# Patient Record
Sex: MALE | Race: WHITE | NOT HISPANIC OR LATINO | Employment: OTHER | URBAN - METROPOLITAN AREA
[De-identification: names, ages, dates, MRNs, and addresses within clinical notes are randomized per-mention and may not be internally consistent; named-entity substitution may affect disease eponyms.]

---

## 2017-04-11 DIAGNOSIS — K21.9 ESOPHAGEAL REFLUX: ICD-10-CM

## 2017-04-12 RX ORDER — OMEPRAZOLE 10 MG/1
CAPSULE, DELAYED RELEASE ORAL
Qty: 30 CAPSULE | Refills: 0 | Status: SHIPPED | OUTPATIENT
Start: 2017-04-12 | End: 2017-05-15

## 2017-04-12 NOTE — TELEPHONE ENCOUNTER
Medication is being filled for 1 time refill only due to:  Patient needs to be seen because it has been more than one year since last visit.   Last seen 4/11/16.  Hilary Orozco RN

## 2017-04-25 ENCOUNTER — OFFICE VISIT (OUTPATIENT)
Dept: TRANSPLANT | Facility: CLINIC | Age: 72
End: 2017-04-25
Attending: INTERNAL MEDICINE
Payer: COMMERCIAL

## 2017-04-25 ENCOUNTER — APPOINTMENT (OUTPATIENT)
Dept: LAB | Facility: CLINIC | Age: 72
End: 2017-04-25
Attending: INTERNAL MEDICINE
Payer: COMMERCIAL

## 2017-04-25 VITALS
BODY MASS INDEX: 25.31 KG/M2 | SYSTOLIC BLOOD PRESSURE: 118 MMHG | HEART RATE: 109 BPM | WEIGHT: 159.2 LBS | RESPIRATION RATE: 18 BRPM | DIASTOLIC BLOOD PRESSURE: 79 MMHG | OXYGEN SATURATION: 94 % | TEMPERATURE: 98.5 F

## 2017-04-25 DIAGNOSIS — C91.10 CLL (CHRONIC LYMPHOCYTIC LEUKEMIA) (H): ICD-10-CM

## 2017-04-25 LAB
ALBUMIN SERPL-MCNC: 4.1 G/DL (ref 3.4–5)
ALP SERPL-CCNC: 101 U/L (ref 40–150)
ALT SERPL W P-5'-P-CCNC: 26 U/L (ref 0–70)
ANION GAP SERPL CALCULATED.3IONS-SCNC: 5 MMOL/L (ref 3–14)
AST SERPL W P-5'-P-CCNC: 26 U/L (ref 0–45)
BASOPHILS # BLD AUTO: 0 10E9/L (ref 0–0.2)
BASOPHILS NFR BLD AUTO: 0 %
BILIRUB SERPL-MCNC: 0.8 MG/DL (ref 0.2–1.3)
BUN SERPL-MCNC: 15 MG/DL (ref 7–30)
CALCIUM SERPL-MCNC: 9.1 MG/DL (ref 8.5–10.1)
CHLORIDE SERPL-SCNC: 109 MMOL/L (ref 94–109)
CO2 SERPL-SCNC: 26 MMOL/L (ref 20–32)
CREAT SERPL-MCNC: 1.22 MG/DL (ref 0.66–1.25)
DIFFERENTIAL METHOD BLD: ABNORMAL
EOSINOPHIL # BLD AUTO: 0 10E9/L (ref 0–0.7)
EOSINOPHIL NFR BLD AUTO: 0 %
ERYTHROCYTE [DISTWIDTH] IN BLOOD BY AUTOMATED COUNT: 14 % (ref 10–15)
GFR SERPL CREATININE-BSD FRML MDRD: 59 ML/MIN/1.7M2
GLUCOSE SERPL-MCNC: 103 MG/DL (ref 70–99)
HCT VFR BLD AUTO: 43.3 % (ref 40–53)
HGB BLD-MCNC: 13.8 G/DL (ref 13.3–17.7)
LDH SERPL L TO P-CCNC: 240 U/L (ref 85–227)
LYMPHOCYTES # BLD AUTO: 89.5 10E9/L (ref 0.8–5.3)
LYMPHOCYTES NFR BLD AUTO: 94 %
MCH RBC QN AUTO: 30.4 PG (ref 26.5–33)
MCHC RBC AUTO-ENTMCNC: 31.9 G/DL (ref 31.5–36.5)
MCV RBC AUTO: 95 FL (ref 78–100)
MONOCYTES # BLD AUTO: 1 10E9/L (ref 0–1.3)
MONOCYTES NFR BLD AUTO: 1 %
NEUTROPHILS # BLD AUTO: 4.8 10E9/L (ref 1.6–8.3)
NEUTROPHILS NFR BLD AUTO: 5 %
PLATELET # BLD AUTO: 125 10E9/L (ref 150–450)
POTASSIUM SERPL-SCNC: 4.5 MMOL/L (ref 3.4–5.3)
PROT SERPL-MCNC: 6.9 G/DL (ref 6.8–8.8)
RBC # BLD AUTO: 4.54 10E12/L (ref 4.4–5.9)
RBC MORPH BLD: NORMAL
SODIUM SERPL-SCNC: 140 MMOL/L (ref 133–144)
WBC # BLD AUTO: 95.2 10E9/L (ref 4–11)

## 2017-04-25 PROCEDURE — 36415 COLL VENOUS BLD VENIPUNCTURE: CPT

## 2017-04-25 PROCEDURE — 80053 COMPREHEN METABOLIC PANEL: CPT | Performed by: INTERNAL MEDICINE

## 2017-04-25 PROCEDURE — 83615 LACTATE (LD) (LDH) ENZYME: CPT | Performed by: INTERNAL MEDICINE

## 2017-04-25 PROCEDURE — 85025 COMPLETE CBC W/AUTO DIFF WBC: CPT | Performed by: INTERNAL MEDICINE

## 2017-04-25 PROCEDURE — 99212 OFFICE O/P EST SF 10 MIN: CPT | Mod: ZF

## 2017-04-25 RX ORDER — CHLORAL HYDRATE 500 MG
2 CAPSULE ORAL DAILY
COMMUNITY
End: 2017-07-18

## 2017-04-25 ASSESSMENT — PAIN SCALES - GENERAL: PAINLEVEL: NO PAIN (0)

## 2017-04-25 NOTE — NURSING NOTE
Chief Complaint   Patient presents with     Blood Draw     labs drawn from right arm and vitals taken      Katya Ogden CMA April 25, 2017

## 2017-04-25 NOTE — MR AVS SNAPSHOT
After Visit Summary   4/25/2017    Loco Wood    MRN: [id number]           Patient Information     Date Of Birth          1945        Visit Information        Provider Department      4/25/2017 3:30 PM He Burns MD [facility]        Today's Diagnoses     CLL (chronic lymphocytic leukemia) (H)              [facility] ([facility]  [address] [place] [address]  Phone: [phone]  Clinic Hours:   Monday-Friday:    8am to 4:30pm   Weekends and holidays:    8am to noon (in general)  If your fever is 100.5  or greater,   call the clinic.  After hours call the   hospital at [phone] or   [phone]. Ask for the BMT   fellow for pediatric or adult patients            Follow-ups after your visit        Follow-up notes from your care team     Return for Physical Exam, Lab Work.      Your next 10 appointments already scheduled     May 15, 2017  9:15 AM CDT   PHYSICAL with Jameson Cisse MD   [facility] ([facility])    [address] [place] [id number] [phone]              Who to contact     If you have questions or need follow up information about today's clinic visit or your schedule please contact [facility] directly at [phone].  Normal or non-critical lab and imaging results will be communicated to you by MyChart, letter or phone within 4 business days after the clinic has received the results. If you do not hear from us within 7 days, please contact the clinic through piSocietyhart or phone. If you have a critical or abnormal lab result, we will notify you by phone as soon as possible.  Submit refill requests through [employer] or call your pharmacy and they will forward the refill request to us. Please allow 3 business days for your refill to be completed.          Additional Information About Your Visit        MyChart Information     [employer] gives you  secure access to your electronic health record. If you see a primary care provider, you can also send messages to your care team and make appointments. If you have questions, please call your primary care clinic.  If you do not have a primary care provider, please call [phone] and they will assist you.        Care EveryWhere ID     This is your Care EveryWhere ID. This could be used by other organizations to access your [place] medical records  [id number]        Your Vitals Were     Pulse Temperature Respirations Pulse Oximetry BMI (Body Mass Index)       109 98.5  F (36.9  C) (Oral) 18 94% 25.31 kg/m2        Blood Pressure from Last 3 Encounters:   04/25/17 118/79   10/25/16 111/70   04/11/16 113/69    Weight from Last 3 Encounters:   04/25/17 72.2 kg (159 lb 3.2 oz)   10/25/16 72.6 kg (160 lb)   04/11/16 71.2 kg (157 lb)              We Performed the Following     *CBC with platelets differential     Comprehensive metabolic panel     Lactate Dehydrogenase        Recent Review Flowsheet Data     BMT Recent Results Latest Ref Rng & Units 6/12/2014 9/19/2014 4/29/2015 11/20/2015 4/11/2016 10/25/2016 4/25/2017    WBC 4.0 - 11.0 10e9/L 46.9(H) 37.0(H) 52.9(HH) 54.2(HH) 64.1(HH) 95.6(HH) 95.2(HH)    Hemoglobin 13.3 - 17.7 g/dL 13.9 14.4 14.8 14.0 14.3 13.2(L) 13.8    Platelet Count 150 - 450 10e9/L 133(L) 129(L) 107(L) 116(L) 112(L) 116(L) 125(L)    Neutrophils (Absolute) 1.6 - 8.3 10e9/L - 4.1 3.8 3.3 3.8 6.7 4.8    INR 0.86 - 1.14 - - - - - - -    Sodium 133 - 144 mmol/L - 142 143 140 141 144 140    Potassium 3.4 - 5.3 mmol/L - 4.6 4.2 3.9 4.0 4.4 4.5    Chloride 94 - 109 mmol/L - 107 111(H) 106 106 107 109    Glucose 70 - 99 mg/dL - 82 99 98 92 88 103(H)    Urea Nitrogen 7 - 30 mg/dL - 17 15 19 18 14 15    Creatinine 0.66 - 1.25 mg/dL - 1.19 1.26(H) 1.34(H) 1.23 1.35(H) 1.22    Calcium (Total) 8.5 - 10.1 mg/dL - 9.2 9.3 8.9 9.3 9.0 9.1    Protein (Total) 6.8 - 8.8 g/dL - 6.9 6.7(L) 6.6(L) 7.0 6.6(L) 6.9     Albumin 3.4 - 5.0 g/dL - 4.1 4.0 3.9 4.4 4.0 4.1    Alkaline Phosphatase 40 - 150 U/L - 78 88 75 73 84 101    AST 0 - 45 U/L - 24 18 22 18 24 26    ALT 0 - 70 U/L - 26 22 27 22 26 26    MCV 78 - 100 fl 92 93 94 95 97 96 95               Primary Care Provider Office Phone # Fax #    Jameson Cisse [phone]       [facility] [address] [facility]  [address]  [facility] [address]        Thank you!     Thank you for choosing [facility]  for your care. Our goal is always to provide you with excellent care. Hearing back from our patients is one way we can continue to improve our services. Please take a few minutes to complete the written survey that you may receive in the mail after your visit with us. Thank you!             Your Updated Medication List - Protect others around you: Learn how to safely use, store and throw away your medicines at www.disposemymeds.org.          This list is accurate as of: 4/25/17 11:59 PM.  Always use your most recent med list.                   Brand Name Dispense Instructions for use    atorvastatin 10 MG tablet    LIPITOR    45 tablet    Take 1 tablet (10 mg) by mouth every 48 hours       fish oil-omega-3 fatty acids 1000 MG capsule      Take 2 g by mouth daily       omeprazole 10 MG CR capsule    priLOSEC    30 capsule    TAKE 1 CAPSULE BY MOUTH DAILY - TAKE 30 TO 60 MINUTES BEFORE A MEAL -       order for DME     1 Device    Equipment being ordered: Superfeet - Blue - Size E       sildenafil 100 MG cap/tab    VIAGRA    18 tablet    Take 0.5-1 tablets ( mg) by mouth daily as needed for erectile dysfunction Take 30 min to 4 hours before intercourse.       VITAMIN D3 PO      Take 2,000 Units by mouth

## 2017-04-25 NOTE — PROGRESS NOTES
Hematology/Oncology Evaluation      Loco Wood is a 71 year old male previously followed by Dr. Dias for CLL.      Hematologic history:  Diagnosed 2/12/2007 with CLL, Lyles stage 0, followed clinically since.  At diagnosis WBC 17.8, ALC 11.2, hemoglobin 15.2, platelets 188.  Flow consistent with CLL.  No opportunistic infections, with IgG in the normal range during follow up.    Date Treatment Response Toxicities/Complications    Observation alone.                  HPI:  Please see entry above for hematologic history.  Overall, he has been doing well. He spent his winter in [place] this year. It went well except for it was complicated by bed bugs at whichever place he was renting. He otherwise has done quite well, he denies any major symptoms, his energy level is good. He has no fevers, chills, or night sweats. His weight is stable. He continues to be quite active. He has no new lumps/bumps and no new pain. He has no chest pain or shortness of breath.    RECOMMENDATIONS:  Mr. Wood is being monitored for his early stage CLL. CBC is notable today for elevated WBC of 95 which is stable since last visit, he has had a slow increase in his WBC over the last several years plus this recent jump, but since his last visit it is largely stable. His hemoglobin and platelets are also stable and he has no adenopathy or organomegaly. At this point he does not have any strict indication to start treatment for his CLL. We reviewed that if he develops cytopenias or symptoms that would be an indication to treat. We briefly reviewed treatment options but did not go into great detail as the treatment landscape will likely change over time.     He asked about testosterone which should not have an impact on his CLL. He can discuss with his PCP about this in the future.  We discussed red flag symptoms, including weight loss, fevers, night sweats, increasing adenopathy, or progressive immune dysfunction, for which he  should be seen.     We reviewed:  1. RTC in 6 months with labs at that time  2. RTC sooner if unexplained weight loss, night sweats, increasing adenopathy, or other new symptoms.    Patient seen and discussed with Dr. Grant Hair MD  Heme/Onc Fellow    _______________________________________________    ATTENDING NOTE    I have seen and personally evaluated the patient today.  I reviewed vitals, medications, laboratory results, and I viewed pertinent imaging studies.  After doing so, I formulated a plan with Dr. Hair as documented in this note.  I personally communicated recommendations to the patient.      He Burns MD, pager [phone]         ROS: 10 point ROS neg other than the symptoms noted above in the HPI.          Allergies   Allergen Reactions     Dilaudid [Hydromorphone] Itching     Morphine Itching        Current Outpatient Prescriptions   Medication Sig Dispense Refill     fish oil-omega-3 fatty acids 1000 MG capsule Take 2 g by mouth daily       omeprazole (PRILOSEC) 10 MG CR capsule TAKE 1 CAPSULE BY MOUTH DAILY - TAKE 30 TO 60 MINUTES BEFORE A MEAL -  30 capsule 0     atorvastatin (LIPITOR) 10 MG tablet Take 1 tablet (10 mg) by mouth every 48 hours 45 tablet 9     Cholecalciferol (VITAMIN D3 PO) Take 2,000 Units by mouth       ORDER FOR DME Equipment being ordered: Superfeet - Blue - Size E 1 Device 0     sildenafil (VIAGRA) 100 MG tablet Take 0.5-1 tablets ( mg) by mouth daily as needed for erectile dysfunction Take 30 min to 4 hours before intercourse. 18 tablet prn         Physical Exam:     Vital Signs: /79 (BP Location: Right arm, Patient Position: Chair, Cuff Size: Adult Regular)  Pulse 109  Temp 98.5  F (36.9  C) (Oral)  Resp 18  Wt 72.2 kg (159 lb 3.2 oz)  SpO2 94%  BMI 25.31 kg/m2    KPS:  100%, ECOG 0     General Appearance: healthy, alert and no distress  Eyes: PERRL, conjunctiva and lids normal, sclera nonicteric  Ears/Nose/M/Throat: Oral mucosa and  posterior oropharynx normal, moist mucous membranes  Neck supple, non-tender, free range of motion, no adenopathy  Cardio/Vascular:regular rate and rhythm, normal S1 and S2, no murmur  Resp Effort And Auscultation: Normal - Clear to auscultation without rales, rhonchi, or wheezing.  GI: soft, nontender, no hepatosplenomegaly   Lymphatics:no significant enlargement of lymph nodes globally   Musculoskeletal: Musculoskeletal normal  Edema: none  Skin: Skin color, texture, turgor normal. No rashes or lesions.  Neurologic: negative  Psych/Affect: Mood and affect are appropriate.  Vascular Access:  none      Loco understood the above assessment and recommendations.  Multiple questions answered.  No barriers to learning identified.         Total time: 25 minutes  Counseling time: 20 minutes  Prolonged service:  no      ------------------------------------------------------------------------------------------------------------------------------------------------    Patient Care Team      Relationship Specialty Notifications Start End    Jameson Cisse MD PCP - General   12/2/02     Comment:  per patient    Phone: [phone] Fax: [phone]         [facility] [address] [facility] [address] [address]    Yodit Barnett PA Physician Assistant   12/2/13     Phone: [phone] Fax: [phone]         [address] [address]

## 2017-04-25 NOTE — NURSING NOTE
"Loco Wood is a 71 year old male who presents for:  Chief Complaint   Patient presents with     Blood Draw     labs drawn from right arm and vitals taken      Oncology Clinic Visit     CLL        Initial Vitals:  /79 (BP Location: Right arm, Patient Position: Chair, Cuff Size: Adult Regular)  Pulse 109  Temp 98.5  F (36.9  C) (Oral)  Resp 18  Wt 72.2 kg (159 lb 3.2 oz)  SpO2 94%  BMI 25.31 kg/m2 Estimated body mass index is 25.31 kg/(m^2) as calculated from the following:    Height as of 4/11/16: 1.689 m (5' 6.5\").    Weight as of this encounter: 72.2 kg (159 lb 3.2 oz).. Body surface area is 1.84 meters squared. BP completed using cuff size: NA (Not Taken)  No Pain (0) No LMP for male patient. Allergies and medications reviewed.     Medications: Medication refills not needed today.  Pharmacy name entered into EPIC:    [facility] - [place], [place] - [address] [facility]  [facility] - [place], [place] - [address]    Comments:     5 minutes for nursing intake (face to face time)   NILTON GRECO CMA        "

## 2017-05-15 ENCOUNTER — OFFICE VISIT (OUTPATIENT)
Dept: FAMILY MEDICINE | Facility: CLINIC | Age: 72
End: 2017-05-15
Payer: COMMERCIAL

## 2017-05-15 VITALS
HEIGHT: 67 IN | WEIGHT: 156 LBS | RESPIRATION RATE: 16 BRPM | DIASTOLIC BLOOD PRESSURE: 73 MMHG | SYSTOLIC BLOOD PRESSURE: 121 MMHG | OXYGEN SATURATION: 96 % | BODY MASS INDEX: 24.48 KG/M2 | TEMPERATURE: 96.9 F | HEART RATE: 68 BPM

## 2017-05-15 DIAGNOSIS — K21.9 GASTROESOPHAGEAL REFLUX DISEASE WITHOUT ESOPHAGITIS: ICD-10-CM

## 2017-05-15 DIAGNOSIS — N40.2 PROSTATE NODULE: ICD-10-CM

## 2017-05-15 DIAGNOSIS — Z00.01 ENCOUNTER FOR ROUTINE ADULT HEALTH EXAMINATION WITH ABNORMAL FINDINGS: Primary | ICD-10-CM

## 2017-05-15 DIAGNOSIS — M62.50 MUSCULAR ATROPHY, UNSPECIFIED SITE: ICD-10-CM

## 2017-05-15 DIAGNOSIS — M72.2 PLANTAR FASCIITIS: ICD-10-CM

## 2017-05-15 DIAGNOSIS — E78.5 HYPERLIPIDEMIA LDL GOAL <130: ICD-10-CM

## 2017-05-15 LAB — PSA SERPL-ACNC: 4.76 UG/L (ref 0–4)

## 2017-05-15 PROCEDURE — G0103 PSA SCREENING: HCPCS | Performed by: FAMILY MEDICINE

## 2017-05-15 PROCEDURE — 99397 PER PM REEVAL EST PAT 65+ YR: CPT | Performed by: FAMILY MEDICINE

## 2017-05-15 PROCEDURE — 84403 ASSAY OF TOTAL TESTOSTERONE: CPT | Performed by: FAMILY MEDICINE

## 2017-05-15 PROCEDURE — 36415 COLL VENOUS BLD VENIPUNCTURE: CPT | Performed by: FAMILY MEDICINE

## 2017-05-15 RX ORDER — OMEPRAZOLE 10 MG/1
CAPSULE, DELAYED RELEASE ORAL
Qty: 90 CAPSULE | Refills: 3 | Status: SHIPPED | OUTPATIENT
Start: 2017-05-15 | End: 2018-05-19

## 2017-05-15 RX ORDER — ATORVASTATIN CALCIUM 10 MG/1
10 TABLET, FILM COATED ORAL
Qty: 45 TABLET | Refills: 9 | Status: SHIPPED | OUTPATIENT
Start: 2017-05-15 | End: 2017-12-21

## 2017-05-15 NOTE — PROGRESS NOTES
Well,this is just slightly high, so hard to conclude anything but see what the urologists think next

## 2017-05-15 NOTE — PROGRESS NOTES
SUBJECTIVE:                                                            Loco Wood is a 72 year old male who presents for Preventive Visit.      Are you in the first 12 months of your Medicare coverage?  No    Physical   Annual:     Getting at least 3 servings of Calcium per day::  Yes    Bi-annual eye exam::  Yes    Dental care twice a year::  Yes    Sleep apnea or symptoms of sleep apnea::  None    Frequency of exercise::  2-3 days/week    Duration of exercise::  45-60 minutes    Taking medications regularly::  Yes    Medication side effects::  None    Additional concerns today::  YES      COGNITIVE SCREEN  1) Repeat 3 items (Banana, Sunrise, Chair)    2) Clock draw: NORMAL  3) 3 item recall: Recalls 3 objects  Results: 3 items recalled: COGNITIVE IMPAIRMENT LESS LIKELY    Mini-CogTM Copyright S Hanna. Licensed by the author for use in [facility]; reprinted with permission (nithin@[facility]). All rights reserved.        Reviewed and updated as needed this visit by clinical staff  Meds  Problems         Reviewed and updated as needed this visit by Provider  Meds  Problems        Social History   Substance Use Topics     Smoking status: Never Smoker     Smokeless tobacco: Never Used     Alcohol use 0.0 oz/week     0 Standard drinks or equivalent per week      Comment: 3 drinks per week       The patient does not drink >3 drinks per day nor >7 drinks per week.        Will discuss with provider     Today's PHQ-2 Score:   PHQ-2 ( 1999 [employer]) 5/12/2017   Little interest or pleasure in doing things Not at all   Feeling down, depressed or hopeless Not at all   PHQ-2 Score 0       Do you feel safe in your environment - Yes    Do you have a Health Care Directive?: Yes: Patient states has Advance Directive and will bring in a copy to clinic.    Current providers sharing in care for this patient include:   Patient Care Team:  Jameson Cisse MD as PCP - General  Yodit Barnett PA as Physician  Assistant      Hearing impairment: No    Ability to successfully perform activities of daily living: Yes, no assistance needed     Fall risk:       Home safety:  none identified  click delete button to remove this line now    The following health maintenance items are reviewed in Epic and correct as of today:  Health Maintenance   Topic Date Due     AORTIC ANEURYSM SCREENING (SYSTEM ASSIGNED)  04/28/2012     FALL RISK ASSESSMENT  04/11/2017     ADVANCE DIRECTIVE PLANNING Q5 YRS (NO INBASKET)  08/02/2017     COLONOSCOPY Q5 YR INBASKET MESSAGE  08/14/2017     INFLUENZA VACCINE (SYSTEM ASSIGNED)  09/01/2017     LIPID MONITORING Q1 YEAR( NO INBASKET)  10/25/2017     TETANUS IMMUNIZATION (SYSTEM ASSIGNED)  05/21/2018     PNEUMOCOCCAL  Completed     HEPATITIS C SCREENING  Completed              ROS:he is concerned about his lack of being able to build up muscle tissue, was to see if his testosterone level is okay, initially wanted to take a testosterone supplement which I recommended against unless his level is consistently low on 2 or 3 occasions. I did find a small nodule on his prostate. There is 1 cm swelling in the anterior prostate area that apparently is new. Thus we did a PSA test today and he will see urology. His insurance is about to change as he retires and that may change where he can go.  We also talked about plantar fasciitis, was seen 7 months ago and had a cortisone shot which made a big difference but it's coming back.  C: NEGATIVE for fever, chills, change in weight  I: NEGATIVE for worrisome rashes, moles or lesions  E: NEGATIVE for vision changes or irritation  E/M: NEGATIVE for ear, mouth and throat problems  R: NEGATIVE for significant cough or SOB  B: NEGATIVE for masses, tenderness or discharge  CV: NEGATIVE for chest pain, palpitations or peripheral edema  GI: NEGATIVE for nausea, abdominal pain, heartburn, or change in bowel habits  : NEGATIVE for frequency, dysuria, or hematuria  M: NEGATIVE  "for significant arthralgias or myalgia  N: NEGATIVE for weakness, dizziness or paresthesias  E: NEGATIVE for temperature intolerance, skin/hair changes  H: NEGATIVE for bleeding problems  P: NEGATIVE for changes in mood or affect    Problem list, Medication list, Allergies, and Medical/Social/Surgical histories reviewed in [facility] and updated as appropriate.  OBJECTIVE:                                                            There were no vitals taken for this visit. Estimated body mass index is 25.31 kg/(m^2) as calculated from the following:    Height as of 4/11/16: 5' 6.5\" (1.689 m).    Weight as of 4/25/17: 159 lb 3.2 oz (72.2 kg).  EXAM:   GENERAL: healthy, alert and no distress  EYES: Eyes grossly normal to inspection, PERRL and conjunctivae and sclerae normal  HENT: ear canals and TM's normal, nose and mouth without ulcers or lesions  NECK: no adenopathy, no asymmetry, masses, or scars and thyroid normal to palpation  RESP: lungs clear to auscultation - no rales, rhonchi or wheezes  CV: regular rate and rhythm, normal S1 S2, no S3 or S4, no murmur, click or rub, no peripheral edema and peripheral pulses strong  ABDOMEN: soft, nontender, no hepatosplenomegaly, no masses and bowel sounds normal   (male): normal male genitalia without lesions or urethral discharge, no hernia  RECTAL: normal sphincter tone, no rectal masses and prostate as above  MS: no gross musculoskeletal defects noted, no edema  SKIN: no suspicious lesions or rashes  NEURO: Normal strength and tone, mentation intact and speech normal  PSYCH: mentation appears normal, affect normal/bright    ASSESSMENT / PLAN:                                                            1. Encounter for routine adult health examination with abnormal findings    - GASTROENTEROLOGY ADULT REF PROCEDURE ONLY    2. Gastroesophageal reflux disease without esophagitis    - omeprazole (PRILOSEC) 10 MG CR capsule; TAKE 1 CAPSULE BY MOUTH DAILY - TAKE 30 TO 60 MINUTES " "BEFORE A MEAL -  Dispense: 90 capsule; Refill: 3    3. Hyperlipidemia LDL goal <130    - atorvastatin (LIPITOR) 10 MG tablet; Take 1 tablet (10 mg) by mouth every 48 hours  Dispense: 45 tablet; Refill: 9    4. Muscular atrophy, unspecified site    - Testosterone, total    5. Plantar fasciitis      6. Prostate nodule    - PSA, screen  - UROLOGY ADULT REFERRAL    End of Life Planning:  Patient currently has an advanced directive: Yes.  Practitioner is supportive of decision.    COUNSELING:  Reviewed preventive health counseling, as reflected in patient instructions       Regular exercise       Healthy diet/nutrition        Estimated body mass index is 25.31 kg/(m^2) as calculated from the following:    Height as of 4/11/16: 5' 6.5\" (1.689 m).    Weight as of 4/25/17: 159 lb 3.2 oz (72.2 kg).     reports that he has never smoked. He has never used smokeless tobacco.      Appropriate preventive services were discussed with this patient, including applicable screening as appropriate for cardiovascular disease, diabetes, osteopenia/osteoporosis, and glaucoma.  As appropriate for age/gender, discussed screening for colorectal cancer, prostate cancer, breast cancer, and cervical cancer. Checklist reviewing preventive services available has been given to the patient.    Reviewed patients plan of care and provided an AVS. The Basic Care Plan (routine screening as documented in Health Maintenance) for Loco meets the Care Plan requirement. This Care Plan has been established and reviewed with the Patient.    Counseling Resources:  ATP IV Guidelines  Pooled Cohorts Equation Calculator  Breast Cancer Risk Calculator  FRAX Risk Assessment  ICSI Preventive Guidelines  Dietary Guidelines for Americans, 2010  [employer]s MyPlate  ASA Prophylaxis  Lung CA Screening    Jameson Cisse MD  [facility]  Answers for HPI/ROS submitted by the patient on 5/12/2017   Q1: Little interest or pleasure in doing things: 0=Not at " all  Q2: Feeling down, depressed or hopeless: 0=Not at all  PHQ-2 Score: 0

## 2017-05-15 NOTE — NURSING NOTE
"Chief Complaint   Patient presents with     Wellness Visit     /73  Pulse 68  Temp 96.9  F (36.1  C) (Oral)  Resp 16  Ht 5' 6.5\" (1.689 m)  Wt 156 lb (70.8 kg)  SpO2 96%  BMI 24.8 kg/m2 Estimated body mass index is 24.8 kg/(m^2) as calculated from the following:    Height as of this encounter: 5' 6.5\" (1.689 m).    Weight as of this encounter: 156 lb (70.8 kg).  bp completed using cuff size: regular       Health Maintenance addressed:  NONE    n/a    Maude Lima MA     "

## 2017-05-15 NOTE — MR AVS SNAPSHOT
After Visit Summary   5/15/2017    Loco Wood    MRN: [id number]           Patient Information     Date Of Birth          1945        Visit Information        Provider Department      5/15/2017 9:15 AM Jameson Cisse MD [facility]        Today's Diagnoses     Encounter for routine adult health examination with abnormal findings    -  1    Gastroesophageal reflux disease without esophagitis        Hyperlipidemia LDL goal <130        Muscular atrophy, unspecified site        Plantar fasciitis        Prostate nodule           Follow-ups after your visit        Additional Services     GASTROENTEROLOGY ADULT REF PROCEDURE ONLY       Last Lab Result: Creatinine (mg/dL)       Date                     Value                 04/25/2017               1.22             ----------  There is no height or weight on file to calculate BMI.      Patient will be contacted to schedule procedure.     Please be aware that coverage of these services is subject to the terms and limitations of your health insurance plan.  Call member services at your health plan with any benefit or coverage questions.  Any procedures must be performed at a [facility] facility OR coordinated by your clinic's referral office.    Please bring the following with you to your appointment:    (1) Any X-Rays, CTs or MRIs which have been performed.  Contact the facility where they were done to arrange for  prior to your scheduled appointment.    (2) List of current medications   (3) This referral request   (4) Any documents/labs given to you for this referral            UROLOGY ADULT REFERRAL       Your provider has referred you to: [facility]: [place] [facility] - [place] [phone]   [email or web address]    Please be aware that coverage of these services is subject to the terms and limitations of your health insurance plan.  Call  member services at your health plan with any benefit or coverage questions.      Please bring the following with you to your appointment:    (1) Any X-Rays, CTs or MRIs which have been performed.  Contact the facility where they were done to arrange for  prior to your scheduled appointment.    (2) List of current medications  (3) This referral request   (4) Any documents/labs given to you for this referral                  Your next 10 appointments already scheduled     Oct 24, 2017  4:00 PM CDT   [facility] with  [facility]   [facility] ([facility])    [address] [place] [id number] [phone]            Oct 24, 2017  4:30 PM CDT   RETURN ONC with He Burns MD   [facility] Blood and Marrow Transplant ([facility])    [address] [place] [address] [phone]              Who to contact     If you have questions or need follow up information about today's clinic visit or your schedule please contact [facility] directly at [phone].  Normal or non-critical lab and imaging results will be communicated to you by MyChart, letter or phone within 4 business days after the clinic has received the results. If you do not hear from us within 7 days, please contact the clinic through [employer] or phone. If you have a critical or abnormal lab result, we will notify you by phone as soon as possible.  Submit refill requests through GetYou or call your pharmacy and they will forward the refill request to us. Please allow 3 business days for your refill to be completed.          Additional Information About Your Visit        [employer] Information     [employer] gives you secure access to your electronic health record. If you see a primary care provider, you can also send messages to your care team and make appointments. If you have questions, please call your primary  "care clinic.  If you do not have a primary care provider, please call [phone] and they will assist you.        Care EveryWhere ID     This is your Care EveryWhere ID. This could be used by other organizations to access your [facility] medical records  [id number]        Your Vitals Were     Pulse Temperature Respirations Height Pulse Oximetry BMI (Body Mass Index)    68 96.9  F (36.1  C) (Oral) 16 5' 6.5\" (1.689 m) 96% 24.8 kg/m2       Blood Pressure from Last 3 Encounters:   05/15/17 121/73   04/25/17 118/79   10/25/16 111/70    Weight from Last 3 Encounters:   05/15/17 156 lb (70.8 kg)   04/25/17 159 lb 3.2 oz (72.2 kg)   10/25/16 160 lb (72.6 kg)              We Performed the Following     GASTROENTEROLOGY ADULT REF PROCEDURE ONLY     PSA, screen     Testosterone, total     UROLOGY ADULT REFERRAL          Today's Medication Changes          These changes are accurate as of: 5/15/17 10:33 AM.  If you have any questions, ask your nurse or doctor.               These medicines have changed or have updated prescriptions.        Dose/Directions    omeprazole 10 MG CR capsule   Commonly known as:  priLOSEC   This may have changed:  See the new instructions.   Used for:  Gastroesophageal reflux disease without esophagitis   Changed by:  Jameson Cisse MD        TAKE 1 CAPSULE BY MOUTH DAILY - TAKE 30 TO 60 MINUTES BEFORE A MEAL -   Quantity:  90 capsule   Refills:  3            Where to get your medicines      Some of these will need a paper prescription and others can be bought over the counter.  Ask your nurse if you have questions.     Bring a paper prescription for each of these medications     atorvastatin 10 MG tablet    omeprazole 10 MG CR capsule                Primary Care Provider Office Phone # Fax #    Jameson Cisse [phone]       [address] [address]        Thank you!     Thank you for choosing [facility]  for " your care. Our goal is always to provide you with excellent care. Hearing back from our patients is one way we can continue to improve our services. Please take a few minutes to complete the written survey that you may receive in the mail after your visit with us. Thank you!             Your Updated Medication List - Protect others around you: Learn how to safely use, store and throw away your medicines at www.disposemymeds.org.          This list is accurate as of: 5/15/17 10:33 AM.  Always use your most recent med list.                   Brand Name Dispense Instructions for use    atorvastatin 10 MG tablet    LIPITOR    45 tablet    Take 1 tablet (10 mg) by mouth every 48 hours       fish oil-omega-3 fatty acids 1000 MG capsule      Take 2 g by mouth daily       omeprazole 10 MG CR capsule    priLOSEC    90 capsule    TAKE 1 CAPSULE BY MOUTH DAILY - TAKE 30 TO 60 MINUTES BEFORE A MEAL -       order for DME     1 Device    Equipment being ordered: Superfeet - Blue - Size E       sildenafil 100 MG cap/tab    VIAGRA    18 tablet    Take 0.5-1 tablets ( mg) by mouth daily as needed for erectile dysfunction Take 30 min to 4 hours before intercourse.       VITAMIN D3 PO      Take 2,000 Units by mouth

## 2017-05-17 LAB — TESTOST SERPL-MCNC: 637 NG/DL (ref 240–950)

## 2017-05-18 ENCOUNTER — PRE VISIT (OUTPATIENT)
Dept: UROLOGY | Facility: CLINIC | Age: 72
End: 2017-05-18

## 2017-05-29 ENCOUNTER — HOSPITAL ENCOUNTER (EMERGENCY)
Facility: CLINIC | Age: 72
Discharge: HOME | End: 2017-05-29
Attending: EMERGENCY MEDICINE | Admitting: EMERGENCY MEDICINE
Payer: COMMERCIAL

## 2017-05-29 VITALS
SYSTOLIC BLOOD PRESSURE: 114 MMHG | RESPIRATION RATE: 16 BRPM | DIASTOLIC BLOOD PRESSURE: 71 MMHG | TEMPERATURE: 98.2 F | HEART RATE: 72 BPM | OXYGEN SATURATION: 96 %

## 2017-05-29 DIAGNOSIS — G89.29 CHRONIC MIDLINE LOW BACK PAIN WITHOUT SCIATICA: ICD-10-CM

## 2017-05-29 DIAGNOSIS — M54.50 CHRONIC MIDLINE LOW BACK PAIN WITHOUT SCIATICA: ICD-10-CM

## 2017-05-29 PROCEDURE — 25000128 H RX IP 250 OP 636: Performed by: EMERGENCY MEDICINE

## 2017-05-29 PROCEDURE — 99284 EMERGENCY DEPT VISIT MOD MDM: CPT | Mod: Z6 | Performed by: EMERGENCY MEDICINE

## 2017-05-29 PROCEDURE — 25000132 ZZH RX MED GY IP 250 OP 250 PS 637: Performed by: EMERGENCY MEDICINE

## 2017-05-29 PROCEDURE — 96375 TX/PRO/DX INJ NEW DRUG ADDON: CPT | Performed by: EMERGENCY MEDICINE

## 2017-05-29 PROCEDURE — 96376 TX/PRO/DX INJ SAME DRUG ADON: CPT | Performed by: EMERGENCY MEDICINE

## 2017-05-29 PROCEDURE — 25000125 ZZHC RX 250: Performed by: EMERGENCY MEDICINE

## 2017-05-29 PROCEDURE — 99285 EMERGENCY DEPT VISIT HI MDM: CPT | Mod: 25 | Performed by: EMERGENCY MEDICINE

## 2017-05-29 PROCEDURE — 96374 THER/PROPH/DIAG INJ IV PUSH: CPT | Performed by: EMERGENCY MEDICINE

## 2017-05-29 RX ORDER — DIAZEPAM 10 MG/2ML
2.5 INJECTION, SOLUTION INTRAMUSCULAR; INTRAVENOUS ONCE
Status: COMPLETED | OUTPATIENT
Start: 2017-05-29 | End: 2017-05-29

## 2017-05-29 RX ORDER — DIAZEPAM 5 MG
5 TABLET ORAL EVERY 6 HOURS PRN
Qty: 10 TABLET | Refills: 0 | Status: SHIPPED | OUTPATIENT
Start: 2017-05-29 | End: 2017-05-31

## 2017-05-29 RX ORDER — KETOROLAC TROMETHAMINE 30 MG/ML
30 INJECTION, SOLUTION INTRAMUSCULAR; INTRAVENOUS ONCE
Status: COMPLETED | OUTPATIENT
Start: 2017-05-29 | End: 2017-05-29

## 2017-05-29 RX ORDER — CYCLOBENZAPRINE HCL 10 MG
10 TABLET ORAL ONCE
Status: COMPLETED | OUTPATIENT
Start: 2017-05-29 | End: 2017-05-29

## 2017-05-29 RX ORDER — FENTANYL CITRATE 50 UG/ML
100 INJECTION, SOLUTION INTRAMUSCULAR; INTRAVENOUS ONCE
Status: COMPLETED | OUTPATIENT
Start: 2017-05-29 | End: 2017-05-29

## 2017-05-29 RX ADMIN — KETOROLAC TROMETHAMINE 30 MG: 30 INJECTION, SOLUTION INTRAMUSCULAR at 17:44

## 2017-05-29 RX ADMIN — DIAZEPAM 2.5 MG: 5 INJECTION, SOLUTION INTRAMUSCULAR; INTRAVENOUS at 19:35

## 2017-05-29 RX ADMIN — CYCLOBENZAPRINE HYDROCHLORIDE 10 MG: 10 TABLET, FILM COATED ORAL at 19:34

## 2017-05-29 RX ADMIN — DIAZEPAM 2.5 MG: 5 INJECTION, SOLUTION INTRAMUSCULAR; INTRAVENOUS at 17:43

## 2017-05-29 RX ADMIN — FENTANYL CITRATE 100 MCG: 50 INJECTION, SOLUTION INTRAMUSCULAR; INTRAVENOUS at 20:46

## 2017-05-29 NOTE — ED NOTES
Bed: ED08  Expected date: 5/29/17  Expected time: 5:17 PM  Means of arrival: Ambulance  Comments:  416 73yo male baack pain

## 2017-05-29 NOTE — ED PROVIDER NOTES
History     Chief Complaint   Patient presents with     Back Pain     HPI  Loco Wood is a 72 year old male with a history of CLL, and chronic back pain s/p L4-S1 fusion who presents for evaluation of back pain. Patient complains of non-radiating, bilateral lower back pain with associated back spasms that began today while he was at the gym. He denies numbness or weakness. No saddle anesthesia. No changes in bowel or bladder movements. He is not followed by a chiropractor or in physical therapy.     I have reviewed the Medications, Allergies, Past Medical and Surgical History, and Social History in the [employer] system.  Past Medical History:   Diagnosis Date     Arthritis     hip and toes     Chronic pain      CLL (chronic lymphocytic leukaemia)      Esophageal reflux      Malignant neoplasm (H)     Leukemia     PONV (postoperative nausea and vomiting)      Pure hypercholesterolemia        Past Surgical History:   Procedure Laterality Date     ARTHROPLASTY MINIMALLY INVASIVE HIP  5/10/2013    Procedure: ARTHROPLASTY MINIMALLY INVASIVE HIP;  Left Two Incision Total Hip Arthroplasty ;  Surgeon: Desmond Alberts MD;  Location: [facility] OR     ARTHROPLASTY MINIMALLY INVASIVE HIP  2/27/2014    Procedure: ARTHROPLASTY MINIMALLY INVASIVE HIP;  Right Total Hip Arthroplasty Minimally Invasive Two Incision  *Latex Free Room;  Surgeon: Desmond Alberts MD;  Location: [facility] OR     BACK SURGERY      back fusion 1994     C NONSPECIFIC PROCEDURE  1964 &'95    (R) inguinal herniorrhapy     C NONSPECIFIC PROCEDURE  1949    (L) inguinal herniorrhaphy     C NONSPECIFIC PROCEDURE  1994    L4-5  L5 S1 fusion - spondylolisthesis     COLONOSCOPY      2007     GI SURGERY      4 hernia repairs in childhood       Family History   Problem Relation Age of Onset     HEART DISEASE Father      CEREBROVASCULAR DISEASE Father      CANCER Father      melonoma     Melanoma Father      CANCER Mother      Lung cancer     CEREBROVASCULAR DISEASE  Paternal Uncle      Aneurism     Breast Cancer Maternal Aunt       from it     CANCER Paternal Uncle      CANCER Paternal Aunt      Skin Cancer No family hx of        Social History   Substance Use Topics     Smoking status: Never Smoker     Smokeless tobacco: Never Used     Alcohol use 0.0 oz/week     0 Standard drinks or equivalent per week      Comment: 3 drinks per week     No current facility-administered medications for this encounter.      Current Outpatient Prescriptions   Medication     diazepam (VALIUM) 5 MG tablet     omeprazole (PRILOSEC) 10 MG CR capsule     atorvastatin (LIPITOR) 10 MG tablet     fish oil-omega-3 fatty acids 1000 MG capsule     Cholecalciferol (VITAMIN D3 PO)     ORDER FOR DME     sildenafil (VIAGRA) 100 MG tablet        Allergies   Allergen Reactions     Dilaudid [Hydromorphone] Itching     Morphine Itching       Review of Systems  ROS: 14 point ROS neg other than the symptoms noted above in the HPI.    Physical Exam      Physical Exam   Constitutional: He is oriented to person, place, and time. He appears well-developed and well-nourished. No distress.   HENT:   Head: Normocephalic and atraumatic.   Eyes: No scleral icterus.   Neck: Normal range of motion. Neck supple.   Pulmonary/Chest: Effort normal.   Musculoskeletal:        Lumbar back: He exhibits decreased range of motion, tenderness, pain and spasm. He exhibits no bony tenderness.   Neurological: He is alert and oriented to person, place, and time.   Skin: Skin is warm and dry. No rash noted. He is not diaphoretic. No erythema. No pallor.       ED Course     ED Course     Procedures       5:34 PM  The patient was seen and examined by Dr. Thomas in Room 8.          Critical Care time:  none               Labs Ordered and Resulted from Time of ED Arrival Up to the Time of Departure from the ED - No data to display         Assessments & Plan (with Medical Decision Making)   This is a 71 y/o male coming to the ED with lower  back pain after working out at the gym today. He admits to chronic lower back pain. Today he is obvious tense in his lower back but has no signs of cauda equina. After 2 doses of valium, flexeril and fentanyl he was able to stand and ambulate without issue. I believe he can be safely discharged with close follow up with his PCP.     I have reviewed the nursing notes.    I have reviewed the findings, diagnosis, plan and need for follow up with the patient.    New Prescriptions    DIAZEPAM (VALIUM) 5 MG TABLET    Take 1 tablet (5 mg) by mouth every 6 hours as needed for anxiety or sleep (MUSCLE SPASM)       Final diagnoses:   Chronic midline low back pain without sciatica   I, Iona Norton, am serving as a trained medical scribe to document services personally performed by Rah Thomas MD, based on the provider's statements to me.   Rah ASCENCIO MD, was physically present and have reviewed and verified the accuracy of this note documented by Iona Norton.      5/29/2017   [facility], [place], EMERGENCY DEPARTMENT     Rah Thomas MD  05/29/17 2016

## 2017-05-29 NOTE — ED AVS SNAPSHOT
" [facility], Emergency Department    [address]    [facility] [place] [address]    Phone:  [phone]    Fax:  [phone]                                       Loco Wood   MRN: [id number]    Department:  [facility], Emergency Department   Date of Visit:  5/29/2017           Patient Information     Date Of Birth          1945        Your diagnoses for this visit were:     Chronic midline low back pain without sciatica        You were seen by Rah Thomas MD.        Discharge Instructions         Neck/Back Pain: General    Both neck and back pain are usually caused by injury to the muscles or ligaments of the spine. Sometimes the disks that separate each bone of the spine may cause pain by pressing on a nearby nerve. Back and neck pain may appear after a sudden twisting/bending force (such as in a car accident), or sometimes after a simple awkward movement. In either case, muscle spasm is often present and adds to the pain.  Acute neck and back pain usually gets better in 1 to 2 weeks. Pain related to disk disease, arthritis in the spinal joints or spinal stenosis (narrowing of the spinal canal) can become chronic and last for months or years.  Back and neck pain are common problems. Most people feel better in 1 or 2 weeks, and most of the rest in 1 to 2 months. Most people can remain active.  Symptoms  People experience and describe pain differently.    Pain can be sharp, stabbing, shooting, aching, cramping, or burning    Movement, standing, bending, lifting, sitting, or walking may worsen the pain    Pain can be localized to one spot or area, or it can be more generalized    Pain can spread or radiate upwards, downwards, to the front, or go down your arms    Muscle spasm may occur.  Cause  Most of the time \"mechanical problems\" with the muscles or spine cause the pain. it is usually caused by an injury, whether known or not, to the muscles or ligaments. While illnesses can cause " back pain, it is usually not caused by a serious illness. Pain is usually related to physical activity, whether sports, exercise, work, or normal activity. Sometimes it can occur without an identifiable cause. This can happen simply by stretching or moving wrong, without noting pain at the time. Other causes include:    Overexertion, lifting, pushing, pulling incorrectly or too aggressively.    Sudden twisting, bending or stretching from an accident (car or fall), or accidental movement.    Poor posture    Poor conditioning, lack of regular exercise    Spinal disc disease or arthritis    Stress    Pregnancy, or illness like appendicitis, bladder or kidney infection, pelvic infections   Home care    For neck pain: Use a comfortable pillow that supports the head and keeps the spine in a neutral position. The position of the head should not be tilted forward or backward.    When in bed, try to find a position of comfort. A firm mattress is best. Try lying flat on your back with pillows under your knees. You can also try lying on your side with your knees bent up towards your chest and a pillow between your knees.    At first, do not try to stretch out the sore spots. If there is a strain, it is not like the good soreness you get after exercising without an injury. In this case, stretching may make it worse.    Avoid prolonged sitting, long car rides or travel. This puts more stress on the lower back than standing or walking.    During the first 24 to 72 hours after an injury, apply an ice pack to the painful area for 20 minutes and then remove it for 20 minutes over a period of 60 to 90 minutes or several times a day. As a safety precaution, do not use a heating pad at bedtime. Sleeping with a heating pad can lead to skin burns or tissue damage.    Ice and heat therapies can be alternated. Talk with your health care provider about the best treatment for your back or neck pain.    Therapeutic massage can help relax the  back and neck muscles without stretching them.    Be aware of safe lifting methods and do not lift anything over 15 pounds until all the pain is gone.  Medications  Talk to your health care provider before using medications, especially if you have other medical problems or are taking other medicines.    You may use acetaminophen (such as Tylenol) or ibuprofen (such as Advil or Motrin) to control pain, unless another pain medicine was prescribed. If you have chronic conditions like diabetes, liver or kidney disease, stomach ulcers,  gastrointestinal bleeding, or are taking blood thinner medications.    Be careful if you are given pain medicines, narcotics, or medication for muscle spasm. They can cause drowsiness, and can affect your coordination, reflexes, and judgment. Do not drive or operate heavy machinery.  Follow-up care  Follow up with your health care provider if your symptoms do not start to improve after one week. Physical therapy or further tests may be needed.  If X-rays were taken, they will be reviewed by a radiologist. You will be notified of any new findings that may affect your care.  Call [phone]  Seek emergency medical care if any of the following occur:    Trouble breathing    Confusion    Very drowsy or trouble awakening    Fainting or loss of consciousness    Rapid or very slow heart rate    Loss of bowel or bladder control  When to seek medical care  Get prompt medical attention if any of the following occur:    Pain becomes worse or spreads into your arms or legs    Weakness, numbness or pain in one or both arms or legs    Numbness in the groin area    Difficulty walking    Fever of 100.4 F (38 C) or higher, or as directed by your healthcare provider    4003-0257 [employer]. [address] [place] [address], [place] [address]. All rights reserved. This information is not intended as a substitute for professional medical care. Always follow your healthcare professional's  instructions.          Future Appointments        Provider Department Dept Phone Center    6/16/2017 9:20 AM Campos Boyd MD [facility]rolog[facility] [phone] [facility]    10/24/2017 4:00 PM [facility] [phone] [facility]    10/24/2017 4:30 PM He Burns MD [facility] Marrow Transplant [phone] [facility]      24 Hour Appointment Hotline       To make an appointment at any [facility], call [phone] ([phone]). If you don't have a family doctor or clinic, we will help you find one. [facility] clinics are conveniently located to serve the needs of you and your family.             Review of your medicines      START taking        Dose / Directions Last dose taken    diazepam 5 MG tablet   Commonly known as:  VALIUM   Dose:  5 mg   Quantity:  10 tablet        Take 1 tablet (5 mg) by mouth every 6 hours as needed for anxiety or sleep (MUSCLE SPASM)   Refills:  0          Our records show that you are taking the medicines listed below. If these are incorrect, please call your family doctor or clinic.        Dose / Directions Last dose taken    atorvastatin 10 MG tablet   Commonly known as:  LIPITOR   Dose:  10 mg   Quantity:  45 tablet        Take 1 tablet (10 mg) by mouth every 48 hours   Refills:  9        fish oil-omega-3 fatty acids 1000 MG capsule   Dose:  2 g        Take 2 g by mouth daily   Refills:  0        omeprazole 10 MG CR capsule   Commonly known as:  priLOSEC   Quantity:  90 capsule        TAKE 1 CAPSULE BY MOUTH DAILY - TAKE 30 TO 60 MINUTES BEFORE A MEAL -   Refills:  3        order for DME   Quantity:  1 Device        Equipment being ordered: Superfeet - Blue - Size E   Refills:  0        sildenafil 100 MG cap/tab   Commonly known as:  VIAGRA   Dose:   mg   Quantity:  18 tablet        Take 0.5-1 tablets ( mg) by mouth daily as needed for erectile dysfunction Take 30 min to 4 hours before  intercourse.   Refills:  prn        VITAMIN D3 PO   Dose:  2000 Units        Take 2,000 Units by mouth   Refills:  0                Prescriptions were sent or printed at these locations (1 Prescription)                   Other Prescriptions                Printed at Department/Unit printer (1 of 1)         diazepam (VALIUM) 5 MG tablet                Orders Needing Specimen Collection     None      Pending Results     No orders found from 5/27/2017 to 5/30/2017.            Pending Culture Results     No orders found from 5/27/2017 to 5/30/2017.            Pending Results Instructions     If you had any lab results that were not finalized at the time of your Discharge, you can call the ED Lab Result RN at [phone]. You will be contacted by this team for any positive Lab results or changes in treatment. The nurses are available 7 days a week from 10A to 6:30P.  You can leave a message 24 hours per day and they will return your call.        Thank you for choosing [facility]       Thank you for choosing [facility] for your care. Our goal is always to provide you with excellent care. Hearing back from our patients is one way we can continue to improve our services. Please take a few minutes to complete the written survey that you may receive in the mail after you visit with us. Thank you!        [employer] Information     [employer] gives you secure access to your electronic health record. If you see a primary care provider, you can also send messages to your care team and make appointments. If you have questions, please call your primary care clinic.  If you do not have a primary care provider, please call [phone] and they will assist you.        Care EveryWhere ID     This is your Care EveryWhere ID. This could be used by other organizations to access your [facility] medical records  [id number]        After Visit Summary       This is your record. Keep this with you and show to your community pharmacist(s) and doctor(s)  at your next visit.

## 2017-05-29 NOTE — ED AVS SNAPSHOT
[facility], Emergency Department    [address]    [facility] [address]    Phone:  [phone]    Fax:  [phone]                                       Loco Wood   MRN: [id number]    Department:  [facility], Emergency Department   Date of Visit:  5/29/2017           After Visit Summary Signature Page     I have received my discharge instructions, and my questions have been answered. I have discussed any challenges I see with this plan with the nurse or doctor.    ..........................................................................................................................................  Patient/Patient Representative Signature      ..........................................................................................................................................  Patient Representative Print Name and Relationship to Patient    ..................................................               ................................................  Date                                            Time    ..........................................................................................................................................  Reviewed by Signature/Title    ...................................................              ..............................................  Date                                                            Time

## 2017-05-30 NOTE — ED NOTES
Pt states he continues to be in a lot of pain and doesn't want to try and sit up. States he can roll side to side without difficulty.

## 2017-05-30 NOTE — DISCHARGE INSTRUCTIONS
"  Neck/Back Pain: General    Both neck and back pain are usually caused by injury to the muscles or ligaments of the spine. Sometimes the disks that separate each bone of the spine may cause pain by pressing on a nearby nerve. Back and neck pain may appear after a sudden twisting/bending force (such as in a car accident), or sometimes after a simple awkward movement. In either case, muscle spasm is often present and adds to the pain.  Acute neck and back pain usually gets better in 1 to 2 weeks. Pain related to disk disease, arthritis in the spinal joints or spinal stenosis (narrowing of the spinal canal) can become chronic and last for months or years.  Back and neck pain are common problems. Most people feel better in 1 or 2 weeks, and most of the rest in 1 to 2 months. Most people can remain active.  Symptoms  People experience and describe pain differently.    Pain can be sharp, stabbing, shooting, aching, cramping, or burning    Movement, standing, bending, lifting, sitting, or walking may worsen the pain    Pain can be localized to one spot or area, or it can be more generalized    Pain can spread or radiate upwards, downwards, to the front, or go down your arms    Muscle spasm may occur.  Cause  Most of the time \"mechanical problems\" with the muscles or spine cause the pain. it is usually caused by an injury, whether known or not, to the muscles or ligaments. While illnesses can cause back pain, it is usually not caused by a serious illness. Pain is usually related to physical activity, whether sports, exercise, work, or normal activity. Sometimes it can occur without an identifiable cause. This can happen simply by stretching or moving wrong, without noting pain at the time. Other causes include:    Overexertion, lifting, pushing, pulling incorrectly or too aggressively.    Sudden twisting, bending or stretching from an accident (car or fall), or accidental movement.    Poor posture    Poor conditioning, " lack of regular exercise    Spinal disc disease or arthritis    Stress    Pregnancy, or illness like appendicitis, bladder or kidney infection, pelvic infections   Home care    For neck pain: Use a comfortable pillow that supports the head and keeps the spine in a neutral position. The position of the head should not be tilted forward or backward.    When in bed, try to find a position of comfort. A firm mattress is best. Try lying flat on your back with pillows under your knees. You can also try lying on your side with your knees bent up towards your chest and a pillow between your knees.    At first, do not try to stretch out the sore spots. If there is a strain, it is not like the good soreness you get after exercising without an injury. In this case, stretching may make it worse.    Avoid prolonged sitting, long car rides or travel. This puts more stress on the lower back than standing or walking.    During the first 24 to 72 hours after an injury, apply an ice pack to the painful area for 20 minutes and then remove it for 20 minutes over a period of 60 to 90 minutes or several times a day. As a safety precaution, do not use a heating pad at bedtime. Sleeping with a heating pad can lead to skin burns or tissue damage.    Ice and heat therapies can be alternated. Talk with your health care provider about the best treatment for your back or neck pain.    Therapeutic massage can help relax the back and neck muscles without stretching them.    Be aware of safe lifting methods and do not lift anything over 15 pounds until all the pain is gone.  Medications  Talk to your health care provider before using medications, especially if you have other medical problems or are taking other medicines.    You may use acetaminophen (such as Tylenol) or ibuprofen (such as Advil or Motrin) to control pain, unless another pain medicine was prescribed. If you have chronic conditions like diabetes, liver or kidney disease, stomach  ulcers,  gastrointestinal bleeding, or are taking blood thinner medications.    Be careful if you are given pain medicines, narcotics, or medication for muscle spasm. They can cause drowsiness, and can affect your coordination, reflexes, and judgment. Do not drive or operate heavy machinery.  Follow-up care  Follow up with your health care provider if your symptoms do not start to improve after one week. Physical therapy or further tests may be needed.  If X-rays were taken, they will be reviewed by a radiologist. You will be notified of any new findings that may affect your care.  Call 911  Seek emergency medical care if any of the following occur:    Trouble breathing    Confusion    Very drowsy or trouble awakening    Fainting or loss of consciousness    Rapid or very slow heart rate    Loss of bowel or bladder control  When to seek medical care  Get prompt medical attention if any of the following occur:    Pain becomes worse or spreads into your arms or legs    Weakness, numbness or pain in one or both arms or legs    Numbness in the groin area    Difficulty walking    Fever of 100.4 F (38 C) or higher, or as directed by your healthcare provider    2848-2207 [employer]. [address] [place] [id number]. All rights reserved. This information is not intended as a substitute for professional medical care. Always follow your healthcare professional's instructions.

## 2017-05-31 ENCOUNTER — TELEPHONE (OUTPATIENT)
Dept: FAMILY MEDICINE | Facility: CLINIC | Age: 72
End: 2017-05-31

## 2017-05-31 DIAGNOSIS — M54.50 ACUTE BILATERAL LOW BACK PAIN WITHOUT SCIATICA: Primary | ICD-10-CM

## 2017-05-31 RX ORDER — METHYLPREDNISOLONE 4 MG
TABLET, DOSE PACK ORAL
Qty: 21 TABLET | Refills: 0 | Status: SHIPPED | OUTPATIENT
Start: 2017-05-31 | End: 2017-07-18

## 2017-05-31 RX ORDER — DIAZEPAM 5 MG
5 TABLET ORAL EVERY 6 HOURS PRN
Qty: 20 TABLET | Refills: 0 | Status: SHIPPED | OUTPATIENT
Start: 2017-05-31 | End: 2017-10-03

## 2017-05-31 NOTE — TELEPHONE ENCOUNTER
"MP,  Patient was in the ER for back pain 5/29/2017  Was sent home with #10 Valium    Pt states pain is still bad; having difficulties moving  States ER kicked him out once he could get up.    Patient requesting orders for Epidural and \"scans\"  Said it's been years since his last scans of his back  Also had history of getting Epidurals he said.  Told pt he would need to be seen to discuss orders for these meds    You have no openings; pt unable to come in d/t pain anyway.  Pt states he can barely move from the pain much less come in for an appointment.  Pharmacy pended if you want to send in alternative med  Please advise.  Delmy DURÁN RN    Assessments & Plan (with Medical Decision Making)   This is a 73 y/o male coming to the ED with lower back pain after working out at the gym today. He admits to   chronic lower back pain. Today he is obvious tense in his lower back but has no signs of cauda equina. After   2 doses of valium, flexeril and fentanyl he was able to stand and ambulate without issue. I believe he can be   safely discharged with close follow up with his PCP.      I have reviewed the nursing notes.     I have reviewed the findings, diagnosis, plan and need for follow up with the patient.          New Prescriptions     DIAZEPAM (VALIUM) 5 MG TABLET    Take 1 tablet (5 mg) by mouth every 6 hours as needed for anxiety or sleep (MUSCLE SPASM)       "

## 2017-05-31 NOTE — TELEPHONE ENCOUNTER
Reason for call:  Patient reporting a symptom    Symptom or request: back went, pt states he cannot move    Duration (how long have symptoms been present): 5/29     Have you been treated for this before? Yes    Additional comments: Pt was in ED on 5/29   He was given IV medication.     Phone Number patient can be reached at:  Home number on file [phone] (home)    Best Time:  anytime    Can we leave a detailed message on this number:  YES    Call taken on 5/31/2017 at 12:22 PM by Christelle Ceballos

## 2017-05-31 NOTE — TELEPHONE ENCOUNTER
Feels like what he had years ago. Last MRI 2013.  Can't do another MRI due to metal. Not radiating. Has vicodin at home from dentist. Will send medrol and refill valium and see if things turn around. Neighbor can p/u

## 2017-06-02 ASSESSMENT — ENCOUNTER SYMPTOMS
NECK PAIN: 1
BACK PAIN: 1

## 2017-06-12 ENCOUNTER — PRE VISIT (OUTPATIENT)
Dept: UROLOGY | Facility: CLINIC | Age: 72
End: 2017-06-12

## 2017-06-16 ENCOUNTER — OFFICE VISIT (OUTPATIENT)
Dept: UROLOGY | Facility: CLINIC | Age: 72
End: 2017-06-16

## 2017-06-16 VITALS
BODY MASS INDEX: 25 KG/M2 | SYSTOLIC BLOOD PRESSURE: 117 MMHG | WEIGHT: 159.3 LBS | DIASTOLIC BLOOD PRESSURE: 66 MMHG | HEIGHT: 67 IN | HEART RATE: 74 BPM

## 2017-06-16 DIAGNOSIS — N40.2 PROSTATE NODULE: Primary | ICD-10-CM

## 2017-06-16 ASSESSMENT — PAIN SCALES - GENERAL: PAINLEVEL: NO PAIN (0)

## 2017-06-16 NOTE — PROGRESS NOTES
ASSESSMENT AND PLAN  Prostate Nodule and borderline elevated PSA.    Based on [facility] Risk Calculator:  13% chance of high-grade prostate cancer,  19% chance of low-grade cancer,  68% chance that the biopsy is negative for cancer.  About 2 to 4% of men undergoing biopsy will have an infection that may require hospitalization.    We discussed biopsy vs observation in 6 months.  He is aware of risks of prostate cancer.    He would like follow-up in 6 months with PSA.    __________________________________________________    CHIEF COMPLAINT  It was my pleasure to see Loco Wood who is a 72 year old male here for prostate nodule.      HPI  He is here since Dr Gibson found a prostate nodule.    He has had a slow stream for many years but this is not bothersome.  He has minimal nocturia.    He denies gross hematuria.    He uses viagra with good results.    He denies family history of prostate cancer.    He has history of CLL.  This was diagnosed 10 years ago and he is on watchful waiting.    PSA   Date Value Ref Range Status   05/15/2017 4.76 (H) 0 - 4 ug/L Final     Comment:     Assay Method:  Chemiluminescence using Siemens Vista analyzer   08/01/2011 1.47 0 - 4 ug/L Final   07/27/2010 1.44 0 - 4 ug/L Final   05/26/2009 1.17 0 - 4 ug/L Final     Patient Active Problem List    Diagnosis Date Noted     Acute bilateral low back pain without sciatica 05/31/2017     Priority: Medium     BPH (benign prostatic hyperplasia) 04/29/2015     Priority: Medium     Insomnia 06/12/2014     Priority: Medium     OA (osteoarthritis) of hip 05/10/2013     Priority: Medium     Osteoarthritis of hip 04/29/2013     Priority: Medium     Do you wish to do the replacement in the background? yes         Advanced directives, counseling/discussion 08/02/2012     Priority: Medium     Patient states has Advance Directive and will bring in a copy to clinic. 8/2/2012   TISH Shah CMA           Vitamin D deficiency 04/30/2012     Priority:  Medium     Health Care Home 06/29/2011     Priority: Medium     x  DX V65.8 REPLACED WITH 97891 [facility]ALTH CARE HOME (04/08/2013)       Mass of skin 03/28/2011     Priority: Medium     Left thumb.       HYPERLIPIDEMIA LDL GOAL <130 10/31/2010     Priority: Medium     CLL (chronic lymphocytic leukemia) (H) 05/11/2007     Priority: Medium     Lumbago 08/04/2006     Priority: Medium     Esophageal reflux 06/23/2005     Priority: Medium     Past Medical History:   Diagnosis Date     Arthritis     hip and toes     Chronic pain      CLL (chronic lymphocytic leukaemia)      Esophageal reflux      Malignant neoplasm (H)     Leukemia     PONV (postoperative nausea and vomiting)      Pure hypercholesterolemia      Past Surgical History:   Procedure Laterality Date     ARTHROPLASTY MINIMALLY INVASIVE HIP  5/10/2013    Procedure: ARTHROPLASTY MINIMALLY INVASIVE HIP;  Left Two Incision Total Hip Arthroplasty ;  Surgeon: Desmond Alberts MD;  Location: [facility] OR     ARTHROPLASTY MINIMALLY INVASIVE HIP  2/27/2014    Procedure: ARTHROPLASTY MINIMALLY INVASIVE HIP;  Right Total Hip Arthroplasty Minimally Invasive Two Incision  *Latex Free Room;  Surgeon: Desmond Alberts MD;  Location: [facility] OR     BACK SURGERY      back fusion 1994     C NONSPECIFIC PROCEDURE  1964 &'95    (R) inguinal herniorrhapy     C NONSPECIFIC PROCEDURE  1949    (L) inguinal herniorrhaphy     C NONSPECIFIC PROCEDURE  1994    L4-5  L5 S1 fusion - spondylolisthesis     COLONOSCOPY      2007     GI SURGERY      4 hernia repairs in childhood     Current Outpatient Prescriptions   Medication Sig Dispense Refill     methylPREDNISolone (MEDROL DOSEPAK) 4 MG tablet Follow package instructions 21 tablet 0     diazepam (VALIUM) 5 MG tablet Take 1 tablet (5 mg) by mouth every 6 hours as needed for anxiety or sleep (MUSCLE SPASM) 20 tablet 0     omeprazole (PRILOSEC) 10 MG CR capsule TAKE 1 CAPSULE BY MOUTH DAILY - TAKE 30 TO 60 MINUTES BEFORE A MEAL - 90 capsule 3      "atorvastatin (LIPITOR) 10 MG tablet Take 1 tablet (10 mg) by mouth every 48 hours 45 tablet 9     fish oil-omega-3 fatty acids 1000 MG capsule Take 2 g by mouth daily       Cholecalciferol (VITAMIN D3 PO) Take 2,000 Units by mouth       ORDER FOR DME Equipment being ordered: Superfeet - Blue - Size E 1 Device 0     sildenafil (VIAGRA) 100 MG tablet Take 0.5-1 tablets ( mg) by mouth daily as needed for erectile dysfunction Take 30 min to 4 hours before intercourse. 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[employer]     Social History Main Topics     Smoking status: Never Smoker     Smokeless tobacco: Never Used     Alcohol use 0.0 oz/week     0 Standard drinks or equivalent per week      Comment: 3 drinks per week     Drug use: No     Sexual activity: Yes     Partners: Female       REVIEW OF SYSTEMS  Answers for HPI/ROS submitted by the patient on 2017   General Symptoms: No  Skin Symptoms: No  HENT Symptoms: No  EYE SYMPTOMS: No  HEART SYMPTOMS: No  LUNG SYMPTOMS: No  INTESTINAL SYMPTOMS: No  URINARY SYMPTOMS: No  REPRODUCTIVE SYMPTOMS: No  SKELETAL SYMPTOMS: Yes  BLOOD SYMPTOMS: No  NERVOUS SYSTEM SYMPTOMS: No  MENTAL HEALTH SYMPTOMS: No  Back pain: Yes  Neck pain: Yes      PHYSICAL EXAM  Vitals:    17 0900   BP: 117/66   Pulse: 74   Weight: 72.3 kg (159 lb 4.8 oz)   Height: 1.702 m (5' 7\")     Wt Readings from Last 3 Encounters:   17 72.3 kg (159 lb 4.8 " oz)   05/15/17 70.8 kg (156 lb)   04/25/17 72.2 kg (159 lb 3.2 oz)     Constitutional: Alert, no acute distress  Psychiatric: Normal mood and affect  Head: Normocephalic. No masses, lesions, tenderness or abnormalities  Neck: Neck supple. No adenopathy. Thyroid symmetric, normal size  ENT: Oropharynx clear.  Back: No spinal tenderness.  No costovertebral angle tenderness  Cardiovascular: RRR. No murmurs, clicks gallops or rub  Respiratory: Good diaphragmatic excursion. Lungs clear  Gastrointestinal: Abdomen soft, non-tender. No masses, organomegaly. No hernia  Skin: no suspicious lesions or rashes on abdomen  Extremities: No lower extremity edema.  No clubbing or cyanosis.  Neurologic:  Cranial nerves grossly intact.  Equal strength and sensation on bilateral extremities.   : Penis is circumcised and without masses/plaques.  Bilateral testis are descended and without masses.  Scrotum is without any lesions.  Digital rectal exam shows normal sphincter tone.  Prostate is approximately 30grams and has a 5-10mm subtle left sided nodule at the base.       Recent Labs   Lab Test  04/25/17   1443  10/25/16   1543  04/11/16   1136  11/20/15   1156   WBC  95.2*  95.6*  64.1*  54.2*   HGB  13.8  13.2*  14.3  14.0   HCT  43.3  41.0  44.7  43.0   PLT  125*  116*  112*  116*     Recent Labs   Lab Test  04/25/17   1443  10/25/16   1543  04/11/16   1136  11/20/15   1156   NA  140  144  141  140   POTASSIUM  4.5  4.4  4.0  3.9   CHLORIDE  109  107  106  106   CO2  26  29  28  30   ANIONGAP  5  8  7  4   GLC  103*  88  92  98   BUN  15  14  18  19   CR  1.22  1.35*  1.23  1.34*   GFRESTIMATED  59*  52*  58*  53*   GFRESTBLACK  71  63  71  64   DAVID  9.1  9.0  9.3  8.9     Recent Labs   Lab Test  03/02/14   1115  02/17/14   1117   COLOR  Yellow  Yellow   APPEARANCE  Clear  Clear   URINEGLC  Negative  Negative   URINEBILI  Negative  Negative   URINEKETONE  Negative  Trace*   SG  1.011  1.025   UBLD  Negative  Negative   URINEPH  6.5   5.0   PROTEIN  Negative  Negative   UROBILINOGEN   --   0.2   NITRITE  Negative  Negative   LEUKEST  Negative  Negative   RBCU  2   --    WBCU  <1   --        Jermaine ASCENCIO, reviewed these laboratory values.        CC  Patient Care Team:  Jameson Cisse MD as PCP - Yodit Bell PA as Physician Assistant  Campos Boyd MD as MD (Urology)  Padmini Whitney, RN as Registered Nurse  JAMESON CISSE    Copy to patient  ATIF JULIEN [facility]  [address] [facility] [address] [facility] [id number]

## 2017-06-16 NOTE — NURSING NOTE
Chief Complaint   Patient presents with     Consult     prostate nodule per pt    Christo Richardson LPN

## 2017-06-16 NOTE — LETTER
6/16/2017       RE: Loco Wood  [address] [facility] [address]     Dear Colleague,    Thank you for referring your patient, Loco Wood, to the [facility] UROLOGY AND INST FOR PROSTATE AND UROLOGIC CANCERS at [facility]. Please see a copy of my visit note below.    ASSESSMENT AND PLAN  Prostate Nodule and borderline elevated PSA.    Based on Addison Risk Calculator:  13% chance of high-grade prostate cancer,  19% chance of low-grade cancer,  68% chance that the biopsy is negative for cancer.  About 2 to 4% of men undergoing biopsy will have an infection that may require hospitalization.    We discussed biopsy vs observation in 6 months.  He is aware of risks of prostate cancer.    He would like follow-up in 6 months with PSA.  __________________________________________________    CHIEF COMPLAINT  It was my pleasure to see Loco Wood who is a 72 year old male here for prostate nodule.      HPI  He is here since Dr Gibson found a prostate nodule.    He has had a slow stream for many years but this is not bothersome.  He has minimal nocturia.    He denies gross hematuria.    He uses viagra with good results.  He denies family history of prostate cancer.    He has history of CLL.  This was diagnosed 10 years ago and he is on watchful waiting.    PSA   Date Value Ref Range Status   05/15/2017 4.76 (H) 0 - 4 ug/L Final     Comment:     Assay Method:  Chemiluminescence using Siemens Vista analyzer   08/01/2011 1.47 0 - 4 ug/L Final   07/27/2010 1.44 0 - 4 ug/L Final   05/26/2009 1.17 0 - 4 ug/L Final     Patient Active Problem List    Diagnosis Date Noted     Acute bilateral low back pain without sciatica 05/31/2017     Priority: Medium     BPH (benign prostatic hyperplasia) 04/29/2015     Priority: Medium     Insomnia 06/12/2014     Priority: Medium     OA (osteoarthritis) of hip 05/10/2013     Priority: Medium     Osteoarthritis of hip 04/29/2013      Priority: Medium     Do you wish to do the replacement in the background? yes         Advanced directives, counseling/discussion 08/02/2012     Priority: Medium     Patient states has Advance Directive and will bring in a copy to clinic. 8/2/2012   TISH Shah CMA           Vitamin D deficiency 04/30/2012     Priority: Medium     Health Care Home 06/29/2011     Priority: Medium     x  DX V65.8 REPLACED WITH 79731 HEALTH CARE HOME (04/08/2013)       Mass of skin 03/28/2011     Priority: Medium     Left thumb.       HYPERLIPIDEMIA LDL GOAL <130 10/31/2010     Priority: Medium     CLL (chronic lymphocytic leukemia) (H) 05/11/2007     Priority: Medium     Lumbago 08/04/2006     Priority: Medium     Esophageal reflux 06/23/2005     Priority: Medium     Past Medical History:   Diagnosis Date     Arthritis     hip and toes     Chronic pain      CLL (chronic lymphocytic leukaemia)      Esophageal reflux      Malignant neoplasm (H)     Leukemia     PONV (postoperative nausea and vomiting)      Pure hypercholesterolemia      Past Surgical History:   Procedure Laterality Date     ARTHROPLASTY MINIMALLY INVASIVE HIP  5/10/2013    Procedure: ARTHROPLASTY MINIMALLY INVASIVE HIP;  Left Two Incision Total Hip Arthroplasty ;  Surgeon: Desmond Alberts MD;  Location: [facility] OR     ARTHROPLASTY MINIMALLY INVASIVE HIP  2/27/2014    Procedure: ARTHROPLASTY MINIMALLY INVASIVE HIP;  Right Total Hip Arthroplasty Minimally Invasive Two Incision  *Latex Free Room;  Surgeon: Desmond Alberts MD;  Location: [facility] OR     BACK SURGERY      back fusion 1994     C NONSPECIFIC PROCEDURE  1964 &'95    (R) inguinal herniorrhapy     C NONSPECIFIC PROCEDURE  1949    (L) inguinal herniorrhaphy     C NONSPECIFIC PROCEDURE  1994    L4-5  L5 S1 fusion - spondylolisthesis     COLONOSCOPY      2007     GI SURGERY      4 hernia repairs in childhood     Current Outpatient Prescriptions   Medication Sig Dispense Refill     methylPREDNISolone (MEDROL  "DOSEPAK) 4 MG tablet Follow package instructions 21 tablet 0     diazepam (VALIUM) 5 MG tablet Take 1 tablet (5 mg) by mouth every 6 hours as needed for anxiety or sleep (MUSCLE SPASM) 20 tablet 0     omeprazole (PRILOSEC) 10 MG CR capsule TAKE 1 CAPSULE BY MOUTH DAILY - TAKE 30 TO 60 MINUTES BEFORE A MEAL - 90 capsule 3     atorvastatin (LIPITOR) 10 MG tablet Take 1 tablet (10 mg) by mouth every 48 hours 45 tablet 9     fish oil-omega-3 fatty acids 1000 MG capsule Take 2 g by mouth daily       Cholecalciferol (VITAMIN D3 PO) Take 2,000 Units by mouth       ORDER FOR DME Equipment being ordered: [employer] - Blue - Size E 1 Device 0     sildenafil (VIAGRA) 100 MG tablet Take 0.5-1 tablets ( mg) by mouth daily as needed for erectile dysfunction Take 30 min to 4 hours before intercourse. 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[employer]     Social History Main Topics     Smoking status: Never Smoker     Smokeless tobacco: Never Used     Alcohol use 0.0 oz/week     0 Standard drinks or equivalent per week      Comment: 3 drinks per week     Drug use: No     Sexual activity: Yes     Partners: Female     REVIEW OF SYSTEMS    PHYSICAL EXAM  Vitals:    17 0900   BP: 117/66   Pulse: 74   Weight: 72.3 kg (159 lb 4.8 oz)   Height: 1.702 m (5' 7\")     Wt Readings from Last 3 Encounters:   17 72.3 kg (159 lb 4.8 oz)   05/15/17 70.8 kg (156 lb)   17 72.2 kg (159 lb " 3.2 oz)     Constitutional: Alert, no acute distress  Psychiatric: Normal mood and affect  Head: Normocephalic. No masses, lesions, tenderness or abnormalities  Neck: Neck supple. No adenopathy. Thyroid symmetric, normal size  ENT: Oropharynx clear.  Back: No spinal tenderness.  No costovertebral angle tenderness  Cardiovascular: RRR. No murmurs, clicks gallops or rub  Respiratory: Good diaphragmatic excursion. Lungs clear  Gastrointestinal: Abdomen soft, non-tender. No masses, organomegaly. No hernia  Skin: no suspicious lesions or rashes on abdomen  Extremities: No lower extremity edema.  No clubbing or cyanosis.  Neurologic:  Cranial nerves grossly intact.  Equal strength and sensation on bilateral extremities.   : Penis is circumcised and without masses/plaques.  Bilateral testis are descended and without masses.  Scrotum is without any lesions.  Digital rectal exam shows normal sphincter tone.  Prostate is approximately 30grams and has a 5-10mm subtle left sided nodule at the base.     Recent Labs   Lab Test  04/25/17   1443  10/25/16   1543  04/11/16   1136  11/20/15   1156   WBC  95.2*  95.6*  64.1*  54.2*   HGB  13.8  13.2*  14.3  14.0   HCT  43.3  41.0  44.7  43.0   PLT  125*  116*  112*  116*     Recent Labs   Lab Test  04/25/17   1443  10/25/16   1543  04/11/16   1136  11/20/15   1156   NA  140  144  141  140   POTASSIUM  4.5  4.4  4.0  3.9   CHLORIDE  109  107  106  106   CO2  26  29  28  30   ANIONGAP  5  8  7  4   GLC  103*  88  92  98   BUN  15  14  18  19   CR  1.22  1.35*  1.23  1.34*   GFRESTIMATED  59*  52*  58*  53*   GFRESTBLACK  71  63  71  64   DAVID  9.1  9.0  9.3  8.9     Recent Labs   Lab Test  03/02/14   1115  02/17/14   1117   COLOR  Yellow  Yellow   APPEARANCE  Clear  Clear   URINEGLC  Negative  Negative   URINEBILI  Negative  Negative   URINEKETONE  Negative  Trace*   SG  1.011  1.025   UBLD  Negative  Negative   URINEPH  6.5  5.0   PROTEIN  Negative  Negative   UROBILINOGEN   --   0.2    NITRITE  Negative  Negative   LEUKEST  Negative  Negative   RBCU  2   --    WBCU  <1   --      I, Jermaine Boyd, reviewed these laboratory values.    CC  Patient Care Team:  Jameson Cisse MD as PCP - Yodit Bell PA as Physician Assistant  Padmini Whitney RN as Registered Nurse    Copy to patient  ATIF JAMES  [address] [facility] [address]      Again, thank you for allowing me to participate in the care of your patient.      Sincerely,    Campos Boyd MD

## 2017-06-16 NOTE — PATIENT INSTRUCTIONS
Follow up with Dr. Boyd in 6 months with PSA done prior to appointment.    It was a pleasure meeting with you today.  Thank you for allowing me and my team the privilege of caring for you today.  YOU are the reason we are here, and I truly hope we provided you with the excellent service you deserve.  Please let us know if there is anything else we can do for you so that we can be sure you are leaving completely satisfied with your care experience.      DAMION Santiago

## 2017-06-16 NOTE — MR AVS SNAPSHOT
After Visit Summary   6/16/2017    Loco Wood    MRN: [id number]           Patient Information     Date Of Birth          1945        Visit Information        Provider Department      6/16/2017 9:20 AM Campos Boyd MD [facility] Urology and [facility] for Prostate and Urologic Cancers        Today's Diagnoses     Prostate nodule    -  1       Follow-ups after your visit        Follow-up notes from your care team     Return in about 6 months (around 12/16/2017).      Your next 10 appointments already scheduled     Aug 15, 2017   Procedure with Chun Mcguire MD   [facility] ([facility])    [address] [facility] [address]   [phone]           [facility] is located at [address]            Oct 24, 2017  4:00 PM CDT   [facility] with  [facility]   [facility] Draw ([facility])    [address]  [address] [facility] [address] [phone]            Oct 24, 2017  4:30 PM CDT   RETURN ONC with He Burns MD   [facility] Blood and Marrow Transplant ([facility])    [address]  2nd Floor  [facility] [address] [phone]              Future tests that were ordered for you today     Open Future Orders        Priority Expected Expires Ordered    PSA tumor marker Routine  7/21/2018 6/16/2017            Who to contact     Please call your clinic at [phone] to:    Ask questions about your health    Make or cancel appointments    Discuss your medicines    Learn about your test results    Speak to your doctor   If you have compliments or concerns about an experience at your clinic, or if you wish to file a complaint, please contact [facility] Patient Relations at [phone] or email us at [email or web address]         Additional Information About Your Visit        [facility]  "Information     [facility] gives you secure access to your electronic health record. If you see a primary care provider, you can also send messages to your care team and make appointments. If you have questions, please call your primary care clinic.  If you do not have a primary care provider, please call [phone] and they will assist you.      [employer] is an electronic gateway that provides easy, online access to your medical records. With [employer], you can request a clinic appointment, read your test results, renew a prescription or communicate with your care team.     To access your existing account, please contact your [facility] or call [phone] for assistance.        Care EveryWhere ID     This is your Care EveryWhere ID. This could be used by other organizations to access your [place] medical records  [id number]        Your Vitals Were     Pulse Height BMI (Body Mass Index)             74 1.702 m (5' 7\") 24.95 kg/m2          Blood Pressure from Last 3 Encounters:   06/16/17 117/66   05/29/17 114/71   05/15/17 121/73    Weight from Last 3 Encounters:   06/16/17 72.3 kg (159 lb 4.8 oz)   05/15/17 70.8 kg (156 lb)   04/25/17 72.2 kg (159 lb 3.2 oz)               Primary Care Provider Office Phone # Fax #    Jameson Cisse [phone]       [facility] [address] [address]        Thank you!     Thank you for choosing [facility] UROLOGY AND [facility] FOR PROSTATE AND UROLOGIC CANCERS  for your care. Our goal is always to provide you with excellent care. Hearing back from our patients is one way we can continue to improve our services. Please take a few minutes to complete the written survey that you may receive in the mail after your visit with us. Thank you!             Your Updated Medication List - Protect others around you: Learn how to safely use, store and throw away your medicines at www.disposemymeds.org.          This " list is accurate as of: 6/16/17  9:39 AM.  Always use your most recent med list.                   Brand Name Dispense Instructions for use    atorvastatin 10 MG tablet    LIPITOR    45 tablet    Take 1 tablet (10 mg) by mouth every 48 hours       diazepam 5 MG tablet    VALIUM    20 tablet    Take 1 tablet (5 mg) by mouth every 6 hours as needed for anxiety or sleep (MUSCLE SPASM)       fish oil-omega-3 fatty acids 1000 MG capsule      Take 2 g by mouth daily       methylPREDNISolone 4 MG tablet    MEDROL DOSEPAK    21 tablet    Follow package instructions       omeprazole 10 MG CR capsule    priLOSEC    90 capsule    TAKE 1 CAPSULE BY MOUTH DAILY - TAKE 30 TO 60 MINUTES BEFORE A MEAL -       order for DME     1 Device    Equipment being ordered: [employer] - Blue - Size E       sildenafil 100 MG cap/tab    VIAGRA    18 tablet    Take 0.5-1 tablets ( mg) by mouth daily as needed for erectile dysfunction Take 30 min to 4 hours before intercourse.       VITAMIN D3 PO      Take 2,000 Units by mouth

## 2017-06-30 ENCOUNTER — RADIANT APPOINTMENT (OUTPATIENT)
Dept: GENERAL RADIOLOGY | Facility: CLINIC | Age: 72
End: 2017-06-30
Attending: PEDIATRICS
Payer: COMMERCIAL

## 2017-06-30 ENCOUNTER — OFFICE VISIT (OUTPATIENT)
Dept: ORTHOPEDICS | Facility: CLINIC | Age: 72
End: 2017-06-30
Payer: COMMERCIAL

## 2017-06-30 VITALS — BODY MASS INDEX: 24.96 KG/M2 | HEIGHT: 67 IN | WEIGHT: 159 LBS

## 2017-06-30 DIAGNOSIS — S99.911A RIGHT ANKLE INJURY, INITIAL ENCOUNTER: Primary | ICD-10-CM

## 2017-06-30 DIAGNOSIS — M95.8 OSTEOCHONDRAL DEFECT OF ANKLE: ICD-10-CM

## 2017-06-30 DIAGNOSIS — M25.561 RIGHT KNEE PAIN, UNSPECIFIED CHRONICITY: ICD-10-CM

## 2017-06-30 DIAGNOSIS — S99.911A RIGHT ANKLE INJURY, INITIAL ENCOUNTER: ICD-10-CM

## 2017-06-30 PROCEDURE — 99203 OFFICE O/P NEW LOW 30 MIN: CPT | Performed by: PEDIATRICS

## 2017-06-30 PROCEDURE — 73562 X-RAY EXAM OF KNEE 3: CPT | Mod: RT | Performed by: PEDIATRICS

## 2017-06-30 PROCEDURE — 73610 X-RAY EXAM OF ANKLE: CPT | Mod: RT | Performed by: PEDIATRICS

## 2017-06-30 NOTE — PROGRESS NOTES
Sports Medicine Clinic Visit    PCP: Jameson Cisse ANAYA Wood is a 72 year old male who is seen  as a self referral presenting with right knee and ankle pain.  Patient states he rolled his ankle this past Tuesday prior to playing golf.  He did play and states he had no issues, but when he was done had pain in his right knee and down the leg to the ankle.  He has had pain in his right knee for a few months.   Knee is more painful and is over medial aspect.  Currently wearing a knee sleeve.  Is scheduled to play tennis tomorrow morning and wanted to make sure it was ok to play.  Pain has improved for both areas since Tuesday      **  Pain began after rolling his ankle.  Continued to play 18 holes of golf with no symptoms until after the holes. Pain and symptoms have been improving since the incident.    Right Knee: Pain is in the medial portion of the knee, and with stretching pain is lateral.   Right Ankle: Pain is focal in the lateral aspect of the ankle. Some pain medial and dorsal foot.      Of note: Patient has had Plantar Fasciitis in the left foot.  Patient has History of osteoarthritis      Injury: gradual for right knee, acute  inversion for right ankle     Location of Pain: right knee medial, lateral ankle   Duration of Pain: 4 day(s)  Rating of Pain at worst: 8/10  Rating of Pain Currently: 2/10  Symptoms are better with: Ice, Ibuprofen and Rest  Symptoms are worse with: flexing knee, crossing legs  Additional Features:   Positive:    Negative: swelling, bruising, popping, grinding, catching, locking, instability, paresthesias, numbness, weakness, pain in other joints and systemic symptoms  Other evaluation and/or treatments so far consists of: Nothing  Prior History of related problems: denies for knee and ankle, does have chronic back issues      Social History: retired     Review of Systems  Musculoskeletal: as above  Remainder of review of systems is negative including constitutional, CV,  pulmonary, GI, Skin and Neurologic except as noted in HPI or medical history.    This document serves as a record of the services and decisions personally performed and made by Jayy Harvey DO, CAQ. It was created on his behalf by Solo Tran, a trained medical scribe. The creation of this document is based the provider's statements to the medical scribe.  Solo Tran 2017 7:59 AM       Past Medical History:   Diagnosis Date     Arthritis     hip and toes     Chronic pain      CLL (chronic lymphocytic leukaemia)      Esophageal reflux      Malignant neoplasm (H)     Leukemia     PONV (postoperative nausea and vomiting)      Pure hypercholesterolemia      Past Surgical History:   Procedure Laterality Date     ARTHROPLASTY MINIMALLY INVASIVE HIP  5/10/2013    Procedure: ARTHROPLASTY MINIMALLY INVASIVE HIP;  Left Two Incision Total Hip Arthroplasty ;  Surgeon: Desmond Alberts MD;  [facility]     ARTHROPLASTY MINIMALLY INVASIVE HIP  2014    Procedure: ARTHROPLASTY MINIMALLY INVASIVE HIP;  Right Total Hip Arthroplasty Minimally Invasive Two Incision  *Latex Free Room;  Surgeon: Desmond Alberts MD;  [facility]     BACK SURGERY      back fusion      C NONSPECIFIC PROCEDURE   &    (R) inguinal herniorrhapy     C NONSPECIFIC PROCEDURE      (L) inguinal herniorrhaphy     C NONSPECIFIC PROCEDURE      L4-5  L5 S1 fusion - spondylolisthesis     COLONOSCOPY           GI SURGERY      4 hernia repairs in childhood     Family History   Problem Relation Age of Onset     HEART DISEASE Father      CEREBROVASCULAR DISEASE Father      CANCER Father      melonoma     Melanoma Father      CANCER Mother      Lung cancer     CEREBROVASCULAR DISEASE Paternal Uncle      Aneurism     Breast Cancer Maternal Aunt       from it     CANCER Paternal Uncle      CANCER Paternal Aunt      Skin Cancer No family hx of      Social History     Social History     Marital status:  "Single     Spouse name: Ellen     Number of children: 0     Years of education: PHD     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[employer]     Social History Main Topics     Smoking status: Never Smoker     Smokeless tobacco: Never Used     Alcohol use 0.0 oz/week     0 Standard drinks or equivalent per week      Comment: 3 drinks per week     Drug use: No     Sexual activity: Yes     Partners: Female     Other Topics Concern      Service No     Blood Transfusions No     Caffeine Concern No     Occupational Exposure No     Hobby Hazards No     Sleep Concern No     Stress Concern No     Weight Concern No     Special Diet No     Back Care Yes     Exercise Yes     Several times a week     Bike Helmet No     Seat Belt Yes     Self-Exams No     Parent/Sibling W/ Cabg, Mi Or Angioplasty Before 65f 55m? No     Social History Narrative    Social Documentation:7/10        Balanced Diet: YES    Calcium intake:milk and food    Caffeine: 2 cups of coffee per day    Exercise:  type of activity  Wts , stretching, cardio;  3 times per week    Sunscreen:no    Seatbelts:  Yes    Self Breast Exam:  No -     Self Testicular Exam: No    Physical/Emotional/Sexual Abuse: No     Do you feel safe in your environment? Yes        Cholesterol screen up to date: today    Eye Exam up to date: Yes    Dental Exam up to date: Yes    Pap smear up to date: Does Not Apply    Mammogram up to date: Does Not Apply    Dexa Scan up to date: Does Not Apply    Colonoscopy up to date: Yes-2007    Immunizations up to date: Yes-2008    Glucose screen if over 40:  No     Luis Alfredo Knox ma                       Objective  Ht 5' 7\" (1.702 m)  Wt 159 lb (72.1 kg)  BMI 24.9 kg/m2    GENERAL APPEARANCE: healthy, alert and no distress   GAIT: NORMAL  SKIN: no suspicious lesions or rashes  NEURO: Normal strength and tone, mentation intact and speech normal  PSYCH:  mentation appears normal and affect normal/bright  HEENT: no scleral " icterus  CV: no extremity edema   RESP: nonlabored breathing     Right Knee exam    ROM:        Full active and passive ROM with flexion and extension, no change in pain with each motion    Inspection:       no visible ecchymosis       minimal effusion     Skin:       no visible deformities       well perfused       capillary refill brisk    Patellar Motion:        Minimal crepitus noted in the patellofemoral joint         Non Tender:     Special Tests:        neg (-) Tanvi       neg (-) Lachman       neg (-) varus no change in pain        neg (-) valgus no change in pain          Evaluation of ipsilateral kinetic chain       Not assessed     Right Ankle Exam:    Inspection:       no visible ecchymosis       no visible edema or effusion    Foot inspection:       no deformity noted    ROM:        full ROM with dorsiflexion, plantarflexion, inversion and eversion    Strength:       ankle dorsiflexion 5/5       plantarflexion 5/5       inversion 5/5       eversion 5/5       great toe dorsiflexion 5/5       great toe plantarflexion 5/5       No change in pain with each motion     Non-Tender:    Skin:       well perfused       capillary refill brisk    Gait:       normal    Proprioception:   Not assessed    Sensation:  Grossly intact to light touch    Regional pulses:  Normal      Radiology  Visualized radiographs of right knee obtained today, and reviewed the images with the patient.  Impression: Bilateral Cabrera, bilateral axial, and right lateral views of the knees demonstrate no acute osseous abnormality. Right knee joint effusion present. Joint spaces appear preserved.  Visualized radiographs of right ankle obtained today, and reviewed the images with the patient.  Impression: Three views of the right ankle demonstrate a lucency in the medial talar dome, noted on AP and oblique views. This is consistent with an osteochondral defect. Otherwise, there are no clear acute osseous abnormalities. Mortise appears  intact, with ankle joint space preserved.        Assessment:  1. Right ankle injury, initial encounter    2. Right knee pain, unspecified chronicity    3. Osteochondral defect of ankle    knee and ankle with minimal physical exam findings today; symptoms improving by history.    Plan:  Discussed the assessment with the patient.    Plain films of the area reviewed with the patient.    Knee:  We discussed the following: symptom treatment, activity modification/rest, imaging, rehab, injection therapy and support for the affected area. Following discussion, plan:    Topical Treatments: Ice prn  Over the counter medication: Patient's preferred OTC medication as directed on packaging.  Observe and monitor for now.  Activity Modification: as discussed; able to return to sports, avoid activities that cause pain  Medical Equipment: compression sleeve prn  Follow up: as needed. If not getting continued improvement, consider additional imaging.  Questions answered. The patient indicates understanding of these issues and agrees with the plan.     Ankle:   Discussed nature of OCD lesions. In this case, no concerning findings on exam today. As such, will monitor clinically for now.  We discussed the following: symptom treatment, activity modification/rest, imaging, rehab, injection therapy and support for the affected area. Following discussion, plan:    Topical Treatments: Ice prn   Over the counter medication: Patient's preferred OTC medication as directed on packaging.  Observe and Monitor  Activity Modification: able to return to sports   Medical Equipment: Patient owns a Trilok ankle brace; will begin using will activities prn  Follow up: as needed. If pain recurs, if swelling in ankle, mechanical symptoms, advised notify clinic and likely obtain MRI.   Questions answered. The patient indicates understanding of these issues and agrees with the plan.     Jayy Harvey DO, CAQ        Disclaimer: This note consists of  symbols derived from keyboarding, dictation and/or voice recognition software. As a result, there may be errors in the script that have gone undetected. Please consider this when interpreting information found in this chart.    The information in this document, created by the medical scribe for me, accurately reflects the services I personally performed and the decisions made by me. I have reviewed and approved this document for accuracy.   Jayy Harvey DO, CAQ

## 2017-06-30 NOTE — Clinical Note
Loco CORONADO was seen in [facility] clinic for his knee and ankle pain. Improved at time of visit. Please see copy of the chart note for additional details. Thanks.

## 2017-07-18 ENCOUNTER — OFFICE VISIT (OUTPATIENT)
Dept: FAMILY MEDICINE | Facility: CLINIC | Age: 72
End: 2017-07-18
Payer: COMMERCIAL

## 2017-07-18 VITALS
TEMPERATURE: 97.3 F | OXYGEN SATURATION: 97 % | HEART RATE: 65 BPM | WEIGHT: 159 LBS | DIASTOLIC BLOOD PRESSURE: 61 MMHG | SYSTOLIC BLOOD PRESSURE: 103 MMHG | BODY MASS INDEX: 24.9 KG/M2

## 2017-07-18 DIAGNOSIS — M25.561 ACUTE PAIN OF RIGHT KNEE: ICD-10-CM

## 2017-07-18 DIAGNOSIS — N40.2 PROSTATE NODULE: ICD-10-CM

## 2017-07-18 DIAGNOSIS — M48.061 SPINAL STENOSIS, LUMBAR REGION, WITHOUT NEUROGENIC CLAUDICATION: Primary | ICD-10-CM

## 2017-07-18 DIAGNOSIS — C91.10 CLL (CHRONIC LYMPHOCYTIC LEUKEMIA) (H): ICD-10-CM

## 2017-07-18 PROCEDURE — 99214 OFFICE O/P EST MOD 30 MIN: CPT | Performed by: INTERNAL MEDICINE

## 2017-07-18 RX ORDER — CALCIUM CARBONATE/VITAMIN D3 600 MG-10
2 TABLET ORAL DAILY
COMMUNITY
End: 2021-04-13

## 2017-07-18 ASSESSMENT — PAIN SCALES - GENERAL: PAINLEVEL: NO PAIN (0)

## 2017-07-18 NOTE — PROGRESS NOTES
SUBJECTIVE:                                                    Loco Wood is a 72 year old male who presents to clinic today for the following health issues:    CLL no symptoms or treatment.  Most recently spiked significantly   Every 6 months  Nodule in the prostate and saw Dr. Boyd at [facility]   13   Opted not to do biopsy.    DR. NEWBERRY ( ONCOLOGY)    LIPITOR AND PRILOSEC  10 MG EVERY OTHER DAY   PRILOSEC TRIED APPLE CIDER VINEGAR   WORKS WELL        BACK AND HAD FUSION SURGERY 20 YEARS AGO FUSED l$ TO [facility]   BACK PAIN ON A DAILY BASIS AND BACK WENT OUT COMPLETELY   CANNOT MOVE AT ALL.   STENOSIS AND SOME SCOLIOSIS   WORKING OUT REGULARLY     NO SCAN IN AWHILE       KNEE CAUSING INTERNAL ROTATION AND DID NOT DO STRETCH PREVIOUS   BEFORE     HEMORROID.      LEFT HEEL PLANTAR FASCITIS    .  PODIATRY [facility].           New Patient/Transfer of Care        Problem list and histories reviewed & adjusted, as indicated.  Additional history: as documented    Patient Active Problem List   Diagnosis     Esophageal reflux     Lumbago     CLL (chronic lymphocytic leukemia) (H)     HYPERLIPIDEMIA LDL GOAL <130     Mass of skin     Health Care Home     Vitamin D deficiency     Advanced directives, counseling/discussion     Osteoarthritis of hip     OA (osteoarthritis) of hip     Insomnia     BPH (benign prostatic hyperplasia)     Acute bilateral low back pain without sciatica     Prostate nodule     Past Surgical History:   Procedure Laterality Date     ARTHROPLASTY MINIMALLY INVASIVE HIP  5/10/2013    Procedure: ARTHROPLASTY MINIMALLY INVASIVE HIP;  Left Two Incision Total Hip Arthroplasty ;  Surgeon: Desmond Alberts MD;  [facility]     ARTHROPLASTY MINIMALLY INVASIVE HIP  2/27/2014    Procedure: ARTHROPLASTY MINIMALLY INVASIVE HIP;  Right Total Hip Arthroplasty Minimally Invasive Two Incision  *Latex Free Room;  Surgeon: Desmond Alberts MD;  [facility]     BACK SURGERY      back fusion       C NONSPECIFIC PROCEDURE   &    (R) inguinal herniorrhapy     C NONSPECIFIC PROCEDURE      (L) inguinal herniorrhaphy     C NONSPECIFIC PROCEDURE      L4-5  L5 S1 fusion - spondylolisthesis     COLONOSCOPY      2007     GI SURGERY      4 hernia repairs in childhood       Social History   Substance Use Topics     Smoking status: Never Smoker     Smokeless tobacco: Never Used     Alcohol use 0.0 oz/week     0 Standard drinks or equivalent per week      Comment: 3 drinks per week     Family History   Problem Relation Age of Onset     HEART DISEASE Father      CEREBROVASCULAR DISEASE Father      CANCER Father      melonoma     Melanoma Father      CANCER Mother      Lung cancer     CEREBROVASCULAR DISEASE Paternal Uncle      Aneurism     Breast Cancer Maternal Aunt       from it     CANCER Paternal Uncle      CANCER Paternal Aunt      Skin Cancer No family hx of          Current Outpatient Prescriptions   Medication Sig Dispense Refill     omega 3 1200 MG CAPS Take 2 capsules by mouth daily       diazepam (VALIUM) 5 MG tablet Take 1 tablet (5 mg) by mouth every 6 hours as needed for anxiety or sleep (MUSCLE SPASM) 20 tablet 0     omeprazole (PRILOSEC) 10 MG CR capsule TAKE 1 CAPSULE BY MOUTH DAILY - TAKE 30 TO 60 MINUTES BEFORE A MEAL - 90 capsule 3     atorvastatin (LIPITOR) 10 MG tablet Take 1 tablet (10 mg) by mouth every 48 hours 45 tablet 9     Cholecalciferol (VITAMIN D3 PO) Take 2,000 Units by mouth       ORDER FOR DME Equipment being ordered: [employer] - Blue - Size E 1 Device 0     sildenafil (VIAGRA) 100 MG tablet Take 0.5-1 tablets ( mg) by mouth daily as needed for erectile dysfunction Take 30 min to 4 hours before intercourse. (Patient not taking: Reported on 2017) 18 tablet prn     Allergies   Allergen Reactions     Dilaudid [Hydromorphone] Itching     Morphine Itching     Recent Labs   Lab Test  17   1443  10/25/16   1543  16   1136   04/29/15   1012   LDL    --   72  101*   --   96   HDL   --   35*  40   --   40*   TRIG   --   263*  123   --   88   ALT  26  26  22   < >  22   CR  1.22  1.35*  1.23   < >  1.26*   GFRESTIMATED  59*  52*  58*   < >  57*   GFRESTBLACK  71  63  71   < >  69   POTASSIUM  4.5  4.4  4.0   < >  4.2    < > = values in this interval not displayed.            Reviewed and updated as needed this visit by clinical staffTobacco  Allergies  Meds  Med Hx  Surg Hx  Fam Hx  Soc Hx      Reviewed and updated as needed this visit by Provider         ROS:  Constitutional, HEENT, cardiovascular, pulmonary, gi and gu systems are negative, except as otherwise noted.      OBJECTIVE:   /61 (BP Location: Right arm, Patient Position: Chair, Cuff Size: Adult Regular)  Pulse 65  Temp 97.3  F (36.3  C) (Oral)  Wt 159 lb (72.1 kg)  SpO2 97%  BMI 24.9 kg/m2  Body mass index is 24.9 kg/(m^2).  GENERAL: healthy, alert and no distress  NECK: no adenopathy, no asymmetry, masses, or scars and thyroid normal to palpation  RESP: lungs clear to auscultation - no rales, rhonchi or wheezes  CV: regular rate and rhythm, normal S1 S2, no S3 or S4, no murmur, click or rub, no peripheral edema and peripheral pulses strong  ABDOMEN: soft, nontender, no hepatosplenomegaly, no masses and bowel sounds normal  MS: no gross musculoskeletal defects noted, no edema    Diagnostic Test Results:  Results for orders placed or performed in visit on 06/30/17   XR Knee Right 3 Views    Narrative    Bilateral Cabrera, bilateral axial, and right lateral views of the knees   demonstrate no acute osseous abnormality. Right knee joint effusion   present. Joint spaces appear preserved.    aJyy Harvey, DO, CAQ         ASSESSMENT/PLAN:       ICD-10-CM    1. Spinal stenosis, lumbar region, without neurogenic claudication M48.06 omega 3 1200 MG CAPS     CT Lumbar Spine w/o Contrast     MR Knee Right w/o Contrast   2. Acute pain of right knee M25.561 MR Knee Right w/o Contrast   3.  CLL (chronic lymphocytic leukemia) (H) C91.10    4. Prostate nodule N40.2      Continue management with oncology and urology.    Patient with symptoms suggestive of medial menisceal pathology and spinal stenosis and/or SI dysfunction            Campos Parra MD  [facility]

## 2017-07-18 NOTE — MR AVS SNAPSHOT
After Visit Summary   7/18/2017    Loco Wood    MRN: [id number]           Patient Information     Date Of Birth          1945        Visit Information        Provider Department      7/18/2017 10:20 AM Campos Parra MD [facility]        Today's Diagnoses     Spinal stenosis, lumbar region, without neurogenic claudication    -  1    Acute pain of right knee           Follow-ups after your visit        Your next 10 appointments already scheduled     Aug 15, 2017   Procedure with Chun Mcguire MD   [facility] Endoscopy ([facility])    [address]  [place] [address]   [phone]           [facility] is located at [address] [place]            Oct 24, 2017  4:00 PM CDT   [facility] Draw with  [facility] DRAW   [facility] Draw ([facility])    [address] [place] [id number] [phone]            Oct 24, 2017  4:30 PM CDT   RETURN ONC with He Burns MD   [facility] Blood and Marrow Transplant ([facility])    [address] [place] [id number] [phone]              Future tests that were ordered for you today     Open Future Orders        Priority Expected Expires Ordered    MR Knee Right w/o Contrast Routine  7/18/2018 7/18/2017    CT Lumbar Spine w/o Contrast Routine  7/18/2018 7/18/2017            Who to contact     If you have questions or need follow up information about today's clinic visit or your schedule please contact [facility] directly at [phone].  Normal or non-critical lab and imaging results will be communicated to you by MyChart, letter or phone within 4 business days after the clinic has received the results. If you do not hear from us within 7 days, please contact the clinic through MyChart or phone. If you have a critical or abnormal lab  result, we will notify you by phone as soon as possible.  Submit refill requests through [employer] or call your pharmacy and they will forward the refill request to us. Please allow 3 business days for your refill to be completed.          Additional Information About Your Visit        [employer] Information     [employer] gives you secure access to your electronic health record. If you see a primary care provider, you can also send messages to your care team and make appointments. If you have questions, please call your primary care clinic.  If you do not have a primary care provider, please call [phone] and they will assist you.        Care EveryWhere ID     This is your Care EveryWhere ID. This could be used by other organizations to access your [facility] medical records  [id number]        Your Vitals Were     Pulse Temperature Pulse Oximetry BMI (Body Mass Index)          65 97.3  F (36.3  C) (Oral) 97% 24.9 kg/m2         Blood Pressure from Last 3 Encounters:   07/18/17 103/61   06/16/17 117/66   05/29/17 114/71    Weight from Last 3 Encounters:   07/18/17 159 lb (72.1 kg)   06/30/17 159 lb (72.1 kg)   06/16/17 159 lb 4.8 oz (72.3 kg)                 Today's Medication Changes          These changes are accurate as of: 7/18/17 10:56 AM.  If you have any questions, ask your nurse or doctor.               These medicines have changed or have updated prescriptions.        Dose/Directions    omega 3 1200 MG Caps   This may have changed:  Another medication with the same name was removed. Continue taking this medication, and follow the directions you see here.   Used for:  Spinal stenosis, lumbar region, without neurogenic claudication   Changed by:  Campos Parra MD        Dose:  2 capsule   Take 2 capsules by mouth daily   Refills:  0                Primary Care Provider Office Phone # Fax #    Jameson Cisse [phone]       [address]  [address]        Equal Access to Services     [facility]: Hadii litzy staton marileegris Sigifredoali, wajavyda luqadaha, qaybta karjjose moura. So [facility] [phone].    ATENCIÓN: Si habla español, tiene a knutson disposición servicios gratuitos de asistencia lingüística. Princessame al [phone].    We comply with applicable federal civil rights laws and [place] laws. We do not discriminate on the basis of race, color, national origin, age, disability sex, sexual orientation or gender identity.            Thank you!     Thank you for choosing [facility]  for your care. Our goal is always to provide you with excellent care. Hearing back from our patients is one way we can continue to improve our services. Please take a few minutes to complete the written survey that you may receive in the mail after your visit with us. Thank you!             Your Updated Medication List - Protect others around you: Learn how to safely use, store and throw away your medicines at www.disposemymeds.org.          This list is accurate as of: 7/18/17 10:56 AM.  Always use your most recent med list.                   Brand Name Dispense Instructions for use Diagnosis    atorvastatin 10 MG tablet    LIPITOR    45 tablet    Take 1 tablet (10 mg) by mouth every 48 hours    Hyperlipidemia LDL goal <130       diazepam 5 MG tablet    VALIUM    20 tablet    Take 1 tablet (5 mg) by mouth every 6 hours as needed for anxiety or sleep (MUSCLE SPASM)    Acute bilateral low back pain without sciatica       omega 3 1200 MG Caps      Take 2 capsules by mouth daily    Spinal stenosis, lumbar region, without neurogenic claudication       omeprazole 10 MG CR capsule    priLOSEC    90 capsule    TAKE 1 CAPSULE BY MOUTH DAILY - TAKE 30 TO 60 MINUTES BEFORE A MEAL -    Gastroesophageal reflux disease without esophagitis       order for DME     1 Device    Equipment being ordered: Isa - Blue - Size E     Plantar fasciitis       sildenafil 100 MG cap/tab    VIAGRA    18 tablet    Take 0.5-1 tablets ( mg) by mouth daily as needed for erectile dysfunction Take 30 min to 4 hours before intercourse.    Erectile dysfunction       VITAMIN D3 PO      Take 2,000 Units by mouth

## 2017-07-18 NOTE — NURSING NOTE
"Chief Complaint   Patient presents with     Establish Care       Initial /61 (BP Location: Right arm, Patient Position: Chair, Cuff Size: Adult Regular)  Pulse 65  Temp 97.3  F (36.3  C) (Oral)  Wt 159 lb (72.1 kg)  SpO2 97%  BMI 24.9 kg/m2 Estimated body mass index is 24.9 kg/(m^2) as calculated from the following:    Height as of 6/30/17: 5' 7\" (1.702 m).    Weight as of this encounter: 159 lb (72.1 kg).  Medication Reconciliation: complete   Karen Levy MA      "

## 2017-07-20 ENCOUNTER — RADIANT APPOINTMENT (OUTPATIENT)
Dept: CT IMAGING | Facility: CLINIC | Age: 72
End: 2017-07-20
Attending: INTERNAL MEDICINE
Payer: COMMERCIAL

## 2017-07-20 DIAGNOSIS — M48.061 SPINAL STENOSIS, LUMBAR REGION, WITHOUT NEUROGENIC CLAUDICATION: ICD-10-CM

## 2017-07-20 PROCEDURE — 72131 CT LUMBAR SPINE W/O DYE: CPT | Mod: TC

## 2017-07-24 DIAGNOSIS — S83.203A COMPLEX TEAR OF MENISCUS OF RIGHT KNEE AS CURRENT INJURY, UNSPECIFIED MENISCUS, INITIAL ENCOUNTER: Primary | ICD-10-CM

## 2017-07-25 ENCOUNTER — TELEPHONE (OUTPATIENT)
Dept: FAMILY MEDICINE | Facility: CLINIC | Age: 72
End: 2017-07-25

## 2017-07-25 NOTE — TELEPHONE ENCOUNTER
CT shows  Old fusion at L4-L5 and L5-s1  Degenerative disc disease rest of low back  Some changes due to aging where his vertebra are pushed back slightly at L2-L3 and L3-L4 and narrowing of the sides at these levels    I am unsure of plan with Dr. Parra-would reommend pt schedule follow up with him about next steps.    Juanita Goldman PA-C

## 2017-07-25 NOTE — TELEPHONE ENCOUNTER
Copied provider results into [employer] message to send, offered to mail a copy of CT result.    Please check chart review for patient response as his response will only come to this RN and I will not be here Thursday.      Christelle Silvestre RN  [facility]

## 2017-07-25 NOTE — TELEPHONE ENCOUNTER
Called and spoke with patient, relayed message as below.  He would like these results to be available in [email or web address].    He declines to schedule at this time but will schedule f/up with JENNIFERB.    Please leave this encounter open after resulting for [employer] for FYI to [facility].    Christelle Silvestre RN  [facility]

## 2017-07-25 NOTE — TELEPHONE ENCOUNTER
RN sees MRI was resulted, CT has not been yet.     Routed to covering pool to advise.    Christelle Silvestre RN  [facility]

## 2017-07-25 NOTE — TELEPHONE ENCOUNTER
Reason for Call:  Other call back    Detailed comments: Patient is calling to get the CT results on his back.  He has not heard from anyone regarding the results.    Phone Number Patient can be reached at: Home number on file [phone]     Best Time: Any   Can we leave a detailed message on this number? YES    Call taken on 7/25/2017 at 4:31 PM by Criselda Pascual

## 2017-07-27 NOTE — TELEPHONE ENCOUNTER
My chart message still not read will try phoning. No answer left message informing of My chart response with triage nurse line number to call if questions or would like the results mailed.    Aurora Sung RN  [facility]

## 2017-07-27 NOTE — TELEPHONE ENCOUNTER
I called and spoke to patient; he states he did read the MRI result from Muriel Morales from 7/24 (although it appears he did not) but states he did not get a message from Christelle although it appears this was sent.    He would like the actual CT result sent via [employer].   I don't have access to release it but can copy and paste into a [employer] message since Shea already resulted it for him.       Patient would appreciate that; done.    Routed to Dr. Parra to review results as well.    Neeta Bloom RN  [facility]

## 2017-07-31 NOTE — TELEPHONE ENCOUNTER
I agree it would be best to start with evaluation of the knee and then move to the back  There is extensive disease is the back, but not obviously benefited by surgery whereas knee likely needs repair.

## 2017-08-01 ENCOUNTER — OFFICE VISIT (OUTPATIENT)
Dept: ORTHOPEDICS | Facility: CLINIC | Age: 72
End: 2017-08-01
Payer: COMMERCIAL

## 2017-08-01 VITALS — TEMPERATURE: 98.6 F | RESPIRATION RATE: 18 BRPM | HEIGHT: 67 IN | BODY MASS INDEX: 24.96 KG/M2 | WEIGHT: 159 LBS

## 2017-08-01 DIAGNOSIS — S83.231A COMPLEX TEAR OF MEDIAL MENISCUS OF RIGHT KNEE AS CURRENT INJURY, INITIAL ENCOUNTER: Primary | ICD-10-CM

## 2017-08-01 DIAGNOSIS — M94.261 CHONDROMALACIA, KNEE, RIGHT: ICD-10-CM

## 2017-08-01 PROCEDURE — 99203 OFFICE O/P NEW LOW 30 MIN: CPT | Performed by: ORTHOPAEDIC SURGERY

## 2017-08-01 RX ORDER — DIAZEPAM 5 MG
5 TABLET ORAL EVERY 6 HOURS PRN
Qty: 15 TABLET | Refills: 0 | Status: SHIPPED | OUTPATIENT
Start: 2017-08-01 | End: 2017-11-06

## 2017-08-01 NOTE — PROGRESS NOTES
Loco Wood is a 72 year old male who is seen in consultation at the request of Dr. Campos Parra  for right knee pain. This started about 2 years ago without history of injury. He just noted it recently when he went back to his health club and was doing a piriformis stretch on a table. He had pain at the medial aspect of the knee. He does not have pain with activities such as pickle ball or bicycling. He describes sharp pain on the medial aspect of the knee rated 1 out of 10 with this stretch.    He has had MRI scan performed on 7/23/17. I've reviewed the images with the patient. This shows a large horizontal cleavage tear of the medial meniscus with further tear of the inferior leaflet. 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    Past Medical History:   Diagnosis Date     Arthritis     hip and toes     Chronic pain      CLL (chronic lymphocytic leukaemia)      Esophageal reflux      Malignant neoplasm (H)     Leukemia     PONV (postoperative nausea and vomiting)      Pure hypercholesterolemia        Past Surgical History:   Procedure Laterality Date     ARTHROPLASTY MINIMALLY INVASIVE HIP  5/10/2013    Procedure: ARTHROPLASTY MINIMALLY INVASIVE HIP;  Left Two Incision Total Hip Arthroplasty ;  Surgeon: Desmond Alberts MD;  [facility]     ARTHROPLASTY MINIMALLY INVASIVE HIP  2/27/2014    Procedure: ARTHROPLASTY MINIMALLY INVASIVE HIP;  Right Total Hip Arthroplasty Minimally Invasive Two Incision  *Latex Free Room;  Surgeon: Desmond Alberts MD;  [facility]: [facility] OR     BACK SURGERY      back fusion 1994     C NONSPECIFIC PROCEDURE  1964 &'95    (R) inguinal herniorrhapy     C NONSPECIFIC PROCEDURE  1949    (L) inguinal herniorrhaphy     C NONSPECIFIC PROCEDURE  1994    L4-5  L5 S1 fusion - spondylolisthesis     COLONOSCOPY      2007     GI SURGERY      4 hernia repairs in childhood       Family History   Problem Relation Age of Onset     HEART DISEASE Father      CEREBROVASCULAR DISEASE Father       CANCER Father      melonoma     Melanoma Father      CANCER Mother      Lung cancer     CEREBROVASCULAR DISEASE Paternal Uncle      Aneurism     Breast Cancer Maternal Aunt       from it     CANCER Paternal Uncle      CANCER Paternal Aunt      Skin Cancer No family hx of        Social History     Social History     Marital status: Single     Spouse name: Ellen     Number of children: 0     Years of education: PHD     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[employer]     Social History Main Topics     Smoking status: Never Smoker     Smokeless tobacco: Never Used     Alcohol use 0.0 oz/week     0 Standard drinks or equivalent per week      Comment: 3 drinks per week     Drug use: No     Sexual activity: Yes     Partners: Female     Other Topics Concern      Service No     Blood Transfusions No     Caffeine Concern No     Occupational Exposure No     Hobby Hazards No     Sleep Concern No     Stress Concern No     Weight Concern No     Special Diet No     Back Care Yes     Exercise Yes     Several times a week     Bike Helmet No     Seat Belt Yes     Self-Exams No     Parent/Sibling W/ Cabg, Mi Or Angioplasty Before 65f 55m? No     Social History Narrative    Social Documentation:7/10        Balanced Diet: YES    Calcium intake:milk and food    Caffeine: 2 cups of coffee per day    Exercise:  type of activity  Wts , stretching, cardio;  3 times per week    Sunscreen:no    Seatbelts:  Yes    Self Breast Exam:  No -     Self Testicular Exam: No    Physical/Emotional/Sexual Abuse: No     Do you feel safe in your environment? Yes        Cholesterol screen up to date: today    Eye Exam up to date: Yes    Dental Exam up to date: Yes    Pap smear up to date: Does Not Apply    Mammogram up to date: Does Not Apply    Dexa Scan up to date: Does Not Apply    Colonoscopy up to date: Yes-    Immunizations up to date: Yes-    Glucose screen if over 40:  No     Luis Alfredo Knox ma                        Current Outpatient Prescriptions   Medication Sig Dispense Refill     diazepam (VALIUM) 5 MG tablet Take 1 tablet (5 mg) by mouth every 6 hours as needed for anxiety 15 tablet 0     omega 3 1200 MG CAPS Take 2 capsules by mouth daily       diazepam (VALIUM) 5 MG tablet Take 1 tablet (5 mg) by mouth every 6 hours as needed for anxiety or sleep (MUSCLE SPASM) 20 tablet 0     omeprazole (PRILOSEC) 10 MG CR capsule TAKE 1 CAPSULE BY MOUTH DAILY - TAKE 30 TO 60 MINUTES BEFORE A MEAL - 90 capsule 3     atorvastatin (LIPITOR) 10 MG tablet Take 1 tablet (10 mg) by mouth every 48 hours 45 tablet 9     Cholecalciferol (VITAMIN D3 PO) Take 2,000 Units by mouth       ORDER FOR DME Equipment being ordered: Superfeet - Blue - Size E 1 Device 0     sildenafil (VIAGRA) 100 MG tablet Take 0.5-1 tablets ( mg) by mouth daily as needed for erectile dysfunction Take 30 min to 4 hours before intercourse. (Patient not taking: Reported on 7/18/2017) 18 tablet prn       Allergies   Allergen Reactions     Dilaudid [Hydromorphone] Itching     Morphine Itching       REVIEW OF SYSTEMS:  CONSTITUTIONAL:  NEGATIVE for fever, chills, change in weight, not feeling tired  SKIN:  NEGATIVE for worrisome rashes, no skin lumps, no skin ulcers and no non-healing wounds  EYES:  NEGATIVE for vision changes or irritation.  ENT/MOUTH:  NEGATIVE.  No hearing loss, no hoarseness, no difficulty swallowing.  RESP:  NEGATIVE. No cough or shortness of breath.  CV:  NEGATIVE for chest pain, palpitations or peripheral edema  GI:  NEGATIVE for nausea, abdominal pain, heartburn, or change in bowel habits  :  Negative. No dysuria, no hematuria  MUSCULOSKELETAL:  See HPI above  NEURO:  NEGATIVE . No headaches, no dizziness,  no numbness  ENDOCRINE:  NEGATIVE for temperature intolerance, skin/hair changes  HEME/ALLERGY/IMMUNE:  NEGATIVE for bleeding problems  PSYCHIATRIC:  NEGATIVE. no anxiety, no depression.      Exam:  Vitals: Temp 98.6  F (37  C)  " Resp 18  Ht 1.702 m (5' 7\")  Wt 72.1 kg (159 lb)  BMI 24.9 kg/m2  BMI= Body mass index is 24.9 kg/(m^2).  Constitutional:  healthy, alert and no distress  Neuro: Alert and Oriented x 3, Gait normal. Sensation grossly WNL.  HEENT:  Atraumatic, EOMI  Neck:  Neck supple with no tenderness.  Psych: Affect normal   Respiratory: Breathing not labored.  Cardiovascular: normal peripheral pulses  Lymph: no adenopathy  Skin: No rashes,worrisome lesions or skin problems  Spine: straight, no straight leg raising pain.  Hips show full range of motion.  There is no tenderness over the sacro-iliac joints, sciatic notch, or greater trochanters.   Knees have full range of motion.  He has no tenderness of the medial or lateral joint line on either knee.  He has negative medial and lateral Tanvi.  There is no effusion of the knees.  He only has pain with a figure 4 stretch on a tabletop with his right leg.  Sensation, motor and circulation are intact.    Assessment:  Right knee chondromalacia, medial meniscus tear  Plan: Since he has minimal symptoms will just observe this for now.  He should avoid the piriformis stretch and avoid squatting.  We will plan surgery only if other activities become painful.    "

## 2017-08-01 NOTE — NURSING NOTE
"Chief Complaint   Patient presents with     Consult     Right knee pain for the past 2 yrs. no injury. Pain level 1/10 sharp and episodic. Only hurts when patient is stretching in a certain way.       Initial Temp 98.6  F (37  C)  Resp 18  Ht 1.702 m (5' 7\")  Wt 72.1 kg (159 lb)  BMI 24.9 kg/m2 Estimated body mass index is 24.9 kg/(m^2) as calculated from the following:    Height as of this encounter: 1.702 m (5' 7\").    Weight as of this encounter: 72.1 kg (159 lb).  Medication Reconciliation: complete   Shannan Dodge MA      "

## 2017-08-01 NOTE — LETTER
8/1/2017         RE: Loco Wood  [address] [id number]        Dear Colleague,    Thank you for referring your patient, Loco Wood, to the [facility]. Please see a copy of my visit note below.    Loco Wood is a 72 year old male who is seen in consultation at the request of Dr. Campos Parra  for right knee pain. This started about 2 years ago without history of injury. He just noted it recently when he went back to his health club and was doing a piriformis stretch on a table. He had pain at the medial aspect of the knee. He does not have pain with activities such as pickle ball or bicycling. He describes sharp pain on the medial aspect of the knee rated 1 out of 10 with this stretch.    He has had MRI scan performed on 7/23/17. I've reviewed the images with the patient. This shows a large horizontal cleavage tear of the medial meniscus with further tear of the inferior leaflet. 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    Past Medical History:   Diagnosis Date     Arthritis     hip and toes     Chronic pain      CLL (chronic lymphocytic leukaemia)      Esophageal reflux      Malignant neoplasm (H)     Leukemia     PONV (postoperative nausea and vomiting)      Pure hypercholesterolemia        Past Surgical History:   Procedure Laterality Date     ARTHROPLASTY MINIMALLY INVASIVE HIP  5/10/2013    Procedure: ARTHROPLASTY MINIMALLY INVASIVE HIP;  Left Two Incision Total Hip Arthroplasty ;  Surgeon: Desmond Alberts MD;  [facility]     ARTHROPLASTY MINIMALLY INVASIVE HIP  2/27/2014    Procedure: ARTHROPLASTY MINIMALLY INVASIVE HIP;  Right Total Hip Arthroplasty Minimally Invasive Two Incision  *Latex Free Room;  Surgeon: Desmond Alberts MD;  [facility]     BACK SURGERY      back fusion 1994     C NONSPECIFIC PROCEDURE  1964 &'95    (R) inguinal herniorrhapy     C NONSPECIFIC PROCEDURE  1949    (L) inguinal herniorrhaphy     C  NONSPECIFIC PROCEDURE      L4-5  L5 S1 fusion - spondylolisthesis     COLONOSCOPY           GI SURGERY      4 hernia repairs in childhood       Family History   Problem Relation Age of Onset     HEART DISEASE Father      CEREBROVASCULAR DISEASE Father      CANCER Father      melonoma     Melanoma Father      CANCER Mother      Lung cancer     CEREBROVASCULAR DISEASE Paternal Uncle      Aneurism     Breast Cancer Maternal Aunt       from it     CANCER Paternal Uncle      CANCER Paternal Aunt      Skin Cancer No family hx of        Social History     Social History     Marital status: Single     Spouse name: Ellen     Number of children: 0     Years of education: PHD     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[employer]     Social History Main Topics     Smoking status: Never Smoker     Smokeless tobacco: Never Used     Alcohol use 0.0 oz/week     0 Standard drinks or equivalent per week      Comment: 3 drinks per week     Drug use: No     Sexual activity: Yes     Partners: Female     Other Topics Concern      Service No     Blood Transfusions No     Caffeine Concern No     Occupational Exposure No     Hobby Hazards No     Sleep Concern No     Stress Concern No     Weight Concern No     Special Diet No     Back Care Yes     Exercise Yes     Several times a week     Bike Helmet No     Seat Belt Yes     Self-Exams No     Parent/Sibling W/ Cabg, Mi Or Angioplasty Before 65f 55m? No     Social History Narrative    Social Documentation:7/10        Balanced Diet: YES    Calcium intake:milk and food    Caffeine: 2 cups of coffee per day    Exercise:  type of activity  Wts , stretching, cardio;  3 times per week    Sunscreen:no    Seatbelts:  Yes    Self Breast Exam:  No -     Self Testicular Exam: No    Physical/Emotional/Sexual Abuse: No     Do you feel safe in your environment? Yes        Cholesterol screen up to date: today    Eye Exam up to date: Yes    Dental Exam up to date:  Yes    Pap smear up to date: Does Not Apply    Mammogram up to date: Does Not Apply    Dexa Scan up to date: Does Not Apply    Colonoscopy up to date: Yes-2007    Immunizations up to date: Yes-2008    Glucose screen if over 40:  No     Luis Alfredo Knox ma                       Current Outpatient Prescriptions   Medication Sig Dispense Refill     diazepam (VALIUM) 5 MG tablet Take 1 tablet (5 mg) by mouth every 6 hours as needed for anxiety 15 tablet 0     omega 3 1200 MG CAPS Take 2 capsules by mouth daily       diazepam (VALIUM) 5 MG tablet Take 1 tablet (5 mg) by mouth every 6 hours as needed for anxiety or sleep (MUSCLE SPASM) 20 tablet 0     omeprazole (PRILOSEC) 10 MG CR capsule TAKE 1 CAPSULE BY MOUTH DAILY - TAKE 30 TO 60 MINUTES BEFORE A MEAL - 90 capsule 3     atorvastatin (LIPITOR) 10 MG tablet Take 1 tablet (10 mg) by mouth every 48 hours 45 tablet 9     Cholecalciferol (VITAMIN D3 PO) Take 2,000 Units by mouth       ORDER FOR DME Equipment being ordered: Superfeet - Blue - Size E 1 Device 0     sildenafil (VIAGRA) 100 MG tablet Take 0.5-1 tablets ( mg) by mouth daily as needed for erectile dysfunction Take 30 min to 4 hours before intercourse. (Patient not taking: Reported on 7/18/2017) 18 tablet prn       Allergies   Allergen Reactions     Dilaudid [Hydromorphone] Itching     Morphine Itching       REVIEW OF SYSTEMS:  CONSTITUTIONAL:  NEGATIVE for fever, chills, change in weight, not feeling tired  SKIN:  NEGATIVE for worrisome rashes, no skin lumps, no skin ulcers and no non-healing wounds  EYES:  NEGATIVE for vision changes or irritation.  ENT/MOUTH:  NEGATIVE.  No hearing loss, no hoarseness, no difficulty swallowing.  RESP:  NEGATIVE. No cough or shortness of breath.  CV:  NEGATIVE for chest pain, palpitations or peripheral edema  GI:  NEGATIVE for nausea, abdominal pain, heartburn, or change in bowel habits  :  Negative. No dysuria, no hematuria  MUSCULOSKELETAL:  See HPI above  NEURO:   "NEGATIVE . No headaches, no dizziness,  no numbness  ENDOCRINE:  NEGATIVE for temperature intolerance, skin/hair changes  HEME/ALLERGY/IMMUNE:  NEGATIVE for bleeding problems  PSYCHIATRIC:  NEGATIVE. no anxiety, no depression.      Exam:  Vitals: Temp 98.6  F (37  C)  Resp 18  Ht 1.702 m (5' 7\")  Wt 72.1 kg (159 lb)  BMI 24.9 kg/m2  BMI= Body mass index is 24.9 kg/(m^2).  Constitutional:  healthy, alert and no distress  Neuro: Alert and Oriented x 3, Gait normal. Sensation grossly WNL.  HEENT:  Atraumatic, EOMI  Neck:  Neck supple with no tenderness.  Psych: Affect normal   Respiratory: Breathing not labored.  Cardiovascular: normal peripheral pulses  Lymph: no adenopathy  Skin: No rashes,worrisome lesions or skin problems  Spine: straight, no straight leg raising pain.  Hips show full range of motion.  There is no tenderness over the sacro-iliac joints, sciatic notch, or greater trochanters.   Knees have full range of motion.  He has no tenderness of the medial or lateral joint line on either knee.  He has negative medial and lateral Tanvi.  There is no effusion of the knees.  He only has pain with a figure 4 stretch on a tabletop with his right leg.  Sensation, motor and circulation are intact.    Assessment:  Right knee chondromalacia, medial meniscus tear  Plan: Since he has minimal symptoms will just observe this for now.  He should avoid the piriformis stretch and avoid squatting.  We will plan surgery only if other activities become painful.      Again, thank you for allowing me to participate in the care of your patient.        Sincerely,        Arnaud Mccray MD    "

## 2017-08-01 NOTE — MR AVS SNAPSHOT
After Visit Summary   8/1/2017    Loco Wood    MRN: [id number]           Patient Information     Date Of Birth          1945        Visit Information        Provider Department      8/1/2017 3:30 PM Arnaud Mccray MD [facility]        Today's Diagnoses     Complex tear of medial meniscus of right knee as current injury, initial encounter    -  1    Chondromalacia, knee, right           Follow-ups after your visit        Your next 10 appointments already scheduled     Aug 15, 2017   Procedure with Chun Mcguire MD   [facility] ([facility])    [address]  [place] [address]   [phone]           [facility] is located at [address] [place]            Oct 24, 2017  4:00 PM CDT   [facility] Lab Draw with  [facility] DRAW   [facility] Mason[facility] Lab Draw ([facility])    [address] [place] [id number] [phone]            Oct 24, 2017  4:30 PM CDT   RETURN ONC with He Burns MD   [facility] Blood and Marrow Transplant ([facility])    [address] [place] [id number] [phone]              Who to contact     If you have questions or need follow up information about today's clinic visit or your schedule please contact [facility] directly at [phone].  Normal or non-critical lab and imaging results will be communicated to you by MyChart, letter or phone within 4 business days after the clinic has received the results. If you do not hear from us within 7 days, please contact the clinic through MyChart or phone. If you have a critical or abnormal lab result, we will notify you by phone as soon as possible.  Submit refill requests through [employer] or call your pharmacy and they will forward the refill request to us. Please allow 3 business days for your refill to  "be completed.          Additional Information About Your Visit        [employer] Information     [employer] gives you secure access to your electronic health record. If you see a primary care provider, you can also send messages to your care team and make appointments. If you have questions, please call your primary care clinic.  If you do not have a primary care provider, please call [phone] and they will assist you.        Care EveryWhere ID     This is your Care EveryWhere ID. This could be used by other organizations to access your [place] medical records  [id number]        Your Vitals Were     Temperature Respirations Height BMI (Body Mass Index)          98.6  F (37  C) 18 1.702 m (5' 7\") 24.9 kg/m2         Blood Pressure from Last 3 Encounters:   07/18/17 103/61   06/16/17 117/66   05/29/17 114/71    Weight from Last 3 Encounters:   08/01/17 72.1 kg (159 lb)   07/18/17 72.1 kg (159 lb)   06/30/17 72.1 kg (159 lb)              Today, you had the following     No orders found for display         Today's Medication Changes          These changes are accurate as of: 8/1/17  5:03 PM.  If you have any questions, ask your nurse or doctor.               These medicines have changed or have updated prescriptions.        Dose/Directions    * diazepam 5 MG tablet   Commonly known as:  VALIUM   This may have changed:  Another medication with the same name was added. Make sure you understand how and when to take each.   Used for:  Acute bilateral low back pain without sciatica   Changed by:  Jameson Cisse MD        Dose:  5 mg   Take 1 tablet (5 mg) by mouth every 6 hours as needed for anxiety or sleep (MUSCLE SPASM)   Quantity:  20 tablet   Refills:  0       * diazepam 5 MG tablet   Commonly known as:  VALIUM   This may have changed:  You were already taking a medication with the same name, and this prescription was added. Make sure you understand how and when to take each.   Used for:  Complex tear of medial " meniscus of right knee as current injury, initial encounter   Changed by:  Arnaud Mccray MD        Dose:  5 mg   Take 1 tablet (5 mg) by mouth every 6 hours as needed for anxiety   Quantity:  15 tablet   Refills:  0       * Notice:  This list has 2 medication(s) that are the same as other medications prescribed for you. Read the directions carefully, and ask your doctor or other care provider to review them with you.         Where to get your medicines      Some of these will need a paper prescription and others can be bought over the counter.  Ask your nurse if you have questions.     Bring a paper prescription for each of these medications     diazepam 5 MG tablet                Primary Care Provider Office Phone # Fax #    Jameson Cisse [phone]       [facility] [address] [address]        Equal Access to Services     HECTOR SANTOS : Hadhenry hunto Solemuel, waaxda luqadaha, qaybta kaalmada adeegyada, jose newton . So [facility] [phone].    ATENCIÓN: Si habla español, tiene a knutson disposición servicios gratuitos de asistencia lingüística. Anastasia al [phone].    We comply with applicable federal civil rights laws and [place] laws. We do not discriminate on the basis of race, color, national origin, age, disability sex, sexual orientation or gender identity.            Thank you!     Thank you for choosing [facility]  for your care. Our goal is always to provide you with excellent care. Hearing back from our patients is one way we can continue to improve our services. Please take a few minutes to complete the written survey that you may receive in the mail after your visit with us. Thank you!             Your Updated Medication List - Protect others around you: Learn how to safely use, store and throw away your medicines at www.disposemymeds.org.          This list is accurate as of:  8/1/17  5:03 PM.  Always use your most recent med list.                   Brand Name Dispense Instructions for use Diagnosis    atorvastatin 10 MG tablet    LIPITOR    45 tablet    Take 1 tablet (10 mg) by mouth every 48 hours    Hyperlipidemia LDL goal <130       * diazepam 5 MG tablet    VALIUM    20 tablet    Take 1 tablet (5 mg) by mouth every 6 hours as needed for anxiety or sleep (MUSCLE SPASM)    Acute bilateral low back pain without sciatica       * diazepam 5 MG tablet    VALIUM    15 tablet    Take 1 tablet (5 mg) by mouth every 6 hours as needed for anxiety    Complex tear of medial meniscus of right knee as current injury, initial encounter       omega 3 1200 MG Caps      Take 2 capsules by mouth daily    Spinal stenosis, lumbar region, without neurogenic claudication       omeprazole 10 MG CR capsule    priLOSEC    90 capsule    TAKE 1 CAPSULE BY MOUTH DAILY - TAKE 30 TO 60 MINUTES BEFORE A MEAL -    Gastroesophageal reflux disease without esophagitis       order for DME     1 Device    Equipment being ordered: Superfeet - Blue - Size E    Plantar fasciitis       sildenafil 100 MG cap/tab    VIAGRA    18 tablet    Take 0.5-1 tablets ( mg) by mouth daily as needed for erectile dysfunction Take 30 min to 4 hours before intercourse.    Erectile dysfunction       VITAMIN D3 PO      Take 2,000 Units by mouth        * Notice:  This list has 2 medication(s) that are the same as other medications prescribed for you. Read the directions carefully, and ask your doctor or other care provider to review them with you.

## 2017-08-15 ENCOUNTER — SURGERY (OUTPATIENT)
Age: 72
End: 2017-08-15

## 2017-08-15 ENCOUNTER — HOSPITAL ENCOUNTER (OUTPATIENT)
Facility: CLINIC | Age: 72
Discharge: HOME | End: 2017-08-15
Attending: SPECIALIST | Admitting: SPECIALIST
Payer: MEDICARE

## 2017-08-15 VITALS
RESPIRATION RATE: 9 BRPM | WEIGHT: 148 LBS | OXYGEN SATURATION: 96 % | SYSTOLIC BLOOD PRESSURE: 111 MMHG | DIASTOLIC BLOOD PRESSURE: 65 MMHG | BODY MASS INDEX: 23.23 KG/M2 | HEIGHT: 67 IN

## 2017-08-15 LAB — COLONOSCOPY: NORMAL

## 2017-08-15 PROCEDURE — G0121 COLON CA SCRN NOT HI RSK IND: HCPCS | Performed by: SPECIALIST

## 2017-08-15 PROCEDURE — 25000128 H RX IP 250 OP 636: Performed by: SPECIALIST

## 2017-08-15 PROCEDURE — 45378 DIAGNOSTIC COLONOSCOPY: CPT | Performed by: SPECIALIST

## 2017-08-15 PROCEDURE — G0500 MOD SEDAT ENDO SERVICE >5YRS: HCPCS | Performed by: SPECIALIST

## 2017-08-15 RX ORDER — ONDANSETRON 2 MG/ML
4 INJECTION INTRAMUSCULAR; INTRAVENOUS
Status: DISCONTINUED | OUTPATIENT
Start: 2017-08-15 | End: 2017-08-15 | Stop reason: HOSPADM

## 2017-08-15 RX ORDER — FENTANYL CITRATE 50 UG/ML
INJECTION, SOLUTION INTRAMUSCULAR; INTRAVENOUS PRN
Status: DISCONTINUED | OUTPATIENT
Start: 2017-08-15 | End: 2017-08-15 | Stop reason: HOSPADM

## 2017-08-15 RX ORDER — LIDOCAINE 40 MG/G
CREAM TOPICAL
Status: DISCONTINUED | OUTPATIENT
Start: 2017-08-15 | End: 2017-08-15 | Stop reason: HOSPADM

## 2017-08-15 RX ADMIN — MIDAZOLAM HYDROCHLORIDE 1 MG: 1 INJECTION, SOLUTION INTRAMUSCULAR; INTRAVENOUS at 11:07

## 2017-08-15 RX ADMIN — MIDAZOLAM HYDROCHLORIDE 1 MG: 1 INJECTION, SOLUTION INTRAMUSCULAR; INTRAVENOUS at 11:04

## 2017-08-15 RX ADMIN — FENTANYL CITRATE 50 MCG: 50 INJECTION, SOLUTION INTRAMUSCULAR; INTRAVENOUS at 11:08

## 2017-08-15 RX ADMIN — FENTANYL CITRATE 50 MCG: 50 INJECTION, SOLUTION INTRAMUSCULAR; INTRAVENOUS at 11:04

## 2017-08-15 NOTE — BRIEF OP NOTE
[facility] Brief Operative Note    Pre-operative diagnosis: screening   Post-operative diagnosis Diverticulosis, tortuous left colon, internal hemorrhoids.      Procedure: Procedure(s):  colonoscopy - Wound Class: II-Clean Contaminated   Surgeon(s): Surgeon(s) and Role:     * Chun Mcguire MD - Primary   Estimated blood loss: * No values recorded between 8/15/2017 12:00 AM and 8/15/2017 11:29 AM *    Specimens: * No specimens in log *   Findings: Please see ProVation procedure note in Chart Review

## 2017-08-15 NOTE — H&P
Pre-Endoscopy History and Physical     Loco Wood MRN# [id number]   YOB: 1945 Age: 72 year old     Date of Procedure: 8/15/2017  Primary care provider: Campos Parra  Type of Endoscopy: Colonoscopy with possible biopsy, possible polypectomy  Reason for Procedure: surveillance  Type of Anesthesia Anticipated: Conscious Sedation    HPI:    Loco is a 72 year old male who will be undergoing the above procedure.      A history and physical has been performed. The patient's medications and allergies have been reviewed. The risks and benefits of the procedure and the sedation options and risks were discussed with the patient.  All questions were answered and informed consent was obtained.      He denies a personal or family history of anesthesia complications or bleeding disorders.     Patient Active Problem List   Diagnosis     Esophageal reflux     Lumbago     CLL (chronic lymphocytic leukemia) (H)     HYPERLIPIDEMIA LDL GOAL <130     [facility]     Health Care Home     Vitamin D deficiency     Advanced directives, counseling/discussion     Osteoarthritis of hip     OA (osteoarthritis) of hip     Insomnia     BPH (benign prostatic hyperplasia)     Acute bilateral low back pain without sciatica     Prostate nodule     Chondromalacia, knee, right     Complex tear of medial meniscus of right knee as current injury        Past Medical History:   Diagnosis Date     Arthritis     hip and toes     Chronic pain      CLL (chronic lymphocytic leukaemia)      Esophageal reflux      Malignant neoplasm (H)     Leukemia     PONV (postoperative nausea and vomiting)      Pure hypercholesterolemia         Past Surgical History:   Procedure Laterality Date     ARTHROPLASTY MINIMALLY INVASIVE HIP  5/10/2013    Procedure: ARTHROPLASTY MINIMALLY INVASIVE HIP;  Left Two Incision Total Hip Arthroplasty ;  Surgeon: Desmond Alberts MD;  [facility]: [facility]     ARTHROPLASTY MINIMALLY INVASIVE HIP  2/27/2014     Procedure: ARTHROPLASTY MINIMALLY INVASIVE HIP;  Right Total Hip Arthroplasty Minimally Invasive Two Incision  *Latex Free Room;  Surgeon: Desmond Alberts MD;  [facility] OR     BACK SURGERY      back fusion      C NONSPECIFIC PROCEDURE  1964    (R) inguinal herniorrhapy     C NONSPECIFIC PROCEDURE      (L) inguinal herniorrhaphy     C NONSPECIFIC PROCEDURE      L4-5  L5 S1 fusion - spondylolisthesis     COLONOSCOPY           GI SURGERY      4 hernia repairs in childhood       Social History   Substance Use Topics     Smoking status: Never Smoker     Smokeless tobacco: Never Used     Alcohol use 0.0 oz/week     0 Standard drinks or equivalent per week      Comment: 3 drinks per week       Family History   Problem Relation Age of Onset     HEART DISEASE Father      CEREBROVASCULAR DISEASE Father      CANCER Father      melonoma     Melanoma Father      CANCER Mother      Lung cancer     CEREBROVASCULAR DISEASE Paternal Uncle      Aneurism     Breast Cancer Maternal Aunt       from it     CANCER Paternal Uncle      CANCER Paternal Aunt      Skin Cancer No family hx of        Prior to Admission medications    Medication Sig Start Date End Date Taking? Authorizing Provider   omega 3 1200 MG CAPS Take 2 capsules by mouth daily   Yes Reported, Patient   omeprazole (PRILOSEC) 10 MG CR capsule TAKE 1 CAPSULE BY MOUTH DAILY - TAKE 30 TO 60 MINUTES BEFORE A MEAL - 5/15/17  Yes Jameson Cisse MD   atorvastatin (LIPITOR) 10 MG tablet Take 1 tablet (10 mg) by mouth every 48 hours 5/15/17  Yes Jameson Cisse MD   Cholecalciferol (VITAMIN D3 PO) Take 2,000 Units by mouth   Yes Reported, Patient   diazepam (VALIUM) 5 MG tablet Take 1 tablet (5 mg) by mouth every 6 hours as needed for anxiety 17   Arnaud Mccray MD   diazepam (VALIUM) 5 MG tablet Take 1 tablet (5 mg) by mouth every 6 hours as needed for anxiety or sleep (MUSCLE SPASM) 17   Jameson Cisse MD   ORDER FOR  "DME Equipment being ordered: Superfeet - Blue - Size E 4/1/15   Seth Hightower, DPM   sildenafil (VIAGRA) 100 MG tablet Take 0.5-1 tablets ( mg) by mouth daily as needed for erectile dysfunction Take 30 min to 4 hours before intercourse.  Patient not taking: Reported on 7/18/2017 7/21/14   Jameson Cisse MD       Allergies   Allergen Reactions     Dilaudid [Hydromorphone] Itching     Morphine Itching        REVIEW OF SYSTEMS:   5 point ROS negative except as noted above in HPI, including Gen., Resp., CV, GI &  system review.    PHYSICAL EXAM:   /66  Resp 16  Ht 1.689 m (5' 6.5\")  Wt 67.1 kg (148 lb)  SpO2 98%  BMI 23.53 kg/m2 Estimated body mass index is 23.53 kg/(m^2) as calculated from the following:    Height as of this encounter: 1.689 m (5' 6.5\").    Weight as of this encounter: 67.1 kg (148 lb).   GENERAL APPEARANCE: alert, and oriented  MENTAL STATUS: alert  AIRWAY EXAM: Mallampatti Class II (visualization of the soft palate, fauces, and uvula)  RESP: lungs clear to auscultation - no rales, rhonchi or wheezes  CV: regular rates and rhythm  DIAGNOSTICS:    Not indicated    IMPRESSION   ASA Class 2 - Mild systemic disease    PLAN:   Plan for Colonoscopy with possible biopsy, possible polypectomy. We discussed the risks, benefits and alternatives and the patient wished to proceed.    The above has been forwarded to the consulting provider.      Signed Electronically by: Chun Mcguire  August 15, 2017          "

## 2017-08-22 ENCOUNTER — TRANSFERRED RECORDS (OUTPATIENT)
Dept: HEALTH INFORMATION MANAGEMENT | Facility: CLINIC | Age: 72
End: 2017-08-22

## 2017-08-23 ENCOUNTER — TELEPHONE (OUTPATIENT)
Dept: NURSING | Facility: CLINIC | Age: 72
End: 2017-08-23

## 2017-08-23 NOTE — TELEPHONE ENCOUNTER
Forms received from: [employer]   Phone number listed: [phone]   Fax listed: [phone]  Date received: 8-23-17  Form description: PT  Once forms are completed, please return to [employer] via fax.  Is patient requesting to be contacted when forms are completed: n/a  Form placed: provider desk  Kate Zazueta

## 2017-09-05 ENCOUNTER — TELEPHONE (OUTPATIENT)
Dept: FAMILY MEDICINE | Facility: CLINIC | Age: 72
End: 2017-09-05

## 2017-09-05 NOTE — TELEPHONE ENCOUNTER
Reason for Call:  Copy of CT Scan of Spine done in July of 2017.    Detailed comments: Patient called [facility] location said we could burn a CD for patient. Patient states this location is closer for him to pick this up.    Phone Number Patient can be reached at:   Loco Wood (Self) [phone] (H)         Best Time: anytime    Can we leave a detailed message on this number? YES    Call taken on 9/5/2017 at 9:11 AM by Miladis Manzo

## 2017-09-06 NOTE — TELEPHONE ENCOUNTER
Left detailed message, informing patient that CD is ready for  and that he will need to sign SARAN, attached, when he picks it up at .

## 2017-09-08 ENCOUNTER — TRANSFERRED RECORDS (OUTPATIENT)
Dept: HEALTH INFORMATION MANAGEMENT | Facility: CLINIC | Age: 72
End: 2017-09-08

## 2017-09-16 ENCOUNTER — HOSPITAL ENCOUNTER (EMERGENCY)
Facility: CLINIC | Age: 72
Discharge: HOME | End: 2017-09-16
Attending: EMERGENCY MEDICINE | Admitting: EMERGENCY MEDICINE
Payer: MEDICARE

## 2017-09-16 VITALS
BODY MASS INDEX: 25.28 KG/M2 | HEART RATE: 52 BPM | TEMPERATURE: 97.7 F | WEIGHT: 159 LBS | RESPIRATION RATE: 16 BRPM | DIASTOLIC BLOOD PRESSURE: 75 MMHG | SYSTOLIC BLOOD PRESSURE: 118 MMHG | OXYGEN SATURATION: 99 %

## 2017-09-16 DIAGNOSIS — R10.31 GROIN DISCOMFORT, RIGHT: ICD-10-CM

## 2017-09-16 DIAGNOSIS — Z92.241 STATUS POST EPIDURAL STEROID INJECTION: ICD-10-CM

## 2017-09-16 LAB
ALBUMIN UR-MCNC: NEGATIVE MG/DL
APPEARANCE UR: CLEAR
BILIRUB UR QL STRIP: NEGATIVE
COLOR UR AUTO: YELLOW
GLUCOSE UR STRIP-MCNC: NEGATIVE MG/DL
HGB UR QL STRIP: NEGATIVE
KETONES UR STRIP-MCNC: NEGATIVE MG/DL
LEUKOCYTE ESTERASE UR QL STRIP: NEGATIVE
NITRATE UR QL: NEGATIVE
PH UR STRIP: 5.5 PH (ref 5–7)
SOURCE: NORMAL
SP GR UR STRIP: 1.02 (ref 1–1.03)
UROBILINOGEN UR STRIP-MCNC: NORMAL MG/DL (ref 0–2)

## 2017-09-16 PROCEDURE — 99284 EMERGENCY DEPT VISIT MOD MDM: CPT | Mod: Z6 | Performed by: EMERGENCY MEDICINE

## 2017-09-16 PROCEDURE — 81003 URINALYSIS AUTO W/O SCOPE: CPT | Performed by: EMERGENCY MEDICINE

## 2017-09-16 PROCEDURE — 99283 EMERGENCY DEPT VISIT LOW MDM: CPT | Performed by: EMERGENCY MEDICINE

## 2017-09-16 ASSESSMENT — ENCOUNTER SYMPTOMS
BACK PAIN: 1
HEMATURIA: 0
FREQUENCY: 0

## 2017-09-16 NOTE — ED PROVIDER NOTES
"    [facility] EMERGENCY DEPARTMENT ([facility])    9/16/17       History     Chief Complaint   Patient presents with     rule out groin abcess     The history is provided by the patient and medical records.     Loco Wood is a 72 year old male with a medical history of CLL, lumbago, OA of hip, s/p right inguinal herniorrhaphy (1964; 1995), s/p left inguinal herniorrhaphy (1949), s/p L4-5 S1 fusion (1994) and s/p arthroplasty minimally invasive hip (5/10/2013; 2/27/2014). Per review of patient's chart, patient was seen at [facility] this morning complaining of right groin pain for the past 3 days. Patient had a normal exam and was advised to go to the Emergency Department to rule out epidural abscess. Patient presents to the Emergency Department today for this reason. Patient reports getting an epidural injection between L2-3 on 9/13/2017 for lower back pain management and since then has been experiencing groin pain. He notes this pain is worse at night when he is lying in bed and improved during the day when he is up on his feet. Patient reports the pain feels more like it is in the pubic bone. He notes his back pain has not improved yet; however, he was told it would take a few days before the epidural would improve his lower back pain. Patient feels the pain he is experiencing today is the back pain radiating into his pelvis. Patient notes increased pain when \"pushing\" while urinating. He denies any scrotal swelling, testicular pain, change in color or smell of his urine, or penile discharge. He also denies any sexual partners, history of STDs or history of kidney stones.    I have reviewed the Medications, Allergies, Past Medical and Surgical History, and Social History in the [facility] system.    Past Medical History:   Diagnosis Date     Arthritis     hip and toes     Chronic pain      CLL (chronic lymphocytic leukaemia)      Esophageal reflux      Malignant neoplasm (H)     Leukemia     " PONV (postoperative nausea and vomiting)      Pure hypercholesterolemia        Past Surgical History:   Procedure Laterality Date     ARTHROPLASTY MINIMALLY INVASIVE HIP  5/10/2013    Procedure: ARTHROPLASTY MINIMALLY INVASIVE HIP;  Left Two Incision Total Hip Arthroplasty ;  Surgeon: Desmond Alberts MD;  [facility]     ARTHROPLASTY MINIMALLY INVASIVE HIP  2014    Procedure: ARTHROPLASTY MINIMALLY INVASIVE HIP;  Right Total Hip Arthroplasty Minimally Invasive Two Incision  *Latex Free Room;  Surgeon: Desmond Alberts MD;  [facility]     BACK SURGERY      back fusion      C NONSPECIFIC PROCEDURE   &    (R) inguinal herniorrhapy     C NONSPECIFIC PROCEDURE      (L) inguinal herniorrhaphy     C NONSPECIFIC PROCEDURE      L4-5  L5 S1 fusion - spondylolisthesis     COLONOSCOPY           COLONOSCOPY N/A 8/15/2017    Procedure: COLONOSCOPY;  colonoscopy;  Surgeon: Chun Mcguire MD;  Location:  GI     GI SURGERY      4 hernia repairs in childhood       Family History   Problem Relation Age of Onset     HEART DISEASE Father      CEREBROVASCULAR DISEASE Father      CANCER Father      melonoma     Melanoma Father      CANCER Mother      Lung cancer     CEREBROVASCULAR DISEASE Paternal Uncle      Aneurism     Breast Cancer Maternal Aunt       from it     CANCER Paternal Uncle      CANCER Paternal Aunt      Skin Cancer No family hx of        Social History   Substance Use Topics     Smoking status: Never Smoker     Smokeless tobacco: Never Used     Alcohol use 0.0 oz/week     0 Standard drinks or equivalent per week      Comment: 3 drinks per week       No current facility-administered medications for this encounter.      Current Outpatient Prescriptions   Medication     omega 3 1200 MG CAPS     omeprazole (PRILOSEC) 10 MG CR capsule     atorvastatin (LIPITOR) 10 MG tablet     Cholecalciferol (VITAMIN D3 PO)     ORDER FOR DME     tamsulosin (FLOMAX) 0.4 MG capsule      ciprofloxacin (CIPRO) 250 MG tablet     diazepam (VALIUM) 5 MG tablet     diazepam (VALIUM) 5 MG tablet     sildenafil (VIAGRA) 100 MG tablet        Allergies   Allergen Reactions     Dilaudid [Hydromorphone] Itching     Morphine Itching         Review of Systems   Genitourinary: Negative for decreased urine volume, discharge, frequency, hematuria, scrotal swelling, testicular pain and urgency.        Positive for groin pain, increased when pushing while urinating   Musculoskeletal: Positive for back pain (lower).   All other systems reviewed and are negative.      Physical Exam   BP: 119/73  Pulse: 51  Temp: 97.9  F (36.6  C)  Resp: 14  Weight: 72.1 kg (159 lb)  SpO2: 99 %  Physical Exam   Constitutional: He is oriented to person, place, and time. He appears well-developed and well-nourished. No distress.   HENT:   Head: Normocephalic and atraumatic.   Right Ear: External ear normal.   Left Ear: External ear normal.   Nose: Nose normal.   Eyes: EOM are normal.   Neck: Normal range of motion.   Cardiovascular: Normal rate, regular rhythm and normal heart sounds.    Pulmonary/Chest: Effort normal and breath sounds normal.   Abdominal: Soft. Bowel sounds are normal. He exhibits no distension and no mass. There is no tenderness. There is no rebound and no guarding. Hernia confirmed negative in the right inguinal area and confirmed negative in the left inguinal area.   Genitourinary: Testes normal and penis normal.   Musculoskeletal: Normal range of motion.   Back normal appearance.    Lymphadenopathy:        Right: No inguinal adenopathy present.        Left: No inguinal adenopathy present.   Neurological: He is alert and oriented to person, place, and time. Coordination normal.   Gait and movement normal.    Skin: Skin is warm and dry. No rash noted. He is not diaphoretic. No erythema. No pallor.   Psychiatric: He has a normal mood and affect.   Nursing note and vitals reviewed.      ED Course   9:44 AM  The patient  was seen and examined by Sindy Bearden MD in Room ED19.     ED Course     Procedures           Labs Ordered and Resulted from Time of ED Arrival Up to the Time of Departure from the ED   URINE MACROSCOPIC WITH REFLEX TO MICRO        No results found for this or any previous visit (from the past 24 hour(s)).           Assessments & Plan (with Medical Decision Making)   The patient presents for right groin pain, and more specifically suprapubic discomfort which seems to be coming from the right.  He says he had an epidural injection 3 days ago and noticed this pain immediately after the procedure.  It has persisted and so decided to have it checked.  He has had injections before and says his back feels similar to prior.  He feels that the pain is nerve pain but when he went for evaluation at an urgent care earlier today they felt he should be checked for an epidural abscess.  Given the pain is near the groin, testicular exam was done and is normal.  UA was checked and does not appear infected.  He denies urinary changes, and denies penile discharge.  He believe the pain is nerve and due to the injection.  I agree.  He doesn't feel that his back pain is out of the ordinary after an injection and denies incontinence or leg symptoms.  He will continue to allow it time to heal and if not better in 1 week or worsening, will f/u with his pmd.   I have reviewed the nursing notes.    I have reviewed the findings, diagnosis, plan and need for follow up with the patient.    Discharge Medication List as of 9/16/2017 10:53 AM          Final diagnoses:   Groin discomfort, right     IMihir, am serving as a trained medical scribe to document services personally performed by Sindy Bearden MD, based on the provider's statements to me.   I, Sindy Bearden MD, was physically present and have reviewed and verified the accuracy of this note documented by Mihir Hall.    9/16/2017   [facility], [place], EMERGENCY DEPARTMENT     Suleiman  MD Sindy  09/18/17 [id number]       Sindy Bearden MD  09/18/17 [id number]

## 2017-09-16 NOTE — ED NOTES
Patient had an epidural cortisone injection at L2-L3 last Wednesday and shortly thereafter he started having groin pain.  He was at [place] urgent care [facility] and they wanted him to come here to r/o an abscess.

## 2017-09-16 NOTE — DISCHARGE INSTRUCTIONS
Continue with current management of your pain.     If your pain does not improve within 1 week, worsens, or you develop fevers, please follow up with your primary care provider.

## 2017-09-16 NOTE — ED AVS SNAPSHOT
[facility], Emergency Department    [address]    [facility] [address]    Phone:  [phone]    Fax:  [phone]                                       Loco Wood   MRN: [id number]    Department:  [facility], Emergency Department   Date of Visit:  9/16/2017           After Visit Summary Signature Page     I have received my discharge instructions, and my questions have been answered. I have discussed any challenges I see with this plan with the nurse or doctor.    ..........................................................................................................................................  Patient/Patient Representative Signature      ..........................................................................................................................................  Patient Representative Print Name and Relationship to Patient    ..................................................               ................................................  Date                                            Time    ..........................................................................................................................................  Reviewed by Signature/Title    ...................................................              ..............................................  Date                                                            Time

## 2017-09-16 NOTE — ED AVS SNAPSHOT
[facility], Emergency Department    [address]    [place] [address]    Phone:  [phone]    Fax:  [phone]                                       Loco Wood   MRN: [id number]    Department:  [facility], Emergency Department   Date of Visit:  9/16/2017           Patient Information     Date Of Birth          1945        Your diagnoses for this visit were:     Groin discomfort, right        You were seen by Sindy Bearden MD.        Discharge Instructions       Continue with current management of your pain.     If your pain does not improve within 1 week, worsens, or you develop fevers, please follow up with your primary care provider.     Future Appointments        Provider Department Dept Phone Center    10/24/2017 4:00 PM [facility] Draw [facility] Draw [phone] [facility]    10/24/2017 4:30 PM He Burns MD [facility] Blood and Marrow Transplant [phone] [facility]      24 Hour Appointment Hotline       To make an appointment at any [place] clinic, call [phone] ([phone]). If you don't have a family doctor or clinic, we will help you find one. [place] clinics are conveniently located to serve the needs of you and your family.             Review of your medicines      Our records show that you are taking the medicines listed below. If these are incorrect, please call your family doctor or clinic.        Dose / Directions Last dose taken    atorvastatin 10 MG tablet   Commonly known as:  LIPITOR   Dose:  10 mg   Quantity:  45 tablet        Take 1 tablet (10 mg) by mouth every 48 hours   Refills:  9        * diazepam 5 MG tablet   Commonly known as:  VALIUM   Dose:  5 mg   Quantity:  20 tablet        Take 1 tablet (5 mg) by mouth every 6 hours as needed for anxiety or sleep (MUSCLE SPASM)   Refills:  0        * diazepam 5 MG tablet   Commonly known as:  VALIUM   Dose:  5 mg   Quantity:  15 tablet        Take 1 tablet (5 mg) by mouth every 6 hours as  needed for anxiety   Refills:  0        omega 3 1200 MG Caps   Dose:  2 capsule        Take 2 capsules by mouth daily   Refills:  0        omeprazole 10 MG CR capsule   Commonly known as:  priLOSEC   Quantity:  90 capsule        TAKE 1 CAPSULE BY MOUTH DAILY - TAKE 30 TO 60 MINUTES BEFORE A MEAL -   Refills:  3        order for DME   Quantity:  1 Device        Equipment being ordered: Superfeet - Blue - Size E   Refills:  0        sildenafil 100 MG tablet   Commonly known as:  VIAGRA   Dose:   mg   Quantity:  18 tablet        Take 0.5-1 tablets ( mg) by mouth daily as needed for erectile dysfunction Take 30 min to 4 hours before intercourse.   Refills:  prn        VITAMIN D3 PO   Dose:  2000 Units        Take 2,000 Units by mouth   Refills:  0        * Notice:  This list has 2 medication(s) that are the same as other medications prescribed for you. Read the directions carefully, and ask your doctor or other care provider to review them with you.            Procedures and tests performed during your visit     UA reflex to Microscopic      Orders Needing Specimen Collection     None      Pending Results     No orders found from 9/14/2017 to 9/17/2017.            Pending Culture Results     No orders found from 9/14/2017 to 9/17/2017.            Pending Results Instructions     If you had any lab results that were not finalized at the time of your Discharge, you can call the ED Lab Result RN at [phone]. You will be contacted by this team for any positive Lab results or changes in treatment. The nurses are available 7 days a week from 10A to 6:30P.  You can leave a message 24 hours per day and they will return your call.        Thank you for choosing [facility]       Thank you for choosing [facility] for your care. Our goal is always to provide you with excellent care. Hearing back from our patients is one way we can continue to improve our services. Please take a few minutes to complete the written survey  that you may receive in the mail after you visit with us. Thank you!        [employer] Information     [employer] gives you secure access to your electronic health record. If you see a primary care provider, you can also send messages to your care team and make appointments. If you have questions, please call your primary care clinic.  If you do not have a primary care provider, please call [phone] and they will assist you.        Care EveryWhere ID     This is your Care EveryWhere ID. This could be used by other organizations to access your [facility] medical records  [id number]        Equal Access to Services     [facility]NCY : Aron Gu, nakia rajput, celina barker, jose desai. So [facility] [phone].    ATENCIÓN: Si habla español, tiene a knutson disposición servicios gratuitos de asistencia lingüística. Anastasia al [phone].    We comply with applicable federal civil rights laws and [place] laws. We do not discriminate on the basis of race, color, national origin, age, disability sex, sexual orientation or gender identity.            After Visit Summary       This is your record. Keep this with you and show to your community pharmacist(s) and doctor(s) at your next visit.

## 2017-09-18 ENCOUNTER — OFFICE VISIT (OUTPATIENT)
Dept: FAMILY MEDICINE | Facility: CLINIC | Age: 72
End: 2017-09-18
Payer: COMMERCIAL

## 2017-09-18 ENCOUNTER — TELEPHONE (OUTPATIENT)
Dept: FAMILY MEDICINE | Facility: CLINIC | Age: 72
End: 2017-09-18

## 2017-09-18 ENCOUNTER — NURSE TRIAGE (OUTPATIENT)
Dept: NURSING | Facility: CLINIC | Age: 72
End: 2017-09-18

## 2017-09-18 VITALS
WEIGHT: 159 LBS | TEMPERATURE: 97.3 F | HEART RATE: 82 BPM | OXYGEN SATURATION: 97 % | SYSTOLIC BLOOD PRESSURE: 111 MMHG | DIASTOLIC BLOOD PRESSURE: 69 MMHG | BODY MASS INDEX: 25.28 KG/M2

## 2017-09-18 DIAGNOSIS — R30.0 DYSURIA: Primary | ICD-10-CM

## 2017-09-18 DIAGNOSIS — N40.2 PROSTATE NODULE: ICD-10-CM

## 2017-09-18 LAB

## 2017-09-18 PROCEDURE — 99213 OFFICE O/P EST LOW 20 MIN: CPT | Performed by: FAMILY MEDICINE

## 2017-09-18 PROCEDURE — 84153 ASSAY OF PSA TOTAL: CPT | Performed by: FAMILY MEDICINE

## 2017-09-18 PROCEDURE — 81001 URINALYSIS AUTO W/SCOPE: CPT | Performed by: FAMILY MEDICINE

## 2017-09-18 PROCEDURE — 36415 COLL VENOUS BLD VENIPUNCTURE: CPT | Performed by: FAMILY MEDICINE

## 2017-09-18 RX ORDER — CIPROFLOXACIN 250 MG/1
250 TABLET, FILM COATED ORAL 2 TIMES DAILY
Qty: 6 TABLET | Refills: 0 | Status: SHIPPED | OUTPATIENT
Start: 2017-09-18 | End: 2017-10-03

## 2017-09-18 RX ORDER — TAMSULOSIN HYDROCHLORIDE 0.4 MG/1
0.4 CAPSULE ORAL DAILY
Qty: 30 CAPSULE | Refills: 1 | Status: SHIPPED | OUTPATIENT
Start: 2017-09-18 | End: 2017-10-03

## 2017-09-18 NOTE — MR AVS SNAPSHOT
After Visit Summary   9/18/2017    Loco Wood    MRN: [id number]           Patient Information     Date Of Birth          1945        Visit Information        Provider Department      9/18/2017 1:20 PM Artur Paris MD [facility]        Today's Diagnoses     Dysuria    -  1    Prostate nodule           Follow-ups after your visit        Your next 10 appointments already scheduled     Oct 24, 2017  4:00 PM CDT   [facility] Draw with  [facility]RAW   [facility] Draw ([facility])    [address] [place] [id number] [phone]            Oct 24, 2017  4:30 PM CDT   RETURN ONC with He Burns MD   [facility] Blood and Marrow Transplant ([facility])    [address] [place] [id number] [phone]              Who to contact     If you have questions or need follow up information about today's clinic visit or your schedule please contact [facility] directly at [phone].  Normal or non-critical lab and imaging results will be communicated to you by [employer], letter or phone within 4 business days after the clinic has received the results. If you do not hear from us within 7 days, please contact the clinic through [employer] or phone. If you have a critical or abnormal lab result, we will notify you by phone as soon as possible.  Submit refill requests through [employer] or call your pharmacy and they will forward the refill request to us. Please allow 3 business days for your refill to be completed.          Additional Information About Your Visit        [employer] Information     [employer] gives you secure access to your electronic health record. If you see a primary care provider, you can also send messages to your care team and make appointments. If you have questions, please call your primary care clinic.  If you do not  have a primary care provider, please call [phone] and they will assist you.        Care EveryWhere ID     This is your Care EveryWhere ID. This could be used by other organizations to access your [facility] medical records  [id number]        Your Vitals Were     Pulse Temperature Pulse Oximetry BMI (Body Mass Index)          82 97.3  F (36.3  C) (Oral) 97% 25.28 kg/m2         Blood Pressure from Last 3 Encounters:   09/18/17 111/69   09/16/17 118/75   08/15/17 111/65    Weight from Last 3 Encounters:   09/18/17 159 lb (72.1 kg)   09/16/17 159 lb (72.1 kg)   08/15/17 148 lb (67.1 kg)              We Performed the Following     PSA, tumor marker     UA reflex to Microscopic and Culture     Urine Microscopic          Today's Medication Changes          These changes are accurate as of: 9/18/17  2:26 PM.  If you have any questions, ask your nurse or doctor.               Start taking these medicines.        Dose/Directions    ciprofloxacin 250 MG tablet   Commonly known as:  CIPRO   Used for:  Dysuria   Started by:  Artur Paris MD        Dose:  250 mg   Take 1 tablet (250 mg) by mouth 2 times daily   Quantity:  6 tablet   Refills:  0       tamsulosin 0.4 MG capsule   Commonly known as:  FLOMAX   Used for:  Dysuria   Started by:  Artur Paris MD        Dose:  0.4 mg   Take 1 capsule (0.4 mg) by mouth daily   Quantity:  30 capsule   Refills:  1            Where to get your medicines      These medications were sent to [facility]/pharmacy #[id number] - [place], [place] - [address] OF [address] [place] [address]     Phone:  [phone]     ciprofloxacin 250 MG tablet    tamsulosin 0.4 MG capsule                Primary Care Provider Office Phone # Fax #    Campos Parra -[phone] [address] [facility] [address]        Equal Access to Services     HECTOR SANTOS AH: Aron Gu, nakia rajput, qadaisy barker,  jose cuevasrufina dodge'aan ah. So [facility] [phone].    ATENCIÓN: Si sebastian rodney, tiene a knutson disposición servicios gratuitos de asistencia lingüística. Anastasia lomeli [phone].    We comply with applicable federal civil rights laws and [place] laws. We do not discriminate on the basis of race, color, national origin, age, disability sex, sexual orientation or gender identity.            Thank you!     Thank you for choosing [facility]  for your care. Our goal is always to provide you with excellent care. Hearing back from our patients is one way we can continue to improve our services. Please take a few minutes to complete the written survey that you may receive in the mail after your visit with us. Thank you!             Your Updated Medication List - Protect others around you: Learn how to safely use, store and throw away your medicines at www.disposemymeds.org.          This list is accurate as of: 9/18/17  2:26 PM.  Always use your most recent med list.                   Brand Name Dispense Instructions for use Diagnosis    atorvastatin 10 MG tablet    LIPITOR    45 tablet    Take 1 tablet (10 mg) by mouth every 48 hours    Hyperlipidemia LDL goal <130       ciprofloxacin 250 MG tablet    CIPRO    6 tablet    Take 1 tablet (250 mg) by mouth 2 times daily    Dysuria       * diazepam 5 MG tablet    VALIUM    20 tablet    Take 1 tablet (5 mg) by mouth every 6 hours as needed for anxiety or sleep (MUSCLE SPASM)    Acute bilateral low back pain without sciatica       * diazepam 5 MG tablet    VALIUM    15 tablet    Take 1 tablet (5 mg) by mouth every 6 hours as needed for anxiety    Complex tear of medial meniscus of right knee as current injury, initial encounter       omega 3 1200 MG Caps      Take 2 capsules by mouth daily    Spinal stenosis, lumbar region, without neurogenic claudication       omeprazole 10 MG CR capsule    priLOSEC    90 capsule    TAKE 1 CAPSULE BY MOUTH  DAILY - TAKE 30 TO 60 MINUTES BEFORE A MEAL -    Gastroesophageal reflux disease without esophagitis       order for DME     1 Device    Equipment being ordered: Superfeet - Blue - Size E    Plantar fasciitis       sildenafil 100 MG tablet    VIAGRA    18 tablet    Take 0.5-1 tablets ( mg) by mouth daily as needed for erectile dysfunction Take 30 min to 4 hours before intercourse.    Erectile dysfunction       tamsulosin 0.4 MG capsule    FLOMAX    30 capsule    Take 1 capsule (0.4 mg) by mouth daily    Dysuria       VITAMIN D3 PO      Take 2,000 Units by mouth        * Notice:  This list has 2 medication(s) that are the same as other medications prescribed for you. Read the directions carefully, and ask your doctor or other care provider to review them with you.

## 2017-09-18 NOTE — TELEPHONE ENCOUNTER
No further action required - appointment scheduled and triaged per FNA.    Aurora Sung RN  [facility]

## 2017-09-18 NOTE — TELEPHONE ENCOUNTER
Reason for call:  Patient reporting a symptom    Symptom or request: Groin pain    Additional comments: RN Pat with FNA triaging patient.    Phone Number patient can be reached at:  Home number on file [phone] (home)        Call taken on 9/18/2017 at 8:07 AM by Miladis Manzo

## 2017-09-18 NOTE — TELEPHONE ENCOUNTER
Recent History of Had  Spinal  for back pain on 9/13/17 , within a day or so  started having right  groin pain .    Seen on 9/16/17 for Groin pain at [facility]  .  Wants  Follow up appt with PCP after ED visit  -  Dr Parra  and PSA test .    Has prostate nodule  which could be aggressive cancer and has follow up in Dec for this - wants PSA test .    New Frequency & urgency , normal color of urine and no new back pain  and no  pain with urination  and no fever for 3 days , and groin pain decreased in same time frame . Sent to  for appointment ..Chrissie De Dios RN [facility] nurse advisors.    Reason for Disposition    Urinating more frequently than usual (i.e., frequency)    Additional Information    Negative: Shock suspected (e.g., cold/pale/clammy skin, too weak to stand, low BP, rapid pulse)    Negative: Sounds like a life-threatening emergency to the triager    Negative: Followed a genital area injury    Negative: Followed a genital area injury (penis, scrotum)    Negative: Vaginal discharge    Negative: Pus (white, yellow) or bloody discharge from end of penis    Negative: [1] Taking antibiotic for urinary tract infection (UTI) AND [2] female    Negative: [1] Taking antibiotic for urinary tract infection (UTI) AND [2] male    Negative: [1] Discomfort (pain, burning or stinging) when passing urine AND [2] pregnant    Negative: [1] Discomfort (pain, burning or stinging) when passing urine AND [2] postpartum < 1 month    Negative: [1] Discomfort (pain, burning or stinging) when passing urine AND [2] female    Negative: [1] Discomfort (pain, burning or stinging) when passing urine AND [2] male    Negative: Pain or itching in the vulvar area    Negative: Pain in scrotum is main symptom    Negative: Blood in the urine is main symptom    Negative: Symptoms arising from use of a urinary catheter (Piña or Coude)    Negative: [1] Unable to urinate (or only a few drops) > 4 hours AND     [2] bladder feels  very full (e.g., palpable bladder or strong urge to urinate)    Negative: [1] Decreased urination and [2] drinking very little AND [2] dehydration suspected (e.g., dark urine, no urine > 12 hours, very dry mouth, very lightheaded)    Negative: Patient sounds very sick or weak to the triager    Negative: Fever > 100.5 F (38.1 C)    Negative: Side (flank) or lower back pain present    Negative: [1] Can't control passage of urine (i.e., urinary incontinence) AND [2] new onset (< 2 weeks) or worsening    Protocols used: URINARY SYMPTOMS-ADULT-AH

## 2017-09-18 NOTE — PROGRESS NOTES
SUBJECTIVE:   Loco Wood is a 72 year old male who presents to clinic today for the following health issues:      ED/UC Followup:    Facility:  [facility]  Date of visit: 9/16/17  Reason for visit: Groin pain  Current Status: Better     Urinary frequency  Has a nodule on his prostate  Would like to have his PSA checked    He also brought after blood was drawn that he has CLL and might like to have that rechecked     Patient was given steroids for a low back problem and wondered if that was related     O: /69  Pulse 82  Temp 97.3  F (36.3  C) (Oral)  Wt 159 lb (72.1 kg)  SpO2 97%  BMI 25.28 kg/m2    Results for orders placed or performed in visit on 09/18/17   UA reflex to Microscopic and Culture   Result Value Ref Range    Color Urine Yellow     Appearance Urine Clear     Glucose Urine Negative NEG^Negative mg/dL    Bilirubin Urine Negative NEG^Negative    Ketones Urine Negative NEG^Negative mg/dL    Specific Gravity Urine 1.015 1.003 - 1.035    Blood Urine Trace (A) NEG^Negative    pH Urine 6.0 5.0 - 7.0 pH    Protein Albumin Urine Negative NEG^Negative mg/dL    Urobilinogen Urine 0.2 0.2 - 1.0 EU/dL    Nitrite Urine Negative NEG^Negative    Leukocyte Esterase Urine Negative NEG^Negative    Source Midstream Urine    Urine Microscopic   Result Value Ref Range    WBC Urine O - 2 OTO2^O - 2 /HPF    RBC Urine 2-5 (A) OTO2^O - 2 /HPF    Squamous Epithelial /LPF Urine Few FEW^Few /LPF    Bacteria Urine Few (A) NEG^Negative /HPF        ICD-10-CM    1. Dysuria R30.0 UA reflex to Microscopic and Culture     Urine Microscopic     tamsulosin (FLOMAX) 0.4 MG capsule     ciprofloxacin (CIPRO) 250 MG tablet   2. Prostate nodule N40.2 PSA, tumor marker     If positive then consider referral back to urology   Lat psa was 4.5  Problem list and histories reviewed & adjusted, as indicated.

## 2017-09-19 LAB — PSA SERPL-MCNC: 4.48 UG/L (ref 0–4)

## 2017-09-22 ENCOUNTER — TRANSFERRED RECORDS (OUTPATIENT)
Dept: HEALTH INFORMATION MANAGEMENT | Facility: CLINIC | Age: 72
End: 2017-09-22

## 2017-10-03 ENCOUNTER — OFFICE VISIT (OUTPATIENT)
Dept: FAMILY MEDICINE | Facility: CLINIC | Age: 72
End: 2017-10-03
Payer: COMMERCIAL

## 2017-10-03 VITALS
SYSTOLIC BLOOD PRESSURE: 102 MMHG | OXYGEN SATURATION: 99 % | TEMPERATURE: 92 F | WEIGHT: 159 LBS | DIASTOLIC BLOOD PRESSURE: 60 MMHG | BODY MASS INDEX: 25.28 KG/M2

## 2017-10-03 DIAGNOSIS — J06.9 VIRAL URI: Primary | ICD-10-CM

## 2017-10-03 PROCEDURE — 99213 OFFICE O/P EST LOW 20 MIN: CPT | Performed by: NURSE PRACTITIONER

## 2017-10-03 RX ORDER — FLUTICASONE PROPIONATE 50 MCG
2 SPRAY, SUSPENSION (ML) NASAL DAILY
Qty: 1 BOTTLE | Refills: 1 | Status: SHIPPED | OUTPATIENT
Start: 2017-10-03 | End: 2017-10-30

## 2017-10-03 ASSESSMENT — PAIN SCALES - GENERAL: PAINLEVEL: MODERATE PAIN (5)

## 2017-10-03 NOTE — PROGRESS NOTES
HISTORY AND PHYSICAL  Mr. Wood is a 72 year old year old male seen in clinic today for concern for sinus infection. Describes ~10 day history of R face and eye pain, and thick, clear sputum. Of note, pt had recent clindamycin course for L lower tooth abscess last week, and had a R upper root canal yesterday.              Review Of Systems    Eyes: Conjunctiva pale pink. Sclera white.  Ears/Nose/Throat: Nasal turbinates pale pink, moist, dried mucous bilaterally. Negative for maxillary or frontal sinus pain with palpation. Oral mucosa intact, pale pink, moist. Bilateral tonsils with mild erythema. Uvula midline.   Respiratory:  Lung sounds clear, no crackles or wheezes. No SOB.   Chief Complaint   Patient presents with     URI     Dictation insertion point#1     Pertinent labs and tests results were reviewed as available in epic.

## 2017-10-03 NOTE — PROGRESS NOTES
"  SUBJECTIVE:   Loco Wood is a 72 year old male who presents to clinic today for the following health issues:      Acute Illness   Acute illness concerns: URI/Sinus  Onset: 1 /12 weeks    Fever: no    Chills/Sweats: no    Headache (location?): no    Sinus Pressure:YES- tender, post-nasal drainage and facial pain    Conjunctivitis:  no    Ear Pain: no    Rhinorrhea: YES    Congestion: YES- head and chest    Sore Throat: no     Cough: YES-productive of clear sputum    Wheeze: no    Decreased Appetite: no    Nausea: YES    Vomiting: no    Diarrhea:  no    Dysuria/Freq.: no    Fatigue/Achiness: YES    Sick/Strep Exposure: no     Therapies Tried and outcome: nothing    Symptoms started 10 days ago with \"all my teeth hurt\" which localized to left lower teeth  Saw his dentist  Treated for abscess with clindamycin. Was on for 5 days then stopped by orthodontist.   He had root canal yesterday upper left tooth (this was known from over 1 year ago)    Bitter taste to mucus he is coughing up  Sharp pain behind right eye started today  No fevers  Feels like his symptoms are improving although the right eye pain is new  \"Feel like I can't get a full breath\"  Nonsmoker      Problem list and histories reviewed & adjusted, as indicated.  Additional history: none    Patient Active Problem List   Diagnosis     Esophageal reflux     Lumbago     CLL (chronic lymphocytic leukemia) (H)     HYPERLIPIDEMIA LDL GOAL <130     Mass of skin     Health Care Home     Vitamin D deficiency     Advanced directives, counseling/discussion     Osteoarthritis of hip     OA (osteoarthritis) of hip     Insomnia     BPH (benign prostatic hyperplasia)     Acute bilateral low back pain without sciatica     Prostate nodule     Chondromalacia, knee, right     Complex tear of medial meniscus of right knee as current injury     Past Surgical History:   Procedure Laterality Date     ARTHROPLASTY MINIMALLY INVASIVE HIP  5/10/2013    Procedure: ARTHROPLASTY " MINIMALLY INVASIVE HIP;  Left Two Incision Total Hip Arthroplasty ;  Surgeon: Desmond Alberts MD;  Location: [facility]     ARTHROPLASTY MINIMALLY INVASIVE HIP  2014    Procedure: ARTHROPLASTY MINIMALLY INVASIVE HIP;  Right Total Hip Arthroplasty Minimally Invasive Two Incision  *Latex Free Room;  Surgeon: Desmond Alberts MD;  Location: [facility] OR     BACK SURGERY      back fusion      C NONSPECIFIC PROCEDURE   &    (R) inguinal herniorrhapy     C NONSPECIFIC PROCEDURE      (L) inguinal herniorrhaphy     C NONSPECIFIC PROCEDURE      L4-5  L5 S1 fusion - spondylolisthesis     COLONOSCOPY           COLONOSCOPY N/A 8/15/2017    Procedure: COLONOSCOPY;  colonoscopy;  Surgeon: Chun Mcguire MD;  Location:  GI     GI SURGERY      4 hernia repairs in childhood       Social History   Substance Use Topics     Smoking status: Never Smoker     Smokeless tobacco: Never Used     Alcohol use 0.0 oz/week     0 Standard drinks or equivalent per week      Comment: 3 drinks per week     Family History   Problem Relation Age of Onset     HEART DISEASE Father      CEREBROVASCULAR DISEASE Father      CANCER Father      melonoma     Melanoma Father      CANCER Mother      Lung cancer     CEREBROVASCULAR DISEASE Paternal Uncle      Aneurism     Breast Cancer Maternal Aunt       from it     CANCER Paternal Uncle      CANCER Paternal Aunt      Skin Cancer No family hx of              Reviewed and updated as needed this visit by clinical staffTobacco  Allergies  Med Hx  Surg Hx  Fam Hx  Soc Hx      Reviewed and updated as needed this visit by Provider         ROS:  Constitutional, HEENT, cardiovascular, pulmonary, gi and gu systems are negative, except as otherwise noted.      OBJECTIVE:   /60 (BP Location: Right arm, Patient Position: Chair, Cuff Size: Adult Regular)  Temp 92  F (33.3  C) (Oral)  Wt 159 lb (72.1 kg)  SpO2 99%  BMI 25.28 kg/m2  Body mass index is 25.28 kg/(m^2).  GENERAL:  healthy, alert and no distress  EYES: Eyes grossly normal to inspection, PERRL and conjunctivae and sclerae normal  HENT: ear canals and TM's normal, nose and mouth without ulcers or lesions, slight nasal mucosa edema with clear nasal drainage, no pain to palpation maxillary or frontal sinuses  NECK: no adenopathy, no asymmetry, masses, or scars and thyroid normal to palpation  RESP: lungs clear to auscultation - no rales, rhonchi or wheezes  CV: regular rate and rhythm, normal S1 S2, no S3 or S4, no murmur, click or rub, no peripheral edema and peripheral pulses strong    Diagnostic Test Results:  none     ASSESSMENT/PLAN:       ICD-10-CM    1. Viral URI J06.9 fluticasone (FLONASE) 50 MCG/ACT spray    B97.89      Patient's symptoms have been present for greater than 5 days, but they appear to be improving and I question whether root canal yesterday is confounding his symptoms  Recommend symptomatic treatment with Fluticasone nasal spray and scheduled ibuprofen   No other occular symptoms to warrant opthalmology referral  If symptoms not improving with the above measures consider Augmentin for sinusitis    Patient Instructions   Do 2 sprays of Flonase nasal spray in each nostril once daily  You can take 600 mg ibuprofen every 6 hours as needed for pain    Continue these as long as you are continuing to have symptoms  If your symptoms are worsening over the next 2-3 days, or you don't continue to see improvement in your symptoms, call clinic and I will start you on antibiotics      LASHAY Stallings [facility]

## 2017-10-03 NOTE — NURSING NOTE
"Chief Complaint   Patient presents with     URI       Initial /60 (BP Location: Right arm, Patient Position: Chair, Cuff Size: Adult Regular)  Temp 92  F (33.3  C) (Oral)  Wt 159 lb (72.1 kg)  SpO2 99%  BMI 25.28 kg/m2 Estimated body mass index is 25.28 kg/(m^2) as calculated from the following:    Height as of 8/15/17: 5' 6.5\" (1.689 m).    Weight as of this encounter: 159 lb (72.1 kg).  Medication Reconciliation: complete.  SEBASTIAN Wong MA      "

## 2017-10-03 NOTE — MR AVS SNAPSHOT
After Visit Summary   10/3/2017    Loco Wood    MRN: [id number]           Patient Information     Date Of Birth          1945        Visit Information        Provider Department      10/3/2017 11:20 AM Rosalind Chamberlain APRN [facility]        Today's Diagnoses     Viral URI    -  1      Care Instructions    Do 2 sprays of Flonase nasal spray in each nostril once daily  You can take 600 mg ibuprofen every 6 hours as needed for pain    Continue these as long as you are continuing to have symptoms  If your symptoms are worsening over the next 2-3 days, or you don't continue to see improvement in your symptoms, call clinic and I will start you on antibiotics          Follow-ups after your visit        Your next 10 appointments already scheduled     Oct 24, 2017  4:00 PM CDT   [facility] with  [facility]   [facility] Draw ([facility])    [address] [place] [id number]   [phone]            Oct 24, 2017  4:30 PM CDT   RETURN ONC with He Burns MD   [facility] Blood and Marrow Transplant ([facility])    [address] [place] [id number]   [phone]            Dec 01, 2017  8:00 AM CST   LAB with  LAB   [facility] Lab ([facility])    [address] [place] [id number]   [phone]           Patient must bring picture ID. Patient should be prepared to give a urine specimen  Please do not eat 10-12 hours before your appointment if you are coming in fasting for labs on lipids, cholesterol, or glucose (sugar). Pregnant women should follow their Care Team instructions. Water with medications is okay. Do not drink coffee or other fluids. If you have concerns about taking  your medications, please ask at office or if scheduling via [facility] Mall, send a message by clicking on Secure  Messaging, Message Your Care Team.            Dec 01, 2017  9:00 AM CST   (Arrive by 8:45 AM)   Return Visit with Campos Boyd MD   [facility] Pro[facility] Urologic Cancers ([facility])    [address]  [address] [facility] [address] [phone]              Who to contact     If you have questions or need follow up information about today's clinic visit or your schedule please contact [facility] directly at [phone].  Normal or non-critical lab and imaging results will be communicated to you by [facility], letter or phone within 4 business days after the clinic has received the results. If you do not hear from us within 7 days, please contact the clinic through Lineagent or phone. If you have a critical or abnormal lab result, we will notify you by phone as soon as possible.  Submit refill requests through [employer] or call your pharmacy and they will forward the refill request to us. Please allow 3 business days for your refill to be completed.          Additional Information About Your Visit        [employer] Information     [employer] gives you secure access to your electronic health record. If you see a primary care provider, you can also send messages to your care team and make appointments. If you have questions, please call your primary care clinic.  If you do not have a primary care provider, please call [phone] and they will assist you.        Care EveryWhere ID     This is your Care EveryWhere ID. This could be used by other organizations to access your [facility] medical records  [id number]        Your Vitals Were     Temperature Pulse Oximetry BMI (Body Mass Index)             92  F (33.3  C) (Oral) 99% 25.28 kg/m2          Blood Pressure from Last 3 Encounters:   10/03/17 102/60   09/18/17 111/69   09/16/17 118/75    Weight from Last 3 Encounters:   10/03/17 159 lb (72.1 kg)   09/18/17 159 lb (72.1 kg)   09/16/17  159 lb (72.1 kg)              Today, you had the following     No orders found for display         Today's Medication Changes          These changes are accurate as of: 10/3/17 12:09 PM.  If you have any questions, ask your nurse or doctor.               Start taking these medicines.        Dose/Directions    fluticasone 50 MCG/ACT spray   Commonly known as:  FLONASE   Used for:  Viral URI   Started by:  Rosalind Chamberlain APRN CNP        Dose:  2 spray   Spray 2 sprays into both nostrils daily   Quantity:  1 Bottle   Refills:  1            Where to get your medicines      These medications were sent to [id number] - [place], [place] - [address] [facility] [address]     Phone:  [phone]     fluticasone 50 MCG/ACT spray                Primary Care Provider Office Phone # Fax #    Campos Parra [phone] [address] Levine, Susan. \Hospital Has a New Name and Outlook.\"" [id number]        Equal Access to Services     [facility] : Hadii aad ku hadasho Soomaali, waaxda luqadaha, qaybta kaalmada adeegyada, waxay idiin haysally [place] . So [facility] [phone].    ATENCIÓN: Si habla español, tiene a knutson disposición servicios gratuitos de asistencia lingüística. Llame al [phone].    We comply with applicable federal civil rights laws and [place] laws. We do not discriminate on the basis of race, color, national origin, age, disability, sex, sexual orientation, or gender identity.            Thank you!     Thank you for choosing [facility]  for your care. Our goal is always to provide you with excellent care. Hearing back from our patients is one way we can continue to improve our services. Please take a few minutes to complete the written survey that you may receive in the mail after your visit with us. Thank you!             Your Updated Medication List - Protect others around you: Learn how to safely use, store and throw away  your medicines at www.disposemymeds.org.          This list is accurate as of: 10/3/17 12:09 PM.  Always use your most recent med list.                   Brand Name Dispense Instructions for use Diagnosis    atorvastatin 10 MG tablet    LIPITOR    45 tablet    Take 1 tablet (10 mg) by mouth every 48 hours    Hyperlipidemia LDL goal <130       diazepam 5 MG tablet    VALIUM    15 tablet    Take 1 tablet (5 mg) by mouth every 6 hours as needed for anxiety    Complex tear of medial meniscus of right knee as current injury, initial encounter       fluticasone 50 MCG/ACT spray    FLONASE    1 Bottle    Spray 2 sprays into both nostrils daily    Viral URI       omega 3 1200 MG Caps      Take 2 capsules by mouth daily    Spinal stenosis, lumbar region, without neurogenic claudication       omeprazole 10 MG CR capsule    priLOSEC    90 capsule    TAKE 1 CAPSULE BY MOUTH DAILY - TAKE 30 TO 60 MINUTES BEFORE A MEAL -    Gastroesophageal reflux disease without esophagitis       VITAMIN D3 PO      Take 2,000 Units by mouth

## 2017-10-03 NOTE — PATIENT INSTRUCTIONS
Do 2 sprays of Flonase nasal spray in each nostril once daily  You can take 600 mg ibuprofen every 6 hours as needed for pain    Continue these as long as you are continuing to have symptoms  If your symptoms are worsening over the next 2-3 days, or you don't continue to see improvement in your symptoms, call clinic and I will start you on antibiotics

## 2017-10-04 ENCOUNTER — HOSPITAL ENCOUNTER (EMERGENCY)
Facility: CLINIC | Age: 72
Discharge: HOME | End: 2017-10-05
Attending: EMERGENCY MEDICINE | Admitting: EMERGENCY MEDICINE
Payer: MEDICARE

## 2017-10-04 ENCOUNTER — APPOINTMENT (OUTPATIENT)
Dept: CT IMAGING | Facility: CLINIC | Age: 72
End: 2017-10-04
Attending: EMERGENCY MEDICINE
Payer: MEDICARE

## 2017-10-04 ENCOUNTER — NURSE TRIAGE (OUTPATIENT)
Dept: NURSING | Facility: CLINIC | Age: 72
End: 2017-10-04

## 2017-10-04 DIAGNOSIS — G44.219 EPISODIC TENSION-TYPE HEADACHE, NOT INTRACTABLE: ICD-10-CM

## 2017-10-04 LAB
ANION GAP SERPL CALCULATED.3IONS-SCNC: 8 MMOL/L (ref 3–14)
BUN SERPL-MCNC: 16 MG/DL (ref 7–30)
CALCIUM SERPL-MCNC: 8.9 MG/DL (ref 8.5–10.1)
CHLORIDE SERPL-SCNC: 107 MMOL/L (ref 94–109)
CO2 SERPL-SCNC: 29 MMOL/L (ref 20–32)
CREAT SERPL-MCNC: 1.28 MG/DL (ref 0.66–1.25)
ERYTHROCYTE [SEDIMENTATION RATE] IN BLOOD BY WESTERGREN METHOD: 1 MM/H (ref 0–20)
GFR SERPL CREATININE-BSD FRML MDRD: 55 ML/MIN/1.7M2
GLUCOSE SERPL-MCNC: 95 MG/DL (ref 70–99)
POTASSIUM SERPL-SCNC: 4.3 MMOL/L (ref 3.4–5.3)
SODIUM SERPL-SCNC: 144 MMOL/L (ref 133–144)
TROPONIN I SERPL-MCNC: <0.015 UG/L (ref 0–0.04)

## 2017-10-04 PROCEDURE — 99285 EMERGENCY DEPT VISIT HI MDM: CPT | Mod: Z6 | Performed by: EMERGENCY MEDICINE

## 2017-10-04 PROCEDURE — 70450 CT HEAD/BRAIN W/O DYE: CPT

## 2017-10-04 PROCEDURE — 80048 BASIC METABOLIC PNL TOTAL CA: CPT | Performed by: EMERGENCY MEDICINE

## 2017-10-04 PROCEDURE — 85652 RBC SED RATE AUTOMATED: CPT | Performed by: EMERGENCY MEDICINE

## 2017-10-04 PROCEDURE — 25000125 ZZHC RX 250: Performed by: EMERGENCY MEDICINE

## 2017-10-04 PROCEDURE — 99284 EMERGENCY DEPT VISIT MOD MDM: CPT | Mod: 25 | Performed by: EMERGENCY MEDICINE

## 2017-10-04 PROCEDURE — 84484 ASSAY OF TROPONIN QUANT: CPT | Performed by: EMERGENCY MEDICINE

## 2017-10-04 PROCEDURE — 85025 COMPLETE CBC W/AUTO DIFF WBC: CPT | Performed by: EMERGENCY MEDICINE

## 2017-10-04 RX ORDER — PROPARACAINE HYDROCHLORIDE 5 MG/ML
1 SOLUTION/ DROPS OPHTHALMIC ONCE
Status: COMPLETED | OUTPATIENT
Start: 2017-10-04 | End: 2017-10-04

## 2017-10-04 RX ADMIN — FLUORESCEIN SODIUM 1 STRIP: 1 STRIP OPHTHALMIC at 23:14

## 2017-10-04 RX ADMIN — PROPARACAINE HYDROCHLORIDE 1 DROP: 5 SOLUTION/ DROPS OPHTHALMIC at 23:14

## 2017-10-04 ASSESSMENT — ENCOUNTER SYMPTOMS: EYE PAIN: 1

## 2017-10-04 NOTE — TELEPHONE ENCOUNTER
Past two hours, fluttering in forehead, same side as eye pain, right side, above the eye brow.  Miya Dixon RN-[facility]

## 2017-10-04 NOTE — TELEPHONE ENCOUNTER
"  Reason for Disposition    [1] New headache AND [2] weak immune system (e.g., HIV positive, cancer chemo, splenectomy, organ transplant, chronic steroids)    Additional Information    Negative: Difficult to awaken or acting confused  (e.g., disoriented, slurred speech)     Family history of aneurisms.    Negative: [1] Weakness of the face, arm or leg on one side of the body AND [2] new onset    Negative: [1] Numbness of the face, arm or leg on one side of the body AND [2] new onset    Negative: [1] Loss of speech or garbled speech AND [2] new onset    Negative: Passed out (i.e., lost consciousness, collapsed and was not responding)    Negative: Sounds like a life-threatening emergency to the triager    Negative: Followed a head injury within last 3 days    Negative: Pregnant    Negative: Traumatic Brain Injury (TBI) is suspected     Root canal done on upper left side.    Negative: Unable to walk, or can only walk with assistance (e.g., requires support)    Negative: Stiff neck (can't touch chin to chest)    Negative: Severe pain in one eye     Pain behind the eye @ 4.    Negative: [1] Other family members (or roommates) with headaches AND [2] possibility of carbon monoxide exposure    Negative: [1] SEVERE headache (e.g., excruciating) AND [2] \"worst headache\" of life    Negative: [1] SEVERE headache AND [2] sudden-onset (i.e., reaching maximum intensity within seconds)    Negative: [1] SEVERE headache AND [2] fever    Negative: Loss of vision or double vision (Exception: same as prior migraines)    Negative: [1] Fever > 100.5 F (38.1 C) AND [2] diabetes mellitus or weak immune system (e.g., HIV positive, cancer chemo, splenectomy, organ transplant, chronic steroids)    Negative: Patient sounds very sick or weak to the triager    Negative: [1] SEVERE headache (e.g., excruciating) AND [2] not improved after 2 hours of pain medicine    Negative: [1] Vomiting AND [2] 2 or more times (Exception: similar to previous " migraines)    Negative: Fever > 104 F (40 C)    Negative: [1] MODERATE headache (e.g., interferes with normal activities) AND [2] present > 24 hours AND [3] unexplained  (Exceptions: analgesics not tried, typical migraine, or headache part of viral illness)    Protocols used: HEADACHE-ADULT-AH    He spoke with a [employer] RN before calling us.  He describes the discomfort/sensation behind his eye, like a fluttering.  Denies facial swelling. Family history of brain aneurism in that same area.  Thought it was a sinus infection, pain behind right eye. Given prescription for flonase. He is nervous, sensation is different.  He wanted to know what the on call MD would do or say about it. I told him they'd have him go in for evaluation since there is nothing they can do over the phone. We talked about if there is fluid build up in the sinus cavity, that can put pressure around the eye and if fluid is sitting there you can have sensations in the sinus cavity, like a dripping sensation. He is going to try a warm pack to that part of his face. Giving that a few hours. If that doesn't help, he'll try a cold pack. He declined setting up an appointment. I advised he call back if anything worsens, changes or something else develops.  Miya Dixon RN-[facility]

## 2017-10-04 NOTE — ED AVS SNAPSHOT
[facility], Emergency Department    [address]    [facility] [address]    Phone:  [phone]    Fax:  [phone]                                       Loco Wood   MRN: [id number]    Department:  [facility], Emergency Department   Date of Visit:  10/4/2017           After Visit Summary Signature Page     I have received my discharge instructions, and my questions have been answered. I have discussed any challenges I see with this plan with the nurse or doctor.    ..........................................................................................................................................  Patient/Patient Representative Signature      ..........................................................................................................................................  Patient Representative Print Name and Relationship to Patient    ..................................................               ................................................  Date                                            Time    ..........................................................................................................................................  Reviewed by Signature/Title    ...................................................              ..............................................  Date                                                            Time

## 2017-10-04 NOTE — ED AVS SNAPSHOT
[facility], Emergency Department    [address]    [place] [address]    Phone:  [phone]    Fax:  [phone]                                       Loco Wood   MRN: [id number]    Department:  [facility], Emergency Department   Date of Visit:  10/4/2017           Patient Information     Date Of Birth          1945        Your diagnoses for this visit were:     Episodic tension-type headache, not intractable        You were seen by Be Mcduffie MD.      Follow-up Information     Follow up with Campos Parra MD In 1 day.    Specialties:  Internal Medicine, Pediatrics    Contact information:    [address] [facility] [address] [phone]          Follow up with [facility], Emergency Department.    Specialty:  EMERGENCY MEDICINE    Why:  As needed, If symptoms worsen    Contact information:    [address] [facility] [address] [phone]    Additional information:    The [facility] is located in the [facility]. lt is easily accessible from virtually any point in the [facility] area, via [employer]-        Discharge Instructions          * HEADACHE [unspecified]    The cause of your headache today is not clear, but it does not appear to be the sign of any serious illness.  Under stress, some people tense the muscles of their shoulder, neck and scalp without knowing it. If this condition lasts long enough, a TENSION HEADACHE can occur.  A MIGRAINE HEADACHE is caused by changes in blood flow to the brain. It can be mild or severe. A migraine attack may be triggered by emotional stress, hormone changes during the menstrual cycle, oral contraceptives, alcohol use, certain foods containing tyramine, eye strain, weather changes, missing meals, lack of sleep or oversleeping.  Other causes of headache include a viral illness, sinus, ear or throat infection, dental pain and TMJ (jaw joint) pain.  HOME CARE:    If you  were given pain medicine for this headache, do not drive yourself home. Arrange for a ride, instead. When you get home, try to sleep. You should feel much better when you wake up.    If you are having nausea or vomiting, follow a light diet until your headache is relieved.    If you have a migraine type headache, use sunglasses when in the daylight or around bright indoor lighting until symptoms improve. Bright glaring light can worsen this kind of headache.  FOLLOW UP with your doctor if the headache is not better within the next 24 hours. If you have frequent headaches you should discuss a treatment plan with your primary care doctor. By being aware of the earliest signs of headache, and starting treatment right away, you may be able to stop the pain yourself.  GET PROMPT MEDICAL ATTENTION if any of the following occur:    Worsening of your head pain or no improvement within 24 hours    Repeated vomiting (unable to keep liquids down)    Fever over 101 F (38.3 C)    Stiff neck    Extreme drowsiness, confusion or fainting    Weakness of an arm or leg or one side of the face    Difficulty with speech or vision    [address] [place] [address]. All rights reserved. This information is not intended as a substitute for professional medical care. Always follow your healthcare professional's instructions.      Future Appointments        Provider Department Dept Phone Center    10/5/2017 8:40 AM Lauren A. Engelmann, MD [facility] [phone] [facility]    10/24/2017 4:00 PM [facility] Draw [facility] Lab Draw [phone] [facility]    10/24/2017 4:30 PM He Burns MD [facility] Blood and Marrow Transplant [phone] [facility]    12/1/2017 8:00 AM Lab [facility] Lab [phone] [facility]    12/1/2017 9:00 AM Campos Boyd MD [facility] Urology and Inst for Prostate and Urologic Cancers [phone] [facility]      24 Hour Appointment Hotline       To  make an appointment at any [facility] clinic, call [phone] ([phone]). If you don't have a family doctor or clinic, we will help you find one. [facility] clinics are conveniently located to serve the needs of you and your family.             Review of your medicines      Our records show that you are taking the medicines listed below. If these are incorrect, please call your family doctor or clinic.        Dose / Directions Last dose taken    atorvastatin 10 MG tablet   Commonly known as:  LIPITOR   Dose:  10 mg   Quantity:  45 tablet        Take 1 tablet (10 mg) by mouth every 48 hours   Refills:  9        diazepam 5 MG tablet   Commonly known as:  VALIUM   Dose:  5 mg   Quantity:  15 tablet        Take 1 tablet (5 mg) by mouth every 6 hours as needed for anxiety   Refills:  0        fluticasone 50 MCG/ACT spray   Commonly known as:  FLONASE   Dose:  2 spray   Quantity:  1 Bottle        Spray 2 sprays into both nostrils daily   Refills:  1        omega 3 1200 MG Caps   Dose:  2 capsule        Take 2 capsules by mouth daily   Refills:  0        omeprazole 10 MG CR capsule   Commonly known as:  priLOSEC   Quantity:  90 capsule        TAKE 1 CAPSULE BY MOUTH DAILY - TAKE 30 TO 60 MINUTES BEFORE A MEAL -   Refills:  3        VITAMIN D3 PO   Dose:  2000 Units        Take 2,000 Units by mouth   Refills:  0                Procedures and tests performed during your visit     Basic metabolic panel    CBC with platelets differential    CT Head w/o Contrast    Erythrocyte sedimentation rate auto    Troponin I      Orders Needing Specimen Collection     None      Pending Results     Date and Time Order Name Status Description    10/4/2017 2204 CBC with platelets differential In process             Pending Culture Results     No orders found for last 3 day(s).            Pending Results Instructions     If you had any lab results that were not finalized at the time of your Discharge, you can call the ED Lab Result RN  at [phone]. You will be contacted by this team for any positive Lab results or changes in treatment. The nurses are available 7 days a week from 10A to 6:30P.  You can leave a message 24 hours per day and they will return your call.        Thank you for choosing [facility]       Thank you for choosing [facility] for your care. Our goal is always to provide you with excellent care. Hearing back from our patients is one way we can continue to improve our services. Please take a few minutes to complete the written survey that you may receive in the mail after you visit with us. Thank you!        [facility] Information     [facility] gives you secure access to your electronic health record. If you see a primary care provider, you can also send messages to your care team and make appointments. If you have questions, please call your primary care clinic.  If you do not have a primary care provider, please call [phone] and they will assist you.        Care EveryWhere ID     This is your Care EveryWhere ID. This could be used by other organizations to access your [facility] medical records  [id number]        Equal Access to Services     HECTOR SANTOS : Hadii litzy Gu, wajonas rajput, qadaisy barker, jose newton . So [facility] [phone].    ATENCIÓN: Si habla español, tiene a knutson disposición servicios gratuitos de asistencia lingüística. Llame al [phone].    We comply with applicable federal civil rights laws and [place] laws. We do not discriminate on the basis of race, color, national origin, age, disability, sex, sexual orientation, or gender identity.            After Visit Summary       This is your record. Keep this with you and show to your community pharmacist(s) and doctor(s) at your next visit.

## 2017-10-05 ENCOUNTER — TELEPHONE (OUTPATIENT)
Dept: FAMILY MEDICINE | Facility: CLINIC | Age: 72
End: 2017-10-05

## 2017-10-05 ENCOUNTER — OFFICE VISIT (OUTPATIENT)
Dept: FAMILY MEDICINE | Facility: CLINIC | Age: 72
End: 2017-10-05
Payer: COMMERCIAL

## 2017-10-05 ENCOUNTER — ALLIED HEALTH/NURSE VISIT (OUTPATIENT)
Dept: NURSING | Facility: CLINIC | Age: 72
End: 2017-10-05
Payer: COMMERCIAL

## 2017-10-05 VITALS
WEIGHT: 158 LBS | BODY MASS INDEX: 25.12 KG/M2 | OXYGEN SATURATION: 97 % | SYSTOLIC BLOOD PRESSURE: 102 MMHG | DIASTOLIC BLOOD PRESSURE: 63 MMHG | HEART RATE: 87 BPM | TEMPERATURE: 97 F

## 2017-10-05 VITALS
SYSTOLIC BLOOD PRESSURE: 132 MMHG | BODY MASS INDEX: 24.83 KG/M2 | OXYGEN SATURATION: 98 % | WEIGHT: 156.2 LBS | RESPIRATION RATE: 18 BRPM | TEMPERATURE: 97.4 F | HEART RATE: 78 BPM | DIASTOLIC BLOOD PRESSURE: 74 MMHG

## 2017-10-05 DIAGNOSIS — G44.209 TENSION TYPE HEADACHE: Primary | ICD-10-CM

## 2017-10-05 DIAGNOSIS — G44.219 EPISODIC TENSION-TYPE HEADACHE, NOT INTRACTABLE: Primary | ICD-10-CM

## 2017-10-05 DIAGNOSIS — C91.10 CLL (CHRONIC LYMPHOCYTIC LEUKEMIA) (H): ICD-10-CM

## 2017-10-05 PROCEDURE — 99207 ZZC NO CHARGE NURSE ONLY: CPT

## 2017-10-05 PROCEDURE — 99213 OFFICE O/P EST LOW 20 MIN: CPT | Performed by: FAMILY MEDICINE

## 2017-10-05 ASSESSMENT — ENCOUNTER SYMPTOMS
CHILLS: 0
SPEECH DIFFICULTY: 0
WEAKNESS: 0
NUMBNESS: 0
BACK PAIN: 0
HEADACHES: 0
FEVER: 0
SEIZURES: 0
PALPITATIONS: 0
NECK PAIN: 0
ABDOMINAL PAIN: 0

## 2017-10-05 NOTE — ED PROVIDER NOTES
[facility] EMERGENCY DEPARTMENT ([facility])    10/04/17     Ed 4  10:07 PM   History     Chief Complaint   Patient presents with     Eye Pain     last night felt electrical pulsing or tingling in forehead then felt jabbing pain around the right eye; denies visual problems; yesterday had pain behind the same eye; saw a nurse practitioner yesterday and was prescribed flonase; also has right TMJ pain; had root canal Monday on the upper left side     Facial Pain     last night felt electrical pulsing or tingling in forehead then felt jabbing pain around the right eye; denies visual problems; yesterday had pain behind the same eye; saw a nurse practitioner yesterday and was prescribed flonase; also has right TMJ pain; had root canal Monday on the upper left side     The history is provided by the patient.     Loco Wood is a 72 year old male who presents with pain to his forehead and around his right eye. He has a history of CLL as well as recent dental procedures. Patient states that 12 days ago he was having left lower dental pain. He had this evaluated by a dentist who considered possible abscess. He saw an endodontist who performed an xray that was negative for abscess, though with dental decay requiring root canal for his left lower tooth, and this was performed 2 days ago.  Yesterday she developed an   electrical pulsing  sensation to her right forehead. He states it wasn't painful and seemed to revolve around his right eye. He subsequently developed jolts of pain around his right eye that started to concern him. He continues to have this right eye pain here. He also has some right jaw pain as well that worsens with poking his jaw with a finger, though feels the jaw pain and periorbital pain are unrelated. No left jaw pain. No change in vision, no photophobia.     I have reviewed the Medications, Allergies, Past Medical and Surgical History, and Social History in the Epic system.    Review of  Systems   Constitutional: Negative for chills and fever.   HENT:        Right forehead pain   Eyes: Positive for pain (periorbital right eye pain). Negative for visual disturbance.   Cardiovascular: Negative for chest pain and palpitations.   Gastrointestinal: Negative for abdominal pain.   Musculoskeletal: Negative for back pain and neck pain.   Neurological: Negative for seizures, speech difficulty, weakness, numbness and headaches.       Physical Exam   BP: 127/71  Pulse: 75  Temp: 97.1  F (36.2  C)  Resp: 16  Weight: 70.9 kg (156 lb 3.2 oz)  SpO2: 96 %  Physical Exam   Constitutional: He is oriented to person, place, and time. He appears well-developed and well-nourished. No distress.   HENT:   Head: Normocephalic and atraumatic.   Right Ear: External ear normal.   Left Ear: External ear normal.   Mouth/Throat: Oropharynx is clear and moist.   Eyes: Conjunctivae are normal. Pupils are equal, round, and reactive to light.   Neck: Normal range of motion. No spinous process tenderness and no muscular tenderness present. No rigidity. Normal range of motion present.   Cardiovascular: Normal rate, regular rhythm and normal heart sounds.    Pulmonary/Chest: Effort normal and breath sounds normal. No stridor. No respiratory distress. He has no wheezes. He has no rales.   Abdominal: Soft.   Neurological: He is alert and oriented to person, place, and time. He has normal strength. No cranial nerve deficit or sensory deficit. GCS eye subscore is 4. GCS verbal subscore is 5. GCS motor subscore is 6.   Skin: Skin is warm and dry. No rash noted.   Psychiatric: He has a normal mood and affect. His behavior is normal.       ED Course     ED Course     Procedures             Labs Ordered and Resulted from Time of ED Arrival Up to the Time of Departure from the ED   CBC WITH PLATELETS DIFFERENTIAL - Abnormal; Notable for the following:        Result Value    WBC 96.1 (*)     Platelet Count 125 (*)     All other components within  normal limits   BASIC METABOLIC PANEL - Abnormal; Notable for the following:     Creatinine 1.28 (*)     GFR Estimate 55 (*)     All other components within normal limits   TROPONIN I   ERYTHROCYTE SEDIMENTATION RATE AUTO            Assessments & Plan (with Medical Decision Making)   72-year-old male who arrives to the Emergency Department for evaluation of right upper forehead pain of unclear etiology.  Upon arrival patient noted to be alert. He is afebrile and hemodynamically stable.  He has no evidence of localized trauma, no appreciable neurologic deficits.  We did obtain intraocular pressures that are nonelevated, low suspicion for glaucoma or optic neuritis as a primary cause. Extraocular movements intact, pupils are equal and reactive. I do not suspect primary eye pathology for source.  He has no sign of generalized neurologic deficit. This does sound atypical for a stroke, subarachnoid bleed or dissection. We did discuss potential etiologies such as temporal arteritis or trigeminal neuralgia with low suspicion of this as the primary cause.  Ultimately we did obtain laboratory studies that demonstrate no significant anemia nor electrolyte disturbance. He has no elevation in sed rate.  With some right jaw discomfort we did obtain a single troponin with a very low suspicion for ACS; this is negative.  Patient is a concern of stroke based on family. We discussed presentation of stroke and low clinical suspicion for this.  CT scan of the head demonstrates no sign of acute pathology.  Otherwise this does not sound consistent with zoster ophthalmicus. I did obtain fluorescein dye with no note of uptake.  Overall the patient appears nontoxic. We discussed symptomatic treatment as well as indications for follow-up for consideration of further evaluation such as MR should symptoms persist. The patient will call or return emergently with change or worsening symptoms.    This part of the document was transcribed by  Shira Reyes, Medical Scribe.      I have reviewed the nursing notes.    I have reviewed the findings, diagnosis, plan and need for follow up with the patient.    New Prescriptions    No medications on file       Final diagnoses:   Episodic tension-type headache, not intractable     I, Shira Reyes, am serving as a trained medical scribe to document services personally performed by Be Mcduffie MD, based on the provider's statements to me on October 4, 2017.  This document has been checked and approved by the attending provider.    I, Be Mcduffie MD, was physically present and have reviewed and verified the accuracy of this note documented by Shira Reyes, medical scribe.       10/4/2017   [facility], [place], EMERGENCY DEPARTMENT      Be Mcduffie MD  10/05/17 1628

## 2017-10-05 NOTE — PROGRESS NOTES
SUBJECTIVE:   Loco Wood is a 72 year old male who presents to clinic today for the following health issues:    ED/UC Followup:    [facility]    Date of visit: 10-4-2017   Reason for visit: headache   Current Status: feeling good      Patient was seen last night at [facility] and had a thorough workup for unilateral headache, pain behind his eye.   Please see 10/4/17 ED documentation for more history.   This morning he states that he feels well and is pain free. He is relieved after being seen in the ED.   He wonders if it is a sinus issue because there is a lot of dust and road debris from construction in his neighborhood.    Problem list and histories reviewed & adjusted, as indicated.  Additional history: as documented    Patient Active Problem List   Diagnosis     Esophageal reflux     Lumbago     CLL (chronic lymphocytic leukemia) (H)     HYPERLIPIDEMIA LDL GOAL <130     Mass of skin     Health Care Home     Vitamin D deficiency     Advanced directives, counseling/discussion     Osteoarthritis of hip     OA (osteoarthritis) of hip     Insomnia     BPH (benign prostatic hyperplasia)     Acute bilateral low back pain without sciatica     Prostate nodule     Chondromalacia, knee, right     Complex tear of medial meniscus of right knee as current injury     Past Surgical History:   Procedure Laterality Date     ARTHROPLASTY MINIMALLY INVASIVE HIP  5/10/2013    Procedure: ARTHROPLASTY MINIMALLY INVASIVE HIP;  Left Two Incision Total Hip Arthroplasty ;  Surgeon: Desmond Alberts MD;  Location: [facility] OR     ARTHROPLASTY MINIMALLY INVASIVE HIP  2/27/2014    Procedure: ARTHROPLASTY MINIMALLY INVASIVE HIP;  Right Total Hip Arthroplasty Minimally Invasive Two Incision  *Latex Free Room;  Surgeon: Desmond Alberts MD;  Location: [facility] OR     BACK SURGERY      back fusion 1994     C NONSPECIFIC PROCEDURE  1964 &'95    (R) inguinal herniorrhapy     C NONSPECIFIC PROCEDURE  1949    (L) inguinal  herniorrhaphy     C NONSPECIFIC PROCEDURE      L4-5  L5 S1 fusion - spondylolisthesis     COLONOSCOPY           COLONOSCOPY N/A 8/15/2017    Procedure: COLONOSCOPY;  colonoscopy;  Surgeon: Chun Mcguire MD;  Location:  GI     GI SURGERY      4 hernia repairs in childhood       Social History   Substance Use Topics     Smoking status: Never Smoker     Smokeless tobacco: Never Used     Alcohol use 0.0 oz/week     0 Standard drinks or equivalent per week      Comment: 3 drinks per week     Family History   Problem Relation Age of Onset     HEART DISEASE Father      CEREBROVASCULAR DISEASE Father      CANCER Father      melonoma     Melanoma Father      CANCER Mother      Lung cancer     CEREBROVASCULAR DISEASE Paternal Uncle      Aneurism     Breast Cancer Maternal Aunt       from it     CANCER Paternal Uncle      CANCER Paternal Aunt      Skin Cancer No family hx of              Reviewed and updated as needed this visit by clinical staff  Tobacco  Allergies  Meds  Med Hx  Surg Hx  Fam Hx  Soc Hx      Reviewed and updated as needed this visit by Provider         ROS:  Constitutional, HEENT, cardiovascular, pulmonary, gi and gu systems are negative, except as otherwise noted.      OBJECTIVE:   /63 (BP Location: Left arm, Patient Position: Sitting, Cuff Size: Adult Regular)  Pulse 87  Temp 97  F (36.1  C) (Oral)  Wt 158 lb (71.7 kg)  SpO2 97%  BMI 25.12 kg/m2  Body mass index is 25.12 kg/(m^2).  GENERAL: healthy, alert and no distress  EYES: Eyes grossly normal to inspection, PERRL and conjunctivae and sclerae normal  RESP: lungs clear to auscultation - no rales, rhonchi or wheezes  CV: regular rate and rhythm, normal S1 S2, no S3 or S4, no murmur, click or rub, no peripheral edema and peripheral pulses strong  MS: no gross musculoskeletal defects noted, no edema  NEURO: Normal strength and tone, mentation intact and speech normal    Diagnostic Test Results:  none     ASSESSMENT/PLAN:        ICD-10-CM    1. Episodic tension-type headache, not intractable G44.219    2. CLL (chronic lymphocytic leukemia) (H) C91.10      I reviewed his chart and we discussed his workup. I do not have anything to add at this time and he is satisfied with his ED visit.   He will call the clinic if he would like to do an MRI if symptoms haven't resolved.  Reviewed CBC with the patient, WBCs are stable. He has follow up scheduled on October 24. No need to move the visit sooner unless the patient prefers.     See Patient Instructions    Lauren A. Engelmann, MD  [facility]

## 2017-10-05 NOTE — NURSING NOTE
"Chief Complaint   Patient presents with     Hospital F/U       Initial /63 (BP Location: Left arm, Patient Position: Sitting, Cuff Size: Adult Regular)  Pulse 87  Temp 97  F (36.1  C) (Oral)  Wt 158 lb (71.7 kg)  SpO2 97%  BMI 25.12 kg/m2 Estimated body mass index is 25.12 kg/(m^2) as calculated from the following:    Height as of 8/15/17: 5' 6.5\" (1.689 m).    Weight as of this encounter: 158 lb (71.7 kg).  Medication Reconciliation: complete  Gio Lei MA    "

## 2017-10-05 NOTE — PATIENT INSTRUCTIONS
If you are still having symptoms in 7-10 days, please give me a call and I will order an MRI. Use Tylenol or aspirin for a headache.

## 2017-10-05 NOTE — DISCHARGE INSTRUCTIONS
* HEADACHE [unspecified]    The cause of your headache today is not clear, but it does not appear to be the sign of any serious illness.  Under stress, some people tense the muscles of their shoulder, neck and scalp without knowing it. If this condition lasts long enough, a TENSION HEADACHE can occur.  A MIGRAINE HEADACHE is caused by changes in blood flow to the brain. It can be mild or severe. A migraine attack may be triggered by emotional stress, hormone changes during the menstrual cycle, oral contraceptives, alcohol use, certain foods containing tyramine, eye strain, weather changes, missing meals, lack of sleep or oversleeping.  Other causes of headache include a viral illness, sinus, ear or throat infection, dental pain and TMJ (jaw joint) pain.  HOME CARE:    If you were given pain medicine for this headache, do not drive yourself home. Arrange for a ride, instead. When you get home, try to sleep. You should feel much better when you wake up.    If you are having nausea or vomiting, follow a light diet until your headache is relieved.    If you have a migraine type headache, use sunglasses when in the daylight or around bright indoor lighting until symptoms improve. Bright glaring light can worsen this kind of headache.  FOLLOW UP with your doctor if the headache is not better within the next 24 hours. If you have frequent headaches you should discuss a treatment plan with your primary care doctor. By being aware of the earliest signs of headache, and starting treatment right away, you may be able to stop the pain yourself.  GET PROMPT MEDICAL ATTENTION if any of the following occur:    Worsening of your head pain or no improvement within 24 hours    Repeated vomiting (unable to keep liquids down)    Fever over 101 F (38.3 C)    Stiff neck    Extreme drowsiness, confusion or fainting    Weakness of an arm or leg or one side of the face    Difficulty with speech or vision    [id number] [address]  [place] [address]. All rights reserved. This information is not intended as a substitute for professional medical care. Always follow your healthcare professional's instructions.

## 2017-10-05 NOTE — MR AVS SNAPSHOT
After Visit Summary   10/5/2017    Loco Wood    MRN: [id number]           Patient Information     Date Of Birth          1945        Visit Information        Provider Department      10/5/2017 1:00 PM CP RN [facility]        Today's Diagnoses     Tension type headache    -  1       Follow-ups after your visit        Your next 10 appointments already scheduled     Oct 24, 2017  4:00 PM CDT   Masonic Lab Draw with  MASONIC LAB DRAW   [facility] Masonic Lab Draw ([facility])    [address] [place] [address]  2nd [facility] [address]   [phone]            Oct 24, 2017  4:30 PM CDT   RETURN ONC with He Burns MD   [facility] Blood and Marrow Transplant ([facility])    [address] [place] [address]  2nd [facility] [address]   [phone]            Dec 01, 2017  8:00 AM CST   LAB with  LAB   [facility] Lab ([facility])    [address] [place] [address]  1st [facility] [address] [phone]           Patient must bring picture ID. Patient should be prepared to give a urine specimen  Please do not eat 10-12 hours before your appointment if you are coming in fasting for labs on lipids, cholesterol, or glucose (sugar). Pregnant women should follow their Care Team instructions. Water with medications is okay. Do not drink coffee or other fluids. If you have concerns about taking  your medications, please ask at office or if scheduling via [employer], send a message by clicking on Secure Messaging, Message Your Care Team.            Dec 01, 2017  9:00 AM CST   (Arrive by 8:45 AM)   Return Visit with Campos Boyd MD   [facility] Urology and Inst for Prostate and Urologic Cancers ([facility])    [address] [place] [address]  4th [facility] [address] [phone]              Who to contact     If you have questions or need follow up information about  today's clinic visit or your schedule please contact [facility] directly at [phone].  Normal or non-critical lab and imaging results will be communicated to you by MyChart, letter or phone within 4 business days after the clinic has received the results. If you do not hear from us within 7 days, please contact the clinic through [employer] or phone. If you have a critical or abnormal lab result, we will notify you by phone as soon as possible.  Submit refill requests through [employer] or call your pharmacy and they will forward the refill request to us. Please allow 3 business days for your refill to be completed.          Additional Information About Your Visit        [employer] Information     [employer] gives you secure access to your electronic health record. If you see a primary care provider, you can also send messages to your care team and make appointments. If you have questions, please call your primary care clinic.  If you do not have a primary care provider, please call [phone] and they will assist you.        Care EveryWhere ID     This is your Care EveryWhere ID. This could be used by other organizations to access your [facility] medical records  [id number]         Blood Pressure from Last 3 Encounters:   10/05/17 102/63   10/05/17 132/74   10/03/17 102/60    Weight from Last 3 Encounters:   10/05/17 158 lb (71.7 kg)   10/04/17 156 lb 3.2 oz (70.9 kg)   10/03/17 159 lb (72.1 kg)              Today, you had the following     No orders found for display       Primary Care Provider Office Phone # Fax #    Campos Parra [phone]       [address] [facility] [address]        Equal Access to Services     HECTOR SANTOS : Hadii litzy Gu, wajonas lubernabe, qaybta kaaljose thapa. So [facility] [phone].    ATENCIÓN: Si habla español, tiene a knutson disposición servicios gratuitos de asistencia  lingüística. Anastasia al [phone].    We comply with applicable federal civil rights laws and [place] laws. We do not discriminate on the basis of race, color, national origin, age, disability, sex, sexual orientation, or gender identity.            Thank you!     Thank you for choosing [facility]  for your care. Our goal is always to provide you with excellent care. Hearing back from our patients is one way we can continue to improve our services. Please take a few minutes to complete the written survey that you may receive in the mail after your visit with us. Thank you!             Your Updated Medication List - Protect others around you: Learn how to safely use, store and throw away your medicines at www.disposemymeds.org.          This list is accurate as of: 10/5/17  5:05 PM.  Always use your most recent med list.                   Brand Name Dispense Instructions for use Diagnosis    atorvastatin 10 MG tablet    LIPITOR    45 tablet    Take 1 tablet (10 mg) by mouth every 48 hours    Hyperlipidemia LDL goal <130       diazepam 5 MG tablet    VALIUM    15 tablet    Take 1 tablet (5 mg) by mouth every 6 hours as needed for anxiety    Complex tear of medial meniscus of right knee as current injury, initial encounter       fluticasone 50 MCG/ACT spray    FLONASE    1 Bottle    Spray 2 sprays into both nostrils daily    Viral URI       omega 3 1200 MG Caps      Take 2 capsules by mouth daily    Spinal stenosis, lumbar region, without neurogenic claudication       omeprazole 10 MG CR capsule    priLOSEC    90 capsule    TAKE 1 CAPSULE BY MOUTH DAILY - TAKE 30 TO 60 MINUTES BEFORE A MEAL -    Gastroesophageal reflux disease without esophagitis       VITAMIN D3 PO      Take 2,000 Units by mouth

## 2017-10-05 NOTE — NURSING NOTE
"Patient declined vital signs check, states his BP is always good.   Denies blurry vision, weakness on one side, worst headache of his life or facial droop.   Had CT in ER last night for headache, was seen today for follow up and was feeling better.   Was advised to call if worse as MRI might be needed.   Patient walked in as he was on hold for 30 minutes around 4 pm.       Dr. Sierra saw patient this am and is now gone for the day.   Patient reports new symptom: sharp \"flashes\" of pain on right side of head and the forehead tingling is further up his forehead.   States he understood he'd be sent to the  for MRI.      Huddled with Dr. COSTELLO who states would not be productive for him to see patient again; advised patient can go to ER if wants this addressed tonight or if develops stroke symptoms; otherwise appropriate to ask Dr. Engelmann to address this tomorrow.    Advised patient of the above, discussed signs of stroke: one-sided weakness, numbness, or tingling, worst headache of his life, droop face, visual disturbance.  Patient states is well-aware of the signs.      Advised he try tylenol/ibuprofen, perhaps cold compress, dark room, relaxation might help.    Patient verbalized understanding of and agreement with plan.    Routed phone encounter to Dr. Engelmann to address.    Neeta Bloom RN  [facility]       "

## 2017-10-05 NOTE — MR AVS SNAPSHOT
After Visit Summary   10/5/2017    Loco Wood    MRN: [id number]           Patient Information     Date Of Birth          1945        Visit Information        Provider Department      10/5/2017 8:40 AM Engelmann, Lauren Anneliese, MD [facility]        Today's Diagnoses     Need for prophylactic vaccination and inoculation against influenza    -  1      Care Instructions    If you are still having symptoms in 7-10 days, please give me a call and I will order an MRI. Use Tylenol or aspirin for a headache.           Follow-ups after your visit        Your next 10 appointments already scheduled     Oct 24, 2017  4:00 PM CDT   Masonic Lab Draw with  MASONIC LAB DRAW   [facility] Masonic Lab Draw ([facility])    [address] [facility]  2nd [facility] [address]   [phone]            Oct 24, 2017  4:30 PM CDT   RETURN ONC with He Burns MD   [facility] Blood and Marrow Transplant ([facility])    [address] [place] [address]   [phone]            Dec 01, 2017  8:00 AM CST   LAB with  LAB   [facility] Lab ([facility])    [address] [place] [address]   [phone]           Patient must bring picture ID. Patient should be prepared to give a urine specimen  Please do not eat 10-12 hours before your appointment if you are coming in fasting for labs on lipids, cholesterol, or glucose (sugar). Pregnant women should follow their Care Team instructions. Water with medications is okay. Do not drink coffee or other fluids. If you have concerns about taking  your medications, please ask at office or if scheduling via [employer], send a message by clicking on Secure Messaging, Message Your Care Team.            Dec 01, 2017  9:00 AM CST   (Arrive by 8:45 AM)   Return Visit with Campos Boyd MD   [facility] Urology and [facility] for  Prostate and Urologic Cancers ([facility])    [address] [facility]  4th Floor  [facility] [address] [phone]              Who to contact     If you have questions or need follow up information about today's clinic visit or your schedule please contact [facility] directly at [phone].  Normal or non-critical lab and imaging results will be communicated to you by MyChart, letter or phone within 4 business days after the clinic has received the results. If you do not hear from us within 7 days, please contact the clinic through MyChart or phone. If you have a critical or abnormal lab result, we will notify you by phone as soon as possible.  Submit refill requests through [employer] or call your pharmacy and they will forward the refill request to us. Please allow 3 business days for your refill to be completed.          Additional Information About Your Visit        [employer] Information     [employer] gives you secure access to your electronic health record. If you see a primary care provider, you can also send messages to your care team and make appointments. If you have questions, please call your primary care clinic.  If you do not have a primary care provider, please call [phone] and they will assist you.        Care EveryWhere ID     This is your Care EveryWhere ID. This could be used by other organizations to access your [facility] medical records  [id number]        Your Vitals Were     Pulse Temperature Pulse Oximetry BMI (Body Mass Index)          87 97  F (36.1  C) (Oral) 97% 25.12 kg/m2         Blood Pressure from Last 3 Encounters:   10/05/17 102/63   10/05/17 132/74   10/03/17 102/60    Weight from Last 3 Encounters:   10/05/17 158 lb (71.7 kg)   10/04/17 156 lb 3.2 oz (70.9 kg)   10/03/17 159 lb (72.1 kg)              Today, you had the following     No orders found for display       Primary Care Provider Office Phone # Fax #    Campos Parra  [phone]       [address] [facility] [address]        Equal Access to Services     HECTOR SANTOS : Hadii aad ku hadambergris Keirylemuel, wajavyda hamletjoshha, qarobertata karjda mj, jose elenain hayaamanuela cuevasrufina sneed laDannysally desai. So [facility] [phone].    ATENCIÓN: Si habla español, tiene a knutson disposición servicios gratuitos de asistencia lingüística. Llame al [phone].    We comply with applicable federal civil rights laws and [place] laws. We do not discriminate on the basis of race, color, national origin, age, disability, sex, sexual orientation, or gender identity.            Thank you!     Thank you for choosing [facility]  for your care. Our goal is always to provide you with excellent care. Hearing back from our patients is one way we can continue to improve our services. Please take a few minutes to complete the written survey that you may receive in the mail after your visit with us. Thank you!             Your Updated Medication List - Protect others around you: Learn how to safely use, store and throw away your medicines at www.disposemymeds.org.          This list is accurate as of: 10/5/17  9:03 AM.  Always use your most recent med list.                   Brand Name Dispense Instructions for use Diagnosis    atorvastatin 10 MG tablet    LIPITOR    45 tablet    Take 1 tablet (10 mg) by mouth every 48 hours    Hyperlipidemia LDL goal <130       diazepam 5 MG tablet    VALIUM    15 tablet    Take 1 tablet (5 mg) by mouth every 6 hours as needed for anxiety    Complex tear of medial meniscus of right knee as current injury, initial encounter       fluticasone 50 MCG/ACT spray    FLONASE    1 Bottle    Spray 2 sprays into both nostrils daily    Viral URI       omega 3 1200 MG Caps      Take 2 capsules by mouth daily    Spinal stenosis, lumbar region, without neurogenic claudication       omeprazole 10 MG CR capsule    priLOSEC    90 capsule    TAKE 1 CAPSULE  BY MOUTH DAILY - TAKE 30 TO 60 MINUTES BEFORE A MEAL -    Gastroesophageal reflux disease without esophagitis       VITAMIN D3 PO      Take 2,000 Units by mouth

## 2017-10-05 NOTE — PROGRESS NOTES
Noted. I reviewed last heme/onc note - WBCs are stable. He has regularly scheduled follow up with them on October 24. No need to move appointment sooner.     Lauren Engelmann, MD

## 2017-10-06 ENCOUNTER — HOSPITAL ENCOUNTER (EMERGENCY)
Facility: CLINIC | Age: 72
Discharge: HOME | End: 2017-10-06
Attending: EMERGENCY MEDICINE | Admitting: EMERGENCY MEDICINE
Payer: MEDICARE

## 2017-10-06 ENCOUNTER — APPOINTMENT (OUTPATIENT)
Dept: MRI IMAGING | Facility: CLINIC | Age: 72
End: 2017-10-06
Attending: EMERGENCY MEDICINE
Payer: MEDICARE

## 2017-10-06 VITALS
SYSTOLIC BLOOD PRESSURE: 122 MMHG | HEIGHT: 67 IN | DIASTOLIC BLOOD PRESSURE: 78 MMHG | WEIGHT: 154 LBS | BODY MASS INDEX: 24.17 KG/M2 | OXYGEN SATURATION: 100 % | TEMPERATURE: 98.2 F | HEART RATE: 79 BPM | RESPIRATION RATE: 16 BRPM

## 2017-10-06 DIAGNOSIS — G50.0 TRIGEMINAL NEURALGIA: ICD-10-CM

## 2017-10-06 PROCEDURE — 25000128 H RX IP 250 OP 636: Performed by: EMERGENCY MEDICINE

## 2017-10-06 PROCEDURE — A9270 NON-COVERED ITEM OR SERVICE: HCPCS | Performed by: EMERGENCY MEDICINE

## 2017-10-06 PROCEDURE — A9585 GADOBUTROL INJECTION: HCPCS | Performed by: EMERGENCY MEDICINE

## 2017-10-06 PROCEDURE — 25000132 ZZH RX MED GY IP 250 OP 250 PS 637: Performed by: EMERGENCY MEDICINE

## 2017-10-06 PROCEDURE — 99285 EMERGENCY DEPT VISIT HI MDM: CPT | Mod: 25 | Performed by: EMERGENCY MEDICINE

## 2017-10-06 PROCEDURE — 70553 MRI BRAIN STEM W/O & W/DYE: CPT

## 2017-10-06 PROCEDURE — 96361 HYDRATE IV INFUSION ADD-ON: CPT | Performed by: EMERGENCY MEDICINE

## 2017-10-06 PROCEDURE — 99285 EMERGENCY DEPT VISIT HI MDM: CPT | Mod: Z6 | Performed by: EMERGENCY MEDICINE

## 2017-10-06 PROCEDURE — 96374 THER/PROPH/DIAG INJ IV PUSH: CPT | Mod: 59 | Performed by: EMERGENCY MEDICINE

## 2017-10-06 PROCEDURE — 96375 TX/PRO/DX INJ NEW DRUG ADDON: CPT | Mod: 59 | Performed by: EMERGENCY MEDICINE

## 2017-10-06 RX ORDER — FENTANYL CITRATE 50 UG/ML
50 INJECTION, SOLUTION INTRAMUSCULAR; INTRAVENOUS
Status: DISCONTINUED | OUTPATIENT
Start: 2017-10-06 | End: 2017-10-06 | Stop reason: HOSPADM

## 2017-10-06 RX ORDER — KETOROLAC TROMETHAMINE 30 MG/ML
15 INJECTION, SOLUTION INTRAMUSCULAR; INTRAVENOUS ONCE
Status: COMPLETED | OUTPATIENT
Start: 2017-10-06 | End: 2017-10-06

## 2017-10-06 RX ORDER — GADOBUTROL 604.72 MG/ML
7.5 INJECTION INTRAVENOUS ONCE
Status: COMPLETED | OUTPATIENT
Start: 2017-10-06 | End: 2017-10-06

## 2017-10-06 RX ORDER — CARBAMAZEPINE 100 MG/1
100 TABLET, EXTENDED RELEASE ORAL 2 TIMES DAILY
Qty: 60 TABLET | Refills: 0 | Status: SHIPPED | OUTPATIENT
Start: 2017-10-06 | End: 2017-10-10

## 2017-10-06 RX ORDER — SODIUM CHLORIDE 9 MG/ML
INJECTION, SOLUTION INTRAVENOUS CONTINUOUS
Status: DISCONTINUED | OUTPATIENT
Start: 2017-10-06 | End: 2017-10-06 | Stop reason: HOSPADM

## 2017-10-06 RX ORDER — METOCLOPRAMIDE HYDROCHLORIDE 5 MG/ML
5 INJECTION INTRAMUSCULAR; INTRAVENOUS ONCE
Status: COMPLETED | OUTPATIENT
Start: 2017-10-06 | End: 2017-10-06

## 2017-10-06 RX ADMIN — GADOBUTROL 7.5 ML: 604.72 INJECTION INTRAVENOUS at 07:53

## 2017-10-06 RX ADMIN — METOCLOPRAMIDE 5 MG: 5 INJECTION, SOLUTION INTRAMUSCULAR; INTRAVENOUS at 05:25

## 2017-10-06 RX ADMIN — CARBAMAZEPINE 100 MG: 200 TABLET ORAL at 05:53

## 2017-10-06 RX ADMIN — SODIUM CHLORIDE: 9 INJECTION, SOLUTION INTRAVENOUS at 04:04

## 2017-10-06 RX ADMIN — KETOROLAC TROMETHAMINE 15 MG: 30 INJECTION, SOLUTION INTRAMUSCULAR at 04:04

## 2017-10-06 RX ADMIN — SODIUM CHLORIDE: 9 INJECTION, SOLUTION INTRAVENOUS at 08:14

## 2017-10-06 RX ADMIN — FENTANYL CITRATE 50 MCG: 50 INJECTION, SOLUTION INTRAMUSCULAR; INTRAVENOUS at 05:24

## 2017-10-06 ASSESSMENT — ENCOUNTER SYMPTOMS
SPEECH DIFFICULTY: 0
HEADACHES: 1
NUMBNESS: 1
FACIAL ASYMMETRY: 0
TREMORS: 0
MUSCULOSKELETAL NEGATIVE: 1
FACIAL SWELLING: 0
FEVER: 0
WEAKNESS: 0
SORE THROAT: 0
SEIZURES: 0

## 2017-10-06 NOTE — ED AVS SNAPSHOT
[facility], [place], Emergency Department    [address] [place] [address]    Phone:  [phone]                                       Loco Wood   MRN: [id number]    Department:  [facility], Emergency Department   Date of Visit:  10/6/2017           After Visit Summary Signature Page     I have received my discharge instructions, and my questions have been answered. I have discussed any challenges I see with this plan with the nurse or doctor.    ..........................................................................................................................................  Patient/Patient Representative Signature      ..........................................................................................................................................  Patient Representative Print Name and Relationship to Patient    ..................................................               ................................................  Date                                            Time    ..........................................................................................................................................  Reviewed by Signature/Title    ...................................................              ..............................................  Date                                                            Time

## 2017-10-06 NOTE — ED AVS SNAPSHOT
[facility], Emergency Department    [address] [facility] [address]    Phone:  [phone]                                       Loco Wood   MRN: [id number]    Department:  [facility], Emergency Department   Date of Visit:  10/6/2017           Patient Information     Date Of Birth          1945        Your diagnoses for this visit were:     Trigeminal neuralgia        You were seen by Nakul Quigley MD.        Discharge Instructions           Start carbamazepine 100 mg twice daily after one week you can increase this to 200 mg twice daily  Follow-up in the neurology clinic  Your brain MRI was normal  Please make an appointment to follow up with Neurology Clinic (phone: [phone])     Trigeminal Neuralgia  You have trigeminal neuralgia. This is pain caused by irritation of the trigeminal nerve on your face. Symptoms include sudden, sharp pain in your head or face. It may feel like an electric shock. It can last for several seconds or minutes. It usually happens on only 1 side of your face. Pain may be triggered by things like moving your jaw or a touch on the skin of your face. The pain may be caused by something irritating the trigeminal nerve, such as a blood vessel pressing against it. But the exact cause of this problem often isn t known. Although it can be quite painful, the condition isn t dangerous.  Trigeminal neuralgia is often treated with medicines. These include anti-seizure medicines or antidepressants. Certain other treatments may also help. In some cases, you may need surgery.    Home care  Your healthcare provider may prescribe medicines to help relieve and prevent pain. Take all medicines as directed. Please note that it may take several changes in dose and medicines before the right combination is found that controls the pain.  General care:    Plan to rest at home today.    Avoid any specific activities that seem to trigger the pain.    Over the next few  weeks, keep a pain diary. Write down when your symptoms happen and how they feel. Certain activities such as touching your face, chewing, talking, or brushing your teeth may bring on the pain. Cold air can also trigger the pain. Make sure you write down any triggers and discuss these with your healthcare provider. This will help guide treatment.  Follow-up care  Follow up with your healthcare provider, or as advised. If you were referred to a neurologist, be sure to make an appointment.  For more information on your condition, visit:    F[employer] Association www.fpa-support.org  When to seek medical advice  Call your healthcare provider right away if any of these occur:    Fever of 100.4 F (38 C) or higher, or as advised    Headache with very stiff neck    You aren t able to keep liquids down (repeated vomiting)    Extreme drowsiness or confusion    Dizziness or fainting    A new feeling of weakness or numbness or tingling in your arm, leg, or face    Difficulty speaking or seeing  Date Last Reviewed: 8/1/2016 2000-2017 [employer]. [address] [place] [address]. All rights reserved. This information is not intended as a substitute for professional medical care. Always follow your healthcare professional's instructions.          Future Appointments        Provider Department Dept Phone Center    10/24/2017 4:00 PM Masonic Lab Draw [facility] Lab Draw [phone] [facility]    10/24/2017 4:30 PM He Burns MD [facility] Blood and Marrow Transplant [phone] [facility]    12/1/2017 8:00 AM Lab [facility] Lab [phone] [facility]    12/1/2017 9:00 AM Campos Boyd MD [facility] Urology and [facility] for Prostate and Urologic Cancers [phone] [facility]      24 Hour Appointment Hotline       To make an appointment at any [facility], call [phone] ([phone]). If you don't have a family doctor or clinic, we will help you find one. [facility] are conveniently  located to serve the needs of you and your family.             Review of your medicines      START taking        Dose / Directions Last dose taken    carBAMazepine 100 MG 12 hr tablet   Commonly known as:  TEGretol XR   Dose:  100 mg   Quantity:  60 tablet        Take 1 tablet (100 mg) by mouth 2 times daily   Refills:  0          Our records show that you are taking the medicines listed below. If these are incorrect, please call your family doctor or clinic.        Dose / Directions Last dose taken    atorvastatin 10 MG tablet   Commonly known as:  LIPITOR   Dose:  10 mg   Quantity:  45 tablet        Take 1 tablet (10 mg) by mouth every 48 hours   Refills:  9        diazepam 5 MG tablet   Commonly known as:  VALIUM   Dose:  5 mg   Quantity:  15 tablet        Take 1 tablet (5 mg) by mouth every 6 hours as needed for anxiety   Refills:  0        fluticasone 50 MCG/ACT spray   Commonly known as:  FLONASE   Dose:  2 spray   Quantity:  1 Bottle        Spray 2 sprays into both nostrils daily   Refills:  1        omega 3 1200 MG Caps   Dose:  2 capsule        Take 2 capsules by mouth daily   Refills:  0        omeprazole 10 MG CR capsule   Commonly known as:  priLOSEC   Quantity:  90 capsule        TAKE 1 CAPSULE BY MOUTH DAILY - TAKE 30 TO 60 MINUTES BEFORE A MEAL -   Refills:  3        VITAMIN D3 PO   Dose:  2000 Units        Take 2,000 Units by mouth   Refills:  0                Prescriptions were sent or printed at these locations (1 Prescription)                   Other Prescriptions                Printed at Department/Unit printer (1 of 1)         carBAMazepine (TEGRETOL XR) 100 MG 12 hr tablet                Procedures and tests performed during your visit     MR Brain w/o & w Contrast      Orders Needing Specimen Collection     None      Pending Results     Date and Time Order Name Status Description    10/6/2017 0513 MR Brain w/o & w Contrast Preliminary             Pending Culture Results     No orders found  from 10/4/2017 to 10/7/2017.            Pending Results Instructions     If you had any lab results that were not finalized at the time of your Discharge, you can call the ED Lab Result RN at [phone]. You will be contacted by this team for any positive Lab results or changes in treatment. The nurses are available 7 days a week from 10A to 6:30P.  You can leave a message 24 hours per day and they will return your call.        Thank you for choosing [facility]       Thank you for choosing [facility] for your care. Our goal is always to provide you with excellent care. Hearing back from our patients is one way we can continue to improve our services. Please take a few minutes to complete the written survey that you may receive in the mail after you visit with us. Thank you!        [employer] Information     [employer] gives you secure access to your electronic health record. If you see a primary care provider, you can also send messages to your care team and make appointments. If you have questions, please call your primary care clinic.  If you do not have a primary care provider, please call [phone] and they will assist you.        Care EveryWhere ID     This is your Care EveryWhere ID. This could be used by other organizations to access your [facility] medical records  [id number]        Equal Access to Services     HECTOR SANTOS : Aron Gu, nakia rajput, qadaisy kagurdeep barker, jose desai. So [facility] [phone].    ATENCIÓN: Si habla español, tiene a knutson disposición servicios gratuitos de asistencia lingüística. Llame al [phone].    We comply with applicable federal civil rights laws and [place] laws. We do not discriminate on the basis of race, color, national origin, age, disability, sex, sexual orientation, or gender identity.            After Visit Summary       This is your record. Keep this with you and show to your community pharmacist(s) and  doctor(s) at your next visit.

## 2017-10-06 NOTE — CONSULTS
[facility]  Neurology Consultation    Patient Name:  Loco Wood  MRN:  [id number]    :  1945  Date of Service:  2017  Primary care provider:  Campos Parra      Neurology consultation service was asked to see Loco Wood to evaluate right sided facial and eye pain.    History of Present Illness:   72 year old male h/o CLL who presents with pain in the right eye and pain primarily on the right side of the face. Patient notes that approximately 2 weeks ago he started to have right jaw pain. He presented to the dentist office and was considered to have a possible abscess. However, an x-ray was negative for this abscess. He continued to have right jaw pain and then started to notice pain around his right eye several days later. The right eye pain is described as a sharp shooting pain. He denies any changes in vision. Shortly thereafter he started to notice a electrical pulsing sensation in his right forehead. He states originally it wasn't painful, but it did seem to revolve around the pain in his right eye. This forehead sensation progressed to be painful as he states that he would get impulses of pain up to 9/10 on the pain scale. These impulses are worsened by touch and temperature changes. He states they have been happening approximately every 15 minutes or so for the last 24-48 hours. Patient notes that he does have pain below his right eye now as well. He states it goes back to about the top of the head and currently, is very painful to touch his hair. He notes that he has had sinus congestion in the last two weeks. He denies unilateral conjunctival injection or rhinorrhea. He has never had any symptoms like this in the past. He states he gets a tension type headache approximately once per year which responds to ibuprofen. He notes no proximal weakness. He is generally healthy except for CLL which is currently being monitored.     AN JULIEN  10-point ROS was performed as per HPI.     PMH  Past Medical History:   Diagnosis Date     Arthritis     hip and toes     Chronic pain      CLL (chronic lymphocytic leukaemia)      Esophageal reflux      Malignant neoplasm (H)     Leukemia     PONV (postoperative nausea and vomiting)      Pure hypercholesterolemia      Past Surgical History:   Procedure Laterality Date     ARTHROPLASTY MINIMALLY INVASIVE HIP  5/10/2013    Procedure: ARTHROPLASTY MINIMALLY INVASIVE HIP;  Left Two Incision Total Hip Arthroplasty ;  Surgeon: Desmond Alberts MD;  Location: [facility] OR     ARTHROPLASTY MINIMALLY INVASIVE HIP  2/27/2014    Procedure: ARTHROPLASTY MINIMALLY INVASIVE HIP;  Right Total Hip Arthroplasty Minimally Invasive Two Incision  *Latex Free Room;  Surgeon: Desmond Alberts MD;  Location: [facility] OR     BACK SURGERY      back fusion 1994     C NONSPECIFIC PROCEDURE  1964 &'95    (R) inguinal herniorrhapy     C NONSPECIFIC PROCEDURE  1949    (L) inguinal herniorrhaphy     C NONSPECIFIC PROCEDURE  1994    L4-5  L5 S1 fusion - spondylolisthesis     COLONOSCOPY      2007     COLONOSCOPY N/A 8/15/2017    Procedure: COLONOSCOPY;  colonoscopy;  Surgeon: Chun Mcguire MD;  Location:  GI     GI SURGERY      4 hernia repairs in childhood       Medications     (Not in a hospital admission)    Allergies  Allergies   Allergen Reactions     Dilaudid [Hydromorphone] Itching     Morphine Itching       Social History  Social History   Substance Use Topics     Smoking status: Never Smoker     Smokeless tobacco: Never Used     Alcohol use 0.0 oz/week     0 Standard drinks or equivalent per week      Comment: 5 drinks per week       Family History    Family History   Problem Relation Age of Onset     HEART DISEASE Father      CEREBROVASCULAR DISEASE Father      CANCER Father      melonoma     Melanoma Father      CANCER Mother      Lung cancer     CEREBROVASCULAR DISEASE Paternal Uncle      Aneurism     Breast Cancer Maternal Aunt       " from it     CANCER Paternal Uncle      CANCER Paternal Aunt      Skin Cancer No family hx of          Physical Examination   Vitals: /85  Pulse 94  Temp 98.3  F (36.8  C) (Oral)  Resp 12  Ht 1.702 m (5' 7\")  Wt 69.9 kg (154 lb)  SpO2 98%  BMI 24.12 kg/m2  General: Adult, in NAD, cooperative  HEENT: NC/AT, no icterus, op pink and moist  Cardiac: RRR no M  Chest: CTAB no w/c/r  Abdomen: S/NT/ND  Extremities: No LE swelling.  Skin: No rash or lesion.   Psych: Mood pleasant, affect congruent  Neuro:  Mental status: Awake, alert, attentive, oriented. Speech is fluent, no dysarthria.  Cranial nerves: CN2-12 tested and no significant findings appreciated. Eyes conjugate, PERRLA, EOMI, face symmetric, facial sensation intact, shoulder shrug strong, tongue/uvula midline.   Motor: Tone within normal. 5/5 strength in all 4 extremities. No atrophy or twitches.   Reflexes: normoreflexic and symmetric biceps, patellar, and achilles. Toes down-going.  Sensory: Intact to LT, allodynia around right eye.   Coordination: FNF no dysmetria, HTS & BRETT normal  Gait: Normal width, stride length, turn, and arm swing. Station normal. Tandem walk intact.      Investigations   CT had completed yesterday showed no acute intracranial findings  ESR was negative, troponin was negative, BMP showed a creatinine of 1.28, CBC showed a white blood cell count 96.1.    Impression  Mr. Wood is a pleasant 72 year old male with PMH significant for CLL (stable) who is presenting with right eye pain and generally right sided trigeminal distribution pain. This pain does track to the posterior portion of V1 and does include a significant portion of V2 on the right side. The differential includes trigeminal neuralgia and hemicrania continua, optic neuritis, acute glaucoma, GCA, stroke, amongst other etiologies. The patient denies lacrimation, conjunctival injection, ptosis making hemicrania continua less likely. Patient had normal " intraocular pressures. Patient had negative ESR.  Patient does have a history of CLL and therefore, the patient would likely benefit from MRI of the brain w and w/o contrast for further evaluation. If this is negative, the patient likely has trigeminal neuralgia and would benefit from carbamazepine 100mg BID and increase to 200mg BID after 1 week and an outpatient neurology follow up.     Recommendations  -MRI brain w and w/o contrast  -If negative, consider carbamazepine 100mg BID and up to 200mg BID after 1 week  -F/u with general neurology.    Thank you for involving neurology in the care of Loco Wood.  Please do not hesitate to call with questions/concerns (consult pager [phone]).      Patient was discussed with Dr. East.     Harvey Herbert MD  Neurology Resident, PGY-2  (p) [phone]

## 2017-10-06 NOTE — DISCHARGE INSTRUCTIONS
Start carbamazepine 100 mg twice daily after one week you can increase this to 200 mg twice daily  Follow-up in the neurology clinic  Your brain MRI was normal  Please make an appointment to follow up with Neurology Clinic (phone: [phone])     Trigeminal Neuralgia  You have trigeminal neuralgia. This is pain caused by irritation of the trigeminal nerve on your face. Symptoms include sudden, sharp pain in your head or face. It may feel like an electric shock. It can last for several seconds or minutes. It usually happens on only 1 side of your face. Pain may be triggered by things like moving your jaw or a touch on the skin of your face. The pain may be caused by something irritating the trigeminal nerve, such as a blood vessel pressing against it. But the exact cause of this problem often isn t known. Although it can be quite painful, the condition isn t dangerous.  Trigeminal neuralgia is often treated with medicines. These include anti-seizure medicines or antidepressants. Certain other treatments may also help. In some cases, you may need surgery.    Home care  Your healthcare provider may prescribe medicines to help relieve and prevent pain. Take all medicines as directed. Please note that it may take several changes in dose and medicines before the right combination is found that controls the pain.  General care:    Plan to rest at home today.    Avoid any specific activities that seem to trigger the pain.    Over the next few weeks, keep a pain diary. Write down when your symptoms happen and how they feel. Certain activities such as touching your face, chewing, talking, or brushing your teeth may bring on the pain. Cold air can also trigger the pain. Make sure you write down any triggers and discuss these with your healthcare provider. This will help guide treatment.  Follow-up care  Follow up with your healthcare provider, or as advised. If you were referred to a neurologist, be sure to make an  appointment.  For more information on your condition, visit:    [employer] www.fpa-support.org  When to seek medical advice  Call your healthcare provider right away if any of these occur:    Fever of 100.4 F (38 C) or higher, or as advised    Headache with very stiff neck    You aren t able to keep liquids down (repeated vomiting)    Extreme drowsiness or confusion    Dizziness or fainting    A new feeling of weakness or numbness or tingling in your arm, leg, or face    Difficulty speaking or seeing  Date Last Reviewed: 8/1/2016 2000-2017 [employer]. [address] [place] [address]. All rights reserved. This information is not intended as a substitute for professional medical care. Always follow your healthcare professional's instructions.

## 2017-10-06 NOTE — ED NOTES
Pt presents to ED with sharp pain to his right eye.p states he also has shooting head pain to the right side that is very tender to the touch. Pt states he developed a numbing sensation tonight to his face. Pt was seen for this at [place] yesterday an as told to come back to ED if symptoms increase. Pt denies any vision changes

## 2017-10-06 NOTE — ED PROVIDER NOTES
History     Chief Complaint   Patient presents with     Eye Pain     Headache     HPI  Loco Wood is a 72 year old male who presents with a subacute headache. He was seen here yesterday and had a head CT done that was negative. He was advised that, if he wasn't better, he should get an MRI and a neurologic consult. His symptoms are unilateral, involving the right side, sharp with tactile pain to the scalp, radiating over the lateral face and retroorbital. It is intermittent, sharp, lancinating pain. Symptoms are suggestive of trigeminal neuralgia. He does not have a rash in the area. He does have a history of CLL that is being monitored, he has had no treatment for it. The pain is not managed by any modalities that he has tried.     This part of the medical record was transcribed by Shaniqua Day Medical Scribe, from a dictation done by Nakul Quigley MD.      I have reviewed the Medications, Allergies, Past Medical and Surgical History, and Social History in the [employer] system.  Past Medical History:   Diagnosis Date     Arthritis     hip and toes     Chronic pain      CLL (chronic lymphocytic leukaemia)      Esophageal reflux      Malignant neoplasm (H)     Leukemia     PONV (postoperative nausea and vomiting)      Pure hypercholesterolemia      Social History     Social History     Marital status: Single     Spouse name: Ellen     Number of children: 0     Years of education: PHD     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[employer]     Social History Main Topics     Smoking status: Never Smoker     Smokeless tobacco: Never Used     Alcohol use 0.0 oz/week     0 Standard drinks or equivalent per week      Comment: 5 drinks per week     Drug use: No     Sexual activity: Yes     Partners: Female     Other Topics Concern      Service No     Blood Transfusions No     Caffeine Concern No     Occupational Exposure No     Hobby Hazards No     Sleep Concern No     Stress  "Concern No     Weight Concern No     Special Diet No     Back Care Yes     Exercise Yes     Several times a week     Bike Helmet No     Seat Belt Yes     Self-Exams No     Parent/Sibling W/ Cabg, Mi Or Angioplasty Before 65f 55m? No     Social History Narrative    Social Documentation:7/10        Balanced Diet: YES    Calcium intake:milk and food    Caffeine: 2 cups of coffee per day    Exercise:  type of activity  Wts , stretching, cardio;  3 times per week    Sunscreen:no    Seatbelts:  Yes    Self Breast Exam:  No -     Self Testicular Exam: No    Physical/Emotional/Sexual Abuse: No     Do you feel safe in your environment? Yes        Cholesterol screen up to date: today    Eye Exam up to date: Yes    Dental Exam up to date: Yes    Pap smear up to date: Does Not Apply    Mammogram up to date: Does Not Apply    Dexa Scan up to date: Does Not Apply    Colonoscopy up to date: Yes-2007    Immunizations up to date: Yes-2008    Glucose screen if over 40:  No     Luis Alfredo Knox ma                       Review of Systems   Constitutional: Negative for fever.   HENT: Negative for facial swelling and sore throat.    Eyes: Negative for visual disturbance.   Musculoskeletal: Negative.    Skin: Negative for rash.   Neurological: Positive for numbness and headaches. Negative for tremors, seizures, facial asymmetry, speech difficulty and weakness.   All other systems reviewed and are negative.      Physical Exam   BP: 126/85  Pulse: 94  Temp: 98.3  F (36.8  C)  Resp: 12  Height: 170.2 cm (5' 7\")  Weight: 69.9 kg (154 lb)  SpO2: 99 %  Physical Exam   Constitutional: He is oriented to person, place, and time. He appears well-developed and well-nourished. No distress.   HENT:   Head: Normocephalic and atraumatic.       Pain as noted   Eyes: Conjunctivae and EOM are normal. Pupils are equal, round, and reactive to light. No scleral icterus.   Neck: Neck supple.   Cardiovascular: Normal rate, regular rhythm and normal heart " sounds.    Pulmonary/Chest: Effort normal. No respiratory distress.   Neurological: He is alert and oriented to person, place, and time. No cranial nerve deficit.   Skin: Skin is warm and dry. No rash noted.   Psychiatric: He has a normal mood and affect. His behavior is normal.   Nursing note and vitals reviewed.      ED Course     ED Course     Procedures        Medications   0.9% sodium chloride infusion ( Intravenous Stopped 10/6/17 0900)   fentaNYL (PF) (SUBLIMAZE) injection 50 mcg (50 mcg Intravenous Given 10/6/17 0524)   ketorolac (TORADOL) injection 15 mg (15 mg Intravenous Given 10/6/17 0404)   metoclopramide (REGLAN) injection 5 mg (5 mg Intravenous Given 10/6/17 0525)   carBAMazepine (TEGretol) half-tab 100 mg (100 mg Oral Given 10/6/17 0590)   gadobutrol (GADAVIST) injection 7.5 mL (7.5 mLs Intravenous Given 10/6/17 5963)           Results for orders placed or performed during the hospital encounter of 10/06/17   MR Brain w/o & w Contrast    Narrative    MR brain without and with contrast 10/6/2017 8:01 AM    Provided History: Right-sided atypical headache, negative CT, history  of CLL.    Comparison: Head CT 10/4/2017.    Technique: Multiplanar T1-weighted, axial FLAIR, and susceptibility  images were obtained without intravenous contrast. Following  intravenous gadolinium-based contrast administration, axial  T2-weighted, diffusion, and T1-weighted images (in multiple planes)  were obtained.    Contrast: 7.5 mL Gadavist    Findings:    No abnormal foci of intracranial enhancement to suggest CNS  involvement of leukemia. No intracranial hemorrhage, mass effect,  midline shift or abnormal extra axial fluid collection. Mild gliosis  and generalized parenchymal volume loss. Ventricles are not enlarged  out of proportion to the cerebral sulci. Stable small anterior right  temporal stem subcortical enlarged perivascular space with thin rim of  gliosis. Patent major intracranial vascular flow voids. No  abnormal  foci of restricted diffusion.      Right maxillary sinus polypoid mucosal thickening. Mastoid air cells  are clear. The normal marrow signal. Orbits are normal.      Impression    Impression:  1. No findings to explain patient's symptoms. No evidence of CNS  involvement of leukemia.  2. Anterior right temporal stem enlarged perivascular space within rim  of gliosis.  3. Mild presumed chronic small vessel ischemic disease.    I have personally reviewed the examination and initial interpretation  and I agree with the findings.    CHRIS MEANS MD         Labs Ordered and Resulted from Time of ED Arrival Up to the Time of Departure from the ED - No data to display         Assessments & Plan (with Medical Decision Making)   72-year-old male with acute right-sided headache with symptoms likely representing trigeminal neuralgia. He was seen by Neurology who agrees with this diagnosis. His brain MRI is normal. He will be discharged on carbamazepine 100 mg QD for 1 week and then increase this to 200 mg BID. He will be given a phone number for the neurology clinic to follow up.     This part of the medical record was transcribed by Shaniqua Day, Medical Scribe, from a dictation done by Nakul Quigley MD.      I have reviewed the nursing notes.    I have reviewed the findings, diagnosis, plan and need for follow up with the patient.    New Prescriptions    CARBAMAZEPINE (TEGRETOL XR) 100 MG 12 HR TABLET    Take 1 tablet (100 mg) by mouth 2 times daily       Final diagnoses:   Trigeminal neuralgia       10/6/2017   [facility], [place], EMERGENCY DEPARTMENT     Nakul Quigley MD  10/06/17 0915

## 2017-10-07 ENCOUNTER — NURSE TRIAGE (OUTPATIENT)
Dept: NURSING | Facility: CLINIC | Age: 72
End: 2017-10-07

## 2017-10-07 LAB
ANISOCYTOSIS BLD QL SMEAR: SLIGHT
BASOPHILS # BLD AUTO: 0 10E9/L (ref 0–0.2)
BASOPHILS NFR BLD AUTO: 0 %
DIFFERENTIAL METHOD BLD: ABNORMAL
EOSINOPHIL # BLD AUTO: 0 10E9/L (ref 0–0.7)
EOSINOPHIL NFR BLD AUTO: 0 %
ERYTHROCYTE [DISTWIDTH] IN BLOOD BY AUTOMATED COUNT: 14.2 % (ref 10–15)
HCT VFR BLD AUTO: 43.7 % (ref 40–53)
HGB BLD-MCNC: 14 G/DL (ref 13.3–17.7)
LYMPHOCYTES # BLD AUTO: 81.2 10E9/L (ref 0.8–5.3)
LYMPHOCYTES NFR BLD AUTO: 84.5 %
MCH RBC QN AUTO: 30.9 PG (ref 26.5–33)
MCHC RBC AUTO-ENTMCNC: 32 G/DL (ref 31.5–36.5)
MCV RBC AUTO: 97 FL (ref 78–100)
MONOCYTES # BLD AUTO: 1 10E9/L (ref 0–1.3)
MONOCYTES NFR BLD AUTO: 1 %
NEUTROPHILS # BLD AUTO: 4.3 10E9/L (ref 1.6–8.3)
NEUTROPHILS NFR BLD AUTO: 4.5 %
OTHER CELLS # BLD MANUAL: 9.6 10E9/L
OTHER CELLS NFR BLD MANUAL: 10 %
PLATELET # BLD AUTO: 125 10E9/L (ref 150–450)
PLATELET # BLD EST: ABNORMAL 10*3/UL
RBC # BLD AUTO: 4.53 10E12/L (ref 4.4–5.9)
WBC # BLD AUTO: 96.1 10E9/L (ref 4–11)

## 2017-10-08 NOTE — TELEPHONE ENCOUNTER
Additional Information    Negative: Shock suspected (e.g., cold/pale/clammy skin, too weak to stand, low BP, rapid pulse)    Negative: Sounds like a life-threatening emergency to the triager    Negative: [1] Nausea or vomiting AND [2] pregnancy < 20 weeks    Negative: Menstrual Period - Missed or Late (i.e., pregnancy suspected)    Negative: Heat exhaustion suspected (i.e., dehydration from heat exposure)    Negative: Motion sickness suspected (i.e., nausea with car, plane, boat, or train travel)    Negative: Anxiety or stress suspected (i.e., nausea with anxiety attacks or stressful situations)    Negative: Traumatic Brain Injury (TBI) suspected    Negative: Vomiting occurs    Negative: Other symptom is present, see that guideline.  (e.g., chest pain, headache, dizziness, abdominal pain, colds, sore throat, etc.).    Negative: Unable to walk, or can only walk with assistance (e.g., requires support)    Negative: Difficulty breathing    Negative: [1] Insulin-dependent diabetes (Type I) AND [2] glucose > 400 mg/dl (22 mmol/l)    Negative: [1] Drinking very little AND [2] dehydration suspected (e.g., no urine > 12 hours, very dry mouth, very lightheaded)    Negative: Patient sounds very sick or weak to the triager    Negative: Fever > 104 F (40 C)    Negative: [1] Fever > 101 F (38.3 C) AND [2] age > 60    Negative: [1] Fever > 101 F (38.3 C) AND [2] bedridden (e.g., nursing home patient, CVA, chronic illness, recovering from surgery)    Negative: [1] Fever > 100.5 F (38.1 C) AND [2] diabetes mellitus or weak immune system (e.g., HIV positive, cancer chemo, splenectomy, organ transplant, chronic steroids)    Negative: Taking any of the following medications: digoxin (Lanoxin), lithium, theophylline, phenytoin (Dilantin)    Negative: Yellowish color of the skin or white of the eye (i.e., jaundice)    Negative: Fever present > 3 days (72 hours)    Negative: Receiving cancer chemotherapy medication    Negative: Taking  "prescription medication that could cause nausea (e.g., narcotics/opiates, antibiotics, OCPs, many others)    Negative: Nausea persists > 1 week    Negative: Alcohol or drug abuse, known or suspected    Negative: Nausea is a chronic symptom (recurrent or ongoing AND present > 4 weeks)    Unexplained nausea  (all triage questions negative)    Answer Assessment - Initial Assessment Questions  1. NAUSEA SEVERITY: \"How bad is the nausea?\" (e.g., mild, moderate, severe; dehydration, weight loss)    - MILD: loss of appetite without change in eating habits    - MODERATE: decreased oral intake without significant weight loss, dehydration, or malnutrition    - SEVERE: inadequate caloric or fluid intake, significant weight loss, symptoms of dehydration   modewrate  2. ONSET: \"When did the nausea begin?\"      Today , when he took  Two new medications together   3. VOMITING: \"Any vomiting?\" If so, ask: \"How many times today?\"      No so far  4. RECURRENT SYMPTOM: \"Have you had nausea before?\" If so, ask: \"When was the last time?\" \"What happened that time?\"      Not like this   5. CAUSE: \"What do you think is causing the nausea?\"      Medication/pain   6. PREGNANCY: \"Is there any chance you are pregnant?\" (e.g., unprotected intercourse, missed birth control pill, broken condom)      na    Protocols used: NAUSEA-ADULT-MARISELA Adan was originally diagnosed with Trigeminal nerve pain, and today his friend noted he had a bump rash on his forehead, he went to urgent care, they  Diagnosed him with  Shingles, and began him on  Acyclovir, and gave him  Vicodin for the pain .  He took the two med's together, 500 valtrex, and  Vicodin 2 tabs  Within 30 minutes, now nausea, and  Pain not controlled . Still at 8-9 . Due to timing of nausea, and meds taken  This should resolve  With  Time, he will try sips of clod fluids, and resting quietly, has some antinausea med's he could try , cautioned that tastes could make him vomit also . He will try " some ice pack for pain also, and stagger medications  When needing to take again. Avoid taking together to lessen the tummy issues. If pain continues to be poorly controlled, or he needs nausea med's, he can call back to reassess . Of note, he has also discontinued the Tegretol they  Gave for Trigeminal pain.

## 2017-10-10 ENCOUNTER — OFFICE VISIT (OUTPATIENT)
Dept: FAMILY MEDICINE | Facility: CLINIC | Age: 72
End: 2017-10-10
Payer: COMMERCIAL

## 2017-10-10 ENCOUNTER — TELEPHONE (OUTPATIENT)
Dept: FAMILY MEDICINE | Facility: CLINIC | Age: 72
End: 2017-10-10

## 2017-10-10 VITALS
DIASTOLIC BLOOD PRESSURE: 73 MMHG | OXYGEN SATURATION: 96 % | WEIGHT: 161 LBS | TEMPERATURE: 98 F | HEART RATE: 98 BPM | HEIGHT: 67 IN | SYSTOLIC BLOOD PRESSURE: 118 MMHG | BODY MASS INDEX: 25.27 KG/M2

## 2017-10-10 DIAGNOSIS — B02.9 HERPES ZOSTER WITHOUT COMPLICATION: Primary | ICD-10-CM

## 2017-10-10 PROCEDURE — 99213 OFFICE O/P EST LOW 20 MIN: CPT | Performed by: FAMILY MEDICINE

## 2017-10-10 RX ORDER — HYDROCODONE BITARTRATE AND ACETAMINOPHEN 10; 325 MG/1; MG/1
1 TABLET ORAL EVERY 6 HOURS PRN
COMMUNITY
End: 2017-12-21

## 2017-10-10 RX ORDER — LIDOCAINE 50 MG/G
OINTMENT TOPICAL 3 TIMES DAILY PRN
Qty: 50 G | Refills: 1 | Status: SHIPPED | OUTPATIENT
Start: 2017-10-10 | End: 2018-02-19

## 2017-10-10 ASSESSMENT — PAIN SCALES - GENERAL: PAINLEVEL: SEVERE PAIN (6)

## 2017-10-10 NOTE — PROGRESS NOTES
SUBJECTIVE:   Loco Wood is a 72 year old male who presents to clinic today for the following health issues:    Rash/Shingles      Duration: 10/3/17    Description  Location: Scalp, forehead, around the eye  Itching: Left side    Intensity:  9/10- average 6/10    Accompanying signs and symptoms: Pain in the right eye, eye is weeping, some head pain    History (similar episodes/previous evaluation): None    Precipitating or alleviating factors:  New exposures:  None  Recent travel: no      Therapies tried and outcome: Rx- hard to tell if it has done anything. Stated he is in a little less pain.    Patient presents to discuss the missed dx of shingles. He was seen in clinic and in the ED several times between onset of neuropathic pain and outbreak of vesicles (10/3 to 10/8).   His main purpose today is to review his diagnosis and to ensure that no other treatment is needed.     Problem list and histories reviewed & adjusted, as indicated.  Additional history: as documented    Patient Active Problem List   Diagnosis     Esophageal reflux     Lumbago     CLL (chronic lymphocytic leukemia) (H)     HYPERLIPIDEMIA LDL GOAL <130     Mass of skin     Health Care Home     Vitamin D deficiency     Advanced directives, counseling/discussion     Osteoarthritis of hip     OA (osteoarthritis) of hip     Insomnia     BPH (benign prostatic hyperplasia)     Acute bilateral low back pain without sciatica     Prostate nodule     Chondromalacia, knee, right     Complex tear of medial meniscus of right knee as current injury     Past Surgical History:   Procedure Laterality Date     ARTHROPLASTY MINIMALLY INVASIVE HIP  5/10/2013    Procedure: ARTHROPLASTY MINIMALLY INVASIVE HIP;  Left Two Incision Total Hip Arthroplasty ;  Surgeon: Desmond Alberts MD;  Location: [facility]     ARTHROPLASTY MINIMALLY INVASIVE HIP  2/27/2014    Procedure: ARTHROPLASTY MINIMALLY INVASIVE HIP;  Right Total Hip Arthroplasty Minimally Invasive Two  "Incision  *Latex Free Room;  Surgeon: Desmond Alberts MD;  Location: [facility] OR     BACK SURGERY      back fusion      C NONSPECIFIC PROCEDURE  1964    (R) inguinal herniorrhapy     C NONSPECIFIC PROCEDURE      (L) inguinal herniorrhaphy     C NONSPECIFIC PROCEDURE      L4-5  L5 S1 fusion - spondylolisthesis     COLONOSCOPY           COLONOSCOPY N/A 8/15/2017    Procedure: COLONOSCOPY;  colonoscopy;  Surgeon: Chun Mcguire MD;  Location:  GI     GI SURGERY      4 hernia repairs in childhood       Social History   Substance Use Topics     Smoking status: Never Smoker     Smokeless tobacco: Never Used     Alcohol use 0.0 oz/week     0 Standard drinks or equivalent per week      Comment: 5 drinks per week     Family History   Problem Relation Age of Onset     HEART DISEASE Father      CEREBROVASCULAR DISEASE Father      CANCER Father      melonoma     Melanoma Father      CANCER Mother      Lung cancer     CEREBROVASCULAR DISEASE Paternal Uncle      Aneurism     Breast Cancer Maternal Aunt       from it     CANCER Paternal Uncle      CANCER Paternal Aunt      Skin Cancer No family hx of              Reviewed and updated as needed this visit by clinical staff     Reviewed and updated as needed this visit by Provider         ROS:  Constitutional, HEENT, cardiovascular, pulmonary, gi and gu systems are negative, except as otherwise noted.      OBJECTIVE:   /73 (BP Location: Left arm, Patient Position: Chair, Cuff Size: Adult Regular)  Pulse 98  Temp 98  F (36.7  C) (Oral)  Ht 1.702 m (5' 7\")  Wt 73 kg (161 lb)  SpO2 96%  BMI 25.22 kg/m2  Body mass index is 25.22 kg/(m^2).  GENERAL: healthy, alert and no distress  EYES: eyelids- periorbital edema OD and erythema and weeping from eye, and conjunctiva/corneas- conjunctival injection OD and clear colored discharge present right  NECK: cervical adenopathy R anterior chain, and thyroid normal to palpation  RESP: lungs clear to " auscultation - no rales, rhonchi or wheezes  CV: regular rate and rhythm, normal S1 S2, no S3 or S4, no murmur, click or rub, no peripheral edema and peripheral pulses strong  SKIN: zosteriform eruption - right trigeminal ophthalmic branch distribution     Diagnostic Test Results:  none     ASSESSMENT/PLAN:         ICD-10-CM    1. Herpes zoster without complication B02.9 lidocaine (XYLOCAINE) 5 % ointment     OPTOMETRY REFERRAL     Discussed progression of symptoms and expected course.   Although he had an eye exam over the weekend, I would prefer that he sees optometry or ophthalmology ASAP.   Rx for topical lidocaine given.   Ok to use gabapentin at bedtime as rx'd by urgent care.     See Patient Instructions    Lauren A. Engelmann, MD  [facility]

## 2017-10-10 NOTE — TELEPHONE ENCOUNTER
Reason for Call:  Other prescription and call back     Detailed comments: Went to the pharmacy on [address] to  some medication. The pharmacy has also called to speak with a nurse about the medication. [facility] has not received the order for lidocaine (XYLOCAINE) 5 % ointment. Please contact patient instead of pharmacy. Thanks     Phone Number Patient can be reached at: Home number on file [phone] (home)    Best Time: Anytime     Can we leave a detailed message on this number? YES    Call taken on 10/10/2017 at 2:13 PM by Chelsea Baldwin

## 2017-10-10 NOTE — TELEPHONE ENCOUNTER
Attempt # 1  Called patient at home number.  Was call answered?  No, left message to call nurse line.  Aurora Sung RN  [facility]

## 2017-10-10 NOTE — PATIENT INSTRUCTIONS
Take Norco (hydrocodone) and gabapentin as needed for pain. Continue the acyclovir until it's gone.

## 2017-10-10 NOTE — NURSING NOTE
"Chief Complaint   Patient presents with     Shingles       Initial /73 (BP Location: Left arm, Patient Position: Chair, Cuff Size: Adult Regular)  Pulse 98  Temp 98  F (36.7  C) (Oral)  Ht 5' 7\" (1.702 m)  Wt 161 lb (73 kg)  SpO2 96%  BMI 25.22 kg/m2 Estimated body mass index is 25.22 kg/(m^2) as calculated from the following:    Height as of this encounter: 5' 7\" (1.702 m).    Weight as of this encounter: 161 lb (73 kg).  Medication Reconciliation: complete    Maite Chavez MA    "

## 2017-10-10 NOTE — MR AVS SNAPSHOT
After Visit Summary   10/10/2017    Loco Wood    MRN: [id number]           Patient Information     Date Of Birth          1945        Visit Information        Provider Department      10/10/2017 1:20 PM Engelmann, Lauren Anneliese, MD [facility]        Today's Diagnoses     Herpes zoster without complication    -  1      Care Instructions    Take Norco (hydrocodone) and gabapentin as needed for pain. Continue the acyclovir until it's gone.           Follow-ups after your visit        Additional Services     OPTOMETRY REFERRAL       Your provider has referred you to: [facility] [facility] [phone]    [email or web address]    Please be aware that coverage of these services is subject to the terms and limitations of your health insurance plan.  Call member services at your health plan with any benefit or coverage questions.      Please bring the following with you to your appointment:    (1) Any X-Rays, CTs or MRIs which have been performed.  Contact the facility where they were done to arrange for  prior to your scheduled appointment.    (2) List of current medications  (3) This referral request   (4) Any documents/labs given to you for this referral                  Your next 10 appointments already scheduled     Oct 13, 2017 11:00 AM CDT   New Visit with Citlali Vega OD   [facility]    [address] [place] [id number] [phone]            Oct 24, 2017  4:00 PM CDT   Masonic Lab Draw with  MASONIC LAB DRAW   [facility] Masonic Lab Draw ([facility])    [address] [place] [id number] [phone]            Oct 24, 2017  4:30 PM CDT   RETURN ONC with He Burns MD   [facility] Blood and Marrow Transplant ([facility])    [address] [place] [address]  [id number] [phone]            Dec 01, 2017  8:00 AM CST   LAB with  LAB   [facility] ([facility])    [address] [place] [id number] [phone]           Patient must bring picture ID. Patient should be prepared to give a urine specimen  Please do not eat 10-12 hours before your appointment if you are coming in fasting for labs on lipids, cholesterol, or glucose (sugar). Pregnant women should follow their Care Team instructions. Water with medications is okay. Do not drink coffee or other fluids. If you have concerns about taking  your medications, please ask at office or if scheduling via Wand[employer]ia, send a message by clicking on Secure Messaging, Message Your Care Team.            Dec 01, 2017  9:00 AM CST   (Arrive by 8:45 AM)   Return Visit with Campos Boyd MD   [facility]rolog[facility] ([facility])    [address] [place] [address] [id number] [phone]              Who to contact     If you have questions or need follow up information about today's clinic visit or your schedule please contact [facility] directly at [phone].  Normal or non-critical lab and imaging results will be communicated to you by MedAwarehart, letter or phone within 4 business days after the clinic has received the results. If you do not hear from us within 7 days, please contact the clinic through MedAwarehart or phone. If you have a critical or abnormal lab result, we will notify you by phone as soon as possible.  Submit refill requests through [employer]ia or call your pharmacy and they will forward the refill request to us. Please allow 3 business days for your refill to be completed.          Additional Information About Your Visit        Wandoujia Information     W[employer]ia gives you secure access to your electronic health record. If you see a primary care provider, you can  "also send messages to your care team and make appointments. If you have questions, please call your primary care clinic.  If you do not have a primary care provider, please call [phone] and they will assist you.        Care EveryWhere ID     This is your Care EveryWhere ID. This could be used by other organizations to access your [place] medical records  [id number]        Your Vitals Were     Pulse Temperature Height Pulse Oximetry BMI (Body Mass Index)       98 98  F (36.7  C) (Oral) 5' 7\" (1.702 m) 96% 25.22 kg/m2        Blood Pressure from Last 3 Encounters:   10/10/17 118/73   10/06/17 122/78   10/05/17 102/63    Weight from Last 3 Encounters:   10/10/17 161 lb (73 kg)   10/06/17 154 lb (69.9 kg)   10/05/17 158 lb (71.7 kg)              We Performed the Following     OPTOMETRY REFERRAL          Today's Medication Changes          These changes are accurate as of: 10/10/17  1:58 PM.  If you have any questions, ask your nurse or doctor.               Start taking these medicines.        Dose/Directions    lidocaine 5 % ointment   Commonly known as:  XYLOCAINE   Used for:  Herpes zoster without complication   Started by:  Engelmann, Lauren Anneliese, MD        Apply topically 3 times daily as needed for moderate pain   Quantity:  50 g   Refills:  1            Where to get your medicines      Call your pharmacy to confirm that your medication is ready for pickup. It may take up to 24 hours for them to receive the prescription. If the prescription is not ready within 3 business days, please contact your clinic or your provider.     We will let you know when these medications are ready. If you don't hear back within 3 business days, please contact us.     lidocaine 5 % ointment                Primary Care Provider Office Phone # Fax #    Campos Parra [phone]       [address] [place] [address]        Equal Access to Services     HECTOR SANTOS AH: Aron huynh " Keirylemuel, joséda luqadaha, qarobertata kagurdeep barker, jose elenain hayaan faithrufina brandonmarlene laDannysally giselle. So [facility] [phone].    ATENCIÓN: Si sebastian rodney, tiene a knutson disposición servicios gratuitos de asistencia lingüística. Anastasia al [phone].    We comply with applicable federal civil rights laws and [place] laws. We do not discriminate on the basis of race, color, national origin, age, disability, sex, sexual orientation, or gender identity.            Thank you!     Thank you for choosing [facility]  for your care. Our goal is always to provide you with excellent care. Hearing back from our patients is one way we can continue to improve our services. Please take a few minutes to complete the written survey that you may receive in the mail after your visit with us. Thank you!             Your Updated Medication List - Protect others around you: Learn how to safely use, store and throw away your medicines at www.disposemymeds.org.          This list is accurate as of: 10/10/17  1:58 PM.  Always use your most recent med list.                   Brand Name Dispense Instructions for use Diagnosis    atorvastatin 10 MG tablet    LIPITOR    45 tablet    Take 1 tablet (10 mg) by mouth every 48 hours    Hyperlipidemia LDL goal <130       diazepam 5 MG tablet    VALIUM    15 tablet    Take 1 tablet (5 mg) by mouth every 6 hours as needed for anxiety    Complex tear of medial meniscus of right knee as current injury, initial encounter       fluticasone 50 MCG/ACT spray    FLONASE    1 Bottle    Spray 2 sprays into both nostrils daily    Viral URI       HYDROcodone-acetaminophen  MG per tablet    NORCO     Take 1 tablet by mouth every 6 hours as needed for moderate to severe pain        lidocaine 5 % ointment    XYLOCAINE    50 g    Apply topically 3 times daily as needed for moderate pain    Herpes zoster without complication       omega 3 1200 MG Caps      Take 2 capsules by mouth daily     Spinal stenosis, lumbar region, without neurogenic claudication       omeprazole 10 MG CR capsule    priLOSEC    90 capsule    TAKE 1 CAPSULE BY MOUTH DAILY - TAKE 30 TO 60 MINUTES BEFORE A MEAL -    Gastroesophageal reflux disease without esophagitis       TYLENOL PO           VALACYCLOVIR HCL PO           VITAMIN D3 PO      Take 2,000 Units by mouth

## 2017-10-11 NOTE — TELEPHONE ENCOUNTER
Called [facility] pharmacy spoke to Desmond - patient's co-pay 105.00 because he needs to cover his deductible.     Desmond will contact patient and let him know.     Aurora Sung RN  [facility]

## 2017-10-13 ENCOUNTER — OFFICE VISIT (OUTPATIENT)
Dept: OPTOMETRY | Facility: CLINIC | Age: 72
End: 2017-10-13
Payer: COMMERCIAL

## 2017-10-13 ENCOUNTER — ALLIED HEALTH/NURSE VISIT (OUTPATIENT)
Dept: NURSING | Facility: CLINIC | Age: 72
End: 2017-10-13
Payer: COMMERCIAL

## 2017-10-13 DIAGNOSIS — H61.23 BILATERAL IMPACTED CERUMEN: Primary | ICD-10-CM

## 2017-10-13 DIAGNOSIS — B02.33: Primary | ICD-10-CM

## 2017-10-13 PROCEDURE — 99202 OFFICE O/P NEW SF 15 MIN: CPT | Performed by: OPTOMETRIST

## 2017-10-13 PROCEDURE — 99207 ZZC NO CHARGE LOS: CPT

## 2017-10-13 RX ORDER — FLUOROMETHOLONE 0.1 %
1 SUSPENSION, DROPS(FINAL DOSAGE FORM)(ML) OPHTHALMIC (EYE) 3 TIMES DAILY
Qty: 3 ML | Refills: 0 | Status: SHIPPED | OUTPATIENT
Start: 2017-10-13 | End: 2017-10-27

## 2017-10-13 ASSESSMENT — VISUAL ACUITY
OD_SC: 20/60
OS_SC: 20/40
METHOD: SNELLEN - LINEAR
OD_SC+: -2

## 2017-10-13 ASSESSMENT — EXTERNAL EXAM - LEFT EYE: OS_EXAM: EDEMA

## 2017-10-13 ASSESSMENT — TONOMETRY
OS_IOP_MMHG: 18
IOP_METHOD: APPLANATION
OD_IOP_MMHG: 18

## 2017-10-13 ASSESSMENT — EXTERNAL EXAM - RIGHT EYE: OD_EXAM: EDEMA

## 2017-10-13 NOTE — MR AVS SNAPSHOT
After Visit Summary   10/13/2017    Loco Wood    MRN: [id number]           Patient Information     Date Of Birth          1945        Visit Information        Provider Department      10/13/2017 1:30 PM [facility] ANCILLARY [facility]        Today's Diagnoses     Bilateral impacted cerumen    -  1       Follow-ups after your visit        Your next 10 appointments already scheduled     Oct 24, 2017  4:00 PM CDT   Masonic Lab Draw with  MASONIC LAB DRAW   [facility] Masonic Lab Draw ([facility])    [address] [place] [address]  2nd [facility] [address]   [phone]            Oct 24, 2017  4:30 PM CDT   RETURN ONC with He Burns MD   [facility] Blood and Marrow Transplant ([facility])    [address] [place] [address]   [phone]            Oct 27, 2017  8:45 AM CDT   New Visit with Jacob Love MD   [facility] ([facility])    [address] [place] [phone]   [phone]            Dec 01, 2017  8:00 AM CST   LAB with  LAB   [facility] Lab ([facility])    [address] [place] [phone]           Patient must bring picture ID. Patient should be prepared to give a urine specimen  Please do not eat 10-12 hours before your appointment if you are coming in fasting for labs on lipids, cholesterol, or glucose (sugar). Pregnant women should follow their Care Team instructions. Water with medications is okay. Do not drink coffee or other fluids. If you have concerns about taking  your medications, please ask at office or if scheduling via GelSight, send a message by clicking on Secure Messaging, Message Your Care Team.            Dec 01, 2017  9:00 AM CST   (Arrive by 8:45 AM)   Return Visit with Campos Boyd MD   [facility]rology and [facility] Prostate [facility]rologic C[facility] ([facility]  Clinics and Surgery Center)    [address]  4th Floor  [facility] [place] [address] [phone]              Who to contact     If you have questions or need follow up information about today's clinic visit or your schedule please contact [facility] directly at [phone].  Normal or non-critical lab and imaging results will be communicated to you by MyChart, letter or phone within 4 business days after the clinic has received the results. If you do not hear from us within 7 days, please contact the clinic through WordSentryhart or phone. If you have a critical or abnormal lab result, we will notify you by phone as soon as possible.  Submit refill requests through [employer] or call your pharmacy and they will forward the refill request to us. Please allow 3 business days for your refill to be completed.          Additional Information About Your Visit        MyChart Information     [employer] gives you secure access to your electronic health record. If you see a primary care provider, you can also send messages to your care team and make appointments. If you have questions, please call your primary care clinic.  If you do not have a primary care provider, please call [phone] and they will assist you.        Care EveryWhere ID     This is your Care EveryWhere ID. This could be used by other organizations to access your [place] medical records  [id number]         Blood Pressure from Last 3 Encounters:   10/10/17 118/73   10/06/17 122/78   10/05/17 102/63    Weight from Last 3 Encounters:   10/10/17 161 lb (73 kg)   10/06/17 154 lb (69.9 kg)   10/05/17 158 lb (71.7 kg)              We Performed the Following     REMOVE IMPACTED CERUMEN          Today's Medication Changes          These changes are accurate as of: 10/13/17  1:39 PM.  If you have any questions, ask your nurse or doctor.               Start taking these medicines.        Dose/Directions    fluorometholone 0.1 % ophthalmic susp    Commonly known as:  FML LIQUIFILM   Used for:  Herpes zoster with keratoconjunctivitis   Started by:  Citlali Vega, OD        Dose:  1 drop   Place 1 drop into the right eye 3 times daily for 14 days   Quantity:  3 mL   Refills:  0            Where to get your medicines      These medications were [facility] to [id number] - [place], [place] - [address] [facility] [facility] [address]     Phone:  [phone]     fluorometholone 0.1 % ophthalmic susp                Primary Care Provider Office Phone # Fax #    Campos Parra [phone] [address] [facility] [address]        Equal Access to Services     HECTOR SANTOS : Aron Gu, nakia rajput, qadaisy kaalmacorinne barker, jose desai. So [facility] [phone].    ATENCIÓN: Si habla español, tiene a knutson disposición servicios gratuitos de asistencia lingüística. Llame al [phone].    We comply with applicable federal civil rights laws and [place] laws. We do not discriminate on the basis of race, color, national origin, age, disability, sex, sexual orientation, or gender identity.            Thank you!     Thank you for choosing [facility]  for your care. Our goal is always to provide you with excellent care. Hearing back from our patients is one way we can continue to improve our services. Please take a few minutes to complete the written survey that you may receive in the mail after your visit with us. Thank you!             Your Updated Medication List - Protect others around you: Learn how to safely use, store and throw away your medicines at www.disposemymeds.org.          This list is accurate as of: 10/13/17  1:39 PM.  Always use your most recent med list.                   Brand Name Dispense Instructions for use Diagnosis    atorvastatin 10 MG tablet    LIPITOR    45 tablet    Take 1 tablet (10 mg) by mouth every 48  hours    Hyperlipidemia LDL goal <130       diazepam 5 MG tablet    VALIUM    15 tablet    Take 1 tablet (5 mg) by mouth every 6 hours as needed for anxiety    Complex tear of medial meniscus of right knee as current injury, initial encounter       fluorometholone 0.1 % ophthalmic susp    FML LIQUIFILM    3 mL    Place 1 drop into the right eye 3 times daily for 14 days    Herpes zoster with keratoconjunctivitis       fluticasone 50 MCG/ACT spray    FLONASE    1 Bottle    Spray 2 sprays into both nostrils daily    Viral URI       HYDROcodone-acetaminophen  MG per tablet    NORCO     Take 1 tablet by mouth every 6 hours as needed for moderate to severe pain        lidocaine 5 % ointment    XYLOCAINE    50 g    Apply topically 3 times daily as needed for moderate pain    Herpes zoster without complication       omega 3 1200 MG Caps      Take 2 capsules by mouth daily    Spinal stenosis, lumbar region, without neurogenic claudication       omeprazole 10 MG CR capsule    priLOSEC    90 capsule    TAKE 1 CAPSULE BY MOUTH DAILY - TAKE 30 TO 60 MINUTES BEFORE A MEAL -    Gastroesophageal reflux disease without esophagitis       TYLENOL PO           VALACYCLOVIR HCL PO           VITAMIN D3 PO      Take 2,000 Units by mouth

## 2017-10-13 NOTE — MR AVS SNAPSHOT
After Visit Summary   10/13/2017    Loco Wood    MRN: [id number]           Patient Information     Date Of Birth          1945        Visit Information        Provider Department      10/13/2017 11:00 AM Citlali Vega OD [facility]        Today's Diagnoses     Herpes zoster with keratoconjunctivitis    -  1      Care Instructions    FML Ophthalmic Suspension, place 1 drop in right eye 3 times per day for 14 days  Can use cool compresses to help reduce lid swelling  Return for recheck in 2 weeks with Dr. Kate Vega O.D.  [facility]  [address] [place]  [address]    [phone]            Follow-ups after your visit        Follow-up notes from your care team     Return in about 2 weeks (around 10/27/2017) for Recheck with Dr. Love.      Your next 10 appointments already scheduled     Oct 24, 2017  4:00 PM CDT   Masonic Lab Draw with  MASONIC LAB DRAW   [facility] Masonic Lab Draw ([facility])    [address] [place] [id number] [phone]            Oct 24, 2017  4:30 PM CDT   RETURN ONC with He Burns MD   [facility] Blood and Marrow Transplant ([facility])    [address] [place] [address] [phone]            Dec 01, 2017  8:00 AM CST   LAB with  LAB   [facility] Lab ([facility])    [address] [place] [id number] [phone]           Patient must bring picture ID. Patient should be prepared to give a urine specimen  Please do not eat 10-12 hours before your appointment if you are coming in fasting for labs on lipids, cholesterol, or glucose (sugar). Pregnant women should follow their Care Team instructions. Water with medications is okay. Do not drink coffee or other fluids. If you have concerns about taking  your medications, please ask at  office or if scheduling via [employer], send a message by clicking on Secure Messaging, Message Your Care Team.            Dec 01, 2017  9:00 AM CST   (Arrive by 8:45 AM)   Return Visit with Campos Boyd MD   [facility] for Prostate and Urologic Cancers ([facility])    [address] [address] [phone]              Who to contact     If you have questions or need follow up information about today's clinic visit or your schedule please contact [facility] directly at [phone].  Normal or non-critical lab and imaging results will be communicated to you by MyChart, letter or phone within 4 business days after the clinic has received the results. If you do not hear from us within 7 days, please contact the clinic through [employer] or phone. If you have a critical or abnormal lab result, we will notify you by phone as soon as possible.  Submit refill requests through [employer] or call your pharmacy and they will forward the refill request to us. Please allow 3 business days for your refill to be completed.          Additional Information About Your Visit        [employer] Information     [employer] gives you secure access to your electronic health record. If you see a primary care provider, you can also send messages to your care team and make appointments. If you have questions, please call your primary care clinic.  If you do not have a primary care provider, please call [phone] and they will assist you.        Care EveryWhere ID     This is your Care EveryWhere ID. This could be used by other organizations to access your [place] medical records  [id number]         Blood Pressure from Last 3 Encounters:   10/10/17 118/73   10/06/17 122/78   10/05/17 102/63    Weight from Last 3 Encounters:   10/10/17 73 kg (161 lb)   10/06/17 69.9 kg (154 lb)   10/05/17 71.7 kg (158 lb)              Today, you had the following     No  orders found for display         Today's Medication Changes          These changes are accurate as of: 10/13/17 12:00 PM.  If you have any questions, ask your nurse or doctor.               Start taking these medicines.        Dose/Directions    fluorometholone 0.1 % ophthalmic susp   Commonly known as:  FML LIQUIFILM   Used for:  Herpes zoster with keratoconjunctivitis   Started by:  Citlali Vega, OD        Dose:  1 drop   Place 1 drop into the right eye 3 times daily for 14 days   Quantity:  3 mL   Refills:  0            Where to get your medicines      These medications were sent to [facility]/pharmacy #[id number] - [place], [place] - [address] [facility] [address]     Phone:  [phone]     fluorometholone 0.1 % ophthalmic susp                Primary Care Provider Office Phone # Fax #    Campos Parra [phone] [address] [facility] [address]        Equal Access to Services     [facility] AH: Hadii aad ku hadasho Solemuel, waaxda luqadaha, qaybta kaalmada adeegyada, jose newton . So [facility] [phone].    ATENCIÓN: Si aleksandrla español, tiene a knutson disposición servicios gratuitos de asistencia lingüística. Princessame al [phone].    We comply with applicable federal civil rights laws and [place] laws. We do not discriminate on the basis of race, color, national origin, age, disability, sex, sexual orientation, or gender identity.            Thank you!     Thank you for choosing [facility]  for your care. Our goal is always to provide you with excellent care. Hearing back from our patients is one way we can continue to improve our services. Please take a few minutes to complete the written survey that you may receive in the mail after your visit with us. Thank you!             Your Updated Medication List - Protect others around you: Learn how to safely use, store and throw away your medicines at  www.disposemymeds.org.          This list is accurate as of: 10/13/17 12:00 PM.  Always use your most recent med list.                   Brand Name Dispense Instructions for use Diagnosis    atorvastatin 10 MG tablet    LIPITOR    45 tablet    Take 1 tablet (10 mg) by mouth every 48 hours    Hyperlipidemia LDL goal <130       diazepam 5 MG tablet    VALIUM    15 tablet    Take 1 tablet (5 mg) by mouth every 6 hours as needed for anxiety    Complex tear of medial meniscus of right knee as current injury, initial encounter       fluorometholone 0.1 % ophthalmic susp    FML LIQUIFILM    3 mL    Place 1 drop into the right eye 3 times daily for 14 days    Herpes zoster with keratoconjunctivitis       fluticasone 50 MCG/ACT spray    FLONASE    1 Bottle    Spray 2 sprays into both nostrils daily    Viral URI       HYDROcodone-acetaminophen  MG per tablet    NORCO     Take 1 tablet by mouth every 6 hours as needed for moderate to severe pain        lidocaine 5 % ointment    XYLOCAINE    50 g    Apply topically 3 times daily as needed for moderate pain    Herpes zoster without complication       omega 3 1200 MG Caps      Take 2 capsules by mouth daily    Spinal stenosis, lumbar region, without neurogenic claudication       omeprazole 10 MG CR capsule    priLOSEC    90 capsule    TAKE 1 CAPSULE BY MOUTH DAILY - TAKE 30 TO 60 MINUTES BEFORE A MEAL -    Gastroesophageal reflux disease without esophagitis       TYLENOL PO           VALACYCLOVIR HCL PO           VITAMIN D3 PO      Take 2,000 Units by mouth

## 2017-10-13 NOTE — PROGRESS NOTES
Chief Complaint   Patient presents with     Eye Problem Right Eye           HPI    Affected eye(s):  Both   Location:  Medial, Lateral   Symptoms:     Puffy eyes      Duration:  10 days      Do you have eye pain now?:  Yes   Location:  OD   Pain Level:  Moderate Pain (5)   Pain Frequency:  Constant      Comments:  Patient has been on medications for the past week for shringles. Patient is uncertain if the meds are working. Patient has more pain at night then during the day.             Citlali Vega, OD     See Review Of Systems     HPI and ROS performed by Optometrist  scribed by Juanita Donaldson, Optometric Tech    Medical, surgical and family histories reviewed and updated 10/13/2017.         OBJECTIVE: See Ophthalmology exam    ASSESSMENT:    ICD-10-CM    1. Herpes zoster with keratoconjunctivitis B02.33 fluorometholone (FML LIQUIFILM) 0.1 % ophthalmic susp      PLAN:  FML Ophthalmic Suspension, place 1 drop in right eye 3 times per day for 14 days  Can use cool compresses to help reduce lid swelling  Return for recheck in 2 weeks with Dr. Kate Vega O.D.  [address]  [address]    [phone]

## 2017-10-13 NOTE — PATIENT INSTRUCTIONS
FML Ophthalmic Suspension, place 1 drop in right eye 3 times per day for 14 days  Can use cool compresses to help reduce lid swelling  Return for recheck in 2 weeks with Dr. Kate Vega O.D.  [address]  [address]    [phone]

## 2017-10-13 NOTE — NURSING NOTE
"Chief Complaint   Patient presents with     Ear Lavage     Bilateral       Initial There were no vitals taken for this visit. Estimated body mass index is 25.22 kg/(m^2) as calculated from the following:    Height as of 10/10/17: 5' 7\" (1.702 m).    Weight as of 10/10/17: 161 lb (73 kg).  Medication Reconciliation: unable or not appropriate to perform   ONSET:  When did the symptoms begin? 1 week ago    DURATION:  Describe the duration of the symptoms: 1 week    LOCATION:  Bilateral ear    PAIN SEVERITY:  0/10    ADDITIONAL SYMPTOMS:  plugged sensation bilaterally    AFFECTS ON ADLs: none    HISTORY:  cerumen impaction    INTERVENTIONS TAKEN:  none    MEASURES WHICH RELIEVE SYMPTOMS: none    Patient came in for bilateral ear lavage. Ear lavage completed using luke warm water, peroxide and elephant ear flush. RN verified before and after cleaning.   Shayla WINSTON CMA ([facility])        "

## 2017-10-16 ENCOUNTER — OFFICE VISIT (OUTPATIENT)
Dept: FAMILY MEDICINE | Facility: CLINIC | Age: 72
End: 2017-10-16
Payer: COMMERCIAL

## 2017-10-16 VITALS
HEART RATE: 90 BPM | TEMPERATURE: 97.3 F | WEIGHT: 155 LBS | SYSTOLIC BLOOD PRESSURE: 120 MMHG | BODY MASS INDEX: 24.28 KG/M2 | DIASTOLIC BLOOD PRESSURE: 80 MMHG | OXYGEN SATURATION: 100 %

## 2017-10-16 DIAGNOSIS — B02.9 HERPES ZOSTER WITHOUT COMPLICATION: Primary | ICD-10-CM

## 2017-10-16 DIAGNOSIS — R59.0 CERVICAL LYMPHADENOPATHY: ICD-10-CM

## 2017-10-16 PROCEDURE — 99213 OFFICE O/P EST LOW 20 MIN: CPT | Performed by: FAMILY MEDICINE

## 2017-10-16 RX ORDER — VALACYCLOVIR HYDROCHLORIDE 1 G/1
1000 TABLET, FILM COATED ORAL 3 TIMES DAILY
Qty: 15 TABLET | Refills: 0 | Status: SHIPPED | OUTPATIENT
Start: 2017-10-16 | End: 2017-10-21

## 2017-10-16 NOTE — PATIENT INSTRUCTIONS
Take 5 more days of Valtrex.   Use pain medications and/or sleep aids as needed at bedtime.   Call with questions.

## 2017-10-16 NOTE — PROGRESS NOTES
SUBJECTIVE:   Loco Wood is a 72 year old male who presents to clinic today for the following health issues:      Patient is here to follow up on his shingles.  Wants to know if he can take more Valtrex because of his history of CLL.   See note dated 10/10/17 for more historical details.       Problem list and histories reviewed & adjusted, as indicated.  Additional history: as documented    Patient Active Problem List   Diagnosis     Esophageal reflux     Lumbago     CLL (chronic lymphocytic leukemia) (H)     HYPERLIPIDEMIA LDL GOAL <130     Mass of skin     Health Care Home     Vitamin D deficiency     Advanced directives, counseling/discussion     Osteoarthritis of hip     OA (osteoarthritis) of hip     Insomnia     BPH (benign prostatic hyperplasia)     Acute bilateral low back pain without sciatica     Prostate nodule     Chondromalacia, knee, right     Complex tear of medial meniscus of right knee as current injury     Past Surgical History:   Procedure Laterality Date     ARTHROPLASTY MINIMALLY INVASIVE HIP  5/10/2013    Procedure: ARTHROPLASTY MINIMALLY INVASIVE HIP;  Left Two Incision Total Hip Arthroplasty ;  Surgeon: Desmond Alberts MD;  Location: [facility] OR     ARTHROPLASTY MINIMALLY INVASIVE HIP  2/27/2014    Procedure: ARTHROPLASTY MINIMALLY INVASIVE HIP;  Right Total Hip Arthroplasty Minimally Invasive Two Incision  *Latex Free Room;  Surgeon: Desmond Alberts MD;  Location: [facility] OR     BACK SURGERY      back fusion 1994     C NONSPECIFIC PROCEDURE  1964 &'95    (R) inguinal herniorrhapy     C NONSPECIFIC PROCEDURE  1949    (L) inguinal herniorrhaphy     C NONSPECIFIC PROCEDURE  1994    L4-5  L5 S1 fusion - spondylolisthesis     COLONOSCOPY      2007     COLONOSCOPY N/A 8/15/2017    Procedure: COLONOSCOPY;  colonoscopy;  Surgeon: Chun Mcguire MD;  Location:  GI     GI SURGERY      4 hernia repairs in childhood       Social History   Substance Use Topics     Smoking status: Never Smoker      Smokeless tobacco: Never Used     Alcohol use 0.0 oz/week     0 Standard drinks or equivalent per week      Comment: 5 drinks per week     Family History   Problem Relation Age of Onset     HEART DISEASE Father      CEREBROVASCULAR DISEASE Father      CANCER Father      melonoma     Melanoma Father      CANCER Mother      Lung cancer     CEREBROVASCULAR DISEASE Paternal Uncle      Aneurism     Breast Cancer Maternal Aunt       from it     CANCER Paternal Uncle      CANCER Paternal Aunt      Skin Cancer No family hx of              Reviewed and updated as needed this visit by clinical staffTobacco  Allergies  Meds  Med Hx  Surg Hx  Fam Hx  Soc Hx      Reviewed and updated as needed this visit by Provider         ROS:  Constitutional, HEENT, cardiovascular, pulmonary, gi and gu systems are negative, except as otherwise noted.      OBJECTIVE:   /80 (BP Location: Right arm, Patient Position: Chair, Cuff Size: Adult Regular)  Pulse 90  Temp 97.3  F (36.3  C) (Oral)  Wt 155 lb (70.3 kg)  SpO2 100%  BMI 24.28 kg/m2  Body mass index is 24.28 kg/(m^2).  GENERAL: alert and no distress  NECK: bilateral anterior cervical adenopathy, no asymmetry, masses, or scars and thyroid normal to palpation  RESP: lungs clear to auscultation - no rales, rhonchi or wheezes  CV: regular rate and rhythm, normal S1 S2, no S3 or S4, no murmur, click or rub, no peripheral edema and peripheral pulses strong  SKIN: zosteriform eruption - face    Diagnostic Test Results:  none     ASSESSMENT/PLAN:           ICD-10-CM    1. Herpes zoster without complication B02.9 valACYclovir (VALTREX) 1000 mg tablet   2. Cervical lymphadenopathy R59.0      Counseled extensively on typical course of zoster.   Discussed possibility of post herpetic neuralgia.   Reasonable to extend course of Valtrex given history.     See Patient Instructions    Lauren A. Engelmann, MD  [facility]

## 2017-10-16 NOTE — MR AVS SNAPSHOT
After Visit Summary   10/16/2017    Loco Wood    MRN: [id number]           Patient Information     Date Of Birth          1945        Visit Information        Provider Department      10/16/2017 2:40 PM Engelmann, Lauren Anneliese, MD [facility]        Today's Diagnoses     Herpes zoster without complication    -  1    Cervical lymphadenopathy          Care Instructions    Take 5 more days of Valtrex.   Use pain medications and/or sleep aids as needed at bedtime.   Call with questions.           Follow-ups after your visit        Your next 10 appointments already scheduled     Oct 24, 2017  4:00 PM CDT   Masonic Lab Draw with  MASONIC LAB DRAW   [facility] Masonic Lab Draw ([facility])    [address] [place] [id number] [phone]            Oct 24, 2017  4:30 PM CDT   RETURN ONC with He Burns MD   [facility] Blood and Marrow Transplant ([facility])    [address] [place] [address] [phone]            Oct 27, 2017  8:45 AM CDT   New Visit with Jacob Love MD   [facility]    [address] [facility] [address]   [phone]            Dec 01, 2017  8:00 AM CST   LAB with  LAB   [facility] Lab ([facility])    [address] [place] [address]   [phone]           Patient must bring picture ID. Patient should be prepared to give a urine specimen  Please do not eat 10-12 hours before your appointment if you are coming in fasting for labs on lipids, cholesterol, or glucose (sugar). Pregnant women should follow their Care Team instructions. Water with medications is okay. Do not drink coffee or other fluids. If you have concerns about taking  your medications, please ask at office or if scheduling via [employer], send a message by clicking on  Secure Messaging, Message Your Care Team.            Dec 01, 2017  9:00 AM CST   (Arrive by 8:45 AM)   Return Visit with Campos Boyd MD   [facility] and [facility] for Prostate and Urologic Cancers ([facility])    [address] [facility]  [address] [facility] [address] [phone]              Who to contact     If you have questions or need follow up information about today's clinic visit or your schedule please contact [facility] directly at [phone].  Normal or non-critical lab and imaging results will be communicated to you by Pixel [employer], letter or phone within 4 business days after the clinic has received the results. If you do not hear from us within 7 days, please contact the clinic through [employer] or phone. If you have a critical or abnormal lab result, we will notify you by phone as soon as possible.  Submit refill requests through [employer] or call your pharmacy and they will forward the refill request to us. Please allow 3 business days for your refill to be completed.          Additional Information About Your Visit        [employer] Information     [employer] gives you secure access to your electronic health record. If you see a primary care provider, you can also send messages to your care team and make appointments. If you have questions, please call your primary care clinic.  If you do not have a primary care provider, please call [phone] and they will assist you.        Care EveryWhere ID     This is your Care EveryWhere ID. This could be used by other organizations to access your [place] medical records  [id number]        Your Vitals Were     Pulse Temperature Pulse Oximetry BMI (Body Mass Index)          90 97.3  F (36.3  C) (Oral) 100% 24.28 kg/m2         Blood Pressure from Last 3 Encounters:   10/16/17 120/80   10/10/17 118/73   10/06/17 122/78    Weight from Last 3 Encounters:   10/16/17 155 lb (70.3 kg)   10/10/17 161 lb (73 kg)    10/06/17 154 lb (69.9 kg)              Today, you had the following     No orders found for display         Today's Medication Changes          These changes are accurate as of: 10/16/17  3:30 PM.  If you have any questions, ask your nurse or doctor.               These medicines have changed or have updated prescriptions.        Dose/Directions    * VALACYCLOVIR HCL PO   This may have changed:  Another medication with the same name was added. Make sure you understand how and when to take each.   Changed by:  Engelmann, Lauren Anneliese, MD        Refills:  0       * valACYclovir 1000 mg tablet   Commonly known as:  VALTREX   This may have changed:  You were already taking a medication with the same name, and this prescription was added. Make sure you understand how and when to take each.   Used for:  Herpes zoster without complication   Changed by:  Engelmann, Lauren Anneliese, MD        Dose:  1000 mg   Take 1 tablet (1,000 mg) by mouth 3 times daily for 5 days   Quantity:  15 tablet   Refills:  0       * Notice:  This list has 2 medication(s) that are the same as other medications prescribed for you. Read the directions carefully, and ask your doctor or other care provider to review them with you.         Where to get your medicines      These medications were sent to [facility]/pharmacy #[id number] - [place] [address] [place] [address]     Phone:  [phone]     valACYclovir 1000 mg tablet                Primary Care Provider Office Phone # Fax #    Campos Parra [phone]       [address] [facility] [address]        Equal Access to Services     [facility] AH: Hadii litzy ku hadasho Sorooseveltali, waaxda luqadaha, qaybta kaalmada adeegyada, jose desai. So [facility] [phone].    ATENCIÓN: Si habla español, tiene a knutson disposición servicios gratuitos de asistencia lingüística. Llame al [phone].    We  comply with applicable federal civil rights laws and [place] laws. We do not discriminate on the basis of race, color, national origin, age, disability, sex, sexual orientation, or gender identity.            Thank you!     Thank you for choosing [facility]  for your care. Our goal is always to provide you with excellent care. Hearing back from our patients is one way we can continue to improve our services. Please take a few minutes to complete the written survey that you may receive in the mail after your visit with us. Thank you!             Your Updated Medication List - Protect others around you: Learn how to safely use, store and throw away your medicines at www.disposemymeds.org.          This list is accurate as of: 10/16/17  3:30 PM.  Always use your most recent med list.                   Brand Name Dispense Instructions for use Diagnosis    atorvastatin 10 MG tablet    LIPITOR    45 tablet    Take 1 tablet (10 mg) by mouth every 48 hours    Hyperlipidemia LDL goal <130       diazepam 5 MG tablet    VALIUM    15 tablet    Take 1 tablet (5 mg) by mouth every 6 hours as needed for anxiety    Complex tear of medial meniscus of right knee as current injury, initial encounter       fluorometholone 0.1 % ophthalmic susp    FML LIQUIFILM    3 mL    Place 1 drop into the right eye 3 times daily for 14 days    Herpes zoster with keratoconjunctivitis       fluticasone 50 MCG/ACT spray    FLONASE    1 Bottle    Spray 2 sprays into both nostrils daily    Viral URI       GABAPENTIN PO           HYDROcodone-acetaminophen  MG per tablet    NORCO     Take 1 tablet by mouth every 6 hours as needed for moderate to severe pain        lidocaine 5 % ointment    XYLOCAINE    50 g    Apply topically 3 times daily as needed for moderate pain    Herpes zoster without complication       omega 3 1200 MG Caps      Take 2 capsules by mouth daily    Spinal stenosis, lumbar region, without neurogenic  claudication       omeprazole 10 MG CR capsule    priLOSEC    90 capsule    TAKE 1 CAPSULE BY MOUTH DAILY - TAKE 30 TO 60 MINUTES BEFORE A MEAL -    Gastroesophageal reflux disease without esophagitis       TYLENOL PO           * VALACYCLOVIR HCL PO           * valACYclovir 1000 mg tablet    VALTREX    15 tablet    Take 1 tablet (1,000 mg) by mouth 3 times daily for 5 days    Herpes zoster without complication       VITAMIN D3 PO      Take 2,000 Units by mouth        * Notice:  This list has 2 medication(s) that are the same as other medications prescribed for you. Read the directions carefully, and ask your doctor or other care provider to review them with you.

## 2017-10-16 NOTE — NURSING NOTE
"Chief Complaint   Patient presents with     Shingles       Initial /80 (BP Location: Right arm, Patient Position: Chair, Cuff Size: Adult Regular)  Pulse 90  Temp 97.3  F (36.3  C) (Oral)  Wt 155 lb (70.3 kg)  SpO2 100%  BMI 24.28 kg/m2 Estimated body mass index is 24.28 kg/(m^2) as calculated from the following:    Height as of 10/10/17: 5' 7\" (1.702 m).    Weight as of this encounter: 155 lb (70.3 kg).  Medication Reconciliation: complete   Estelle See KARINA Ramirez      "

## 2017-10-20 ENCOUNTER — TELEPHONE (OUTPATIENT)
Dept: NURSING | Facility: CLINIC | Age: 72
End: 2017-10-20

## 2017-10-20 ENCOUNTER — TELEPHONE (OUTPATIENT)
Dept: FAMILY MEDICINE | Facility: CLINIC | Age: 72
End: 2017-10-20

## 2017-10-20 DIAGNOSIS — B02.9 HERPES ZOSTER WITHOUT COMPLICATION: ICD-10-CM

## 2017-10-20 DIAGNOSIS — B02.29 POSTHERPETIC NEURALGIA: Primary | ICD-10-CM

## 2017-10-20 RX ORDER — HYDROXYZINE PAMOATE 25 MG/1
25 CAPSULE ORAL 4 TIMES DAILY PRN
Qty: 120 CAPSULE | Refills: 0 | Status: SHIPPED | OUTPATIENT
Start: 2017-10-20 | End: 2017-11-06

## 2017-10-20 RX ORDER — VALACYCLOVIR HYDROCHLORIDE 1 G/1
1000 TABLET, FILM COATED ORAL 3 TIMES DAILY
Qty: 15 TABLET | Refills: 0 | Status: CANCELLED | OUTPATIENT
Start: 2017-10-20

## 2017-10-20 NOTE — TELEPHONE ENCOUNTER
[facility] pharmacy calling pt today was prescribed hydroxyzine Pamoate 25 mg Vistaril and he needs to have a prior authorization done.  Urgent message to clinic.

## 2017-10-20 NOTE — TELEPHONE ENCOUNTER
10/20/17- Called Pharmacy to see if a different medication would be covered and there is not.  Maite Chavez MA

## 2017-10-20 NOTE — TELEPHONE ENCOUNTER
Patient is here, RN relayed message as below.  Patient states he is still taking Norco.  He states he's been applying Calamine Spray & Cortizone Cream without any relief.      He would like to know if there is anything else that would work, he does not think Benadryl would work.  He will continue to wait.    Routed to provider.    Christelle Silvestre RN  [facility]

## 2017-10-20 NOTE — TELEPHONE ENCOUNTER
Spoke with patient.  He does not want the Gabapentin d/t the hangover effect.  He will try the Vistaril and will take before bedtime if it ends up making him too tired.    He will D/C the hydrocortisone cream.  He will use OTC topical Benadryl.    RN advised to call us Monday if this doesn't work.    Routed to provider, pharmacy cued up.    Christelle Silvestre RN  [facility]

## 2017-10-20 NOTE — TELEPHONE ENCOUNTER
"elp with itching  Reason for Call:  Medication or medication refill:    Do you use a [facility]?  Name of the pharmacy and phone number for the current request:  Natanael pended    Name of the medication requested: valACYclovir (VALTREX) 1000 mg tablet    Other request: patient is asking if he can get a refill or something else to help with \"this God awful itching\"  Please call to advise.    Can we leave a detailed message on this number? YES    Phone number patient can be reached at: Home number on file [phone] (home)    Best Time: any    Call taken on 10/20/2017 at 12:59 PM by Kate Zazueta      "

## 2017-10-20 NOTE — TELEPHONE ENCOUNTER
Here are his options:     - gabapentin at bedtime which will help the neuropathic pain; I know he tried this and didn't like it, but it is very helpful with the type of pain from shingles.   - hydroxyzine; this is a stronger version of Benadryl, but it will make him tired  - topical Benadryl in addition to either of the above - this is different than calamine lotion and hydrocortisone cream  - still ok to use lidocaine    I do not recommend hydrocortisone cream in this scenario as a steroid has a risk of thinning the skin. Let me know what he wants to do and I will send any appropriate rx.     Lauren Engelmann, MD

## 2017-10-20 NOTE — TELEPHONE ENCOUNTER
Valtrex won't help with itching. Unfortunately the medications he can take by mouth for itching will make him drowsy, but he can use the topical lidocaine and topical Benadryl. Or take oral Benadryl. Of note, the norco also may be making him itch. Is he still taking it?    Lauren Engelmann, MD

## 2017-10-23 ENCOUNTER — ALLIED HEALTH/NURSE VISIT (OUTPATIENT)
Dept: NURSING | Facility: CLINIC | Age: 72
End: 2017-10-23
Payer: COMMERCIAL

## 2017-10-23 ENCOUNTER — TELEPHONE (OUTPATIENT)
Dept: FAMILY MEDICINE | Facility: CLINIC | Age: 72
End: 2017-10-23

## 2017-10-23 DIAGNOSIS — B02.29 POST HERPETIC NEURALGIA: Primary | ICD-10-CM

## 2017-10-23 DIAGNOSIS — L29.9 ITCHING: Primary | ICD-10-CM

## 2017-10-23 PROCEDURE — 99207 ZZC NO CHARGE NURSE ONLY: CPT

## 2017-10-23 NOTE — TELEPHONE ENCOUNTER
Reason for Call:  Other prescription    Detailed comments: pt called in. He hopes to have the medication today     Phone Number Patient can be reached at: Home number on file [phone] (home)    Best Time:     Can we leave a detailed message on this number? YES    Call taken on 10/23/2017 at 8:55 AM by Melinda Jerome

## 2017-10-23 NOTE — NURSING NOTE
Patient was given Vistaril for itching on Friday.  Please see telephone message.  This medication requires a prior auth.  Selma COLLINS is looking into it.    Patient is here waiting.    Selma COLLINS completed the PA and notified the pharmacy.    Patient advised.        Maude Brice RN [facility] Triage.

## 2017-10-23 NOTE — TELEPHONE ENCOUNTER
Notified patient of the message below per provider.  Patient stated understanding and agreeable with the plan of care. Maude Brice RN [facility] Triage.

## 2017-10-23 NOTE — MR AVS SNAPSHOT
After Visit Summary   10/23/2017    Loco Wood    MRN: [id number]           Patient Information     Date Of Birth          1945        Visit Information        Provider Department      10/23/2017 2:00 PM CP RN [facility]        Today's Diagnoses     Itching    -  1       Follow-ups after your visit        Your next 10 appointments already scheduled     Oct 24, 2017  4:00 PM CDT   [facility] Draw with  [facility]   [facility] Draw ([facility])    [address] [place] [address]  2nd [facility] [address]   [phone]            Oct 24, 2017  4:30 PM CDT   RETURN ONC with He Burns MD   [facility] Blood and Marrow Transplant ([facility])    [address] [place] [address]   [phone]            Oct 27, 2017  8:45 AM CDT   New Visit with Jacob Love MD   [facility] ([facility]    [address] [place] [address]   [phone]            Dec 01, 2017  8:00 AM CST   LAB with  LAB   [facility] Lab ([facility])    [address] [place] [address]  1st [facility] [address] [phone]           Patient must bring picture ID. Patient should be prepared to give a urine specimen  Please do not eat 10-12 hours before your appointment if you are coming in fasting for labs on lipids, cholesterol, or glucose (sugar). Pregnant women should follow their Care Team instructions. Water with medications is okay. Do not drink coffee or other fluids. If you have concerns about taking  your medications, please ask at office or if scheduling via [employer], send a message by clicking on Secure Messaging, Message Your Care Team.            Dec 01, 2017  9:00 AM CST   (Arrive by 8:45 AM)   Return Visit with Campos Boyd MD   [facility] Urology and [facility]    [address] [facility]  4th [facility] [address] [phone]              Who to contact     If you have questions or need follow up information about today's clinic visit or your schedule please contact [facility] directly at [phone].  Normal or non-critical lab and imaging results will be communicated to you by MyChart, letter or phone within 4 business days after the clinic has received the results. If you do not hear from us within 7 days, please contact the clinic through MyChart or phone. If you have a critical or abnormal lab result, we will notify you by phone as soon as possible.  Submit refill requests through [employer] or call your pharmacy and they will forward the refill request to us. Please allow 3 business days for your refill to be completed.          Additional Information About Your Visit        MyChart Information     [employer] gives you secure access to your electronic health record. If you see a primary care provider, you can also send messages to your care team and make appointments. If you have questions, please call your primary care clinic.  If you do not have a primary care provider, please call [phone] and they will assist you.        Care EveryWhere ID     This is your Care EveryWhere ID. This could be used by other organizations to access your [facility] medical records  [id number]         Blood Pressure from Last 3 Encounters:   10/16/17 120/80   10/10/17 118/73   10/06/17 122/78    Weight from Last 3 Encounters:   10/16/17 155 lb (70.3 kg)   10/10/17 161 lb (73 kg)   10/06/17 154 lb (69.9 kg)              Today, you had the following     No orders found for display       Primary Care Provider Office Phone # Fax #    Campos Parra [phone]       [address]  [facility] [address]        Equal Access to Services     HECTOR SANTOS : nakia Busch qaybta  jose mercerrufina newton ah. So [facility] [phone].    ATENCIÓN: Si sebastian rodney, tiene a knutson disposición servicios gratuitos de asistencia lingüística. Anastasia al [phone].    We comply with applicable federal civil rights laws and [place] laws. We do not discriminate on the basis of race, color, national origin, age, disability, sex, sexual orientation, or gender identity.            Thank you!     Thank you for choosing [facility]  for your care. Our goal is always to provide you with excellent care. Hearing back from our patients is one way we can continue to improve our services. Please take a few minutes to complete the written survey that you may receive in the mail after your visit with us. Thank you!             Your Updated Medication List - Protect others around you: Learn how to safely use, store and throw away your medicines at www.disposemymeds.org.          This list is accurate as of: 10/23/17  3:46 PM.  Always use your most recent med list.                   Brand Name Dispense Instructions for use Diagnosis    atorvastatin 10 MG tablet    LIPITOR    45 tablet    Take 1 tablet (10 mg) by mouth every 48 hours    Hyperlipidemia LDL goal <130       diazepam 5 MG tablet    VALIUM    15 tablet    Take 1 tablet (5 mg) by mouth every 6 hours as needed for anxiety    Complex tear of medial meniscus of right knee as current injury, initial encounter       fluorometholone 0.1 % ophthalmic susp    FML LIQUIFILM    3 mL    Place 1 drop into the right eye 3 times daily for 14 days    Herpes zoster with keratoconjunctivitis       fluticasone 50 MCG/ACT spray    FLONASE    1 Bottle    Spray 2 sprays into both nostrils daily    Viral URI       GABAPENTIN PO           HYDROcodone-acetaminophen  MG per tablet    NORCO     Take 1 tablet by mouth every 6 hours as needed for moderate to severe pain        hydrOXYzine 25 MG capsule    VISTARIL    120  capsule    Take 1 capsule (25 mg) by mouth 4 times daily as needed for itching    Herpes zoster without complication, Postherpetic neuralgia       lidocaine 5 % ointment    XYLOCAINE    50 g    Apply topically 3 times daily as needed for moderate pain    Herpes zoster without complication       omega 3 1200 MG Caps      Take 2 capsules by mouth daily    Spinal stenosis, lumbar region, without neurogenic claudication       omeprazole 10 MG CR capsule    priLOSEC    90 capsule    TAKE 1 CAPSULE BY MOUTH DAILY - TAKE 30 TO 60 MINUTES BEFORE A MEAL -    Gastroesophageal reflux disease without esophagitis       TYLENOL PO           * VALACYCLOVIR HCL PO           * valACYclovir 1000 mg tablet    VALTREX    15 tablet    Take 1 tablet (1,000 mg) by mouth 3 times daily for 5 days    Herpes zoster without complication       VITAMIN D3 PO      Take 2,000 Units by mouth        * Notice:  This list has 2 medication(s) that are the same as other medications prescribed for you. Read the directions carefully, and ask your doctor or other care provider to review them with you.

## 2017-10-23 NOTE — TELEPHONE ENCOUNTER
Forms received from: [employer]    Phone number listed:    Fax listed: [phone]  Date received: 10/23/2017  Form description: prior authorization request  Once forms are completed, please return to [employer] via fax.  Form placed: in providers folder  Amy Monroy

## 2017-10-23 NOTE — TELEPHONE ENCOUNTER
Patient has tried and failed Benadryl (diphenhydramine) oral and topical.     Thanks!    Lauren Engelmann, MD

## 2017-10-23 NOTE — TELEPHONE ENCOUNTER
See previous issues and encounters.  He stated that the ER gave him a referral for neurosurgery and he stated he needs one for neurology for the [facility].    Maude Brice RN C[facility] Triage.

## 2017-10-23 NOTE — TELEPHONE ENCOUNTER
PA is needed for the medication, Hydroxizine 25mg.     Insurance has been called.  Alternatives that are covered are:    Would you like to start PA or Rx alternative medication?    If PA, please list all medications this patient has tried along with any contraindications that may have been experienced in the past. Thank you.    ROUTE BACK TO GHAZAL BULLOCK    Pharmacy: [facility]  Phone: [phone]    Insurance Plan: [facility] [employer]  Phone: [phone]  Pt ID: [id number]  Group:     Forwarded to Dr. Parra for review.

## 2017-10-24 ENCOUNTER — MYC MEDICAL ADVICE (OUTPATIENT)
Dept: FAMILY MEDICINE | Facility: CLINIC | Age: 72
End: 2017-10-24

## 2017-10-24 ENCOUNTER — OFFICE VISIT (OUTPATIENT)
Dept: TRANSPLANT | Facility: CLINIC | Age: 72
End: 2017-10-24
Attending: INTERNAL MEDICINE
Payer: MEDICARE

## 2017-10-24 ENCOUNTER — APPOINTMENT (OUTPATIENT)
Dept: LAB | Facility: CLINIC | Age: 72
End: 2017-10-24
Attending: INTERNAL MEDICINE
Payer: MEDICARE

## 2017-10-24 ENCOUNTER — NURSE TRIAGE (OUTPATIENT)
Dept: NURSING | Facility: CLINIC | Age: 72
End: 2017-10-24

## 2017-10-24 VITALS
WEIGHT: 153 LBS | BODY MASS INDEX: 23.96 KG/M2 | HEART RATE: 102 BPM | TEMPERATURE: 98.4 F | DIASTOLIC BLOOD PRESSURE: 79 MMHG | OXYGEN SATURATION: 97 % | SYSTOLIC BLOOD PRESSURE: 112 MMHG

## 2017-10-24 DIAGNOSIS — G62.9 NEUROPATHY: ICD-10-CM

## 2017-10-24 DIAGNOSIS — C91.10 CLL (CHRONIC LYMPHOCYTIC LEUKEMIA) (H): Primary | ICD-10-CM

## 2017-10-24 LAB
ALBUMIN SERPL-MCNC: 4.4 G/DL (ref 3.4–5)
ALP SERPL-CCNC: 84 U/L (ref 40–150)
ALT SERPL W P-5'-P-CCNC: 24 U/L (ref 0–70)
ANION GAP SERPL CALCULATED.3IONS-SCNC: 6 MMOL/L (ref 3–14)
AST SERPL W P-5'-P-CCNC: 16 U/L (ref 0–45)
BASOPHILS # BLD AUTO: 0 10E9/L (ref 0–0.2)
BASOPHILS NFR BLD AUTO: 0 %
BILIRUB SERPL-MCNC: 0.7 MG/DL (ref 0.2–1.3)
BUN SERPL-MCNC: 18 MG/DL (ref 7–30)
CALCIUM SERPL-MCNC: 8.7 MG/DL (ref 8.5–10.1)
CHLORIDE SERPL-SCNC: 105 MMOL/L (ref 94–109)
CO2 SERPL-SCNC: 29 MMOL/L (ref 20–32)
CREAT SERPL-MCNC: 1.26 MG/DL (ref 0.66–1.25)
DIFFERENTIAL METHOD BLD: ABNORMAL
EOSINOPHIL # BLD AUTO: 1.9 10E9/L (ref 0–0.7)
EOSINOPHIL NFR BLD AUTO: 2 %
ERYTHROCYTE [DISTWIDTH] IN BLOOD BY AUTOMATED COUNT: 14.8 % (ref 10–15)
GFR SERPL CREATININE-BSD FRML MDRD: 57 ML/MIN/1.7M2
GLUCOSE SERPL-MCNC: 91 MG/DL (ref 70–99)
HCT VFR BLD AUTO: 43.3 % (ref 40–53)
HGB BLD-MCNC: 13.5 G/DL (ref 13.3–17.7)
LDH SERPL L TO P-CCNC: 194 U/L (ref 85–227)
LYMPHOCYTES # BLD AUTO: 84.7 10E9/L (ref 0.8–5.3)
LYMPHOCYTES NFR BLD AUTO: 89 %
MCH RBC QN AUTO: 30.6 PG (ref 26.5–33)
MCHC RBC AUTO-ENTMCNC: 31.2 G/DL (ref 31.5–36.5)
MCV RBC AUTO: 98 FL (ref 78–100)
MONOCYTES # BLD AUTO: 2.9 10E9/L (ref 0–1.3)
MONOCYTES NFR BLD AUTO: 3 %
NEUTROPHILS # BLD AUTO: 5.7 10E9/L (ref 1.6–8.3)
NEUTROPHILS NFR BLD AUTO: 6 %
PLATELET # BLD AUTO: 152 10E9/L (ref 150–450)
POTASSIUM SERPL-SCNC: 4.7 MMOL/L (ref 3.4–5.3)
PROT SERPL-MCNC: 7.1 G/DL (ref 6.8–8.8)
RBC # BLD AUTO: 4.41 10E12/L (ref 4.4–5.9)
RBC MORPH BLD: NORMAL
SODIUM SERPL-SCNC: 140 MMOL/L (ref 133–144)
WBC # BLD AUTO: 95.2 10E9/L (ref 4–11)

## 2017-10-24 PROCEDURE — 83615 LACTATE (LD) (LDH) ENZYME: CPT | Performed by: INTERNAL MEDICINE

## 2017-10-24 PROCEDURE — 85025 COMPLETE CBC W/AUTO DIFF WBC: CPT | Performed by: INTERNAL MEDICINE

## 2017-10-24 PROCEDURE — 82784 ASSAY IGA/IGD/IGG/IGM EACH: CPT | Performed by: INTERNAL MEDICINE

## 2017-10-24 PROCEDURE — 80053 COMPREHEN METABOLIC PANEL: CPT | Performed by: INTERNAL MEDICINE

## 2017-10-24 PROCEDURE — 36415 COLL VENOUS BLD VENIPUNCTURE: CPT

## 2017-10-24 PROCEDURE — 99213 OFFICE O/P EST LOW 20 MIN: CPT | Mod: ZF

## 2017-10-24 RX ORDER — AZITHROMYCIN 250 MG/1
TABLET, FILM COATED ORAL
COMMUNITY
Start: 2017-10-21 | End: 2017-10-26

## 2017-10-24 ASSESSMENT — PAIN SCALES - GENERAL: PAINLEVEL: SEVERE PAIN (6)

## 2017-10-24 NOTE — PROGRESS NOTES
Hematology/Oncology Evaluation      Loco Wood is a 71 year old male previously followed by Dr. Dias for CLL.      Hematologic history:  Diagnosed 2/12/2007 with CLL, Lyles stage 0, followed clinically since.  At diagnosis WBC 17.8, ALC 11.2, hemoglobin 15.2, platelets 188.  Flow consistent with CLL.  No opportunistic infections, with IgG in the normal range during follow up.    Date Treatment Response Toxicities/Complications   2007 - current Observation alone.                  HPI:  Please see entry above for hematologic history.  He has been dealing with trigeminal neuralgia at the beginning of October, which transitioned into herpes zoster with keratoconjunctivitis later in October.  The itching sensation is very bad and not readily relieved by a number of conservative therapies including benadryl, Vistaril, Atarax, gabapentin etc.      RECOMMENDATIONS:  Mr. Wood is being monitored for his early stage CLL.  His lymphocytosis is stable since last April.  His hemoglobin and platelets are also stable and he has no adenopathy or organomegaly. At this point he does not have any strict indication to start treatment for his CLL. We reviewed that if he develops cytopenias or symptoms that would be an indication to treat. We briefly reviewed treatment options but did not go into great detail as the treatment landscape will likely change over time.     Immunodeficiency is possible with CLL, and an IgG level is pending.  If low (<500), consideration could be given to IVIG to potentially help him overcome his recent viral reactivation.    We reviewed:  1. Await IgG.  Consider IVIG if he has significant hypogammaglobulinemia. Addendum:  IgG>500, no plan to supplement with IVIG for now, but can reconsider if recurrent infections.  2.  Trial of gabapentin gel in addition to his other supportive care for zoster.  He did not like the sedation of oral gabapentin.  3. RTC in 6 months with labs at that time  4.  RTC sooner if unexplained weight loss, night sweats, increasing adenopathy, or other new symptoms.    He Burns MD, pager [phone]          ROS: 10 point ROS neg other than the symptoms noted above in the HPI.          Allergies   Allergen Reactions     Dilaudid [Hydromorphone] Itching     Morphine Itching        Current Outpatient Prescriptions   Medication Sig Dispense Refill     azithromycin (ZITHROMAX) 250 MG tablet        gabapentin 8 % GEL topical PLO cream Apply 100 g topically every 8 hours 100 g 3     hydrOXYzine (VISTARIL) 25 MG capsule Take 1 capsule (25 mg) by mouth 4 times daily as needed for itching 120 capsule 0     GABAPENTIN PO        fluorometholone (FML LIQUIFILM) 0.1 % ophthalmic susp Place 1 drop into the right eye 3 times daily for 14 days 3 mL 0     HYDROcodone-acetaminophen (NORCO)  MG per tablet Take 1 tablet by mouth every 6 hours as needed for moderate to severe pain       Acetaminophen (TYLENOL PO)        lidocaine (XYLOCAINE) 5 % ointment Apply topically 3 times daily as needed for moderate pain 50 g 1     fluticasone (FLONASE) 50 MCG/ACT spray Spray 2 sprays into both nostrils daily 1 Bottle 1     diazepam (VALIUM) 5 MG tablet Take 1 tablet (5 mg) by mouth every 6 hours as needed for anxiety 15 tablet 0     omega 3 1200 MG CAPS Take 2 capsules by mouth daily       omeprazole (PRILOSEC) 10 MG CR capsule TAKE 1 CAPSULE BY MOUTH DAILY - TAKE 30 TO 60 MINUTES BEFORE A MEAL - 90 capsule 3     atorvastatin (LIPITOR) 10 MG tablet Take 1 tablet (10 mg) by mouth every 48 hours 45 tablet 9     Cholecalciferol (VITAMIN D3 PO) Take 2,000 Units by mouth           Physical Exam:     Vital Signs: /79 (BP Location: Right arm, Patient Position: Sitting)  Pulse 102  Temp 98.4  F (36.9  C) (Oral)  Wt 69.4 kg (153 lb)  SpO2 97%  BMI 23.96 kg/m2    KPS:  80%, ECOG 1 due to zoster     General Appearance: healthy, alert and no distress  Eyes: Swollen right eyelid, injected conjunctiva R>L,  no significant discharge  Ears/Nose/M/Throat: Oral mucosa and posterior oropharynx normal, moist mucous membranes  Neck supple, non-tender, free range of motion, no adenopathy  Cardio/Vascular:regular rate and rhythm, normal S1 and S2, no murmur  Resp Effort And Auscultation: Normal - Clear to auscultation without rales, rhonchi, or wheezing.  GI: soft, nontender, no hepatosplenomegaly   Lymphatics:no significant enlargement of lymph nodes globally   Musculoskeletal: Musculoskeletal normal  Edema: none  Skin: Skin color, texture, turgor normal. Crusted, healing VZV lesions on left scalp  Neurologic: negative  Psych/Affect: Mood and affect are appropriate.  Vascular Access:  none      Loco understood the above assessment and recommendations.  Multiple questions answered.  No barriers to learning identified.         Total time: 25 minutes  Counseling time: 20 minutes  Prolonged service:  no      ------------------------------------------------------------------------------------------------------------------------------------------------    Patient Care Team      Relationship Specialty Notifications Start End    Jameson Cisse MD PCP - General   12/2/02     Comment:  per patient    Phone: [phone] Fax: [phone]         [facility] [address]  [address] [place] [address]    Yodit Barnett PA Physician Assistant   12/2/13     Phone: [phone] Fax: [phone]         [address] [address]

## 2017-10-24 NOTE — MR AVS SNAPSHOT
After Visit Summary   10/24/2017    Loco Wood    MRN: [id number]           Patient Information     Date Of Birth          1945        Visit Information        Provider Department      10/24/2017 4:30 PM He Burns MD [facility] Blood and Marrow Transplant        Today's Diagnoses     CLL (chronic lymphocytic leukemia) (H)    -  1    Neuropathy              [facility] ([facility])  [address] [place] [address]  Phone: [phone]  Clinic Hours:   Monday-Thursday:7am to 7pm   Friday: 7am to 5pm   Weekends and holidays:    8am to noon (in general)  If your fever is 100.5  or greater,   call the clinic.  After hours call the   hospital at [phone] or   [phone]. Ask for the BMT   fellow on-call            Follow-ups after your visit        Follow-up notes from your care team     Return in about 6 months (around 4/24/2018).      Your next 10 appointments already scheduled     Oct 27, 2017  8:45 AM CDT   New Visit with Jacob Love MD   [facility] ([facility])    [address] [address]   [phone]            Oct 30, 2017  2:15 PM CDT   New Patient Visit with Nikhil Farah MD   [facility] NEUROSPECIALTIES ([facility])    [address] [facility] [address]  [facility] [address]   [phone]            Dec 01, 2017  8:00 AM CST   LAB with  LAB   [facility] Lab ([facility])    [address] [place] [address]  1st Floor  [facility] [id number] [phone]           Patient must bring picture ID. Patient should be prepared to give a urine specimen  Please do not eat 10-12 hours before your appointment if you are coming in fasting for labs on lipids, cholesterol, or glucose (sugar). Pregnant women should follow their Care Team instructions. Water with medications is okay. Do not drink coffee or other fluids. If you have concerns about taking  your medications, please  ask at office or if scheduling via Doctor Evidence, send a message by clicking on Secure Messaging, Message Your Care Team.            Dec 01, 2017  9:00 AM CST   (Arrive by 8:45 AM)   Return Visit with Campos Boyd MD   [facility] Urology and Inst for Prostate and Urologic Cancers ([facility])    [address] [place] [address]  4th Floor  [facility] [address]   [phone]            Apr 24, 2018  3:00 PM CDT   M[facility]ic Lab Draw with  MASONIC LAB DRAW   [facility] Masonic Lab Draw ([facility])    [address] [place] [address]  2nd [facility] [address] [phone]            Apr 24, 2018  3:30 PM CDT   RETURN ONC with He Burns MD   [facility] Blood and Marrow Transplant ([facility])    [address] [place] [id number] [phone]              Who to contact     If you have questions or need follow up information about today's clinic visit or your schedule please contact [facility] directly at [phone].  Normal or non-critical lab and imaging results will be communicated to you by [employer], letter or phone within 4 business days after the clinic has received the results. If you do not hear from us within 7 days, please contact the clinic through [employer] or phone. If you have a critical or abnormal lab result, we will notify you by phone as soon as possible.  Submit refill requests through Doctor Evidence or call your pharmacy and they will forward the refill request to us. Please allow 3 business days for your refill to be completed.          Additional Information About Your Visit        [employer] Information     Doctor Evidence gives you secure access to your electronic health record. If you see a primary care provider, you can also send messages to your care team and make appointments. If you have questions, please call your primary care clinic.  If you do not have a primary care provider,  please call [phone] and they will assist you.        Care EveryWhere ID     This is your Care EveryWhere ID. This could be used by other organizations to access your [facility] medical records  [id number]        Your Vitals Were     Pulse Temperature Pulse Oximetry BMI (Body Mass Index)          102 98.4  F (36.9  C) (Oral) 97% 23.96 kg/m2         Blood Pressure from Last 3 Encounters:   10/24/17 112/79   10/16/17 120/80   10/10/17 118/73    Weight from Last 3 Encounters:   10/24/17 69.4 kg (153 lb)   10/16/17 70.3 kg (155 lb)   10/10/17 73 kg (161 lb)              We Performed the Following     *CBC with platelets differential     Comprehensive metabolic panel     IgG     Lactate Dehydrogenase          Today's Medication Changes          These changes are accurate as of: 10/24/17 11:59 PM.  If you have any questions, ask your nurse or doctor.               Start taking these medicines.        Dose/Directions    gabapentin 8 % Gel topical PLO cream   Used for:  Neuropathy   Started by:  He Burns MD        Dose:  100 g   Apply 100 g topically every 8 hours   Quantity:  100 g   Refills:  3         Stop taking these medicines if you haven't already. Please contact your care team if you have questions.     VALACYCLOVIR HCL PO   Stopped by:  He Burns MD                Where to get your medicines      These medications were sent to [facility]/pharmacy #[id number] [address] AT CORNER OF [address] [place] [address]     Phone:  [phone]     gabapentin 8 % Gel topical PLO cream                Recent Review Flowsheet Data     BMT Recent Results Latest Ref Rng & Units 4/29/2015 11/20/2015 4/11/2016 10/25/2016 4/25/2017 10/4/2017 10/24/2017    WBC 4.0 - 11.0 10e9/L 52.9(HH) 54.2(HH) 64.1(HH) 95.6(HH) 95.2(HH) 96.1(HH) 95.2(HH)    Hemoglobin 13.3 - 17.7 g/dL 14.8 14.0 14.3 13.2(L) 13.8 14.0 13.5    Platelet Count 150 - 450 10e9/L 107(L) 116(L) 112(L) 116(L) 125(L) 125(L)  152    Neutrophils (Absolute) 1.6 - 8.3 10e9/L 3.8 3.3 3.8 6.7 4.8 4.3 5.7    INR 0.86 - 1.14 - - - - - - -    Sodium 133 - 144 mmol/L 143 140 141 144 140 144 140    Potassium 3.4 - 5.3 mmol/L 4.2 3.9 4.0 4.4 4.5 4.3 4.7    Chloride 94 - 109 mmol/L 111(H) 106 106 107 109 107 105    Glucose 70 - 99 mg/dL 99 98 92 88 103(H) 95 91    Urea Nitrogen 7 - 30 mg/dL 15 19 18 14 15 16 18    Creatinine 0.66 - 1.25 mg/dL 1.26(H) 1.34(H) 1.23 1.35(H) 1.22 1.28(H) 1.26(H)    Calcium (Total) 8.5 - 10.1 mg/dL 9.3 8.9 9.3 9.0 9.1 8.9 8.7    Protein (Total) 6.8 - 8.8 g/dL 6.7(L) 6.6(L) 7.0 6.6(L) 6.9 - 7.1    Albumin 3.4 - 5.0 g/dL 4.0 3.9 4.4 4.0 4.1 - 4.4    Alkaline Phosphatase 40 - 150 U/L 88 75 73 84 101 - 84    AST 0 - 45 U/L 18 22 18 24 26 - 16    ALT 0 - 70 U/L 22 27 22 26 26 - 24    MCV 78 - 100 fl 94 95 97 96 95 97 98               Primary Care Provider Office Phone # Fax #    Campos Parra [phone]       [address] [facility] [address]        Equal Access to Services     [facility] AH: Hadii aad ku hadeitan Solemuel, waaxda luqadaha, qaybta kaalmada adeegyada, jose valverden harjinder desai. So [facility] [phone].    ATENCIÓN: Si habla español, tiene a knutson disposición servicios gratuitos de asistencia lingüística. Llame al [phone].    We comply with applicable federal civil rights laws and [place] laws. We do not discriminate on the basis of race, color, national origin, age, disability, sex, sexual orientation, or gender identity.            Thank you!     Thank you for choosing [facility]OD [facility]ARR[facility]PL[facility]  for your care. Our goal is always to provide you with excellent care. Hearing back from our patients is one way we can continue to improve our services. Please take a few minutes to complete the written survey that you may receive in the mail after your visit with us. Thank you!             Your Updated Medication List - Protect others around you: Learn how  to safely use, store and throw away your medicines at www.disposemymeds.org.          This list is accurate as of: 10/24/17 11:59 PM.  Always use your most recent med list.                   Brand Name Dispense Instructions for use Diagnosis    atorvastatin 10 MG tablet    LIPITOR    45 tablet    Take 1 tablet (10 mg) by mouth every 48 hours    Hyperlipidemia LDL goal <130       azithromycin 250 MG tablet    ZITHROMAX          diazepam 5 MG tablet    VALIUM    15 tablet    Take 1 tablet (5 mg) by mouth every 6 hours as needed for anxiety    Complex tear of medial meniscus of right knee as current injury, initial encounter       fluorometholone 0.1 % ophthalmic susp    FML LIQUIFILM    3 mL    Place 1 drop into the right eye 3 times daily for 14 days    Herpes zoster with keratoconjunctivitis       fluticasone 50 MCG/ACT spray    FLONASE    1 Bottle    Spray 2 sprays into both nostrils daily    Viral URI       gabapentin 8 % Gel topical PLO cream     100 g    Apply 100 g topically every 8 hours    Neuropathy       GABAPENTIN PO           HYDROcodone-acetaminophen  MG per tablet    NORCO     Take 1 tablet by mouth every 6 hours as needed for moderate to severe pain        hydrOXYzine 25 MG capsule    VISTARIL    120 capsule    Take 1 capsule (25 mg) by mouth 4 times daily as needed for itching    Herpes zoster without complication, Postherpetic neuralgia       lidocaine 5 % ointment    XYLOCAINE    50 g    Apply topically 3 times daily as needed for moderate pain    Herpes zoster without complication       omega 3 1200 MG Caps      Take 2 capsules by mouth daily    Spinal stenosis, lumbar region, without neurogenic claudication       omeprazole 10 MG CR capsule    priLOSEC    90 capsule    TAKE 1 CAPSULE BY MOUTH DAILY - TAKE 30 TO 60 MINUTES BEFORE A MEAL -    Gastroesophageal reflux disease without esophagitis       TYLENOL PO           VITAMIN D3 PO      Take 2,000 Units by mouth

## 2017-10-24 NOTE — TELEPHONE ENCOUNTER
"  Reason for Disposition    [1] Painful rash AND [2] multiple small blisters grouped together (i.e., dermatomal distribution or \"band\" or \"stripe\" on one side of face)    Additional Information    Negative: Shock suspected (e.g., cold/pale/clammy skin, too weak to stand, low BP, rapid pulse)    Negative: [1] Similar pain previously AND [2] it was from \"heart attack\"    Negative: [1] Similar pain previously AND [2] it was from \"angina\" AND [3] not relieved by nitroglycerin    Negative: Sounds like a life-threatening emergency to the triager    Negative: Chest pain    Negative: Sinus pain and congestion    Negative: Headache or pain in upper forehead    Negative: Toothache is main symptom    Negative: Followed a face injury    Negative: Difficulty breathing or unusual sweating (e.g., sweating without exertion)    Negative: Can't close the mouth fully (i.e., \"mouth is locked open\", patient will have difficulty talking)    Negative: [1] Fever AND [2] localized red rash    Negative: [1] Fever AND [2] area of face is swollen    Negative: Patient sounds very sick or weak to the triager    Negative: [1] SEVERE pain (e.g., excruciating) AND [2] not improved after 2 hours of pain medicine    Negative: [1] Localized red rash AND [2] painful to the touch    Protocols used: FACE PAIN-ADULT-  Patient is calling and states he is having severe itching and irritation from his shingles rash on face. Patient states the atarax is not working, nothing the provider has prescribed is not working and wants the triager to advise him on what he can treat the itching and pain with. Triager advised patient to call an 24 hour pharmacist with over the counter recommendations, as nothing seems to work for patient. Triager spoke about baking soda paste or aveeno oatmeal treatment.  "

## 2017-10-24 NOTE — NURSING NOTE
"Oncology Rooming Note    October 24, 2017 4:29 PM   Loco Wood is a 72 year old male who presents for:    Chief Complaint   Patient presents with     Blood Draw     Venipuncture labs collected by RN.      RECHECK     Patient here for provider visit r/t CLL      Initial Vitals: /79 (BP Location: Right arm, Patient Position: Sitting)  Pulse 102  Temp 98.4  F (36.9  C) (Oral)  Wt 69.4 kg (153 lb)  SpO2 97%  BMI 23.96 kg/m2 Estimated body mass index is 23.96 kg/(m^2) as calculated from the following:    Height as of 10/10/17: 1.702 m (5' 7\").    Weight as of this encounter: 69.4 kg (153 lb). Body surface area is 1.81 meters squared.  Severe Pain (6) Comment: Data Unavailable   No LMP for male patient.  Allergies reviewed: Yes  Medications reviewed: Yes    Medications: MEDICATION REFILLS NEEDED TODAY. Provider was notified. Hydrocodone.   Pharmacy name entered into EPIC:    [facility] - [place], [place] - [address] [facility]  [facility] - [place], [place] - [address]    Clinical concerns: NA    8 minutes for nursing intake (face to face time)     Daly Dougherty RN              "

## 2017-10-25 LAB — IGG SERPL-MCNC: 568 MG/DL (ref 695–1620)

## 2017-10-27 ENCOUNTER — OFFICE VISIT (OUTPATIENT)
Dept: OPHTHALMOLOGY | Facility: CLINIC | Age: 72
End: 2017-10-27
Payer: COMMERCIAL

## 2017-10-27 ENCOUNTER — MYC MEDICAL ADVICE (OUTPATIENT)
Dept: FAMILY MEDICINE | Facility: CLINIC | Age: 72
End: 2017-10-27

## 2017-10-27 DIAGNOSIS — B02.33: Primary | ICD-10-CM

## 2017-10-27 PROCEDURE — 92002 INTRM OPH EXAM NEW PATIENT: CPT | Performed by: OPHTHALMOLOGY

## 2017-10-27 RX ORDER — FLUOROMETHOLONE 0.1 %
1 SUSPENSION, DROPS(FINAL DOSAGE FORM)(ML) OPHTHALMIC (EYE) 3 TIMES DAILY
Qty: 3 ML | Refills: 3 | Status: SHIPPED | OUTPATIENT
Start: 2017-10-27 | End: 2017-11-28

## 2017-10-27 RX ORDER — ACYCLOVIR 400 MG/1
400 TABLET ORAL DAILY
Qty: 30 TABLET | Refills: 4 | Status: SHIPPED | OUTPATIENT
Start: 2017-10-27 | End: 2017-11-28

## 2017-10-27 ASSESSMENT — VISUAL ACUITY
OS_SC: 20/40
OD_SC+: -1
OD_SC: 20/50
METHOD: SNELLEN - LINEAR

## 2017-10-27 ASSESSMENT — SLIT LAMP EXAM - LIDS: COMMENTS: MOD EDEMA

## 2017-10-27 ASSESSMENT — TONOMETRY
OS_IOP_MMHG: 12
IOP_METHOD: ICARE
OD_IOP_MMHG: 13

## 2017-10-27 NOTE — PROGRESS NOTES
Current Eye Medications:  FML TID right eye. Fish oil QD. Warm compresses TID right eye.     Subjective:  Referred by iCtlali Vega, OD for 2 week follow up on Herpes zoster with keratoconjunctivitis. Sharp stabbing pains right eye, pain scale up to 9/10 and a constant 7/10 for last 3 weeks. Was seen at [facility]'s and they put on Z-pack. Watering right eye. Has trouble opening right eye to see, can't keep open very long. Vision is a blurred when able to keep both eyes open, was never that way before.     Objective:  See Ophthalmology Exam.       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      Plan: Continue FML three times daily right eye.   Continue Gabapentin pills up to 3 times a day (discuss this with your primary care doctor).  Acyclovir 400 mg daily.  Cold packs as needed.   Sleep with head of bed elevated.  Return visit one month for intraocular pressure check.    Jacob Love M.D.

## 2017-10-27 NOTE — PATIENT INSTRUCTIONS
Continue FML three times daily right eye.   Continue Gabapentin pills up to 3 times a day (discuss this with your primary care doctor).  Acyclovir 400 mg daily.  Cold packs as needed.   Sleep with head of bed elevated.  Return visit one month for intraocular pressure check.    Jacob Love M.D.

## 2017-10-27 NOTE — MR AVS SNAPSHOT
After Visit Summary   10/27/2017    Loco Wood    MRN: [id number]           Patient Information     Date Of Birth          1945        Visit Information        Provider Department      10/27/2017 8:45 AM Jacob Love MD [facility]        Today's Diagnoses     Herpes zoster keratoconjunctivitis    -  1    Herpes zoster with keratoconjunctivitis          Care Instructions    Continue FML three times daily left eye   Continue Gabapentin pills up to 3 times a day (discuss this with your primary care doctor)  Acyclovir 400 mg daily  Cold packs as needed   Sleep elevated  Return visit one month for intraocular pressure check    Jacob Love M.D.            Follow-ups after your visit        Your next 10 appointments already scheduled     Oct 27, 2017  8:45 AM CDT   New Visit with Jacob Love MD   [facility] ([facility])    [address] [facility] [address]   [phone]            Oct 30, 2017  2:15 PM CDT   New Patient Visit with Nikhil Farah MD   [facility] NEUROSPECIALTIES ([facility] Clin[facility])    [address] [facility]  [address]  [facility] [address]   [phone]            Dec 01, 2017  8:00 AM CST   LAB with  LAB    Health Lab ([facility])    [address] [address] [phone]           Patient must bring picture ID. Patient should be prepared to give a urine specimen  Please do not eat 10-12 hours before your appointment if you are coming in fasting for labs on lipids, cholesterol, or glucose (sugar). Pregnant women should follow their Care Team instructions. Water with medications is okay. Do not drink coffee or other fluids. If you have concerns about taking  your medications, please ask at office or if scheduling via [employer], send a message by clicking on Secure Messaging, Message Your Care Team.            Dec 01, 2017  9:00 AM CST   (Arrive  by 8:45 AM)   Return Visit with Campos Boyd MD   [facility] Urology and Inst for Prostate and Urologic Cancers ([facility])    [address] [facility]  4th Floor  [facility] [address] [phone]            Apr 24, 2018  2:30 PM CDT   [facility] Lab Draw with  MASONIC LAB DRAW   [facility] Masonic Lab Draw ([facility])    [address] [facility]  2nd Floor  [facility] [address] [phone]            Apr 24, 2018  3:00 PM CDT   RETURN ONC with He Burns MD   [facility] Blood and Marrow Transplant ([facility])    [address] [facility]  2nd Floor  [facility] [id number] [phone]              Who to contact     If you have questions or need follow up information about today's clinic visit or your schedule please contact [facility] directly at [phone].  Normal or non-critical lab and imaging results will be communicated to you by [employer], letter or phone within 4 business days after the clinic has received the results. If you do not hear from us within 7 days, please contact the clinic through [employer] or phone. If you have a critical or abnormal lab result, we will notify you by phone as soon as possible.  Submit refill requests through [employer] or call your pharmacy and they will forward the refill request to us. Please allow 3 business days for your refill to be completed.          Additional Information About Your Visit        [employer] Information     [employer] gives you secure access to your electronic health record. If you see a primary care provider, you can also send messages to your care team and make appointments. If you have questions, please call your primary care clinic.  If you do not have a primary care provider, please call [phone] and they will assist you.        Care EveryWhere ID     This is your Care EveryWhere ID. This could be used by other organizations to access your [facility]  medical records  [id number]         Blood Pressure from Last 3 Encounters:   10/24/17 112/79   10/16/17 120/80   10/10/17 118/73    Weight from Last 3 Encounters:   10/24/17 69.4 kg (153 lb)   10/16/17 70.3 kg (155 lb)   10/10/17 73 kg (161 lb)              Today, you had the following     No orders found for display         Today's Medication Changes          These changes are accurate as of: 10/27/17  8:42 AM.  If you have any questions, ask your nurse or doctor.               Start taking these medicines.        Dose/Directions    acyclovir 400 MG tablet   Commonly known as:  ZOVIRAX   Used for:  Herpes zoster keratoconjunctivitis        Dose:  400 mg   Take 1 tablet (400 mg) by mouth daily   Quantity:  30 tablet   Refills:  4         Stop taking these medicines if you haven't already. Please contact your care team if you have questions.     azithromycin 250 MG tablet   Commonly known as:  ZITHROMAX                Where to get your medicines      These medications were sent to [facility]/pharmacy #[id number] - [place] [address]  [address] [place] [address]     Phone:  [phone]     acyclovir 400 MG tablet    fluorometholone 0.1 % ophthalmic susp                Primary Care Provider Office Phone # Fax #    Campos Parra [phone] [address] [facility] [address]        Equal Access to Services     [facility]: Hadhenry Gu, wajavyda lubernabe, qaybta kaalmacorinne barker, jose newton . So [facility] [phone].    ATENCIÓN: Si habla español, tiene a knutson disposición servicios gratuitos de asistencia lingüística. Llame al [phone].    We comply with applicable federal civil rights laws and [place] laws. We do not discriminate on the basis of race, color, national origin, age, disability, sex, sexual orientation, or gender identity.            Thank you!     Thank you for choosing [place]  for your care. Our goal is always to provide you with excellent care. Hearing back from our patients is one way we can continue to improve our services. Please take a few minutes to complete the written survey that you may receive in the mail after your visit with us. Thank you!             Your Updated Medication List - Protect others around you: Learn how to safely use, store and throw away your medicines at www.disposemymeds.org.          This list is accurate as of: 10/27/17  8:42 AM.  Always use your most recent med list.                   Brand Name Dispense Instructions for use Diagnosis    acyclovir 400 MG tablet    ZOVIRAX    30 tablet    Take 1 tablet (400 mg) by mouth daily    Herpes zoster keratoconjunctivitis       atorvastatin 10 MG tablet    LIPITOR    45 tablet    Take 1 tablet (10 mg) by mouth every 48 hours    Hyperlipidemia LDL goal <130       diazepam 5 MG tablet    VALIUM    15 tablet    Take 1 tablet (5 mg) by mouth every 6 hours as needed for anxiety    Complex tear of medial meniscus of right knee as current injury, initial encounter       fluorometholone 0.1 % ophthalmic susp    FML LIQUIFILM    3 mL    Place 1 drop into the right eye 3 times daily    Herpes zoster with keratoconjunctivitis       fluticasone 50 MCG/ACT spray    FLONASE    1 Bottle    Spray 2 sprays into both nostrils daily    Viral URI       gabapentin 8 % Gel topical PLO cream     100 g    Apply 100 g topically every 8 hours    Neuropathy       GABAPENTIN PO           HYDROcodone-acetaminophen  MG per tablet    NORCO     Take 1 tablet by mouth every 6 hours as needed for moderate to severe pain        hydrOXYzine 25 MG capsule    VISTARIL    120 capsule    Take 1 capsule (25 mg) by mouth 4 times daily as needed for itching    Herpes zoster without complication, Postherpetic neuralgia       lidocaine 5 % ointment    XYLOCAINE    50 g    Apply topically 3 times daily as needed for moderate pain     Herpes zoster without complication       omega 3 1200 MG Caps      Take 2 capsules by mouth daily    Spinal stenosis, lumbar region, without neurogenic claudication       omeprazole 10 MG CR capsule    priLOSEC    90 capsule    TAKE 1 CAPSULE BY MOUTH DAILY - TAKE 30 TO 60 MINUTES BEFORE A MEAL -    Gastroesophageal reflux disease without esophagitis       TYLENOL PO           VITAMIN D3 PO      Take 2,000 Units by mouth

## 2017-10-29 PROBLEM — B02.33: Status: ACTIVE | Noted: 2017-10-29

## 2017-10-30 ENCOUNTER — OFFICE VISIT (OUTPATIENT)
Dept: NEUROLOGY | Facility: CLINIC | Age: 72
End: 2017-10-30

## 2017-10-30 VITALS
SYSTOLIC BLOOD PRESSURE: 134 MMHG | DIASTOLIC BLOOD PRESSURE: 87 MMHG | HEIGHT: 67 IN | HEART RATE: 88 BPM | BODY MASS INDEX: 24.39 KG/M2 | WEIGHT: 155.4 LBS

## 2017-10-30 DIAGNOSIS — B02.29 POST HERPETIC NEURALGIA: Primary | ICD-10-CM

## 2017-10-30 DIAGNOSIS — H02.401 PTOSIS OF EYELID, RIGHT: ICD-10-CM

## 2017-10-30 RX ORDER — NORTRIPTYLINE HCL 10 MG
10 CAPSULE ORAL SEE ADMIN INSTRUCTIONS
Qty: 90 CAPSULE | Refills: 1 | Status: SHIPPED | OUTPATIENT
Start: 2017-10-30 | End: 2017-11-28

## 2017-10-30 NOTE — MR AVS SNAPSHOT
After Visit Summary   10/30/2017    Loco Wood    MRN: [id number]           Patient Information     Date Of Birth          1945        Visit Information        Provider Department      10/30/2017 2:15 PM Nikhil Farah MD [facility]UROSPECIALT[facility]        Today's Diagnoses     Post herpetic neuralgia    -  1    Ptosis of eyelid, right           Follow-ups after your visit        Additional Services     OPHTHALMOLOGY ADULT REFERRAL       Your provider has referred you to: [facility]: [facility] - [place] [phone]   [email or web address]    Please be aware that coverage of these services is subject to the terms and limitations of your health insurance plan.  Call member services at your health plan with any benefit or coverage questions.      Please bring the following with you to your appointment:    (1) Any X-Rays, CTs or MRIs which have been performed.  Contact the facility where they were done to arrange for  prior to your scheduled appointment.    (2) List of current medications  (3) This referral request   (4) Any documents/labs given to you for this referral                  Follow-up notes from your care team     Return if symptoms worsen or fail to improve.      Your next 10 appointments already scheduled     Nov 20, 2017 10:45 AM CST   [facility]   [facility]    [address] [place] [id number] [phone]            Dec 01, 2017  8:00 AM CST   LAB with  LAB    Health Lab ([facility])    [address] [id number] [phone]           Please do not eat 10-12 hours before your appointment if you are coming in fasting for labs on lipids, cholesterol, or glucose (sugar). This does not apply to pregnant women. Water, hot tea and black coffee (with nothing added) are okay. Do not drink other  fluids, diet soda or chew gum.            Dec 01, 2017  9:00 AM CST   (Arrive by 8:45 AM)   Return Visit with Campos Boyd MD   [facility] ([facility])    [address] [place] [address]  4th [facility] [id number] [phone]            Apr 24, 2018  2:30 PM CDT   [facility] Draw with  [facility]   [facility] ([facility])    [address] [place] [address]  2nd [facility] [id number] [phone]            Apr 24, 2018  3:00 PM CDT   RETURN ONC with He Burns MD   [facility] ([facility])    [address] [place] [address]  2nd [facility] [id number] [phone]              Who to contact     Please call your clinic at [phone] to:    Ask questions about your health    Make or cancel appointments    Discuss your medicines    Learn about your test results    Speak to your doctor   If you have compliments or concerns about an experience at your clinic, or if you wish to file a complaint, please contact [facility] Patient Relations at [phone] or email us at [email or web address]         Additional Information About Your Visit        Pipelinefxhart Information     Metrilus gives you secure access to your electronic health record. If you see a primary care provider, you can also send messages to your care team and make appointments. If you have questions, please call your primary care clinic.  If you do not have a primary care provider, please call [phone] and they will assist you.      Metrilus is an electronic gateway that provides easy, online access to your medical records. With Metrilus, you can request a clinic appointment, read your test results, renew a prescription or communicate with your care team.     To access your existing account, please contact your [facility]  [facility] [facility] or call [phone] for assistance.        Care EveryWhere ID     This is your Care EveryWhere ID. This could be used by other organizations to access your Post medical records  [id number]        Your Vitals Were     Pulse Height BMI (Body Mass Index)             88 5' 7\" (170.2 cm) 24.34 kg/m2          Blood Pressure from Last 3 Encounters:   10/30/17 134/87   10/24/17 112/79   10/16/17 120/80    Weight from Last 3 Encounters:   10/30/17 155 lb 6.4 oz (70.5 kg)   10/24/17 153 lb (69.4 kg)   10/16/17 155 lb (70.3 kg)              We Performed the Following     OPHTHALMOLOGY ADULT REFERRAL          Today's Medication Changes          These changes are accurate as of: 10/30/17  3:10 PM.  If you have any questions, ask your nurse or doctor.               Start taking these medicines.        Dose/Directions    nortriptyline 10 MG capsule   Commonly known as:  PAMELOR   Used for:  Post herpetic neuralgia   Started by:  Nikhil Farah MD        Dose:  10 mg   Take 1 capsule (10 mg) by mouth See Admin Instructions One po q hs for 3 days, then two po q hs for 3 days, then three po q hs.   Quantity:  90 capsule   Refills:  1            Where to get your medicines      These medications [facility] to [id number] [address] AT CORNER OF [address] [place], [place] [id number]     Phone:  [phone]     nortriptyline 10 MG capsule                Primary Care Provider Office Phone # Fax #    [facility] [phone]       [address] [place] [address]        Equal Access to Services     HECTOR SANTOS : Hadhenry Gu, wajavyda lubernabe, qaybta kaaljose thapa. So [facility] [phone].    ATENCIÓN: Si habla español, tiene a knutson disposición servicios gratuitos de asistencia lingüística. Llame al [phone].    We comply with applicable federal " civil rights laws and [place] laws. We do not discriminate on the basis of race, color, national origin, age, disability, sex, sexual orientation, or gender identity.            Thank you!     Thank you for choosing [facility] NEUROSPECIALTIES  for your care. Our goal is always to provide you with excellent care. Hearing back from our patients is one way we can continue to improve our services. Please take a few minutes to complete the written survey that you may receive in the mail after your visit with us. Thank you!             Your Updated Medication List - Protect others around you: Learn how to safely use, store and throw away your medicines at www.disposemymeds.org.          This list is accurate as of: 10/30/17  3:10 PM.  Always use your most recent med list.                   Brand Name Dispense Instructions for use Diagnosis    acyclovir 400 MG tablet    ZOVIRAX    30 tablet    Take 1 tablet (400 mg) by mouth daily    Herpes zoster with keratoconjunctivitis       atorvastatin 10 MG tablet    LIPITOR    45 tablet    Take 1 tablet (10 mg) by mouth every 48 hours    Hyperlipidemia LDL goal <130       diazepam 5 MG tablet    VALIUM    15 tablet    Take 1 tablet (5 mg) by mouth every 6 hours as needed for anxiety    Complex tear of medial meniscus of right knee as current injury, initial encounter       fluorometholone 0.1 % ophthalmic susp    FML LIQUIFILM    3 mL    Place 1 drop into the right eye 3 times daily    Herpes zoster with keratoconjunctivitis       gabapentin 8 % Gel topical PLO cream     100 g    Apply 100 g topically every 8 hours    Neuropathy       GABAPENTIN PO           HYDROcodone-acetaminophen  MG per tablet    NORCO     Take 1 tablet by mouth every 6 hours as needed for moderate to severe pain        hydrOXYzine 25 MG capsule    VISTARIL    120 capsule    Take 1 capsule (25 mg) by mouth 4 times daily as needed for itching    Herpes zoster without complication, Postherpetic neuralgia        lidocaine 5 % ointment    XYLOCAINE    50 g    Apply topically 3 times daily as needed for moderate pain    Herpes zoster without complication       nortriptyline 10 MG capsule    PAMELOR    90 capsule    Take 1 capsule (10 mg) by mouth See Admin Instructions One po q hs for 3 days, then two po q hs for 3 days, then three po q hs.    Post herpetic neuralgia       omega 3 1200 MG Caps      Take 2 capsules by mouth daily    Spinal stenosis, lumbar region, without neurogenic claudication       omeprazole 10 MG CR capsule    priLOSEC    90 capsule    TAKE 1 CAPSULE BY MOUTH DAILY - TAKE 30 TO 60 MINUTES BEFORE A MEAL -    Gastroesophageal reflux disease without esophagitis       TYLENOL PO           VITAMIN D3 PO      Take 2,000 Units by mouth

## 2017-10-30 NOTE — PROGRESS NOTES
"REASON FOR VISIT:  This patient is a 72-year-old right-handed man seen for evaluation of right periorbital pain.        HISTORY OF PRESENT ILLNESS:   He has a somewhat complicated history.  His symptoms began on 10/03.  He had a couple of visits to the emergency room.  He was diagnosed with trigeminal neuralgia.  Three days after onset, he developed shingles around the right eye and on the right forehead.  Since then he has had intense pain.  He feels like he has electric tingling in the right side of his face and the top of his head and also a \"spike in his right eye.\"  He gets sharp jolts of pain.  His face has been numb in the area of the pain.  He was put on a course of antiviral for 5 days and the course was repeated.  He does have CLL.  He was on Tegretol briefly before the shingles broke out, but did not notice the Tegretol helped at all.  He was never on oral steroids but did take steroid drops for his eye.  A few weeks before the outbreak of the shingles, he had had an epidural steroid.  He has also had issues with a painful root canal and a partial crown on the right.  He has pain and also a symptom of itching that is \"driving him crazy.\"  He has a difficult time opening the right eye in part because of the pain.  He said that when it was he had shingles all around the eyelid, upper and lower, as well as on the top of his scalp.  He is highly sensitive below the right eyelid now and even light wind blowing on his face can send him into a paroxysm of pain.  His right cheek is extremely sensitive to the touch.  He is now in the midst of his third course of acyclovir.  He feels that the stabbing pain in his eye might actually be getting worse.  It is a neuralgia type pain in that it is short-lived, but recurring and intense.  Vicodin takes the edge off it.  He has 3-year-old gabapentin at home.  He never cared for the medicine and has not found it very helpful on this occasion for treatment of the neuralgia.  " He has had CLL for 10 years.  He does not have symptoms on the left face.  He does not have symptoms in his arms or legs or with bowel or bladder control.  He has no problem with hearing, speech or swallowing.      PAST MEDICAL HISTORY:   Otherwise largely noncontributory.  He does not have high blood pressure, diabetes, thyroid or asthma.  He has not had pertinent surgery to the head or neck.  He did have a head injury at age 19 with coma for several days and a 1-month hospitalization.      CURRENT MEDICATIONS:  He does take atorvastatin.      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      FAMILY HISTORY:  Positive for heart disease and lung cancer.      PHYSICAL EXAMINATION:   GENERAL:  The patient is cooperative and in mild distress.  He has his right eye closed or its ptosis.   VITAL SIGNS:  Blood pressure 134/87.     There are no carotid bruits.  Auscultation of the heart shows S1, S2, without murmur, rub or gallop.  Chest is clear to auscultation.     NEUROLOGIC:   The patient is alert, oriented and lucid.  Cranial nerve testing shows full visual fields to confrontation.  Funduscopic exam was performed on the left shows a sharp disk.  It was difficult to perform a funduscopic exam on the right.  The eye is either closed or he has ptosis.  He does report that he is keeping his eye closed because it hurts so much to have it open.  He is able to open it somewhat, but the range of motion of the muscles appear limited.  Pupil response on the right could not be assessed.  The pupil on the left reacts.  The pupils are equal in size.  Facies are otherwise symmetric.  Tongue protrudes in the midline.  Motor evaluation shows no pronator drift, normal finger tapping, finger-nose-finger and heel-knee-shin.  He has good strength in the arms, hands and legs.  Muscle stretch reflexes are present and symmetric.  Toes are downgoing.  Sensory exam shows  preserved vibration and temperature in the hands and feet.  Romberg sign is absent.  He can walk on his heels, toes and tandem.      He did have an MRI scan of the brain performed before his diagnosis in early October.  The study was reviewed with the patient.  There are some minor areas of gliosis in the cerebral white matter.  The study is otherwise unremarkable.      ASSESSMENT:   1.  Postherpetic neuralgia, right ophthalmic territory.      DISCUSSION:  The patient is seen for evaluation of postherpetic neuralgia in the right ophthalmic territory.  He has superimposed ptosis or a closed eye.  In terms of treating the pain, I am going to try him on nortriptyline, building up to 30 mg at night.  If that is ineffective, then carbamazepine will be tried.  He really does not want to try the gabapentin because it made him feel ill when he took it a few years ago.  Pregabalin might be an alternative.  I offered to make an appointment to see him in followup in a month, but he is going to monitor his status for now.  I also put in a referral for him to see Neuro-Ophthalmology with regard to the closed right eye and whether or not this is ptosis or just a reflex closure due to pain.      MD MICHEAL Thakkar MD             D: 10/30/2017 15:08   T: 10/30/2017 15:44   MT: KEVIN      Name:     ATIF JAMES   MRN:      [id number]        Account:      [id number]   :      1945           Service Date: 10/30/2017      Document: [id number]

## 2017-10-30 NOTE — LETTER
"10/30/2017       RE: Loco Wood  [address] [facility] [address]     Dear Colleague,    Thank you for referring your patient, Loco Wood, to the [facility] at [facility]. Please see a copy of my visit note below.    REASON FOR VISIT:  This patient is a 72-year-old right-handed man seen for evaluation of right periorbital pain.        HISTORY OF PRESENT ILLNESS:   He has a somewhat complicated history.  His symptoms began on 10/03.  He had a couple of visits to the emergency room.  He was diagnosed with trigeminal neuralgia.  Three days after onset, he developed shingles around the right eye and on the right forehead.  Since then he has had intense pain.  He feels like he has electric tingling in the right side of his face and the top of his head and also a \"spike in his right eye.\"  He gets sharp jolts of pain.  His face has been numb in the area of the pain.  He was put on a course of antiviral for 5 days and the course was repeated.  He does have CLL.  He was on Tegretol briefly before the shingles broke out, but did not notice the Tegretol helped at all.  He was never on oral steroids but did take steroid drops for his eye.  A few weeks before the outbreak of the shingles, he had had an epidural steroid.  He has also had issues with a painful root canal and a partial crown on the right.  He has pain and also a symptom of itching that is \"driving him crazy.\"  He has a difficult time opening the right eye in part because of the pain.  He said that when it was he had shingles all around the eyelid, upper and lower, as well as on the top of his scalp.  He is highly sensitive below the right eyelid now and even light wind blowing on his face can send him into a paroxysm of pain.  His right cheek is extremely sensitive to the touch.  He is now in the midst of his third course of acyclovir.  He feels that the stabbing pain in his eye might " actually be getting worse.  It is a neuralgia type pain in that it is short-lived, but recurring and intense.  Vicodin takes the edge off it.  He has 3-year-old gabapentin at home.  He never cared for the medicine and has not found it very helpful on this occasion for treatment of the neuralgia.  He has had CLL for 10 years.  He does not have symptoms on the left face.  He does not have symptoms in his arms or legs or with bowel or bladder control.  He has no problem with hearing, speech or swallowing.      PAST MEDICAL HISTORY:   Otherwise largely noncontributory.  He does not have high blood pressure, diabetes, thyroid or asthma.  He has not had pertinent surgery to the head or neck.  He did have a head injury at age 19 with coma for several days and a 1-month hospitalization.      CURRENT MEDICATIONS:  He does take atorvastatin.      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      FAMILY HISTORY:  Positive for heart disease and lung cancer.      PHYSICAL EXAMINATION:   GENERAL:  The patient is cooperative and in mild distress.  He has his right eye closed or its ptosis.   VITAL SIGNS:  Blood pressure 134/87.     There are no carotid bruits.  Auscultation of the heart shows S1, S2, without murmur, rub or gallop.  Chest is clear to auscultation.     NEUROLOGIC:   The patient is alert, oriented and lucid.  Cranial nerve testing shows full visual fields to confrontation.  Funduscopic exam was performed on the left shows a sharp disk.  It was difficult to perform a funduscopic exam on the right.  The eye is either closed or he has ptosis.  He does report that he is keeping his eye closed because it hurts so much to have it open.  He is able to open it somewhat, but the range of motion of the muscles appear limited.  Pupil response on the right could not be assessed.  The pupil on the left reacts.  The pupils are equal in size.  Facies are otherwise  symmetric.  Tongue protrudes in the midline.  Motor evaluation shows no pronator drift, normal finger tapping, finger-nose-finger and heel-knee-shin.  He has good strength in the arms, hands and legs.  Muscle stretch reflexes are present and symmetric.  Toes are downgoing.  Sensory exam shows preserved vibration and temperature in the hands and feet.  Romberg sign is absent.  He can walk on his heels, toes and tandem.      He did have an MRI scan of the brain performed before his diagnosis in early October.  The study was reviewed with the patient.  There are some minor areas of gliosis in the cerebral white matter.  The study is otherwise unremarkable.      ASSESSMENT:   1.  Postherpetic neuralgia, right ophthalmic territory.      DISCUSSION:  The patient is seen for evaluation of postherpetic neuralgia in the right ophthalmic territory.  He has superimposed ptosis or a closed eye.  In terms of treating the pain, I am going to try him on nortriptyline, building up to 30 mg at night.  If that is ineffective, then carbamazepine will be tried.  He really does not want to try the gabapentin because it made him feel ill when he took it a few years ago.  Pregabalin might be an alternative.  I offered to make an appointment to see him in followup in a month, but he is going to monitor his status for now.  I also put in a referral for him to see Neuro-Ophthalmology with regard to the closed right eye and whether or not this is ptosis or just a reflex closure due to pain.      Nikhil Farah MD

## 2017-10-31 ENCOUNTER — MYC MEDICAL ADVICE (OUTPATIENT)
Dept: FAMILY MEDICINE | Facility: CLINIC | Age: 72
End: 2017-10-31

## 2017-10-31 DIAGNOSIS — B02.29 POST HERPETIC NEURALGIA: Primary | ICD-10-CM

## 2017-10-31 NOTE — TELEPHONE ENCOUNTER
Please see MyChart message below.    Routed to provider to advise.    Christlele Silvestre RN  [facility]

## 2017-11-01 ENCOUNTER — MYC MEDICAL ADVICE (OUTPATIENT)
Dept: FAMILY MEDICINE | Facility: CLINIC | Age: 72
End: 2017-11-01

## 2017-11-01 ENCOUNTER — TELEPHONE (OUTPATIENT)
Dept: OPHTHALMOLOGY | Facility: CLINIC | Age: 72
End: 2017-11-01

## 2017-11-01 DIAGNOSIS — B02.29 POST HERPETIC NEURALGIA: ICD-10-CM

## 2017-11-01 DIAGNOSIS — B02.29 POST HERPETIC NEURALGIA: Primary | ICD-10-CM

## 2017-11-01 RX ORDER — GABAPENTIN 300 MG/1
300-900 CAPSULE ORAL 3 TIMES DAILY
Qty: 540 CAPSULE | Refills: 3 | Status: SHIPPED | OUTPATIENT
Start: 2017-11-01 | End: 2017-11-01

## 2017-11-01 RX ORDER — GABAPENTIN 300 MG/1
300-900 CAPSULE ORAL 3 TIMES DAILY
Qty: 540 CAPSULE | Refills: 3 | Status: SHIPPED | OUTPATIENT
Start: 2017-11-01 | End: 2019-05-16

## 2017-11-01 NOTE — TELEPHONE ENCOUNTER
Routing to PCP to review and advise.    My chart message Gabapentin 300 mg    Aurora Sung RN  [facility]

## 2017-11-01 NOTE — TELEPHONE ENCOUNTER
Sent patient a message to find out what pharmacy he wants to use.    Maude Brice RN [facility] Triage.

## 2017-11-01 NOTE — TELEPHONE ENCOUNTER
Pt currently being treated for keratits by dr. Garibay and referred to Dr. Carrillo for ptosis    Called and left voicemail with direct triage number      Would review able to schedule with another provider for complaint of worsening eye pain--    Will try again by end of day if no call back  Desmond Arriaga RN 1:43 PM 11/01/17

## 2017-11-01 NOTE — TELEPHONE ENCOUNTER
Routing to provider - was sent to wrong pharmacy - called pharmacy and cancelled these 2 scripts - correct pharmacy is cued. [facility]     Aurora Sung RN  [facility]

## 2017-11-01 NOTE — TELEPHONE ENCOUNTER
----- Message from Mark Ferrell sent at 11/1/2017 12:58 PM CDT -----  Regarding: Symptomatic Pt  Contact: [phone]  Pt of Dr Bonner stated that he's been experiencing stabbing in his eye thats becoming more frequent and intense in the past few days.     Pt has an Appt with Dr Bonner on 12/01/17 and said there's no way he can wait 1 month to be seen.     Pt would like to a call back at [phone] to discuss symptoms, ok to leave detailed vm.    Thank you!  CH  Please DO NOT send this message and/or reply back to sender. Call Center Representatives DO NOT respond to messages.

## 2017-11-02 ENCOUNTER — OFFICE VISIT (OUTPATIENT)
Dept: OPHTHALMOLOGY | Facility: CLINIC | Age: 72
End: 2017-11-02

## 2017-11-02 DIAGNOSIS — B02.31 HERPES ZOSTER CONJUNCTIVITIS OF RIGHT EYE: Primary | ICD-10-CM

## 2017-11-02 RX ORDER — PREDNISOLONE ACETATE 10 MG/ML
1 SUSPENSION/ DROPS OPHTHALMIC 4 TIMES DAILY
Qty: 1 BOTTLE | Refills: 0 | Status: SHIPPED | OUTPATIENT
Start: 2017-11-02 | End: 2017-11-28

## 2017-11-02 ASSESSMENT — VISUAL ACUITY
OD_SC+: +2
METHOD: SNELLEN - LINEAR
OD_SC: 20/25
OS_SC: 20/25
OS_SC+: +

## 2017-11-02 ASSESSMENT — SLIT LAMP EXAM - LIDS: COMMENTS: MOD EDEMA

## 2017-11-02 ASSESSMENT — TONOMETRY
OS_IOP_MMHG: 12
IOP_METHOD: ICARE
OD_IOP_MMHG: 9

## 2017-11-02 ASSESSMENT — CONF VISUAL FIELD
OD_NORMAL: 1
OS_NORMAL: 1

## 2017-11-02 NOTE — TELEPHONE ENCOUNTER
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 evans 10-27-17 and pt was to f/u in one month.    Pt seen by neurologist 10-30-17 and referred to dr. Carrillo for post herpetic neuralgia pain/ptosis  Pt stopped acyclovir at that appt and started nortriptyline      Pt states started having drooping lid about same time as shingles started     Pt now reporting increased right eye pain recently and worse yesterday    Scheduled pt with our cornea fellow for review of HZO reoccurrence may review if needs to see dr. Carrillo earlier than scheduled also with dr. Byrd    Pt scheduled today at 3:40 at [facility]  Desmond Arriaga RN 9:48 AM 11/02/17    Note to dr. Byrd for review

## 2017-11-02 NOTE — MR AVS SNAPSHOT
After Visit Summary   11/2/2017    Loco Wood    MRN: [id number]           Patient Information     Date Of Birth          1945        Visit Information        Provider Department      11/2/2017 3:40 PM Blayne Byrd DO [facility] Ophthalmology        Today's Diagnoses     Herpes zoster conjunctivitis of right eye - Right Eye    -  1       Follow-ups after your visit        Your next 10 appointments already scheduled     Nov 20, 2017 10:45 AM CST   Return Visit with Jacob Love MD   [facility] ([facility])    [address] [facility] [address]   [phone]            Dec 01, 2017  8:00 AM CST   LAB with  LAB   [facility] Lab ([facility])    [address] [place] [address]  [address]st [facility] [id number] [phone]           Please do not eat 10-12 hours before your appointment if you are coming in fasting for labs on lipids, cholesterol, or glucose (sugar). This does not apply to pregnant women. Water, hot tea and black coffee (with nothing added) are okay. Do not drink other fluids, diet soda or chew gum.            Dec 01, 2017  9:00 AM CST   (Arrive by 8:45 AM)   Return Visit with Campos Boyd MD   [facility]rology and Inst for Prostate and Urologic Cancers ([facility])    [address] [place] [address]  [address] [facility] [id number] [phone]            Dec 01, 2017  2:15 PM CST   NEW NEURO with Kamlesh Bonner MD   [facility] Clinic ([facility])    [address]  [address] [facility]  [address] Clin [address]  [facility] [address] [phone]            Apr 24, 2018  2:30 PM CDT   [facility] Draw with  [facility] DRAW   [facility] Draw ([facility])    [address] [facility]  [address] [facility] [id number] [phone]            Apr 24, 2018  3:00 PM CDT   RETURN ONC with He Burns MD   [facility] Blood and Marrow  Transplant ([facility])    [address] [facility]  2nd Floor  [facility] [address] [phone]              Who to contact     Please call your clinic at [phone] to:    Ask questions about your health    Make or cancel appointments    Discuss your medicines    Learn about your test results    Speak to your doctor   If you have compliments or concerns about an experience at your clinic, or if you wish to file a complaint, please contact [facility] Patient Relations at [phone] or email us at [email or web address]         Additional Information About Your Visit        [employer] Information     [employer] gives you secure access to your electronic health record. If you see a primary care provider, you can also send messages to your care team and make appointments. If you have questions, please call your primary care clinic.  If you do not have a primary care provider, please call [phone] and they will assist you.      [employer] is an electronic gateway that provides easy, online access to your medical records. With [employer], you can request a clinic appointment, read your test results, renew a prescription or communicate with your care team.     To access your existing account, please contact your [facility] or call [phone] for assistance.        Care EveryWhere ID     This is your Care EveryWhere ID. This could be used by other organizations to access your [facility] medical records  [id number]         Blood Pressure from Last 3 Encounters:   10/30/17 134/87   10/24/17 112/79   10/16/17 120/80    Weight from Last 3 Encounters:   10/30/17 70.5 kg (155 lb 6.4 oz)   10/24/17 69.4 kg (153 lb)   10/16/17 70.3 kg (155 lb)              Today, you had the following     No orders found for display         Today's Medication Changes          These changes are accurate as of: 11/2/17  5:33 PM.  If you have any questions,  ask your nurse or doctor.               Start taking these medicines.        Dose/Directions    prednisoLONE acetate 1 % ophthalmic susp   Commonly known as:  PRED FORTE   Started by:  Blayne Byrd, DO        Dose:  1 drop   Place 1 drop into the right eye 4 times daily   Quantity:  1 Bottle   Refills:  0            Where to get your medicines      These medications were sent to [id number] - [facility] [address] [facility] [address]     Phone:  [phone]     prednisoLONE acetate 1 % ophthalmic susp                Primary Care Provider Office Phone # Fax #    [facility] [phone]       [address] [facility] [address]        Equal Access to Services     HECTOR SANTOS : Aron Gu, wajonas rajput, celina toussaintalmacorinne barker, jose desai. So [facility] [phone].    ATENCIÓN: Si habla español, tiene a knutson disposición servicios gratuitos de asistencia lingüística. Llame al [phone].    We comply with applicable federal civil rights laws and [place] laws. We do not discriminate on the basis of race, color, national origin, age, disability, sex, sexual orientation, or gender identity.            Thank you!     Thank you for choosing [facility] OPHTHALMOLOGY  for your care. Our goal is always to provide you with excellent care. Hearing back from our patients is one way we can continue to improve our services. Please take a few minutes to complete the written survey that you may receive in the mail after your visit with us. Thank you!             Your Updated Medication List - Protect others around you: Learn how to safely use, store and throw away your medicines at www.disposemymeds.org.          This list is accurate as of: 11/2/17  5:33 PM.  Always use your most recent med list.                   Brand Name Dispense Instructions for use Diagnosis     acyclovir 400 MG tablet    ZOVIRAX    30 tablet    Take 1 tablet (400 mg) by mouth daily    Herpes zoster with keratoconjunctivitis       atorvastatin 10 MG tablet    LIPITOR    45 tablet    Take 1 tablet (10 mg) by mouth every 48 hours    Hyperlipidemia LDL goal <130       diazepam 5 MG tablet    VALIUM    15 tablet    Take 1 tablet (5 mg) by mouth every 6 hours as needed for anxiety    Complex tear of medial meniscus of right knee as current injury, initial encounter       fluorometholone 0.1 % ophthalmic susp    FML LIQUIFILM    3 mL    Place 1 drop into the right eye 3 times daily    Herpes zoster with keratoconjunctivitis       gabapentin 8 % Gel topical PLO cream     100 g    Apply 100 g topically every 8 hours    Neuropathy       * GABAPENTIN PO           * gabapentin 300 MG capsule    NEURONTIN    540 capsule    Take 1-3 capsules (300-900 mg) by mouth 3 times daily    Post herpetic neuralgia       HYDROcodone-acetaminophen  MG per tablet    NORCO     Take 1 tablet by mouth every 6 hours as needed for moderate to severe pain        hydrOXYzine 25 MG capsule    VISTARIL    120 capsule    Take 1 capsule (25 mg) by mouth 4 times daily as needed for itching    Herpes zoster without complication, Postherpetic neuralgia       lidocaine 2 % topical gel    XYLOCAINE    30 mL    Apply topically 3 times daily    Post herpetic neuralgia       lidocaine 5 % ointment    XYLOCAINE    50 g    Apply topically 3 times daily as needed for moderate pain    Herpes zoster without complication       nortriptyline 10 MG capsule    PAMELOR    90 capsule    Take 1 capsule (10 mg) by mouth See Admin Instructions One po q hs for 3 days, then two po q hs for 3 days, then three po q hs.    Post herpetic neuralgia       omega 3 1200 MG Caps      Take 2 capsules by mouth daily    Spinal stenosis, lumbar region, without neurogenic claudication       omeprazole 10 MG CR capsule    priLOSEC    90 capsule    TAKE 1 CAPSULE BY MOUTH DAILY  - TAKE 30 TO 60 MINUTES BEFORE A MEAL -    Gastroesophageal reflux disease without esophagitis       prednisoLONE acetate 1 % ophthalmic susp    PRED FORTE    1 Bottle    Place 1 drop into the right eye 4 times daily        TYLENOL PO           VITAMIN D3 PO      Take 2,000 Units by mouth        * Notice:  This list has 2 medication(s) that are the same as other medications prescribed for you. Read the directions carefully, and ask your doctor or other care provider to review them with you.

## 2017-11-02 NOTE — NURSING NOTE
Chief Complaints and History of Present Illnesses   Patient presents with     Follow Up For     Herpes zoster with keratoconjunctivitis     HPI    Affected eye(s):  Right   Symptoms:     No blurred vision   Photophobia      Frequency:  Constant       Do you have eye pain now?:  Yes   Location:  OD   Pain Level:  Severe Pain (6), Severe Pain (7)   Pain Frequency:  Constant   Pain Characteristics:  Stabbing      Comments:  Herpes zoster with keratoconjunctivitis OD. Stabbing pain that comes and goes, but a constant pain. Feels like it is either the eye itself or right below the eye. Eye has been mostly closed, when opening it is more painful. Lids and above the eye is numb, since onset of shingles. Using FML TID OD, taking nortriptyline (was told not to take acyclovir).    Maegan Ni COT 3:25 PM November 2, 2017

## 2017-11-02 NOTE — PROGRESS NOTES
Subjective:  Patient has been seen for VZV by Dr. Love and has recently seen Dr. Farah (neurology) for post herpetic neuralgia.  He reports that he has frequent stabbing pain in and around the right eye.  Shingles was diagnosed 10/3/17 and he has been treated with Valtrex, FML, and gabapentin.  His vision has been blurred in the right eye since the onset but has cleared somewhat.  He denies new flashing lights, floaters, or curtains over vision.      Current Eye Medications:    FML TID right eye   Fish oil QD.  Cold compresses TID right eye.  Acyclovir (stopped by neurology)  Gabapentin  Nortriptyline         A/P  Herpes zoster ophthalmicus right eye with mild iritis    Trial of prednisolone FOUR TIMES A DAY OD in place of FML  Pain control per neurology with nortriptyline and gabapentin  Cold packs as needed.   No intraocular involvement today  No evidence of neurologic involvement, ptosis is protective and related to periocular edema  Full ocular motility  Keep appointment with Dr. Bonner as scheduled    F/u with Dr. Love for IOP check in 2 weeks    Complete documentation of historical and exam elements from today's encounter can be found in the full encounter summary report (not reduplicated in this progress note). I personally obtained the chief complaint(s) and history of present illness.  I confirmed and edited as necessary the review of systems, past medical/surgical history, family history, social history, and examination findings as documented by others; and I examined the patient myself. I personally reviewed the relevant tests, images, and reports as documented above. I formulated and edited as necessary the assessment and plan and discussed the findings and management plan with the patient and family.     Blayne Byrd,

## 2017-11-03 ENCOUNTER — MYC MEDICAL ADVICE (OUTPATIENT)
Dept: FAMILY MEDICINE | Facility: CLINIC | Age: 72
End: 2017-11-03

## 2017-11-03 NOTE — TELEPHONE ENCOUNTER
Routing to PCP to review and advise.    Please see My chart message     Aurora Sung RN  [facility]

## 2017-11-06 ENCOUNTER — OFFICE VISIT (OUTPATIENT)
Dept: FAMILY MEDICINE | Facility: CLINIC | Age: 72
End: 2017-11-06
Payer: COMMERCIAL

## 2017-11-06 ENCOUNTER — MYC MEDICAL ADVICE (OUTPATIENT)
Dept: FAMILY MEDICINE | Facility: CLINIC | Age: 72
End: 2017-11-06

## 2017-11-06 VITALS
OXYGEN SATURATION: 100 % | TEMPERATURE: 96.9 F | BODY MASS INDEX: 24.59 KG/M2 | SYSTOLIC BLOOD PRESSURE: 115 MMHG | HEART RATE: 94 BPM | DIASTOLIC BLOOD PRESSURE: 79 MMHG | WEIGHT: 157 LBS

## 2017-11-06 DIAGNOSIS — B02.33: Primary | ICD-10-CM

## 2017-11-06 DIAGNOSIS — C91.10 CLL (CHRONIC LYMPHOCYTIC LEUKEMIA) (H): ICD-10-CM

## 2017-11-06 DIAGNOSIS — B02.22 POSTHERPETIC TRIGEMINAL NEURALGIA: ICD-10-CM

## 2017-11-06 PROCEDURE — 99213 OFFICE O/P EST LOW 20 MIN: CPT | Performed by: INTERNAL MEDICINE

## 2017-11-06 RX ORDER — HYDROCODONE BITARTRATE AND ACETAMINOPHEN 5; 325 MG/1; MG/1
1 TABLET ORAL EVERY 8 HOURS PRN
Qty: 30 TABLET | Refills: 0 | Status: SHIPPED | OUTPATIENT
Start: 2017-11-06 | End: 2017-11-30

## 2017-11-06 RX ORDER — ZOLPIDEM TARTRATE 5 MG/1
5 TABLET ORAL
Qty: 30 TABLET | Refills: 5 | Status: SHIPPED | OUTPATIENT
Start: 2017-11-06 | End: 2019-05-22

## 2017-11-06 NOTE — NURSING NOTE
"Chief Complaint   Patient presents with     Shingles       Initial /79 (BP Location: Right arm, Patient Position: Chair, Cuff Size: Adult Regular)  Pulse 94  Temp 96.9  F (36.1  C) (Oral)  Wt 157 lb (71.2 kg)  SpO2 100%  BMI 24.59 kg/m2 Estimated body mass index is 24.59 kg/(m^2) as calculated from the following:    Height as of 10/30/17: 5' 7\" (1.702 m).    Weight as of this encounter: 157 lb (71.2 kg).  Medication Reconciliation: complete   Karen Levy MA      "

## 2017-11-06 NOTE — PROGRESS NOTES
SUBJECTIVE:   Loco Wood is a 72 year old male who presents to clinic today for the following health issues:      Follow up shingles, right side of face and right eye.    Steroid and root canal.    Patient had immunization.  Has CLL but very well controlled.  Not on immunosupressive treatments    Involved the right side of the face and eye    Working with ophthalmology for preservation of vision  Saw neurology.    At this point, could CLL be affecting?  Tend to agree that there is little harm in continuing 1 more month of acyclovir.               Problem list and histories reviewed & adjusted, as indicated.  Additional history: as documented    Patient Active Problem List   Diagnosis     Esophageal reflux     Lumbago     CLL (chronic lymphocytic leukemia) (H)     HYPERLIPIDEMIA LDL GOAL <130     Mass of skin     Health Care Home     Vitamin D deficiency     Advanced directives, counseling/discussion     Osteoarthritis of hip     OA (osteoarthritis) of hip     Insomnia     BPH (benign prostatic hyperplasia)     Acute bilateral low back pain without sciatica     Prostate nodule     Chondromalacia, knee, right     Complex tear of medial meniscus of right knee as current injury     Herpes zoster with keratoconjunctivitis, od     Past Surgical History:   Procedure Laterality Date     ARTHROPLASTY MINIMALLY INVASIVE HIP  5/10/2013    Procedure: ARTHROPLASTY MINIMALLY INVASIVE HIP;  Left Two Incision Total Hip Arthroplasty ;  Surgeon: Desmond Alberts MD;  Location: [facility] OR     ARTHROPLASTY MINIMALLY INVASIVE HIP  2/27/2014    Procedure: ARTHROPLASTY MINIMALLY INVASIVE HIP;  Right Total Hip Arthroplasty Minimally Invasive Two Incision  *Latex Free Room;  Surgeon: Desmond Alberts MD;  Location: [facility] OR     BACK SURGERY      back fusion 1994     C NONSPECIFIC PROCEDURE  1964 &'95    (R) inguinal herniorrhapy     C NONSPECIFIC PROCEDURE  1949    (L) inguinal herniorrhaphy     C NONSPECIFIC PROCEDURE  1994     L4-5  L5 S1 fusion - spondylolisthesis     COLONOSCOPY           COLONOSCOPY N/A 8/15/2017    Procedure: COLONOSCOPY;  colonoscopy;  Surgeon: Chun Mcguire MD;  Location:  GI     GI SURGERY      4 hernia repairs in childhood       Social History   Substance Use Topics     Smoking status: Never Smoker     Smokeless tobacco: Never Used     Alcohol use 0.0 oz/week     0 Standard drinks or equivalent per week      Comment: 5 drinks per week     Family History   Problem Relation Age of Onset     HEART DISEASE Father      CEREBROVASCULAR DISEASE Father      CANCER Father      melonoma     Melanoma Father      CANCER Mother      Lung cancer     CEREBROVASCULAR DISEASE Paternal Uncle      Aneurism     Breast Cancer Maternal Aunt       from it     CANCER Paternal Uncle      CANCER Paternal Aunt      Skin Cancer No family hx of      Glaucoma No family hx of      Macular Degeneration No family hx of          Current Outpatient Prescriptions   Medication Sig Dispense Refill     HYDROcodone-acetaminophen (NORCO) 5-325 MG per tablet Take 1 tablet by mouth every 8 hours as needed for pain maximum 2 tablet(s) per day 30 tablet 0     zolpidem (AMBIEN) 5 MG tablet Take 1 tablet (5 mg) by mouth nightly as needed for sleep 30 tablet 5     prednisoLONE acetate (PRED FORTE) 1 % ophthalmic susp Place 1 drop into the right eye 4 times daily 1 Bottle 0     gabapentin (NEURONTIN) 300 MG capsule Take 1-3 capsules (300-900 mg) by mouth 3 times daily 540 capsule 3     lidocaine (XYLOCAINE) 2 % topical gel Apply topically 3 times daily 30 mL 3     nortriptyline (PAMELOR) 10 MG capsule Take 1 capsule (10 mg) by mouth See Admin Instructions One po q hs for 3 days, then two po q hs for 3 days, then three po q hs. 90 capsule 1     acyclovir (ZOVIRAX) 400 MG tablet Take 1 tablet (400 mg) by mouth daily 30 tablet 4     fluorometholone (FML LIQUIFILM) 0.1 % ophthalmic susp Place 1 drop into the right eye 3 times daily 3 mL 3     gabapentin  8 % GEL topical PLO cream Apply 100 g topically every 8 hours 100 g 3     GABAPENTIN PO        HYDROcodone-acetaminophen (NORCO)  MG per tablet Take 1 tablet by mouth every 6 hours as needed for moderate to severe pain       Acetaminophen (TYLENOL PO)        lidocaine (XYLOCAINE) 5 % ointment Apply topically 3 times daily as needed for moderate pain 50 g 1     omega 3 1200 MG CAPS Take 2 capsules by mouth daily       omeprazole (PRILOSEC) 10 MG CR capsule TAKE 1 CAPSULE BY MOUTH DAILY - TAKE 30 TO 60 MINUTES BEFORE A MEAL - 90 capsule 3     atorvastatin (LIPITOR) 10 MG tablet Take 1 tablet (10 mg) by mouth every 48 hours 45 tablet 9     Cholecalciferol (VITAMIN D3 PO) Take 2,000 Units by mouth       Allergies   Allergen Reactions     Dilaudid [Hydromorphone] Itching     Morphine Itching     Recent Labs   Lab Test  10/24/17   1620  10/04/17   2250  04/25/17   1443  10/25/16   1543  04/11/16   1136   04/29/15   1012   LDL   --    --    --   72  101*   --   96   HDL   --    --    --   35*  40   --   40*   TRIG   --    --    --   263*  123   --   88   ALT  24   --   26  26  22   < >  22   CR  1.26*  1.28*  1.22  1.35*  1.23   < >  1.26*   GFRESTIMATED  57*  55*  59*  52*  58*   < >  57*   GFRESTBLACK  68  67  71  63  71   < >  69   POTASSIUM  4.7  4.3  4.5  4.4  4.0   < >  4.2    < > = values in this interval not displayed.            Reviewed and updated as needed this visit by clinical staffTobacco  Allergies  Meds  Problems  Med Hx  Surg Hx  Fam Hx  Soc Hx        Reviewed and updated as needed this visit by Provider  Allergies  Meds  Problems         ROS:  Constitutional, HEENT, cardiovascular, pulmonary, gi and gu systems are negative, except as otherwise noted.      OBJECTIVE:   /79 (BP Location: Right arm, Patient Position: Chair, Cuff Size: Adult Regular)  Pulse 94  Temp 96.9  F (36.1  C) (Oral)  Wt 157 lb (71.2 kg)  SpO2 100%  BMI 24.59 kg/m2  Body mass index is 24.59  kg/(m^2).  GENERAL: healthy, alert and no distress  NECK: no adenopathy, no asymmetry, masses, or scars and thyroid normal to palpation  RESP: lungs clear to auscultation - no rales, rhonchi or wheezes  CV: regular rate and rhythm, normal S1 S2, no S3 or S4, no murmur, click or rub, no peripheral edema and peripheral pulses strong  ABDOMEN: soft, nontender, no hepatosplenomegaly, no masses and bowel sounds normal  MS: no gross musculoskeletal defects noted, no edema    Diagnostic Test Results:  Results for orders placed or performed in visit on 10/24/17   Comprehensive metabolic panel   Result Value Ref Range    Sodium 140 133 - 144 mmol/L    Potassium 4.7 3.4 - 5.3 mmol/L    Chloride 105 94 - 109 mmol/L    Carbon Dioxide 29 20 - 32 mmol/L    Anion Gap 6 3 - 14 mmol/L    Glucose 91 70 - 99 mg/dL    Urea Nitrogen 18 7 - 30 mg/dL    Creatinine 1.26 (H) 0.66 - 1.25 mg/dL    GFR Estimate 57 (L) >60 mL/min/1.7m2    GFR Estimate If Black 68 >60 mL/min/1.7m2    Calcium 8.7 8.5 - 10.1 mg/dL    Bilirubin Total 0.7 0.2 - 1.3 mg/dL    Albumin 4.4 3.4 - 5.0 g/dL    Protein Total 7.1 6.8 - 8.8 g/dL    Alkaline Phosphatase 84 40 - 150 U/L    ALT 24 0 - 70 U/L    AST 16 0 - 45 U/L   Lactate Dehydrogenase   Result Value Ref Range    Lactate Dehydrogenase 194 85 - 227 U/L   *CBC with platelets differential   Result Value Ref Range    WBC 95.2 (HH) 4.0 - 11.0 10e9/L    RBC Count 4.41 4.4 - 5.9 10e12/L    Hemoglobin 13.5 13.3 - 17.7 g/dL    Hematocrit 43.3 40.0 - 53.0 %    MCV 98 78 - 100 fl    MCH 30.6 26.5 - 33.0 pg    MCHC 31.2 (L) 31.5 - 36.5 g/dL    RDW 14.8 10.0 - 15.0 %    Platelet Count 152 150 - 450 10e9/L    Diff Method Manual Differential     % Neutrophils 6.0 %    % Lymphocytes 89.0 %    % Monocytes 3.0 %    % Eosinophils 2.0 %    % Basophils 0.0 %    Absolute Neutrophil 5.7 1.6 - 8.3 10e9/L    Absolute Lymphocytes 84.7 (H) 0.8 - 5.3 10e9/L    Absolute Monocytes 2.9 (H) 0.0 - 1.3 10e9/L    Absolute Eosinophils 1.9 (H) 0.0  - 0.7 10e9/L    Absolute Basophils 0.0 0.0 - 0.2 10e9/L    RBC Morphology Normal    IgG   Result Value Ref Range     (L) 695 - 1620 mg/dL       ASSESSMENT/PLAN:       ICD-10-CM    1. Herpes zoster with keratoconjunctivitis, od B02.33 HYDROcodone-acetaminophen (NORCO) 5-325 MG per tablet     zolpidem (AMBIEN) 5 MG tablet   2. Postherpetic trigeminal neuralgia B02.22    3. CLL (chronic lymphocytic leukemia) (H) C91.10      Continue one more month of acyclovir, continue neurontin, continue nortrytiline.  Occasional hydrocodone    Recommended 4-6 hours between hydrocodone and other sedative medications  cotninue topical treatmetns if helpful as well        Campos Parra MD  [facility]

## 2017-11-06 NOTE — MR AVS SNAPSHOT
After Visit Summary   11/6/2017    Loco Wood    MRN: [id number]           Patient Information     Date Of Birth          1945        Visit Information        Provider Department      11/6/2017 11:20 AM Campos Parra MD [facility]        Today's Diagnoses     Herpes zoster with keratoconjunctivitis, od    -  1       Follow-ups after your visit        Your next 10 appointments already scheduled     Nov 20, 2017 10:45 AM CST   Return Visit with Jacob Love MD   [facility] ([facility])    [address] [facility] [facility] [address]   [phone]            Dec 01, 2017  8:00 AM CST   LAB with  LAB   [facility] ([facility])    [address] [place] [address]  1st [facility] [address] [phone]           Please do not eat 10-12 hours before your appointment if you are coming in fasting for labs on lipids, cholesterol, or glucose (sugar). This does not apply to pregnant women. Water, hot tea and black coffee (with nothing added) are okay. Do not drink other fluids, diet soda or chew gum.            Dec 01, 2017  9:00 AM CST   (Arrive by 8:45 AM)   Return Visit with Campos Boyd MD   [facility] for Prostate and Urologic Cancers ([facility])    [address] [place] [address]  [address]th [facility] [address] [phone]            Dec 01, 2017  2:15 PM CST   NEW NEURO with Kamlesh Bonner MD   [facility] ([facility])    [address]  [address] [facility]  [address] [address]  [facility] [address] [phone]            Apr 24, 2018  2:30 PM CDT   [facility] Lab Draw with  [facility] LAB DRAW   [facility] Lab Draw ([facility])    [address] [facility]  2nd [facility] [address] [phone]            Apr 24, 2018  3:00 PM CDT   RETURN ONC with He Burns MD   [facility] Blood and Marrow  Transplant ([facility])    [address] [facility]  2nd Floor  [facility] [address] [phone]              Who to contact     If you have questions or need follow up information about today's clinic visit or your schedule please contact [facility] directly at [phone].  Normal or non-critical lab and imaging results will be communicated to you by MyChart, letter or phone within 4 business days after the clinic has received the results. If you do not hear from us within 7 days, please contact the clinic through [employer] or phone. If you have a critical or abnormal lab result, we will notify you by phone as soon as possible.  Submit refill requests through [employer] or call your pharmacy and they will forward the refill request to us. Please allow 3 business days for your refill to be completed.          Additional Information About Your Visit        MyChart Information     [employer] gives you secure access to your electronic health record. If you see a primary care provider, you can also send messages to your care team and make appointments. If you have questions, please call your primary care clinic.  If you do not have a primary care provider, please call [phone] and they will assist you.        Care EveryWhere ID     This is your Care EveryWhere ID. This could be used by other organizations to access your [place] medical records  [id number]        Your Vitals Were     Pulse Temperature Pulse Oximetry BMI (Body Mass Index)          94 96.9  F (36.1  C) (Oral) 100% 24.59 kg/m2         Blood Pressure from Last 3 Encounters:   11/06/17 115/79   10/30/17 134/87   10/24/17 112/79    Weight from Last 3 Encounters:   11/06/17 157 lb (71.2 kg)   10/30/17 155 lb 6.4 oz (70.5 kg)   10/24/17 153 lb (69.4 kg)              Today, you had the following     No orders found for display         Today's Medication Changes          These changes are accurate as of: 11/6/17  12:09 PM.  If you have any questions, ask your nurse or doctor.               These medicines have changed or have updated prescriptions.        Dose/Directions    * HYDROcodone-acetaminophen  MG per tablet   Commonly known as:  NORCO   This may have changed:  Another medication with the same name was added. Make sure you understand how and when to take each.   Changed by:  Engelmann, Lauren Anneliese, MD        Dose:  1 tablet   Take 1 tablet by mouth every 6 hours as needed for moderate to severe pain   Refills:  0       * HYDROcodone-acetaminophen 5-325 MG per tablet   Commonly known as:  NORCO   This may have changed:  You were already taking a medication with the same name, and this prescription was added. Make sure you understand how and when to take each.   Used for:  Herpes zoster with keratoconjunctivitis   Changed by:  Campos Parra MD        Dose:  1 tablet   Take 1 tablet by mouth every 8 hours as needed for pain maximum 2 tablet(s) per day   Quantity:  30 tablet   Refills:  0       * Notice:  This list has 2 medication(s) that are the same as other medications prescribed for you. Read the directions carefully, and ask your doctor or other care provider to review them with you.      Stop taking these medicines if you haven't already. Please contact your care team if you have questions.     diazepam 5 MG tablet   Commonly known as:  VALIUM   Stopped by:  Campos Parra MD           hydrOXYzine 25 MG capsule   Commonly known as:  VISTARIL   Stopped by:  Campos Parra MD                Where to get your medicines      Some of these will need a paper prescription and others can be bought over the counter.  Ask your nurse if you have questions.     Bring a paper prescription for each of these medications     HYDROcodone-acetaminophen 5-325 MG per tablet                Primary Care Provider Office Phone # Fax #    [facility] [phone]       [address] [place] [address]  [facility] [address]        Equal Access to Services     HECTOR SANTOS : Hadii aad ku hadambergris Gu, wajavycorinne rajput, qarobertalinus castañedamajose moura. So [facility] [phone].    ATENCIÓN: Si habla español, tiene a knutson disposición servicios gratuitos de asistencia lingüística. Princessame al [phone].    We comply with applicable federal civil rights laws and [place] laws. We do not discriminate on the basis of race, color, national origin, age, disability, sex, sexual orientation, or gender identity.            Thank you!     Thank you for choosing [facility]  for your care. Our goal is always to provide you with excellent care. Hearing back from our patients is one way we can continue to improve our services. Please take a few minutes to complete the written survey that you may receive in the mail after your visit with us. Thank you!             Your Updated Medication List - Protect others around you: Learn how to safely use, store and throw away your medicines at www.disposemymeds.org.          This list is accurate as of: 11/6/17 12:09 PM.  Always use your most recent med list.                   Brand Name Dispense Instructions for use Diagnosis    acyclovir 400 MG tablet    ZOVIRAX    30 tablet    Take 1 tablet (400 mg) by mouth daily    Herpes zoster with keratoconjunctivitis       atorvastatin 10 MG tablet    LIPITOR    45 tablet    Take 1 tablet (10 mg) by mouth every 48 hours    Hyperlipidemia LDL goal <130       fluorometholone 0.1 % ophthalmic susp    FML LIQUIFILM    3 mL    Place 1 drop into the right eye 3 times daily    Herpes zoster with keratoconjunctivitis       gabapentin 8 % Gel topical PLO cream     100 g    Apply 100 g topically every 8 hours    Neuropathy       * GABAPENTIN PO           * gabapentin 300 MG capsule    NEURONTIN    540 capsule    Take 1-3 capsules (300-900 mg) by mouth 3 times daily    Post herpetic  neuralgia       * HYDROcodone-acetaminophen  MG per tablet    NORCO     Take 1 tablet by mouth every 6 hours as needed for moderate to severe pain        * HYDROcodone-acetaminophen 5-325 MG per tablet    NORCO    30 tablet    Take 1 tablet by mouth every 8 hours as needed for pain maximum 2 tablet(s) per day    Herpes zoster with keratoconjunctivitis       lidocaine 2 % topical gel    XYLOCAINE    30 mL    Apply topically 3 times daily    Post herpetic neuralgia       lidocaine 5 % ointment    XYLOCAINE    50 g    Apply topically 3 times daily as needed for moderate pain    Herpes zoster without complication       nortriptyline 10 MG capsule    PAMELOR    90 capsule    Take 1 capsule (10 mg) by mouth See Admin Instructions One po q hs for 3 days, then two po q hs for 3 days, then three po q hs.    Post herpetic neuralgia       omega 3 1200 MG Caps      Take 2 capsules by mouth daily    Spinal stenosis, lumbar region, without neurogenic claudication       omeprazole 10 MG CR capsule    priLOSEC    90 capsule    TAKE 1 CAPSULE BY MOUTH DAILY - TAKE 30 TO 60 MINUTES BEFORE A MEAL -    Gastroesophageal reflux disease without esophagitis       prednisoLONE acetate 1 % ophthalmic susp    PRED FORTE    1 Bottle    Place 1 drop into the right eye 4 times daily        TYLENOL PO           VITAMIN D3 PO      Take 2,000 Units by mouth        * Notice:  This list has 4 medication(s) that are the same as other medications prescribed for you. Read the directions carefully, and ask your doctor or other care provider to review them with you.

## 2017-11-09 ENCOUNTER — MYC MEDICAL ADVICE (OUTPATIENT)
Dept: FAMILY MEDICINE | Facility: CLINIC | Age: 72
End: 2017-11-09

## 2017-11-09 NOTE — TELEPHONE ENCOUNTER
Routing to PCP to review and advise.    See my chart     Aurora Sung RN  [facility]

## 2017-11-14 ENCOUNTER — MYC MEDICAL ADVICE (OUTPATIENT)
Dept: FAMILY MEDICINE | Facility: CLINIC | Age: 72
End: 2017-11-14

## 2017-11-14 DIAGNOSIS — L08.9 INFECTED SKIN LESION: Primary | ICD-10-CM

## 2017-11-14 RX ORDER — SULFAMETHOXAZOLE/TRIMETHOPRIM 800-160 MG
1 TABLET ORAL 2 TIMES DAILY
Qty: 10 TABLET | Refills: 0 | Status: SHIPPED | OUTPATIENT
Start: 2017-11-14 | End: 2017-11-15

## 2017-11-14 NOTE — TELEPHONE ENCOUNTER
Routing to PCP to review and advise.    See My Chart question?      Aurora Sung RN  [facility]

## 2017-11-15 ENCOUNTER — OFFICE VISIT (OUTPATIENT)
Dept: FAMILY MEDICINE | Facility: CLINIC | Age: 72
End: 2017-11-15
Payer: COMMERCIAL

## 2017-11-15 VITALS
OXYGEN SATURATION: 99 % | TEMPERATURE: 97.3 F | SYSTOLIC BLOOD PRESSURE: 114 MMHG | DIASTOLIC BLOOD PRESSURE: 68 MMHG | HEART RATE: 97 BPM

## 2017-11-15 DIAGNOSIS — C91.10 CLL (CHRONIC LYMPHOCYTIC LEUKEMIA) (H): Primary | ICD-10-CM

## 2017-11-15 DIAGNOSIS — B02.33: ICD-10-CM

## 2017-11-15 PROCEDURE — 99213 OFFICE O/P EST LOW 20 MIN: CPT | Performed by: INTERNAL MEDICINE

## 2017-11-15 ASSESSMENT — PAIN SCALES - GENERAL: PAINLEVEL: MODERATE PAIN (5)

## 2017-11-15 NOTE — NURSING NOTE
"Chief Complaint   Patient presents with     Bleeding/Bruising       Initial /68 (BP Location: Right arm, Patient Position: Chair, Cuff Size: Adult Regular)  Pulse 97  Temp 97.3  F (36.3  C) (Oral)  SpO2 99% Estimated body mass index is 24.59 kg/(m^2) as calculated from the following:    Height as of 10/30/17: 5' 7\" (1.702 m).    Weight as of 11/6/17: 157 lb (71.2 kg).  Medication Reconciliation: complete   Karen Levy MA      "

## 2017-11-15 NOTE — MR AVS SNAPSHOT
After Visit Summary   11/15/2017    Loco Wood    MRN: [id number]           Patient Information     Date Of Birth          1945        Visit Information        Provider Department      11/15/2017 4:40 PM Campos Parra MD [facility]         Follow-ups after your visit        Your next 10 appointments already scheduled     Nov 20, 2017 10:45 AM CST   Return Visit with Jacob Love MD   [facility] ([facility])    [address] [facility] [address]   [phone]            Nov 28, 2017 12:30 PM CST   NEW NEURO with Jameson Morton MD   Eye Clinic ([facility])    [address]  [address] [facility] [address] [facility] [facility] [address]  9th Fl Clin 9a  [facility] [address] [phone]            Dec 01, 2017  8:00 AM CST   LAB with  LAB   [facility] ([facility])    [address] [place] [address]  1st Floor  [facility] [address] [phone]           Please do not eat 10-12 hours before your appointment if you are coming in fasting for labs on lipids, cholesterol, or glucose (sugar). This does not apply to pregnant women. Water, hot tea and black coffee (with nothing added) are okay. Do not drink other fluids, diet soda or chew gum.            Dec 01, 2017  9:00 AM CST   (Arrive by 8:45 AM)   Return Visit with Campos Boyd MD   [facility]rology [facility] Inst for Prostate and Urologic Cancers ([facility])    [address] [place] [address]  4th Floor  [facility] [id number] [phone]            Apr 24, 2018  2:30 PM CDT   [facility]ic Lab Draw with  MASONIC LAB DRAW   [facility] Lab Draw ([facility])    [address] [facility]  2nd Floor  [facility] [address] [phone]            Apr 24, 2018  3:00 PM CDT   RETURN ONC with He Burns MD   [facility] Blood and Marrow Transplant ([facility])    [address] [place] [address]  2nd  [facility] [address] [phone]              Who to contact     If you have questions or need follow up information about today's clinic visit or your schedule please contact [facility] directly at [phone].  Normal or non-critical lab and imaging results will be communicated to you by MyChart, letter or phone within 4 business days after the clinic has received the results. If you do not hear from us within 7 days, please contact the clinic through [employer] or phone. If you have a critical or abnormal lab result, we will notify you by phone as soon as possible.  Submit refill requests through [employer] or call your pharmacy and they will forward the refill request to us. Please allow 3 business days for your refill to be completed.          Additional Information About Your Visit        [employer] Information     [employer] gives you secure access to your electronic health record. If you see a primary care provider, you can also send messages to your care team and make appointments. If you have questions, please call your primary care clinic.  If you do not have a primary care provider, please call [phone] and they will assist you.        Care EveryWhere ID     This is your Care EveryWhere ID. This could be used by other organizations to access your [place] medical records  [id number]        Your Vitals Were     Pulse Temperature Pulse Oximetry             97 97.3  F (36.3  C) (Oral) 99%          Blood Pressure from Last 3 Encounters:   11/15/17 114/68   11/06/17 115/79   10/30/17 134/87    Weight from Last 3 Encounters:   11/06/17 157 lb (71.2 kg)   10/30/17 155 lb 6.4 oz (70.5 kg)   10/24/17 153 lb (69.4 kg)              Today, you had the following     No orders found for display         Today's Medication Changes          These changes are accurate as of: 11/15/17  5:25 PM.  If you have any questions, ask your nurse or doctor.               Stop taking these medicines  if you haven't already. Please contact your care team if you have questions.     sulfamethoxazole-trimethoprim 800-160 MG per tablet   Commonly known as:  BACTRIM DS/SEPTRA DS   Stopped by:  Campos Parra MD                    Primary Care Provider Office Phone # Fax #    [facility] [phone]       [address] [place] [address]        Equal Access to Services     HECTOR SANTOS : Hadii aad ku hadasho Soomaali, waaxda luqadaha, qaybta kaalmada adeegyada, waxay idiin hayaan adeeg kharash la'aan . So [facility] [phone].    ATENCIÓN: Si habla espgatito, tiene a kntuson disposición servicios gratuitos de asistencia lingüística. Llame al [phone].    We comply with applicable federal civil rights laws and [place] laws. We do not discriminate on the basis of race, color, national origin, age, disability, sex, sexual orientation, or gender identity.            Thank you!     Thank you for choosing [facility]  for your care. Our goal is always to provide you with excellent care. Hearing back from our patients is one way we can continue to improve our services. Please take a few minutes to complete the written survey that you may receive in the mail after your visit with us. Thank you!             Your Updated Medication List - Protect others around you: Learn how to safely use, store and throw away your medicines at www.disposemymeds.org.          This list is accurate as of: 11/15/17  5:25 PM.  Always use your most recent med list.                   Brand Name Dispense Instructions for use Diagnosis    acyclovir 400 MG tablet    ZOVIRAX    30 tablet    Take 1 tablet (400 mg) by mouth daily    Herpes zoster with keratoconjunctivitis       atorvastatin 10 MG tablet    LIPITOR    45 tablet    Take 1 tablet (10 mg) by mouth every 48 hours    Hyperlipidemia LDL goal <130       fluorometholone 0.1 % ophthalmic susp    FML LIQUIFILM    3 mL    Place  1 drop into the right eye 3 times daily    Herpes zoster with keratoconjunctivitis       gabapentin 8 % Gel topical PLO cream     100 g    Apply 100 g topically every 8 hours    Neuropathy       * GABAPENTIN PO           * gabapentin 300 MG capsule    NEURONTIN    540 capsule    Take 1-3 capsules (300-900 mg) by mouth 3 times daily    Post herpetic neuralgia       * HYDROcodone-acetaminophen  MG per tablet    NORCO     Take 1 tablet by mouth every 6 hours as needed for moderate to severe pain        * HYDROcodone-acetaminophen 5-325 MG per tablet    NORCO    30 tablet    Take 1 tablet by mouth every 8 hours as needed for pain maximum 2 tablet(s) per day    Herpes zoster with keratoconjunctivitis       lidocaine 2 % topical gel    XYLOCAINE    30 mL    Apply topically 3 times daily    Post herpetic neuralgia       lidocaine 5 % ointment    XYLOCAINE    50 g    Apply topically 3 times daily as needed for moderate pain    Herpes zoster without complication       nortriptyline 10 MG capsule    PAMELOR    90 capsule    Take 1 capsule (10 mg) by mouth See Admin Instructions One po q hs for 3 days, then two po q hs for 3 days, then three po q hs.    Post herpetic neuralgia       omega 3 1200 MG Caps      Take 2 capsules by mouth daily    Spinal stenosis, lumbar region, without neurogenic claudication       omeprazole 10 MG CR capsule    priLOSEC    90 capsule    TAKE 1 CAPSULE BY MOUTH DAILY - TAKE 30 TO 60 MINUTES BEFORE A MEAL -    Gastroesophageal reflux disease without esophagitis       prednisoLONE acetate 1 % ophthalmic susp    PRED FORTE    1 Bottle    Place 1 drop into the right eye 4 times daily        TYLENOL PO           VITAMIN D3 PO      Take 2,000 Units by mouth        zolpidem 5 MG tablet    AMBIEN    30 tablet    Take 1 tablet (5 mg) by mouth nightly as needed for sleep    Herpes zoster with keratoconjunctivitis       * Notice:  This list has 4 medication(s) that are the same as other medications  prescribed for you. Read the directions carefully, and ask your doctor or other care provider to review them with you.

## 2017-11-15 NOTE — PROGRESS NOTES
SUBJECTIVE:   Loco Wood is a 72 year old male who presents to clinic today for the following health issues:    Recheck bruising/swelling on forehead/face; Hx of shingles   Had a few weeks before and more fatigued before the pain occurred.        Problem list and histories reviewed & adjusted, as indicated.  Additional history: as documented    Patient Active Problem List   Diagnosis     Esophageal reflux     Lumbago     CLL (chronic lymphocytic leukemia) (H)     HYPERLIPIDEMIA LDL GOAL <130     Mass of skin     Health Care Home     Vitamin D deficiency     Advanced directives, counseling/discussion     Osteoarthritis of hip     OA (osteoarthritis) of hip     Insomnia     BPH (benign prostatic hyperplasia)     Acute bilateral low back pain without sciatica     Prostate nodule     Chondromalacia, knee, right     Complex tear of medial meniscus of right knee as current injury     Herpes zoster with keratoconjunctivitis, od     Past Surgical History:   Procedure Laterality Date     ARTHROPLASTY MINIMALLY INVASIVE HIP  5/10/2013    Procedure: ARTHROPLASTY MINIMALLY INVASIVE HIP;  Left Two Incision Total Hip Arthroplasty ;  Surgeon: Desmond Alberts MD;  Location: [facility]     ARTHROPLASTY MINIMALLY INVASIVE HIP  2/27/2014    Procedure: ARTHROPLASTY MINIMALLY INVASIVE HIP;  Right Total Hip Arthroplasty Minimally Invasive Two Incision  *Latex Free Room;  Surgeon: Desmond Alberts MD;  Location: [facility]     BACK SURGERY      back fusion 1994     C NONSPECIFIC PROCEDURE  1964 &'95    (R) inguinal herniorrhapy     C NONSPECIFIC PROCEDURE  1949    (L) inguinal herniorrhaphy     C NONSPECIFIC PROCEDURE  1994    L4-5  L5 S1 fusion - spondylolisthesis     COLONOSCOPY      2007     COLONOSCOPY N/A 8/15/2017    Procedure: COLONOSCOPY;  colonoscopy;  Surgeon: Chun Mcguire MD;  Location:  GI     GI SURGERY      4 hernia repairs in childhood       Social History   Substance Use Topics     Smoking status: Never Smoker      Smokeless tobacco: Never Used     Alcohol use 0.0 oz/week     0 Standard drinks or equivalent per week      Comment: 5 drinks per week     Family History   Problem Relation Age of Onset     HEART DISEASE Father      CEREBROVASCULAR DISEASE Father      CANCER Father      melonoma     Melanoma Father      CANCER Mother      Lung cancer     CEREBROVASCULAR DISEASE Paternal Uncle      Aneurism     Breast Cancer Maternal Aunt       from it     CANCER Paternal Uncle      CANCER Paternal Aunt      Skin Cancer No family hx of      Glaucoma No family hx of      Macular Degeneration No family hx of          Current Outpatient Prescriptions   Medication Sig Dispense Refill     HYDROcodone-acetaminophen (NORCO) 5-325 MG per tablet Take 1 tablet by mouth every 8 hours as needed for pain maximum 2 tablet(s) per day 30 tablet 0     zolpidem (AMBIEN) 5 MG tablet Take 1 tablet (5 mg) by mouth nightly as needed for sleep 30 tablet 5     prednisoLONE acetate (PRED FORTE) 1 % ophthalmic susp Place 1 drop into the right eye 4 times daily 1 Bottle 0     gabapentin (NEURONTIN) 300 MG capsule Take 1-3 capsules (300-900 mg) by mouth 3 times daily 540 capsule 3     lidocaine (XYLOCAINE) 2 % topical gel Apply topically 3 times daily 30 mL 3     nortriptyline (PAMELOR) 10 MG capsule Take 1 capsule (10 mg) by mouth See Admin Instructions One po q hs for 3 days, then two po q hs for 3 days, then three po q hs. 90 capsule 1     acyclovir (ZOVIRAX) 400 MG tablet Take 1 tablet (400 mg) by mouth daily 30 tablet 4     fluorometholone (FML LIQUIFILM) 0.1 % ophthalmic susp Place 1 drop into the right eye 3 times daily 3 mL 3     gabapentin 8 % GEL topical PLO cream Apply 100 g topically every 8 hours 100 g 3     GABAPENTIN PO        HYDROcodone-acetaminophen (NORCO)  MG per tablet Take 1 tablet by mouth every 6 hours as needed for moderate to severe pain       Acetaminophen (TYLENOL PO)        lidocaine (XYLOCAINE) 5 % ointment Apply  topically 3 times daily as needed for moderate pain 50 g 1     omega 3 1200 MG CAPS Take 2 capsules by mouth daily       omeprazole (PRILOSEC) 10 MG CR capsule TAKE 1 CAPSULE BY MOUTH DAILY - TAKE 30 TO 60 MINUTES BEFORE A MEAL - 90 capsule 3     atorvastatin (LIPITOR) 10 MG tablet Take 1 tablet (10 mg) by mouth every 48 hours 45 tablet 9     Cholecalciferol (VITAMIN D3 PO) Take 2,000 Units by mouth       Allergies   Allergen Reactions     Dilaudid [Hydromorphone] Itching     Morphine Itching     Recent Labs   Lab Test  10/24/17   1620  10/04/17   2250  04/25/17   1443  10/25/16   1543  04/11/16   1136   04/29/15   1012   LDL   --    --    --   72  101*   --   96   HDL   --    --    --   35*  40   --   40*   TRIG   --    --    --   263*  123   --   88   ALT  24   --   26  26  22   < >  22   CR  1.26*  1.28*  1.22  1.35*  1.23   < >  1.26*   GFRESTIMATED  57*  55*  59*  52*  58*   < >  57*   GFRESTBLACK  68  67  71  63  71   < >  69   POTASSIUM  4.7  4.3  4.5  4.4  4.0   < >  4.2    < > = values in this interval not displayed.            Reviewed and updated as needed this visit by clinical staff       Reviewed and updated as needed this visit by Provider         ROS:  Constitutional, HEENT, cardiovascular, pulmonary, gi and gu systems are negative, except as otherwise noted.      OBJECTIVE:   /68 (BP Location: Right arm, Patient Position: Chair, Cuff Size: Adult Regular)  Pulse 97  Temp 97.3  F (36.3  C) (Oral)  SpO2 99%  There is no height or weight on file to calculate BMI.  GENERAL: healthy, alert and no distress  NECK: no adenopathy, no asymmetry, masses, or scars and thyroid normal to palpation  RESP: lungs clear to auscultation - no rales, rhonchi or wheezes  CV: regular rate and rhythm, normal S1 S2, no S3 or S4, no murmur, click or rub, no peripheral edema and peripheral pulses strong  ABDOMEN: soft, nontender, no hepatosplenomegaly, no masses and bowel sounds normal  MS: no gross musculoskeletal  defects noted, no edema    Diagnostic Test Results:  Results for orders placed or performed in visit on 10/24/17   Comprehensive metabolic panel   Result Value Ref Range    Sodium 140 133 - 144 mmol/L    Potassium 4.7 3.4 - 5.3 mmol/L    Chloride 105 94 - 109 mmol/L    Carbon Dioxide 29 20 - 32 mmol/L    Anion Gap 6 3 - 14 mmol/L    Glucose 91 70 - 99 mg/dL    Urea Nitrogen 18 7 - 30 mg/dL    Creatinine 1.26 (H) 0.66 - 1.25 mg/dL    GFR Estimate 57 (L) >60 mL/min/1.7m2    GFR Estimate If Black 68 >60 mL/min/1.7m2    Calcium 8.7 8.5 - 10.1 mg/dL    Bilirubin Total 0.7 0.2 - 1.3 mg/dL    Albumin 4.4 3.4 - 5.0 g/dL    Protein Total 7.1 6.8 - 8.8 g/dL    Alkaline Phosphatase 84 40 - 150 U/L    ALT 24 0 - 70 U/L    AST 16 0 - 45 U/L   Lactate Dehydrogenase   Result Value Ref Range    Lactate Dehydrogenase 194 85 - 227 U/L   *CBC with platelets differential   Result Value Ref Range    WBC 95.2 (HH) 4.0 - 11.0 10e9/L    RBC Count 4.41 4.4 - 5.9 10e12/L    Hemoglobin 13.5 13.3 - 17.7 g/dL    Hematocrit 43.3 40.0 - 53.0 %    MCV 98 78 - 100 fl    MCH 30.6 26.5 - 33.0 pg    MCHC 31.2 (L) 31.5 - 36.5 g/dL    RDW 14.8 10.0 - 15.0 %    Platelet Count 152 150 - 450 10e9/L    Diff Method Manual Differential     % Neutrophils 6.0 %    % Lymphocytes 89.0 %    % Monocytes 3.0 %    % Eosinophils 2.0 %    % Basophils 0.0 %    Absolute Neutrophil 5.7 1.6 - 8.3 10e9/L    Absolute Lymphocytes 84.7 (H) 0.8 - 5.3 10e9/L    Absolute Monocytes 2.9 (H) 0.0 - 1.3 10e9/L    Absolute Eosinophils 1.9 (H) 0.0 - 0.7 10e9/L    Absolute Basophils 0.0 0.0 - 0.2 10e9/L    RBC Morphology Normal    IgG   Result Value Ref Range     (L) 695 - 1620 mg/dL       ASSESSMENT/PLAN:       ICD-10-CM    1. CLL (chronic lymphocytic leukemia) (H) C91.10    2. Herpes zoster with keratoconjunctivitis, od B02.33      Continue current treatments.  Titrate available meds.  If worsening, may need to see dermatoology    Suspect CLL is contributing to atypical  presentation and length of severe symptoms        Campos Parra MD  [facility]

## 2017-11-20 ENCOUNTER — OFFICE VISIT (OUTPATIENT)
Dept: OPHTHALMOLOGY | Facility: CLINIC | Age: 72
End: 2017-11-20
Payer: COMMERCIAL

## 2017-11-20 DIAGNOSIS — B02.33: Primary | ICD-10-CM

## 2017-11-20 DIAGNOSIS — B02.29 POST HERPETIC NEURALGIA: ICD-10-CM

## 2017-11-20 PROCEDURE — 92012 INTRM OPH EXAM EST PATIENT: CPT | Performed by: OPHTHALMOLOGY

## 2017-11-20 ASSESSMENT — TONOMETRY: OD_IOP_MMHG: 14

## 2017-11-20 ASSESSMENT — VISUAL ACUITY
OD_SC: 20/40
OD_PH_SC: 20/30
METHOD: SNELLEN - LINEAR

## 2017-11-20 NOTE — MR AVS SNAPSHOT
After Visit Summary   11/20/2017    Loco Wood    MRN: [id number]           Patient Information     Date Of Birth          1945        Visit Information        Provider Department      11/20/2017 10:45 AM Jacob Love MD [facility]        Today's Diagnoses     Herpes zoster with keratoconjunctivitis, od    -  1      Care Instructions    Continue Prednisolone four times daily right eye   Continue Acyclovir as prescribed  Keep scheduled appt. with Dr. Morton  Return visit as needed     Jacob Love M.D.            Follow-ups after your visit        Your next 10 appointments already scheduled     Nov 28, 2017 12:30 PM CST   NEW NEURO with Jameson Morton MD   [facility] Clinic ([facility])    [address]  [address]  [facility] [address] [phone]            Dec 01, 2017  8:00 AM CST   LAB with  LAB   [facility] Lab ([facility])    [address] [place] [address]  [address]st [facility] [id number] [phone]           Please do not eat 10-12 hours before your appointment if you are coming in fasting for labs on lipids, cholesterol, or glucose (sugar). This does not apply to pregnant women. Water, hot tea and black coffee (with nothing added) are okay. Do not drink other fluids, diet soda or chew gum.            Dec 01, 2017  9:00 AM CST   (Arrive by 8:45 AM)   Return Visit with Campos Boyd MD   [facility] Urology and Inst for Prostate and Urologic Cancers ([facility])    [address]  4th Floor  [facility] [id number] [phone]            Apr 24, 2018  2:30 PM CDT   [facility] Lab Draw with  [facility] DRAW   [facility] Lab Draw ([facility])    [address]  2nd Floor  [facility] [id number] [phone]            Apr 24, 2018  3:00 PM CDT   RETURN ONC with He Burns MD   [facility] Blood and Marrow  Transplant ([facility])    [address]  2nd Floor  [facility] [address] [phone]              Who to contact     If you have questions or need follow up information about today's clinic visit or your schedule please contact [facility] directly at [phone].  Normal or non-critical lab and imaging results will be communicated to you by MyChart, letter or phone within 4 business days after the clinic has received the results. If you do not hear from us within 7 days, please contact the clinic through Tweetflowhart or phone. If you have a critical or abnormal lab result, we will notify you by phone as soon as possible.  Submit refill requests through Gigantt or call your pharmacy and they will forward the refill request to us. Please allow 3 business days for your refill to be completed.          Additional Information About Your Visit        Tweetflowhart Information     [employer]t gives you secure access to your electronic health record. If you see a primary care provider, you can also send messages to your care team and make appointments. If you have questions, please call your primary care clinic.  If you do not have a primary care provider, please call [phone] and they will assist you.        Care EveryWhere ID     This is your Care EveryWhere ID. This could be used by other organizations to access your [facility] medical records  [id number]         Blood Pressure from Last 3 Encounters:   11/15/17 114/68   11/06/17 115/79   10/30/17 134/87    Weight from Last 3 Encounters:   11/06/17 71.2 kg (157 lb)   10/30/17 70.5 kg (155 lb 6.4 oz)   10/24/17 69.4 kg (153 lb)              Today, you had the following     No orders found for display       Primary Care Provider Office Phone # Fax #    [facility] [phone]       [address] [place] [address]        Equal Access to Services     HECTOR SANTOS : Aron staton  amina Gu, wajavyda luqadaha, qaybta kaalmada mj, jose barakat faithrufina brandonmarlene laDannysally giselle. So [facility] [phone].    ATENCIÓN: Si sebastian rodney, tiene a knutson disposición servicios gratuitos de asistencia lingüística. Anastasia al [phone].    We comply with applicable federal civil rights laws and [place] laws. We do not discriminate on the basis of race, color, national origin, age, disability, sex, sexual orientation, or gender identity.            Thank you!     Thank you for choosing [facility]  for your care. Our goal is always to provide you with excellent care. Hearing back from our patients is one way we can continue to improve our services. Please take a few minutes to complete the written survey that you may receive in the mail after your visit with us. Thank you!             Your Updated Medication List - Protect others around you: Learn how to safely use, store and throw away your medicines at www.disposemymeds.org.          This list is accurate as of: 11/20/17 11:39 AM.  Always use your most recent med list.                   Brand Name Dispense Instructions for use Diagnosis    acyclovir 400 MG tablet    ZOVIRAX    30 tablet    Take 1 tablet (400 mg) by mouth daily    Herpes zoster with keratoconjunctivitis       atorvastatin 10 MG tablet    LIPITOR    45 tablet    Take 1 tablet (10 mg) by mouth every 48 hours    Hyperlipidemia LDL goal <130       fluorometholone 0.1 % ophthalmic susp    FML LIQUIFILM    3 mL    Place 1 drop into the right eye 3 times daily    Herpes zoster with keratoconjunctivitis       gabapentin 8 % Gel topical PLO cream     100 g    Apply 100 g topically every 8 hours    Neuropathy       * GABAPENTIN PO           * gabapentin 300 MG capsule    NEURONTIN    540 capsule    Take 1-3 capsules (300-900 mg) by mouth 3 times daily    Post herpetic neuralgia       * HYDROcodone-acetaminophen  MG per tablet    NORCO     Take 1 tablet by mouth every 6 hours  as needed for moderate to severe pain        * HYDROcodone-acetaminophen 5-325 MG per tablet    NORCO    30 tablet    Take 1 tablet by mouth every 8 hours as needed for pain maximum 2 tablet(s) per day    Herpes zoster with keratoconjunctivitis       lidocaine 2 % topical gel    XYLOCAINE    30 mL    Apply topically 3 times daily    Post herpetic neuralgia       lidocaine 5 % ointment    XYLOCAINE    50 g    Apply topically 3 times daily as needed for moderate pain    Herpes zoster without complication       nortriptyline 10 MG capsule    PAMELOR    90 capsule    Take 1 capsule (10 mg) by mouth See Admin Instructions One po q hs for 3 days, then two po q hs for 3 days, then three po q hs.    Post herpetic neuralgia       omega 3 1200 MG Caps      Take 2 capsules by mouth daily    Spinal stenosis, lumbar region, without neurogenic claudication       omeprazole 10 MG CR capsule    priLOSEC    90 capsule    TAKE 1 CAPSULE BY MOUTH DAILY - TAKE 30 TO 60 MINUTES BEFORE A MEAL -    Gastroesophageal reflux disease without esophagitis       prednisoLONE acetate 1 % ophthalmic susp    PRED FORTE    1 Bottle    Place 1 drop into the right eye 4 times daily        TYLENOL PO           VITAMIN D3 PO      Take 2,000 Units by mouth        zolpidem 5 MG tablet    AMBIEN    30 tablet    Take 1 tablet (5 mg) by mouth nightly as needed for sleep    Herpes zoster with keratoconjunctivitis       * Notice:  This list has 4 medication(s) that are the same as other medications prescribed for you. Read the directions carefully, and ask your doctor or other care provider to review them with you.

## 2017-11-20 NOTE — PROGRESS NOTES
Current Eye Medications:  Prednisolone 1% qid od     Subjective:  4 wk iop/hzo right eye   Pt reports that is keeping his right eye  closed, this eye is light sensitive and he also  finds it is difficult to navigate with both eyes open. Pt also wants us to know if he  hits a bump while driving will get a twinge of pain in his right eye.    Also saw Dr. Byrd at [facility] and has appt scheduled with Dr. Morton 11/28.     Objective:  See Ophthalmology Exam.       Assessment:  Herpes zoster ophthalmicus right eye.  Corneal edema and iritis improved.  Post herpetic neuralgia is primary complaint.      Plan:  Continue Prednisolone four times daily right eye.  Continue Acyclovir as prescribed.  Keep scheduled appt. with Dr. Morton.  Return visit and tapering schedule for meds determined after visit with Dr. Morton.     Jacob Love M.D.

## 2017-11-20 NOTE — PATIENT INSTRUCTIONS
Continue Prednisolone four times daily right eye.  Continue Acyclovir as prescribed.  Keep scheduled appt. with Dr. Morton.  Return visit determined after visit with Dr. Morton.     Jacob Love M.D.

## 2017-11-21 ENCOUNTER — PRE VISIT (OUTPATIENT)
Dept: UROLOGY | Facility: CLINIC | Age: 72
End: 2017-11-21

## 2017-11-21 PROBLEM — B02.29 POST HERPETIC NEURALGIA: Status: ACTIVE | Noted: 2017-11-21

## 2017-11-21 ASSESSMENT — SLIT LAMP EXAM - LIDS: COMMENTS: MEIBOMIAN GLAND DYSFUNCTION, UPPER LID DERMATOCHALASIS

## 2017-11-21 ASSESSMENT — EXTERNAL EXAM - LEFT EYE: OS_EXAM: PROLAPSED FAT PADS: UPPER, LOWER

## 2017-11-28 ENCOUNTER — OFFICE VISIT (OUTPATIENT)
Dept: OPHTHALMOLOGY | Facility: CLINIC | Age: 72
End: 2017-11-28
Attending: OPHTHALMOLOGY
Payer: MEDICARE

## 2017-11-28 DIAGNOSIS — B02.29 POST HERPETIC NEURALGIA: Primary | ICD-10-CM

## 2017-11-28 DIAGNOSIS — H49.11 PARALYTIC STRABISMUS ASSOCIATED WITH RIGHT TROCHLEAR NERVE PALSY: ICD-10-CM

## 2017-11-28 DIAGNOSIS — H53.10 SUBJECTIVE VISUAL DISTURBANCE: ICD-10-CM

## 2017-11-28 DIAGNOSIS — H53.10 SUBJECTIVE VISUAL DISTURBANCE: Primary | ICD-10-CM

## 2017-11-28 PROCEDURE — 92060 SENSORIMOTOR EXAMINATION: CPT | Mod: ZF | Performed by: OPHTHALMOLOGY

## 2017-11-28 PROCEDURE — 99213 OFFICE O/P EST LOW 20 MIN: CPT | Mod: ZF | Performed by: TECHNICIAN/TECHNOLOGIST

## 2017-11-28 ASSESSMENT — CONF VISUAL FIELD
OS_NORMAL: 1
METHOD: COUNTING FINGERS
OD_NORMAL: 1

## 2017-11-28 ASSESSMENT — EXTERNAL EXAM - LEFT EYE: OS_EXAM: PROLAPSED FAT PADS: UPPER, LOWER

## 2017-11-28 ASSESSMENT — SLIT LAMP EXAM - LIDS: COMMENTS: MEIBOMIAN GLAND DYSFUNCTION, UPPER LID DERMATOCHALASIS

## 2017-11-28 ASSESSMENT — VISUAL ACUITY
METHOD: SNELLEN - LINEAR
OS_SC: 20/25
OD_SC+: -2
OD_SC: 20/30

## 2017-11-28 ASSESSMENT — TONOMETRY
OS_IOP_MMHG: 12
OD_IOP_MMHG: 10
IOP_METHOD: ICARE

## 2017-11-28 ASSESSMENT — CUP TO DISC RATIO
OS_RATIO: 0.3
OD_RATIO: 0.3

## 2017-11-28 NOTE — LETTER
2017         RE:  :  MRN: Loco Wood  1945  [id number]     Dear Dr. Farah,    Thank you for asking me to see your very pleasant patient, Loco Wood, in neuro-ophthalmic consultation.  I would like to thank you for sending your records and I have summarized them in the history of present illness.  My assessment and plan are below.  For further details, please see my attached clinic note.          Loco Wood is a 72 year old male with the following diagnoses:   1. Post herpetic neuralgia    2. Subjective visual disturbance    3. 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 Patient continues to have paresthesia of the scalp and periorbital skin, has pain at night time that requires oral pain medication to resolve. He reports blurry vision out of his right eye, and closes it to avoid diplopia. He reports that the blurriness is worst in downgaze, when he is walking down steps or playing pool.  He was taking acyclovir oral daily up until yesterday, and is being tapered off of pred forte.     There is no anterior segment inflammation. His right sided ptosis is due to brow ptosis. There is no afferent pupillary defect. He is currently on gabapentin for neuropathic pain and patient is interested in discontinuing because he does not feel that it helps.  I think it is reasonable to taper him.       He has a lot of edema of the Right upper eyelid.  This should improve as the HZO improves.      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  This occurred about a month after the HZO  Began.  This has been reported secondary to HZO.   It usually resolves over the course of 3-4 months.      Finally, he has an anesthetic cornea. I advised him to come in if he develops epiphora or injection.      Follow up 3 months sooner as needed for worsening symptoms.            Again, thank you  for allowing me to participate in the care of your patient.      Sincerely,    Jameson Morton MD  Professor, Neuro-Ophthalmology  Department of Ophthalmology and Visual Neurosciences  [facility]    CC: Nikhil Farah MD  [address] [place] [address]  VIA In Basket     [facility]  [address] [facility] [address]  VIA Facsimile: [phone]     MEÑO Goetz  Xxx Resigned Xxx  [id number]  [facility] [address]  VIA Mail     Campos Boyd MD  [address] [facility] [address]  VIA In Basket     Padmini Whitney RN  VIA In Basket     Jacob Love MD  [address] [place] [id number]  VIA In Basket       DX =  Herpes zoster ophthalmicus, 4th nerve palsy

## 2017-11-28 NOTE — PROGRESS NOTES
Assessment & Plan      Lcoo Wood is a 72 year old male with the following diagnoses:   1. Post herpetic neuralgia    2. Subjective visual disturbance    3. 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 Patient continues to have paresthesia of the scalp and periorbital skin, has pain at night time that requires oral pain medication to resolve. He reports blurry vision out of his right eye, and closes it to avoid diplopia. He reports that the blurriness is worst in downgaze, when he is walking down steps or playing pool.  He was taking acyclovir oral daily up until yesterday, and is being tapered off of pred forte.     There is no anterior segment inflammation. His right sided ptosis is due to brow ptosis. There is no afferent pupillary defect. He is currently on gabapentin for neuropathic pain and patient is interested in discontinuing because he does not feel that it helps.  I think it is reasonable to taper him.       He has a lot of edema of the Right upper eyelid.  This should improve as the HZO improves.      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  This occurred about a month after the HZO  Began.  This has been reported secondary to HZO.   It usually resolves over the course of 3-4 months.      Finally, he has an anesthetic cornea. I advised him to come in if he develops epiphora or injection.      Follow up 3 months sooner as needed for worsening symptoms.           Christo Morton MD  PGY3     Attending Physician Attestation:  Complete documentation of historical and exam elements from today's encounter can be found in the full encounter summary report (not reduplicated in this progress note).  I personally obtained the chief complaint(s) and history of present illness.  I confirmed and edited as necessary the review of systems, past medical/surgical history, family history, social  history, and examination findings as documented by others; and I examined the patient myself.  I personally reviewed the relevant tests, images, and reports as documented above.  I formulated and edited as necessary the assessment and plan and discussed the findings and management plan with the patient and family. - Jameson Morton MD

## 2017-11-28 NOTE — NURSING NOTE
"Chief Complaints and History of Present Illnesses   Patient presents with     Neurologic Evaluation     ptosis     HPI    Symptoms:              Comments:  New patient referred for ptosis.   Patient states ptosis, right eye, occurred the same time diagnosed with Shingles (10/3/17).  Patient states can open right eye but difficult as he states \"pressure, like iron pressing on my upper eye brow\". Patient states right upper brow and front is very sensitive to touch. Feels like needles are poking him.  Patient reports photophobia right eye. Also, patient reports double, blurry vision when opening right eye.    Patient saw Dr. Love a week ago and states that the orbit is unremarkable, patient would like a second opinion.   Currently taking Prednisolone once daily (started with four times per day).  Today was the last dose of acyclovir. Patient states Dr. Love recommended seeing Dr. Morton today and see what Dr. Morton recommend.   CINDY Bowles 11/28/2017 12:38 PM                  "

## 2017-11-28 NOTE — MR AVS SNAPSHOT
After Visit Summary   11/28/2017    Loco Wood    MRN: [id number]           Patient Information     Date Of Birth          1945        Visit Information        Provider Department      11/28/2017 12:30 PM [facility]on [facility]        Today's Diagnoses     Post herpetic neuralgia    -  1    Subjective visual disturbance           Follow-ups after your visit        Follow-up notes from your care team     Return in about 3 months (around 2/28/2018) for Vision, tension color.      Your next 10 appointments already scheduled     Dec 01, 2017  8:00 AM CST   LAB with  LAB   [facility] ([facility])    [address]  1st Floor  [place] [address] [phone]           Please do not eat 10-12 hours before your appointment if you are coming in fasting for labs on lipids, cholesterol, or glucose (sugar). This does not apply to pregnant women. Water, hot tea and black coffee (with nothing added) are okay. Do not drink other fluids, diet soda or chew gum.            Dec 01, 2017  9:00 AM CST   (Arrive by 8:45 AM)   Return Visit with Campos Boyd MD   [facility] ([facility])    [address]  4th Floor  [place] [address] [phone]            Mar 01, 2018 10:00 AM CST   RETURN NEURO with Jameson Morton MD   [facility] ([facility])    [address]  [address]  9th [address]  [place] [address]   [phone]            Apr 24, 2018  2:30 PM CDT   [facility] with  [facility]   [facility] ([facility])    [address]  2nd Floor  [place] [address]   [phone]            Apr 24, 2018  3:00 PM CDT   RETURN ONC with He Burns MD   [facility] ([facility])    [address] [place] [address]  2nd  [facility] [address] [phone]              Who to contact     Please call your clinic at [phone] to:    Ask questions about your health    Make or cancel appointments    Discuss your medicines    Learn about your test results    Speak to your doctor   If you have compliments or concerns about an experience at your clinic, or if you wish to file a complaint, please contact [facility] Patient Relations at [phone] or email us at [email or web address]         Additional Information About Your Visit        Neuralitic Systemshart Information     [facility] gives you secure access to your electronic health record. If you see a primary care provider, you can also send messages to your care team and make appointments. If you have questions, please call your primary care clinic.  If you do not have a primary care provider, please call [phone] and they will assist you.      [facility] is an electronic gateway that provides easy, online access to your medical records. With [facility], you can request a clinic appointment, read your test results, renew a prescription or communicate with your care team.     To access your existing account, please contact your [facility] or call [phone] for assistance.        Care EveryWhere ID     This is your Care EveryWhere ID. This could be used by other organizations to access your [place] medical records  [id number]         Blood Pressure from Last 3 Encounters:   11/15/17 114/68   11/06/17 115/79   10/30/17 134/87    Weight from Last 3 Encounters:   11/06/17 71.2 kg (157 lb)   10/30/17 70.5 kg (155 lb 6.4 oz)   10/24/17 69.4 kg (153 lb)              We Performed the Following     IOP Measurement          Today's Medication Changes          These changes are accurate as of: 11/28/17  1:53 PM.  If you have any questions, ask your nurse or doctor.               Stop taking these medicines if you haven't  already. Please contact your care team if you have questions.     acyclovir 400 MG tablet   Commonly known as:  ZOVIRAX   Stopped by:  Jameson Morton MD           fluorometholone 0.1 % ophthalmic susp   Commonly known as:  FML LIQUIFILM   Stopped by:  Jameson Morton MD           lidocaine 2 % topical gel   Commonly known as:  XYLOCAINE   Stopped by:  Jameson Morton MD           nortriptyline 10 MG capsule   Commonly known as:  PAMELOR   Stopped by:  Jameson Morton MD           prednisoLONE acetate 1 % ophthalmic susp   Commonly known as:  PRED FORTE   Stopped by:  Jameson Morton MD                    Primary Care Provider Office Phone # Fax #    [facility] [phone]       [address] [place] [address]        Equal Access to Services     HECTOR SANTOS : Hadii aad ku hadasho Soomaali, waaxda luqadaha, qaybta kaalmada adeegyada, jose newton . So [facility] [phone].    ATENCIÓN: Si habla español, tiene a knutson disposición servicios gratuitos de asistencia lingüística. [facility] [phone].    We comply with applicable federal civil rights laws and [place] laws. We do not discriminate on the basis of race, color, national origin, age, disability, sex, sexual orientation, or gender identity.            Thank you!     Thank you for choosing EYE CLINIC  for your care. Our goal is always to provide you with excellent care. Hearing back from our patients is one way we can continue to improve our services. Please take a few minutes to complete the written survey that you may receive in the mail after your visit with us. Thank you!             Your Updated Medication List - Protect others around you: Learn how to safely use, store and throw away your medicines at www.disposemymeds.org.          This list is accurate as of: 11/28/17  1:53 PM.  Always use your most recent med list.                    Brand Name Dispense Instructions for use Diagnosis    atorvastatin 10 MG tablet    LIPITOR    45 tablet    Take 1 tablet (10 mg) by mouth every 48 hours    Hyperlipidemia LDL goal <130       gabapentin 8 % Gel topical PLO cream     100 g    Apply 100 g topically every 8 hours    Neuropathy       * GABAPENTIN PO           * gabapentin 300 MG capsule    NEURONTIN    540 capsule    Take 1-3 capsules (300-900 mg) by mouth 3 times daily    Post herpetic neuralgia       * HYDROcodone-acetaminophen  MG per tablet    NORCO     Take 1 tablet by mouth every 6 hours as needed for moderate to severe pain        * HYDROcodone-acetaminophen 5-325 MG per tablet    NORCO    30 tablet    Take 1 tablet by mouth every 8 hours as needed for pain maximum 2 tablet(s) per day    Herpes zoster with keratoconjunctivitis       lidocaine 5 % ointment    XYLOCAINE    50 g    Apply topically 3 times daily as needed for moderate pain    Herpes zoster without complication       omega 3 1200 MG Caps      Take 2 capsules by mouth daily    Spinal stenosis, lumbar region, without neurogenic claudication       omeprazole 10 MG CR capsule    priLOSEC    90 capsule    TAKE 1 CAPSULE BY MOUTH DAILY - TAKE 30 TO 60 MINUTES BEFORE A MEAL -    Gastroesophageal reflux disease without esophagitis       TYLENOL PO           VITAMIN D3 PO      Take 2,000 Units by mouth        zolpidem 5 MG tablet    AMBIEN    30 tablet    Take 1 tablet (5 mg) by mouth nightly as needed for sleep    Herpes zoster with keratoconjunctivitis       * Notice:  This list has 4 medication(s) that are the same as other medications prescribed for you. Read the directions carefully, and ask your doctor or other care provider to review them with you.

## 2017-12-05 NOTE — TELEPHONE ENCOUNTER
Omeprazole      Last Written Prescription Date: 1-11-16  Last Fill Quantity: 90,  # refills: 0   Last Office Visit with G, P or [facility] prescribing provider: 4-11-16                                         Next 5 appointments (look out 90 days)     May 15, 2017  9:15 AM CDT   PHYSICAL with Jameson Cisse MD   [facility] ([facility])    [address] [address] [phone]                       SUBJECTIVE: Patient is a 59 year old female with complaints of : of having headache in frontal area  area which is dull achy and pulling type of  pain; present since last 24 hrs  and is continuous.  no nausea, no vomiting with headache; no photophobia, no phonophobia; + prior history of similar  Migraine/tension  headaches; no other neurologic symptoms associated with headaches like weakness in arms, legs, vision changes, balancing problems; headache graded 6/10.  She takes fioricet and  Ran out of meds; want refills today   denies any chest pain, sob, palpitations or any other cardiovascular symptoms;  denies any respiratory distress or symptoms;  denies any gastrointestinal symptoms;  denies any genitourinary symptoms;  denies any  visual changes or any other neurologic symptoms;    Past Medical History:   Diagnosis Date   • Migraine    • UTI (urinary tract infection)      Social History     Social History   • Marital status: Single     Spouse name: N/A   • Number of children: N/A   • Years of education: N/A     Occupational History   • Not on file.     Social History Main Topics   • Smoking status: Never Smoker   • Smokeless tobacco: Never Used   • Alcohol use 0.0 oz/week      Comment: 2 per month           PHYSICAL EXAM:  APPEARANCE:  Healthy, alert, in no distress  HEAD: normocephalic. No masses, lesions, tenderness or abnormalities  EYES: Pupils equal, round reactive to light & accommodation, normal extraocular movements and fundoscopic examination normal  EARS: tympanic membranes normal  NOSE: normal  THROAT: normal  NECK: Neck supple. No masses. No adenopathy.  no JVD; neg meningeal signs   LUNGS: clear to auscultation and percussion  HEART: regular rate and rhythm, S1 and S2 normal and with no gallops or murmurs  EXTREMITIES: the extremities are normal with no signs of inflammation or injury. There is no edema.  NEUROLOGIC: CN II-XII intact, sensory and motor grossly intact, reflexes normal and symmetric.  and normal speech and gait    ASSESSMENT:  (G44.219) Episodic tension-type headache, not intractable  (primary encounter diagnosis)  Plan: med as ord  > in case of persistent headaches or worsening of headaches or any neurologic symptoms or visual symptoms should recheck immediately;  Patient instructions - AVS given to patient.

## 2017-12-20 ENCOUNTER — TELEPHONE (OUTPATIENT)
Dept: UROLOGY | Facility: CLINIC | Age: 72
End: 2017-12-20

## 2017-12-20 NOTE — TELEPHONE ENCOUNTER
Patient called several times to get in to see you again after being cancelled very late for his scheduled appointment on dec 1 st  No one has called him  Scheduled jan 5th Marielos Craft LPN Staff Nurse

## 2017-12-21 ENCOUNTER — OFFICE VISIT (OUTPATIENT)
Dept: FAMILY MEDICINE | Facility: CLINIC | Age: 72
End: 2017-12-21
Payer: COMMERCIAL

## 2017-12-21 VITALS
BODY MASS INDEX: 25.22 KG/M2 | DIASTOLIC BLOOD PRESSURE: 68 MMHG | HEART RATE: 93 BPM | OXYGEN SATURATION: 99 % | SYSTOLIC BLOOD PRESSURE: 116 MMHG | WEIGHT: 161 LBS | TEMPERATURE: 97.5 F

## 2017-12-21 DIAGNOSIS — N40.2 PROSTATE NODULE: ICD-10-CM

## 2017-12-21 DIAGNOSIS — C91.10 CLL (CHRONIC LYMPHOCYTIC LEUKEMIA) (H): Primary | ICD-10-CM

## 2017-12-21 DIAGNOSIS — B02.33: ICD-10-CM

## 2017-12-21 DIAGNOSIS — K64.5 THROMBOSED EXTERNAL HEMORRHOIDS: ICD-10-CM

## 2017-12-21 DIAGNOSIS — B02.29 POST HERPETIC NEURALGIA: ICD-10-CM

## 2017-12-21 DIAGNOSIS — E78.5 HYPERLIPIDEMIA LDL GOAL <130: ICD-10-CM

## 2017-12-21 LAB
ANION GAP SERPL CALCULATED.3IONS-SCNC: 9 MMOL/L (ref 3–14)
BASOPHILS # BLD AUTO: 0.1 10E9/L (ref 0–0.2)
BASOPHILS NFR BLD AUTO: 0.1 %
BUN SERPL-MCNC: 20 MG/DL (ref 7–30)
CALCIUM SERPL-MCNC: 9.4 MG/DL (ref 8.5–10.1)
CHLORIDE SERPL-SCNC: 106 MMOL/L (ref 94–109)
CO2 SERPL-SCNC: 28 MMOL/L (ref 20–32)
CREAT SERPL-MCNC: 1.33 MG/DL (ref 0.66–1.25)
DIFFERENTIAL METHOD BLD: ABNORMAL
EOSINOPHIL # BLD AUTO: 0.4 10E9/L (ref 0–0.7)
EOSINOPHIL NFR BLD AUTO: 0.4 %
ERYTHROCYTE [DISTWIDTH] IN BLOOD BY AUTOMATED COUNT: 14.5 % (ref 10–15)
GFR SERPL CREATININE-BSD FRML MDRD: 53 ML/MIN/1.7M2
GLUCOSE SERPL-MCNC: 97 MG/DL (ref 70–99)
HCT VFR BLD AUTO: 43.9 % (ref 40–53)
HGB BLD-MCNC: 14 G/DL (ref 13.3–17.7)
LYMPHOCYTES # BLD AUTO: 87.4 10E9/L (ref 0.8–5.3)
LYMPHOCYTES NFR BLD AUTO: 94 %
MCH RBC QN AUTO: 32.2 PG (ref 26.5–33)
MCHC RBC AUTO-ENTMCNC: 31.9 G/DL (ref 31.5–36.5)
MCV RBC AUTO: 101 FL (ref 78–100)
MONOCYTES # BLD AUTO: 1.8 10E9/L (ref 0–1.3)
MONOCYTES NFR BLD AUTO: 1.9 %
NEUTROPHILS # BLD AUTO: 3.4 10E9/L (ref 1.6–8.3)
NEUTROPHILS NFR BLD AUTO: 3.6 %
PLATELET # BLD AUTO: 118 10E9/L (ref 150–450)
POTASSIUM SERPL-SCNC: 4.1 MMOL/L (ref 3.4–5.3)
PSA SERPL-ACNC: 4.2 UG/L (ref 0–4)
RBC # BLD AUTO: 4.35 10E12/L (ref 4.4–5.9)
SODIUM SERPL-SCNC: 143 MMOL/L (ref 133–144)
WBC # BLD AUTO: 93 10E9/L (ref 4–11)

## 2017-12-21 PROCEDURE — 99214 OFFICE O/P EST MOD 30 MIN: CPT | Performed by: INTERNAL MEDICINE

## 2017-12-21 PROCEDURE — 36415 COLL VENOUS BLD VENIPUNCTURE: CPT | Performed by: INTERNAL MEDICINE

## 2017-12-21 PROCEDURE — 85025 COMPLETE CBC W/AUTO DIFF WBC: CPT | Performed by: INTERNAL MEDICINE

## 2017-12-21 PROCEDURE — G0103 PSA SCREENING: HCPCS | Performed by: INTERNAL MEDICINE

## 2017-12-21 PROCEDURE — 80048 BASIC METABOLIC PNL TOTAL CA: CPT | Performed by: INTERNAL MEDICINE

## 2017-12-21 RX ORDER — ATORVASTATIN CALCIUM 10 MG/1
10 TABLET, FILM COATED ORAL
Qty: 45 TABLET | Refills: 9 | Status: SHIPPED | OUTPATIENT
Start: 2017-12-21 | End: 2019-03-14

## 2017-12-21 ASSESSMENT — PAIN SCALES - GENERAL: PAINLEVEL: NO PAIN (0)

## 2017-12-21 NOTE — PROGRESS NOTES
SUBJECTIVE:   Loco Wood is a 72 year old male who presents to clinic today for the following health issues:      Follow up on shingles  Itching has decreased and few nights made through the night  1800    Itching gets so bad not able.   No new sores  Touching bumps.  Entire right side towards back of the head as well    Still very painful and debilitating.  Cannot help but wonder how much the CLL is contributing    Consdiering zoster vaccine.  Will need to review with CLL diagnosis the safety of the vaccine      Problem list and histories reviewed & adjusted, as indicated.  Additional history: as documented    Patient Active Problem List   Diagnosis     Esophageal reflux     Lumbago     CLL (chronic lymphocytic leukemia) (H)     HYPERLIPIDEMIA LDL GOAL <130     Mass of skin     Health Care Home     Vitamin D deficiency     Advanced directives, counseling/discussion     Osteoarthritis of hip     OA (osteoarthritis) of hip     Insomnia     BPH (benign prostatic hyperplasia)     Acute bilateral low back pain without sciatica     Prostate nodule     Chondromalacia, knee, right     Complex tear of medial meniscus of right knee as current injury     Herpes zoster with keratoconjunctivitis, od     Post herpetic neuralgia     Past Surgical History:   Procedure Laterality Date     ARTHROPLASTY MINIMALLY INVASIVE HIP  5/10/2013    Procedure: ARTHROPLASTY MINIMALLY INVASIVE HIP;  Left Two Incision Total Hip Arthroplasty ;  Surgeon: Desmond Alberts MD;  Location: [facility]     ARTHROPLASTY MINIMALLY INVASIVE HIP  2/27/2014    Procedure: ARTHROPLASTY MINIMALLY INVASIVE HIP;  Right Total Hip Arthroplasty Minimally Invasive Two Incision  *Latex Free Room;  Surgeon: Desmond Alberts MD;  Location: [facility] OR     BACK SURGERY      back fusion 1994     C NONSPECIFIC PROCEDURE  1964 &'95    (R) inguinal herniorrhapy     C NONSPECIFIC PROCEDURE  1949    (L) inguinal herniorrhaphy     C NONSPECIFIC PROCEDURE  1994    L4-5  L5  S1 fusion - spondylolisthesis     COLONOSCOPY           COLONOSCOPY N/A 8/15/2017    Procedure: COLONOSCOPY;  colonoscopy;  Surgeon: Chun Mcguire MD;  Location:  GI     GI SURGERY      4 hernia repairs in childhood       Social History   Substance Use Topics     Smoking status: Never Smoker     Smokeless tobacco: Never Used     Alcohol use 0.0 oz/week     0 Standard drinks or equivalent per week      Comment: 5 drinks per week     Family History   Problem Relation Age of Onset     HEART DISEASE Father      CEREBROVASCULAR DISEASE Father      CANCER Father      melonoma     Melanoma Father      CANCER Mother      Lung cancer     CEREBROVASCULAR DISEASE Paternal Uncle      Aneurism     Breast Cancer Maternal Aunt       from it     CANCER Paternal Uncle      CANCER Paternal Aunt      Skin Cancer No family hx of      Glaucoma No family hx of      Macular Degeneration No family hx of          Current Outpatient Prescriptions   Medication Sig Dispense Refill     atorvastatin (LIPITOR) 10 MG tablet Take 1 tablet (10 mg) by mouth every 48 hours 45 tablet 9     hydrocortisone (ANUSOL-HC) 2.5 % cream Place rectally 2 times daily 30 g 3     HYDROcodone-acetaminophen (NORCO) 5-325 MG per tablet Take 1 tablet by mouth every 8 hours as needed for pain maximum 2 tablet(s) per day 60 tablet 0     zolpidem (AMBIEN) 5 MG tablet Take 1 tablet (5 mg) by mouth nightly as needed for sleep 30 tablet 5     gabapentin (NEURONTIN) 300 MG capsule Take 1-3 capsules (300-900 mg) by mouth 3 times daily 540 capsule 3     lidocaine (XYLOCAINE) 5 % ointment Apply topically 3 times daily as needed for moderate pain 50 g 1     omega 3 1200 MG CAPS Take 2 capsules by mouth daily       omeprazole (PRILOSEC) 10 MG CR capsule TAKE 1 CAPSULE BY MOUTH DAILY - TAKE 30 TO 60 MINUTES BEFORE A MEAL - 90 capsule 3     Cholecalciferol (VITAMIN D3 PO) Take 2,000 Units by mouth       Acetaminophen (TYLENOL PO)        Allergies   Allergen Reactions      Dilaudid [Hydromorphone] Itching     Morphine Itching     Recent Labs   Lab Test  12/21/17   0951  10/24/17   1620   04/25/17   1443  10/25/16   1543  04/11/16   1136   04/29/15   1012   LDL   --    --    --    --   72  101*   --   96   HDL   --    --    --    --   35*  40   --   40*   TRIG   --    --    --    --   263*  123   --   88   ALT   --   24   --   26  26  22   < >  22   CR  1.33*  1.26*   < >  1.22  1.35*  1.23   < >  1.26*   GFRESTIMATED  53*  57*   < >  59*  52*  58*   < >  57*   GFRESTBLACK  64  68   < >  71  63  71   < >  69   POTASSIUM  4.1  4.7   < >  4.5  4.4  4.0   < >  4.2    < > = values in this interval not displayed.            Reviewed and updated as needed this visit by clinical staffTobacco  Allergies  Meds  Med Hx  Surg Hx  Fam Hx  Soc Hx      Reviewed and updated as needed this visit by Provider         ROS:  Constitutional, HEENT, cardiovascular, pulmonary, gi and gu systems are negative, except as otherwise noted.      OBJECTIVE:   /68 (BP Location: Right arm, Patient Position: Chair, Cuff Size: Adult Regular)  Pulse 93  Temp 97.5  F (36.4  C) (Oral)  Wt 161 lb (73 kg)  SpO2 99%  BMI 25.22 kg/m2  Body mass index is 25.22 kg/(m^2).  GENERAL: healthy, alert and no distress  EYES: swelling, sensitivity left eye into forehead and into posterior scalp as well.    HENT: ear canals and TM's normal, nose and mouth without ulcers or lesions  NECK: no adenopathy, no asymmetry, masses, or scars and thyroid normal to palpation  RESP: lungs clear to auscultation - no rales, rhonchi or wheezes  CV: regular rate and rhythm, normal S1 S2, no S3 or S4, no murmur, click or rub, no peripheral edema and peripheral pulses strong  ABDOMEN: soft, nontender, no hepatosplenomegaly, no masses and bowel sounds normal  MS: no gross musculoskeletal defects noted, no edema  SKIN: no suspicious lesions or rashes  NEURO: Normal strength and tone, mentation intact and speech normal  BACK: no CVA  tenderness, no paralumbar tenderness    Diagnostic Test Results:  Results for orders placed or performed in visit on 12/21/17   PSA, screen   Result Value Ref Range    PSA 4.20 (H) 0 - 4 ug/L   CBC with platelets and differential   Result Value Ref Range    WBC 93.0 (HH) 4.0 - 11.0 10e9/L    RBC Count 4.35 (L) 4.4 - 5.9 10e12/L    Hemoglobin 14.0 13.3 - 17.7 g/dL    Hematocrit 43.9 40.0 - 53.0 %     (H) 78 - 100 fl    MCH 32.2 26.5 - 33.0 pg    MCHC 31.9 31.5 - 36.5 g/dL    RDW 14.5 10.0 - 15.0 %    Platelet Count 118 (L) 150 - 450 10e9/L    Diff Method Automated Method     % Neutrophils 3.6 %    % Lymphocytes 94.0 %    % Monocytes 1.9 %    % Eosinophils 0.4 %    % Basophils 0.1 %    Absolute Neutrophil 3.4 1.6 - 8.3 10e9/L    Absolute Lymphocytes 87.4 (H) 0.8 - 5.3 10e9/L    Absolute Monocytes 1.8 (H) 0.0 - 1.3 10e9/L    Absolute Eosinophils 0.4 0.0 - 0.7 10e9/L    Absolute Basophils 0.1 0.0 - 0.2 10e9/L   Basic metabolic panel   Result Value Ref Range    Sodium 143 133 - 144 mmol/L    Potassium 4.1 3.4 - 5.3 mmol/L    Chloride 106 94 - 109 mmol/L    Carbon Dioxide 28 20 - 32 mmol/L    Anion Gap 9 3 - 14 mmol/L    Glucose 97 70 - 99 mg/dL    Urea Nitrogen 20 7 - 30 mg/dL    Creatinine 1.33 (H) 0.66 - 1.25 mg/dL    GFR Estimate 53 (L) >60 mL/min/1.7m2    GFR Estimate If Black 64 >60 mL/min/1.7m2    Calcium 9.4 8.5 - 10.1 mg/dL       ASSESSMENT/PLAN:       ICD-10-CM    1. CLL (chronic lymphocytic leukemia) (H) C91.10 CBC with platelets and differential   2. Hyperlipidemia LDL goal <130 E78.5 atorvastatin (LIPITOR) 10 MG tablet     Basic metabolic panel   3. Prostate nodule N40.2 PSA, screen   4. Thrombosed external hemorrhoids K64.5 hydrocortisone (ANUSOL-HC) 2.5 % cream   5. Herpes zoster with keratoconjunctivitis, od B02.33    6. Post herpetic neuralgia B02.29      consdier lyrica as another option as other tretments have not hector successful    Consider zoster vaccine in setting of CLL      Campos Parra,  MD  [facility]

## 2017-12-21 NOTE — MR AVS SNAPSHOT
After Visit Summary   12/21/2017    Loco Wood    MRN: [id number]           Patient Information     Date Of Birth          1945        Visit Information        Provider Department      12/21/2017 9:20 AM Campos Parra MD [facility]        Today's Diagnoses     CLL (chronic lymphocytic leukemia) (H)    -  1    Hyperlipidemia LDL goal <130        Prostate nodule        Thrombosed external hemorrhoids           Follow-ups after your visit        Your next 10 appointments already scheduled     Jan 05, 2018  2:40 PM CST   (Arrive by 2:25 PM)   Return Visit with Campos Boyd MD   [facility] and Inst for Prostate and Urologic Cancers ([facility])    [address]  4th Floor  [facility] [address]   [phone]            Mar 01, 2018 10:00 AM CST   RETURN NEURO with Jameson Morton MD   Eye Clinic ([facility])    [address]  [address]  9th [address] Clin [address]a  [facility] [address]   [phone]            Apr 24, 2018  2:30 PM CDT   Masonic Lab Draw with  MASONIC LAB DRAW   [facility] Masonic Lab Draw ([facility])    [address]  [address]nd [facility] [address] [phone]            Apr 24, 2018  3:00 PM CDT   RETURN ONC with He Burns MD   [facility] Blood and Marrow Transplant ([facility])    [address]  [address] [facility] [address] [phone]              Who to contact     If you have questions or need follow up information about today's clinic visit or your schedule please contact [facility] directly at [phone].  Normal or non-critical lab and imaging results will be communicated to you by MyChart, letter or phone within 4 business days after the clinic has received the results. If you do not hear from us within 7 days, please contact the clinic through HitMeUphart or  phone. If you have a critical or abnormal lab result, we will notify you by phone as soon as possible.  Submit refill requests through FoneSense or call your pharmacy and they will forward the refill request to us. Please allow 3 business days for your refill to be completed.          Additional Information About Your Visit        NeoMedia Technologieshart Information     FoneSense gives you secure access to your electronic health record. If you see a primary care provider, you can also send messages to your care team and make appointments. If you have questions, please call your primary care clinic.  If you do not have a primary care provider, please call [phone] and they will assist you.        Care EveryWhere ID     This is your Care EveryWhere ID. This could be used by other organizations to access your [place] medical records  [id number]        Your Vitals Were     Pulse Temperature Pulse Oximetry BMI (Body Mass Index)          93 97.5  F (36.4  C) (Oral) 99% 25.22 kg/m2         Blood Pressure from Last 3 Encounters:   12/21/17 116/68   11/15/17 114/68   11/06/17 115/79    Weight from Last 3 Encounters:   12/21/17 161 lb (73 kg)   11/06/17 157 lb (71.2 kg)   10/30/17 155 lb 6.4 oz (70.5 kg)              We Performed the Following     Basic metabolic panel     CBC with platelets and differential     PSA, screen          Today's Medication Changes          These changes are accurate as of: 12/21/17  9:47 AM.  If you have any questions, ask your nurse or doctor.               Start taking these medicines.        Dose/Directions    hydrocortisone 2.5 % cream   Commonly known as:  ANUSOL-HC   Used for:  Thrombosed external hemorrhoids   Started by:  Campos Parra MD        Place rectally 2 times daily   Quantity:  30 g   Refills:  3         These medicines have changed or have updated prescriptions.        Dose/Directions    gabapentin 300 MG capsule   Commonly known as:  NEURONTIN   This may have changed:  Another medication  with the same name was removed. Continue taking this medication, and follow the directions you see here.   Used for:  Post herpetic neuralgia   Changed by:  Campos Parra MD        Dose:  300-900 mg   Take 1-3 capsules (300-900 mg) by mouth 3 times daily   Quantity:  540 capsule   Refills:  3       HYDROcodone-acetaminophen 5-325 MG per tablet   Commonly known as:  NORCO   This may have changed:  Another medication with the same name was removed. Continue taking this medication, and follow the directions you see here.   Used for:  Herpes zoster with keratoconjunctivitis   Changed by:  Campos Parra MD        Dose:  1 tablet   Take 1 tablet by mouth every 8 hours as needed for pain maximum 2 tablet(s) per day   Quantity:  60 tablet   Refills:  0         Stop taking these medicines if you haven't already. Please contact your care team if you have questions.     gabapentin 8 % Gel topical PLO cream   Stopped by:  Campos Parra MD                Where to get your medicines      These medications were sent to [facility]/pharmacy #[id number] [address] AT CORNER OF [address] [place] [address]     Phone:  [phone]     atorvastatin 10 MG tablet    hydrocortisone 2.5 % cream                Primary Care Provider Office Phone # Fax #    [facility] [phone]       [address] [place] [address]        Equal Access to Services     [facility] AH: Aron Gu, waaxda luqadaha, qaybta kaalmada adeegyada, jose newton . So [facility] [phone].    ATENCIÓN: Si habla español, tiene a knutson disposición servicios gratuitos de asistencia lingüística. Llame al [phone].    We comply with applicable federal civil rights laws and [place] laws. We do not discriminate on the basis of race, color, national origin, age, disability, sex, sexual orientation, or gender identity.            Thank you!      Thank you for choosing [facility]  for your care. Our goal is always to provide you with excellent care. Hearing back from our patients is one way we can continue to improve our services. Please take a few minutes to complete the written survey that you may receive in the mail after your visit with us. Thank you!             Your Updated Medication List - Protect others around you: Learn how to safely use, store and throw away your medicines at www.disposemymeds.org.          This list is accurate as of: 12/21/17  9:47 AM.  Always use your most recent med list.                   Brand Name Dispense Instructions for use Diagnosis    atorvastatin 10 MG tablet    LIPITOR    45 tablet    Take 1 tablet (10 mg) by mouth every 48 hours    Hyperlipidemia LDL goal <130       gabapentin 300 MG capsule    NEURONTIN    540 capsule    Take 1-3 capsules (300-900 mg) by mouth 3 times daily    Post herpetic neuralgia       HYDROcodone-acetaminophen 5-325 MG per tablet    NORCO    60 tablet    Take 1 tablet by mouth every 8 hours as needed for pain maximum 2 tablet(s) per day    Herpes zoster with keratoconjunctivitis       hydrocortisone 2.5 % cream    ANUSOL-HC    30 g    Place rectally 2 times daily    Thrombosed external hemorrhoids       lidocaine 5 % ointment    XYLOCAINE    50 g    Apply topically 3 times daily as needed for moderate pain    Herpes zoster without complication       omega 3 1200 MG Caps      Take 2 capsules by mouth daily    Spinal stenosis, lumbar region, without neurogenic claudication       omeprazole 10 MG CR capsule    priLOSEC    90 capsule    TAKE 1 CAPSULE BY MOUTH DAILY - TAKE 30 TO 60 MINUTES BEFORE A MEAL -    Gastroesophageal reflux disease without esophagitis       TYLENOL PO           VITAMIN D3 PO      Take 2,000 Units by mouth        zolpidem 5 MG tablet    AMBIEN    30 tablet    Take 1 tablet (5 mg) by mouth nightly as needed for sleep    Herpes zoster with  keratoconjunctivitis

## 2017-12-21 NOTE — NURSING NOTE
"Chief Complaint   Patient presents with     RECHECK       Initial /68 (BP Location: Right arm, Patient Position: Chair, Cuff Size: Adult Regular)  Pulse 93  Temp 97.5  F (36.4  C) (Oral)  Wt 161 lb (73 kg)  SpO2 99%  BMI 25.22 kg/m2 Estimated body mass index is 25.22 kg/(m^2) as calculated from the following:    Height as of 10/30/17: 5' 7\" (1.702 m).    Weight as of this encounter: 161 lb (73 kg).  Medication Reconciliation: complete   Karen Levy MA      "

## 2018-01-02 ENCOUNTER — MYC REFILL (OUTPATIENT)
Dept: FAMILY MEDICINE | Facility: CLINIC | Age: 73
End: 2018-01-02

## 2018-01-02 ENCOUNTER — TELEPHONE (OUTPATIENT)
Dept: UROLOGY | Facility: CLINIC | Age: 73
End: 2018-01-02

## 2018-01-02 DIAGNOSIS — B02.33: ICD-10-CM

## 2018-01-02 NOTE — TELEPHONE ENCOUNTER
Patient called regarding his cancelled appt in December  He was told dr rudolph would call him and he has not received phone call.  Appointment made for jan 5th patient states his psa is the same as last time and does not want to come in.  Message sent to klaudia about future follow up . Marielos Craft LPN Staff Nurse

## 2018-01-02 NOTE — TELEPHONE ENCOUNTER
Routing refill request to provider for review/approval because:  Drug not on the [facility] refill protocol       Christelle Silvestre RN  [facility]

## 2018-01-02 NOTE — TELEPHONE ENCOUNTER
Message from MyChart:  Original authorizing provider: Campos Parra MD    Loco JULIENMary Nina would like a refill of the following medications:  HYDROcodone-acetaminophen (NORCO) 5-325 MG per tablet [Campos Parra MD]    Preferred pharmacy: [facility]/PHARMACY #[id number] - [facility] [address]    Comment:  have 10 tablets left, but want to not wait till last minute to refill.... some small signs of progress...longer stretches of time without needing Norco (e.g. making it thru the night and most of the next day), longer stretches between itching frenzies, and eye being partly open more of the time still painful pressure in forehead right above eye and extreme sensitivity along right side of eye..these have not diminished in 3 months and skin still feels alien

## 2018-01-03 RX ORDER — HYDROCODONE BITARTRATE AND ACETAMINOPHEN 5; 325 MG/1; MG/1
1 TABLET ORAL EVERY 8 HOURS PRN
Qty: 60 TABLET | Refills: 0 | Status: SHIPPED | OUTPATIENT
Start: 2018-01-03 | End: 2019-05-22

## 2018-01-03 NOTE — TELEPHONE ENCOUNTER
Prescription of HYDROcodone-acetaminophen (NORCO) 5-325 MG per tablet was brought to the  and patient informed to  via Unypet.

## 2018-01-05 ENCOUNTER — TELEPHONE (OUTPATIENT)
Dept: UROLOGY | Facility: CLINIC | Age: 73
End: 2018-01-05

## 2018-01-05 DIAGNOSIS — R97.20 ELEVATED PROSTATE SPECIFIC ANTIGEN (PSA): Primary | ICD-10-CM

## 2018-01-05 NOTE — TELEPHONE ENCOUNTER
I spoke with Loco Wood via phone today.       ASSESSMENT AND PLAN  Prostate Nodule and borderline elevated PSA.    Based on [place] Risk Calculator:  13% chance of high-grade prostate cancer,  19% chance of low-grade cancer,  68% chance that the biopsy is negative for cancer.  About 2 to 4% of men undergoing biopsy will have an infection that may require hospitalization.    He would like to continue observation.    He would like follow-up in 6 months with PSA.    __________________________________________________    CHIEF COMPLAINT  It was my pleasure to see Loco Wood who is a 72 year old male here for prostate nodule.      HPI  He is here since Dr Gibson found a prostate nodule.    He has had a slow stream for many years but this is not bothersome.  He has minimal nocturia.    He denies gross hematuria.    He uses viagra with good results.    He denies family history of prostate cancer.    He has history of CLL.  This was diagnosed 10 years ago and he is on watchful waiting.    PSA   Date Value Ref Range Status   12/21/2017 4.20 (H) 0 - 4 ug/L Final     Comment:     Assay Method:  Chemiluminescence using Siemens Vista analyzer   09/18/2017 4.48 (H) 0 - 4 ug/L Final     Comment:     Assay Method:  Chemiluminescence using Siemens Vista analyzer   05/15/2017 4.76 (H) 0 - 4 ug/L Final     Comment:     Assay Method:  Chemiluminescence using Siemens Vista analyzer   08/01/2011 1.47 0 - 4 ug/L Final   07/27/2010 1.44 0 - 4 ug/L Final   05/26/2009 1.17 0 - 4 ug/L Final     Patient Active Problem List    Diagnosis Date Noted     Post herpetic neuralgia 11/21/2017     Priority: Medium     Herpes zoster with keratoconjunctivitis, od 10/29/2017     Priority: Medium     Chondromalacia, knee, right 08/01/2017     Priority: Medium     Complex tear of medial meniscus of right knee as current injury 08/01/2017     Priority: Medium     Prostate nodule 06/16/2017     Priority: Medium     Acute bilateral low back  pain without sciatica 05/31/2017     Priority: Medium     BPH (benign prostatic hyperplasia) 04/29/2015     Priority: Medium     Insomnia 06/12/2014     Priority: Medium     OA (osteoarthritis) of hip 05/10/2013     Priority: Medium     Osteoarthritis of hip 04/29/2013     Priority: Medium     Do you wish to do the replacement in the background? yes         Advanced directives, counseling/discussion 08/02/2012     Priority: Medium     Patient states has Advance Directive and will bring in a copy to clinic. 8/2/2012   TISH Shah CMA           Vitamin D deficiency 04/30/2012     Priority: Medium     Health Care Home 06/29/2011     Priority: Medium     x  DX V65.8 REPLACED WITH 28590 HEALTH CARE HOME (04/08/2013)       Mass of skin 03/28/2011     Priority: Medium     Left thumb.       HYPERLIPIDEMIA LDL GOAL <130 10/31/2010     Priority: Medium     CLL (chronic lymphocytic leukemia) (H) 05/11/2007     Priority: Medium     Lumbago 08/04/2006     Priority: Medium     Esophageal reflux 06/23/2005     Priority: Medium     Past Medical History:   Diagnosis Date     Arthritis     hip and toes     Chronic pain      CLL (chronic lymphocytic leukaemia)      Esophageal reflux      Malignant neoplasm (H)     Leukemia     PONV (postoperative nausea and vomiting)      Pure hypercholesterolemia      Past Surgical History:   Procedure Laterality Date     ARTHROPLASTY MINIMALLY INVASIVE HIP  5/10/2013    Procedure: ARTHROPLASTY MINIMALLY INVASIVE HIP;  Left Two Incision Total Hip Arthroplasty ;  Surgeon: Desmond Alberts MD;  Location: [facility] OR     ARTHROPLASTY MINIMALLY INVASIVE HIP  2/27/2014    Procedure: ARTHROPLASTY MINIMALLY INVASIVE HIP;  Right Total Hip Arthroplasty Minimally Invasive Two Incision  *Latex Free Room;  Surgeon: Desmond Alberts MD;  Location: [facility] OR     BACK SURGERY      back fusion 1994     C NONSPECIFIC PROCEDURE  1964 &'95    (R) inguinal herniorrhapy     C NONSPECIFIC PROCEDURE  1949    (L) inguinal  herniorrhaphy     C NONSPECIFIC PROCEDURE      L4-5  L5 S1 fusion - spondylolisthesis     COLONOSCOPY           COLONOSCOPY N/A 8/15/2017    Procedure: COLONOSCOPY;  colonoscopy;  Surgeon: Chun Mcguire MD;  Location:  GI     GI SURGERY      4 hernia repairs in childhood     Current Outpatient Prescriptions   Medication Sig Dispense Refill     HYDROcodone-acetaminophen (NORCO) 5-325 MG per tablet Take 1 tablet by mouth every 8 hours as needed for pain maximum 2 tablet(s) per day 60 tablet 0     atorvastatin (LIPITOR) 10 MG tablet Take 1 tablet (10 mg) by mouth every 48 hours 45 tablet 9     hydrocortisone (ANUSOL-HC) 2.5 % cream Place rectally 2 times daily 30 g 3     zolpidem (AMBIEN) 5 MG tablet Take 1 tablet (5 mg) by mouth nightly as needed for sleep 30 tablet 5     gabapentin (NEURONTIN) 300 MG capsule Take 1-3 capsules (300-900 mg) by mouth 3 times daily 540 capsule 3     Acetaminophen (TYLENOL PO)        lidocaine (XYLOCAINE) 5 % ointment Apply topically 3 times daily as needed for moderate pain 50 g 1     omega 3 1200 MG CAPS Take 2 capsules by mouth daily       omeprazole (PRILOSEC) 10 MG CR capsule TAKE 1 CAPSULE BY MOUTH DAILY - TAKE 30 TO 60 MINUTES BEFORE A MEAL - 90 capsule 3     Cholecalciferol (VITAMIN D3 PO) Take 2,000 Units by mouth        Allergies   Allergen Reactions     Dilaudid [Hydromorphone] Itching     Morphine Itching     Family History   Problem Relation Age of Onset     HEART DISEASE Father      CEREBROVASCULAR DISEASE Father      CANCER Father      melonoma     Melanoma Father      CANCER Mother      Lung cancer     CEREBROVASCULAR DISEASE Paternal Uncle      Aneurism     Breast Cancer Maternal Aunt       from it     CANCER Paternal Uncle      CANCER Paternal Aunt      Skin Cancer No family hx of      Glaucoma No family hx of      Macular Degeneration No family hx of       Social History     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[employer]      Social History Main Topics     Smoking status: Never Smoker     Smokeless tobacco: Never Used     Alcohol use 0.0 oz/week     0 Standard drinks or equivalent per week      Comment: 5 drinks per week     Drug use: No     Sexual activity: Yes     Partners: Female        Recent Labs   Lab Test  04/25/17   1443  10/25/16   1543  04/11/16   1136  11/20/15   1156   WBC  95.2*  95.6*  64.1*  54.2*   HGB  13.8  13.2*  14.3  14.0   HCT  43.3  41.0  44.7  43.0   PLT  125*  116*  112*  116*     Recent Labs   Lab Test  04/25/17   1443  10/25/16   1543  04/11/16   1136  11/20/15   1156   NA  140  144  141  140   POTASSIUM  4.5  4.4  4.0  3.9   CHLORIDE  109  107  106  106   CO2  26  29  28  30   ANIONGAP  5  8  7  4   GLC  103*  88  92  98   BUN  15  14  18  19   CR  1.22  1.35*  1.23  1.34*   GFRESTIMATED  59*  52*  58*  53*   GFRESTBLACK  71  63  71  64   DAVID  9.1  9.0  9.3  8.9     Recent Labs   Lab Test  03/02/14   1115  02/17/14   1117   COLOR  Yellow  Yellow   APPEARANCE  Clear  Clear   URINEGLC  Negative  Negative   URINEBILI  Negative  Negative   URINEKETONE  Negative  Trace*   SG  1.011  1.025   UBLD  Negative  Negative   URINEPH  6.5  5.0   PROTEIN  Negative  Negative   UROBILINOGEN   --   0.2   NITRITE  Negative  Negative   LEUKEST  Negative  Negative   RBCU  2   --    WBCU  <1   --        Jermaine ASCENCIO, reviewed these laboratory values.        CC  Patient Care Team:  [facility] as PCP - General (Clinic)  Yodit Barnett PA as Physician Assistant  Campos Boyd MD as MD (Urology)  Padmini Whitney RN as Registered Nurse  Nikhil Farah MD as MD (Neurology)  Nikhil Farah MD as Referring Physician (Neurology)  Kamlesh Bonner MD as MD (Ophthalmology)  JAMIE MEEKS    Copy to patient  ATIF JULIEN [facility]  [address] [address]

## 2018-01-29 ENCOUNTER — TRANSFERRED RECORDS (OUTPATIENT)
Dept: HEALTH INFORMATION MANAGEMENT | Facility: CLINIC | Age: 73
End: 2018-01-29

## 2018-02-19 ENCOUNTER — OFFICE VISIT (OUTPATIENT)
Dept: NEUROLOGY | Facility: CLINIC | Age: 73
End: 2018-02-19
Payer: COMMERCIAL

## 2018-02-19 VITALS
HEART RATE: 75 BPM | WEIGHT: 166 LBS | HEIGHT: 68 IN | SYSTOLIC BLOOD PRESSURE: 130 MMHG | BODY MASS INDEX: 25.16 KG/M2 | DIASTOLIC BLOOD PRESSURE: 70 MMHG

## 2018-02-19 DIAGNOSIS — B02.29 POST HERPETIC NEURALGIA: Primary | ICD-10-CM

## 2018-02-19 ASSESSMENT — PAIN SCALES - GENERAL: PAINLEVEL: MODERATE PAIN (4)

## 2018-02-19 NOTE — MR AVS SNAPSHOT
After Visit Summary   2/19/2018    Loco Wood    MRN: [id number]           Patient Information     Date Of Birth          1945        Visit Information        Provider Department      2/19/2018 4:15 PM Nikhil Farah MD [facility] NEUROSPECIALTIES        Today's Diagnoses     Post herpetic neuralgia    -  1       Follow-ups after your visit        Follow-up notes from your care team     Return if symptoms worsen or fail to improve.      Your next 10 appointments already scheduled     Mar 01, 2018 10:00 AM CST   RETURN NEURO with Jameson Morton MD   Eye Clinic ([facility])    [address]  [facility] [address]   [phone]            Apr 24, 2018  2:30 PM CDT   Masonic Lab Draw with  MASONIC LAB DRAW   [facility] Masonic Lab Draw ([facility])    [address]  [address]  [facility] [id number] [phone]            Apr 24, 2018  3:00 PM CDT   RETURN ONC with He Burns MD   [facility] Blood and Marrow Transplant ([facility])    [address]  [address]  [facility] [id number] [phone]              Who to contact     Please call your clinic at [phone] to:    Ask questions about your health    Make or cancel appointments    Discuss your medicines    Learn about your test results    Speak to your doctor            Additional Information About Your Visit        Gustohart Information     Keepstream gives you secure access to your electronic health record. If you see a primary care provider, you can also send messages to your care team and make appointments. If you have questions, please call your primary care clinic.  If you do not have a primary care provider, please call [phone] and they will assist you.      Keepstream is an electronic gateway that provides easy, online access to your medical records. With Keepstream, you can request a  "clinic appointment, read your test results, renew a prescription or communicate with your care team.     To access your existing account, please contact your [facility] or call [phone] for assistance.        Care EveryWhere ID     This is your Care EveryWhere ID. This could be used by other organizations to access your [place] medical records  [id number]        Your Vitals Were     Pulse Height BMI (Body Mass Index)             75 1.715 m (5' 7.5\") 25.62 kg/m2          Blood Pressure from Last 3 Encounters:   02/19/18 130/70   12/21/17 116/68   11/15/17 114/68    Weight from Last 3 Encounters:   02/19/18 75.3 kg (166 lb)   12/21/17 73 kg (161 lb)   11/06/17 71.2 kg (157 lb)              Today, you had the following     No orders found for display         Today's Medication Changes          These changes are accurate as of 2/19/18 11:59 PM.  If you have any questions, ask your nurse or doctor.               These medicines have changed or have updated prescriptions.        Dose/Directions    gabapentin 300 MG capsule   Commonly known as:  NEURONTIN   This may have changed:  how much to take   Used for:  Post herpetic neuralgia        Dose:  300-900 mg   Take 1-3 capsules (300-900 mg) by mouth 3 times daily   Quantity:  540 capsule   Refills:  3         Stop taking these medicines if you haven't already. Please contact your care team if you have questions.     lidocaine 5 % ointment   Commonly known as:  XYLOCAINE   Stopped by:  Nikhil Farah MD                    Primary Care Provider Office Phone # Fax #    [facility] [phone]       [address] [place] [address]        Equal Access to Services     [facility] Hospital[facility]NCY : nakia Busch, jose ortega. So [facility] [phone].    ATENCIÓN: Si habla español, tiene a knutson disposición servicios " zaid de asistencia lingüística. Anastasia lomeli [phone].    We comply with applicable federal civil rights laws and [place] laws. We do not discriminate on the basis of race, color, national origin, age, disability, sex, sexual orientation, or gender identity.            Thank you!     Thank you for choosing [facility] NEUROSPECIALTIES  for your care. Our goal is always to provide you with excellent care. Hearing back from our patients is one way we can continue to improve our services. Please take a few minutes to complete the written survey that you may receive in the mail after your visit with us. Thank you!             Your Updated Medication List - Protect others around you: Learn how to safely use, store and throw away your medicines at www.disposemymeds.org.          This list is accurate as of 2/19/18 11:59 PM.  Always use your most recent med list.                   Brand Name Dispense Instructions for use Diagnosis    atorvastatin 10 MG tablet    LIPITOR    45 tablet    Take 1 tablet (10 mg) by mouth every 48 hours    Hyperlipidemia LDL goal <130       gabapentin 300 MG capsule    NEURONTIN    540 capsule    Take 1-3 capsules (300-900 mg) by mouth 3 times daily    Post herpetic neuralgia       HYDROcodone-acetaminophen 5-325 MG per tablet    NORCO    60 tablet    Take 1 tablet by mouth every 8 hours as needed for pain maximum 2 tablet(s) per day    Herpes zoster with keratoconjunctivitis       hydrocortisone 2.5 % cream    ANUSOL-HC    30 g    Place rectally 2 times daily    Thrombosed external hemorrhoids       omega 3 1200 MG Caps      Take 2 capsules by mouth daily    Spinal stenosis, lumbar region, without neurogenic claudication       omeprazole 10 MG CR capsule    priLOSEC    90 capsule    TAKE 1 CAPSULE BY MOUTH DAILY - TAKE 30 TO 60 MINUTES BEFORE A MEAL -    Gastroesophageal reflux disease without esophagitis       TYLENOL PO           VITAMIN D3 PO      Take 4,000 Units by mouth daily         zolpidem 5 MG tablet    AMBIEN    30 tablet    Take 1 tablet (5 mg) by mouth nightly as needed for sleep    Herpes zoster with keratoconjunctivitis

## 2018-02-19 NOTE — LETTER
"2/19/2018       RE: Loco Wood  [address] [facility] [address]     Dear Colleague,    Thank you for referring your patient, Loco Wood, to the [facility] at [facility]. Please see a copy of my visit note below.    Service Date: 02/19/2018      INTERVAL HISTORY:  This patient is seen in followup with postherpetic neuralgia.  I saw him in October.  His symptoms began in early October.  He was quite impaired when I saw him.  He was having intense pain.  He also had ptosis in the right eye and did see Neuro-Ophthalmology.  He was diagnosed with a fourth nerve palsy as well.  He is now opening his eye normally.  The eyelid is numb.  His fourth nerve palsy has resolved and he no longer has double vision.  He continues to have pain in his forehead, scalp in the V1 territory and in the right temple.  He describes it as a stabbing and burning.  It is present pretty much every day.  It is hypersensitive to touch.  It tingles and itches.  The itching \"drives him crazy.\"  For 3 months, the left eye was closed, but that has now resolved.  He is using less Vicodin.  He was using up to 2 or more per day, but now has only used 1 in the last week.  He is on gabapentin 300 mg 2 tablets 3 times a day.  His sleep is adequate.  He had been on nortriptyline, but could not tolerate the dry mouth.      PHYSICAL EXAMINATION:   GENERAL:   The patient is cooperative and in no distress.     VITAL SIGNS:   His blood pressure is 130/70.     NEUROLOGIC:   He has full eye movements.  There is no ptosis.      ASSESSMENT:   1.  Postherpetic neuralgia, right ophthalmic territory.      DISCUSSION:  This patient is seen in followup with postherpetic neuralgia.  He has actually improved somewhat in the last week or 2 and is using less Vicodin.  I offered to increase his dose of gabapentin or initiate a trial of venlafaxine or duloxetine.  He is really not interested in taking " any more medication.  There is no specific treatment for the symptom of itching.  He mostly had questions about his illness and whether or not his recovery is typical.  I reassured him on these points.  Hopefully within the next few months he is going to have significant resolution of symptoms.  He had questions about the new vaccine.  I am not sure it would add much in terms of prevention, given that he has just had the illness itself.  I am not making any changes in his treatment.  I will see him in followup on an as-needed basis.      This was a 25-minute appointment, more than half of which was spent in counseling and coordination of care.      MD MICHEAL Thakkar MD             D: 2018   T: 2018   MT: KEVIN      Name:     ATIF JAMES   MRN:      [id number]        Account:      [id number]   :      1945           Service Date: 2018      Document: [id number]       Again, thank you for allowing me to participate in the care of your patient.      Sincerely,    Micheal Farah MD

## 2018-02-19 NOTE — PROGRESS NOTES
"Service Date: 02/19/2018      INTERVAL HISTORY:  This patient is seen in followup with postherpetic neuralgia.  I saw him in October.  His symptoms began in early October.  He was quite impaired when I saw him.  He was having intense pain.  He also had ptosis in the right eye and did see Neuro-Ophthalmology.  He was diagnosed with a fourth nerve palsy as well.  He is now opening his eye normally.  The eyelid is numb.  His fourth nerve palsy has resolved and he no longer has double vision.  He continues to have pain in his forehead, scalp in the V1 territory and in the right temple.  He describes it as a stabbing and burning.  It is present pretty much every day.  It is hypersensitive to touch.  It tingles and itches.  The itching \"drives him crazy.\"  For 3 months, the left eye was closed, but that has now resolved.  He is using less Vicodin.  He was using up to 2 or more per day, but now has only used 1 in the last week.  He is on gabapentin 300 mg 2 tablets 3 times a day.  His sleep is adequate.  He had been on nortriptyline, but could not tolerate the dry mouth.      PHYSICAL EXAMINATION:   GENERAL:   The patient is cooperative and in no distress.     VITAL SIGNS:   His blood pressure is 130/70.     NEUROLOGIC:   He has full eye movements.  There is no ptosis.      ASSESSMENT:   1.  Postherpetic neuralgia, right ophthalmic territory.      DISCUSSION:  This patient is seen in followup with postherpetic neuralgia.  He has actually improved somewhat in the last week or 2 and is using less Vicodin.  I offered to increase his dose of gabapentin or initiate a trial of venlafaxine or duloxetine.  He is really not interested in taking any more medication.  There is no specific treatment for the symptom of itching.  He mostly had questions about his illness and whether or not his recovery is typical.  I reassured him on these points.  Hopefully within the next few months he is going to have significant resolution of " symptoms.  He had questions about the new vaccine.  I am not sure it would add much in terms of prevention, given that he has just had the illness itself.  I am not making any changes in his treatment.  I will see him in followup on an as-needed basis.      This was a 25-minute appointment, more than half of which was spent in counseling and coordination of care.      MD MICHEAL Thakkar MD             D: 2018   T: 2018   MT: KEVIN      Name:     ATIF JAMES   MRN:      [id number]        Account:      [id number]   :      1945           Service Date: 2018      Document: [id number]

## 2018-02-28 DIAGNOSIS — H53.10 SUBJECTIVE VISUAL DISTURBANCE: Primary | ICD-10-CM

## 2018-03-01 ENCOUNTER — OFFICE VISIT (OUTPATIENT)
Dept: OPHTHALMOLOGY | Facility: CLINIC | Age: 73
End: 2018-03-01
Attending: OPHTHALMOLOGY
Payer: MEDICARE

## 2018-03-01 DIAGNOSIS — H49.10 SUPERIOR OBLIQUE PALSY, UNSPECIFIED LATERALITY: Primary | ICD-10-CM

## 2018-03-01 DIAGNOSIS — H53.10 SUBJECTIVE VISUAL DISTURBANCE: ICD-10-CM

## 2018-03-01 PROCEDURE — 92060 SENSORIMOTOR EXAMINATION: CPT | Mod: ZF | Performed by: OPHTHALMOLOGY

## 2018-03-01 PROCEDURE — G0463 HOSPITAL OUTPT CLINIC VISIT: HCPCS | Mod: ZF | Performed by: TECHNICIAN/TECHNOLOGIST

## 2018-03-01 ASSESSMENT — VISUAL ACUITY
OD_PH_SC: 20/20
OD_SC+: +3
OD_SC: 20/30
OS_PH_SC: 20/20
OS_SC: 20/40
METHOD: SNELLEN - LINEAR

## 2018-03-01 ASSESSMENT — CONF VISUAL FIELD
OS_NORMAL: 1
OD_NORMAL: 1
METHOD: COUNTING FINGERS

## 2018-03-01 ASSESSMENT — TONOMETRY
OS_IOP_MMHG: 12
OD_IOP_MMHG: 11
IOP_METHOD: ICARE

## 2018-03-01 ASSESSMENT — EXTERNAL EXAM - LEFT EYE: OS_EXAM: NORMAL

## 2018-03-01 ASSESSMENT — SLIT LAMP EXAM - LIDS
COMMENTS: NORMAL
COMMENTS: NORMAL

## 2018-03-01 ASSESSMENT — EXTERNAL EXAM - RIGHT EYE: OD_EXAM: NORMAL

## 2018-03-01 NOTE — MR AVS SNAPSHOT
After Visit Summary   3/1/2018    Loco Wood    MRN: [id number]           Patient Information     Date Of Birth          1945        Visit Information        Provider Department      3/1/2018 10:00 AM Jameson Morton MD [facility]        Today's Diagnoses     Superior oblique palsy, unspecified laterality    -  1    Subjective visual disturbance           Follow-ups after your visit        Your next 10 appointments already scheduled     Apr 24, 2018  2:30 PM CDT   [facility] with  [facility]   [facility] ([facility])    [address] [place] [address] [address]  [address]  [facility] [address] [phone]            Apr 24, 2018  3:00 PM CDT   RETURN ONC with He Burns MD   [facility] Mar[facility] Transplant ([facility])    [address] [place] [address]  [address] [place] [address] [phone]              Who to contact     Please call your clinic at [phone] to:    Ask questions about your health    Make or cancel appointments    Discuss your medicines    Learn about your test results    Speak to your doctor            Additional Information About Your Visit        [facility] Information     tadoÂ° gives you secure access to your electronic health record. If you see a primary care provider, you can also send messages to your care team and make appointments. If you have questions, please call your primary care clinic.  If you do not have a primary care provider, please call [phone] and they will assist you.      tadoÂ° is an electronic gateway that provides easy, online access to your medical records. With tadoÂ°, you can request a clinic appointment, read your test results, renew a prescription or communicate with your care team.     To access your existing account, please contact your [facility] or call [phone] for assistance.        Care EveryWhere ID      This is your Care EveryWhere ID. This could be used by other organizations to access your [facility] medical records  [id number]         Blood Pressure from Last 3 Encounters:   02/19/18 130/70   12/21/17 116/68   11/15/17 114/68    Weight from Last 3 Encounters:   02/19/18 75.3 kg (166 lb)   12/21/17 73 kg (161 lb)   11/06/17 71.2 kg (157 lb)              We Performed the Following     IOP Measurement     Sensorimotor          Today's Medication Changes          These changes are accurate as of 3/1/18 11:24 AM.  If you have any questions, ask your nurse or doctor.               These medicines have changed or have updated prescriptions.        Dose/Directions    gabapentin 300 MG capsule   Commonly known as:  NEURONTIN   This may have changed:  how much to take   Used for:  Post herpetic neuralgia        Dose:  300-900 mg   Take 1-3 capsules (300-900 mg) by mouth 3 times daily   Quantity:  540 capsule   Refills:  3                Primary Care Provider Office Phone # Fax #    [facility] [phone]       [address] [place] [address]        Equal Access to Services     HECTOR SANTOS : Hadhenry Gu, wajonas rajput, qadaisy barker, jose desai. So [facility] [phone].    ATENCIÓN: Si habla español, tiene a knutson disposición servicios gratuitos de asistencia lingüística. [facility] [phone].    We comply with applicable federal civil rights laws and [place] laws. We do not discriminate on the basis of race, color, national origin, age, disability, sex, sexual orientation, or gender identity.            Thank you!     Thank you for choosing [facility]  for your care. Our goal is always to provide you with excellent care. Hearing back from our patients is one way we can continue to improve our services. Please take a few minutes to complete the written survey that you may receive in the mail after your visit  with us. Thank you!             Your Updated Medication List - Protect others around you: Learn how to safely use, store and throw away your medicines at www.disposemymeds.org.          This list is accurate as of 3/1/18 11:24 AM.  Always use your most recent med list.                   Brand Name Dispense Instructions for use Diagnosis    atorvastatin 10 MG tablet    LIPITOR    45 tablet    Take 1 tablet (10 mg) by mouth every 48 hours    Hyperlipidemia LDL goal <130       gabapentin 300 MG capsule    NEURONTIN    540 capsule    Take 1-3 capsules (300-900 mg) by mouth 3 times daily    Post herpetic neuralgia       HYDROcodone-acetaminophen 5-325 MG per tablet    NORCO    60 tablet    Take 1 tablet by mouth every 8 hours as needed for pain maximum 2 tablet(s) per day    Herpes zoster with keratoconjunctivitis       hydrocortisone 2.5 % cream    ANUSOL-HC    30 g    Place rectally 2 times daily    Thrombosed external hemorrhoids       omega 3 1200 MG Caps      Take 2 capsules by mouth daily    Spinal stenosis, lumbar region, without neurogenic claudication       omeprazole 10 MG CR capsule    priLOSEC    90 capsule    TAKE 1 CAPSULE BY MOUTH DAILY - TAKE 30 TO 60 MINUTES BEFORE A MEAL -    Gastroesophageal reflux disease without esophagitis       TYLENOL PO           VITAMIN D3 PO      Take 4,000 Units by mouth daily        zolpidem 5 MG tablet    AMBIEN    30 tablet    Take 1 tablet (5 mg) by mouth nightly as needed for sleep    Herpes zoster with keratoconjunctivitis

## 2018-03-01 NOTE — NURSING NOTE
Chief Complaints and History of Present Illnesses   Patient presents with     Follow Up For     subjective visual disturbance     HPI    Symptoms:              Comments:  Loco Wood is a 72 year old male with the following diagnoses:     1. Post herpetic neuralgia   2. Subjective visual disturbance   3. 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   He can open his eye more, no double vision and vision improving.   Pain persistent on top forehead associated with shingles per patient.   Estelle Wallace CO 3/1/2018 9:53 AM

## 2018-03-01 NOTE — LETTER
3/1/2018         Nikhil Farah MD    RE:  :  MRN: Loco Wood  1945  [id number]     Dear Dr. Farah:     Your patient, Loco Wood, returned for neuro-ophthalmic follow up. My assessment and plan are below.  For further details, please see my attached clinic note.      Assessment & Plan   Loco Wood is a 72 year old male with the following diagnoses:   1. Superior oblique palsy, unspecified laterality    2. Subjective visual disturbance       Follow up for post-herpetic neuralgia, and right trochlear nerve palsy. At the last visit  he had a right trochlear nerve palsy most obvious in downgaze. Diplopia has resolved completely as of one month ago. No blurry vision. No longer any weeping. Continues to have pin prick tingling over the right eyelid, as well has pain in R V1 distribution. Intermittent right eyelid burning pain that lasts 30 minutes. He is taking gabapentin 600 mg TID with some improvement. When he weaned to 300 mg TID one month ago, head pain returned. He is no longer taking prednisone or acyclovir.     Visual acuity 20/30 OD, 20/40 OS. IOP stable. Right V1 altered/painful sensation, V2, V3 symmetric and intact.  His corneal sensation has improved in the right eye.  His 4th nerve palsy has gone away.  He has a small exophoria, but this is likely pre-existing.    It is my impression that he had a 4th nerve palsy following an attack of herpes zoster ophthalmicus.  This has resolved, which is typical of this phenomenon.  He still has some postherpetic pain, but is only on gabapentin 1800 mg a day at this point.  He will continue to try to taper this over the course of time.  I did not give him a follow-up appointment but would be happy to see him in the future as needed.    Again, thank you for allowing me to participate in the care of your patient.      Sincerely,    Jameson Morton MD  Professor, Neuro-Ophthalmology      CC: [facility]  Campos Dean  MD Padmini Boyd, MD Kamlesh Melendez MD Dennis A Fenichel,  VIA MyChart       DX = 4th nerve palsy, Herpes zoster ophthalmicus

## 2018-03-01 NOTE — PROGRESS NOTES
Assessment & Plan     Loco Wood is a 72 year old male with the following diagnoses:   1. Superior oblique palsy, unspecified laterality    2. Subjective visual disturbance       Follow up for post-herpetic neuralgia, and right trochlear nerve palsy. At the last visit 11/17 he had a right trochlear nerve palsy most obvious in downgaze. Diplopia has resolved completely as of one month ago. No blurry vision. No longer any weeping. Continues to have pin prick tingling over the right eyelid, as well has pain in R V1 distribution. Intermittent right eyelid burning pain that lasts 30 minutes. He is taking gabapentin 600 mg TID with some improvement. When he weaned to 300 mg TID one month ago, head pain returned. He is no longer taking prednisone or acyclovir.     Visual acuity 20/30 OD, 20/40 OS. IOP stable. Right V1 altered/painful sensation, V2, V3 symmetric and intact.  His corneal sensation has improved in the right eye.  His 4th nerve palsy has gone away.  He has a small exophoria, but this is likely pre-existing.    It is my impression that he had a 4th nerve palsy following an attack of herpes zoster ophthalmicus.  This has resolved, which is typical of this phenomenon.  He still has some postherpetic pain, but is only on gabapentin 1800 mg a day at this point.  He will continue to try to taper this over the course of time.  I did not give him a follow-up appointment but would be happy to see him in the future as needed.              Attending Physician Attestation:  Complete documentation of historical and exam elements from today's encounter can be found in the full encounter summary report (not reduplicated in this progress note).  I personally obtained the chief complaint(s) and history of present illness.  I confirmed and edited as necessary the review of systems, past medical/surgical history, family history, social history, and examination findings as documented by others; and I examined the  patient myself.  I personally reviewed the relevant tests, images, and reports as documented above.  I formulated and edited as necessary the assessment and plan and discussed the findings and management plan with the patient and family. - Jameson Morton MD

## 2018-03-19 ASSESSMENT — ACTIVITIES OF DAILY LIVING (ADL)
CURRENT_FUNCTION: NO ASSISTANCE NEEDED
I_NEED_ASSISTANCE_FOR_THE_FOLLOWING_DAILY_ACTIVITIES:: NO ASSISTANCE IS NEEDED

## 2018-03-22 ENCOUNTER — OFFICE VISIT (OUTPATIENT)
Dept: FAMILY MEDICINE | Facility: CLINIC | Age: 73
End: 2018-03-22
Payer: COMMERCIAL

## 2018-03-22 VITALS
BODY MASS INDEX: 25.62 KG/M2 | TEMPERATURE: 97.2 F | SYSTOLIC BLOOD PRESSURE: 105 MMHG | DIASTOLIC BLOOD PRESSURE: 68 MMHG | WEIGHT: 166 LBS | HEART RATE: 88 BPM | OXYGEN SATURATION: 96 %

## 2018-03-22 DIAGNOSIS — F52.9 SEXUAL FUNCTION PROBLEM: ICD-10-CM

## 2018-03-22 DIAGNOSIS — B02.33: ICD-10-CM

## 2018-03-22 DIAGNOSIS — N40.1 BENIGN PROSTATIC HYPERPLASIA WITH URINARY OBSTRUCTION: ICD-10-CM

## 2018-03-22 DIAGNOSIS — C91.10 CLL (CHRONIC LYMPHOCYTIC LEUKEMIA) (H): ICD-10-CM

## 2018-03-22 DIAGNOSIS — R97.20 ELEVATED PROSTATE SPECIFIC ANTIGEN (PSA): Primary | ICD-10-CM

## 2018-03-22 DIAGNOSIS — N13.8 BENIGN PROSTATIC HYPERPLASIA WITH URINARY OBSTRUCTION: ICD-10-CM

## 2018-03-22 DIAGNOSIS — E78.5 HYPERLIPIDEMIA LDL GOAL <130: ICD-10-CM

## 2018-03-22 LAB
ANION GAP SERPL CALCULATED.3IONS-SCNC: 10 MMOL/L (ref 3–14)
BUN SERPL-MCNC: 21 MG/DL (ref 7–30)
CALCIUM SERPL-MCNC: 9.2 MG/DL (ref 8.5–10.1)
CHLORIDE SERPL-SCNC: 107 MMOL/L (ref 94–109)
CHOLEST SERPL-MCNC: 172 MG/DL
CO2 SERPL-SCNC: 25 MMOL/L (ref 20–32)
CREAT SERPL-MCNC: 1.51 MG/DL (ref 0.66–1.25)
GFR SERPL CREATININE-BSD FRML MDRD: 46 ML/MIN/1.7M2
GLUCOSE SERPL-MCNC: 94 MG/DL (ref 70–99)
HDLC SERPL-MCNC: 36 MG/DL
LDLC SERPL CALC-MCNC: 107 MG/DL
NONHDLC SERPL-MCNC: 136 MG/DL
POTASSIUM SERPL-SCNC: 4.5 MMOL/L (ref 3.4–5.3)
PROLACTIN SERPL-MCNC: 9 UG/L (ref 2–18)
PSA SERPL-ACNC: 4.58 UG/L (ref 0–4)
SODIUM SERPL-SCNC: 142 MMOL/L (ref 133–144)
TRIGL SERPL-MCNC: 144 MG/DL

## 2018-03-22 PROCEDURE — 36415 COLL VENOUS BLD VENIPUNCTURE: CPT | Performed by: INTERNAL MEDICINE

## 2018-03-22 PROCEDURE — 99397 PER PM REEVAL EST PAT 65+ YR: CPT | Performed by: INTERNAL MEDICINE

## 2018-03-22 PROCEDURE — 80061 LIPID PANEL: CPT | Performed by: INTERNAL MEDICINE

## 2018-03-22 PROCEDURE — 84146 ASSAY OF PROLACTIN: CPT | Performed by: INTERNAL MEDICINE

## 2018-03-22 PROCEDURE — G0103 PSA SCREENING: HCPCS | Performed by: INTERNAL MEDICINE

## 2018-03-22 PROCEDURE — 80048 BASIC METABOLIC PNL TOTAL CA: CPT | Performed by: INTERNAL MEDICINE

## 2018-03-22 PROCEDURE — 84403 ASSAY OF TOTAL TESTOSTERONE: CPT | Performed by: INTERNAL MEDICINE

## 2018-03-22 RX ORDER — GABAPENTIN 100 MG/1
100-200 CAPSULE ORAL 3 TIMES DAILY
Qty: 180 CAPSULE | Refills: 3 | Status: SHIPPED | OUTPATIENT
Start: 2018-03-22 | End: 2019-05-16

## 2018-03-22 ASSESSMENT — ACTIVITIES OF DAILY LIVING (ADL): CURRENT_FUNCTION: NO ASSISTANCE NEEDED

## 2018-03-22 ASSESSMENT — PAIN SCALES - GENERAL: PAINLEVEL: NO PAIN (0)

## 2018-03-22 NOTE — MR AVS SNAPSHOT
After Visit Summary   3/22/2018    Loco Wood    MRN: [id number]           Patient Information     Date Of Birth          1945        Visit Information        Provider Department      3/22/2018 11:00 AM Campos Parra MD [facility]        Today's Diagnoses     Elevated prostate specific antigen (PSA)    -  1    Benign prostatic hyperplasia with urinary obstruction        Herpes zoster with keratoconjunctivitis, od        Hyperlipidemia LDL goal <130        CLL (chronic lymphocytic leukemia) (H)        Sexual function problem          Care Instructions      Preventive Health Recommendations:       Male Ages 65 and over    Yearly exam:             See your health care provider every year in order to  o   Review health changes.   o   Discuss preventive care.    o   Review your medicines if your doctor has prescribed any.    Talk with your health care provider about whether you should have a test to screen for prostate cancer (PSA).    Every 3 years, have a diabetes test (fasting glucose). If you are at risk for diabetes, you should have this test more often.    Every 5 years, have a cholesterol test. Have this test more often if you are at risk for high cholesterol or heart disease.     Every 10 years, have a colonoscopy. Or, have a yearly FIT test (stool test). These exams will check for colon cancer.    Talk to with your health care provider about screening for Abdominal Aortic Aneurysm if you have a family history of AAA or have a history of smoking.  Shots:     Get a flu shot each year.     Get a tetanus shot every 10 years.     Talk to your doctor about your pneumonia vaccines. There are now two you should receive - Pneumovax (PPSV 23) and Prevnar (PCV 13).    Talk to your doctor about a shingles vaccine.     Talk to your doctor about the hepatitis B vaccine.  Nutrition:     Eat at least 5 servings of fruits and vegetables each day.     Eat whole-grain bread,  whole-wheat pasta and brown rice instead of white grains and rice.     Talk to your doctor about Calcium and Vitamin D.   Lifestyle    Exercise for at least 150 minutes a week (30 minutes a day, 5 days a week). This will help you control your weight and prevent disease.     Limit alcohol to one drink per day.     No smoking.     Wear sunscreen to prevent skin cancer.     See your dentist every six months for an exam and cleaning.     See your eye doctor every 1 to 2 years to screen for conditions such as glaucoma, macular degeneration and cataracts.          Follow-ups after your visit        Your next 10 appointments already scheduled     Apr 24, 2018  2:30 PM CDT   [facility] with  [facility]   [facility] ([facility])    [address] [place] [address]  [address]  [facility] [id number] [phone]            Apr 24, 2018  3:00 PM CDT   RETURN ONC with He Burns MD   [facility] and Mar[facility] Transplant ([facility])    [address] [place] [address]  [address]  [facility] [id number] [phone]              Who to contact     If you have questions or need follow up information about today's clinic visit or your schedule please contact [facility] directly at [phone].  Normal or non-critical lab and imaging results will be communicated to you by [employer], letter or phone within 4 business days after the clinic has received the results. If you do not hear from us within 7 days, please contact the clinic through [employer] or phone. If you have a critical or abnormal lab result, we will notify you by phone as soon as possible.  Submit refill requests through [employer] or call your pharmacy and they will forward the refill request to us. Please allow 3 business days for your refill to be completed.          Additional Information About Your Visit        [employer] Information     [employer] gives you secure access to your  electronic health record. If you see a primary care provider, you can also send messages to your care team and make appointments. If you have questions, please call your primary care clinic.  If you do not have a primary care provider, please call [phone] and they will assist you.        Care EveryWhere ID     This is your Care EveryWhere ID. This could be used by other organizations to access your [place] medical records  [id number]        Your Vitals Were     Pulse Temperature Pulse Oximetry BMI (Body Mass Index)          88 97.2  F (36.2  C) (Oral) 96% 25.62 kg/m2         Blood Pressure from Last 3 Encounters:   03/22/18 105/68   02/19/18 130/70   12/21/17 116/68    Weight from Last 3 Encounters:   03/22/18 166 lb (75.3 kg)   02/19/18 166 lb (75.3 kg)   12/21/17 161 lb (73 kg)              We Performed the Following     **Basic metabolic panel FUTURE anytime     Lipid panel reflex to direct LDL Fasting     Prolactin     PSA, screen     Testosterone, total          Today's Medication Changes          These changes are accurate as of 3/22/18 11:46 AM.  If you have any questions, ask your nurse or doctor.               These medicines have changed or have updated prescriptions.        Dose/Directions    * gabapentin 300 MG capsule   Commonly known as:  NEURONTIN   This may have changed:  how much to take   Used for:  Post herpetic neuralgia        Dose:  300-900 mg   Take 1-3 capsules (300-900 mg) by mouth 3 times daily   Quantity:  540 capsule   Refills:  3       * gabapentin 100 MG capsule   Commonly known as:  NEURONTIN   This may have changed:  You were already taking a medication with the same name, and this prescription was added. Make sure you understand how and when to take each.   Used for:  Herpes zoster with keratoconjunctivitis   Changed by:  Campos Parra MD        Dose:  100-200 mg   Take 1-2 capsules (100-200 mg) by mouth 3 times daily   Quantity:  180 capsule   Refills:  3       * Notice:   This list has 2 medication(s) that are the same as other medications prescribed for you. Read the directions carefully, and ask your doctor or other care provider to review them with you.         Where to get your medicines      These medications were sent to [id number] - [place] [id number] [facility] [address]     Phone:  [phone]     gabapentin 100 MG capsule                Primary Care Provider Office Phone # Fax #    [facility] [phone] [address] [facility] [address]        Equal Access to Services     [facility] Center[facility]NC[facility] : Hadii litzy ku hadasho Soomaali, waaxda luqadaha, qaybta kaalmada aderufinayacorinne, jose newton . So [facility] [phone].    ATENCIÓN: Si habla español, tiene a knutson disposición servicios gratuitos de asistencia lingüística. Princessame al [phone].    We comply with applicable federal civil rights laws and [place] laws. We do not discriminate on the basis of race, color, national origin, age, disability, sex, sexual orientation, or gender identity.            Thank you!     Thank you for choosing [facility]  for your care. Our goal is always to provide you with excellent care. Hearing back from our patients is one way we can continue to improve our services. Please take a few minutes to complete the written survey that you may receive in the mail after your visit with us. Thank you!             Your Updated Medication List - Protect others around you: Learn how to safely use, store and throw away your medicines at www.disposemymeds.org.          This list is accurate as of 3/22/18 11:46 AM.  Always use your most recent med list.                   Brand Name Dispense Instructions for use Diagnosis    atorvastatin 10 MG tablet    LIPITOR    45 tablet    Take 1 tablet (10 mg) by mouth every 48 hours    Hyperlipidemia LDL goal <130        * gabapentin 300 MG capsule    NEURONTIN    540 capsule    Take 1-3 capsules (300-900 mg) by mouth 3 times daily    Post herpetic neuralgia       * gabapentin 100 MG capsule    NEURONTIN    180 capsule    Take 1-2 capsules (100-200 mg) by mouth 3 times daily    Herpes zoster with keratoconjunctivitis       HYDROcodone-acetaminophen 5-325 MG per tablet    NORCO    60 tablet    Take 1 tablet by mouth every 8 hours as needed for pain maximum 2 tablet(s) per day    Herpes zoster with keratoconjunctivitis       hydrocortisone 2.5 % cream    ANUSOL-HC    30 g    Place rectally 2 times daily    Thrombosed external hemorrhoids       omega 3 1200 MG Caps      Take 2 capsules by mouth daily    Spinal stenosis, lumbar region, without neurogenic claudication       omeprazole 10 MG CR capsule    priLOSEC    90 capsule    TAKE 1 CAPSULE BY MOUTH DAILY - TAKE 30 TO 60 MINUTES BEFORE A MEAL -    Gastroesophageal reflux disease without esophagitis       TYLENOL PO           VITAMIN D3 PO      Take 4,000 Units by mouth daily        zolpidem 5 MG tablet    AMBIEN    30 tablet    Take 1 tablet (5 mg) by mouth nightly as needed for sleep    Herpes zoster with keratoconjunctivitis       * Notice:  This list has 2 medication(s) that are the same as other medications prescribed for you. Read the directions carefully, and ask your doctor or other care provider to review them with you.

## 2018-03-22 NOTE — NURSING NOTE
"Chief Complaint   Patient presents with     Physical       Initial /68 (BP Location: Right arm, Patient Position: Chair, Cuff Size: Adult Regular)  Pulse 88  Temp 97.2  F (36.2  C) (Oral)  Wt 166 lb (75.3 kg)  SpO2 96%  BMI 25.62 kg/m2 Estimated body mass index is 25.62 kg/(m^2) as calculated from the following:    Height as of 2/19/18: 5' 7.5\" (1.715 m).    Weight as of this encounter: 166 lb (75.3 kg).  Medication Reconciliation: complete   Karen Levy MA      "

## 2018-03-22 NOTE — PROGRESS NOTES
SUBJECTIVE:   Loco Wood is a 72 year old male who presents for Preventive Visit.  Are you in the first 12 months of your Medicare coverage?  No    Physical   Annual:     Getting at least 3 servings of Calcium per day::  Yes    Bi-annual eye exam::  Yes    Dental care twice a year::  Yes    Sleep apnea or symptoms of sleep apnea::  None    Diet::  Regular (no restrictions)    Frequency of exercise::  2-3 days/week    Duration of exercise::  30-45 minutes    Taking medications regularly::  Yes    Medication side effects::  None    Additional concerns today::  YES    Ability to successfully perform activities of daily living: no assistance needed  Home Safety:  No safety concerns identified  Hearing Impairment: no hearing concerns    Fall risk:  Fallen 2 or more times in the past year?: No  Any fall with injury in the past year?: No       Itching -  Gabapentin  100 mg tablets   300 mg tid   200 mg tid    Lump on the right scalp  Staying about same in size over the area.    Concerning PSA  Hurts in the back of the leg or knee     COGNITIVE SCREEN  1) Repeat 3 items (Banana, Sunrise, Chair)    2) Clock draw: NORMAL  3) 3 item recall: Recalls 3 objects  Results: 0 items recalled: PROBABLE COGNITIVE IMPAIRMENT, **INFORM PROVIDER**    Mini-CogTM Copyright SUMA Ferreira. Licensed by the author for use in [facility]; reprinted with permission (nithin@.[facility]). All rights reserved.        Reviewed and updated as needed this visit by clinical staff         Reviewed and updated as needed this visit by Provider        Social History   Substance Use Topics     Smoking status: Never Smoker     Smokeless tobacco: Never Used     Alcohol use 0.0 oz/week     0 Standard drinks or equivalent per week      Comment: 5 drinks per week       No flowsheet data found.No flowsheet data found.                Today's PHQ-2 Score:   PHQ-2 ( 1999 [employer]) 3/19/2018   Q1: Little interest or pleasure in doing things 0   Q2: Feeling down,  depressed or hopeless 0   PHQ-2 Score 0   Q1: Little interest or pleasure in doing things Not at all   Q2: Feeling down, depressed or hopeless Not at all   PHQ-2 Score 0       Do you feel safe in your environment - Yes    Do you have a Health Care Directive?: No: Advance care planning reviewed with patient; information given to patient to review.    Current providers sharing in care for this patient include:   Patient Care Team:  [facility], [facility] as PCP - General (Clinic)  Campos Boyd MD as MD (Urology)  Padmini Whitney RN as Registered Nurse  Nikhil Farah MD as MD (Neurology)  Nikhil Farah MD as Referring Physician (Neurology)  Kamlesh Bonner MD as MD (Ophthalmology)    The following health maintenance items are reviewed in Epic and correct as of today:  Health Maintenance   Topic Date Due     AORTIC ANEURYSM SCREENING (SYSTEM ASSIGNED)  04/28/2012     ADVANCE DIRECTIVE PLANNING Q5 YRS  08/02/2017     LIPID MONITORING Q1 YEAR  10/25/2017     TETANUS IMMUNIZATION (SYSTEM ASSIGNED)  05/21/2018     INFLUENZA VACCINE (SYSTEM ASSIGNED)  09/01/2018     FALL RISK ASSESSMENT  11/28/2018     COLONOSCOPY Q5 YR  08/15/2022     PNEUMOCOCCAL  Completed     HEPATITIS C SCREENING  Completed     Labs reviewed in EPIC  BP Readings from Last 3 Encounters:   03/22/18 105/68   02/19/18 130/70   12/21/17 116/68    Wt Readings from Last 3 Encounters:   03/22/18 166 lb (75.3 kg)   02/19/18 166 lb (75.3 kg)   12/21/17 161 lb (73 kg)                  Patient Active Problem List   Diagnosis     Esophageal reflux     Lumbago     CLL (chronic lymphocytic leukemia) (H)     HYPERLIPIDEMIA LDL GOAL <130     Mass of skin     Health Care Home     Vitamin D deficiency     Advanced directives, counseling/discussion     Osteoarthritis of hip     OA (osteoarthritis) of hip     Insomnia     BPH (benign prostatic hyperplasia)     Acute bilateral low back pain without sciatica     Prostate nodule      Chondromalacia, knee, right     Complex tear of medial meniscus of right knee as current injury     Herpes zoster with keratoconjunctivitis, od     Post herpetic neuralgia     Past Surgical History:   Procedure Laterality Date     ARTHROPLASTY MINIMALLY INVASIVE HIP  5/10/2013    Procedure: ARTHROPLASTY MINIMALLY INVASIVE HIP;  Left Two Incision Total Hip Arthroplasty ;  Surgeon: Desmond Alberts MD;  Location: [facility] OR     ARTHROPLASTY MINIMALLY INVASIVE HIP  2014    Procedure: ARTHROPLASTY MINIMALLY INVASIVE HIP;  Right Total Hip Arthroplasty Minimally Invasive Two Incision  *Latex Free Room;  Surgeon: Desmond Alberts MD;  Location: [facility] OR     BACK SURGERY      back fusion      C NONSPECIFIC PROCEDURE   &    (R) inguinal herniorrhapy     C NONSPECIFIC PROCEDURE      (L) inguinal herniorrhaphy     C NONSPECIFIC PROCEDURE      L4-5  L5 S1 fusion - spondylolisthesis     COLONOSCOPY           COLONOSCOPY N/A 8/15/2017    Procedure: COLONOSCOPY;  colonoscopy;  Surgeon: Chun Mcguire MD;  Location:  GI     GI SURGERY      4 hernia repairs in childhood     HERNIA REPAIR      4 since age 4       Social History   Substance Use Topics     Smoking status: Never Smoker     Smokeless tobacco: Never Used     Alcohol use 0.0 oz/week      Comment: moderate, social     Family History   Problem Relation Age of Onset     HEART DISEASE Father      CEREBROVASCULAR DISEASE Father       OF STROKE      CANCER Father      melonoma     Melanoma Father      Hyperlipidemia Father      Thyroid Disease Father      CANCER Mother      Lung cancer     Other Cancer Mother      lung; heavy smoker     CEREBROVASCULAR DISEASE Paternal Uncle      Aneurism     Breast Cancer Maternal Aunt       from it     CANCER Paternal Uncle      CANCER Paternal Aunt      Skin Cancer No family hx of      Glaucoma No family hx of      Macular Degeneration No family hx of          Current Outpatient Prescriptions  "  Medication Sig Dispense Refill     gabapentin (NEURONTIN) 100 MG capsule Take 1-2 capsules (100-200 mg) by mouth 3 times daily 180 capsule 3     HYDROcodone-acetaminophen (NORCO) 5-325 MG per tablet Take 1 tablet by mouth every 8 hours as needed for pain maximum 2 tablet(s) per day 60 tablet 0     atorvastatin (LIPITOR) 10 MG tablet Take 1 tablet (10 mg) by mouth every 48 hours 45 tablet 9     hydrocortisone (ANUSOL-HC) 2.5 % cream Place rectally 2 times daily 30 g 3     zolpidem (AMBIEN) 5 MG tablet Take 1 tablet (5 mg) by mouth nightly as needed for sleep 30 tablet 5     gabapentin (NEURONTIN) 300 MG capsule Take 1-3 capsules (300-900 mg) by mouth 3 times daily (Patient taking differently: Take 600 mg by mouth 3 times daily ) 540 capsule 3     Acetaminophen (TYLENOL PO)        omega 3 1200 MG CAPS Take 2 capsules by mouth daily       omeprazole (PRILOSEC) 10 MG CR capsule TAKE 1 CAPSULE BY MOUTH DAILY - TAKE 30 TO 60 MINUTES BEFORE A MEAL - 90 capsule 3     Cholecalciferol (VITAMIN D3 PO) Take 4,000 Units by mouth daily        Allergies   Allergen Reactions     Dilaudid [Hydromorphone] Itching     Morphine Itching           Review of Systems  Constitutional, HEENT, cardiovascular, pulmonary, gi and gu systems are negative, except as otherwise noted.    OBJECTIVE:   There were no vitals taken for this visit. Estimated body mass index is 25.62 kg/(m^2) as calculated from the following:    Height as of 2/19/18: 5' 7.5\" (1.715 m).    Weight as of 2/19/18: 166 lb (75.3 kg).  Physical Exam  GENERAL: healthy, alert and no distress  EYES: Eyes grossly normal to inspection, PERRL and conjunctivae and sclerae normal  HENT: ear canals and TM's normal, nose and mouth without ulcers or lesions  NECK: no adenopathy, no asymmetry, masses, or scars and thyroid normal to palpation  RESP: lungs clear to auscultation - no rales, rhonchi or wheezes  CV: regular rate and rhythm, normal S1 S2, no S3 or S4, no murmur, click or rub, no " "peripheral edema and peripheral pulses strong  ABDOMEN: soft, nontender, no hepatosplenomegaly, no masses and bowel sounds normal  MS: no gross musculoskeletal defects noted, no edema  SKIN: no suspicious lesions or rashes  NEURO: Normal strength and tone, mentation intact and speech normal  PSYCH: mentation appears normal, affect normal/bright  Small cyst right occiptal - not fixed. No drainage    ASSESSMENT / PLAN:       ICD-10-CM    1. Elevated prostate specific antigen (PSA) R97.20 PSA, screen   2. Benign prostatic hyperplasia with urinary obstruction N40.1 PSA, screen    N13.8    3. Herpes zoster with keratoconjunctivitis, od B02.33 gabapentin (NEURONTIN) 100 MG capsule   4. Hyperlipidemia LDL goal <130 E78.5 Lipid panel reflex to direct LDL Fasting   5. CLL (chronic lymphocytic leukemia) (H) C91.10 **Basic metabolic panel FUTURE anytime   6. Sexual function problem F52.9 Prolactin     Testosterone, total       End of Life Planning:  Patient currently has an advanced directive: Yes.  Practitioner is supportive of decision.    COUNSELING:  Reviewed preventive health counseling, as reflected in patient instructions       Consider AAA screening for ages 65-75 and smoking history       Regular exercise       Healthy diet/nutrition       Vision screening       Hearing screening       Dental care       Colon cancer screening        Estimated body mass index is 25.62 kg/(m^2) as calculated from the following:    Height as of 2/19/18: 5' 7.5\" (1.715 m).    Weight as of 2/19/18: 166 lb (75.3 kg).     reports that he has never smoked. He has never used smokeless tobacco.      ICD-10-CM    1. Elevated prostate specific antigen (PSA) R97.20 PSA, screen   2. Benign prostatic hyperplasia with urinary obstruction N40.1 PSA, screen    N13.8    3. Herpes zoster with keratoconjunctivitis, od B02.33 gabapentin (NEURONTIN) 100 MG capsule   4. Hyperlipidemia LDL goal <130 E78.5 Lipid panel reflex to direct LDL Fasting   5. CLL " (chronic lymphocytic leukemia) (H) C91.10 **Basic metabolic panel FUTURE anytime   6. Sexual function problem F52.9 Prolactin     Testosterone, total       Appropriate preventive services were discussed with this patient, including applicable screening as appropriate for cardiovascular disease, diabetes, osteopenia/osteoporosis, and glaucoma.  As appropriate for age/gender, discussed screening for colorectal cancer, prostate cancer, breast cancer, and cervical cancer. Checklist reviewing preventive services available has been given to the patient.    Reviewed patients plan of care and provided an AVS. The Basic Care Plan (routine screening as documented in Health Maintenance) for Loco meets the Care Plan requirement. This Care Plan has been established and reviewed with the Patient.    Counseling Resources:  ATP IV Guidelines  Pooled Cohorts Equation Calculator  Breast Cancer Risk Calculator  FRAX Risk Assessment  ICSI Preventive Guidelines  Dietary Guidelines for Americans, 2010  USDA's MyPlate  ASA Prophylaxis  Lung CA Screening    Campos Parra MD  [facility]

## 2018-03-24 LAB — TESTOST SERPL-MCNC: 568 NG/DL (ref 240–950)

## 2018-04-11 ENCOUNTER — TRANSFERRED RECORDS (OUTPATIENT)
Dept: HEALTH INFORMATION MANAGEMENT | Facility: CLINIC | Age: 73
End: 2018-04-11

## 2018-04-24 ENCOUNTER — APPOINTMENT (OUTPATIENT)
Dept: LAB | Facility: CLINIC | Age: 73
End: 2018-04-24
Attending: INTERNAL MEDICINE
Payer: MEDICARE

## 2018-04-24 ENCOUNTER — OFFICE VISIT (OUTPATIENT)
Dept: TRANSPLANT | Facility: CLINIC | Age: 73
End: 2018-04-24
Attending: INTERNAL MEDICINE
Payer: MEDICARE

## 2018-04-24 VITALS
HEIGHT: 68 IN | HEART RATE: 86 BPM | DIASTOLIC BLOOD PRESSURE: 67 MMHG | WEIGHT: 165.7 LBS | TEMPERATURE: 99.5 F | OXYGEN SATURATION: 95 % | RESPIRATION RATE: 16 BRPM | SYSTOLIC BLOOD PRESSURE: 125 MMHG | BODY MASS INDEX: 25.11 KG/M2

## 2018-04-24 DIAGNOSIS — N40.2 PROSTATE NODULE: ICD-10-CM

## 2018-04-24 DIAGNOSIS — C91.10 CLL (CHRONIC LYMPHOCYTIC LEUKEMIA) (H): ICD-10-CM

## 2018-04-24 LAB
ALBUMIN SERPL-MCNC: 3.9 G/DL (ref 3.4–5)
ALP SERPL-CCNC: 102 U/L (ref 40–150)
ALT SERPL W P-5'-P-CCNC: 20 U/L (ref 0–70)
ANION GAP SERPL CALCULATED.3IONS-SCNC: 7 MMOL/L (ref 3–14)
AST SERPL W P-5'-P-CCNC: 23 U/L (ref 0–45)
BASOPHILS # BLD AUTO: 0 10E9/L (ref 0–0.2)
BASOPHILS NFR BLD AUTO: 0 %
BILIRUB SERPL-MCNC: 0.8 MG/DL (ref 0.2–1.3)
BUN SERPL-MCNC: 23 MG/DL (ref 7–30)
CALCIUM SERPL-MCNC: 9.1 MG/DL (ref 8.5–10.1)
CHLORIDE SERPL-SCNC: 110 MMOL/L (ref 94–109)
CO2 SERPL-SCNC: 26 MMOL/L (ref 20–32)
CREAT SERPL-MCNC: 1.54 MG/DL (ref 0.66–1.25)
DIFFERENTIAL METHOD BLD: ABNORMAL
EOSINOPHIL # BLD AUTO: 1 10E9/L (ref 0–0.7)
EOSINOPHIL NFR BLD AUTO: 1 %
ERYTHROCYTE [DISTWIDTH] IN BLOOD BY AUTOMATED COUNT: 13.9 % (ref 10–15)
GFR SERPL CREATININE-BSD FRML MDRD: 45 ML/MIN/1.7M2
GLUCOSE SERPL-MCNC: 103 MG/DL (ref 70–99)
HCT VFR BLD AUTO: 41.1 % (ref 40–53)
HGB BLD-MCNC: 13.3 G/DL (ref 13.3–17.7)
LDH SERPL L TO P-CCNC: 222 U/L (ref 85–227)
LYMPHOCYTES # BLD AUTO: 92.9 10E9/L (ref 0.8–5.3)
LYMPHOCYTES NFR BLD AUTO: 90 %
MCH RBC QN AUTO: 31.1 PG (ref 26.5–33)
MCHC RBC AUTO-ENTMCNC: 32.4 G/DL (ref 31.5–36.5)
MCV RBC AUTO: 96 FL (ref 78–100)
MONOCYTES # BLD AUTO: 4.1 10E9/L (ref 0–1.3)
MONOCYTES NFR BLD AUTO: 4 %
NEUTROPHILS # BLD AUTO: 5.2 10E9/L (ref 1.6–8.3)
NEUTROPHILS NFR BLD AUTO: 5 %
PLATELET # BLD AUTO: 129 10E9/L (ref 150–450)
PLATELET # BLD EST: ABNORMAL 10*3/UL
POIKILOCYTOSIS BLD QL SMEAR: SLIGHT
POTASSIUM SERPL-SCNC: 4.6 MMOL/L (ref 3.4–5.3)
PROT SERPL-MCNC: 6.7 G/DL (ref 6.8–8.8)
PSA SERPL-MCNC: 4.44 UG/L (ref 0–4)
RBC # BLD AUTO: 4.28 10E12/L (ref 4.4–5.9)
SODIUM SERPL-SCNC: 142 MMOL/L (ref 133–144)
WBC # BLD AUTO: 103.2 10E9/L (ref 4–11)

## 2018-04-24 PROCEDURE — 80053 COMPREHEN METABOLIC PANEL: CPT | Performed by: INTERNAL MEDICINE

## 2018-04-24 PROCEDURE — 36415 COLL VENOUS BLD VENIPUNCTURE: CPT

## 2018-04-24 PROCEDURE — G0463 HOSPITAL OUTPT CLINIC VISIT: HCPCS

## 2018-04-24 PROCEDURE — 85025 COMPLETE CBC W/AUTO DIFF WBC: CPT | Performed by: INTERNAL MEDICINE

## 2018-04-24 PROCEDURE — 83615 LACTATE (LD) (LDH) ENZYME: CPT | Performed by: INTERNAL MEDICINE

## 2018-04-24 PROCEDURE — 84153 ASSAY OF PSA TOTAL: CPT | Performed by: INTERNAL MEDICINE

## 2018-04-24 ASSESSMENT — PAIN SCALES - GENERAL: PAINLEVEL: NO PAIN (0)

## 2018-04-24 NOTE — NURSING NOTE
Chief Complaint   Patient presents with     Blood Draw     patient here for venipuncture lab draw, vitals completed, pt checked in for appt.      Idania Solomon, CMA

## 2018-04-24 NOTE — LETTER
April 30, 2018       TO: Loco Wood  [address] [facility] [id number]         Mr. Wood,   Your PSA is stable.  If you would like to continue with observation, I would recommend another PSA in 6 months.  If you change your mind and would like to proceed with prostate biopsy, please let me know.  Thank you, Jermaine Boyd         Resulted Orders   PSA tumor marker   Result Value Ref Range    PSA 4.44 (H) 0 - 4 ug/L      Comment:      Assay Method:  Chemiluminescence using Siemens Vista analyzer

## 2018-04-24 NOTE — PROGRESS NOTES
Hematology/Oncology Evaluation      Loco Wood is a 71 year old male previously followed by Dr. Dias for CLL.      Hematologic history:  Diagnosed 2/12/2007 with CLL, Lyles stage 0, followed clinically since.  At diagnosis WBC 17.8, ALC 11.2, hemoglobin 15.2, platelets 188.  Flow consistent with CLL.  No opportunistic infections, with IgG in the normal range during follow up.    Date Treatment Response Toxicities/Complications   2007 - current Observation alone.                  HPI:  Please see entry above for hematologic history.  He has been dealing with trigeminal neuralgia at the beginning of October, which transitioned into herpes zoster with keratoconjunctivitis later in October.  He has continued to have a lot of post-herpetic neuralgia, but his eyelid swelling and vision have markedly improved.      RECOMMENDATIONS:  Mr. Wood is being monitored for his early stage CLL.  His leukocytosis is relatively stable since last April.  His hemoglobin and platelets are also stable.  He has no adenopathy or organomegaly. At this point he does not have any strict indication to start treatment for his CLL. We reviewed that if he develops cytopenias or symptoms of bulky adenopathy that would be an indication to treat. We briefly reviewed treatment options, including a discussion of ibrutinib, but did not go into great detail as the treatment landscape will likely change over time.     We reviewed:  1. Continue to monitor CLL.  2. Continue to monitor immunodeficiency related to CLL.  No contraindication to Shingrix from my standpoint, but it is also not clear if it would be of additional benefit after zoster.  3. RTC in 6 months with labs at that time  4. RTC sooner if unexplained weight loss, night sweats, increasing adenopathy, or other new symptoms.    eH Burns MD, pager [phone]          ROS: 10 point ROS neg other than the symptoms noted above in the HPI.          Allergies   Allergen Reactions  "    Dilaudid [Hydromorphone] Itching     Morphine Itching        Current Outpatient Prescriptions   Medication Sig Dispense Refill     atorvastatin (LIPITOR) 10 MG tablet Take 1 tablet (10 mg) by mouth every 48 hours 45 tablet 9     Cholecalciferol (VITAMIN D3 PO) Take 4,000 Units by mouth daily        gabapentin (NEURONTIN) 100 MG capsule Take 1-2 capsules (100-200 mg) by mouth 3 times daily 180 capsule 3     gabapentin (NEURONTIN) 300 MG capsule Take 1-3 capsules (300-900 mg) by mouth 3 times daily (Patient taking differently: Take 600 mg by mouth 3 times daily ) 540 capsule 3     omega 3 1200 MG CAPS Take 2 capsules by mouth daily       omeprazole (PRILOSEC) 10 MG CR capsule TAKE 1 CAPSULE BY MOUTH DAILY - TAKE 30 TO 60 MINUTES BEFORE A MEAL - 90 capsule 3     Acetaminophen (TYLENOL PO)        HYDROcodone-acetaminophen (NORCO) 5-325 MG per tablet Take 1 tablet by mouth every 8 hours as needed for pain maximum 2 tablet(s) per day (Patient not taking: Reported on 4/24/2018) 60 tablet 0     hydrocortisone (ANUSOL-HC) 2.5 % cream Place rectally 2 times daily (Patient not taking: Reported on 4/24/2018) 30 g 3     zolpidem (AMBIEN) 5 MG tablet Take 1 tablet (5 mg) by mouth nightly as needed for sleep (Patient not taking: Reported on 4/24/2018) 30 tablet 5         Physical Exam:     Vital Signs: /67  Pulse 86  Temp 99.5  F (37.5  C) (Oral)  Resp 16  Ht 1.715 m (5' 7.5\")  Wt 75.2 kg (165 lb 11.2 oz)  SpO2 95%  BMI 25.57 kg/m2    KPS:  90%    General Appearance: healthy, alert and no distress  Eyes: Slight droop of right eyelid  Ears/Nose/M/Throat: Oral mucosa and posterior oropharynx normal, moist mucous membranes  Neck supple, non-tender, free range of motion, no adenopathy  Cardio/Vascular:regular rate and rhythm, normal S1 and S2, no murmur  Resp Effort And Auscultation: Normal - Clear to auscultation without rales, rhonchi, or wheezing.  GI: soft, nontender, no hepatosplenomegaly   Lymphatics:no " significant enlargement of lymph nodes globally   Musculoskeletal: Musculoskeletal normal  Edema: none  Skin: Skin color, texture, turgor normal. Neurologic: negative  Psych/Affect: Mood and affect are appropriate.  Vascular Access:  none      Loco understood the above assessment and recommendations.  Multiple questions answered.  No barriers to learning identified.         Total time: 25 minutes  Counseling time: 20 minutes  Prolonged service:  no      ------------------------------------------------------------------------------------------------------------------------------------------------    Patient Care Team      Relationship Specialty Notifications Start End    Jameson Cisse MD PCP - General   12/2/02     Comment:  per patient    Phone: [phone] Fax: [phone]         [facility] [address] [address]    Yodit Barnett PA Physician Assistant   12/2/13     Phone: [phone] Fax: [phone]         [address] [address]

## 2018-04-24 NOTE — MR AVS SNAPSHOT
After Visit Summary   4/24/2018    Loco Wood    MRN: [id number]           Patient Information     Date Of Birth          1945        Visit Information        Provider Department      4/24/2018 3:00 PM He Burns MD [facility] Blood and Marrow Transplant        Today's Diagnoses     CLL (chronic lymphocytic leukemia) (H)        Prostate nodule              [facility] ([facility])  [address] [place] [address]  Phone: [phone]  Clinic Hours:   Monday-Thursday:7am to 7pm   Friday: 7am to 5pm   Weekends and holidays:    8am to noon (in general)  If your fever is 100.5  or greater,   call the clinic.  After hours call the   hospital at [phone] or   [phone]. Ask for the BMT   fellow on-call            Follow-ups after your visit        Your next 10 appointments already scheduled     Sep 26, 2018 10:00 AM CDT   Nurse Only with CP ANCILLARY   [facility] ([facility])    [address] [place] [id number] [phone]            Oct 16, 2018  1:30 PM CDT   Masonic Lab Draw with  MASONIC LAB DRAW   [facility] Masonic Lab Draw ([facility])    [address] [place] [address]  [address] [place] [address] [phone]            Oct 16, 2018  2:00 PM CDT   RETURN ONC with He Burns MD   [facility] Blood and Marrow Transplant ([facility])    [address] [place] [address]  [address] [place] [address] [phone]              Who to contact     If you have questions or need follow up information about today's clinic visit or your schedule please contact [facility] BLOOD [facility] MARROW TRANSPLANT directly at [phone].  Normal or non-critical lab and imaging results will be communicated to you by MyChart, letter or phone within 4 business days after the clinic has received the results. If you do not hear from us within 7 days, please  "contact the clinic through makeena or phone. If you have a critical or abnormal lab result, we will notify you by phone as soon as possible.  Submit refill requests through makeena or call your pharmacy and they will forward the refill request to us. Please allow 3 business days for your refill to be completed.          Additional Information About Your Visit        [email or web address] Information     makeena gives you secure access to your electronic health record. If you see a primary care provider, you can also send messages to your care team and make appointments. If you have questions, please call your primary care clinic.  If you do not have a primary care provider, please call [phone] and they will assist you.        Care EveryWhere ID     This is your Care EveryWhere ID. This could be used by other organizations to access your [place] medical records  [id number]        Your Vitals Were     Pulse Temperature Respirations Height Pulse Oximetry BMI (Body Mass Index)    86 99.5  F (37.5  C) (Oral) 16 1.715 m (5' 7.5\") 95% 25.57 kg/m2       Blood Pressure from Last 3 Encounters:   06/27/18 100/61   04/30/18 116/68   04/24/18 125/67    Weight from Last 3 Encounters:   06/27/18 74.4 kg (164 lb)   04/30/18 75.5 kg (166 lb 6 oz)   04/24/18 75.2 kg (165 lb 11.2 oz)              We Performed the Following     *CBC with platelets differential     Comprehensive metabolic panel     Lactate Dehydrogenase     PSA tumor marker          Today's Medication Changes          These changes are accurate as of 4/24/18 11:59 PM.  If you have any questions, ask your nurse or doctor.               These medicines have changed or have updated prescriptions.        Dose/Directions    * gabapentin 300 MG capsule   Commonly known as:  NEURONTIN   This may have changed:  how much to take   Used for:  Post herpetic neuralgia        Dose:  300-900 mg   Take 1-3 capsules (300-900 mg) by mouth 3 times daily   Quantity:  540 capsule   Refills:  3 "       * gabapentin 100 MG capsule   Commonly known as:  NEURONTIN   This may have changed:  Another medication with the same name was changed. Make sure you understand how and when to take each.   Used for:  Herpes zoster with keratoconjunctivitis        Dose:  100-200 mg   Take 1-2 capsules (100-200 mg) by mouth 3 times daily   Quantity:  180 capsule   Refills:  3       * Notice:  This list has 2 medication(s) that are the same as other medications prescribed for you. Read the directions carefully, and ask your doctor or other care provider to review them with you.             Recent Review Flowsheet Data     BMT Recent Results Latest Ref Rng & Units 10/25/2016 4/25/2017 10/4/2017 10/24/2017 12/21/2017 3/22/2018 4/24/2018    WBC 4.0 - 11.0 10e9/L 95.6(HH) 95.2(HH) 96.1(HH) 95.2(HH) 93.0(HH) - 103.2(HH)    Hemoglobin 13.3 - 17.7 g/dL 13.2(L) 13.8 14.0 13.5 14.0 - 13.3    Platelet Count 150 - 450 10e9/L 116(L) 125(L) 125(L) 152 118(L) - 129(L)    Neutrophils (Absolute) 1.6 - 8.3 10e9/L 6.7 4.8 4.3 5.7 3.4 - 5.2    INR 0.86 - 1.14 - - - - - - -    Sodium 133 - 144 mmol/L 144 140 144 140 143 142 142    Potassium 3.4 - 5.3 mmol/L 4.4 4.5 4.3 4.7 4.1 4.5 4.6    Chloride 94 - 109 mmol/L 107 109 107 105 106 107 110(H)    Glucose 70 - 99 mg/dL 88 103(H) 95 91 97 94 103(H)    Urea Nitrogen 7 - 30 mg/dL 14 15 16 18 20 21 23    Creatinine 0.66 - 1.25 mg/dL 1.35(H) 1.22 1.28(H) 1.26(H) 1.33(H) 1.51(H) 1.54(H)    Calcium (Total) 8.5 - 10.1 mg/dL 9.0 9.1 8.9 8.7 9.4 9.2 9.1    Protein (Total) 6.8 - 8.8 g/dL 6.6(L) 6.9 - 7.1 - - 6.7(L)    Albumin 3.4 - 5.0 g/dL 4.0 4.1 - 4.4 - - 3.9    Alkaline Phosphatase 40 - 150 U/L 84 101 - 84 - - 102    AST 0 - 45 U/L 24 26 - 16 - - 23    ALT 0 - 70 U/L 26 26 - 24 - - 20    MCV 78 - 100 fl 96 95 97 98 101(H) - 96               Primary Care Provider Office Phone # Fax #    [facility] [phone] [address] [facility] [address]         Equal Access to Services     [facility] Beach[facility]NC[facility] : Hadii aad ku hadambergris Gu, wajavyda hamletjoshha, qadaisy norbertrjjose moura. So [facility] [phone].    ATENCIÓN: Si habla ronel, tiene a knutson disposición servicios gratuitos de asistencia lingüística. Princessame al [phone].    We comply with applicable federal civil rights laws and [place] laws. We do not discriminate on the basis of race, color, national origin, age, disability, sex, sexual orientation, or gender identity.            Thank you!     Thank you for choosing [facility] BLOOD [facility] MARROW TRANSPLANT  for your care. Our goal is always to provide you with excellent care. Hearing back from our patients is one way we can continue to improve our services. Please take a few minutes to complete the written survey that you may receive in the mail after your visit with us. Thank you!             Your Updated Medication List - Protect others around you: Learn how to safely use, store and throw away your medicines at www.disposemymeds.org.          This list is accurate as of 4/24/18 11:59 PM.  Always use your most recent med list.                   Brand Name Dispense Instructions for use Diagnosis    atorvastatin 10 MG tablet    LIPITOR    45 tablet    Take 1 tablet (10 mg) by mouth every 48 hours    Hyperlipidemia LDL goal <130       * gabapentin 300 MG capsule    NEURONTIN    540 capsule    Take 1-3 capsules (300-900 mg) by mouth 3 times daily    Post herpetic neuralgia       * gabapentin 100 MG capsule    NEURONTIN    180 capsule    Take 1-2 capsules (100-200 mg) by mouth 3 times daily    Herpes zoster with keratoconjunctivitis       HYDROcodone-acetaminophen 5-325 MG per tablet    NORCO    60 tablet    Take 1 tablet by mouth every 8 hours as needed for pain maximum 2 tablet(s) per day    Herpes zoster with keratoconjunctivitis       hydrocortisone 2.5 % cream    ANUSOL-HC    30 g    Place rectally 2 times daily    Thrombosed  external hemorrhoids       omega 3 1200 MG Caps      Take 2 capsules by mouth daily    Spinal stenosis, lumbar region, without neurogenic claudication       TYLENOL PO           VITAMIN D3 PO      Take 4,000 Units by mouth daily        zolpidem 5 MG tablet    AMBIEN    30 tablet    Take 1 tablet (5 mg) by mouth nightly as needed for sleep    Herpes zoster with keratoconjunctivitis       * Notice:  This list has 2 medication(s) that are the same as other medications prescribed for you. Read the directions carefully, and ask your doctor or other care provider to review them with you.

## 2018-04-24 NOTE — NURSING NOTE
"Oncology Rooming Note    April 24, 2018 3:10 PM   Loco Wood is a 72 year old male who presents for:    Chief Complaint   Patient presents with     Blood Draw     patient here for venipuncture lab draw, vitals completed, pt checked in for appt.      RECHECK     Return: CLL (chronic lymphocytic leukemia)     Initial Vitals: /67  Pulse 86  Temp 99.5  F (37.5  C) (Oral)  Resp 16  Ht 1.715 m (5' 7.5\")  Wt 75.2 kg (165 lb 11.2 oz)  SpO2 95%  BMI 25.57 kg/m2 Estimated body mass index is 25.57 kg/(m^2) as calculated from the following:    Height as of this encounter: 1.715 m (5' 7.5\").    Weight as of this encounter: 75.2 kg (165 lb 11.2 oz). Body surface area is 1.89 meters squared.  No Pain (0) Comment: Data Unavailable   No LMP for male patient.  Allergies reviewed: Yes  Medications reviewed: Yes    Medications: Medication refills not needed today.  Pharmacy name entered into K[employer]: [id number] - [place], [place] - [address]    Clinical concerns: N/A  5 minutes for nursing intake (face to face time)     Patrica Francois CMA              "

## 2018-04-26 NOTE — PROGRESS NOTES
Mr. Wood,  Your PSA is stable.  If you would like to continue with observation, I would recommend another PSA in 6 months.  If you change your mind and would like to proceed with prostate biopsy, please let me know.  Thank you, Jermaine Boyd.

## 2018-04-30 ENCOUNTER — OFFICE VISIT (OUTPATIENT)
Dept: FAMILY MEDICINE | Facility: CLINIC | Age: 73
End: 2018-04-30
Payer: COMMERCIAL

## 2018-04-30 VITALS
DIASTOLIC BLOOD PRESSURE: 68 MMHG | BODY MASS INDEX: 25.22 KG/M2 | SYSTOLIC BLOOD PRESSURE: 116 MMHG | HEART RATE: 72 BPM | HEIGHT: 68 IN | WEIGHT: 166.38 LBS | TEMPERATURE: 97.8 F

## 2018-04-30 DIAGNOSIS — C91.10 CLL (CHRONIC LYMPHOCYTIC LEUKEMIA) (H): ICD-10-CM

## 2018-04-30 DIAGNOSIS — G57.01 LESION OF RIGHT SCIATIC NERVE: Primary | ICD-10-CM

## 2018-04-30 PROCEDURE — 99214 OFFICE O/P EST MOD 30 MIN: CPT | Performed by: FAMILY MEDICINE

## 2018-04-30 RX ORDER — METHYLPREDNISOLONE 4 MG
TABLET, DOSE PACK ORAL
Qty: 21 TABLET | Refills: 0 | Status: SHIPPED | OUTPATIENT
Start: 2018-04-30 | End: 2018-06-27

## 2018-04-30 ASSESSMENT — PAIN SCALES - GENERAL: PAINLEVEL: MILD PAIN (2)

## 2018-04-30 NOTE — PATIENT INSTRUCTIONS
Continue to stretch your hamstrings.     Let me know if your pain returns after completing the dose pack.     If you can, it is helpful if you fill out a survey about your clinic visit if it is mailed to your house in a few weeks.

## 2018-04-30 NOTE — MR AVS SNAPSHOT
After Visit Summary   4/30/2018    Loco Wood    MRN: [id number]           Patient Information     Date Of Birth          1945        Visit Information        Provider Department      4/30/2018 8:40 AM Lorena Isbell DO [facility]        Today's Diagnoses     Lesion of right sciatic nerve    -  1    CLL (chronic lymphocytic leukemia) (H)          Care Instructions    Continue to stretch your hamstrings.     Let me know if your pain returns after completing the dose pack.     If you can, it is helpful if you fill out a survey about your clinic visit if it is mailed to your house in a few weeks.          Follow-ups after your visit        Your next 10 appointments already scheduled     Oct 16, 2018  1:30 PM CDT   Masonic Lab Draw with  MASONIC LAB DRAW   [facility] Masonic Lab Draw ([facility])    [address] [place] [address]  [address] [place] [address] [phone]            Oct 16, 2018  2:00 PM CDT   RETURN ONC with He Burns MD   [facility] Blood and Marrow Transplant ([facility])    [address] [place] [address]  [address] [place] [address] [phone]              Who to contact     If you have questions or need follow up information about today's clinic visit or your schedule please contact [facility] directly at [phone].  Normal or non-critical lab and imaging results will be communicated to you by MyChart, letter or phone within 4 business days after the clinic has received the results. If you do not hear from us within 7 days, please contact the clinic through MyChart or phone. If you have a critical or abnormal lab result, we will notify you by phone as soon as possible.  Submit refill requests through [employer] or call your pharmacy and they will forward the refill request to us. Please allow 3 business days for your refill to be completed.          Additional Information About  "Your Visit        [employer] Information     [employer] gives you secure access to your electronic health record. If you see a primary care provider, you can also send messages to your care team and make appointments. If you have questions, please call your primary care clinic.  If you do not have a primary care provider, please call [phone] and they will assist you.        Care EveryWhere ID     This is your Care EveryWhere ID. This could be used by other organizations to access your [place] medical records  [id number]        Your Vitals Were     Pulse Temperature Height BMI (Body Mass Index)          72 97.8  F (36.6  C) (Oral) 5' 7.5\" (1.715 m) 25.67 kg/m2         Blood Pressure from Last 3 Encounters:   04/30/18 116/68   04/24/18 125/67   03/22/18 105/68    Weight from Last 3 Encounters:   04/30/18 166 lb 6 oz (75.5 kg)   04/24/18 165 lb 11.2 oz (75.2 kg)   03/22/18 166 lb (75.3 kg)              Today, you had the following     No orders found for display         Today's Medication Changes          These changes are accurate as of 4/30/18  9:23 AM.  If you have any questions, ask your nurse or doctor.               Start taking these medicines.        Dose/Directions    methylPREDNISolone 4 MG tablet   Commonly known as:  MEDROL DOSEPAK   Used for:  Lesion of right sciatic nerve   Started by:  Lorena Isbell, DO        Follow package instructions   Quantity:  21 tablet   Refills:  0         These medicines have changed or have updated prescriptions.        Dose/Directions    * gabapentin 300 MG capsule   Commonly known as:  NEURONTIN   This may have changed:  how much to take   Used for:  Post herpetic neuralgia        Dose:  300-900 mg   Take 1-3 capsules (300-900 mg) by mouth 3 times daily   Quantity:  540 capsule   Refills:  3       * gabapentin 100 MG capsule   Commonly known as:  NEURONTIN   This may have changed:  Another medication with the same name was changed. Make sure you understand how and when to " take each.   Used for:  Herpes zoster with keratoconjunctivitis        Dose:  100-200 mg   Take 1-2 capsules (100-200 mg) by mouth 3 times daily   Quantity:  180 capsule   Refills:  3       * Notice:  This list has 2 medication(s) that are the same as other medications prescribed for you. Read the directions carefully, and ask your doctor or other care provider to review them with you.         Where to get your medicines      These medications were sent to [id number] - [place], [place] - [address] [facility] [address]     Phone:  [phone]     methylPREDNISolone 4 MG tablet                Primary Care Provider Office Phone # Fax #    Campos Parra [phone] [address] [facility] [address]        Equal Access to Services     HECTOR SANTOS : Aron Gu, wajonas rajput, qadaisy kaaltian barker, jose newton . So [facility] [phone].    ATENCIÓN: Si habla español, tiene a knutson disposición servicios gratuitos de asistencia lingüística. Anastasia al [phone].    We comply with applicable federal civil rights laws and [place] laws. We do not discriminate on the basis of race, color, national origin, age, disability, sex, sexual orientation, or gender identity.            Thank you!     Thank you for choosing [facility]  for your care. Our goal is always to provide you with excellent care. Hearing back from our patients is one way we can continue to improve our services. Please take a few minutes to complete the written survey that you may receive in the mail after your visit with us. Thank you!             Your Updated Medication List - Protect others around you: Learn how to safely use, store and throw away your medicines at www.disposemymeds.org.          This list is accurate as of 4/30/18  9:23 AM.  Always use your most recent med list.                    Brand Name Dispense Instructions for use Diagnosis    atorvastatin 10 MG tablet    LIPITOR    45 tablet    Take 1 tablet (10 mg) by mouth every 48 hours    Hyperlipidemia LDL goal <130       * gabapentin 300 MG capsule    NEURONTIN    540 capsule    Take 1-3 capsules (300-900 mg) by mouth 3 times daily    Post herpetic neuralgia       * gabapentin 100 MG capsule    NEURONTIN    180 capsule    Take 1-2 capsules (100-200 mg) by mouth 3 times daily    Herpes zoster with keratoconjunctivitis       HYDROcodone-acetaminophen 5-325 MG per tablet    NORCO    60 tablet    Take 1 tablet by mouth every 8 hours as needed for pain maximum 2 tablet(s) per day    Herpes zoster with keratoconjunctivitis       hydrocortisone 2.5 % cream    ANUSOL-HC    30 g    Place rectally 2 times daily    Thrombosed external hemorrhoids       methylPREDNISolone 4 MG tablet    MEDROL DOSEPAK    21 tablet    Follow package instructions    Lesion of right sciatic nerve       omega 3 1200 MG Caps      Take 2 capsules by mouth daily    Spinal stenosis, lumbar region, without neurogenic claudication       omeprazole 10 MG CR capsule    priLOSEC    90 capsule    TAKE 1 CAPSULE BY MOUTH DAILY - TAKE 30 TO 60 MINUTES BEFORE A MEAL -    Gastroesophageal reflux disease without esophagitis       TYLENOL PO           VITAMIN D3 PO      Take 4,000 Units by mouth daily        zolpidem 5 MG tablet    AMBIEN    30 tablet    Take 1 tablet (5 mg) by mouth nightly as needed for sleep    Herpes zoster with keratoconjunctivitis       * Notice:  This list has 2 medication(s) that are the same as other medications prescribed for you. Read the directions carefully, and ask your doctor or other care provider to review them with you.

## 2018-04-30 NOTE — PROGRESS NOTES
SUBJECTIVE:   Loco Wood is a 73 year old male who presents to clinic today for the following health issues:      Joint Pain    Onset: about 3-4 months    Description:   Location: Back of right thigh, buttock area  Character: Sharp and tightness type pain    Intensity: 9/10 at its worst, 2/10 currently    Progression of Symptoms: intermittent    Accompanying Signs & Symptoms:  Other symptoms: radiation of pain down leg, to back of knee and into the calve     History:   Previous similar pain: YES- many years ago had some similar pain that was sciatica (unsure of which side he had that on) and never did see doctor for it (self diagnosed)    Precipitating factors:   Trauma or overuse: no     Alleviating factors:  Improved by: Not doing forward bends    Therapies Tried and outcome: heat and ibuprofen    He is currently on Neurontin. He has an MRI in 2013 of his lumbar spine. He had a fusion of L4-S1. L3-4 he has severe foraminal stenosis on the left. He has been to [facility], and [facility] for his back and spine. He has had steroid injections. He has CLL for 11 years, he has had no symptoms or treatment for this yet.    He reports that the pain in his leg is more recent than his back pain. He had this pain once or twice many years ago, but it quickly resolved. He states that it began about 3 months ago. He states that the pain is worst when he leans forward to tie his shoe, or is he has been sitting for prolonged periods of time. The pain is mostly in the thigh. Occasionally the pain will radiate toward his calf, and that is when the pain is the worst. He states he does not feel this pain is coming from his back. It seems to be more behind the thigh, and doesn't seem to begin in the buttocks.When he is sitting for prolonged periods is when the calf pain occurs, and it will be in the buttocks at that time.        Problem list and histories reviewed & adjusted, as indicated.  Additional history: as  "documented    BP Readings from Last 3 Encounters:   04/30/18 116/68   04/24/18 125/67   03/22/18 105/68    Wt Readings from Last 3 Encounters:   04/30/18 166 lb 6 oz (75.5 kg)   04/24/18 165 lb 11.2 oz (75.2 kg)   03/22/18 166 lb (75.3 kg)                    Reviewed and updated as needed this visit by clinical staff  Tobacco  Allergies  Meds  Med Hx  Surg Hx  Fam Hx  Soc Hx      Reviewed and updated as needed this visit by Provider         ROS:  Constitutional, HEENT, cardiovascular, pulmonary, gi and gu systems are negative, except as otherwise noted.    This document serves as a record of the services and decisions personally performed by RUSSEL LEBRON. It was created on his/her behalf by Britt Swartz, a trained medical scribe. The creation of this document is based on the provider's statements to the medical scribe. Britt Swartz, April 30, 2018 8:55 AM    OBJECTIVE:     /68 (BP Location: Right arm, Cuff Size: Adult Large)  Pulse 72  Temp 97.8  F (36.6  C) (Oral)  Ht 5' 7.5\" (1.715 m)  Wt 166 lb 6 oz (75.5 kg)  BMI 25.67 kg/m2  Body mass index is 25.67 kg/(m^2).  GENERAL: healthy, alert and no distress  HENT: ear canals and TM's normal, nose and mouth without ulcers or lesions  NECK: no adenopathy, no asymmetry, masses, or scars and thyroid normal to palpation  RESP: lungs clear to auscultation - no rales, rhonchi or wheezes  CV: regular rate and rhythm, normal S1 S2, no S3 or S4, no murmur, click or rub, no peripheral edema and peripheral pulses strong  MS: No trochanteric bursitis tenderness, IT bands normal, No tenderness over the ischial tuberosity . Right adductor non-tender.Slight tenderness of the medial hamstring. no gross musculoskeletal defects noted, no edema  SKIN: no suspicious lesions or rashes  NEURO: Normal strength and tone, mentation intact and speech normal  PSYCH: mentation appears normal, affect normal/bright    Diagnostic Test Results:  No results found for this or any previous " visit (from the past 24 hour(s)).    ASSESSMENT/PLAN:       ICD-10-CM    1. 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 CLL (chronic lymphocytic leukemia) (H) C91.10        I prescribed the patient methylprednisolone for his sciatica pain. I advised him to continue to stretch his hamstrings. If this pain returns after methylprednisolone course, we will consider PT. He is not being treated for CLL at this time, so this should not be affecting his nerve pain.     Patient Instructions   Continue to stretch your hamstrings.     Let me know if your pain returns after completing the dose pack.     If you can, it is helpful if you fill out a survey about your clinic visit if it is mailed to your house in a few weeks.      Lorena Isbell, DO  [facility]    The information in this document, created by the medical scribe Britt Swartz for me, accurately reflects the services I personally performed and the decisions made by me. I have reviewed and approved this document for accuracy prior to leaving the patient care area.

## 2018-05-19 DIAGNOSIS — K21.9 GASTROESOPHAGEAL REFLUX DISEASE WITHOUT ESOPHAGITIS: ICD-10-CM

## 2018-05-21 NOTE — TELEPHONE ENCOUNTER
"Requested Prescriptions   Pending Prescriptions Disp Refills     omeprazole (PRILOSEC) 10 MG CR capsule [Pharmacy Med Name: OMEPRAZOLE DR 10 MG CAPSULE] 90 capsule 1    Last Written Prescription Date:  5-15-17  Last Fill Quantity: 90,  # refills: 3   Last office visit: 4/30/2018 with prescribing provider:     Future Office Visit:     Sig: TAKE ONE CAPSULE BY MOUTH EVERY DAY 30 TO 60 MINUTES BEFORE A MEAL    PPI Protocol Passed    5/19/2018  1:58 PM       Passed - Not on Clopidogrel (unless Pantoprazole ordered)       Passed - No diagnosis of osteoporosis on record       Passed - Recent (12 mo) or future (30 days) visit within the authorizing provider's specialty    Patient had office visit in the last 12 months or has a visit in the next 30 days with authorizing provider or within the authorizing provider's specialty.  See \"Patient Info\" tab in inbasket, or \"Choose Columns\" in Meds & Orders section of the refill encounter.           Passed - Patient is age 18 or older          "

## 2018-05-23 ENCOUNTER — MYC MEDICAL ADVICE (OUTPATIENT)
Dept: FAMILY MEDICINE | Facility: CLINIC | Age: 73
End: 2018-05-23

## 2018-05-23 DIAGNOSIS — K21.9 GASTROESOPHAGEAL REFLUX DISEASE WITHOUT ESOPHAGITIS: ICD-10-CM

## 2018-05-23 RX ORDER — OMEPRAZOLE 10 MG/1
CAPSULE, DELAYED RELEASE ORAL
Qty: 90 CAPSULE | Refills: 1 | Status: SHIPPED | OUTPATIENT
Start: 2018-05-23 | End: 2019-04-02

## 2018-05-23 RX ORDER — OMEPRAZOLE 10 MG/1
CAPSULE, DELAYED RELEASE ORAL
Qty: 90 CAPSULE | Refills: 1 | Status: SHIPPED | OUTPATIENT
Start: 2018-05-23 | End: 2018-05-23

## 2018-06-01 ENCOUNTER — TELEPHONE (OUTPATIENT)
Dept: FAMILY MEDICINE | Facility: CLINIC | Age: 73
End: 2018-06-01

## 2018-06-01 ENCOUNTER — MYC MEDICAL ADVICE (OUTPATIENT)
Dept: FAMILY MEDICINE | Facility: CLINIC | Age: 73
End: 2018-06-01

## 2018-06-01 NOTE — TELEPHONE ENCOUNTER
Sent my chart message to patient: Loco,     Is the following present?   Sudden onset of severe hives or rash and difficulty breathing, chest tightness, swelling in back of throat or tongue or change in mental status?    If so call 911     Are any of the following present?   Sudden onset of illness and rapid progression of widespread redness, scaliness, fever and enlarged lymph nodes   Purple or blood-colored flat spots or dots, headache, stiff neck, vomiting, confusion or fever   Severe facial or eye swelling   Fever, headache or respiratory infection followed by blistering rash,   Blistering rash and painful urination or swallowing   If so seek emergency care now - someone else to drive.     Are any of the following present?   Rash around eyes, vision changes or weeping lesions   Open sores with signes of infection: redness, swellingk pain, red streaks, draiange, warmth   Fever, sore throat and joint pain   Fever and painful rash   Red rash that peels off in sheets of skin   Tick bite in past 24 days and fever, headache, red or purple rash on wrists, ankles, palms or soles of feet   Go to urgent care now.     Did you recently have the vaccinations you listed in your appointment request?     Thank you,   Aurora Sung RN   [facility]

## 2018-06-01 NOTE — TELEPHONE ENCOUNTER
[facility] for the following:    tetanus and shingles shots,sciatica,skin rash,varicose veins?    Routing to review rash symptoms.

## 2018-06-04 NOTE — TELEPHONE ENCOUNTER
Patient replied with no red flag symptoms see my chart messages.\    Aurora Sung RN  [facility]

## 2018-06-27 ENCOUNTER — RADIANT APPOINTMENT (OUTPATIENT)
Dept: GENERAL RADIOLOGY | Facility: CLINIC | Age: 73
End: 2018-06-27
Attending: INTERNAL MEDICINE
Payer: COMMERCIAL

## 2018-06-27 ENCOUNTER — OFFICE VISIT (OUTPATIENT)
Dept: FAMILY MEDICINE | Facility: CLINIC | Age: 73
End: 2018-06-27
Payer: COMMERCIAL

## 2018-06-27 VITALS
DIASTOLIC BLOOD PRESSURE: 61 MMHG | WEIGHT: 164 LBS | HEART RATE: 69 BPM | SYSTOLIC BLOOD PRESSURE: 100 MMHG | BODY MASS INDEX: 25.31 KG/M2 | OXYGEN SATURATION: 97 %

## 2018-06-27 DIAGNOSIS — R09.89 RHONCHI: ICD-10-CM

## 2018-06-27 DIAGNOSIS — B02.29 POST HERPETIC NEURALGIA: ICD-10-CM

## 2018-06-27 DIAGNOSIS — N18.30 CHRONIC KIDNEY DISEASE, STAGE 3 (MODERATE): ICD-10-CM

## 2018-06-27 DIAGNOSIS — C91.10 CLL (CHRONIC LYMPHOCYTIC LEUKEMIA) (H): ICD-10-CM

## 2018-06-27 DIAGNOSIS — Z23 NEED FOR SHINGLES VACCINE: Primary | ICD-10-CM

## 2018-06-27 DIAGNOSIS — M54.16 LUMBAR RADICULOPATHY: ICD-10-CM

## 2018-06-27 DIAGNOSIS — Z23 NEED FOR TETANUS BOOSTER: ICD-10-CM

## 2018-06-27 PROCEDURE — 90714 TD VACC NO PRESV 7 YRS+ IM: CPT | Performed by: INTERNAL MEDICINE

## 2018-06-27 PROCEDURE — 90471 IMMUNIZATION ADMIN: CPT | Performed by: INTERNAL MEDICINE

## 2018-06-27 PROCEDURE — 71046 X-RAY EXAM CHEST 2 VIEWS: CPT | Mod: FY

## 2018-06-27 PROCEDURE — 99214 OFFICE O/P EST MOD 30 MIN: CPT | Mod: 25 | Performed by: INTERNAL MEDICINE

## 2018-06-27 RX ORDER — IBUPROFEN 200 MG
200 TABLET ORAL EVERY 4 HOURS PRN
COMMUNITY
End: 2021-01-01

## 2018-06-27 RX ORDER — AMOXICILLIN 500 MG/1
500 TABLET, FILM COATED ORAL 3 TIMES DAILY
COMMUNITY
End: 2019-05-22

## 2018-06-27 NOTE — NURSING NOTE
Screening Questionnaire for Adult Immunization    Are you sick today?   No   Do you have allergies to medications, food, a vaccine component or latex?   Yes   Have you ever had a serious reaction after receiving a vaccination?   No   Do you have a long-term health problem with heart disease, lung disease, asthma, kidney disease, metabolic disease (e.g. diabetes), anemia, or other blood disorder?   No   Do you have cancer, leukemia, HIV/AIDS, or any other immune system problem?   No   In the past 3 months, have you taken medications that affect  your immune system, such as prednisone, other steroids, or anticancer drugs; drugs for the treatment of rheumatoid arthritis, Crohn s disease, or psoriasis; or have you had radiation treatments?   No   Have you had a seizure, or a brain or other nervous system problem?   No   During the past year, have you received a transfusion of blood or blood     products, or been given immune (gamma) globulin or antiviral drug?   No   For women: Are you pregnant or is there a chance you could become        pregnant during the next month?   No   Have you received any vaccinations in the past 4 weeks?   No     Immunization questionnaire was positive for at least one answer.  Notified Dr. Parra.        Per orders of Dr. Parra, injection of Td given by Karen Levy. Patient instructed to remain in clinic for 15 minutes afterwards, and to report any adverse reaction to me immediately.       Screening performed by Karen Levy on 6/27/2018 at 3:52 PM.

## 2018-06-27 NOTE — MR AVS SNAPSHOT
After Visit Summary   6/27/2018    Loco Wood    MRN: [id number]           Patient Information     Date Of Birth          1945        Visit Information        Provider Department      6/27/2018 3:00 PM Campos Parra MD [facility]        Today's Diagnoses     Need for shingles vaccine    -  1    Need for tetanus booster        Chronic kidney disease, stage 3 (moderate)        Lumbar radiculopathy        Rhonchi        Post herpetic neuralgia        CLL (chronic lymphocytic leukemia) (H)           Follow-ups after your visit        Your next 10 appointments already scheduled     Oct 16, 2018  1:30 PM CDT   [facility] with  [facility]   [facility] ([facility])    [address]  [address]  [place] [id number] [phone]            Oct 16, 2018  2:00 PM CDT   RETURN ONC with He Burns MD   [facility] Marrow Transplant ([facility])    [address]  [address]  [place] [facility] [id number] [phone]              Future tests that were ordered for you today     Open Future Orders        Priority Expected Expires Ordered    MR Lumbar Spine w/o Contrast Routine  6/27/2019 6/27/2018    **Basic metabolic panel FUTURE anytime Routine 6/27/2018 6/27/2019 6/27/2018    ZOSTER VACCINE RECOMBINANT ADJUVANTED IM NJX Routine 12/27/2018 6/27/2019 6/27/2018            Who to contact     If you have questions or need follow up information about today's clinic visit or your schedule please contact [facility] directly at [phone].  Normal or non-critical lab and imaging results will be communicated to you by MyChart, letter or phone within 4 business days after the clinic has received the results. If you do not hear from us within 7 days, please contact the clinic through MyChart or phone. If you have a critical or abnormal lab result, we will  notify you by phone as soon as possible.  Submit refill requests through [employer] or call your pharmacy and they will forward the refill request to us. Please allow 3 business days for your refill to be completed.          Additional Information About Your Visit        [employer] Information     [employer] gives you secure access to your electronic health record. If you see a primary care provider, you can also send messages to your care team and make appointments. If you have questions, please call your primary care clinic.  If you do not have a primary care provider, please call [phone] and they will assist you.        Care EveryWhere ID     This is your Care EveryWhere ID. This could be used by other organizations to access your [place] medical records  [id number]        Your Vitals Were     Pulse Pulse Oximetry BMI (Body Mass Index)             69 97% 25.31 kg/m2          Blood Pressure from Last 3 Encounters:   06/27/18 100/61   04/30/18 116/68   04/24/18 125/67    Weight from Last 3 Encounters:   06/27/18 164 lb (74.4 kg)   04/30/18 166 lb 6 oz (75.5 kg)   04/24/18 165 lb 11.2 oz (75.2 kg)              We Performed the Following     TD PRESERV FREE, IM (7+ YRS)     VACCINE ADMINISTRATION, INITIAL          Today's Medication Changes          These changes are accurate as of 6/27/18  3:46 PM.  If you have any questions, ask your nurse or doctor.               These medicines have changed or have updated prescriptions.        Dose/Directions    * gabapentin 300 MG capsule   Commonly known as:  NEURONTIN   This may have changed:  how much to take   Used for:  Post herpetic neuralgia        Dose:  300-900 mg   Take 1-3 capsules (300-900 mg) by mouth 3 times daily   Quantity:  540 capsule   Refills:  3       * gabapentin 100 MG capsule   Commonly known as:  NEURONTIN   This may have changed:  Another medication with the same name was changed. Make sure you understand how and when to take each.   Used for:  Herpes  zoster with keratoconjunctivitis        Dose:  100-200 mg   Take 1-2 capsules (100-200 mg) by mouth 3 times daily   Quantity:  180 capsule   Refills:  3       * Notice:  This list has 2 medication(s) that are the same as other medications prescribed for you. Read the directions carefully, and ask your doctor or other care provider to review them with you.      Stop taking these medicines if you haven't already. Please contact your care team if you have questions.     methylPREDNISolone 4 MG tablet   Commonly known as:  MEDROL DOSEPAK   Stopped by:  Campos Parra MD                    Primary Care Provider Office Phone # Fax #    Campos Parra [phone]       [address] [place] [address]        Equal Access to Services     [facility] Center[facility] : Aron hunto Riccardo, waaxda luqadaha, qaybta kaalmada mj, jose newton . So [facility] [phone].    ATENCIÓN: Si habla español, tiene a knutson disposición servicios gratuitos de asistencia lingüística. Llame al [phone].    We comply with applicable federal civil rights laws and [place] laws. We do not discriminate on the basis of race, color, national origin, age, disability, sex, sexual orientation, or gender identity.            Thank you!     Thank you for choosing [facility]  for your care. Our goal is always to provide you with excellent care. Hearing back from our patients is one way we can continue to improve our services. Please take a few minutes to complete the written survey that you may receive in the mail after your visit with us. Thank you!             Your Updated Medication List - Protect others around you: Learn how to safely use, store and throw away your medicines at www.disposemymeds.org.          This list is accurate as of 6/27/18  3:46 PM.  Always use your most recent med list.                   Brand Name Dispense Instructions for use Diagnosis     amoxicillin 500 MG tablet    AMOXIL     Take 500 mg by mouth 3 times daily        atorvastatin 10 MG tablet    LIPITOR    45 tablet    Take 1 tablet (10 mg) by mouth every 48 hours    Hyperlipidemia LDL goal <130       * gabapentin 300 MG capsule    NEURONTIN    540 capsule    Take 1-3 capsules (300-900 mg) by mouth 3 times daily    Post herpetic neuralgia       * gabapentin 100 MG capsule    NEURONTIN    180 capsule    Take 1-2 capsules (100-200 mg) by mouth 3 times daily    Herpes zoster with keratoconjunctivitis       HYDROcodone-acetaminophen 5-325 MG per tablet    NORCO    60 tablet    Take 1 tablet by mouth every 8 hours as needed for pain maximum 2 tablet(s) per day    Herpes zoster with keratoconjunctivitis       hydrocortisone 2.5 % cream    ANUSOL-HC    30 g    Place rectally 2 times daily    Thrombosed external hemorrhoids       ibuprofen 200 MG tablet    ADVIL/MOTRIN     Take 200 mg by mouth every 4 hours as needed for mild pain        omega 3 1200 MG Caps      Take 2 capsules by mouth daily    Spinal stenosis, lumbar region, without neurogenic claudication       omeprazole 10 MG CR capsule    priLOSEC    90 capsule    TAKE ONE CAPSULE BY MOUTH EVERY DAY 30 TO 60 MINUTES BEFORE A MEAL    Gastroesophageal reflux disease without esophagitis       TYLENOL PO           VITAMIN D3 PO      Take 4,000 Units by mouth daily        zolpidem 5 MG tablet    AMBIEN    30 tablet    Take 1 tablet (5 mg) by mouth nightly as needed for sleep    Herpes zoster with keratoconjunctivitis       * Notice:  This list has 2 medication(s) that are the same as other medications prescribed for you. Read the directions carefully, and ask your doctor or other care provider to review them with you.

## 2018-06-27 NOTE — PROGRESS NOTES
SUBJECTIVE:   Loco Wood is a 73 year old male who presents to clinic today for the following health issues:    Recheck rash due to shingles; discuss immunizations and medications    Patient still with increased sensitivity over the right frontal trigemnial branch    I would recommend the shingles vaccine after healing from the root canal    Patient tolerating medications well          Problem list and histories reviewed & adjusted, as indicated.  Additional history: as documented    Patient Active Problem List   Diagnosis     Esophageal reflux     Lumbago     CLL (chronic lymphocytic leukemia) (H)     HYPERLIPIDEMIA LDL GOAL <130     Mass of skin     Health Care Home     Vitamin D deficiency     Advanced directives, counseling/discussion     Osteoarthritis of hip     OA (osteoarthritis) of hip     Insomnia     BPH (benign prostatic hyperplasia)     Acute bilateral low back pain without sciatica     Prostate nodule     Chondromalacia, knee, right     Complex tear of medial meniscus of right knee as current injury     Herpes zoster with keratoconjunctivitis, od     Post herpetic neuralgia     Past Surgical History:   Procedure Laterality Date     ARTHROPLASTY MINIMALLY INVASIVE HIP  5/10/2013    Procedure: ARTHROPLASTY MINIMALLY INVASIVE HIP;  Left Two Incision Total Hip Arthroplasty ;  Surgeon: Desmond Alberts MD;  [facility]: [facility]     ARTHROPLASTY MINIMALLY INVASIVE HIP  2/27/2014    Procedure: ARTHROPLASTY MINIMALLY INVASIVE HIP;  Right Total Hip Arthroplasty Minimally Invasive Two Incision  *Latex Free Room;  Surgeon: Desmond Alberts MD;  [facility]: [facility]     BACK SURGERY      back fusion 1994     C NONSPECIFIC PROCEDURE  1964 &'95    (R) inguinal herniorrhapy     C NONSPECIFIC PROCEDURE  1949    (L) inguinal herniorrhaphy     C NONSPECIFIC PROCEDURE  1994    L4-5  L5 S1 fusion - spondylolisthesis     COLONOSCOPY      2007     COLONOSCOPY N/A 8/15/2017    Procedure: COLONOSCOPY;  colonoscopy;   Surgeon: Chun Mcguire MD;  Location:  GI     GI SURGERY      4 hernia repairs in childhood     HERNIA REPAIR      4 since age 4       Social History   Substance Use Topics     Smoking status: Never Smoker     Smokeless tobacco: Never Used     Alcohol use 0.0 oz/week      Comment: moderate, social     Family History   Problem Relation Age of Onset     HEART DISEASE Father      Cerebrovascular Disease Father       OF STROKE      Cancer Father      melonoma     Melanoma Father      Hyperlipidemia Father      Thyroid Disease Father      Cancer Mother      Lung cancer     Other Cancer Mother      lung; heavy smoker     Cerebrovascular Disease Paternal Uncle      Aneurism     Breast Cancer Maternal Aunt       from it     Cancer Paternal Uncle      Cancer Paternal Aunt      Skin Cancer No family hx of      Glaucoma No family hx of      Macular Degeneration No family hx of          Current Outpatient Prescriptions   Medication Sig Dispense Refill     Acetaminophen (TYLENOL PO)        amoxicillin (AMOXIL) 500 MG tablet Take 500 mg by mouth 3 times daily       atorvastatin (LIPITOR) 10 MG tablet Take 1 tablet (10 mg) by mouth every 48 hours 45 tablet 9     Cholecalciferol (VITAMIN D3 PO) Take 4,000 Units by mouth daily        gabapentin (NEURONTIN) 100 MG capsule Take 1-2 capsules (100-200 mg) by mouth 3 times daily 180 capsule 3     gabapentin (NEURONTIN) 300 MG capsule Take 1-3 capsules (300-900 mg) by mouth 3 times daily (Patient taking differently: Take 600 mg by mouth 3 times daily ) 540 capsule 3     HYDROcodone-acetaminophen (NORCO) 5-325 MG per tablet Take 1 tablet by mouth every 8 hours as needed for pain maximum 2 tablet(s) per day 60 tablet 0     hydrocortisone (ANUSOL-HC) 2.5 % cream Place rectally 2 times daily 30 g 3     ibuprofen (ADVIL/MOTRIN) 200 MG tablet Take 200 mg by mouth every 4 hours as needed for mild pain       omega 3 1200 MG CAPS Take 2 capsules by mouth daily       omeprazole  (PRILOSEC) 10 MG CR capsule TAKE ONE CAPSULE BY MOUTH EVERY DAY 30 TO 60 MINUTES BEFORE A MEAL 90 capsule 1     zolpidem (AMBIEN) 5 MG tablet Take 1 tablet (5 mg) by mouth nightly as needed for sleep 30 tablet 5     Allergies   Allergen Reactions     Dilaudid [Hydromorphone] Itching     Morphine Itching     Recent Labs   Lab Test  04/24/18   1432  03/22/18   1151   10/24/17   1620   04/25/17   1443  10/25/16   1543  04/11/16   1136   LDL   --   107*   --    --    --    --   72  101*   HDL   --   36*   --    --    --    --   35*  40   TRIG   --   144   --    --    --    --   263*  123   ALT  20   --    --   24   --   26  26  22   CR  1.54*  1.51*   < >  1.26*   < >  1.22  1.35*  1.23   GFRESTIMATED  45*  46*   < >  57*   < >  59*  52*  58*   GFRESTBLACK  54*  55*   < >  68   < >  71  63  71   POTASSIUM  4.6  4.5   < >  4.7   < >  4.5  4.4  4.0    < > = values in this interval not displayed.        Reviewed and updated as needed this visit by clinical staff       Reviewed and updated as needed this visit by Provider         ROS:  Constitutional, HEENT, cardiovascular, pulmonary, gi and gu systems are negative, except as otherwise noted.    OBJECTIVE:     /61 (BP Location: Right arm, Patient Position: Chair, Cuff Size: Adult Regular)  Pulse 69  Wt 164 lb (74.4 kg)  SpO2 97%  BMI 25.31 kg/m2  Body mass index is 25.31 kg/(m^2).  GENERAL: healthy, alert and no distress  EYES: Eyes grossly normal to inspection, PERRL and conjunctivae and sclerae normal  HENT: ear canals and TM's normal, nose and mouth without ulcers or lesions  NECK: no adenopathy, no asymmetry, masses, or scars and thyroid normal to palpation  RESP: lungs clear to auscultation - no rales, rhonchi or wheezes  CV: regular rate and rhythm, normal S1 S2, no S3 or S4, no murmur, click or rub, no peripheral edema and peripheral pulses strong  ABDOMEN: soft, nontender, no hepatosplenomegaly, no masses and bowel sounds normal  MS: no gross  musculoskeletal defects noted, no edema  SKIN: no suspicious lesions or rashes  NEURO: Normal strength and tone, mentation intact and speech normal  BACK: no CVA tenderness, no paralumbar tenderness  PSYCH: mentation appears normal, affect normal/bright  LYMPH: no cervical, supraclavicular, axillary, or inguinal adenopathy    Diagnostic Test Results:  Results for orders placed or performed in visit on 04/24/18   Comprehensive metabolic panel   Result Value Ref Range    Sodium 142 133 - 144 mmol/L    Potassium 4.6 3.4 - 5.3 mmol/L    Chloride 110 (H) 94 - 109 mmol/L    Carbon Dioxide 26 20 - 32 mmol/L    Anion Gap 7 3 - 14 mmol/L    Glucose 103 (H) 70 - 99 mg/dL    Urea Nitrogen 23 7 - 30 mg/dL    Creatinine 1.54 (H) 0.66 - 1.25 mg/dL    GFR Estimate 45 (L) >60 mL/min/1.7m2    GFR Estimate If Black 54 (L) >60 mL/min/1.7m2    Calcium 9.1 8.5 - 10.1 mg/dL    Bilirubin Total 0.8 0.2 - 1.3 mg/dL    Albumin 3.9 3.4 - 5.0 g/dL    Protein Total 6.7 (L) 6.8 - 8.8 g/dL    Alkaline Phosphatase 102 40 - 150 U/L    ALT 20 0 - 70 U/L    AST 23 0 - 45 U/L   Lactate Dehydrogenase   Result Value Ref Range    Lactate Dehydrogenase 222 85 - 227 U/L   *CBC with platelets differential   Result Value Ref Range    .2 (HH) 4.0 - 11.0 10e9/L    RBC Count 4.28 (L) 4.4 - 5.9 10e12/L    Hemoglobin 13.3 13.3 - 17.7 g/dL    Hematocrit 41.1 40.0 - 53.0 %    MCV 96 78 - 100 fl    MCH 31.1 26.5 - 33.0 pg    MCHC 32.4 31.5 - 36.5 g/dL    RDW 13.9 10.0 - 15.0 %    Platelet Count 129 (L) 150 - 450 10e9/L    Diff Method Manual Differential     % Neutrophils 5.0 %    % Lymphocytes 90.0 %    % Monocytes 4.0 %    % Eosinophils 1.0 %    % Basophils 0.0 %    Absolute Neutrophil 5.2 1.6 - 8.3 10e9/L    Absolute Lymphocytes 92.9 (H) 0.8 - 5.3 10e9/L    Absolute Monocytes 4.1 (H) 0.0 - 1.3 10e9/L    Absolute Eosinophils 1.0 (H) 0.0 - 0.7 10e9/L    Absolute Basophils 0.0 0.0 - 0.2 10e9/L    Poikilocytosis Slight     Platelet Estimate Confirming automated  cell count    PSA tumor marker   Result Value Ref Range    PSA 4.44 (H) 0 - 4 ug/L       ASSESSMENT/PLAN:       ICD-10-CM    1. Need for shingles vaccine Z23 VACCINE ADMINISTRATION, INITIAL     ZOSTER VACCINE RECOMBINANT ADJUVANTED IM NJX   2. Need for tetanus booster Z23 TD PRESERV FREE, IM (7+ YRS)   3. Chronic kidney disease, stage 3 (moderate) N18.3 **Basic metabolic panel FUTURE anytime   4. Lumbar radiculopathy M54.16 MR Lumbar Spine w/o Contrast   5. Rhonchi R09.89 XR Chest 2 Views   6. Post herpetic neuralgia B02.29    7. CLL (chronic lymphocytic leukemia) (H) C91.10 XR Chest 2 Views             Campos Parra MD  [facility]

## 2018-06-29 ENCOUNTER — RADIANT APPOINTMENT (OUTPATIENT)
Dept: MRI IMAGING | Facility: CLINIC | Age: 73
End: 2018-06-29
Attending: INTERNAL MEDICINE
Payer: COMMERCIAL

## 2018-06-29 DIAGNOSIS — M54.16 LUMBAR RADICULOPATHY: ICD-10-CM

## 2018-06-29 PROCEDURE — 72148 MRI LUMBAR SPINE W/O DYE: CPT | Mod: TC

## 2018-07-09 ENCOUNTER — ALLIED HEALTH/NURSE VISIT (OUTPATIENT)
Dept: NURSING | Facility: CLINIC | Age: 73
End: 2018-07-09
Payer: COMMERCIAL

## 2018-07-09 DIAGNOSIS — Z23 NEED FOR SHINGLES VACCINE: Primary | ICD-10-CM

## 2018-07-09 PROCEDURE — 99207 ZZC NO CHARGE NURSE ONLY: CPT

## 2018-07-09 PROCEDURE — 90471 IMMUNIZATION ADMIN: CPT

## 2018-07-30 ENCOUNTER — MYC MEDICAL ADVICE (OUTPATIENT)
Dept: FAMILY MEDICINE | Facility: CLINIC | Age: 73
End: 2018-07-30

## 2018-07-30 DIAGNOSIS — M54.16 LUMBAR RADICULOPATHY: Primary | ICD-10-CM

## 2018-07-30 NOTE — TELEPHONE ENCOUNTER
My chart message sent to patient with the referral information.    Maude Brice, DORIS [facility] Triage.

## 2018-07-30 NOTE — TELEPHONE ENCOUNTER
Routed patient's response to Dr. Parra, he would prefer to have injection for his sciatic pain.    Would need referral?    Neeta Bloom RN  [facility]

## 2018-08-06 ENCOUNTER — TRANSFERRED RECORDS (OUTPATIENT)
Dept: HEALTH INFORMATION MANAGEMENT | Facility: CLINIC | Age: 73
End: 2018-08-06

## 2018-09-10 ENCOUNTER — MYC MEDICAL ADVICE (OUTPATIENT)
Dept: FAMILY MEDICINE | Facility: CLINIC | Age: 73
End: 2018-09-10

## 2018-09-21 ENCOUNTER — DOCUMENTATION ONLY (OUTPATIENT)
Dept: OTHER | Facility: CLINIC | Age: 73
End: 2018-09-21

## 2018-09-26 ENCOUNTER — ALLIED HEALTH/NURSE VISIT (OUTPATIENT)
Dept: NURSING | Facility: CLINIC | Age: 73
End: 2018-09-26
Payer: COMMERCIAL

## 2018-09-26 DIAGNOSIS — Z23 NEED FOR SHINGLES VACCINE: Primary | ICD-10-CM

## 2018-09-26 PROCEDURE — 90471 IMMUNIZATION ADMIN: CPT

## 2018-09-26 PROCEDURE — 99207 ZZC NO CHARGE NURSE ONLY: CPT

## 2018-10-04 ENCOUNTER — OFFICE VISIT (OUTPATIENT)
Dept: FAMILY MEDICINE | Facility: CLINIC | Age: 73
End: 2018-10-04
Payer: COMMERCIAL

## 2018-10-04 VITALS
WEIGHT: 157 LBS | SYSTOLIC BLOOD PRESSURE: 106 MMHG | OXYGEN SATURATION: 98 % | BODY MASS INDEX: 24.23 KG/M2 | TEMPERATURE: 97 F | HEART RATE: 67 BPM | DIASTOLIC BLOOD PRESSURE: 64 MMHG

## 2018-10-04 DIAGNOSIS — L50.9 URTICARIA: Primary | ICD-10-CM

## 2018-10-04 PROCEDURE — 99213 OFFICE O/P EST LOW 20 MIN: CPT | Performed by: FAMILY MEDICINE

## 2018-10-04 ASSESSMENT — PAIN SCALES - GENERAL: PAINLEVEL: NO PAIN (0)

## 2018-10-04 NOTE — PROGRESS NOTES
SUBJECTIVE:                                                    Loco Wood is a 73 year old male who presents to clinic today for the following health issues:  Patient comes in today with concern for rash, he reports he noticed this morning he reports it  was more in his inner groin area in addition to his buttocks .      He reports he had this rash before does comes and go.  He reports yesterday he did use hot tube After he had worked out.  He is not sure if that could be the cause. He also, wear swim suite.    He has no fever no chills has no other exposure.  Currently has no rashes.    Rash  Onset: Recurring, but noticed this morning    Description:   Location: bottom and inner thighs  Character: round, raised, red, blanches to palpitation  Itching (Pruritis): YES    Progression of Symptoms:  worsening    Accompanying Signs & Symptoms:  Fever: no   Body aches or joint pain: no   Sore throat symptoms: no   Recent cold symptoms: YES    History:   Previous similar rash: YES    Precipitating factors:   Exposure to similar rash: YES  New exposures: unsure  Recent travel: no     Alleviating factors:  None    Therapies Tried and outcome: none        Problem list and histories reviewed & adjusted, as indicated.  Additional history: as documented    Patient Active Problem List   Diagnosis     Esophageal reflux     Lumbago     CLL (chronic lymphocytic leukemia) (H)     HYPERLIPIDEMIA LDL GOAL <130     Mass of skin     Health Care Home     Vitamin D deficiency     Osteoarthritis of hip     OA (osteoarthritis) of hip     Insomnia     BPH (benign prostatic hyperplasia)     Acute bilateral low back pain without sciatica     Prostate nodule     Chondromalacia, knee, right     Complex tear of medial meniscus of right knee as current injury     Herpes zoster with keratoconjunctivitis, od     Post herpetic neuralgia     Past Surgical History:   Procedure Laterality Date     ARTHROPLASTY MINIMALLY INVASIVE HIP  5/10/2013     Procedure: ARTHROPLASTY MINIMALLY INVASIVE HIP;  Left Two Incision Total Hip Arthroplasty ;  Surgeon: Desmond Alberts MD;  [facility]     ARTHROPLASTY MINIMALLY INVASIVE HIP  2014    Procedure: ARTHROPLASTY MINIMALLY INVASIVE HIP;  Right Total Hip Arthroplasty Minimally Invasive Two Incision  *Latex Free Room;  Surgeon: Desmond Alberts MD;  [facility]     BACK SURGERY      back fusion      C NONSPECIFIC PROCEDURE   &    (R) inguinal herniorrhapy     C NONSPECIFIC PROCEDURE      (L) inguinal herniorrhaphy     C NONSPECIFIC PROCEDURE      L4-5  L5 S1 fusion - spondylolisthesis     COLONOSCOPY           COLONOSCOPY N/A 8/15/2017    Procedure: COLONOSCOPY;  colonoscopy;  Surgeon: Chun Mcguire MD;  Location:  GI     GI SURGERY      4 hernia repairs in childhood     HERNIA REPAIR      4 since age 4       Social History   Substance Use Topics     Smoking status: Never Smoker     Smokeless tobacco: Never Used     Alcohol use 0.0 oz/week      Comment: moderate, social     Family History   Problem Relation Age of Onset     HEART DISEASE Father      Cerebrovascular Disease Father       OF STROKE      Cancer Father      melonoma     Melanoma Father      Hyperlipidemia Father      Thyroid Disease Father      Cancer Mother      Lung cancer     Other Cancer Mother      lung; heavy smoker     Cerebrovascular Disease Paternal Uncle      Aneurism     Breast Cancer Maternal Aunt       from it     Cancer Paternal Uncle      Cancer Paternal Aunt      Skin Cancer No family hx of      Glaucoma No family hx of      Macular Degeneration No family hx of            ROS:  Constitutional, HEENT, cardiovascular, pulmonary, gi and gu systems are negative, except as otherwise noted.    OBJECTIVE:     /64 (BP Location: Right arm, Patient Position: Chair, Cuff Size: Adult Regular)  Pulse 67  Temp 97  F (36.1  C) (Oral)  Wt 157 lb (71.2 kg)  SpO2 98%  BMI 24.23 kg/m2  Body  mass index is 24.23 kg/(m^2).  GENERAL: healthy, alert and no distress  SKIN: no suspicious lesions or rashes    none     ASSESSMENT/PLAN:       ICD-10-CM    1. Urticaria L50.9    currently, normal exam. Possible allergice reaction, HIves from Hot tube or swimming suite, discussed preventative measure.    Discussed with patient to pay more attention to what could cause it.  In addition I advised him he may take loratadine or Zyrtec before he start working out.  This note was recorded using an Auto voice recording, ( Dragon).  Patient Instructions     Hives (Adult)  Hives are pink or red bumps on the skin. These bumps are also known as wheals. The bumps can itch, burn, or sting. Hives can occur anywhere on the body. They vary in size and shape and can form in clusters. Individual hives can appear and go away quickly. New hives may develop as old ones fade. Hives are common and usually harmless. Occasionally hives are a sign of a serious allergy.  Hives are often caused by an allergic reaction. It may be an allergic reaction to foods such as fruit, shellfish, chocolate, nuts, or tomatoes. It may be a reaction to pollens, animal fur, or mold spores. Medicines, chemicals, and insect bites can also cause hives. And hives can be caused by hot sun or cold air. The cause of hives can be difficult to find.  You may be given medicines to relieve swelling and itching. Follow all instructions when using these medicines. The hives will usually fade in a few days, but can last up to 2 weeks.  Home care  Follow these tips:    Try to find the cause of the hives and eliminate it. Discuss possible causes with your healthcare provider. Future reactions to the same allergen may be worse.    Don t scratch the hives. Scratching will delay healing. To reduce itching, apply cool, wet compresses to the skin.    Dress in soft, loose cotton clothing.    Don t bathe in hot water. This can make the itching worse.    Apply an ice pack or cool  pack wrapped in a thin towel to your skin. This will help reduce redness and itching. But if your hives were caused by exposure to cold, then do not apply more cold to them.    You may use over-the counter antihistamines to reduce itching. Some older antihistamines, such as diphenhydramine and chlorpheniramine, are inexpensive. But they need to be taken often and may make you sleepy. They are best used at bedtime. Don t use diphenhydramine if you have glaucoma or have trouble urinating because of an enlarged prostate. Newer antihistamines, such as loratadine, cetirizine, and fexofenadine, are generally more expensive. But they tend to have fewer side effects, such as drowsiness. They can be taken less often.    Another type of antihistamine is used to treat heartburn. This type includes ranitidine, nizatidine, famotidine, and cimetidine. These are sometimes used along with the above antihistamines if a single medicine is not working.  Follow-up care  Follow up with your healthcare provider if your symptoms don't get better in 2 days. Ask your provider about allergy testing if you have had a severe reaction, or have had several episodes of hives. He or she can use the allergy testing to find out what you are allergic to.  When to seek medical advice  Call your healthcare provider right away if any of these occur:    Fever of 100.4 F (38.0 C) or higher, or as directed by your healthcare provider    Redness, swelling, or pain    Foul-smelling fluid coming from the rash  Call 911  Call 911 if any of the following occur:    Swelling of the face, throat, or tongue    Trouble breathing or swallowing    Dizziness, weakness, or fainting  Date Last Reviewed: 9/1/2016 2000-2017 [employer] [address] [place] [address], [place] [address]. All rights reserved. This information is not intended as a substitute for professional medical care. Always follow your healthcare professional's instructions.            Oscar  CISCO Girard MD  [facility]

## 2018-10-04 NOTE — MR AVS SNAPSHOT
After Visit Summary   10/4/2018    Loco Wood    MRN: [id number]           Patient Information     Date Of Birth          1945        Visit Information        Provider Department      10/4/2018 11:00 AM Oscar Girard MD [facility]      Hives (Adult)  Hives are pink or red bumps on the skin. These bumps are also known as wheals. The bumps can itch, burn, or sting. Hives can occur anywhere on the body. They vary in size and shape and can form in clusters. Individual hives can appear and go away quickly. New hives may develop as old ones fade. Hives are common and usually harmless. Occasionally hives are a sign of a serious allergy.  Hives are often caused by an allergic reaction. It may be an allergic reaction to foods such as fruit, shellfish, chocolate, nuts, or tomatoes. It may be a reaction to pollens, animal fur, or mold spores. Medicines, chemicals, and insect bites can also cause hives. And hives can be caused by hot sun or cold air. The cause of hives can be difficult to find.  You may be given medicines to relieve swelling and itching. Follow all instructions when using these medicines. The hives will usually fade in a few days, but can last up to 2 weeks.  Home care  Follow these tips:    Try to find the cause of the hives and eliminate it. Discuss possible causes with your healthcare provider. Future reactions to the same allergen may be worse.    Don t scratch the hives. Scratching will delay healing. To reduce itching, apply cool, wet compresses to the skin.    Dress in soft, loose cotton clothing.    Don t bathe in hot water. This can make the itching worse.    Apply an ice pack or cool pack wrapped in a thin towel to your skin. This will help reduce redness and itching. But if your hives were caused by exposure to cold, then do not apply more cold to them.    You may use over-the counter antihistamines to reduce itching. Some  older antihistamines, such as diphenhydramine and chlorpheniramine, are inexpensive. But they need to be taken often and may make you sleepy. They are best used at bedtime. Don t use diphenhydramine if you have glaucoma or have trouble urinating because of an enlarged prostate. Newer antihistamines, such as loratadine, cetirizine, and fexofenadine, are generally more expensive. But they tend to have fewer side effects, such as drowsiness. They can be taken less often.    Another type of antihistamine is used to treat heartburn. This type includes ranitidine, nizatidine, famotidine, and cimetidine. These are sometimes used along with the above antihistamines if a single medicine is not working.  Follow-up care  Follow up with your healthcare provider if your symptoms don't get better in 2 days. Ask your provider about allergy testing if you have had a severe reaction, or have had several episodes of hives. He or she can use the allergy testing to find out what you are allergic to.  When to seek medical advice  Call your healthcare provider right away if any of these occur:    Fever of 100.4 F (38.0 C) or higher, or as directed by your healthcare provider    Redness, swelling, or pain    Foul-smelling fluid coming from the rash  Call 911  Call 911 if any of the following occur:    Swelling of the face, throat, or tongue    Trouble breathing or swallowing    Dizziness, weakness, or fainting  Date Last Reviewed: 9/1/2016 2000-2017 [employer]. [address] [place] [address]. All rights reserved. This information is not intended as a substitute for professional medical care. Always follow your healthcare professional's instructions.                Follow-ups after your visit        Your next 10 appointments already scheduled     Oct 16, 2018  1:30 PM T   [facility] with  [facility]   [facility] ([facility])    [address] [place] [address]  [address]  [facility] [id number] [phone]            Oct 16, 2018  2:00 PM CDT   RETURN ONC with He Burns MD   [facility] and Marrow Transplant ([facility])    [address]  [address]  [facility] [id number] [phone]              Who to contact     If you have questions or need follow up information about today's clinic visit or your schedule please contact [facility] directly at [phone].  Normal or non-critical lab and imaging results will be communicated to you by [email or web address], letter or phone within 4 business days after the clinic has received the results. If you do not hear from us within 7 days, please contact the clinic through [employer]t or phone. If you have a critical or abnormal lab result, we will notify you by phone as soon as possible.  Submit refill requests through [employer] or call your pharmacy and they will forward the refill request to us. Please allow 3 business days for your refill to be completed.          Additional Information About Your Visit        [employer] Information     [employer] gives you secure access to your electronic health record. If you see a primary care provider, you can also send messages to your care team and make appointments. If you have questions, please call your primary care clinic.  If you do not have a primary care provider, please call [phone] and they will assist you.        Care EveryWhere ID     This is your Care EveryWhere ID. This could be used by other organizations to access your [place] medical records  [id number]        Your Vitals Were     Pulse Temperature Pulse Oximetry BMI (Body Mass Index)          67 97  F (36.1  C) (Oral) 98% 24.23 kg/m2         Blood Pressure from Last 3 Encounters:   10/04/18 106/64   06/27/18 100/61   04/30/18 116/68    Weight from Last 3 Encounters:   10/04/18 157 lb (71.2 kg)   06/27/18 164 lb (74.4 kg)   04/30/18 166 lb 6 oz (75.5 kg)               Today, you had the following     No orders found for display       Primary Care Provider Office Phone # Fax #    Campos Parra [phone]       [address] [facility] [address]        Equal Access to Services     HECTOR SANTOS : Hadii aad ku hadeitan Solemuel, waaxda luqadaha, qaybta kaalmada adeprashanth, jose elenain hayaamanuela cuevasrufina sneed aman desai. So [facility] [place] [facility] [phone].    ATENCIÓN: Si habla español, tiene a knutson disposición servicios gratuitos de asistencia lingüística. Llame al [phone].    We comply with applicable federal civil rights laws and [place] laws. We do not discriminate on the basis of race, color, national origin, age, disability, sex, sexual orientation, or gender identity.            Thank you!     Thank you for choosing [facility]  for your care. Our goal is always to provide you with excellent care. Hearing back from our patients is one way we can continue to improve our services. Please take a few minutes to complete the written survey that you may receive in the mail after your visit with us. Thank you!             Your Updated Medication List - Protect others around you: Learn how to safely use, store and throw away your medicines at www.disposemymeds.org.          This list is accurate as of 10/4/18 11:11 AM.  Always use your most recent med list.                   Brand Name Dispense Instructions for use Diagnosis    amoxicillin 500 MG tablet    AMOXIL     Take 500 mg by mouth 3 times daily        atorvastatin 10 MG tablet    LIPITOR    45 tablet    Take 1 tablet (10 mg) by mouth every 48 hours    Hyperlipidemia LDL goal <130       * gabapentin 300 MG capsule    NEURONTIN    540 capsule    Take 1-3 capsules (300-900 mg) by mouth 3 times daily    Post herpetic neuralgia       * gabapentin 100 MG capsule    NEURONTIN    180 capsule    Take 1-2 capsules (100-200 mg) by mouth 3 times daily    Herpes zoster with keratoconjunctivitis        HYDROcodone-acetaminophen 5-325 MG per tablet    NORCO    60 tablet    Take 1 tablet by mouth every 8 hours as needed for pain maximum 2 tablet(s) per day    Herpes zoster with keratoconjunctivitis       hydrocortisone 2.5 % cream    ANUSOL-HC    30 g    Place rectally 2 times daily    Thrombosed external hemorrhoids       ibuprofen 200 MG tablet    ADVIL/MOTRIN     Take 200 mg by mouth every 4 hours as needed for mild pain        omega 3 1200 MG Caps      Take 2 capsules by mouth daily    Spinal stenosis, lumbar region, without neurogenic claudication       omeprazole 10 MG CR capsule    priLOSEC    90 capsule    TAKE ONE CAPSULE BY MOUTH EVERY DAY 30 TO 60 MINUTES BEFORE A MEAL    Gastroesophageal reflux disease without esophagitis       TYLENOL PO           VITAMIN D3 PO      Take 4,000 Units by mouth daily        zolpidem 5 MG tablet    AMBIEN    30 tablet    Take 1 tablet (5 mg) by mouth nightly as needed for sleep    Herpes zoster with keratoconjunctivitis       * Notice:  This list has 2 medication(s) that are the same as other medications prescribed for you. Read the directions carefully, and ask your doctor or other care provider to review them with you.

## 2018-10-04 NOTE — PATIENT INSTRUCTIONS
Hives (Adult)  Hives are pink or red bumps on the skin. These bumps are also known as wheals. The bumps can itch, burn, or sting. Hives can occur anywhere on the body. They vary in size and shape and can form in clusters. Individual hives can appear and go away quickly. New hives may develop as old ones fade. Hives are common and usually harmless. Occasionally hives are a sign of a serious allergy.  Hives are often caused by an allergic reaction. It may be an allergic reaction to foods such as fruit, shellfish, chocolate, nuts, or tomatoes. It may be a reaction to pollens, animal fur, or mold spores. Medicines, chemicals, and insect bites can also cause hives. And hives can be caused by hot sun or cold air. The cause of hives can be difficult to find.  You may be given medicines to relieve swelling and itching. Follow all instructions when using these medicines. The hives will usually fade in a few days, but can last up to 2 weeks.  Home care  Follow these tips:    Try to find the cause of the hives and eliminate it. Discuss possible causes with your healthcare provider. Future reactions to the same allergen may be worse.    Don t scratch the hives. Scratching will delay healing. To reduce itching, apply cool, wet compresses to the skin.    Dress in soft, loose cotton clothing.    Don t bathe in hot water. This can make the itching worse.    Apply an ice pack or cool pack wrapped in a thin towel to your skin. This will help reduce redness and itching. But if your hives were caused by exposure to cold, then do not apply more cold to them.    You may use over-the counter antihistamines to reduce itching. Some older antihistamines, such as diphenhydramine and chlorpheniramine, are inexpensive. But they need to be taken often and may make you sleepy. They are best used at bedtime. Don t use diphenhydramine if you have glaucoma or have trouble urinating because of an enlarged prostate. Newer antihistamines, such as  loratadine, cetirizine, and fexofenadine, are generally more expensive. But they tend to have fewer side effects, such as drowsiness. They can be taken less often.    Another type of antihistamine is used to treat heartburn. This type includes ranitidine, nizatidine, famotidine, and cimetidine. These are sometimes used along with the above antihistamines if a single medicine is not working.  Follow-up care  Follow up with your healthcare provider if your symptoms don't get better in 2 days. Ask your provider about allergy testing if you have had a severe reaction, or have had several episodes of hives. He or she can use the allergy testing to find out what you are allergic to.  When to seek medical advice  Call your healthcare provider right away if any of these occur:    Fever of 100.4 F (38.0 C) or higher, or as directed by your healthcare provider    Redness, swelling, or pain    Foul-smelling fluid coming from the rash  Call 911  Call 911 if any of the following occur:    Swelling of the face, throat, or tongue    Trouble breathing or swallowing    Dizziness, weakness, or fainting  Date Last Reviewed: 9/1/2016 2000-2017 [employer]. [address] [place] [address], [place] [address]. All rights reserved. This information is not intended as a substitute for professional medical care. Always follow your healthcare professional's instructions.

## 2018-10-16 ENCOUNTER — OFFICE VISIT (OUTPATIENT)
Dept: TRANSPLANT | Facility: CLINIC | Age: 73
End: 2018-10-16
Attending: INTERNAL MEDICINE
Payer: MEDICARE

## 2018-10-16 VITALS
TEMPERATURE: 97.5 F | WEIGHT: 158 LBS | HEIGHT: 68 IN | HEART RATE: 65 BPM | BODY MASS INDEX: 23.95 KG/M2 | RESPIRATION RATE: 18 BRPM | SYSTOLIC BLOOD PRESSURE: 124 MMHG | OXYGEN SATURATION: 97 % | DIASTOLIC BLOOD PRESSURE: 73 MMHG

## 2018-10-16 DIAGNOSIS — C91.10 CLL (CHRONIC LYMPHOCYTIC LEUKEMIA) (H): ICD-10-CM

## 2018-10-16 DIAGNOSIS — R97.20 ELEVATED PROSTATE SPECIFIC ANTIGEN (PSA): ICD-10-CM

## 2018-10-16 LAB
ALBUMIN SERPL-MCNC: 4.1 G/DL (ref 3.4–5)
ALP SERPL-CCNC: 107 U/L (ref 40–150)
ALT SERPL W P-5'-P-CCNC: 25 U/L (ref 0–70)
ANION GAP SERPL CALCULATED.3IONS-SCNC: 3 MMOL/L (ref 3–14)
AST SERPL W P-5'-P-CCNC: 22 U/L (ref 0–45)
BASOPHILS # BLD AUTO: 0 10E9/L (ref 0–0.2)
BASOPHILS NFR BLD AUTO: 0 %
BILIRUB SERPL-MCNC: 0.6 MG/DL (ref 0.2–1.3)
BUN SERPL-MCNC: 16 MG/DL (ref 7–30)
CALCIUM SERPL-MCNC: 8.5 MG/DL (ref 8.5–10.1)
CHLORIDE SERPL-SCNC: 107 MMOL/L (ref 94–109)
CO2 SERPL-SCNC: 30 MMOL/L (ref 20–32)
CREAT SERPL-MCNC: 1.35 MG/DL (ref 0.66–1.25)
DIFFERENTIAL METHOD BLD: ABNORMAL
EOSINOPHIL # BLD AUTO: 0.7 10E9/L (ref 0–0.7)
EOSINOPHIL NFR BLD AUTO: 0.5 %
ERYTHROCYTE [DISTWIDTH] IN BLOOD BY AUTOMATED COUNT: 14.5 % (ref 10–15)
GFR SERPL CREATININE-BSD FRML MDRD: 52 ML/MIN/1.7M2
GLUCOSE SERPL-MCNC: 113 MG/DL (ref 70–99)
HCT VFR BLD AUTO: 40.2 % (ref 40–53)
HGB BLD-MCNC: 12.4 G/DL (ref 13.3–17.7)
LDH SERPL L TO P-CCNC: 216 U/L (ref 85–227)
LYMPHOCYTES # BLD AUTO: 130.2 10E9/L (ref 0.8–5.3)
LYMPHOCYTES NFR BLD AUTO: 95 %
MCH RBC QN AUTO: 30.7 PG (ref 26.5–33)
MCHC RBC AUTO-ENTMCNC: 30.8 G/DL (ref 31.5–36.5)
MCV RBC AUTO: 100 FL (ref 78–100)
MONOCYTES # BLD AUTO: 3.4 10E9/L (ref 0–1.3)
MONOCYTES NFR BLD AUTO: 2.5 %
NEUTROPHILS # BLD AUTO: 2.7 10E9/L (ref 1.6–8.3)
NEUTROPHILS NFR BLD AUTO: 2 %
PLATELET # BLD AUTO: 111 10E9/L (ref 150–450)
PLATELET # BLD EST: ABNORMAL 10*3/UL
POIKILOCYTOSIS BLD QL SMEAR: SLIGHT
POTASSIUM SERPL-SCNC: 4.8 MMOL/L (ref 3.4–5.3)
PROT SERPL-MCNC: 6.8 G/DL (ref 6.8–8.8)
PSA SERPL-MCNC: 4.68 UG/L (ref 0–4)
RBC # BLD AUTO: 4.04 10E12/L (ref 4.4–5.9)
SODIUM SERPL-SCNC: 140 MMOL/L (ref 133–144)
WBC # BLD AUTO: 137 10E9/L (ref 4–11)

## 2018-10-16 PROCEDURE — G0463 HOSPITAL OUTPT CLINIC VISIT: HCPCS

## 2018-10-16 PROCEDURE — 84153 ASSAY OF PSA TOTAL: CPT | Performed by: UROLOGY

## 2018-10-16 PROCEDURE — 80053 COMPREHEN METABOLIC PANEL: CPT | Performed by: INTERNAL MEDICINE

## 2018-10-16 PROCEDURE — 36415 COLL VENOUS BLD VENIPUNCTURE: CPT

## 2018-10-16 PROCEDURE — 83615 LACTATE (LD) (LDH) ENZYME: CPT | Performed by: INTERNAL MEDICINE

## 2018-10-16 PROCEDURE — 85025 COMPLETE CBC W/AUTO DIFF WBC: CPT | Performed by: INTERNAL MEDICINE

## 2018-10-16 ASSESSMENT — PAIN SCALES - GENERAL: PAINLEVEL: NO PAIN (0)

## 2018-10-16 NOTE — LETTER
November 5, 2018       TO: Loco Wood  [address] [address]     Mr. Wood,     Your PSA is stable.  We should plan for another check in 6 months.  Please let me know if you would like to pursue an alternate plan such as biopsy.      Thank you, Jermaine Boyd    Resulted Orders   PSA tumor marker   Result Value Ref Range    PSA 4.68 (H) 0 - 4 ug/L      Comment:      Assay Method:  Chemiluminescence using Siemens Vista analyzer

## 2018-10-16 NOTE — PROGRESS NOTES
Hematology/Oncology Evaluation      Loco Wood is a 71 year old male previously followed by Dr. Dias for CLL.      Hematologic history:  Diagnosed 2/12/2007 with CLL, Lyles stage 0, followed clinically since.  At diagnosis WBC 17.8, ALC 11.2, hemoglobin 15.2, platelets 188.  Flow consistent with CLL.  No opportunistic infections, with IgG in the normal range during follow up.    Date Treatment Response Toxicities/Complications   2007 - current Observation alone.                  HPI:  Please see entry above for hematologic history.  Loco continues to have some post-herpetic neuralgia, and he received botox yesterday in an effort to ameliorate this.  No fevers, chills, sweats, weight loss, bone pain, nausea, vomiting, diarrhea, skin rash, or any other new symptoms.     RECOMMENDATIONS:  Mr. Wood is being monitored for his CLL.  His leukocytosis continues to gradually increase.  He is slightly anemic and thromboctopenic, but he does not have enlarged lymph nodes, liver, or spleen.    We reviewed:  1. Continue to monitor CLL.    2. Slight progression in anemia and thrombocytopenia suggests possibility of needing to start CLL-specific therapy in the coming months.  Discussed potential first-line therapies including ibrutinib.  3. RTC in 3-4 months with labs at that time  4. RTC sooner if unexplained weight loss, night sweats, increasing adenopathy, or other new symptoms.    He Burns MD, pager [phone]          ROS: 10 point ROS neg other than the symptoms noted above in the HPI.          Allergies   Allergen Reactions     Dilaudid [Hydromorphone] Itching     Morphine Itching        Current Outpatient Prescriptions   Medication Sig Dispense Refill     Acetaminophen (TYLENOL PO)        amoxicillin (AMOXIL) 500 MG tablet Take 500 mg by mouth 3 times daily       atorvastatin (LIPITOR) 10 MG tablet Take 1 tablet (10 mg) by mouth every 48 hours 45 tablet 9     Cholecalciferol (VITAMIN D3 PO) Take  "4,000 Units by mouth daily        gabapentin (NEURONTIN) 100 MG capsule Take 1-2 capsules (100-200 mg) by mouth 3 times daily (Patient not taking: Reported on 10/4/2018) 180 capsule 3     gabapentin (NEURONTIN) 300 MG capsule Take 1-3 capsules (300-900 mg) by mouth 3 times daily (Patient not taking: Reported on 10/4/2018) 540 capsule 3     HYDROcodone-acetaminophen (NORCO) 5-325 MG per tablet Take 1 tablet by mouth every 8 hours as needed for pain maximum 2 tablet(s) per day 60 tablet 0     hydrocortisone (ANUSOL-HC) 2.5 % cream Place rectally 2 times daily (Patient not taking: Reported on 10/4/2018) 30 g 3     ibuprofen (ADVIL/MOTRIN) 200 MG tablet Take 200 mg by mouth every 4 hours as needed for mild pain       omega 3 1200 MG CAPS Take 2 capsules by mouth daily       omeprazole (PRILOSEC) 10 MG CR capsule TAKE ONE CAPSULE BY MOUTH EVERY DAY 30 TO 60 MINUTES BEFORE A MEAL 90 capsule 1     zolpidem (AMBIEN) 5 MG tablet Take 1 tablet (5 mg) by mouth nightly as needed for sleep (Patient not taking: Reported on 10/4/2018) 30 tablet 5         Physical Exam:     Vital Signs: /73 (BP Location: Right arm, Patient Position: Sitting, Cuff Size: Adult Regular)  Pulse 65  Temp 97.5  F (36.4  C) (Oral)  Resp 18  Ht 1.715 m (5' 7.52\")  Wt 71.7 kg (158 lb)  SpO2 97%  BMI 24.37 kg/m2    KPS:  90%    General Appearance: healthy, alert and no distress  Eyes: no redness or icterus  Ears/Nose/M/Throat: Oral mucosa and posterior oropharynx normal, moist mucous membranes  Neck supple, non-tender, free range of motion, no adenopathy  Cardio/Vascular:regular rate and rhythm, normal S1 and S2, no murmur  Resp Effort And Auscultation: Normal - Clear to auscultation without rales, rhonchi, or wheezing.  GI: soft, nontender, no hepatosplenomegaly   Lymphatics:no significant enlargement of lymph nodes globally   Musculoskeletal: Musculoskeletal normal  Edema: none  Skin: Skin color, texture, turgor normal. Neurologic: " negative  Psych/Affect: Mood and affect are appropriate.  Vascular Access:  none      Loco understood the above assessment and recommendations.  Multiple questions answered.  No barriers to learning identified.         Total time: 25 minutes  Counseling time: 20 minutes  Prolonged service:  no      ------------------------------------------------------------------------------------------------------------------------------------------------    Patient Care Team      Relationship Specialty Notifications Start End    Jameson Cisse MD PCP - General   12/2/02     Comment:  per patient    Phone: [phone] Fax: [phone]         [facility] [address] [address]    Yodit Barnett PA Physician Assistant   12/2/13     Phone: [phone] Fax: [phone]         [address] [address]

## 2018-10-16 NOTE — NURSING NOTE
"Oncology Rooming Note    October 16, 2018 1:58 PM   Loco Wood is a 73 year old male who presents for:    Chief Complaint   Patient presents with     Oncology Clinic Visit     Pt is here for a RTN for CLL     Initial Vitals: /73 (BP Location: Right arm, Patient Position: Sitting, Cuff Size: Adult Regular)  Pulse 65  Temp 97.5  F (36.4  C) (Oral)  Resp 18  Ht 1.715 m (5' 7.52\")  Wt 71.7 kg (158 lb)  SpO2 97%  BMI 24.37 kg/m2 Estimated body mass index is 24.37 kg/(m^2) as calculated from the following:    Height as of this encounter: 1.715 m (5' 7.52\").    Weight as of this encounter: 71.7 kg (158 lb). Body surface area is 1.85 meters squared.  No Pain (0) Comment: Data Unavailable   No LMP for male patient.  Allergies reviewed: Yes  Medications reviewed: Yes    Medications: Medication refills not needed today.  Pharmacy name entered into [employer]: [id number] - [place], [place] - [address]    Clinical concerns: none     6 minutes for nursing intake (face to face time)     Nancy Samuel MA              "

## 2018-10-16 NOTE — NURSING NOTE
Chief Complaint   Patient presents with     Blood Draw     Labs only     Vitals done, labs drawn by venipuncture.  See doc flow sheets for details.  Kendra Victoria CMA

## 2018-10-16 NOTE — MR AVS SNAPSHOT
After Visit Summary   10/16/2018    Loco Wood    MRN: [id number]           Patient Information     Date Of Birth          1945        Visit Information        Provider Department      10/16/2018 2:00 PM He Burns MD [facility] Blood and Marrow Transplant        Today's Diagnoses     CLL (chronic lymphocytic leukemia) (H)        Elevated prostate specific antigen (PSA)              [facility] ([facility])  [address] [place] [address]  Phone: [phone]  Clinic Hours:   Monday-Thursday:7am to 7pm   Friday: 7am to 5pm   Weekends and holidays:    8am to noon (in general)  If your fever is 100.5  or greater,   call the clinic.  After hours call the   hospital at [phone] or   [phone]. Ask for the BMT   fellow on-call            Follow-ups after your visit        Future tests that were ordered for you today     Open Standing Orders        Priority Remaining Interval Expires Ordered    CBC with platelets differential Routine 20/20 3 months 10/16/2019 10/16/2018    Comprehensive metabolic panel Routine 20/20 3 months 10/16/2019 10/16/2018    Lactate Dehydrogenase Routine 20/20 3 months 10/16/2019 10/16/2018    IgG Routine 20/20 3 months 10/16/2019 10/16/2018            Who to contact     If you have questions or need follow up information about today's clinic visit or your schedule please contact [facility] BLOOD AND MARROW TRANSPLANT directly at [phone].  Normal or non-critical lab and imaging results will be communicated to you by MyChart, letter or phone within 4 business days after the clinic has received the results. If you do not hear from us within 7 days, please contact the clinic through MyChart or phone. If you have a critical or abnormal lab result, we will notify you by phone as soon as possible.  Submit refill requests through [employer] or call your pharmacy and they will forward the refill request to us. Please allow 3 business days for  "your refill to be completed.          Additional Information About Your Visit        L3hart Information     [employer] gives you secure access to your electronic health record. If you see a primary care provider, you can also send messages to your care team and make appointments. If you have questions, please call your primary care clinic.  If you do not have a primary care provider, please call [phone] and they will assist you.        Care EveryWhere ID     This is your Care EveryWhere ID. This could be used by other organizations to access your [place] medical records  [id number]        Your Vitals Were     Pulse Temperature Respirations Height Pulse Oximetry BMI (Body Mass Index)    65 97.5  F (36.4  C) (Oral) 18 1.715 m (5' 7.52\") 97% 24.37 kg/m2       Blood Pressure from Last 3 Encounters:   10/16/18 124/73   10/04/18 106/64   06/27/18 100/61    Weight from Last 3 Encounters:   10/16/18 71.7 kg (158 lb)   10/04/18 71.2 kg (157 lb)   06/27/18 74.4 kg (164 lb)              We Performed the Following     *CBC with platelets differential     Comprehensive metabolic panel     Lactate Dehydrogenase     PSA tumor marker        Recent Review Flowsheet Data     BMT Recent Results Latest Ref Rng & Units 4/25/2017 10/4/2017 10/24/2017 12/21/2017 3/22/2018 4/24/2018 10/16/2018    WBC 4.0 - 11.0 10e9/L 95.2(HH) 96.1(HH) 95.2(HH) 93.0(HH) - 103.2(HH) 137.0(HH)    Hemoglobin 13.3 - 17.7 g/dL 13.8 14.0 13.5 14.0 - 13.3 12.4(L)    Platelet Count 150 - 450 10e9/L 125(L) 125(L) 152 118(L) - 129(L) 111(L)    Neutrophils (Absolute) 1.6 - 8.3 10e9/L 4.8 4.3 5.7 3.4 - 5.2 2.7    INR 0.86 - 1.14 - - - - - - -    Sodium 133 - 144 mmol/L 140 144 140 143 142 142 140    Potassium 3.4 - 5.3 mmol/L 4.5 4.3 4.7 4.1 4.5 4.6 4.8    Chloride 94 - 109 mmol/L 109 107 105 106 107 110(H) 107    Glucose 70 - 99 mg/dL 103(H) 95 91 97 94 103(H) 113(H)    Urea Nitrogen 7 - 30 mg/dL 15 16 18 20 21 23 16    Creatinine 0.66 - 1.25 mg/dL 1.22 1.28(H) " 1.26(H) 1.33(H) 1.51(H) 1.54(H) 1.35(H)    Calcium (Total) 8.5 - 10.1 mg/dL 9.1 8.9 8.7 9.4 9.2 9.1 8.5    Protein (Total) 6.8 - 8.8 g/dL 6.9 - 7.1 - - 6.7(L) 6.8    Albumin 3.4 - 5.0 g/dL 4.1 - 4.4 - - 3.9 4.1    Alkaline Phosphatase 40 - 150 U/L 101 - 84 - - 102 107    AST 0 - 45 U/L 26 - 16 - - 23 22    ALT 0 - 70 U/L 26 - 24 - - 20 25    MCV 78 - 100 fl 95 97 98 101(H) - 96 100               Primary Care Provider Office Phone # Fax #    Campos Parra [phone]       [address] [facility] [address]        Equal Access to Services     [facility] Hospital[facility] : Hadii litzy ku hadasho Solemuel, waaxda luqadaha, qaybta kaalmada adeegyada, jose newton . So [facility] [phone].    ATENCIÓN: Si sebastian rodney, tiene a knutson disposición servicios gratuitos de asistencia lingüística. Llame al [phone].    We comply with applicable federal civil rights laws and [place] laws. We do not discriminate on the basis of race, color, national origin, age, disability, sex, sexual orientation, or gender identity.            Thank you!     Thank you for choosing [facility] [place] BLOOD AND MARROW TRANSPLANT  for your care. Our goal is always to provide you with excellent care. Hearing back from our patients is one way we can continue to improve our services. Please take a few minutes to complete the written survey that you may receive in the mail after your visit with us. Thank you!             Your Updated Medication List - Protect others around you: Learn how to safely use, store and throw away your medicines at www.disposemymeds.org.          This list is accurate as of 10/16/18  5:12 PM.  Always use your most recent med list.                   Brand Name Dispense Instructions for use Diagnosis    amoxicillin 500 MG tablet    AMOXIL     Take 500 mg by mouth 3 times daily        atorvastatin 10 MG tablet    LIPITOR    45 tablet    Take 1 tablet (10 mg) by mouth every 48 hours     Hyperlipidemia LDL goal <130       * gabapentin 300 MG capsule    NEURONTIN    540 capsule    Take 1-3 capsules (300-900 mg) by mouth 3 times daily    Post herpetic neuralgia       * gabapentin 100 MG capsule    NEURONTIN    180 capsule    Take 1-2 capsules (100-200 mg) by mouth 3 times daily    Herpes zoster with keratoconjunctivitis       HYDROcodone-acetaminophen 5-325 MG per tablet    NORCO    60 tablet    Take 1 tablet by mouth every 8 hours as needed for pain maximum 2 tablet(s) per day    Herpes zoster with keratoconjunctivitis       hydrocortisone 2.5 % cream    ANUSOL-HC    30 g    Place rectally 2 times daily    Thrombosed external hemorrhoids       ibuprofen 200 MG tablet    ADVIL/MOTRIN     Take 200 mg by mouth every 4 hours as needed for mild pain        omega 3 1200 MG Caps      Take 2 capsules by mouth daily    Spinal stenosis, lumbar region, without neurogenic claudication       omeprazole 10 MG CR capsule    priLOSEC    90 capsule    TAKE ONE CAPSULE BY MOUTH EVERY DAY 30 TO 60 MINUTES BEFORE A MEAL    Gastroesophageal reflux disease without esophagitis       TYLENOL PO           VITAMIN D3 PO      Take 4,000 Units by mouth daily        zolpidem 5 MG tablet    AMBIEN    30 tablet    Take 1 tablet (5 mg) by mouth nightly as needed for sleep    Herpes zoster with keratoconjunctivitis       * Notice:  This list has 2 medication(s) that are the same as other medications prescribed for you. Read the directions carefully, and ask your doctor or other care provider to review them with you.

## 2018-10-24 ENCOUNTER — TELEPHONE (OUTPATIENT)
Dept: ONCOLOGY | Facility: CLINIC | Age: 73
End: 2018-10-24

## 2018-10-24 ENCOUNTER — CARE COORDINATION (OUTPATIENT)
Dept: ONCOLOGY | Facility: CLINIC | Age: 73
End: 2018-10-24

## 2018-10-24 DIAGNOSIS — C91.10 CLL (CHRONIC LYMPHOCYTIC LEUKEMIA) (H): ICD-10-CM

## 2018-10-24 DIAGNOSIS — N18.30 CHRONIC KIDNEY DISEASE, STAGE 3 (MODERATE): ICD-10-CM

## 2018-10-24 LAB
ALBUMIN SERPL-MCNC: 4.5 G/DL (ref 3.4–5)
ALP SERPL-CCNC: 114 U/L (ref 40–150)
ALT SERPL W P-5'-P-CCNC: 27 U/L (ref 0–70)
ANION GAP SERPL CALCULATED.3IONS-SCNC: 6 MMOL/L (ref 3–14)
AST SERPL W P-5'-P-CCNC: 24 U/L (ref 0–45)
BILIRUB SERPL-MCNC: 1.2 MG/DL (ref 0.2–1.3)
BUN SERPL-MCNC: 21 MG/DL (ref 7–30)
CALCIUM SERPL-MCNC: 9.6 MG/DL (ref 8.5–10.1)
CHLORIDE SERPL-SCNC: 105 MMOL/L (ref 94–109)
CO2 SERPL-SCNC: 30 MMOL/L (ref 20–32)
CREAT SERPL-MCNC: 1.49 MG/DL (ref 0.66–1.25)
DIFFERENTIAL METHOD BLD: ABNORMAL
EOSINOPHIL # BLD AUTO: 1.7 10E9/L (ref 0–0.7)
EOSINOPHIL NFR BLD AUTO: 1 %
ERYTHROCYTE [DISTWIDTH] IN BLOOD BY AUTOMATED COUNT: 15.6 % (ref 10–15)
GFR SERPL CREATININE-BSD FRML MDRD: 46 ML/MIN/1.7M2
GLUCOSE SERPL-MCNC: 104 MG/DL (ref 70–99)
HCT VFR BLD AUTO: 44.5 % (ref 40–53)
HGB BLD-MCNC: 13.5 G/DL (ref 13.3–17.7)
LDH SERPL L TO P-CCNC: 209 U/L (ref 85–227)
LYMPHOCYTES # BLD AUTO: 164.6 10E9/L (ref 0.8–5.3)
LYMPHOCYTES NFR BLD AUTO: 96 %
MCH RBC QN AUTO: 30.2 PG (ref 26.5–33)
MCHC RBC AUTO-ENTMCNC: 30.3 G/DL (ref 31.5–36.5)
MCV RBC AUTO: 100 FL (ref 78–100)
NEUTROPHILS # BLD AUTO: 5.1 10E9/L (ref 1.6–8.3)
NEUTROPHILS NFR BLD AUTO: 3 %
PLATELET # BLD AUTO: 119 10E9/L (ref 150–450)
PLATELET # BLD EST: ABNORMAL 10*3/UL
POTASSIUM SERPL-SCNC: 4.3 MMOL/L (ref 3.4–5.3)
PROT SERPL-MCNC: 7.3 G/DL (ref 6.8–8.8)
RBC # BLD AUTO: 4.47 10E12/L (ref 4.4–5.9)
RBC MORPH BLD: NORMAL
SMUDGE CELLS BLD QL SMEAR: PRESENT
SODIUM SERPL-SCNC: 141 MMOL/L (ref 133–144)
WBC # BLD AUTO: 171.4 10E9/L (ref 4–11)

## 2018-10-24 PROCEDURE — 36415 COLL VENOUS BLD VENIPUNCTURE: CPT | Performed by: INTERNAL MEDICINE

## 2018-10-24 PROCEDURE — 83615 LACTATE (LD) (LDH) ENZYME: CPT | Performed by: INTERNAL MEDICINE

## 2018-10-24 PROCEDURE — 80053 COMPREHEN METABOLIC PANEL: CPT | Performed by: INTERNAL MEDICINE

## 2018-10-24 PROCEDURE — 85025 COMPLETE CBC W/AUTO DIFF WBC: CPT | Performed by: INTERNAL MEDICINE

## 2018-10-24 PROCEDURE — 82784 ASSAY IGA/IGD/IGG/IGM EACH: CPT | Performed by: INTERNAL MEDICINE

## 2018-10-24 NOTE — TELEPHONE ENCOUNTER
Writer received a call from Wandy at [facility] to report a critical WBC of 171.4. Writer paged Dr. He Burns and sent urgent IB to Marisol Juarez, DORISCC, as well.

## 2018-10-24 NOTE — PROGRESS NOTES
Spoke to Loco to discuss his lab results.  , Hgb 15.3 Plts 119.  Denies fevers, pain, night sweats or swelling.  States he feels the same as he did when he was seen by Dr Burns last week.  Discussed that Dr Burns would like him to have labs rechecked and an appointment with an MELLY in 2 weeks. He was agreeable to having labs drawn locally in 2 weeks, he does not want to come to the clinic for an appointment unless his labs have worsened. Writer agreed to review his lab results on 11/7 with Dr Burns or Jess Sanchez PA-C, until the labs have been reviewed his appointment with Jess Sanchez PA-C will remain scheduled. He agrees to notify writer after he has the labs drawn on 11/7 in order to watch for results.  Reviewed s/sx to report prior to his appointments.  Verbalized understanding and agreement with above plan.

## 2018-10-25 LAB — IGG SERPL-MCNC: 555 MG/DL (ref 695–1620)

## 2018-11-04 NOTE — PROGRESS NOTES
Mr. Wood,  Your PSA is stable.  We should plan for another check in 6 months.  Please let me know if you would like to pursue an alternate plan such as biopsy.  Thank you, Jermaine Boyd.

## 2018-11-07 DIAGNOSIS — C91.10 CLL (CHRONIC LYMPHOCYTIC LEUKEMIA) (H): ICD-10-CM

## 2018-11-07 LAB
ALBUMIN SERPL-MCNC: 4.2 G/DL (ref 3.4–5)
ALP SERPL-CCNC: 109 U/L (ref 40–150)
ALT SERPL W P-5'-P-CCNC: 27 U/L (ref 0–70)
ANION GAP SERPL CALCULATED.3IONS-SCNC: 5 MMOL/L (ref 3–14)
ANISOCYTOSIS BLD QL SMEAR: SLIGHT
AST SERPL W P-5'-P-CCNC: 21 U/L (ref 0–45)
BASOPHILS NFR BLD AUTO: 0.5 %
BILIRUB SERPL-MCNC: 0.8 MG/DL (ref 0.2–1.3)
BUN SERPL-MCNC: 18 MG/DL (ref 7–30)
CALCIUM SERPL-MCNC: 8.9 MG/DL (ref 8.5–10.1)
CHLORIDE SERPL-SCNC: 110 MMOL/L (ref 94–109)
CO2 SERPL-SCNC: 30 MMOL/L (ref 20–32)
CREAT SERPL-MCNC: 1.4 MG/DL (ref 0.66–1.25)
DIFFERENTIAL METHOD BLD: ABNORMAL
EOSINOPHIL NFR BLD AUTO: 0.5 %
ERYTHROCYTE [DISTWIDTH] IN BLOOD BY AUTOMATED COUNT: 15.2 % (ref 10–15)
GFR SERPL CREATININE-BSD FRML MDRD: 50 ML/MIN/1.7M2
GLUCOSE SERPL-MCNC: 105 MG/DL (ref 70–99)
HCT VFR BLD AUTO: 43.7 % (ref 40–53)
HGB BLD-MCNC: 13.3 G/DL (ref 13.3–17.7)
LDH SERPL L TO P-CCNC: 211 U/L (ref 85–227)
LYMPHOCYTES NFR BLD AUTO: 97 %
MCH RBC QN AUTO: 30.7 PG (ref 26.5–33)
MCHC RBC AUTO-ENTMCNC: 30.4 G/DL (ref 31.5–36.5)
MCV RBC AUTO: 101 FL (ref 78–100)
MONOCYTES NFR BLD AUTO: 0.5 %
NEUTROPHILS NFR BLD AUTO: 1.5 %
PLATELET # BLD AUTO: 117 10E9/L (ref 150–450)
PLATELET # BLD EST: ABNORMAL 10*3/UL
POTASSIUM SERPL-SCNC: 4.4 MMOL/L (ref 3.4–5.3)
PROT SERPL-MCNC: 6.8 G/DL (ref 6.8–8.8)
RBC # BLD AUTO: 4.33 10E12/L (ref 4.4–5.9)
SODIUM SERPL-SCNC: 145 MMOL/L (ref 133–144)
WBC # BLD AUTO: 148.5 10E9/L (ref 4–11)

## 2018-11-07 PROCEDURE — 36415 COLL VENOUS BLD VENIPUNCTURE: CPT | Performed by: INTERNAL MEDICINE

## 2018-11-07 PROCEDURE — 85025 COMPLETE CBC W/AUTO DIFF WBC: CPT | Performed by: INTERNAL MEDICINE

## 2018-11-07 PROCEDURE — 82784 ASSAY IGA/IGD/IGG/IGM EACH: CPT | Performed by: INTERNAL MEDICINE

## 2018-11-07 PROCEDURE — 80053 COMPREHEN METABOLIC PANEL: CPT | Performed by: INTERNAL MEDICINE

## 2018-11-07 PROCEDURE — 83615 LACTATE (LD) (LDH) ENZYME: CPT | Performed by: INTERNAL MEDICINE

## 2018-11-08 LAB — IGG SERPL-MCNC: 520 MG/DL (ref 695–1620)

## 2018-11-09 ENCOUNTER — CARE COORDINATION (OUTPATIENT)
Dept: ONCOLOGY | Facility: CLINIC | Age: 73
End: 2018-11-09

## 2018-11-09 NOTE — PROGRESS NOTES
LVM to let Loco know that Dr Burns recommends he keep his appointment in [place] with her.  He should call the clinic with any unexplained fevers, weight loss, rapidly growing lymph nodes, or any other symptoms that concern him.  Encouraged him to call with any additional questions or concerns.

## 2018-11-21 ENCOUNTER — ALLIED HEALTH/NURSE VISIT (OUTPATIENT)
Dept: NURSING | Facility: CLINIC | Age: 73
End: 2018-11-21
Payer: COMMERCIAL

## 2018-11-21 DIAGNOSIS — Z23 NEED FOR PROPHYLACTIC VACCINATION AND INOCULATION AGAINST INFLUENZA: Primary | ICD-10-CM

## 2018-11-21 PROCEDURE — 90662 IIV NO PRSV INCREASED AG IM: CPT

## 2018-11-21 PROCEDURE — 99207 ZZC NO CHARGE NURSE ONLY: CPT

## 2018-11-21 PROCEDURE — G0008 ADMIN INFLUENZA VIRUS VAC: HCPCS

## 2018-11-21 NOTE — PROGRESS NOTES

## 2018-11-21 NOTE — MR AVS SNAPSHOT
After Visit Summary   11/21/2018    Loco Wood    MRN: [id number]           Patient Information     Date Of Birth          1945        Visit Information        Provider Department      11/21/2018 12:00 PM CP FLU CLINIC [facility]        Today's Diagnoses     Need for prophylactic vaccination and inoculation against influenza    -  1       Follow-ups after your visit        Your next 10 appointments already scheduled     Nov 21, 2018 12:00 PM CST   Nurse Only with  FLU CLINIC   [facility] ([facility])    [address] [address]   [phone]            Dec 21, 2018 10:30 AM CST   [facility] Lab Draw with  [facility] LAB DRAW   [facility] Masonic Lab Draw ([facility])    [address]  [address]  [facility] [address] [phone]            Dec 21, 2018 11:00 AM CST   RETURN ONC with  BMT DOM   [facility] Blood and Marrow Transplant ([facility])    [address]  [address]  [facility] [address] [phone]              Who to contact     If you have questions or need follow up information about today's clinic visit or your schedule please contact [facility] directly at [phone].  Normal or non-critical lab and imaging results will be communicated to you by MyChart, letter or phone within 4 business days after the clinic has received the results. If you do not hear from us within 7 days, please contact the clinic through MyChart or phone. If you have a critical or abnormal lab result, we will notify you by phone as soon as possible.  Submit refill requests through [email or web address] or call your pharmacy and they will forward the refill request to us. Please allow 3 business days for your refill to be completed.          Additional Information About Your Visit        MyChart Information     [employer]  gives you secure access to your electronic health record. If you see a primary care provider, you can also send messages to your care team and make appointments. If you have questions, please call your primary care clinic.  If you do not have a primary care provider, please call [phone] and they will assist you.        Care EveryWhere ID     This is your Care EveryWhere ID. This could be used by other organizations to access your [place] medical records  [id number]         Blood Pressure from Last 3 Encounters:   10/16/18 124/73   10/04/18 106/64   06/27/18 100/61    Weight from Last 3 Encounters:   10/16/18 158 lb (71.7 kg)   10/04/18 157 lb (71.2 kg)   06/27/18 164 lb (74.4 kg)              We Performed the Following     FLU VACCINE, INCREASED ANTIGEN, PRESV FREE, AGE 65+ [55593]     Vaccine Administration, Initial [19604]        Primary Care Provider Office Phone # Fax #    Campos Parra [phone]       [address] [facility] [address]        Equal Access to Services     [facility] : Hadii aad ku hadasho Soomaali, waaxda luqadaha, qaybta kaalmada adeprashanth, jose newton . So [facility] [phone].    ATENCIÓN: Si habla español, tiene a knutson disposición servicios gratjiros de asistencia lingüística. Anastasia al [phone].    We comply with applicable federal civil rights laws and [place] laws. We do not discriminate on the basis of race, color, national origin, age, disability, sex, sexual orientation, or gender identity.            Thank you!     Thank you for choosing [facility]  for your care. Our goal is always to provide you with excellent care. Hearing back from our patients is one way we can continue to improve our services. Please take a few minutes to complete the written survey that you may receive in the mail after your visit with us. Thank you!             Your Updated Medication List - Protect others around  you: Learn how to safely use, store and throw away your medicines at www.disposemymeds.org.          This list is accurate as of 11/21/18 11:58 AM.  Always use your most recent med list.                   Brand Name Dispense Instructions for use Diagnosis    amoxicillin 500 MG tablet    AMOXIL     Take 500 mg by mouth 3 times daily        atorvastatin 10 MG tablet    LIPITOR    45 tablet    Take 1 tablet (10 mg) by mouth every 48 hours    Hyperlipidemia LDL goal <130       * gabapentin 300 MG capsule    NEURONTIN    540 capsule    Take 1-3 capsules (300-900 mg) by mouth 3 times daily    Post herpetic neuralgia       * gabapentin 100 MG capsule    NEURONTIN    180 capsule    Take 1-2 capsules (100-200 mg) by mouth 3 times daily    Herpes zoster with keratoconjunctivitis       HYDROcodone-acetaminophen 5-325 MG tablet    NORCO    60 tablet    Take 1 tablet by mouth every 8 hours as needed for pain maximum 2 tablet(s) per day    Herpes zoster with keratoconjunctivitis       hydrocortisone 2.5 % cream    ANUSOL-HC    30 g    Place rectally 2 times daily    Thrombosed external hemorrhoids       ibuprofen 200 MG tablet    ADVIL/MOTRIN     Take 200 mg by mouth every 4 hours as needed for mild pain        omega 3 1200 MG Caps      Take 2 capsules by mouth daily    Spinal stenosis, lumbar region, without neurogenic claudication       omeprazole 10 MG CR capsule    priLOSEC    90 capsule    TAKE ONE CAPSULE BY MOUTH EVERY DAY 30 TO 60 MINUTES BEFORE A MEAL    Gastroesophageal reflux disease without esophagitis       TYLENOL PO           VITAMIN D3 PO      Take 4,000 Units by mouth daily        zolpidem 5 MG tablet    AMBIEN    30 tablet    Take 1 tablet (5 mg) by mouth nightly as needed for sleep    Herpes zoster with keratoconjunctivitis       * Notice:  This list has 2 medication(s) that are the same as other medications prescribed for you. Read the directions carefully, and ask your doctor or other care provider to review  them with you.

## 2018-11-21 NOTE — NURSING NOTE
Prior to injection verified patient identity using patient's name and date of birth.  Due to injection administration, patient instructed to remain in clinic for 15 minutes  afterwards, and to report any adverse reaction to me immediately.    MICHAEL Vital MA    VIS for Influenza given on same date of administration.  Staff signature/Title: MICHAEL Vital MA

## 2018-12-20 NOTE — PROGRESS NOTES
Hematology/Oncology Evaluation      Loco Wood is a 71 year old male previously followed by Dr. Dias for CLL.      Hematologic history:  Diagnosed 2/12/2007 with CLL, Lyles stage 0, followed clinically since.  At diagnosis WBC 17.8, ALC 11.2, hemoglobin 15.2, platelets 188.  Flow consistent with CLL.  No opportunistic infections, with IgG in the normal range during follow up.    Date Treatment Response Toxicities/Complications   2007 - current Observation alone.  Ocular VZV                HPI:  Please see entry above for hematologic history.  Loco is feeling well.  No fevers, chills, sweats, weight loss, bone pain, nausea, vomiting, diarrhea, skin rash, or any other new symptoms.     RECOMMENDATIONS:  Mr. Wood is being monitored for his CLL.  His leukocytosis continues to gradually increase.  He is slightly anemic and thromboctopenic, but he does not have enlarged lymph nodes, liver, or spleen.    We reviewed:  1. Continue to monitor CLL.  He is traveling to [place] for the spring and knows to be evaluated promtply for any fevers, chills, weight loss, increasing adenopathy, or any other symptoms that concern him.   2. Slight progression in anemia and thrombocytopenia suggests possibility of needing to start CLL-specific therapy in the coming months.  Discussed potential first-line therapies including ibrutinib vs obinutuzumab+chlorambucil.  3. RTC in 3-4 months with labs at that time      He Burns MD, pager [phone]          ROS: 10 point ROS neg other than the symptoms noted above in the HPI.          Allergies   Allergen Reactions     Dilaudid [Hydromorphone] Itching     Morphine Itching        Current Outpatient Medications   Medication Sig Dispense Refill     Acetaminophen (TYLENOL PO)        amoxicillin (AMOXIL) 500 MG tablet Take 500 mg by mouth 3 times daily       atorvastatin (LIPITOR) 10 MG tablet Take 1 tablet (10 mg) by mouth every 48 hours 45 tablet 9     Cholecalciferol (VITAMIN D3  PO) Take 4,000 Units by mouth daily        HYDROcodone-acetaminophen (NORCO) 5-325 MG per tablet Take 1 tablet by mouth every 8 hours as needed for pain maximum 2 tablet(s) per day 60 tablet 0     ibuprofen (ADVIL/MOTRIN) 200 MG tablet Take 200 mg by mouth every 4 hours as needed for mild pain       omega 3 1200 MG CAPS Take 2 capsules by mouth daily       omeprazole (PRILOSEC) 10 MG CR capsule TAKE ONE CAPSULE BY MOUTH EVERY DAY 30 TO 60 MINUTES BEFORE A MEAL 90 capsule 1     gabapentin (NEURONTIN) 100 MG capsule Take 1-2 capsules (100-200 mg) by mouth 3 times daily (Patient not taking: Reported on 10/4/2018) 180 capsule 3     gabapentin (NEURONTIN) 300 MG capsule Take 1-3 capsules (300-900 mg) by mouth 3 times daily (Patient not taking: Reported on 10/4/2018) 540 capsule 3     hydrocortisone (ANUSOL-HC) 2.5 % cream Place rectally 2 times daily (Patient not taking: Reported on 10/4/2018) 30 g 3     zolpidem (AMBIEN) 5 MG tablet Take 1 tablet (5 mg) by mouth nightly as needed for sleep (Patient not taking: Reported on 10/4/2018) 30 tablet 5         Physical Exam:     Vital Signs: /74   Pulse 93   Temp 98.1  F (36.7  C) (Oral)   Resp 18   Wt 72.9 kg (160 lb 11.5 oz)   SpO2 98%   BMI 24.79 kg/m      KPS:  90%    General Appearance: healthy, alert and no distress  Eyes: no redness or icterus  Ears/Nose/M/Throat: Oral mucosa and posterior oropharynx normal, moist mucous membranes  Neck supple, non-tender, free range of motion, no adenopathy  Cardio/Vascular: no JVD  Resp Effort And Auscultation: No wheeze  GI: soft, nontender, no hepatosplenomegaly   Lymphatics:no significant enlargement of lymph nodes globally   Musculoskeletal: Musculoskeletal normal  Edema: none  Skin: Skin color, texture, turgor normal. Neurologic: negative  Psych/Affect: Mood and affect are appropriate.  Vascular Access:  none      Loco understood the above assessment and recommendations.  Multiple questions answered.  No barriers to  learning identified.         Total time: 25 minutes  Counseling time: 20 minutes  Prolonged service:  no      ------------------------------------------------------------------------------------------------------------------------------------------------    Patient Care Team      Relationship Specialty Notifications Start End    Jameson Cisse MD PCP - General   12/2/02     Comment:  per patient    Phone: [phone] Fax: [phone]         [facility] [address] [address]    Yodit Barnett PA Physician Assistant   12/2/13     Phone: [phone] Fax: [phone]         [address] [address]

## 2018-12-21 ENCOUNTER — APPOINTMENT (OUTPATIENT)
Dept: LAB | Facility: CLINIC | Age: 73
End: 2018-12-21
Attending: INTERNAL MEDICINE
Payer: MEDICARE

## 2018-12-21 ENCOUNTER — OFFICE VISIT (OUTPATIENT)
Dept: TRANSPLANT | Facility: CLINIC | Age: 73
End: 2018-12-21
Attending: INTERNAL MEDICINE
Payer: MEDICARE

## 2018-12-21 VITALS
SYSTOLIC BLOOD PRESSURE: 128 MMHG | HEART RATE: 93 BPM | WEIGHT: 160.72 LBS | BODY MASS INDEX: 24.79 KG/M2 | DIASTOLIC BLOOD PRESSURE: 74 MMHG | OXYGEN SATURATION: 98 % | RESPIRATION RATE: 18 BRPM | TEMPERATURE: 98.1 F

## 2018-12-21 DIAGNOSIS — D64.9 ANEMIA, UNSPECIFIED TYPE: ICD-10-CM

## 2018-12-21 DIAGNOSIS — C91.10 CLL (CHRONIC LYMPHOCYTIC LEUKEMIA) (H): ICD-10-CM

## 2018-12-21 LAB
ALBUMIN SERPL-MCNC: 3.9 G/DL (ref 3.4–5)
ALBUMIN UR-MCNC: NEGATIVE MG/DL
ALP SERPL-CCNC: 121 U/L (ref 40–150)
ALT SERPL W P-5'-P-CCNC: 29 U/L (ref 0–70)
ANION GAP SERPL CALCULATED.3IONS-SCNC: 5 MMOL/L (ref 3–14)
APPEARANCE UR: CLEAR
AST SERPL W P-5'-P-CCNC: 29 U/L (ref 0–45)
BASOPHILS # BLD AUTO: 0 10E9/L (ref 0–0.2)
BASOPHILS NFR BLD AUTO: 0 %
BILIRUB SERPL-MCNC: 0.7 MG/DL (ref 0.2–1.3)
BILIRUB UR QL STRIP: NEGATIVE
BUN SERPL-MCNC: 18 MG/DL (ref 7–30)
CALCIUM SERPL-MCNC: 8.6 MG/DL (ref 8.5–10.1)
CHLORIDE SERPL-SCNC: 107 MMOL/L (ref 94–109)
CO2 SERPL-SCNC: 28 MMOL/L (ref 20–32)
COLOR UR AUTO: YELLOW
CREAT SERPL-MCNC: 1.57 MG/DL (ref 0.66–1.25)
DIFFERENTIAL METHOD BLD: ABNORMAL
EOSINOPHIL # BLD AUTO: 3.5 10E9/L (ref 0–0.7)
EOSINOPHIL NFR BLD AUTO: 2 %
ERYTHROCYTE [DISTWIDTH] IN BLOOD BY AUTOMATED COUNT: 14.9 % (ref 10–15)
GFR SERPL CREATININE-BSD FRML MDRD: 44 ML/MIN/{1.73_M2}
GLUCOSE SERPL-MCNC: 97 MG/DL (ref 70–99)
GLUCOSE UR STRIP-MCNC: NEGATIVE MG/DL
HCT VFR BLD AUTO: 42 % (ref 40–53)
HGB BLD-MCNC: 12.8 G/DL (ref 13.3–17.7)
HGB UR QL STRIP: NEGATIVE
KETONES UR STRIP-MCNC: 5 MG/DL
LDH SERPL L TO P-CCNC: 237 U/L (ref 85–227)
LEUKOCYTE ESTERASE UR QL STRIP: NEGATIVE
LYMPHOCYTES # BLD AUTO: 167 10E9/L (ref 0.8–5.3)
LYMPHOCYTES NFR BLD AUTO: 96 %
MCH RBC QN AUTO: 30.5 PG (ref 26.5–33)
MCHC RBC AUTO-ENTMCNC: 30.5 G/DL (ref 31.5–36.5)
MCV RBC AUTO: 100 FL (ref 78–100)
MONOCYTES # BLD AUTO: 0 10E9/L (ref 0–1.3)
MONOCYTES NFR BLD AUTO: 0 %
NEUTROPHILS # BLD AUTO: 3.5 10E9/L (ref 1.6–8.3)
NEUTROPHILS NFR BLD AUTO: 2 %
NITRATE UR QL: NEGATIVE
PH UR STRIP: 5 PH (ref 5–7)
PLATELET # BLD AUTO: 114 10E9/L (ref 150–450)
POTASSIUM SERPL-SCNC: 4.7 MMOL/L (ref 3.4–5.3)
PROT SERPL-MCNC: 6.7 G/DL (ref 6.8–8.8)
RBC # BLD AUTO: 4.19 10E12/L (ref 4.4–5.9)
RBC MORPH BLD: NORMAL
SODIUM SERPL-SCNC: 140 MMOL/L (ref 133–144)
SOURCE: ABNORMAL
SP GR UR STRIP: 1.02 (ref 1–1.03)
T4 FREE SERPL-MCNC: 0.74 NG/DL (ref 0.76–1.46)
TSH SERPL DL<=0.005 MIU/L-ACNC: 9.33 MU/L (ref 0.4–4)
UROBILINOGEN UR STRIP-MCNC: 0 MG/DL (ref 0–2)
WBC # BLD AUTO: 174 10E9/L (ref 4–11)

## 2018-12-21 PROCEDURE — 83615 LACTATE (LD) (LDH) ENZYME: CPT | Performed by: INTERNAL MEDICINE

## 2018-12-21 PROCEDURE — 82784 ASSAY IGA/IGD/IGG/IGM EACH: CPT | Performed by: INTERNAL MEDICINE

## 2018-12-21 PROCEDURE — 80053 COMPREHEN METABOLIC PANEL: CPT | Performed by: INTERNAL MEDICINE

## 2018-12-21 PROCEDURE — G0463 HOSPITAL OUTPT CLINIC VISIT: HCPCS | Mod: ZF

## 2018-12-21 PROCEDURE — 84439 ASSAY OF FREE THYROXINE: CPT | Performed by: INTERNAL MEDICINE

## 2018-12-21 PROCEDURE — 84443 ASSAY THYROID STIM HORMONE: CPT | Performed by: INTERNAL MEDICINE

## 2018-12-21 PROCEDURE — 81003 URINALYSIS AUTO W/O SCOPE: CPT | Performed by: INTERNAL MEDICINE

## 2018-12-21 PROCEDURE — 00000402 ZZHCL STATISTIC TOTAL PROTEIN

## 2018-12-21 PROCEDURE — 84165 PROTEIN E-PHORESIS SERUM: CPT

## 2018-12-21 PROCEDURE — 85025 COMPLETE CBC W/AUTO DIFF WBC: CPT | Performed by: INTERNAL MEDICINE

## 2018-12-21 PROCEDURE — 36415 COLL VENOUS BLD VENIPUNCTURE: CPT

## 2018-12-21 ASSESSMENT — PAIN SCALES - GENERAL: PAINLEVEL: NO PAIN (0)

## 2018-12-21 NOTE — NURSING NOTE
Chief Complaint   Patient presents with     Blood Draw     VPT blood draw and vitals     VPT labs drawn from left arm   (2) JIc red gels drawn for ELP - refer to my chart message patient sent to Dr. Burns about labs from allergist.

## 2018-12-21 NOTE — NURSING NOTE
"Oncology Rooming Note    December 21, 2018 11:17 AM   Loco Wood is a 73 year old male who presents for:    Chief Complaint   Patient presents with     Blood Draw     VPT blood draw and vitals     Oncology Clinic Visit     RTN- CLL     Initial Vitals: /74   Pulse 93   Temp 98.1  F (36.7  C) (Oral)   Resp 18   Wt 72.9 kg (160 lb 11.5 oz)   SpO2 98%   BMI 24.79 kg/m   Estimated body mass index is 24.79 kg/m  as calculated from the following:    Height as of 10/16/18: 1.715 m (5' 7.52\").    Weight as of this encounter: 72.9 kg (160 lb 11.5 oz). Body surface area is 1.86 meters squared.  No Pain (0) Comment: Data Unavailable   No LMP for male patient.  Allergies reviewed: Yes  Medications reviewed: Yes    Medications: Medication refills not needed today.  Pharmacy name entered into [employer]: [id number] - [place], [place] - [address]    Clinical concerns: None     8 minutes for nursing intake (face to face time)     Karena Freedman CMA              "

## 2018-12-24 LAB
ALBUMIN SERPL ELPH-MCNC: 4.5 G/DL (ref 3.7–5.1)
ALPHA1 GLOB SERPL ELPH-MCNC: 0.2 G/DL (ref 0.2–0.4)
ALPHA2 GLOB SERPL ELPH-MCNC: 0.6 G/DL (ref 0.5–0.9)
B-GLOBULIN SERPL ELPH-MCNC: 0.6 G/DL (ref 0.6–1)
GAMMA GLOB SERPL ELPH-MCNC: 0.4 G/DL (ref 0.7–1.6)
IGG SERPL-MCNC: 443 MG/DL (ref 695–1620)
M PROTEIN SERPL ELPH-MCNC: 0 G/DL
PROT PATTERN SERPL ELPH-IMP: ABNORMAL

## 2019-01-29 ENCOUNTER — TRANSFERRED RECORDS (OUTPATIENT)
Dept: HEALTH INFORMATION MANAGEMENT | Facility: CLINIC | Age: 74
End: 2019-01-29

## 2019-03-14 DIAGNOSIS — E78.5 HYPERLIPIDEMIA LDL GOAL <130: ICD-10-CM

## 2019-03-14 RX ORDER — ATORVASTATIN CALCIUM 10 MG/1
10 TABLET, FILM COATED ORAL
Qty: 45 TABLET | Refills: 4 | Status: SHIPPED | OUTPATIENT
Start: 2019-03-14 | End: 2019-04-02

## 2019-03-14 NOTE — TELEPHONE ENCOUNTER
"Requested Prescriptions   Pending Prescriptions Disp Refills     atorvastatin (LIPITOR) 10 MG tablet [Pharmacy Med Name: ATORVASTATIN 10 MG TABLET] 45 tablet 4    Last Written Prescription Date:  12-21-17  Last Fill Quantity: 45,  # refills: 9   Last office visit: 10/4/2018 with prescribing provider:  3-22-18   Future Office Visit:     Sig: TAKE 1 TABLET (10 MG) BY MOUTH EVERY 48 HOURS    Statins Protocol Passed - 3/14/2019  9:10 AM       Passed - LDL on file in past 12 months    Recent Labs   Lab Test 03/22/18  1151   *            Passed - No abnormal creatine kinase in past 12 months    No lab results found.            Passed - Recent (12 mo) or future (30 days) visit within the authorizing provider's specialty    Patient had office visit in the last 12 months or has a visit in the next 30 days with authorizing provider or within the authorizing provider's specialty.  See \"Patient Info\" tab in inbasket, or \"Choose Columns\" in Meds & Orders section of the refill encounter.             Passed - Medication is active on med list       Passed - Patient is age 18 or older          "

## 2019-04-02 ENCOUNTER — MYC REFILL (OUTPATIENT)
Dept: FAMILY MEDICINE | Facility: CLINIC | Age: 74
End: 2019-04-02

## 2019-04-02 DIAGNOSIS — E78.5 HYPERLIPIDEMIA LDL GOAL <130: ICD-10-CM

## 2019-04-02 DIAGNOSIS — K21.9 GASTROESOPHAGEAL REFLUX DISEASE WITHOUT ESOPHAGITIS: ICD-10-CM

## 2019-04-02 NOTE — TELEPHONE ENCOUNTER
"Requested Prescriptions   Pending Prescriptions Disp Refills     omeprazole (PRILOSEC) 10 MG DR capsule 90 capsule 1     Sig: TAKE ONE CAPSULE BY MOUTH EVERY DAY 30 TO 60 MINUTES BEFORE A MEAL    PPI Protocol Passed - 4/2/2019  3:44 PM       Passed - Not on Clopidogrel (unless Pantoprazole ordered)       Passed - No diagnosis of osteoporosis on record       Passed - Recent (12 mo) or future (30 days) visit within the authorizing provider's specialty    Patient had office visit in the last 12 months or has a visit in the next 30 days with authorizing provider or within the authorizing provider's specialty.  See \"Patient Info\" tab in inbasket, or \"Choose Columns\" in Meds & Orders section of the refill encounter.             Passed - Medication is active on med list       Passed - Patient is age 18 or older        atorvastatin (LIPITOR) 10 MG tablet 45 tablet 4     Sig: Take 1 tablet (10 mg) by mouth every 48 hours    Statins Protocol Failed - 4/2/2019  3:44 PM       Failed - LDL on file in past 12 months    Recent Labs   Lab Test 03/22/18  1151   *            Passed - No abnormal creatine kinase in past 12 months    No lab results found.            Passed - Recent (12 mo) or future (30 days) visit within the authorizing provider's specialty    Patient had office visit in the last 12 months or has a visit in the next 30 days with authorizing provider or within the authorizing provider's specialty.  See \"Patient Info\" tab in inbasket, or \"Choose Columns\" in Meds & Orders section of the refill encounter.             Passed - Medication is active on med list       Passed - Patient is age 18 or older          "

## 2019-04-02 NOTE — TELEPHONE ENCOUNTER
Routing refill request to provider for review/approval because:  Labs not current:  Lipid.  Please review refills.  Thank you.  Krysten Beth RN

## 2019-04-03 RX ORDER — OMEPRAZOLE 10 MG/1
CAPSULE, DELAYED RELEASE ORAL
Qty: 90 CAPSULE | Refills: 1 | Status: SHIPPED | OUTPATIENT
Start: 2019-04-03 | End: 2019-06-05

## 2019-04-03 RX ORDER — ATORVASTATIN CALCIUM 10 MG/1
10 TABLET, FILM COATED ORAL
Qty: 45 TABLET | Refills: 4 | Status: SHIPPED | OUTPATIENT
Start: 2019-04-03 | End: 2020-04-22

## 2019-04-30 ENCOUNTER — APPOINTMENT (OUTPATIENT)
Dept: LAB | Facility: CLINIC | Age: 74
End: 2019-04-30
Attending: INTERNAL MEDICINE
Payer: MEDICARE

## 2019-04-30 ENCOUNTER — OFFICE VISIT (OUTPATIENT)
Dept: TRANSPLANT | Facility: CLINIC | Age: 74
End: 2019-04-30
Attending: INTERNAL MEDICINE
Payer: MEDICARE

## 2019-04-30 VITALS
DIASTOLIC BLOOD PRESSURE: 69 MMHG | RESPIRATION RATE: 16 BRPM | HEIGHT: 68 IN | BODY MASS INDEX: 23.49 KG/M2 | HEART RATE: 81 BPM | TEMPERATURE: 97.4 F | WEIGHT: 155 LBS | SYSTOLIC BLOOD PRESSURE: 113 MMHG | OXYGEN SATURATION: 95 %

## 2019-04-30 DIAGNOSIS — C91.10 CLL (CHRONIC LYMPHOCYTIC LEUKEMIA) (H): ICD-10-CM

## 2019-04-30 DIAGNOSIS — D64.9 ANEMIA, UNSPECIFIED TYPE: ICD-10-CM

## 2019-04-30 DIAGNOSIS — R97.20 ELEVATED PROSTATE SPECIFIC ANTIGEN (PSA): Primary | ICD-10-CM

## 2019-04-30 LAB
ALBUMIN SERPL-MCNC: 4.3 G/DL (ref 3.4–5)
ALP SERPL-CCNC: 123 U/L (ref 40–150)
ALT SERPL W P-5'-P-CCNC: 27 U/L (ref 0–70)
ANION GAP SERPL CALCULATED.3IONS-SCNC: 5 MMOL/L (ref 3–14)
ANISOCYTOSIS BLD QL SMEAR: SLIGHT
AST SERPL W P-5'-P-CCNC: 34 U/L (ref 0–45)
BASOPHILS # BLD AUTO: 0 10E9/L (ref 0–0.2)
BASOPHILS NFR BLD AUTO: 0 %
BILIRUB SERPL-MCNC: 0.8 MG/DL (ref 0.2–1.3)
BUN SERPL-MCNC: 22 MG/DL (ref 7–30)
CALCIUM SERPL-MCNC: 9 MG/DL (ref 8.5–10.1)
CHLORIDE SERPL-SCNC: 108 MMOL/L (ref 94–109)
CO2 SERPL-SCNC: 26 MMOL/L (ref 20–32)
CREAT SERPL-MCNC: 1.47 MG/DL (ref 0.66–1.25)
DIFFERENTIAL METHOD BLD: ABNORMAL
EOSINOPHIL # BLD AUTO: 0 10E9/L (ref 0–0.7)
EOSINOPHIL NFR BLD AUTO: 0 %
ERYTHROCYTE [DISTWIDTH] IN BLOOD BY AUTOMATED COUNT: 15.5 % (ref 10–15)
GFR SERPL CREATININE-BSD FRML MDRD: 47 ML/MIN/{1.73_M2}
GLUCOSE SERPL-MCNC: 96 MG/DL (ref 70–99)
HCT VFR BLD AUTO: 38.8 % (ref 40–53)
HGB BLD-MCNC: 11.9 G/DL (ref 13.3–17.7)
LDH SERPL L TO P-CCNC: 258 U/L (ref 85–227)
LYMPHOCYTES # BLD AUTO: 195.7 10E9/L (ref 0.8–5.3)
LYMPHOCYTES NFR BLD AUTO: 97 %
MACROCYTES BLD QL SMEAR: PRESENT
MCH RBC QN AUTO: 30.8 PG (ref 26.5–33)
MCHC RBC AUTO-ENTMCNC: 30.7 G/DL (ref 31.5–36.5)
MCV RBC AUTO: 101 FL (ref 78–100)
MONOCYTES # BLD AUTO: 0 10E9/L (ref 0–1.3)
MONOCYTES NFR BLD AUTO: 0 %
NEUTROPHILS # BLD AUTO: 6.1 10E9/L (ref 1.6–8.3)
NEUTROPHILS NFR BLD AUTO: 3 %
PLATELET # BLD AUTO: 102 10E9/L (ref 150–450)
PLATELET # BLD EST: ABNORMAL 10*3/UL
POTASSIUM SERPL-SCNC: 5.1 MMOL/L (ref 3.4–5.3)
PROT SERPL-MCNC: 6.6 G/DL (ref 6.8–8.8)
PSA SERPL-MCNC: 4.11 UG/L (ref 0–4)
RBC # BLD AUTO: 3.86 10E12/L (ref 4.4–5.9)
SODIUM SERPL-SCNC: 139 MMOL/L (ref 133–144)
T4 FREE SERPL-MCNC: 0.74 NG/DL (ref 0.76–1.46)
TSH SERPL DL<=0.005 MIU/L-ACNC: 12.84 MU/L (ref 0.4–4)
WBC # BLD AUTO: 201.8 10E9/L (ref 4–11)

## 2019-04-30 PROCEDURE — 83615 LACTATE (LD) (LDH) ENZYME: CPT | Performed by: INTERNAL MEDICINE

## 2019-04-30 PROCEDURE — 36415 COLL VENOUS BLD VENIPUNCTURE: CPT

## 2019-04-30 PROCEDURE — 84153 ASSAY OF PSA TOTAL: CPT | Performed by: INTERNAL MEDICINE

## 2019-04-30 PROCEDURE — 84439 ASSAY OF FREE THYROXINE: CPT | Performed by: INTERNAL MEDICINE

## 2019-04-30 PROCEDURE — 82784 ASSAY IGA/IGD/IGG/IGM EACH: CPT | Performed by: INTERNAL MEDICINE

## 2019-04-30 PROCEDURE — 85025 COMPLETE CBC W/AUTO DIFF WBC: CPT | Performed by: INTERNAL MEDICINE

## 2019-04-30 PROCEDURE — 00000402 ZZHCL STATISTIC TOTAL PROTEIN: Performed by: INTERNAL MEDICINE

## 2019-04-30 PROCEDURE — G0463 HOSPITAL OUTPT CLINIC VISIT: HCPCS | Mod: ZF

## 2019-04-30 PROCEDURE — 80053 COMPREHEN METABOLIC PANEL: CPT | Performed by: INTERNAL MEDICINE

## 2019-04-30 PROCEDURE — 84165 PROTEIN E-PHORESIS SERUM: CPT | Performed by: INTERNAL MEDICINE

## 2019-04-30 PROCEDURE — 84443 ASSAY THYROID STIM HORMONE: CPT | Performed by: INTERNAL MEDICINE

## 2019-04-30 ASSESSMENT — MIFFLIN-ST. JEOR: SCORE: 1419.96

## 2019-04-30 ASSESSMENT — PAIN SCALES - GENERAL: PAINLEVEL: NO PAIN (0)

## 2019-04-30 NOTE — NURSING NOTE
"Oncology Rooming Note    April 30, 2019 12:55 PM   Loco Wood is a 74 year old male who presents for:    Chief Complaint   Patient presents with     Blood Draw     vitals and venipuncture done by CMA     RECHECK     Return: CLL (chronic lymphocytic leukemia)     Initial Vitals: /69 (BP Location: Right arm, Patient Position: Sitting, Cuff Size: Adult Regular)   Pulse 81   Temp 97.4  F (36.3  C) (Oral)   Resp 16   Ht 1.715 m (5' 7.52\")   Wt 70.3 kg (155 lb)   SpO2 95%   BMI 23.90 kg/m   Estimated body mass index is 23.9 kg/m  as calculated from the following:    Height as of this encounter: 1.715 m (5' 7.52\").    Weight as of this encounter: 70.3 kg (155 lb). Body surface area is 1.83 meters squared.  No Pain (0) Comment: Data Unavailable   No LMP for male patient.  Allergies reviewed: Yes  Medications reviewed: Yes    Medications: Medication refills not needed today.  Pharmacy name entered into [employer]: [id number] - [place], [place] - [address]    Clinical concerns: N/A     Patrica Francois CMA              "

## 2019-04-30 NOTE — NURSING NOTE
Chief Complaint   Patient presents with     Blood Draw     vitals and venipuncture done by ISI Reyes CMA on 4/30/2019 at 12:19 PM

## 2019-04-30 NOTE — PROGRESS NOTES
Hematology/Oncology Evaluation      Loco Wood is a 74 year old male previously followed by Dr. Dias for CLL.      Hematologic history:  Diagnosed 2/12/2007 with CLL, Lyles stage 0, followed clinically since.  At diagnosis WBC 17.8, ALC 11.2, hemoglobin 15.2, platelets 188.  Flow consistent with CLL.  No opportunistic infections, with IgG in the normal range during follow up.    Date Treatment Response Toxicities/Complications   2007 - current Observation alone.  Ocular VZV                HPI:  Please see entry above for hematologic history.  Loco is feeling well.  Denies any symptoms related to CLL.  No fevers, chills, sweats, weight loss, bone pain, nausea, vomiting, diarrhea, skin rash, or any other new symptoms.  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    RECOMMENDATIONS:  Mr. Wood is being monitored for his CLL.  His leukocytosis continues to gradually increase.  He is slightly anemic and thromboctopenic, but he does not have enlarged lymph nodes, liver, or spleen.  He has marked lymphocytosis with an ALC doubling time of 1 year.    We reviewed:  1. Continue to monitor CLL.  He is asymptomatic and wants to continue monitoring alone.  Discussed potential first-line therapies including ibrutinib vs obinutuzumab+chlorambucil.  He prefers ibrutinib when the time comes for treatment.  I think we should consider this soon, as his ALC continues to increase, he could have worsening complications with tumor lysis syndrome.  He is not yet ready, and we will continue to monitor carefully.    2. Slight elevation in TSH, may benefit from thyroid hormone replacement soon. Asked him to follow up with his PCP regarding this.    3. RTC in 3 months, sooner PRN.      He Burns MD, pager [phone]          ROS: 10 point ROS neg other than the symptoms noted above in the HPI.          Allergies   Allergen Reactions     Dilaudid [Hydromorphone] Itching     Morphine Itching        Current  "Outpatient Medications   Medication Sig Dispense Refill     atorvastatin (LIPITOR) 10 MG tablet Take 1 tablet (10 mg) by mouth every 48 hours 45 tablet 4     Cholecalciferol (VITAMIN D3 PO) Take 4,000 Units by mouth daily        ibuprofen (ADVIL/MOTRIN) 200 MG tablet Take 200 mg by mouth every 4 hours as needed for mild pain       omega 3 1200 MG CAPS Take 2 capsules by mouth daily       omeprazole (PRILOSEC) 10 MG DR capsule TAKE ONE CAPSULE BY MOUTH EVERY DAY 30 TO 60 MINUTES BEFORE A MEAL 90 capsule 1     Acetaminophen (TYLENOL PO)        amoxicillin (AMOXIL) 500 MG tablet Take 500 mg by mouth 3 times daily       gabapentin (NEURONTIN) 100 MG capsule Take 1-2 capsules (100-200 mg) by mouth 3 times daily (Patient not taking: Reported on 10/4/2018) 180 capsule 3     gabapentin (NEURONTIN) 300 MG capsule Take 1-3 capsules (300-900 mg) by mouth 3 times daily (Patient not taking: Reported on 10/4/2018) 540 capsule 3     HYDROcodone-acetaminophen (NORCO) 5-325 MG per tablet Take 1 tablet by mouth every 8 hours as needed for pain maximum 2 tablet(s) per day (Patient not taking: Reported on 4/30/2019) 60 tablet 0     hydrocortisone (ANUSOL-HC) 2.5 % cream Place rectally 2 times daily (Patient not taking: Reported on 10/4/2018) 30 g 3     zolpidem (AMBIEN) 5 MG tablet Take 1 tablet (5 mg) by mouth nightly as needed for sleep (Patient not taking: Reported on 10/4/2018) 30 tablet 5         Physical Exam:     Vital Signs: /69 (BP Location: Right arm, Patient Position: Sitting, Cuff Size: Adult Regular)   Pulse 81   Temp 97.4  F (36.3  C) (Oral)   Resp 16   Ht 1.715 m (5' 7.52\")   Wt 70.3 kg (155 lb)   SpO2 95%   BMI 23.90 kg/m      KPS:  90%    General Appearance: healthy, alert and no distress  Eyes: no redness or icterus  Ears/Nose/M/Throat: Oral mucosa and posterior oropharynx normal, moist mucous membranes  Neck supple, non-tender, free range of motion, no adenopathy  Cardio/Vascular: no JVD  Resp Effort And " Auscultation: No wheeze  GI: soft, nontender, no hepatosplenomegaly   Lymphatics:no significant enlargement of lymph nodes globally   Musculoskeletal: Musculoskeletal normal  Edema: none  Skin: Skin color, texture, turgor normal. Neurologic: negative  Psych/Affect: Mood and affect are appropriate.  Vascular Access:  none      Loco understood the above assessment and recommendations.  Multiple questions answered.  No barriers to learning identified.         Total time: 25 minutes  Counseling time: 20 minutes  Prolonged service:  no      ------------------------------------------------------------------------------------------------------------------------------------------------    Patient Care Team      Relationship Specialty Notifications Start End    Jameson Cisse MD PCP - General   12/2/02     Comment:  per patient    Phone: [phone] Fax: [phone]         [place] [id number] [address] [address]    Yodit Barnett PA Physician Assistant   12/2/13     Phone: [phone] Fax: [phone]         [address] [address]

## 2019-05-01 LAB
ALBUMIN SERPL ELPH-MCNC: 4.7 G/DL (ref 3.7–5.1)
ALPHA1 GLOB SERPL ELPH-MCNC: 0.3 G/DL (ref 0.2–0.4)
ALPHA2 GLOB SERPL ELPH-MCNC: 0.6 G/DL (ref 0.5–0.9)
B-GLOBULIN SERPL ELPH-MCNC: 0.6 G/DL (ref 0.6–1)
GAMMA GLOB SERPL ELPH-MCNC: 0.5 G/DL (ref 0.7–1.6)
IGG SERPL-MCNC: 429 MG/DL (ref 695–1620)
M PROTEIN SERPL ELPH-MCNC: 0 G/DL
PROT PATTERN SERPL ELPH-IMP: ABNORMAL

## 2019-05-02 ENCOUNTER — MYC MEDICAL ADVICE (OUTPATIENT)
Dept: FAMILY MEDICINE | Facility: CLINIC | Age: 74
End: 2019-05-02

## 2019-05-02 DIAGNOSIS — E06.3 HYPOTHYROIDISM DUE TO HASHIMOTO'S THYROIDITIS: Primary | ICD-10-CM

## 2019-05-02 RX ORDER — LEVOTHYROXINE SODIUM 50 UG/1
50 TABLET ORAL DAILY
Qty: 90 TABLET | Refills: 3 | Status: SHIPPED | OUTPATIENT
Start: 2019-05-02 | End: 2020-04-22

## 2019-05-07 ENCOUNTER — TELEPHONE (OUTPATIENT)
Dept: ONCOLOGY | Facility: CLINIC | Age: 74
End: 2019-05-07

## 2019-05-07 NOTE — TELEPHONE ENCOUNTER
Prior Authorization Approval    Authorization Effective Date: 5/7/2019  Authorization Expiration Date: 12/31/2019  Medication: Imbruvica prior authorization approved.  Approved Dose/Quantity: 420mg, 30/30 days  Reference #: [id number]   Insurance Company: [employer] Part D - Phone [phone] Fax [phone]  Expected CoPay: Unknown, outside pharmacy     CoPay Card Available: No    Foundation Assistance Needed:    Which Pharmacy is filling the prescription (Not needed for infusion/clinic administered): [facility] [place], [place] - [address]  Pharmacy Notified: Yes  Patient Notified:  Yes

## 2019-05-08 ENCOUNTER — TELEPHONE (OUTPATIENT)
Dept: ONCOLOGY | Facility: CLINIC | Age: 74
End: 2019-05-08

## 2019-05-08 DIAGNOSIS — Z79.899 ENCOUNTER FOR LONG-TERM (CURRENT) USE OF MEDICATIONS: ICD-10-CM

## 2019-05-08 DIAGNOSIS — C91.10 CLL (CHRONIC LYMPHOCYTIC LEUKEMIA) (H): ICD-10-CM

## 2019-05-08 DIAGNOSIS — C91.10 CLL (CHRONIC LYMPHOCYTIC LEUKEMIA) (H): Primary | ICD-10-CM

## 2019-05-08 RX ORDER — PROCHLORPERAZINE MALEATE 10 MG
10 TABLET ORAL EVERY 6 HOURS PRN
Qty: 30 TABLET | Refills: 2 | Status: SHIPPED | OUTPATIENT
Start: 2019-05-08 | End: 2020-05-20

## 2019-05-08 NOTE — ORAL ONC MGMT
Oral Chemotherapy Monitoring Program    Primary Oncologist: Dr. He Burns   Primary Oncology Clinic: [facility]  Cancer Diagnosis: CLL    Drug: Imbruvica 420mg once daily   Start Date: as soon as available   Dose is appropriate for patients:  Renal Function and  Hepatic Function   Expected duration of therapy: Until disease progression or unacceptable toxicity    Drug Interaction Assessment:   Review of medication list with chemotherapy agents on 5/8: -Imbruvica -Acetaminophen -Amoxicillin -AtorvaSTATin -Cholecalciferol -Gabapentin -Hydrocodone and Acetaminophen -Ibuprofen -Levothyroxine -Omega-3 Fatty Acids -Omeprazole -Zolpidem    Upon review of medication list, the following potential drug interactions were identified:   Imbruvica + ibuprofen and omega-3 fatty acids = Imbruvica may enhance the adverse/toxic effect of agents with antiplatelet properties. Risk Rating C: Monitor therapy. During new teach, provided patient education. Patient does not take ibuprofen at this time.     Lab Monitoring Plan  CBC/CMP qweekly x 8 per Dr. Burns then check for plan.     Subjective/Objective:  Loco Wood is a 74 year old male contacted by phone for an initial visit for oral chemotherapy education.      ORAL CHEMOTHERAPY 5/8/2019   Drug Name Imbruvica (Ibrutinib)   Current Dosage 420mg   Current Schedule Daily   Cycle Details Continuous   Any new drug interactions? No   Is the dose as ordered appropriate for the patient? Yes       Last PHQ-2 Score on record:   PHQ-2 ( [facility]) 3/19/2018 2/19/2018   Q1: Little interest or pleasure in doing things 0 0   Q2: Feeling down, depressed or hopeless 0 0   PHQ-2 Score 0 0   Q1: Little interest or pleasure in doing things Not at all -   Q2: Feeling down, depressed or hopeless Not at all -   PHQ-2 Score 0 -       Patient does not report depression symptoms.      Vitals:  BP:   BP Readings from Last 1 Encounters:   04/30/19 113/69     Wt Readings from Last 1  "Encounters:   04/30/19 70.3 kg (155 lb)     Estimated body surface area is 1.83 meters squared as calculated from the following:    Height as of 4/30/19: 1.715 m (5' 7.52\").    Weight as of 4/30/19: 70.3 kg (155 lb).      Labs:  _  Result Component Current Result Ref Range   Sodium 139 (4/30/2019) 133 - 144 mmol/L     _  Result Component Current Result Ref Range   Potassium 5.1 (4/30/2019) 3.4 - 5.3 mmol/L     _  Result Component Current Result Ref Range   Calcium 9.0 (4/30/2019) 8.5 - 10.1 mg/dL     No results found for Mag within last 30 days.     No results found for Phos within last 30 days.     _  Result Component Current Result Ref Range   Albumin 4.3 (4/30/2019) 3.4 - 5.0 g/dL     _  Result Component Current Result Ref Range   Urea Nitrogen 22 (4/30/2019) 7 - 30 mg/dL     _  Result Component Current Result Ref Range   Creatinine 1.47 (H) (4/30/2019) 0.66 - 1.25 mg/dL       _  Result Component Current Result Ref Range   AST 34 (4/30/2019) 0 - 45 U/L     _  Result Component Current Result Ref Range   ALT 27 (4/30/2019) 0 - 70 U/L     _  Result Component Current Result Ref Range   Bilirubin Total 0.8 (4/30/2019) 0.2 - 1.3 mg/dL       _  Result Component Current Result Ref Range   .8 (HH) (4/30/2019) 4.0 - 11.0 10e9/L     _  Result Component Current Result Ref Range   Hemoglobin 11.9 (L) (4/30/2019) 13.3 - 17.7 g/dL     _  Result Component Current Result Ref Range   Platelet Count 102 (L) (4/30/2019) 150 - 450 10e9/L     _  Result Component Current Result Ref Range   Absolute Neutrophil 6.1 (4/30/2019) 1.6 - 8.3 10e9/L       Assessment:  Patient is appropriate to start therapy.    Plan:  Basic chemotherapy teaching was reviewed with the patient including indication, start date of therapy, dose, administration, adverse effects, missed doses, food and drug interactions, monitoring, side effect management, office contact information, and safe handling. Written materials were mailed and all questions answered " to his stated satisfaction.     Spoke to patient about limited distribution medication to [facility] per Access Services note. He has their phone number and will follow up tomorrow.      Patient verbalized understanding to call Oral Chemotherapy Pharmacist team when medication is delivered prior to starting so appropriate appointment and lab follow-up can be scheduled.     Follow-Up:  1 week after start       Thank you for the opportunity to participate in the care of the above patient,  Jameson Baker, PharmD  Hematology/Oncology Clinical Pharmacist  [facility]  [facility]  [phone]        Addendum:  [facility] called and is not able to fill Imbruvica for patient, no access to medication. Per oncology liaison, insurance prefers [facility]. Called and [phone]. Imbruvica was re-released to [facility]. Patient called back and [facility].  He verbalized understanding of the pharmacy change.

## 2019-05-09 ENCOUNTER — TELEPHONE (OUTPATIENT)
Dept: ONCOLOGY | Facility: CLINIC | Age: 74
End: 2019-05-09

## 2019-05-09 DIAGNOSIS — C91.10 CLL (CHRONIC LYMPHOCYTIC LEUKEMIA) (H): ICD-10-CM

## 2019-05-09 NOTE — TELEPHONE ENCOUNTER
Prior Authorization Approval    Authorization Effective Date: 5/7/2019  Authorization Expiration Date: 12/31/2019  Medication: Imbruvica prior authorization approved.  Approved Dose/Quantity: 420mg, 30/30 days  Reference #: [id number]   Insurance Company: [employer] Part D - Phone [phone] Fax [phone]  Expected CoPay: [facility] [place]   CoPay Card Available: No    Foundation Assistance Needed:  Cancercare mirela under [facility] for $10,000.00  Which Pharmacy is filling the prescription (Not needed for infusion/clinic administered): [id number] - [place], [place] - [address]  Pharmacy Notified: Yes  Patient Notified: Yes

## 2019-05-09 NOTE — ORAL ONC MGMT
Oral chemotherapy monitoring program    Per Oncology Liaison, both [facility] and [facility] do not have access to Imbruvica. Imbruvica Rx was canceled at [facility].     Placed call to patient in follow up of Imbruvica therapy. Left message to inform patient that [facility] does not have access to Imbruvica. Informed him to call [facility], [place],  [place], phone number [phone] to schedule delivery and to call back if he has questions. No patient names or medication names mentioned.      Imbruvica rx was released to [facility].     Jameson Baker, PharmD  Hematology/Oncology Clinical Pharmacist  [facility]

## 2019-05-16 ENCOUNTER — OFFICE VISIT (OUTPATIENT)
Dept: FAMILY MEDICINE | Facility: CLINIC | Age: 74
End: 2019-05-16
Payer: MEDICARE

## 2019-05-16 VITALS
TEMPERATURE: 97.5 F | WEIGHT: 152 LBS | HEART RATE: 65 BPM | OXYGEN SATURATION: 100 % | HEIGHT: 67 IN | BODY MASS INDEX: 23.86 KG/M2 | DIASTOLIC BLOOD PRESSURE: 65 MMHG | SYSTOLIC BLOOD PRESSURE: 115 MMHG

## 2019-05-16 DIAGNOSIS — B02.29 POST HERPETIC NEURALGIA: ICD-10-CM

## 2019-05-16 DIAGNOSIS — N40.1 BENIGN PROSTATIC HYPERPLASIA WITH URINARY RETENTION: ICD-10-CM

## 2019-05-16 DIAGNOSIS — E06.3 HYPOTHYROIDISM DUE TO HASHIMOTO'S THYROIDITIS: ICD-10-CM

## 2019-05-16 DIAGNOSIS — C91.10 CLL (CHRONIC LYMPHOCYTIC LEUKEMIA) (H): ICD-10-CM

## 2019-05-16 DIAGNOSIS — E78.5 HYPERLIPIDEMIA LDL GOAL <130: Primary | ICD-10-CM

## 2019-05-16 DIAGNOSIS — R33.8 BENIGN PROSTATIC HYPERPLASIA WITH URINARY RETENTION: ICD-10-CM

## 2019-05-16 PROCEDURE — G0439 PPPS, SUBSEQ VISIT: HCPCS | Performed by: INTERNAL MEDICINE

## 2019-05-16 ASSESSMENT — ENCOUNTER SYMPTOMS
HEMATOCHEZIA: 0
SORE THROAT: 0
WEAKNESS: 0
PARESTHESIAS: 1
DIZZINESS: 0
FREQUENCY: 1
EYE PAIN: 0
DYSURIA: 0
ABDOMINAL PAIN: 0
COUGH: 0
DIARRHEA: 0
CONSTIPATION: 0
MYALGIAS: 1
NERVOUS/ANXIOUS: 0
CHILLS: 0
NAUSEA: 0
HEMATURIA: 0
PALPITATIONS: 0
SHORTNESS OF BREATH: 0
FEVER: 0
ARTHRALGIAS: 1
HEADACHES: 0
HEARTBURN: 0

## 2019-05-16 ASSESSMENT — MIFFLIN-ST. JEOR: SCORE: 1398.1

## 2019-05-16 ASSESSMENT — ACTIVITIES OF DAILY LIVING (ADL): CURRENT_FUNCTION: NO ASSISTANCE NEEDED

## 2019-05-16 NOTE — PROGRESS NOTES
"SUBJECTIVE:   CC: Loco Wood is an 74 year old male who presents for preventative health visit.     Healthy Habits:     In general, how would you rate your overall health?  Good    Frequency of exercise:  4-5 days/week    Duration of exercise:  45-60 minutes    Do you usually eat at least 4 servings of fruit and vegetables a day, include whole grains    & fiber and avoid regularly eating high fat or \"junk\" foods?  No    Taking medications regularly:  Yes    Medication side effects:  None    Ability to successfully perform activities of daily living:  No assistance needed    Home Safety:  No safety concerns identified    Hearing Impairment:  No hearing concerns    In the past 6 months, have you been bothered by leaking of urine?  No    In general, how would you rate your overall mental or emotional health?  Good      PHQ-2 Total Score: 0    Additional concerns today:  Yes    Still had no symptoms initially and the white count doubled   More load placed on the kidneys  Bleeding and arrythmia.   3 weeks       Today's PHQ-2 Score:   PHQ-2 ( 1999 [employer]) 5/16/2019   Q1: Little interest or pleasure in doing things 0   Q2: Feeling down, depressed or hopeless 0   PHQ-2 Score 0   Q1: Little interest or pleasure in doing things Not at all   Q2: Feeling down, depressed or hopeless Not at all   PHQ-2 Score 0       Abuse: Current or Past(Physical, Sexual or Emotional)- No  Do you feel safe in your environment? Yes    Social History     Tobacco Use     Smoking status: Never Smoker     Smokeless tobacco: Never Used   Substance Use Topics     Alcohol use: Yes     Alcohol/week: 0.0 oz     Comment: moderate, social     If you drink alcohol do you typically have >3 drinks per day or >7 drinks per week? No    Alcohol Use 5/16/2019   Prescreen: >3 drinks/day or >7 drinks/week? No   Prescreen: >3 drinks/day or >7 drinks/week? -   No flowsheet data found.    Last PSA:   PSA   Date Value Ref Range Status   04/30/2019 4.11 (H) 0 - 4 " ug/L Final     Comment:     Assay Method:  Chemiluminescence using Siemens Vista analyzer     Hips lock walking makes them lock  Starts to hurt significantly  Always cramps with leg lifting    Reviewed orders with patient. Reviewed health maintenance and updated orders accordingly - Yes  Lab work is in process  Labs reviewed in EPIC  BP Readings from Last 3 Encounters:   05/16/19 115/65   04/30/19 113/69   12/21/18 128/74    Wt Readings from Last 3 Encounters:   05/16/19 68.9 kg (152 lb)   04/30/19 70.3 kg (155 lb)   12/21/18 72.9 kg (160 lb 11.5 oz)                  Patient Active Problem List   Diagnosis     Esophageal reflux     Lumbago     CLL (chronic lymphocytic leukemia) (H)     HYPERLIPIDEMIA LDL GOAL <130     Mass of skin     Health Care Home     Vitamin D deficiency     Osteoarthritis of hip     OA (osteoarthritis) of hip     Insomnia     BPH (benign prostatic hyperplasia)     Acute bilateral low back pain without sciatica     Prostate nodule     Chondromalacia, knee, right     Complex tear of medial meniscus of right knee as current injury     Herpes zoster with keratoconjunctivitis, od     Post herpetic neuralgia     Past Surgical History:   Procedure Laterality Date     ARTHROPLASTY MINIMALLY INVASIVE HIP  5/10/2013    Procedure: ARTHROPLASTY MINIMALLY INVASIVE HIP;  Left Two Incision Total Hip Arthroplasty ;  Surgeon: Desmond Alberts MD;  Location: [facility]     ARTHROPLASTY MINIMALLY INVASIVE HIP  2/27/2014    Procedure: ARTHROPLASTY MINIMALLY INVASIVE HIP;  Right Total Hip Arthroplasty Minimally Invasive Two Incision  *Latex Free Room;  Surgeon: Desmond Alberts MD;  Location: [facility]     BACK SURGERY      back fusion 1994     C NONSPECIFIC PROCEDURE  1964 &'95    (R) inguinal herniorrhapy     C NONSPECIFIC PROCEDURE  1949    (L) inguinal herniorrhaphy     C NONSPECIFIC PROCEDURE  1994    L4-5  L5 S1 fusion - spondylolisthesis     COLONOSCOPY      2007     COLONOSCOPY N/A 8/15/2017    Procedure:  COLONOSCOPY;  colonoscopy;  Surgeon: Chun Mcguire MD;  Location:  GI     GI SURGERY      4 hernia repairs in childhood     HERNIA REPAIR      4 since age 4       Social History     Tobacco Use     Smoking status: Never Smoker     Smokeless tobacco: Never Used   Substance Use Topics     Alcohol use: Yes     Alcohol/week: 0.0 oz     Comment: moderate, social     Family History   Problem Relation Age of Onset     Heart Disease Father      Cerebrovascular Disease Father          OF STROKE      Cancer Father         melonoma     Melanoma Father      Hyperlipidemia Father      Thyroid Disease Father      Cancer Mother         Lung cancer     Other Cancer Mother         lung; heavy smoker     Cerebrovascular Disease Paternal Uncle         Aneurism     Breast Cancer Maternal Aunt          from it     Cancer Paternal Uncle      Cancer Paternal Aunt      Skin Cancer No family hx of      Glaucoma No family hx of      Macular Degeneration No family hx of            Reviewed and updated as needed this visit by clinical staff  Tobacco  Allergies         Reviewed and updated as needed this visit by Provider            Review of Systems   Constitutional: Negative for chills and fever.   HENT: Negative for congestion, ear pain, hearing loss and sore throat.    Eyes: Negative for pain and visual disturbance.   Respiratory: Negative for cough and shortness of breath.    Cardiovascular: Negative for chest pain, palpitations and peripheral edema.   Gastrointestinal: Negative for abdominal pain, constipation, diarrhea, heartburn, hematochezia and nausea.   Genitourinary: Positive for frequency and urgency. Negative for discharge, dysuria, genital sores, hematuria and impotence.   Musculoskeletal: Positive for arthralgias and myalgias.   Skin: Negative for rash.   Neurological: Positive for paresthesias. Negative for dizziness, weakness and headaches.   Psychiatric/Behavioral: Negative for mood changes. The patient is  "not nervous/anxious.      CONSTITUTIONAL: NEGATIVE for fever, chills, change in weight  INTEGUMENTARY/SKIN: NEGATIVE for worrisome rashes, moles or lesions  EYES: NEGATIVE for vision changes or irritation  ENT: NEGATIVE for ear, mouth and throat problems  RESP: NEGATIVE for significant cough or SOB  CV: NEGATIVE for chest pain, palpitations or peripheral edema  GI: NEGATIVE for nausea, abdominal pain, heartburn, or change in bowel habits   male: negative for dysuria, hematuria, decreased urinary stream, erectile dysfunction, urethral discharge  MUSCULOSKELETAL: NEGATIVE for significant arthralgias or myalgia  NEURO: NEGATIVE for weakness, dizziness or paresthesias  PSYCHIATRIC: NEGATIVE for changes in mood or affect    OBJECTIVE:   /65 (BP Location: Right arm, Patient Position: Chair, Cuff Size: Adult Regular)   Pulse 65   Temp 97.5  F (36.4  C)   Ht 1.702 m (5' 7\")   Wt 68.9 kg (152 lb)   SpO2 100%   BMI 23.81 kg/m      Physical Exam  GENERAL: healthy, alert and no distress  EYES: Eyes grossly normal to inspection, PERRL and conjunctivae and sclerae normal  HENT: ear canals and TM's normal, nose and mouth without ulcers or lesions  NECK: no adenopathy, no asymmetry, masses, or scars and thyroid normal to palpation  RESP: lungs clear to auscultation - no rales, rhonchi or wheezes  CV: regular rate and rhythm, normal S1 S2, no S3 or S4, no murmur, click or rub, no peripheral edema and peripheral pulses strong  ABDOMEN: soft, nontender, no hepatosplenomegaly, no masses and bowel sounds normal  MS: no gross musculoskeletal defects noted, no edema  SKIN: no suspicious lesions or rashes  NEURO: Normal strength and tone, mentation intact and speech normal  BACK: no CVA tenderness, no paralumbar tenderness  PSYCH: mentation appears normal, affect normal/bright  LYMPH: no cervical, supraclavicular, axillary, or inguinal adenopathy    Diagnostic Test Results:  Results for orders placed or performed in visit on " 04/30/19   IgG   Result Value Ref Range     (L) 695 - 1,620 mg/dL   Lactate Dehydrogenase   Result Value Ref Range    Lactate Dehydrogenase 258 (H) 85 - 227 U/L   Comprehensive metabolic panel   Result Value Ref Range    Sodium 139 133 - 144 mmol/L    Potassium 5.1 3.4 - 5.3 mmol/L    Chloride 108 94 - 109 mmol/L    Carbon Dioxide 26 20 - 32 mmol/L    Anion Gap 5 3 - 14 mmol/L    Glucose 96 70 - 99 mg/dL    Urea Nitrogen 22 7 - 30 mg/dL    Creatinine 1.47 (H) 0.66 - 1.25 mg/dL    GFR Estimate 47 (L) >60 mL/min/[1.73_m2]    GFR Estimate If Black 54 (L) >60 mL/min/[1.73_m2]    Calcium 9.0 8.5 - 10.1 mg/dL    Bilirubin Total 0.8 0.2 - 1.3 mg/dL    Albumin 4.3 3.4 - 5.0 g/dL    Protein Total 6.6 (L) 6.8 - 8.8 g/dL    Alkaline Phosphatase 123 40 - 150 U/L    ALT 27 0 - 70 U/L    AST 34 0 - 45 U/L   CBC with platelets differential   Result Value Ref Range    .8 (HH) 4.0 - 11.0 10e9/L    RBC Count 3.86 (L) 4.4 - 5.9 10e12/L    Hemoglobin 11.9 (L) 13.3 - 17.7 g/dL    Hematocrit 38.8 (L) 40.0 - 53.0 %     (H) 78 - 100 fl    MCH 30.8 26.5 - 33.0 pg    MCHC 30.7 (L) 31.5 - 36.5 g/dL    RDW 15.5 (H) 10.0 - 15.0 %    Platelet Count 102 (L) 150 - 450 10e9/L    Diff Method Manual Differential     % Neutrophils 3.0 %    % Lymphocytes 97.0 %    % Monocytes 0.0 %    % Eosinophils 0.0 %    % Basophils 0.0 %    Absolute Neutrophil 6.1 1.6 - 8.3 10e9/L    Absolute Lymphocytes 195.7 (H) 0.8 - 5.3 10e9/L    Absolute Monocytes 0.0 0.0 - 1.3 10e9/L    Absolute Eosinophils 0.0 0.0 - 0.7 10e9/L    Absolute Basophils 0.0 0.0 - 0.2 10e9/L    Anisocytosis Slight     Macrocytes Present     Platelet Estimate Confirming automated cell count    Protein electrophoresis   Result Value Ref Range    Albumin Fraction 4.7 3.7 - 5.1 g/dL    Alpha 1 Fraction 0.3 0.2 - 0.4 g/dL    Alpha 2 Fraction 0.6 0.5 - 0.9 g/dL    Beta Fraction 0.6 0.6 - 1.0 g/dL    Gamma Fraction 0.5 (L) 0.7 - 1.6 g/dL    Monoclonal Peak 0.0 0.0 g/dL    ELP  "Interpretation:       Hypogammaglobulinemia.  No monoclonal protein seen.  Recommend a urine for immunofixation   to rule out a light chain secreting myeloma if myeloma is a serious clinical   consideration.  Pathologic significance requires clinical correlation. MARCY Castellano M.D., Ph.D., Pathologist ().      TSH with free T4 reflex   Result Value Ref Range    TSH 12.84 (H) 0.40 - 4.00 mU/L   T4 free   Result Value Ref Range    T4 Free 0.74 (L) 0.76 - 1.46 ng/dL   PSA tumor marker   Result Value Ref Range    PSA 4.11 (H) 0 - 4 ug/L       ASSESSMENT/PLAN:       ICD-10-CM    1. Hyperlipidemia LDL goal <130 E78.5 Lipid panel reflex to direct LDL Non-fasting   2. Hypothyroidism due to Hashimoto's thyroiditis E03.8 **TSH with free T4 reflex FUTURE anytime    E06.3    3. Post herpetic neuralgia B02.29    4. Benign prostatic hyperplasia with urinary retention N40.1     R33.8    5. CLL (chronic lymphocytic leukemia) (H) C91.90        COUNSELING:   Reviewed preventive health counseling, as reflected in patient instructions       Regular exercise       Healthy diet/nutrition       Vision screening       Hearing screening    Estimated body mass index is 23.81 kg/m  as calculated from the following:    Height as of this encounter: 1.702 m (5' 7\").    Weight as of this encounter: 68.9 kg (152 lb).          reports that he has never smoked. He has never used smokeless tobacco.      ICD-10-CM    1. Hyperlipidemia LDL goal <130 E78.5 Lipid panel reflex to direct LDL Non-fasting   2. Hypothyroidism due to Hashimoto's thyroiditis E03.8 **TSH with free T4 reflex FUTURE anytime    E06.3    3. Post herpetic neuralgia B02.29    4. Benign prostatic hyperplasia with urinary retention N40.1     R33.8    5. CLL (chronic lymphocytic leukemia) (H) C91.90      Two areas of AK frozen off left ear  Starting on the Imbruvica without side effects currently    Had a doubling of the white count load without symptoms      Counseling " Resources:  ATP IV Guidelines  Pooled Cohorts Equation Calculator  FRAX Risk Assessment  ICSI Preventive Guidelines  Dietary Guidelines for Americans, 2010  USDA's MyPlate  ASA Prophylaxis  Lung CA Screening    Campos Parra MD  [facility]

## 2019-05-16 NOTE — NURSING NOTE
Patient identified using two patient identifiers.  Ear exam showing wax occlusion completed by provider.  Solution: warm water was placed in the both ear(s) via irrigation tool: elephant ear.  Karen Levy MA

## 2019-05-22 ENCOUNTER — PRE VISIT (OUTPATIENT)
Dept: UROLOGY | Facility: CLINIC | Age: 74
End: 2019-05-22

## 2019-05-22 ENCOUNTER — APPOINTMENT (OUTPATIENT)
Dept: LAB | Facility: CLINIC | Age: 74
End: 2019-05-22
Attending: INTERNAL MEDICINE
Payer: MEDICARE

## 2019-05-22 ENCOUNTER — ONCOLOGY VISIT (OUTPATIENT)
Dept: ONCOLOGY | Facility: CLINIC | Age: 74
End: 2019-05-22
Attending: INTERNAL MEDICINE
Payer: MEDICARE

## 2019-05-22 VITALS
SYSTOLIC BLOOD PRESSURE: 107 MMHG | HEART RATE: 63 BPM | OXYGEN SATURATION: 97 % | HEIGHT: 67 IN | BODY MASS INDEX: 24.61 KG/M2 | DIASTOLIC BLOOD PRESSURE: 68 MMHG | WEIGHT: 156.8 LBS | TEMPERATURE: 97.5 F

## 2019-05-22 DIAGNOSIS — R97.20 ELEVATED PROSTATE SPECIFIC ANTIGEN (PSA): ICD-10-CM

## 2019-05-22 DIAGNOSIS — C91.10 CLL (CHRONIC LYMPHOCYTIC LEUKEMIA) (H): ICD-10-CM

## 2019-05-22 DIAGNOSIS — E06.3 HYPOTHYROIDISM DUE TO HASHIMOTO'S THYROIDITIS: ICD-10-CM

## 2019-05-22 LAB
ALBUMIN SERPL-MCNC: 4 G/DL (ref 3.4–5)
ALP SERPL-CCNC: 113 U/L (ref 40–150)
ALT SERPL W P-5'-P-CCNC: 19 U/L (ref 0–70)
ANION GAP SERPL CALCULATED.3IONS-SCNC: 6 MMOL/L (ref 3–14)
AST SERPL W P-5'-P-CCNC: 19 U/L (ref 0–45)
BASOPHILS # BLD AUTO: 0 10E9/L (ref 0–0.2)
BASOPHILS NFR BLD AUTO: 0 %
BILIRUB SERPL-MCNC: 1.1 MG/DL (ref 0.2–1.3)
BUN SERPL-MCNC: 19 MG/DL (ref 7–30)
CALCIUM SERPL-MCNC: 9.3 MG/DL (ref 8.5–10.1)
CHLORIDE SERPL-SCNC: 106 MMOL/L (ref 94–109)
CO2 SERPL-SCNC: 28 MMOL/L (ref 20–32)
CREAT SERPL-MCNC: 1.48 MG/DL (ref 0.66–1.25)
DIFFERENTIAL METHOD BLD: ABNORMAL
EOSINOPHIL # BLD AUTO: 0 10E9/L (ref 0–0.7)
EOSINOPHIL NFR BLD AUTO: 0 %
ERYTHROCYTE [DISTWIDTH] IN BLOOD BY AUTOMATED COUNT: 17.5 % (ref 10–15)
GFR SERPL CREATININE-BSD FRML MDRD: 47 ML/MIN/{1.73_M2}
GLUCOSE SERPL-MCNC: 94 MG/DL (ref 70–99)
HCT VFR BLD AUTO: 40.6 % (ref 40–53)
HGB BLD-MCNC: 11.7 G/DL (ref 13.3–17.7)
LYMPHOCYTES # BLD AUTO: 261.2 10E9/L (ref 0.8–5.3)
LYMPHOCYTES NFR BLD AUTO: 99.2 %
MCH RBC QN AUTO: 30 PG (ref 26.5–33)
MCHC RBC AUTO-ENTMCNC: 28.8 G/DL (ref 31.5–36.5)
MCV RBC AUTO: 104 FL (ref 78–100)
MONOCYTES # BLD AUTO: 0 10E9/L (ref 0–1.3)
MONOCYTES NFR BLD AUTO: 0 %
NEUTROPHILS # BLD AUTO: 2.1 10E9/L (ref 1.6–8.3)
NEUTROPHILS NFR BLD AUTO: 0.8 %
PLATELET # BLD AUTO: 104 10E9/L (ref 150–450)
PLATELET # BLD EST: ABNORMAL 10*3/UL
POTASSIUM SERPL-SCNC: 4.5 MMOL/L (ref 3.4–5.3)
PROT SERPL-MCNC: 6.3 G/DL (ref 6.8–8.8)
PSA SERPL-MCNC: 4.58 UG/L (ref 0–4)
RBC # BLD AUTO: 3.9 10E12/L (ref 4.4–5.9)
RBC MORPH BLD: NORMAL
SODIUM SERPL-SCNC: 141 MMOL/L (ref 133–144)
T4 FREE SERPL-MCNC: 1.08 NG/DL (ref 0.76–1.46)
TSH SERPL DL<=0.005 MIU/L-ACNC: 5.33 MU/L (ref 0.4–4)
WBC # BLD AUTO: 263.3 10E9/L (ref 4–11)

## 2019-05-22 PROCEDURE — 84153 ASSAY OF PSA TOTAL: CPT | Performed by: INTERNAL MEDICINE

## 2019-05-22 PROCEDURE — 99214 OFFICE O/P EST MOD 30 MIN: CPT | Mod: ZP | Performed by: PHYSICIAN ASSISTANT

## 2019-05-22 PROCEDURE — 84439 ASSAY OF FREE THYROXINE: CPT | Performed by: INTERNAL MEDICINE

## 2019-05-22 PROCEDURE — 85025 COMPLETE CBC W/AUTO DIFF WBC: CPT | Performed by: INTERNAL MEDICINE

## 2019-05-22 PROCEDURE — 36415 COLL VENOUS BLD VENIPUNCTURE: CPT

## 2019-05-22 PROCEDURE — 80053 COMPREHEN METABOLIC PANEL: CPT | Performed by: INTERNAL MEDICINE

## 2019-05-22 PROCEDURE — 84443 ASSAY THYROID STIM HORMONE: CPT | Performed by: INTERNAL MEDICINE

## 2019-05-22 PROCEDURE — G0463 HOSPITAL OUTPT CLINIC VISIT: HCPCS | Mod: ZF

## 2019-05-22 ASSESSMENT — PAIN SCALES - GENERAL: PAINLEVEL: SEVERE PAIN (6)

## 2019-05-22 ASSESSMENT — MIFFLIN-ST. JEOR: SCORE: 1419.87

## 2019-05-22 NOTE — NURSING NOTE
Chief Complaint   Patient presents with     Blood Draw     Pt here for venipuncture blood draw, vitals completed by Ma, osmany chekced in for appt.     Patrica Francois MA

## 2019-05-22 NOTE — PROGRESS NOTES
Oncology/Hematology Visit Note  May 22, 2019    Reason for Visit: follow up of CLL    History of Present Illness: Loco Wood is a 74 year old male with CLL. He was diagnosed 2/12/2007 with CLL, Lyles stage 0, followed clinically since.  At diagnosis WBC 17.8, ALC 11.2, hemoglobin 15.2, platelets 188.  Flow consistent with CLL.  No opportunistic infections, with IgG in the normal range during follow up. Due to rising lymphocyte count, it was recommended he consider starting on treatment. He is currently undergoing treatment with ibrutinib, which he began on 5/14/19. Please see previous notes for further details on the patient's history. He comes in today for routine follow up.    Interval History:  Patient reports that overall he has been tolerating the ibrutinib well thus far.  He has had some mild mid abdominal fullness and he is unsure if that is related to the bruit neb or not.  He also had one day with some diarrhea.  He typically has bowel movements twice a day and they are usually loose which is typical for him.  He keeps active with playing pickle ball and golf.  He also works out a few times per week.  He is typically up a few times at night to urinate and will be seeing urology for this.  He denies any lumps or bumps or night sweats.  He denies other concerns.    Review of Systems:  Patient denies any of the following except if noted above: fevers, chills, difficulty with energy, vision or hearing changes, chest pain, dyspnea, abdominal pain, nausea, vomiting, diarrhea, constipation, other urinary concerns, headaches, numbness, tingling, issues with sleep or mood.     Current Outpatient Medications   Medication Sig Dispense Refill     Acetaminophen (TYLENOL PO)        atorvastatin (LIPITOR) 10 MG tablet Take 1 tablet (10 mg) by mouth every 48 hours 45 tablet 4     Cholecalciferol (VITAMIN D3 PO) Take 4,000 Units by mouth daily        ibrutinib (IMBRUVICA) 420 MG tablet Take 1 tablet (420 mg) by mouth  "daily 28 tablet 0     levothyroxine (SYNTHROID/LEVOTHROID) 50 MCG tablet Take 1 tablet (50 mcg) by mouth daily 90 tablet 3     omega 3 1200 MG CAPS Take 2 capsules by mouth daily       omeprazole (PRILOSEC) 10 MG DR capsule TAKE ONE CAPSULE BY MOUTH EVERY DAY 30 TO 60 MINUTES BEFORE A MEAL 90 capsule 1     HYDROcodone-acetaminophen (NORCO) 5-325 MG per tablet Take 1 tablet by mouth every 8 hours as needed for pain maximum 2 tablet(s) per day (Patient not taking: Reported on 5/22/2019) 60 tablet 0     hydrocortisone (ANUSOL-HC) 2.5 % cream Place rectally 2 times daily (Patient not taking: Reported on 5/22/2019) 30 g 3     ibuprofen (ADVIL/MOTRIN) 200 MG tablet Take 200 mg by mouth every 4 hours as needed for mild pain       prochlorperazine (COMPAZINE) 10 MG tablet Take 1 tablet (10 mg) by mouth every 6 hours as needed (Nausea/Vomiting) (Patient not taking: Reported on 5/22/2019) 30 tablet 2     zolpidem (AMBIEN) 5 MG tablet Take 1 tablet (5 mg) by mouth nightly as needed for sleep (Patient not taking: Reported on 5/22/2019) 30 tablet 5     Physical Examination:  General: The patient is a pleasant male in no acute distress.  /68 (BP Location: Left arm, Patient Position: Sitting, Cuff Size: Adult Regular)   Pulse 63   Temp 97.5  F (36.4  C) (Oral)   Ht 1.702 m (5' 7\")   Wt 71.1 kg (156 lb 12.8 oz)   SpO2 97%   BMI 24.56 kg/m    Wt Readings from Last 10 Encounters:   05/22/19 71.1 kg (156 lb 12.8 oz)   05/16/19 68.9 kg (152 lb)   04/30/19 70.3 kg (155 lb)   12/21/18 72.9 kg (160 lb 11.5 oz)   10/16/18 71.7 kg (158 lb)   10/04/18 71.2 kg (157 lb)   06/27/18 74.4 kg (164 lb)   04/30/18 75.5 kg (166 lb 6 oz)   04/24/18 75.2 kg (165 lb 11.2 oz)   03/22/18 75.3 kg (166 lb)   HEENT: EOMI, PERRL. Sclerae are anicteric. Oral mucosa is pink and moist with no lesions or thrush.   Lymph: Neck is supple with no lymphadenopathy in the cervical or supraclavicular areas. No axillary adenopathy palpable. Pea-sized lymph " nodes palpated in the inguinal area bilaterally.  Heart: Regular rate and rhythm.   Lungs: Clear to auscultation bilaterally.   Abdomen: Bowel sounds present, soft, nontender with no palpable hepatosplenomegaly or masses.   Extremities: No lower extremity edema noted bilaterally.   Neuro: Cranial nerves II through XII are grossly intact.  Skin: No rashes, petechiae, or bruising noted on exposed skin.    Laboratory Data:   5/22/2019 09:01   Sodium 141   Potassium 4.5   Chloride 106   Carbon Dioxide 28   Urea Nitrogen 19   Creatinine 1.48 (H)   GFR Estimate 47 (L)   GFR Estimate If Black 54 (L)   Calcium 9.3   Anion Gap 6   Albumin 4.0   Protein Total 6.3 (L)   Bilirubin Total 1.1   Alkaline Phosphatase 113   ALT 19   AST 19   PSA 4.58 (H)   T4 Free 1.08   TSH 5.33 (H)   Glucose 94   .3 (HH)   Hemoglobin 11.7 (L)   Hematocrit 40.6   Platelet Count 104 (L)   RBC Count 3.90 (L)    (H)   MCH 30.0   MCHC 28.8 (L)   RDW 17.5 (H)   Diff Method Manual Differential   % Neutrophils 0.8   % Lymphocytes 99.2   % Monocytes 0.0   % Eosinophils 0.0   % Basophils 0.0   Absolute Neutrophil 2.1   Absolute Lymphocytes 261.2 (H)   Absolute Monocytes 0.0   Absolute Eosinophils 0.0   Absolute Basophils 0.0   RBC Morphology Normal   Platelet Estimate Confirming automa...     Assessment and Plan:  CLL. Patient started on ibrutinib 420 mg daily on 5/14/19. He is tolerating treatment very well thus far. He has had an expected rise in his lymphocyte count. He will continue weekly follow up for the first month. If he is doing well after that time, we may decrease the frequency of his follow up.     Hypothyroidism. He began on levothyroxine 50 mcg daily on 5/2/19. TSH is improving and free T4 is normal. I would not recommend a dose adjustment at this time.     CKD. He appears to have had some kidney dysfunction since at least 2010. No acute concerns today. Will continue to monitor.     Jess Sanchez PA-C  [facility]  [address]  [place] [address]  [phone]

## 2019-05-22 NOTE — NURSING NOTE
"Oncology Rooming Note    May 22, 2019 9:29 AM   Loco Wood is a 74 year old male who presents for:    Chief Complaint   Patient presents with     Blood Draw     Pt here for venipuncture blood draw, vitals completed by osmany Grayson chekced in for appt.     Oncology Clinic Visit     Leukemia , labs      Initial Vitals: /68 (BP Location: Left arm, Patient Position: Sitting, Cuff Size: Adult Regular)   Pulse 63   Temp 97.5  F (36.4  C) (Oral)   Ht 1.702 m (5' 7\")   Wt 71.1 kg (156 lb 12.8 oz)   SpO2 97%   BMI 24.56 kg/m   Estimated body mass index is 24.56 kg/m  as calculated from the following:    Height as of this encounter: 1.702 m (5' 7\").    Weight as of this encounter: 71.1 kg (156 lb 12.8 oz). Body surface area is 1.83 meters squared.  Severe Pain (6) Comment: Data Unavailable   No LMP for male patient.  Allergies reviewed: Yes  Medications reviewed: Yes    Medications: Medication refills not needed today.  Pharmacy name entered into [employer]:    [id number] - [place], [place] - [address] AT [address]NER OF [address] [place] [address] [id number] [place] - [address] [facility] [place] - [address]  [facility], [place] - [address] [facility]  [facility] - [place], [place] - [address]    Clinical concerns: no concerns  Daniel  was notified.      Ynes Geronimo MA              "

## 2019-05-22 NOTE — LETTER
5/22/2019      RE: Loco Wood  [address] [facility] [address]       Oncology/Hematology Visit Note  May 22, 2019    Reason for Visit: follow up of CLL    History of Present Illness: Loco Wood is a 74 year old male with CLL. He was diagnosed 2/12/2007 with CLL, Lyles stage 0, followed clinically since.  At diagnosis WBC 17.8, ALC 11.2, hemoglobin 15.2, platelets 188.  Flow consistent with CLL.  No opportunistic infections, with IgG in the normal range during follow up. Due to rising lymphocyte count, it was recommended he consider starting on treatment. He is currently undergoing treatment with ibrutinib, which he began on 5/14/19. Please see previous notes for further details on the patient's history. He comes in today for routine follow up.    Interval History:  Patient reports that overall he has been tolerating the ibrutinib well thus far.  He has had some mild mid abdominal fullness and he is unsure if that is related to the bruit neb or not.  He also had one day with some diarrhea.  He typically has bowel movements twice a day and they are usually loose which is typical for him.  He keeps active with playing pickle ball and golf.  He also works out a few times per week.  He is typically up a few times at night to urinate and will be seeing urology for this.  He denies any lumps or bumps or night sweats.  He denies other concerns.    Review of Systems:  Patient denies any of the following except if noted above: fevers, chills, difficulty with energy, vision or hearing changes, chest pain, dyspnea, abdominal pain, nausea, vomiting, diarrhea, constipation, other urinary concerns, headaches, numbness, tingling, issues with sleep or mood.     Current Outpatient Medications   Medication Sig Dispense Refill     Acetaminophen (TYLENOL PO)        atorvastatin (LIPITOR) 10 MG tablet Take 1 tablet (10 mg) by mouth every 48 hours 45 tablet 4     Cholecalciferol (VITAMIN D3 PO) Take 4,000 Units  "by mouth daily        ibrutinib (IMBRUVICA) 420 MG tablet Take 1 tablet (420 mg) by mouth daily 28 tablet 0     levothyroxine (SYNTHROID/LEVOTHROID) 50 MCG tablet Take 1 tablet (50 mcg) by mouth daily 90 tablet 3     omega 3 1200 MG CAPS Take 2 capsules by mouth daily       omeprazole (PRILOSEC) 10 MG DR capsule TAKE ONE CAPSULE BY MOUTH EVERY DAY 30 TO 60 MINUTES BEFORE A MEAL 90 capsule 1     HYDROcodone-acetaminophen (NORCO) 5-325 MG per tablet Take 1 tablet by mouth every 8 hours as needed for pain maximum 2 tablet(s) per day (Patient not taking: Reported on 5/22/2019) 60 tablet 0     hydrocortisone (ANUSOL-HC) 2.5 % cream Place rectally 2 times daily (Patient not taking: Reported on 5/22/2019) 30 g 3     ibuprofen (ADVIL/MOTRIN) 200 MG tablet Take 200 mg by mouth every 4 hours as needed for mild pain       prochlorperazine (COMPAZINE) 10 MG tablet Take 1 tablet (10 mg) by mouth every 6 hours as needed (Nausea/Vomiting) (Patient not taking: Reported on 5/22/2019) 30 tablet 2     zolpidem (AMBIEN) 5 MG tablet Take 1 tablet (5 mg) by mouth nightly as needed for sleep (Patient not taking: Reported on 5/22/2019) 30 tablet 5     Physical Examination:  General: The patient is a pleasant male in no acute distress.  /68 (BP Location: Left arm, Patient Position: Sitting, Cuff Size: Adult Regular)   Pulse 63   Temp 97.5  F (36.4  C) (Oral)   Ht 1.702 m (5' 7\")   Wt 71.1 kg (156 lb 12.8 oz)   SpO2 97%   BMI 24.56 kg/m     Wt Readings from Last 10 Encounters:   05/22/19 71.1 kg (156 lb 12.8 oz)   05/16/19 68.9 kg (152 lb)   04/30/19 70.3 kg (155 lb)   12/21/18 72.9 kg (160 lb 11.5 oz)   10/16/18 71.7 kg (158 lb)   10/04/18 71.2 kg (157 lb)   06/27/18 74.4 kg (164 lb)   04/30/18 75.5 kg (166 lb 6 oz)   04/24/18 75.2 kg (165 lb 11.2 oz)   03/22/18 75.3 kg (166 lb)   HEENT: EOMI, PERRL. Sclerae are anicteric. Oral mucosa is pink and moist with no lesions or thrush.   Lymph: Neck is supple with no lymphadenopathy in " the cervical or supraclavicular areas. No axillary adenopathy palpable. Pea-sized lymph nodes palpated in the inguinal area bilaterally.  Heart: Regular rate and rhythm.   Lungs: Clear to auscultation bilaterally.   Abdomen: Bowel sounds present, soft, nontender with no palpable hepatosplenomegaly or masses.   Extremities: No lower extremity edema noted bilaterally.   Neuro: Cranial nerves II through XII are grossly intact.  Skin: No rashes, petechiae, or bruising noted on exposed skin.    Laboratory Data:   5/22/2019 09:01   Sodium 141   Potassium 4.5   Chloride 106   Carbon Dioxide 28   Urea Nitrogen 19   Creatinine 1.48 (H)   GFR Estimate 47 (L)   GFR Estimate If Black 54 (L)   Calcium 9.3   Anion Gap 6   Albumin 4.0   Protein Total 6.3 (L)   Bilirubin Total 1.1   Alkaline Phosphatase 113   ALT 19   AST 19   PSA 4.58 (H)   T4 Free 1.08   TSH 5.33 (H)   Glucose 94   .3 (HH)   Hemoglobin 11.7 (L)   Hematocrit 40.6   Platelet Count 104 (L)   RBC Count 3.90 (L)    (H)   MCH 30.0   MCHC 28.8 (L)   RDW 17.5 (H)   Diff Method Manual Differential   % Neutrophils 0.8   % Lymphocytes 99.2   % Monocytes 0.0   % Eosinophils 0.0   % Basophils 0.0   Absolute Neutrophil 2.1   Absolute Lymphocytes 261.2 (H)   Absolute Monocytes 0.0   Absolute Eosinophils 0.0   Absolute Basophils 0.0   RBC Morphology Normal   Platelet Estimate Confirming automa...     Assessment and Plan:  CLL. Patient started on ibrutinib 420 mg daily on 5/14/19. He is tolerating treatment very well thus far. He has had an expected rise in his lymphocyte count. He will continue weekly follow up for the first month. If he is doing well after that time, we may decrease the frequency of his follow up.     Hypothyroidism. He began on levothyroxine 50 mcg daily on 5/2/19. TSH is improving and free T4 is normal. I would not recommend a dose adjustment at this time.     CKD. He appears to have had some kidney dysfunction since at least 2010. No acute  concerns today. Will continue to monitor.     Jess Sanchez PA-C  [facility]  [address] [place] [address] [phone]

## 2019-05-24 ENCOUNTER — TELEPHONE (OUTPATIENT)
Dept: FAMILY MEDICINE | Facility: CLINIC | Age: 74
End: 2019-05-24

## 2019-05-24 DIAGNOSIS — R35.0 URINARY FREQUENCY: ICD-10-CM

## 2019-05-24 DIAGNOSIS — R35.0 URINARY FREQUENCY: Primary | ICD-10-CM

## 2019-05-24 LAB
ALBUMIN UR-MCNC: NEGATIVE MG/DL
APPEARANCE UR: CLEAR
BILIRUB UR QL STRIP: NEGATIVE
COLOR UR AUTO: YELLOW
GLUCOSE UR STRIP-MCNC: NEGATIVE MG/DL
HGB UR QL STRIP: ABNORMAL
KETONES UR STRIP-MCNC: NEGATIVE MG/DL
LEUKOCYTE ESTERASE UR QL STRIP: NEGATIVE
NITRATE UR QL: NEGATIVE
NON-SQ EPI CELLS #/AREA URNS LPF: ABNORMAL /LPF
PH UR STRIP: 5.5 PH (ref 5–7)
RBC #/AREA URNS AUTO: ABNORMAL /HPF
SOURCE: ABNORMAL
SP GR UR STRIP: 1.02 (ref 1–1.03)
UROBILINOGEN UR STRIP-ACNC: 0.2 EU/DL (ref 0.2–1)
WBC #/AREA URNS AUTO: ABNORMAL /HPF

## 2019-05-24 PROCEDURE — 81001 URINALYSIS AUTO W/SCOPE: CPT | Performed by: INTERNAL MEDICINE

## 2019-05-24 NOTE — TELEPHONE ENCOUNTER
Routed to Dr. Parra to be aware of in case UA requires treatment today.    Neeta Bloom RN  [facility]

## 2019-05-24 NOTE — TELEPHONE ENCOUNTER
Attempted to call patient at home/mobile number, left message on voicemail; patient was instructed to return call to [facility] RN directly on the RN call back line at [phone]   Neeta Bloom RN  [facility]

## 2019-05-24 NOTE — TELEPHONE ENCOUNTER
Patient was recently seen 5/16/19, see plan:                    Instructions         Return in about 6 months (around 11/16/2019).       I see he has prostate issues and is oncology patient.    I called patient, he says he started new oncology med recently and has been having urinary frequency and urgency for a few days, also up at night to urinate frequently but that is not new.    See recent oncology note:    He is currently undergoing treatment with ibrutinib, which he began on 5/14/19.    Patient says he called the pharmacy and they told him UTI is a fairly common side effect when taking this med.      Patient denies blood in urine, chills, fever.    He is already scheduled for 9:15 lab visit.    Routed to Dr. Parra for UA order.    Neeta Bloom RN  [facility]

## 2019-05-24 NOTE — TELEPHONE ENCOUNTER
Reason for Call: Request for an order or referral:    Order or referral being requested: Urine test    Date needed: as soon as possible    Has the patient been seen by the PCP for this problem? NO    Additional comments: Patient called and stated he wants to have a urine test today and he would appreciate his PCP putting in an order.    Phone number Patient can be reached at:  Cell number on file:    Telephone Information:   Mobile [phone]       Best Time:  ASAP    Can we leave a detailed message on this number?  YES    Call taken on 5/24/2019 at 8:19 AM by Lisa Pena

## 2019-05-24 NOTE — TELEPHONE ENCOUNTER
Reason for Call:  Other DRUG WARNING    Detailed comments: Patient wants Dr. Parra to know that he cannot take CIPRO while on his leukemia drugs.  He wants Dr. LANIER to know that because he might have a UTI and is here for lab test today.    Phone Number Patient can be reached at: Cell number on file:    Telephone Information:   Mobile [phone]       Best Time: anytime    Can we leave a detailed message on this number? YES    Call taken on 5/24/2019 at 9:12 AM by Perri Hare

## 2019-05-24 NOTE — TELEPHONE ENCOUNTER
Notified patient of the results below per PCP.  Patient stated understanding and agreeable with the plan of care. Maude Brice,JODYN,RN, [facility] Triage.

## 2019-05-31 ENCOUNTER — APPOINTMENT (OUTPATIENT)
Dept: LAB | Facility: CLINIC | Age: 74
End: 2019-05-31
Attending: INTERNAL MEDICINE
Payer: MEDICARE

## 2019-05-31 ENCOUNTER — OFFICE VISIT (OUTPATIENT)
Dept: UROLOGY | Facility: CLINIC | Age: 74
End: 2019-05-31
Payer: MEDICARE

## 2019-05-31 ENCOUNTER — ONCOLOGY VISIT (OUTPATIENT)
Dept: ONCOLOGY | Facility: CLINIC | Age: 74
End: 2019-05-31
Attending: INTERNAL MEDICINE
Payer: MEDICARE

## 2019-05-31 VITALS
HEART RATE: 64 BPM | BODY MASS INDEX: 23.98 KG/M2 | RESPIRATION RATE: 18 BRPM | SYSTOLIC BLOOD PRESSURE: 111 MMHG | TEMPERATURE: 98 F | OXYGEN SATURATION: 97 % | DIASTOLIC BLOOD PRESSURE: 63 MMHG | WEIGHT: 152.8 LBS | HEIGHT: 67 IN

## 2019-05-31 VITALS
HEART RATE: 64 BPM | BODY MASS INDEX: 23.86 KG/M2 | SYSTOLIC BLOOD PRESSURE: 111 MMHG | OXYGEN SATURATION: 97 % | TEMPERATURE: 98 F | HEIGHT: 67 IN | DIASTOLIC BLOOD PRESSURE: 63 MMHG | RESPIRATION RATE: 18 BRPM | WEIGHT: 152 LBS

## 2019-05-31 DIAGNOSIS — E03.9 HYPOTHYROIDISM, UNSPECIFIED TYPE: Primary | ICD-10-CM

## 2019-05-31 DIAGNOSIS — C91.10 CLL (CHRONIC LYMPHOCYTIC LEUKEMIA) (H): ICD-10-CM

## 2019-05-31 DIAGNOSIS — N40.1 BENIGN PROSTATIC HYPERPLASIA WITH LOWER URINARY TRACT SYMPTOMS, SYMPTOM DETAILS UNSPECIFIED: Primary | ICD-10-CM

## 2019-05-31 DIAGNOSIS — Z79.899 ENCOUNTER FOR LONG-TERM (CURRENT) USE OF MEDICATIONS: ICD-10-CM

## 2019-05-31 LAB
ALBUMIN SERPL-MCNC: 3.9 G/DL (ref 3.4–5)
ALP SERPL-CCNC: 97 U/L (ref 40–150)
ALT SERPL W P-5'-P-CCNC: 18 U/L (ref 0–70)
ANION GAP SERPL CALCULATED.3IONS-SCNC: 6 MMOL/L (ref 3–14)
AST SERPL W P-5'-P-CCNC: 16 U/L (ref 0–45)
BASOPHILS # BLD AUTO: 0 10E9/L (ref 0–0.2)
BASOPHILS NFR BLD AUTO: 0 %
BILIRUB SERPL-MCNC: 1.1 MG/DL (ref 0.2–1.3)
BUN SERPL-MCNC: 22 MG/DL (ref 7–30)
BURR CELLS BLD QL SMEAR: ABNORMAL
CALCIUM SERPL-MCNC: 9.6 MG/DL (ref 8.5–10.1)
CHLORIDE SERPL-SCNC: 106 MMOL/L (ref 94–109)
CO2 SERPL-SCNC: 27 MMOL/L (ref 20–32)
CREAT SERPL-MCNC: 1.47 MG/DL (ref 0.66–1.25)
DIFFERENTIAL METHOD BLD: ABNORMAL
EOSINOPHIL # BLD AUTO: 0 10E9/L (ref 0–0.7)
EOSINOPHIL NFR BLD AUTO: 0 %
ERYTHROCYTE [DISTWIDTH] IN BLOOD BY AUTOMATED COUNT: 16.9 % (ref 10–15)
GFR SERPL CREATININE-BSD FRML MDRD: 47 ML/MIN/{1.73_M2}
GLUCOSE SERPL-MCNC: 108 MG/DL (ref 70–99)
HCT VFR BLD AUTO: 38.3 % (ref 40–53)
HGB BLD-MCNC: 10.7 G/DL (ref 13.3–17.7)
IGG SERPL-MCNC: 409 MG/DL (ref 695–1620)
LDH SERPL L TO P-CCNC: 171 U/L (ref 85–227)
LYMPHOCYTES # BLD AUTO: 229.2 10E9/L (ref 0.8–5.3)
LYMPHOCYTES NFR BLD AUTO: 98.5 %
MCH RBC QN AUTO: 29.4 PG (ref 26.5–33)
MCHC RBC AUTO-ENTMCNC: 27.9 G/DL (ref 31.5–36.5)
MCV RBC AUTO: 105 FL (ref 78–100)
MONOCYTES # BLD AUTO: 0 10E9/L (ref 0–1.3)
MONOCYTES NFR BLD AUTO: 0 %
NEUTROPHILS # BLD AUTO: 3.5 10E9/L (ref 1.6–8.3)
NEUTROPHILS NFR BLD AUTO: 1.5 %
OVALOCYTES BLD QL SMEAR: SLIGHT
PLATELET # BLD AUTO: 153 10E9/L (ref 150–450)
PLATELET # BLD EST: ABNORMAL 10*3/UL
POIKILOCYTOSIS BLD QL SMEAR: ABNORMAL
POTASSIUM SERPL-SCNC: 4.7 MMOL/L (ref 3.4–5.3)
PROT SERPL-MCNC: 6.3 G/DL (ref 6.8–8.8)
RBC # BLD AUTO: 3.64 10E12/L (ref 4.4–5.9)
SODIUM SERPL-SCNC: 139 MMOL/L (ref 133–144)
WBC # BLD AUTO: 232.7 10E9/L (ref 4–11)

## 2019-05-31 PROCEDURE — 85025 COMPLETE CBC W/AUTO DIFF WBC: CPT | Performed by: INTERNAL MEDICINE

## 2019-05-31 PROCEDURE — 36415 COLL VENOUS BLD VENIPUNCTURE: CPT

## 2019-05-31 PROCEDURE — 83615 LACTATE (LD) (LDH) ENZYME: CPT | Performed by: INTERNAL MEDICINE

## 2019-05-31 PROCEDURE — 80053 COMPREHEN METABOLIC PANEL: CPT | Performed by: INTERNAL MEDICINE

## 2019-05-31 PROCEDURE — 82784 ASSAY IGA/IGD/IGG/IGM EACH: CPT | Performed by: INTERNAL MEDICINE

## 2019-05-31 PROCEDURE — G0463 HOSPITAL OUTPT CLINIC VISIT: HCPCS | Mod: ZF

## 2019-05-31 PROCEDURE — 99214 OFFICE O/P EST MOD 30 MIN: CPT | Mod: ZP | Performed by: PHYSICIAN ASSISTANT

## 2019-05-31 RX ORDER — TAMSULOSIN HYDROCHLORIDE 0.4 MG/1
0.4 CAPSULE ORAL DAILY
Qty: 90 CAPSULE | Refills: 3 | Status: SHIPPED | OUTPATIENT
Start: 2019-05-31 | End: 2019-07-23

## 2019-05-31 RX ORDER — FINASTERIDE 5 MG/1
5 TABLET, FILM COATED ORAL DAILY
Qty: 90 TABLET | Refills: 3 | Status: SHIPPED | OUTPATIENT
Start: 2019-05-31 | End: 2019-07-23

## 2019-05-31 ASSESSMENT — PAIN SCALES - GENERAL
PAINLEVEL: NO PAIN (0)
PAINLEVEL: NO PAIN (0)

## 2019-05-31 ASSESSMENT — MIFFLIN-ST. JEOR
SCORE: 1401.85
SCORE: 1398.1

## 2019-05-31 NOTE — LETTER
2019       RE: Loco Wood  [address] [facility] [address]     Dear Colleague,    Thank you for referring your patient, Loco Wood, to the [facility] UROLOGY AND INST FOR PROSTATE AND UROLOGIC CANCERS at [facility]. Please see a copy of my visit note below.      Urology Clinic    Campos Boyd MD  Date of Service: 2019     Name: Loco Wood  MRN: [id number]  Age: 74 year old  : 1945  Referring provider: Referred Self     Assessment and Plan:  1. Prostate Nodule and borderline elevated PSA.    PSA has been in the 4.11-4.76 range since 2017. Currently 4.58 on 2019.     We discussed that we cannot rule out prostate cancer with this marker, but this marker has not changed in the last 2 years. The patient understands that he might have prostate cancer, though if he did have cancer this would likely not be aggressive or a high tisha score type cancer burden.      We had a long discussion about the utility of PSA screening. I noted the patient would not need aggressive management currently. If the PSA changes, biopsy would be the next step. He is aware that he could have a biopsy now if he wishes.    Based on [place] Risk Calculator:  13% chance of high-grade prostate cancer,  19% chance of low-grade cancer,  68% chance that the biopsy is negative for cancer.  About 2 to 4% of men undergoing biopsy will have an infection that may require hospitalization.    In the end, given the overall low PSA and normal SYDNIE, we felt that there was not strong evidence that a biopsy would be beneficial at this point, understanding that there is a small chance that a biologically important cancer may be missed.    Patient is welcome to continue with PSA screening as the patient and his primary care physician see best.    2. Sexual Health:     The patient states that he has experienced some retrograde ejaculations normal orgasms.     3.  "Urinary symptoms the patient attributes to his enlarged prostate:     Nocturia 3-6x. Urgency. Frequency. We discussed Flowmax. The patient is aware of nausea, lightheadedness, interactions with eye surgery, and potential retrograde ejaculation. We also spoke about finasteride as well as side effects. We briefly discussed TURP. I noted that medication is usually the first step. We briefly discussed Urolift, Rezum, TURP, Greenlight PVP, and HoLEP. If the patient is interested in this option, I will refer him to my colleague.     --The patient will start Flowmax and Finasteride   --Follow up in 6 months with a PSA  ______________________________________________________________________    HPI  Loco Wood is a 74 year old male here for follow up of an elevated PSA. Mr. Wood has a history of chronic lymphocytic leukemia diagnosed 2/12/2007 with CLL, Lyles stage 0, followed clinically since now with Dr. Sanchez. He is currently undergoing treatment with ibrutinib, which he began on 5/14/19.       I last saw Mr. Wood on 06/16/2017 at which time he complained of a slow urinary stream, minimal nocturia. He denied gross hematuria. He was using viagra with good results. He denied a family history of prostate cancer.     Today, the patient endorses multiple episodes of nocturia and states \"I have all of the classic symptoms of an enlarged prostate.\" He endorses a slow stream, urgency, and frequency.     Review of Systems:   Pertinent items are noted in HPI or as below, remainder of complete ROS is negative.      Physical Exam:   PHYSICAL EXAM  /63   Pulse 64   Temp 98  F (36.7  C) (Oral)   Resp 18   Ht 1.702 m (5' 7\")   Wt 68.9 kg (152 lb)   SpO2 97%   BMI 23.81 kg/m     Constitutional: Alert, no acute distress  Psychiatric: Normal mood and affect  Gastrointestinal: Abdomen soft, non-tender.  : Deferred    Laboratory:   I reviewed all applicable laboratory and pathology data and went over findings with " patient  Significant for     Lab Results   Component Value Date    CR 1.48 05/22/2019    CR 1.47 04/30/2019    CR 1.57 12/21/2018    CR 1.40 11/07/2018    CR 1.49 10/24/2018    CR 1.35 10/16/2018    CR 1.54 04/24/2018    CR 1.51 03/22/2018    CR 1.33 12/21/2017    CR 1.26 10/24/2017     PSA   Date Value Ref Range Status   05/22/2019 4.58 (H) 0 - 4 ug/L Final     Comment:     Assay Method:  Chemiluminescence using Siemens Vista analyzer   04/30/2019 4.11 (H) 0 - 4 ug/L Final     Comment:     Assay Method:  Chemiluminescence using Siemens Vista analyzer   10/16/2018 4.68 (H) 0 - 4 ug/L Final     Comment:     Assay Method:  Chemiluminescence using Siemens Vista analyzer   04/24/2018 4.44 (H) 0 - 4 ug/L Final     Comment:     Assay Method:  Chemiluminescence using Siemens Vista analyzer   03/22/2018 4.58 (H) 0 - 4 ug/L Final     Comment:     Assay Method:  Chemiluminescence using Siemens Vista analyzer   12/21/2017 4.20 (H) 0 - 4 ug/L Final     Comment:     Assay Method:  Chemiluminescence using Siemens Vista analyzer   09/18/2017 4.48 (H) 0 - 4 ug/L Final     Comment:     Assay Method:  Chemiluminescence using Siemens Vista analyzer   05/15/2017 4.76 (H) 0 - 4 ug/L Final     Comment:     Assay Method:  Chemiluminescence using Siemens Vista analyzer   08/01/2011 1.47 0 - 4 ug/L Final   07/27/2010 1.44 0 - 4 ug/L Final     Results for orders placed or performed during the hospital encounter of 02/27/14   UA with Microscopic   Result Value Ref Range    Color Urine Yellow     Appearance Urine Clear     Glucose Urine Negative NEG mg/dL    Bilirubin Urine Negative NEG    Ketones Urine Negative NEG mg/dL    Specific Gravity Urine 1.011 1.003 - 1.035    Blood Urine Negative NEG    pH Urine 6.5 5.0 - 7.0 pH    Protein Albumin Urine Negative NEG mg/dL    Urobilinogen mg/dL Normal 0.0 - 2.0 mg/dL    Nitrite Urine Negative NEG    Leukocyte Esterase Urine Negative NEG    Source Midstream Urine     WBC Urine <1 0 - 2 /HPF    RBC  Urine 2 0 - 2 /HPF    Bacteria Urine Few (A) NEG /HPF    Mucous Urine Present (A) NEG /LPF    Hyaline Casts 1 0 - 2 /LPF     Results for orders placed or performed in visit on 10/04/11   Lactate dehydrogenase total   Result Value Ref Range     325 - 750 U/L   Results for orders placed or performed in visit on 09/25/09   Lactate dehydrogenase total   Result Value Ref Range     325 - 750 U/L   Results for orders placed or performed in visit on 03/11/08   Lactate dehydrogenase total   Result Value Ref Range     325 - 750 U/L   ]   Imaging:   I personally reviewed all applicable imaging and went over the below findings with patient.    Results for orders placed or performed in visit on 06/29/18   MR Lumbar Spine w/o Contrast    Narrative    MRI LUMBAR SPINE WITHOUT CONTRAST   6/29/2018 12:39 PM     HISTORY: Approximately 6 months of right low back and leg pain.  Surgery in 1990.    COMPARISON: A CT on 7/20/2017.    TECHNIQUE: Multiplanar MR imaging was performed without contrast.    FINDINGS: Numbering of the levels is based on what appear to be five  lumbar type vertebral bodies. The majority of the vertebral bodies and  the entire spinal canal from L3 to the sacrum is obscured by metal  artifact. The visualized vertebral bodies are well aligned. No  fracture is seen. No pars interarticularis defect is demonstrated.  There is an approximately 1 cm benign-appearing hemangioma within the  L1 vertebral body. Mild degenerative signal changes are seen in the  endplates adjacent to the L2-L3 disc. The conus medullaris terminates  at the level of the T12-L1 disc. No intrathecal abnormality is seen.  The adjacent soft tissues are unremarkable.    Findings by specific level:    T12-L1: The disc and facet joints are normal. No stenosis is seen.    L1-L2: The disc height is well-preserved. There is a minimal disc  bulge with no herniation or stenosis seen. The facet joints are  unremarkable.    L2-L3: There  appears to mild posterior disc height loss. There is a  mild disc bulge with no focal herniation seen. The facet joints are  partially obscured by metal artifact, but I suspect there are mild if  not moderate degenerative changes. The central canal is partially  obscured by metal artifact. There is moderate left and mild right  neural foraminal stenosis.    L3-L4, L4-L5, and L5-S1: These levels are completely obscured by  surgical metal artifact.      Impression    IMPRESSION:   1. Surgical changes from L3 to S1. Metal artifact obscures these  levels and thus they cannot be further evaluated.  2. At L2-L3 degenerative changes result in moderate left and mild  right neural foraminal stenosis.    CIRO BURT MD     Scribe Disclosure:  I, Seth Cade, am serving as a scribe to document services personally performed by Campos Boyd MD at this visit, based upon the provider's statements to me. All documentation has been reviewed by the aforementioned provider prior to being entered into the official medical record.     Seth Cade served as the scribe for this patient's visit and documented my history and physical exam.  I performed the history and physical exam.  I have edited and agree with the note.  JUS Boyd MD      Again, thank you for allowing me to participate in the care of your patient.      Sincerely,    Campos Boyd MD        Patient Care Team:  Campos Parra MD as PCP - General (Internal Medicine)  Campos Boyd MD as MD (Urology)  Padmini Whitney RN as Registered Nurse  Nikhil Farah MD as MD (Neurology)  Nikhil Farah MD as Referring Physician (Neurology)  Kamlesh Bonner MD as MD (Ophthalmology)  Campos Parra MD as Assigned PCP    Copy to patient  ATIF JAMES  [address] [facility] [address]

## 2019-05-31 NOTE — PATIENT INSTRUCTIONS
Follow up with Dr. Boyd in 6 months with PSA prior to appointment.    Medications have been sent to your pharmacy (Finasteride and Tamsulosin)    It was a pleasure meeting with you today.  Thank you for allowing me and my team the privilege of caring for you today.  YOU are the reason we are here, and I truly hope we provided you with the excellent service you deserve.  Please let us know if there is anything else we can do for you so that we can be sure you are leaving completely satisfied with your care experience.        DAMION Santigao

## 2019-05-31 NOTE — LETTER
5/31/2019      RE: Loco Wood  [address] [facility] [address]       Oncology/Hematology Visit Note  May 31, 2019    Reason for Visit: follow up of CLL    History of Present Illness: Loco Wood is a 74 year old male with CLL. He was diagnosed 2/12/2007 with CLL, Lyles stage 0, followed clinically since.  At diagnosis WBC 17.8, ALC 11.2, hemoglobin 15.2, platelets 188.  Flow consistent with CLL.  No opportunistic infections, with IgG in the normal range during follow up. Due to rising lymphocyte count, it was recommended he consider starting on treatment. He is currently undergoing treatment with ibrutinib, which he began on 5/14/19. Please see previous notes for further details on the patient's history. He comes in today for routine follow up.    Interval History:  Loco is here for follow up. He woke up one night with a damp T shirt. He states this is unusual. No fevers. No other sweats. Appetite good. Weight stable. No fatigue. Stools may be a little looser. No nausea.     He saw Dr. Boyd today and was recommended Flomax and Finasteride for his prostate.    He is recovering from URI a few weeks ago. Has some cough with mucous occasionally. He has not been using any OTC meds. He feels this is improving. No other infectious concerns.     Review of Systems:  Patient denies any of the following except if noted above: chest pain, dyspnea,dizziness, abdominal pain, rashes, bleeding/bruising, melena, hematochezia. Had 1 headache that resolved with tylenol.    Current Outpatient Medications   Medication Sig Dispense Refill     Acetaminophen (TYLENOL PO)        atorvastatin (LIPITOR) 10 MG tablet Take 1 tablet (10 mg) by mouth every 48 hours 45 tablet 4     Cholecalciferol (VITAMIN D3 PO) Take 4,000 Units by mouth daily        finasteride (PROSCAR) 5 MG tablet Take 1 tablet (5 mg) by mouth daily 90 tablet 3     ibrutinib (IMBRUVICA) 420 MG tablet Take 1 tablet (420 mg) by mouth daily 28  tablet 0     ibuprofen (ADVIL/MOTRIN) 200 MG tablet Take 200 mg by mouth every 4 hours as needed for mild pain       levothyroxine (SYNTHROID/LEVOTHROID) 50 MCG tablet Take 1 tablet (50 mcg) by mouth daily 90 tablet 3     omega 3 1200 MG CAPS Take 2 capsules by mouth daily       omeprazole (PRILOSEC) 10 MG DR capsule TAKE ONE CAPSULE BY MOUTH EVERY DAY 30 TO 60 MINUTES BEFORE A MEAL 90 capsule 1     prochlorperazine (COMPAZINE) 10 MG tablet Take 1 tablet (10 mg) by mouth every 6 hours as needed (Nausea/Vomiting) 30 tablet 2     tamsulosin (FLOMAX) 0.4 MG capsule Take 1 capsule (0.4 mg) by mouth daily 90 capsule 3     Physical Examination:  General: The patient is a pleasant male in no acute distress.  /63 (BP Location: Right arm, Patient Position: Sitting, Cuff Size: Adult Regular)   Pulse 64   Temp 98  F (36.7  C) (Oral)   Resp 18   Wt 69.3 kg (152 lb 12.8 oz)   SpO2 97%   BMI 23.93 kg/m     Wt Readings from Last 10 Encounters:   05/31/19 69.3 kg (152 lb 12.8 oz)   05/31/19 68.9 kg (152 lb)   05/22/19 71.1 kg (156 lb 12.8 oz)   05/16/19 68.9 kg (152 lb)   04/30/19 70.3 kg (155 lb)   12/21/18 72.9 kg (160 lb 11.5 oz)   10/16/18 71.7 kg (158 lb)   10/04/18 71.2 kg (157 lb)   06/27/18 74.4 kg (164 lb)   04/30/18 75.5 kg (166 lb 6 oz)   HEENT: EOMI, PERRL. Sclerae are anicteric. Oral mucosa is pink and moist with no lesions or thrush.   Lymph: Neck is supple with no lymphadenopathy in the cervical or supraclavicular areas. No axillary adenopathy palpable. Pea-sized lymph nodes palpated in the left inguinal area only.  Heart: Regular rate and rhythm.   Lungs: Clear to auscultation bilaterally.   Abdomen: Bowel sounds present, soft, nontender with no palpable hepatosplenomegaly or masses.   Extremities: No lower extremity edema noted bilaterally.   Neuro: Cranial nerves II through XII are grossly intact.  Skin: No rashes, petechiae, or bruising noted on exposed skin.  Laboratory Data:  Results for FENICHEL,  ATIF JULIEN (MRN [id number]) as of 5/31/2019 11:45   Ref. Range 5/31/2019 10:21   Sodium Latest Ref Range: 133 - 144 mmol/L 139   Potassium Latest Ref Range: 3.4 - 5.3 mmol/L 4.7   Chloride Latest Ref Range: 94 - 109 mmol/L 106   Carbon Dioxide Latest Ref Range: 20 - 32 mmol/L 27   Urea Nitrogen Latest Ref Range: 7 - 30 mg/dL 22   Creatinine Latest Ref Range: 0.66 - 1.25 mg/dL 1.47 (H)   GFR Estimate Latest Ref Range: >60 mL/min/1.73_m2 47 (L)   GFR Estimate If Black Latest Ref Range: >60 mL/min/1.73_m2 54 (L)   Calcium Latest Ref Range: 8.5 - 10.1 mg/dL 9.6   Anion Gap Latest Ref Range: 3 - 14 mmol/L 6   Albumin Latest Ref Range: 3.4 - 5.0 g/dL 3.9   Protein Total Latest Ref Range: 6.8 - 8.8 g/dL 6.3 (L)   Bilirubin Total Latest Ref Range: 0.2 - 1.3 mg/dL 1.1   Alkaline Phosphatase Latest Ref Range: 40 - 150 U/L 97   ALT Latest Ref Range: 0 - 70 U/L 18   AST Latest Ref Range: 0 - 45 U/L 16   Lactate Dehydrogenase Latest Ref Range: 85 - 227 U/L 171   Glucose Latest Ref Range: 70 - 99 mg/dL 108 (H)   WBC Latest Ref Range: 4.0 - 11.0 10e9/L 232.7 (HH)   Hemoglobin Latest Ref Range: 13.3 - 17.7 g/dL 10.7 (L)   Hematocrit Latest Ref Range: 40.0 - 53.0 % 38.3 (L)   Platelet Count Latest Ref Range: 150 - 450 10e9/L 153   RBC Count Latest Ref Range: 4.4 - 5.9 10e12/L 3.64 (L)   MCV Latest Ref Range: 78 - 100 fl 105 (H)   MCH Latest Ref Range: 26.5 - 33.0 pg 29.4   MCHC Latest Ref Range: 31.5 - 36.5 g/dL 27.9 (L)   RDW Latest Ref Range: 10.0 - 15.0 % 16.9 (H)   Diff Method Unknown Manual Differential   % Neutrophils Latest Units: % 1.5   % Lymphocytes Latest Units: % 98.5   % Monocytes Latest Units: % 0.0   % Eosinophils Latest Units: % 0.0   % Basophils Latest Units: % 0.0   Absolute Neutrophil Latest Ref Range: 1.6 - 8.3 10e9/L 3.5   Absolute Lymphocytes Latest Ref Range: 0.8 - 5.3 10e9/L 229.2 (H)   Absolute Monocytes Latest Ref Range: 0.0 - 1.3 10e9/L 0.0   Absolute Eosinophils Latest Ref Range: 0.0 - 0.7 10e9/L 0.0    Absolute Basophils Latest Ref Range: 0.0 - 0.2 10e9/L 0.0     Assessment and Plan:  CLL. Patient started on ibrutinib 420 mg daily on 5/14/19. He is tolerating treatment very well thus far. He has had an expected rise in his lymphocyte count that is now trending down. He will continue weekly follow up for the first month. If he is doing well after that time, we may decrease the frequency of his follow up.     Hypothyroidism. He began on levothyroxine 50 mcg daily on 5/2/19. TSH is improving and free T4 is normal. Will recheck next visit.    CKD. He appears to have had some kidney dysfunction since at least 2010. No acute concerns today. Will continue to monitor.     Kelley Everett PA-C  [facility]  [address] [place] [address] [phone]

## 2019-05-31 NOTE — NURSING NOTE
Chief Complaint   Patient presents with     RECHECK     Follow up- prostate nodule and elevated PSA     SAMUEL SantiagoA

## 2019-05-31 NOTE — NURSING NOTE
"Oncology Rooming Note    May 31, 2019 11:19 AM   Loco Wood is a 74 year old male who presents for:    Chief Complaint   Patient presents with     Blood Draw     Labs drawn via  by RN in lab. VS taken.     Oncology Clinic Visit     Return visit related to CLL     Initial Vitals: /63 (BP Location: Right arm, Patient Position: Sitting, Cuff Size: Adult Regular)   Pulse 64   Temp 98  F (36.7  C) (Oral)   Resp 18   Ht 1.702 m (5' 7.01\")   Wt 69.3 kg (152 lb 12.8 oz)   SpO2 97%   BMI 23.93 kg/m   Estimated body mass index is 23.93 kg/m  as calculated from the following:    Height as of this encounter: 1.702 m (5' 7.01\").    Weight as of this encounter: 69.3 kg (152 lb 12.8 oz). Body surface area is 1.81 meters squared.  No Pain (0) Comment: Data Unavailable   No LMP for male patient.  Allergies reviewed: Yes  Medications reviewed: Yes    Medications: Medication refills not needed today.  Pharmacy name entered into [employer]:    [id number] - [place], [place] - [address] AT CORNER OF [address] [place] [address] [id number] - [place], [place] - [address] [facility] - [place] - [address]  [facility] - [place]  [place], [place] - [address] [facility] - [place], [place] - [address]    Clinical concerns: No new concerns. Provider was notified.      Magdalena Hackett LPN            "

## 2019-05-31 NOTE — NURSING NOTE
Chief Complaint   Patient presents with     Blood Draw     Labs drawn via  by RN in lab. VS taken.     Evangelina Nation RN

## 2019-05-31 NOTE — PROGRESS NOTES
Oncology/Hematology Visit Note  May 31, 2019    Reason for Visit: follow up of CLL    History of Present Illness: Loco Wood is a 74 year old male with CLL. He was diagnosed 2/12/2007 with CLL, Lyles stage 0, followed clinically since.  At diagnosis WBC 17.8, ALC 11.2, hemoglobin 15.2, platelets 188.  Flow consistent with CLL.  No opportunistic infections, with IgG in the normal range during follow up. Due to rising lymphocyte count, it was recommended he consider starting on treatment. He is currently undergoing treatment with ibrutinib, which he began on 5/14/19. Please see previous notes for further details on the patient's history. He comes in today for routine follow up.    Interval History:  Loco is here for follow up. He woke up one night with a damp T shirt. He states this is unusual. No fevers. No other sweats. Appetite good. Weight stable. No fatigue. Stools may be a little looser. No nausea.     He saw Dr. Boyd today and was recommended Flomax and Finasteride for his prostate.    He is recovering from URI a few weeks ago. Has some cough with mucous occasionally. He has not been using any OTC meds. He feels this is improving. No other infectious concerns.     Review of Systems:  Patient denies any of the following except if noted above: chest pain, dyspnea,dizziness, abdominal pain, rashes, bleeding/bruising, melena, hematochezia. Had 1 headache that resolved with tylenol.    Current Outpatient Medications   Medication Sig Dispense Refill     Acetaminophen (TYLENOL PO)        atorvastatin (LIPITOR) 10 MG tablet Take 1 tablet (10 mg) by mouth every 48 hours 45 tablet 4     Cholecalciferol (VITAMIN D3 PO) Take 4,000 Units by mouth daily        finasteride (PROSCAR) 5 MG tablet Take 1 tablet (5 mg) by mouth daily 90 tablet 3     ibrutinib (IMBRUVICA) 420 MG tablet Take 1 tablet (420 mg) by mouth daily 28 tablet 0     ibuprofen (ADVIL/MOTRIN) 200 MG tablet Take 200 mg by mouth every 4 hours as  needed for mild pain       levothyroxine (SYNTHROID/LEVOTHROID) 50 MCG tablet Take 1 tablet (50 mcg) by mouth daily 90 tablet 3     omega 3 1200 MG CAPS Take 2 capsules by mouth daily       omeprazole (PRILOSEC) 10 MG DR capsule TAKE ONE CAPSULE BY MOUTH EVERY DAY 30 TO 60 MINUTES BEFORE A MEAL 90 capsule 1     prochlorperazine (COMPAZINE) 10 MG tablet Take 1 tablet (10 mg) by mouth every 6 hours as needed (Nausea/Vomiting) 30 tablet 2     tamsulosin (FLOMAX) 0.4 MG capsule Take 1 capsule (0.4 mg) by mouth daily 90 capsule 3     Physical Examination:  General: The patient is a pleasant male in no acute distress.  /63 (BP Location: Right arm, Patient Position: Sitting, Cuff Size: Adult Regular)   Pulse 64   Temp 98  F (36.7  C) (Oral)   Resp 18   Wt 69.3 kg (152 lb 12.8 oz)   SpO2 97%   BMI 23.93 kg/m    Wt Readings from Last 10 Encounters:   05/31/19 69.3 kg (152 lb 12.8 oz)   05/31/19 68.9 kg (152 lb)   05/22/19 71.1 kg (156 lb 12.8 oz)   05/16/19 68.9 kg (152 lb)   04/30/19 70.3 kg (155 lb)   12/21/18 72.9 kg (160 lb 11.5 oz)   10/16/18 71.7 kg (158 lb)   10/04/18 71.2 kg (157 lb)   06/27/18 74.4 kg (164 lb)   04/30/18 75.5 kg (166 lb 6 oz)   HEENT: EOMI, PERRL. Sclerae are anicteric. Oral mucosa is pink and moist with no lesions or thrush.   Lymph: Neck is supple with no lymphadenopathy in the cervical or supraclavicular areas. No axillary adenopathy palpable. Pea-sized lymph nodes palpated in the left inguinal area only.  Heart: Regular rate and rhythm.   Lungs: Clear to auscultation bilaterally.   Abdomen: Bowel sounds present, soft, nontender with no palpable hepatosplenomegaly or masses.   Extremities: No lower extremity edema noted bilaterally.   Neuro: Cranial nerves II through XII are grossly intact.  Skin: No rashes, petechiae, or bruising noted on exposed skin.  Laboratory Data:  Results for ATIF JAMES (MRN [id number]) as of 5/31/2019 11:45   Ref. Range 5/31/2019 10:21   Sodium Latest  Ref Range: 133 - 144 mmol/L 139   Potassium Latest Ref Range: 3.4 - 5.3 mmol/L 4.7   Chloride Latest Ref Range: 94 - 109 mmol/L 106   Carbon Dioxide Latest Ref Range: 20 - 32 mmol/L 27   Urea Nitrogen Latest Ref Range: 7 - 30 mg/dL 22   Creatinine Latest Ref Range: 0.66 - 1.25 mg/dL 1.47 (H)   GFR Estimate Latest Ref Range: >60 mL/min/1.73_m2 47 (L)   GFR Estimate If Black Latest Ref Range: >60 mL/min/1.73_m2 54 (L)   Calcium Latest Ref Range: 8.5 - 10.1 mg/dL 9.6   Anion Gap Latest Ref Range: 3 - 14 mmol/L 6   Albumin Latest Ref Range: 3.4 - 5.0 g/dL 3.9   Protein Total Latest Ref Range: 6.8 - 8.8 g/dL 6.3 (L)   Bilirubin Total Latest Ref Range: 0.2 - 1.3 mg/dL 1.1   Alkaline Phosphatase Latest Ref Range: 40 - 150 U/L 97   ALT Latest Ref Range: 0 - 70 U/L 18   AST Latest Ref Range: 0 - 45 U/L 16   Lactate Dehydrogenase Latest Ref Range: 85 - 227 U/L 171   Glucose Latest Ref Range: 70 - 99 mg/dL 108 (H)   WBC Latest Ref Range: 4.0 - 11.0 10e9/L 232.7 (HH)   Hemoglobin Latest Ref Range: 13.3 - 17.7 g/dL 10.7 (L)   Hematocrit Latest Ref Range: 40.0 - 53.0 % 38.3 (L)   Platelet Count Latest Ref Range: 150 - 450 10e9/L 153   RBC Count Latest Ref Range: 4.4 - 5.9 10e12/L 3.64 (L)   MCV Latest Ref Range: 78 - 100 fl 105 (H)   MCH Latest Ref Range: 26.5 - 33.0 pg 29.4   MCHC Latest Ref Range: 31.5 - 36.5 g/dL 27.9 (L)   RDW Latest Ref Range: 10.0 - 15.0 % 16.9 (H)   Diff Method Unknown Manual Differential   % Neutrophils Latest Units: % 1.5   % Lymphocytes Latest Units: % 98.5   % Monocytes Latest Units: % 0.0   % Eosinophils Latest Units: % 0.0   % Basophils Latest Units: % 0.0   Absolute Neutrophil Latest Ref Range: 1.6 - 8.3 10e9/L 3.5   Absolute Lymphocytes Latest Ref Range: 0.8 - 5.3 10e9/L 229.2 (H)   Absolute Monocytes Latest Ref Range: 0.0 - 1.3 10e9/L 0.0   Absolute Eosinophils Latest Ref Range: 0.0 - 0.7 10e9/L 0.0   Absolute Basophils Latest Ref Range: 0.0 - 0.2 10e9/L 0.0     Assessment and Plan:  CLL. Patient  started on ibrutinib 420 mg daily on 5/14/19. He is tolerating treatment very well thus far. He has had an expected rise in his lymphocyte count that is now trending down. He will continue weekly follow up for the first month. If he is doing well after that time, we may decrease the frequency of his follow up.     Hypothyroidism. He began on levothyroxine 50 mcg daily on 5/2/19. TSH is improving and free T4 is normal. Will recheck next visit.    CKD. He appears to have had some kidney dysfunction since at least 2010. No acute concerns today. Will continue to monitor.     Kelley Everett PA-C  [facility]  [address] [place] [address] [phone]

## 2019-06-04 DIAGNOSIS — C91.10 CLL (CHRONIC LYMPHOCYTIC LEUKEMIA) (H): ICD-10-CM

## 2019-06-05 ENCOUNTER — APPOINTMENT (OUTPATIENT)
Dept: LAB | Facility: CLINIC | Age: 74
End: 2019-06-05
Attending: INTERNAL MEDICINE
Payer: MEDICARE

## 2019-06-05 ENCOUNTER — ONCOLOGY VISIT (OUTPATIENT)
Dept: ONCOLOGY | Facility: CLINIC | Age: 74
End: 2019-06-05
Attending: INTERNAL MEDICINE
Payer: MEDICARE

## 2019-06-05 VITALS
HEIGHT: 67 IN | HEART RATE: 73 BPM | RESPIRATION RATE: 16 BRPM | BODY MASS INDEX: 24.42 KG/M2 | DIASTOLIC BLOOD PRESSURE: 57 MMHG | OXYGEN SATURATION: 98 % | WEIGHT: 155.6 LBS | TEMPERATURE: 98.3 F | SYSTOLIC BLOOD PRESSURE: 102 MMHG

## 2019-06-05 DIAGNOSIS — K21.9 GASTROESOPHAGEAL REFLUX DISEASE WITHOUT ESOPHAGITIS: ICD-10-CM

## 2019-06-05 DIAGNOSIS — C91.10 CLL (CHRONIC LYMPHOCYTIC LEUKEMIA) (H): Primary | ICD-10-CM

## 2019-06-05 DIAGNOSIS — E03.9 HYPOTHYROIDISM, UNSPECIFIED TYPE: ICD-10-CM

## 2019-06-05 DIAGNOSIS — R00.2 HEART PALPITATIONS: ICD-10-CM

## 2019-06-05 LAB
ALBUMIN SERPL-MCNC: 3.9 G/DL (ref 3.4–5)
ALP SERPL-CCNC: 88 U/L (ref 40–150)
ALT SERPL W P-5'-P-CCNC: 17 U/L (ref 0–70)
ANION GAP SERPL CALCULATED.3IONS-SCNC: 7 MMOL/L (ref 3–14)
ANISOCYTOSIS BLD QL SMEAR: SLIGHT
AST SERPL W P-5'-P-CCNC: 18 U/L (ref 0–45)
BASOPHILS # BLD AUTO: 0 10E9/L (ref 0–0.2)
BASOPHILS NFR BLD AUTO: 0 %
BILIRUB SERPL-MCNC: 1.1 MG/DL (ref 0.2–1.3)
BUN SERPL-MCNC: 13 MG/DL (ref 7–30)
CALCIUM SERPL-MCNC: 8.9 MG/DL (ref 8.5–10.1)
CHLORIDE SERPL-SCNC: 106 MMOL/L (ref 94–109)
CO2 SERPL-SCNC: 26 MMOL/L (ref 20–32)
CREAT SERPL-MCNC: 1.4 MG/DL (ref 0.66–1.25)
DIFFERENTIAL METHOD BLD: ABNORMAL
EOSINOPHIL # BLD AUTO: 0 10E9/L (ref 0–0.7)
EOSINOPHIL NFR BLD AUTO: 0 %
ERYTHROCYTE [DISTWIDTH] IN BLOOD BY AUTOMATED COUNT: 16.5 % (ref 10–15)
GFR SERPL CREATININE-BSD FRML MDRD: 50 ML/MIN/{1.73_M2}
GLUCOSE SERPL-MCNC: 109 MG/DL (ref 70–99)
HCT VFR BLD AUTO: 38.7 % (ref 40–53)
HGB BLD-MCNC: 11.2 G/DL (ref 13.3–17.7)
LYMPHOCYTES # BLD AUTO: 209.5 10E9/L (ref 0.8–5.3)
LYMPHOCYTES NFR BLD AUTO: 99 %
MCH RBC QN AUTO: 30.7 PG (ref 26.5–33)
MCHC RBC AUTO-ENTMCNC: 28.9 G/DL (ref 31.5–36.5)
MCV RBC AUTO: 106 FL (ref 78–100)
MONOCYTES # BLD AUTO: 0 10E9/L (ref 0–1.3)
MONOCYTES NFR BLD AUTO: 0 %
NEUTROPHILS # BLD AUTO: 2.1 10E9/L (ref 1.6–8.3)
NEUTROPHILS NFR BLD AUTO: 1 %
PLATELET # BLD AUTO: 132 10E9/L (ref 150–450)
POIKILOCYTOSIS BLD QL SMEAR: SLIGHT
POTASSIUM SERPL-SCNC: 4.3 MMOL/L (ref 3.4–5.3)
PROT SERPL-MCNC: 6.1 G/DL (ref 6.8–8.8)
RBC # BLD AUTO: 3.65 10E12/L (ref 4.4–5.9)
SODIUM SERPL-SCNC: 140 MMOL/L (ref 133–144)
T4 FREE SERPL-MCNC: 1.21 NG/DL (ref 0.76–1.46)
TSH SERPL DL<=0.005 MIU/L-ACNC: 5.55 MU/L (ref 0.4–4)
WBC # BLD AUTO: 211.6 10E9/L (ref 4–11)

## 2019-06-05 PROCEDURE — 85025 COMPLETE CBC W/AUTO DIFF WBC: CPT | Performed by: INTERNAL MEDICINE

## 2019-06-05 PROCEDURE — 84439 ASSAY OF FREE THYROXINE: CPT | Performed by: INTERNAL MEDICINE

## 2019-06-05 PROCEDURE — G0463 HOSPITAL OUTPT CLINIC VISIT: HCPCS | Mod: ZF

## 2019-06-05 PROCEDURE — 80053 COMPREHEN METABOLIC PANEL: CPT | Performed by: INTERNAL MEDICINE

## 2019-06-05 PROCEDURE — 93010 ELECTROCARDIOGRAM REPORT: CPT | Mod: ZP | Performed by: INTERNAL MEDICINE

## 2019-06-05 PROCEDURE — 93005 ELECTROCARDIOGRAM TRACING: CPT | Mod: ZF

## 2019-06-05 PROCEDURE — G0463 HOSPITAL OUTPT CLINIC VISIT: HCPCS | Mod: 25

## 2019-06-05 PROCEDURE — 84443 ASSAY THYROID STIM HORMONE: CPT | Performed by: INTERNAL MEDICINE

## 2019-06-05 PROCEDURE — 99214 OFFICE O/P EST MOD 30 MIN: CPT | Mod: ZP | Performed by: PHYSICIAN ASSISTANT

## 2019-06-05 PROCEDURE — 36415 COLL VENOUS BLD VENIPUNCTURE: CPT

## 2019-06-05 RX ORDER — OMEPRAZOLE 10 MG/1
CAPSULE, DELAYED RELEASE ORAL
Qty: 90 CAPSULE | Refills: 3 | Status: SHIPPED | OUTPATIENT
Start: 2019-06-05 | End: 2020-08-24

## 2019-06-05 ASSESSMENT — PAIN SCALES - GENERAL: PAINLEVEL: NO PAIN (0)

## 2019-06-05 ASSESSMENT — MIFFLIN-ST. JEOR: SCORE: 1414.43

## 2019-06-05 NOTE — NURSING NOTE
"Oncology Rooming Note    June 5, 2019 9:22 AM   Loco Wood is a 74 year old male who presents for:    Chief Complaint   Patient presents with     Blood Draw     Labs drawn via VPT by RN in lab. Vs taken. Pt checked in for next appt     Oncology Clinic Visit     Return: CLL (chronic lymphocytic leukemia)     Initial Vitals: /57 (BP Location: Right arm, Patient Position: Sitting, Cuff Size: Adult Regular)   Pulse 73   Temp 98.3  F (36.8  C) (Oral)   Resp 16   Ht 1.702 m (5' 7\")   Wt 70.6 kg (155 lb 9.6 oz)   SpO2 98%   BMI 24.37 kg/m   Estimated body mass index is 24.37 kg/m  as calculated from the following:    Height as of this encounter: 1.702 m (5' 7\").    Weight as of this encounter: 70.6 kg (155 lb 9.6 oz). Body surface area is 1.83 meters squared.  No Pain (0) Comment: Data Unavailable   No LMP for male patient.  Allergies reviewed: Yes  Medications reviewed: Yes    Medications: MEDICATION REFILLS NEEDED TODAY. Provider was notified.  Pharmacy name entered into [employer]:    [id number] - [place], [place] - [address] AT [address] OF [address] [place] [address] [id number] - [place], [place] - [address] [facility] - [place] - [address]  [facility] - [facility], [place] - [address] [facility]  [facility] - [place], [place] - [address]    Clinical concerns: Pt requesting refill on the omeprazole.  Jess LEACH was notified.      Patrica Francois CMA              "

## 2019-06-05 NOTE — PROGRESS NOTES
Oncology/Hematology Visit Note  Jun 5, 2019    Reason for Visit: follow up of CLL    History of Present Illness: Loco Wood is a 74 year old male with CLL. He was diagnosed 2/12/2007 with CLL, Lyles stage 0, followed clinically since.  At diagnosis WBC 17.8, ALC 11.2, hemoglobin 15.2, platelets 188.  Flow consistent with CLL.  No opportunistic infections, with IgG in the normal range during follow up. Due to rising lymphocyte count, it was recommended he consider starting on treatment. He is currently undergoing treatment with ibrutinib, which he began on 5/14/19. Please see previous notes for further details on the patient's history. He comes in today for routine follow up.    Interval History:  Patient reports that he had an episode of blood mixed with his nasal mucus yesterday.  He has had ongoing cold symptoms over the last 2 weeks with someSinus congestion but no sinus pain.  He denies fevers.  He did have a cough that has since improved.  He is not taking any medication for his cold symptoms.  He did have intermittent diarrhea and has not felt the need to take medication for this.  He questions if he may have heart palpitations most days but he is not sure.  Since starting on the new medications for his prostate he has noticed improvement in his nighttime urination from 4-5 times at night down to twice at night.  He denies any heartburn and remains on Prilosec chronically.  He denies any new lumps or bumps.  He denies any further night sweats.  He reports that his appetite has been okay and he has been eating and drinking okay.  He has had one headache in the last week.  He reports that his ankles intermittently feel achy and he has not felt the need to take anything for this.  He denies other concerns.    Current Outpatient Medications   Medication Sig Dispense Refill     Acetaminophen (TYLENOL PO)        atorvastatin (LIPITOR) 10 MG tablet Take 1 tablet (10 mg) by mouth every 48 hours 45 tablet 4      "Cholecalciferol (VITAMIN D3 PO) Take 4,000 Units by mouth daily        finasteride (PROSCAR) 5 MG tablet Take 1 tablet (5 mg) by mouth daily 90 tablet 3     ibrutinib (IMBRUVICA) 420 MG tablet Take 1 tablet (420 mg) by mouth daily 28 tablet 0     levothyroxine (SYNTHROID/LEVOTHROID) 50 MCG tablet Take 1 tablet (50 mcg) by mouth daily 90 tablet 3     omega 3 1200 MG CAPS Take 2 capsules by mouth daily       omeprazole (PRILOSEC) 10 MG DR capsule TAKE ONE CAPSULE BY MOUTH EVERY DAY 30 TO 60 MINUTES BEFORE A MEAL 90 capsule 3     tamsulosin (FLOMAX) 0.4 MG capsule Take 1 capsule (0.4 mg) by mouth daily 90 capsule 3     ibuprofen (ADVIL/MOTRIN) 200 MG tablet Take 200 mg by mouth every 4 hours as needed for mild pain       prochlorperazine (COMPAZINE) 10 MG tablet Take 1 tablet (10 mg) by mouth every 6 hours as needed (Nausea/Vomiting) (Patient not taking: Reported on 5/31/2019) 30 tablet 2     Physical Examination:  General: The patient is a pleasant male in no acute distress.  /57 (BP Location: Right arm, Patient Position: Sitting, Cuff Size: Adult Regular)   Pulse 73   Temp 98.3  F (36.8  C) (Oral)   Resp 16   Ht 1.702 m (5' 7\")   Wt 70.6 kg (155 lb 9.6 oz)   SpO2 98%   BMI 24.37 kg/m    Wt Readings from Last 10 Encounters:   06/05/19 70.6 kg (155 lb 9.6 oz)   05/31/19 69.3 kg (152 lb 12.8 oz)   05/31/19 68.9 kg (152 lb)   05/22/19 71.1 kg (156 lb 12.8 oz)   05/16/19 68.9 kg (152 lb)   04/30/19 70.3 kg (155 lb)   12/21/18 72.9 kg (160 lb 11.5 oz)   10/16/18 71.7 kg (158 lb)   10/04/18 71.2 kg (157 lb)   06/27/18 74.4 kg (164 lb)   HEENT: EOMI, PERRL. Sclerae are anicteric. Oral mucosa is pink and moist with no lesions or thrush.   Lymph: Neck is supple with no lymphadenopathy in the cervical or supraclavicular areas. No axillary adenopathy palpable. One pea-sized lymph node palpated in each of the inguinal areas.  Heart: Regular rate and rhythm.   Lungs: Clear to auscultation bilaterally.   Abdomen: Bowel " sounds present, soft, nontender with no palpable hepatosplenomegaly or masses.   Extremities: No lower extremity edema noted bilaterally.   Neuro: Cranial nerves II through XII are grossly intact.  Skin: No rashes, petechiae, or bruising noted on exposed skin.    Laboratory Data:   6/5/2019 09:04   Sodium 140   Potassium 4.3   Chloride 106   Carbon Dioxide 26   Urea Nitrogen 13   Creatinine 1.40 (H)   GFR Estimate 50 (L)   GFR Estimate If Black 58 (L)   Calcium 8.9   Anion Gap 7   Albumin 3.9   Protein Total 6.1 (L)   Bilirubin Total 1.1   Alkaline Phosphatase 88   ALT 17   AST 18   T4 Free 1.21   TSH 5.55 (H)   Glucose 109 (H)   .6 (HH)   Hemoglobin 11.2 (L)   Hematocrit 38.7 (L)   Platelet Count 132 (L)   RBC Count 3.65 (L)    (H)   MCH 30.7   MCHC 28.9 (L)   RDW 16.5 (H)   Diff Method Manual Differential   % Neutrophils 1.0   % Lymphocytes 99.0   % Monocytes 0.0   % Eosinophils 0.0   % Basophils 0.0   Absolute Neutrophil 2.1   Absolute Lymphocytes 209.5 (H)   Absolute Monocytes 0.0   Absolute Eosinophils 0.0   Absolute Basophils 0.0   Anisocytosis Slight   Poikilocytosis Slight     Assessment and Plan:  CLL. Patient started on ibrutinib 420 mg daily on 5/14/19. He is tolerating treatment very well thus far. He has had an expected rise in his lymphocyte count that is now trending down. He will continue weekly follow up for the first month. If he is doing well after that time, we may decrease the frequency of his follow up. He will see Dr. Burns in early July.     Hypothyroidism. He began on levothyroxine 50 mcg daily on 5/2/19. TSH is stable and mildly elevated and free T4 is normal.      CKD. He appears to have had some kidney dysfunction since at least 2010. No acute concerns today. Will continue to monitor.     Heart palpitations. EKG today shows NSR with normal QTc of 409. If persist through next week, may consider a Ziopatch Holter monitor.     Jess Sanchez PA-C  [facility]  [address]  [place] [address]  [phone]

## 2019-06-05 NOTE — NURSING NOTE
Chief Complaint   Patient presents with     Blood Draw     Labs drawn via VPT by RN in lab. Vs taken. Pt checked in for next appt     Labs collected from venipuncture by RN. Vitals taken. Checked in for appointment(s). Pt stated no PSA needed at this time.    Monica ESCUDERO RN PHN BSN  BMT/Oncology Lab

## 2019-06-05 NOTE — LETTER
6/5/2019       RE: Loco Wood  [address] [facility] [address]     Dear Colleague,    Thank you for referring your patient, Loco Wood, to the [facility]. Please see a copy of my visit note below.    Oncology/Hematology Visit Note  Jun 5, 2019    Reason for Visit: follow up of CLL    History of Present Illness: Loco Wood is a 74 year old male with CLL. He was diagnosed 2/12/2007 with CLL, Lyles stage 0, followed clinically since.  At diagnosis WBC 17.8, ALC 11.2, hemoglobin 15.2, platelets 188.  Flow consistent with CLL.  No opportunistic infections, with IgG in the normal range during follow up. Due to rising lymphocyte count, it was recommended he consider starting on treatment. He is currently undergoing treatment with ibrutinib, which he began on 5/14/19. Please see previous notes for further details on the patient's history. He comes in today for routine follow up.    Interval History:  Patient reports that he had an episode of blood mixed with his nasal mucus yesterday.  He has had ongoing cold symptoms over the last 2 weeks with someSinus congestion but no sinus pain.  He denies fevers.  He did have a cough that has since improved.  He is not taking any medication for his cold symptoms.  He did have intermittent diarrhea and has not felt the need to take medication for this.  He questions if he may have heart palpitations most days but he is not sure.  Since starting on the new medications for his prostate he has noticed improvement in his nighttime urination from 4-5 times at night down to twice at night.  He denies any heartburn and remains on Prilosec chronically.  He denies any new lumps or bumps.  He denies any further night sweats.  He reports that his appetite has been okay and he has been eating and drinking okay.  He has had one headache in the last week.  He reports that his ankles intermittently feel achy and he has not felt the need to take  "anything for this.  He denies other concerns.    Current Outpatient Medications   Medication Sig Dispense Refill     Acetaminophen (TYLENOL PO)        atorvastatin (LIPITOR) 10 MG tablet Take 1 tablet (10 mg) by mouth every 48 hours 45 tablet 4     Cholecalciferol (VITAMIN D3 PO) Take 4,000 Units by mouth daily        finasteride (PROSCAR) 5 MG tablet Take 1 tablet (5 mg) by mouth daily 90 tablet 3     ibrutinib (IMBRUVICA) 420 MG tablet Take 1 tablet (420 mg) by mouth daily 28 tablet 0     levothyroxine (SYNTHROID/LEVOTHROID) 50 MCG tablet Take 1 tablet (50 mcg) by mouth daily 90 tablet 3     omega 3 1200 MG CAPS Take 2 capsules by mouth daily       omeprazole (PRILOSEC) 10 MG DR capsule TAKE ONE CAPSULE BY MOUTH EVERY DAY 30 TO 60 MINUTES BEFORE A MEAL 90 capsule 3     tamsulosin (FLOMAX) 0.4 MG capsule Take 1 capsule (0.4 mg) by mouth daily 90 capsule 3     ibuprofen (ADVIL/MOTRIN) 200 MG tablet Take 200 mg by mouth every 4 hours as needed for mild pain       prochlorperazine (COMPAZINE) 10 MG tablet Take 1 tablet (10 mg) by mouth every 6 hours as needed (Nausea/Vomiting) (Patient not taking: Reported on 5/31/2019) 30 tablet 2     Physical Examination:  General: The patient is a pleasant male in no acute distress.  /57 (BP Location: Right arm, Patient Position: Sitting, Cuff Size: Adult Regular)   Pulse 73   Temp 98.3  F (36.8  C) (Oral)   Resp 16   Ht 1.702 m (5' 7\")   Wt 70.6 kg (155 lb 9.6 oz)   SpO2 98%   BMI 24.37 kg/m     Wt Readings from Last 10 Encounters:   06/05/19 70.6 kg (155 lb 9.6 oz)   05/31/19 69.3 kg (152 lb 12.8 oz)   05/31/19 68.9 kg (152 lb)   05/22/19 71.1 kg (156 lb 12.8 oz)   05/16/19 68.9 kg (152 lb)   04/30/19 70.3 kg (155 lb)   12/21/18 72.9 kg (160 lb 11.5 oz)   10/16/18 71.7 kg (158 lb)   10/04/18 71.2 kg (157 lb)   06/27/18 74.4 kg (164 lb)   HEENT: EOMI, PERRL. Sclerae are anicteric. Oral mucosa is pink and moist with no lesions or thrush.   Lymph: Neck is supple with no " lymphadenopathy in the cervical or supraclavicular areas. No axillary adenopathy palpable. One pea-sized lymph node palpated in each of the inguinal areas.  Heart: Regular rate and rhythm.   Lungs: Clear to auscultation bilaterally.   Abdomen: Bowel sounds present, soft, nontender with no palpable hepatosplenomegaly or masses.   Extremities: No lower extremity edema noted bilaterally.   Neuro: Cranial nerves II through XII are grossly intact.  Skin: No rashes, petechiae, or bruising noted on exposed skin.    Laboratory Data:   6/5/2019 09:04   Sodium 140   Potassium 4.3   Chloride 106   Carbon Dioxide 26   Urea Nitrogen 13   Creatinine 1.40 (H)   GFR Estimate 50 (L)   GFR Estimate If Black 58 (L)   Calcium 8.9   Anion Gap 7   Albumin 3.9   Protein Total 6.1 (L)   Bilirubin Total 1.1   Alkaline Phosphatase 88   ALT 17   AST 18   T4 Free 1.21   TSH 5.55 (H)   Glucose 109 (H)   .6 (HH)   Hemoglobin 11.2 (L)   Hematocrit 38.7 (L)   Platelet Count 132 (L)   RBC Count 3.65 (L)    (H)   MCH 30.7   MCHC 28.9 (L)   RDW 16.5 (H)   Diff Method Manual Differential   % Neutrophils 1.0   % Lymphocytes 99.0   % Monocytes 0.0   % Eosinophils 0.0   % Basophils 0.0   Absolute Neutrophil 2.1   Absolute Lymphocytes 209.5 (H)   Absolute Monocytes 0.0   Absolute Eosinophils 0.0   Absolute Basophils 0.0   Anisocytosis Slight   Poikilocytosis Slight     Assessment and Plan:  CLL. Patient started on ibrutinib 420 mg daily on 5/14/19. He is tolerating treatment very well thus far. He has had an expected rise in his lymphocyte count that is now trending down. He will continue weekly follow up for the first month. If he is doing well after that time, we may decrease the frequency of his follow up. He will see Dr. Burns in early July.     Hypothyroidism. He began on levothyroxine 50 mcg daily on 5/2/19. TSH is stable and mildly elevated and free T4 is normal.      CKD. He appears to have had some kidney dysfunction since at least  2010. No acute concerns today. Will continue to monitor.     Heart palpitations. EKG today shows NSR with normal QTc of 409. If persist through next week, may consider a Ziopatch Holter monitor.     Jess Sanchez PA-C  [facility] [place] [address] [place], [place] [address] [phone]

## 2019-06-06 LAB — INTERPRETATION ECG - MUSE: NORMAL

## 2019-06-11 NOTE — PROGRESS NOTES
Oncology/Hematology Visit Note  Jun 12, 2019    Reason for Visit: follow up of CLL    History of Present Illness: Loco Wood is a 74 year old male with CLL. He was diagnosed 2/12/2007 with CLL, Lyles stage 0, followed clinically since.  At diagnosis WBC 17.8, ALC 11.2, hemoglobin 15.2, platelets 188.  Flow consistent with CLL.  No opportunistic infections, with IgG in the normal range during follow up. Due to rising lymphocyte count, it was recommended he consider starting on treatment. He is currently undergoing treatment with ibrutinib, which he began on 5/14/19. Please see previous notes for further details on the patient's history. He comes in today for routine follow up.    Interval History:  Patient reports that he has had a cold over the last 3 weeks.  He notes that he has had a cough that is gone into his chest and is sometimes productive with white to yellow mucus.  He denies any fevers, dyspnea, or chest pain.  He has not taken any medication for his symptoms.  He does not stay awake at night with his cough.  He has had 3 or 4 episodes of feeling like his heart is racing in the last week each lasting about 20 seconds and occurring while in bed.  He denies other concerns.    Review of Systems:  Patient denies any of the following except if noted above: fevers, chills, difficulty with energy, vision or hearing changes, chest pain, dyspnea, abdominal pain, nausea, vomiting, diarrhea, constipation, urinary concerns, headaches, numbness, tingling, issues with sleep or mood.     Current Outpatient Medications   Medication Sig Dispense Refill     Acetaminophen (TYLENOL PO)        atorvastatin (LIPITOR) 10 MG tablet Take 1 tablet (10 mg) by mouth every 48 hours 45 tablet 4     Cholecalciferol (VITAMIN D3 PO) Take 4,000 Units by mouth daily        finasteride (PROSCAR) 5 MG tablet Take 1 tablet (5 mg) by mouth daily 90 tablet 3     ibrutinib (IMBRUVICA) 420 MG tablet Take 1 tablet (420 mg) by mouth daily 28  "tablet 0     ibrutinib (IMBRUVICA) 420 MG tablet Take 1 tablet (420 mg) by mouth daily 28 tablet 0     ibuprofen (ADVIL/MOTRIN) 200 MG tablet Take 200 mg by mouth every 4 hours as needed for mild pain       levothyroxine (SYNTHROID/LEVOTHROID) 50 MCG tablet Take 1 tablet (50 mcg) by mouth daily 90 tablet 3     omega 3 1200 MG CAPS Take 2 capsules by mouth daily       omeprazole (PRILOSEC) 10 MG DR capsule TAKE ONE CAPSULE BY MOUTH EVERY DAY 30 TO 60 MINUTES BEFORE A MEAL 90 capsule 3     prochlorperazine (COMPAZINE) 10 MG tablet Take 1 tablet (10 mg) by mouth every 6 hours as needed (Nausea/Vomiting) 30 tablet 2     tamsulosin (FLOMAX) 0.4 MG capsule Take 1 capsule (0.4 mg) by mouth daily 90 capsule 3     Physical Examination:  General: The patient is a pleasant male in no acute distress.  /64 (BP Location: Right arm, Patient Position: Sitting, Cuff Size: Adult Regular)   Pulse 59   Temp 98.5  F (36.9  C) (Oral)   Resp 16   Ht 1.702 m (5' 7.01\")   Wt 70.9 kg (156 lb 4.8 oz)   SpO2 99%   BMI 24.47 kg/m    Wt Readings from Last 10 Encounters:   06/12/19 70.9 kg (156 lb 4.8 oz)   06/05/19 70.6 kg (155 lb 9.6 oz)   05/31/19 69.3 kg (152 lb 12.8 oz)   05/31/19 68.9 kg (152 lb)   05/22/19 71.1 kg (156 lb 12.8 oz)   05/16/19 68.9 kg (152 lb)   04/30/19 70.3 kg (155 lb)   12/21/18 72.9 kg (160 lb 11.5 oz)   10/16/18 71.7 kg (158 lb)   10/04/18 71.2 kg (157 lb)   HEENT: EOMI, PERRL. Sclerae are anicteric. Oral mucosa is pink and moist with no lesions or thrush.   Lymph: Neck is supple with no lymphadenopathy in the cervical or supraclavicular areas. No axillary adenopathy palpable. One pea-sized lymph node palpated in each of the inguinal areas.  Heart: Regular rate and rhythm.   Lungs: Clear to auscultation bilaterally.   Abdomen: Bowel sounds present, soft, nontender with no palpable hepatosplenomegaly or masses.   Extremities: No lower extremity edema noted bilaterally.   Neuro: Cranial nerves II through XII " are grossly intact.  Skin: No rashes, petechiae, or bruising noted on exposed skin.    Laboratory Data:   2019 08:06   Sodium 139   Potassium 4.5   Chloride 107   Carbon Dioxide 25   Urea Nitrogen 17   Creatinine 1.40 (H)   GFR Estimate 50 (L)   GFR Estimate If Black 58 (L)   Calcium 8.5   Anion Gap 7   Albumin 3.8   Protein Total 6.1 (L)   Bilirubin Total 0.9   Alkaline Phosphatase 81   ALT 20   AST 22   Lactate Dehydrogenase 173   Glucose 91   .3 (HH)   Hemoglobin 10.7 (L)   Hematocrit 36.7 (L)   Platelet Count 107 (L)   RBC Count 3.51 (L)    (H)   MCH 30.5   MCHC 29.2 (L)   RDW 16.2 (H)   Diff Method Manual Differential   % Neutrophils 0.8   % Lymphocytes 99.2   % Monocytes 0.0   % Eosinophils 0.0   % Basophils 0.0   Absolute Neutrophil 1.6   Absolute Lymphocytes 202.7 (H)   Absolute Monocytes 0.0   Absolute Eosinophils 0.0   Absolute Basophils 0.0   Anisocytosis Slight   Platelet Estimate Confirming automa...     Imaging:  Chest x-ray today shows the followin. No acute cardiopulmonary abnormality.   2. Stable convexity along the posteromedial aspect of the left lower chest, possibly a Bochdalek hernia.    Assessment and Plan:  CLL. Patient started on ibrutinib 420 mg daily on 19. He is tolerating treatment very well thus far. He has had an expected rise in his lymphocyte count that is now trending down. He will see Dr. Burns in early July. I will check with Dr. Burns on the frequency of labs moving forward.     Hypothyroidism. He began on levothyroxine 50 mcg daily on 19. Last TSH on 19 was stable and mildly elevated and free T4 is normal.      CKD. He appears to have had some kidney dysfunction since at least . No acute concerns today. Will continue to monitor.     Heart palpitations. EKG on 19 shows NSR with normal QTc of 409. Given ongoing episodes of heart racing, will set up for a Ziopatch Holter monitor.     Cough. Chest x-ray today shows no evidence of  pneumonia. Suspect viral illness. Okay to use OTC cough medications for symptom management. Call if develops fever or worsening of symptoms.     Jess Sanchez PA-C  [facility]  [address], [place] [address] [phone]    Addendum: Will plan to recheck labs in 1.5 weeks at [facility]. He will see Dr. Burns in 3 weeks.

## 2019-06-12 ENCOUNTER — ANCILLARY PROCEDURE (OUTPATIENT)
Dept: GENERAL RADIOLOGY | Facility: CLINIC | Age: 74
End: 2019-06-12
Attending: PHYSICIAN ASSISTANT
Payer: MEDICARE

## 2019-06-12 ENCOUNTER — APPOINTMENT (OUTPATIENT)
Dept: LAB | Facility: CLINIC | Age: 74
End: 2019-06-12
Attending: INTERNAL MEDICINE
Payer: MEDICARE

## 2019-06-12 ENCOUNTER — ONCOLOGY VISIT (OUTPATIENT)
Dept: ONCOLOGY | Facility: CLINIC | Age: 74
End: 2019-06-12
Attending: INTERNAL MEDICINE
Payer: MEDICARE

## 2019-06-12 VITALS
SYSTOLIC BLOOD PRESSURE: 112 MMHG | WEIGHT: 156.3 LBS | HEIGHT: 67 IN | OXYGEN SATURATION: 99 % | HEART RATE: 59 BPM | DIASTOLIC BLOOD PRESSURE: 64 MMHG | RESPIRATION RATE: 16 BRPM | TEMPERATURE: 98.5 F | BODY MASS INDEX: 24.53 KG/M2

## 2019-06-12 DIAGNOSIS — R05.9 COUGH: ICD-10-CM

## 2019-06-12 DIAGNOSIS — C91.10 CLL (CHRONIC LYMPHOCYTIC LEUKEMIA) (H): Primary | ICD-10-CM

## 2019-06-12 DIAGNOSIS — R00.2 PALPITATIONS: ICD-10-CM

## 2019-06-12 DIAGNOSIS — Z79.899 ENCOUNTER FOR LONG-TERM (CURRENT) USE OF MEDICATIONS: ICD-10-CM

## 2019-06-12 LAB
ALBUMIN SERPL-MCNC: 3.8 G/DL (ref 3.4–5)
ALP SERPL-CCNC: 81 U/L (ref 40–150)
ALT SERPL W P-5'-P-CCNC: 20 U/L (ref 0–70)
ANION GAP SERPL CALCULATED.3IONS-SCNC: 7 MMOL/L (ref 3–14)
ANISOCYTOSIS BLD QL SMEAR: SLIGHT
AST SERPL W P-5'-P-CCNC: 22 U/L (ref 0–45)
BASOPHILS # BLD AUTO: 0 10E9/L (ref 0–0.2)
BASOPHILS NFR BLD AUTO: 0 %
BILIRUB SERPL-MCNC: 0.9 MG/DL (ref 0.2–1.3)
BUN SERPL-MCNC: 17 MG/DL (ref 7–30)
CALCIUM SERPL-MCNC: 8.5 MG/DL (ref 8.5–10.1)
CHLORIDE SERPL-SCNC: 107 MMOL/L (ref 94–109)
CO2 SERPL-SCNC: 25 MMOL/L (ref 20–32)
CREAT SERPL-MCNC: 1.4 MG/DL (ref 0.66–1.25)
DIFFERENTIAL METHOD BLD: ABNORMAL
EOSINOPHIL # BLD AUTO: 0 10E9/L (ref 0–0.7)
EOSINOPHIL NFR BLD AUTO: 0 %
ERYTHROCYTE [DISTWIDTH] IN BLOOD BY AUTOMATED COUNT: 16.2 % (ref 10–15)
GFR SERPL CREATININE-BSD FRML MDRD: 50 ML/MIN/{1.73_M2}
GLUCOSE SERPL-MCNC: 91 MG/DL (ref 70–99)
HCT VFR BLD AUTO: 36.7 % (ref 40–53)
HGB BLD-MCNC: 10.7 G/DL (ref 13.3–17.7)
IGG SERPL-MCNC: 394 MG/DL (ref 695–1620)
LDH SERPL L TO P-CCNC: 173 U/L (ref 85–227)
LYMPHOCYTES # BLD AUTO: 202.7 10E9/L (ref 0.8–5.3)
LYMPHOCYTES NFR BLD AUTO: 99.2 %
MCH RBC QN AUTO: 30.5 PG (ref 26.5–33)
MCHC RBC AUTO-ENTMCNC: 29.2 G/DL (ref 31.5–36.5)
MCV RBC AUTO: 105 FL (ref 78–100)
MONOCYTES # BLD AUTO: 0 10E9/L (ref 0–1.3)
MONOCYTES NFR BLD AUTO: 0 %
NEUTROPHILS # BLD AUTO: 1.6 10E9/L (ref 1.6–8.3)
NEUTROPHILS NFR BLD AUTO: 0.8 %
PLATELET # BLD AUTO: 107 10E9/L (ref 150–450)
PLATELET # BLD EST: ABNORMAL 10*3/UL
POTASSIUM SERPL-SCNC: 4.5 MMOL/L (ref 3.4–5.3)
PROT SERPL-MCNC: 6.1 G/DL (ref 6.8–8.8)
RBC # BLD AUTO: 3.51 10E12/L (ref 4.4–5.9)
SODIUM SERPL-SCNC: 139 MMOL/L (ref 133–144)
WBC # BLD AUTO: 204.3 10E9/L (ref 4–11)

## 2019-06-12 PROCEDURE — 99215 OFFICE O/P EST HI 40 MIN: CPT | Mod: ZP | Performed by: PHYSICIAN ASSISTANT

## 2019-06-12 PROCEDURE — G0463 HOSPITAL OUTPT CLINIC VISIT: HCPCS | Mod: ZF

## 2019-06-12 PROCEDURE — 82784 ASSAY IGA/IGD/IGG/IGM EACH: CPT | Performed by: INTERNAL MEDICINE

## 2019-06-12 PROCEDURE — 36415 COLL VENOUS BLD VENIPUNCTURE: CPT

## 2019-06-12 PROCEDURE — 83615 LACTATE (LD) (LDH) ENZYME: CPT | Performed by: INTERNAL MEDICINE

## 2019-06-12 PROCEDURE — 80053 COMPREHEN METABOLIC PANEL: CPT | Performed by: INTERNAL MEDICINE

## 2019-06-12 PROCEDURE — 85025 COMPLETE CBC W/AUTO DIFF WBC: CPT | Performed by: INTERNAL MEDICINE

## 2019-06-12 ASSESSMENT — PAIN SCALES - GENERAL: PAINLEVEL: NO PAIN (0)

## 2019-06-12 ASSESSMENT — MIFFLIN-ST. JEOR: SCORE: 1417.72

## 2019-06-12 NOTE — NURSING NOTE
Chief Complaint   Patient presents with     Blood Draw     labs drawn in lab via , vitals taken, pt checked in for appt     Idania Solomon, CMA

## 2019-06-12 NOTE — NURSING NOTE
"Oncology Rooming Note    June 12, 2019 8:20 AM   Loco Wood is a 74 year old male who presents for:    Chief Complaint   Patient presents with     Blood Draw     labs drawn in lab via , vitals taken, pt checked in for appt     Oncology Clinic Visit     Return visit related to Leukemia     Initial Vitals: /64 (BP Location: Right arm, Patient Position: Sitting, Cuff Size: Adult Regular)   Pulse 59   Temp 98.5  F (36.9  C) (Oral)   Resp 16   Ht 1.702 m (5' 7.01\")   Wt 70.9 kg (156 lb 4.8 oz)   SpO2 99%   BMI 24.47 kg/m   Estimated body mass index is 24.47 kg/m  as calculated from the following:    Height as of this encounter: 1.702 m (5' 7.01\").    Weight as of this encounter: 70.9 kg (156 lb 4.8 oz). Body surface area is 1.83 meters squared.  No Pain (0) Comment: Data Unavailable   No LMP for male patient.  Allergies reviewed: Yes  Medications reviewed: Yes    Medications: MEDICATION REFILLS NEEDED TODAY. Provider was notified.  Pharmacy name entered into [employer]:    [id number] - [place], [place] - [address] AT CORNER OF [address] [place] [address] [id number] - [place], [place] - [address] [facility] [place], [place] - [address]  [facility] - [facility], [place] - [address] [facility]  [facility] - [place], [place] - [address]    Clinical concerns: Refills needed today. Patient complains of cold and cough past 3 weeks. Provider was notified.      Magdalena Hackett LPN            "

## 2019-06-12 NOTE — LETTER
6/12/2019      RE: Loco Wood  [address] [facility] [address]       Oncology/Hematology Visit Note  Jun 12, 2019    Reason for Visit: follow up of CLL    History of Present Illness: Loco Wood is a 74 year old male with CLL. He was diagnosed 2/12/2007 with CLL, Lyles stage 0, followed clinically since.  At diagnosis WBC 17.8, ALC 11.2, hemoglobin 15.2, platelets 188.  Flow consistent with CLL.  No opportunistic infections, with IgG in the normal range during follow up. Due to rising lymphocyte count, it was recommended he consider starting on treatment. He is currently undergoing treatment with ibrutinib, which he began on 5/14/19. Please see previous notes for further details on the patient's history. He comes in today for routine follow up.    Interval History:  Patient reports that he has had a cold over the last 3 weeks.  He notes that he has had a cough that is gone into his chest and is sometimes productive with white to yellow mucus.  He denies any fevers, dyspnea, or chest pain.  He has not taken any medication for his symptoms.  He does not stay awake at night with his cough.  He has had 3 or 4 episodes of feeling like his heart is racing in the last week each lasting about 20 seconds and occurring while in bed.  He denies other concerns.    Review of Systems:  Patient denies any of the following except if noted above: fevers, chills, difficulty with energy, vision or hearing changes, chest pain, dyspnea, abdominal pain, nausea, vomiting, diarrhea, constipation, urinary concerns, headaches, numbness, tingling, issues with sleep or mood.     Current Outpatient Medications   Medication Sig Dispense Refill     Acetaminophen (TYLENOL PO)        atorvastatin (LIPITOR) 10 MG tablet Take 1 tablet (10 mg) by mouth every 48 hours 45 tablet 4     Cholecalciferol (VITAMIN D3 PO) Take 4,000 Units by mouth daily        finasteride (PROSCAR) 5 MG tablet Take 1 tablet (5 mg) by mouth daily 90  "tablet 3     ibrutinib (IMBRUVICA) 420 MG tablet Take 1 tablet (420 mg) by mouth daily 28 tablet 0     ibrutinib (IMBRUVICA) 420 MG tablet Take 1 tablet (420 mg) by mouth daily 28 tablet 0     ibuprofen (ADVIL/MOTRIN) 200 MG tablet Take 200 mg by mouth every 4 hours as needed for mild pain       levothyroxine (SYNTHROID/LEVOTHROID) 50 MCG tablet Take 1 tablet (50 mcg) by mouth daily 90 tablet 3     omega 3 1200 MG CAPS Take 2 capsules by mouth daily       omeprazole (PRILOSEC) 10 MG DR capsule TAKE ONE CAPSULE BY MOUTH EVERY DAY 30 TO 60 MINUTES BEFORE A MEAL 90 capsule 3     prochlorperazine (COMPAZINE) 10 MG tablet Take 1 tablet (10 mg) by mouth every 6 hours as needed (Nausea/Vomiting) 30 tablet 2     tamsulosin (FLOMAX) 0.4 MG capsule Take 1 capsule (0.4 mg) by mouth daily 90 capsule 3     Physical Examination:  General: The patient is a pleasant male in no acute distress.  /64 (BP Location: Right arm, Patient Position: Sitting, Cuff Size: Adult Regular)   Pulse 59   Temp 98.5  F (36.9  C) (Oral)   Resp 16   Ht 1.702 m (5' 7.01\")   Wt 70.9 kg (156 lb 4.8 oz)   SpO2 99%   BMI 24.47 kg/m     Wt Readings from Last 10 Encounters:   06/12/19 70.9 kg (156 lb 4.8 oz)   06/05/19 70.6 kg (155 lb 9.6 oz)   05/31/19 69.3 kg (152 lb 12.8 oz)   05/31/19 68.9 kg (152 lb)   05/22/19 71.1 kg (156 lb 12.8 oz)   05/16/19 68.9 kg (152 lb)   04/30/19 70.3 kg (155 lb)   12/21/18 72.9 kg (160 lb 11.5 oz)   10/16/18 71.7 kg (158 lb)   10/04/18 71.2 kg (157 lb)   HEENT: EOMI, PERRL. Sclerae are anicteric. Oral mucosa is pink and moist with no lesions or thrush.   Lymph: Neck is supple with no lymphadenopathy in the cervical or supraclavicular areas. No axillary adenopathy palpable. One pea-sized lymph node palpated in each of the inguinal areas.  Heart: Regular rate and rhythm.   Lungs: Clear to auscultation bilaterally.   Abdomen: Bowel sounds present, soft, nontender with no palpable hepatosplenomegaly or masses. "   Extremities: No lower extremity edema noted bilaterally.   Neuro: Cranial nerves II through XII are grossly intact.  Skin: No rashes, petechiae, or bruising noted on exposed skin.    Laboratory Data:   2019 08:06   Sodium 139   Potassium 4.5   Chloride 107   Carbon Dioxide 25   Urea Nitrogen 17   Creatinine 1.40 (H)   GFR Estimate 50 (L)   GFR Estimate If Black 58 (L)   Calcium 8.5   Anion Gap 7   Albumin 3.8   Protein Total 6.1 (L)   Bilirubin Total 0.9   Alkaline Phosphatase 81   ALT 20   AST 22   Lactate Dehydrogenase 173   Glucose 91   .3 (HH)   Hemoglobin 10.7 (L)   Hematocrit 36.7 (L)   Platelet Count 107 (L)   RBC Count 3.51 (L)    (H)   MCH 30.5   MCHC 29.2 (L)   RDW 16.2 (H)   Diff Method Manual Differential   % Neutrophils 0.8   % Lymphocytes 99.2   % Monocytes 0.0   % Eosinophils 0.0   % Basophils 0.0   Absolute Neutrophil 1.6   Absolute Lymphocytes 202.7 (H)   Absolute Monocytes 0.0   Absolute Eosinophils 0.0   Absolute Basophils 0.0   Anisocytosis Slight   Platelet Estimate Confirming automa...     Imaging:  Chest x-ray today shows the followin. No acute cardiopulmonary abnormality.   2. Stable convexity along the posteromedial aspect of the left lower chest, possibly a Bochdalek hernia.    Assessment and Plan:  CLL. Patient started on ibrutinib 420 mg daily on 19. He is tolerating treatment very well thus far. He has had an expected rise in his lymphocyte count that is now trending down. He will see Dr. Burns in early July. I will check with Dr. Burns on the frequency of labs moving forward.     Hypothyroidism. He began on levothyroxine 50 mcg daily on 19. Last TSH on 19 was stable and mildly elevated and free T4 is normal.      CKD. He appears to have had some kidney dysfunction since at least . No acute concerns today. Will continue to monitor.     Heart palpitations. EKG on 19 shows NSR with normal QTc of 409. Given ongoing episodes of heart racing,  will set up for a Ziopatch Holter monitor.     Cough. Chest x-ray today shows no evidence of pneumonia. Suspect viral illness. Okay to use OTC cough medications for symptom management. Call if develops fever or worsening of symptoms.     Jess Sanchez PA-C  [facility] [place] [address] [place] [address]  [phone]    Addendum: Will plan to recheck labs in 1.5 weeks at [facility]. He will see Dr. Burns in 3 weeks.

## 2019-06-17 ENCOUNTER — ALLIED HEALTH/NURSE VISIT (OUTPATIENT)
Dept: NURSING | Facility: CLINIC | Age: 74
End: 2019-06-17
Payer: MEDICARE

## 2019-06-17 DIAGNOSIS — R00.2 PALPITATIONS: ICD-10-CM

## 2019-06-17 PROCEDURE — 99207 ZZC NO CHARGE NURSE ONLY: CPT

## 2019-06-17 NOTE — NURSING NOTE
Zio Patch was placed on 06/17/2019 at 1:26 pm. Patient instructed on how to use the device and their questions were answered. Instructed the patient on how to remove the device and mail it back via the pre-addressed and pre-postage box/envelope after 3 days (per provider instruction). Verbal consent was obtained from patient to place Zio patch.  Cris De Santiago MA on 6/17/2019 at 2:40 PM

## 2019-06-21 ENCOUNTER — TELEPHONE (OUTPATIENT)
Dept: ONCOLOGY | Facility: CLINIC | Age: 74
End: 2019-06-21

## 2019-06-21 NOTE — ORAL ONC MGMT
Oral Chemotherapy Monitoring Program   Left Voicemail    Attempted to contact patient today for follow up regarding oral chemotherapy, ibrutinib, for CLL. No answer. Left voicemail for patient to remind him to get labs drawn. Asked patient to call us back at [phone] with questions. No medication name was left.    Kathie Bustamante, Pharm.D., [facility]  [phone]  06/21/19

## 2019-06-25 ENCOUNTER — TELEPHONE (OUTPATIENT)
Dept: ONCOLOGY | Facility: CLINIC | Age: 74
End: 2019-06-25

## 2019-06-25 DIAGNOSIS — Z79.899 ENCOUNTER FOR LONG-TERM (CURRENT) USE OF MEDICATIONS: ICD-10-CM

## 2019-06-25 DIAGNOSIS — C91.10 CLL (CHRONIC LYMPHOCYTIC LEUKEMIA) (H): ICD-10-CM

## 2019-06-25 LAB
ALBUMIN SERPL-MCNC: 4.3 G/DL (ref 3.4–5)
ALP SERPL-CCNC: 92 U/L (ref 40–150)
ALT SERPL W P-5'-P-CCNC: 25 U/L (ref 0–70)
ANION GAP SERPL CALCULATED.3IONS-SCNC: 9 MMOL/L (ref 3–14)
AST SERPL W P-5'-P-CCNC: 20 U/L (ref 0–45)
BILIRUB SERPL-MCNC: 1 MG/DL (ref 0.2–1.3)
BUN SERPL-MCNC: 16 MG/DL (ref 7–30)
CALCIUM SERPL-MCNC: 9.8 MG/DL (ref 8.5–10.1)
CHLORIDE SERPL-SCNC: 107 MMOL/L (ref 94–109)
CO2 SERPL-SCNC: 26 MMOL/L (ref 20–32)
CREAT SERPL-MCNC: 1.47 MG/DL (ref 0.66–1.25)
DIFFERENTIAL METHOD BLD: ABNORMAL
ERYTHROCYTE [DISTWIDTH] IN BLOOD BY AUTOMATED COUNT: 15.8 % (ref 10–15)
GFR SERPL CREATININE-BSD FRML MDRD: 47 ML/MIN/{1.73_M2}
GLUCOSE SERPL-MCNC: 98 MG/DL (ref 70–99)
HCT VFR BLD AUTO: 40.5 % (ref 40–53)
HGB BLD-MCNC: 11.7 G/DL (ref 13.3–17.7)
LYMPHOCYTES # BLD AUTO: 208.1 10E9/L (ref 0.8–5.3)
LYMPHOCYTES NFR BLD AUTO: 98 %
MCH RBC QN AUTO: 30.6 PG (ref 26.5–33)
MCHC RBC AUTO-ENTMCNC: 28.9 G/DL (ref 31.5–36.5)
MCV RBC AUTO: 106 FL (ref 78–100)
MONOCYTES # BLD AUTO: 2.1 10E9/L (ref 0–1.3)
MONOCYTES NFR BLD AUTO: 1 %
NEUTROPHILS # BLD AUTO: 2.1 10E9/L (ref 1.6–8.3)
NEUTROPHILS NFR BLD AUTO: 1 %
OVALOCYTES BLD QL SMEAR: SLIGHT
PLATELET # BLD AUTO: 149 10E9/L (ref 150–450)
PLATELET # BLD EST: ABNORMAL 10*3/UL
POTASSIUM SERPL-SCNC: 4 MMOL/L (ref 3.4–5.3)
PROT SERPL-MCNC: 6.8 G/DL (ref 6.8–8.8)
RBC # BLD AUTO: 3.82 10E12/L (ref 4.4–5.9)
SMUDGE CELLS BLD QL SMEAR: PRESENT
SODIUM SERPL-SCNC: 142 MMOL/L (ref 133–144)
WBC # BLD AUTO: 212.3 10E9/L (ref 4–11)

## 2019-06-25 PROCEDURE — 36415 COLL VENOUS BLD VENIPUNCTURE: CPT | Performed by: INTERNAL MEDICINE

## 2019-06-25 PROCEDURE — 80053 COMPREHEN METABOLIC PANEL: CPT | Performed by: INTERNAL MEDICINE

## 2019-06-25 PROCEDURE — 85025 COMPLETE CBC W/AUTO DIFF WBC: CPT | Performed by: INTERNAL MEDICINE

## 2019-06-25 NOTE — TELEPHONE ENCOUNTER
DATE:  6/25/2019   TIME OF RECEIPT FROM LAB:  0914  LAB TEST:  WBC  LAB VALUE:  212.28 per   RESULTS GIVEN WITH READ-BACK TO (PROVIDER):  Write paged provider, Dr He Burns, at 0918 through [employer]  TIME LAB VALUE REPORTED TO PROVIDER:   Dr Burns received message and no further steps were advised

## 2019-06-25 NOTE — ORAL ONC MGMT
Oral Chemotherapy Monitoring Program     Placed call to patient in follow up of oral chemotherapy. Plan per Dr. Burns and the patient was to get weekly labs for first 8 weeks of therapy due to lower counts at start of therapy. Patient has not had labs since 6/12 and had a drop in platelets from 150 to 107 within a two week period. This to me is concerning as imbruvica has an inherent risk of bleeding due to its mechanism. Message sent to Dr. Burns at the request of the patient to discuss the need for weekly labs and whether he could wait until 7/2 which would be ~3 weeks between lab appointments.     Received correspondence from Dr. Burns that she would in fact like labs this week and next to monitor the drop in platelets. I called the patient to convey this message, and he subsequently told me that he spoke with the pharmacist at [facility] who stated that a drop in platelets was common and it likely did not warrant rechecking as frequently. I respectfully told Loco that I respect the opinion on a fellow pharmacist colleague but that they likely do not have the full picture and that Dr. Burns has spent a lot of time reviewing your case and developing a plan specific to his individual needs. I also told him that it would NOT be our medical advice to wait until 7/2 to recheck his labs given the drop in platelets.     He reluctantly agreed to have labs completed today. Our team will review those labs and inform Dr. Burns of any abnormalities.     Berto Coleman, PharmD, MS  Hematology/Oncology Clinical Pharmacist  [facility]  [facility]

## 2019-06-26 ENCOUNTER — TELEPHONE (OUTPATIENT)
Dept: ONCOLOGY | Facility: CLINIC | Age: 74
End: 2019-06-26

## 2019-06-26 NOTE — ORAL ONC MGMT
Oral Chemotherapy Monitoring Program     Placed call to patient in follow up of labs due to Ibrutinib therapy. I left a generic message to call us back.    Left message to please call back in follow-up of therapy. No patient or drug names were mentioned.Patient was reluctant to get labs done on 6/25 but did have them drawn.  Results took a long time to process and they were not resulted as of 5:27 last evening.  Results were posted in chart on 6/26 in am.  I sent inbasket to Dr. Burns to make her aware platelets did not continue going down as we were concerned may happen. Also asked her if there was anything she would like us to tell patient in regard to his labs. (His WBC is quite elevated but that is due to his disease.) I didn't want to make the patient keep waiting on results so called and had to leave message.  Nothing concerning with labs and happy to see platelets were trending up.  He will see Dr. Burns next week.    Kacy Devlin, Pharm.D., [facility]  [phone]  06/26/19

## 2019-06-26 NOTE — ORAL ONC MGMT
Patient returned call and I reviewed results with him and he appreciated my call.  He thanked me and asked me to release his results to [employer].      Kacy Devlin, Pharm.D., [facility]  [phone]  06/26/19

## 2019-06-27 ENCOUNTER — TELEPHONE (OUTPATIENT)
Dept: ONCOLOGY | Facility: CLINIC | Age: 74
End: 2019-06-27

## 2019-06-27 DIAGNOSIS — I48.0 PAROXYSMAL A-FIB (H): Primary | ICD-10-CM

## 2019-06-27 NOTE — TELEPHONE ENCOUNTER
Spoke to Loco to discuss results of Holter monitor and the consult placed for Cardiology.   He is agreement and has no additional questions or concerns.

## 2019-06-27 NOTE — TELEPHONE ENCOUNTER
----- Message from Jess Sanchez PA-C sent at 6/27/2019  7:43 AM CDT -----  Regarding: FW: Holter results  Scheduling,  Please set up to see cardiology.  Marisol,  Please let him know his Holter monitor showed a small amount of a.fib so we would like him evaluated by cardiology, given risk with ibrutinib.     Thanks.  Jess    ----- Message -----  From: He Burns MD  Sent: 6/26/2019   3:35 PM  To: Jess Sanchez PA-C, Gilda Juarez, RN  Subject: RE: Holter results                               Yes please, moses given risk of atrial fibrillation with ibrutinib.      ----- Message -----  From: Jess Sanchez PA-C  Sent: 6/26/2019  11:44 AM  To: Gilda Juarez, RN, He Burns MD  Subject: Holter results                                   Due to heart palpitations, he had a Holter monitor, which showed 1 brief episode of SVT and a.fib lasting up to 58 seconds. Do you think he should see cardiology?   Jess

## 2019-06-29 DIAGNOSIS — C91.10 CLL (CHRONIC LYMPHOCYTIC LEUKEMIA) (H): ICD-10-CM

## 2019-07-01 ENCOUNTER — DOCUMENTATION ONLY (OUTPATIENT)
Dept: CARE COORDINATION | Facility: CLINIC | Age: 74
End: 2019-07-01

## 2019-07-02 ENCOUNTER — OFFICE VISIT (OUTPATIENT)
Dept: TRANSPLANT | Facility: CLINIC | Age: 74
End: 2019-07-02
Attending: INTERNAL MEDICINE
Payer: MEDICARE

## 2019-07-02 ENCOUNTER — APPOINTMENT (OUTPATIENT)
Dept: LAB | Facility: CLINIC | Age: 74
End: 2019-07-02
Attending: INTERNAL MEDICINE
Payer: MEDICARE

## 2019-07-02 VITALS
WEIGHT: 156.8 LBS | RESPIRATION RATE: 16 BRPM | DIASTOLIC BLOOD PRESSURE: 71 MMHG | BODY MASS INDEX: 24.55 KG/M2 | OXYGEN SATURATION: 98 % | TEMPERATURE: 97.8 F | HEART RATE: 73 BPM | SYSTOLIC BLOOD PRESSURE: 121 MMHG

## 2019-07-02 DIAGNOSIS — C91.10 CLL (CHRONIC LYMPHOCYTIC LEUKEMIA) (H): ICD-10-CM

## 2019-07-02 LAB
ALBUMIN SERPL-MCNC: 4.1 G/DL (ref 3.4–5)
ALP SERPL-CCNC: 97 U/L (ref 40–150)
ALT SERPL W P-5'-P-CCNC: 24 U/L (ref 0–70)
ANION GAP SERPL CALCULATED.3IONS-SCNC: 5 MMOL/L (ref 3–14)
ANISOCYTOSIS BLD QL SMEAR: SLIGHT
AST SERPL W P-5'-P-CCNC: 22 U/L (ref 0–45)
BASOPHILS # BLD AUTO: 0 10E9/L (ref 0–0.2)
BASOPHILS NFR BLD AUTO: 0 %
BILIRUB SERPL-MCNC: 0.9 MG/DL (ref 0.2–1.3)
BUN SERPL-MCNC: 17 MG/DL (ref 7–30)
CALCIUM SERPL-MCNC: 9.2 MG/DL (ref 8.5–10.1)
CHLORIDE SERPL-SCNC: 106 MMOL/L (ref 94–109)
CO2 SERPL-SCNC: 27 MMOL/L (ref 20–32)
CREAT SERPL-MCNC: 1.39 MG/DL (ref 0.66–1.25)
DIFFERENTIAL METHOD BLD: ABNORMAL
EOSINOPHIL # BLD AUTO: 0 10E9/L (ref 0–0.7)
EOSINOPHIL NFR BLD AUTO: 0 %
ERYTHROCYTE [DISTWIDTH] IN BLOOD BY AUTOMATED COUNT: 15.6 % (ref 10–15)
GFR SERPL CREATININE-BSD FRML MDRD: 50 ML/MIN/{1.73_M2}
GLUCOSE SERPL-MCNC: 86 MG/DL (ref 70–99)
HCT VFR BLD AUTO: 42.3 % (ref 40–53)
HGB BLD-MCNC: 12.2 G/DL (ref 13.3–17.7)
LYMPHOCYTES # BLD AUTO: 207.5 10E9/L (ref 0.8–5.3)
LYMPHOCYTES NFR BLD AUTO: 94 %
MCH RBC QN AUTO: 30.4 PG (ref 26.5–33)
MCHC RBC AUTO-ENTMCNC: 28.8 G/DL (ref 31.5–36.5)
MCV RBC AUTO: 106 FL (ref 78–100)
MONOCYTES # BLD AUTO: 2.2 10E9/L (ref 0–1.3)
MONOCYTES NFR BLD AUTO: 1 %
NEUTROPHILS # BLD AUTO: 11 10E9/L (ref 1.6–8.3)
NEUTROPHILS NFR BLD AUTO: 5 %
PLATELET # BLD AUTO: 146 10E9/L (ref 150–450)
PLATELET # BLD EST: ABNORMAL 10*3/UL
POTASSIUM SERPL-SCNC: 4.7 MMOL/L (ref 3.4–5.3)
PROT SERPL-MCNC: 6.8 G/DL (ref 6.8–8.8)
RBC # BLD AUTO: 4.01 10E12/L (ref 4.4–5.9)
SODIUM SERPL-SCNC: 138 MMOL/L (ref 133–144)
WBC # BLD AUTO: 220.7 10E9/L (ref 4–11)

## 2019-07-02 PROCEDURE — 36415 COLL VENOUS BLD VENIPUNCTURE: CPT

## 2019-07-02 PROCEDURE — G0463 HOSPITAL OUTPT CLINIC VISIT: HCPCS

## 2019-07-02 PROCEDURE — 80053 COMPREHEN METABOLIC PANEL: CPT | Performed by: INTERNAL MEDICINE

## 2019-07-02 PROCEDURE — 85025 COMPLETE CBC W/AUTO DIFF WBC: CPT | Performed by: INTERNAL MEDICINE

## 2019-07-02 ASSESSMENT — PAIN SCALES - GENERAL: PAINLEVEL: NO PAIN (0)

## 2019-07-02 NOTE — NURSING NOTE
Chief Complaint   Patient presents with     Blood Draw     Labs drawn via  by RN in lab. VS taken.      Labs drawn via venipuncture. Vital signs taken. Checked into next appointment.   Chitra Camacho RN

## 2019-07-02 NOTE — NURSING NOTE
"Oncology Rooming Note    July 2, 2019 2:00 PM   Loco Wood is a 74 year old male who presents for:    Chief Complaint   Patient presents with     Blood Draw     Labs drawn via  by RN in lab. VS taken.      Oncology Clinic Visit     Pt is here for a rtn for CLL     Initial Vitals: Blood Pressure 121/71   Pulse 73   Temperature 97.8  F (36.6  C) (Oral)   Respiration 16   Weight 71.1 kg (156 lb 12.8 oz)   Oxygen Saturation 98%   Body Mass Index 24.55 kg/m   Estimated body mass index is 24.55 kg/m  as calculated from the following:    Height as of 6/12/19: 1.702 m (5' 7.01\").    Weight as of this encounter: 71.1 kg (156 lb 12.8 oz). Body surface area is 1.83 meters squared.  No Pain (0) Comment: Data Unavailable   No LMP for male patient.  Allergies reviewed: Yes  Medications reviewed: Yes    Medications: Medication refills not needed today.  Pharmacy name entered into [employer]:    [id number] - [place], [place] - [address] AT [facility] OF [address] [place] [address] [facility] [place] - [address]  [facility] - [place] [facility], [place] - [address] [facility]  [facility] [place] [place] - [address]    Clinical concerns: none       Nancy Samuel MA              "

## 2019-07-02 NOTE — PROGRESS NOTES
Hematology/Oncology Evaluation      Loco Wood is a 74 year old male previously followed by Dr. Dias for CLL.      Hematologic history:  Diagnosed 2/12/2007 with CLL, Lyles stage 0, followed clinically since.  At diagnosis WBC 17.8, ALC 11.2, hemoglobin 15.2, platelets 188.  Flow consistent with CLL.  No opportunistic infections, with IgG in the normal range during follow up.    Date Treatment Response Toxicities/Complications   2007 - current Observation alone.  Ocular VZV                HPI:  Please see entry above for hematologic history.  Loco is concerned about his atrial fibrillation.  It was picked up on Zio patch, but he also had an episode that he could feel lasted over an hour before resolving.  He does not have symptoms currently.  He is concerned about stroke risk.  He has otherwise no significant symptoms from ibrutinib.    RECOMMENDATIONS:    #CLL:  Mr. Wood is now 7 weeks into therapy with ibrutinib for CLL with hyperleukocytosis, anemia, and thrombocytopenia.  He has had the expected increase in ALC, but fortunately his hemoglobin and platelets have remained stable.  I think we can reduce the monitoring to every 3-4 weeks for now.    #Atrial fibrillation: He has developed paroxysmal atrial fibrillation.  This has been previously reported with ibrutinib (~10%).  He has an appointment with EP coming up.  To reduce stroke risk, I did recommend starting aspirin, but I held off on starting any beta blocker or similar drug in case this would interfere with EP evaluation.    Clealry the hard decision is whether to switch therapy based upon this development.  Ibrutinib has a very high response rate, but it takes months sometimes to see a full response, and does carry a risk of afib.  Our most commonly used alternative, chlorambucil + ofatumumab, has a similar excellent response rate, but has more short term risks (tumor lysis, infusion reactions, neutropenia, more profound  immunodeficiency).  He is not strongly in favor of this because he lives alone and would not have someone to drive him to/from appointments if needed.  A third option would be to consider the old standard of chlorambucil + prednisone, which is palliative, safe, and well-tolerated, and would avoid the infusion reaction risk.  He is going to consider all these options further, and we will re-discuss after his EP appointment to make the final decision on what therapy to continue going forward.    In the meantime, I encouraged him to seek immediate help if he experiences another episode of atrial fibrillation that does not resolve after a few minutes, or if he has chest pain, shortness of breath, or other concerning syptoms.  He expressed understanding.    He Burns MD, pager [phone]          ROS: 10 point ROS neg other than the symptoms noted above in the HPI.          Allergies   Allergen Reactions     Dilaudid [Hydromorphone] Itching     Morphine Itching        Current Outpatient Medications   Medication Sig Dispense Refill     Acetaminophen (TYLENOL PO)        atorvastatin (LIPITOR) 10 MG tablet Take 1 tablet (10 mg) by mouth every 48 hours 45 tablet 4     Cholecalciferol (VITAMIN D3 PO) Take 4,000 Units by mouth daily        finasteride (PROSCAR) 5 MG tablet Take 1 tablet (5 mg) by mouth daily 90 tablet 3     ibrutinib (IMBRUVICA) 420 MG tablet Take 1 tablet (420 mg) by mouth daily 28 tablet 0     ibrutinib (IMBRUVICA) 420 MG tablet Take 1 tablet (420 mg) by mouth daily 28 tablet 0     ibuprofen (ADVIL/MOTRIN) 200 MG tablet Take 200 mg by mouth every 4 hours as needed for mild pain       levothyroxine (SYNTHROID/LEVOTHROID) 50 MCG tablet Take 1 tablet (50 mcg) by mouth daily 90 tablet 3     omega 3 1200 MG CAPS Take 2 capsules by mouth daily       omeprazole (PRILOSEC) 10 MG DR capsule TAKE ONE CAPSULE BY MOUTH EVERY DAY 30 TO 60 MINUTES BEFORE A MEAL 90 capsule 3     prochlorperazine (COMPAZINE) 10 MG tablet  Take 1 tablet (10 mg) by mouth every 6 hours as needed (Nausea/Vomiting) 30 tablet 2     tamsulosin (FLOMAX) 0.4 MG capsule Take 1 capsule (0.4 mg) by mouth daily 90 capsule 3         Physical Exam:     Vital Signs: /71   Pulse 73   Temp 97.8  F (36.6  C) (Oral)   Resp 16   Wt 71.1 kg (156 lb 12.8 oz)   SpO2 98%   BMI 24.55 kg/m      KPS:  90%    General Appearance: healthy, alert and no distress  Eyes: no redness or icterus  Ears/Nose/M/Throat: Oral mucosa and posterior oropharynx normal, moist mucous membranes  Neck supple, non-tender, free range of motion, no adenopathy  Cardio/Vascular: no JVD  Resp Effort And Auscultation: No wheeze  GI: soft, nontender, no hepatosplenomegaly   Lymphatics:no significant enlargement of lymph nodes globally   Musculoskeletal: Musculoskeletal normal  Edema: none  Skin: Skin color, texture, turgor normal. Neurologic: negative  Psych/Affect: Mood and affect are appropriate.  Vascular Access:  none      Loco understood the above assessment and recommendations.  Multiple questions answered.  No barriers to learning identified.         Total time: 25 minutes  Counseling time: 20 minutes  Prolonged service:  no      ------------------------------------------------------------------------------------------------------------------------------------------------    Patient Care Team      Relationship Specialty Notifications Start End    Jameson Cisse MD PCP - General   12/2/02     Comment:  per patient    Phone: [phone] Fax: [phone]         [facility] [address] [address]    Yodit Barnett PA Physician Assistant   12/2/13     Phone: [phone] Fax: [phone]         [address] [address]

## 2019-07-03 DIAGNOSIS — C91.10 CLL (CHRONIC LYMPHOCYTIC LEUKEMIA) (H): Primary | ICD-10-CM

## 2019-07-09 NOTE — TELEPHONE ENCOUNTER
RECORDS RECEIVED FROM: Internal   DATE RECEIVED: 7-15-19   NOTES STATUS DETAILS   OFFICE NOTE from referring provider    N/A    OFFICE NOTE from other cardiologists   and neurologists N/A    DISCHARGE SUMMARY from hospital    N/A    DISCHARGE REPORT from the ER   N/A    OPERATIVE REPORT    N/A    MEDICATION LIST   Internal    LABS     BMP   Internal 3-22-18   CBC   Internal 7-2-19   CMP   Internal 7-2-19   Lipids   Internal 3-22-18   TSH   Internal 6-5-19   DIAGNOSTIC PROCEDURES     EKG  Strips *important Internal    Monitor Reports  Strips *important Internal    Cardioversions   N/A    ICD/pacemaker implant       Tilt table studies   N/A    IMAGING (DISC & REPORT)      ECHO's   N/A    Stress Tests   N/A    Cath   N/A    CT/CTA   N/A    MRI/MRA   N/A

## 2019-07-11 ENCOUNTER — TRANSFERRED RECORDS (OUTPATIENT)
Dept: HEALTH INFORMATION MANAGEMENT | Facility: CLINIC | Age: 74
End: 2019-07-11

## 2019-07-15 ENCOUNTER — PRE VISIT (OUTPATIENT)
Dept: CARDIOLOGY | Facility: CLINIC | Age: 74
End: 2019-07-15

## 2019-07-18 ENCOUNTER — TELEPHONE (OUTPATIENT)
Dept: UROLOGY | Facility: CLINIC | Age: 74
End: 2019-07-18

## 2019-07-18 NOTE — TELEPHONE ENCOUNTER
Called patient and wants to see dr pizarro on aug 13th at 8am for discussion on possible prostate surgery.  He cannot take flomax and proscar he felt side effects of A-fib and low sex drive Marielos Craft LPN Staff Nurse

## 2019-07-18 NOTE — PROGRESS NOTES
I am delighted to see Loco Wood in consultation for  Atrial fibrillation.    History of Present Illness:  As you know, the patient is a 74 year old  Male with a long history of CLL who started Ibrutinib in   May 2019. He also started meds for BPH. He developed some irregular heart beats which he describes as happening mostly when he was going to bed, usually a few minutes but sometimes hours. Due to his irregular heart rhythm he was given a ziopatch monitor which he wore for 3 days, during this time he had no symptoms. On the monitor however paroxysmal atrial fibrillation was noted. In the last two weeks he stopped his Flomax and Proscar and he has had no irregular heart beats whatsoever. However he's having difficulty with urinary retention.  He knows that Ibrutinib can be associated with atrial fibrillation and he is concerned that he might have to stop Ibrutinib. He continues to be active, has no physical limitations, plays [employer], no chest discomfort, no dizziness/syncope, dyspnea. He is tolerating Ibrutinib without significant side effects.    The following portions of the patient's history were reviewed and updated as appropriate: allergies, current medications, past family history, past medical history, past social history, past surgical history, and the problem list.      Past Medical History:  CLL diagnosed 07; ibrutinib started 19  Hyperlipidemia  Hypothyroidism  BPH  GERD  Hip surgery  Hernia repair  Chronic renal insufficiency - cr ~ 1.4    Medications:   Atorvastatin 10 every day  Ibrutinib 420 mg qd  Levothyroxine 50 mcg every day  Omeprazole    Allergies:    Allergies   Allergen Reactions     Dilaudid [Hydromorphone] Itching     Morphine Itching         Family History: father  stroke 85    Family History   Problem Relation Age of Onset     Heart Disease Father      Cerebrovascular Disease Father          OF STROKE      Cancer Father         melonoma     Melanoma  Father      Hyperlipidemia Father      Thyroid Disease Father      Cancer Mother         Lung cancer     Other Cancer Mother         lung; heavy smoker     Cerebrovascular Disease Paternal Uncle         Aneurism     Breast Cancer Maternal Aunt          from it     Cancer Paternal Uncle      Cancer Paternal Aunt      Skin Cancer No family hx of      Glaucoma No family hx of      Macular Degeneration No family hx of        Psychosocial history:  reports that he has never smoked. He has never used smokeless tobacco. He reports that he drinks alcohol. He reports that he does not use drugs.    Review of systems:   Cardiovascular: No palpitations, chest pain, shortness of breath at rest, dyspnea with exertion, orthopnea, paroxysmal nocturia dyspnea, nocturia, dizziness, syncope.    In addition,   Constitutional: No change in weight, sleep or appetite.  Normal energy.  No fever or chills  Eyes: Negative for vision changes or eye problems  ENT: No problems with ears, nose or throat.  No difficulty swallowing.  Resp: No coughing, wheezing or shortness of breath  GI: No nausea, vomiting,  heartburn, abdominal pain, diarrhea, constipation or change in bowel habits  :  +urinary frequency   Musculoskeletal: No significant muscle or joint pains  Neurologic: No headaches, numbness, tingling, weakness, problems with balance or coordination  Psychiatric: No problems with anxiety, depression or mental health  Heme/immune/allergy: No history of bleeding or clotting problems or anemia.  No allergies or immune system problems  Integumentary: No rashes,worrisome lesions or skin problems      Physical examination  Vitals: 123/68, 89 bpm  BMI= 24    Constitutional: In general, the patient is a pleasant male in no apparent distress.    Eyes: PERRLA.  EOMI.  Sclerae white, not injected.  ENT/mouth: Normiocephalic and atraumatic.  Nares clear.  Pharynx without erythema or exudate.  Dentition intact.  No adenopathy.  No thyromegaly.  Carotids +2/2 bilaterally without bruits.  No jugular venous distension.   Card/Vasc: The PMI is in the 5th ICS in the midclavicular line. There is no heave. Regular rate and rhythm. Normal S1, S2. No murmur, rub, click, or gallop. Pulses are normal bilaterally throughout. No peripheral edema.  Respiratory: Clear to asculation.  No ronchi, wheezes, rales.  No dullness to percussion.   GI: Abdomen is soft, nontender, nondistended. No organomegaly. No AAA.  No bruits.   Integument: No significant bruises or rashes  Neurological: The neurological examination reveal a patient who was oriented to person, place, and time.    Psych: Normal  Heme/Lymph/Immun: no significant adenopathy      I have reviewed the following labs/imaging:  Labs: 19 - K 4.7, cr 1.39, hgb 12, plt 146K  19 - TSH 5.55,  Free T4 1.21      I have personally and independently reviewed the following:  EK19 - sinus 82 bpm, normal intervals  19: sinus, normal intervals  Ziopatch -19 (3 days) - sinus , average 77 bpm  4 episodes of Afib, longest 58 seconds, average rate 120 bpm. No symptoms reported.      Assessment :  1. Atrial fibrillation, paroxysmal. No symptoms.Brief, all episodes < 1 minutes. [id number] is 1 for age. Annual stroke risk is low, he does not require long term anticoagulation at this time. No need to stop ibrutinib.  2. Palpitations - unknown mechanism - he had no symptoms on monitor and has no symptoms since stopping procar and flomax. If he has recurrent symptoms we can do a longer monitor.  3. CLL - stable. Patients on Ibrutinib  have higher incidence of AF compared with age controlled population; however his episodes are brief and asymptomatic, no need to stop  4. BPH. Unknown if proscar and/or flomax caused symptoms but he's been asymptomatic since stopping. He is however having urinary frequency.    Plan:  Reassurance offered  No need for long term anticoagulation at this time  Can continue  ibrutinib  If he would like to resume flomax/proscar and if irregular heart beats recur, he will notify me and I will order a long term 30 day event monitor      I spent a total of 30 minutes face to face with  Loco ANAYA Joseluisjermaine during today's office visit. Over 50% of this time was spent counseling the patient and/or coordinating care regarding management strategies.      The patient is to return as needed . The patient understood the treatment plan as outlined above.  There were no barriers to learning.      Virginie Kenney MD

## 2019-07-18 NOTE — TELEPHONE ENCOUNTER
[facility] Call Center    Phone Message    May a detailed message be left on voicemail: yes    Reason for Call: Medication Question or concern regarding medications - Tamsulosin (FLOMAX), finasteride (PROSCAR). Per Pt, they are not working and he would like to see Dr. Boyd to discuss other options. Also has questions regarding UroLift procedure and would like to know if this is something that Dr. Boyd could do. I tried to schedule Pt with Dr. Boyd, but he is scheduling out to October. Pt then asked if he could be seen by another provider, which I informed him that I was not able to schedule and would send a message to clinic to call him back.       Action Taken: Urology Clinic

## 2019-07-19 ENCOUNTER — OFFICE VISIT (OUTPATIENT)
Dept: CARDIOLOGY | Facility: CLINIC | Age: 74
End: 2019-07-19
Attending: INTERNAL MEDICINE
Payer: MEDICARE

## 2019-07-19 VITALS
DIASTOLIC BLOOD PRESSURE: 68 MMHG | HEART RATE: 89 BPM | HEIGHT: 67 IN | OXYGEN SATURATION: 94 % | WEIGHT: 158.2 LBS | BODY MASS INDEX: 24.83 KG/M2 | SYSTOLIC BLOOD PRESSURE: 123 MMHG

## 2019-07-19 DIAGNOSIS — C91.10 CLL (CHRONIC LYMPHOCYTIC LEUKEMIA) (H): ICD-10-CM

## 2019-07-19 DIAGNOSIS — R33.8 BENIGN PROSTATIC HYPERPLASIA WITH URINARY RETENTION: ICD-10-CM

## 2019-07-19 DIAGNOSIS — I48.0 PAROXYSMAL ATRIAL FIBRILLATION (H): Primary | ICD-10-CM

## 2019-07-19 DIAGNOSIS — N40.1 BENIGN PROSTATIC HYPERPLASIA WITH URINARY RETENTION: ICD-10-CM

## 2019-07-19 LAB — INTERPRETATION ECG - MUSE: NORMAL

## 2019-07-19 PROCEDURE — 99204 OFFICE O/P NEW MOD 45 MIN: CPT | Mod: 25 | Performed by: INTERNAL MEDICINE

## 2019-07-19 PROCEDURE — G0463 HOSPITAL OUTPT CLINIC VISIT: HCPCS | Mod: 25,ZF

## 2019-07-19 PROCEDURE — 93010 ELECTROCARDIOGRAM REPORT: CPT | Mod: ZP | Performed by: INTERNAL MEDICINE

## 2019-07-19 PROCEDURE — 93005 ELECTROCARDIOGRAM TRACING: CPT | Mod: ZF

## 2019-07-19 ASSESSMENT — MIFFLIN-ST. JEOR: SCORE: 1426.22

## 2019-07-19 ASSESSMENT — PAIN SCALES - GENERAL: PAINLEVEL: NO PAIN (0)

## 2019-07-19 NOTE — PATIENT INSTRUCTIONS
You were seen in the Electrophysiology Clinic today by: Dr. Virginie Kenney    Plan:     Medication Changes: none    Labs/Tests Needed: none    Follow up visit: as needed    Further Instructions: none      Your Care Team:  EP Cardiology   Telephone Number     Daya Adams RN [phone]     For scheduling appts or procedures:    Tere Centeno   [phone]   For the Device Clinic (Pacemakers, ICDs, Loop Recorders)    During business hours: [phone]  After business hours:   [phone]- select option 4 and ask for job code [phone].       Cardiovascular Clinic:   [address] [place] [address]. [place] [address]      As always, Thank you for trusting us with your health care needs!

## 2019-07-19 NOTE — LETTER
7/19/2019      RE: Loco Wood  [address] [facility] [address]       Dear Colleague,    Thank you for the opportunity to participate in the care of your patient, Loco Wood, at the [facility] at [facility]. Please see a copy of my visit note below.    I am delighted to see Loco Wood in consultation for  Atrial fibrillation.    History of Present Illness:  As you know, the patient is a 74 year old  Male with a long history of CLL who started Ibrutinib in   May 2019. He also started meds for BPH. He developed some irregular heart beats which he describes as happening mostly when he was going to bed, usually a few minutes but sometimes hours. Due to his irregular heart rhythm he was given a ziopatch monitor which he wore for 3 days, during this time he had no symptoms. On the monitor however paroxysmal atrial fibrillation was noted. In the last two weeks he stopped his Flomax and Proscar and he has had no irregular heart beats whatsoever. However he's having difficulty with urinary retention.  He knows that Ibrutinib can be associated with atrial fibrillation and he is concerned that he might have to stop Ibrutinib. 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 He is tolerating Ibrutinib without significant side effects.    The following portions of the patient's history were reviewed and updated as appropriate: allergies, current medications, past family history, past medical history, past social history, past surgical history, and the problem list.      Past Medical History:  CLL diagnosed 2/12/07; ibrutinib started 5/14/19  Hyperlipidemia  Hypothyroidism  BPH  GERD  Hip surgery  Hernia repair  Chronic renal insufficiency - cr ~ 1.4    Medications:   Atorvastatin 10 every day  Ibrutinib 420 mg qd  Levothyroxine 50 mcg every day  Omeprazole    Allergies:     Allergies   Allergen Reactions     Dilaudid [Hydromorphone] Itching     Morphine Itching         Family History: father  stroke 85    Family History   Problem Relation Age of Onset     Heart Disease Father      Cerebrovascular Disease Father          OF STROKE      Cancer Father         melonoma     Melanoma Father      Hyperlipidemia Father      Thyroid Disease Father      Cancer Mother         Lung cancer     Other Cancer Mother         lung; heavy smoker     Cerebrovascular Disease Paternal Uncle         Aneurism     Breast Cancer Maternal Aunt          from it     Cancer Paternal Uncle      Cancer Paternal Aunt      Skin Cancer No family hx of      Glaucoma No family hx of      Macular Degeneration No family hx of        Psychosocial history:  reports that he has never smoked. He has never used smokeless tobacco. He reports that he drinks alcohol. He reports that he does not use drugs.    Review of systems:   Cardiovascular: No palpitations, chest pain, shortness of breath at rest, dyspnea with exertion, orthopnea, paroxysmal nocturia dyspnea, nocturia, dizziness, syncope.    In addition,   Constitutional: No change in weight, sleep or appetite.  Normal energy.  No fever or chills  Eyes: Negative for vision changes or eye problems  ENT: No problems with ears, nose or throat.  No difficulty swallowing.  Resp: No coughing, wheezing or shortness of breath  GI: No nausea, vomiting,  heartburn, abdominal pain, diarrhea, constipation or change in bowel habits  :  +urinary frequency   Musculoskeletal: No significant muscle or joint pains  Neurologic: No headaches, numbness, tingling, weakness, problems with balance or coordination  Psychiatric: No problems with anxiety, depression or mental health  Heme/immune/allergy: No history of bleeding or clotting problems or anemia.  No allergies or immune system problems  Integumentary: No rashes,worrisome lesions or skin problems      Physical  examination  Vitals: 123/68, 89 bpm  BMI= 24    Constitutional: In general, the patient is a pleasant male in no apparent distress.    Eyes: PERRLA.  EOMI.  Sclerae white, not injected.  ENT/mouth: Normiocephalic and atraumatic.  Nares clear.  Pharynx without erythema or exudate.  Dentition intact.  No adenopathy.  No thyromegaly. Carotids +2/2 bilaterally without bruits.  No jugular venous distension.   Card/Vasc: The PMI is in the 5th ICS in the midclavicular line. There is no heave. Regular rate and rhythm. Normal S1, S2. No murmur, rub, click, or gallop. Pulses are normal bilaterally throughout. No peripheral edema.  Respiratory: Clear to asculation.  No ronchi, wheezes, rales.  No dullness to percussion.   GI: Abdomen is soft, nontender, nondistended. No organomegaly. No AAA.  No bruits.   Integument: No significant bruises or rashes  Neurological: The neurological examination reveal a patient who was oriented to person, place, and time.    Psych: Normal  Heme/Lymph/Immun: no significant adenopathy      I have reviewed the following labs/imaging:  Labs: 19 - K 4.7, cr 1.39, hgb 12, plt 146K  19 - TSH 5.55,  Free T4 1.21      I have personally and independently reviewed the following:  EK19 - sinus 82 bpm, normal intervals  19: sinus, normal intervals  Ziopatch -19 (3 days) - sinus , average 77 bpm  4 episodes of Afib, longest 58 seconds, average rate 120 bpm. No symptoms reported.      Assessment :  1. Atrial fibrillation, paroxysmal. No symptoms.Brief, all episodes < 1 minutes. [id number] is 1 for age. Annual stroke risk is low, he does not require long term anticoagulation at this time. No need to stop ibrutinib.  2. Palpitations - unknown mechanism - he had no symptoms on monitor and has no symptoms since stopping procar and flomax. If he has recurrent symptoms we can do a longer monitor.  3. CLL - stable. Patients on Ibrutinib  have higher incidence of AF compared with  age controlled population; however his episodes are brief and asymptomatic, no need to stop  4. BPH. Unknown if proscar and/or flomax caused symptoms but he's been asymptomatic since stopping. He is however having urinary frequency.    Plan:  Reassurance offered  No need for long term anticoagulation at this time  Can continue ibrutinib  If he would like to resume flomax/proscar and if irregular heart beats recur, he will notify me and I will order a long term 30 day event monitor      I spent a total of 30 minutes face to face with  Loco Wood during today's office visit. Over 50% of this time was spent counseling the patient and/or coordinating care regarding management strategies.      The patient is to return as needed . The patient understood the treatment plan as outlined above.  There were no barriers to learning.      Virginie Kenney MD

## 2019-07-22 ENCOUNTER — PRE VISIT (OUTPATIENT)
Dept: UROLOGY | Facility: CLINIC | Age: 74
End: 2019-07-22

## 2019-07-22 NOTE — TELEPHONE ENCOUNTER
Chief Complaint : Return-2 mo    Hx/Sx: BPH/Elevated PSA    Records/Orders: Available; has seen Dr. Boyd     Pt Contacted: N/A    At Rooming: Flow/PVR

## 2019-07-23 ENCOUNTER — OFFICE VISIT (OUTPATIENT)
Dept: UROLOGY | Facility: CLINIC | Age: 74
End: 2019-07-23
Payer: MEDICARE

## 2019-07-23 VITALS
RESPIRATION RATE: 16 BRPM | DIASTOLIC BLOOD PRESSURE: 72 MMHG | HEART RATE: 79 BPM | WEIGHT: 160 LBS | BODY MASS INDEX: 25.06 KG/M2 | SYSTOLIC BLOOD PRESSURE: 121 MMHG

## 2019-07-23 DIAGNOSIS — N40.1 BENIGN PROSTATIC HYPERPLASIA WITH LOWER URINARY TRACT SYMPTOMS, SYMPTOM DETAILS UNSPECIFIED: ICD-10-CM

## 2019-07-23 RX ORDER — TAMSULOSIN HYDROCHLORIDE 0.4 MG/1
0.4 CAPSULE ORAL DAILY
Qty: 90 CAPSULE | Refills: 3 | Status: SHIPPED | OUTPATIENT
Start: 2019-07-23 | End: 2020-03-02

## 2019-07-23 ASSESSMENT — PAIN SCALES - GENERAL: PAINLEVEL: NO PAIN (1)

## 2019-07-23 NOTE — LETTER
2019       RE: Loco Wood  [address] [facility] [address]     Dear Colleague,    Thank you for referring your patient, Loco Wood, to the [facility] PROSTATE [facility] UROLOGIC CANCERS at [facility]. Please see a copy of my visit note below.      Urology Clinic    Jameson Warren MD  Date of Service: 2019     Name: Loco Wood  MRN: [id number]  Age: 74 year old  : 1945  Referring provider: Campos Boyd     Assessment and Plan:  Assessment:  Loco Wood  is a 74 year old male with a history of benign prostatic hypertrophy with lower urinary tract symptoms.      Plan:  We discussed the available surgical treatment options for BPH and went over the risk/benefit profile of each including retrograde ejaculation, bleeding, infection, damage to the urethra, prostate and bladder, urinary incontinence, pelvic pain, identification of incidental prostate cancer and need for additional intervention.    At this point he feels his symptoms are fairly tolerable with the improvement he has had on Flomax and he is not ready to contemplate a surgery at this time.  I will see him back yearly, sooner if he starts to have symptoms that are more bothersome.    ______________________________________________________________________    HPI  Loco Wood is a 74 year old male with a history of CLL who presents for follow up of elevated PSA and BPH.  He was last seen in urology clinic by Dr. Boyd on 19 at which time he endorsed a slow urine stream, urgency, and frequency.      He was told he had a nodule on his prostate a couple years ago by his primary physician.  He has had PSA tests which have been in the mid-4 range since 2017.  Dr. Boyd did offer him a prostate biopsy at his appointment in May 2019 which the patient deferred at that time.    He was having urinary frequency, having to get up  "approximately 6 times a night to urinate.  He was started on Flomax and Finasteride by Dr. Boyd in May and did notice an improvement in his stream and only had to get up once at night.  However, he read about some long-term effects of Finasteride.  He noticed it was harder to get and erection and ejaculate therefore he stopped it after about 3 weeks or a month.  He did stop Flomax temporarily and noticed his urinary symptoms worsened markedly.  Now back on this, he does feel it makes his urinary symptoms acceptable.    He started having an irregular heartbeat after he started on Flomax and Finasteride.  This was also after he started on a new medication, Ibrutinib, for his CLL.  He did have a cardiac workup with a Ziopatch which found very short episodes of atrial fibrillation.  Atrial fibrillation can be associated with Ibrutinib.  Cardiology told him he would not need anticoagulation based on how short the episodes of atrial fibrillation were.    Review of Systems:   Pertinent items are noted in HPI or as below, remainder of complete ROS is negative.      Physical Exam:   Patient is a 74 year old  male   Vitals: Blood pressure 121/72, pulse 79, resp. rate 16, weight 72.6 kg (160 lb).  Notable Findings on Exam: Well-nourished male in no apparent distress   : Prostate \"nodule\" feels more consistent with superficial calcification rather than nodular tissue, otherwise 40 gm smooth    Laboratory:   I personally reviewed all applicable laboratory data and went over findings with patient  Significant for:    CBC RESULTS:  Recent Labs   Lab Test 07/02/19  1256 06/25/19  0852 06/12/19  0806 06/05/19  0904   .7* 212.3* 204.3* 211.6*   HGB 12.2* 11.7* 10.7* 11.2*   * 149* 107* 132*     BMP RESULTS:  Recent Labs   Lab Test 07/02/19  1256 06/25/19  0852 06/12/19  0806 06/05/19  0904    142 139 140   POTASSIUM 4.7 4.0 4.5 4.3   CHLORIDE 106 107 107 106   CO2 27 26 25 26   ANIONGAP 5 9 7 7   GLC 86 98 " 91 109*   BUN 17 16 17 13   CR 1.39* 1.47* 1.40* 1.40*   GFRESTIMATED 50* 47* 50* 50*   GFRESTBLACK 58* 54* 58* 58*   DAVID 9.2 9.8 8.5 8.9     UA RESULTS:   Recent Labs   Lab Test 05/24/19  0914 12/21/18  1156 09/18/17  1359  03/02/14  1115   SG 1.020 1.021 1.015   < > 1.011   URINEPH 5.5 5.0 6.0   < > 6.5   NITRITE Negative Negative Negative   < > Negative   RBCU 2-5*  --  2-5*  --  2   WBCU 0 - 5  --  O - 2  --  <1    < > = values in this interval not displayed.     CALCIUM RESULTS  Lab Results   Component Value Date    DAVID 9.2 07/02/2019    DAVID 9.8 06/25/2019    DAVID 8.5 06/12/2019     PSA RESULTS  PSA   Date Value Ref Range Status   05/22/2019 4.58 (H) 0 - 4 ug/L Final     Comment:     Assay Method:  Chemiluminescence using Siemens Vista analyzer   04/30/2019 4.11 (H) 0 - 4 ug/L Final     Comment:     Assay Method:  Chemiluminescence using Siemens Vista analyzer   10/16/2018 4.68 (H) 0 - 4 ug/L Final     Comment:     Assay Method:  Chemiluminescence using Siemens Vista analyzer   04/24/2018 4.44 (H) 0 - 4 ug/L Final     Comment:     Assay Method:  Chemiluminescence using Siemens Vista analyzer   03/22/2018 4.58 (H) 0 - 4 ug/L Final     Comment:     Assay Method:  Chemiluminescence using Siemens Vista analyzer   12/21/2017 4.20 (H) 0 - 4 ug/L Final     Comment:     Assay Method:  Chemiluminescence using Siemens Vista analyzer   09/18/2017 4.48 (H) 0 - 4 ug/L Final     Comment:     Assay Method:  Chemiluminescence using Siemens Vista analyzer   05/15/2017 4.76 (H) 0 - 4 ug/L Final     Comment:     Assay Method:  Chemiluminescence using Siemens Vista analyzer   08/01/2011 1.47 0 - 4 ug/L Final   07/27/2010 1.44 0 - 4 ug/L Final       INR  Recent Labs   Lab Test 03/06/14  2127 01/03/13  0150   INR 1.03 1.10        Scribe Disclosure:  I, Leela Nicholas, am serving as a scribe to document services personally performed by Jameson Warren MD at this visit, based upon the provider's statements to me. All documentation  has been reviewed by the aforementioned provider prior to being entered into the official medical record.     Again, thank you for allowing me to participate in the care of your patient.      Sincerely,    Jameson Warren MD

## 2019-07-23 NOTE — PROGRESS NOTES
Urology Clinic    Jameson Warren MD  Date of Service: 2019     Name: Loco Wood  MRN: [id number]  Age: 74 year old  : 1945  Referring provider: Campos Boyd     Assessment and Plan:  Assessment:  Loco Wood  is a 74 year old male with a history of benign prostatic hypertrophy with lower urinary tract symptoms.      Plan:  We discussed the available surgical treatment options for BPH and went over the risk/benefit profile of each including retrograde ejaculation, bleeding, infection, damage to the urethra, prostate and bladder, urinary incontinence, pelvic pain, identification of incidental prostate cancer and need for additional intervention.    At this point he feels his symptoms are fairly tolerable with the improvement he has had on Flomax and he is not ready to contemplate a surgery at this time.  I will see him back yearly, sooner if he starts to have symptoms that are more bothersome.    ______________________________________________________________________    HPI  Loco Wood is a 74 year old male with a history of CLL who presents for follow up of elevated PSA and BPH.  He was last seen in urology clinic by Dr. Boyd on 19 at which time he endorsed a slow urine stream, urgency, and frequency.      He was told he had a nodule on his prostate a couple years ago by his primary physician.  He has had PSA tests which have been in the mid-4 range since 2017.  Dr. Boyd did offer him a prostate biopsy at his appointment in May 2019 which the patient deferred at that time.    He was having urinary frequency, having to get up approximately 6 times a night to urinate.  He was started on Flomax and Finasteride by Dr. Boyd in May and did notice an improvement in his stream and only had to get up once at night.  However, he read about some long-term effects of Finasteride.  He noticed it was harder to get and erection and ejaculate therefore he stopped it  "after about 3 weeks or a month.  He did stop Flomax temporarily and noticed his urinary symptoms worsened markedly.  Now back on this, he does feel it makes his urinary symptoms acceptable.    He started having an irregular heartbeat after he started on Flomax and Finasteride.  This was also after he started on a new medication, Ibrutinib, for his CLL.  He did have a cardiac workup with a Ziopatch which found very short episodes of atrial fibrillation.  Atrial fibrillation can be associated with Ibrutinib.  Cardiology told him he would not need anticoagulation based on how short the episodes of atrial fibrillation were.    Review of Systems:   Pertinent items are noted in HPI or as below, remainder of complete ROS is negative.      Physical Exam:   Patient is a 74 year old  male   Vitals: Blood pressure 121/72, pulse 79, resp. rate 16, weight 72.6 kg (160 lb).  Notable Findings on Exam: Well-nourished male in no apparent distress   : Prostate \"nodule\" feels more consistent with superficial calcification rather than nodular tissue, otherwise 40 gm smooth    Laboratory:   I personally reviewed all applicable laboratory data and went over findings with patient  Significant for:    CBC RESULTS:  Recent Labs   Lab Test 07/02/19  1256 06/25/19  0852 06/12/19  0806 06/05/19  0904   .7* 212.3* 204.3* 211.6*   HGB 12.2* 11.7* 10.7* 11.2*   * 149* 107* 132*     BMP RESULTS:  Recent Labs   Lab Test 07/02/19  1256 06/25/19  0852 06/12/19  0806 06/05/19  0904    142 139 140   POTASSIUM 4.7 4.0 4.5 4.3   CHLORIDE 106 107 107 106   CO2 27 26 25 26   ANIONGAP 5 9 7 7   GLC 86 98 91 109*   BUN 17 16 17 13   CR 1.39* 1.47* 1.40* 1.40*   GFRESTIMATED 50* 47* 50* 50*   GFRESTBLACK 58* 54* 58* 58*   DAVID 9.2 9.8 8.5 8.9     UA RESULTS:   Recent Labs   Lab Test 05/24/19  0914 12/21/18  1156 09/18/17  1359  03/02/14  1115   SG 1.020 1.021 1.015   < > 1.011   URINEPH 5.5 5.0 6.0   < > 6.5   NITRITE Negative Negative " Negative   < > Negative   RBCU 2-5*  --  2-5*  --  2   WBCU 0 - 5  --  O - 2  --  <1    < > = values in this interval not displayed.     CALCIUM RESULTS  Lab Results   Component Value Date    DAVID 9.2 07/02/2019    DAVID 9.8 06/25/2019    DAVID 8.5 06/12/2019     PSA RESULTS  PSA   Date Value Ref Range Status   05/22/2019 4.58 (H) 0 - 4 ug/L Final     Comment:     Assay Method:  Chemiluminescence using Siemens Vista analyzer   04/30/2019 4.11 (H) 0 - 4 ug/L Final     Comment:     Assay Method:  Chemiluminescence using Siemens Vista analyzer   10/16/2018 4.68 (H) 0 - 4 ug/L Final     Comment:     Assay Method:  Chemiluminescence using Siemens Vista analyzer   04/24/2018 4.44 (H) 0 - 4 ug/L Final     Comment:     Assay Method:  Chemiluminescence using Siemens Vista analyzer   03/22/2018 4.58 (H) 0 - 4 ug/L Final     Comment:     Assay Method:  Chemiluminescence using Siemens Vista analyzer   12/21/2017 4.20 (H) 0 - 4 ug/L Final     Comment:     Assay Method:  Chemiluminescence using Siemens Vista analyzer   09/18/2017 4.48 (H) 0 - 4 ug/L Final     Comment:     Assay Method:  Chemiluminescence using Siemens Vista analyzer   05/15/2017 4.76 (H) 0 - 4 ug/L Final     Comment:     Assay Method:  Chemiluminescence using Siemens Vista analyzer   08/01/2011 1.47 0 - 4 ug/L Final   07/27/2010 1.44 0 - 4 ug/L Final       INR  Recent Labs   Lab Test 03/06/14  2127 01/03/13  0150   INR 1.03 1.10        Scribe Disclosure:  I, Leela Nicholas, am serving as a scribe to document services personally performed by Jameson Warren MD at this visit, based upon the provider's statements to me. All documentation has been reviewed by the aforementioned provider prior to being entered into the official medical record.

## 2019-07-23 NOTE — LETTER
2019      RE: Loco Wood  [address] [facility] [address]         Urology Clinic    Jameson Warren MD  Date of Service: 2019     Name: Loco Wood  MRN: [id number]  Age: 74 year old  : 1945  Referring provider: Campos Boyd     Assessment and Plan:  Assessment:  Loco Wood  is a 74 year old male with a history of benign prostatic hypertrophy with lower urinary tract symptoms.      Plan:  We discussed the available surgical treatment options for BPH and went over the risk/benefit profile of each including retrograde ejaculation, bleeding, infection, damage to the urethra, prostate and bladder, urinary incontinence, pelvic pain, identification of incidental prostate cancer and need for additional intervention.    At this point he feels his symptoms are fairly tolerable with the improvement he has had on Flomax and he is not ready to contemplate a surgery at this time.  I will see him back yearly, sooner if he starts to have symptoms that are more bothersome.    ______________________________________________________________________    HPI  Loco Wood is a 74 year old male with a history of CLL who presents for follow up of elevated PSA and BPH.  He was last seen in urology clinic by Dr. Boyd on 19 at which time he endorsed a slow urine stream, urgency, and frequency.      He was told he had a nodule on his prostate a couple years ago by his primary physician.  He has had PSA tests which have been in the mid-4 range since 2017.  Dr. Boyd did offer him a prostate biopsy at his appointment in May 2019 which the patient deferred at that time.    He was having urinary frequency, having to get up approximately 6 times a night to urinate.  He was started on Flomax and Finasteride by Dr. Boyd in May and did notice an improvement in his stream and only had to get up once at night.  However, he read about some long-term effects of  "Finasteride.  He noticed it was harder to get and erection and ejaculate therefore he stopped it after about 3 weeks or a month.  He did stop Flomax temporarily and noticed his urinary symptoms worsened markedly.  Now back on this, he does feel it makes his urinary symptoms acceptable.    He started having an irregular heartbeat after he started on Flomax and Finasteride.  This was also after he started on a new medication, Ibrutinib, for his CLL.  He did have a cardiac workup with a Ziopatch which found very short episodes of atrial fibrillation.  Atrial fibrillation can be associated with Ibrutinib.  Cardiology told him he would not need anticoagulation based on how short the episodes of atrial fibrillation were.    Review of Systems:   Pertinent items are noted in HPI or as below, remainder of complete ROS is negative.      Physical Exam:   Patient is a 74 year old  male   Vitals: Blood pressure 121/72, pulse 79, resp. rate 16, weight 72.6 kg (160 lb).  Notable Findings on Exam: Well-nourished male in no apparent distress   : Prostate \"nodule\" feels more consistent with superficial calcification rather than nodular tissue, otherwise 40 gm smooth    Laboratory:   I personally reviewed all applicable laboratory data and went over findings with patient  Significant for:    CBC RESULTS:  Recent Labs   Lab Test 07/02/19  1256 06/25/19  0852 06/12/19  0806 06/05/19  0904   .7* 212.3* 204.3* 211.6*   HGB 12.2* 11.7* 10.7* 11.2*   * 149* 107* 132*     BMP RESULTS:  Recent Labs   Lab Test 07/02/19  1256 06/25/19  0852 06/12/19  0806 06/05/19  0904    142 139 140   POTASSIUM 4.7 4.0 4.5 4.3   CHLORIDE 106 107 107 106   CO2 27 26 25 26   ANIONGAP 5 9 7 7   GLC 86 98 91 109*   BUN 17 16 17 13   CR 1.39* 1.47* 1.40* 1.40*   GFRESTIMATED 50* 47* 50* 50*   GFRESTBLACK 58* 54* 58* 58*   DAVID 9.2 9.8 8.5 8.9     UA RESULTS:   Recent Labs   Lab Test 05/24/19  0914 12/21/18  1156 09/18/17  1359  03/02/14  1115 "   SG 1.020 1.021 1.015   < > 1.011   URINEPH 5.5 5.0 6.0   < > 6.5   NITRITE Negative Negative Negative   < > Negative   RBCU 2-5*  --  2-5*  --  2   WBCU 0 - 5  --  O - 2  --  <1    < > = values in this interval not displayed.     CALCIUM RESULTS  Lab Results   Component Value Date    DAVID 9.2 07/02/2019    DAVID 9.8 06/25/2019    DAVID 8.5 06/12/2019     PSA RESULTS  PSA   Date Value Ref Range Status   05/22/2019 4.58 (H) 0 - 4 ug/L Final     Comment:     Assay Method:  Chemiluminescence using Siemens Vista analyzer   04/30/2019 4.11 (H) 0 - 4 ug/L Final     Comment:     Assay Method:  Chemiluminescence using Siemens Vista analyzer   10/16/2018 4.68 (H) 0 - 4 ug/L Final     Comment:     Assay Method:  Chemiluminescence using Siemens Vista analyzer   04/24/2018 4.44 (H) 0 - 4 ug/L Final     Comment:     Assay Method:  Chemiluminescence using Siemens Vista analyzer   03/22/2018 4.58 (H) 0 - 4 ug/L Final     Comment:     Assay Method:  Chemiluminescence using Siemens Vista analyzer   12/21/2017 4.20 (H) 0 - 4 ug/L Final     Comment:     Assay Method:  Chemiluminescence using Siemens Vista analyzer   09/18/2017 4.48 (H) 0 - 4 ug/L Final     Comment:     Assay Method:  Chemiluminescence using Siemens Vista analyzer   05/15/2017 4.76 (H) 0 - 4 ug/L Final     Comment:     Assay Method:  Chemiluminescence using Siemens Vista analyzer   08/01/2011 1.47 0 - 4 ug/L Final   07/27/2010 1.44 0 - 4 ug/L Final       INR  Recent Labs   Lab Test 03/06/14  2127 01/03/13  0150   INR 1.03 1.10        Scribe Disclosure:  I, Leela Nicholas, am serving as a scribe to document services personally performed by Jameson Warren MD at this visit, based upon the provider's statements to me. All documentation has been reviewed by the aforementioned provider prior to being entered into the official medical record.     Jameson Warren MD

## 2019-07-23 NOTE — NURSING NOTE
Chief Complaint   Patient presents with     Consult     Patient is here to discuss surgery     Preethi Garcia CMA 2:21 PM on 7/23/2019.

## 2019-07-28 DIAGNOSIS — C91.10 CLL (CHRONIC LYMPHOCYTIC LEUKEMIA) (H): ICD-10-CM

## 2019-07-29 ENCOUNTER — TELEPHONE (OUTPATIENT)
Dept: TRANSPLANT | Facility: CLINIC | Age: 74
End: 2019-07-29

## 2019-07-29 DIAGNOSIS — C91.10 CLL (CHRONIC LYMPHOCYTIC LEUKEMIA) (H): Primary | ICD-10-CM

## 2019-07-29 DIAGNOSIS — C91.10 CLL (CHRONIC LYMPHOCYTIC LEUKEMIA) (H): ICD-10-CM

## 2019-07-29 LAB
ALBUMIN SERPL-MCNC: 4 G/DL (ref 3.4–5)
ALP SERPL-CCNC: 80 U/L (ref 40–150)
ALT SERPL W P-5'-P-CCNC: 22 U/L (ref 0–70)
ANION GAP SERPL CALCULATED.3IONS-SCNC: 5 MMOL/L (ref 3–14)
ANISOCYTOSIS BLD QL SMEAR: SLIGHT
AST SERPL W P-5'-P-CCNC: 19 U/L (ref 0–45)
BILIRUB SERPL-MCNC: 1.1 MG/DL (ref 0.2–1.3)
BUN SERPL-MCNC: 22 MG/DL (ref 7–30)
CALCIUM SERPL-MCNC: 9.1 MG/DL (ref 8.5–10.1)
CHLORIDE SERPL-SCNC: 108 MMOL/L (ref 94–109)
CO2 SERPL-SCNC: 28 MMOL/L (ref 20–32)
CREAT SERPL-MCNC: 1.61 MG/DL (ref 0.66–1.25)
DIFFERENTIAL METHOD BLD: ABNORMAL
ERYTHROCYTE [DISTWIDTH] IN BLOOD BY AUTOMATED COUNT: 14.6 % (ref 10–15)
GFR SERPL CREATININE-BSD FRML MDRD: 42 ML/MIN/{1.73_M2}
GLUCOSE SERPL-MCNC: 84 MG/DL (ref 70–99)
HCT VFR BLD AUTO: 39.2 % (ref 40–53)
HGB BLD-MCNC: 11.3 G/DL (ref 13.3–17.7)
LYMPHOCYTES # BLD AUTO: 165.5 10E9/L (ref 0.8–5.3)
LYMPHOCYTES NFR BLD AUTO: 97 %
MCH RBC QN AUTO: 29.6 PG (ref 26.5–33)
MCHC RBC AUTO-ENTMCNC: 28.8 G/DL (ref 31.5–36.5)
MCV RBC AUTO: 103 FL (ref 78–100)
NEUTROPHILS # BLD AUTO: 5.1 10E9/L (ref 1.6–8.3)
NEUTROPHILS NFR BLD AUTO: 3 %
PLATELET # BLD AUTO: 143 10E9/L (ref 150–450)
PLATELET # BLD EST: ABNORMAL 10*3/UL
POTASSIUM SERPL-SCNC: 4.1 MMOL/L (ref 3.4–5.3)
PROT SERPL-MCNC: 6.2 G/DL (ref 6.8–8.8)
RBC # BLD AUTO: 3.82 10E12/L (ref 4.4–5.9)
SODIUM SERPL-SCNC: 141 MMOL/L (ref 133–144)
WBC # BLD AUTO: 170.6 10E9/L (ref 4–11)

## 2019-07-29 PROCEDURE — 36415 COLL VENOUS BLD VENIPUNCTURE: CPT | Performed by: INTERNAL MEDICINE

## 2019-07-29 PROCEDURE — 85025 COMPLETE CBC W/AUTO DIFF WBC: CPT | Performed by: INTERNAL MEDICINE

## 2019-07-29 PROCEDURE — 80053 COMPREHEN METABOLIC PANEL: CPT | Performed by: INTERNAL MEDICINE

## 2019-07-29 NOTE — TELEPHONE ENCOUNTER
DATE:  7/29/2019   TIME OF RECEIPT FROM LAB:  9:39 AM ([facility])  LAB TEST:  WBC  LAB VALUE:  170  RESULTS GIVEN WITH READ-BACK TO (PROVIDER):  RASHID KAHN  TIME LAB VALUE REPORTED TO PROVIDER:   Paged 9:39 AM, routed to [facility]

## 2019-07-31 ENCOUNTER — TELEPHONE (OUTPATIENT)
Dept: ONCOLOGY | Facility: CLINIC | Age: 74
End: 2019-07-31

## 2019-07-31 NOTE — ORAL ONC MGMT
"Oral Chemotherapy Monitoring Program    Primary Oncologist: Dr. Burns  Primary Oncology Clinic: [facility]  Cancer Diagnosis: CLL    Therapy History:  C1D1: 5/14/19  Imbruvica 420 mg (1 X 420 mg) by mouth daily continuously    Drug Interaction Assessment: No new known drug interactions    Lab Monitoring Plan  CMP and CBC monthly  Subjective/Objective:  Loco Wood is a 74 year old male contacted by phone for a follow-up visit for oral chemotherapy. He verified he is taking the Imbruvica correctly and has missed no doses in the last two weeks. He has not started any new OTC or prescription medications. He said he is doing very good on Imbruvica and has very few side effects. He has experienced some diarrhea, but it is nothing he can't manage right now. He denies rash, edema, or nausea.     ORAL CHEMOTHERAPY 5/8/2019 7/31/2019   Drug Name Imbruvica (Ibrutinib) Imbruvica (Ibrutinib)   Current Dosage 420mg 420mg   Current Schedule Daily Daily   Cycle Details Continuous Continuous   Doses missed in last 2 weeks - 0   Adherence Assessment - Adherent   Adverse Effects - No AE identified during assessment   Any new drug interactions? No No   Is the dose as ordered appropriate for the patient? Yes Yes     Vitals:  BP:   BP Readings from Last 1 Encounters:   07/23/19 121/72     Wt Readings from Last 1 Encounters:   07/23/19 72.6 kg (160 lb)     Estimated body surface area is 1.85 meters squared as calculated from the following:    Height as of 7/19/19: 1.702 m (5' 7\").    Weight as of 7/23/19: 72.6 kg (160 lb).    Labs:  _  Result Component Current Result Ref Range   Sodium 141 (7/29/2019) 133 - 144 mmol/L     _  Result Component Current Result Ref Range   Potassium 4.1 (7/29/2019) 3.4 - 5.3 mmol/L     _  Result Component Current Result Ref Range   Calcium 9.1 (7/29/2019) 8.5 - 10.1 mg/dL     No results found for Mag within last 30 days.     No results found for Phos within last 30 days.     _  Result " Component Current Result Ref Range   Albumin 4.0 (7/29/2019) 3.4 - 5.0 g/dL     _  Result Component Current Result Ref Range   Urea Nitrogen 22 (7/29/2019) 7 - 30 mg/dL     _  Result Component Current Result Ref Range   Creatinine 1.61 (H) (7/29/2019) 0.66 - 1.25 mg/dL       _  Result Component Current Result Ref Range   AST 19 (7/29/2019) 0 - 45 U/L     _  Result Component Current Result Ref Range   ALT 22 (7/29/2019) 0 - 70 U/L     _  Result Component Current Result Ref Range   Bilirubin Total 1.1 (7/29/2019) 0.2 - 1.3 mg/dL       _  Result Component Current Result Ref Range   .6 (HH) (7/29/2019) 4.0 - 11.0 10e9/L     _  Result Component Current Result Ref Range   Hemoglobin 11.3 (L) (7/29/2019) 13.3 - 17.7 g/dL     _  Result Component Current Result Ref Range   Platelet Count 143 (L) (7/29/2019) 150 - 450 10e9/L     _  Result Component Current Result Ref Range   Absolute Neutrophil 5.1 (7/29/2019) 1.6 - 8.3 10e9/L     Assessment:  Loco is doing well on Imbruvica    Plan:  -Continue Imbruvica 420 mg by mouth daily  -Monitor diarrhea    Follow-Up:  Review Jess Sanchez appointment on 8/8    Barby Zaidi  Pharmacy Intern  [facility]  [phone]

## 2019-08-08 ENCOUNTER — OFFICE VISIT (OUTPATIENT)
Dept: ORTHOPEDICS | Facility: CLINIC | Age: 74
End: 2019-08-08
Payer: MEDICARE

## 2019-08-08 ENCOUNTER — ONCOLOGY VISIT (OUTPATIENT)
Dept: ONCOLOGY | Facility: CLINIC | Age: 74
End: 2019-08-08
Attending: PHYSICIAN ASSISTANT
Payer: MEDICARE

## 2019-08-08 VITALS
HEART RATE: 80 BPM | DIASTOLIC BLOOD PRESSURE: 73 MMHG | WEIGHT: 158.07 LBS | SYSTOLIC BLOOD PRESSURE: 114 MMHG | OXYGEN SATURATION: 97 % | RESPIRATION RATE: 14 BRPM | BODY MASS INDEX: 24.76 KG/M2 | TEMPERATURE: 97.6 F

## 2019-08-08 VITALS
DIASTOLIC BLOOD PRESSURE: 73 MMHG | BODY MASS INDEX: 24.8 KG/M2 | HEIGHT: 67 IN | WEIGHT: 158 LBS | SYSTOLIC BLOOD PRESSURE: 114 MMHG

## 2019-08-08 DIAGNOSIS — G89.29 CHRONIC MIDLINE LOW BACK PAIN WITHOUT SCIATICA: Primary | ICD-10-CM

## 2019-08-08 DIAGNOSIS — E03.9 HYPOTHYROIDISM, UNSPECIFIED TYPE: ICD-10-CM

## 2019-08-08 DIAGNOSIS — C91.10 CLL (CHRONIC LYMPHOCYTIC LEUKEMIA) (H): Primary | ICD-10-CM

## 2019-08-08 DIAGNOSIS — M54.50 CHRONIC MIDLINE LOW BACK PAIN WITHOUT SCIATICA: Primary | ICD-10-CM

## 2019-08-08 PROCEDURE — G0463 HOSPITAL OUTPT CLINIC VISIT: HCPCS | Mod: ZF

## 2019-08-08 PROCEDURE — 99214 OFFICE O/P EST MOD 30 MIN: CPT | Mod: ZP | Performed by: PHYSICIAN ASSISTANT

## 2019-08-08 RX ORDER — TIZANIDINE 2 MG/1
2-4 TABLET ORAL 3 TIMES DAILY
Qty: 30 TABLET | Refills: 0 | Status: SHIPPED | OUTPATIENT
Start: 2019-08-08 | End: 2019-09-16

## 2019-08-08 RX ORDER — DIAZEPAM 5 MG
5 TABLET ORAL EVERY 6 HOURS PRN
COMMUNITY
End: 2020-11-18

## 2019-08-08 ASSESSMENT — MIFFLIN-ST. JEOR: SCORE: 1425.31

## 2019-08-08 ASSESSMENT — PAIN SCALES - GENERAL: PAINLEVEL: MODERATE PAIN (4)

## 2019-08-08 NOTE — LETTER
8/8/2019      RE: Loco Wood  [address] [facility] [address]       Oncology/Hematology Visit Note  Aug 8, 2019    Reason for Visit: follow up of CLL    History of Present Illness: Loco Wood is a 74 year old male with CLL. He was diagnosed 2/12/2007 with CLL, Lyles stage 0, followed clinically since.  At diagnosis WBC 17.8, ALC 11.2, hemoglobin 15.2, platelets 188.  Flow consistent with CLL.  No opportunistic infections, with IgG in the normal range during follow up. Due to rising lymphocyte count, it was recommended he consider starting on treatment. He is currently undergoing treatment with ibrutinib, which he began on 5/14/19. Please see previous notes for further details on the patient's history. He comes in today for routine follow up.    Interval History:  Patient reports that he recently went golfing and injured his back.  He plans to go upstairs and see the orthopedic walk-in clinic for this.  He does have bowel movements daily with some loose stools.  He has had 2 incontinent episodes.  Very rarely are his stools watery. He has not felt the need to take Imodium.  He does sometimes report that his stomach is achy.  He continues on omeprazole every other day.  He has found the Flomax extremely helpful for his nighttime urination.  He denies any bleeding or bruising issues.  He does get some cramps in his calves.  He is drinking about 3 cups of fluids per day.  He denies other concerns.    Review of Systems:  Patient denies any of the following except if noted above: fevers, chills, difficulty with energy, vision or hearing changes, chest pain, dyspnea, nausea, vomiting, constipation, urinary concerns, headaches, numbness, tingling, issues with sleep or mood.     Current Outpatient Medications   Medication Sig Dispense Refill     Acetaminophen (TYLENOL PO)        atorvastatin (LIPITOR) 10 MG tablet Take 1 tablet (10 mg) by mouth every 48 hours 45 tablet 4     diazepam (VALIUM) 5 MG  tablet Take 5 mg by mouth every 6 hours as needed for anxiety       ibrutinib (IMBRUVICA) 420 MG tablet Take 1 tablet (420 mg) by mouth daily 28 tablet 0     ibuprofen (ADVIL/MOTRIN) 200 MG tablet Take 200 mg by mouth every 4 hours as needed for mild pain       levothyroxine (SYNTHROID/LEVOTHROID) 50 MCG tablet Take 1 tablet (50 mcg) by mouth daily 90 tablet 3     omeprazole (PRILOSEC) 10 MG DR capsule TAKE ONE CAPSULE BY MOUTH EVERY DAY 30 TO 60 MINUTES BEFORE A MEAL 90 capsule 3     prochlorperazine (COMPAZINE) 10 MG tablet Take 1 tablet (10 mg) by mouth every 6 hours as needed (Nausea/Vomiting) 30 tablet 2     tamsulosin (FLOMAX) 0.4 MG capsule Take 1 capsule (0.4 mg) by mouth daily 90 capsule 3     Cholecalciferol (VITAMIN D3 PO) Take 4,000 Units by mouth daily        omega 3 1200 MG CAPS Take 2 capsules by mouth daily       tiZANidine (ZANAFLEX) 2 MG tablet Take 1-2 tablets (2-4 mg) by mouth 3 times daily 30 tablet 0     Physical Examination:  General: The patient is a pleasant male in no acute distress.  /73   Pulse 80   Temp 97.6  F (36.4  C) (Oral)   Resp 14   Wt 71.7 kg (158 lb 1.1 oz)   SpO2 97%   BMI 24.76 kg/m     Wt Readings from Last 10 Encounters:   08/08/19 71.7 kg (158 lb)   08/08/19 71.7 kg (158 lb 1.1 oz)   07/23/19 72.6 kg (160 lb)   07/19/19 71.8 kg (158 lb 3.2 oz)   07/02/19 71.1 kg (156 lb 12.8 oz)   06/12/19 70.9 kg (156 lb 4.8 oz)   06/05/19 70.6 kg (155 lb 9.6 oz)   05/31/19 69.3 kg (152 lb 12.8 oz)   05/31/19 68.9 kg (152 lb)   05/22/19 71.1 kg (156 lb 12.8 oz)   HEENT: EOMI, PERRL. Sclerae are anicteric. Oral mucosa is pink and moist with no lesions or thrush.   Lymph: Neck is supple with no lymphadenopathy in the cervical or supraclavicular areas. No axillary adenopathy palpable. No inguinal adenopathy palpable.  Heart: Regular rate and rhythm.   Lungs: Clear to auscultation bilaterally.   Abdomen: Bowel sounds present, soft, nontender with no palpable hepatosplenomegaly or  masses.   Extremities: No lower extremity edema noted bilaterally.   Neuro: Cranial nerves II through XII are grossly intact.  Skin: No rashes, petechiae, or bruising noted on exposed skin.    Laboratory Data:   7/29/2019 09:17   Sodium 141   Potassium 4.1   Chloride 108   Carbon Dioxide 28   Urea Nitrogen 22   Creatinine 1.61 (H)   GFR Estimate 42 (L)   GFR Estimate If Black 49 (L)   Calcium 9.1   Anion Gap 5   Albumin 4.0   Protein Total 6.2 (L)   Bilirubin Total 1.1   Alkaline Phosphatase 80   ALT 22   AST 19   .6 (HH)   Hemoglobin 11.3 (L)   Hematocrit 39.2 (L)   Platelet Count 143 (L)   RBC Count 3.82 (L)    (H)   MCH 29.6   MCHC 28.8 (L)   RDW 14.6   Diff Method Manual Differential   % Neutrophils 3.0   % Lymphocytes 97.0   Absolute Neutrophil 5.1   Absolute Lymphocytes 165.5 (H)   Anisocytosis Slight   Platelet Estimate Automated count confirmed.  Platelet morphology is normal.     Assessment and Plan:  CLL. Patient started on ibrutinib 420 mg daily on 5/14/19. He is tolerating treatment very well thus far. He has had an expected rise in his lymphocyte count that is now trending down. He will see me back in 1 month. He will have labs drawn 2 days prior to my visit at [facility].      Hypothyroidism. He began on levothyroxine 50 mcg daily on 5/2/19. Last TSH on 6/5/19 was stable and mildly elevated and free T4 is normal.  Will recheck again next month.    CKD. He appears to have had some kidney dysfunction since at least 2010. His last check was mildly increased above his baseline. Recommend increasing fluid intake to 48-64 oz/day. This may also help with his calf cramping.      Diarrhea. Secondary to ibrutinib. Discussed okay to take Imodium as needed.     Jess Sanchez PA-C  [facility]  [address] [place] [address] [phone]

## 2019-08-08 NOTE — PROGRESS NOTES
"[facility]  Orthopedics  Severiano Royal MD  2019     Name: Loco Wood  MRN: [id number]  Age: 74 year old  : 1945  Referring provider: Referred Self     Chief Complaint: Pain of the Lower Back    History of Present Illness:   Loco Wood is a 74 year old male who presents today for evaluation of lower back pain for the past 30 years. He notes that he had a fusion surgery in , L-4-sacrum. He notes that since the fusion, he has been endorsing sudden, severe pain every few years. Some of these incidents were so intense that he had to be taken in an ambulance to the ED. On one occasion at the ED several years ago, he notes that he was given an IV \"cocktail,\" which had provided significant back pain relief. Last week, he notes that he played 18 holes of golf on Monday, and 9 holes on Thursday. Since Buzz morning, he has been endorsing sharp pain in his left lower back. He denies any numbness or tingling. Pain is exacerbated with certain twisting movements and with weightbearing. He called the spine center and asked if they could do the injection, but they denied his request. He has previously tried physical therapy and has visited a chiropractor. He also denies any pain relief with oral medications such as Flexeril, hydrocodone, and oral valium. He is unable to take ibuprofen, because he is also taken medications for leukemia. Of note, he has received an MRI and has had both hip replacements. He also notes that he has torn his meniscus last year.     Review of Systems:   A 10-point review of systems was obtained and is negative except for as noted in the HPI.     Medications:   Current Outpatient Medications:      Acetaminophen (TYLENOL PO), , Disp: , Rfl:      atorvastatin (LIPITOR) 10 MG tablet, Take 1 tablet (10 mg) by mouth every 48 hours, Disp: 45 tablet, Rfl: 4     Cholecalciferol (VITAMIN D3 PO), Take 4,000 Units by mouth daily , Disp: , Rfl:      diazepam (VALIUM) 5 MG tablet, Take 5 " "mg by mouth every 6 hours as needed for anxiety, Disp: , Rfl:      ibrutinib (IMBRUVICA) 420 MG tablet, Take 1 tablet (420 mg) by mouth daily, Disp: 28 tablet, Rfl: 0     ibuprofen (ADVIL/MOTRIN) 200 MG tablet, Take 200 mg by mouth every 4 hours as needed for mild pain, Disp: , Rfl:      levothyroxine (SYNTHROID/LEVOTHROID) 50 MCG tablet, Take 1 tablet (50 mcg) by mouth daily, Disp: 90 tablet, Rfl: 3     omega 3 1200 MG CAPS, Take 2 capsules by mouth daily, Disp: , Rfl:      omeprazole (PRILOSEC) 10 MG DR capsule, TAKE ONE CAPSULE BY MOUTH EVERY DAY 30 TO 60 MINUTES BEFORE A MEAL, Disp: 90 capsule, Rfl: 3     prochlorperazine (COMPAZINE) 10 MG tablet, Take 1 tablet (10 mg) by mouth every 6 hours as needed (Nausea/Vomiting), Disp: 30 tablet, Rfl: 2     tamsulosin (FLOMAX) 0.4 MG capsule, Take 1 capsule (0.4 mg) by mouth daily, Disp: 90 capsule, Rfl: 3    Allergies:  Dilaudid [hydromorphone] and Morphine     Past Medical History:  Arthritis   Chronic pain  CLL  Esophageal reflux  Malignant neoplasm  PONV  Pure hypercholesterolemia    Past Surgical History:  Arthroplasty Minimally Invasive Hip  Back Surgery  C nonspecific procedure  Colonoscopy  GI Surgery  Hernia Repair    Social History:  He denies tobacco use and admits to alcohol use.     Family History:  Positive: Heart Disease (father), Cerebrovascular Disease (father, paternal uncle), Cancer (father, mother, maternal uncle, paternal aunt), Melanoma (father), Hyperlipidemia (father), Thyroid Disease (father), Breast Cancer (maternal aunt)    Physical Examination:  Blood pressure 114/73, height 1.702 m (5' 7\"), weight 71.7 kg (158 lb).  General: alert, pleasant, no distress. sitting comfortably in chair  Back/Spine: no tenderness to palpation of spinous processes, or paraspinous musculature of lumbar spine. full ROM with flexion, extension, twisting, and side bending with pain on with rotation and sidebending to left. Straight leg raise negative bilaterally. No " significant hamstring tightness noted. No  pain in back with FABRICE testing bilaterally  Neuro: SILT on bilateral lower extremities, can stand on toes and heel walk without difficulty, patellar and achilles reflexes 2+ and symmetric    Imaging: Patient declined xray at this visit     Assessment:   74 year old male with acute on chronic midline low back pain. No red flag symptoms. Suspect acutely mechanical related to golfing this past weekend.     Plan:     Discussed that IV diazepam is not available in this clinic nor is it a common treatment for acute low back pain anymore.     Recommended Tizanidine for back pain relief.     Relative rest. consider home exercises and referral to pt.     Severiano Royal MD      Scribe Disclosure:  IEz, am serving as a scribe to document services personally performed by Severiano Royal MD at this visit, based upon the provider's statements to me. All documentation has been reviewed by the aforementioned provider prior to being entered into the official medical record.

## 2019-08-08 NOTE — NURSING NOTE
"Oncology Rooming Note    August 8, 2019 8:57 AM   Loco Wood is a 74 year old male who presents for:    Chief Complaint   Patient presents with     Oncology Clinic Visit     Leukemia      Initial Vitals: /73   Pulse 80   Temp 97.6  F (36.4  C) (Oral)   Resp 14   Wt 71.7 kg (158 lb 1.1 oz)   SpO2 97%   BMI 24.76 kg/m   Estimated body mass index is 24.76 kg/m  as calculated from the following:    Height as of 7/19/19: 1.702 m (5' 7\").    Weight as of this encounter: 71.7 kg (158 lb 1.1 oz). Body surface area is 1.84 meters squared.  Moderate Pain (4) Comment: Data Unavailable   No LMP for male patient.  Allergies reviewed: Yes  Medications reviewed: Yes    Medications: Medication refills not needed today.  Pharmacy name entered into [facility]:    [id number] - [place], [place] - [address] AT [facility] OF [address] [place] [address] [place], [place] - [address]  [facility] - [place]  [place] - [address] [facility]  [facility] [place] [place] - [address]    Clinical concerns: no  Daniel  was notified.      Ynes Geronimo MA              "

## 2019-08-08 NOTE — PROGRESS NOTES
"      SPORTS & ORTHOPEDIC WALK-IN VISIT 8/8/2019    Primary Care Physician: Dr. Parra    Has had back pain for 30 years. Had fusion surgery in 1990, L4-sacrum. Every few years throws back out again. Few times he's had to go to ED via ambulance because of it. Few years ago this happened and was given an IV \"cocktail\" in ED, worked very well. Last week played 18 holes of golf on Monday and 9 holes on Thursday. Woke up Sunday morning in pain. Has been continuous since then. Called spine center a few days ago and asked if they could do the injection for him, they said no. This is different from [facility], states a needle was stuck between fingers. Believes there was valium in there but not sure what else. This is the only thing that has made a difference. Has done PT, chiro. States hasn't had luck with oral medications either. Has taken valium the past few days but that hasn't helped either.     Reason for visit:     What part of your body is injured / painful?  Midline, this morning left low back    What caused the injury /pain? Overuse injury from regular activity    How long ago did your injury occur or pain begin? a week ago    What are your most bothersome symptoms? Pain    How would you characterize your symptom?  sharp    What makes your symptoms better? Ice, Heat and Other: valium, chiro    What makes your symptoms worse? Movement    Have you been previously seen for this problem? No    Medical History:    Any recent changes to your medical history? No    Any new medication prescribed since last visit? No    Have you had surgery on this body part before? BTHA, fusion    Social History:    Occupation: Retired    Review of Systems:    Do you have fever, chills, weight loss? No    Do you have any vision problems? No    Do you have any chest pain or edema? No    Do you have any shortness of breath or wheezing?  No    Do you have stomach problems? No    Do you have any numbness or focal weakness? No    Do you have " diabetes? No    Do you have problems with bleeding or clotting? No    Do you have an rashes or other skin lesions? No

## 2019-08-08 NOTE — PROGRESS NOTES
Oncology/Hematology Visit Note  Aug 8, 2019    Reason for Visit: follow up of CLL    History of Present Illness: Loco Wood is a 74 year old male with CLL. He was diagnosed 2/12/2007 with CLL, Lyles stage 0, followed clinically since.  At diagnosis WBC 17.8, ALC 11.2, hemoglobin 15.2, platelets 188.  Flow consistent with CLL.  No opportunistic infections, with IgG in the normal range during follow up. Due to rising lymphocyte count, it was recommended he consider starting on treatment. He is currently undergoing treatment with ibrutinib, which he began on 5/14/19. Please see previous notes for further details on the patient's history. He comes in today for routine follow up.    Interval History:  Patient reports that he recently went golfing and injured his back.  He plans to go upstairs and see the orthopedic walk-in clinic for this.  He does have bowel movements daily with some loose stools.  He has had 2 incontinent episodes.  Very rarely are his stools watery. He has not felt the need to take Imodium.  He does sometimes report that his stomach is achy.  He continues on omeprazole every other day.  He has found the Flomax extremely helpful for his nighttime urination.  He denies any bleeding or bruising issues.  He does get some cramps in his calves.  He is drinking about 3 cups of fluids per day.  He denies other concerns.    Review of Systems:  Patient denies any of the following except if noted above: fevers, chills, difficulty with energy, vision or hearing changes, chest pain, dyspnea, nausea, vomiting, constipation, urinary concerns, headaches, numbness, tingling, issues with sleep or mood.     Current Outpatient Medications   Medication Sig Dispense Refill     Acetaminophen (TYLENOL PO)        atorvastatin (LIPITOR) 10 MG tablet Take 1 tablet (10 mg) by mouth every 48 hours 45 tablet 4     diazepam (VALIUM) 5 MG tablet Take 5 mg by mouth every 6 hours as needed for anxiety       ibrutinib (IMBRUVICA)  420 MG tablet Take 1 tablet (420 mg) by mouth daily 28 tablet 0     ibuprofen (ADVIL/MOTRIN) 200 MG tablet Take 200 mg by mouth every 4 hours as needed for mild pain       levothyroxine (SYNTHROID/LEVOTHROID) 50 MCG tablet Take 1 tablet (50 mcg) by mouth daily 90 tablet 3     omeprazole (PRILOSEC) 10 MG DR capsule TAKE ONE CAPSULE BY MOUTH EVERY DAY 30 TO 60 MINUTES BEFORE A MEAL 90 capsule 3     prochlorperazine (COMPAZINE) 10 MG tablet Take 1 tablet (10 mg) by mouth every 6 hours as needed (Nausea/Vomiting) 30 tablet 2     tamsulosin (FLOMAX) 0.4 MG capsule Take 1 capsule (0.4 mg) by mouth daily 90 capsule 3     Cholecalciferol (VITAMIN D3 PO) Take 4,000 Units by mouth daily        omega 3 1200 MG CAPS Take 2 capsules by mouth daily       tiZANidine (ZANAFLEX) 2 MG tablet Take 1-2 tablets (2-4 mg) by mouth 3 times daily 30 tablet 0     Physical Examination:  General: The patient is a pleasant male in no acute distress.  /73   Pulse 80   Temp 97.6  F (36.4  C) (Oral)   Resp 14   Wt 71.7 kg (158 lb 1.1 oz)   SpO2 97%   BMI 24.76 kg/m    Wt Readings from Last 10 Encounters:   08/08/19 71.7 kg (158 lb)   08/08/19 71.7 kg (158 lb 1.1 oz)   07/23/19 72.6 kg (160 lb)   07/19/19 71.8 kg (158 lb 3.2 oz)   07/02/19 71.1 kg (156 lb 12.8 oz)   06/12/19 70.9 kg (156 lb 4.8 oz)   06/05/19 70.6 kg (155 lb 9.6 oz)   05/31/19 69.3 kg (152 lb 12.8 oz)   05/31/19 68.9 kg (152 lb)   05/22/19 71.1 kg (156 lb 12.8 oz)   HEENT: EOMI, PERRL. Sclerae are anicteric. Oral mucosa is pink and moist with no lesions or thrush.   Lymph: Neck is supple with no lymphadenopathy in the cervical or supraclavicular areas. No axillary adenopathy palpable. No inguinal adenopathy palpable.  Heart: Regular rate and rhythm.   Lungs: Clear to auscultation bilaterally.   Abdomen: Bowel sounds present, soft, nontender with no palpable hepatosplenomegaly or masses.   Extremities: No lower extremity edema noted bilaterally.   Neuro: Cranial nerves  II through XII are grossly intact.  Skin: No rashes, petechiae, or bruising noted on exposed skin.    Laboratory Data:   7/29/2019 09:17   Sodium 141   Potassium 4.1   Chloride 108   Carbon Dioxide 28   Urea Nitrogen 22   Creatinine 1.61 (H)   GFR Estimate 42 (L)   GFR Estimate If Black 49 (L)   Calcium 9.1   Anion Gap 5   Albumin 4.0   Protein Total 6.2 (L)   Bilirubin Total 1.1   Alkaline Phosphatase 80   ALT 22   AST 19   .6 (HH)   Hemoglobin 11.3 (L)   Hematocrit 39.2 (L)   Platelet Count 143 (L)   RBC Count 3.82 (L)    (H)   MCH 29.6   MCHC 28.8 (L)   RDW 14.6   Diff Method Manual Differential   % Neutrophils 3.0   % Lymphocytes 97.0   Absolute Neutrophil 5.1   Absolute Lymphocytes 165.5 (H)   Anisocytosis Slight   Platelet Estimate Automated count confirmed.  Platelet morphology is normal.     Assessment and Plan:  CLL. Patient started on ibrutinib 420 mg daily on 5/14/19. He is tolerating treatment very well thus far. He has had an expected rise in his lymphocyte count that is now trending down. He will see me back in 1 month. He will have labs drawn 2 days prior to my visit at [facility].      Hypothyroidism. He began on levothyroxine 50 mcg daily on 5/2/19. Last TSH on 6/5/19 was stable and mildly elevated and free T4 is normal.  Will recheck again next month.    CKD. He appears to have had some kidney dysfunction since at least 2010. His last check was mildly increased above his baseline. Recommend increasing fluid intake to 48-64 oz/day. This may also help with his calf cramping.      Diarrhea. Secondary to ibrutinib. Discussed okay to take Imodium as needed.     Jess Sanchez PA-C  [facility]  [address] [place] [address] [phone]

## 2019-08-21 DIAGNOSIS — C91.10 CLL (CHRONIC LYMPHOCYTIC LEUKEMIA) (H): ICD-10-CM

## 2019-08-27 DIAGNOSIS — C91.10 CLL (CHRONIC LYMPHOCYTIC LEUKEMIA) (H): Primary | ICD-10-CM

## 2019-09-04 ENCOUNTER — TELEPHONE (OUTPATIENT)
Dept: ONCOLOGY | Facility: CLINIC | Age: 74
End: 2019-09-04

## 2019-09-04 DIAGNOSIS — E03.9 HYPOTHYROIDISM, UNSPECIFIED TYPE: ICD-10-CM

## 2019-09-04 DIAGNOSIS — C91.10 CLL (CHRONIC LYMPHOCYTIC LEUKEMIA) (H): ICD-10-CM

## 2019-09-04 LAB
ALBUMIN SERPL-MCNC: 4.1 G/DL (ref 3.4–5)
ALP SERPL-CCNC: 75 U/L (ref 40–150)
ALT SERPL W P-5'-P-CCNC: 22 U/L (ref 0–70)
ANION GAP SERPL CALCULATED.3IONS-SCNC: 5 MMOL/L (ref 3–14)
AST SERPL W P-5'-P-CCNC: 20 U/L (ref 0–45)
BILIRUB SERPL-MCNC: 1 MG/DL (ref 0.2–1.3)
BUN SERPL-MCNC: 18 MG/DL (ref 7–30)
CALCIUM SERPL-MCNC: 9 MG/DL (ref 8.5–10.1)
CHLORIDE SERPL-SCNC: 108 MMOL/L (ref 94–109)
CO2 SERPL-SCNC: 29 MMOL/L (ref 20–32)
CREAT SERPL-MCNC: 1.48 MG/DL (ref 0.66–1.25)
DIFFERENTIAL METHOD BLD: ABNORMAL
ERYTHROCYTE [DISTWIDTH] IN BLOOD BY AUTOMATED COUNT: 14 % (ref 10–15)
GFR SERPL CREATININE-BSD FRML MDRD: 46 ML/MIN/{1.73_M2}
GLUCOSE SERPL-MCNC: 87 MG/DL (ref 70–99)
HCT VFR BLD AUTO: 41.2 % (ref 40–53)
HGB BLD-MCNC: 12.3 G/DL (ref 13.3–17.7)
LYMPHOCYTES # BLD AUTO: 125.3 10E9/L (ref 0.8–5.3)
LYMPHOCYTES NFR BLD AUTO: 92 %
MCH RBC QN AUTO: 29.9 PG (ref 26.5–33)
MCHC RBC AUTO-ENTMCNC: 29.9 G/DL (ref 31.5–36.5)
MCV RBC AUTO: 100 FL (ref 78–100)
MONOCYTES # BLD AUTO: 1.4 10E9/L (ref 0–1.3)
MONOCYTES NFR BLD AUTO: 1 %
NEUTROPHILS # BLD AUTO: 9.5 10E9/L (ref 1.6–8.3)
NEUTROPHILS NFR BLD AUTO: 7 %
PLATELET # BLD AUTO: 143 10E9/L (ref 150–450)
PLATELET # BLD EST: ABNORMAL 10*3/UL
POTASSIUM SERPL-SCNC: 4.1 MMOL/L (ref 3.4–5.3)
PROT SERPL-MCNC: 6.5 G/DL (ref 6.8–8.8)
RBC # BLD AUTO: 4.11 10E12/L (ref 4.4–5.9)
RBC MORPH BLD: NORMAL
SMUDGE CELLS BLD QL SMEAR: PRESENT
SODIUM SERPL-SCNC: 142 MMOL/L (ref 133–144)
T4 FREE SERPL-MCNC: 1.02 NG/DL (ref 0.76–1.46)
TSH SERPL DL<=0.005 MIU/L-ACNC: 7.4 MU/L (ref 0.4–4)
WBC # BLD AUTO: 136.2 10E9/L (ref 4–11)

## 2019-09-04 PROCEDURE — 36415 COLL VENOUS BLD VENIPUNCTURE: CPT | Performed by: INTERNAL MEDICINE

## 2019-09-04 PROCEDURE — 84443 ASSAY THYROID STIM HORMONE: CPT | Performed by: INTERNAL MEDICINE

## 2019-09-04 PROCEDURE — 85025 COMPLETE CBC W/AUTO DIFF WBC: CPT | Performed by: INTERNAL MEDICINE

## 2019-09-04 PROCEDURE — 84439 ASSAY OF FREE THYROXINE: CPT | Performed by: INTERNAL MEDICINE

## 2019-09-04 PROCEDURE — 80053 COMPREHEN METABOLIC PANEL: CPT | Performed by: INTERNAL MEDICINE

## 2019-09-04 NOTE — TELEPHONE ENCOUNTER
[place] [phone]  Preliminary Result  DATE:  9/4/19  TIME OF RECEIPT FROM LAB: 9:57 AM   LAB TEST:  WBC = 136.0  RESULTS PAGED TO (PROVIDER):  Dr Burns  TIME LAB VALUE REPORTED TO PROVIDER: 9:58    10:06 AM  Spoke with Dr Burns, no further action required.

## 2019-09-06 ENCOUNTER — ONCOLOGY VISIT (OUTPATIENT)
Dept: ONCOLOGY | Facility: CLINIC | Age: 74
End: 2019-09-06
Attending: PHYSICIAN ASSISTANT
Payer: MEDICARE

## 2019-09-06 VITALS
BODY MASS INDEX: 25.24 KG/M2 | RESPIRATION RATE: 14 BRPM | OXYGEN SATURATION: 94 % | SYSTOLIC BLOOD PRESSURE: 126 MMHG | DIASTOLIC BLOOD PRESSURE: 61 MMHG | TEMPERATURE: 97.9 F | HEART RATE: 88 BPM | WEIGHT: 161.16 LBS

## 2019-09-06 DIAGNOSIS — C91.10 CLL (CHRONIC LYMPHOCYTIC LEUKEMIA) (H): Primary | ICD-10-CM

## 2019-09-06 PROCEDURE — 99214 OFFICE O/P EST MOD 30 MIN: CPT | Mod: ZP | Performed by: PHYSICIAN ASSISTANT

## 2019-09-06 PROCEDURE — G0463 HOSPITAL OUTPT CLINIC VISIT: HCPCS | Mod: ZF

## 2019-09-06 ASSESSMENT — PAIN SCALES - GENERAL: PAINLEVEL: NO PAIN (0)

## 2019-09-06 NOTE — PROGRESS NOTES
Oncology/Hematology Visit Note  Sep 6, 2019    Reason for Visit: follow up of CLL    History of Present Illness: Loco Wood is a 74 year old male with CLL. He was diagnosed 2/12/2007 with CLL, Lyles stage 0, followed clinically since.  At diagnosis WBC 17.8, ALC 11.2, hemoglobin 15.2, platelets 188.  Flow consistent with CLL.  No opportunistic infections, with IgG in the normal range during follow up. Due to rising lymphocyte count, it was recommended he consider starting on treatment. He is currently undergoing treatment with ibrutinib, which he began on 5/14/19. Please see previous notes for further details on the patient's history. He comes in today for routine follow up.    Interval History:  Patient reports that overall he has been doing okay.  He did go golfing recently though has not been out as much recently due to ongoing back pain which is been a chronic problem for him.  He has been trying to manage this with ice and heat and also using muscle relaxant on occasion.  He has tried physical therapy in the past and has not found this to be particularly helpful.  He continues to have intermittent diarrhea, but has not felt the need to take anything for this.  He has ongoing urinary frequency which initially improved with Flomax but now seems to be worse again going up to 15-20 times per day despite not drinking a large amount of fluids.  He denies any lumps or bumps, night sweats, or issues with his appetite.  He denies other concerns.    Current Outpatient Medications   Medication Sig Dispense Refill     Probiotic Product (PROBIOTIC-10 PO) Take by mouth daily       Acetaminophen (TYLENOL PO)        atorvastatin (LIPITOR) 10 MG tablet Take 1 tablet (10 mg) by mouth every 48 hours 45 tablet 4     Cholecalciferol (VITAMIN D3 PO) Take 4,000 Units by mouth daily        diazepam (VALIUM) 5 MG tablet Take 5 mg by mouth every 6 hours as needed for anxiety       ibrutinib (IMBRUVICA) 420 MG tablet Take 1 tablet  (420 mg) by mouth daily 28 tablet 0     ibrutinib (IMBRUVICA) 420 MG tablet Take 1 tablet (420 mg) by mouth daily 28 tablet 0     ibuprofen (ADVIL/MOTRIN) 200 MG tablet Take 200 mg by mouth every 4 hours as needed for mild pain       levothyroxine (SYNTHROID/LEVOTHROID) 50 MCG tablet Take 1 tablet (50 mcg) by mouth daily 90 tablet 3     omega 3 1200 MG CAPS Take 2 capsules by mouth daily       omeprazole (PRILOSEC) 10 MG DR capsule TAKE ONE CAPSULE BY MOUTH EVERY DAY 30 TO 60 MINUTES BEFORE A MEAL 90 capsule 3     prochlorperazine (COMPAZINE) 10 MG tablet Take 1 tablet (10 mg) by mouth every 6 hours as needed (Nausea/Vomiting) 30 tablet 2     tamsulosin (FLOMAX) 0.4 MG capsule Take 1 capsule (0.4 mg) by mouth daily 90 capsule 3     tiZANidine (ZANAFLEX) 2 MG tablet Take 1-2 tablets (2-4 mg) by mouth 3 times daily 30 tablet 0     Physical Examination:  General: The patient is a pleasant male in no acute distress.  /61   Pulse 88   Temp 97.9  F (36.6  C) (Oral)   Resp 14   Wt 73.1 kg (161 lb 2.5 oz)   SpO2 94%   BMI 25.24 kg/m    Wt Readings from Last 10 Encounters:   09/06/19 73.1 kg (161 lb 2.5 oz)   08/08/19 71.7 kg (158 lb)   08/08/19 71.7 kg (158 lb 1.1 oz)   07/23/19 72.6 kg (160 lb)   07/19/19 71.8 kg (158 lb 3.2 oz)   07/02/19 71.1 kg (156 lb 12.8 oz)   06/12/19 70.9 kg (156 lb 4.8 oz)   06/05/19 70.6 kg (155 lb 9.6 oz)   05/31/19 69.3 kg (152 lb 12.8 oz)   05/31/19 68.9 kg (152 lb)   HEENT: EOMI, PERRL. Sclerae are anicteric. Oral mucosa is pink and moist with no lesions or thrush.   Lymph: Neck is supple with no lymphadenopathy in the cervical or supraclavicular areas. No axillary adenopathy palpable. No inguinal adenopathy palpable.  Heart: Regular rate and rhythm.   Lungs: Clear to auscultation bilaterally.   Abdomen: Bowel sounds present, soft, nontender with no palpable hepatosplenomegaly or masses.   Extremities: No lower extremity edema noted bilaterally.   Neuro: Cranial nerves II through  XII are grossly intact.  Skin: No rashes, petechiae, or bruising noted on exposed skin.    Laboratory Data:   9/4/2019 08:38   Sodium 142   Potassium 4.1   Chloride 108   Carbon Dioxide 29   Urea Nitrogen 18   Creatinine 1.48 (H)   GFR Estimate 46 (L)   GFR Estimate If Black 54 (L)   Calcium 9.0   Anion Gap 5   Albumin 4.1   Protein Total 6.5 (L)   Bilirubin Total 1.0   Alkaline Phosphatase 75   ALT 22   AST 20   T4 Free 1.02   TSH 7.40 (H)   Glucose 87   .2 (HH)   Hemoglobin 12.3 (L)   Hematocrit 41.2   Platelet Count 143 (L)   RBC Count 4.11 (L)      MCH 29.9   MCHC 29.9 (L)   RDW 14.0   Diff Method Manual Differential   % Neutrophils 7.0   % Lymphocytes 92.0   % Monocytes 1.0   Absolute Neutrophil 9.5 (H)   Absolute Lymphocytes 125.3 (H)   Absolute Monocytes 1.4 (H)   RBC Morphology Normal   Smudge Cells Present   Platelet Estimate Automated count confirmed.  Platelet morphology is normal.     Assessment and Plan:  CLL. Patient started on ibrutinib 420 mg daily on 5/14/19. He is tolerating treatment very well thus far. He has had an expected rise in his lymphocyte count that is now trending down. He will see me back in 1 month and will see Dr. Burns in 2 months. He will have labs drawn 2 days our visits at [facility].      Hypothyroidism. He began on levothyroxine 50 mcg daily on 5/2/19. Last TSH on 6/5/19 was stable and mildly elevated and free T4 is normal.  Will recheck again in 3 months (early December).    CKD. He appears to have had some kidney dysfunction since at least 2010. His last check was back to his baseline.     Diarrhea. Secondary to ibrutinib. Occurring intermittently. He is aware of the possibility of taking Imodium prn.    BPH. Follows with urology. Remains on Flomax. Symptoms are worse recently. Recommend discussing with his urologist about a potential dose adjustment of Flomax versus considering adding a medication to help with his symptoms.    Back pain. Chronic, but  more bothersome recently. He will continue with ice and heat as well as his muscle relaxant. Recommend considering resuming physical therapy exercises that he learned previously.     Jess Sanchez PA-C  [facility] [place] [id number] [place] [address] [phone]

## 2019-09-06 NOTE — LETTER
9/6/2019      RE: Loco Wood  [address] [id number]       Oncology/Hematology Visit Note  Sep 6, 2019    Reason for Visit: follow up of CLL    History of Present Illness: Loco Wood is a 74 year old male with CLL. He was diagnosed 2/12/2007 with CLL, Lyles stage 0, followed clinically since.  At diagnosis WBC 17.8, ALC 11.2, hemoglobin 15.2, platelets 188.  Flow consistent with CLL.  No opportunistic infections, with IgG in the normal range during follow up. Due to rising lymphocyte count, it was recommended he consider starting on treatment. He is currently undergoing treatment with ibrutinib, which he began on 5/14/19. Please see previous notes for further details on the patient's history. He comes in today for routine follow up.    Interval History:  Patient reports that overall he has been doing okay.  He did go golfing recently though has not been out as much recently due to ongoing back pain which is been a chronic problem for him.  He has been trying to manage this with ice and heat and also using muscle relaxant on occasion.  He has tried physical therapy in the past and has not found this to be particularly helpful.  He continues to have intermittent diarrhea, but has not felt the need to take anything for this.  He has ongoing urinary frequency which initially improved with Flomax but now seems to be worse again going up to 15-20 times per day despite not drinking a large amount of fluids.  He denies any lumps or bumps, night sweats, or issues with his appetite.  He denies other concerns.    Current Outpatient Medications   Medication Sig Dispense Refill     Probiotic Product (PROBIOTIC-10 PO) Take by mouth daily       Acetaminophen (TYLENOL PO)        atorvastatin (LIPITOR) 10 MG tablet Take 1 tablet (10 mg) by mouth every 48 hours 45 tablet 4     Cholecalciferol (VITAMIN D3 PO) Take 4,000 Units by mouth daily        diazepam (VALIUM) 5 MG tablet Take 5 mg by mouth  every 6 hours as needed for anxiety       ibrutinib (IMBRUVICA) 420 MG tablet Take 1 tablet (420 mg) by mouth daily 28 tablet 0     ibrutinib (IMBRUVICA) 420 MG tablet Take 1 tablet (420 mg) by mouth daily 28 tablet 0     ibuprofen (ADVIL/MOTRIN) 200 MG tablet Take 200 mg by mouth every 4 hours as needed for mild pain       levothyroxine (SYNTHROID/LEVOTHROID) 50 MCG tablet Take 1 tablet (50 mcg) by mouth daily 90 tablet 3     omega 3 1200 MG CAPS Take 2 capsules by mouth daily       omeprazole (PRILOSEC) 10 MG DR capsule TAKE ONE CAPSULE BY MOUTH EVERY DAY 30 TO 60 MINUTES BEFORE A MEAL 90 capsule 3     prochlorperazine (COMPAZINE) 10 MG tablet Take 1 tablet (10 mg) by mouth every 6 hours as needed (Nausea/Vomiting) 30 tablet 2     tamsulosin (FLOMAX) 0.4 MG capsule Take 1 capsule (0.4 mg) by mouth daily 90 capsule 3     tiZANidine (ZANAFLEX) 2 MG tablet Take 1-2 tablets (2-4 mg) by mouth 3 times daily 30 tablet 0     Physical Examination:  General: The patient is a pleasant male in no acute distress.  /61   Pulse 88   Temp 97.9  F (36.6  C) (Oral)   Resp 14   Wt 73.1 kg (161 lb 2.5 oz)   SpO2 94%   BMI 25.24 kg/m     Wt Readings from Last 10 Encounters:   09/06/19 73.1 kg (161 lb 2.5 oz)   08/08/19 71.7 kg (158 lb)   08/08/19 71.7 kg (158 lb 1.1 oz)   07/23/19 72.6 kg (160 lb)   07/19/19 71.8 kg (158 lb 3.2 oz)   07/02/19 71.1 kg (156 lb 12.8 oz)   06/12/19 70.9 kg (156 lb 4.8 oz)   06/05/19 70.6 kg (155 lb 9.6 oz)   05/31/19 69.3 kg (152 lb 12.8 oz)   05/31/19 68.9 kg (152 lb)   HEENT: EOMI, PERRL. Sclerae are anicteric. Oral mucosa is pink and moist with no lesions or thrush.   Lymph: Neck is supple with no lymphadenopathy in the cervical or supraclavicular areas. No axillary adenopathy palpable. No inguinal adenopathy palpable.  Heart: Regular rate and rhythm.   Lungs: Clear to auscultation bilaterally.   Abdomen: Bowel sounds present, soft, nontender with no palpable hepatosplenomegaly or masses.    Extremities: No lower extremity edema noted bilaterally.   Neuro: Cranial nerves II through XII are grossly intact.  Skin: No rashes, petechiae, or bruising noted on exposed skin.    Laboratory Data:   9/4/2019 08:38   Sodium 142   Potassium 4.1   Chloride 108   Carbon Dioxide 29   Urea Nitrogen 18   Creatinine 1.48 (H)   GFR Estimate 46 (L)   GFR Estimate If Black 54 (L)   Calcium 9.0   Anion Gap 5   Albumin 4.1   Protein Total 6.5 (L)   Bilirubin Total 1.0   Alkaline Phosphatase 75   ALT 22   AST 20   T4 Free 1.02   TSH 7.40 (H)   Glucose 87   .2 (HH)   Hemoglobin 12.3 (L)   Hematocrit 41.2   Platelet Count 143 (L)   RBC Count 4.11 (L)      MCH 29.9   MCHC 29.9 (L)   RDW 14.0   Diff Method Manual Differential   % Neutrophils 7.0   % Lymphocytes 92.0   % Monocytes 1.0   Absolute Neutrophil 9.5 (H)   Absolute Lymphocytes 125.3 (H)   Absolute Monocytes 1.4 (H)   RBC Morphology Normal   Smudge Cells Present   Platelet Estimate Automated count confirmed.  Platelet morphology is normal.     Assessment and Plan:  CLL. Patient started on ibrutinib 420 mg daily on 5/14/19. He is tolerating treatment very well thus far. He has had an expected rise in his lymphocyte count that is now trending down. He will see me back in 1 month and will see Dr. Burns in 2 months. He will have labs drawn 2 days our visits at [facility].      Hypothyroidism. He began on levothyroxine 50 mcg daily on 5/2/19. Last TSH on 6/5/19 was stable and mildly elevated and free T4 is normal.  Will recheck again in 3 months (early December).    CKD. He appears to have had some kidney dysfunction since at least 2010. His last check was back to his baseline.     Diarrhea. Secondary to ibrutinib. Occurring intermittently. He is aware of the possibility of taking Imodium prn.    BPH. Follows with urology. Remains on Flomax. Symptoms are worse recently. Recommend discussing with his urologist about a potential dose adjustment of Flomax  versus considering adding a medication to help with his symptoms.    Back pain. Chronic, but more bothersome recently. He will continue with ice and heat as well as his muscle relaxant. Recommend considering resuming physical therapy exercises that he learned previously.     Jess Sanchez PA-C  [facility]  [address] [place], [place] [address] [phone]

## 2019-09-06 NOTE — NURSING NOTE
"Oncology Rooming Note    September 6, 2019 8:37 AM   Loco Wood is a 74 year old male who presents for:    Chief Complaint   Patient presents with     Oncology Clinic Visit     Leukemia      Initial Vitals: /61   Pulse 88   Temp 97.9  F (36.6  C) (Oral)   Resp 14   Wt 73.1 kg (161 lb 2.5 oz)   SpO2 94%   BMI 25.24 kg/m   Estimated body mass index is 25.24 kg/m  as calculated from the following:    Height as of 8/8/19: 1.702 m (5' 7\").    Weight as of this encounter: 73.1 kg (161 lb 2.5 oz). Body surface area is 1.86 meters squared.  No Pain (0) Comment: Data Unavailable   No LMP for male patient.  Allergies reviewed: Yes  Medications reviewed: Yes    Medications: Medication refills not needed today.  Pharmacy name entered into [facility]:    [id number] - [place], [place] - [address] AT [facility] OF [address] [place] [address] [place], [place] - [address]  [facility] - [place]  [place] - [address] [facility]  [facility] [place] [place] - [address]    Clinical concerns: no concerns  Daniel  was notified.      Ynes Geronimo MA            `  "

## 2019-09-16 ENCOUNTER — MYC REFILL (OUTPATIENT)
Dept: ORTHOPEDICS | Facility: CLINIC | Age: 74
End: 2019-09-16

## 2019-09-16 DIAGNOSIS — G89.29 CHRONIC MIDLINE LOW BACK PAIN WITHOUT SCIATICA: ICD-10-CM

## 2019-09-16 DIAGNOSIS — C91.10 CLL (CHRONIC LYMPHOCYTIC LEUKEMIA) (H): ICD-10-CM

## 2019-09-16 DIAGNOSIS — M54.50 CHRONIC MIDLINE LOW BACK PAIN WITHOUT SCIATICA: ICD-10-CM

## 2019-09-16 RX ORDER — TIZANIDINE 2 MG/1
2-4 TABLET ORAL 3 TIMES DAILY
Qty: 30 TABLET | Refills: 0 | Status: SHIPPED | OUTPATIENT
Start: 2019-09-16 | End: 2021-01-25

## 2019-09-24 DIAGNOSIS — C91.10 CLL (CHRONIC LYMPHOCYTIC LEUKEMIA) (H): Primary | ICD-10-CM

## 2019-10-01 ENCOUNTER — HEALTH MAINTENANCE LETTER (OUTPATIENT)
Age: 74
End: 2019-10-01

## 2019-10-01 ENCOUNTER — TELEPHONE (OUTPATIENT)
Dept: ONCOLOGY | Facility: CLINIC | Age: 74
End: 2019-10-01

## 2019-10-01 NOTE — ORAL ONC MGMT
"Oral Chemotherapy Monitoring Program    Primary Oncologist: Dr. Burns  Primary Oncology Clinic: [facility]  Cancer Diagnosis: CLL    Therapy: Imbruvica 420 mg by mouth daily continuously  Start Date: C1D1=5/14/19    Drug Interaction Assessment: No new drug interactions identified    Lab Monitoring Plan  CMP and CBC monthly  Subjective/Objective:  Loco Wood is a 74 year old male contacted by phone for a follow-up visit for oral chemotherapy.  Loco reports that he is tolerating Imbruvica therapy well with minimal side effects. He continues to have ongoing loose stools, but he does not believe they warrant pharmacological intervention. He is aware that he could try Imodium if it becomes bothersome. He denies nausea, vomiting, constipation, mouth sores, rash, and bleeding/bruising. Loco did add that his hand occasionally freezes up for a second and he has ongoing \"deep\" back pain. He wasn't sure if these were newer issues or side effects of the Imbruvica. At the moment, they are not limiting daily activities. He states that his muscle relaxant helps. He will continue to monitor and let us know if there are any changes and worsening of symptoms. Loco added that he will consult with his care team regarding these issues at his appointment on 10/10.     ORAL CHEMOTHERAPY 5/8/2019 7/31/2019 10/1/2019   Drug Name Imbruvica (Ibrutinib) Imbruvica (Ibrutinib) Imbruvica (Ibrutinib)   Current Dosage 420mg 420mg 420mg   Current Schedule Daily Daily Daily   Cycle Details Continuous Continuous Continuous   Doses missed in last 2 weeks - 0 0   Adherence Assessment - Adherent Adherent   Adverse Effects - No AE identified during assessment No AE identified during assessment   Any new drug interactions? No No No   Is the dose as ordered appropriate for the patient? Yes Yes Yes       Vitals:  BP:   BP Readings from Last 1 Encounters:   09/06/19 126/61     Wt Readings from Last 1 Encounters:   09/06/19 73.1 kg " "(161 lb 2.5 oz)     Estimated body surface area is 1.86 meters squared as calculated from the following:    Height as of 8/8/19: 1.702 m (5' 7\").    Weight as of 9/6/19: 73.1 kg (161 lb 2.5 oz).    Labs:  _  Result Component Current Result Ref Range   Sodium 142 (9/4/2019) 133 - 144 mmol/L     _  Result Component Current Result Ref Range   Potassium 4.1 (9/4/2019) 3.4 - 5.3 mmol/L     _  Result Component Current Result Ref Range   Calcium 9.0 (9/4/2019) 8.5 - 10.1 mg/dL     No results found for Mag within last 30 days.     No results found for Phos within last 30 days.     _  Result Component Current Result Ref Range   Albumin 4.1 (9/4/2019) 3.4 - 5.0 g/dL     _  Result Component Current Result Ref Range   Urea Nitrogen 18 (9/4/2019) 7 - 30 mg/dL     _  Result Component Current Result Ref Range   Creatinine 1.48 (H) (9/4/2019) 0.66 - 1.25 mg/dL       _  Result Component Current Result Ref Range   AST 20 (9/4/2019) 0 - 45 U/L     _  Result Component Current Result Ref Range   ALT 22 (9/4/2019) 0 - 70 U/L     _  Result Component Current Result Ref Range   Bilirubin Total 1.0 (9/4/2019) 0.2 - 1.3 mg/dL       _  Result Component Current Result Ref Range   .2 (HH) (9/4/2019) 4.0 - 11.0 10e9/L     _  Result Component Current Result Ref Range   Hemoglobin 12.3 (L) (9/4/2019) 13.3 - 17.7 g/dL     _  Result Component Current Result Ref Range   Platelet Count 143 (L) (9/4/2019) 150 - 450 10e9/L     _  Result Component Current Result Ref Range   Absolute Neutrophil 9.5 (H) (9/4/2019) 1.6 - 8.3 10e9/L       Assessment:  Loco is tolerating Imbruvica well at this time. No pharmacological interventions were made. He will continue to monitor \"hand freezing\" and \"deep back pain\" and will follow-up with his care team at his next appointment.    Plan:  - Continue Imbruvica  - Monitor diarrhea  - Monitor for changes in hand movements and back pain, contact triage for worsening changes    Follow-Up:  Review appointment with " Jess Sanchez on 10/10 and local labs a few days prior.      Olivia Brantley  Pharmacy Intern  [facility]  [phone]

## 2019-10-08 DIAGNOSIS — Z79.899 ENCOUNTER FOR LONG-TERM (CURRENT) USE OF MEDICATIONS: ICD-10-CM

## 2019-10-08 DIAGNOSIS — C91.10 CLL (CHRONIC LYMPHOCYTIC LEUKEMIA) (H): ICD-10-CM

## 2019-10-08 LAB
ALBUMIN SERPL-MCNC: 4.1 G/DL (ref 3.4–5)
ALP SERPL-CCNC: 73 U/L (ref 40–150)
ALT SERPL W P-5'-P-CCNC: 17 U/L (ref 0–70)
ANION GAP SERPL CALCULATED.3IONS-SCNC: 5 MMOL/L (ref 3–14)
AST SERPL W P-5'-P-CCNC: 19 U/L (ref 0–45)
BILIRUB SERPL-MCNC: 0.9 MG/DL (ref 0.2–1.3)
BUN SERPL-MCNC: 20 MG/DL (ref 7–30)
CALCIUM SERPL-MCNC: 8.6 MG/DL (ref 8.5–10.1)
CHLORIDE SERPL-SCNC: 109 MMOL/L (ref 94–109)
CO2 SERPL-SCNC: 29 MMOL/L (ref 20–32)
CREAT SERPL-MCNC: 1.43 MG/DL (ref 0.66–1.25)
DIFFERENTIAL METHOD BLD: ABNORMAL
ERYTHROCYTE [DISTWIDTH] IN BLOOD BY AUTOMATED COUNT: 14.3 % (ref 10–15)
GFR SERPL CREATININE-BSD FRML MDRD: 48 ML/MIN/{1.73_M2}
GLUCOSE SERPL-MCNC: 89 MG/DL (ref 70–99)
HCT VFR BLD AUTO: 41.2 % (ref 40–53)
HGB BLD-MCNC: 12.4 G/DL (ref 13.3–17.7)
LYMPHOCYTES # BLD AUTO: 98.4 10E9/L (ref 0.8–5.3)
LYMPHOCYTES NFR BLD AUTO: 96 %
MCH RBC QN AUTO: 29.7 PG (ref 26.5–33)
MCHC RBC AUTO-ENTMCNC: 30.1 G/DL (ref 31.5–36.5)
MCV RBC AUTO: 99 FL (ref 78–100)
MONOCYTES # BLD AUTO: 1 10E9/L (ref 0–1.3)
MONOCYTES NFR BLD AUTO: 1 %
NEUTROPHILS # BLD AUTO: 3.1 10E9/L (ref 1.6–8.3)
NEUTROPHILS NFR BLD AUTO: 3 %
PLATELET # BLD AUTO: 142 10E9/L (ref 150–450)
PLATELET # BLD EST: ABNORMAL 10*3/UL
POTASSIUM SERPL-SCNC: 3.6 MMOL/L (ref 3.4–5.3)
PROT SERPL-MCNC: 6.4 G/DL (ref 6.8–8.8)
RBC # BLD AUTO: 4.18 10E12/L (ref 4.4–5.9)
RBC MORPH BLD: NORMAL
SODIUM SERPL-SCNC: 143 MMOL/L (ref 133–144)
WBC # BLD AUTO: 102.5 10E9/L (ref 4–11)

## 2019-10-08 PROCEDURE — 85025 COMPLETE CBC W/AUTO DIFF WBC: CPT | Performed by: INTERNAL MEDICINE

## 2019-10-08 PROCEDURE — 80053 COMPREHEN METABOLIC PANEL: CPT | Performed by: INTERNAL MEDICINE

## 2019-10-08 PROCEDURE — 36415 COLL VENOUS BLD VENIPUNCTURE: CPT | Performed by: INTERNAL MEDICINE

## 2019-10-10 ENCOUNTER — ONCOLOGY VISIT (OUTPATIENT)
Dept: ONCOLOGY | Facility: CLINIC | Age: 74
End: 2019-10-10
Attending: PHYSICIAN ASSISTANT
Payer: MEDICARE

## 2019-10-10 VITALS
SYSTOLIC BLOOD PRESSURE: 128 MMHG | TEMPERATURE: 97.7 F | HEIGHT: 67 IN | DIASTOLIC BLOOD PRESSURE: 73 MMHG | OXYGEN SATURATION: 97 % | RESPIRATION RATE: 18 BRPM | BODY MASS INDEX: 25.43 KG/M2 | HEART RATE: 65 BPM | WEIGHT: 162 LBS

## 2019-10-10 DIAGNOSIS — E03.9 HYPOTHYROIDISM, UNSPECIFIED TYPE: ICD-10-CM

## 2019-10-10 DIAGNOSIS — C91.10 CLL (CHRONIC LYMPHOCYTIC LEUKEMIA) (H): Primary | ICD-10-CM

## 2019-10-10 PROCEDURE — G0463 HOSPITAL OUTPT CLINIC VISIT: HCPCS | Mod: ZF

## 2019-10-10 PROCEDURE — 99214 OFFICE O/P EST MOD 30 MIN: CPT | Mod: ZP | Performed by: PHYSICIAN ASSISTANT

## 2019-10-10 ASSESSMENT — PAIN SCALES - GENERAL: PAINLEVEL: NO PAIN (0)

## 2019-10-10 ASSESSMENT — MIFFLIN-ST. JEOR: SCORE: 1443.58

## 2019-10-10 NOTE — NURSING NOTE
"Oncology Rooming Note    October 10, 2019 9:39 AM   Loco Wood is a 74 year old male who presents for:    Chief Complaint   Patient presents with     Oncology Clinic Visit     Return visit related to Leukemia     Initial Vitals: /73 (BP Location: Left arm, Patient Position: Sitting, Cuff Size: Adult Large)   Pulse 65   Temp 97.7  F (36.5  C) (Oral)   Resp 18   Ht 1.702 m (5' 7.01\")   Wt 73.5 kg (162 lb)   SpO2 97%   BMI 25.37 kg/m   Estimated body mass index is 25.37 kg/m  as calculated from the following:    Height as of this encounter: 1.702 m (5' 7.01\").    Weight as of this encounter: 73.5 kg (162 lb). Body surface area is 1.86 meters squared.  No Pain (0) Comment: Data Unavailable   No LMP for male patient.  Allergies reviewed: Yes  Medications reviewed: Yes    Medications: Medication refills not needed today.  Pharmacy name entered into [employer]:    [id number] - [place], [place] - [address] AT [address] OF [address] [place] [address] [facility] [place], [place] - [address] AT [address]  [facility] - [place], [place] - [address] [facility] [address] [facility] [place] - [address]    Clinical concerns: No new concerns. Provider was notified.      Magdalena Hackett LPN            "

## 2019-10-10 NOTE — PROGRESS NOTES
Oncology/Hematology Visit Note  Oct 10, 2019    Reason for Visit: follow up of CLL    History of Present Illness: Loco Wood is a 74 year old male with CLL. He was diagnosed 2/12/2007 with CLL, Lyles stage 0, followed clinically since.  At diagnosis WBC 17.8, ALC 11.2, hemoglobin 15.2, platelets 188.  Flow consistent with CLL.  No opportunistic infections, with IgG in the normal range during follow up. Due to rising lymphocyte count, it was recommended he consider starting on treatment. He is currently undergoing treatment with ibrutinib, which he began on 5/14/19. Please see previous notes for further details on the patient's history. He comes in today for routine follow up.    Interval History:  Patient reports that overall he has been doing okay.  He did enjoy some golfing yesterday.  He notes that about 3 times per week he feels that his hands lock up.  He notes this lasts for about 10 seconds and then resolves.  He has noticed more back pain since starting on treatment that he manages with taking a muscle relaxant a few times per week.  He does not find any relief from ice, heat, or stretching.  He does continue to exercise regularly.  He has occasional diarrhea that he manages without medication.  A few times a week he coughs up some yellow mucus and this is been going on for several months.  He reports that he got his flu shot yesterday.  He denies any other concerns.    Current Outpatient Medications   Medication Sig Dispense Refill     atorvastatin (LIPITOR) 10 MG tablet Take 1 tablet (10 mg) by mouth every 48 hours 45 tablet 4     ibrutinib (IMBRUVICA) 420 MG tablet Take 1 tablet (420 mg) by mouth daily 28 tablet 0     ibrutinib (IMBRUVICA) 420 MG tablet Take 1 tablet (420 mg) by mouth daily 28 tablet 0     ibrutinib (IMBRUVICA) 420 MG tablet Take 1 tablet (420 mg) by mouth daily 28 tablet 0     levothyroxine (SYNTHROID/LEVOTHROID) 50 MCG tablet Take 1 tablet (50 mcg) by mouth daily 90 tablet 3      "omeprazole (PRILOSEC) 10 MG DR capsule TAKE ONE CAPSULE BY MOUTH EVERY DAY 30 TO 60 MINUTES BEFORE A MEAL 90 capsule 3     Probiotic Product (PROBIOTIC-10 PO) Take by mouth daily       tamsulosin (FLOMAX) 0.4 MG capsule Take 1 capsule (0.4 mg) by mouth daily 90 capsule 3     tiZANidine (ZANAFLEX) 2 MG tablet Take 1-2 tablets (2-4 mg) by mouth 3 times daily 30 tablet 0     Acetaminophen (TYLENOL PO)        Cholecalciferol (VITAMIN D3 PO) Take 4,000 Units by mouth daily        diazepam (VALIUM) 5 MG tablet Take 5 mg by mouth every 6 hours as needed for anxiety       ibuprofen (ADVIL/MOTRIN) 200 MG tablet Take 200 mg by mouth every 4 hours as needed for mild pain       omega 3 1200 MG CAPS Take 2 capsules by mouth daily       prochlorperazine (COMPAZINE) 10 MG tablet Take 1 tablet (10 mg) by mouth every 6 hours as needed (Nausea/Vomiting) (Patient not taking: Reported on 10/10/2019) 30 tablet 2     Physical Examination:  General: The patient is a pleasant male in no acute distress.  /73 (BP Location: Left arm, Patient Position: Sitting, Cuff Size: Adult Large)   Pulse 65   Temp 97.7  F (36.5  C) (Oral)   Resp 18   Ht 1.702 m (5' 7.01\")   Wt 73.5 kg (162 lb)   SpO2 97%   BMI 25.37 kg/m    Wt Readings from Last 10 Encounters:   10/10/19 73.5 kg (162 lb)   09/06/19 73.1 kg (161 lb 2.5 oz)   08/08/19 71.7 kg (158 lb)   08/08/19 71.7 kg (158 lb 1.1 oz)   07/23/19 72.6 kg (160 lb)   07/19/19 71.8 kg (158 lb 3.2 oz)   07/02/19 71.1 kg (156 lb 12.8 oz)   06/12/19 70.9 kg (156 lb 4.8 oz)   06/05/19 70.6 kg (155 lb 9.6 oz)   05/31/19 69.3 kg (152 lb 12.8 oz)   HEENT: EOMI, PERRL. Sclerae are anicteric. Oral mucosa is pink and moist with no lesions or thrush.   Lymph: Neck is supple with no lymphadenopathy in the cervical or supraclavicular areas. No axillary adenopathy palpable. No inguinal adenopathy palpable.  Heart: Regular rate and rhythm.   Lungs: Clear to auscultation bilaterally.   Abdomen: Bowel sounds " present, soft, nontender with no palpable hepatosplenomegaly or masses.   Extremities: No lower extremity edema noted bilaterally.   Neuro: Cranial nerves II through XII are grossly intact.  Skin: No rashes, petechiae, or bruising noted on exposed skin.    Laboratory Data:   10/8/2019 08:39   Sodium 143   Potassium 3.6   Chloride 109   Carbon Dioxide 29   Urea Nitrogen 20   Creatinine 1.43 (H)   GFR Estimate 48 (L)   GFR Estimate If Black 56 (L)   Calcium 8.6   Anion Gap 5   Albumin 4.1   Protein Total 6.4 (L)   Bilirubin Total 0.9   Alkaline Phosphatase 73   ALT 17   AST 19   Glucose 89   .5 (HH)   Hemoglobin 12.4 (L)   Hematocrit 41.2   Platelet Count 142 (L)   RBC Count 4.18 (L)   MCV 99   MCH 29.7   MCHC 30.1 (L)   RDW 14.3   Diff Method Manual Differential   % Neutrophils 3.0   % Lymphocytes 96.0   % Monocytes 1.0   Absolute Neutrophil 3.1   Absolute Lymphocytes 98.4 (H)   Absolute Monocytes 1.0   RBC Morphology Normal   Platelet Estimate Automated count confirmed.  Platelet morphology is normal.     Assessment and Plan:  CLL. Patient started on ibrutinib 420 mg daily on 5/14/19. He is tolerating treatment very well thus far. He has had an expected rise in his lymphocyte count that is now trending down. Since starting on treatment, he has had some hand muscle spasms and increased back pain. I suspect both are side effects from ibrutinib. He does not feel either are severe enough to warrant a dose reduction. He will see Dr. Burns in 1 month. He will have labs drawn 2 days our visits at [facility].      Hypothyroidism. He began on levothyroxine 50 mcg daily on 5/2/19. Last TSH on 6/5/19 was stable and mildly elevated and free T4 is normal.  Will recheck again in 3 months (early December).    CKD. He appears to have had some kidney dysfunction since at least 2010. His last check is at his baseline.     Diarrhea. Secondary to ibrutinib. Occurring intermittently. He is aware of the possibility of  taking Imodium prn.    Back pain. Chronic, but more bothersome recently. Suspect related to ibrutinib. He will continue with the muscle relaxant. He is not interested in PT again.    Intermittent phlegm production. Discussed he may try Mucinex prn. Lungs are clear on exam.    Jess Sanchez PA-C  [facility]  [address] [place], [place] [id number] [phone]

## 2019-10-10 NOTE — LETTER
RE: Loco Wood  [address] [facility] [address]       Oncology/Hematology Visit Note  Oct 10, 2019    Reason for Visit: follow up of CLL    History of Present Illness: Loco Wood is a 74 year old male with CLL. He was diagnosed 2/12/2007 with CLL, Lyles stage 0, followed clinically since.  At diagnosis WBC 17.8, ALC 11.2, hemoglobin 15.2, platelets 188.  Flow consistent with CLL.  No opportunistic infections, with IgG in the normal range during follow up. Due to rising lymphocyte count, it was recommended he consider starting on treatment. He is currently undergoing treatment with ibrutinib, which he began on 5/14/19. Please see previous notes for further details on the patient's history. He comes in today for routine follow up.    Interval History:  Patient reports that overall he has been doing okay.  He did enjoy some golfing yesterday.  He notes that about 3 times per week he feels that his hands lock up.  He notes this lasts for about 10 seconds and then resolves.  He has noticed more back pain since starting on treatment that he manages with taking a muscle relaxant a few times per week.  He does not find any relief from ice, heat, or stretching.  He does continue to exercise regularly.  He has occasional diarrhea that he manages without medication.  A few times a week he coughs up some yellow mucus and this is been going on for several months.  He reports that he got his flu shot yesterday.  He denies any other concerns.    Current Outpatient Medications   Medication Sig Dispense Refill     atorvastatin (LIPITOR) 10 MG tablet Take 1 tablet (10 mg) by mouth every 48 hours 45 tablet 4     ibrutinib (IMBRUVICA) 420 MG tablet Take 1 tablet (420 mg) by mouth daily 28 tablet 0     ibrutinib (IMBRUVICA) 420 MG tablet Take 1 tablet (420 mg) by mouth daily 28 tablet 0     ibrutinib (IMBRUVICA) 420 MG tablet Take 1 tablet (420 mg) by mouth daily 28 tablet 0     levothyroxine  "(SYNTHROID/LEVOTHROID) 50 MCG tablet Take 1 tablet (50 mcg) by mouth daily 90 tablet 3     omeprazole (PRILOSEC) 10 MG DR capsule TAKE ONE CAPSULE BY MOUTH EVERY DAY 30 TO 60 MINUTES BEFORE A MEAL 90 capsule 3     Probiotic Product (PROBIOTIC-10 PO) Take by mouth daily       tamsulosin (FLOMAX) 0.4 MG capsule Take 1 capsule (0.4 mg) by mouth daily 90 capsule 3     tiZANidine (ZANAFLEX) 2 MG tablet Take 1-2 tablets (2-4 mg) by mouth 3 times daily 30 tablet 0     Acetaminophen (TYLENOL PO)        Cholecalciferol (VITAMIN D3 PO) Take 4,000 Units by mouth daily        diazepam (VALIUM) 5 MG tablet Take 5 mg by mouth every 6 hours as needed for anxiety       ibuprofen (ADVIL/MOTRIN) 200 MG tablet Take 200 mg by mouth every 4 hours as needed for mild pain       omega 3 1200 MG CAPS Take 2 capsules by mouth daily       prochlorperazine (COMPAZINE) 10 MG tablet Take 1 tablet (10 mg) by mouth every 6 hours as needed (Nausea/Vomiting) (Patient not taking: Reported on 10/10/2019) 30 tablet 2     Physical Examination:  General: The patient is a pleasant male in no acute distress.  /73 (BP Location: Left arm, Patient Position: Sitting, Cuff Size: Adult Large)   Pulse 65   Temp 97.7  F (36.5  C) (Oral)   Resp 18   Ht 1.702 m (5' 7.01\")   Wt 73.5 kg (162 lb)   SpO2 97%   BMI 25.37 kg/m     Wt Readings from Last 10 Encounters:   10/10/19 73.5 kg (162 lb)   09/06/19 73.1 kg (161 lb 2.5 oz)   08/08/19 71.7 kg (158 lb)   08/08/19 71.7 kg (158 lb 1.1 oz)   07/23/19 72.6 kg (160 lb)   07/19/19 71.8 kg (158 lb 3.2 oz)   07/02/19 71.1 kg (156 lb 12.8 oz)   06/12/19 70.9 kg (156 lb 4.8 oz)   06/05/19 70.6 kg (155 lb 9.6 oz)   05/31/19 69.3 kg (152 lb 12.8 oz)   HEENT: EOMI, PERRL. Sclerae are anicteric. Oral mucosa is pink and moist with no lesions or thrush.   Lymph: Neck is supple with no lymphadenopathy in the cervical or supraclavicular areas. No axillary adenopathy palpable. No inguinal adenopathy palpable.  Heart: " Regular rate and rhythm.   Lungs: Clear to auscultation bilaterally.   Abdomen: Bowel sounds present, soft, nontender with no palpable hepatosplenomegaly or masses.   Extremities: No lower extremity edema noted bilaterally.   Neuro: Cranial nerves II through XII are grossly intact.  Skin: No rashes, petechiae, or bruising noted on exposed skin.    Laboratory Data:   10/8/2019 08:39   Sodium 143   Potassium 3.6   Chloride 109   Carbon Dioxide 29   Urea Nitrogen 20   Creatinine 1.43 (H)   GFR Estimate 48 (L)   GFR Estimate If Black 56 (L)   Calcium 8.6   Anion Gap 5   Albumin 4.1   Protein Total 6.4 (L)   Bilirubin Total 0.9   Alkaline Phosphatase 73   ALT 17   AST 19   Glucose 89   .5 (HH)   Hemoglobin 12.4 (L)   Hematocrit 41.2   Platelet Count 142 (L)   RBC Count 4.18 (L)   MCV 99   MCH 29.7   MCHC 30.1 (L)   RDW 14.3   Diff Method Manual Differential   % Neutrophils 3.0   % Lymphocytes 96.0   % Monocytes 1.0   Absolute Neutrophil 3.1   Absolute Lymphocytes 98.4 (H)   Absolute Monocytes 1.0   RBC Morphology Normal   Platelet Estimate Automated count confirmed.  Platelet morphology is normal.     Assessment and Plan:  CLL. Patient started on ibrutinib 420 mg daily on 5/14/19. He is tolerating treatment very well thus far. He has had an expected rise in his lymphocyte count that is now trending down. Since starting on treatment, he has had some hand muscle spasms and increased back pain. I suspect both are side effects from ibrutinib. He does not feel either are severe enough to warrant a dose reduction. He will see Dr. Burns in 1 month. He will have labs drawn 2 days our visits at [facility].      Hypothyroidism. He began on levothyroxine 50 mcg daily on 5/2/19. Last TSH on 6/5/19 was stable and mildly elevated and free T4 is normal.  Will recheck again in 3 months (early December).    CKD. He appears to have had some kidney dysfunction since at least 2010. His last check is at his baseline.      Diarrhea. Secondary to ibrutinib. Occurring intermittently. He is aware of the possibility of taking Imodium prn.    Back pain. Chronic, but more bothersome recently. Suspect related to ibrutinib. He will continue with the muscle relaxant. He is not interested in PT again.    Intermittent phlegm production. Discussed he may try Mucinex prn. Lungs are clear on exam.    Jess Sanchez PA-C  [facility]  [address] [place], [place] [id number] [phone]

## 2019-10-20 DIAGNOSIS — C91.10 CLL (CHRONIC LYMPHOCYTIC LEUKEMIA) (H): ICD-10-CM

## 2019-10-23 DIAGNOSIS — C91.10 CLL (CHRONIC LYMPHOCYTIC LEUKEMIA) (H): Primary | ICD-10-CM

## 2019-11-08 ENCOUNTER — TELEPHONE (OUTPATIENT)
Dept: TRANSPLANT | Facility: CLINIC | Age: 74
End: 2019-11-08

## 2019-11-08 DIAGNOSIS — C91.10 CLL (CHRONIC LYMPHOCYTIC LEUKEMIA) (H): ICD-10-CM

## 2019-11-08 LAB
ALBUMIN SERPL-MCNC: 4.1 G/DL (ref 3.4–5)
ALP SERPL-CCNC: 77 U/L (ref 40–150)
ALT SERPL W P-5'-P-CCNC: 19 U/L (ref 0–70)
ANION GAP SERPL CALCULATED.3IONS-SCNC: 4 MMOL/L (ref 3–14)
AST SERPL W P-5'-P-CCNC: 19 U/L (ref 0–45)
BASOPHILS # BLD AUTO: 0 10E9/L (ref 0–0.2)
BASOPHILS NFR BLD AUTO: 0 %
BILIRUB SERPL-MCNC: 1.2 MG/DL (ref 0.2–1.3)
BUN SERPL-MCNC: 16 MG/DL (ref 7–30)
CALCIUM SERPL-MCNC: 9.2 MG/DL (ref 8.5–10.1)
CHLORIDE SERPL-SCNC: 107 MMOL/L (ref 94–109)
CO2 SERPL-SCNC: 31 MMOL/L (ref 20–32)
CREAT SERPL-MCNC: 1.55 MG/DL (ref 0.66–1.25)
DIFFERENTIAL METHOD BLD: ABNORMAL
EOSINOPHIL # BLD AUTO: 0.2 10E9/L (ref 0–0.7)
EOSINOPHIL NFR BLD AUTO: 0.2 %
ERYTHROCYTE [DISTWIDTH] IN BLOOD BY AUTOMATED COUNT: 14.8 % (ref 10–15)
GFR SERPL CREATININE-BSD FRML MDRD: 44 ML/MIN/{1.73_M2}
GLUCOSE SERPL-MCNC: 99 MG/DL (ref 70–99)
HCT VFR BLD AUTO: 42 % (ref 40–53)
HGB BLD-MCNC: 12.8 G/DL (ref 13.3–17.7)
LYMPHOCYTES # BLD AUTO: 82.8 10E9/L (ref 0.8–5.3)
LYMPHOCYTES NFR BLD AUTO: 94.4 %
MCH RBC QN AUTO: 29.8 PG (ref 26.5–33)
MCHC RBC AUTO-ENTMCNC: 30.5 G/DL (ref 31.5–36.5)
MCV RBC AUTO: 98 FL (ref 78–100)
MONOCYTES # BLD AUTO: 0.9 10E9/L (ref 0–1.3)
MONOCYTES NFR BLD AUTO: 1 %
NEUTROPHILS # BLD AUTO: 3.7 10E9/L (ref 1.6–8.3)
NEUTROPHILS NFR BLD AUTO: 4.4 %
PLATELET # BLD AUTO: 144 10E9/L (ref 150–450)
POTASSIUM SERPL-SCNC: 4 MMOL/L (ref 3.4–5.3)
PROT SERPL-MCNC: 6.5 G/DL (ref 6.8–8.8)
RBC # BLD AUTO: 4.29 10E12/L (ref 4.4–5.9)
SODIUM SERPL-SCNC: 142 MMOL/L (ref 133–144)
WBC # BLD AUTO: 87.7 10E9/L (ref 4–11)

## 2019-11-08 PROCEDURE — 36415 COLL VENOUS BLD VENIPUNCTURE: CPT | Performed by: INTERNAL MEDICINE

## 2019-11-08 PROCEDURE — 85025 COMPLETE CBC W/AUTO DIFF WBC: CPT | Performed by: INTERNAL MEDICINE

## 2019-11-08 PROCEDURE — 80053 COMPREHEN METABOLIC PANEL: CPT | Performed by: INTERNAL MEDICINE

## 2019-11-08 NOTE — TELEPHONE ENCOUNTER
DATE:  11/8/2019   TIME OF RECEIPT FROM LAB:  9:37 AM  LAB TEST:  WBC (prelim)  LAB VALUE:  87.69 (previously 102.5 on 10/8/19)  RESULTS GIVEN WITH READ-BACK TO (PROVIDER):  Jess WILDER  TIME LAB VALUE REPORTED TO PROVIDER:   Paged 9:37 AM   Jess WILDER acknowledged results, no further instructions.

## 2019-11-12 ENCOUNTER — OFFICE VISIT (OUTPATIENT)
Dept: TRANSPLANT | Facility: CLINIC | Age: 74
End: 2019-11-12
Attending: INTERNAL MEDICINE
Payer: MEDICARE

## 2019-11-12 DIAGNOSIS — R82.998 FOAMY URINE: Primary | ICD-10-CM

## 2019-11-12 LAB
ALBUMIN UR-MCNC: NEGATIVE MG/DL
APPEARANCE UR: CLEAR
BILIRUB UR QL STRIP: NEGATIVE
COLOR UR AUTO: YELLOW
GLUCOSE UR STRIP-MCNC: NEGATIVE MG/DL
HGB UR QL STRIP: ABNORMAL
KETONES UR STRIP-MCNC: NEGATIVE MG/DL
LEUKOCYTE ESTERASE UR QL STRIP: NEGATIVE
MUCOUS THREADS #/AREA URNS LPF: PRESENT /LPF
NITRATE UR QL: NEGATIVE
PH UR STRIP: 5 PH (ref 5–7)
RBC #/AREA URNS AUTO: 10 /HPF (ref 0–2)
SOURCE: ABNORMAL
SP GR UR STRIP: 1.02 (ref 1–1.03)
UROBILINOGEN UR STRIP-MCNC: 0 MG/DL (ref 0–2)
WBC #/AREA URNS AUTO: 1 /HPF (ref 0–5)

## 2019-11-12 PROCEDURE — 81001 URINALYSIS AUTO W/SCOPE: CPT | Performed by: INTERNAL MEDICINE

## 2019-11-12 PROCEDURE — G0463 HOSPITAL OUTPT CLINIC VISIT: HCPCS | Mod: ZF

## 2019-11-12 RX ORDER — PREGABALIN 75 MG/1
CAPSULE ORAL
Refills: 3 | COMMUNITY
Start: 2019-11-02 | End: 2020-02-05

## 2019-11-12 NOTE — PROGRESS NOTES
Oncology/Hematology Visit Note  Nov 12, 2019    Reason for Visit: follow up of CLL    History of Present Illness: Loco Wood is a 74 year old male with CLL. He was diagnosed 2/12/2007 with CLL, Lyles stage 0, followed clinically since.  At diagnosis WBC 17.8, ALC 11.2, hemoglobin 15.2, platelets 188.  Flow consistent with CLL.  No opportunistic infections, with IgG in the normal range during follow up. Due to rising lymphocyte count, it was recommended he consider starting on treatment. He is currently undergoing treatment with ibrutinib, which he began on 5/14/19. Please see previous notes for further details on the patient's history. He comes in today for routine follow up.    Interval History:  Patient reports that overall he has been doing okay.  Feeling well.  No bleeding or bruising.  Has noticed some bubbles in his urine, just noticed recently, not sure how long it has been there.      Current Outpatient Medications   Medication Sig Dispense Refill     Acetaminophen (TYLENOL PO)        atorvastatin (LIPITOR) 10 MG tablet Take 1 tablet (10 mg) by mouth every 48 hours 45 tablet 4     ibrutinib (IMBRUVICA) 420 MG tablet Take 1 tablet (420 mg) by mouth daily 28 tablet 0     ibrutinib (IMBRUVICA) 420 MG tablet Take 1 tablet (420 mg) by mouth daily 28 tablet 0     ibuprofen (ADVIL/MOTRIN) 200 MG tablet Take 200 mg by mouth every 4 hours as needed for mild pain       levothyroxine (SYNTHROID/LEVOTHROID) 50 MCG tablet Take 1 tablet (50 mcg) by mouth daily 90 tablet 3     omeprazole (PRILOSEC) 10 MG DR capsule TAKE ONE CAPSULE BY MOUTH EVERY DAY 30 TO 60 MINUTES BEFORE A MEAL (Patient taking differently: TAKE ONE CAPSULE BY MOUTH EVERY OTHER DAY 30 TO 60 MINUTES BEFORE A MEAL) 90 capsule 3     pregabalin (LYRICA) 75 MG capsule TK ONE C PO  BID  3     Probiotic Product (PROBIOTIC-10 PO) Take by mouth daily       tamsulosin (FLOMAX) 0.4 MG capsule Take 1 capsule (0.4 mg) by mouth daily 90 capsule 3     tiZANidine  (ZANAFLEX) 2 MG tablet Take 1-2 tablets (2-4 mg) by mouth 3 times daily 30 tablet 0     Cholecalciferol (VITAMIN D3 PO) Take 4,000 Units by mouth daily        diazepam (VALIUM) 5 MG tablet Take 5 mg by mouth every 6 hours as needed for anxiety       omega 3 1200 MG CAPS Take 2 capsules by mouth daily       prochlorperazine (COMPAZINE) 10 MG tablet Take 1 tablet (10 mg) by mouth every 6 hours as needed (Nausea/Vomiting) (Patient not taking: Reported on 10/10/2019) 30 tablet 2     Physical Examination:  General: The patient is a pleasant male in no acute distress.  There were no vitals taken for this visit.  Wt Readings from Last 10 Encounters:   10/10/19 73.5 kg (162 lb)   09/06/19 73.1 kg (161 lb 2.5 oz)   08/08/19 71.7 kg (158 lb)   08/08/19 71.7 kg (158 lb 1.1 oz)   07/23/19 72.6 kg (160 lb)   07/19/19 71.8 kg (158 lb 3.2 oz)   07/02/19 71.1 kg (156 lb 12.8 oz)   06/12/19 70.9 kg (156 lb 4.8 oz)   06/05/19 70.6 kg (155 lb 9.6 oz)   05/31/19 69.3 kg (152 lb 12.8 oz)     HEENT: EOMI, PERRL. Sclerae are anicteric. Oral mucosa is pink and moist with no lesions or thrush.   Lymph: Neck is supple with no lymphadenopathy in the cervical or supraclavicular areas. No axillary adenopathy palpable. No inguinal adenopathy palpable.  Heart: Regular rate and rhythm.   Lungs: Clear to auscultation bilaterally.   Abdomen: Bowel sounds present, soft, nontender with no palpable hepatosplenomegaly or masses.   Extremities: No lower extremity edema noted bilaterally.   Neuro: Cranial nerves II through XII are grossly intact.  Skin: No rashes, petechiae, or bruising noted on exposed skin.    Results for orders placed or performed in visit on 11/12/19   UA with Microscopic reflex to Culture ([place]; [facility])     Status: Abnormal   Result Value Ref Range    Color Urine Yellow     Appearance Urine Clear     Glucose Urine Negative NEG^Negative mg/dL    Bilirubin Urine Negative NEG^Negative    Ketones Urine Negative  NEG^Negative mg/dL    Specific Gravity Urine 1.019 1.003 - 1.035    Blood Urine Small (A) NEG^Negative    pH Urine 5.0 5.0 - 7.0 pH    Protein Albumin Urine Negative NEG^Negative mg/dL    Urobilinogen mg/dL 0.0 0.0 - 2.0 mg/dL    Nitrite Urine Negative NEG^Negative    Leukocyte Esterase Urine Negative NEG^Negative    Source Midstream Urine     WBC Urine 1 0 - 5 /HPF    RBC Urine 10 (H) 0 - 2 /HPF    Mucous Urine Present (A) NEG^Negative /LPF           Assessment and Plan:  CLL. Patient started on ibrutinib 420 mg daily on 5/14/19. He is tolerating treatment very well thus far. He has had an expected rise in his lymphocyte count that is now trending down. Since starting on treatment, he has had some hand muscle spasms and increased back pain. I suspect both are side effects from ibrutinib. He does not feel either are severe enough to warrant a dose reduction. We will get monthly labs and see him back in 3 months, sooner PRN.    Hypothyroidism. He began on levothyroxine 50 mcg daily on 5/2/19. Last TSH on 6/5/19 was stable and mildly elevated and free T4 is normal.  Will recheck again in 3 months.    CKD. He appears to have had some kidney dysfunction since at least 2010. His last check is at his baseline. He reports bubbles in his urine.  Checked UA, no proteinuria, but he does have some RBCs.  Urology follow up scheduled for January.  Asked him to report any new or worsening symptoms to us prior to that time.      He Burns MD, pager [phone]

## 2019-11-18 DIAGNOSIS — C91.10 CLL (CHRONIC LYMPHOCYTIC LEUKEMIA) (H): ICD-10-CM

## 2019-11-19 DIAGNOSIS — C91.10 CLL (CHRONIC LYMPHOCYTIC LEUKEMIA) (H): Primary | ICD-10-CM

## 2019-11-26 ENCOUNTER — ANCILLARY PROCEDURE (OUTPATIENT)
Dept: GENERAL RADIOLOGY | Facility: CLINIC | Age: 74
End: 2019-11-26
Attending: FAMILY MEDICINE
Payer: MEDICARE

## 2019-11-26 ENCOUNTER — OFFICE VISIT (OUTPATIENT)
Dept: FAMILY MEDICINE | Facility: CLINIC | Age: 74
End: 2019-11-26
Payer: MEDICARE

## 2019-11-26 VITALS
TEMPERATURE: 97.8 F | OXYGEN SATURATION: 97 % | WEIGHT: 166.25 LBS | HEART RATE: 81 BPM | BODY MASS INDEX: 26.03 KG/M2 | SYSTOLIC BLOOD PRESSURE: 125 MMHG | DIASTOLIC BLOOD PRESSURE: 78 MMHG

## 2019-11-26 DIAGNOSIS — R05.9 COUGH: Primary | ICD-10-CM

## 2019-11-26 DIAGNOSIS — K21.9 GASTROESOPHAGEAL REFLUX DISEASE, ESOPHAGITIS PRESENCE NOT SPECIFIED: ICD-10-CM

## 2019-11-26 DIAGNOSIS — Z91.09 ENVIRONMENTAL ALLERGIES: ICD-10-CM

## 2019-11-26 DIAGNOSIS — R05.9 COUGH: ICD-10-CM

## 2019-11-26 DIAGNOSIS — J20.9 ACUTE BRONCHITIS WITH SYMPTOMS > 10 DAYS: ICD-10-CM

## 2019-11-26 PROCEDURE — 71046 X-RAY EXAM CHEST 2 VIEWS: CPT

## 2019-11-26 PROCEDURE — 99213 OFFICE O/P EST LOW 20 MIN: CPT | Performed by: FAMILY MEDICINE

## 2019-11-26 RX ORDER — AZITHROMYCIN 250 MG/1
TABLET, FILM COATED ORAL
Qty: 6 TABLET | Refills: 1 | Status: SHIPPED | OUTPATIENT
Start: 2019-11-26 | End: 2020-02-05

## 2019-11-26 ASSESSMENT — PAIN SCALES - GENERAL: PAINLEVEL: NO PAIN (0)

## 2019-11-26 NOTE — PATIENT INSTRUCTIONS
Take the antibiotics ; one refill available if needed    Stay well hydrated    mucinex as needed    After antibiotics, if still some cough, try zyrtec type med daily for 2-3 weeks    Follow up as needed based on symptoms

## 2019-11-26 NOTE — PROGRESS NOTES
Subjective     Loco Wood is a 74 year old male who presents to clinic today for the following health issues:    HPI   Cough for the past 6 months off and on               Reviewed and updated as needed this visit by Provider         Review of Systems   ROS COMP:  Got mucinex yesterday, took some    Took an allergy tab    A little better last night but this am bad    Mucus for 6 months    More chest     Vocal cords coated sometimes    Sometimes deep    Not much cough at night    Sleep okay    No change in meds except pregalbin new     On pregalbin for aftereffects of shingles    Not around many sick people recently    Chills one day    No fever    Still able to exercise    Retired    gerd usually well controlled    Some taste when cough  Maybe acid?          Objective    /78 (BP Location: Left arm, Patient Position: Chair, Cuff Size: Adult Regular)   Pulse 81   Temp 97.8  F (36.6  C) (Oral)   Wt 75.4 kg (166 lb 4 oz)   SpO2 97%   BMI 26.03 kg/m    Body mass index is 26.03 kg/m .  Physical Exam  Constitutional:       Appearance: Normal appearance.   HENT:      Head: Normocephalic and atraumatic.      Right Ear: Tympanic membrane, ear canal and external ear normal.      Left Ear: Tympanic membrane, ear canal and external ear normal.      Nose: Nose normal.      Mouth/Throat:      Mouth: Mucous membranes are moist.      Pharynx: Oropharynx is clear.   Eyes:      Conjunctiva/sclera: Conjunctivae normal.   Neck:      Musculoskeletal: Neck supple.   Cardiovascular:      Rate and Rhythm: Normal rate and regular rhythm.      Heart sounds: Normal heart sounds.   Neurological:      Mental Status: He is alert.      a few scattered coarse sounds posterior bases  No resp distress  Only rare cough  abd soft, no chest wall/ back/ costovertebral angle tenderness  No sinus / submandib tenderness    Xray done, normal         Diagnostic Test Results:  Labs reviewed in Epic        ASSESSMENT / PLAN:  (R05) Cough   (primary encounter diagnosis)  Comment: no obvious pneumonia  Plan: XR Chest 2 Views             (J20.9) Acute bronchitis with symptoms > 10 days  Comment: patient is on treatment for CLL.  Given duration and worsening nature of symptoms, reasonable to do therapeutic trial of antibiotic.    Plan: azithromycin (ZITHROMAX) 250 MG tablet             (K21.9) Gastroesophageal reflux disease, esophagitis presence not specified  Comment: patient already on ppi   Plan: continue     (Z91.09) Environmental allergies  Comment: after antibiotic done, if not resolved, then do 2-3 weeks daily course of cetirizine  Plan: if not better after that, call/ return to clinic     Be seen promptly if symptoms acutely worsen       I reviewed the patient's medications, allergies, medical history, family history, and social history.    Kin Alexander MD

## 2019-11-26 NOTE — LETTER
[facility]   [address] [place]  [address]  [phone]                                   November 27, 2019    Loco Wood  [address] [facility] [id number]        Dear Loco,    The radiologist thought there might be an infection ( infiltrate ) in the left lower lung.  It     could also just be some mild settling of the lower lung ( atelectasis ).    Take the antibiotics as we discussed.    Plan to follow up in clinic in 4-6 weeks.  Be seen sooner if needed.  Sincerely,    Kin Alexander MD  bmd

## 2019-11-27 NOTE — RESULT ENCOUNTER NOTE
The radiologist thought there might be an infection ( infiltrate ) in the left lower lung.  It could also just be some mild settling of the lower lung ( atelectasis ).    Take the antibiotics as we discussed.    Plan to follow up in clinic in 4-6 weeks.  Be seen sooner if needed.    Kin Alexander MD

## 2019-12-10 ENCOUNTER — TELEPHONE (OUTPATIENT)
Dept: ONCOLOGY | Facility: CLINIC | Age: 74
End: 2019-12-10

## 2019-12-10 DIAGNOSIS — E03.9 HYPOTHYROIDISM, UNSPECIFIED TYPE: ICD-10-CM

## 2019-12-10 DIAGNOSIS — C91.10 CLL (CHRONIC LYMPHOCYTIC LEUKEMIA) (H): ICD-10-CM

## 2019-12-10 DIAGNOSIS — Z79.899 ENCOUNTER FOR LONG-TERM (CURRENT) USE OF MEDICATIONS: ICD-10-CM

## 2019-12-10 LAB
ALBUMIN SERPL-MCNC: 4 G/DL (ref 3.4–5)
ALP SERPL-CCNC: 70 U/L (ref 40–150)
ALT SERPL W P-5'-P-CCNC: 23 U/L (ref 0–70)
ANION GAP SERPL CALCULATED.3IONS-SCNC: 4 MMOL/L (ref 3–14)
AST SERPL W P-5'-P-CCNC: 23 U/L (ref 0–45)
BILIRUB SERPL-MCNC: 1 MG/DL (ref 0.2–1.3)
BUN SERPL-MCNC: 17 MG/DL (ref 7–30)
CALCIUM SERPL-MCNC: 8.9 MG/DL (ref 8.5–10.1)
CHLORIDE SERPL-SCNC: 110 MMOL/L (ref 94–109)
CO2 SERPL-SCNC: 29 MMOL/L (ref 20–32)
CREAT SERPL-MCNC: 1.44 MG/DL (ref 0.66–1.25)
DIFFERENTIAL METHOD BLD: ABNORMAL
EOSINOPHIL # BLD AUTO: 0.7 10E9/L (ref 0–0.7)
EOSINOPHIL NFR BLD AUTO: 1 %
ERYTHROCYTE [DISTWIDTH] IN BLOOD BY AUTOMATED COUNT: 14.4 % (ref 10–15)
GFR SERPL CREATININE-BSD FRML MDRD: 48 ML/MIN/{1.73_M2}
GLUCOSE SERPL-MCNC: 89 MG/DL (ref 70–99)
HCT VFR BLD AUTO: 41.6 % (ref 40–53)
HGB BLD-MCNC: 12.6 G/DL (ref 13.3–17.7)
LYMPHOCYTES # BLD AUTO: 68.7 10E9/L (ref 0.8–5.3)
LYMPHOCYTES NFR BLD AUTO: 93 %
MCH RBC QN AUTO: 29.6 PG (ref 26.5–33)
MCHC RBC AUTO-ENTMCNC: 30.3 G/DL (ref 31.5–36.5)
MCV RBC AUTO: 98 FL (ref 78–100)
MONOCYTES # BLD AUTO: 1.5 10E9/L (ref 0–1.3)
MONOCYTES NFR BLD AUTO: 2 %
NEUTROPHILS # BLD AUTO: 3 10E9/L (ref 1.6–8.3)
NEUTROPHILS NFR BLD AUTO: 4 %
PLATELET # BLD AUTO: 151 10E9/L (ref 150–450)
PLATELET # BLD EST: ABNORMAL 10*3/UL
POTASSIUM SERPL-SCNC: 4.3 MMOL/L (ref 3.4–5.3)
PROT SERPL-MCNC: 6.3 G/DL (ref 6.8–8.8)
RBC # BLD AUTO: 4.26 10E12/L (ref 4.4–5.9)
RBC MORPH BLD: NORMAL
SODIUM SERPL-SCNC: 143 MMOL/L (ref 133–144)
T4 FREE SERPL-MCNC: 0.95 NG/DL (ref 0.76–1.46)
TSH SERPL DL<=0.005 MIU/L-ACNC: 12.3 MU/L (ref 0.4–4)
VARIANT LYMPHS BLD QL SMEAR: PRESENT
WBC # BLD AUTO: 73.9 10E9/L (ref 4–11)

## 2019-12-10 PROCEDURE — 85025 COMPLETE CBC W/AUTO DIFF WBC: CPT | Performed by: INTERNAL MEDICINE

## 2019-12-10 PROCEDURE — 80053 COMPREHEN METABOLIC PANEL: CPT | Performed by: INTERNAL MEDICINE

## 2019-12-10 PROCEDURE — 36415 COLL VENOUS BLD VENIPUNCTURE: CPT | Performed by: INTERNAL MEDICINE

## 2019-12-10 PROCEDURE — 84439 ASSAY OF FREE THYROXINE: CPT | Performed by: INTERNAL MEDICINE

## 2019-12-10 PROCEDURE — 84443 ASSAY THYROID STIM HORMONE: CPT | Performed by: INTERNAL MEDICINE

## 2019-12-10 NOTE — TELEPHONE ENCOUNTER
DATE:  12/10/2019   TIME OF RECEIPT FROM LAB:  1038  LAB TEST:  CBC with differential  LAB VALUE:  WBC 73.9  RESULTS GIVEN WITH READ-BACK TO (PROVIDER):  Notified Jess Sanchez PA-C, no changes to care plan, WBC steadily improving per past results.

## 2019-12-11 DIAGNOSIS — E03.9 HYPOTHYROIDISM, UNSPECIFIED TYPE: Primary | ICD-10-CM

## 2019-12-16 DIAGNOSIS — C91.10 CLL (CHRONIC LYMPHOCYTIC LEUKEMIA) (H): ICD-10-CM

## 2019-12-17 DIAGNOSIS — C91.10 CLL (CHRONIC LYMPHOCYTIC LEUKEMIA) (H): Primary | ICD-10-CM

## 2019-12-18 ENCOUNTER — TELEPHONE (OUTPATIENT)
Dept: ONCOLOGY | Facility: CLINIC | Age: 74
End: 2019-12-18

## 2019-12-18 DIAGNOSIS — E03.9 HYPOTHYROIDISM, UNSPECIFIED TYPE: Primary | ICD-10-CM

## 2019-12-18 RX ORDER — LEVOTHYROXINE SODIUM 75 UG/1
75 TABLET ORAL DAILY
Qty: 90 TABLET | Refills: 3 | Status: SHIPPED | OUTPATIENT
Start: 2019-12-18 | End: 2020-05-20

## 2019-12-18 NOTE — ORAL ONC MGMT
Oral Chemotherapy Monitoring Program     Received voicemail forwared from [facility] voicemail from patient requesting update on ibrutinib refill status.     Placed call to patient in follow up of refill status. Left message, stated rx released to pharmacy on 12/17 and was received by pharmacy [facility] at 2:46PM and can call back if any further questions.     No medication or patient names mentioned.     Jameson Baker, PharmD  Hematology/Oncology Clinical Pharmacist  [facility]  [facility]

## 2019-12-18 NOTE — PROGRESS NOTES
Recevied a return call from Loco who states he does not need an increase in his Levothyroxine. Dr Burns had changed his medication to every other day, he believes this is the reason for the change in his blood work. He has gone back to taking the 50mcg dose daily. He is due for labs next month and will wait to see what his reults are at that time.  Message left for Natanael to cancel the script that was sent by [employer].

## 2019-12-18 NOTE — TELEPHONE ENCOUNTER
"Patient called this writer and stated that \"They must have released my Imbruvica to [facility]...they do this every month. I just called my pharmacy and they said they do not have     [phone]            "

## 2019-12-18 NOTE — PROGRESS NOTES
Called Loco to discuss need to increase Levothyroxine to 75mcg daily and refill for Imbruvica.   LVM with detailed instructions and sent script to his [facility] pharmacy.  The message for Imbruvica refill has been forwarded to the [facility] oral pharmacy team.

## 2019-12-18 NOTE — TELEPHONE ENCOUNTER
"Patient called this writer and stated that \"They must have released my Imbruvica to [facility]...they do this every month. I just called [facility] and they said they do not have the order.\"    This writer checked the order that was released the [facility] on 12/17. [employer] receipt was confirmed by pharmacy 12/17 at 2:45pm.     This writer called [facility] and requested the rep double check for the order. The representative was able to find the order, and stated that the order had been sent to the wrong [facility]. For future orders, fax to [phone]. The wrong [facility] had been selected when placing the order. This writer removed the incorrect [facility] pharmacy location from patient's preferred pharmacy list to avoid this issue in the future.    [facility]   Oncology Pharmacy Liaison  [email or web address]   [phone] (phone)   [phone] (fax)      "

## 2019-12-23 ENCOUNTER — PRE VISIT (OUTPATIENT)
Dept: UROLOGY | Facility: CLINIC | Age: 74
End: 2019-12-23

## 2019-12-23 ENCOUNTER — TELEPHONE (OUTPATIENT)
Dept: FAMILY MEDICINE | Facility: CLINIC | Age: 74
End: 2019-12-23

## 2019-12-23 DIAGNOSIS — M54.50 ACUTE BILATERAL LOW BACK PAIN WITHOUT SCIATICA: Primary | ICD-10-CM

## 2019-12-23 RX ORDER — METHYLPREDNISOLONE 4 MG
TABLET, DOSE PACK ORAL
Qty: 21 TABLET | Refills: 0 | Status: SHIPPED | OUTPATIENT
Start: 2019-12-23 | End: 2020-02-05

## 2019-12-23 RX ORDER — TIZANIDINE HYDROCHLORIDE 4 MG/1
4 CAPSULE, GELATIN COATED ORAL 3 TIMES DAILY PRN
Qty: 30 CAPSULE | Refills: 1 | Status: SHIPPED | OUTPATIENT
Start: 2019-12-23 | End: 2020-05-20

## 2019-12-23 NOTE — TELEPHONE ENCOUNTER
I will send a script for medrol dose pack and tizanadine  85 percent of the time this gets better without injection    We would need to evaluate to see where to place the injection    If not improved on the medications, come into clinic    Do not drive within 6 hours of taking the tizanadine  Call if shingles rash breaks out

## 2019-12-23 NOTE — TELEPHONE ENCOUNTER
Reason for Visit: Discuss surgical options     Diagnosis: BPH    Orders/Procedures/Records: Records available    Contact Patient: N/A    Rooming Requirements: dip/PVR      Natalie Duarte  12/23/19  10:03 AM

## 2019-12-23 NOTE — TELEPHONE ENCOUNTER
Attempt #   Called patient at home number.[phone].     Was call answered?  Yes, relayed message from PCP to patient, Patient verbalized understanding and agreement with plan and had no questions.        Aurora Sung RN  [facility]

## 2019-12-23 NOTE — TELEPHONE ENCOUNTER
Reason for call:  Patient reporting a symptom    Symptom or request:  Patients back went out yesterday.  When he woke up in the morning he could tell his back had gone out.  Patient had done something earlier in the week that he thinks contributed to his back going out.  Patient has a history of his back going out.  Patient was wanting to get an Epidural injection at [facility] or some kind of treatment. [facility] said that they need an order from Dr Parra before they can do anything. Please call patient to discuss.    Duration (how long have symptoms been present): Since yesterday    Have you been treated for this before? Yes    Additional comments:     Phone Number patient can be reached at:  Home number on file [phone] (home)    Best Time:  any    Can we leave a detailed message on this number:  YES    Call taken on 12/23/2019 at 10:55 AM by Lauren Xavier

## 2019-12-23 NOTE — TELEPHONE ENCOUNTER
Routing to PCP to review and advise.    Order for an injection for back pain?          Aurora Sung RN  [facility]

## 2019-12-23 NOTE — TELEPHONE ENCOUNTER
Patient calling back, he states he is in excruciating pain and would like to hear back as soon as possible. please call patient at [phone].    Thank you,  Lucero POLLACK  [email or web address]  Team [place]omen[place] Coordinator

## 2019-12-26 ENCOUNTER — TELEPHONE (OUTPATIENT)
Dept: ONCOLOGY | Facility: CLINIC | Age: 74
End: 2019-12-26

## 2019-12-26 NOTE — ORAL ONC MGMT
Oral Chemotherapy Monitoring Program    Current Therapy: Imbruvica (ibrutinib)  Diagnosis: CLL    Placed call to Loco Wood in follow up of therapy. Left generic message requesting call back at [phone]. No patient or drug names were included in the message.    Sang Braden  Pharmacy Intern  Oral Chemotherapy Monitoring Program  [facility]  [phone]

## 2020-01-03 ENCOUNTER — TELEPHONE (OUTPATIENT)
Dept: ONCOLOGY | Facility: CLINIC | Age: 75
End: 2020-01-03

## 2020-01-03 NOTE — TELEPHONE ENCOUNTER
Oral Chemotherapy Monitoring Program     Placed call to patient in follow up of Imbruvica oral chemotherapy.     Left message requesting call back. No drug names were mentioned.       Lindsey Oconnor, PharmD  Oral Chemotherapy Monitoring Program  [facility]  [phone]  January 3, 2020

## 2020-01-03 NOTE — TELEPHONE ENCOUNTER
Prior Authorization Approval    Authorization Effective Date: 10/5/2019  Authorization Expiration Date: 1/2/2021  Medication: [employer] Isaiah Approval  Approved Dose/Quantity: 420mg/28ds  Reference #:     Insurance Company:    Expected CoPay:       CoPay Card Available: Yes    Foundation Assistance Needed:    Which Pharmacy is filling the prescription (Not needed for infusion/clinic administered):    Pharmacy Notified:    Patient Notified:          Nu Kennedy CPhT  [facility]  Oncology Pharmacy Liaison  [email or web address]  Phone: [phone]  Fax: [phone]

## 2020-01-03 NOTE — TELEPHONE ENCOUNTER
Oral Chemotherapy Monitoring Program    Primary Oncologist: Dr. Burns  Primary Oncology Clinic: [facility]   Cancer Diagnosis: CLL    Therapy History:  Imbruvica 420mg by mouth daily  C1D1=5/14/2019    Drug Interaction Assessment: no new drug interactions identified.   Medication list checked (1/3): -Imbruvica -MethylPREDNISolone -Levothyroxine  -Azithromycin (Systemic) -Pregabalin -TiZANidine -Tamsulosin -Omeprazole -Prochlorperazine -AtorvaSTATin -Cholecalciferol -DiazePAM -Ibuprofen -Omega-3 Fatty Acids    Lab Monitoring Plan  CBC and CMP monthly    Subjective/Objective:  Loco Wood is a 74 year old male contacted by phone for a follow-up visit for oral chemotherapy. Loco confirmed that he is taking the Imbruvica 420mg daily and is adherent to therapy. He reports that he is not experiencing any side effects outside of what has been previously discussed. He denies any changes to his current prescription medications or what he takes over-the-counter. He is aware of his upcoming lab appointment and intends to be able to complete this.     ORAL CHEMOTHERAPY 5/8/2019 7/31/2019 10/1/2019 1/3/2020   Drug Name Imbruvica (Ibrutinib) Imbruvica (Ibrutinib) Imbruvica (Ibrutinib) Imbruvica (Ibrutinib)   Current Dosage 420mg 420mg 420mg 420mg   Current Schedule Daily Daily Daily Daily   Cycle Details Continuous Continuous Continuous Continuous   Doses missed in last 2 weeks - 0 0 0   Adherence Assessment - Adherent Adherent Adherent   Adverse Effects - No AE identified during assessment No AE identified during assessment No AE identified during assessment   Any new drug interactions? No No No No   Is the dose as ordered appropriate for the patient? Yes Yes Yes Yes       Last PHQ-2 Score on record:   PHQ-2 ( 1999 [employer]) 5/16/2019 3/19/2018   Q1: Little interest or pleasure in doing things 0 0   Q2: Feeling down, depressed or hopeless 0 0   PHQ-2 Score 0 0   Q1: Little interest or pleasure in doing things  "Not at all Not at all   Q2: Feeling down, depressed or hopeless Not at all Not at all   PHQ-2 Score 0 0     Vitals:  BP:   BP Readings from Last 1 Encounters:   11/26/19 125/78     Wt Readings from Last 1 Encounters:   11/26/19 75.4 kg (166 lb 4 oz)     Estimated body surface area is 1.89 meters squared as calculated from the following:    Height as of 10/10/19: 1.702 m (5' 7.01\").    Weight as of 11/26/19: 75.4 kg (166 lb 4 oz).    Labs:  _  Result Component Current Result Ref Range   Sodium 143 (12/10/2019) 133 - 144 mmol/L     _  Result Component Current Result Ref Range   Potassium 4.3 (12/10/2019) 3.4 - 5.3 mmol/L     _  Result Component Current Result Ref Range   Calcium 8.9 (12/10/2019) 8.5 - 10.1 mg/dL     No results found for Mag within last 30 days.     No results found for Phos within last 30 days.     _  Result Component Current Result Ref Range   Albumin 4.0 (12/10/2019) 3.4 - 5.0 g/dL     _  Result Component Current Result Ref Range   Urea Nitrogen 17 (12/10/2019) 7 - 30 mg/dL     _  Result Component Current Result Ref Range   Creatinine 1.44 (H) (12/10/2019) 0.66 - 1.25 mg/dL     _  Result Component Current Result Ref Range   AST 23 (12/10/2019) 0 - 45 U/L     _  Result Component Current Result Ref Range   ALT 23 (12/10/2019) 0 - 70 U/L     _  Result Component Current Result Ref Range   Bilirubin Total 1.0 (12/10/2019) 0.2 - 1.3 mg/dL     _  Result Component Current Result Ref Range   WBC 73.9 (HH) (12/10/2019) 4.0 - 11.0 10e9/L     _  Result Component Current Result Ref Range   Hemoglobin 12.6 (L) (12/10/2019) 13.3 - 17.7 g/dL     _  Result Component Current Result Ref Range   Platelet Count 151 (12/10/2019) 150 - 450 10e9/L     _  Result Component Current Result Ref Range   Absolute Neutrophil 3.0 (12/10/2019) 1.6 - 8.3 10e9/L       Assessment:  Loco is tolerating therapy well.     Plan:  Continue Imbruvica therapy as planned.     Follow-Up:  1/10 to review lab work.  Will need to schedule monthly " labs for February.      Lindsey Oconnor PharmD  Oral Chemotherapy Monitoring Program  [facility]  [phone]

## 2020-01-07 ENCOUNTER — OFFICE VISIT (OUTPATIENT)
Dept: UROLOGY | Facility: CLINIC | Age: 75
End: 2020-01-07
Payer: COMMERCIAL

## 2020-01-07 VITALS
HEIGHT: 67 IN | HEART RATE: 85 BPM | WEIGHT: 166 LBS | DIASTOLIC BLOOD PRESSURE: 79 MMHG | BODY MASS INDEX: 26.06 KG/M2 | SYSTOLIC BLOOD PRESSURE: 126 MMHG

## 2020-01-07 DIAGNOSIS — N40.1 BENIGN PROSTATIC HYPERPLASIA WITH LOWER URINARY TRACT SYMPTOMS, SYMPTOM DETAILS UNSPECIFIED: Primary | ICD-10-CM

## 2020-01-07 LAB
APPEARANCE UR: CLEAR
BILIRUB UR QL: NORMAL
COLOR UR: YELLOW
GLUCOSE URINE: NORMAL MG/DL
HGB UR QL: NORMAL
KETONES UR QL: NORMAL MG/DL
LEUKOCYTE ESTERASE URINE: NORMAL
NITRITE UR QL STRIP: NORMAL
PH UR STRIP: 6 PH (ref 5–7)
PROTEIN ALBUMIN URINE: NORMAL MG/DL
SOURCE: NORMAL
SP GR UR STRIP: 1.02 (ref 1–1.03)
UROBILINOGEN UR QL STRIP: 0.2 EU/DL (ref 0.2–1)

## 2020-01-07 ASSESSMENT — MIFFLIN-ST. JEOR: SCORE: 1461.75

## 2020-01-07 ASSESSMENT — PAIN SCALES - GENERAL: PAINLEVEL: NO PAIN (0)

## 2020-01-07 NOTE — NURSING NOTE
"Chief Complaint   Patient presents with     Consult     Discuss HoLEP       Blood pressure 126/79, pulse 85, height 1.702 m (5' 7.01\"), weight 75.3 kg (166 lb). Body mass index is 25.99 kg/m .    Patient Active Problem List   Diagnosis     Esophageal reflux     Lumbago     CLL (chronic lymphocytic leukemia) (H)     HYPERLIPIDEMIA LDL GOAL <130     Mass of On license of [facility]     Health Care Home     Vitamin D deficiency     Osteoarthritis of hip     OA (osteoarthritis) of hip     Insomnia     BPH (benign prostatic hyperplasia)     Acute bilateral low back pain without sciatica     Prostate nodule     Chondromalacia, knee, right     Complex tear of medial meniscus of right knee as current injury     Herpes zoster with keratoconjunctivitis, od     Post herpetic neuralgia     Aftercare following surgery of the musculoskeletal system       Allergies   Allergen Reactions     Dilaudid [Hydromorphone] Itching     Morphine Itching       Current Outpatient Medications   Medication Sig Dispense Refill     Acetaminophen (TYLENOL PO)        atorvastatin (LIPITOR) 10 MG tablet Take 1 tablet (10 mg) by mouth every 48 hours 45 tablet 4     azithromycin (ZITHROMAX) 250 MG tablet 2 po daily x one day then 1 po daily x 4 days 6 tablet 1     Cholecalciferol (VITAMIN D3 PO) Take 4,000 Units by mouth daily        diazepam (VALIUM) 5 MG tablet Take 5 mg by mouth every 6 hours as needed for anxiety       ibrutinib (IMBRUVICA) 420 MG tablet Take 1 tablet (420 mg) by mouth daily 28 tablet 0     ibuprofen (ADVIL/MOTRIN) 200 MG tablet Take 200 mg by mouth every 4 hours as needed for mild pain       levothyroxine (SYNTHROID/LEVOTHROID) 50 MCG tablet Take 1 tablet (50 mcg) by mouth daily 90 tablet 3     levothyroxine (SYNTHROID/LEVOTHROID) 75 MCG tablet Take 1 tablet (75 mcg) by mouth daily 90 tablet 3     methylPREDNISolone (MEDROL DOSEPAK) 4 MG tablet therapy pack Follow Package Directions 21 tablet 0     omega 3 1200 MG CAPS Take 2 capsules by mouth daily  "      omeprazole (PRILOSEC) 10 MG DR capsule TAKE ONE CAPSULE BY MOUTH EVERY DAY 30 TO 60 MINUTES BEFORE A MEAL (Patient taking differently: TAKE ONE CAPSULE BY MOUTH EVERY OTHER DAY 30 TO 60 MINUTES BEFORE A MEAL) 90 capsule 3     pregabalin (LYRICA) 75 MG capsule TK ONE C PO  BID  3     Probiotic Product (PROBIOTIC-10 PO) Take by mouth daily       prochlorperazine (COMPAZINE) 10 MG tablet Take 1 tablet (10 mg) by mouth every 6 hours as needed (Nausea/Vomiting) 30 tablet 2     tamsulosin (FLOMAX) 0.4 MG capsule Take 1 capsule (0.4 mg) by mouth daily 90 capsule 3     tiZANidine (ZANAFLEX) 2 MG tablet Take 1-2 tablets (2-4 mg) by mouth 3 times daily 30 tablet 0     tiZANidine (ZANAFLEX) 4 MG capsule Take 1 capsule (4 mg) by mouth 3 times daily as needed for muscle spasms 30 capsule 1       Social History     Tobacco Use     Smoking status: Never Smoker     Smokeless tobacco: Never Used   Substance Use Topics     Alcohol use: Yes     Alcohol/week: 0.0 standard drinks     Comment: moderate, social     Drug use: No       Natalie Duarte, EMT  1/7/2020  3:46 PM

## 2020-01-07 NOTE — PATIENT INSTRUCTIONS
Referral to IR.      It was a pleasure meeting with you today.  Thank you for allowing me and my team the privilege of caring for you today.  YOU are the reason we are here, and I truly hope we provided you with the excellent service you deserve.  Please let us know if there is anything else we can do for you so that we can be sure you are leaving completely satisfied with your care experience.

## 2020-01-07 NOTE — TELEPHONE ENCOUNTER
DIAGNOSIS: PAE Consult // per Urology clinic // Dr. Warren referring // recds internal   DATE RECEIVED: 1.14.20   NOTES STATUS DETAILS   OFFICE NOTE from referring provider Internal 1.7.20 Dr. Warren   OFFICE NOTE from other specialist N/A    OPERATIVE REPORT N/A    MEDICATION LIST Internal    PERTINENT LABS Internal    CTA (CT ANGIOGRAPHY) N/A    CT N/A    MRI N/A    ULTRASOUND N/A

## 2020-01-07 NOTE — LETTER
2020       RE: Loco Wood  [address] [address]     Dear Colleague,    Thank you for referring your patient, Loco Wood, to the [facility] UROLOGY AND INST FOR PROSTATE AND UROLOGIC CANCERS [facility]. Please see a copy of my visit note below.      Urology Clinic    Jameson Warren MD  Date of Service: 2020     Name: Loco Wood  MRN: [id number]  Age: 74 year old  : 1945  Referring provider: Campos Boyd     Assessment and Plan:  Assessment:  Loco Wood  is a 74 year old male with a history of benign prostatic hypertrophy with lower urinary tract symptoms.      Plan:  We discussed the available surgical treatment options for BPH and went over the risk/benefit profile of each including retrograde ejaculation, bleeding, infection, damage to the urethra, prostate and bladder, urinary incontinence, pelvic pain, identification of incidental prostate cancer and need for additional intervention.    We discussed that PAE, UroLift, and Rezum are the most minimally invasive treatment options with the lowest likelihood of side effects though these treatments are relatively new and long term data regarding durability is limited.  Additionally, functional improvement is less likely to be as dramatic relative to more invasive procedures.    We also discussed the transurethral tissue removing procedures including TURP, Greenlight PVP, and HoLEP and discussed that these procedures carry higher perioperative risk profiles but greater degree of symptom improvement and likely increased durability.  Additionally, we discussed that some degree of post-operative urinary leakage and frequency is common after these procedures though often is limited to the first three months of recovery.    I would recommend either PAE or Rezum given that his symptoms are more mild. We discussed that a more aggressive treatment could  always be pursued later on. He will require cystoscopy prior to either treatment to rule out urethral stricture.     He is leaning towards embolization. Referral was placed for follow up with Dr. Mares for consideration of PAE.   ______________________________________________________________________    HPI  Loco Wood is a 74 year old male with a history of CLL who presents for follow up of elevated PSA and BPH.      He was told he had a nodule on his prostate a couple years ago by his primary physician.  He has had PSA tests which have been in the mid-4 range since 2017.     He was having urinary frequency, having to get up approximately 6 times a night to urinate.  He was started on Flomax and Finasteride by Dr. Boyd in May and did notice an improvement in his stream and only had to get up once at night.  However, he read about some long-term effects of Finasteride.  He noticed it was harder to get and erection and ejaculate therefore he stopped it after about 3 weeks or a month.  He did stop Flomax temporarily and noticed his urinary symptoms worsened markedly.       He started having an irregular heartbeat after he started on Flomax and Finasteride.  This was also after he started on a new medication, Ibrutinib, for his CLL.  He did have a cardiac workup with a Ziopatch which found very short episodes of atrial fibrillation.  Atrial fibrillation can be associated with Ibrutinib.  Cardiology told him he would not need anticoagulation based on how short the episodes of atrial fibrillation were.    Today he reports that most nights he wakes up once but sometimes wakes up 0-2 times per night to void. He does not notice much difference with using Flomax every night vs every other night. He already has retrograde ejaculation on Flomax. Of note, he has a history of Peyronie's years ago.     He is seeking opinions on further treatment options of his urinary symptoms.    Review of Systems:   Pertinent  "items are noted in HPI or as below, remainder of complete ROS is negative.      Physical Exam:   Patient is a 74 year old  male   Vitals: Blood pressure 126/79, pulse 85, height 1.702 m (5' 7.01\"), weight 75.3 kg (166 lb).  Notable Findings on Exam: Well-nourished male in no apparent distress     Laboratory:   I personally reviewed all applicable laboratory data and went over findings with patient  Significant for:    CBC RESULTS:  Recent Labs   Lab Test 12/10/19  0845 11/08/19  0853 10/08/19  0839 09/04/19  0838   WBC 73.9* 87.7* 102.5* 136.2*   HGB 12.6* 12.8* 12.4* 12.3*    144* 142* 143*        BMP RESULTS:  Recent Labs   Lab Test 12/10/19  0845 11/08/19  0853 10/08/19  0839 09/04/19  0838    142 143 142   POTASSIUM 4.3 4.0 3.6 4.1   CHLORIDE 110* 107 109 108   CO2 29 31 29 29   ANIONGAP 4 4 5 5   GLC 89 99 89 87   BUN 17 16 20 18   CR 1.44* 1.55* 1.43* 1.48*   GFRESTIMATED 48* 44* 48* 46*   GFRESTBLACK 56* 51* 56* 54*   DAVID 8.9 9.2 8.6 9.0       UA RESULTS:   Recent Labs   Lab Test 11/12/19  1322 05/24/19  0914 12/21/18  1156 09/18/17  1359   SG 1.019 1.020 1.021 1.015   URINEPH 5.0 5.5 5.0 6.0   NITRITE Negative Negative Negative Negative   RBCU 10* 2-5*  --  2-5*   WBCU 1 0 - 5  --  O - 2       CALCIUM RESULTS  Lab Results   Component Value Date    DAVID 8.9 12/10/2019    DAVID 9.2 11/08/2019    DAVID 8.6 10/08/2019       PSA RESULTS  PSA   Date Value Ref Range Status   05/22/2019 4.58 (H) 0 - 4 ug/L Final     Comment:     Assay Method:  Chemiluminescence using Siemens Vista analyzer   04/30/2019 4.11 (H) 0 - 4 ug/L Final     Comment:     Assay Method:  Chemiluminescence using Siemens Vista analyzer   10/16/2018 4.68 (H) 0 - 4 ug/L Final     Comment:     Assay Method:  Chemiluminescence using Siemens Vista analyzer   04/24/2018 4.44 (H) 0 - 4 ug/L Final     Comment:     Assay Method:  Chemiluminescence using Siemens Vista analyzer   03/22/2018 4.58 (H) 0 - 4 ug/L Final     Comment:     Assay Method:  " Chemiluminescence using Siemens Vista analyzer   12/21/2017 4.20 (H) 0 - 4 ug/L Final     Comment:     Assay Method:  Chemiluminescence using Siemens Vista analyzer   09/18/2017 4.48 (H) 0 - 4 ug/L Final     Comment:     Assay Method:  Chemiluminescence using Siemens Vista analyzer   05/15/2017 4.76 (H) 0 - 4 ug/L Final     Comment:     Assay Method:  Chemiluminescence using Siemens Vista analyzer   08/01/2011 1.47 0 - 4 ug/L Final   07/27/2010 1.44 0 - 4 ug/L Final       INR  Recent Labs   Lab Test 03/06/14  2127 01/03/13  0150   INR 1.03 1.10     Scribe Disclosure:  I, Sherrie Mcdowell, am serving as a scribe to document services personally performed by Jameson Warren MD at this visit, based upon the provider's statements to me. All documentation has been reviewed by the aforementioned provider prior to being entered into the official medical record.       Again, thank you for allowing me to participate in the care of your patient.      Sincerely,    Jameson Warren MD

## 2020-01-07 NOTE — PROGRESS NOTES
Urology Clinic    Jameson Warren MD  Date of Service: 2020     Name: Loco Wood  MRN: [id number]  Age: 74 year old  : 1945  Referring provider: Campos Boyd     Assessment and Plan:  Assessment:  Loco Wood  is a 74 year old male with a history of benign prostatic hypertrophy with lower urinary tract symptoms.      Plan:  We discussed the available surgical treatment options for BPH and went over the risk/benefit profile of each including retrograde ejaculation, bleeding, infection, damage to the urethra, prostate and bladder, urinary incontinence, pelvic pain, identification of incidental prostate cancer and need for additional intervention.    We discussed that PAE, UroLift, and Rezum are the most minimally invasive treatment options with the lowest likelihood of side effects though these treatments are relatively new and long term data regarding durability is limited.  Additionally, functional improvement is less likely to be as dramatic relative to more invasive procedures.    We also discussed the transurethral tissue removing procedures including TURP, Greenlight PVP, and HoLEP and discussed that these procedures carry higher perioperative risk profiles but greater degree of symptom improvement and likely increased durability.  Additionally, we discussed that some degree of post-operative urinary leakage and frequency is common after these procedures though often is limited to the first three months of recovery.    I would recommend either PAE or Rezum given that his symptoms are more mild. We discussed that a more aggressive treatment could always be pursued later on. He will require cystoscopy prior to either treatment to rule out urethral stricture.     He is leaning towards embolization. Referral was placed for follow up with Dr. Mares for consideration of PAE.   ______________________________________________________________________    HPI  Loco Wood  "is a 74 year old male with a history of CLL who presents for follow up of elevated PSA and BPH.      He was told he had a nodule on his prostate a couple years ago by his primary physician.  He has had PSA tests which have been in the mid-4 range since 2017.     He was having urinary frequency, having to get up approximately 6 times a night to urinate.  He was started on Flomax and Finasteride by Dr. Boyd in May and did notice an improvement in his stream and only had to get up once at night.  However, he read about some long-term effects of Finasteride.  He noticed it was harder to get and erection and ejaculate therefore he stopped it after about 3 weeks or a month.  He did stop Flomax temporarily and noticed his urinary symptoms worsened markedly.       He started having an irregular heartbeat after he started on Flomax and Finasteride.  This was also after he started on a new medication, Ibrutinib, for his CLL.  He did have a cardiac workup with a [facility] which found very short episodes of atrial fibrillation.  Atrial fibrillation can be associated with Ibrutinib.  Cardiology told him he would not need anticoagulation based on how short the episodes of atrial fibrillation were.    Today he reports that most nights he wakes up once but sometimes wakes up 0-2 times per night to void. He does not notice much difference with using Flomax every night vs every other night. He already has retrograde ejaculation on Flomax. Of note, he has a history of Peyronie's years ago.     He is seeking opinions on further treatment options of his urinary symptoms.    Review of Systems:   Pertinent items are noted in HPI or as below, remainder of complete ROS is negative.      Physical Exam:   Patient is a 74 year old  male   Vitals: Blood pressure 126/79, pulse 85, height 1.702 m (5' 7.01\"), weight 75.3 kg (166 lb).  Notable Findings on Exam: Well-nourished male in no apparent distress     Laboratory:   I personally reviewed " all applicable laboratory data and went over findings with patient  Significant for:    CBC RESULTS:  Recent Labs   Lab Test 12/10/19  0845 11/08/19  0853 10/08/19  0839 09/04/19  0838   WBC 73.9* 87.7* 102.5* 136.2*   HGB 12.6* 12.8* 12.4* 12.3*    144* 142* 143*        BMP RESULTS:  Recent Labs   Lab Test 12/10/19  0845 11/08/19  0853 10/08/19  0839 09/04/19  0838    142 143 142   POTASSIUM 4.3 4.0 3.6 4.1   CHLORIDE 110* 107 109 108   CO2 29 31 29 29   ANIONGAP 4 4 5 5   GLC 89 99 89 87   BUN 17 16 20 18   CR 1.44* 1.55* 1.43* 1.48*   GFRESTIMATED 48* 44* 48* 46*   GFRESTBLACK 56* 51* 56* 54*   DAVID 8.9 9.2 8.6 9.0       UA RESULTS:   Recent Labs   Lab Test 11/12/19  1322 05/24/19  0914 12/21/18  1156 09/18/17  1359   SG 1.019 1.020 1.021 1.015   URINEPH 5.0 5.5 5.0 6.0   NITRITE Negative Negative Negative Negative   RBCU 10* 2-5*  --  2-5*   WBCU 1 0 - 5  --  O - 2       CALCIUM RESULTS  Lab Results   Component Value Date    DAVID 8.9 12/10/2019    DAVID 9.2 11/08/2019    DAVID 8.6 10/08/2019       PSA RESULTS  PSA   Date Value Ref Range Status   05/22/2019 4.58 (H) 0 - 4 ug/L Final     Comment:     Assay Method:  Chemiluminescence using Siemens Vista analyzer   04/30/2019 4.11 (H) 0 - 4 ug/L Final     Comment:     Assay Method:  Chemiluminescence using Siemens Vista analyzer   10/16/2018 4.68 (H) 0 - 4 ug/L Final     Comment:     Assay Method:  Chemiluminescence using Siemens Vista analyzer   04/24/2018 4.44 (H) 0 - 4 ug/L Final     Comment:     Assay Method:  Chemiluminescence using Siemens Vista analyzer   03/22/2018 4.58 (H) 0 - 4 ug/L Final     Comment:     Assay Method:  Chemiluminescence using Siemens Vista analyzer   12/21/2017 4.20 (H) 0 - 4 ug/L Final     Comment:     Assay Method:  Chemiluminescence using Siemens Vista analyzer   09/18/2017 4.48 (H) 0 - 4 ug/L Final     Comment:     Assay Method:  Chemiluminescence using Siemens Vista analyzer   05/15/2017 4.76 (H) 0 - 4 ug/L Final     Comment:      Assay Method:  Chemiluminescence using Siemens Vista analyzer   08/01/2011 1.47 0 - 4 ug/L Final   07/27/2010 1.44 0 - 4 ug/L Final       INR  Recent Labs   Lab Test 03/06/14  2127 01/03/13  0150   INR 1.03 1.10     Scribe Disclosure:  Sherrie ASCENCIO, am serving as a scribe to document services personally performed by Jameson Warren MD at this visit, based upon the provider's statements to me. All documentation has been reviewed by the aforementioned provider prior to being entered into the official medical record.

## 2020-01-10 DIAGNOSIS — C91.10 CLL (CHRONIC LYMPHOCYTIC LEUKEMIA) (H): ICD-10-CM

## 2020-01-10 DIAGNOSIS — E03.9 HYPOTHYROIDISM, UNSPECIFIED TYPE: ICD-10-CM

## 2020-01-10 DIAGNOSIS — Z79.899 ENCOUNTER FOR LONG-TERM (CURRENT) USE OF MEDICATIONS: ICD-10-CM

## 2020-01-10 LAB
ALBUMIN SERPL-MCNC: 3.8 G/DL (ref 3.4–5)
ALP SERPL-CCNC: 76 U/L (ref 40–150)
ALT SERPL W P-5'-P-CCNC: 19 U/L (ref 0–70)
ANION GAP SERPL CALCULATED.3IONS-SCNC: 4 MMOL/L (ref 3–14)
AST SERPL W P-5'-P-CCNC: 16 U/L (ref 0–45)
BILIRUB SERPL-MCNC: 1.3 MG/DL (ref 0.2–1.3)
BUN SERPL-MCNC: 21 MG/DL (ref 7–30)
CALCIUM SERPL-MCNC: 9.1 MG/DL (ref 8.5–10.1)
CHLORIDE SERPL-SCNC: 109 MMOL/L (ref 94–109)
CO2 SERPL-SCNC: 30 MMOL/L (ref 20–32)
CREAT SERPL-MCNC: 1.57 MG/DL (ref 0.66–1.25)
GFR SERPL CREATININE-BSD FRML MDRD: 43 ML/MIN/{1.73_M2}
GLUCOSE SERPL-MCNC: 94 MG/DL (ref 70–99)
POTASSIUM SERPL-SCNC: 4.1 MMOL/L (ref 3.4–5.3)
PROT SERPL-MCNC: 6.4 G/DL (ref 6.8–8.8)
SODIUM SERPL-SCNC: 143 MMOL/L (ref 133–144)
T4 FREE SERPL-MCNC: 1.07 NG/DL (ref 0.76–1.46)
TSH SERPL DL<=0.005 MIU/L-ACNC: 7.1 MU/L (ref 0.4–4)

## 2020-01-10 PROCEDURE — 36415 COLL VENOUS BLD VENIPUNCTURE: CPT | Performed by: PHYSICIAN ASSISTANT

## 2020-01-10 PROCEDURE — 80053 COMPREHEN METABOLIC PANEL: CPT | Performed by: PHYSICIAN ASSISTANT

## 2020-01-10 PROCEDURE — 85025 COMPLETE CBC W/AUTO DIFF WBC: CPT | Performed by: PHYSICIAN ASSISTANT

## 2020-01-10 PROCEDURE — 84443 ASSAY THYROID STIM HORMONE: CPT | Performed by: PHYSICIAN ASSISTANT

## 2020-01-10 PROCEDURE — 84439 ASSAY OF FREE THYROXINE: CPT | Performed by: PHYSICIAN ASSISTANT

## 2020-01-13 LAB
DIFFERENTIAL METHOD BLD: ABNORMAL
ERYTHROCYTE [DISTWIDTH] IN BLOOD BY AUTOMATED COUNT: 14.7 % (ref 10–15)
HCT VFR BLD AUTO: 40.8 % (ref 40–53)
HGB BLD-MCNC: 12.5 G/DL (ref 13.3–17.7)
LYMPHOCYTES # BLD AUTO: 46.2 10E9/L (ref 0.8–5.3)
LYMPHOCYTES NFR BLD AUTO: 93 %
MCH RBC QN AUTO: 29.6 PG (ref 26.5–33)
MCHC RBC AUTO-ENTMCNC: 30.6 G/DL (ref 31.5–36.5)
MCV RBC AUTO: 97 FL (ref 78–100)
MONOCYTES # BLD AUTO: 1 10E9/L (ref 0–1.3)
MONOCYTES NFR BLD AUTO: 2 %
NEUTROPHILS # BLD AUTO: 2.5 10E9/L (ref 1.6–8.3)
NEUTROPHILS NFR BLD AUTO: 5 %
PLATELET # BLD AUTO: 147 10E9/L (ref 150–450)
PLATELET # BLD EST: ABNORMAL 10*3/UL
RBC # BLD AUTO: 4.23 10E12/L (ref 4.4–5.9)
RBC MORPH BLD: NORMAL
SMUDGE CELLS BLD QL SMEAR: PRESENT
WBC # BLD AUTO: 49.7 10E9/L (ref 4–11)

## 2020-01-14 ENCOUNTER — OFFICE VISIT (OUTPATIENT)
Dept: VASCULAR SURGERY | Facility: CLINIC | Age: 75
End: 2020-01-14
Payer: COMMERCIAL

## 2020-01-14 ENCOUNTER — PRE VISIT (OUTPATIENT)
Dept: VASCULAR SURGERY | Facility: CLINIC | Age: 75
End: 2020-01-14

## 2020-01-14 ENCOUNTER — TELEPHONE (OUTPATIENT)
Dept: ONCOLOGY | Facility: CLINIC | Age: 75
End: 2020-01-14

## 2020-01-14 VITALS — SYSTOLIC BLOOD PRESSURE: 130 MMHG | HEART RATE: 78 BPM | DIASTOLIC BLOOD PRESSURE: 68 MMHG | OXYGEN SATURATION: 97 %

## 2020-01-14 DIAGNOSIS — C91.10 CLL (CHRONIC LYMPHOCYTIC LEUKEMIA) (H): Primary | ICD-10-CM

## 2020-01-14 DIAGNOSIS — N40.1 BENIGN PROSTATIC HYPERPLASIA WITH LOWER URINARY TRACT SYMPTOMS, SYMPTOM DETAILS UNSPECIFIED: Primary | ICD-10-CM

## 2020-01-14 ASSESSMENT — PAIN SCALES - GENERAL: PAINLEVEL: NO PAIN (0)

## 2020-01-14 NOTE — LETTER
RE: Loco Wood  [address] [facility] [id number]     Dear Colleague,    Thank you for referring your patient, Loco Wood, to the [facility] at [facility]. Please see a copy of my visit note below.        Interventional Radiology Clinic Visit    Date of this visit: 1/14/2020    Loco Wood presents for consultation for evaluation of BPH    Referring Physician: Dr. Warren        History Of Present Illness:    Loco Wood is a 74 year old male with a history of CLL, and BPH with lo lower urinary tract symptoms.  Patient has previously seen Dr. Boyd and Dr. Warren in Urology.  Was referred to discuss prostate artery embolization as a noninvasive alternative management of his BPH.  Patient describes initial onset of urinary symptoms as 20 years ago initially presenting as a weak stream.  Over the last year the patient has noted significant increase in nocturia, urgency, dribbling and stopping the starting of stream.  Additional management was with Flomax which did initially improve the patient's stream and decrease his nocturia from 3-5 times a night to 1-2 times a night currently.  Patient describes nocturia as his most bothersome lower urinary tract symptom.  Patient did experience retrograde ejaculation and decreased potency/arousal.  Patient has also attempted finasteride for approximately a month, however discontinued secondary to concerns for side effects.  Patient's most recent PSA in May 2019 measured 4.58, similar to prior PSAs since 2017.  Patient has not had a prior prostate biopsy.  No prostate MRI has been obtained.  No history of gross hematuria, however has had microscopic hematuria on previous urinalysis.  Has not had a uroflow study describes having uroflow study remotely, however no recent uroflow is available.    IPSS score of 18.  QOL of 3.     Review of Systems:    10 Point ROS is positive as noted  in the HPI. Otherwise, the remainder of the ROS is negative.    Past Medical/Surgical History:    Past Medical History:   Diagnosis Date     Arthritis     hip and toes     Chronic pain      CLL (chronic lymphocytic leukaemia)      Esophageal reflux      Malignant neoplasm (H)     Leukemia     PONV (postoperative nausea and vomiting)      Pure hypercholesterolemia      Past Surgical History:   Procedure Laterality Date     ARTHROPLASTY MINIMALLY INVASIVE HIP  5/10/2013    Procedure: ARTHROPLASTY MINIMALLY INVASIVE HIP;  Left Two Incision Total Hip Arthroplasty ;  Surgeon: Desmond Alberts MD;  Location: [facility] OR     ARTHROPLASTY MINIMALLY INVASIVE HIP  2/27/2014    Procedure: ARTHROPLASTY MINIMALLY INVASIVE HIP;  Right Total Hip Arthroplasty Minimally Invasive Two Incision  *Latex Free Room;  Surgeon: Desmond Alberts MD;  Location: [facility] OR     BACK SURGERY      back fusion 1994     C NONSPECIFIC PROCEDURE  1964 &'95    (R) inguinal herniorrhapy     C NONSPECIFIC PROCEDURE  1949    (L) inguinal herniorrhaphy     C NONSPECIFIC PROCEDURE  1994    L4-5  L5 S1 fusion - spondylolisthesis     COLONOSCOPY      2007     COLONOSCOPY N/A 8/15/2017    Procedure: COLONOSCOPY;  colonoscopy;  Surgeon: Chun Mcguire MD;  Location:  GI     GI SURGERY      4 hernia repairs in childhood     HERNIA REPAIR      4 since age 4       Current Medications:    Current Outpatient Medications   Medication Sig Dispense Refill     Acetaminophen (TYLENOL PO)        atorvastatin (LIPITOR) 10 MG tablet Take 1 tablet (10 mg) by mouth every 48 hours 45 tablet 4     azithromycin (ZITHROMAX) 250 MG tablet 2 po daily x one day then 1 po daily x 4 days 6 tablet 1     Cholecalciferol (VITAMIN D3 PO) Take 4,000 Units by mouth daily        diazepam (VALIUM) 5 MG tablet Take 5 mg by mouth every 6 hours as needed for anxiety       ibrutinib (IMBRUVICA) 420 MG tablet Take 1 tablet (420 mg) by mouth daily 28 tablet 0     ibuprofen (ADVIL/MOTRIN) 200 MG  tablet Take 200 mg by mouth every 4 hours as needed for mild pain       levothyroxine (SYNTHROID/LEVOTHROID) 50 MCG tablet Take 1 tablet (50 mcg) by mouth daily 90 tablet 3     levothyroxine (SYNTHROID/LEVOTHROID) 75 MCG tablet Take 1 tablet (75 mcg) by mouth daily 90 tablet 3     methylPREDNISolone (MEDROL DOSEPAK) 4 MG tablet therapy pack Follow Package Directions 21 tablet 0     omega 3 1200 MG CAPS Take 2 capsules by mouth daily       omeprazole (PRILOSEC) 10 MG DR capsule TAKE ONE CAPSULE BY MOUTH EVERY DAY 30 TO 60 MINUTES BEFORE A MEAL (Patient taking differently: TAKE ONE CAPSULE BY MOUTH EVERY OTHER DAY 30 TO 60 MINUTES BEFORE A MEAL) 90 capsule 3     pregabalin (LYRICA) 75 MG capsule TK ONE C PO  BID  3     Probiotic Product (PROBIOTIC-10 PO) Take by mouth daily       prochlorperazine (COMPAZINE) 10 MG tablet Take 1 tablet (10 mg) by mouth every 6 hours as needed (Nausea/Vomiting) 30 tablet 2     tamsulosin (FLOMAX) 0.4 MG capsule Take 1 capsule (0.4 mg) by mouth daily 90 capsule 3     tiZANidine (ZANAFLEX) 2 MG tablet Take 1-2 tablets (2-4 mg) by mouth 3 times daily 30 tablet 0     tiZANidine (ZANAFLEX) 4 MG capsule Take 1 capsule (4 mg) by mouth 3 times daily as needed for muscle spasms 30 capsule 1       Allergies:    Dilaudid [hydromorphone] and Morphine    Family History:    Family History   Problem Relation Age of Onset     Heart Disease Father      Cerebrovascular Disease Father          OF [place]      Cancer Father         melonoma     Melanoma Father      Hyperlipidemia Father      Thyroid Disease Father      Cancer Mother         Lung cancer     Other Cancer Mother         lung; heavy smoker     Cerebrovascular Disease Paternal Uncle         Aneurism     Breast Cancer Maternal Aunt          from it     Cancer Paternal Uncle      Cancer Paternal Aunt      Skin Cancer No family hx of      Glaucoma No family hx of      Macular Degeneration No family hx of        Social  History:      Social History     Socioeconomic History     Marital status: Single     Spouse name: Ellen     Number of children: 0     Years of education: PHD     Highest education level: None   Occupational History     Occupation: Teacher     Employer: [place] [id number]   Social Needs     Financial resource strain: None     Food insecurity:     Worry: None     Inability: None     Transportation needs:     Medical: None     Non-medical: None   Tobacco Use     Smoking status: Never Smoker     Smokeless tobacco: Never Used   Substance and Sexual Activity     Alcohol use: Yes     Alcohol/week: 0.0 standard drinks     Comment: moderate, social     Drug use: No     Sexual activity: Not Currently     Partners: Female   Lifestyle     Physical activity:     Days per week: None     Minutes per session: None     Stress: None   Relationships     Social connections:     Talks on phone: None     Gets together: None     Attends Congregation service: None     Active member of club or organization: None     Attends meetings of clubs or organizations: None     Relationship status: None     Intimate partner violence:     Fear of current or ex partner: None     Emotionally abused: None     Physically abused: None     Forced sexual activity: None   Other Topics Concern      Service No     Blood Transfusions No     Caffeine Concern No     Occupational Exposure No     Hobby Hazards No     Sleep Concern No     Stress Concern No     Weight Concern No     Special Diet No     Back Care Yes     Exercise Yes     Comment: Several times a week     Bike Helmet No     Seat Belt Yes     Self-Exams No     Parent/sibling w/ CABG, MI or angioplasty before 65F 55M? No   Social History Narrative    Social Documentation:7/10        Balanced Diet: YES    Calcium intake:milk and food    Caffeine: 2 cups of coffee per day    Exercise:  type of activity  Wts , stretching, cardio;  3 times per week    Sunscreen:no    Seatbelts:   Yes    Self Breast Exam:  No -     Self Testicular Exam: No    Physical/Emotional/Sexual Abuse: No     Do you feel safe in your environment? Yes        Cholesterol screen up to date: today    Eye Exam up to date: Yes    Dental Exam up to date: Yes    Pap smear up to date: Does Not Apply    Mammogram up to date: Does Not Apply    Dexa Scan up to date: Does Not Apply    Colonoscopy up to date: Yes-2007    Immunizations up to date: Yes-2008    Glucose screen if over 40:  No     Luis Alfredo Knox ma                   Physical Examination:   VITALS:   /68 (BP Location: Left arm, Patient Position: Chair, Cuff Size: Adult Large)   Pulse 78   SpO2 97%   General: No acute distress.   HEENT: Normocephalic/atraumatic.  Chest: Regular rate and rhythm. Breathing normally on room air.   Abdomen: Soft, nontender, nondistended    Laboratory Studies:    Lab Results   Component Value Date    HGB 12.5 01/10/2020     Lab Results   Component Value Date     01/10/2020     Lab Results   Component Value Date    WBC 49.7 01/10/2020       Lab Results   Component Value Date    INR 1.03 03/06/2014         Lab Results   Component Value Date    CR 1.57 01/10/2020     Lab Results   Component Value Date    BUN 21 01/10/2020       No results found for: FETO    Imaging:     I reviewed the no relevant imaging    ASSESSMENT/PLAN:      Loco Wood is a 74 year old male with a history of BPH and LUTS. Currently on Flomax. IPSS score of 18.  QOL of 3. Patient is interested in potential noninvasive management of his BPH symptoms with prostate artery embolization, the patient will contact his insurance provider regarding coverage of the procedure.  P platelets are within normal limits for procedure, no recent INR.  The patient's creatinine was elevated on 110 at 1.57.    Plan:  - We will plan to proceed with prostate artery embolization  - Preoperative CTA of the pelvis  - Patient will need to obtain Uroflow study prior to  intervention  -- Given elevated creatinine of 1.57, plan for pre/post hydration  -- INR draw day of procedure  -- Patient is on Imbruvica (ibrutinib) injection for CLL - discussion with pharmacy regarding the recommendations on holding this medication - risk/benefits discussion regarding bleed risk and risk of holding the medication. Recommend message Dr. Burns for recommendations.     A total of 45 minutes was spent in care for the patient, of which >50% was spent in counseling and co-ordination of care.     It was a pleasure seeing the patient.     Discussion of the risks and benefits of prostate artery embolization.  Prostate artery embolization i was described as a safe and effective noninvasive treatment for BPH performed on an outpatient basis under moderate sedation  We described the procedure including arterial access, either in the groin or radial artery, catheterization of the internal anterior division of the internal iliac artery with selection of the prostate arteries and microsphere embolization of the prostate gland to shrink and/or softening of the prostate to improve BPH symptoms.  We described the risks of the procedure includes treatment failure, groin site complications access site complications including hematoma, pseudoaneurysm or thrombosis.  Risks of the procedure include nontarget embolization including to the bladder, rectum and penis.  We described the post procedural period, and that likely lower urinary tract symptoms will worsen within the first several days which can include mild hematuria or hematospermia, worsening symptoms can be significant and may in rare cases require catheterization.  Typical course involves symptomatic improvement at 1 month with the greatest therapeutic effect at 3 months.  The procedure was described as successful in approximately 85% of cases for patients with appropriate anatomy. We discussed 20-25% recurrence rate at 5 years and that the procedure can be  repeated if necessary. Pre-procedure was described including the acquisition of a pelvic CTA.  Described to the the patient the prostate artery embolization has a lower risk compared to surgical intervention of urinary incontinence or sexual dysfunction, and that improvement in sexual dysfunction function has been reported.  We discussed a prostate artery embolization does not preclude the patient from future surgical management.  Typical restrictions were described to the patient including light activity for 2 to 3 days and no heavy lifting for 1 week, and no sexual activity for a 2 weeks. The patients questions were answered.     Signed,    Vijay Andujar MD  Interventional Radiology Fellow  IR pass pager: [phone]  Personal pager: [phone]    I Have seen the patient with the resident and agree with the note.    Sincerely,    Arline Mares MD    CC  Patient Care Team:  Campos Parra MD as PCP - General (Internal Medicine)  Campos Boyd MD as MD (Urology)  Padmini Whitnye RN as Registered Nurse  Nikhil Farah MD as MD (Neurology)  Nikhil Farah MD as Referring Physician (Neurology)  Kamlesh Bonner MD as MD (Ophthalmology)  Campos Parra MD as Assigned PCP  Daya Adams RN as Specialty Care Coordinator (Cardiology)  Virginie Kenney MD as MD (Cardiology)  JAMIE SHETTY

## 2020-01-14 NOTE — ORAL ONC MGMT
Oral Chemotherapy Monitoring Program    Placed call to patient in follow up of labs from 1/10/2020 for Ibrutinib therapy. Patient has given permission to leave detailed voicemails. There were no concerning abnormalities at this time. WBC were still very elevated, but are trending down. Instructed Loco to call us back at [phone] if he has any questions or concerns.    Olivia Brantley  Pharmacy Intern  [facility]  [phone]

## 2020-01-14 NOTE — PROGRESS NOTES
Interventional Radiology Clinic Visit    Date of this visit: 1/14/2020    Loco Wood presents for consultation for evaluation of BPH    Referring Physician: Dr. Warren        History Of Present Illness:    Loco Wood is a 74 year old male with a history of CLL, and BPH with lo lower urinary tract symptoms.  Patient has previously seen Dr. Boyd and Dr. Warren in Urology.  Was referred to discuss prostate artery embolization as a noninvasive alternative management of his BPH.  Patient describes initial onset of urinary symptoms as 20 years ago initially presenting as a weak stream.  Over the last year the patient has noted significant increase in nocturia, urgency, dribbling and stopping the starting of stream.  Additional management was with Flomax which did initially improve the patient's stream and decrease his nocturia from 3-5 times a night to 1-2 times a night currently.  Patient describes nocturia as his most bothersome lower urinary tract symptom.  Patient did experience retrograde ejaculation and decreased potency/arousal.  Patient has also attempted finasteride for approximately a month, however discontinued secondary to concerns for side effects.  Patient's most recent PSA in May 2019 measured 4.58, similar to prior PSAs since 2017.  Patient has not had a prior prostate biopsy.  No prostate MRI has been obtained.  No history of gross hematuria, however has had microscopic hematuria on previous urinalysis.  Has not had a uroflow study describes having uroflow study remotely, however no recent uroflow is available.    IPSS score of 18.  QOL of 3.     Review of Systems:    10 Point ROS is positive as noted in the HPI. Otherwise, the remainder of the ROS is negative.    Past Medical/Surgical History:    Past Medical History:   Diagnosis Date     Arthritis     hip and toes     Chronic pain      CLL (chronic lymphocytic leukaemia)      Esophageal reflux      Malignant neoplasm (H)      Leukemia     PONV (postoperative nausea and vomiting)      Pure hypercholesterolemia      Past Surgical History:   Procedure Laterality Date     ARTHROPLASTY MINIMALLY INVASIVE HIP  5/10/2013    Procedure: ARTHROPLASTY MINIMALLY INVASIVE HIP;  Left Two Incision Total Hip Arthroplasty ;  Surgeon: Desmond Alberts MD;  Location: [facility]     ARTHROPLASTY MINIMALLY INVASIVE HIP  2/27/2014    Procedure: ARTHROPLASTY MINIMALLY INVASIVE HIP;  Right Total Hip Arthroplasty Minimally Invasive Two Incision  *Latex Free Room;  Surgeon: Desmond Alberts MD;  Location: [facility] OR     BACK SURGERY      back fusion 1994     C NONSPECIFIC PROCEDURE  1964 &'95    (R) inguinal herniorrhapy     C NONSPECIFIC PROCEDURE  1949    (L) inguinal herniorrhaphy     C NONSPECIFIC PROCEDURE  1994    L4-5  L5 S1 fusion - spondylolisthesis     COLONOSCOPY      2007     COLONOSCOPY N/A 8/15/2017    Procedure: COLONOSCOPY;  colonoscopy;  Surgeon: Chun Mcguire MD;  Location:  [facility]     GI SURGERY      4 hernia repairs in childhood     HERNIA REPAIR      4 since age 4       Current Medications:    Current Outpatient Medications   Medication Sig Dispense Refill     Acetaminophen (TYLENOL PO)        atorvastatin (LIPITOR) 10 MG tablet Take 1 tablet (10 mg) by mouth every 48 hours 45 tablet 4     azithromycin (ZITHROMAX) 250 MG tablet 2 po daily x one day then 1 po daily x 4 days 6 tablet 1     Cholecalciferol (VITAMIN D3 PO) Take 4,000 Units by mouth daily        diazepam (VALIUM) 5 MG tablet Take 5 mg by mouth every 6 hours as needed for anxiety       ibrutinib (IMBRUVICA) 420 MG tablet Take 1 tablet (420 mg) by mouth daily 28 tablet 0     ibuprofen (ADVIL/MOTRIN) 200 MG tablet Take 200 mg by mouth every 4 hours as needed for mild pain       levothyroxine (SYNTHROID/LEVOTHROID) 50 MCG tablet Take 1 tablet (50 mcg) by mouth daily 90 tablet 3     levothyroxine (SYNTHROID/LEVOTHROID) 75 MCG tablet Take 1 tablet (75 mcg) by mouth daily 90 tablet 3      methylPREDNISolone (MEDROL DOSEPAK) 4 MG tablet therapy pack Follow Package Directions 21 tablet 0     omega 3 1200 MG CAPS Take 2 capsules by mouth daily       omeprazole (PRILOSEC) 10 MG DR capsule TAKE ONE CAPSULE BY MOUTH EVERY DAY 30 TO 60 MINUTES BEFORE A MEAL (Patient taking differently: TAKE ONE CAPSULE BY MOUTH EVERY OTHER DAY 30 TO 60 MINUTES BEFORE A MEAL) 90 capsule 3     pregabalin (LYRICA) 75 MG capsule TK ONE C PO  BID  3     Probiotic Product (PROBIOTIC-10 PO) Take by mouth daily       prochlorperazine (COMPAZINE) 10 MG tablet Take 1 tablet (10 mg) by mouth every 6 hours as needed (Nausea/Vomiting) 30 tablet 2     tamsulosin (FLOMAX) 0.4 MG capsule Take 1 capsule (0.4 mg) by mouth daily 90 capsule 3     tiZANidine (ZANAFLEX) 2 MG tablet Take 1-2 tablets (2-4 mg) by mouth 3 times daily 30 tablet 0     tiZANidine (ZANAFLEX) 4 MG capsule Take 1 capsule (4 mg) by mouth 3 times daily as needed for muscle spasms 30 capsule 1       Allergies:    Dilaudid [hydromorphone] and Morphine    Family History:    Family History   Problem Relation Age of Onset     Heart Disease Father      Cerebrovascular Disease Father          OF [place]      Cancer Father         melonoma     Melanoma Father      Hyperlipidemia Father      Thyroid Disease Father      Cancer Mother         Lung cancer     Other Cancer Mother         lung; heavy smoker     Cerebrovascular Disease Paternal Uncle         Aneurism     Breast Cancer Maternal Aunt          from it     Cancer Paternal Uncle      Cancer Paternal Aunt      Skin Cancer No family hx of      Glaucoma No family hx of      Macular Degeneration No family hx of        Social History:      Social History     Socioeconomic History     Marital status: Single     Spouse name: Ellen     Number of children: 0     Years of education: PHD     Highest education level: None   Occupational History     Occupation: Teacher     Employer: [employer]    Social Needs     Financial resource strain: None     Food insecurity:     Worry: None     Inability: None     Transportation needs:     Medical: None     Non-medical: None   Tobacco Use     Smoking status: Never Smoker     Smokeless tobacco: Never Used   Substance and Sexual Activity     Alcohol use: Yes     Alcohol/week: 0.0 standard drinks     Comment: moderate, social     Drug use: No     Sexual activity: Not Currently     Partners: Female   Lifestyle     Physical activity:     Days per week: None     Minutes per session: None     Stress: None   Relationships     Social connections:     Talks on phone: None     Gets together: None     Attends [employer]ec[employer]ism service: None     Active member of club or organization: None     Attends meetings of clubs or organizations: None     Relationship status: None     Intimate partner violence:     Fear of current or ex partner: None     Emotionally abused: None     Physically abused: None     Forced sexual activity: None   Other Topics Concern      Service No     Blood Transfusions No     Caffeine Concern No     Occupational Exposure No     Hobby Hazards No     Sleep Concern No     Stress Concern No     Weight Concern No     Special Diet No     Back Care Yes     Exercise Yes     Comment: Several times a week     Bike Helmet No     Seat Belt Yes     Self-Exams No     Parent/sibling w/ CABG, MI or angioplasty before 65F 55M? No   Social History Narrative    Social Documentation:7/10        Balanced Diet: YES    Calcium intake:milk and food    Caffeine: 2 cups of coffee per day    Exercise:  type of activity  Wts , stretching, cardio;  3 times per week    Sunscreen:no    Seatbelts:  Yes    Self Breast Exam:  No -     Self Testicular Exam: No    Physical/Emotional/Sexual Abuse: No     Do you feel safe in your environment? Yes        Cholesterol screen up to date: today    Eye Exam up to date: Yes    Dental Exam up to date: Yes    Pap smear up to date: Does Not Apply     Mammogram up to date: Does Not Apply    Dexa Scan up to date: Does Not Apply    Colonoscopy up to date: Yes-2007    Immunizations up to date: Yes-2008    Glucose screen if over 40:  No     Luis Alfredo Knox ma                   Physical Examination:   VITALS:   /68 (BP Location: Left arm, Patient Position: Chair, Cuff Size: Adult Large)   Pulse 78   SpO2 97%   General: No acute distress.   HEENT: Normocephalic/atraumatic.  Chest: Regular rate and rhythm. Breathing normally on room air.   Abdomen: Soft, nontender, nondistended    Laboratory Studies:    Lab Results   Component Value Date    HGB 12.5 01/10/2020     Lab Results   Component Value Date     01/10/2020     Lab Results   Component Value Date    WBC 49.7 01/10/2020       Lab Results   Component Value Date    INR 1.03 03/06/2014         Lab Results   Component Value Date    CR 1.57 01/10/2020     Lab Results   Component Value Date    BUN 21 01/10/2020       No results found for: FETO    Imaging:     I reviewed the no relevant imaging    ASSESSMENT/PLAN:      Loco Wood is a 74 year old male with a history of BPH and LUTS. Currently on Flomax. IPSS score of 18.  QOL of 3. Patient is interested in potential noninvasive management of his BPH symptoms with prostate artery embolization, the patient will contact his insurance provider regarding coverage of the procedure.  P platelets are within normal limits for procedure, no recent INR.  The patient's creatinine was elevated on 110 at 1.57.    Plan:  - We will plan to proceed with prostate artery embolization  - Preoperative CTA of the pelvis  - Patient will need to obtain Uroflow study prior to intervention  -- Given elevated creatinine of 1.57, plan for pre/post hydration  -- INR draw day of procedure  -- Patient is on Imbruvica (ibrutinib) injection for CLL - discussion with pharmacy regarding the recommendations on holding this medication - risk/benefits discussion regarding bleed risk and  risk of holding the medication. Recommend message Dr. Burns for recommendations.     A total of 45 minutes was spent in care for the patient, of which >50% was spent in counseling and co-ordination of care.     It was a pleasure seeing the patient.     Discussion of the risks and benefits of prostate artery embolization.  Prostate artery embolization i was described as a safe and effective noninvasive treatment for BPH performed on an outpatient basis under moderate sedation  We described the procedure including arterial access, either in the groin or radial artery, catheterization of the internal anterior division of the internal iliac artery with selection of the prostate arteries and microsphere embolization of the prostate gland to shrink and/or softening of the prostate to improve BPH symptoms.  We described the risks of the procedure includes treatment failure, groin site complications access site complications including hematoma, pseudoaneurysm or thrombosis.  Risks of the procedure include nontarget embolization including to the bladder, rectum and penis.  We described the post procedural period, and that likely lower urinary tract symptoms will worsen within the first several days which can include mild hematuria or hematospermia, worsening symptoms can be significant and may in rare cases require catheterization.  Typical course involves symptomatic improvement at 1 month with the greatest therapeutic effect at 3 months.  The procedure was described as successful in approximately 85% of cases for patients with appropriate anatomy. We discussed 20-25% recurrence rate at 5 years and that the procedure can be repeated if necessary. Pre-procedure was described including the acquisition of a pelvic CTA.  Described to the the patient the prostate artery embolization has a lower risk compared to surgical intervention of urinary incontinence or sexual dysfunction, and that improvement in sexual dysfunction  function has been reported.  We discussed a prostate artery embolization does not preclude the patient from future surgical management.  Typical restrictions were described to the patient including light activity for 2 to 3 days and no heavy lifting for 1 week, and no sexual activity for a 2 weeks. The patients questions were answered.     Signed,    Vijay Andujar MD  Interventional Radiology Fellow  IR pass pager: [phone]  Personal pager: [phone]      I Have seen the patient with the resident and agree with the note.    CC  Patient Care Team:  Campos Parra MD as PCP - General (Internal Medicine)  Campos Boyd MD as MD (Urology)  Padmini Whitney RN as Registered Nurse  Nikhil Farah MD as MD (Neurology)  Nikhil Farah MD as Referring Physician (Neurology)  Kamlesh Bonner MD as MD (Ophthalmology)  Campos Parra MD as Assigned PCP  Daya Adams, RN as Specialty Care Coordinator (Cardiology)  Virginie Kenney MD as MD (Cardiology)  JAMIE SHETTY

## 2020-01-14 NOTE — NURSING NOTE
Vascular Rooming Note     Loco Wood's goals for this visit include:   Chief Complaint   Patient presents with     Consult     Loco, is being seen today For a PAE consult, feeling fine, no concerns at this time, as reported by patient.     Rula Palmer LPN

## 2020-01-14 NOTE — PATIENT INSTRUCTIONS
1. CTA of pelvis   2. Uroflow       Location: [facility] waiting room, Main Level, [facility], [address], [place] [address]    Reminders:     1. Nothing to eat or drink after midnight  2. Please have a  as this will be a an outpatient procedure  3. All morning medications are o.k to take with sips of water.   4. The discussion of placing a urinary catheter will be decided at the time of procedure with Dr. Mares.      Preparing for your PAE Procedure:     1. Stop all prostate medication 1 week before the procedure.      2. 2 days before the procedure:      A. Start Omeprazole 20 mg, once daily. This is an acid suppressing medication to help with reflux.   --You will continue to take these medications after the PAE procedure, up to a total of 7 days.      B.  Ibuprofen 800 mg, twice a day. This is an anti-inflammatory that will help with swelling.  -You will continue to take these medications after the PAE procedure, up to a total of 7 days.      C. Cipro 750mg, twice a day. This is an antibiotic to prevent infection.   -You will continue to take this antibiotic after the PAE procedure, up to a total of 10 days.   We will provide you a prescription for this medication.        What to expect after your PAE:     1. You may develop a fever within the first 24 hours. This is normal. Please take over the counter medication for this such as Tylenol.      2. It may be hard to pass urine at first, in which you may have a burning sensation at first. This should go away.     -You should NOT be urinating bright red blood. If this is the case, then please call us immediately or go to the ER. (Hospital phone number is [phone], ask for the Interventional Radiologist on-call) Urine color may range from clear to pink-tinged, but not BRIGHT RED BLOOD.     - You may also notice small clots when urinating the day of or after the procedure. This can last up to 2-3 days.     3. . Please  note that symptoms can get worse prior to seeing improvement. After the procedure, there is still inflammation in which the Ibuprofen will help with. The inflammation should decrease within a couple of days      4. Please do not have intercourse or manual stimulation for at least 2 weeks after procedure.     If there are any question after hours,  you may call our IR on-call team at the hospital which is, [phone].   Should any of these symptoms worsen and you need to seek immediate help, please go to your local ER. They are more than welcome to contact our On-call team by calling the hospital number listed above.     Please call me with any other questions.      Sincerely,      Leela Farrell RN, BSN  Interventional Radiology Nurse Coordinator              Phone: [phone]

## 2020-01-15 DIAGNOSIS — C91.10 CLL (CHRONIC LYMPHOCYTIC LEUKEMIA) (H): ICD-10-CM

## 2020-01-15 NOTE — ORAL ONC MGMT
Oral Chemotherapy Monitoring Program    Re-Released Imbruvica to [facility] as insurance had changed and [place] can now fill med.    Kacy Devlin, Pharm.D., [facility]  [phone]  01/15/20

## 2020-01-16 ENCOUNTER — TELEPHONE (OUTPATIENT)
Dept: VASCULAR SURGERY | Facility: CLINIC | Age: 75
End: 2020-01-16

## 2020-01-16 NOTE — TELEPHONE ENCOUNTER
Called pt to f/u on his clinic visit regarding PAE.     I informed him that I believe there's been one patient who we did bill out to Medicare however once again with Medicare there is  No Prior Authorization allowed and it will be reviewed to see if it is medically necessary.     I did provide him with the two cpt codes :   51339  10903  In which he will call and research himself.     We will call us when he Is ready to proceed.     Leela JOHNSON RN, BSN  Interventional Radiology Nurse Coordinator   Phone: [phone]

## 2020-01-20 ENCOUNTER — PATIENT OUTREACH (OUTPATIENT)
Dept: ONCOLOGY | Facility: CLINIC | Age: 75
End: 2020-01-20

## 2020-01-29 ENCOUNTER — TELEPHONE (OUTPATIENT)
Dept: VASCULAR SURGERY | Facility: CLINIC | Age: 75
End: 2020-01-29

## 2020-01-29 NOTE — TELEPHONE ENCOUNTER
Insurance questions.     He was told by [employer] that he needed to have a Prior Auth prior to treatment.     I've asked for him to call and indicate what fax number this information needs to be sent to as we will be more than happy to send this information to them.     I did inform him that once again, with it being a Medicare plan, there's no prior authorization needed PRIOR to treatment.     He states that he will do so and then let me know.     I also did inform him that I spoke with someone earlier today as well     I did provide call reference number as well.   I did ask him to let the call center people know this reference number in which they can double check purpose of CPT code call.     He states that he will.     Leela JOHNSON RN, BSN  Interventional Radiology Nurse Coordinator   Phone: [phone]

## 2020-01-30 DIAGNOSIS — N40.1 BENIGN PROSTATIC HYPERPLASIA WITH LOWER URINARY TRACT SYMPTOMS, SYMPTOM DETAILS UNSPECIFIED: Primary | ICD-10-CM

## 2020-02-04 ENCOUNTER — OFFICE VISIT (OUTPATIENT)
Dept: ORTHOPEDICS | Facility: CLINIC | Age: 75
End: 2020-02-04
Payer: COMMERCIAL

## 2020-02-04 VITALS — RESPIRATION RATE: 16 BRPM | HEIGHT: 67 IN | BODY MASS INDEX: 26.06 KG/M2 | WEIGHT: 166 LBS

## 2020-02-04 DIAGNOSIS — M75.41 IMPINGEMENT SYNDROME OF SHOULDER REGION, RIGHT: Primary | ICD-10-CM

## 2020-02-04 DIAGNOSIS — G89.29 CHRONIC PAIN IN RIGHT SHOULDER: ICD-10-CM

## 2020-02-04 DIAGNOSIS — M25.511 CHRONIC PAIN IN RIGHT SHOULDER: ICD-10-CM

## 2020-02-04 RX ADMIN — LIDOCAINE HYDROCHLORIDE 4 ML: 10 INJECTION, SOLUTION EPIDURAL; INFILTRATION; INTRACAUDAL; PERINEURAL at 17:30

## 2020-02-04 RX ADMIN — TRIAMCINOLONE ACETONIDE 40 MG: 40 INJECTION, SUSPENSION INTRA-ARTICULAR; INTRAMUSCULAR at 17:30

## 2020-02-04 ASSESSMENT — MIFFLIN-ST. JEOR: SCORE: 1461.6

## 2020-02-04 NOTE — LETTER
2/4/2020       RE: Loco Wood  [address] [facility] [address] [facility] [address]     Dear Colleague,    Thank you for referring your patient, Loco Wood, to the [facility] SPORTS AND ORTHOPAEDIC WALK IN CLINIC at [facility]. Please see a copy of my visit note below.    CHIEF COMPLAINT:  Pain of the Right Shoulder       HISTORY OF PRESENT ILLNESS  Mr. Wood is a pleasant 74 year old year old male who presents to clinic today with acute on chronic right shoulder pain.  Loco explains that he is a very active gentleman and has increasing right shoulder pain with overhead exercising with dumbbells.  Has been intermittent but worsening over the last several months.  No trauma or inciting event.  History of right shoulder pain and diagnosis of shoulder impingement syndrome in 2014.  Treatment at that time consisted of physical therapy which helped.     Intermittent pain has persisted but now worse.  Aching pain. Anterolateral shoulder.  Has a golf trip planned and worried that this will affect his ability to play. Treatment has included strengthening and rest.      Additional history: as documented    MEDICAL HISTORY  Patient Active Problem List   Diagnosis     Esophageal reflux     Lumbago     CLL (chronic lymphocytic leukemia) (H)     HYPERLIPIDEMIA LDL GOAL <130     Mass of skin     Health Care Home     Vitamin D deficiency     Osteoarthritis of hip     OA (osteoarthritis) of hip     Insomnia     BPH (benign prostatic hyperplasia)     Acute bilateral low back pain without sciatica     Prostate nodule     Chondromalacia, knee, right     Complex tear of medial meniscus of right knee as current injury     Herpes zoster with keratoconjunctivitis, od     Post herpetic neuralgia     Aftercare following surgery of the musculoskeletal system       Current Outpatient Medications   Medication Sig Dispense Refill     Acetaminophen (TYLENOL PO)        atorvastatin (LIPITOR) 10 MG  tablet Take 1 tablet (10 mg) by mouth every 48 hours 45 tablet 4     azithromycin (ZITHROMAX) 250 MG tablet 2 po daily x one day then 1 po daily x 4 days 6 tablet 1     Cholecalciferol (VITAMIN D3 PO) Take 4,000 Units by mouth daily        diazepam (VALIUM) 5 MG tablet Take 5 mg by mouth every 6 hours as needed for anxiety       ibrutinib (IMBRUVICA) 420 MG tablet Take 1 tablet (420 mg) by mouth daily 28 tablet 0     ibuprofen (ADVIL/MOTRIN) 200 MG tablet Take 200 mg by mouth every 4 hours as needed for mild pain       levothyroxine (SYNTHROID/LEVOTHROID) 50 MCG tablet Take 1 tablet (50 mcg) by mouth daily 90 tablet 3     levothyroxine (SYNTHROID/LEVOTHROID) 75 MCG tablet Take 1 tablet (75 mcg) by mouth daily 90 tablet 3     methylPREDNISolone (MEDROL DOSEPAK) 4 MG tablet therapy pack Follow Package Directions 21 tablet 0     omega 3 1200 MG CAPS Take 2 capsules by mouth daily       omeprazole (PRILOSEC) 10 MG DR capsule TAKE ONE CAPSULE BY MOUTH EVERY DAY 30 TO 60 MINUTES BEFORE A MEAL (Patient taking differently: TAKE ONE CAPSULE BY MOUTH EVERY OTHER DAY 30 TO 60 MINUTES BEFORE A MEAL) 90 capsule 3     pregabalin (LYRICA) 75 MG capsule TK ONE C PO  BID  3     Probiotic Product (PROBIOTIC-10 PO) Take by mouth daily       prochlorperazine (COMPAZINE) 10 MG tablet Take 1 tablet (10 mg) by mouth every 6 hours as needed (Nausea/Vomiting) 30 tablet 2     tamsulosin (FLOMAX) 0.4 MG capsule Take 1 capsule (0.4 mg) by mouth daily 90 capsule 3     tiZANidine (ZANAFLEX) 2 MG tablet Take 1-2 tablets (2-4 mg) by mouth 3 times daily 30 tablet 0     tiZANidine (ZANAFLEX) 4 MG capsule Take 1 capsule (4 mg) by mouth 3 times daily as needed for muscle spasms 30 capsule 1       Allergies   Allergen Reactions     Dilaudid [Hydromorphone] Itching     Morphine Itching       Family History   Problem Relation Age of Onset     Heart Disease Father      Cerebrovascular Disease Father          OF STROKE      Cancer Father          "melonoma     Melanoma Father      Hyperlipidemia Father      Thyroid Disease Father      Cancer Mother         Lung cancer     Other Cancer Mother         lung; heavy smoker     Cerebrovascular Disease Paternal Uncle         Aneurism     Breast Cancer Maternal Aunt          from it     Cancer Paternal Uncle      Cancer Paternal Aunt      Skin Cancer No family hx of      Glaucoma No family hx of      Macular Degeneration No family hx of        Additional medical/Social/Surgical histories reviewed in [facility] and updated as appropriate.     REVIEW OF SYSTEMS (2020)  A 10-point review of systems was obtained and is negative except for as noted in the HPI.      PHYSICAL EXAM  Resp 16   Ht 1.702 m (5' 7\")   Wt 75.3 kg (166 lb)   BMI 26.00 kg/m       General  - normal appearance, in no obvious distress  CV  - normal radial pulse  Pulm  - normal respiratory pattern, non-labored  Musculoskeletal -Right shoulder  - inspection: normal bone and joint alignment, no obvious deformity, no scapular winging, no AC step-off  - palpation: mildly tender RC insertion, normal clavicle, non-tender AC  - ROM:  Painful at end range of FE and abduction. Overall full ROM  - strength: 5/5  strength, 5/5 in all shoulder planes  - special tests:  (-) Speed's  (+) Neer  (+) Hawkin's  (+) Iveth's  (-) [place]'s  (-) apprehension  (-) subscap lift-off  Neuro  - no sensory or motor deficit, grossly normal coordination, normal muscle tone  Skin  - no ecchymosis, erythema, warmth, or induration, no obvious rash  Psych  - interactive, appropriate, normal mood and affect    IMAGING : Deferred     ASSESSMENT & PLAN  Mr. Wood is a 74 year old year old male who presents to clinic today with acute on chronic right shoulder pain consistent with impingement syndrome.    Diagnosis: Impingement syndrome of right shoulder.    - Treatment options reviewed, he would like to proceed with CSI  -HEP provided for cuff  -Ice therapy  -Analgesics " OTC  -Consider formal PT in the future  -Follow up 4-6 weeks if no improvement    Subacromial Bursa - Ultrasound Guided  The patient was informed of the risks and the benefits of the procedure and a written consent was signed.  The patient s right shoulder was prepped with chlorhexidine in sterile fashion.   40 mg of triamcinolone suspension was drawn up into a 5 mL syringe with 4 mL of 1% lidocaine.  Injection was performed using sterile technique.  Under ultrasound guidance a 1.5-inch 25-gauge needle was used to enter the right subacromial bursa.  Anterolateral approach was used with arm held in Crass position.  Needle placement was visualized and documented with ultrasound.  Ultrasound visualization was necessary to ensure placement in to the bursa and not the rotator cuff tendon which could potentially cause further tendon damage.  Injection performed long axis to the probe.  Injection solution visualized within the joint space.  Images were permanently stored for the patient's record.  There were no complications. The patient tolerated the procedure well. There was negligible bleeding.   The patient was instructed to ice the shoulder upon leaving clinic and refrain from overuse over the next 3 days.   The patient was instructed to call or go to the emergency room with any unusual pain, swelling, redness, or if otherwise concerned.  It was a pleasure seeing Loco today.    Arnaud Melton DO, GMM  Primary Care Sports Medicine            SPORTS & ORTHOPEDIC WALK-IN VISIT 2/4/2020    Primary Care Physician: Dr. Parra    He has had shoulder pain for several years. Previously diagnosed with rotator cuff tear. In the last few months he noticed an increase in shoulder pain. He is leaving for an intensive golf trip and wants it taken care of prior to then. He has done PT for he shoulder in the past. The majority of his pain is in the anterior shoulder.     Reason for visit:     What part of your body is injured /  painful?  right shoulder    What caused the injury /pain? Unsure    How long ago did your injury occur or pain begin? several years ago    What are your most bothersome symptoms? Pain    How would you characterize your symptom?  sharp    What makes your symptoms better? Nothing    What makes your symptoms worse? Overhead motion with weight is the worse    Have you been previously seen for this problem? No    Medical History:    Any recent changes to your medical history? No    Any new medication prescribed since last visit? No    Have you had surgery on this body part before? No    Social History:    Occupation: Retired    Handedness: Right    Exercise: Golf    Review of Systems:    Do you have fever, chills, weight loss? No    Do you have any vision problems? No    Do you have any chest pain or edema? No    Do you have any shortness of breath or wheezing?  No    Do you have stomach problems? No    Do you have any numbness or focal weakness? No    Do you have diabetes? No    Do you have problems with bleeding or clotting? No    Do you have an rashes or other skin lesions? No       Large Joint Injection: R subacromial bursa  Date/Time: 2/4/2020 5:30 PM  Performed by: Arnaud Melton DO  Authorized by: Arnaud Melton DO     Indications:  Pain  Needle Size:  22 G  Guidance: ultrasound    Approach:  Posterior  Location:  Shoulder      Site:  R subacromial bursa  Medications:  40 mg triamcinolone 40 MG/ML; 4 mL lidocaine (PF) 1 %  Outcome:  Tolerated well, no immediate complications  Procedure discussed: discussed risks, benefits, and alternatives    Consent Given by:  Patient  Timeout: timeout called immediately prior to procedure    Prep: patient was prepped and draped in usual sterile fashion            Again, thank you for allowing me to participate in the care of your patient.      Sincerely,    Arnaud Melton DO

## 2020-02-04 NOTE — PROGRESS NOTES
SPORTS & ORTHOPEDIC WALK-IN VISIT 2/4/2020    Primary Care Physician: Dr. Parra    He has had shoulder pain for several years. Previously diagnosed with rotator cuff tear. In the last few months he noticed an increase in shoulder pain. He is leaving for an intensive golf trip and wants it taken care of prior to then. He has done PT for he shoulder in the past. The majority of his pain is in the anterior shoulder.     Reason for visit:     What part of your body is injured / painful?  right shoulder    What caused the injury /pain? Unsure    How long ago did your injury occur or pain begin? several years ago    What are your most bothersome symptoms? Pain    How would you characterize your symptom?  sharp    What makes your symptoms better? Nothing    What makes your symptoms worse? Overhead motion with weight is the worse    Have you been previously seen for this problem? No    Medical History:    Any recent changes to your medical history? No    Any new medication prescribed since last visit? No    Have you had surgery on this body part before? No    Social History:    Occupation: Retired    Handedness: Right    Exercise: Golf    Review of Systems:    Do you have fever, chills, weight loss? No    Do you have any vision problems? No    Do you have any chest pain or edema? No    Do you have any shortness of breath or wheezing?  No    Do you have stomach problems? No    Do you have any numbness or focal weakness? No    Do you have diabetes? No    Do you have problems with bleeding or clotting? No    Do you have an rashes or other skin lesions? No       Large Joint Injection: R subacromial bursa  Date/Time: 2/4/2020 5:30 PM  Performed by: Arnaud Melton DO  Authorized by: Arnaud Melton DO     Indications:  Pain  Needle Size:  22 G  Guidance: ultrasound    Approach:  Posterior  Location:  Shoulder      Site:  R subacromial bursa  Medications:  40 mg triamcinolone 40 MG/ML; 4 mL lidocaine (PF) 1 %  Outcome:   Tolerated well, no immediate complications  Procedure discussed: discussed risks, benefits, and alternatives    Consent Given by:  Patient  Timeout: timeout called immediately prior to procedure    Prep: patient was prepped and draped in usual sterile fashion

## 2020-02-04 NOTE — PROGRESS NOTES
CHIEF COMPLAINT:  Pain of the Right Shoulder       HISTORY OF PRESENT ILLNESS  Mr. Wood is a pleasant 74 year old year old male who presents to clinic today with acute on chronic right shoulder pain.  Loco explains that he is a very active gentleman and has increasing right shoulder pain with overhead exercising with dumbbells.  Has been intermittent but worsening over the last several months.  No trauma or inciting event.  History of right shoulder pain and diagnosis of shoulder impingement syndrome in 2014.  Treatment at that time consisted of physical therapy which helped.     Intermittent pain has persisted but now worse.  Aching pain. Anterolateral shoulder.  Has a golf trip planned and worried that this will affect his ability to play. Treatment has included strengthening and rest.      Additional history: as documented    MEDICAL HISTORY  Patient Active Problem List   Diagnosis     Esophageal reflux     Lumbago     CLL (chronic lymphocytic leukemia) (H)     HYPERLIPIDEMIA LDL GOAL <130     Mass of skin     Health Care Home     Vitamin D deficiency     Osteoarthritis of hip     OA (osteoarthritis) of hip     Insomnia     BPH (benign prostatic hyperplasia)     Acute bilateral low back pain without sciatica     Prostate nodule     Chondromalacia, knee, right     Complex tear of medial meniscus of right knee as current injury     Herpes zoster with keratoconjunctivitis, od     Post herpetic neuralgia     Aftercare following surgery of the musculoskeletal system       Current Outpatient Medications   Medication Sig Dispense Refill     Acetaminophen (TYLENOL PO)        atorvastatin (LIPITOR) 10 MG tablet Take 1 tablet (10 mg) by mouth every 48 hours 45 tablet 4     azithromycin (ZITHROMAX) 250 MG tablet 2 po daily x one day then 1 po daily x 4 days 6 tablet 1     Cholecalciferol (VITAMIN D3 PO) Take 4,000 Units by mouth daily        diazepam (VALIUM) 5 MG tablet Take 5 mg by mouth every 6 hours as needed for  anxiety       ibrutinib (IMBRUVICA) 420 MG tablet Take 1 tablet (420 mg) by mouth daily 28 tablet 0     ibuprofen (ADVIL/MOTRIN) 200 MG tablet Take 200 mg by mouth every 4 hours as needed for mild pain       levothyroxine (SYNTHROID/LEVOTHROID) 50 MCG tablet Take 1 tablet (50 mcg) by mouth daily 90 tablet 3     levothyroxine (SYNTHROID/LEVOTHROID) 75 MCG tablet Take 1 tablet (75 mcg) by mouth daily 90 tablet 3     methylPREDNISolone (MEDROL DOSEPAK) 4 MG tablet therapy pack Follow Package Directions 21 tablet 0     omega 3 1200 MG CAPS Take 2 capsules by mouth daily       omeprazole (PRILOSEC) 10 MG DR capsule TAKE ONE CAPSULE BY MOUTH EVERY DAY 30 TO 60 MINUTES BEFORE A MEAL (Patient taking differently: TAKE ONE CAPSULE BY MOUTH EVERY OTHER DAY 30 TO 60 MINUTES BEFORE A MEAL) 90 capsule 3     pregabalin (LYRICA) 75 MG capsule TK ONE C PO  BID  3     Probiotic Product (PROBIOTIC-10 PO) Take by mouth daily       prochlorperazine (COMPAZINE) 10 MG tablet Take 1 tablet (10 mg) by mouth every 6 hours as needed (Nausea/Vomiting) 30 tablet 2     tamsulosin (FLOMAX) 0.4 MG capsule Take 1 capsule (0.4 mg) by mouth daily 90 capsule 3     tiZANidine (ZANAFLEX) 2 MG tablet Take 1-2 tablets (2-4 mg) by mouth 3 times daily 30 tablet 0     tiZANidine (ZANAFLEX) 4 MG capsule Take 1 capsule (4 mg) by mouth 3 times daily as needed for muscle spasms 30 capsule 1       Allergies   Allergen Reactions     Dilaudid [Hydromorphone] Itching     Morphine Itching       Family History   Problem Relation Age of Onset     Heart Disease Father      Cerebrovascular Disease Father          OF STROKE      Cancer Father         melonoma     Melanoma Father      Hyperlipidemia Father      Thyroid Disease Father      Cancer Mother         Lung cancer     Other Cancer Mother         lung; heavy smoker     Cerebrovascular Disease Paternal Uncle         Aneurism     Breast Cancer Maternal Aunt          from it     Cancer Paternal Uncle       "Cancer Paternal Aunt      Skin Cancer No family hx of      Glaucoma No family hx of      Macular Degeneration No family hx of        Additional medical/Social/Surgical histories reviewed in [facility] and updated as appropriate.     REVIEW OF SYSTEMS (2/4/2020)  A 10-point review of systems was obtained and is negative except for as noted in the HPI.      PHYSICAL EXAM  Resp 16   Ht 1.702 m (5' 7\")   Wt 75.3 kg (166 lb)   BMI 26.00 kg/m      General  - normal appearance, in no obvious distress  CV  - normal radial pulse  Pulm  - normal respiratory pattern, non-labored  Musculoskeletal -Right shoulder  - inspection: normal bone and joint alignment, no obvious deformity, no scapular winging, no AC step-off  - palpation: mildly tender RC insertion, normal clavicle, non-tender AC  - ROM:  Painful at end range of FE and abduction. Overall full ROM  - strength: 5/5  strength, 5/5 in all shoulder planes  - special tests:  (-) Speed's  (+) Neer  (+) Hawkin's  (+) Iveth's  (-) [place]'s  (-) apprehension  (-) subscap lift-off  Neuro  - no sensory or motor deficit, grossly normal coordination, normal muscle tone  Skin  - no ecchymosis, erythema, warmth, or induration, no obvious rash  Psych  - interactive, appropriate, normal mood and affect    IMAGING : Deferred     ASSESSMENT & PLAN  Mr. Wood is a 74 year old year old male who presents to clinic today with acute on chronic right shoulder pain consistent with impingement syndrome.    Diagnosis: Impingement syndrome of right shoulder.    - Treatment options reviewed, he would like to proceed with CSI  -HEP provided for cuff  -Ice therapy  -Analgesics OTC  -Consider formal PT in the future  -Follow up 4-6 weeks if no improvement    Subacromial Bursa - Ultrasound Guided  The patient was informed of the risks and the benefits of the procedure and a written consent was signed.  The patient s right shoulder was prepped with chlorhexidine in sterile fashion.   40 mg of " triamcinolone suspension was drawn up into a 5 mL syringe with 4 mL of 1% lidocaine.  Injection was performed using sterile technique.  Under ultrasound guidance a 1.5-inch 25-gauge needle was used to enter the right subacromial bursa.  Anterolateral approach was used with arm held in Crass position.  Needle placement was visualized and documented with ultrasound.  Ultrasound visualization was necessary to ensure placement in to the bursa and not the rotator cuff tendon which could potentially cause further tendon damage.  Injection performed long axis to the probe.  Injection solution visualized within the joint space.  Images were permanently stored for the patient's record.  There were no complications. The patient tolerated the procedure well. There was negligible bleeding.   The patient was instructed to ice the shoulder upon leaving clinic and refrain from overuse over the next 3 days.   The patient was instructed to call or go to the emergency room with any unusual pain, swelling, redness, or if otherwise concerned.  It was a pleasure seeing Loco today.    Arnaud Melton DO, CAM  Primary Care Sports Medicine

## 2020-02-04 NOTE — NURSING NOTE
[address] [address]  Dept: [phone]  ______________________________________________________________________________    Patient: Loco Wood   : 1945   MRN: [id number]   2020    INVASIVE PROCEDURE SAFETY CHECKLIST    Date: 2020  Procedure: Right subacromial kenalog injection  Patient Name: Loco Wood  MRN: [id number]  YOB: 1945    Action: Complete sections as appropriate. Any discrepancy results in a HARD COPY until resolved.     PRE PROCEDURE:  Patient ID verified with 2 identifiers (name and  or MRN): Yes  Procedure and site verified with patient/designee (when able): Yes  Accurate consent documentation in medical record: Yes  H&P (or appropriate assessment) documented in medical record: Yes  H&P must be up to 20 days prior to procedure and updates within 24 hours of procedure as applicable: NA  Relevant diagnostic and radiology test results appropriately labeled and displayed as applicable: Yes  Procedure site(s) marked with provider initials: NA    TIMEOUT:  Time-Out performed immediately prior to starting procedure, including verbal and active participation of all team members addressing the following:Yes  * Correct patient identify  * Confirmed that the correct side and site are marked  * An accurate procedure consent form  * Agreement on the procedure to be done  * Correct patient position  * Relevant images and results are properly labeled and appropriately displayed  * The need to administer antibiotics or fluids for irrigation purposes during the procedure as applicable   * Safety precautions based on patient history or medication use    DURING PROCEDURE: Verification of correct person, site, and procedures any time the responsibility for care of the patient is transferred to another member of the care team.     The following medication was given:     MEDICATION:  Kenalog 40  mg  ROUTE: intra-articular  SITE: right Shoulder  DOSE: 1mL  LOT #: [id number]  : [employer]  EXPIRATION DATE: 10/2021  NDC#: [address]-6   Was there drug waste? No    MEDICATION:  Lidocaine without epinephrine  ROUTE: intra-articular  SITE: right Shoulder  DOSE: 4mL  LOT #: [id number]  : [employer]  EXPIRATION DATE: 10/2023  NDC#: 24693-113-50   Was there drug waste? Yes  Amount of drug waste (mL): 1.  Reason for waste:  As per MD    Prior to injection, verified patient identity using patient's name and date of birth.  Due to injection administration, patient instructed to remain in clinic for 15 minutes  afterwards, and to report any adverse reaction to me immediately.    Carmenza Hickey, ATC  February 4, 2020

## 2020-02-05 ENCOUNTER — OFFICE VISIT (OUTPATIENT)
Dept: FAMILY MEDICINE | Facility: CLINIC | Age: 75
End: 2020-02-05
Payer: COMMERCIAL

## 2020-02-05 ENCOUNTER — TELEPHONE (OUTPATIENT)
Dept: ONCOLOGY | Facility: CLINIC | Age: 75
End: 2020-02-05

## 2020-02-05 VITALS
OXYGEN SATURATION: 96 % | DIASTOLIC BLOOD PRESSURE: 79 MMHG | BODY MASS INDEX: 26.73 KG/M2 | HEART RATE: 72 BPM | SYSTOLIC BLOOD PRESSURE: 133 MMHG | HEIGHT: 67 IN | WEIGHT: 170.3 LBS

## 2020-02-05 DIAGNOSIS — I48.0 PAROXYSMAL ATRIAL FIBRILLATION (H): ICD-10-CM

## 2020-02-05 DIAGNOSIS — N40.1 BENIGN PROSTATIC HYPERPLASIA WITH LOWER URINARY TRACT SYMPTOMS, SYMPTOM DETAILS UNSPECIFIED: ICD-10-CM

## 2020-02-05 DIAGNOSIS — E55.9 VITAMIN D DEFICIENCY: ICD-10-CM

## 2020-02-05 DIAGNOSIS — C91.10 CLL (CHRONIC LYMPHOCYTIC LEUKEMIA) (H): ICD-10-CM

## 2020-02-05 DIAGNOSIS — N18.30 CHRONIC KIDNEY DISEASE, STAGE 3 (MODERATE): ICD-10-CM

## 2020-02-05 DIAGNOSIS — R22.9 MASS OF SKIN: ICD-10-CM

## 2020-02-05 DIAGNOSIS — B02.29 POST HERPETIC NEURALGIA: Primary | ICD-10-CM

## 2020-02-05 LAB
ALBUMIN SERPL-MCNC: 4.1 G/DL (ref 3.4–5)
ALP SERPL-CCNC: 74 U/L (ref 40–150)
ALT SERPL W P-5'-P-CCNC: 23 U/L (ref 0–70)
ANION GAP SERPL CALCULATED.3IONS-SCNC: 6 MMOL/L (ref 3–14)
AST SERPL W P-5'-P-CCNC: 17 U/L (ref 0–45)
BILIRUB SERPL-MCNC: 1 MG/DL (ref 0.2–1.3)
BUN SERPL-MCNC: 20 MG/DL (ref 7–30)
CALCIUM SERPL-MCNC: 9.1 MG/DL (ref 8.5–10.1)
CHLORIDE SERPL-SCNC: 109 MMOL/L (ref 94–109)
CO2 SERPL-SCNC: 25 MMOL/L (ref 20–32)
CREAT SERPL-MCNC: 1.27 MG/DL (ref 0.66–1.25)
DIFFERENTIAL METHOD BLD: ABNORMAL
ERYTHROCYTE [DISTWIDTH] IN BLOOD BY AUTOMATED COUNT: 14.1 % (ref 10–15)
GFR SERPL CREATININE-BSD FRML MDRD: 56 ML/MIN/{1.73_M2}
GLUCOSE SERPL-MCNC: 107 MG/DL (ref 70–99)
HCT VFR BLD AUTO: 42.2 % (ref 40–53)
HGB BLD-MCNC: 13 G/DL (ref 13.3–17.7)
LYMPHOCYTES # BLD AUTO: 55.4 10E9/L (ref 0.8–5.3)
LYMPHOCYTES NFR BLD AUTO: 88 %
MCH RBC QN AUTO: 29.3 PG (ref 26.5–33)
MCHC RBC AUTO-ENTMCNC: 30.8 G/DL (ref 31.5–36.5)
MCV RBC AUTO: 95 FL (ref 78–100)
NEUTROPHILS # BLD AUTO: 7.6 10E9/L (ref 1.6–8.3)
NEUTROPHILS NFR BLD AUTO: 12 %
PLATELET # BLD AUTO: 188 10E9/L (ref 150–450)
PLATELET # BLD EST: ABNORMAL 10*3/UL
POTASSIUM SERPL-SCNC: 3.9 MMOL/L (ref 3.4–5.3)
PROT SERPL-MCNC: 6.9 G/DL (ref 6.8–8.8)
PSA SERPL-MCNC: 6.92 UG/L (ref 0–4)
RBC # BLD AUTO: 4.43 10E12/L (ref 4.4–5.9)
SMUDGE CELLS BLD QL SMEAR: PRESENT
SODIUM SERPL-SCNC: 140 MMOL/L (ref 133–144)
WBC # BLD AUTO: 63 10E9/L (ref 4–11)

## 2020-02-05 PROCEDURE — 84153 ASSAY OF PSA TOTAL: CPT | Performed by: RADIOLOGY

## 2020-02-05 PROCEDURE — 85025 COMPLETE CBC W/AUTO DIFF WBC: CPT | Performed by: INTERNAL MEDICINE

## 2020-02-05 PROCEDURE — 36415 COLL VENOUS BLD VENIPUNCTURE: CPT | Performed by: INTERNAL MEDICINE

## 2020-02-05 PROCEDURE — 80053 COMPREHEN METABOLIC PANEL: CPT | Performed by: INTERNAL MEDICINE

## 2020-02-05 PROCEDURE — 99214 OFFICE O/P EST MOD 30 MIN: CPT | Performed by: INTERNAL MEDICINE

## 2020-02-05 ASSESSMENT — MIFFLIN-ST. JEOR: SCORE: 1481.11

## 2020-02-05 NOTE — PROGRESS NOTES
"Subjective     Loco Wood is a 74 year old male who presents to clinic today for the following health issues:    HPI   Skin condition  Calf; left leg gave out on him  Gaining weight? Med related?  Both hands will \"freeze\" or not be able to move fingers for a few minutes  Kidney symptoms  Gained 16 pounds               Patient Active Problem List   Diagnosis     Esophageal reflux     Lumbago     CLL (chronic lymphocytic leukemia) (H)     HYPERLIPIDEMIA LDL GOAL <130     Mass of skin     Health Care Home     Vitamin D deficiency     Osteoarthritis of hip     OA (osteoarthritis) of hip     Insomnia     BPH (benign prostatic hyperplasia)     Acute bilateral low back pain without sciatica     Prostate nodule     Chondromalacia, knee, right     Complex tear of medial meniscus of right knee as current injury     Herpes zoster with keratoconjunctivitis, od     Post herpetic neuralgia     Aftercare following surgery of the musculoskeletal system     Paroxysmal atrial fibrillation (H)     Chronic kidney disease, stage 3 (moderate) (H)     Past Surgical History:   Procedure Laterality Date     ARTHROPLASTY MINIMALLY INVASIVE HIP  5/10/2013    Procedure: ARTHROPLASTY MINIMALLY INVASIVE HIP;  Left Two Incision Total Hip Arthroplasty ;  Surgeon: Desmond Alberts MD;  [facility]     ARTHROPLASTY MINIMALLY INVASIVE HIP  2/27/2014    Procedure: ARTHROPLASTY MINIMALLY INVASIVE HIP;  Right Total Hip Arthroplasty Minimally Invasive Two Incision  *Latex Free Room;  Surgeon: Desmond Alberts MD;  [facility]     BACK SURGERY      back fusion 1994     C NONSPECIFIC PROCEDURE  1964 &'95    (R) inguinal herniorrhapy     C NONSPECIFIC PROCEDURE  1949    (L) inguinal herniorrhaphy     C NONSPECIFIC PROCEDURE  1994    L4-5  L5 S1 fusion - spondylolisthesis     COLONOSCOPY      2007     COLONOSCOPY N/A 8/15/2017    Procedure: COLONOSCOPY;  colonoscopy;  Surgeon: Chun Mcguire MD;  Location:  GI     GI SURGERY      4 " hernia repairs in childhood     HERNIA REPAIR      4 since age 4       Social History     Tobacco Use     Smoking status: Never Smoker     Smokeless tobacco: Never Used   Substance Use Topics     Alcohol use: Yes     Alcohol/week: 0.0 standard drinks     Comment: moderate, social     Family History   Problem Relation Age of Onset     Heart Disease Father      Cerebrovascular Disease Father          OF STROKE      Cancer Father         melonoma     Melanoma Father      Hyperlipidemia Father      Thyroid Disease Father      Cancer Mother         Lung cancer     Other Cancer Mother         lung; heavy smoker     Cerebrovascular Disease Paternal Uncle         Aneurism     Breast Cancer Maternal Aunt          from it     Cancer Paternal Uncle      Cancer Paternal Aunt      Skin Cancer No family hx of      Glaucoma No family hx of      Macular Degeneration No family hx of          Current Outpatient Medications   Medication Sig Dispense Refill     Acetaminophen (TYLENOL PO)        atorvastatin (LIPITOR) 10 MG tablet Take 1 tablet (10 mg) by mouth every 48 hours 45 tablet 4     ibrutinib (IMBRUVICA) 420 MG tablet Take 1 tablet (420 mg) by mouth daily 28 tablet 0     ibuprofen (ADVIL/MOTRIN) 200 MG tablet Take 200 mg by mouth every 4 hours as needed for mild pain       levothyroxine (SYNTHROID/LEVOTHROID) 50 MCG tablet Take 1 tablet (50 mcg) by mouth daily 90 tablet 3     omega 3 1200 MG CAPS Take 2 capsules by mouth daily       omeprazole (PRILOSEC) 10 MG DR capsule TAKE ONE CAPSULE BY MOUTH EVERY DAY 30 TO 60 MINUTES BEFORE A MEAL (Patient taking differently: TAKE ONE CAPSULE BY MOUTH EVERY OTHER DAY 30 TO 60 MINUTES BEFORE A MEAL) 90 capsule 3     tamsulosin (FLOMAX) 0.4 MG capsule Take 1 capsule (0.4 mg) by mouth daily 90 capsule 3     tiZANidine (ZANAFLEX) 2 MG tablet Take 1-2 tablets (2-4 mg) by mouth 3 times daily 30 tablet 0     tiZANidine (ZANAFLEX) 4 MG capsule Take 1 capsule (4 mg) by mouth 3 times  "daily as needed for muscle spasms 30 capsule 1     Cholecalciferol (VITAMIN D3 PO) Take 4,000 Units by mouth daily        diazepam (VALIUM) 5 MG tablet Take 5 mg by mouth every 6 hours as needed for anxiety       levothyroxine (SYNTHROID/LEVOTHROID) 75 MCG tablet Take 1 tablet (75 mcg) by mouth daily (Patient not taking: Reported on 2/5/2020) 90 tablet 3     Probiotic Product (PROBIOTIC-10 PO) Take by mouth daily       prochlorperazine (COMPAZINE) 10 MG tablet Take 1 tablet (10 mg) by mouth every 6 hours as needed (Nausea/Vomiting) (Patient not taking: Reported on 2/5/2020) 30 tablet 2     Allergies   Allergen Reactions     Dilaudid [Hydromorphone] Itching     Morphine Itching     Recent Labs   Lab Test 01/10/20  0825 12/10/19  0845 11/08/19  0853  03/22/18  1151  10/25/16  1543 04/11/16  1136   LDL  --   --   --   --  107*  --  72 101*   HDL  --   --   --   --  36*  --  35* 40   TRIG  --   --   --   --  144  --  263* 123   ALT 19 23 19   < >  --    < > 26 22   CR 1.57* 1.44* 1.55*   < > 1.51*   < > 1.35* 1.23   GFRESTIMATED 43* 48* 44*   < > 46*   < > 52* 58*   GFRESTBLACK 50* 56* 51*   < > 55*   < > 63 71   POTASSIUM 4.1 4.3 4.0   < > 4.5   < > 4.4 4.0   TSH 7.10* 12.30*  --    < >  --   --   --   --     < > = values in this interval not displayed.      BP Readings from Last 3 Encounters:   02/05/20 133/79   01/14/20 130/68   01/07/20 126/79    Wt Readings from Last 3 Encounters:   02/05/20 77.2 kg (170 lb 4.8 oz)   02/04/20 75.3 kg (166 lb)   01/07/20 75.3 kg (166 lb)                      Reviewed and updated as needed this visit by Provider         Review of Systems   ROS COMP: Constitutional, HEENT, cardiovascular, pulmonary, gi and gu systems are negative, except as otherwise noted.      Objective    /79 (BP Location: Right arm, Patient Position: Chair, Cuff Size: Adult Large)   Pulse 72   Ht 1.702 m (5' 7\")   Wt 77.2 kg (170 lb 4.8 oz)   SpO2 96%   BMI 26.67 kg/m    Body mass index is 26.67 " kg/m .  Physical Exam   GENERAL: healthy, alert and no distress  EYES: Eyes grossly normal to inspection, PERRL and conjunctivae and sclerae normal  HENT: ear canals and TM's normal, nose and mouth without ulcers or lesions  NECK: no adenopathy, no asymmetry, masses, or scars and thyroid normal to palpation  RESP: lungs clear to auscultation - no rales, rhonchi or wheezes  CV: regular rate and rhythm, normal S1 S2, no S3 or S4, no murmur, click or rub, no peripheral edema and peripheral pulses strong  ABDOMEN: soft, nontender, no hepatosplenomegaly, no masses and bowel sounds normal  MS: no gross musculoskeletal defects noted, no edema  SKIN: no suspicious lesions or rashes  NEURO: Normal strength and tone, mentation intact and speech normal  BACK: no CVA tenderness, no paralumbar tenderness  PSYCH: mentation appears normal, affect normal/bright  LYMPH: no cervical, supraclavicular, axillary, or inguinal adenopathy    Diagnostic Test Results:  Labs reviewed in Epic  Results for orders placed or performed in visit on 02/05/20   CBC with platelets differential     Status: Abnormal (In process)   Result Value Ref Range    WBC 63.0 (HH) 4.0 - 11.0 10e9/L    RBC Count 4.43 4.4 - 5.9 10e12/L    Hemoglobin 13.0 (L) 13.3 - 17.7 g/dL    Hematocrit 42.2 40.0 - 53.0 %    MCV 95 78 - 100 fl    MCH 29.3 26.5 - 33.0 pg    MCHC 30.8 (L) 31.5 - 36.5 g/dL    RDW 14.1 10.0 - 15.0 %    Platelet Count 188 150 - 450 10e9/L    Diff Method PENDING            Assessment & Plan       ICD-10-CM    1. Post herpetic neuralgia B02.29    2. CLL (chronic lymphocytic leukemia) (H) C91.10    3. Paroxysmal atrial fibrillation (H) I48.0    4. Chronic kidney disease, stage 3 (moderate) (H) N18.3    5. Vitamin D deficiency E55.9    6. Mass of skin R22.9       has areas of actinic keratosis not responding to a few episodes of freezing.  Dermatology was negative on biopsy  I recommended treatment - but patient wants to wait for any change in the lesions  "before treatment as he says the areas have not changed even with treatment in over 3 years.      PNH not helped by lyrica -- lyrica noel increased weight gain  Has been on tegretol, nortryptyline, gabapentin without benefit      BMI:   Estimated body mass index is 26.67 kg/m  as calculated from the following:    Height as of this encounter: 1.702 m (5' 7\").    Weight as of this encounter: 77.2 kg (170 lb 4.8 oz).   Weight management plan: Discussed healthy diet and exercise guidelines    BP     133/79  2/5/2020    Lab Results   Component Value Date    GLC 94 01/10/2020     No results found for: A1C  Lab Results   Component Value Date     03/22/2018     No results found for: MICROL  No results found for: MICROALBUMIN      Regular exercise    No follow-ups on file.    Campos Parra MD  [facility]        "

## 2020-02-05 NOTE — TELEPHONE ENCOUNTER
DATE:  2/5/20  TIME OF RECEIPT FROM LAB:  11:14  LAB TEST:  WBC=63  RESULTS PAGED TO (PROVIDER):  Dr Burns  TIME LAB VALUE REPORTED TO PROVIDER:  11:17    Acknowledged by Dr Burns, no action needed at this time.

## 2020-02-06 ENCOUNTER — TELEPHONE (OUTPATIENT)
Dept: ONCOLOGY | Facility: CLINIC | Age: 75
End: 2020-02-06

## 2020-02-06 ENCOUNTER — PRE VISIT (OUTPATIENT)
Dept: UROLOGY | Facility: CLINIC | Age: 75
End: 2020-02-06

## 2020-02-06 RX ORDER — LIDOCAINE HYDROCHLORIDE 10 MG/ML
4 INJECTION, SOLUTION EPIDURAL; INFILTRATION; INTRACAUDAL; PERINEURAL
Status: DISCONTINUED | OUTPATIENT
Start: 2020-02-04 | End: 2020-03-02

## 2020-02-06 RX ORDER — TRIAMCINOLONE ACETONIDE 40 MG/ML
40 INJECTION, SUSPENSION INTRA-ARTICULAR; INTRAMUSCULAR
Status: DISCONTINUED | OUTPATIENT
Start: 2020-02-04 | End: 2020-03-02

## 2020-02-06 NOTE — TELEPHONE ENCOUNTER
Oral Chemotherapy Monitoring Program     Placed call to patient in follow up of lab work completed on 2/5 for Imbruvica oral chemotherapy and to complete a monthly assessment. Patient has authorized that detailed messages can be left on his voicemail.     I left a message and relayed that his labs indicated an elevation in his WBC, but that the remaining labs were unremarkable. Detailed that Dr. Burns is aware of the WBC elevation and the plan is to continue with current treatment. Emphasized to the patient that if he had any questions that he could contact the clinic.       Lindsey Oconnor, PharmD  Oral Chemotherapy Monitoring Program  [facility]  [phone]  February 6, 2020

## 2020-02-08 DIAGNOSIS — C91.10 CLL (CHRONIC LYMPHOCYTIC LEUKEMIA) (H): ICD-10-CM

## 2020-02-11 ENCOUNTER — ALLIED HEALTH/NURSE VISIT (OUTPATIENT)
Dept: UROLOGY | Facility: CLINIC | Age: 75
End: 2020-02-11
Payer: COMMERCIAL

## 2020-02-11 ENCOUNTER — ANCILLARY PROCEDURE (OUTPATIENT)
Dept: CT IMAGING | Facility: CLINIC | Age: 75
End: 2020-02-11
Attending: RADIOLOGY
Payer: COMMERCIAL

## 2020-02-11 VITALS — DIASTOLIC BLOOD PRESSURE: 65 MMHG | HEART RATE: 70 BPM | SYSTOLIC BLOOD PRESSURE: 120 MMHG

## 2020-02-11 DIAGNOSIS — C91.10 CLL (CHRONIC LYMPHOCYTIC LEUKEMIA) (H): Primary | ICD-10-CM

## 2020-02-11 DIAGNOSIS — N40.1 BENIGN PROSTATIC HYPERPLASIA WITH LOWER URINARY TRACT SYMPTOMS, SYMPTOM DETAILS UNSPECIFIED: ICD-10-CM

## 2020-02-11 DIAGNOSIS — N40.1 BENIGN PROSTATIC HYPERPLASIA WITH LOWER URINARY TRACT SYMPTOMS, SYMPTOM DETAILS UNSPECIFIED: Primary | ICD-10-CM

## 2020-02-11 RX ORDER — IOPAMIDOL 755 MG/ML
100 INJECTION, SOLUTION INTRAVASCULAR ONCE
Status: COMPLETED | OUTPATIENT
Start: 2020-02-11 | End: 2020-02-11

## 2020-02-11 RX ORDER — NITROGLYCERIN 0.4 MG/1
0.4 TABLET SUBLINGUAL ONCE
Status: COMPLETED | OUTPATIENT
Start: 2020-02-11 | End: 2020-02-11

## 2020-02-11 RX ORDER — AZITHROMYCIN 250 MG/1
TABLET, FILM COATED ORAL
Refills: 1 | COMMUNITY
Start: 2019-11-26 | End: 2020-03-02

## 2020-02-11 RX ORDER — PREGABALIN 75 MG/1
CAPSULE ORAL
Refills: 3 | COMMUNITY
Start: 2019-12-11 | End: 2020-05-20

## 2020-02-11 RX ORDER — METHYLPREDNISOLONE 4 MG
TABLET, DOSE PACK ORAL
COMMUNITY
Start: 2019-12-23 | End: 2020-03-02

## 2020-02-11 RX ADMIN — NITROGLYCERIN 0.4 MG: 0.4 TABLET SUBLINGUAL at 14:50

## 2020-02-11 RX ADMIN — IOPAMIDOL 100 ML: 755 INJECTION, SOLUTION INTRAVASCULAR at 14:37

## 2020-02-11 NOTE — PROCEDURES
Loco Wood comes into clinic today at the request of Dr. Mares for PVR/Flow (uroflow).    Flow/pvr was documented in the flowsheet. Pt states he thought he had a full bladder.     This service provided today was under the supervising provider of the day Martina Santiago PA-C, who was available if needed.    Justa Mora CMA

## 2020-02-18 ENCOUNTER — TELEPHONE (OUTPATIENT)
Dept: ONCOLOGY | Facility: CLINIC | Age: 75
End: 2020-02-18

## 2020-02-18 NOTE — ORAL ONC MGMT
Oral Chemotherapy Monitoring Program     Placed call to patient in follow up of Imbruvica therapy.     Left message to please call back in follow-up of therapy. No patient or drug names were mentioned.     Henri FlorezD  [facility]  [phone]  February 18, 2020

## 2020-02-24 DIAGNOSIS — N40.1 BENIGN PROSTATIC HYPERPLASIA WITH LOWER URINARY TRACT SYMPTOMS, SYMPTOM DETAILS UNSPECIFIED: Primary | ICD-10-CM

## 2020-02-24 RX ORDER — CIPROFLOXACIN 250 MG/1
750 TABLET, FILM COATED ORAL 2 TIMES DAILY
Qty: 60 TABLET | Refills: 0 | Status: SHIPPED | OUTPATIENT
Start: 2020-03-03 | End: 2020-03-02

## 2020-03-02 ENCOUNTER — TELEPHONE (OUTPATIENT)
Dept: ONCOLOGY | Facility: CLINIC | Age: 75
End: 2020-03-02

## 2020-03-02 ENCOUNTER — DOCUMENTATION ONLY (OUTPATIENT)
Dept: LAB | Facility: CLINIC | Age: 75
End: 2020-03-02

## 2020-03-02 ENCOUNTER — DOCUMENTATION ONLY (OUTPATIENT)
Dept: FAMILY MEDICINE | Facility: CLINIC | Age: 75
End: 2020-03-02

## 2020-03-02 ENCOUNTER — OFFICE VISIT (OUTPATIENT)
Dept: FAMILY MEDICINE | Facility: CLINIC | Age: 75
End: 2020-03-02
Payer: COMMERCIAL

## 2020-03-02 VITALS
HEART RATE: 59 BPM | OXYGEN SATURATION: 99 % | DIASTOLIC BLOOD PRESSURE: 77 MMHG | TEMPERATURE: 97.6 F | BODY MASS INDEX: 25.84 KG/M2 | WEIGHT: 165 LBS | SYSTOLIC BLOOD PRESSURE: 125 MMHG

## 2020-03-02 DIAGNOSIS — Z01.818 PREOP GENERAL PHYSICAL EXAM: Primary | ICD-10-CM

## 2020-03-02 DIAGNOSIS — Z79.899 ENCOUNTER FOR LONG-TERM (CURRENT) USE OF MEDICATIONS: ICD-10-CM

## 2020-03-02 DIAGNOSIS — E78.5 HYPERLIPIDEMIA LDL GOAL <130: ICD-10-CM

## 2020-03-02 DIAGNOSIS — C91.10 CLL (CHRONIC LYMPHOCYTIC LEUKEMIA) (H): ICD-10-CM

## 2020-03-02 DIAGNOSIS — E78.5 HYPERLIPIDEMIA LDL GOAL <130: Primary | ICD-10-CM

## 2020-03-02 DIAGNOSIS — N40.1 BENIGN PROSTATIC HYPERPLASIA WITH LOWER URINARY TRACT SYMPTOMS, SYMPTOM DETAILS UNSPECIFIED: ICD-10-CM

## 2020-03-02 LAB
ALBUMIN SERPL-MCNC: 3.8 G/DL (ref 3.4–5)
ALP SERPL-CCNC: 66 U/L (ref 40–150)
ALT SERPL W P-5'-P-CCNC: 23 U/L (ref 0–70)
ANION GAP SERPL CALCULATED.3IONS-SCNC: 6 MMOL/L (ref 3–14)
AST SERPL W P-5'-P-CCNC: 16 U/L (ref 0–45)
BILIRUB SERPL-MCNC: 1 MG/DL (ref 0.2–1.3)
BUN SERPL-MCNC: 18 MG/DL (ref 7–30)
CALCIUM SERPL-MCNC: 8.8 MG/DL (ref 8.5–10.1)
CHLORIDE SERPL-SCNC: 109 MMOL/L (ref 94–109)
CHOLEST SERPL-MCNC: 174 MG/DL
CO2 SERPL-SCNC: 28 MMOL/L (ref 20–32)
CREAT SERPL-MCNC: 1.52 MG/DL (ref 0.66–1.25)
GFR SERPL CREATININE-BSD FRML MDRD: 45 ML/MIN/{1.73_M2}
GLUCOSE SERPL-MCNC: 94 MG/DL (ref 70–99)
HDLC SERPL-MCNC: 50 MG/DL
LDLC SERPL CALC-MCNC: 107 MG/DL
NONHDLC SERPL-MCNC: 124 MG/DL
POTASSIUM SERPL-SCNC: 4.2 MMOL/L (ref 3.4–5.3)
PROT SERPL-MCNC: 6.4 G/DL (ref 6.8–8.8)
PSA SERPL-MCNC: 6.56 UG/L (ref 0–4)
SODIUM SERPL-SCNC: 143 MMOL/L (ref 133–144)
TRIGL SERPL-MCNC: 83 MG/DL

## 2020-03-02 PROCEDURE — 80053 COMPREHEN METABOLIC PANEL: CPT | Performed by: INTERNAL MEDICINE

## 2020-03-02 PROCEDURE — 84153 ASSAY OF PSA TOTAL: CPT | Performed by: INTERNAL MEDICINE

## 2020-03-02 PROCEDURE — 99214 OFFICE O/P EST MOD 30 MIN: CPT | Performed by: INTERNAL MEDICINE

## 2020-03-02 PROCEDURE — 36415 COLL VENOUS BLD VENIPUNCTURE: CPT | Performed by: INTERNAL MEDICINE

## 2020-03-02 PROCEDURE — 85025 COMPLETE CBC W/AUTO DIFF WBC: CPT | Performed by: INTERNAL MEDICINE

## 2020-03-02 PROCEDURE — 80061 LIPID PANEL: CPT | Performed by: INTERNAL MEDICINE

## 2020-03-02 RX ORDER — CIPROFLOXACIN 250 MG/1
3 TABLET, FILM COATED ORAL 2 TIMES DAILY
COMMUNITY
Start: 2020-02-24 | End: 2020-05-20

## 2020-03-02 NOTE — PROGRESS NOTES
[address] [id number] [phone]  Dept: [phone]    PRE-OP EVALUATION:  Today's date: 3/2/2020    Loco Wood (: 1945) presents for pre-operative evaluation assessment as requested by Dr. Mares.  He requires evaluation and anesthesia risk assessment prior to undergoing surgery/procedure for treatment of Prostate Artery Bolization .    Fax number for surgical facility:   Primary Physician: Campos Parra  Type of Anesthesia Anticipated: General    Patient has a Health Care Directive or Living Will:  YES Scanned into chart    Preop Questions 3/2/2020   Who is doing your surgery? Suzan   What are you having done? Prostate Artery Bolization   Date of Surgery/Procedure: 2020   Facility or Hospital where procedure/surgery will be performed: [facility]   1.  Do you have a history of Heart attack, stroke, stent, coronary bypass surgery, or other heart surgery? No   2.  Do you ever have any pain or discomfort in your chest? No   3.  Do you have a history of  Heart Failure? No   4.   Are you troubled by shortness of breath when:  walking on a level surface, or up a slight hill, or at night? No   5.  Do you currently have a cold, bronchitis or other respiratory infection? No   6.  Do you have a cough, shortness of breath, or wheezing? No   7.  Do you sometimes get pains in the calves of your legs when you walk? No   8. Do you or anyone in your family have previous history of blood clots? No   9.  Do you or does anyone in your family have a serious bleeding problem such as prolonged bleeding following surgeries or cuts? No   10. Have you ever had problems with anemia or been told to take iron pills? No   11. Have you had any abnormal blood loss such as black, tarry or bloody stools? No   12. Have you ever had a blood transfusion? No   13. Have you or any of your relatives ever had problems with anesthesia?  No   14. Do you have sleep apnea, excessive snoring or daytime drowsiness? No   15. Do you have any prosthetic heart valves? No   16. Do you have prosthetic joints? YES - Bilateral hip replacement         HPI:     HPI related to upcoming procedure: Prostate embolization. Enlargement in the prostate.  No previous procedure.  Hernia in the past.    No previous complications.    Patient with the CLL  On the [facility] haave stopped already and staying off 5 days after             MEDICAL HISTORY:     Patient Active Problem List    Diagnosis Date Noted     Paroxysmal atrial fibrillation (H) 02/05/2020     Priority: Medium     Chronic kidney disease, stage 3 (moderate) (H) 02/05/2020     Priority: Medium     Post herpetic neuralgia 11/21/2017     Priority: Medium     Herpes zoster with keratoconjunctivitis, od 10/29/2017     Priority: Medium     Chondromalacia, knee, right 08/01/2017     Priority: Medium     Complex tear of medial meniscus of right knee as current injury 08/01/2017     Priority: Medium     Prostate nodule 06/16/2017     Priority: Medium     Acute bilateral low back pain without sciatica 05/31/2017     Priority: Medium     BPH (benign prostatic hyperplasia) 04/29/2015     Priority: Medium     Aftercare following surgery of the musculoskeletal system 04/17/2015     Priority: Medium     Insomnia 06/12/2014     Priority: Medium     OA (osteoarthritis) of hip 05/10/2013     Priority: Medium     Osteoarthritis of hip 04/29/2013     Priority: Medium     Do you wish to do the replacement in the background? yes         Vitamin D deficiency 04/30/2012     Priority: Medium     Health Care Home 06/29/2011     Priority: Medium     x  DX V65.8 REPLACED WITH 92115 HEALTH CARE HOME (04/08/2013)       Mass of skin 03/28/2011     Priority: Medium     Left thumb.       HYPERLIPIDEMIA LDL GOAL <130 10/31/2010     Priority: Medium     CLL (chronic lymphocytic leukemia) (H) 05/11/2007     Priority: Medium     Lumbago 08/04/2006      Priority: Medium     Esophageal reflux 06/23/2005     Priority: Medium      Past Medical History:   Diagnosis Date     Arthritis     hip and toes     Chronic pain      CLL (chronic lymphocytic leukaemia)      Esophageal reflux      Malignant neoplasm (H)     Leukemia     Paroxysmal atrial fibrillation (H) 2/5/2020     PONV (postoperative nausea and vomiting)      Pure hypercholesterolemia      Past Surgical History:   Procedure Laterality Date     ARTHROPLASTY MINIMALLY INVASIVE HIP  5/10/2013    Procedure: ARTHROPLASTY MINIMALLY INVASIVE HIP;  Left Two Incision Total Hip Arthroplasty ;  Surgeon: Desmond Alberts MD;  [facility]     ARTHROPLASTY MINIMALLY INVASIVE HIP  2/27/2014    Procedure: ARTHROPLASTY MINIMALLY INVASIVE HIP;  Right Total Hip Arthroplasty Minimally Invasive Two Incision  *Latex Free Room;  Surgeon: Desmond Alberts MD;  [facility]     BACK SURGERY      back fusion 1994     C NONSPECIFIC PROCEDURE  1964 &'95    (R) inguinal herniorrhapy     C NONSPECIFIC PROCEDURE  1949    (L) inguinal herniorrhaphy     C NONSPECIFIC PROCEDURE  1994    L4-5  L5 S1 fusion - spondylolisthesis     COLONOSCOPY      2007     COLONOSCOPY N/A 8/15/2017    Procedure: COLONOSCOPY;  colonoscopy;  Surgeon: Chun Mcguire MD;  Location:  GI     GI SURGERY      4 hernia repairs in childhood     HERNIA REPAIR      4 since age 4     Current Outpatient Medications   Medication Sig Dispense Refill     Acetaminophen (TYLENOL PO)        atorvastatin (LIPITOR) 10 MG tablet Take 1 tablet (10 mg) by mouth every 48 hours 45 tablet 4     Cholecalciferol (VITAMIN D3 PO) Take 4,000 Units by mouth daily        diazepam (VALIUM) 5 MG tablet Take 5 mg by mouth every 6 hours as needed for anxiety       ibrutinib (IMBRUVICA) 420 MG tablet Take 1 tablet (420 mg) by mouth daily 28 tablet 0     ibuprofen (ADVIL/MOTRIN) 200 MG tablet Take 200 mg by mouth every 4 hours as needed for mild pain       levothyroxine  (SYNTHROID/LEVOTHROID) 50 MCG tablet Take 1 tablet (50 mcg) by mouth daily 90 tablet 3     levothyroxine (SYNTHROID/LEVOTHROID) 75 MCG tablet Take 1 tablet (75 mcg) by mouth daily 90 tablet 3     omega 3 1200 MG CAPS Take 2 capsules by mouth daily       omeprazole (PRILOSEC) 10 MG DR capsule TAKE ONE CAPSULE BY MOUTH EVERY DAY 30 TO 60 MINUTES BEFORE A MEAL (Patient taking differently: TAKE ONE CAPSULE BY MOUTH EVERY OTHER DAY 30 TO 60 MINUTES BEFORE A MEAL) 90 capsule 3     Probiotic Product (PROBIOTIC-10 PO) Take by mouth daily       prochlorperazine (COMPAZINE) 10 MG tablet Take 1 tablet (10 mg) by mouth every 6 hours as needed (Nausea/Vomiting) 30 tablet 2     tiZANidine (ZANAFLEX) 2 MG tablet Take 1-2 tablets (2-4 mg) by mouth 3 times daily 30 tablet 0     tiZANidine (ZANAFLEX) 4 MG capsule Take 1 capsule (4 mg) by mouth 3 times daily as needed for muscle spasms 30 capsule 1     tiZANidine (ZANAFLEX) 4 MG tablet        ciprofloxacin (CIPRO) 250 MG tablet Take 3 tablets by mouth 2 times daily FOR SUGERY       ibrutinib (IMBRUVICA) 420 MG tablet Take 1 tablet (420 mg) by mouth daily (Patient not taking: Reported on 3/2/2020) 28 tablet 0     pregabalin (LYRICA) 75 MG capsule TK ONE C PO  BID  3     tamsulosin (FLOMAX) 0.4 MG capsule Take 1 capsule (0.4 mg) by mouth daily (Patient not taking: Reported on 3/2/2020) 90 capsule 3     OTC products: None, except as noted above    Allergies   Allergen Reactions     Dilaudid [Hydromorphone] Itching     Morphine Itching      Latex Allergy: NO    Social History     Tobacco Use     Smoking status: Never Smoker     Smokeless tobacco: Never Used   Substance Use Topics     Alcohol use: Yes     Alcohol/week: 0.0 standard drinks     Comment: moderate, social     History   Drug Use No       REVIEW OF SYSTEMS:   CONSTITUTIONAL: NEGATIVE for fever, chills, change in weight  ENT/MOUTH: NEGATIVE for ear, mouth and throat problems  RESP: NEGATIVE for significant cough or SOB  CV:  NEGATIVE for chest pain, palpitations or peripheral edema    EXAM:   /77 (BP Location: Right arm, Patient Position: Chair, Cuff Size: Adult Large)   Pulse 59   Temp 97.6  F (36.4  C) (Oral)   Wt 74.8 kg (165 lb)   SpO2 99%   BMI 25.84 kg/m    GENERAL APPEARANCE: healthy, alert and no distress  HENT: ear canals and TM's normal and nose and mouth without ulcers or lesions  RESP: lungs clear to auscultation - no rales, rhonchi or wheezes  CV: regular rate and rhythm, normal S1 S2, no S3 or S4 and no murmur, click or rub   ABDOMEN: soft, nontender, no HSM or masses and bowel sounds normal  NEURO: Normal strength and tone, sensory exam grossly normal, mentation intact and speech normal    DIAGNOSTICS:   EKG: appears normal, NSR, normal axis, normal intervals, no acute ST/T changes c/w ischemia, no LVH by voltage criteria, unchanged from previous tracings    Recent Labs   Lab Test 02/05/20  0953 01/10/20  0825  03/06/14  2127  01/03/13  0150   HGB 13.0* 12.5*   < > 12.1*   < > 14.4    147*   < > 254   < > 123*   INR  --   --   --  1.03  --  1.10    143   < > 141   < > 146*   POTASSIUM 3.9 4.1   < > 3.7   < > 4.0   CR 1.27* 1.57*   < > 1.20   < > 1.30*    < > = values in this interval not displayed.        IMPRESSION:   Reason for surgery/procedure: prostate embolization for BPH  Diagnosis/reason for consult: pre-operative evaluation    The proposed surgical procedure is considered LOW risk.    REVISED CARDIAC RISK INDEX  The patient has the following serious cardiovascular risks for perioperative complications such as (MI, PE, VFib and 3  AV Block):  No serious cardiac risks  INTERPRETATION: 0 risks: Class I (very low risk - 0.4% complication rate)    The patient has the following additional risks for perioperative complications:  No identified additional risks      ICD-10-CM    1. Preop general physical exam Z01.818        RECOMMENDATIONS:     --Consult hospital rounder / IM to assist post-op  medical management    --> Patient is to take all scheduled medications on the day of surgery EXCEPT for modifications listed below.  -- holding tizanadine as well      APPROVAL GIVEN to proceed with proposed procedure, without further diagnostic evaluation       Signed Electronically by: Campos Parra MD    Copy of this evaluation report is provided to requesting physician.    [facility] Preop Guidelines    Revised Cardiac Risk Index

## 2020-03-02 NOTE — TELEPHONE ENCOUNTER
Preliminary result  DATE:  3/2/2020  TIME OF RECEIPT FROM LAB: 1035    LAB TEST:  WBC=43  RESULTS PAGED TO (PROVIDER): Dr Burns    TIME LAB VALUE REPORTED TO PROVIDER: 1038      10:41 AM  Spoke with Dr Burns, acknowledged value, no further action needed from triage at this time.

## 2020-03-03 ENCOUNTER — OFFICE VISIT (OUTPATIENT)
Dept: TRANSPLANT | Facility: CLINIC | Age: 75
End: 2020-03-03
Attending: INTERNAL MEDICINE
Payer: COMMERCIAL

## 2020-03-03 ENCOUNTER — TELEPHONE (OUTPATIENT)
Dept: INTERVENTIONAL RADIOLOGY/VASCULAR | Facility: CLINIC | Age: 75
End: 2020-03-03

## 2020-03-03 VITALS
BODY MASS INDEX: 26.2 KG/M2 | TEMPERATURE: 97.8 F | RESPIRATION RATE: 16 BRPM | SYSTOLIC BLOOD PRESSURE: 132 MMHG | DIASTOLIC BLOOD PRESSURE: 84 MMHG | WEIGHT: 167.3 LBS | OXYGEN SATURATION: 98 % | HEART RATE: 73 BPM

## 2020-03-03 DIAGNOSIS — C91.10 CLL (CHRONIC LYMPHOCYTIC LEUKEMIA) (H): Primary | ICD-10-CM

## 2020-03-03 LAB
DIFFERENTIAL METHOD BLD: ABNORMAL
EOSINOPHIL # BLD AUTO: 0.4 10E9/L (ref 0–0.7)
EOSINOPHIL NFR BLD AUTO: 1 %
ERYTHROCYTE [DISTWIDTH] IN BLOOD BY AUTOMATED COUNT: 15.2 % (ref 10–15)
HCT VFR BLD AUTO: 44.1 % (ref 40–53)
HGB BLD-MCNC: 13.4 G/DL (ref 13.3–17.7)
LYMPHOCYTES # BLD AUTO: 41.9 10E9/L (ref 0.8–5.3)
LYMPHOCYTES NFR BLD AUTO: 97 %
MCH RBC QN AUTO: 28.9 PG (ref 26.5–33)
MCHC RBC AUTO-ENTMCNC: 30.4 G/DL (ref 31.5–36.5)
MCV RBC AUTO: 95 FL (ref 78–100)
NEUTROPHILS # BLD AUTO: 0.9 10E9/L (ref 1.6–8.3)
NEUTROPHILS NFR BLD AUTO: 2 %
PLATELET # BLD AUTO: 156 10E9/L (ref 150–450)
PLATELET # BLD EST: ABNORMAL 10*3/UL
RBC # BLD AUTO: 4.63 10E12/L (ref 4.4–5.9)
RBC MORPH BLD: NORMAL
WBC # BLD AUTO: 43.2 10E9/L (ref 4–11)

## 2020-03-03 PROCEDURE — G0463 HOSPITAL OUTPT CLINIC VISIT: HCPCS

## 2020-03-03 NOTE — PATIENT INSTRUCTIONS
Ibrutinib has a blood thinning effect.  Please continue to hold after the 5 days past the procedure if you are having bleeding.  Please also notify us if you are continuing to hold your ibrutinib.      Get blood work once monthly and return to see Dr. Burns in 3 months.

## 2020-03-03 NOTE — NURSING NOTE
"Oncology Rooming Note    March 3, 2020 4:05 PM   Loco Wood is a 74 year old male who presents for:    Chief Complaint   Patient presents with     Oncology Clinic Visit     Pt is here for a rtn for CLL     Initial Vitals: Blood Pressure 132/84   Pulse 73   Temperature 97.8  F (36.6  C) (Oral)   Respiration 16   Weight 75.9 kg (167 lb 4.8 oz)   Oxygen Saturation 98%   Body Mass Index 26.20 kg/m   Estimated body mass index is 26.2 kg/m  as calculated from the following:    Height as of 2/5/20: 1.702 m (5' 7\").    Weight as of this encounter: 75.9 kg (167 lb 4.8 oz). Body surface area is 1.89 meters squared.  Data Unavailable Comment: Data Unavailable   No LMP for male patient.  Allergies reviewed: Yes  Medications reviewed: Yes    Medications: Medication refills not needed today.  Pharmacy name entered into [employer]:    [id number] - [place], [place] - [address] AT [facility] [address] [facility] [place] [address]  [facility] [place], [place] [address] [facility] - [place], [place] - [address] [facility] - [place], [place] - [address]    Clinical concerns: none       Nancy Samuel MA            "

## 2020-03-03 NOTE — PROGRESS NOTES
"Oncology/Hematology Visit Note  Mar 3, 2020    Reason for Visit: follow up of CLL    History of Present Illness: Loco Wood is a 74 year old male with CLL. He was diagnosed 2/12/2007 with CLL, Lyles stage 0, followed clinically since.  At diagnosis WBC 17.8, ALC 11.2, hemoglobin 15.2, platelets 188.  Flow consistent with CLL.  No opportunistic infections, with IgG in the normal range during follow up. Due to rising lymphocyte count, it was recommended he consider starting on treatment. He is currently undergoing treatment with ibrutinib, which he began on 5/14/19. Please see previous notes for further details on the patient's history. He comes in today for routine follow up.    Interval History:  Patient reports that overall he has been doing okay.  Getting prostate artery embolization 3/5 for BPH.  Has been off ibrutinib for past 3 days in preparation for procedure.  Notes \"locking\" of his hands has decreased since stopping ibrutinib.  Otherwise feeling well.  Appetite and energy are good.  No fevers, chills, night sweats, new lumps/bumps or early satiety.  Denies other complaints at this time.  Remainder of 10 point ROS reviewed and negative except as in HPI.        Current Outpatient Medications   Medication Sig Dispense Refill     Acetaminophen (TYLENOL PO)        atorvastatin (LIPITOR) 10 MG tablet Take 1 tablet (10 mg) by mouth every 48 hours 45 tablet 4     Cholecalciferol (VITAMIN D3 PO) Take 4,000 Units by mouth daily        ciprofloxacin (CIPRO) 250 MG tablet Take 3 tablets by mouth 2 times daily FOR SUGERY       diazepam (VALIUM) 5 MG tablet Take 5 mg by mouth every 6 hours as needed for anxiety       ibrutinib (IMBRUVICA) 420 MG tablet Take 1 tablet (420 mg) by mouth daily 28 tablet 0     ibrutinib (IMBRUVICA) 420 MG tablet Take 1 tablet (420 mg) by mouth daily (Patient not taking: Reported on 3/2/2020) 28 tablet 0     ibuprofen (ADVIL/MOTRIN) 200 MG tablet Take 200 mg by mouth every 4 hours as " needed for mild pain       levothyroxine (SYNTHROID/LEVOTHROID) 50 MCG tablet Take 1 tablet (50 mcg) by mouth daily 90 tablet 3     levothyroxine (SYNTHROID/LEVOTHROID) 75 MCG tablet Take 1 tablet (75 mcg) by mouth daily 90 tablet 3     omega 3 1200 MG CAPS Take 2 capsules by mouth daily       omeprazole (PRILOSEC) 10 MG DR capsule TAKE ONE CAPSULE BY MOUTH EVERY DAY 30 TO 60 MINUTES BEFORE A MEAL (Patient taking differently: TAKE ONE CAPSULE BY MOUTH EVERY OTHER DAY 30 TO 60 MINUTES BEFORE A MEAL) 90 capsule 3     pregabalin (LYRICA) 75 MG capsule TK ONE C PO  BID  3     Probiotic Product (PROBIOTIC-10 PO) Take by mouth daily       prochlorperazine (COMPAZINE) 10 MG tablet Take 1 tablet (10 mg) by mouth every 6 hours as needed (Nausea/Vomiting) 30 tablet 2     tiZANidine (ZANAFLEX) 2 MG tablet Take 1-2 tablets (2-4 mg) by mouth 3 times daily 30 tablet 0     tiZANidine (ZANAFLEX) 4 MG capsule Take 1 capsule (4 mg) by mouth 3 times daily as needed for muscle spasms 30 capsule 1     tiZANidine (ZANAFLEX) 4 MG tablet        Physical Examination:  General: The patient is a pleasant male in no acute distress.  /84   Pulse 73   Temp 97.8  F (36.6  C) (Oral)   Resp 16   Wt 75.9 kg (167 lb 4.8 oz)   SpO2 98%   BMI 26.20 kg/m    Wt Readings from Last 10 Encounters:   03/03/20 75.9 kg (167 lb 4.8 oz)   03/02/20 74.8 kg (165 lb)   02/05/20 77.2 kg (170 lb 4.8 oz)   02/04/20 75.3 kg (166 lb)   01/07/20 75.3 kg (166 lb)   11/26/19 75.4 kg (166 lb 4 oz)   10/10/19 73.5 kg (162 lb)   09/06/19 73.1 kg (161 lb 2.5 oz)   08/08/19 71.7 kg (158 lb)   08/08/19 71.7 kg (158 lb 1.1 oz)     HEENT: EOMI, PERRL. Sclerae are anicteric. Oral mucosa is pink and moist with no lesions or thrush.   Lymph: Neck is supple with no lymphadenopathy in the cervical or supraclavicular areas.   Heart: Regular rate and rhythm. No m/r/g appreciated   Lungs: Clear to auscultation bilaterally. Normal WOB on RA   Abdomen: Bowel sounds present,  soft, nontender with no palpable hepatosplenomegaly or masses.   Extremities: No lower extremity edema noted bilaterally.   Neuro: Cranial nerves II through XII are grossly intact.  Normal gait, normal speech and mentation.   Skin: No rashes, petechiae, or bruising noted on exposed skin.    No results found for any visits on 03/03/20.  3/2 Labs.   Creatinine at baseline of 1.52.   Lytes and LFTs WNL  WBC 43.2  Hgb 13.4  Plt 156      Assessment and Plan:  CLL. Patient started on ibrutinib 420 mg daily on 5/14/19. He is tolerating treatment very well thus far. He has had an expected rise in his lymphocyte count that is now trending down. Since starting on treatment, he has had some hand muscle spasms and increased back pain. I suspect both are side effects from ibrutinib. He does not feel either are severe enough to warrant a dose reduction, although he did note resolution in cramps after stopping ibrutinib prior to prostate artery embolization procedure. We discussed that he would continue to hold ibrutinib for 5 days after the procedure, longer if he is continuing to have hematuria.  He will call if hematuria/bleeding continues after procedure longer than anticipated so we can workout a plan for when to restart ibrutinib.      Hypothyroidism. He began on levothyroxine 50 mcg daily on 5/2/19. Last TSH on 1/10/20 was elevated to 7.10.  Will recheck in 3 months.      CKD. He appears to have had some kidney dysfunction since at least 2010. His last check is at his baseline. He reports bubbles in his urine.  Checked UA, no proteinuria, but he does have some RBCs. Creatinine remains at baseline of ~1.5.  He is going to try to hydrate more over the next week or so marianna-procedure.      RTC in 3 months with labs.      Patient seen and discussed with Dr. Burns.     Vinod Barr MD, PhD  Hematology/Oncology Fellow  Pager: [phone]    _______________________________________________    ATTENDING NOTE    I have seen and personally  evaluated the patient today.  I reviewed vitals, medications, laboratory results, and I viewed pertinent imaging studies.  After doing so, I formulated a plan with Dr. Barr as documented in this note.  I spent >50% of a 25 minute face-to-face visit personally communicating recommendations regarding monitoring CLL in [place] on ibrutinib, marianna-operative management of ibrutinib giving bleeding risks with the medication.      He Burns MD, pager [phone]

## 2020-03-05 ENCOUNTER — APPOINTMENT (OUTPATIENT)
Dept: MEDSURG UNIT | Facility: CLINIC | Age: 75
End: 2020-03-05
Attending: RADIOLOGY
Payer: COMMERCIAL

## 2020-03-05 ENCOUNTER — APPOINTMENT (OUTPATIENT)
Dept: INTERVENTIONAL RADIOLOGY/VASCULAR | Facility: CLINIC | Age: 75
End: 2020-03-05
Attending: RADIOLOGY
Payer: COMMERCIAL

## 2020-03-05 ENCOUNTER — HOSPITAL ENCOUNTER (OUTPATIENT)
Facility: CLINIC | Age: 75
Discharge: HOME | End: 2020-03-05
Attending: RADIOLOGY | Admitting: RADIOLOGY
Payer: COMMERCIAL

## 2020-03-05 VITALS
DIASTOLIC BLOOD PRESSURE: 84 MMHG | TEMPERATURE: 97 F | HEART RATE: 56 BPM | SYSTOLIC BLOOD PRESSURE: 122 MMHG | RESPIRATION RATE: 16 BRPM | OXYGEN SATURATION: 96 %

## 2020-03-05 DIAGNOSIS — N40.1 BENIGN PROSTATIC HYPERPLASIA WITH LOWER URINARY TRACT SYMPTOMS, SYMPTOM DETAILS UNSPECIFIED: ICD-10-CM

## 2020-03-05 LAB
APTT PPP: 29 SEC (ref 22–37)
INR PPP: 1.16 (ref 0.86–1.14)

## 2020-03-05 PROCEDURE — 27210732 ZZH ACCESSORY CR1

## 2020-03-05 PROCEDURE — C1887 CATHETER, GUIDING: HCPCS

## 2020-03-05 PROCEDURE — C1769 GUIDE WIRE: HCPCS

## 2020-03-05 PROCEDURE — 37243 VASC EMBOLIZE/OCCLUDE ORGAN: CPT

## 2020-03-05 PROCEDURE — 25500064 ZZH RX 255 OP 636: Performed by: RADIOLOGY

## 2020-03-05 PROCEDURE — 85610 PROTHROMBIN TIME: CPT | Performed by: RADIOLOGY

## 2020-03-05 PROCEDURE — 27810149 ZZH COIL/EMBOLIC DEVICE CR12

## 2020-03-05 PROCEDURE — 36247 INS CATH ABD/L-EXT ART 3RD: CPT | Mod: 50

## 2020-03-05 PROCEDURE — 25000128 H RX IP 250 OP 636: Performed by: RADIOLOGY

## 2020-03-05 PROCEDURE — 27810343 ZZH COIL/EMBOLIC DEVICE CR17

## 2020-03-05 PROCEDURE — 40000168 ZZH STATISTIC PP CARE STAGE 3

## 2020-03-05 PROCEDURE — 27210804 ZZH SHEATH CR3

## 2020-03-05 PROCEDURE — 27810158 ZZH COIL/EMBOLIC DEVICE CR7

## 2020-03-05 PROCEDURE — 25000125 ZZHC RX 250: Performed by: STUDENT IN AN ORGANIZED HEALTH CARE EDUCATION/TRAINING PROGRAM

## 2020-03-05 PROCEDURE — C1760 CLOSURE DEV, VASC: HCPCS

## 2020-03-05 PROCEDURE — 85730 THROMBOPLASTIN TIME PARTIAL: CPT | Performed by: RADIOLOGY

## 2020-03-05 PROCEDURE — 99153 MOD SED SAME PHYS/QHP EA: CPT

## 2020-03-05 PROCEDURE — 27210908 ZZH NEEDLE CR4

## 2020-03-05 PROCEDURE — 75736 ARTERY X-RAYS PELVIS: CPT

## 2020-03-05 PROCEDURE — 25800030 ZZH RX IP 258 OP 636: Performed by: RADIOLOGY

## 2020-03-05 PROCEDURE — 25000128 H RX IP 250 OP 636: Performed by: STUDENT IN AN ORGANIZED HEALTH CARE EDUCATION/TRAINING PROGRAM

## 2020-03-05 PROCEDURE — 99152 MOD SED SAME PHYS/QHP 5/>YRS: CPT

## 2020-03-05 PROCEDURE — 27210889 ZZH ACCESSORY CR8

## 2020-03-05 PROCEDURE — 27210888 ZZH ACCESSORY CR7

## 2020-03-05 PROCEDURE — 27810159 ZZH COIL/EMBOLIC DEVICE CR8

## 2020-03-05 PROCEDURE — 27210780 ZZH KIT CR3

## 2020-03-05 RX ORDER — SODIUM CHLORIDE 9 MG/ML
INJECTION, SOLUTION INTRAVENOUS CONTINUOUS
Status: DISCONTINUED | OUTPATIENT
Start: 2020-03-05 | End: 2020-03-05 | Stop reason: HOSPADM

## 2020-03-05 RX ORDER — DEXTROSE MONOHYDRATE 25 G/50ML
25-50 INJECTION, SOLUTION INTRAVENOUS
Status: DISCONTINUED | OUTPATIENT
Start: 2020-03-05 | End: 2020-03-05 | Stop reason: HOSPADM

## 2020-03-05 RX ORDER — NICOTINE POLACRILEX 4 MG
15-30 LOZENGE BUCCAL
Status: DISCONTINUED | OUTPATIENT
Start: 2020-03-05 | End: 2020-03-05 | Stop reason: HOSPADM

## 2020-03-05 RX ORDER — FENTANYL CITRATE 50 UG/ML
25-50 INJECTION, SOLUTION INTRAMUSCULAR; INTRAVENOUS EVERY 5 MIN PRN
Status: DISCONTINUED | OUTPATIENT
Start: 2020-03-05 | End: 2020-03-05 | Stop reason: HOSPADM

## 2020-03-05 RX ORDER — FLUMAZENIL 0.1 MG/ML
0.2 INJECTION, SOLUTION INTRAVENOUS
Status: DISCONTINUED | OUTPATIENT
Start: 2020-03-05 | End: 2020-03-05 | Stop reason: HOSPADM

## 2020-03-05 RX ORDER — NITROGLYCERIN 5 MG/ML
150 VIAL (ML) INTRAVENOUS
Status: COMPLETED | OUTPATIENT
Start: 2020-03-05 | End: 2020-03-05

## 2020-03-05 RX ORDER — NITROGLYCERIN 5 MG/ML
100 VIAL (ML) INTRAVENOUS
Status: COMPLETED | OUTPATIENT
Start: 2020-03-05 | End: 2020-03-05

## 2020-03-05 RX ORDER — CEFAZOLIN SODIUM 2 G/100ML
2 INJECTION, SOLUTION INTRAVENOUS
Status: COMPLETED | OUTPATIENT
Start: 2020-03-05 | End: 2020-03-05

## 2020-03-05 RX ORDER — HEPARIN SOD,PORCINE/0.9 % NACL 5K/1000 ML
1000-10000 INTRAVENOUS SOLUTION INTRAVENOUS
Status: COMPLETED | OUTPATIENT
Start: 2020-03-05 | End: 2020-03-05

## 2020-03-05 RX ORDER — NALOXONE HYDROCHLORIDE 0.4 MG/ML
.1-.4 INJECTION, SOLUTION INTRAMUSCULAR; INTRAVENOUS; SUBCUTANEOUS
Status: DISCONTINUED | OUTPATIENT
Start: 2020-03-05 | End: 2020-03-05 | Stop reason: HOSPADM

## 2020-03-05 RX ORDER — LIDOCAINE 40 MG/G
CREAM TOPICAL
Status: DISCONTINUED | OUTPATIENT
Start: 2020-03-05 | End: 2020-03-05 | Stop reason: HOSPADM

## 2020-03-05 RX ORDER — IODIXANOL 320 MG/ML
300 INJECTION, SOLUTION INTRAVASCULAR ONCE
Status: COMPLETED | OUTPATIENT
Start: 2020-03-05 | End: 2020-03-05

## 2020-03-05 RX ORDER — NITROGLYCERIN 5 MG/ML
100 VIAL (ML) INTRAVENOUS ONCE
Status: COMPLETED | OUTPATIENT
Start: 2020-03-05 | End: 2020-03-05

## 2020-03-05 RX ADMIN — NITROGLYCERIN 100 MCG: 5 INJECTION, SOLUTION INTRAVENOUS at 11:17

## 2020-03-05 RX ADMIN — FENTANYL CITRATE 100 MCG: 50 INJECTION, SOLUTION INTRAMUSCULAR; INTRAVENOUS at 12:27

## 2020-03-05 RX ADMIN — NITROGLYCERIN 100 MCG: 5 INJECTION, SOLUTION INTRAVENOUS at 08:58

## 2020-03-05 RX ADMIN — HEPARIN SODIUM 3000 ML: 1000 INJECTION, SOLUTION INTRAVENOUS; SUBCUTANEOUS at 09:08

## 2020-03-05 RX ADMIN — IODIXANOL 250 ML: 320 INJECTION, SOLUTION INTRAVASCULAR at 12:29

## 2020-03-05 RX ADMIN — NITROGLYCERIN 100 MCG: 5 INJECTION, SOLUTION INTRAVENOUS at 11:02

## 2020-03-05 RX ADMIN — SODIUM CHLORIDE: 9 INJECTION, SOLUTION INTRAVENOUS at 07:25

## 2020-03-05 RX ADMIN — LIDOCAINE HYDROCHLORIDE 5 ML: 10 INJECTION, SOLUTION EPIDURAL; INFILTRATION; INTRACAUDAL; PERINEURAL at 09:07

## 2020-03-05 RX ADMIN — NITROGLYCERIN 150 MCG: 5 INJECTION, SOLUTION INTRAVENOUS at 10:14

## 2020-03-05 RX ADMIN — CEFAZOLIN SODIUM 2 G: 2 INJECTION, SOLUTION INTRAVENOUS at 07:26

## 2020-03-05 NOTE — IP AVS SNAPSHOT
[address] [address]                                    After Visit Summary   3/5/2020    Loco Wood    MRN: [id number]           After Visit Summary Signature Page    I have received my discharge instructions, and my questions have been answered. I have discussed any challenges I see with this plan with the nurse or doctor.    ..........................................................................................................................................  Patient/Patient Representative Signature      ..........................................................................................................................................  Patient Representative Print Name and Relationship to Patient    ..................................................               ................................................  Date                                   Time    ..........................................................................................................................................  Reviewed by Signature/Title    ...................................................              ..............................................  Date                                               Time          22EPIC Rev 08/18

## 2020-03-05 NOTE — PRE-PROCEDURE
GENERAL PRE-PROCEDURE:   Procedure:  Prostate artery embolization  Date/Time:  3/5/2020 7:32 AM    Written consent obtained?: Yes    Risks and benefits: Risks, benefits and alternatives were discussed    Consent given by:  Patient  Patient states understanding of procedure being performed: Yes    Patient's understanding of procedure matches consent: Yes    Procedure consent matches procedure scheduled: Yes    Expected level of sedation:  Moderate  Appropriately NPO:  Yes  ASA Class:  Class 2- mild systemic disease, no acute problems, no functional limitations  Mallampati  :  Grade 3- soft palate visible, posterior pharyngeal wall not visible  Lungs:  Lungs clear with good breath sounds bilaterally  Heart:  Normal heart sounds and rate  History & Physical reviewed:  History and physical reviewed and no updates needed  Statement of review:  I have reviewed the lab findings, diagnostic data, medications, and the plan for sedation

## 2020-03-05 NOTE — PROCEDURES
[facility], [place]    Procedure: IR Procedure Note  Date/Time: 3/5/2020 1:04 PM  Performed by: Vijay Andujar MD  Authorized by: Vijay Andujar MD   IR Fellow Physician:  Other(s) attending procedure: Prostate Artery Embolization    UNIVERSAL PROTOCOL   Site Marked: NA  Prior Images Obtained and Reviewed:  Yes  Required items: Required blood products, implants, devices and special equipment available    Patient identity confirmed:  Verbally with patient, arm band, provided demographic data and hospital-assigned identification number  Patient was reevaluated immediately before administering moderate or deep sedation or anesthesia  Confirmation Checklist:  Patient's identity using two indicators, relevant allergies, procedure was appropriate and matched the consent or emergent situation and correct equipment/implants were available  Time out: Immediately prior to the procedure a time out was called    Universal Protocol: the Joint Commission Universal Protocol was followed    Preparation: Patient was prepped and draped in usual sterile fashion           ANESTHESIA    Anesthesia: Local infiltration  Local Anesthetic:  Lidocaine 1% without epinephrine      SEDATION    Patient Sedated: Yes    Sedation Type:  Moderate (conscious) sedation  Sedation:  Fentanyl and midazolam  Vital signs: Vital signs monitored during sedation    Fluoroscopy Time: 93 minute(s)  See dictated procedure note for full details.  Findings: Patent bilateral prostate arteries, 2 prostate arteries on the right with a capsular branch and a small additional branch from the proximal superior vesicular artery    Specimens: none    Complications: None    Condition: Stable    Plan: - 4 hours of bedrest with right leg straight  - Follow up with Dr. Mares in 1 mo (IR will coordinate)    PROCEDURE   Patient Tolerance:  Patient tolerated the procedure well with no immediate complications  Describe Procedure:  Bilateral particle prostate artery embolization with coil embolization of the right proximal parenchymal prostate branch secondary to extravasation  Length of time physician/provider present for 1:1 monitoring during sedation: 150

## 2020-03-05 NOTE — IP AVS SNAPSHOT
MRN:[id number]                      After Visit Summary   3/5/2020    Loco Wood    MRN: [id number]           Visit Information        Department      3/5/2020  6:50 AM [address] [facility]          Review of your medicines      UNREVIEWED medicines. Ask your doctor about these medicines       Dose / Directions   atorvastatin 10 MG tablet  Commonly known as:  LIPITOR  Used for:  Hyperlipidemia LDL goal <130      Dose:  10 mg  Take 1 tablet (10 mg) by mouth every 48 hours  Quantity:  45 tablet  Refills:  4     ciprofloxacin 250 MG tablet  Commonly known as:  CIPRO      Dose:  3 tablet  Take 3 tablets by mouth 2 times daily FOR SUGERY  Refills:  0     diazepam 5 MG tablet  Commonly known as:  VALIUM      Dose:  5 mg  Take 5 mg by mouth every 6 hours as needed for anxiety  Refills:  0     * ibrutinib 420 MG tablet  Commonly known as:  IMBRUVICA  Used for:  CLL (chronic lymphocytic leukemia) (H)      Dose:  420 mg  Take 1 tablet (420 mg) by mouth daily  Quantity:  28 tablet  Refills:  0     * ibrutinib 420 MG tablet  Commonly known as:  IMBRUVICA  Used for:  CLL (chronic lymphocytic leukemia) (H)      Dose:  420 mg  Take 1 tablet (420 mg) by mouth daily  Quantity:  28 tablet  Refills:  0     ibuprofen 200 MG tablet  Commonly known as:  ADVIL/MOTRIN      Dose:  200 mg  Take 200 mg by mouth every 4 hours as needed for mild pain  Refills:  0     * levothyroxine 50 MCG tablet  Commonly known as:  SYNTHROID/LEVOTHROID  Used for:  Hypothyroidism due to Hashimoto's thyroiditis      Dose:  50 mcg  Take 1 tablet (50 mcg) by mouth daily  Quantity:  90 tablet  Refills:  3     * levothyroxine 75 MCG tablet  Commonly known as:  SYNTHROID/LEVOTHROID  Used for:  Hypothyroidism, unspecified type      Dose:  75 mcg  Take 1 tablet (75 mcg) by mouth daily  Quantity:  90 tablet  Refills:  3     omeprazole 10 MG DR capsule  Commonly known as:  priLOSEC  Used for:  Gastroesophageal reflux disease without  esophagitis      TAKE ONE CAPSULE BY MOUTH EVERY DAY 30 TO 60 MINUTES BEFORE A MEAL  Quantity:  90 capsule  Refills:  3     pregabalin 75 MG capsule  Commonly known as:  LYRICA      TK ONE C PO  BID  Refills:  3     PROBIOTIC-10 PO      Take by mouth daily  Refills:  0     prochlorperazine 10 MG tablet  Commonly known as:  COMPAZINE  Used for:  CLL (chronic lymphocytic leukemia) (H)      Dose:  10 mg  Take 1 tablet (10 mg) by mouth every 6 hours as needed (Nausea/Vomiting)  Quantity:  30 tablet  Refills:  2     * tiZANidine 2 MG tablet  Commonly known as:  ZANAFLEX  Used for:  Chronic midline low back pain without sciatica      Dose:  2-4 mg  Take 1-2 tablets (2-4 mg) by mouth 3 times daily  Quantity:  30 tablet  Refills:  0     * tiZANidine 4 MG capsule  Commonly known as:  ZANAFLEX  Used for:  Acute bilateral low back pain without sciatica      Dose:  4 mg  Take 1 capsule (4 mg) by mouth 3 times daily as needed for muscle spasms  Quantity:  30 capsule  Refills:  1     * tiZANidine 4 MG tablet  Commonly known as:  ZANAFLEX      Refills:  0     TYLENOL PO      Refills:  0     VITAMIN D3 PO      Dose:  4,000 Units  Take 4,000 Units by mouth daily  Refills:  0         * This list has 7 medication(s) that are the same as other medications prescribed for you. Read the directions carefully, and ask your doctor or other care provider to review them with you.            CONTINUE these medicines which have NOT CHANGED       Dose / Directions   omega 3 1200 MG Caps  Used for:  Spinal stenosis, lumbar region, without neurogenic claudication      Dose:  2 capsule  Take 2 capsules by mouth daily  Refills:  0              Protect others around you: Learn how to safely use, store and throw away your medicines at www.disposemymeds.org.       Follow-ups after your visit       Care Instructions       Further instructions from your care team       [facility]   Interventional Radiology  Discharge Instructions Post  Prostate Artery Embolization    AFTER YOU GO HOME          Relax and take it easy for 24 hours.       Drink plenty of fluids.       Resume your regular diet, unless otherwise instructed by your Primary Physician.       DO NOT smoke for at least 24 hours, if you were given any sedation.       DO NOT drink alcoholic beverages the day of your procedure.       Do not drive or operate machinery at home or at work for 24 hours.          DO NOT do any strenuous exercise or lifting for at least 2 days following your Procedure.       DO NOT take a bath or shower for at least 12 hours following your procedure.       DO NOT make any important or legal decisions for 24 hours following your procedure.    CALL THE PHYSICIAN IF:       - You start bleeding from the procedure site. lie down flat and hold pressure on the site. A small lump or bruise is common at the puncture site. Your physician will tell you if you need to return to the hospital.      - You develop numbness, coolness or a change in color of the leg that was punctured.      - You experience increased pain or redness at the puncture site.      - You develop hives or a rash or unexplained itching.      - You develop a temperature of 101 degrees F or greater.    Additional Information:           Support the puncture site for coughing, sneezing, or moving your bowels for the first 48 hours  No tub bath, hot tubs, or swimming for 5 days  No lotion or powder to the puncture site for 3 day    Closure Device: Mynx            [facility] INTERVENTIONAL RADIOLOGY DEPARTMENT         Procedure Physician: Dr. Mares and Dr. Andujar                             Date of Procedure:March 5, 2020       Telephone Numbers:   [phone]     Monday-Friday 8:00 to 4:30 pm                                        [phone]     After 4:30 pm Monday-Friday, Weekend and Holidays. Ask for the Interventional Radiologist on call. Someone is on call 24 hrs/day.              Additional Information About  Your Visit       [facility] [employer] Information    [employer] gives you secure access to your electronic health record. If you see a primary care provider, you can also send messages to your care team and make appointments. If you have questions, please call your primary care clinic.  If you do not have a primary care provider, please call [phone] and they will assist you.       Care EveryWhere ID    This is your Care EveryWhere ID. This could be used by other organizations to access your [facility] medical records  [id number]       Your Vitals Were  Most recent update: 3/5/2020  3:03 PM    Blood Pressure   115/77 (BP Location: Right arm)    Pulse   56    Temperature   97  F (36.1  C)    Respirations   16    Pulse Oximetry   98%          Primary Care Provider Office Phone # Fax #    Campos Parra [phone]      Equal Access to Services    [facility] : Hadii aad ku hadasho Solemuel, waaxda luqadaha, qaybta kaalmada aderufinayacorinne, jose newton . [facility] [phone].    ATENCIÓN: Si habla español, tiene a knutson disposición servicios gratuitos de asistencia lingüística. Anasatsia al [phone].    We comply with applicable federal and state civil rights laws, including the [place] Human Rights Act. We do not discriminate on the basis of race, color, creed, Restorationist, national origin, marital status, age, disability, sex, sexual orientation, or gender identity.       Thank you!    Thank you for choosing [facility] for your care. Our goal is always to provide you with excellent care. Hearing back from our patients is one way we can continue to improve our services. Please take a few minutes to complete the written survey that you may receive in the mail after you visit with us. Thank you!            Medication List      Medications          Morning Afternoon Evening Bedtime As Needed    omega 3 1200 MG Caps  INSTRUCTIONS:  Take 2 capsules by mouth daily                       ASK your  doctor about these medications          Morning Afternoon Evening Bedtime As Needed    atorvastatin 10 MG tablet  Also known as:  LIPITOR  INSTRUCTIONS:  Take 1 tablet (10 mg) by mouth every 48 hours                     ciprofloxacin 250 MG tablet  Also known as:  CIPRO  INSTRUCTIONS:  Take 3 tablets by mouth 2 times daily FOR SUGERY                     diazepam 5 MG tablet  Also known as:  VALIUM  INSTRUCTIONS:  Take 5 mg by mouth every 6 hours as needed for anxiety                     * ibrutinib 420 MG tablet  Also known as:  IMBRUVICA  INSTRUCTIONS:  Take 1 tablet (420 mg) by mouth daily                     * ibrutinib 420 MG tablet  Also known as:  IMBRUVICA  INSTRUCTIONS:  Take 1 tablet (420 mg) by mouth daily                     ibuprofen 200 MG tablet  Also known as:  ADVIL/MOTRIN  INSTRUCTIONS:  Take 200 mg by mouth every 4 hours as needed for mild pain                     * levothyroxine 50 MCG tablet  Also known as:  SYNTHROID/LEVOTHROID  INSTRUCTIONS:  Take 1 tablet (50 mcg) by mouth daily                     * levothyroxine 75 MCG tablet  Also known as:  SYNTHROID/LEVOTHROID  INSTRUCTIONS:  Take 1 tablet (75 mcg) by mouth daily                     omeprazole 10 MG DR capsule  Also known as:  priLOSEC  INSTRUCTIONS:  TAKE ONE CAPSULE BY MOUTH EVERY DAY 30 TO 60 MINUTES BEFORE A MEAL                     pregabalin 75 MG capsule  Also known as:  LYRICA  INSTRUCTIONS:  TK ONE C PO  BID                     PROBIOTIC-10 PO  INSTRUCTIONS:  Take by mouth daily                     prochlorperazine 10 MG tablet  Also known as:  COMPAZINE  INSTRUCTIONS:  Take 1 tablet (10 mg) by mouth every 6 hours as needed (Nausea/Vomiting)                     * tiZANidine 2 MG tablet  Also known as:  ZANAFLEX  INSTRUCTIONS:  Take 1-2 tablets (2-4 mg) by mouth 3 times daily                     * tiZANidine 4 MG capsule  Also known as:  ZANAFLEX  INSTRUCTIONS:  Take 1 capsule (4 mg) by mouth 3 times daily as needed for muscle  spasms                     * tiZANidine 4 MG tablet  Also known as:  ZANAFLEX                     TYLENOL PO                     VITAMIN D3 PO  INSTRUCTIONS:  Take 4,000 Units by mouth daily                        * This list has 7 medication(s) that are the same as other medications prescribed for you. Read the directions carefully, and ask your doctor or other care provider to review them with you.

## 2020-03-05 NOTE — PROGRESS NOTES
Patient tolerated recovery stage well. VSS, right groin site clean/dry/intact, no hematoma, and denies pain. Patient tolerated PO food and fluids. Teaching was done and discharge instructions were given. Patient ambulated, voided, and PIV was removed. Patient discharged from the hospital via wheel chair to home with friend @ 1700.

## 2020-03-05 NOTE — DISCHARGE INSTRUCTIONS
[facility]   Interventional Radiology  Discharge Instructions Post Prostate Artery Embolization    AFTER YOU GO HOME          Relax and take it easy for 24 hours.       Drink plenty of fluids.       Resume your regular diet, unless otherwise instructed by your Primary Physician.       DO NOT smoke for at least 24 hours, if you were given any sedation.       DO NOT drink alcoholic beverages the day of your procedure.       Do not drive or operate machinery at home or at work for 24 hours.          DO NOT do any strenuous exercise or lifting for at least 2 days following your Procedure.       DO NOT take a bath or shower for at least 12 hours following your procedure.       DO NOT make any important or legal decisions for 24 hours following your procedure.    CALL THE PHYSICIAN IF:       - You start bleeding from the procedure site. lie down flat and hold pressure on the site. A small lump or bruise is common at the puncture site. Your physician will tell you if you need to return to the hospital.      - You develop numbness, coolness or a change in color of the leg that was punctured.      - You experience increased pain or redness at the puncture site.      - You develop hives or a rash or unexplained itching.      - You develop a temperature of 101 degrees F or greater.    Additional Information:           Support the puncture site for coughing, sneezing, or moving your bowels for the first 48 hours  No tub bath, hot tubs, or swimming for 5 days  No lotion or powder to the puncture site for 3 day    Closure Device: Mynx            [facility] INTERVENTIONAL RADIOLOGY DEPARTMENT         Procedure Physician: Dr. Mares and Dr. Andujar                             Date of Procedure:March 5, 2020       Telephone Numbers:   [phone]     Monday-Friday 8:00 to 4:30 pm                                        [phone]     After 4:30 pm Monday-Friday, Weekend and Holidays. Ask for the Interventional  Radiologist on call. Someone is on call 24 hrs/day.

## 2020-03-05 NOTE — PROGRESS NOTES
Patient Name: Loco Wood  Medical Record Number: [id number]  Today's Date: 3/5/2020    Procedure: prsotate artery embolization   Proceduralist: Dr.Golzarion Aparicio    Procedure start time: 0830 0837 puncture time 5Fr RFA  Procedure end time: 1235    Report given to: 2A RN     Pt arrived to IR room 1 from . Consent reviewed. Pt denies any questions or concerns regarding procedure. Pt educated on evans placement extensively pt refused three times, agreed to condom cath, nursing explained long procedure with bedrest . Pt verbalized understanding and refused evans. Pt positioned supine and monitored per protocol. Pt tolerated procedure without any noted complications. Pt transferred back to . Site CDI. Pressure dressing placed to site by rad techs.

## 2020-03-05 NOTE — PROGRESS NOTES
Patient returned to 2A post prostate artery embolization.  VSS.  Right groin with pressure dressing in place, C/D/I, no hematoma palpable, denies pain.  Condom catheter removed and patient will attempt to use urinal.  PO food and fluids at bedside.  4 hour bedrest due to likely failed Mynx closure device.

## 2020-03-05 NOTE — PROGRESS NOTES
Patient arrived on 2A for prostate artery embolization.  IV placed, labs sent.  Antibiotic infusing.  Groin prepped, pulses marked.  Consent complete.  Awaiting INR, PTT, otherwise prep complete.

## 2020-03-06 ENCOUNTER — TELEPHONE (OUTPATIENT)
Dept: VASCULAR SURGERY | Facility: CLINIC | Age: 75
End: 2020-03-06

## 2020-03-06 NOTE — TELEPHONE ENCOUNTER
Called pt to f/up on him s/p PAE    He did have some dribbling and frequency last night.   He states that today he's noticed more of a little stream.   Difficulty starting: none.     Hourly wake up last night.  He states that he felt he was never able to complete his bladder.     He does have penile head pain.  Informed him that this is common but it will go away.     Informed him that most often time, people report it as stinging and needles. I've asked him to keep up with the ibuprofen medication.    He states that he is       Pink tinged urine: negative.   Fevers: none    Puncture sites: looks good.   Walking is a little sore.     However overall, managing.     I informed him that most often times results are more visible ~2-3 week cdoy.     Informed him that we will remain in connection to follow up on his sx and if he needs anything     I did verify that he has the hospital phone number to call with any other questions.     He can call me next week should he need to followup.    He agrees to plan     Leela JOHNSON RN, BSN  Interventional Radiology Nurse Coordinator   Phone: [phone]

## 2020-03-09 ENCOUNTER — TELEPHONE (OUTPATIENT)
Dept: SURGERY | Facility: CLINIC | Age: 75
End: 2020-03-09

## 2020-03-10 ENCOUNTER — TELEPHONE (OUTPATIENT)
Dept: ONCOLOGY | Facility: CLINIC | Age: 75
End: 2020-03-10

## 2020-03-10 ENCOUNTER — TELEPHONE (OUTPATIENT)
Dept: VASCULAR SURGERY | Facility: CLINIC | Age: 75
End: 2020-03-10

## 2020-03-10 NOTE — TELEPHONE ENCOUNTER
"Called pt again on his cell phone.     Inquired on how he is doing over the weekend.     He states that the symptoms are still the same.     He states that nocturia is now 2-3 times since then.     Stream is still \"little\"   NO blood in urine.     Asked about the penile head pain, in which he denies now     Decreased frequency    He states that it's still the stream that 's not much improved.     He states that he's planning on 1 mos out. I informed him that symptoms will improve.     He is to call with any other questions.     He agrees to plan.     Leela JOHNSON RN, BSN  Interventional Radiology Nurse Coordinator   Phone: [phone]    "

## 2020-03-10 NOTE — ORAL ONC MGMT
Oral Chemotherapy Monitoring Program     Placed call to patient in follow up of Imbruvica therapy. Imbruvica was held from 3/1-3/10/2020 for prostate artery embolization. Loco said he plans to resume imbruvica on 3/11/2020. He thanked me for the call.     Henri FlorezD  [facility]  [phone]  March 10, 2020

## 2020-03-11 DIAGNOSIS — N40.1 BENIGN PROSTATIC HYPERPLASIA WITH LOWER URINARY TRACT SYMPTOMS, SYMPTOM DETAILS UNSPECIFIED: Primary | ICD-10-CM

## 2020-03-12 ENCOUNTER — TELEPHONE (OUTPATIENT)
Dept: VASCULAR SURGERY | Facility: CLINIC | Age: 75
End: 2020-03-12

## 2020-03-12 NOTE — TELEPHONE ENCOUNTER
Pt returning call.     He states that he's noticed a bruise a couple inches from the puncture site.    He did start taking the imbruvica, which can cause bruising.      I informed him that we will keep an eye on it. I do want him to make sure that the bruise doesn't get worse.     He notes that there's no pain either.     I've asked him to return my call with any other questions.     Leela JOHNSON RN, BSN  Interventional Radiology Nurse Coordinator   Phone: [phone]

## 2020-03-16 DIAGNOSIS — C91.10 CLL (CHRONIC LYMPHOCYTIC LEUKEMIA) (H): Primary | ICD-10-CM

## 2020-03-18 ENCOUNTER — TELEPHONE (OUTPATIENT)
Dept: ORTHOPEDICS | Facility: CLINIC | Age: 75
End: 2020-03-18

## 2020-03-18 ENCOUNTER — OFFICE VISIT (OUTPATIENT)
Dept: ORTHOPEDICS | Facility: CLINIC | Age: 75
End: 2020-03-18
Payer: COMMERCIAL

## 2020-03-18 VITALS
BODY MASS INDEX: 25.53 KG/M2 | SYSTOLIC BLOOD PRESSURE: 140 MMHG | HEART RATE: 86 BPM | DIASTOLIC BLOOD PRESSURE: 97 MMHG | WEIGHT: 163 LBS | RESPIRATION RATE: 16 BRPM

## 2020-03-18 DIAGNOSIS — S86.112A STRAIN OF GASTROCNEMIUS MUSCLE OF LEFT LOWER EXTREMITY, INITIAL ENCOUNTER: Primary | ICD-10-CM

## 2020-03-18 NOTE — TELEPHONE ENCOUNTER
Reason for Call:  Other appointment    Detailed comments: patient has Left Lower leg pain and wants to schedule an appointment.    Phone Number Patient can be reached at: Cell number on file:    Telephone Information:   Mobile [phone]       Best Time: asap    Can we leave a detailed message on this number? YES    Call taken on 3/18/2020 at 2:00 PM by Perri White

## 2020-03-18 NOTE — LETTER
3/18/2020       RE: Loco Wood  [address] [address]     Dear Colleague,    Thank you for referring your patient, Loco Wood, to the [facility] SPORTS AND ORTHOPAEDIC WALK IN CLINIC at [facility]. Please see a copy of my visit note below.          SPORTS & ORTHOPEDIC WALK-IN VISIT 3/18/2020    Primary Care Physician: Dr. Parra    Reason for visit:     What part of your body is injured / painful?  left lower leg    What caused the injury /pain? Gave out while playing pickelball     How long ago did your injury occur or pain begin? 5 weeks ago    What are your most bothersome symptoms? Pain    How would you characterize your symptom?  aching    What makes your symptoms better? Rest    What makes your symptoms worse? Walking    Have you been previously seen for this problem? No    Medical History:    Any recent changes to your medical history? No    Any new medication prescribed since last visit? No    Have you had surgery on this body part before? No    Social History:    Occupation: Retired    Handedness: Right    Exercise: 3-4 days/week    Review of Systems:    Do you have fever, chills, weight loss? No    Do you have any vision problems? No    Do you have any chest pain or edema? No    Do you have any shortness of breath or wheezing?  No    Do you have stomach problems? No    Do you have any numbness or focal weakness? No    Do you have diabetes? No    Do you have problems with bleeding or clotting? No    Do you have an rashes or other skin lesions? No             CHIEF COMPLAINT:  Consult (left lower leg )     HISTORY OF PRESENT ILLNESS  Mr. Wood is a pleasant 74 year old year old male who presents to clinic today with left lower leg pain.  Loco explains that left lower leg pain began about 5 weeks ago.  Initially felt soreness of left calf after pickle ball match.  He experienced improving calf pain after this time, improved  slowly.      However two weeks ago he was on a walk when he noted increased calf pain half way through walk.  Now has pain before the first mile of walking.  Lateral gastrocnemius region.  Worse with weight bearing ambulation.  Improved with rest. No swelling, deformity or ecchymosis of area.     Additional history: as documented    MEDICAL HISTORY  Patient Active Problem List   Diagnosis     Esophageal reflux     Lumbago     CLL (chronic lymphocytic leukemia) (H)     HYPERLIPIDEMIA LDL GOAL <130     [facility]     Health Care Home     Vitamin D deficiency     Osteoarthritis of hip     OA (osteoarthritis) of hip     Insomnia     BPH (benign prostatic hyperplasia)     Acute bilateral low back pain without sciatica     Prostate nodule     Chondromalacia, knee, right     Complex tear of medial meniscus of right knee as current injury     Herpes zoster with keratoconjunctivitis, od     Post herpetic neuralgia     Aftercare following surgery of the musculoskeletal system     Paroxysmal atrial fibrillation (H)     Chronic kidney disease, stage 3 (moderate) (H)       Current Outpatient Medications   Medication Sig Dispense Refill     Acetaminophen (TYLENOL PO)        atorvastatin (LIPITOR) 10 MG tablet Take 1 tablet (10 mg) by mouth every 48 hours 45 tablet 4     Cholecalciferol (VITAMIN D3 PO) Take 4,000 Units by mouth daily        ciprofloxacin (CIPRO) 250 MG tablet Take 3 tablets by mouth 2 times daily FOR SUGERY       diazepam (VALIUM) 5 MG tablet Take 5 mg by mouth every 6 hours as needed for anxiety       ibrutinib (IMBRUVICA) 420 MG tablet Take 1 tablet (420 mg) by mouth daily 28 tablet 0     ibrutinib (IMBRUVICA) 420 MG tablet Take 1 tablet (420 mg) by mouth daily 28 tablet 0     ibuprofen (ADVIL/MOTRIN) 200 MG tablet Take 200 mg by mouth every 4 hours as needed for mild pain       levothyroxine (SYNTHROID/LEVOTHROID) 50 MCG tablet Take 1 tablet (50 mcg) by mouth daily 90 tablet 3     levothyroxine  (SYNTHROID/LEVOTHROID) 75 MCG tablet Take 1 tablet (75 mcg) by mouth daily 90 tablet 3     omega 3 1200 MG CAPS Take 2 capsules by mouth daily       omeprazole (PRILOSEC) 10 MG DR capsule TAKE ONE CAPSULE BY MOUTH EVERY DAY 30 TO 60 MINUTES BEFORE A MEAL (Patient taking differently: TAKE ONE CAPSULE BY MOUTH EVERY OTHER DAY 30 TO 60 MINUTES BEFORE A MEAL) 90 capsule 3     prochlorperazine (COMPAZINE) 10 MG tablet Take 1 tablet (10 mg) by mouth every 6 hours as needed (Nausea/Vomiting) 30 tablet 2     tiZANidine (ZANAFLEX) 2 MG tablet Take 1-2 tablets (2-4 mg) by mouth 3 times daily 30 tablet 0     tiZANidine (ZANAFLEX) 4 MG capsule Take 1 capsule (4 mg) by mouth 3 times daily as needed for muscle spasms 30 capsule 1     tiZANidine (ZANAFLEX) 4 MG tablet        ibrutinib (IMBRUVICA) 420 MG tablet Take 1 tablet (420 mg) by mouth daily (Patient not taking: Reported on 3/2/2020) 28 tablet 0     pregabalin (LYRICA) 75 MG capsule TK ONE C PO  BID  3     Probiotic Product (PROBIOTIC-10 PO) Take by mouth daily         Allergies   Allergen Reactions     Dilaudid [Hydromorphone] Itching     Morphine Itching       Family History   Problem Relation Age of Onset     Heart Disease Father      Cerebrovascular Disease Father          OF [place]      Cancer Father         melonoma     Melanoma Father      Hyperlipidemia Father      Thyroid Disease Father      Cancer Mother         Lung cancer     Other Cancer Mother         lung; heavy smoker     Cerebrovascular Disease Paternal Uncle         Aneurism     Breast Cancer Maternal Aunt          from it     Cancer Paternal Uncle      Cancer Paternal Aunt      Skin Cancer No family hx of      Glaucoma No family hx of      Macular Degeneration No family hx of        Additional medical/Social/Surgical histories reviewed in [facility] and updated as appropriate.     REVIEW OF SYSTEMS (3/20/2020)  A 10-point review of systems was obtained and is negative except for as noted in the HPI.       PHYSICAL EXAM  BP (!) 140/97   Pulse 86   Resp 16   Wt 73.9 kg (163 lb)   BMI 25.53 kg/m      General  - normal appearance, in no obvious distress  CV  - normal popliteal pulse  Pulm  - normal respiratory pattern, non-labored  Musculoskeletal -Left lower leg  - stance: normal gait without limp  - inspection: no swelling or effusion, normal bone and joint alignment, no obvious deformity   - palpation: TTP of lateral head of gastrocnemius.  - ROM: Full knee and ankle ROM without pain. Decreased passive ankle dorsiflexion.  - strength: 5/5 plantarflexion and dorsiflexion at 0 and 90 degrees with mild pain at mid lateral gastroc.  Neuro  - no sensory or motor deficit  Skin  - no ecchymosis, erythema, warmth, or induration, no obvious rash  Psych  - interactive, appropriate, normal mood and affect     ASSESSMENT & PLAN  Mr. Wood is a 74 year old year old male who presents to clinic today with acute calf pain initially after pickle ball match 5 weeks ago.    Diagnosis: Acute strain of left gastrocnemius.    -Heel lifts provided to shorten heel gastroc/achilles complex  -Subthreshold activity including short distance walking  -Ice, OTC analgesics  -Home exercise plan discussed; start stretching and progress to strengthening as pain free with walking and stretches.  -Follow up 4 weeks if persisting; may call in.    It was a pleasure seeing Loco today.    Arnaud Melton DO, [facility]  Primary Care Sports Medicine      Again, thank you for allowing me to participate in the care of your patient.      Sincerely,    Arnaud Melton DO

## 2020-03-18 NOTE — TELEPHONE ENCOUNTER
Called pt back to help schedule appt. Left vm to call back placed spot on hold for pt tomorrow with patel.    Leela Mohan RN Specialty Triage 3/18/2020 3:39 PM

## 2020-03-18 NOTE — PROGRESS NOTES
SPORTS & ORTHOPEDIC WALK-IN VISIT 3/18/2020    Primary Care Physician: Dr. Parra    Reason for visit:     What part of your body is injured / painful?  left lower leg    What caused the injury /pain? Gave out while playing pickelball     How long ago did your injury occur or pain begin? 5 weeks ago    What are your most bothersome symptoms? Pain    How would you characterize your symptom?  aching    What makes your symptoms better? Rest    What makes your symptoms worse? Walking    Have you been previously seen for this problem? No    Medical History:    Any recent changes to your medical history? No    Any new medication prescribed since last visit? No    Have you had surgery on this body part before? No    Social History:    Occupation: Retired    Handedness: Right    Exercise: 3-4 days/week    Review of Systems:    Do you have fever, chills, weight loss? No    Do you have any vision problems? No    Do you have any chest pain or edema? No    Do you have any shortness of breath or wheezing?  No    Do you have stomach problems? No    Do you have any numbness or focal weakness? No    Do you have diabetes? No    Do you have problems with bleeding or clotting? No    Do you have an rashes or other skin lesions? No

## 2020-03-18 NOTE — PATIENT INSTRUCTIONS
WHAT IS A CALF STRAIN?      A calf strain is a stretch or tear of a muscle or tendon in the back of your leg below your knee. This area of your leg is called the calf. Tendons are strong bands of tissue that attach muscle to bone.    This type of injury is often called a pulled muscle.    WHAT IS THE CAUSE?    You can strain your calf muscle when you do an activity that involves pushing off forcefully from your toes. For example, it may happen when you are running, jumping, or lunging.    WHAT ARE THE SYMPTOMS?    Symptoms may include:    A snapping or popping sound at the time of the injury  Pain or burning in the back of your lower leg  A feeling that someone has hit you in the back of the leg  Trouble rising up on your toes  Swelling and bruising    HOW IS IT DIAGNOSED?    Your healthcare provider will ask about your symptoms, activities, and medical history and examine your leg.    HOW IS IT TREATED?    You will need to change or stop doing the activities that cause pain until your muscle or tendon has healed. For example, you may need to swim instead of run. You may need to use crutches if it s too painful to walk.    Your healthcare provider may recommend stretching and strengthening exercises. Your provider may refer you to physical therapy.    Your healthcare provider or physical therapist may tape the injured muscle while it heals to help you return to athletic activities. Taping helps reduce movement that may cause more muscle damage. They may recommend wearing an elastic or neoprene sleeve around your calf.    A mild strain may heal within a few weeks. A more severe strain may take 6 weeks or longer to heal.    HOW CAN I TAKE CARE OF MYSELF?    To help relieve swelling and pain:    Put an ice pack, gel pack, or package of frozen vegetables wrapped in a cloth on the sore area every 3 to 4 hours for up to 20 minutes at a time.  Do ice massage. To do this, freeze water in a Styrofoam cup, then peel the top  of the cup away to expose the ice. Hold the bottom of the cup and rub the ice over the painful area for 5 to 10 minutes. Do this several times a day while you have pain.  Keep your leg up on pillows when you sit or lie down.  Take nonprescription pain medicine, such as acetaminophen, ibuprofen, or naproxen. Read the label and take as directed. Nonsteroidal anti-inflammatory medicines (NSAIDs), such as ibuprofen or naproxen, may cause stomach bleeding and other problems. These risks increase with age. Unless recommended by your healthcare provider, do not take an NSAID for more than 10 days.  After you recover from your injury, moist heat may help relax your muscles and make it easier to use them. Put moist heat on your calf for 10 to 15 minutes before you do warm-up and stretching exercises. Moist heat includes heat patches or moist heating pads that you can buy at most [employer], a wet washcloth or towel that has been heated in a microwave or the dryer, or a hot shower. Don t use heat if you have swelling.    Use an elastic bandage when you return to your activities as directed by your provider.    Follow your healthcare provider's instructions, including any exercises recommended by your provider. Ask your provider:    How long it will take to recover  What activities you should avoid and when you can return to your normal activities  How to take care of yourself at home  What symptoms or problems you should watch for and what to do if you have them  Make sure you know when you should come back for a checkup.    HOW CAN I HELP PREVENT A CALF STRAIN?    Warm-up exercises and stretching before activities can help prevent injuries. This is especially important if you are doing jumping or sprinting sports.    Developed by [employer].  Published by [employer].  Copyright  2014 [employer] and/or one of its subsidiaries. All rights reserved.      CALF STRAIN EXERCISES   You can start gently stretching your  calf muscle with the towel stretch right away. Make sure you feel only a gentle pull and not a sharp pain in your calf while you are doing the stretch.    Wearing a quarter to half-inch heel lift in each shoe might reduce your pain as you start to recover from your injury. You can purchase heel lifts at most pharmacies. You can stop using the heel lift when you have no pain while walking.    Towel stretch: Sit on a hard surface with your injured leg stretched out in front of you. Loop a towel around your toes and the ball of your foot and pull the towel toward your body keeping your leg straight. Hold this position for 15 to 30 seconds and then relax. Repeat 3 times.  After you can do the towel stretch easily, you can start the standing calf stretch.    Standing calf stretch: Stand facing a wall with your hands on the wall at about eye level. Keep your injured leg back with your heel on the floor. Keep the other leg forward with the knee bent. Turn your back foot slightly inward (as if you were pigeon-toed). Slowly lean into the wall until you feel a stretch in the back of your calf. Hold the stretch for 15 to 30 seconds. Return to the starting position. Repeat 3 times. Do this exercise several times each day.    After a couple days of stretching (pain free walking and pain free stretching), you can begin strengthening your calf and lower leg muscles using elastic tubing as described in the next exercise.    Resisted ankle plantar flexion: Sit with your injured leg stretched out in front of you. Loop the tubing around the ball of your foot. Hold the ends of the tubing with both hands. Gently press the ball of your foot down and point your toes, stretching the tubing. Return to the starting position. Do 2 sets of 15.  You may do the last 4 exercises when you can stand on your toes without pain.    Heel raise: Stand behind a chair or counter with both feet flat on the floor. Using the chair or counter as a support,  "rise up onto your toes and hold for 5 seconds. Then slowly lower yourself down without holding onto the support. (It's OK to keep holding onto the support if you need to.) When this exercise becomes less painful, try doing this exercise while you are standing on the injured leg only. Repeat 15 times. Do 2 sets of 15. Rest 30 seconds between sets.  You can challenge yourself by standing on just your injured leg as you do this exercise.    Single leg balance: Stand without any support and try to balance on your injured leg. Keep standing on the one leg for 30 seconds. Repeat 3 times. Begin with your eyes open and then try to do the exercise with your eyes closed. When you have mastered this, try doing the exercise standing on a pillow.    Nose touch: Stand on one leg facing a wall. Stand 4 inches (10 centimeters) from the wall. Keep your body and leg straight. Slowly lean forward, trying to touch your nose to the wall and then return to the starting position. Make sure you do not bend forward at your waist. Do 2 sets of 10.    Wall jump: Face a wall and place a piece of masking tape about 2 feet (50 to 60 centimeters) above your head. Jump up with your arms above your head and try to touch the piece of tape. Make sure you do a \"spring\" type of motion and try to land softly on your feet. As the exercise gets easier, jump on just your injured leg. Do 2 sets of 15.    Side-lying leg lift: Lie on your uninjured side. Tighten the front thigh muscles on your injured leg and lift that leg 8 to 10 inches (20 to 25 centimeters) away from the other leg. Keep the leg straight and lower it slowly. Do 2 sets of 15.    When you can do these exercises without pain, you can start a light jogging program. You can return to your sport when there is no difference between your injured side and noninjured side when you do the exercises.    Developed by [employer].  Published by [employer].  Copyright  2014 [employer] and/or " one of its subsidiaries. All rights reserved.

## 2020-03-20 ENCOUNTER — TELEPHONE (OUTPATIENT)
Dept: SURGERY | Facility: CLINIC | Age: 75
End: 2020-03-20

## 2020-03-20 ENCOUNTER — TELEPHONE (OUTPATIENT)
Dept: VASCULAR SURGERY | Facility: CLINIC | Age: 75
End: 2020-03-20

## 2020-03-20 NOTE — PROGRESS NOTES
CHIEF COMPLAINT:  Consult (left lower leg )     HISTORY OF PRESENT ILLNESS  Mr. Wood is a pleasant 74 year old year old male who presents to clinic today with left lower leg pain.  Loco explains that left lower leg pain began about 5 weeks ago.  Initially felt soreness of left calf after pickle ball match.  He experienced improving calf pain after this time, improved slowly.      However two weeks ago he was on a walk when he noted increased calf pain half way through walk.  Now has pain before the first mile of walking.  Lateral gastrocnemius region.  Worse with weight bearing ambulation.  Improved with rest. No swelling, deformity or ecchymosis of area.     Additional history: as documented    MEDICAL HISTORY  Patient Active Problem List   Diagnosis     Esophageal reflux     Lumbago     CLL (chronic lymphocytic leukemia) (H)     HYPERLIPIDEMIA LDL GOAL <130     Mass of skin     Health Care Home     Vitamin D deficiency     Osteoarthritis of hip     OA (osteoarthritis) of hip     Insomnia     BPH (benign prostatic hyperplasia)     Acute bilateral low back pain without sciatica     Prostate nodule     Chondromalacia, knee, right     Complex tear of medial meniscus of right knee as current injury     Herpes zoster with keratoconjunctivitis, od     Post herpetic neuralgia     Aftercare following surgery of the musculoskeletal system     Paroxysmal atrial fibrillation (H)     Chronic kidney disease, stage 3 (moderate) (H)       Current Outpatient Medications   Medication Sig Dispense Refill     Acetaminophen (TYLENOL PO)        atorvastatin (LIPITOR) 10 MG tablet Take 1 tablet (10 mg) by mouth every 48 hours 45 tablet 4     Cholecalciferol (VITAMIN D3 PO) Take 4,000 Units by mouth daily        ciprofloxacin (CIPRO) 250 MG tablet Take 3 tablets by mouth 2 times daily FOR SUGERY       diazepam (VALIUM) 5 MG tablet Take 5 mg by mouth every 6 hours as needed for anxiety       ibrutinib (IMBRUVICA) 420 MG tablet Take  1 tablet (420 mg) by mouth daily 28 tablet 0     ibrutinib (IMBRUVICA) 420 MG tablet Take 1 tablet (420 mg) by mouth daily 28 tablet 0     ibuprofen (ADVIL/MOTRIN) 200 MG tablet Take 200 mg by mouth every 4 hours as needed for mild pain       levothyroxine (SYNTHROID/LEVOTHROID) 50 MCG tablet Take 1 tablet (50 mcg) by mouth daily 90 tablet 3     levothyroxine (SYNTHROID/LEVOTHROID) 75 MCG tablet Take 1 tablet (75 mcg) by mouth daily 90 tablet 3     omega 3 1200 MG CAPS Take 2 capsules by mouth daily       omeprazole (PRILOSEC) 10 MG DR capsule TAKE ONE CAPSULE BY MOUTH EVERY DAY 30 TO 60 MINUTES BEFORE A MEAL (Patient taking differently: TAKE ONE CAPSULE BY MOUTH EVERY OTHER DAY 30 TO 60 MINUTES BEFORE A MEAL) 90 capsule 3     prochlorperazine (COMPAZINE) 10 MG tablet Take 1 tablet (10 mg) by mouth every 6 hours as needed (Nausea/Vomiting) 30 tablet 2     tiZANidine (ZANAFLEX) 2 MG tablet Take 1-2 tablets (2-4 mg) by mouth 3 times daily 30 tablet 0     tiZANidine (ZANAFLEX) 4 MG capsule Take 1 capsule (4 mg) by mouth 3 times daily as needed for muscle spasms 30 capsule 1     tiZANidine (ZANAFLEX) 4 MG tablet        ibrutinib (IMBRUVICA) 420 MG tablet Take 1 tablet (420 mg) by mouth daily (Patient not taking: Reported on 3/2/2020) 28 tablet 0     pregabalin (LYRICA) 75 MG capsule TK ONE C PO  BID  3     Probiotic Product (PROBIOTIC-10 PO) Take by mouth daily         Allergies   Allergen Reactions     Dilaudid [Hydromorphone] Itching     Morphine Itching       Family History   Problem Relation Age of Onset     Heart Disease Father      Cerebrovascular Disease Father          OF [place]      Cancer Father         melonoma     Melanoma Father      Hyperlipidemia Father      Thyroid Disease Father      Cancer Mother         Lung cancer     Other Cancer Mother         lung; heavy smoker     Cerebrovascular Disease Paternal Uncle         Aneurism     Breast Cancer Maternal Aunt          from it     Cancer  Paternal Uncle      Cancer Paternal Aunt      Skin Cancer No family hx of      Glaucoma No family hx of      Macular Degeneration No family hx of        Additional medical/Social/Surgical histories reviewed in [facility] and updated as appropriate.     REVIEW OF SYSTEMS (3/20/2020)  A 10-point review of systems was obtained and is negative except for as noted in the HPI.      PHYSICAL EXAM  BP (!) 140/97   Pulse 86   Resp 16   Wt 73.9 kg (163 lb)   BMI 25.53 kg/m      General  - normal appearance, in no obvious distress  CV  - normal popliteal pulse  Pulm  - normal respiratory pattern, non-labored  Musculoskeletal -Left lower leg  - stance: normal gait without limp  - inspection: no swelling or effusion, normal bone and joint alignment, no obvious deformity   - palpation: TTP of lateral head of gastrocnemius.  - ROM: Full knee and ankle ROM without pain. Decreased passive ankle dorsiflexion.  - strength: 5/5 plantarflexion and dorsiflexion at 0 and 90 degrees with mild pain at mid lateral gastroc.  Neuro  - no sensory or motor deficit  Skin  - no ecchymosis, erythema, warmth, or induration, no obvious rash  Psych  - interactive, appropriate, normal mood and affect     ASSESSMENT & PLAN  Mr. Wood is a 74 year old year old male who presents to clinic today with acute calf pain initially after pickle ball match 5 weeks ago.    Diagnosis: Acute strain of left gastrocnemius.    -Heel lifts provided to shorten heel gastroc/achilles complex  -Subthreshold activity including short distance walking  -Ice, OTC analgesics  -Home exercise plan discussed; start stretching and progress to strengthening as pain free with walking and stretches.  -Follow up 4 weeks if persisting; may call in.    It was a pleasure seeing Loco today.    Arnaud Melton DO, CAQSM  Primary Care Sports Medicine

## 2020-03-20 NOTE — TELEPHONE ENCOUNTER
LM for patient regarding appointment 4/7 that has been changed from in clinic visit to telephone visit with   Dr. Mares and that call will take place approx. 9:20.    Call back number was provided    Rula Palmer LPN

## 2020-03-20 NOTE — TELEPHONE ENCOUNTER
Pt calling to inform me that he masterbated and had blood in his seman.     I informed him that this can happen post PAE.   Informed him that the prostate is still very inflamed in which when the fluid passes through the canal then blood can appear in the seman fluid    He is not having any pain and his urine is clear.     I informed him that as long as he doesn't have any pain or so then we will continue to monitor him     Assured him that this will get better.     He verbalized understanding     Leela JOHNSON RN, BSN  Interventional Radiology Nurse Coordinator   Phone: [phone]

## 2020-04-07 ENCOUNTER — VIRTUAL VISIT (OUTPATIENT)
Dept: VASCULAR SURGERY | Facility: CLINIC | Age: 75
End: 2020-04-07
Payer: COMMERCIAL

## 2020-04-07 DIAGNOSIS — N40.0 BENIGN PROSTATIC HYPERPLASIA, UNSPECIFIED WHETHER LOWER URINARY TRACT SYMPTOMS PRESENT: Primary | ICD-10-CM

## 2020-04-07 NOTE — PROGRESS NOTES
I've tried 2 times to reach Loco to do his rooming for his appointment today. 2 vm's left with a call back number.    DAMION Hudson

## 2020-04-07 NOTE — PROGRESS NOTES
Interventional Radiology Clinic Visit    Date of this visit: 4/7/2020    This is a telephone clinic visit with Dr. Mares.    9:20 AM    Primary Physician: Campos Parra        History Of Present Illness:    Loco Wood is a 74 year old male with a diagnosis of BPH with LUTS.    Patient will fill out IPSS form. I emailed this to him. We will addend this note, or enter his responses as a separate note, and these results will be compared to his pre-procedure responses.    Addendum to above: pt flled out IPSS form, unchanged score of 18 and QOL rating of 3    Immediately following the procedure he endorses minimal groin oozing, which went away.  Also had ~1 month hematospermia, this has also stopped (few days ago).  No hematuria, continues to have retrograde ejaculations. No pain.   Only improved symptom at this time is now getting up once a night to pee; this is now fairly regular (previously 1-3)  Still weak stream, incomplete and repeat voiding    Urine flow done before procedure, but minimal volume at that time gave a poor reading per patient.      Review of Systems:    General: Negative for recent fever.  Respiratory: Negative for cough or shortness of breath.    Past Medical/Surgical History:    Past Medical History:   Diagnosis Date     Arthritis     hip and toes     Chronic pain      CLL (chronic lymphocytic leukaemia)      Esophageal reflux      Malignant neoplasm (H)     Leukemia     Paroxysmal atrial fibrillation (H) 2/5/2020     PONV (postoperative nausea and vomiting)      Pure hypercholesterolemia      Past Surgical History:   Procedure Laterality Date     ARTHROPLASTY MINIMALLY INVASIVE HIP  5/10/2013    Procedure: ARTHROPLASTY MINIMALLY INVASIVE HIP;  Left Two Incision Total Hip Arthroplasty ;  Surgeon: Desmond Alberts MD;  Location: [facility] OR     ARTHROPLASTY MINIMALLY INVASIVE HIP  2/27/2014    Procedure: ARTHROPLASTY MINIMALLY INVASIVE HIP;  Right Total Hip Arthroplasty Minimally  Invasive Two Incision  *Latex Free Room;  Surgeon: Desmond Alberts MD;  Location: [facility] OR     BACK SURGERY      back fusion 1994     C NONSPECIFIC PROCEDURE  1964 &'95    (R) inguinal herniorrhapy     C NONSPECIFIC PROCEDURE  1949    (L) inguinal herniorrhaphy     C NONSPECIFIC PROCEDURE  1994    L4-5  L5 S1 fusion - spondylolisthesis     COLONOSCOPY      2007     COLONOSCOPY N/A 8/15/2017    Procedure: COLONOSCOPY;  colonoscopy;  Surgeon: Chun Mcguire MD;  Location:  GI     GI SURGERY      4 hernia repairs in childhood     HERNIA REPAIR      4 since age 4     IR PAE  3/5/2020       Current Medications:    Current Outpatient Medications   Medication Sig Dispense Refill     Acetaminophen (TYLENOL PO)        atorvastatin (LIPITOR) 10 MG tablet Take 1 tablet (10 mg) by mouth every 48 hours 45 tablet 4     Cholecalciferol (VITAMIN D3 PO) Take 4,000 Units by mouth daily        ciprofloxacin (CIPRO) 250 MG tablet Take 3 tablets by mouth 2 times daily FOR SUGERY       diazepam (VALIUM) 5 MG tablet Take 5 mg by mouth every 6 hours as needed for anxiety       ibrutinib (IMBRUVICA) 420 MG tablet Take 1 tablet (420 mg) by mouth daily 28 tablet 0     ibrutinib (IMBRUVICA) 420 MG tablet Take 1 tablet (420 mg) by mouth daily 28 tablet 0     ibrutinib (IMBRUVICA) 420 MG tablet Take 1 tablet (420 mg) by mouth daily (Patient not taking: Reported on 3/2/2020) 28 tablet 0     ibuprofen (ADVIL/MOTRIN) 200 MG tablet Take 200 mg by mouth every 4 hours as needed for mild pain       levothyroxine (SYNTHROID/LEVOTHROID) 50 MCG tablet Take 1 tablet (50 mcg) by mouth daily 90 tablet 3     levothyroxine (SYNTHROID/LEVOTHROID) 75 MCG tablet Take 1 tablet (75 mcg) by mouth daily 90 tablet 3     omega 3 1200 MG CAPS Take 2 capsules by mouth daily       omeprazole (PRILOSEC) 10 MG DR capsule TAKE ONE CAPSULE BY MOUTH EVERY DAY 30 TO 60 MINUTES BEFORE A MEAL (Patient taking differently: TAKE ONE CAPSULE BY MOUTH EVERY OTHER DAY 30 TO 60  MINUTES BEFORE A MEAL) 90 capsule 3     pregabalin (LYRICA) 75 MG capsule TK ONE C PO  BID  3     Probiotic Product (PROBIOTIC-10 PO) Take by mouth daily       prochlorperazine (COMPAZINE) 10 MG tablet Take 1 tablet (10 mg) by mouth every 6 hours as needed (Nausea/Vomiting) 30 tablet 2     tiZANidine (ZANAFLEX) 2 MG tablet Take 1-2 tablets (2-4 mg) by mouth 3 times daily 30 tablet 0     tiZANidine (ZANAFLEX) 4 MG capsule Take 1 capsule (4 mg) by mouth 3 times daily as needed for muscle spasms 30 capsule 1     tiZANidine (ZANAFLEX) 4 MG tablet          Allergies:    Dilaudid [hydromorphone] and Morphine    Family History:    Family History   Problem Relation Age of Onset     Heart Disease Father      Cerebrovascular Disease Father          OF [place]      Cancer Father         melonoma     Melanoma Father      Hyperlipidemia Father      Thyroid Disease Father      Cancer Mother         Lung cancer     Other Cancer Mother         lung; heavy smoker     Cerebrovascular Disease Paternal Uncle         Aneurism     Breast Cancer Maternal Aunt          from it     Cancer Paternal Uncle      Cancer Paternal Aunt      Skin Cancer No family hx of      Glaucoma No family hx of      Macular Degeneration No family hx of        Social History:        Social History     Socioeconomic History     Marital status: Single     Spouse name: Ellen     Number of children: 0     Years of education: PHD     Highest education level: Not on file   Occupational History     Occupation: Teacher     Employer: [employer]   Social Needs     Financial resource strain: Not on file     Food insecurity     Worry: Not on file     Inability: Not on file     Transportation needs     Medical: Not on file     Non-medical: Not on file   Tobacco Use     Smoking status: Never Smoker     Smokeless tobacco: Never Used   Substance and Sexual Activity     Alcohol use: Yes     Alcohol/week: 0.0 standard drinks     Comment:  moderate, social     Drug use: No     Sexual activity: Not Currently     Partners: Female   Lifestyle     Physical activity     Days per week: Not on file     Minutes per session: Not on file     Stress: Not on file   Relationships     Social connections     Talks on phone: Not on file     Gets together: Not on file     Attends Buddhism service: Not on file     Active member of club or organization: Not on file     Attends meetings of clubs or organizations: Not on file     Relationship status: Not on file     Intimate partner violence     Fear of current or ex partner: Not on file     Emotionally abused: Not on file     Physically abused: Not on file     Forced sexual activity: Not on file   Other Topics Concern      Service No     Blood Transfusions No     Caffeine Concern No     Occupational Exposure No     Hobby Hazards No     Sleep Concern No     Stress Concern No     Weight Concern No     Special Diet No     Back Care Yes     Exercise Yes     Comment: Several times a week     Bike Helmet No     Seat Belt Yes     Self-Exams No     Parent/sibling w/ CABG, MI or angioplasty before 65F 55M? No   Social History Narrative    Social Documentation:7/10        Balanced Diet: YES    Calcium intake:milk and food    Caffeine: 2 cups of coffee per day    Exercise:  type of activity  Wts , stretching, cardio;  3 times per week    Sunscreen:no    Seatbelts:  Yes    Self Breast Exam:  No -     Self Testicular Exam: No    Physical/Emotional/Sexual Abuse: No     Do you feel safe in your environment? Yes        Cholesterol screen up to date: today    Eye Exam up to date: Yes    Dental Exam up to date: Yes    Pap smear up to date: Does Not Apply    Mammogram up to date: Does Not Apply    Dexa Scan up to date: Does Not Apply    Colonoscopy up to date: Yes-2007    Immunizations up to date: Yes-2008    Glucose screen if over 40:  No     Luis Alfredo Knox ma                   Physical Exam:    There were no vitals taken for  this visit.     Physical exam not performed in this setting.    Laboratory Studies:    Lab Results   Component Value Date    HGB 13.4 03/02/2020     Lab Results   Component Value Date     03/02/2020     Lab Results   Component Value Date    WBC 43.2 03/02/2020       Lab Results   Component Value Date    INR 1.16 03/05/2020       Lab Results   Component Value Date    PROTTOTAL 6.4 03/02/2020      Lab Results   Component Value Date    ALBUMIN 3.8 03/02/2020     Lab Results   Component Value Date    BILITOTAL 1.0 03/02/2020     Lab Results   Component Value Date    BILICONJ 0.0 03/05/2012      Lab Results   Component Value Date    ALKPHOS 66 03/02/2020     Lab Results   Component Value Date    AST 16 03/02/2020     Lab Results   Component Value Date    ALT 23 03/02/2020       Lab Results   Component Value Date    CR 1.52 03/02/2020     Lab Results   Component Value Date    BUN 18 03/02/2020       No results found for: FETO    Imaging:     No new relevant imaging.    ASSESSMENT/PLAN:        We are optimistic that there will be continued improvement at 3 months, decreased nocturia is promising. Also stopped flomax prior to procedure, so current benefit is in spite of that.     We will follow-up in clinic 2 months from now, which will be 3 months from the time of the procedure. We are ok with patient restarting flomax, if he feels this may offer further symptomatic relief. Our end goal would be for his symptoms to be improved enough from our procedure that this would not be necessary. If there is not improvement by this time, we will need to look further into alternative causes of his urinary symptoms.    It was a pleasure heaing this patient. We spent a total of 60 minutes in the care of this patient, greater than 50% of which was spent on face to face counseling and/or care coordination.    Nikhil Flanagan MD    IPSS: 18 and QOL: 3 Without medication    I have discussed with the patient and the resident by  phone and agree with the note.    CC  Patient Care Team:  Campos Parra MD as PCP - General (Internal Medicine)  Campos Boyd MD as MD (Urology)  Padmini Whitney, RN as Registered Nurse  Nikhil Farah MD as MD (Neurology)  Nikhil Farah MD as Referring Physician (Neurology)  Kamlesh Bonner MD as MD (Ophthalmology)  Campos Parra MD as Assigned PCP  Daya Adams RN as Specialty Care Coordinator (Cardiology)  Virginie Kenney MD as MD (Cardiology)  SELF, REFERRED

## 2020-04-13 DIAGNOSIS — C91.10 CLL (CHRONIC LYMPHOCYTIC LEUKEMIA) (H): Primary | ICD-10-CM

## 2020-04-15 ENCOUNTER — DOCUMENTATION ONLY (OUTPATIENT)
Dept: ONCOLOGY | Facility: CLINIC | Age: 75
End: 2020-04-15

## 2020-04-15 DIAGNOSIS — C91.10 CLL (CHRONIC LYMPHOCYTIC LEUKEMIA) (H): Primary | ICD-10-CM

## 2020-04-15 NOTE — PROGRESS NOTES
[facility]-Payment form received via e-mail. To be filled out and placed in provider folder for approval.    When signed, fax to [phone].    Miya Goodwin [facility]

## 2020-04-20 DIAGNOSIS — E06.3 HYPOTHYROIDISM DUE TO HASHIMOTO'S THYROIDITIS: ICD-10-CM

## 2020-04-20 DIAGNOSIS — E78.5 HYPERLIPIDEMIA LDL GOAL <130: ICD-10-CM

## 2020-04-20 NOTE — TELEPHONE ENCOUNTER
"Requested Prescriptions   Pending Prescriptions Disp Refills     atorvastatin (LIPITOR) 10 MG tablet [Pharmacy Med Name: ATORVASTATIN 10MG TABLETS]  Last Written Prescription Date:  4/3/2019  Last Fill Quantity: 45 tablet,  # refills: 4   Last office visit: 3/2/2020 with prescribing provider:  SELENA Parra   Future Office Visit:   45 tablet 4     Sig: TAKE 1 TABLET BY MOUTH EVERY 48 HOURS       Statins Protocol Passed - 4/20/2020  9:30 AM        Passed - LDL on file in past 12 months     Recent Labs   Lab Test 03/02/20  0846   *             Passed - No abnormal creatine kinase in past 12 months     No lab results found.             Passed - Recent (12 mo) or future (30 days) visit within the authorizing provider's specialty     Patient has had an office visit with the authorizing provider or a provider within the authorizing providers department within the previous 12 mos or has a future within next 30 days. See \"Patient Info\" tab in inbasket, or \"Choose Columns\" in Meds & Orders section of the refill encounter.              Passed - Medication is active on med list        Passed - Patient is age 18 or older                   levothyroxine (SYNTHROID/LEVOTHROID) 50 MCG tablet [Pharmacy Med Name: LEVOTHYROXINE 0.05MG (50MCG) TAB]  Last Written Prescription Date:  5/2/2019  Last Fill Quantity: 90 tablet,  # refills: 3   Last office visit: 3/2/2020 with prescribing provider:  SELENA Parra   Future Office Visit:   90 tablet 3     Sig: TAKE 1 TABLET(50 MCG) BY MOUTH DAILY       Thyroid Protocol Failed - 4/20/2020  9:30 AM        Failed - Normal TSH on file in past 12 months     Recent Labs   Lab Test 01/10/20  0825   TSH 7.10*              Passed - Patient is 12 years or older        Passed - Recent (12 mo) or future (30 days) visit within the authorizing provider's specialty     Patient has had an office visit with the authorizing provider or a provider within the authorizing providers department within the " "previous 12 mos or has a future within next 30 days. See \"Patient Info\" tab in inbasket, or \"Choose Columns\" in Meds & Orders section of the refill encounter.              Passed - Medication is active on med list           "

## 2020-04-22 RX ORDER — LEVOTHYROXINE SODIUM 50 UG/1
TABLET ORAL
Qty: 90 TABLET | Refills: 3 | Status: SHIPPED | OUTPATIENT
Start: 2020-04-22 | End: 2021-04-19

## 2020-04-22 RX ORDER — ATORVASTATIN CALCIUM 10 MG/1
TABLET, FILM COATED ORAL
Qty: 45 TABLET | Refills: 4 | Status: SHIPPED | OUTPATIENT
Start: 2020-04-22 | End: 2021-04-12

## 2020-04-22 NOTE — TELEPHONE ENCOUNTER
Atorvastatin  Prescription approved per [facility] [place].  JODY MataN, RN - Pap Tracking

## 2020-04-22 NOTE — TELEPHONE ENCOUNTER
Levothyroxine -   Routing refill request to provider for review/approval because:  Labs out of range:  TSH  Bren Jorgensen, JODYN, RN

## 2020-04-28 ENCOUNTER — MYC MEDICAL ADVICE (OUTPATIENT)
Dept: FAMILY MEDICINE | Facility: CLINIC | Age: 75
End: 2020-04-28

## 2020-05-05 ENCOUNTER — TELEPHONE (OUTPATIENT)
Dept: TRANSPLANT | Facility: CLINIC | Age: 75
End: 2020-05-05

## 2020-05-05 DIAGNOSIS — C91.10 CLL (CHRONIC LYMPHOCYTIC LEUKEMIA) (H): ICD-10-CM

## 2020-05-05 DIAGNOSIS — N40.1 BENIGN PROSTATIC HYPERPLASIA WITH LOWER URINARY TRACT SYMPTOMS, SYMPTOM DETAILS UNSPECIFIED: ICD-10-CM

## 2020-05-05 LAB
ALBUMIN SERPL-MCNC: 4 G/DL (ref 3.4–5)
ALP SERPL-CCNC: 62 U/L (ref 40–150)
ALT SERPL W P-5'-P-CCNC: 20 U/L (ref 0–70)
ANION GAP SERPL CALCULATED.3IONS-SCNC: 4 MMOL/L (ref 3–14)
ANISOCYTOSIS BLD QL SMEAR: SLIGHT
AST SERPL W P-5'-P-CCNC: 21 U/L (ref 0–45)
BILIRUB SERPL-MCNC: 1.2 MG/DL (ref 0.2–1.3)
BUN SERPL-MCNC: 16 MG/DL (ref 7–30)
BURR CELLS BLD QL SMEAR: SLIGHT
CALCIUM SERPL-MCNC: 8.7 MG/DL (ref 8.5–10.1)
CHLORIDE SERPL-SCNC: 109 MMOL/L (ref 94–109)
CO2 SERPL-SCNC: 28 MMOL/L (ref 20–32)
CREAT SERPL-MCNC: 1.46 MG/DL (ref 0.66–1.25)
DIFFERENTIAL METHOD BLD: ABNORMAL
ELLIPTOCYTES BLD QL SMEAR: SLIGHT
ERYTHROCYTE [DISTWIDTH] IN BLOOD BY AUTOMATED COUNT: 15.5 % (ref 10–15)
GFR SERPL CREATININE-BSD FRML MDRD: 47 ML/MIN/{1.73_M2}
GLUCOSE SERPL-MCNC: 93 MG/DL (ref 70–99)
HCT VFR BLD AUTO: 42.1 % (ref 40–53)
HGB BLD-MCNC: 13.2 G/DL (ref 13.3–17.7)
LYMPHOCYTES # BLD AUTO: 32.9 10E9/L (ref 0.8–5.3)
LYMPHOCYTES NFR BLD AUTO: 89 %
MCH RBC QN AUTO: 30.1 PG (ref 26.5–33)
MCHC RBC AUTO-ENTMCNC: 31.4 G/DL (ref 31.5–36.5)
MCV RBC AUTO: 96 FL (ref 78–100)
MONOCYTES # BLD AUTO: 0.7 10E9/L (ref 0–1.3)
MONOCYTES NFR BLD AUTO: 2 %
NEUTROPHILS # BLD AUTO: 3.3 10E9/L (ref 1.6–8.3)
NEUTROPHILS NFR BLD AUTO: 9 %
PLATELET # BLD AUTO: 149 10E9/L (ref 150–450)
PLATELET # BLD EST: ABNORMAL 10*3/UL
POTASSIUM SERPL-SCNC: 4.3 MMOL/L (ref 3.4–5.3)
PROT SERPL-MCNC: 6.5 G/DL (ref 6.8–8.8)
PSA SERPL-MCNC: 5.73 UG/L (ref 0–4)
RBC # BLD AUTO: 4.39 10E12/L (ref 4.4–5.9)
SMUDGE CELLS BLD QL SMEAR: PRESENT
SODIUM SERPL-SCNC: 141 MMOL/L (ref 133–144)
VARIANT LYMPHS BLD QL SMEAR: PRESENT
WBC # BLD AUTO: 36.9 10E9/L (ref 4–11)

## 2020-05-05 PROCEDURE — 85025 COMPLETE CBC W/AUTO DIFF WBC: CPT | Performed by: INTERNAL MEDICINE

## 2020-05-05 PROCEDURE — 84153 ASSAY OF PSA TOTAL: CPT | Performed by: UROLOGY

## 2020-05-05 PROCEDURE — 36415 COLL VENOUS BLD VENIPUNCTURE: CPT | Performed by: INTERNAL MEDICINE

## 2020-05-05 PROCEDURE — 80053 COMPREHEN METABOLIC PANEL: CPT | Performed by: INTERNAL MEDICINE

## 2020-05-05 NOTE — TELEPHONE ENCOUNTER
DATE:  5/5/2020   TIME OF RECEIPT FROM LAB:  11:41 AM  LAB TEST:  wbc  LAB VALUE:  36 (will send slide)  RESULTS GIVEN WITH READ-BACK TO (PROVIDER):  RASHID KAHN (attending, page sent to Jess WILDER)  TIME LAB VALUE REPORTED TO PROVIDER:   Paged 11:41 AM    Acknowledged by provider.

## 2020-05-11 DIAGNOSIS — C91.10 CLL (CHRONIC LYMPHOCYTIC LEUKEMIA) (H): Primary | ICD-10-CM

## 2020-05-13 DIAGNOSIS — C91.10 CLL (CHRONIC LYMPHOCYTIC LEUKEMIA) (H): Primary | ICD-10-CM

## 2020-05-14 ENCOUNTER — TELEPHONE (OUTPATIENT)
Dept: PHARMACY | Facility: CLINIC | Age: 75
End: 2020-05-14

## 2020-05-14 NOTE — ORAL ONC MGMT
"Oral Chemotherapy Monitoring Program    Primary Oncologist: Dr. Burns  Primary Oncology Clinic: [facility]  Cancer Diagnosis: CLL    Therapy History:  Ibrutinib  C1D1: 05/14/20  1 tablet (420mg) daily    Drug Interaction Assessment: No new drug interactions found.    Lab Monitoring Plan  CBC and CMP monthly  Subjective/Objective:  Loco Wood is a 75 year old male called over the phone for a follow-up visit for oral chemotherapy. Dennie confirmed his dose and that he is adherent. He reports that his hands were \"freezing up\" but this hasn't happened for over a week now. He denies any nausea, vomiting, or diarrhea.     ORAL CHEMOTHERAPY 5/8/2019 7/31/2019 10/1/2019 1/3/2020 3/10/2020 5/14/2020   Drug Name Imbruvica (Ibrutinib) Imbruvica (Ibrutinib) Imbruvica (Ibrutinib) Imbruvica (Ibrutinib) Imbruvica (Ibrutinib) Imbruvica (Ibrutinib)   Current Dosage 420mg 420mg 420mg 420mg 420mg 420mg   Current Schedule Daily Daily Daily Daily Daily Daily   Cycle Details Continuous Continuous Continuous Continuous Continuous Continuous   Planned next cycle start date - - - - 3/11/2020 -   Doses missed in last 2 weeks - 0 0 0 - 0   Adherence Assessment - Adherent Adherent Adherent - Adherent   Adverse Effects - No AE identified during assessment No AE identified during assessment No AE identified during assessment - No AE identified during assessment   Any new drug interactions? No No No No - No   Is the dose as ordered appropriate for the patient? Yes Yes Yes Yes - Yes       Last PHQ-2 Score on record:   PHQ-2 ( [facility]) 3/18/2020 1/7/2020   Q1: Little interest or pleasure in doing things 0 0   Q2: Feeling down, depressed or hopeless 0 0   PHQ-2 Score 0 0   Q1: Little interest or pleasure in doing things - -   Q2: Feeling down, depressed or hopeless - -   PHQ-2 Score - -       Vitals:  BP:   BP Readings from Last 1 Encounters:   03/18/20 (!) 140/97     Wt Readings from Last 1 Encounters:   03/18/20 73.9 kg (163 " "lb)     Estimated body surface area is 1.87 meters squared as calculated from the following:    Height as of 2/5/20: 1.702 m (5' 7\").    Weight as of 3/18/20: 73.9 kg (163 lb).    Labs:  _  Result Component Current Result Ref Range   Sodium 141 (5/5/2020) 133 - 144 mmol/L     _  Result Component Current Result Ref Range   Potassium 4.3 (5/5/2020) 3.4 - 5.3 mmol/L     _  Result Component Current Result Ref Range   Calcium 8.7 (5/5/2020) 8.5 - 10.1 mg/dL     No results found for Mag within last 30 days.     No results found for Phos within last 30 days.     _  Result Component Current Result Ref Range   Albumin 4.0 (5/5/2020) 3.4 - 5.0 g/dL     _  Result Component Current Result Ref Range   Urea Nitrogen 16 (5/5/2020) 7 - 30 mg/dL     _  Result Component Current Result Ref Range   Creatinine 1.46 (H) (5/5/2020) 0.66 - 1.25 mg/dL       _  Result Component Current Result Ref Range   AST 21 (5/5/2020) 0 - 45 U/L     _  Result Component Current Result Ref Range   ALT 20 (5/5/2020) 0 - 70 U/L     _  Result Component Current Result Ref Range   Bilirubin Total 1.2 (5/5/2020) 0.2 - 1.3 mg/dL       _  Result Component Current Result Ref Range   WBC 36.9 (HH) (5/5/2020) 4.0 - 11.0 10e9/L     _  Result Component Current Result Ref Range   Hemoglobin 13.2 (L) (5/5/2020) 13.3 - 17.7 g/dL     _  Result Component Current Result Ref Range   Platelet Count 149 (L) (5/5/2020) 150 - 450 10e9/L     _  Result Component Current Result Ref Range   Absolute Neutrophil 3.3 (5/5/2020) 1.6 - 8.3 10e9/L       Assessment/Plan:  CLL: Patient is tolerating well, continue therapy as planned    Follow-Up:  R: Review Grant marquezt.    Refill Due:  Refill due 08/05    [facility] Intern  Oral Chemotherapy Monitoring Program  [phone]      "

## 2020-05-20 ENCOUNTER — VIRTUAL VISIT (OUTPATIENT)
Dept: FAMILY MEDICINE | Facility: CLINIC | Age: 75
End: 2020-05-20
Payer: COMMERCIAL
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
## 2020-05-20 PROCEDURE — 99213 OFFICE O/P EST LOW 20 MIN: CPT | Mod: 95 | Performed by: INTERNAL MEDICINE

## 2020-05-20 NOTE — PROGRESS NOTES
"Loco Wood is a 75 year old male who is being evaluated via a billable video visit.      The patient has been notified of following:     \"This video visit will be conducted via a call between you and your physician/provider. We have found that certain health care needs can be provided without the need for an in-person physical exam.  This service lets us provide the care you need with a video conversation.  If a prescription is necessary we can send it directly to your pharmacy.  If lab work is needed we can place an order for that and you can then stop by our lab to have the test done at a later time.    Video visits are billed at different rates depending on your insurance coverage.  Please reach out to your insurance provider with any questions.    If during the course of the call the physician/provider feels a video visit is not appropriate, you will not be charged for this service.\"    Patient has given verbal consent for Video visit? Yes    How would you like to obtain your AVS? Angela    Patient would like the video invitation sent by: Text to cell phone: [phone]  Start: 05/20/2020 02:22 pm   Stop: 05/20/2020 02:35 pm  Will anyone else be joining your video visit? No    Subjective     Loco Wood is a 75 year old male who presents today via video visit for the following health issues:    HPI     Discuss red/blood looking dots on both arms, forearms. Possible bug bites from golfing yesterday. No itching or raised, no pain or welt like.         Video Start Time: 2:22 PM        Patient Active Problem List   Diagnosis     Esophageal reflux     Lumbago     CLL (chronic lymphocytic leukemia) (H)     HYPERLIPIDEMIA LDL GOAL <130     Mass of skin     Health Care Home     Vitamin D deficiency     Osteoarthritis of hip     OA (osteoarthritis) of hip     Insomnia     BPH (benign prostatic hyperplasia)     Acute bilateral low back pain without sciatica     Prostate nodule     Chondromalacia, knee, " right     Complex tear of medial meniscus of right knee as current injury     Herpes zoster with keratoconjunctivitis, od     Post herpetic neuralgia     Aftercare following surgery of the musculoskeletal system     Paroxysmal atrial fibrillation (H)     Chronic kidney disease, stage 3 (moderate) (H)     Past Surgical History:   Procedure Laterality Date     ARTHROPLASTY MINIMALLY INVASIVE HIP  5/10/2013    Procedure: ARTHROPLASTY MINIMALLY INVASIVE HIP;  Left Two Incision Total Hip Arthroplasty ;  Surgeon: Desmond Alberts MD;  Location: [facility] OR     ARTHROPLASTY MINIMALLY INVASIVE HIP  2014    Procedure: ARTHROPLASTY MINIMALLY INVASIVE HIP;  Right Total Hip Arthroplasty Minimally Invasive Two Incision  *Latex Free Room;  Surgeon: Desmond Alberts MD;  Location: [facility] OR     BACK SURGERY      back fusion      C NONSPECIFIC PROCEDURE   &    (R) inguinal herniorrhapy     C NONSPECIFIC PROCEDURE      (L) inguinal herniorrhaphy     C NONSPECIFIC PROCEDURE      L4-5  L5 S1 fusion - spondylolisthesis     COLONOSCOPY           COLONOSCOPY N/A 8/15/2017    Procedure: COLONOSCOPY;  colonoscopy;  Surgeon: Chun Mcguire MD;  Location:  GI     GI SURGERY      4 hernia repairs in childhood     HERNIA REPAIR      4 since age 4     IR PAE  3/5/2020       Social History     Tobacco Use     Smoking status: Never Smoker     Smokeless tobacco: Never Used   Substance Use Topics     Alcohol use: Yes     Alcohol/week: 0.0 standard drinks     Comment: moderate, social     Family History   Problem Relation Age of Onset     Heart Disease Father      Cerebrovascular Disease Father          OF STROKE      Cancer Father         melonoma     Melanoma Father      Hyperlipidemia Father      Thyroid Disease Father      Cancer Mother         Lung cancer     Other Cancer Mother         lung; heavy smoker     Cerebrovascular Disease Paternal Uncle         Aneurism     Breast Cancer Maternal Aunt           from it     Cancer Paternal Uncle      Cancer Paternal Aunt      Skin Cancer No family hx of      Glaucoma No family hx of      Macular Degeneration No family hx of          Current Outpatient Medications   Medication Sig Dispense Refill     Acetaminophen (TYLENOL PO)        atorvastatin (LIPITOR) 10 MG tablet TAKE 1 TABLET BY MOUTH EVERY 48 HOURS 45 tablet 4     diazepam (VALIUM) 5 MG tablet Take 5 mg by mouth every 6 hours as needed for anxiety       ibrutinib (IMBRUVICA) 420 MG tablet Take 1 tablet (420 mg) by mouth daily 28 tablet 2     ibuprofen (ADVIL/MOTRIN) 200 MG tablet Take 200 mg by mouth every 4 hours as needed for mild pain       levothyroxine (SYNTHROID/LEVOTHROID) 50 MCG tablet TAKE 1 TABLET(50 MCG) BY MOUTH DAILY 90 tablet 3     omega 3 1200 MG CAPS Take 2 capsules by mouth daily       omeprazole (PRILOSEC) 10 MG DR capsule TAKE ONE CAPSULE BY MOUTH EVERY DAY 30 TO 60 MINUTES BEFORE A MEAL (Patient taking differently: TAKE ONE CAPSULE BY MOUTH EVERY OTHER DAY 30 TO 60 MINUTES BEFORE A MEAL) 90 capsule 3     Probiotic Product (PROBIOTIC-10 PO) Take by mouth daily       tiZANidine (ZANAFLEX) 2 MG tablet Take 1-2 tablets (2-4 mg) by mouth 3 times daily 30 tablet 0     tiZANidine (ZANAFLEX) 4 MG tablet        Allergies   Allergen Reactions     Dilaudid [Hydromorphone] Itching     Morphine Itching     Recent Labs   Lab Test 05/05/20  0936 03/02/20  0846 02/05/20  0953 01/10/20  0825 12/10/19  0845  03/22/18  1151  10/25/16  1543   LDL  --  107*  --   --   --   --  107*  --  72   HDL  --  50  --   --   --   --  36*  --  35*   TRIG  --  83  --   --   --   --  144  --  263*   ALT 20 23 23 19 23   < >  --    < > 26   CR 1.46* 1.52* 1.27* 1.57* 1.44*   < > 1.51*   < > 1.35*   GFRESTIMATED 47* 45* 56* 43* 48*   < > 46*   < > 52*   GFRESTBLACK 55* 52* 65 50* 56*   < > 55*   < > 63   POTASSIUM 4.3 4.2 3.9 4.1 4.3   < > 4.5   < > 4.4   TSH  --   --   --  7.10* 12.30*   < >  --   --   --     < > = values in this  "interval not displayed.      BP Readings from Last 3 Encounters:   03/18/20 (!) 140/97   03/05/20 122/84   03/03/20 132/84    Wt Readings from Last 3 Encounters:   03/18/20 73.9 kg (163 lb)   03/03/20 75.9 kg (167 lb 4.8 oz)   03/02/20 74.8 kg (165 lb)                    Reviewed and updated as needed this visit by Provider         Review of Systems   Constitutional, HEENT, cardiovascular, pulmonary, gi and gu systems are negative, except as otherwise noted.      Objective    There were no vitals taken for this visit.  Estimated body mass index is 25.53 kg/m  as calculated from the following:    Height as of 2/5/20: 1.702 m (5' 7\").    Weight as of 3/18/20: 73.9 kg (163 lb).  Physical Exam     GENERAL: Healthy, alert and no distress  EYES: Eyes grossly normal to inspection.  No discharge or erythema, or obvious scleral/conjunctival abnormalities.  HENT: Normal cephalic/atraumatic.  External ears, nose and mouth without ulcers or lesions.  No nasal drainage visible.  NECK: No asymmetry, visible masses or scars  RESP: No audible wheeze, cough, or visible cyanosis.  No visible retractions or increased work of breathing.    SKIN: Visible skin clear. No significant rash, abnormal pigmentation or lesions.  SKIN: purple bites up arms    NEURO: Cranial nerves grossly intact.  Mentation and speech appropriate for age.  PSYCH: Mentation appears normal, affect normal/bright, judgement and insight intact, normal speech and appearance well-groomed.      Diagnostic Test Results:  Labs reviewed in Epic  No results found for any visits on 05/20/20.        Assessment & Plan       ICD-10-CM    1. Bug bite, initial encounter  W57.XXXA           Regular exercise    No follow-ups on file.    Campos Parra MD  [facility]      Video-Visit Details    Type of service:  Video Visit    Video End Time:2:35 PM    Originating Location (pt. Location): Home    Distant Location (provider location):  [facility]     Platform used for Video Visit: Emy    No follow-ups on file.       Campos Parra MD

## 2020-06-05 ENCOUNTER — PRE VISIT (OUTPATIENT)
Dept: UROLOGY | Facility: CLINIC | Age: 75
End: 2020-06-05

## 2020-06-12 ENCOUNTER — TELEPHONE (OUTPATIENT)
Dept: ONCOLOGY | Facility: CLINIC | Age: 75
End: 2020-06-12

## 2020-06-12 DIAGNOSIS — C91.10 CLL (CHRONIC LYMPHOCYTIC LEUKEMIA) (H): ICD-10-CM

## 2020-06-12 DIAGNOSIS — N40.1 BENIGN PROSTATIC HYPERPLASIA WITH LOWER URINARY TRACT SYMPTOMS, SYMPTOM DETAILS UNSPECIFIED: ICD-10-CM

## 2020-06-12 LAB
ALBUMIN SERPL-MCNC: 4.1 G/DL (ref 3.4–5)
ALP SERPL-CCNC: 64 U/L (ref 40–150)
ALT SERPL W P-5'-P-CCNC: 22 U/L (ref 0–70)
ANION GAP SERPL CALCULATED.3IONS-SCNC: 4 MMOL/L (ref 3–14)
ANISOCYTOSIS BLD QL SMEAR: SLIGHT
AST SERPL W P-5'-P-CCNC: 16 U/L (ref 0–45)
BILIRUB SERPL-MCNC: 1.3 MG/DL (ref 0.2–1.3)
BUN SERPL-MCNC: 19 MG/DL (ref 7–30)
CALCIUM SERPL-MCNC: 8.8 MG/DL (ref 8.5–10.1)
CHLORIDE SERPL-SCNC: 112 MMOL/L (ref 94–109)
CO2 SERPL-SCNC: 27 MMOL/L (ref 20–32)
CREAT SERPL-MCNC: 1.44 MG/DL (ref 0.66–1.25)
DIFFERENTIAL METHOD BLD: ABNORMAL
ERYTHROCYTE [DISTWIDTH] IN BLOOD BY AUTOMATED COUNT: 14.2 % (ref 10–15)
GFR SERPL CREATININE-BSD FRML MDRD: 48 ML/MIN/{1.73_M2}
GLUCOSE SERPL-MCNC: 83 MG/DL (ref 70–99)
HCT VFR BLD AUTO: 41.5 % (ref 40–53)
HGB BLD-MCNC: 13.5 G/DL (ref 13.3–17.7)
LYMPHOCYTES # BLD AUTO: 30 10E9/L (ref 0.8–5.3)
LYMPHOCYTES NFR BLD AUTO: 93 %
MCH RBC QN AUTO: 30.9 PG (ref 26.5–33)
MCHC RBC AUTO-ENTMCNC: 32.5 G/DL (ref 31.5–36.5)
MCV RBC AUTO: 95 FL (ref 78–100)
MONOCYTES # BLD AUTO: 0.3 10E9/L (ref 0–1.3)
MONOCYTES NFR BLD AUTO: 1 %
NEUTROPHILS # BLD AUTO: 1.9 10E9/L (ref 1.6–8.3)
NEUTROPHILS NFR BLD AUTO: 6 %
PLATELET # BLD AUTO: 152 10E9/L (ref 150–450)
PLATELET # BLD EST: ABNORMAL 10*3/UL
POTASSIUM SERPL-SCNC: 4 MMOL/L (ref 3.4–5.3)
PROT SERPL-MCNC: 6.5 G/DL (ref 6.8–8.8)
PSA SERPL-MCNC: 6.53 UG/L (ref 0–4)
RBC # BLD AUTO: 4.37 10E12/L (ref 4.4–5.9)
SODIUM SERPL-SCNC: 143 MMOL/L (ref 133–144)
WBC # BLD AUTO: 32.2 10E9/L (ref 4–11)

## 2020-06-12 PROCEDURE — 80053 COMPREHEN METABOLIC PANEL: CPT | Performed by: INTERNAL MEDICINE

## 2020-06-12 PROCEDURE — 84153 ASSAY OF PSA TOTAL: CPT | Performed by: INTERNAL MEDICINE

## 2020-06-12 PROCEDURE — 36415 COLL VENOUS BLD VENIPUNCTURE: CPT | Performed by: INTERNAL MEDICINE

## 2020-06-12 PROCEDURE — 85025 COMPLETE CBC W/AUTO DIFF WBC: CPT | Performed by: INTERNAL MEDICINE

## 2020-06-12 NOTE — TELEPHONE ENCOUNTER
[place] lab calling with critical lab value.  WBC 32k. Was 36.9 on 5/5/20    Paged to Jess Sanchez PA-C with acknowledgement of finding

## 2020-06-15 NOTE — RESULT ENCOUNTER NOTE
Mr. Wood,  Your PSA remains elevated at 6.53.  Please plan to discuss this with Dr Warren at your upcoming appointment.  Thanks, Jermaine Boyd

## 2020-06-16 ENCOUNTER — VIRTUAL VISIT (OUTPATIENT)
Dept: UROLOGY | Facility: CLINIC | Age: 75
End: 2020-06-16
Payer: COMMERCIAL

## 2020-06-16 ENCOUNTER — VIRTUAL VISIT (OUTPATIENT)
Dept: TRANSPLANT | Facility: CLINIC | Age: 75
End: 2020-06-16
Attending: INTERNAL MEDICINE
Payer: COMMERCIAL

## 2020-06-16 ENCOUNTER — VIRTUAL VISIT (OUTPATIENT)
Dept: RADIOLOGY | Facility: CLINIC | Age: 75
End: 2020-06-16
Attending: RADIOLOGY
Payer: COMMERCIAL

## 2020-06-16 DIAGNOSIS — N40.1 BENIGN PROSTATIC HYPERPLASIA WITH LOWER URINARY TRACT SYMPTOMS, SYMPTOM DETAILS UNSPECIFIED: Primary | ICD-10-CM

## 2020-06-16 DIAGNOSIS — C91.10 CLL (CHRONIC LYMPHOCYTIC LEUKEMIA) (H): Primary | ICD-10-CM

## 2020-06-16 DIAGNOSIS — E03.9 HYPOTHYROIDISM, UNSPECIFIED TYPE: ICD-10-CM

## 2020-06-16 DIAGNOSIS — R35.0 URINARY FREQUENCY: Primary | ICD-10-CM

## 2020-06-16 PROCEDURE — 40001009 ZZH VIDEO/TELEPHONE VISIT; NO CHARGE

## 2020-06-16 ASSESSMENT — PAIN SCALES - GENERAL: PAINLEVEL: NO PAIN (0)

## 2020-06-16 NOTE — PROGRESS NOTES
Loco Wood is a 75 year old male who is being evaluated via a billable video visit (changed to telephone)              UROLOGY TELEPHONE FOLLOW-UP NOTE           Chief Complaint:   LUTS         Interval Update    Loco Wood is a very pleasant 75 yo M with hx of BPH/LUTS as well as CLL.  He underwent PAE 3/5/20.     Brief  History: Is doing well, had some hematospermia after procedure.  Had some improvements in nocturia but still with moderate bother and frequency/slow stream.      Has been monitoring his PSA, trends as follows:  PSA   Date Value Ref Range Status   06/12/2020 6.53 (H) 0 - 4 ug/L Final     Comment:     Assay Method:  Chemiluminescence using Siemens Vista analyzer   05/05/2020 5.73 (H) 0 - 4 ug/L Final     Comment:     Assay Method:  Chemiluminescence using Siemens Vista analyzer   03/02/2020 6.56 (H) 0 - 4 ug/L Final     Comment:     Assay Method:  Chemiluminescence using Siemens Vista analyzer   02/05/2020 6.92 (H) 0 - 4 ug/L Final     Comment:     Assay Method:  Chemiluminescence using Siemens Vista analyzer   05/22/2019 4.58 (H) 0 - 4 ug/L Final     Comment:     Assay Method:  Chemiluminescence using Siemens Vista analyzer           Labs and Pathology:    I personally reviewed all applicable laboratory data and went over findings with patient  Significant for:    CBC RESULTS:  Recent Labs   Lab Test 06/12/20  0912 05/05/20  0936 03/02/20  0846 02/05/20  0953   WBC 32.2* 36.9* 43.2* 63.0*   HGB 13.5 13.2* 13.4 13.0*    149* 156 188        BMP RESULTS:  Recent Labs   Lab Test 06/12/20  0912 05/05/20  0936 03/02/20  0846 02/05/20  0953    141 143 140   POTASSIUM 4.0 4.3 4.2 3.9   CHLORIDE 112* 109 109 109   CO2 27 28 28 25   ANIONGAP 4 4 6 6   GLC 83 93 94 107*   BUN 19 16 18 20   CR 1.44* 1.46* 1.52* 1.27*   GFRESTIMATED 48* 47* 45* 56*   GFRESTBLACK 55* 55* 52* 65   DAVID 8.8 8.7 8.8 9.1       UA RESULTS:   Recent Labs   Lab Test 01/07/20 11/12/19  1322 05/24/19  0914   09/18/17  1359   SG 1.025 1.019 1.020   < > 1.015   URINEPH 6.0 5.0 5.5   < > 6.0   NITRITE Neg Negative Negative   < > Negative   RBCU  --  10* 2-5*  --  2-5*   WBCU  --  1 0 - 5  --  O - 2    < > = values in this interval not displayed.       PSA RESULTS  PSA   Date Value Ref Range Status   06/12/2020 6.53 (H) 0 - 4 ug/L Final     Comment:     Assay Method:  Chemiluminescence using Siemens Vista analyzer   05/05/2020 5.73 (H) 0 - 4 ug/L Final     Comment:     Assay Method:  Chemiluminescence using Siemens Vista analyzer   03/02/2020 6.56 (H) 0 - 4 ug/L Final     Comment:     Assay Method:  Chemiluminescence using Siemens Vista analyzer   02/05/2020 6.92 (H) 0 - 4 ug/L Final     Comment:     Assay Method:  Chemiluminescence using Siemens Vista analyzer   05/22/2019 4.58 (H) 0 - 4 ug/L Final     Comment:     Assay Method:  Chemiluminescence using Siemens Vista analyzer   04/30/2019 4.11 (H) 0 - 4 ug/L Final     Comment:     Assay Method:  Chemiluminescence using Siemens Vista analyzer   10/16/2018 4.68 (H) 0 - 4 ug/L Final     Comment:     Assay Method:  Chemiluminescence using Siemens Vista analyzer   04/24/2018 4.44 (H) 0 - 4 ug/L Final     Comment:     Assay Method:  Chemiluminescence using Siemens Vista analyzer   03/22/2018 4.58 (H) 0 - 4 ug/L Final     Comment:     Assay Method:  Chemiluminescence using Siemens Vista analyzer   12/21/2017 4.20 (H) 0 - 4 ug/L Final     Comment:     Assay Method:  Chemiluminescence using Siemens Vista analyzer           Imaging:    I personally reviewed all applicable imaging and went over the below findings with patient.    Results for orders placed or performed during the hospital encounter of 03/05/20   IR PAE    Addendum: 3/24/2020    Disregard previous report. Final dictation as follows:    Procedures 3/5/2020:  1. Ultrasound guidance for vascular access  2. Catheterization of the contralateral internal iliac artery with  internal iliac artery angiogram.  3. Third order  catheterization of the left anterior division of the  obturator artery with angiography.  4. Selective catheterization of the left prostate artery with  cone-beam CTA.   5. Abbe-acryl gelatin microspheres (Embospheres) embolization of the  left prostate artery.   6. Selective catheterization of the pudendal artery with angiography.  7. Selective catheterization of the left seminal vesicle branch with  angiography  8. Abbe-acryl gelatin microspheres (Embospheres) embolization of the  left seminal vesicle branches with branches to the apical prostate  9. Catheterization of the ipsilateral right internal iliac artery with  internal iliac artery angiogram  10. Catheterization of the anterior division of the right internal  iliac artery with angiography.  11. Third order catheterization of the right superior vesicular artery  with selective catheterization of the prostatic branch.  12. Abbe-acryl gelatin microspheres (Embospheres) embolization of the  right prostate capsular artery.   13. Selective catheterization of the right prostate artery parenchymal  branch with angiography.   14. Abbe-acryl gelatin microspheres (Embospheres) embolization of the  right prostate parenchymal artery.   15. Closure device assisted closure of the right common femoral  arteriotomy.     History: 74 year old male with?a history of BPH and LUTS. Currently on  Flomax. Interested in potential noninvasive management of his BPH  symptoms with prostate artery embolization. Mild elevated creatinine.     Comparison: CTA pelvis to 11/20/2020    Staff: Arline Mares MD    Fellow/Resident: LUIS Sauceda, ARLINE MARES MD, attest that I was present for all critical  portions of the procedure and was immediately available to provide  guidance and assistance during the remainder of the procedure.    Monitoring: Patient was placed on continuous monitoring with  intravenous conscious sedation administered by the IR nursing staff  and  supervised by the IR attending. Patient remained stable throughout  the procedure.     Medications:  1. 2. Fentanyl IV: 100 mcg  2. 450 mcg IV nitroglycerin  3. 10  cc 1% lidocaine  4. 100-300 um Abbe-acryl gelatin microspheres (Embospheres)   5. 300-500 um Abbe-acryl gelatin microspheres (Embospheres)     Sedation time, face-to-face: 150 minutes    Fluoroscopy time: 93.4 minutes    Findings/procedure:    Prior to the procedure, both verbal and written informed consent  obtained from the patient.    Patient was positioned in the supine on the fluoroscopy table. The  right groin was prepped and draped in standard sterile fashion. A  preprocedural timeout was performed. Under fluoroscopy, the mid right  femoral head was localized with a metallic clamp. Limited ultrasound  demonstrated a patent right common femoral artery. 1% lidocaine was  instilled and the overlying soft tissues. Under direct ultrasound  guidance, 21-gauge micropuncture needle was advanced into the common  femoral artery. An image of the needle tip within the vessel was saved  in the patient's record. Micropuncture sheath exchanged for a 5 Greek  by 10 cm vascular sheath. 5 Greek RUC catheter and 0.035 Glidewire  advanced into the abdominal aorta and used to select the contralateral  iliac vasculature. The wire was removed and the catheter tip retracted  into the left internal iliac artery.     The internal iliac artery angiogram was obtained demonstrating: Patent  anterior and posterior divisions. Obturator artery identified. Left  prostate artery off the proximal obturator artery was catheterized  with the tip of the 5 Greek catheter and angiography was obtained.  2.4 Fr Direxion microcatheter and 0.016 [employer] Fathom  microwire advanced through this catheter and used to cannulate the  prostate artery. Catheter was advanced selectively into the distal  prostate artery. Catheter location was confirmed with coned beam CTA  of the of  prostate artery. 100 mcg of intra-arterial nitroglycerin was  administered. Embolization with 100-300 um Abbe-acryl gelatin  microspheres were mixed with 320 Visipaque contrast, embolization was  performed total of 2 cc, subsequently embolization was performed with  300-500 um Abbe-acryl gelatin microspheres for a total of 3 cc.  Embolization was performed under direct fluoroscopic guidance. The  desired embolic endpoint was reached with stasis of the embolized  vessels and reflux into an adjacent prostate gland.    Microcatheter/microwire were retracted used to select the pudendal  artery. Pudendal artery angiogram was obtained demonstrating no  branches to the prostate. A small left vesicular branch was then  selected and angiography was performed. Cone beam CTA was then  performed demonstrating enhancement of the left seminal vesicle with  distal branches supplying the left prostate apex. Embolization was  performed with 1 cc of 300-500 Abbe-acryl gelatin microspheres  (Embospheres).    A 2.4 Fr Direxion microcatheter and 0.016 [employer] Fathom  microwire were used to attempt to select the left obturator artery.  Multiple attempts were made in multiple projections, ultimately the  microwire formed a loop which spasm the anterior division of the left  internal iliac artery. A total of 250 mcg intra-arterial nitroglycerin  was administered an attempt to relieve the spasm, without effect. The  microcatheter was removed. The base catheter was advanced into the  abdominal aorta and redirected into the ipsilateral iliac vasculature.  The [facility] catheter was used to cannulate the ostium of the right  internal iliac artery.     A right internal iliac artery angiogram was obtained, demonstrating:  Patent posterior division. Patent anterior division with superior  vesicular artery origin bifurcating with the inferior gluteal artery,  superior vesicular artery distal branches extending towards the  prostate.    The 2.4  Fr Direxion microcatheter and a 0.018 Transend microwire were  used to select the anterior division. Anterior division angiogram was  obtained demonstrating the prostate artery capsular branch. Cone beam  CT areas used confirm catheter position. Embolization performed with  300-500 um Embospheres. Angiography demonstrated an additional  prostate artery from the proximal anterior division of the shared  branches at the superior testicular artery. The right prostate artery  origin from the acute angle with the shared origin of the superior  vesicular artery. Multiple attempts were made with a Direxion catheter  and 0.018 Transend and 0.016 Fathom microwire which resulted in the  mild vasospasm was treated with 350 mcg of IV nitroglycerin.  Ultimately the catheter resulted in a small extravasation from the  prostate artery branch with evidence of forward flow in the prostate  on angiography. Embolization was performed with 300-500 um  Embospheres. Proximal coil embolization of the right prostate artery  was performed with a 2 mm x 4 cm Concerto coil.    A limited angiogram was obtained of the right common femoral  arteriotomy demonstrate appropriate vessel caliber puncture position  for closure device use. The right common femoral arteriotomy was  closed with a 5 Estonian Mynx device. Small hematoma around the right  puncture site, stable and likely procedural. No pseudoaneurysm on  bedside ultrasound. No enlargement of the hematoma.     Patient was transferred to the unit 2A in stable condition.    Estimated blood loss: 0 cc.    Impression:  1. Successful left prostate artery particle embolization with elective  catheterization and embolization of 2 separate prostate artery  branches.  2. Successful right prostate artery particle embolization of a  capsular prostate artery branch. Partial embolization of an additional  right prostate branch, incompletely embolized secondary to vascular  injury with mild extravasation.  There proximal right prostate artery  was coil embolized with a 2 mm x 4 cm Concerto coil.    Plan:  1. 4 hours bedrest with right leg straight.   2. Follow up with Dr. Mares will be arranged by Interventional  Radiology.     I have personally reviewed the examination and initial interpretation  and I agree with the findings.    UGO MARES MD      Narrative    Procedures 3/5/2020:  1. Ultrasound guidance for vascular access  2. Catheterization of the contralateral internal iliac artery with  internal iliac artery angiogram.  3. Third order catheterization of the left anterior division of the  obturator artery with angiography.  4. Selective catheterization of the left prostate artery with  cone-beam CTA.   5. Abbe-acryl gelatin microspheres (Embospheres) embolization of the  left prostate artery.   6. Selective catheterization of the pudendal artery with angiography.  7. Selective catheterization of the left seminal vesicle branch with  angiography  8. Abbe-acryl gelatin microspheres (Embospheres) embolization of the  left seminal vesicle branches with branches to the apical prostate  9. Catheterization of the ipsilateral right internal iliac artery with  internal iliac artery angiogram  10. Catheterization of the anterior division of the right internal  iliac artery with angiography.  11. Third order catheterization of the right superior vesicular artery  with selective catheterization of the prostatic branch.  12. Abbe-acryl gelatin microspheres (Embospheres) embolization of the  right prostate capsular artery.   13. Selective catheterization of the right prostate artery parenchymal  branch with angiography.   14. Abbe-acryl gelatin microspheres (Embospheres) embolization of the  right prostate parenchymal artery.   15. Closure device assisted closure of the right common femoral  arteriotomy.     History: 74 year old male with?a history of BPH and LUTS. Currently on  Flomax. Interested in potential  noninvasive management of his BPH  symptoms with prostate artery embolization. Mild elevated creatinine.     Comparison: CTA pelvis to 11/20/2020    Staff: Arline Mares MD    Fellow/Resident: LUIS Sauceda, ARLINE MARES MD, attest that I was present for all critical  portions of the procedure and was immediately available to provide  guidance and assistance during the remainder of the procedure.    Monitoring: Patient was placed on continuous monitoring with  intravenous conscious sedation administered by the IR nursing staff  and supervised by the IR attending. Patient remained stable throughout  the procedure.     Medications:  1. 2. Fentanyl IV: 100 mcg  2. 450 mcg IV nitroglycerin  3. 10  cc 1% lidocaine  4. 100-300 um Abbe-acryl gelatin microspheres (Embospheres)   5. 300-500 um Abbe-acryl gelatin microspheres (Embospheres)     Sedation time, face-to-face: 150 minutes    Fluoroscopy time: 93.4 minutes    Findings/procedure:    Prior to the procedure, both verbal and written informed consent  obtained from the patient.    Patient was positioned in the supine on the fluoroscopy table. The  right groin was prepped and draped in standard sterile fashion. A  preprocedural timeout was performed. Under fluoroscopy, the mid right  femoral head was localized with a metallic clamp. Limited ultrasound  demonstrated a patent right common femoral artery. 1% lidocaine was  instilled and the overlying soft tissues. Under direct ultrasound  guidance, 21-gauge micropuncture needle was advanced into the common  femoral artery. An image of the needle tip within the vessel was saved  in the patient's record. Micropuncture sheath exchanged for a 5 Kuwaiti  by 10 cm vascular sheath. 5 Kuwaiti RUC catheter and 0.035 Glidewire  advanced into the abdominal aorta and used to select the contralateral  iliac vasculature. The wire was removed and the catheter tip retracted  into the left internal iliac artery.     The  internal iliac artery angiogram was obtained demonstrating: Patent  anterior and posterior divisions. Obturator artery identified. Left  prostate artery off the proximal obturator artery was catheterized  with the tip of the 5 Vietnamese catheter and angiography was obtained.  2.4 Fr Direxion microcatheter and 0.016 [place] [employer] Fathom  microwire advanced through this catheter and used to cannulate the  prostate artery. Catheter was advanced selectively into the distal  prostate artery. Catheter location was confirmed with coned beam CTA  of the of prostate artery. 100 mcg of intra-arterial nitroglycerin was  administered. Embolization with 100-300 um Abbe-acryl gelatin  microspheres were mixed with 320 Visipaque contrast, embolization was  performed total of 2 cc, subsequently embolization was performed with  300-500 um Abbe-acryl gelatin microspheres for a total of 3 cc.  Embolization was performed under direct fluoroscopic guidance. The  desired embolic endpoint was reached with stasis of the embolized  vessels and reflux into an adjacent prostate gland.    Microcatheter/microwire were retracted used to select the pudendal  artery. Pudendal artery angiogram was obtained demonstrating no  branches to the prostate. A small left vesicular branch was then  selected and angiography was performed. Cone beam CTA was then  performed demonstrating enhancement of the left seminal vesicle with  distal branches supplying the left prostate apex. Embolization was  performed with 1 cc of 300-500 Abbe-acryl gelatin microspheres  (Embospheres).    A 2.4 Fr Direxion microcatheter and 0.016 [place] [employer] Fathom  microwire were used to attempt to select the left obturator artery.  Multiple attempts were made in multiple projections, ultimately the  microwire formed a loop which spasm the anterior division of the left  internal iliac artery. A total of 250 mcg intra-arterial nitroglycerin  was administered an attempt to relieve  the spasm, without effect. The  microcatheter was removed. The base catheter was advanced into the  abdominal aorta and redirected into the ipsilateral iliac vasculature.  The RUC catheter was used to cannulate the ostium of the right  internal iliac artery.     A right internal iliac artery angiogram was obtained, demonstrating:  Patent posterior division. Patent anterior division with superior  vesicular artery origin bifurcating with the inferior gluteal artery,  superior vesicular artery distal branches extending towards the  prostate.    The 2.4 Fr Direxion microcatheter and a 0.018 Transend microwire were  used to select the anterior division. Anterior division angiogram was  obtained demonstrating the prostate artery from the inferior gluteal  artery. Multiple small superior vesicular branches proximally to the  dome of the bladder. The distal branches of the superior vesicular  artery supply the right prostate. No additional prostate arteries were  noted. CTA of the right anterior division of the internal iliac artery  confirmed the above findings.    Microcatheter/microwire were used to select the superior vesicular  artery. Limited angiography was obtained of the superior secured  artery. CT angiography of the superior vesicular artery was obtained  confirming small branches to the bladder dome and terminal branches  supplying the right hemiprostate. The catheter was advanced distal to  the superior vesicular branches supplying the bladder dome and right  prostate artery embolization was performed with 300-500 um Embospheres  to stasis. A small artery supplying the contralateral prostate was  noted towards the end of the right prostate embolization with reflux  into the left superior vesicular artery.    Catheter and wires were removed. A noncontrast CT of the pelvis  confirmed staining of the right hemiprostate and the left prostate  capsule. A limited angiogram was obtained of the right common  femoral  arteriotomy demonstrate appropriate vessel caliber puncture position  for closure device use. The right common femoral arteriotomy was  closed with a 5 Slovenian Mynx device.    The patient was transferred to  in stable condition.    1. Ultrasound guidance for vascular access  2. Catheterization of the contralateral internal iliac artery with  internal iliac artery angiogram.  3. Third order catheterization of the left anterior division of the  obturator artery with angiography.  4. Selective catheterization of the left prostate artery with  cone-beam CTA.   5. Abbe-acryl gelatin microspheres (Embospheres) embolization of the  left prostate artery.   6. Selective catheterization of the pudendal artery with angiography.  7. Selective catheterization of the left seminal vesicle branch with  angiography  8. Abbe-acryl gelatin microspheres (Embospheres) embolization of the  left seminal vesicle branches with branches to the apical prostate  9. Catheterization of the ipsilateral right internal iliac artery with  internal iliac artery angiogram  10. Catheterization of the anterior division of the right internal  iliac artery with angiography.  11. Third order catheterization of the right superior vesicular artery  with selective catheterization of the prostatic branch.  12. Abbe-acryl gelatin microspheres (Embospheres) embolization of the  right prostate capsular artery.   13. Selective catheterization of the right prostate artery parenchymal  branch with angiography.   14. Abbe-acryl gelatin microspheres (Embospheres) embolization of the  right prostate parenchymal artery.   15. Closure device assisted closure of the right common femoral  arteriotomy.     Estimate blood loss: 0 cc     Specimens: None.      Impression    Impression:  1. Successful right prostate artery embolization. Small branch to the  left prostate from the right prostate artery noted during  embolization. Postoperative CTA demonstrates staining  throughout the  right hemiprostate and along the left prosthetic capsule.    2. Arterial supply to the left prostate apex from a branch from the  left obturator artery, additional left prostate branch, likely from  the proximal left superior vesicular artery. No left prostate artery  embolization performed due to refractory vasospasm of the anterior  division of the left internal iliac artery.    Plan:  1. Postop recovery PACU. She has a better assess right lower  extremity.  2. Mid for overnight observation. Appreciate the assistance of the  hospitalist service. Okay to resume Coumadin tomorrow morning.  3. Chronic indwelling Piña left in place. Patient has a follow-up  appointment in several weeks with Dr. Warren for attempted removal  of the catheter.  4. Follow-up with Dr. Mares will be arranged by interventional  radiology. Repeated attempts at embolization of the left temporal  prostate could be considered if the patient does not experience  adequate symptomatic relief from the right prostate artery  embolization.    Estimate blood loss: 0 cc    Specimens: None.    Impression:    I have personally reviewed the examination and initial interpretation  and I agree with the findings.    UGO MARES MD              Assessment/Plan   75 year old male with elevated PSA, BPH/LUTS  -Given suboptimal response to PAE will bring back for cysto to ensure no other etiology of lower tract symptoms (I.e. stricture(  -Update PSA prior as there may be some fluctuation with recent embolization               Past Medical History:     Past Medical History:   Diagnosis Date     Arthritis     hip and toes     Chronic pain      CLL (chronic lymphocytic leukaemia)      Esophageal reflux      Malignant neoplasm (H)     Leukemia     Paroxysmal atrial fibrillation (H) 2/5/2020     PONV (postoperative nausea and vomiting)      Pure hypercholesterolemia             Past Surgical History:     Past Surgical History:   Procedure  Laterality Date     ARTHROPLASTY MINIMALLY INVASIVE HIP  5/10/2013    Procedure: ARTHROPLASTY MINIMALLY INVASIVE HIP;  Left Two Incision Total Hip Arthroplasty ;  Surgeon: Desmond Alberts MD;  Location: [facility] OR     ARTHROPLASTY MINIMALLY INVASIVE HIP  2014    Procedure: ARTHROPLASTY MINIMALLY INVASIVE HIP;  Right Total Hip Arthroplasty Minimally Invasive Two Incision  *Latex Free Room;  Surgeon: Desmond Alberts MD;  Location: [facility] OR     BACK SURGERY      back fusion      C NONSPECIFIC PROCEDURE   &    (R) inguinal herniorrhapy     C NONSPECIFIC PROCEDURE      (L) inguinal herniorrhaphy     C NONSPECIFIC PROCEDURE      L4-5  L5 S1 fusion - spondylolisthesis     COLONOSCOPY           COLONOSCOPY N/A 8/15/2017    Procedure: COLONOSCOPY;  colonoscopy;  Surgeon: Chun Mcguire MD;  Location:  GI     GI SURGERY      4 hernia repairs in childhood     HERNIA REPAIR      4 since age 4     IR PAE  3/5/2020            Medications     Current Outpatient Medications   Medication     Acetaminophen (TYLENOL PO)     atorvastatin (LIPITOR) 10 MG tablet     diazepam (VALIUM) 5 MG tablet     ibrutinib (IMBRUVICA) 420 MG tablet     ibuprofen (ADVIL/MOTRIN) 200 MG tablet     levothyroxine (SYNTHROID/LEVOTHROID) 50 MCG tablet     omega 3 1200 MG CAPS     omeprazole (PRILOSEC) 10 MG DR capsule     Probiotic Product (PROBIOTIC-10 PO)     tiZANidine (ZANAFLEX) 2 MG tablet     tiZANidine (ZANAFLEX) 4 MG tablet     No current facility-administered medications for this visit.             Family History:     Family History   Problem Relation Age of Onset     Heart Disease Father      Cerebrovascular Disease Father          OF [place]      Cancer Father         melonoma     Melanoma Father      Hyperlipidemia Father      Thyroid Disease Father      Cancer Mother         Lung cancer     Other Cancer Mother         lung; heavy smoker     Cerebrovascular Disease Paternal Uncle         Aneurism     Breast  Cancer Maternal Aunt          from it     Cancer Paternal Uncle      Cancer Paternal Aunt      Skin Cancer No family hx of      Glaucoma No family hx of      Macular Degeneration No family hx of             Social History:     Social History     Socioeconomic History     Marital status: Single     Spouse name: Ellen     Number of children: 0     Years of education: PHD     Highest education level: Not on file   Occupational History     Occupation: Teacher     Employer: [employer]   Social Needs     Financial resource strain: Not on file     Food insecurity     Worry: Not on file     Inability: Not on file     Transportation needs     Medical: Not on file     Non-medical: Not on file   Tobacco Use     Smoking status: Never Smoker     Smokeless tobacco: Never Used   Substance and Sexual Activity     Alcohol use: Yes     Alcohol/week: 0.0 standard drinks     Comment: moderate, social     Drug use: No     Sexual activity: Not Currently     Partners: Female   Lifestyle     Physical activity     Days per week: Not on file     Minutes per session: Not on file     Stress: Not on file   Relationships     Social connections     Talks on phone: Not on file     Gets together: Not on file     Attends [employer] service: Not on file     Active member of club or organization: Not on file     Attends meetings of clubs or organizations: Not on file     Relationship status: Not on file     Intimate partner violence     Fear of current or ex partner: Not on file     Emotionally abused: Not on file     Physically abused: Not on file     Forced sexual activity: Not on file   Other Topics Concern      Service No     Blood Transfusions No     Caffeine Concern No     Occupational Exposure No     Hobby Hazards No     Sleep Concern No     Stress Concern No     Weight Concern No     Special Diet No     Back Care Yes     Exercise Yes     Comment: Several times a week     Bike Helmet No     Seat Belt Yes  "    Self-Exams No     Parent/sibling w/ CABG, MI or angioplasty before 65F 55M? No   Social History Narrative    Social Documentation:7/10        Balanced Diet: YES    Calcium intake:milk and food    Caffeine: 2 cups of coffee per day    Exercise:  type of activity  Wts , stretching, cardio;  3 times per week    Sunscreen:no    Seatbelts:  Yes    Self Breast Exam:  No -     Self Testicular Exam: No    Physical/Emotional/Sexual Abuse: No     Do you feel safe in your environment? Yes        Cholesterol screen up to date: today    Eye Exam up to date: Yes    Dental Exam up to date: Yes    Pap smear up to date: Does Not Apply    Mammogram up to date: Does Not Apply    Dexa Scan up to date: Does Not Apply    Colonoscopy up to date: Yes-2007    Immunizations up to date: Yes-2008    Glucose screen if over 40:  No     Luis Alfredo Knox ma                        Allergies:   Dilaudid [hydromorphone] and Morphine         Review of Systems:  From intake questionnaire   Negative 14 system review except as noted on HPI, nurse's note.        CC:  Campos Parra      The patient has been notified of following:     \"This video visit will be conducted via a call between you and your physician/provider. We have found that certain health care needs can be provided without the need for an in-person physical exam.  This service lets us provide the care you need with a video conversation.  If a prescription is necessary we can send it directly to your pharmacy.  If lab work is needed we can place an order for that and you can then stop by our lab to have the test done at a later time.    Video visits are billed at different rates depending on your insurance coverage.  Please reach out to your insurance provider with any questions.    If during the course of the call the physician/provider feels a video visit is not appropriate, you will not be charged for this service.\"    Please send invite to patient's cell phone:  [phone]   " "  Phone call contact time = 10 minutes    The patient has been notified of following:     \"This telephone visit will be conducted via a call between you and your physician/provider. We have found that certain health care needs can be provided without the need for a physical exam. This service lets us provide the care you need with a short phone conversation. If a prescription is necessary we can send it directly to your pharmacy. If lab work is needed we can place an order for that and you can then stop by our lab to have the test done at a later time.   If during the course of the call the physician/provider feels a telephone visit is not appropriate, you will not be charged for this service.\"        "

## 2020-06-16 NOTE — LETTER
"    6/16/2020         RE: Loco Wood  [address] [facility] [address] [facility] [address]        Dear Colleague,    Thank you for referring your patient, Loco Wood, to the [facility]. Please see a copy of my visit note below.    Video visit  Total: 5 min        Loco Wood is a 74 year old male with a diagnosis of BPH with LUTS.     3-month status post PAE of the left prostatic artery. Post embolization, he had about 3 weeks of hematospermia. His symptoms slightly improved mostly in relation to Nocturia.  IPSS form at 1 month was 18 and QOL rating of 3 however without medication.    Today, his IPSS is still around 20 without Flomax.    He thinks that his QOL is not as bad and that he van live with his symptoms. For now he is not planning on having any new procedure or a second PAE.    I agree with his decision. The patient would let us know should he change his mind.        Loco Wood is a 75 year old male who is being evaluated via a billable video visit.      The patient has been notified of following:     \"This video visit will be conducted via a call between you and your physician/provider. We have found that certain health care needs can be provided without the need for an in-person physical exam.  This service lets us provide the care you need with a video conversation.  If a prescription is necessary we can send it directly to your pharmacy.  If lab work is needed we can place an order for that and you can then stop by our lab to have the test done at a later time.    Video visits are billed at different rates depending on your insurance coverage.  Please reach out to your insurance provider with any questions.    If during the course of the call the physician/provider feels a video visit is not appropriate, you will not be charged for this service.\"    Patient has given verbal consent for Video visit? Yes    Will anyone else be joining your video visit? No "     Patient does not want to do visit via fypio. Please text invite to [phone].        I have reviewed and updated the patient's allergies and medication list.    Concerns: No new concerns.   Refills: None needed.         Miya Goodwin CMA        Video-Visit Details    Type of service:  Video Visit    Video Start Time:   Video End Time:     Originating Location (pt. Location): home    Distant Location (provider location):  [facility]     Platform used for Video Visit: Moira              Again, thank you for allowing me to participate in the care of your patient.        Sincerely,        Arline Mares MD

## 2020-06-16 NOTE — PROGRESS NOTES
"Loco Wood is a 75 year old male who is being evaluated via a billable video visit.      The patient has been notified of following:     \"This video visit will be conducted via a call between you and your physician/provider. We have found that certain health care needs can be provided without the need for an in-person physical exam.  This service lets us provide the care you need with a video conversation.  If a prescription is necessary we can send it directly to your pharmacy.  If lab work is needed we can place an order for that and you can then stop by our lab to have the test done at a later time.    Video visits are billed at different rates depending on your insurance coverage.  Please reach out to your insurance provider with any questions.    If during the course of the call the physician/provider feels a video visit is not appropriate, you will not be charged for this service.\"    Patient has given verbal consent for Video visit? Yes    Will anyone else be joining your video visit? No     Patient does not want to do visit via [employer]. Please text invite to [phone].        I have reviewed and updated the patient's allergies and medication list.    Concerns: No new concerns.   Refills: None needed.         Miya Goodwin CMA        Telephone Details    Type of service:  Telephone Visit    Time: 16:55 min      "

## 2020-06-16 NOTE — PROGRESS NOTES
Video visit  Total: 5 min        Loco Wood is a 74 year old male with a diagnosis of BPH with LUTS.     3-month status post PAE of the left prostatic artery. Post embolization, he had about 3 weeks of hematospermia. His symptoms slightly improved mostly in relation to Nocturia.  IPSS form at 1 month was 18 and QOL rating of 3 however without medication.    Today, his IPSS is still around 20 without Flomax.    He thinks that his QOL is not as bad and that he van live with his symptoms. For now he is not planning on having any new procedure or a second PAE.    I agree with his decision. The patient would let us know should he change his mind.

## 2020-06-16 NOTE — LETTER
6/16/2020       RE: Loco Wood  [address] [facility] [id number]     Dear Colleague,    Thank you for referring your patient, Loco Wood, to the [facility] UROLOGY AND INST FOR PROSTATE AND UROLOGIC CANCERS at [facility]. Please see a copy of my visit note below.    Loco Wood is a 75 year old male who is being evaluated via a billable video visit (changed to telephone)              UROLOGY TELEPHONE FOLLOW-UP NOTE           Chief Complaint:   LUTS         Interval Update    Loco Wood is a very pleasant 73 yo M with hx of BPH/LUTS as well as CLL.  He underwent PAE 3/5/20.     Brief  History: Is doing well, had some hematospermia after procedure.  Had some improvements in nocturia but still with moderate bother and frequency/slow stream.      Has been monitoring his PSA, trends as follows:  PSA   Date Value Ref Range Status   06/12/2020 6.53 (H) 0 - 4 ug/L Final     Comment:     Assay Method:  Chemiluminescence using Siemens Vista analyzer   05/05/2020 5.73 (H) 0 - 4 ug/L Final     Comment:     Assay Method:  Chemiluminescence using Siemens Vista analyzer   03/02/2020 6.56 (H) 0 - 4 ug/L Final     Comment:     Assay Method:  Chemiluminescence using Siemens Vista analyzer   02/05/2020 6.92 (H) 0 - 4 ug/L Final     Comment:     Assay Method:  Chemiluminescence using Siemens Vista analyzer   05/22/2019 4.58 (H) 0 - 4 ug/L Final     Comment:     Assay Method:  Chemiluminescence using Siemens Vista analyzer           Labs and Pathology:    I personally reviewed all applicable laboratory data and went over findings with patient  Significant for:    CBC RESULTS:  Recent Labs   Lab Test 06/12/20  0912 05/05/20  0936 03/02/20  0846 02/05/20  0953   WBC 32.2* 36.9* 43.2* 63.0*   HGB 13.5 13.2* 13.4 13.0*    149* 156 188        BMP RESULTS:  Recent Labs   Lab Test 06/12/20  0912 05/05/20  0936 03/02/20  0846 02/05/20  0953    141 143 140    POTASSIUM 4.0 4.3 4.2 3.9   CHLORIDE 112* 109 109 109   CO2 27 28 28 25   ANIONGAP 4 4 6 6   GLC 83 93 94 107*   BUN 19 16 18 20   CR 1.44* 1.46* 1.52* 1.27*   GFRESTIMATED 48* 47* 45* 56*   GFRESTBLACK 55* 55* 52* 65   DAVID 8.8 8.7 8.8 9.1       UA RESULTS:   Recent Labs   Lab Test 01/07/20 11/12/19  1322 05/24/19  0914  09/18/17  1359   SG 1.025 1.019 1.020   < > 1.015   URINEPH 6.0 5.0 5.5   < > 6.0   NITRITE Neg Negative Negative   < > Negative   RBCU  --  10* 2-5*  --  2-5*   WBCU  --  1 0 - 5  --  O - 2    < > = values in this interval not displayed.       PSA RESULTS  PSA   Date Value Ref Range Status   06/12/2020 6.53 (H) 0 - 4 ug/L Final     Comment:     Assay Method:  Chemiluminescence using Siemens Vista analyzer   05/05/2020 5.73 (H) 0 - 4 ug/L Final     Comment:     Assay Method:  Chemiluminescence using Siemens Vista analyzer   03/02/2020 6.56 (H) 0 - 4 ug/L Final     Comment:     Assay Method:  Chemiluminescence using Siemens Vista analyzer   02/05/2020 6.92 (H) 0 - 4 ug/L Final     Comment:     Assay Method:  Chemiluminescence using Siemens Vista analyzer   05/22/2019 4.58 (H) 0 - 4 ug/L Final     Comment:     Assay Method:  Chemiluminescence using Siemens Vista analyzer   04/30/2019 4.11 (H) 0 - 4 ug/L Final     Comment:     Assay Method:  Chemiluminescence using Siemens Vista analyzer   10/16/2018 4.68 (H) 0 - 4 ug/L Final     Comment:     Assay Method:  Chemiluminescence using Siemens Vista analyzer   04/24/2018 4.44 (H) 0 - 4 ug/L Final     Comment:     Assay Method:  Chemiluminescence using Siemens Vista analyzer   03/22/2018 4.58 (H) 0 - 4 ug/L Final     Comment:     Assay Method:  Chemiluminescence using Siemens Vista analyzer   12/21/2017 4.20 (H) 0 - 4 ug/L Final     Comment:     Assay Method:  Chemiluminescence using Siemens Vista analyzer           Imaging:    I personally reviewed all applicable imaging and went over the below findings with patient.    Results for orders placed or  performed during the hospital encounter of 03/05/20   IR PAE    Addendum: 3/24/2020    Disregard previous report. Final dictation as follows:    Procedures 3/5/2020:  1. Ultrasound guidance for vascular access  2. Catheterization of the contralateral internal iliac artery with  internal iliac artery angiogram.  3. Third order catheterization of the left anterior division of the  obturator artery with angiography.  4. Selective catheterization of the left prostate artery with  cone-beam CTA.   5. Abbe-acryl gelatin microspheres (Embospheres) embolization of the  left prostate artery.   6. Selective catheterization of the pudendal artery with angiography.  7. Selective catheterization of the left seminal vesicle branch with  angiography  8. Abbe-acryl gelatin microspheres (Embospheres) embolization of the  left seminal vesicle branches with branches to the apical prostate  9. Catheterization of the ipsilateral right internal iliac artery with  internal iliac artery angiogram  10. Catheterization of the anterior division of the right internal  iliac artery with angiography.  11. Third order catheterization of the right superior vesicular artery  with selective catheterization of the prostatic branch.  12. Abbe-acryl gelatin microspheres (Embospheres) embolization of the  right prostate capsular artery.   13. Selective catheterization of the right prostate artery parenchymal  branch with angiography.   14. Abbe-acryl gelatin microspheres (Embospheres) embolization of the  right prostate parenchymal artery.   15. Closure device assisted closure of the right common femoral  arteriotomy.     History: 74 year old male with?a history of BPH and LUTS. Currently on  Flomax. Interested in potential noninvasive management of his BPH  symptoms with prostate artery embolization. Mild elevated creatinine.     Comparison: CTA pelvis to 11/20/2020    Staff: Arline Mares MD    Fellow/Resident: LUIS Sauceda JAFAR  MD BRENDON, attest that I was present for all critical  portions of the procedure and was immediately available to provide  guidance and assistance during the remainder of the procedure.    Monitoring: Patient was placed on continuous monitoring with  intravenous conscious sedation administered by the IR nursing staff  and supervised by the IR attending. Patient remained stable throughout  the procedure.     Medications:  1. 2. Fentanyl IV: 100 mcg  2. 450 mcg IV nitroglycerin  3. 10  cc 1% lidocaine  4. 100-300 um Abbe-acryl gelatin microspheres (Embospheres)   5. 300-500 um Abbe-acryl gelatin microspheres (Embospheres)     Sedation time, face-to-face: 150 minutes    Fluoroscopy time: 93.4 minutes    Findings/procedure:    Prior to the procedure, both verbal and written informed consent  obtained from the patient.    Patient was positioned in the supine on the fluoroscopy table. The  right groin was prepped and draped in standard sterile fashion. A  preprocedural timeout was performed. Under fluoroscopy, the mid right  femoral head was localized with a metallic clamp. Limited ultrasound  demonstrated a patent right common femoral artery. 1% lidocaine was  instilled and the overlying soft tissues. Under direct ultrasound  guidance, 21-gauge micropuncture needle was advanced into the common  femoral artery. An image of the needle tip within the vessel was saved  in the patient's record. Micropuncture sheath exchanged for a 5 Hungarian  by 10 cm vascular sheath. 5 Hungarian RUC catheter and 0.035 Glidewire  advanced into the abdominal aorta and used to select the contralateral  iliac vasculature. The wire was removed and the catheter tip retracted  into the left internal iliac artery.     The internal iliac artery angiogram was obtained demonstrating: Patent  anterior and posterior divisions. Obturator artery identified. Left  prostate artery off the proximal obturator artery was catheterized  with the tip of the 5  English catheter and angiography was obtained.  2.4 Fr Direxion microcatheter and 0.016 [place] [employer] Fathom  microwire advanced through this catheter and used to cannulate the  prostate artery. Catheter was advanced selectively into the distal  prostate artery. Catheter location was confirmed with coned beam CTA  of the of prostate artery. 100 mcg of intra-arterial nitroglycerin was  administered. Embolization with 100-300 um Abbe-acryl gelatin  microspheres were mixed with 320 Visipaque contrast, embolization was  performed total of 2 cc, subsequently embolization was performed with  300-500 um Abbe-acryl gelatin microspheres for a total of 3 cc.  Embolization was performed under direct fluoroscopic guidance. The  desired embolic endpoint was reached with stasis of the embolized  vessels and reflux into an adjacent prostate gland.    Microcatheter/microwire were retracted used to select the pudendal  artery. Pudendal artery angiogram was obtained demonstrating no  branches to the prostate. A small left vesicular branch was then  selected and angiography was performed. Cone beam CTA was then  performed demonstrating enhancement of the left seminal vesicle with  distal branches supplying the left prostate apex. Embolization was  performed with 1 cc of 300-500 Abbe-acryl gelatin microspheres  (Emb[employer]works).    A 2.4 Fr Direxion microcatheter and 0.016 [place] [employer] Fathom  microwire were used to attempt to select the left obturator artery.  Multiple attempts were made in multiple projections, ultimately the  microwire formed a loop which spasm the anterior division of the left  internal iliac artery. A total of 250 mcg intra-arterial nitroglycerin  was administered an attempt to relieve the spasm, without effect. The  microcatheter was removed. The base catheter was advanced into the  abdominal aorta and redirected into the ipsilateral iliac vasculature.  The RUC catheter was used to cannulate the ostium of  the right  internal iliac artery.     A right internal iliac artery angiogram was obtained, demonstrating:  Patent posterior division. Patent anterior division with superior  vesicular artery origin bifurcating with the inferior gluteal artery,  superior vesicular artery distal branches extending towards the  prostate.    The 2.4 Fr Direxion microcatheter and a 0.018 Transend microwire were  used to select the anterior division. Anterior division angiogram was  obtained demonstrating the prostate artery capsular branch. Cone beam  CT areas used confirm catheter position. Embolization performed with  300-500 um Embospheres. Angiography demonstrated an additional  prostate artery from the proximal anterior division of the shared  branches at the superior testicular artery. The right prostate artery  origin from the acute angle with the shared origin of the superior  vesicular artery. Multiple attempts were made with a Direxion catheter  and 0.018 Transend and 0.016 Fathom microwire which resulted in the  mild vasospasm was treated with 350 mcg of IV nitroglycerin.  Ultimately the catheter resulted in a small extravasation from the  prostate artery branch with evidence of forward flow in the prostate  on angiography. Embolization was performed with 300-500 um  Embospheres. Proximal coil embolization of the right prostate artery  was performed with a 2 mm x 4 cm Concerto coil.    A limited angiogram was obtained of the right common femoral  arteriotomy demonstrate appropriate vessel caliber puncture position  for closure device use. The right common femoral arteriotomy was  closed with a 5 [place] Mynx device. Small hematoma around the right  puncture site, stable and likely procedural. No pseudoaneurysm on  bedside ultrasound. No enlargement of the hematoma.     Patient was transferred to the unit 2A in stable condition.    Estimated blood loss: 0 cc.    Impression:  1. Successful left prostate artery particle  embolization with elective  catheterization and embolization of 2 separate prostate artery  branches.  2. Successful right prostate artery particle embolization of a  capsular prostate artery branch. Partial embolization of an additional  right prostate branch, incompletely embolized secondary to vascular  injury with mild extravasation. There proximal right prostate artery  was coil embolized with a 2 mm x 4 cm Concerto coil.    Plan:  1. 4 hours bedrest with right leg straight.   2. Follow up with Dr. Mares will be arranged by Interventional  Radiology.     I have personally reviewed the examination and initial interpretation  and I agree with the findings.    UGO MARES MD      Narrative    Procedures 3/5/2020:  1. Ultrasound guidance for vascular access  2. Catheterization of the contralateral internal iliac artery with  internal iliac artery angiogram.  3. Third order catheterization of the left anterior division of the  obturator artery with angiography.  4. Selective catheterization of the left prostate artery with  cone-beam CTA.   5. Abbe-acryl gelatin microspheres (Embospheres) embolization of the  left prostate artery.   6. Selective catheterization of the pudendal artery with angiography.  7. Selective catheterization of the left seminal vesicle branch with  angiography  8. Abbe-acryl gelatin microspheres (Embospheres) embolization of the  left seminal vesicle branches with branches to the apical prostate  9. Catheterization of the ipsilateral right internal iliac artery with  internal iliac artery angiogram  10. Catheterization of the anterior division of the right internal  iliac artery with angiography.  11. Third order catheterization of the right superior vesicular artery  with selective catheterization of the prostatic branch.  12. Abbe-acryl gelatin microspheres (Embospheres) embolization of the  right prostate capsular artery.   13. Selective catheterization of the right prostate artery  parenchymal  branch with angiography.   14. Abbe-acryl gelatin microspheres (Embospheres) embolization of the  right prostate parenchymal artery.   15. Closure device assisted closure of the right common femoral  arteriotomy.     History: 74 year old male with?a history of BPH and LUTS. Currently on  Flomax. Interested in potential noninvasive management of his BPH  symptoms with prostate artery embolization. Mild elevated creatinine.     Comparison: CTA pelvis to 11/20/2020    Staff: Arline Mares MD    Fellow/Resident: LUIS Sauceda, ARLINE MARES MD, attest that I was present for all critical  portions of the procedure and was immediately available to provide  guidance and assistance during the remainder of the procedure.    Monitoring: Patient was placed on continuous monitoring with  intravenous conscious sedation administered by the IR nursing staff  and supervised by the IR attending. Patient remained stable throughout  the procedure.     Medications:  1. 2. Fentanyl IV: 100 mcg  2. 450 mcg IV nitroglycerin  3. 10  cc 1% lidocaine  4. 100-300 um Abbe-acryl gelatin microspheres (Embospheres)   5. 300-500 um Abbe-acryl gelatin microspheres (Embospheres)     Sedation time, face-to-face: 150 minutes    Fluoroscopy time: 93.4 minutes    Findings/procedure:    Prior to the procedure, both verbal and written informed consent  obtained from the patient.    Patient was positioned in the supine on the fluoroscopy table. The  right groin was prepped and draped in standard sterile fashion. A  preprocedural timeout was performed. Under fluoroscopy, the mid right  femoral head was localized with a metallic clamp. Limited ultrasound  demonstrated a patent right common femoral artery. 1% lidocaine was  instilled and the overlying soft tissues. Under direct ultrasound  guidance, 21-gauge micropuncture needle was advanced into the common  femoral artery. An image of the needle tip within the vessel was  saved  in the patient's record. Micropuncture sheath exchanged for a 5 Swedish  by 10 cm vascular sheath. 5 Swedish RUC catheter and 0.035 Glidewire  advanced into the abdominal aorta and used to select the contralateral  iliac vasculature. The wire was removed and the catheter tip retracted  into the left internal iliac artery.     The internal iliac artery angiogram was obtained demonstrating: Patent  anterior and posterior divisions. Obturator artery identified. Left  prostate artery off the proximal obturator artery was catheterized  with the tip of the 5 Swedish catheter and angiography was obtained.  2.4 Fr Direxion microcatheter and 0.016 [employer] Fathom  microwire advanced through this catheter and used to cannulate the  prostate artery. Catheter was advanced selectively into the distal  prostate artery. Catheter location was confirmed with coned beam CTA  of the of prostate artery. 100 mcg of intra-arterial nitroglycerin was  administered. Embolization with 100-300 um Abbe-acryl gelatin  microspheres were mixed with 320 Visipaque contrast, embolization was  performed total of 2 cc, subsequently embolization was performed with  300-500 um Abbe-acryl gelatin microspheres for a total of 3 cc.  Embolization was performed under direct fluoroscopic guidance. The  desired embolic endpoint was reached with stasis of the embolized  vessels and reflux into an adjacent prostate gland.    Microcatheter/microwire were retracted used to select the pudendal  artery. Pudendal artery angiogram was obtained demonstrating no  branches to the prostate. A small left vesicular branch was then  selected and angiography was performed. Cone beam CTA was then  performed demonstrating enhancement of the left seminal vesicle with  distal branches supplying the left prostate apex. Embolization was  performed with 1 cc of 300-500 Abbe-acryl gelatin microspheres  ([employer]).    A 2.4 Fr Direxion microcatheter and 0.016 [place]  [employer] Fathom  microwire were used to attempt to select the left obturator artery.  Multiple attempts were made in multiple projections, ultimately the  microwire formed a loop which spasm the anterior division of the left  internal iliac artery. A total of 250 mcg intra-arterial nitroglycerin  was administered an attempt to relieve the spasm, without effect. The  microcatheter was removed. The base catheter was advanced into the  abdominal aorta and redirected into the ipsilateral iliac vasculature.  The RUC catheter was used to cannulate the ostium of the right  internal iliac artery.     A right internal iliac artery angiogram was obtained, demonstrating:  Patent posterior division. Patent anterior division with superior  vesicular artery origin bifurcating with the inferior gluteal artery,  superior vesicular artery distal branches extending towards the  prostate.    The 2.4 Fr Direxion microcatheter and a 0.018 Transend microwire were  used to select the anterior division. Anterior division angiogram was  obtained demonstrating the prostate artery from the inferior gluteal  artery. Multiple small superior vesicular branches proximally to the  dome of the bladder. The distal branches of the superior vesicular  artery supply the right prostate. No additional prostate arteries were  noted. CTA of the right anterior division of the internal iliac artery  confirmed the above findings.    Microcatheter/microwire were used to select the superior vesicular  artery. Limited angiography was obtained of the superior secured  artery. CT angiography of the superior vesicular artery was obtained  confirming small branches to the bladder dome and terminal branches  supplying the right hemiprostate. The catheter was advanced distal to  the superior vesicular branches supplying the bladder dome and right  prostate artery embolization was performed with 300-500 um Embospheres  to stasis. A small artery supplying the  contralateral prostate was  noted towards the end of the right prostate embolization with reflux  into the left superior vesicular artery.    Catheter and wires were removed. A noncontrast CT of the pelvis  confirmed staining of the right hemiprostate and the left prostate  capsule. A limited angiogram was obtained of the right common femoral  arteriotomy demonstrate appropriate vessel caliber puncture position  for closure device use. The right common femoral arteriotomy was  closed with a 5 Citizen of [place] Mynx device.    The patient was transferred to  in stable condition.    1. Ultrasound guidance for vascular access  2. Catheterization of the contralateral internal iliac artery with  internal iliac artery angiogram.  3. Third order catheterization of the left anterior division of the  obturator artery with angiography.  4. Selective catheterization of the left prostate artery with  cone-beam CTA.   5. Abbe-acryl gelatin microspheres (Embospheres) embolization of the  left prostate artery.   6. Selective catheterization of the pudendal artery with angiography.  7. Selective catheterization of the left seminal vesicle branch with  angiography  8. Abbe-acryl gelatin microspheres (Embospheres) embolization of the  left seminal vesicle branches with branches to the apical prostate  9. Catheterization of the ipsilateral right internal iliac artery with  internal iliac artery angiogram  10. Catheterization of the anterior division of the right internal  iliac artery with angiography.  11. Third order catheterization of the right superior vesicular artery  with selective catheterization of the prostatic branch.  12. Abbe-acryl gelatin microspheres (Embospheres) embolization of the  right prostate capsular artery.   13. Selective catheterization of the right prostate artery parenchymal  branch with angiography.   14. Abbe-acryl gelatin microspheres (Embospheres) embolization of the  right prostate parenchymal artery.   15.  Closure device assisted closure of the right common femoral  arteriotomy.     Estimate blood loss: 0 cc     Specimens: None.      Impression    Impression:  1. Successful right prostate artery embolization. Small branch to the  left prostate from the right prostate artery noted during  embolization. Postoperative CTA demonstrates staining throughout the  right hemiprostate and along the left prosthetic capsule.    2. Arterial supply to the left prostate apex from a branch from the  left obturator artery, additional left prostate branch, likely from  the proximal left superior vesicular artery. No left prostate artery  embolization performed due to refractory vasospasm of the anterior  division of the left internal iliac artery.    Plan:  1. Postop recovery PACU. She has a better assess right lower  extremity.  2. Mid for overnight observation. Appreciate the assistance of the  hospitalist service. Okay to resume Coumadin tomorrow morning.  3. Chronic indwelling Piña left in place. Patient has a follow-up  appointment in several weeks with Dr. Warren for attempted removal  of the catheter.  4. Follow-up with Dr. Mares will be arranged by interventional  radiology. Repeated attempts at embolization of the left temporal  prostate could be considered if the patient does not experience  adequate symptomatic relief from the right prostate artery  embolization.    Estimate blood loss: 0 cc    Specimens: None.    Impression:    I have personally reviewed the examination and initial interpretation  and I agree with the findings.    UGO MARES MD              Assessment/Plan   75 year old male with elevated PSA, BPH/LUTS  -Given suboptimal response to PAE will bring back for cysto to ensure no other etiology of lower tract symptoms (I.e. stricture(  -Update PSA prior as there may be some fluctuation with recent embolization               Past Medical History:     Past Medical History:   Diagnosis Date      Arthritis     hip and toes     Chronic pain      CLL (chronic lymphocytic leukaemia)      Esophageal reflux      Malignant neoplasm (H)     Leukemia     Paroxysmal atrial fibrillation (H) 2/5/2020     PONV (postoperative nausea and vomiting)      Pure hypercholesterolemia             Past Surgical History:     Past Surgical History:   Procedure Laterality Date     ARTHROPLASTY MINIMALLY INVASIVE HIP  5/10/2013    Procedure: ARTHROPLASTY MINIMALLY INVASIVE HIP;  Left Two Incision Total Hip Arthroplasty ;  Surgeon: Desmond Alberts MD;  Location: [facility] OR     ARTHROPLASTY MINIMALLY INVASIVE HIP  2/27/2014    Procedure: ARTHROPLASTY MINIMALLY INVASIVE HIP;  Right Total Hip Arthroplasty Minimally Invasive Two Incision  *Latex Free Room;  Surgeon: Desmond Alberts MD;  Location: [facility]     BACK SURGERY      back fusion 1994     C NONSPECIFIC PROCEDURE  1964 &'95    (R) inguinal herniorrhapy     C NONSPECIFIC PROCEDURE  1949    (L) inguinal herniorrhaphy     C NONSPECIFIC PROCEDURE  1994    L4-5  L5 S1 fusion - spondylolisthesis     COLONOSCOPY      2007     COLONOSCOPY N/A 8/15/2017    Procedure: COLONOSCOPY;  colonoscopy;  Surgeon: Chun Mcguire MD;  Location:  GI     GI SURGERY      4 hernia repairs in childhood     HERNIA REPAIR      4 since age 4     IR PAE  3/5/2020            Medications     Current Outpatient Medications   Medication     Acetaminophen (TYLENOL PO)     atorvastatin (LIPITOR) 10 MG tablet     diazepam (VALIUM) 5 MG tablet     ibrutinib (IMBRUVICA) 420 MG tablet     ibuprofen (ADVIL/MOTRIN) 200 MG tablet     levothyroxine (SYNTHROID/LEVOTHROID) 50 MCG tablet     omega 3 1200 MG CAPS     omeprazole (PRILOSEC) 10 MG DR capsule     Probiotic Product (PROBIOTIC-10 PO)     tiZANidine (ZANAFLEX) 2 MG tablet     tiZANidine (ZANAFLEX) 4 MG tablet     No current facility-administered medications for this visit.             Family History:     Family History   Problem Relation Age of Onset     Heart  Disease Father      Cerebrovascular Disease Father          OF [place]      Cancer Father         melonoma     Melanoma Father      Hyperlipidemia Father      Thyroid Disease Father      Cancer Mother         Lung cancer     Other Cancer Mother         lung; heavy smoker     Cerebrovascular Disease Paternal Uncle         Aneurism     Breast Cancer Maternal Aunt          from it     Cancer Paternal Uncle      Cancer Paternal Aunt      Skin Cancer No family hx of      Glaucoma No family hx of      Macular Degeneration No family hx of             Social History:     Social History     Socioeconomic History     Marital status: Single     Spouse name: Ellen     Number of children: 0     Years of education: PHD     Highest education level: Not on file   Occupational History     Occupation: Teacher     Employer: [employer]   Social Needs     Financial resource strain: Not on file     Food insecurity     Worry: Not on file     Inability: Not on file     Transportation needs     Medical: Not on file     Non-medical: Not on file   Tobacco Use     Smoking status: Never Smoker     Smokeless tobacco: Never Used   Substance and Sexual Activity     Alcohol use: Yes     Alcohol/week: 0.0 standard drinks     Comment: moderate, social     Drug use: No     Sexual activity: Not Currently     Partners: Female   Lifestyle     Physical activity     Days per week: Not on file     Minutes per session: Not on file     Stress: Not on file   Relationships     Social connections     Talks on phone: Not on file     Gets together: Not on file     Attends Religion service: Not on file     Active member of club or organization: Not on file     Attends meetings of clubs or organizations: Not on file     Relationship status: Not on file     Intimate partner violence     Fear of current or ex partner: Not on file     Emotionally abused: Not on file     Physically abused: Not on file     Forced sexual activity:  "Not on file   Other Topics Concern      Service No     Blood Transfusions No     Caffeine Concern No     Occupational Exposure No     Hobby Hazards No     Sleep Concern No     Stress Concern No     Weight Concern No     Special Diet No     Back Care Yes     Exercise Yes     Comment: Several times a week     Bike Helmet No     Seat Belt Yes     Self-Exams No     Parent/sibling w/ CABG, MI or angioplasty before 65F 55M? No   Social History Narrative    Social Documentation:7/10        Balanced Diet: YES    Calcium intake:milk and food    Caffeine: 2 cups of coffee per day    Exercise:  type of activity  Wts , stretching, cardio;  3 times per week    Sunscreen:no    Seatbelts:  Yes    Self Breast Exam:  No -     Self Testicular Exam: No    Physical/Emotional/Sexual Abuse: No     Do you feel safe in your environment? Yes        Cholesterol screen up to date: today    Eye Exam up to date: Yes    Dental Exam up to date: Yes    Pap smear up to date: Does Not Apply    Mammogram up to date: Does Not Apply    Dexa Scan up to date: Does Not Apply    Colonoscopy up to date: Yes-2007    Immunizations up to date: Yes-2008    Glucose screen if over 40:  No     Luis Alfredo Knox ma                        Allergies:   Dilaudid [hydromorphone] and Morphine         Review of Systems:  From intake questionnaire   Negative 14 system review except as noted on HPI, nurse's note.        CC:  Campos Parra      The patient has been notified of following:     \"This video visit will be conducted via a call between you and your physician/provider. We have found that certain health care needs can be provided without the need for an in-person physical exam.  This service lets us provide the care you need with a video conversation.  If a prescription is necessary we can send it directly to your pharmacy.  If lab work is needed we can place an order for that and you can then stop by our lab to have the test done at a later " "time.    Video visits are billed at different rates depending on your insurance coverage.  Please reach out to your insurance provider with any questions.    If during the course of the call the physician/provider feels a video visit is not appropriate, you will not be charged for this service.\"    Please send invite to patient's cell phone:  [phone]     Phone call contact time = 10 minutes    The patient has been notified of following:     \"This telephone visit will be conducted via a call between you and your physician/provider. We have found that certain health care needs can be provided without the need for a physical exam. This service lets us provide the care you need with a short phone conversation. If a prescription is necessary we can send it directly to your pharmacy. If lab work is needed we can place an order for that and you can then stop by our lab to have the test done at a later time.   If during the course of the call the physician/provider feels a telephone visit is not appropriate, you will not be charged for this service.\"            "

## 2020-06-16 NOTE — LETTER
6/16/2020         RE: Loco Wood  [address] [address]        Dear Colleague,    Thank you for referring your patient, Loco Wood, to the [facility]. Please see a copy of my visit note below.    Oncology/Hematology Visit Note  Jun 16, 2020    Reason for Visit: follow up of CLL    History of Present Illness: Loco Wood is a 75 year old male with CLL. He was diagnosed 2/12/2007 with CLL, Lyles stage 0, followed clinically since.  At diagnosis WBC 17.8, ALC 11.2, hemoglobin 15.2, platelets 188.  Flow consistent with CLL.  No opportunistic infections, with IgG in the normal range during follow up. Due to rising lymphocyte count, it was recommended he consider starting on treatment. He is currently undergoing treatment with ibrutinib, which he began on 5/14/19. Please see previous notes for further details on the patient's history. He comes in today for routine follow up.    Interval History:  Patient reports that overall he has been doing okay. He feels that he has recovered after his prostate artery embolization.  Because he has been doing so well and his counts have been stable, he wonders if he can take the ibrutinib every other day.   Otherwise feeling well.  Appetite and energy are good.  No fevers, chills, night sweats, new lumps/bumps or early satiety.  Denies other complaints at this time.  Remainder of 10 point ROS reviewed and negative except as in HPI.        Current Outpatient Medications   Medication Sig Dispense Refill     Acetaminophen (TYLENOL PO)        atorvastatin (LIPITOR) 10 MG tablet TAKE 1 TABLET BY MOUTH EVERY 48 HOURS 45 tablet 4     diazepam (VALIUM) 5 MG tablet Take 5 mg by mouth every 6 hours as needed for anxiety       ibrutinib (IMBRUVICA) 420 MG tablet Take 1 tablet (420 mg) by mouth daily 28 tablet 2     ibuprofen (ADVIL/MOTRIN) 200 MG tablet Take 200 mg by mouth every 4 hours as needed for mild pain        levothyroxine (SYNTHROID/LEVOTHROID) 50 MCG tablet TAKE 1 TABLET(50 MCG) BY MOUTH DAILY 90 tablet 3     omega 3 1200 MG CAPS Take 2 capsules by mouth daily       omeprazole (PRILOSEC) 10 MG DR capsule TAKE ONE CAPSULE BY MOUTH EVERY DAY 30 TO 60 MINUTES BEFORE A MEAL (Patient taking differently: TAKE ONE CAPSULE BY MOUTH EVERY OTHER DAY 30 TO 60 MINUTES BEFORE A MEAL) 90 capsule 3     Probiotic Product (PROBIOTIC-10 PO) Take by mouth daily       tiZANidine (ZANAFLEX) 2 MG tablet Take 1-2 tablets (2-4 mg) by mouth 3 times daily 30 tablet 0     tiZANidine (ZANAFLEX) 4 MG tablet        Physical Examination:  General: The patient is a pleasant male in no acute distress.  There were no vitals taken for this visit.  Wt Readings from Last 10 Encounters:   03/18/20 73.9 kg (163 lb)   03/03/20 75.9 kg (167 lb 4.8 oz)   03/02/20 74.8 kg (165 lb)   02/05/20 77.2 kg (170 lb 4.8 oz)   02/04/20 75.3 kg (166 lb)   01/07/20 75.3 kg (166 lb)   11/26/19 75.4 kg (166 lb 4 oz)   10/10/19 73.5 kg (162 lb)   09/06/19 73.1 kg (161 lb 2.5 oz)   08/08/19 71.7 kg (158 lb)     AAOx 3, normal mood and affect, linear thoughts, coherent speech  No results found for any visits on 06/16/20.  Orders Only on 06/12/2020   Component Date Value Ref Range Status     Sodium 06/12/2020 143  133 - 144 mmol/L Final     Potassium 06/12/2020 4.0  3.4 - 5.3 mmol/L Final     Chloride 06/12/2020 112* 94 - 109 mmol/L Final     Carbon Dioxide 06/12/2020 27  20 - 32 mmol/L Final     Anion Gap 06/12/2020 4  3 - 14 mmol/L Final     Glucose 06/12/2020 83  70 - 99 mg/dL Final    Non Fasting     Urea Nitrogen 06/12/2020 19  7 - 30 mg/dL Final     Creatinine 06/12/2020 1.44* 0.66 - 1.25 mg/dL Final     GFR Estimate 06/12/2020 48* >60 mL/min/[1.73_m2] Final    Comment: Non  GFR Calc  Starting 12/18/2018, serum creatinine based estimated GFR (eGFR) will be   calculated using the Chronic Kidney Disease Epidemiology Collaboration   (CKD-EPI) equation.        GFR Estimate If Black 06/12/2020 55* >60 mL/min/[1.73_m2] Final    Comment:  GFR Calc  Starting 12/18/2018, serum creatinine based estimated GFR (eGFR) will be   calculated using the Chronic Kidney Disease Epidemiology Collaboration   (CKD-EPI) equation.       Calcium 06/12/2020 8.8  8.5 - 10.1 mg/dL Final     Bilirubin Total 06/12/2020 1.3  0.2 - 1.3 mg/dL Final     Albumin 06/12/2020 4.1  3.4 - 5.0 g/dL Final     Protein Total 06/12/2020 6.5* 6.8 - 8.8 g/dL Final     Alkaline Phosphatase 06/12/2020 64  40 - 150 U/L Final     ALT 06/12/2020 22  0 - 70 U/L Final     AST 06/12/2020 16  0 - 45 U/L Final     WBC 06/12/2020 32.2* 4.0 - 11.0 10e9/L Final    Comment: Critical Value called to and read back by  SOLOMON IBARRA AT 1004 ON 6/12/20 BY MS       RBC Count 06/12/2020 4.37* 4.4 - 5.9 10e12/L Final     Hemoglobin 06/12/2020 13.5  13.3 - 17.7 g/dL Final     Hematocrit 06/12/2020 41.5  40.0 - 53.0 % Final     MCV 06/12/2020 95  78 - 100 fl Final     MCH 06/12/2020 30.9  26.5 - 33.0 pg Final     MCHC 06/12/2020 32.5  31.5 - 36.5 g/dL Final     RDW 06/12/2020 14.2  10.0 - 15.0 % Final     Platelet Count 06/12/2020 152  150 - 450 10e9/L Final     % Neutrophils 06/12/2020 6.0  % Final     % Lymphocytes 06/12/2020 93.0  % Final     % Monocytes 06/12/2020 1.0  % Final     Absolute Neutrophil 06/12/2020 1.9  1.6 - 8.3 10e9/L Final     Absolute Lymphocytes 06/12/2020 30.0* 0.8 - 5.3 10e9/L Final     Absolute Monocytes 06/12/2020 0.3  0.0 - 1.3 10e9/L Final     Anisocytosis 06/12/2020 Slight   Final     Platelet Estimate 06/12/2020 Automated count confirmed.  Platelet morphology is normal.   Final     Diff Method 06/12/2020 Manual Differential   Final     PSA 06/12/2020 6.53* 0 - 4 ug/L Final    Assay Method:  Chemiluminescence using Siemens Vista analyzer           Assessment and Plan:  CLL. Patient started on ibrutinib 420 mg daily on 5/14/19. He is tolerating treatment very well thus far. He has had an expected  rise in his lymphocyte count that is now trending down. Since starting on treatment, he has had some hand muscle spasms and increased back pain. I suspect both are side effects from ibrutinib. He does not feel either are severe enough to warrant a dose reduction, although he did note resolution in cramps after stopping ibrutinib prior to prostate artery embolization procedure.    Based on our discussion today he would like to try and decrease ibrutinib to every other day. We discussed that this may be less effective, but he would like to try for month and recheck labs. The overarching concern is long-term cost of therapy.    Hypothyroidism. He began on levothyroxine 50 mcg daily on 5/2/19. Last TSH on 1/10/20 was elevated to 7.10.  Will recheck next visit.      CKD. He appears to have had some kidney dysfunction since at least 2010. His last check is at his baseline. He reports bubbles in his urine.  Checked UA, no proteinuria, but he does have some RBCs. Creatinine remains at baseline of ~1.5.      Continue monthly lab monitoring, RTC in 3 months or sooner PRN.      Patient seen and discussed with Dr. Burns.     Joann Najera MD  Medicine/Dermatology PGY-5  p [phone]    _______________________________________________    ATTENDING NOTE    I have seen and personally evaluated the patient today.  I reviewed vitals, medications, laboratory results, and I viewed pertinent imaging studies.  After doing so, I formulated a plan with Dr. Colón as documented in this note.  I spent >50% of a 17 minute video visit personally communicating recommendations regarding montitoring CLL in [place] to ibrutinib, monitoring for treatment toxicities, fatigue, hypothyroidism.  He has long-term concerns about the cost of ongoing ibrutinib, understandably.      He Burns MD, pager [phone]          Loco Wood is a 75 year old male who is being evaluated via a billable video visit.      The patient has been notified of  "following:     \"This video visit will be conducted via a call between you and your physician/provider. We have found that certain health care needs can be provided without the need for an in-person physical exam.  This service lets us provide the care you need with a video conversation.  If a prescription is necessary we can send it directly to your pharmacy.  If lab work is needed we can place an order for that and you can then stop by our lab to have the test done at a later time.    Video visits are billed at different rates depending on your insurance coverage.  Please reach out to your insurance provider with any questions.    If during the course of the call the physician/provider feels a video visit is not appropriate, you will not be charged for this service.\"    Patient has given verbal consent for Video visit? Yes    Will anyone else be joining your video visit? No     Patient does not want to do visit via [employer]. Please text invite to [phone].        I have reviewed and updated the patient's allergies and medication list.    Concerns: No new concerns.   Refills: None needed.         Miya Goodwin CMA        Telephone Details    Type of service:  Telephone Visit    Time: 16:55 min        Again, thank you for allowing me to participate in the care of your patient.        Sincerely,        He Burns MD    "

## 2020-06-16 NOTE — PROGRESS NOTES
Oncology/Hematology Visit Note  Jun 16, 2020    Reason for Visit: follow up of CLL    History of Present Illness: Loco Wood is a 75 year old male with CLL. He was diagnosed 2/12/2007 with CLL, Lyles stage 0, followed clinically since.  At diagnosis WBC 17.8, ALC 11.2, hemoglobin 15.2, platelets 188.  Flow consistent with CLL.  No opportunistic infections, with IgG in the normal range during follow up. Due to rising lymphocyte count, it was recommended he consider starting on treatment. He is currently undergoing treatment with ibrutinib, which he began on 5/14/19. Please see previous notes for further details on the patient's history. He comes in today for routine follow up.    Interval History:  Patient reports that overall he has been doing okay. He feels that he has recovered after his prostate artery embolization.  Because he has been doing so well and his counts have been stable, he wonders if he can take the ibrutinib every other day.   Otherwise feeling well.  Appetite and energy are good.  No fevers, chills, night sweats, new lumps/bumps or early satiety.  Denies other complaints at this time.  Remainder of 10 point ROS reviewed and negative except as in HPI.        Current Outpatient Medications   Medication Sig Dispense Refill     Acetaminophen (TYLENOL PO)        atorvastatin (LIPITOR) 10 MG tablet TAKE 1 TABLET BY MOUTH EVERY 48 HOURS 45 tablet 4     diazepam (VALIUM) 5 MG tablet Take 5 mg by mouth every 6 hours as needed for anxiety       ibrutinib (IMBRUVICA) 420 MG tablet Take 1 tablet (420 mg) by mouth daily 28 tablet 2     ibuprofen (ADVIL/MOTRIN) 200 MG tablet Take 200 mg by mouth every 4 hours as needed for mild pain       levothyroxine (SYNTHROID/LEVOTHROID) 50 MCG tablet TAKE 1 TABLET(50 MCG) BY MOUTH DAILY 90 tablet 3     omega 3 1200 MG CAPS Take 2 capsules by mouth daily       omeprazole (PRILOSEC) 10 MG DR capsule TAKE ONE CAPSULE BY MOUTH EVERY DAY 30 TO 60 MINUTES BEFORE A MEAL  (Patient taking differently: TAKE ONE CAPSULE BY MOUTH EVERY OTHER DAY 30 TO 60 MINUTES BEFORE A MEAL) 90 capsule 3     Probiotic Product (PROBIOTIC-10 PO) Take by mouth daily       tiZANidine (ZANAFLEX) 2 MG tablet Take 1-2 tablets (2-4 mg) by mouth 3 times daily 30 tablet 0     tiZANidine (ZANAFLEX) 4 MG tablet        Physical Examination:  General: The patient is a pleasant male in no acute distress.  There were no vitals taken for this visit.  Wt Readings from Last 10 Encounters:   03/18/20 73.9 kg (163 lb)   03/03/20 75.9 kg (167 lb 4.8 oz)   03/02/20 74.8 kg (165 lb)   02/05/20 77.2 kg (170 lb 4.8 oz)   02/04/20 75.3 kg (166 lb)   01/07/20 75.3 kg (166 lb)   11/26/19 75.4 kg (166 lb 4 oz)   10/10/19 73.5 kg (162 lb)   09/06/19 73.1 kg (161 lb 2.5 oz)   08/08/19 71.7 kg (158 lb)     AAOx 3, normal mood and affect, linear thoughts, coherent speech  No results found for any visits on 06/16/20.  Orders Only on 06/12/2020   Component Date Value Ref Range Status     Sodium 06/12/2020 143  133 - 144 mmol/L Final     Potassium 06/12/2020 4.0  3.4 - 5.3 mmol/L Final     Chloride 06/12/2020 112* 94 - 109 mmol/L Final     Carbon Dioxide 06/12/2020 27  20 - 32 mmol/L Final     Anion Gap 06/12/2020 4  3 - 14 mmol/L Final     Glucose 06/12/2020 83  70 - 99 mg/dL Final    Non Fasting     Urea Nitrogen 06/12/2020 19  7 - 30 mg/dL Final     Creatinine 06/12/2020 1.44* 0.66 - 1.25 mg/dL Final     GFR Estimate 06/12/2020 48* >60 mL/min/[1.73_m2] Final    Comment: Non  GFR Calc  Starting 12/18/2018, serum creatinine based estimated GFR (eGFR) will be   calculated using the Chronic Kidney Disease Epidemiology Collaboration   (CKD-EPI) equation.       GFR Estimate If Black 06/12/2020 55* >60 mL/min/[1.73_m2] Final    Comment:  GFR Calc  Starting 12/18/2018, serum creatinine based estimated GFR (eGFR) will be   calculated using the Chronic Kidney Disease Epidemiology Collaboration   (CKD-EPI)  equation.       Calcium 06/12/2020 8.8  8.5 - 10.1 mg/dL Final     Bilirubin Total 06/12/2020 1.3  0.2 - 1.3 mg/dL Final     Albumin 06/12/2020 4.1  3.4 - 5.0 g/dL Final     Protein Total 06/12/2020 6.5* 6.8 - 8.8 g/dL Final     Alkaline Phosphatase 06/12/2020 64  40 - 150 U/L Final     ALT 06/12/2020 22  0 - 70 U/L Final     AST 06/12/2020 16  0 - 45 U/L Final     WBC 06/12/2020 32.2* 4.0 - 11.0 10e9/L Final    Comment: Critical Value called to and read back by  SOLOMON IBARRA AT 1004 ON 6/12/20 BY MS       RBC Count 06/12/2020 4.37* 4.4 - 5.9 10e12/L Final     Hemoglobin 06/12/2020 13.5  13.3 - 17.7 g/dL Final     Hematocrit 06/12/2020 41.5  40.0 - 53.0 % Final     MCV 06/12/2020 95  78 - 100 fl Final     MCH 06/12/2020 30.9  26.5 - 33.0 pg Final     MCHC 06/12/2020 32.5  31.5 - 36.5 g/dL Final     RDW 06/12/2020 14.2  10.0 - 15.0 % Final     Platelet Count 06/12/2020 152  150 - 450 10e9/L Final     % Neutrophils 06/12/2020 6.0  % Final     % Lymphocytes 06/12/2020 93.0  % Final     % Monocytes 06/12/2020 1.0  % Final     Absolute Neutrophil 06/12/2020 1.9  1.6 - 8.3 10e9/L Final     Absolute Lymphocytes 06/12/2020 30.0* 0.8 - 5.3 10e9/L Final     Absolute Monocytes 06/12/2020 0.3  0.0 - 1.3 10e9/L Final     Anisocytosis 06/12/2020 Slight   Final     Platelet Estimate 06/12/2020 Automated count confirmed.  Platelet morphology is normal.   Final     Diff Method 06/12/2020 Manual Differential   Final     PSA 06/12/2020 6.53* 0 - 4 ug/L Final    Assay Method:  Chemiluminescence using Siemens Vista analyzer           Assessment and Plan:  CLL. Patient started on ibrutinib 420 mg daily on 5/14/19. He is tolerating treatment very well thus far. He has had an expected rise in his lymphocyte count that is now trending down. Since starting on treatment, he has had some hand muscle spasms and increased back pain. I suspect both are side effects from ibrutinib. He does not feel either are severe enough to warrant a dose  reduction, although he did note resolution in cramps after stopping ibrutinib prior to prostate artery embolization procedure.    Based on our discussion today he would like to try and decrease ibrutinib to every other day. We discussed that this may be less effective, but he would like to try for month and recheck labs. The overarching concern is long-term cost of therapy.    Hypothyroidism. He began on levothyroxine 50 mcg daily on 5/2/19. Last TSH on 1/10/20 was elevated to 7.10.  Will recheck next visit.      CKD. He appears to have had some kidney dysfunction since at least 2010. His last check is at his baseline. He reports bubbles in his urine.  Checked UA, no proteinuria, but he does have some RBCs. Creatinine remains at baseline of ~1.5.      Continue monthly lab monitoring, RTC in 3 months or sooner PRN.      Patient seen and discussed with Dr. Burns.     Joann Najera MD  Medicine/Dermatology PGY-5  p [phone]    _______________________________________________    ATTENDING NOTE    I have seen and personally evaluated the patient today.  I reviewed vitals, medications, laboratory results, and I viewed pertinent imaging studies.  After doing so, I formulated a plan with Dr. Colón as documented in this note.  I spent >50% of a 17 minute video visit personally communicating recommendations regarding montitoring CLL in [place] to ibrutinib, monitoring for treatment toxicities, fatigue, hypothyroidism.  He has long-term concerns about the cost of ongoing ibrutinib, understandably.      He Burns MD, pager [phone]

## 2020-06-16 NOTE — PROGRESS NOTES
"Loco Wood is a 75 year old male who is being evaluated via a billable video visit.      The patient has been notified of following:     \"This video visit will be conducted via a call between you and your physician/provider. We have found that certain health care needs can be provided without the need for an in-person physical exam.  This service lets us provide the care you need with a video conversation.  If a prescription is necessary we can send it directly to your pharmacy.  If lab work is needed we can place an order for that and you can then stop by our lab to have the test done at a later time.    Video visits are billed at different rates depending on your insurance coverage.  Please reach out to your insurance provider with any questions.    If during the course of the call the physician/provider feels a video visit is not appropriate, you will not be charged for this service.\"    Patient has given verbal consent for Video visit? Yes    Will anyone else be joining your video visit? No     Patient does not want to do visit via [employer] (TT[employer]). Please text invite to [phone].        I have reviewed and updated the patient's allergies and medication list.    Concerns: No new concerns.   Refills: None needed.         Miya Goodwin CMA        Video-Visit Details    Type of service:  Video Visit    Video Start Time:   Video End Time:     Originating Location (pt. Location): home    Distant Location (provider location):  [facility]     Platform used for Video Visit: D[employer]imcharmaine            "

## 2020-06-22 ENCOUNTER — TELEPHONE (OUTPATIENT)
Dept: ONCOLOGY | Facility: CLINIC | Age: 75
End: 2020-06-22

## 2020-06-22 DIAGNOSIS — C91.10 CLL (CHRONIC LYMPHOCYTIC LEUKEMIA) (H): Primary | ICD-10-CM

## 2020-06-22 NOTE — TELEPHONE ENCOUNTER
Pt called and stated that during his 6/16 visit that he was told that orders for Covid antibody testing and Blood typing would be placed and central scheduling would be calling. Pt has not heard form them yet. Dr Burns believes these were issued. Called central scheduling. They have no orders. Dr Burns reissued orders. Spoke with Central scheduling, they are calling Pt immediately to schedule Covid test. Message [facility] scheduling for ABO & Rh lab.

## 2020-06-22 NOTE — PROGRESS NOTES
CLL patient with prior long-lasting URI symptoms months ago, wants to assess if COVID.  Also wants to know blood type in case of transfusions (anemia due to CLL). Orders entered.    He Burns MD, pager [phone]

## 2020-06-23 ENCOUNTER — PATIENT OUTREACH (OUTPATIENT)
Dept: ONCOLOGY | Facility: CLINIC | Age: 75
End: 2020-06-23

## 2020-06-23 DIAGNOSIS — C91.10 CLL (CHRONIC LYMPHOCYTIC LEUKEMIA) (H): ICD-10-CM

## 2020-06-23 DIAGNOSIS — C91.10 CLL (CHRONIC LYMPHOCYTIC LEUKEMIA) (H): Primary | ICD-10-CM

## 2020-06-23 PROCEDURE — 36415 COLL VENOUS BLD VENIPUNCTURE: CPT | Performed by: INTERNAL MEDICINE

## 2020-06-23 PROCEDURE — 99000 SPECIMEN HANDLING OFFICE-LAB: CPT | Performed by: INTERNAL MEDICINE

## 2020-06-23 PROCEDURE — 86769 SARS-COV-2 COVID-19 ANTIBODY: CPT | Mod: 90 | Performed by: INTERNAL MEDICINE

## 2020-06-23 NOTE — LETTER
June 26, 2020        Loco Wood  [address] [address]      COVID-19 Antibody Screen   Date Value Ref Range Status   06/23/2020 Negative  Final     Comment:     No COVID-19 antibodies detected.  Patients within 10 days of symptom onset for   COVID-19 may not produce sufficient levels of detectable antibodies.    Immunocompromised COVID-19 patients may take longer to develop antibodies.       COVID-19 Antibody, IgG Titer   Date Value Ref Range Status   06/23/2020 Not Applicable  Final     Comment:     Qualitative screen for total antibodies to COVID-19 (SARS-CoV-2) with   semi-quantitative measurement of IgG COVID-19 antibodies by endpoint titer.    COVID-19 antibodies may be elevated due to a past or current infection.  Negative results do not rule out COVID-19 infection.  Results from antibody   testing should not be used as the sole basis to diagnose or exclude SARS-CoV-2   infection or to inform infection status.  COVID-19 PCR test should be ordered   if current infection is suspected.  False positive results may occur in rare   cases due to cross-reacting antibodies.  This test was developed and its performance characteristics determined by the   [facility] ([facility]),   which is regulated under CLIA as qualified to perform high-complexity testing.    This test has not been reviewed by the FDA.  Testing performed by [facility], [facility], [address], [address], [place] [address]           You have tested NEGATIVE for COVID-19 antibodies. This suggests you have not had or been exposed to COVID-19. But it does not mean that for sure.     The test finds antibodies in most people 10 days after they get sick. For some people, it takes longer than 10 days for antibodies to show up. Others may never show antibodies against COVID-19, especially if they have weak immune  systems.    If you have COVID-19 symptoms now, please stay home and away from others.     What is antibody testing?    This is a kind of blood test. We take a small sample of your blood, and then test it for something called  antibodies.      Your body makes antibodies to fight infection. If your blood has antibodies for a certain germ, it means you ve been infected with that germ in the past.     Sometimes, antibodies stay in your body for years after you ve had the infection. They can be there even if the germ didn t make you sick. They are a sign that your body fought off the infection.    Will this test find antibodies in everyone who s had COVID-19?    No. The test finds antibodies in most people 10 days after they get sick. For some people, it takes longer than 10 days for antibodies to show up. Others may never show antibodies against COVID-19, especially if they have weak immune systems.    What does it mean if the test finds COVID-19 antibodies?    If we find these antibodies, it suggests:     This person has had the virus.     Their body s immune system fought the virus.     We don t know if this will help protect someone from getting COVID-19 again. Scientists are still learning about this.    What are the signs of COVID-19?    Signs of COVID-19 can appear from 2 to 14 days (up to 2 weeks) after you re infected. Some people have no symptoms or only mild symptoms. Others get very sick. The most common symptoms are:          Cough    Shortness of breath or trouble breathing  Or at least 2 of these symptoms:    Fever    Chills    Repeated shaking with chills    Muscle pain    Headache    Sore throat    Losing your sense of taste or smell    You may have other symptoms. Please contact your doctor or clinic for any symptoms that worry you.    Where can I get more information?     To learn the [place] s guidelines for staying home, please visit the [facility] website at  https://www.health.state.[place]/diseases/coronavirus/basics.html    To learn more about COVID-19 and how to care for yourself at home, please visit the CDC website at https://www.cdc.gov/coronavirus/2019-ncov/about/steps-when-sick.html    For more options for care at [facility], please visit our website at [email or web address]    [facility] ([facility]) COVID-19 Hotline:  [phone]

## 2020-06-23 NOTE — PROGRESS NOTES
Received a VMM from Loco stating he was at the lab today for his COVID antibody test and was told there was not an order for Blood typing.     LVM for Loco that a new order as been placed and is available for his next lab appointment.

## 2020-06-25 LAB
COVID-19 SPIKE RBD ABY TITER: NORMAL
COVID-19 SPIKE RBD ABY: NEGATIVE

## 2020-07-17 ENCOUNTER — TELEPHONE (OUTPATIENT)
Dept: ONCOLOGY | Facility: CLINIC | Age: 75
End: 2020-07-17

## 2020-07-17 DIAGNOSIS — C91.10 CLL (CHRONIC LYMPHOCYTIC LEUKEMIA) (H): ICD-10-CM

## 2020-07-17 DIAGNOSIS — E03.9 HYPOTHYROIDISM, UNSPECIFIED TYPE: ICD-10-CM

## 2020-07-17 DIAGNOSIS — R35.0 URINARY FREQUENCY: ICD-10-CM

## 2020-07-17 LAB
ALBUMIN SERPL-MCNC: 3.7 G/DL (ref 3.4–5)
ALP SERPL-CCNC: 60 U/L (ref 40–150)
ALT SERPL W P-5'-P-CCNC: 20 U/L (ref 0–70)
ANION GAP SERPL CALCULATED.3IONS-SCNC: 7 MMOL/L (ref 3–14)
AST SERPL W P-5'-P-CCNC: 16 U/L (ref 0–45)
BILIRUB SERPL-MCNC: 1.2 MG/DL (ref 0.2–1.3)
BUN SERPL-MCNC: 19 MG/DL (ref 7–30)
CALCIUM SERPL-MCNC: 9.1 MG/DL (ref 8.5–10.1)
CHLORIDE SERPL-SCNC: 109 MMOL/L (ref 94–109)
CO2 SERPL-SCNC: 26 MMOL/L (ref 20–32)
CREAT SERPL-MCNC: 1.4 MG/DL (ref 0.66–1.25)
DIFFERENTIAL METHOD BLD: ABNORMAL
ERYTHROCYTE [DISTWIDTH] IN BLOOD BY AUTOMATED COUNT: 13.5 % (ref 10–15)
GFR SERPL CREATININE-BSD FRML MDRD: 49 ML/MIN/{1.73_M2}
GLUCOSE SERPL-MCNC: 90 MG/DL (ref 70–99)
HCT VFR BLD AUTO: 43.1 % (ref 40–53)
HGB BLD-MCNC: 14 G/DL (ref 13.3–17.7)
LYMPHOCYTES # BLD AUTO: 22.8 10E9/L (ref 0.8–5.3)
LYMPHOCYTES NFR BLD AUTO: 85 %
MCH RBC QN AUTO: 30.6 PG (ref 26.5–33)
MCHC RBC AUTO-ENTMCNC: 32.5 G/DL (ref 31.5–36.5)
MCV RBC AUTO: 94 FL (ref 78–100)
MONOCYTES # BLD AUTO: 0.5 10E9/L (ref 0–1.3)
MONOCYTES NFR BLD AUTO: 2 %
NEUTROPHILS # BLD AUTO: 3.5 10E9/L (ref 1.6–8.3)
NEUTROPHILS NFR BLD AUTO: 13 %
PLATELET # BLD AUTO: 148 10E9/L (ref 150–450)
PLATELET # BLD EST: ABNORMAL 10*3/UL
POTASSIUM SERPL-SCNC: 4.2 MMOL/L (ref 3.4–5.3)
PROT SERPL-MCNC: 6.3 G/DL (ref 6.8–8.8)
PSA SERPL-MCNC: 6.45 UG/L (ref 0–4)
RBC # BLD AUTO: 4.58 10E12/L (ref 4.4–5.9)
RBC MORPH BLD: NORMAL
SMUDGE CELLS BLD QL SMEAR: PRESENT
SODIUM SERPL-SCNC: 142 MMOL/L (ref 133–144)
TSH SERPL DL<=0.005 MIU/L-ACNC: 3.75 MU/L (ref 0.4–4)
WBC # BLD AUTO: 26.8 10E9/L (ref 4–11)

## 2020-07-17 PROCEDURE — 85025 COMPLETE CBC W/AUTO DIFF WBC: CPT | Performed by: FAMILY MEDICINE

## 2020-07-17 PROCEDURE — 86900 BLOOD TYPING SEROLOGIC ABO: CPT | Performed by: FAMILY MEDICINE

## 2020-07-17 PROCEDURE — 84443 ASSAY THYROID STIM HORMONE: CPT | Performed by: FAMILY MEDICINE

## 2020-07-17 PROCEDURE — 36415 COLL VENOUS BLD VENIPUNCTURE: CPT | Performed by: FAMILY MEDICINE

## 2020-07-17 PROCEDURE — 84153 ASSAY OF PSA TOTAL: CPT | Performed by: FAMILY MEDICINE

## 2020-07-17 PROCEDURE — 80053 COMPREHEN METABOLIC PANEL: CPT | Performed by: FAMILY MEDICINE

## 2020-07-17 NOTE — TELEPHONE ENCOUNTER
"DATE:  7/17/20  TIME OF RECEIPT FROM LAB: 1100   LAB TEST:  WBC=26.7  RESULTS PAGED TO (PROVIDER): Dr Burns @ 6319  TIME LAB VALUE REPORTED TO PROVIDER: 4790 Dr Burns wrote \"Agree, these are good results for the patient\"        "

## 2020-07-19 ENCOUNTER — DOCUMENTATION ONLY (OUTPATIENT)
Dept: ONCOLOGY | Facility: CLINIC | Age: 75
End: 2020-07-19

## 2020-07-20 LAB
ABO + RH BLD: NORMAL
ABO + RH BLD: NORMAL
SPECIMEN EXP DATE BLD: NORMAL

## 2020-08-17 ENCOUNTER — TELEPHONE (OUTPATIENT)
Dept: ONCOLOGY | Facility: CLINIC | Age: 75
End: 2020-08-17

## 2020-08-17 DIAGNOSIS — C91.10 CLL (CHRONIC LYMPHOCYTIC LEUKEMIA) (H): ICD-10-CM

## 2020-08-17 LAB
ALBUMIN SERPL-MCNC: 3.6 G/DL (ref 3.4–5)
ALP SERPL-CCNC: 56 U/L (ref 40–150)
ALT SERPL W P-5'-P-CCNC: 22 U/L (ref 0–70)
ANION GAP SERPL CALCULATED.3IONS-SCNC: 7 MMOL/L (ref 3–14)
AST SERPL W P-5'-P-CCNC: 20 U/L (ref 0–45)
BILIRUB SERPL-MCNC: 1 MG/DL (ref 0.2–1.3)
BUN SERPL-MCNC: 12 MG/DL (ref 7–30)
CALCIUM SERPL-MCNC: 8.7 MG/DL (ref 8.5–10.1)
CHLORIDE SERPL-SCNC: 109 MMOL/L (ref 94–109)
CO2 SERPL-SCNC: 27 MMOL/L (ref 20–32)
CREAT SERPL-MCNC: 1.35 MG/DL (ref 0.66–1.25)
DIFFERENTIAL METHOD BLD: ABNORMAL
ERYTHROCYTE [DISTWIDTH] IN BLOOD BY AUTOMATED COUNT: 14.1 % (ref 10–15)
GFR SERPL CREATININE-BSD FRML MDRD: 52 ML/MIN/{1.73_M2}
GLUCOSE SERPL-MCNC: 103 MG/DL (ref 70–99)
HCT VFR BLD AUTO: 40.8 % (ref 40–53)
HGB BLD-MCNC: 15 G/DL (ref 13.3–17.7)
LYMPHOCYTES # BLD AUTO: 18.2 10E9/L (ref 0.8–5.3)
LYMPHOCYTES NFR BLD AUTO: 81 %
MCH RBC QN AUTO: 34.3 PG (ref 26.5–33)
MCHC RBC AUTO-ENTMCNC: 36.8 G/DL (ref 31.5–36.5)
MCV RBC AUTO: 93 FL (ref 78–100)
MONOCYTES # BLD AUTO: 0.2 10E9/L (ref 0–1.3)
MONOCYTES NFR BLD AUTO: 1 %
NEUTROPHILS # BLD AUTO: 4 10E9/L (ref 1.6–8.3)
NEUTROPHILS NFR BLD AUTO: 18 %
PLATELET # BLD AUTO: 200 10E9/L (ref 150–450)
PLATELET # BLD EST: ABNORMAL 10*3/UL
POTASSIUM SERPL-SCNC: 3.9 MMOL/L (ref 3.4–5.3)
PROT SERPL-MCNC: 5.9 G/DL (ref 6.8–8.8)
RBC # BLD AUTO: 4.37 10E12/L (ref 4.4–5.9)
SMUDGE CELLS BLD QL SMEAR: PRESENT
SODIUM SERPL-SCNC: 143 MMOL/L (ref 133–144)
WBC # BLD AUTO: 22.4 10E9/L (ref 4–11)

## 2020-08-17 PROCEDURE — 85025 COMPLETE CBC W/AUTO DIFF WBC: CPT | Performed by: INTERNAL MEDICINE

## 2020-08-17 PROCEDURE — 36415 COLL VENOUS BLD VENIPUNCTURE: CPT | Performed by: INTERNAL MEDICINE

## 2020-08-17 PROCEDURE — 80053 COMPREHEN METABOLIC PANEL: CPT | Performed by: INTERNAL MEDICINE

## 2020-08-17 NOTE — TELEPHONE ENCOUNTER
DATE:  8/17/2020   TIME OF RECEIPT FROM LAB:  1129  LAB TEST:  CBC with platelet differential  LAB VALUE:  WBC 22  RESULTS GIVEN WITH READ-BACK TO (PROVIDER):  Writer paged provider at 1123  TIME LAB VALUE REPORTED TO PROVIDER:   Dr Burns acknowledged at 1132

## 2020-08-24 DIAGNOSIS — K21.9 GASTROESOPHAGEAL REFLUX DISEASE WITHOUT ESOPHAGITIS: ICD-10-CM

## 2020-08-25 RX ORDER — OMEPRAZOLE 10 MG/1
CAPSULE, DELAYED RELEASE ORAL
Qty: 90 CAPSULE | Refills: 1 | Status: SHIPPED | OUTPATIENT
Start: 2020-08-25 | End: 2021-06-18

## 2020-08-25 NOTE — TELEPHONE ENCOUNTER
Prescription approved per [facility] Refill Protocol.    Britt Parra, RN, BSN, PHN  [facility]: [place]

## 2020-09-18 DIAGNOSIS — C91.10 CLL (CHRONIC LYMPHOCYTIC LEUKEMIA) (H): ICD-10-CM

## 2020-09-18 LAB
ALBUMIN SERPL-MCNC: 3.9 G/DL (ref 3.4–5)
ALP SERPL-CCNC: 69 U/L (ref 40–150)
ALT SERPL W P-5'-P-CCNC: 24 U/L (ref 0–70)
ANION GAP SERPL CALCULATED.3IONS-SCNC: 4 MMOL/L (ref 3–14)
AST SERPL W P-5'-P-CCNC: 19 U/L (ref 0–45)
BASOPHILS # BLD AUTO: 0 10E9/L (ref 0–0.2)
BASOPHILS NFR BLD AUTO: 0.1 %
BILIRUB SERPL-MCNC: 1.2 MG/DL (ref 0.2–1.3)
BUN SERPL-MCNC: 16 MG/DL (ref 7–30)
CALCIUM SERPL-MCNC: 8.9 MG/DL (ref 8.5–10.1)
CHLORIDE SERPL-SCNC: 108 MMOL/L (ref 94–109)
CO2 SERPL-SCNC: 29 MMOL/L (ref 20–32)
CREAT SERPL-MCNC: 1.43 MG/DL (ref 0.66–1.25)
DIFFERENTIAL METHOD BLD: ABNORMAL
EOSINOPHIL # BLD AUTO: 0.4 10E9/L (ref 0–0.7)
EOSINOPHIL NFR BLD AUTO: 1.8 %
ERYTHROCYTE [DISTWIDTH] IN BLOOD BY AUTOMATED COUNT: 14.8 % (ref 10–15)
GFR SERPL CREATININE-BSD FRML MDRD: 48 ML/MIN/{1.73_M2}
GLUCOSE SERPL-MCNC: 128 MG/DL (ref 70–99)
HCT VFR BLD AUTO: 42.6 % (ref 40–53)
HGB BLD-MCNC: 13.9 G/DL (ref 13.3–17.7)
LYMPHOCYTES # BLD AUTO: 17.3 10E9/L (ref 0.8–5.3)
LYMPHOCYTES NFR BLD AUTO: 82.9 %
MCH RBC QN AUTO: 30.2 PG (ref 26.5–33)
MCHC RBC AUTO-ENTMCNC: 32.6 G/DL (ref 31.5–36.5)
MCV RBC AUTO: 93 FL (ref 78–100)
MONOCYTES # BLD AUTO: 0.5 10E9/L (ref 0–1.3)
MONOCYTES NFR BLD AUTO: 2.4 %
NEUTROPHILS # BLD AUTO: 2.7 10E9/L (ref 1.6–8.3)
NEUTROPHILS NFR BLD AUTO: 12.8 %
PLATELET # BLD AUTO: 145 10E9/L (ref 150–450)
PLATELET # BLD EST: ABNORMAL 10*3/UL
POTASSIUM SERPL-SCNC: 3.8 MMOL/L (ref 3.4–5.3)
PROT SERPL-MCNC: 6.5 G/DL (ref 6.8–8.8)
RBC # BLD AUTO: 4.6 10E12/L (ref 4.4–5.9)
SMUDGE CELLS BLD QL SMEAR: PRESENT
SODIUM SERPL-SCNC: 141 MMOL/L (ref 133–144)
WBC # BLD AUTO: 20.9 10E9/L (ref 4–11)

## 2020-09-18 PROCEDURE — 36415 COLL VENOUS BLD VENIPUNCTURE: CPT | Performed by: INTERNAL MEDICINE

## 2020-09-18 PROCEDURE — 85025 COMPLETE CBC W/AUTO DIFF WBC: CPT | Performed by: INTERNAL MEDICINE

## 2020-09-18 PROCEDURE — 80053 COMPREHEN METABOLIC PANEL: CPT | Performed by: INTERNAL MEDICINE

## 2020-09-21 NOTE — PROGRESS NOTES
Oncology/Hematology Visit Note  Sep 22, 2020    Reason for Visit: follow up of CLL    History of Present Illness: Loco Wood is a 75 year old male with CLL. He was diagnosed 2/12/2007 with CLL, Lyles stage 0, followed clinically since.  At diagnosis WBC 17.8, ALC 11.2, hemoglobin 15.2, platelets 188.  Flow consistent with CLL.  No opportunistic infections, with IgG in the normal range during follow up. Due to rising lymphocyte count, it was recommended he consider starting on treatment. He is currently undergoing treatment with ibrutinib, which he began on 5/14/19. Please see previous notes for further details on the patient's history. He comes in today for routine follow up.    Interval History:  Patient reports that overall he has been doing okay.  He has been slowly decreasing the frequency of his ibrutinib due to cost considerations, and wants to take it every third day.  Otherwise feeling well.  Appetite and energy are good.  No fevers, chills, night sweats, new lumps/bumps or early satiety. No palpitations. Denies other complaints at this time.  Remainder of 10 point ROS reviewed and negative except as in HPI.        Current Outpatient Medications   Medication Sig Dispense Refill     Acetaminophen (TYLENOL PO)        atorvastatin (LIPITOR) 10 MG tablet TAKE 1 TABLET BY MOUTH EVERY 48 HOURS 45 tablet 4     diazepam (VALIUM) 5 MG tablet Take 5 mg by mouth every 6 hours as needed for anxiety       ibrutinib (IMBRUVICA) 420 MG tablet Take 1 tablet (420 mg) by mouth daily 28 tablet 2     ibuprofen (ADVIL/MOTRIN) 200 MG tablet Take 200 mg by mouth every 4 hours as needed for mild pain       levothyroxine (SYNTHROID/LEVOTHROID) 50 MCG tablet TAKE 1 TABLET(50 MCG) BY MOUTH DAILY 90 tablet 3     omega 3 1200 MG CAPS Take 2 capsules by mouth daily       omeprazole (PRILOSEC) 10 MG DR capsule TAKE ONE CAPSULE BY MOUTH EVERY DAY 30 TO 60 MINUTES BEFORE A MEAL 90 capsule 1     Probiotic Product (PROBIOTIC-10 PO) Take  by mouth daily       tiZANidine (ZANAFLEX) 2 MG tablet Take 1-2 tablets (2-4 mg) by mouth 3 times daily 30 tablet 0     tiZANidine (ZANAFLEX) 4 MG tablet        Physical Examination:  General: The patient is a pleasant male in no acute distress.  Constitutional: No acute distress  HEENT: Normocephalic/atraumatic  Eyes: EOMI, PERRLA, anicteric  Lymph: No cervical, supraclavicular, inguinal, or axillary lymphadenopathy  Respiratory: No increased work of breathing while on room air.  CTAB.    Cardiovascular: RRR, normal S1/S2, no MRGs. No edema.  Extremities WWP.  Pedal pulses 2+.  GI: nontender/nondistended, BS+. No HSM.   Skin: No rashes or lesions on visible areas.    Musculoskeletal: Normal tone, bulk.    Neurologic: Alert and oriented to time, person, place, and situation.  No gross focal cranial nerve abnormalities.  Moving all 4 extremities.    There were no vitals taken for this visit.  Wt Readings from Last 10 Encounters:   03/18/20 73.9 kg (163 lb)   03/03/20 75.9 kg (167 lb 4.8 oz)   03/02/20 74.8 kg (165 lb)   02/05/20 77.2 kg (170 lb 4.8 oz)   02/04/20 75.3 kg (166 lb)   01/07/20 75.3 kg (166 lb)   11/26/19 75.4 kg (166 lb 4 oz)   10/10/19 73.5 kg (162 lb)   09/06/19 73.1 kg (161 lb 2.5 oz)   08/08/19 71.7 kg (158 lb)     AAOx 3, normal mood and affect, linear thoughts, coherent speech  No results found for any visits on 09/22/20.  Orders Only on 06/12/2020   Component Date Value Ref Range Status     Sodium 06/12/2020 143  133 - 144 mmol/L Final     Potassium 06/12/2020 4.0  3.4 - 5.3 mmol/L Final     Chloride 06/12/2020 112* 94 - 109 mmol/L Final     Carbon Dioxide 06/12/2020 27  20 - 32 mmol/L Final     Anion Gap 06/12/2020 4  3 - 14 mmol/L Final     Glucose 06/12/2020 83  70 - 99 mg/dL Final    Non Fasting     Urea Nitrogen 06/12/2020 19  7 - 30 mg/dL Final     Creatinine 06/12/2020 1.44* 0.66 - 1.25 mg/dL Final     GFR Estimate 06/12/2020 48* >60 mL/min/[1.73_m2] Final    Comment: Non   GFR Calc  Starting 12/18/2018, serum creatinine based estimated GFR (eGFR) will be   calculated using the Chronic Kidney Disease Epidemiology Collaboration   (CKD-EPI) equation.       GFR Estimate If Black 06/12/2020 55* >60 mL/min/[1.73_m2] Final    Comment:  GFR Calc  Starting 12/18/2018, serum creatinine based estimated GFR (eGFR) will be   calculated using the Chronic Kidney Disease Epidemiology Collaboration   (CKD-EPI) equation.       Calcium 06/12/2020 8.8  8.5 - 10.1 mg/dL Final     Bilirubin Total 06/12/2020 1.3  0.2 - 1.3 mg/dL Final     Albumin 06/12/2020 4.1  3.4 - 5.0 g/dL Final     Protein Total 06/12/2020 6.5* 6.8 - 8.8 g/dL Final     Alkaline Phosphatase 06/12/2020 64  40 - 150 U/L Final     ALT 06/12/2020 22  0 - 70 U/L Final     AST 06/12/2020 16  0 - 45 U/L Final     WBC 06/12/2020 32.2* 4.0 - 11.0 10e9/L Final    Comment: Critical Value called to and read back by  SOLOMON IBARRA AT 1004 ON 6/12/20 BY MS       RBC Count 06/12/2020 4.37* 4.4 - 5.9 10e12/L Final     Hemoglobin 06/12/2020 13.5  13.3 - 17.7 g/dL Final     Hematocrit 06/12/2020 41.5  40.0 - 53.0 % Final     MCV 06/12/2020 95  78 - 100 fl Final     MCH 06/12/2020 30.9  26.5 - 33.0 pg Final     MCHC 06/12/2020 32.5  31.5 - 36.5 g/dL Final     RDW 06/12/2020 14.2  10.0 - 15.0 % Final     Platelet Count 06/12/2020 152  150 - 450 10e9/L Final     % Neutrophils 06/12/2020 6.0  % Final     % Lymphocytes 06/12/2020 93.0  % Final     % Monocytes 06/12/2020 1.0  % Final     Absolute Neutrophil 06/12/2020 1.9  1.6 - 8.3 10e9/L Final     Absolute Lymphocytes 06/12/2020 30.0* 0.8 - 5.3 10e9/L Final     Absolute Monocytes 06/12/2020 0.3  0.0 - 1.3 10e9/L Final     Anisocytosis 06/12/2020 Slight   Final     Platelet Estimate 06/12/2020 Automated count confirmed.  Platelet morphology is normal.   Final     Diff Method 06/12/2020 Manual Differential   Final     PSA 06/12/2020 6.53* 0 - 4 ug/L Final    Assay Method:  Chemiluminescence using  Siemens Lackey analyzer           Assessment and Plan:  CLL. Patient started on ibrutinib 420 mg daily on 5/14/19. He is tolerating treatment very well thus far. He has had an expected rise in his lymphocyte count that is now trending down. Since starting on treatment, he has had some hand muscle spasms and increased back pain. I suspect both are side effects from ibrutinib. He had a brief episode of atrial fibrillation after starting ibrutinib but has been doing well lately. Due to cost considerations he would like to decrease ibrutinib to every third day -- he plans to incrementally decrease the frequency so long as he has continued disease control. We discussed that this may be less effective, but he would like to try.. The overarching concern is long-term cost of therapy.    Hypothyroidism. He began on levothyroxine 50 mcg daily on 5/2/19. TSH stable July.     CKD. He appears to have had some kidney dysfunction since at least 2010. His last check is at his baseline. He reports bubbles in his urine.  Checked UA, no proteinuria, but he does have some RBCs. Creatinine remains at baseline of ~1.5.      Continue monthly lab monitoring, RTC in 3 months or sooner PRN.      Severiano Lui MD/PhD  Fellow, Division of Hematology/Oncology and Bone Marrow Transplantation  [facility]    _______________________________________________    ATTENDING NOTE    I have seen and personally evaluated the patient today.  I reviewed vitals, medications, laboratory results, and I viewed pertinent imaging studies.  After doing so, I formulated a plan with Dr. Rivers documented in this note.  I spent >50% of a 25 minute in person visit personally communicating recommendations regarding montitoring CLL in [place] to ibrutinib, monitoring for treatment toxicities, fatigue, hypothyroidism.  He has long-term concerns about the cost of ongoing ibrutinib, understandably.      He Burns MD, pager [phone]

## 2020-09-22 ENCOUNTER — OFFICE VISIT (OUTPATIENT)
Dept: TRANSPLANT | Facility: CLINIC | Age: 75
End: 2020-09-22
Attending: INTERNAL MEDICINE
Payer: COMMERCIAL

## 2020-09-22 VITALS
SYSTOLIC BLOOD PRESSURE: 135 MMHG | WEIGHT: 159.9 LBS | HEART RATE: 97 BPM | DIASTOLIC BLOOD PRESSURE: 79 MMHG | RESPIRATION RATE: 16 BRPM | OXYGEN SATURATION: 97 % | BODY MASS INDEX: 25.04 KG/M2 | TEMPERATURE: 98.2 F

## 2020-09-22 DIAGNOSIS — C91.10 CLL (CHRONIC LYMPHOCYTIC LEUKEMIA) (H): Primary | ICD-10-CM

## 2020-09-22 PROCEDURE — G0463 HOSPITAL OUTPT CLINIC VISIT: HCPCS | Mod: ZF

## 2020-09-22 ASSESSMENT — PAIN SCALES - GENERAL: PAINLEVEL: NO PAIN (0)

## 2020-09-22 NOTE — LETTER
9/22/2020         RE: Loco Wood  [address] [address]        Dear Colleague,    Thank you for referring your patient, Loco Wood, to the [facility]. Please see a copy of my visit note below.    Oncology/Hematology Visit Note  Sep 22, 2020    Reason for Visit: follow up of CLL    History of Present Illness: Loco Wood is a 75 year old male with CLL. He was diagnosed 2/12/2007 with CLL, Lyles stage 0, followed clinically since.  At diagnosis WBC 17.8, ALC 11.2, hemoglobin 15.2, platelets 188.  Flow consistent with CLL.  No opportunistic infections, with IgG in the normal range during follow up. Due to rising lymphocyte count, it was recommended he consider starting on treatment. He is currently undergoing treatment with ibrutinib, which he began on 5/14/19. Please see previous notes for further details on the patient's history. He comes in today for routine follow up.    Interval History:  Patient reports that overall he has been doing okay.  He has been slowly decreasing the frequency of his ibrutinib due to cost considerations, and wants to take it every third day.  Otherwise feeling well.  Appetite and energy are good.  No fevers, chills, night sweats, new lumps/bumps or early satiety. No palpitations. Denies other complaints at this time.  Remainder of 10 point ROS reviewed and negative except as in HPI.        Current Outpatient Medications   Medication Sig Dispense Refill     Acetaminophen (TYLENOL PO)        atorvastatin (LIPITOR) 10 MG tablet TAKE 1 TABLET BY MOUTH EVERY 48 HOURS 45 tablet 4     diazepam (VALIUM) 5 MG tablet Take 5 mg by mouth every 6 hours as needed for anxiety       ibrutinib (IMBRUVICA) 420 MG tablet Take 1 tablet (420 mg) by mouth daily 28 tablet 2     ibuprofen (ADVIL/MOTRIN) 200 MG tablet Take 200 mg by mouth every 4 hours as needed for mild pain       levothyroxine (SYNTHROID/LEVOTHROID) 50 MCG tablet TAKE 1  TABLET(50 MCG) BY MOUTH DAILY 90 tablet 3     omega 3 1200 MG CAPS Take 2 capsules by mouth daily       omeprazole (PRILOSEC) 10 MG DR capsule TAKE ONE CAPSULE BY MOUTH EVERY DAY 30 TO 60 MINUTES BEFORE A MEAL 90 capsule 1     Probiotic Product (PROBIOTIC-10 PO) Take by mouth daily       tiZANidine (ZANAFLEX) 2 MG tablet Take 1-2 tablets (2-4 mg) by mouth 3 times daily 30 tablet 0     tiZANidine (ZANAFLEX) 4 MG tablet        Physical Examination:  General: The patient is a pleasant male in no acute distress.  Constitutional: No acute distress  HEENT: Normocephalic/atraumatic  Eyes: EOMI, PERRLA, anicteric  Lymph: No cervical, supraclavicular, inguinal, or axillary lymphadenopathy  Respiratory: No increased work of breathing while on room air.  CTAB.    Cardiovascular: RRR, normal S1/S2, no MRGs. No edema.  Extremities WWP.  Pedal pulses 2+.  GI: nontender/nondistended, BS+. No HSM.   Skin: No rashes or lesions on visible areas.    Musculoskeletal: Normal tone, bulk.    Neurologic: Alert and oriented to time, person, place, and situation.  No gross focal cranial nerve abnormalities.  Moving all 4 extremities.    There were no vitals taken for this visit.  Wt Readings from Last 10 Encounters:   03/18/20 73.9 kg (163 lb)   03/03/20 75.9 kg (167 lb 4.8 oz)   03/02/20 74.8 kg (165 lb)   02/05/20 77.2 kg (170 lb 4.8 oz)   02/04/20 75.3 kg (166 lb)   01/07/20 75.3 kg (166 lb)   11/26/19 75.4 kg (166 lb 4 oz)   10/10/19 73.5 kg (162 lb)   09/06/19 73.1 kg (161 lb 2.5 oz)   08/08/19 71.7 kg (158 lb)     AAOx 3, normal mood and affect, linear thoughts, coherent speech  No results found for any visits on 09/22/20.  Orders Only on 06/12/2020   Component Date Value Ref Range Status     Sodium 06/12/2020 143  133 - 144 mmol/L Final     Potassium 06/12/2020 4.0  3.4 - 5.3 mmol/L Final     Chloride 06/12/2020 112* 94 - 109 mmol/L Final     Carbon Dioxide 06/12/2020 27  20 - 32 mmol/L Final     Anion Gap 06/12/2020 4  3 - 14 mmol/L  Final     Glucose 06/12/2020 83  70 - 99 mg/dL Final    Non Fasting     Urea Nitrogen 06/12/2020 19  7 - 30 mg/dL Final     Creatinine 06/12/2020 1.44* 0.66 - 1.25 mg/dL Final     GFR Estimate 06/12/2020 48* >60 mL/min/[1.73_m2] Final    Comment: Non  GFR Calc  Starting 12/18/2018, serum creatinine based estimated GFR (eGFR) will be   calculated using the Chronic Kidney Disease Epidemiology Collaboration   (CKD-EPI) equation.       GFR Estimate If Black 06/12/2020 55* >60 mL/min/[1.73_m2] Final    Comment:  GFR Calc  Starting 12/18/2018, serum creatinine based estimated GFR (eGFR) will be   calculated using the Chronic Kidney Disease Epidemiology Collaboration   (CKD-EPI) equation.       Calcium 06/12/2020 8.8  8.5 - 10.1 mg/dL Final     Bilirubin Total 06/12/2020 1.3  0.2 - 1.3 mg/dL Final     Albumin 06/12/2020 4.1  3.4 - 5.0 g/dL Final     Protein Total 06/12/2020 6.5* 6.8 - 8.8 g/dL Final     Alkaline Phosphatase 06/12/2020 64  40 - 150 U/L Final     ALT 06/12/2020 22  0 - 70 U/L Final     AST 06/12/2020 16  0 - 45 U/L Final     WBC 06/12/2020 32.2* 4.0 - 11.0 10e9/L Final    Comment: Critical Value called to and read back by  SOLOMON IBARRA AT 1004 ON 6/12/20 BY MS       RBC Count 06/12/2020 4.37* 4.4 - 5.9 10e12/L Final     Hemoglobin 06/12/2020 13.5  13.3 - 17.7 g/dL Final     Hematocrit 06/12/2020 41.5  40.0 - 53.0 % Final     MCV 06/12/2020 95  78 - 100 fl Final     MCH 06/12/2020 30.9  26.5 - 33.0 pg Final     MCHC 06/12/2020 32.5  31.5 - 36.5 g/dL Final     RDW 06/12/2020 14.2  10.0 - 15.0 % Final     Platelet Count 06/12/2020 152  150 - 450 10e9/L Final     % Neutrophils 06/12/2020 6.0  % Final     % Lymphocytes 06/12/2020 93.0  % Final     % Monocytes 06/12/2020 1.0  % Final     Absolute Neutrophil 06/12/2020 1.9  1.6 - 8.3 10e9/L Final     Absolute Lymphocytes 06/12/2020 30.0* 0.8 - 5.3 10e9/L Final     Absolute Monocytes 06/12/2020 0.3  0.0 - 1.3 10e9/L Final      Anisocytosis 06/12/2020 Slight   Final     Platelet Estimate 06/12/2020 Automated count confirmed.  Platelet morphology is normal.   Final     Diff Method 06/12/2020 Manual Differential   Final     PSA 06/12/2020 6.53* 0 - 4 ug/L Final    Assay Method:  Chemiluminescence using Siemens Vista analyzer           Assessment and Plan:  CLL. Patient started on ibrutinib 420 mg daily on 5/14/19. He is tolerating treatment very well thus far. He has had an expected rise in his lymphocyte count that is now trending down. Since starting on treatment, he has had some hand muscle spasms and increased back pain. I suspect both are side effects from ibrutinib. He had a brief episode of atrial fibrillation after starting ibrutinib but has been doing well lately. Due to cost considerations he would like to decrease ibrutinib to every third day -- he plans to incrementally decrease the frequency so long as he has continued disease control. We discussed that this may be less effective, but he would like to try.. The overarching concern is long-term cost of therapy.    Hypothyroidism. He began on levothyroxine 50 mcg daily on 5/2/19. TSH stable July.     CKD. He appears to have had some kidney dysfunction since at least 2010. His last check is at his baseline. He reports bubbles in his urine.  Checked UA, no proteinuria, but he does have some RBCs. Creatinine remains at baseline of ~1.5.      Continue monthly lab monitoring, RTC in 3 months or sooner PRN.      Severiano Liu MD/PhD  Fellow, Division of Hematology/Oncology and Bone Marrow Transplantation  [facility]    _______________________________________________    ATTENDING NOTE    I have seen and personally evaluated the patient today.  I reviewed vitals, medications, laboratory results, and I viewed pertinent imaging studies.  After doing so, I formulated a plan with Dr. Rivers documented in this note.  I spent >50% of a 25 minute in person visit personally  communicating recommendations regarding montitoring CLL in [place] to ibrutinib, monitoring for treatment toxicities, fatigue, hypothyroidism.  He has long-term concerns about the cost of ongoing ibrutinib, understandably.      He Burns MD, pager [phone]          Again, thank you for allowing me to participate in the care of your patient.        Sincerely,        He Burns MD

## 2020-09-22 NOTE — NURSING NOTE
"Oncology Rooming Note    September 22, 2020 4:48 PM   Loco Wood is a 75 year old male who presents for:    Chief Complaint   Patient presents with     Oncology Clinic Visit     CLL (chronic lymphocytic leukemia) (H)     Initial Vitals: /79   Pulse 97   Temp 98.2  F (36.8  C)   Resp 16   Wt 72.5 kg (159 lb 14.4 oz)   SpO2 97%   BMI 25.04 kg/m   Estimated body mass index is 25.04 kg/m  as calculated from the following:    Height as of 2/5/20: 1.702 m (5' 7\").    Weight as of this encounter: 72.5 kg (159 lb 14.4 oz). Body surface area is 1.85 meters squared.  No Pain (0) Comment: Data Unavailable   No LMP for male patient.  Allergies reviewed: Yes  Medications reviewed: Yes    Medications: Medication refills not needed today.  Pharmacy name entered into [facility]:    [id number] - [place], [place] - [address] AT [facility] [address]  [facility] [address] [place] [address]  [facility] [place] - [address] [facility] [place] - [address] [facility] - [place], [place] - [address]  [facility] - [place], [place] - [address] AT [address]    Clinical concerns: No new concerns today. Dr. Burns was notified.      Jb Casper MA            "

## 2020-10-09 ENCOUNTER — MYC MEDICAL ADVICE (OUTPATIENT)
Dept: FAMILY MEDICINE | Facility: CLINIC | Age: 75
End: 2020-10-09

## 2020-10-09 DIAGNOSIS — C61 PROSTATE CANCER (H): ICD-10-CM

## 2020-10-09 DIAGNOSIS — Z12.5 SPECIAL SCREENING FOR MALIGNANT NEOPLASM OF PROSTATE: Primary | ICD-10-CM

## 2020-10-09 NOTE — TELEPHONE ENCOUNTER
Routing to PCP to review and advise.          Aurora Sung RN  [facility]

## 2020-10-14 ENCOUNTER — IMMUNIZATION (OUTPATIENT)
Dept: NURSING | Facility: CLINIC | Age: 75
End: 2020-10-14
Payer: COMMERCIAL

## 2020-10-14 ENCOUNTER — PATIENT OUTREACH (OUTPATIENT)
Dept: ONCOLOGY | Facility: CLINIC | Age: 75
End: 2020-10-14

## 2020-10-14 DIAGNOSIS — Z23 NEED FOR PROPHYLACTIC VACCINATION AND INOCULATION AGAINST INFLUENZA: Primary | ICD-10-CM

## 2020-10-14 DIAGNOSIS — C91.10 CLL (CHRONIC LYMPHOCYTIC LEUKEMIA) (H): ICD-10-CM

## 2020-10-14 DIAGNOSIS — C91.10 CLL (CHRONIC LYMPHOCYTIC LEUKEMIA) (H): Primary | ICD-10-CM

## 2020-10-14 DIAGNOSIS — C61 PROSTATE CANCER (H): ICD-10-CM

## 2020-10-14 LAB
ALBUMIN SERPL-MCNC: 4 G/DL (ref 3.4–5)
ALP SERPL-CCNC: 75 U/L (ref 40–150)
ALT SERPL W P-5'-P-CCNC: 19 U/L (ref 0–70)
ANION GAP SERPL CALCULATED.3IONS-SCNC: 4 MMOL/L (ref 3–14)
ANISOCYTOSIS BLD QL SMEAR: SLIGHT
AST SERPL W P-5'-P-CCNC: 13 U/L (ref 0–45)
BILIRUB SERPL-MCNC: 1 MG/DL (ref 0.2–1.3)
BUN SERPL-MCNC: 18 MG/DL (ref 7–30)
CALCIUM SERPL-MCNC: 8.9 MG/DL (ref 8.5–10.1)
CHLORIDE SERPL-SCNC: 107 MMOL/L (ref 94–109)
CO2 SERPL-SCNC: 29 MMOL/L (ref 20–32)
CREAT SERPL-MCNC: 1.4 MG/DL (ref 0.66–1.25)
DIFFERENTIAL METHOD BLD: ABNORMAL
EOSINOPHIL # BLD AUTO: 0.2 10E9/L (ref 0–0.7)
EOSINOPHIL NFR BLD AUTO: 1 %
ERYTHROCYTE [DISTWIDTH] IN BLOOD BY AUTOMATED COUNT: 14.8 % (ref 10–15)
GFR SERPL CREATININE-BSD FRML MDRD: 49 ML/MIN/{1.73_M2}
GLUCOSE SERPL-MCNC: 86 MG/DL (ref 70–99)
HCT VFR BLD AUTO: 41.9 % (ref 40–53)
HGB BLD-MCNC: 13.8 G/DL (ref 13.3–17.7)
LYMPHOCYTES # BLD AUTO: 16.5 10E9/L (ref 0.8–5.3)
LYMPHOCYTES NFR BLD AUTO: 84 %
MCH RBC QN AUTO: 30.1 PG (ref 26.5–33)
MCHC RBC AUTO-ENTMCNC: 32.9 G/DL (ref 31.5–36.5)
MCV RBC AUTO: 92 FL (ref 78–100)
MONOCYTES # BLD AUTO: 0.2 10E9/L (ref 0–1.3)
MONOCYTES NFR BLD AUTO: 1 %
NEUTROPHILS # BLD AUTO: 2.7 10E9/L (ref 1.6–8.3)
NEUTROPHILS NFR BLD AUTO: 14 %
PLATELET # BLD AUTO: 169 10E9/L (ref 150–450)
PLATELET # BLD EST: ABNORMAL 10*3/UL
POTASSIUM SERPL-SCNC: 4.1 MMOL/L (ref 3.4–5.3)
PROT SERPL-MCNC: 6.7 G/DL (ref 6.8–8.8)
PSA SERPL-MCNC: 7.72 UG/L (ref 0–4)
RBC # BLD AUTO: 4.58 10E12/L (ref 4.4–5.9)
SODIUM SERPL-SCNC: 140 MMOL/L (ref 133–144)
VARIANT LYMPHS BLD QL SMEAR: PRESENT
WBC # BLD AUTO: 19.6 10E9/L (ref 4–11)

## 2020-10-14 PROCEDURE — 90662 IIV NO PRSV INCREASED AG IM: CPT

## 2020-10-14 PROCEDURE — G0008 ADMIN INFLUENZA VIRUS VAC: HCPCS

## 2020-10-14 PROCEDURE — 80053 COMPREHEN METABOLIC PANEL: CPT | Performed by: INTERNAL MEDICINE

## 2020-10-14 PROCEDURE — 85025 COMPLETE CBC W/AUTO DIFF WBC: CPT | Performed by: INTERNAL MEDICINE

## 2020-10-14 PROCEDURE — 84153 ASSAY OF PSA TOTAL: CPT | Performed by: INTERNAL MEDICINE

## 2020-10-14 PROCEDURE — 36415 COLL VENOUS BLD VENIPUNCTURE: CPT | Performed by: INTERNAL MEDICINE

## 2020-10-14 PROCEDURE — 99207 PR NO CHARGE NURSE ONLY: CPT

## 2020-10-14 NOTE — PROGRESS NOTES
Patient asking for lab orders to run monthly CMP as recommended by Dr Burns. Encouraged patient to drink more water with slightly elevated creatinine as he does not drink much water. Verified patient does not take NSAIDS.  Orders placed and patient notified. Winter Zaragoza RN, BSN [facility] Nurse Coordinator

## 2020-10-21 ENCOUNTER — MYC MEDICAL ADVICE (OUTPATIENT)
Dept: FAMILY MEDICINE | Facility: CLINIC | Age: 75
End: 2020-10-21

## 2020-10-21 DIAGNOSIS — R97.20 ELEVATED PROSTATE SPECIFIC ANTIGEN (PSA): Primary | ICD-10-CM

## 2020-10-21 DIAGNOSIS — C91.10 CLL (CHRONIC LYMPHOCYTIC LEUKEMIA) (H): ICD-10-CM

## 2020-10-21 RX ORDER — FAMOTIDINE 40 MG/1
40 TABLET, FILM COATED ORAL
Qty: 90 TABLET | Refills: 4 | Status: SHIPPED | OUTPATIENT
Start: 2020-10-21

## 2020-11-09 DIAGNOSIS — C91.10 CLL (CHRONIC LYMPHOCYTIC LEUKEMIA) (H): Primary | ICD-10-CM

## 2020-11-10 DIAGNOSIS — C91.10 CLL (CHRONIC LYMPHOCYTIC LEUKEMIA) (H): Primary | ICD-10-CM

## 2020-11-16 ENCOUNTER — DOCUMENTATION ONLY (OUTPATIENT)
Dept: ONCOLOGY | Facility: CLINIC | Age: 75
End: 2020-11-16

## 2020-11-16 DIAGNOSIS — C91.10 CLL (CHRONIC LYMPHOCYTIC LEUKEMIA) (H): ICD-10-CM

## 2020-11-16 LAB
ALBUMIN SERPL-MCNC: 3.8 G/DL (ref 3.4–5)
ALP SERPL-CCNC: 63 U/L (ref 40–150)
ALT SERPL W P-5'-P-CCNC: 21 U/L (ref 0–70)
ANION GAP SERPL CALCULATED.3IONS-SCNC: 5 MMOL/L (ref 3–14)
AST SERPL W P-5'-P-CCNC: 14 U/L (ref 0–45)
BILIRUB SERPL-MCNC: 1 MG/DL (ref 0.2–1.3)
BUN SERPL-MCNC: 15 MG/DL (ref 7–30)
CALCIUM SERPL-MCNC: 8.6 MG/DL (ref 8.5–10.1)
CHLORIDE SERPL-SCNC: 107 MMOL/L (ref 94–109)
CO2 SERPL-SCNC: 29 MMOL/L (ref 20–32)
CREAT SERPL-MCNC: 1.37 MG/DL (ref 0.66–1.25)
DIFFERENTIAL METHOD BLD: ABNORMAL
EOSINOPHIL # BLD AUTO: 0.3 10E9/L (ref 0–0.7)
EOSINOPHIL NFR BLD AUTO: 2 %
ERYTHROCYTE [DISTWIDTH] IN BLOOD BY AUTOMATED COUNT: 14.7 % (ref 10–15)
GFR SERPL CREATININE-BSD FRML MDRD: 51 ML/MIN/{1.73_M2}
GLUCOSE SERPL-MCNC: 135 MG/DL (ref 70–99)
HCT VFR BLD AUTO: 42.2 % (ref 40–53)
HGB BLD-MCNC: 13.7 G/DL (ref 13.3–17.7)
LYMPHOCYTES # BLD AUTO: 12.5 10E9/L (ref 0.8–5.3)
LYMPHOCYTES NFR BLD AUTO: 77 %
MCH RBC QN AUTO: 30.6 PG (ref 26.5–33)
MCHC RBC AUTO-ENTMCNC: 32.5 G/DL (ref 31.5–36.5)
MCV RBC AUTO: 94 FL (ref 78–100)
MONOCYTES # BLD AUTO: 0.5 10E9/L (ref 0–1.3)
MONOCYTES NFR BLD AUTO: 3 %
NEUTROPHILS # BLD AUTO: 2.9 10E9/L (ref 1.6–8.3)
NEUTROPHILS NFR BLD AUTO: 18 %
PLATELET # BLD AUTO: 144 10E9/L (ref 150–450)
PLATELET # BLD EST: ABNORMAL 10*3/UL
POTASSIUM SERPL-SCNC: 3.9 MMOL/L (ref 3.4–5.3)
PROT SERPL-MCNC: 6.2 G/DL (ref 6.8–8.8)
RBC # BLD AUTO: 4.48 10E12/L (ref 4.4–5.9)
RBC MORPH BLD: NORMAL
SODIUM SERPL-SCNC: 141 MMOL/L (ref 133–144)
VARIANT LYMPHS BLD QL SMEAR: PRESENT
WBC # BLD AUTO: 16.2 10E9/L (ref 4–11)

## 2020-11-16 PROCEDURE — 80053 COMPREHEN METABOLIC PANEL: CPT | Performed by: INTERNAL MEDICINE

## 2020-11-16 PROCEDURE — 85025 COMPLETE CBC W/AUTO DIFF WBC: CPT | Performed by: INTERNAL MEDICINE

## 2020-11-17 ENCOUNTER — MYC MEDICAL ADVICE (OUTPATIENT)
Dept: PHARMACY | Facility: CLINIC | Age: 75
End: 2020-11-17

## 2020-11-18 ENCOUNTER — VIRTUAL VISIT (OUTPATIENT)
Dept: FAMILY MEDICINE | Facility: CLINIC | Age: 75
End: 2020-11-18
Payer: COMMERCIAL

## 2020-11-18 DIAGNOSIS — R04.0 EPISTAXIS: Primary | ICD-10-CM

## 2020-11-18 DIAGNOSIS — C91.10 CLL (CHRONIC LYMPHOCYTIC LEUKEMIA) (H): ICD-10-CM

## 2020-11-18 DIAGNOSIS — I48.0 PAROXYSMAL ATRIAL FIBRILLATION (H): ICD-10-CM

## 2020-11-18 PROCEDURE — 99213 OFFICE O/P EST LOW 20 MIN: CPT | Mod: 95 | Performed by: INTERNAL MEDICINE

## 2020-11-18 NOTE — PROGRESS NOTES
"Loco Wood is a 75 year old male who is being evaluated via a billable video visit.      The patient has been notified of following:     \"This video visit will be conducted via a call between you and your physician/provider. We have found that certain health care needs can be provided without the need for an in-person physical exam.  This service lets us provide the care you need with a video conversation.  If a prescription is necessary we can send it directly to your pharmacy.  If lab work is needed we can place an order for that and you can then stop by our lab to have the test done at a later time.    Video visits are billed at different rates depending on your insurance coverage.  Please reach out to your insurance provider with any questions.    If during the course of the call the physician/provider feels a video visit is not appropriate, you will not be charged for this service.\"    Patient has given verbal consent for Video visit? Yes  How would you like to obtain your AVS? MyChart  If you are dropped from the video visit, the video invite should be resent to: Text to cell phone: [phone]  Will anyone else be joining your video visit? No    Subjective     Loco Wood is a 75 year old male who presents today via video visit for the following health issues:    HPI     Recheck kidney concerns  Last 16 days 3 occasions no trigger  Humidifier  Nicotinamide  resveritol  quercetin    Bleeds ent doctor    Kidney   End 3:40   Begin 3:18    Video Start Time: 3:18 pm        Review of Systems   Constitutional, HEENT, cardiovascular, pulmonary, gi and gu systems are negative, except as otherwise noted.      Objective           Vitals:  No vitals were obtained today due to virtual visit.    Physical Exam     GENERAL: Healthy, alert and no distress  EYES: Eyes grossly normal to inspection.  No discharge or erythema, or obvious scleral/conjunctival abnormalities.  HENT: Normal cephalic/atraumatic.  " External ears, nose and mouth without ulcers or lesions.  No nasal drainage visible.  NECK: No asymmetry, visible masses or scars  RESP: No audible wheeze, cough, or visible cyanosis.  No visible retractions or increased work of breathing.    SKIN: Visible skin clear. No significant rash, abnormal pigmentation or lesions.  NEURO: Cranial nerves grossly intact.  Mentation and speech appropriate for age.  PSYCH: Mentation appears normal, affect normal/bright, judgement and insight intact, normal speech and appearance well-groomed.      No results found for any visits on 11/18/20.        Assessment & Plan     Loco was seen today for kidney problem.    Diagnoses and all orders for this visit:    Epistaxis    Paroxysmal atrial fibrillation (H)    CLL (chronic lymphocytic leukemia) (H)        Patient with increasing epistaxis .  Aquaphor, humidity  May be worsened by the imbuvica ( 11 percent reported epistaxis)    May need ent evaluation if worsening to evaluate for polyp or other complication    Minimize ibuprofen    Ok tylenol for pain     Regular exercise    No follow-ups on file.    Campos Parra MD  [facility]      Video-Visit Details    Type of service:  Video Visit    Video End Time:3:40 PM    Originating Location (pt. Location): Home    Distant Location (provider location):  [facility]     Platform used for Video Visit: LetaWell

## 2020-11-19 ENCOUNTER — TRANSFERRED RECORDS (OUTPATIENT)
Dept: HEALTH INFORMATION MANAGEMENT | Facility: CLINIC | Age: 75
End: 2020-11-19

## 2020-11-19 ENCOUNTER — HOSPITAL ENCOUNTER (EMERGENCY)
Facility: CLINIC | Age: 75
Discharge: HOME | End: 2020-11-19
Attending: EMERGENCY MEDICINE | Admitting: EMERGENCY MEDICINE
Payer: COMMERCIAL

## 2020-11-19 VITALS
TEMPERATURE: 98.3 F | OXYGEN SATURATION: 94 % | HEIGHT: 67 IN | BODY MASS INDEX: 24.33 KG/M2 | RESPIRATION RATE: 18 BRPM | HEART RATE: 83 BPM | SYSTOLIC BLOOD PRESSURE: 132 MMHG | WEIGHT: 155 LBS | DIASTOLIC BLOOD PRESSURE: 83 MMHG

## 2020-11-19 DIAGNOSIS — R04.0 EPISTAXIS: Primary | ICD-10-CM

## 2020-11-19 PROCEDURE — 99283 EMERGENCY DEPT VISIT LOW MDM: CPT | Performed by: EMERGENCY MEDICINE

## 2020-11-19 PROCEDURE — 250N000013 HC RX MED GY IP 250 OP 250 PS 637: Performed by: EMERGENCY MEDICINE

## 2020-11-19 RX ADMIN — PHENYLEPHRINE HYDROCHLORIDE 1 SPRAY: 0.25 SPRAY NASAL at 02:07

## 2020-11-19 ASSESSMENT — MIFFLIN-ST. JEOR: SCORE: 1406.71

## 2020-11-19 NOTE — ED PROVIDER NOTES
[place] EMERGENCY DEPARTMENT ([facility])  November 19, 2020  History     Chief Complaint   Patient presents with     Epistaxis     The history is provided by the patient and medical records.     Loco Wood is a 75 year old male with a past medical history significant for chronic lymphocytic leukemia, paroxysmal atrial fibrillation, CKD, and BPH who presents here to the Emergency Department via EMS due to nose bleed. Patient reports his nose started bleeding one hour prior to arrival and has been uncontrollable since. Patient states he has not had nosebleeds in many years until this past week when he had 4 episodes. In his first 3 episodes only the right was bleeding but tonight both sides were bleeding. He states he has been taking over the counter supplements for the past month. Patient is not anticoagulated.    PAST MEDICAL HISTORY:   Past Medical History:   Diagnosis Date     Arthritis     hip and toes     Chronic pain      CLL (chronic lymphocytic leukaemia)      Esophageal reflux      Malignant neoplasm (H)     Leukemia     Paroxysmal atrial fibrillation (H) 2/5/2020     PONV (postoperative nausea and vomiting)      Pure hypercholesterolemia        PAST SURGICAL HISTORY:   Past Surgical History:   Procedure Laterality Date     ARTHROPLASTY MINIMALLY INVASIVE HIP  5/10/2013    Procedure: ARTHROPLASTY MINIMALLY INVASIVE HIP;  Left Two Incision Total Hip Arthroplasty ;  Surgeon: Desmond Alberts MD;  [facility]     ARTHROPLASTY MINIMALLY INVASIVE HIP  2/27/2014    Procedure: ARTHROPLASTY MINIMALLY INVASIVE HIP;  Right Total Hip Arthroplasty Minimally Invasive Two Incision  *Latex Free Room;  Surgeon: Desmond Alberts MD;  [facility]: [facility] OR     BACK SURGERY      back fusion 1994     COLONOSCOPY      2007     COLONOSCOPY N/A 8/15/2017    Procedure: COLONOSCOPY;  colonoscopy;  Surgeon: Chun Mcguire MD;  Location:  GI     GI SURGERY      4 hernia repairs in childhood     HERNIA  REPAIR      4 since age 4     IR PAE  3/5/2020     [facility] NONSPECIFIC PROCEDURE   &    (R) inguinal herniorrhapy     [facility] NONSPECIFIC PROCEDURE      (L) inguinal herniorrhaphy     [facility] NONSPECIFIC PROCEDURE      L4-5  L5 S1 fusion - spondylolisthesis       Past medical history, past surgical history, medications, and allergies were reviewed with the patient. Additional pertinent items: None    FAMILY HISTORY:   Family History   Problem Relation Age of Onset     Heart Disease Father      Cerebrovascular Disease Father          OF STROKE      Cancer Father         melonoma     Melanoma Father      Hyperlipidemia Father      Thyroid Disease Father      Cancer Mother         Lung cancer     Other Cancer Mother         lung; heavy smoker     Cerebrovascular Disease Paternal Uncle         Aneurism     Breast Cancer Maternal Aunt          from it     Cancer Paternal Uncle      Cancer Paternal Aunt      Skin Cancer No family hx of      Glaucoma No family hx of      Macular Degeneration No family hx of        SOCIAL HISTORY:   Social History     Tobacco Use     Smoking status: Never Smoker     Smokeless tobacco: Never Used   Substance Use Topics     Alcohol use: Yes     Alcohol/week: 0.0 standard drinks     Comment: moderate, social     Social history was reviewed with the patient. Additional pertinent items: None      Discharge Medication List as of 2020  2:57 AM      CONTINUE these medications which have NOT CHANGED    Details   Acetaminophen (TYLENOL PO) Historical      atorvastatin (LIPITOR) 10 MG tablet TAKE 1 TABLET BY MOUTH EVERY 48 HOURS, Disp-45 tablet,R-4, E-Prescribe      famotidine (PEPCID) 40 MG tablet Take 1 tablet (40 mg) by mouth nightly as needed for heartburn, Disp-90 tablet, R-4, E-Prescribe      ibrutinib (IMBRUVICA) 420 MG tablet Take 1 tablet (420 mg) by mouth daily, Disp-28 tablet, R-2, E-Prescribe      ibuprofen (ADVIL/MOTRIN) 200 MG tablet Take 200 mg by mouth every 4  "hours as needed for mild pain, Historical      levothyroxine (SYNTHROID/LEVOTHROID) 50 MCG tablet TAKE 1 TABLET(50 MCG) BY MOUTH DAILY, Disp-90 tablet,R-3, E-Prescribe      omega 3 1200 MG CAPS Take 2 capsules by mouth daily, Historical      omeprazole (PRILOSEC) 10 MG DR capsule TAKE ONE CAPSULE BY MOUTH EVERY DAY 30 TO 60 MINUTES BEFORE A MEAL, Disp-90 capsule,R-1, E-Prescribe      Probiotic Product (PROBIOTIC-10 PO) Take by mouth daily, Historical      !! tiZANidine (ZANAFLEX) 2 MG tablet Take 1-2 tablets (2-4 mg) by mouth 3 times daily, Disp-30 tablet, R-0, E-Prescribe      !! tiZANidine (ZANAFLEX) 4 MG tablet Historical       !! - Potential duplicate medications found. Please discuss with provider.             Allergies   Allergen Reactions     Dilaudid [Hydromorphone] Itching     Morphine Itching        Review of Systems   HENT: Positive for nosebleeds.        Physical Exam   BP: 138/87  Pulse: 97  Temp: 98.3  F (36.8  C)  Resp: 18  Height: 170.2 cm (5' 7\")  Weight: 70.3 kg (155 lb)  SpO2: 99 %      Physical Exam  Vitals signs and nursing note reviewed.   Constitutional:       General: He is not in acute distress.     Appearance: He is well-developed.   HENT:      Head: Normocephalic.      Nose:      Comments: Blood in bilateral nares, right side seems to be oozing blood in comparison to left  Eyes:      Extraocular Movements: Extraocular movements intact.   Neck:      Musculoskeletal: Neck supple.   Pulmonary:      Effort: No respiratory distress.   Musculoskeletal:         General: No deformity.   Skin:     General: Skin is dry.   Neurological:      Mental Status: He is alert.      Comments: Oriented   Psychiatric:         Behavior: Behavior normal.         ED Course   2:00 AM  The patient was seen and examined by Elvin Cortez DO in Room ED17.       Procedures               No results found for this or any previous visit (from the past 24 hour(s)).  Medications   phenylephrine (VIRGINIA-SYNEPHRINE) 0.25 % " spray 1 spray (1 spray Nasal Given 11/19/20 0207)             Assessments & Plan (with Medical Decision Making)   75-year-old male presents to us with a chief complaint of nosebleed.  Edy-Synephrine was applied to bilateral nares and nose clip was left on for 20 minutes.  This was taken off and there was no further bleeding.  Patient does report recent multiple nosebleeds.  We will give him a referral for ENT and discharge him with nose clips and Edy-Synephrine in case of recurrent bleeding.  Patient is feeling better and comfortable with discharge home and the plan    I have reviewed the nursing notes.    I have reviewed the findings, diagnosis, plan and need for follow up with the patient.    Discharge Medication List as of 11/19/2020  2:57 AM          Final diagnoses:   Epistaxis     --  Elvin Cortez, DO  11/19/2020   [facility] EMERGENCY DEPARTMENT     Elvin Cortez, DO  11/19/20 0308

## 2020-11-19 NOTE — ED AVS SNAPSHOT
[facility] Emergency Department  [address] [address]  Phone: [phone]                                    Loco Wood   MRN: [id number]    Department: [facility] Emergency Department   Date of Visit: 11/19/2020           After Visit Summary Signature Page    I have received my discharge instructions, and my questions have been answered. I have discussed any challenges I see with this plan with the nurse or doctor.    ..........................................................................................................................................  Patient/Patient Representative Signature      ..........................................................................................................................................  Patient Representative Print Name and Relationship to Patient    ..................................................               ................................................  Date                                   Time    ..........................................................................................................................................  Reviewed by Signature/Title    ...................................................              ..............................................  Date                                               Time          22EPIC Rev 08/18

## 2020-11-19 NOTE — ED TRIAGE NOTES
Pt presents to ED via EMS for uncontrolled nose bleed. Denies blood thinner. Pt states nose bleed started at 0040.

## 2020-11-19 NOTE — DISCHARGE INSTRUCTIONS
A referral with ENT has been made for you.  They should call you to set up an appointment.  Return to the emergency department as needed for any new or worsening symptoms.

## 2020-12-01 ENCOUNTER — HOSPITAL ENCOUNTER (EMERGENCY)
Facility: CLINIC | Age: 75
Discharge: HOME | End: 2020-12-01
Attending: FAMILY MEDICINE | Admitting: FAMILY MEDICINE
Payer: COMMERCIAL

## 2020-12-01 VITALS
TEMPERATURE: 98.1 F | WEIGHT: 154.98 LBS | SYSTOLIC BLOOD PRESSURE: 123 MMHG | OXYGEN SATURATION: 100 % | DIASTOLIC BLOOD PRESSURE: 85 MMHG | BODY MASS INDEX: 24.27 KG/M2 | HEART RATE: 120 BPM | RESPIRATION RATE: 16 BRPM

## 2020-12-01 DIAGNOSIS — R04.0 EPISTAXIS, RECURRENT: ICD-10-CM

## 2020-12-01 LAB
ABO + RH BLD: NORMAL
ABO + RH BLD: NORMAL
ANION GAP SERPL CALCULATED.3IONS-SCNC: 4 MMOL/L (ref 3–14)
APTT PPP: 28 SEC (ref 22–37)
BASOPHILS # BLD AUTO: 0.3 10E9/L (ref 0–0.2)
BASOPHILS NFR BLD AUTO: 1.7 %
BLD GP AB SCN SERPL QL: NORMAL
BLOOD BANK CMNT PATIENT-IMP: NORMAL
BUN SERPL-MCNC: 15 MG/DL (ref 7–30)
CALCIUM SERPL-MCNC: 8.8 MG/DL (ref 8.5–10.1)
CHLORIDE SERPL-SCNC: 107 MMOL/L (ref 94–109)
CO2 SERPL-SCNC: 30 MMOL/L (ref 20–32)
CREAT SERPL-MCNC: 1.32 MG/DL (ref 0.66–1.25)
DIFFERENTIAL METHOD BLD: ABNORMAL
EOSINOPHIL # BLD AUTO: 0.2 10E9/L (ref 0–0.7)
EOSINOPHIL NFR BLD AUTO: 0.9 %
ERYTHROCYTE [DISTWIDTH] IN BLOOD BY AUTOMATED COUNT: 13.6 % (ref 10–15)
GFR SERPL CREATININE-BSD FRML MDRD: 53 ML/MIN/{1.73_M2}
GLUCOSE SERPL-MCNC: 116 MG/DL (ref 70–99)
HCT VFR BLD AUTO: 42.4 % (ref 40–53)
HGB BLD-MCNC: 13.7 G/DL (ref 13.3–17.7)
INR PPP: 1.15 (ref 0.86–1.14)
LYMPHOCYTES # BLD AUTO: 12.3 10E9/L (ref 0.8–5.3)
LYMPHOCYTES NFR BLD AUTO: 69 %
MCH RBC QN AUTO: 30.2 PG (ref 26.5–33)
MCHC RBC AUTO-ENTMCNC: 32.3 G/DL (ref 31.5–36.5)
MCV RBC AUTO: 93 FL (ref 78–100)
MONOCYTES # BLD AUTO: 0.6 10E9/L (ref 0–1.3)
MONOCYTES NFR BLD AUTO: 3.4 %
NEUTROPHILS # BLD AUTO: 4.5 10E9/L (ref 1.6–8.3)
NEUTROPHILS NFR BLD AUTO: 25 %
PLATELET # BLD AUTO: 174 10E9/L (ref 150–450)
PLATELET # BLD EST: ABNORMAL 10*3/UL
POTASSIUM SERPL-SCNC: 3.9 MMOL/L (ref 3.4–5.3)
RBC # BLD AUTO: 4.54 10E12/L (ref 4.4–5.9)
RBC MORPH BLD: NORMAL
SODIUM SERPL-SCNC: 141 MMOL/L (ref 133–144)
SPECIMEN EXP DATE BLD: NORMAL
WBC # BLD AUTO: 17.8 10E9/L (ref 4–11)

## 2020-12-01 PROCEDURE — 86901 BLOOD TYPING SEROLOGIC RH(D): CPT | Performed by: FAMILY MEDICINE

## 2020-12-01 PROCEDURE — 80048 BASIC METABOLIC PNL TOTAL CA: CPT | Performed by: FAMILY MEDICINE

## 2020-12-01 PROCEDURE — 99284 EMERGENCY DEPT VISIT MOD MDM: CPT | Mod: 25 | Performed by: FAMILY MEDICINE

## 2020-12-01 PROCEDURE — 85730 THROMBOPLASTIN TIME PARTIAL: CPT | Performed by: FAMILY MEDICINE

## 2020-12-01 PROCEDURE — 30901 CONTROL OF NOSEBLEED: CPT | Mod: RT | Performed by: FAMILY MEDICINE

## 2020-12-01 PROCEDURE — 99283 EMERGENCY DEPT VISIT LOW MDM: CPT | Mod: 25 | Performed by: FAMILY MEDICINE

## 2020-12-01 PROCEDURE — 86900 BLOOD TYPING SEROLOGIC ABO: CPT | Performed by: FAMILY MEDICINE

## 2020-12-01 PROCEDURE — 86850 RBC ANTIBODY SCREEN: CPT | Performed by: FAMILY MEDICINE

## 2020-12-01 PROCEDURE — 85610 PROTHROMBIN TIME: CPT | Performed by: FAMILY MEDICINE

## 2020-12-01 PROCEDURE — 85025 COMPLETE CBC W/AUTO DIFF WBC: CPT | Performed by: FAMILY MEDICINE

## 2020-12-01 RX ORDER — TRANEXAMIC ACID 100 MG/ML
500 INJECTION, SOLUTION INTRAVENOUS ONCE
Status: DISCONTINUED | OUTPATIENT
Start: 2020-12-01 | End: 2020-12-01 | Stop reason: HOSPADM

## 2020-12-01 RX ORDER — LIDOCAINE HYDROCHLORIDE 40 MG/ML
50 SOLUTION TOPICAL
Status: DISCONTINUED | OUTPATIENT
Start: 2020-12-01 | End: 2020-12-01 | Stop reason: HOSPADM

## 2020-12-01 NOTE — ED PROVIDER NOTES
ED Provider Note  [facility]      History     Chief Complaint   Patient presents with     Epistaxis     6th nose bleed this month. was seen in past by ent without any findings. blleding started 40 min ago     The history is provided by the patient and medical records.   Epistaxis    Loco Wood is a 75 year old male with a past medical history significant for paroxysmal defibrillation, CKD stage III, hyperlipidemia and chronic lymphocytic leukemia who presents to the ED for evaluation of epistaxis.  The patient states that this is his sixth nosebleed within the last month.  The patient has previously been seen by ENT without any findings.  Today, the patient reports having another episode of epistaxis that started about 40 minutes prior to arrival (approximately 13:15).  He was seen in the ED November 19.  Since then he has had 2 subsequent bleeding episodes that were self-limited at home and saw otolaryngology for a scope.  Was told there was no areas amenable to cauterization or obvious source of bleeding and advised to keep his nose moist.  Today he had no trauma but direct pressure would not stop the bleed.    Past Medical History  Past Medical History:   Diagnosis Date     Arthritis     hip and toes     Chronic pain      CLL (chronic lymphocytic leukaemia)      Esophageal reflux      Malignant neoplasm (H)     Leukemia     Paroxysmal atrial fibrillation (H) 2/5/2020     PONV (postoperative nausea and vomiting)      Pure hypercholesterolemia      Past Surgical History:   Procedure Laterality Date     ARTHROPLASTY MINIMALLY INVASIVE HIP  5/10/2013    Procedure: ARTHROPLASTY MINIMALLY INVASIVE HIP;  Left Two Incision Total Hip Arthroplasty ;  Surgeon: Desmond Alberts MD;  [facility]     ARTHROPLASTY MINIMALLY INVASIVE HIP  2/27/2014    Procedure: ARTHROPLASTY MINIMALLY INVASIVE HIP;  Right Total Hip Arthroplasty Minimally Invasive Two Incision  *Latex Free Room;   Surgeon: Desmond Alberts MD;  [facility] OR     BACK SURGERY      back fusion      COLONOSCOPY           COLONOSCOPY N/A 8/15/2017    Procedure: COLONOSCOPY;  colonoscopy;  Surgeon: Chun Mcguire MD;  Location:  GI     GI SURGERY      4 hernia repairs in childhood     HERNIA REPAIR      4 since age 4     IR PAE  3/5/2020     [facility] NONSPECIFIC PROCEDURE   &    (R) inguinal herniorrhapy     [facility] NONSPECIFIC PROCEDURE      (L) inguinal herniorrhaphy     [facility] NONSPECIFIC PROCEDURE      L4-5  L5 S1 fusion - spondylolisthesis          Acetaminophen (TYLENOL PO)       atorvastatin (LIPITOR) 10 MG tablet       famotidine (PEPCID) 40 MG tablet       ibrutinib (IMBRUVICA) 420 MG tablet       ibuprofen (ADVIL/MOTRIN) 200 MG tablet       levothyroxine (SYNTHROID/LEVOTHROID) 50 MCG tablet       omega 3 1200 MG CAPS       omeprazole (PRILOSEC) 10 MG DR capsule       Probiotic Product (PROBIOTIC-10 PO)       tiZANidine (ZANAFLEX) 2 MG tablet       tiZANidine (ZANAFLEX) 4 MG tablet      Allergies   Allergen Reactions     Dilaudid [Hydromorphone] Itching     Morphine Itching     Family History  Family History   Problem Relation Age of Onset     Heart Disease Father      Cerebrovascular Disease Father          OF STROKE      Cancer Father         melonoma     Melanoma Father      Hyperlipidemia Father      Thyroid Disease Father      Cancer Mother         Lung cancer     Other Cancer Mother         lung; heavy smoker     Cerebrovascular Disease Paternal Uncle         Aneurism     Breast Cancer Maternal Aunt          from it     Cancer Paternal Uncle      Cancer Paternal Aunt      Skin Cancer No family hx of      Glaucoma No family hx of      Macular Degeneration No family hx of      Social History   Social History     Tobacco Use     Smoking status: Never Smoker     Smokeless tobacco: Never Used   Substance Use Topics     Alcohol use: Yes     Alcohol/week: 0.0 standard drinks     Comment:  moderate, social     Drug use: No      Past medical history, past surgical history, medications, allergies, family history, and social history were reviewed with the patient. No additional pertinent items.       Review of Systems  A complete review of systems was performed with pertinent positives and negatives noted in the HPI, and all other systems negative.    Physical Exam   BP: (!) 146/95  Pulse: 133  Temp: 96.9  F (36.1  C)  Resp: 16  Weight: 70.3 kg (154 lb 15.7 oz)  SpO2: 99 %  Physical Exam  Constitutional:       General: He is in acute distress (Gagging and spitting blood into an emesis bag).      Appearance: He is not diaphoretic.   HENT:      Head: Atraumatic.      Nose:      Right Nostril: Epistaxis and occlusion (Clots in nare) present. No septal hematoma.   Eyes:      General: No scleral icterus.     Pupils: Pupils are equal, round, and reactive to light.   Cardiovascular:      Heart sounds: Normal heart sounds.   Pulmonary:      Effort: No respiratory distress.      Breath sounds: Normal breath sounds.   Abdominal:      General: Bowel sounds are normal.      Palpations: Abdomen is soft.      Tenderness: There is no abdominal tenderness.   Musculoskeletal:         General: No tenderness.   Skin:     General: Skin is warm.      Findings: No rash.         ED Course      [facility]     -Epistaxis Mgmt    Date/Time: 12/1/2020 3:22 PM  Performed by: Arnaud Coffman MD  Authorized by: Arnaud Coffman MD       ANESTHESIA (see MAR for exact dosages)     Anesthesia method:  Topical application    Topical anesthetic:  Epinephrine      PROCEDURE DETAILS     Treatment site:  R anterior    Treatment method:  Anterior pack    Treatment complexity:  Limited    Treatment episode: recurrence of recent bleed        POST PROCEDURE     Assessment:  Bleeding stopped  PROCEDURE   Patient Tolerance:  Patient tolerated the procedure well with no immediate complications  Describe  Procedure: Nose was cleared of clots with blowing and suction.  I examined the nose and I did not find an obvious source of the bleeding.  I topically anesthetized the nose and then packed with gauze soaked with tranexamic acid.  Bleeding stopped, later the packing was removed and there was no recurrent bleeding.                        Results for orders placed or performed during the hospital encounter of 12/01/20   CBC with platelets differential     Status: Abnormal   Result Value Ref Range    WBC 17.8 (H) 4.0 - 11.0 10e9/L    RBC Count 4.54 4.4 - 5.9 10e12/L    Hemoglobin 13.7 13.3 - 17.7 g/dL    Hematocrit 42.4 40.0 - 53.0 %    MCV 93 78 - 100 fl    MCH 30.2 26.5 - 33.0 pg    MCHC 32.3 31.5 - 36.5 g/dL    RDW 13.6 10.0 - 15.0 %    Platelet Count 174 150 - 450 10e9/L    Diff Method Manual Differential     % Neutrophils 25.0 %    % Lymphocytes 69.0 %    % Monocytes 3.4 %    % Eosinophils 0.9 %    % Basophils 1.7 %    Absolute Neutrophil 4.5 1.6 - 8.3 10e9/L    Absolute Lymphocytes 12.3 (H) 0.8 - 5.3 10e9/L    Absolute Monocytes 0.6 0.0 - 1.3 10e9/L    Absolute Eosinophils 0.2 0.0 - 0.7 10e9/L    Absolute Basophils 0.3 (H) 0.0 - 0.2 10e9/L    RBC Morphology Normal     Platelet Estimate Confirming automated cell count    INR     Status: Abnormal   Result Value Ref Range    INR 1.15 (H) 0.86 - 1.14   Partial thromboplastin time     Status: None   Result Value Ref Range    PTT 28 22 - 37 sec   Basic metabolic panel     Status: Abnormal   Result Value Ref Range    Sodium 141 133 - 144 mmol/L    Potassium 3.9 3.4 - 5.3 mmol/L    Chloride 107 94 - 109 mmol/L    Carbon Dioxide 30 20 - 32 mmol/L    Anion Gap 4 3 - 14 mmol/L    Glucose 116 (H) 70 - 99 mg/dL    Urea Nitrogen 15 7 - 30 mg/dL    Creatinine 1.32 (H) 0.66 - 1.25 mg/dL    GFR Estimate 53 (L) >60 mL/min/[1.73_m2]    GFR Estimate If Black 61 >60 mL/min/[1.73_m2]    Calcium 8.8 8.5 - 10.1 mg/dL   ABO/Rh type and screen     Status: None (In process)   Result Value  Ref Range    ABO PENDING     Antibody Screen PENDING     Test Valid Only At          [facility]    Specimen Expires 12/04/2020      Medications   tranexamic acid (CYKLOKAPRON) spray 500 mg (has no administration in time range)   phenylephrine (VIRGINIA-SYNEPHRINE) 0.25 % spray 1 spray (has no administration in time range)   lidocaine (XYLOCAINE) 4 % solution 50 mL (has no administration in time range)        Assessments & Plan (with Medical Decision Making)   75-year-old male with a history of CLL presenting with recurrent nosebleeds.  He arrived spitting blood and unable to stop his nosebleed with direct pressure.  Has been seen in the ED for this, has been seen by ENT for this, and has had 2 or 3 other self-limited episodes at home.  Today his vitals were significant initially for tachycardia which resolved immediately upon the cessation of bleeding.  His hemoglobin and platelets are stable and his coags are unremarkable.  I was able to obtain hemostasis as documented in the procedure above.  He had no recurrent bleeding in over an hour.  He would like to be discharged.  As the bleeding has now stopped and his labs are stable I do not see any indication for packing.  We discussed measures to prevent future rebleeding and also the potential to at least discuss follow-up with the ENT who saw him recently.  Patient appears stable for discharge at this time.  We discussed the indications for return.     I have reviewed the nursing notes. I have reviewed the findings, diagnosis, plan and need for follow up with the patient.    New Prescriptions    No medications on file       Final diagnoses:   Epistaxis, recurrent       --  Arnaud Coffman MD  [facility] EMERGENCY DEPARTMENT  12/1/2020     Arnaud Coffman MD  12/01/20 1524

## 2020-12-01 NOTE — ED NOTES
Patient  reviewed discharge.  .  Follow-up appts reviewed.   What s/s warrant return to the ER.  Prescriptions and how to take them.  Importance of social distancing, good hand hygiene, and wearing a mask when in public spaces.

## 2020-12-01 NOTE — ED AVS SNAPSHOT
[facility] Emergency Department  [address]  [place] [address]  Phone: [phone]  Fax: [phone]                                    Loco Wood   MRN: [id number]    Department: [facility] Emergency Department   Date of Visit: 12/1/2020           After Visit Summary Signature Page    I have received my discharge instructions, and my questions have been answered. I have discussed any challenges I see with this plan with the nurse or doctor.    ..........................................................................................................................................  Patient/Patient Representative Signature      ..........................................................................................................................................  Patient Representative Print Name and Relationship to Patient    ..................................................               ................................................  Date                                   Time    ..........................................................................................................................................  Reviewed by Signature/Title    ...................................................              ..............................................  Date                                               Time          22EPIC Rev 08/18

## 2020-12-01 NOTE — DISCHARGE INSTRUCTIONS
Thank you for choosing [facility].     Please closely monitor for further symptoms. Return to the Emergency Department if you develop any new or worsening signs or symptoms.    If you received any opiate pain medications or sedatives during your visit, please do not drive for at least 8 hours.     Labs, cultures or final xray interpretations may still need to be reviewed.  We will call you if your plan of care needs to be changed.    Consider follow-up by telephone with the ENT who saw you recently.  Continue to avoid forceful blowing of nose and heavy lifting for the next 5 to 7 days.  For recurrence use direct pressure and topical vasoconstrictors as discussed.  Use moisturizing spray or Vaseline.

## 2020-12-11 DIAGNOSIS — C91.10 CLL (CHRONIC LYMPHOCYTIC LEUKEMIA) (H): ICD-10-CM

## 2020-12-11 LAB
BASOPHILS # BLD AUTO: 0.1 10E9/L (ref 0–0.2)
BASOPHILS NFR BLD AUTO: 0.5 %
DIFFERENTIAL METHOD BLD: ABNORMAL
EOSINOPHIL # BLD AUTO: 0.2 10E9/L (ref 0–0.7)
EOSINOPHIL NFR BLD AUTO: 1.8 %
ERYTHROCYTE [DISTWIDTH] IN BLOOD BY AUTOMATED COUNT: 14.4 % (ref 10–15)
HCT VFR BLD AUTO: 34.5 % (ref 40–53)
HGB BLD-MCNC: 11.1 G/DL (ref 13.3–17.7)
LYMPHOCYTES # BLD AUTO: 9.2 10E9/L (ref 0.8–5.3)
LYMPHOCYTES NFR BLD AUTO: 70.2 %
MCH RBC QN AUTO: 29.9 PG (ref 26.5–33)
MCHC RBC AUTO-ENTMCNC: 32.2 G/DL (ref 31.5–36.5)
MCV RBC AUTO: 93 FL (ref 78–100)
MONOCYTES # BLD AUTO: 0.9 10E9/L (ref 0–1.3)
MONOCYTES NFR BLD AUTO: 6.5 %
NEUTROPHILS # BLD AUTO: 2.7 10E9/L (ref 1.6–8.3)
NEUTROPHILS NFR BLD AUTO: 21 %
PLATELET # BLD AUTO: 164 10E9/L (ref 150–450)
RBC # BLD AUTO: 3.71 10E12/L (ref 4.4–5.9)
WBC # BLD AUTO: 13 10E9/L (ref 4–11)

## 2020-12-11 PROCEDURE — 85025 COMPLETE CBC W/AUTO DIFF WBC: CPT | Performed by: INTERNAL MEDICINE

## 2020-12-11 PROCEDURE — 80053 COMPREHEN METABOLIC PANEL: CPT | Performed by: INTERNAL MEDICINE

## 2020-12-11 PROCEDURE — 36415 COLL VENOUS BLD VENIPUNCTURE: CPT | Performed by: INTERNAL MEDICINE

## 2020-12-12 LAB
ALBUMIN SERPL-MCNC: 3.9 G/DL (ref 3.4–5)
ALP SERPL-CCNC: 66 U/L (ref 40–150)
ALT SERPL W P-5'-P-CCNC: 23 U/L (ref 0–70)
ANION GAP SERPL CALCULATED.3IONS-SCNC: 4 MMOL/L (ref 3–14)
AST SERPL W P-5'-P-CCNC: 20 U/L (ref 0–45)
BILIRUB SERPL-MCNC: 0.9 MG/DL (ref 0.2–1.3)
BUN SERPL-MCNC: 16 MG/DL (ref 7–30)
CALCIUM SERPL-MCNC: 8.8 MG/DL (ref 8.5–10.1)
CHLORIDE SERPL-SCNC: 110 MMOL/L (ref 94–109)
CO2 SERPL-SCNC: 26 MMOL/L (ref 20–32)
CREAT SERPL-MCNC: 1.48 MG/DL (ref 0.66–1.25)
GFR SERPL CREATININE-BSD FRML MDRD: 46 ML/MIN/{1.73_M2}
GLUCOSE SERPL-MCNC: 91 MG/DL (ref 70–99)
POTASSIUM SERPL-SCNC: 4 MMOL/L (ref 3.4–5.3)
PROT SERPL-MCNC: 6.4 G/DL (ref 6.8–8.8)
SODIUM SERPL-SCNC: 140 MMOL/L (ref 133–144)

## 2020-12-14 ENCOUNTER — TELEPHONE (OUTPATIENT)
Dept: ONCOLOGY | Facility: CLINIC | Age: 75
End: 2020-12-14

## 2020-12-14 NOTE — ORAL ONC MGMT
Oral Chemotherapy Monitoring Program  Lab Follow Up    Patient currently on ibrutinib therapy for CLL.    Reviewed lab results from 12/11/2020.    Labs:  _  Result Component Current Result Ref Range   Sodium 140 (12/11/2020) 133 - 144 mmol/L     _  Result Component Current Result Ref Range   Potassium 4.0 (12/11/2020) 3.4 - 5.3 mmol/L     _  Result Component Current Result Ref Range   Calcium 8.8 (12/11/2020) 8.5 - 10.1 mg/dL     No results found for Mag within last 30 days.     No results found for Phos within last 30 days.     _  Result Component Current Result Ref Range   Albumin 3.9 (12/11/2020) 3.4 - 5.0 g/dL     _  Result Component Current Result Ref Range   Urea Nitrogen 16 (12/11/2020) 7 - 30 mg/dL     _  Result Component Current Result Ref Range   Creatinine 1.48 (H) (12/11/2020) 0.66 - 1.25 mg/dL       _  Result Component Current Result Ref Range   AST 20 (12/11/2020) 0 - 45 U/L     _  Result Component Current Result Ref Range   ALT 23 (12/11/2020) 0 - 70 U/L     _  Result Component Current Result Ref Range   Bilirubin Total 0.9 (12/11/2020) 0.2 - 1.3 mg/dL       _  Result Component Current Result Ref Range   WBC 13.0 (H) (12/11/2020) 4.0 - 11.0 10e9/L     _  Result Component Current Result Ref Range   Hemoglobin 11.1 (L) (12/11/2020) 13.3 - 17.7 g/dL     _  Result Component Current Result Ref Range   Platelet Count 164 (12/11/2020) 150 - 450 10e9/L     _  Result Component Current Result Ref Range   Absolute Neutrophil 2.7 (12/11/2020) 1.6 - 8.3 10e9/L       Assessment & Plan:  No concerning abnormalities. Creatinine increased, continue to monitor. Continue plan.     Follow-Up:  12/15: BMT appointment    Jameson Baker, PharmD  Hematology/Oncology Clinical Pharmacist  [facility]  [facility]

## 2020-12-15 ENCOUNTER — VIRTUAL VISIT (OUTPATIENT)
Dept: TRANSPLANT | Facility: CLINIC | Age: 75
End: 2020-12-15
Attending: INTERNAL MEDICINE
Payer: COMMERCIAL

## 2020-12-15 DIAGNOSIS — C91.10 CLL (CHRONIC LYMPHOCYTIC LEUKEMIA) (H): Primary | ICD-10-CM

## 2020-12-15 PROCEDURE — 99213 OFFICE O/P EST LOW 20 MIN: CPT | Mod: 95

## 2020-12-15 RX ORDER — CHOLECALCIFEROL (VITAMIN D3) 50 MCG
1 TABLET ORAL DAILY
COMMUNITY

## 2020-12-15 NOTE — PROGRESS NOTES
"Loco Wood is a 75 year old male who is being evaluated via a billable video visit.      The patient has been notified of following:     \"This video visit will be conducted via a call between you and your physician/provider. We have found that certain health care needs can be provided without the need for an in-person physical exam.  This service lets us provide the care you need with a video conversation.  If a prescription is necessary we can send it directly to your pharmacy.  If lab work is needed we can place an order for that and you can then stop by our lab to have the test done at a later time.    Video visits are billed at different rates depending on your insurance coverage.  Please reach out to your insurance provider with any questions.    If during the course of the call the physician/provider feels a video visit is not appropriate, you will not be charged for this service.\"    Patient has given verbal consent for Video visit? Yes  How would you like to obtain your AVS? MyChart  If you are dropped from the video visit, the video invite should be resent to: Text to cell phone: [phone]  Will anyone else be joining your video visit? No        Video-Visit Details    We could not get the video to work, so this converted to a telephone visit.    "

## 2020-12-15 NOTE — LETTER
"    12/15/2020         RE: Loco Wood  [address] [address]        Dear Colleague,    Thank you for referring your patient, Loco Wood, to the [facility] [place]. Please see a copy of my visit note below.    Loco Wood is a 75 year old male who is being evaluated via a billable video visit.      The patient has been notified of following:     \"This video visit will be conducted via a call between you and your physician/provider. We have found that certain health care needs can be provided without the need for an in-person physical exam.  This service lets us provide the care you need with a video conversation.  If a prescription is necessary we can send it directly to your pharmacy.  If lab work is needed we can place an order for that and you can then stop by our lab to have the test done at a later time.    Video visits are billed at different rates depending on your insurance coverage.  Please reach out to your insurance provider with any questions.    If during the course of the call the physician/provider feels a video visit is not appropriate, you will not be charged for this service.\"    Patient has given verbal consent for Video visit? Yes  How would you like to obtain your AVS? MyChart  If you are dropped from the video visit, the video invite should be resent to: Text to cell phone: [phone]  Will anyone else be joining your video visit? No        Video-Visit Details    We could not get the video to work, so this converted to a telephone visit.      Oncology/Hematology Visit Note  Dec 15, 2020    Reason for Visit: follow up of CLL    History of Present Illness: Loco Wood is a 75 year old male with CLL. He was diagnosed 2/12/2007 with CLL, Lyles stage 0, followed clinically since.  At diagnosis WBC 17.8, ALC 11.2, hemoglobin 15.2, platelets 188.  Flow consistent with CLL.  No opportunistic infections, with " IgG in the normal range during follow up. Due to rising lymphocyte count, it was recommended he consider starting on treatment. He is currently undergoing treatment with ibrutinib, which he began on 5/14/19. Please see previous notes for further details on the patient's history. He comes in today for routine follow up.    Interval History:  Loco had 2 major episodes of epistaxis over the past few weeks.  These required emergency room visits for control of bleeding.  He is also been evaluated by ENT.  Most recent evaluation was able to have a visible vessel cauterized.  He has had no recurrent bleeding for about 12 days.  His weight is stable.  His appetite is good.  He has no fevers, chills, night sweats, or increasing lymphadenopathy.       Current Outpatient Medications   Medication Sig Dispense Refill     atorvastatin (LIPITOR) 10 MG tablet TAKE 1 TABLET BY MOUTH EVERY 48 HOURS 45 tablet 4     famotidine (PEPCID) 40 MG tablet Take 1 tablet (40 mg) by mouth nightly as needed for heartburn 90 tablet 4     ibrutinib (IMBRUVICA) 420 MG tablet Take 1 tablet (420 mg) by mouth daily 28 tablet 2     ibuprofen (ADVIL/MOTRIN) 200 MG tablet Take 200 mg by mouth every 4 hours as needed for mild pain       levothyroxine (SYNTHROID/LEVOTHROID) 50 MCG tablet TAKE 1 TABLET(50 MCG) BY MOUTH DAILY 90 tablet 3     omeprazole (PRILOSEC) 10 MG DR capsule TAKE ONE CAPSULE BY MOUTH EVERY DAY 30 TO 60 MINUTES BEFORE A MEAL 90 capsule 1     tiZANidine (ZANAFLEX) 2 MG tablet Take 1-2 tablets (2-4 mg) by mouth 3 times daily 30 tablet 0     tiZANidine (ZANAFLEX) 4 MG tablet        vitamin D3 (CHOLECALCIFEROL) 50 mcg (2000 units) tablet Take 1 tablet by mouth daily       Acetaminophen (TYLENOL PO)        omega 3 1200 MG CAPS Take 2 capsules by mouth daily       Probiotic Product (PROBIOTIC-10 PO) Take by mouth daily       RESVERATROL PO          There were no vitals taken for this visit.  Wt Readings from Last 10 Encounters:   12/01/20  70.3 kg (154 lb 15.7 oz)   11/19/20 70.3 kg (155 lb)   09/22/20 72.5 kg (159 lb 14.4 oz)   03/18/20 73.9 kg (163 lb)   03/03/20 75.9 kg (167 lb 4.8 oz)   03/02/20 74.8 kg (165 lb)   02/05/20 77.2 kg (170 lb 4.8 oz)   02/04/20 75.3 kg (166 lb)   01/07/20 75.3 kg (166 lb)   11/26/19 75.4 kg (166 lb 4 oz)     Physical Examination:  Not possible during telephone visit      Lab Results   Component Value Date    WBC 13.0 (H) 12/11/2020    ANEU 2.7 12/11/2020    HGB 11.1 (L) 12/11/2020    HCT 34.5 (L) 12/11/2020     12/11/2020     12/11/2020    POTASSIUM 4.0 12/11/2020    CHLORIDE 110 (H) 12/11/2020    CO2 26 12/11/2020    GLC 91 12/11/2020    BUN 16 12/11/2020    CR 1.48 (H) 12/11/2020    MAG 1.9 02/28/2014    INR 1.15 (H) 12/01/2020         Assessment and Plan:  CLL. Patient started on ibrutinib 420 mg daily on 5/14/19. He is tolerating treatment very well thus far. He has had an expected rise in his lymphocyte count that is now trending down. Since starting on treatment, he has had some hand muscle spasms and increased back pain. I suspect both are side effects from ibrutinib. He had a brief episode of atrial fibrillation after starting ibrutinib but has been doing well lately. Due to cost considerations, he is taking ibrutinib only 1-2 times per week at this time.  With this lower dosing, his blood counts remained satisfactory, with the overall trajectory of his lymphocytes continuing to decrease.  He is more anemic, undoubtedly due to his recent nosebleeds.  We will continue to monitor this every other month, sooner if he has new or worsening bleeding issues.    Hypothyroidism. He began on levothyroxine 50 mcg daily on 5/2/19. TSH stable July.     CKD. He appears to have had some kidney dysfunction since at least 2010. His last check is at his baseline. He reports bubbles in his urine.  Checked UA, no proteinuria, but he does have some RBCs. Creatinine remains at baseline of ~1.5.      Our telephone call  was of 18 minutes duration today.  Multiple questions answered.  We will have him recheck labs in 2 months and return for a visit with me in 4 months, sooner if needed.      He Burns MD            Again, thank you for allowing me to participate in the care of your patient.        Sincerely,        BMT DOM

## 2020-12-16 NOTE — PROGRESS NOTES
Oncology/Hematology Visit Note  Dec 15, 2020    Reason for Visit: follow up of CLL    History of Present Illness: Loco Wood is a 75 year old male with CLL. He was diagnosed 2/12/2007 with CLL, Lyles stage 0, followed clinically since.  At diagnosis WBC 17.8, ALC 11.2, hemoglobin 15.2, platelets 188.  Flow consistent with CLL.  No opportunistic infections, with IgG in the normal range during follow up. Due to rising lymphocyte count, it was recommended he consider starting on treatment. He is currently undergoing treatment with ibrutinib, which he began on 5/14/19. Please see previous notes for further details on the patient's history. He comes in today for routine follow up.    Interval History:  Loco had 2 major episodes of epistaxis over the past few weeks.  These required emergency room visits for control of bleeding.  He is also been evaluated by ENT.  Most recent evaluation was able to have a visible vessel cauterized.  He has had no recurrent bleeding for about 12 days.  His weight is stable.  His appetite is good.  He has no fevers, chills, night sweats, or increasing lymphadenopathy.       Current Outpatient Medications   Medication Sig Dispense Refill     atorvastatin (LIPITOR) 10 MG tablet TAKE 1 TABLET BY MOUTH EVERY 48 HOURS 45 tablet 4     famotidine (PEPCID) 40 MG tablet Take 1 tablet (40 mg) by mouth nightly as needed for heartburn 90 tablet 4     ibrutinib (IMBRUVICA) 420 MG tablet Take 1 tablet (420 mg) by mouth daily 28 tablet 2     ibuprofen (ADVIL/MOTRIN) 200 MG tablet Take 200 mg by mouth every 4 hours as needed for mild pain       levothyroxine (SYNTHROID/LEVOTHROID) 50 MCG tablet TAKE 1 TABLET(50 MCG) BY MOUTH DAILY 90 tablet 3     omeprazole (PRILOSEC) 10 MG DR capsule TAKE ONE CAPSULE BY MOUTH EVERY DAY 30 TO 60 MINUTES BEFORE A MEAL 90 capsule 1     tiZANidine (ZANAFLEX) 2 MG tablet Take 1-2 tablets (2-4 mg) by mouth 3 times daily 30 tablet 0     tiZANidine (ZANAFLEX) 4 MG tablet         vitamin D3 (CHOLECALCIFEROL) 50 mcg (2000 units) tablet Take 1 tablet by mouth daily       Acetaminophen (TYLENOL PO)        omega 3 1200 MG CAPS Take 2 capsules by mouth daily       Probiotic Product (PROBIOTIC-10 PO) Take by mouth daily       RESVERATROL PO          There were no vitals taken for this visit.  Wt Readings from Last 10 Encounters:   12/01/20 70.3 kg (154 lb 15.7 oz)   11/19/20 70.3 kg (155 lb)   09/22/20 72.5 kg (159 lb 14.4 oz)   03/18/20 73.9 kg (163 lb)   03/03/20 75.9 kg (167 lb 4.8 oz)   03/02/20 74.8 kg (165 lb)   02/05/20 77.2 kg (170 lb 4.8 oz)   02/04/20 75.3 kg (166 lb)   01/07/20 75.3 kg (166 lb)   11/26/19 75.4 kg (166 lb 4 oz)     Physical Examination:  Not possible during telephone visit      Lab Results   Component Value Date    WBC 13.0 (H) 12/11/2020    ANEU 2.7 12/11/2020    HGB 11.1 (L) 12/11/2020    HCT 34.5 (L) 12/11/2020     12/11/2020     12/11/2020    POTASSIUM 4.0 12/11/2020    CHLORIDE 110 (H) 12/11/2020    CO2 26 12/11/2020    GLC 91 12/11/2020    BUN 16 12/11/2020    CR 1.48 (H) 12/11/2020    MAG 1.9 02/28/2014    INR 1.15 (H) 12/01/2020         Assessment and Plan:  CLL. Patient started on ibrutinib 420 mg daily on 5/14/19. He is tolerating treatment very well thus far. He has had an expected rise in his lymphocyte count that is now trending down. Since starting on treatment, he has had some hand muscle spasms and increased back pain. I suspect both are side effects from ibrutinib. He had a brief episode of atrial fibrillation after starting ibrutinib but has been doing well lately. Due to cost considerations, he is taking ibrutinib only 1-2 times per week at this time.  With this lower dosing, his blood counts remained satisfactory, with the overall trajectory of his lymphocytes continuing to decrease.  He is more anemic, undoubtedly due to his recent nosebleeds.  We will continue to monitor this every other month, sooner if he has new or worsening  bleeding issues.    Hypothyroidism. He began on levothyroxine 50 mcg daily on 5/2/19. TSH stable July.     CKD. He appears to have had some kidney dysfunction since at least 2010. His last check is at his baseline. He reports bubbles in his urine.  Checked UA, no proteinuria, but he does have some RBCs. Creatinine remains at baseline of ~1.5.      Our telephone call was of 18 minutes duration today.  Multiple questions answered.  We will have him recheck labs in 2 months and return for a visit with me in 4 months, sooner if needed.      He Burns MD

## 2020-12-24 ENCOUNTER — TELEPHONE (OUTPATIENT)
Dept: ONCOLOGY | Facility: CLINIC | Age: 75
End: 2020-12-24

## 2020-12-24 NOTE — TELEPHONE ENCOUNTER
Nu Kennedy CPhT  [facility]  Oncology Pharmacy [email or web address]  Phone: [phone]  Fax: [phone]

## 2021-01-01 ENCOUNTER — THERAPY VISIT (OUTPATIENT)
Dept: PHYSICAL THERAPY | Facility: CLINIC | Age: 76
End: 2021-01-01
Attending: FAMILY MEDICINE
Payer: COMMERCIAL

## 2021-01-01 ENCOUNTER — NURSE TRIAGE (OUTPATIENT)
Dept: NURSING | Facility: CLINIC | Age: 76
End: 2021-01-01

## 2021-01-01 ENCOUNTER — OFFICE VISIT (OUTPATIENT)
Dept: SURGERY | Facility: CLINIC | Age: 76
End: 2021-01-01
Payer: COMMERCIAL

## 2021-01-01 ENCOUNTER — TELEPHONE (OUTPATIENT)
Facility: CLINIC | Age: 76
End: 2021-01-01

## 2021-01-01 ENCOUNTER — ANCILLARY PROCEDURE (OUTPATIENT)
Dept: GENERAL RADIOLOGY | Facility: CLINIC | Age: 76
End: 2021-01-01
Attending: INTERNAL MEDICINE
Payer: COMMERCIAL

## 2021-01-01 ENCOUNTER — NURSE TRIAGE (OUTPATIENT)
Dept: FAMILY MEDICINE | Facility: CLINIC | Age: 76
End: 2021-01-01

## 2021-01-01 ENCOUNTER — OFFICE VISIT (OUTPATIENT)
Dept: URGENT CARE | Facility: URGENT CARE | Age: 76
End: 2021-01-01
Payer: COMMERCIAL

## 2021-01-01 ENCOUNTER — LAB (OUTPATIENT)
Dept: LAB | Facility: CLINIC | Age: 76
End: 2021-01-01
Payer: COMMERCIAL

## 2021-01-01 ENCOUNTER — THERAPY VISIT (OUTPATIENT)
Dept: PHYSICAL THERAPY | Facility: CLINIC | Age: 76
End: 2021-01-01
Payer: COMMERCIAL

## 2021-01-01 ENCOUNTER — MYC MEDICAL ADVICE (OUTPATIENT)
Dept: FAMILY MEDICINE | Facility: CLINIC | Age: 76
End: 2021-01-01

## 2021-01-01 ENCOUNTER — VIRTUAL VISIT (OUTPATIENT)
Dept: TRANSPLANT | Facility: CLINIC | Age: 76
End: 2021-01-01
Attending: INTERNAL MEDICINE
Payer: COMMERCIAL

## 2021-01-01 ENCOUNTER — VIRTUAL VISIT (OUTPATIENT)
Dept: FAMILY MEDICINE | Facility: CLINIC | Age: 76
End: 2021-01-01
Payer: COMMERCIAL

## 2021-01-01 ENCOUNTER — DOCUMENTATION ONLY (OUTPATIENT)
Dept: LAB | Facility: CLINIC | Age: 76
End: 2021-01-01

## 2021-01-01 ENCOUNTER — OFFICE VISIT (OUTPATIENT)
Dept: ORTHOPEDICS | Facility: CLINIC | Age: 76
End: 2021-01-01
Payer: COMMERCIAL

## 2021-01-01 ENCOUNTER — MYC MEDICAL ADVICE (OUTPATIENT)
Dept: FAMILY MEDICINE | Facility: CLINIC | Age: 76
End: 2021-01-01
Payer: COMMERCIAL

## 2021-01-01 ENCOUNTER — MYC MEDICAL ADVICE (OUTPATIENT)
Dept: TRANSPLANT | Facility: CLINIC | Age: 76
End: 2021-01-01
Payer: COMMERCIAL

## 2021-01-01 ENCOUNTER — TRANSFERRED RECORDS (OUTPATIENT)
Dept: HEALTH INFORMATION MANAGEMENT | Facility: CLINIC | Age: 76
End: 2021-01-01
Payer: COMMERCIAL

## 2021-01-01 ENCOUNTER — TELEPHONE (OUTPATIENT)
Dept: INTERNAL MEDICINE | Facility: CLINIC | Age: 76
End: 2021-01-01

## 2021-01-01 ENCOUNTER — TELEPHONE (OUTPATIENT)
Dept: FAMILY MEDICINE | Facility: CLINIC | Age: 76
End: 2021-01-01

## 2021-01-01 ENCOUNTER — E-VISIT (OUTPATIENT)
Dept: FAMILY MEDICINE | Facility: CLINIC | Age: 76
End: 2021-01-01
Payer: COMMERCIAL

## 2021-01-01 VITALS — WEIGHT: 164 LBS | HEIGHT: 67 IN | BODY MASS INDEX: 25.74 KG/M2

## 2021-01-01 VITALS
HEIGHT: 67 IN | DIASTOLIC BLOOD PRESSURE: 77 MMHG | HEART RATE: 107 BPM | BODY MASS INDEX: 25.83 KG/M2 | WEIGHT: 164.6 LBS | SYSTOLIC BLOOD PRESSURE: 129 MMHG | OXYGEN SATURATION: 100 %

## 2021-01-01 VITALS
OXYGEN SATURATION: 97 % | HEART RATE: 96 BPM | WEIGHT: 165 LBS | DIASTOLIC BLOOD PRESSURE: 70 MMHG | RESPIRATION RATE: 12 BRPM | HEIGHT: 67 IN | BODY MASS INDEX: 25.9 KG/M2 | TEMPERATURE: 98.2 F | SYSTOLIC BLOOD PRESSURE: 114 MMHG

## 2021-01-01 VITALS
HEIGHT: 67 IN | DIASTOLIC BLOOD PRESSURE: 73 MMHG | OXYGEN SATURATION: 99 % | SYSTOLIC BLOOD PRESSURE: 135 MMHG | HEART RATE: 96 BPM | BODY MASS INDEX: 26.19 KG/M2 | WEIGHT: 166.9 LBS

## 2021-01-01 VITALS
SYSTOLIC BLOOD PRESSURE: 111 MMHG | DIASTOLIC BLOOD PRESSURE: 73 MMHG | HEIGHT: 67 IN | WEIGHT: 166 LBS | BODY MASS INDEX: 26.06 KG/M2

## 2021-01-01 DIAGNOSIS — L29.9 PRURITIC DISORDER: ICD-10-CM

## 2021-01-01 DIAGNOSIS — M62.89 HAMSTRING TIGHTNESS: ICD-10-CM

## 2021-01-01 DIAGNOSIS — K21.9 GASTROESOPHAGEAL REFLUX DISEASE WITHOUT ESOPHAGITIS: ICD-10-CM

## 2021-01-01 DIAGNOSIS — C91.10 CLL (CHRONIC LYMPHOCYTIC LEUKEMIA) (H): Primary | ICD-10-CM

## 2021-01-01 DIAGNOSIS — Z98.1 HISTORY OF SPINAL FUSION: ICD-10-CM

## 2021-01-01 DIAGNOSIS — N18.31 STAGE 3A CHRONIC KIDNEY DISEASE (H): ICD-10-CM

## 2021-01-01 DIAGNOSIS — Z96.643 HX OF BILATERAL HIP REPLACEMENTS: ICD-10-CM

## 2021-01-01 DIAGNOSIS — L30.9 DERMATITIS: Primary | ICD-10-CM

## 2021-01-01 DIAGNOSIS — M25.551 RIGHT HIP PAIN: ICD-10-CM

## 2021-01-01 DIAGNOSIS — I48.0 PAROXYSMAL ATRIAL FIBRILLATION (H): ICD-10-CM

## 2021-01-01 DIAGNOSIS — N40.3 PROSTATE NODULE WITH URINARY OBSTRUCTION: Primary | ICD-10-CM

## 2021-01-01 DIAGNOSIS — K64.2 GRADE III INTERNAL HEMORRHOIDS: Primary | ICD-10-CM

## 2021-01-01 DIAGNOSIS — M25.571 PAIN IN JOINT INVOLVING ANKLE AND FOOT, RIGHT: ICD-10-CM

## 2021-01-01 DIAGNOSIS — K64.5 INTERNAL THROMBOSED HEMORRHOIDS: Primary | ICD-10-CM

## 2021-01-01 DIAGNOSIS — M95.5 PELVIC OBLIQUITY: ICD-10-CM

## 2021-01-01 DIAGNOSIS — C91.10 CLL (CHRONIC LYMPHOCYTIC LEUKEMIA) (H): ICD-10-CM

## 2021-01-01 DIAGNOSIS — M79.18 GLUTEAL PAIN: Primary | ICD-10-CM

## 2021-01-01 DIAGNOSIS — S86.112A: ICD-10-CM

## 2021-01-01 DIAGNOSIS — L50.9 HIVES: Primary | ICD-10-CM

## 2021-01-01 DIAGNOSIS — N13.8 PROSTATE NODULE WITH URINARY OBSTRUCTION: Primary | ICD-10-CM

## 2021-01-01 DIAGNOSIS — Z12.5 SPECIAL SCREENING FOR MALIGNANT NEOPLASM OF PROSTATE: ICD-10-CM

## 2021-01-01 DIAGNOSIS — S86.112A STRAIN OF GASTROCNEMIUS MUSCLE OF LEFT LOWER EXTREMITY, INITIAL ENCOUNTER: Primary | ICD-10-CM

## 2021-01-01 DIAGNOSIS — R97.20 ELEVATED PROSTATE SPECIFIC ANTIGEN (PSA): ICD-10-CM

## 2021-01-01 DIAGNOSIS — M79.18 GLUTEAL PAIN: ICD-10-CM

## 2021-01-01 LAB
ALBUMIN SERPL-MCNC: 3.7 G/DL (ref 3.4–5)
ALBUMIN SERPL-MCNC: 4 G/DL (ref 3.4–5)
ALBUMIN SERPL-MCNC: 4.1 G/DL (ref 3.4–5)
ALBUMIN SERPL-MCNC: 4.4 G/DL (ref 3.4–5)
ALP SERPL-CCNC: 66 U/L (ref 40–150)
ALP SERPL-CCNC: 67 U/L (ref 40–150)
ALP SERPL-CCNC: 72 U/L (ref 40–150)
ALP SERPL-CCNC: 72 U/L (ref 40–150)
ALT SERPL W P-5'-P-CCNC: 29 U/L (ref 0–70)
ALT SERPL W P-5'-P-CCNC: 30 U/L (ref 0–70)
ALT SERPL W P-5'-P-CCNC: 31 U/L (ref 0–70)
ALT SERPL W P-5'-P-CCNC: 31 U/L (ref 0–70)
ALT SERPL-CCNC: 18 LU/L (ref 5–60)
ANION GAP SERPL CALCULATED.3IONS-SCNC: 3 MMOL/L (ref 3–14)
ANION GAP SERPL CALCULATED.3IONS-SCNC: 4 MMOL/L (ref 3–14)
ANION GAP SERPL CALCULATED.3IONS-SCNC: 5 MMOL/L (ref 3–14)
AST SERPL W P-5'-P-CCNC: 21 U/L (ref 0–45)
AST SERPL W P-5'-P-CCNC: 23 U/L (ref 0–45)
AST SERPL W P-5'-P-CCNC: 28 U/L (ref 0–45)
AST SERPL W P-5'-P-CCNC: 29 U/L (ref 0–45)
AST SERPL-CCNC: 34 LU/L (ref 12–47)
BASOPHILS # BLD AUTO: 0 10E3/UL (ref 0–0.2)
BASOPHILS # BLD AUTO: 0 10E9/L (ref 0–0.2)
BASOPHILS # BLD AUTO: 0.1 10E3/UL (ref 0–0.2)
BASOPHILS # BLD MANUAL: 0 10E3/UL (ref 0–0.2)
BASOPHILS NFR BLD AUTO: 0 %
BASOPHILS NFR BLD AUTO: 0.5 %
BASOPHILS NFR BLD AUTO: 1 %
BASOPHILS NFR BLD MANUAL: 0 %
BILIRUB DIRECT SERPL-MCNC: 0.4 MG/DL (ref 0–0.2)
BILIRUB SERPL-MCNC: 1.1 MG/DL (ref 0.2–1.3)
BILIRUB SERPL-MCNC: 1.4 MG/DL (ref 0.2–1.3)
BILIRUB SERPL-MCNC: 1.6 MG/DL (ref 0.2–1.3)
BILIRUB SERPL-MCNC: 1.6 MG/DL (ref 0.2–1.3)
BUN SERPL-MCNC: 18 MG/DL (ref 7–30)
BUN SERPL-MCNC: 22 MG/DL (ref 7–30)
BUN SERPL-MCNC: 23 MG/DL (ref 7–30)
CALCIUM SERPL-MCNC: 8.5 MG/DL (ref 8.5–10.1)
CALCIUM SERPL-MCNC: 8.8 MG/DL (ref 8.5–10.1)
CALCIUM SERPL-MCNC: 9.1 MG/DL (ref 8.5–10.1)
CHLORIDE BLD-SCNC: 107 MMOL/L (ref 94–109)
CHLORIDE BLD-SCNC: 112 MMOL/L (ref 94–109)
CHLORIDE SERPL-SCNC: 110 MMOL/L (ref 94–109)
CO2 SERPL-SCNC: 28 MMOL/L (ref 20–32)
CO2 SERPL-SCNC: 28 MMOL/L (ref 20–32)
CO2 SERPL-SCNC: 30 MMOL/L (ref 20–32)
CREAT SERPL-MCNC: 1.47 MG/DL (ref 0.66–1.25)
CREAT SERPL-MCNC: 1.47 MG/DL (ref 0.66–1.25)
CREAT SERPL-MCNC: 1.53 MG/DL (ref 0.66–1.25)
CREATININE (EXTERNAL): 1.58 MG/DL (ref 0.6–1.5)
DIFFERENTIAL METHOD BLD: ABNORMAL
EOSINOPHIL # BLD AUTO: 0.2 10E3/UL (ref 0–0.7)
EOSINOPHIL # BLD AUTO: 0.3 10E3/UL (ref 0–0.7)
EOSINOPHIL # BLD AUTO: 0.4 10E9/L (ref 0–0.7)
EOSINOPHIL # BLD MANUAL: 0.4 10E3/UL (ref 0–0.7)
EOSINOPHIL NFR BLD AUTO: 2 %
EOSINOPHIL NFR BLD AUTO: 4.6 %
EOSINOPHIL NFR BLD AUTO: 5 %
EOSINOPHIL NFR BLD MANUAL: 4 %
ERYTHROCYTE [DISTWIDTH] IN BLOOD BY AUTOMATED COUNT: 12.8 % (ref 10–15)
ERYTHROCYTE [DISTWIDTH] IN BLOOD BY AUTOMATED COUNT: 12.8 % (ref 10–15)
ERYTHROCYTE [DISTWIDTH] IN BLOOD BY AUTOMATED COUNT: 14.1 % (ref 10–15)
ERYTHROCYTE [DISTWIDTH] IN BLOOD BY AUTOMATED COUNT: 15.4 % (ref 10–15)
GFR ESTIMATED (EXTERNAL): 45 ML/MIN/1.73M2
GFR SERPL CREATININE-BSD FRML MDRD: 44 ML/MIN/1.73M2
GFR SERPL CREATININE-BSD FRML MDRD: 46 ML/MIN/1.73M2
GFR SERPL CREATININE-BSD FRML MDRD: 46 ML/MIN/{1.73_M2}
GLUCOSE (EXTERNAL): 91 MG/DL (ref 70–99)
GLUCOSE BLD-MCNC: 108 MG/DL (ref 70–99)
GLUCOSE BLD-MCNC: 114 MG/DL (ref 70–99)
GLUCOSE SERPL-MCNC: 100 MG/DL (ref 70–99)
HCT VFR BLD AUTO: 33.9 % (ref 40–53)
HCT VFR BLD AUTO: 35.7 % (ref 40–53)
HCT VFR BLD AUTO: 35.8 % (ref 40–53)
HCT VFR BLD AUTO: 35.9 % (ref 40–53)
HGB BLD-MCNC: 11.7 G/DL (ref 13.3–17.7)
HGB BLD-MCNC: 11.7 G/DL (ref 13.3–17.7)
HGB BLD-MCNC: 11.9 G/DL (ref 13.3–17.7)
HGB BLD-MCNC: 12 G/DL (ref 13.3–17.7)
IMM GRANULOCYTES # BLD: 0 10E3/UL
IMM GRANULOCYTES # BLD: 0 10E3/UL
IMM GRANULOCYTES NFR BLD: 0 %
IMM GRANULOCYTES NFR BLD: 0 %
LYMPHOCYTES # BLD AUTO: 3.7 10E3/UL (ref 0.8–5.3)
LYMPHOCYTES # BLD AUTO: 3.7 10E3/UL (ref 0.8–5.3)
LYMPHOCYTES # BLD AUTO: 4.5 10E9/L (ref 0.8–5.3)
LYMPHOCYTES # BLD MANUAL: 5.8 10E3/UL (ref 0.8–5.3)
LYMPHOCYTES NFR BLD AUTO: 39 %
LYMPHOCYTES NFR BLD AUTO: 53 %
LYMPHOCYTES NFR BLD AUTO: 54 %
LYMPHOCYTES NFR BLD MANUAL: 59 %
MCH RBC QN AUTO: 32.3 PG (ref 26.5–33)
MCH RBC QN AUTO: 33.1 PG (ref 26.5–33)
MCH RBC QN AUTO: 33.8 PG (ref 26.5–33)
MCH RBC QN AUTO: 35.1 PG (ref 26.5–33)
MCHC RBC AUTO-ENTMCNC: 32.7 G/DL (ref 31.5–36.5)
MCHC RBC AUTO-ENTMCNC: 33.3 G/DL (ref 31.5–36.5)
MCHC RBC AUTO-ENTMCNC: 33.4 G/DL (ref 31.5–36.5)
MCHC RBC AUTO-ENTMCNC: 34.5 G/DL (ref 31.5–36.5)
MCV RBC AUTO: 101 FL (ref 78–100)
MCV RBC AUTO: 102 FL (ref 78–100)
MCV RBC AUTO: 99 FL (ref 78–100)
MCV RBC AUTO: 99 FL (ref 78–100)
METAMYELOCYTES # BLD MANUAL: 0.2 10E3/UL
METAMYELOCYTES NFR BLD MANUAL: 2 %
MONOCYTES # BLD AUTO: 0.4 10E3/UL (ref 0–1.3)
MONOCYTES # BLD AUTO: 0.5 10E9/L (ref 0–1.3)
MONOCYTES # BLD AUTO: 0.6 10E3/UL (ref 0–1.3)
MONOCYTES # BLD MANUAL: 0.6 10E3/UL (ref 0–1.3)
MONOCYTES NFR BLD AUTO: 6 %
MONOCYTES NFR BLD AUTO: 6 %
MONOCYTES NFR BLD AUTO: 6.2 %
MONOCYTES NFR BLD MANUAL: 6 %
NEUTROPHILS # BLD AUTO: 2.4 10E3/UL (ref 1.6–8.3)
NEUTROPHILS # BLD AUTO: 2.9 10E9/L (ref 1.6–8.3)
NEUTROPHILS # BLD AUTO: 4.8 10E3/UL (ref 1.6–8.3)
NEUTROPHILS # BLD MANUAL: 2.9 10E3/UL (ref 1.6–8.3)
NEUTROPHILS NFR BLD AUTO: 34.7 %
NEUTROPHILS NFR BLD AUTO: 35 %
NEUTROPHILS NFR BLD AUTO: 53 %
NEUTROPHILS NFR BLD MANUAL: 29 %
NRBC # BLD AUTO: 0 10E3/UL
NRBC # BLD AUTO: 0 10E3/UL
NRBC BLD AUTO-RTO: 0 /100
NRBC BLD AUTO-RTO: 0 /100
PLAT MORPH BLD: ABNORMAL
PLAT MORPH BLD: ABNORMAL
PLATELET # BLD AUTO: 108 10E3/UL (ref 150–450)
PLATELET # BLD AUTO: 112 10E3/UL (ref 150–450)
PLATELET # BLD AUTO: 118 10E3/UL (ref 150–450)
PLATELET # BLD AUTO: 138 10E9/L (ref 150–450)
POTASSIUM (EXTERNAL): 4.2 MMOL/L (ref 3.6–5.3)
POTASSIUM BLD-SCNC: 4.1 MMOL/L (ref 3.4–5.3)
POTASSIUM BLD-SCNC: 4.1 MMOL/L (ref 3.4–5.3)
POTASSIUM SERPL-SCNC: 4.2 MMOL/L (ref 3.4–5.3)
PROT SERPL-MCNC: 5.8 G/DL (ref 6.8–8.8)
PROT SERPL-MCNC: 6.2 G/DL (ref 6.8–8.8)
PROT SERPL-MCNC: 6.4 G/DL (ref 6.8–8.8)
PROT SERPL-MCNC: 6.4 G/DL (ref 6.8–8.8)
PSA SERPL-MCNC: 5.33 UG/L (ref 0–4)
PSA SERPL-MCNC: 5.93 UG/L (ref 0–4)
RBC # BLD AUTO: 3.33 10E6/UL (ref 4.4–5.9)
RBC # BLD AUTO: 3.55 10E6/UL (ref 4.4–5.9)
RBC # BLD AUTO: 3.6 10E6/UL (ref 4.4–5.9)
RBC # BLD AUTO: 3.62 10E12/L (ref 4.4–5.9)
RBC MORPH BLD: ABNORMAL
RBC MORPH BLD: ABNORMAL
SODIUM SERPL-SCNC: 141 MMOL/L (ref 133–144)
SODIUM SERPL-SCNC: 143 MMOL/L (ref 133–144)
SODIUM SERPL-SCNC: 143 MMOL/L (ref 133–144)
T4 FREE SERPL-MCNC: 0.92 NG/DL (ref 0.76–1.46)
TSH SERPL DL<=0.005 MIU/L-ACNC: 4.62 MU/L (ref 0.4–4)
TSH SERPL-ACNC: 4.23 MU/L (ref 0.45–4.5)
VARIANT LYMPHS BLD QL SMEAR: PRESENT
WBC # BLD AUTO: 6.9 10E3/UL (ref 4–11)
WBC # BLD AUTO: 8.3 10E9/L (ref 4–11)
WBC # BLD AUTO: 9.3 10E3/UL (ref 4–11)
WBC # BLD AUTO: 9.9 10E3/UL (ref 4–11)

## 2021-01-01 PROCEDURE — 85025 COMPLETE CBC W/AUTO DIFF WBC: CPT

## 2021-01-01 PROCEDURE — 84153 ASSAY OF PSA TOTAL: CPT | Performed by: INTERNAL MEDICINE

## 2021-01-01 PROCEDURE — 80053 COMPREHEN METABOLIC PANEL: CPT | Performed by: INTERNAL MEDICINE

## 2021-01-01 PROCEDURE — 99214 OFFICE O/P EST MOD 30 MIN: CPT | Performed by: FAMILY MEDICINE

## 2021-01-01 PROCEDURE — 36415 COLL VENOUS BLD VENIPUNCTURE: CPT

## 2021-01-01 PROCEDURE — G0103 PSA SCREENING: HCPCS

## 2021-01-01 PROCEDURE — 85025 COMPLETE CBC W/AUTO DIFF WBC: CPT | Performed by: INTERNAL MEDICINE

## 2021-01-01 PROCEDURE — 99212 OFFICE O/P EST SF 10 MIN: CPT | Performed by: NURSE PRACTITIONER

## 2021-01-01 PROCEDURE — 80053 COMPREHEN METABOLIC PANEL: CPT

## 2021-01-01 PROCEDURE — 97161 PT EVAL LOW COMPLEX 20 MIN: CPT | Mod: GP | Performed by: PHYSICAL THERAPIST

## 2021-01-01 PROCEDURE — 84443 ASSAY THYROID STIM HORMONE: CPT

## 2021-01-01 PROCEDURE — 73610 X-RAY EXAM OF ANKLE: CPT | Mod: 26 | Performed by: RADIOLOGY

## 2021-01-01 PROCEDURE — 99213 OFFICE O/P EST LOW 20 MIN: CPT | Performed by: PHYSICIAN ASSISTANT

## 2021-01-01 PROCEDURE — 80076 HEPATIC FUNCTION PANEL: CPT

## 2021-01-01 PROCEDURE — 36415 COLL VENOUS BLD VENIPUNCTURE: CPT | Performed by: INTERNAL MEDICINE

## 2021-01-01 PROCEDURE — 97162 PT EVAL MOD COMPLEX 30 MIN: CPT | Mod: GP | Performed by: PHYSICAL THERAPIST

## 2021-01-01 PROCEDURE — 99214 OFFICE O/P EST MOD 30 MIN: CPT | Mod: 95 | Performed by: INTERNAL MEDICINE

## 2021-01-01 PROCEDURE — 97110 THERAPEUTIC EXERCISES: CPT | Mod: GP | Performed by: PHYSICAL THERAPIST

## 2021-01-01 PROCEDURE — 99204 OFFICE O/P NEW MOD 45 MIN: CPT | Performed by: FAMILY MEDICINE

## 2021-01-01 PROCEDURE — 85027 COMPLETE CBC AUTOMATED: CPT

## 2021-01-01 PROCEDURE — 46221 LIGATION OF HEMORRHOID(S): CPT | Performed by: SURGERY

## 2021-01-01 PROCEDURE — 99421 OL DIG E/M SVC 5-10 MIN: CPT | Performed by: INTERNAL MEDICINE

## 2021-01-01 PROCEDURE — 84439 ASSAY OF FREE THYROXINE: CPT

## 2021-01-01 RX ORDER — OMEPRAZOLE 10 MG/1
CAPSULE, DELAYED RELEASE ORAL
Qty: 90 CAPSULE | Refills: 1 | Status: SHIPPED | OUTPATIENT
Start: 2021-01-01

## 2021-01-01 RX ORDER — TRIAMCINOLONE ACETONIDE 1 MG/G
CREAM TOPICAL 2 TIMES DAILY
Qty: 80 G | Refills: 0 | Status: SHIPPED | OUTPATIENT
Start: 2021-01-01 | End: 2022-01-01

## 2021-01-01 RX ORDER — PREDNISONE 20 MG/1
TABLET ORAL
Qty: 20 TABLET | Refills: 0 | Status: SHIPPED | OUTPATIENT
Start: 2021-01-01 | End: 2021-01-01

## 2021-01-01 RX ORDER — KETOCONAZOLE 20 MG/ML
SHAMPOO TOPICAL
COMMUNITY
Start: 2021-01-01 | End: 2021-01-01

## 2021-01-01 ASSESSMENT — PAIN SCALES - GENERAL
PAINLEVEL: EXTREME PAIN (9)
PAINLEVEL: MILD PAIN (2)

## 2021-01-01 ASSESSMENT — MIFFLIN-ST. JEOR
SCORE: 1448.42
SCORE: 1445.25
SCORE: 1447.07
SCORE: 1442.53
SCORE: 1455.68

## 2021-01-01 ASSESSMENT — ENCOUNTER SYMPTOMS: CONSTITUTIONAL NEGATIVE: 1

## 2021-01-04 ENCOUNTER — HOSPITAL ENCOUNTER (EMERGENCY)
Facility: CLINIC | Age: 76
Discharge: HOME | End: 2021-01-04
Attending: EMERGENCY MEDICINE | Admitting: EMERGENCY MEDICINE
Payer: COMMERCIAL

## 2021-01-04 ENCOUNTER — APPOINTMENT (OUTPATIENT)
Dept: CT IMAGING | Facility: CLINIC | Age: 76
End: 2021-01-04
Attending: EMERGENCY MEDICINE
Payer: COMMERCIAL

## 2021-01-04 VITALS
WEIGHT: 164 LBS | RESPIRATION RATE: 20 BRPM | HEART RATE: 62 BPM | DIASTOLIC BLOOD PRESSURE: 67 MMHG | BODY MASS INDEX: 25.69 KG/M2 | SYSTOLIC BLOOD PRESSURE: 102 MMHG | OXYGEN SATURATION: 98 % | TEMPERATURE: 97.5 F

## 2021-01-04 DIAGNOSIS — R10.9 RIGHT FLANK PAIN: ICD-10-CM

## 2021-01-04 DIAGNOSIS — R31.9 HEMATURIA, UNSPECIFIED TYPE: ICD-10-CM

## 2021-01-04 DIAGNOSIS — N18.9 CHRONIC KIDNEY DISEASE, UNSPECIFIED CKD STAGE: ICD-10-CM

## 2021-01-04 DIAGNOSIS — N20.1 RIGHT URETERAL STONE: ICD-10-CM

## 2021-01-04 LAB
ALBUMIN SERPL-MCNC: 4.2 G/DL (ref 3.4–5)
ALBUMIN UR-MCNC: 10 MG/DL
ALP SERPL-CCNC: 63 U/L (ref 40–150)
ALT SERPL W P-5'-P-CCNC: 25 U/L (ref 0–70)
ANION GAP SERPL CALCULATED.3IONS-SCNC: 10 MMOL/L (ref 3–14)
APPEARANCE UR: CLEAR
AST SERPL W P-5'-P-CCNC: 21 U/L (ref 0–45)
BASOPHILS # BLD AUTO: 0.1 10E9/L (ref 0–0.2)
BASOPHILS NFR BLD AUTO: 0.3 %
BILIRUB SERPL-MCNC: 0.9 MG/DL (ref 0.2–1.3)
BILIRUB UR QL STRIP: NEGATIVE
BUN SERPL-MCNC: 18 MG/DL (ref 7–30)
CALCIUM SERPL-MCNC: 9 MG/DL (ref 8.5–10.1)
CHLORIDE SERPL-SCNC: 105 MMOL/L (ref 94–109)
CO2 SERPL-SCNC: 24 MMOL/L (ref 20–32)
COLOR UR AUTO: YELLOW
CREAT SERPL-MCNC: 1.48 MG/DL (ref 0.66–1.25)
DIFFERENTIAL METHOD BLD: ABNORMAL
EOSINOPHIL # BLD AUTO: 0.2 10E9/L (ref 0–0.7)
EOSINOPHIL NFR BLD AUTO: 1.1 %
ERYTHROCYTE [DISTWIDTH] IN BLOOD BY AUTOMATED COUNT: 13.1 % (ref 10–15)
GFR SERPL CREATININE-BSD FRML MDRD: 46 ML/MIN/{1.73_M2}
GLUCOSE SERPL-MCNC: 127 MG/DL (ref 70–99)
GLUCOSE UR STRIP-MCNC: NEGATIVE MG/DL
HCT VFR BLD AUTO: 36.4 % (ref 40–53)
HGB BLD-MCNC: 11.6 G/DL (ref 13.3–17.7)
HGB UR QL STRIP: ABNORMAL
IMM GRANULOCYTES # BLD: 0 10E9/L (ref 0–0.4)
IMM GRANULOCYTES NFR BLD: 0.2 %
KETONES UR STRIP-MCNC: NEGATIVE MG/DL
LEUKOCYTE ESTERASE UR QL STRIP: NEGATIVE
LIPASE SERPL-CCNC: 127 U/L (ref 73–393)
LYMPHOCYTES # BLD AUTO: 7.7 10E9/L (ref 0.8–5.3)
LYMPHOCYTES NFR BLD AUTO: 52.9 %
MCH RBC QN AUTO: 27.7 PG (ref 26.5–33)
MCHC RBC AUTO-ENTMCNC: 31.9 G/DL (ref 31.5–36.5)
MCV RBC AUTO: 87 FL (ref 78–100)
MONOCYTES # BLD AUTO: 0.6 10E9/L (ref 0–1.3)
MONOCYTES NFR BLD AUTO: 4 %
MUCOUS THREADS #/AREA URNS LPF: PRESENT /LPF
NEUTROPHILS # BLD AUTO: 6 10E9/L (ref 1.6–8.3)
NEUTROPHILS NFR BLD AUTO: 41.5 %
NITRATE UR QL: NEGATIVE
NRBC # BLD AUTO: 0 10*3/UL
NRBC BLD AUTO-RTO: 0 /100
PH UR STRIP: 5.5 PH (ref 5–7)
PLATELET # BLD AUTO: 162 10E9/L (ref 150–450)
PLATELET # BLD EST: ABNORMAL 10*3/UL
POTASSIUM SERPL-SCNC: 3.6 MMOL/L (ref 3.4–5.3)
PROT SERPL-MCNC: 6.7 G/DL (ref 6.8–8.8)
RBC # BLD AUTO: 4.19 10E12/L (ref 4.4–5.9)
RBC #/AREA URNS AUTO: 77 /HPF (ref 0–2)
SODIUM SERPL-SCNC: 139 MMOL/L (ref 133–144)
SOURCE: ABNORMAL
SP GR UR STRIP: 1.01 (ref 1–1.03)
UROBILINOGEN UR STRIP-MCNC: NORMAL MG/DL (ref 0–2)
WBC # BLD AUTO: 14.6 10E9/L (ref 4–11)
WBC #/AREA URNS AUTO: 1 /HPF (ref 0–5)

## 2021-01-04 PROCEDURE — 80053 COMPREHEN METABOLIC PANEL: CPT | Performed by: EMERGENCY MEDICINE

## 2021-01-04 PROCEDURE — 250N000011 HC RX IP 250 OP 636: Performed by: EMERGENCY MEDICINE

## 2021-01-04 PROCEDURE — 81001 URINALYSIS AUTO W/SCOPE: CPT | Performed by: EMERGENCY MEDICINE

## 2021-01-04 PROCEDURE — 85025 COMPLETE CBC W/AUTO DIFF WBC: CPT | Performed by: EMERGENCY MEDICINE

## 2021-01-04 PROCEDURE — 74176 CT ABD & PELVIS W/O CONTRAST: CPT

## 2021-01-04 PROCEDURE — 258N000003 HC RX IP 258 OP 636: Performed by: EMERGENCY MEDICINE

## 2021-01-04 PROCEDURE — 99284 EMERGENCY DEPT VISIT MOD MDM: CPT | Performed by: EMERGENCY MEDICINE

## 2021-01-04 PROCEDURE — 83690 ASSAY OF LIPASE: CPT | Performed by: EMERGENCY MEDICINE

## 2021-01-04 PROCEDURE — 99284 EMERGENCY DEPT VISIT MOD MDM: CPT | Mod: 25 | Performed by: EMERGENCY MEDICINE

## 2021-01-04 RX ORDER — SODIUM CHLORIDE 9 MG/ML
INJECTION, SOLUTION INTRAVENOUS CONTINUOUS
Status: DISCONTINUED | OUTPATIENT
Start: 2021-01-04 | End: 2021-01-04 | Stop reason: HOSPADM

## 2021-01-04 RX ORDER — ONDANSETRON 2 MG/ML
4 INJECTION INTRAMUSCULAR; INTRAVENOUS EVERY 30 MIN PRN
Status: DISCONTINUED | OUTPATIENT
Start: 2021-01-04 | End: 2021-01-04 | Stop reason: HOSPADM

## 2021-01-04 RX ORDER — HYDROCODONE BITARTRATE AND ACETAMINOPHEN 5; 325 MG/1; MG/1
1 TABLET ORAL EVERY 6 HOURS PRN
Qty: 15 TABLET | Refills: 0 | Status: SHIPPED | OUTPATIENT
Start: 2021-01-04 | End: 2021-01-07

## 2021-01-04 RX ORDER — ONDANSETRON 4 MG/1
4 TABLET, FILM COATED ORAL EVERY 8 HOURS PRN
Qty: 15 TABLET | Refills: 0 | Status: SHIPPED | OUTPATIENT
Start: 2021-01-04 | End: 2021-04-13

## 2021-01-04 RX ORDER — TAMSULOSIN HYDROCHLORIDE 0.4 MG/1
0.4 CAPSULE ORAL DAILY
Qty: 10 CAPSULE | Refills: 0 | Status: SHIPPED | OUTPATIENT
Start: 2021-01-04 | End: 2021-01-14

## 2021-01-04 RX ORDER — FENTANYL CITRATE 50 UG/ML
50 INJECTION, SOLUTION INTRAMUSCULAR; INTRAVENOUS ONCE
Status: COMPLETED | OUTPATIENT
Start: 2021-01-04 | End: 2021-01-04

## 2021-01-04 RX ADMIN — ONDANSETRON 4 MG: 2 INJECTION INTRAMUSCULAR; INTRAVENOUS at 17:56

## 2021-01-04 RX ADMIN — SODIUM CHLORIDE 1000 ML: 9 INJECTION, SOLUTION INTRAVENOUS at 17:57

## 2021-01-04 RX ADMIN — FENTANYL CITRATE 50 MCG: 50 INJECTION, SOLUTION INTRAMUSCULAR; INTRAVENOUS at 17:54

## 2021-01-04 NOTE — ED PROVIDER NOTES
History     Chief Complaint   Patient presents with     Flank Pain     started 1 hour ago on rt flank     HPI  Loco Wood is a 75 year old male with a past medical history of BPH, arthritis, CLL, GERD, chronic pain, atrial fibrillation, and chronic kidney disease who presents to the emergency department with a chief complaint of right flank pain. It radiates around to the right lower quadrant. It started suddenly 1 hour ago. It is severe. It is constant. It does not wax and wane. The patient denies any history of abdominal surgeries. He states he has been taking some supplements, including nettle tea, Astragalus, but emergency, and coenzyme Q in an attempt to treat his chronic kidney disease at home. Patient reports some associated nausea without vomiting as well as some constipation. The patient states that he urinates frequently due to his BPH, but denies any new dysuria, hematuria, or urinary frequency. The patient is allergic to Dilaudid and morphine. Pt reports his baseline WBC count is around 13.  The patient has not had a history of kidney stones in the past.  The patient denies any prior abdominal surgeries.    I have reviewed the Medications, Allergies, Past Medical and Surgical History, and Social History in the [employer] Machine system.    Past Medical History:   Diagnosis Date     Arthritis     hip and toes     Chronic pain      CLL (chronic lymphocytic leukaemia)      Esophageal reflux      Malignant neoplasm (H)     Leukemia     Paroxysmal atrial fibrillation (H) 2/5/2020     PONV (postoperative nausea and vomiting)      Pure hypercholesterolemia      Past Surgical History:   Procedure Laterality Date     ARTHROPLASTY MINIMALLY INVASIVE HIP  5/10/2013    Procedure: ARTHROPLASTY MINIMALLY INVASIVE HIP;  Left Two Incision Total Hip Arthroplasty ;  Surgeon: Desmond Alberts MD;  Location: [facility]     ARTHROPLASTY MINIMALLY INVASIVE HIP  2/27/2014    Procedure: ARTHROPLASTY MINIMALLY INVASIVE HIP;  Right  Total Hip Arthroplasty Minimally Invasive Two Incision  *Latex Free Room;  Surgeon: Desmond Alberts MD;  Location: [facility] OR     BACK SURGERY      back fusion 1994     COLONOSCOPY      2007     COLONOSCOPY N/A 8/15/2017    Procedure: COLONOSCOPY;  colonoscopy;  Surgeon: Chun Mcguire MD;  Location:  GI     GI SURGERY      4 hernia repairs in childhood     HERNIA REPAIR      4 since age 4     IR PAE  3/5/2020     [facility] NONSPECIFIC PROCEDURE  1964 &'95    (R) inguinal herniorrhapy     [facility] NONSPECIFIC PROCEDURE  1949    (L) inguinal herniorrhaphy     [facility] NONSPECIFIC PROCEDURE  1994    L4-5  L5 S1 fusion - spondylolisthesis     No current facility-administered medications for this encounter.      Current Outpatient Medications   Medication     Acetaminophen (TYLENOL PO)     atorvastatin (LIPITOR) 10 MG tablet     famotidine (PEPCID) 40 MG tablet     ibrutinib (IMBRUVICA) 420 MG tablet     ibuprofen (ADVIL/MOTRIN) 200 MG tablet     levothyroxine (SYNTHROID/LEVOTHROID) 50 MCG tablet     omega 3 1200 MG CAPS     omeprazole (PRILOSEC) 10 MG DR capsule     Probiotic Product (PROBIOTIC-10 PO)     RESVERATROL PO     tiZANidine (ZANAFLEX) 2 MG tablet     tiZANidine (ZANAFLEX) 4 MG tablet     vitamin D3 (CHOLECALCIFEROL) 50 mcg (2000 units) tablet     Allergies   Allergen Reactions     Dilaudid [Hydromorphone] Itching     Morphine Itching     Past medical history, past surgical history, medications, and allergies were reviewed with the patient. Additional pertinent items: None    Social History     Socioeconomic History     Marital status: Single     Spouse name: Ellen     Number of children: 0     Years of education: PHD     Highest education level: Not on file   Occupational History     Occupation: Teacher     Employer: [employer]   Social Needs     Financial resource strain: Not on file     Food insecurity     Worry: Not on file     Inability: Not on file     Transportation needs     Medical:  Not on file     Non-medical: Not on file   Tobacco Use     Smoking status: Never Smoker     Smokeless tobacco: Never Used   Substance and Sexual Activity     Alcohol use: Yes     Alcohol/week: 0.0 standard drinks     Comment: moderate, social     Drug use: No     Sexual activity: Not Currently     Partners: Female   Lifestyle     Physical activity     Days per week: Not on file     Minutes per session: Not on file     Stress: Not on file   Relationships     Social connections     Talks on phone: Not on file     Gets together: Not on file     Attends Sabianism service: Not on file     Active member of club or organization: Not on file     Attends meetings of clubs or organizations: Not on file     Relationship status: Not on file     Intimate partner violence     Fear of current or ex partner: Not on file     Emotionally abused: Not on file     Physically abused: Not on file     Forced sexual activity: Not on file   Other Topics Concern      Service No     Blood Transfusions No     Caffeine Concern No     Occupational Exposure No     Hobby Hazards No     Sleep Concern No     Stress Concern No     Weight Concern No     Special Diet No     Back Care Yes     Exercise Yes     Comment: Several times a week     Bike Helmet No     Seat Belt Yes     Self-Exams No     Parent/sibling w/ CABG, MI or angioplasty before 65F 55M? No   Social History Narrative    Social Documentation:7/10        Balanced Diet: YES    Calcium intake:milk and food    Caffeine: 2 cups of coffee per day    Exercise:  type of activity  Wts , stretching, cardio;  3 times per week    Sunscreen:no    Seatbelts:  Yes    Self Breast Exam:  No -     Self Testicular Exam: No    Physical/Emotional/Sexual Abuse: No     Do you feel safe in your environment? Yes        Cholesterol screen up to date: today    Eye Exam up to date: Yes    Dental Exam up to date: Yes    Pap smear up to date: Does Not Apply    Mammogram up to date: Does Not Apply    Dexa Scan  up to date: Does Not Apply    Colonoscopy up to date: Yes-2007    Immunizations up to date: Yes-2008    Glucose screen if over 40:  No     Luis Alfredo Knox ma                 Social history was reviewed with the patient. Additional pertinent items: None    Review of Systems  General: No fevers or chills  Skin: No rash or diaphoresis  Eyes: No eye redness or discharge  Ears/Nose/Throat: No rhinorrhea or nasal congestion  Respiratory: No cough or SOB  Cardiovascular: No chest pain or palpitations  Gastrointestinal: No nausea, vomiting, or diarrhea  Genitourinary: No urinary frequency, hematuria, or dysuria, positive for flank pain   musculoskeletal: No arthralgias or myalgias  Neurologic: No numbness or weakness  Psychiatric: No depression or SI  Hematologic/Lymphatic/Immunologic: No leg swelling, no easy bruising/bleeding  Endocrine: No polyuria/polydypsia    A complete review of systems was performed with pertinent positives and negatives noted in the HPI, and all other systems negative.    Physical Exam   BP: 134/64  Temp: 97.5  F (36.4  C)  Resp: 20  Weight: 74.4 kg (164 lb)  SpO2: 100 %      General: Well nourished, well developed, patient appears to be in moderate distress secondary to pain.  HEENT: EOMI, anicteric. NCAT, MMM  Neck: no jugular venous distension, supple, nl ROM  Cardiac: Regular rate and rhythm. No murmurs, rubs, or gallops. Normal S1, S2.  Intact peripheral pulses  Pulm: CTAB, no stridor, wheezes, rales, rhonchi  Abd: Soft, nontender, nondistended.  No masses palpated.  R CVA TTP, No L CVA TTP  Skin: Warm and dry to the touch.  No rash  Extremities: No LE edema, no cyanosis, w/w/p  Neuro: A&Ox3, no gross focal deficits    ED Course        Procedures                           Labs Ordered and Resulted from Time of ED Arrival Up to the Time of Departure from the ED   CBC WITH PLATELETS DIFFERENTIAL - Abnormal; Notable for the following components:       Result Value    WBC 14.6 (*)     RBC Count  4.19 (*)     Hemoglobin 11.6 (*)     Hematocrit 36.4 (*)     Absolute Lymphocytes 7.7 (*)     All other components within normal limits   COMPREHENSIVE METABOLIC PANEL - Abnormal; Notable for the following components:    Glucose 127 (*)     Creatinine 1.48 (*)     GFR Estimate 46 (*)     GFR Estimate If Black 53 (*)     Protein Total 6.7 (*)     All other components within normal limits   ROUTINE UA WITH MICROSCOPIC REFLEX TO CULTURE - Abnormal; Notable for the following components:    Blood Urine Moderate (*)     Protein Albumin Urine 10 (*)     RBC Urine 77 (*)     Mucous Urine Present (*)     All other components within normal limits   LIPASE            Results for orders placed or performed during the hospital encounter of 01/04/21 (from the past 24 hour(s))   CBC with platelets differential   Result Value Ref Range    WBC 14.6 (H) 4.0 - 11.0 10e9/L    RBC Count 4.19 (L) 4.4 - 5.9 10e12/L    Hemoglobin 11.6 (L) 13.3 - 17.7 g/dL    Hematocrit 36.4 (L) 40.0 - 53.0 %    MCV 87 78 - 100 fl    MCH 27.7 26.5 - 33.0 pg    MCHC 31.9 31.5 - 36.5 g/dL    RDW 13.1 10.0 - 15.0 %    Platelet Count 162 150 - 450 10e9/L    Diff Method Automated Method     % Neutrophils 41.5 %    % Lymphocytes 52.9 %    % Monocytes 4.0 %    % Eosinophils 1.1 %    % Basophils 0.3 %    % Immature Granulocytes 0.2 %    Nucleated RBCs 0 0 /100    Absolute Neutrophil 6.0 1.6 - 8.3 10e9/L    Absolute Lymphocytes 7.7 (H) 0.8 - 5.3 10e9/L    Absolute Monocytes 0.6 0.0 - 1.3 10e9/L    Absolute Eosinophils 0.2 0.0 - 0.7 10e9/L    Absolute Basophils 0.1 0.0 - 0.2 10e9/L    Abs Immature Granulocytes 0.0 0 - 0.4 10e9/L    Absolute Nucleated RBC 0.0     Platelet Estimate Confirming automated cell count    Comprehensive metabolic panel   Result Value Ref Range    Sodium 139 133 - 144 mmol/L    Potassium 3.6 3.4 - 5.3 mmol/L    Chloride 105 94 - 109 mmol/L    Carbon Dioxide 24 20 - 32 mmol/L    Anion Gap 10 3 - 14 mmol/L    Glucose 127 (H) 70 - 99 mg/dL     Urea Nitrogen 18 7 - 30 mg/dL    Creatinine 1.48 (H) 0.66 - 1.25 mg/dL    GFR Estimate 46 (L) >60 mL/min/[1.73_m2]    GFR Estimate If Black 53 (L) >60 mL/min/[1.73_m2]    Calcium 9.0 8.5 - 10.1 mg/dL    Bilirubin Total 0.9 0.2 - 1.3 mg/dL    Albumin 4.2 3.4 - 5.0 g/dL    Protein Total 6.7 (L) 6.8 - 8.8 g/dL    Alkaline Phosphatase 63 40 - 150 U/L    ALT 25 0 - 70 U/L    AST 21 0 - 45 U/L   Lipase   Result Value Ref Range    Lipase 127 73 - 393 U/L   UA with Microscopic reflex to Culture    Specimen: Urine clean catch; Midstream Urine   Result Value Ref Range    Color Urine Yellow     Appearance Urine Clear     Glucose Urine Negative NEG^Negative mg/dL    Bilirubin Urine Negative NEG^Negative    Ketones Urine Negative NEG^Negative mg/dL    Specific Gravity Urine 1.012 1.003 - 1.035    Blood Urine Moderate (A) NEG^Negative    pH Urine 5.5 5.0 - 7.0 pH    Protein Albumin Urine 10 (A) NEG^Negative mg/dL    Urobilinogen mg/dL Normal 0.0 - 2.0 mg/dL    Nitrite Urine Negative NEG^Negative    Leukocyte Esterase Urine Negative NEG^Negative    Source Midstream Urine     WBC Urine 1 0 - 5 /HPF    RBC Urine 77 (H) 0 - 2 /HPF    Mucous Urine Present (A) NEG^Negative /LPF   Abd/pelvis CT no contrast - Stone Protocol    Narrative    CT ABDOMEN PELVIS WITHOUT CONTRAST 1/4/2021 8:07 PM    CLINICAL HISTORY: Flank pain, stone disease suspected, right.    TECHNIQUE: CT scan of the abdomen and pelvis was performed without IV  contrast. Multiplanar reformats were obtained. Dose reduction  techniques were used.    CONTRAST: None.    COMPARISON: 2/11/2020    FINDINGS:   LOWER CHEST: Dependent density in both lung bases most likely  represents mild atelectasis. Fat-containing Bochdalek-type  diaphragmatic hernia on the left. Small hiatal hernia.    HEPATOBILIARY: Normal.    PANCREAS: Normal.    SPLEEN: Normal.    ADRENAL GLANDS: Normal.    KIDNEYS/BLADDER: Mild right hydronephrosis. There is a 1 mm  nonobstructing right renal calculus. 2 mm  nonobstructing left renal  calculus. Probable 2 mm obstructing calculus in the distal right  ureter on series 4 image 322, immediately proximal to the  ureterovesical junction. Stable pelvic phleboliths.    BOWEL: Sigmoid colonic diverticulosis.    LYMPH NODES: Normal.    VASCULATURE: New embolization coils in a branch of the right internal  iliac artery.    PELVIC ORGANS: The prostate gland is not well visualized due to streak  artifact from bilateral hip arthroplasties.    OTHER: None.    MUSCULOSKELETAL: Bilateral hip arthroplasties. Lower lumbar fusion.      Impression    IMPRESSION: Mild right hydronephrosis secondary to a 2 mm obstructing  calculus in the distal right ureter.    LAURE SOTELO MD       Labs, vital signs, and imaging studies were reviewed by me.    Medications   0.9% sodium chloride BOLUS (has no administration in time range)     Followed by   sodium chloride 0.9% infusion (has no administration in time range)   ondansetron (ZOFRAN) injection 4 mg (has no administration in time range)   fentaNYL (PF) (SUBLIMAZE) injection 50 mcg (has no administration in time range)       Assessments & Plan (with Medical Decision Making)   Loco Wood is a 75 year old male who presents with right flank and abdominal pain. Differential diagnosis includes nephrolithiasis, UTI, musculoskeletal pain, cholelithiasis, cholecystitis, hepatitis, gastritis/gastroenteritis, constipation, appendicitis. Labs, urinalysis, CT scan ordered to further evaluate the patient. Medications were ordered for symptomatic relief in the emergency department.    Laboratory work-up is remarkable for white blood cell count of 14.6, which is consistent with patient's known history of CLL.  Urinalysis shows hematuria.  Not consistent with UTI.  Creatinine is elevated at 1.48, which appears to be near patient's baseline.    CT shows mild right hydronephrosis with 2 mm obstructing calculus in distal right ureter.    I have reviewed the  nursing notes.    I have reviewed the findings, diagnosis, plan and need for follow up with the patient.    Patient to be discharged home. Advised to follow up with urology (referral given, but patient states he has seen a urologist in the past) within 1 week. To return to ER immediately with any new/worsening symptoms. Plan of care discussed with patient who expresses understanding and agrees with plan of care.  Prescription for Flomax as well as for medications for symptomatic relief at home provided to the patient.  The patient states he is prescribed Flomax, but has not been taking it as he did not feel that it significantly helped his BPH.  Patient was advised to resume this medication and to make sure that he drinks plenty of water to help him pass the stone as well as to help prevent future stones.      New Prescriptions    HYDROCODONE-ACETAMINOPHEN (NORCO) 5-325 MG TABLET    Take 1 tablet by mouth every 6 hours as needed for severe pain    ONDANSETRON (ZOFRAN) 4 MG TABLET    Take 1 tablet (4 mg) by mouth every 8 hours as needed    TAMSULOSIN (FLOMAX) 0.4 MG CAPSULE    Take 1 capsule (0.4 mg) by mouth daily for 10 doses       Final diagnoses:   Right flank pain   Hematuria, unspecified type   Chronic kidney disease, unspecified CKD stage   Right ureteral stone       1/4/2021   [facility] EMERGENCY DEPARTMENT     Lucrecia Lopez MD  01/04/21 3201

## 2021-01-04 NOTE — ED AVS SNAPSHOT
[facility] Emergency Department  [address]  [place] [address]  Phone: [phone]  Fax: [phone]                                    Loco Wood   MRN: [id number]    Department: [facility] Emergency Department   Date of Visit: 1/4/2021           After Visit Summary Signature Page    I have received my discharge instructions, and my questions have been answered. I have discussed any challenges I see with this plan with the nurse or doctor.    ..........................................................................................................................................  Patient/Patient Representative Signature      ..........................................................................................................................................  Patient Representative Print Name and Relationship to Patient    ..................................................               ................................................  Date                                   Time    ..........................................................................................................................................  Reviewed by Signature/Title    ...................................................              ..............................................  Date                                               Time          22EPIC Rev 08/18

## 2021-01-04 NOTE — DISCHARGE INSTRUCTIONS
TODAY'S VISIT:  You were seen today for flank pain   -   - If you had any labs or imaging/radiology tests performed today, you should also discuss these tests with your usual provider.     FOLLOW-UP:  Please make an appointment to follow up with:  - Your Primary Care Provider. If you do not have a PCP, please call the Primary Care Center (phone: [phone] for an appointment  - Urology Clinic (phone: [phone])     - Have your provider review the results from today's visit with you again to make sure no further follow-up or additional testing is needed based on those results.     OTHER INSTRUCTIONS:  - make sure to stay well hydrated    RETURN TO THE EMERGENCY DEPARTMENT  Return to the Emergency Department at any time for any new or worsening symptoms or any concerns.

## 2021-01-05 ENCOUNTER — PATIENT OUTREACH (OUTPATIENT)
Dept: NURSING | Facility: CLINIC | Age: 76
End: 2021-01-05
Payer: COMMERCIAL

## 2021-01-05 DIAGNOSIS — Z71.89 OTHER SPECIFIED COUNSELING: ICD-10-CM

## 2021-01-05 NOTE — PROGRESS NOTES
Clinic Care Coordination Contact  Community Health Worker Initial Outreach     CHW Additional Questions  If ED/Hospital discharge, follow-up appointment scheduled as recommended?: No  Patient agreeable to assistance with scheduling?: No  Patient declined (specify): Patient said he will call if wants an appt  Medication changes made following ED/Hospital discharge?: Yes, but patient said he does not currently have questions  MyChart active?: Yes  Patient sent Social Determinants of Health questionnaire?: No    Patient accepts CC: No, patient said he did not have questions for CC RN currently. Patient is aware to call clinic with non-emergent questions or if interested in CC in the future.    CHW spoke with patient today. CHW told patient she saw patient was in the ER and wanted to see if patient had non-emergent medical or medication questions for CC RN at this time. Patient discussed he just had an issue picking up a medication from [employer], is waiting for a call from [employer] about why they don't cover a medication. Patient said he may have a question after speaking with them. CHW told patient to call the clinic with non-emergent questions, and that patient can call the clinic and ask for Care Coordination if wants to speak with CC. CHW asked if patient had any other questions right now, and patient said no, he is just dealing with the medication.     CHW asked if patient wanted CHW to send a message to PCP about a follow up appt. Patient said no, he will call if he wants to do that.      Referral made off of discharge report.    Kierra Swartz, MPH  Community Health Worker   Office: [phone]  [facility], and [facility]  [address], [place] [address]  [facility]  [address] [place] [address]  [facility]  [address], [place] [address]  [email or web address]

## 2021-01-06 ENCOUNTER — NURSE TRIAGE (OUTPATIENT)
Dept: NURSING | Facility: CLINIC | Age: 76
End: 2021-01-06

## 2021-01-06 NOTE — TELEPHONE ENCOUNTER
Patient was in the ER last night for kidney stone, was prescribed Norco, states he has had pain all day, but in the last few hours, pain is severe and had worsened. States Norco isn't touching his pain at all. Advised if pain is uncontrolled, he needs to return to the ER for evaluation.     Marybeth Snell RN/[facility] Nurse Advisors                COVID 19 Nurse Triage Plan/Patient Instructions    Please be aware that novel coronavirus (COVID-19) may be circulating in the community. If you develop symptoms such as fever, cough, or SOB or if you have concerns about the presence of another infection including coronavirus (COVID-19), please contact your health care provider or visit [email or web address].     Disposition/Instructions    ED Visit recommended. Follow protocol based instructions.     Bring Your Own Device:  Please also bring your smart device(s) (smart phones, tablets, laptops) and their charging cables for your personal use and to communicate with your care team during your visit.    Thank you for taking steps to prevent the spread of this virus.  o Limit your contact with others.  o Wear a simple mask to cover your cough.  o Wash your hands well and often.    Resources    [facility]: About COVID-19: [email or web address]    CDC: What to Do If You're Sick: www.cdc.gov/coronavirus/2019-ncov/about/steps-when-sick.html    CDC: Ending Home Isolation: www.cdc.gov/coronavirus/2019-ncov/hcp/disposition-in-home-patients.html     CDC: Caring for Someone: www.cdc.gov/coronavirus/2019-ncov/if-you-are-sick/care-for-someone.html     [facility]: Interim Guidance for Hospital Discharge to Home: www.health.[facility].[place]/diseases/coronavirus/hcp/hospdischarge.pdf    [facility] ER clinical trials (COVID-19 research studies): [email or web address]     Below are the COVID-19 hotlines at the [place] [facility] ([facility]). Interpreters are available.   o For OUTSIDE [employer]  questions: Call 178-399-0179 or 1-991.684.3050 (7 a.m. to 7 p.m.)  o For questions about schools and childcare: Call 991-830-2704 or 1-259.137.3229 (7 a.m. to 7 p.m.)                       Additional Information    Negative: [1] Caller is not with the adult (patient) AND [2] reporting urgent symptoms    Negative: Lab result questions    Negative: Medication questions    Negative: Caller can't be reached by phone    Negative: Caller has already spoken to PCP or another triager    Negative: RN needs further essential information from caller in order to complete triage    Negative: Requesting regular office appointment    Negative: [1] Caller requesting NON-URGENT health information AND [2] PCP's office is the best resource    Health Information question, no triage required and triager able to answer question    Protocols used: INFORMATION ONLY CALL-A-

## 2021-01-15 ENCOUNTER — HEALTH MAINTENANCE LETTER (OUTPATIENT)
Age: 76
End: 2021-01-15

## 2021-01-21 ENCOUNTER — MYC MEDICAL ADVICE (OUTPATIENT)
Dept: FAMILY MEDICINE | Facility: CLINIC | Age: 76
End: 2021-01-21

## 2021-01-25 ENCOUNTER — OFFICE VISIT (OUTPATIENT)
Dept: FAMILY MEDICINE | Facility: CLINIC | Age: 76
End: 2021-01-25
Payer: COMMERCIAL

## 2021-01-25 VITALS
HEART RATE: 80 BPM | TEMPERATURE: 97.6 F | DIASTOLIC BLOOD PRESSURE: 74 MMHG | BODY MASS INDEX: 26 KG/M2 | SYSTOLIC BLOOD PRESSURE: 116 MMHG | OXYGEN SATURATION: 100 % | WEIGHT: 166 LBS

## 2021-01-25 DIAGNOSIS — M43.26 FUSION OF SPINE, LUMBAR REGION: ICD-10-CM

## 2021-01-25 DIAGNOSIS — S39.012A STRAIN OF LUMBAR REGION, INITIAL ENCOUNTER: Primary | ICD-10-CM

## 2021-01-25 DIAGNOSIS — C91.10 CLL (CHRONIC LYMPHOCYTIC LEUKEMIA) (H): ICD-10-CM

## 2021-01-25 LAB
BASOPHILS # BLD AUTO: 0 10E9/L (ref 0–0.2)
BASOPHILS NFR BLD AUTO: 0.3 %
DIFFERENTIAL METHOD BLD: ABNORMAL
EOSINOPHIL # BLD AUTO: 0.2 10E9/L (ref 0–0.7)
EOSINOPHIL NFR BLD AUTO: 2.4 %
ERYTHROCYTE [DISTWIDTH] IN BLOOD BY AUTOMATED COUNT: 14.7 % (ref 10–15)
HCT VFR BLD AUTO: 37.8 % (ref 40–53)
HGB BLD-MCNC: 11.5 G/DL (ref 13.3–17.7)
LYMPHOCYTES # BLD AUTO: 4 10E9/L (ref 0.8–5.3)
LYMPHOCYTES NFR BLD AUTO: 55.7 %
MCH RBC QN AUTO: 26.8 PG (ref 26.5–33)
MCHC RBC AUTO-ENTMCNC: 30.4 G/DL (ref 31.5–36.5)
MCV RBC AUTO: 88 FL (ref 78–100)
MONOCYTES # BLD AUTO: 0.3 10E9/L (ref 0–1.3)
MONOCYTES NFR BLD AUTO: 4.4 %
NEUTROPHILS # BLD AUTO: 2.7 10E9/L (ref 1.6–8.3)
NEUTROPHILS NFR BLD AUTO: 37.2 %
PLATELET # BLD AUTO: 145 10E9/L (ref 150–450)
RBC # BLD AUTO: 4.29 10E12/L (ref 4.4–5.9)
WBC # BLD AUTO: 7.2 10E9/L (ref 4–11)

## 2021-01-25 PROCEDURE — 36415 COLL VENOUS BLD VENIPUNCTURE: CPT | Performed by: INTERNAL MEDICINE

## 2021-01-25 PROCEDURE — 85025 COMPLETE CBC W/AUTO DIFF WBC: CPT | Performed by: INTERNAL MEDICINE

## 2021-01-25 PROCEDURE — 80053 COMPREHEN METABOLIC PANEL: CPT | Performed by: INTERNAL MEDICINE

## 2021-01-25 PROCEDURE — 99214 OFFICE O/P EST MOD 30 MIN: CPT | Performed by: FAMILY MEDICINE

## 2021-01-25 RX ORDER — METHYLPREDNISOLONE 4 MG
TABLET, DOSE PACK ORAL
Qty: 21 TABLET | Refills: 0 | Status: SHIPPED | OUTPATIENT
Start: 2021-01-25 | End: 2021-02-10

## 2021-01-25 NOTE — PROGRESS NOTES
Assessment & Plan     Strain of lumbar region, initial encounter  Worsening back pain, mostly lower lumbar region.  Patient advised for supportive care, heat, massge  Starting Medrol Dosepak tizanidine.  Patient was advised to remain active  - methylPREDNISolone (MEDROL DOSEPAK) 4 MG tablet therapy pack; Follow Package Directions  - tiZANidine (ZANAFLEX) 4 MG tablet; Take 1 tablet (4 mg) by mouth 3 times daily as needed for muscle spasms    Fusion of spine, lumbar region  Advised with supportive care, remain active, daily exercise.           Patient Instructions     Patient Education     Back Sprain or Strain     Injury to the muscles (strain) or ligaments (sprain) around the spine can be troubling. Injury may occur after a sudden forceful twisting or bending such as in a car accident, after a simple awkward movement, or after lifting something heavy with poor body positioning. In any case, muscle spasm is often present and adds to the pain.  Thankfully, most people feel better in 1 to 2 weeks. Most of the rest feel better in 1 to 2 months. Most people can remain active. Unless you had a forceful or traumatic physical injury such as a car accident or fall, X-rays may not be done for the first assessment of a back sprain or strain. If pain continues and doesn't respond to medical treatment, your healthcare provider may then do X-rays and other tests.  Home care  These guidelines will help you care for your injury at home:    When in bed, try to find a comfortable position. A firm mattress is best. Try lying flat on your back with pillows under your knees. You can also try lying on your side with your knees bent up toward your chest and a pillow between your knees.    Don't sit for long periods. Try not to take long car rides or take other trips that have you sitting for a long time. This puts more stress on the lower back than standing or walking.    During the first 24 to 72 hours after an injury or flare-up, put  an ice pack on the painful area for 20 minutes. Then remove it for 20 minutes. Do this for 60 to 90 minutes, or several times a day. This will reduce swelling and pain. Always wrap the ice pack in a thin towel or plastic to protect your skin.    You can start with ice, then switch to heat. Heat from a hot shower, hot bath, or heating pad reduces pain and works well for muscle spasms. Put heat on the painful area for 20 minutes, then remove for 20 minutes. Do this for 60 to 90 minutes, or several times a day. Don't use a heating pad while sleeping. It can burn the skin.    You can alternate the ice and heat. Talk with your healthcare provider to find out the best treatment or therapy for your back pain.    Therapeutic massage can help relax the back muscles without stretching them.    Be aware of safe lifting methods. Don't lift anything over 15 pounds until all of the pain is gone.  Medicines  Talk with your healthcare provider before using medicines, especially if you have other health problems or are taking other medicines.    You may use over-the-counter medicines such as acetaminophen, ibuprofen, or naproxen to control pain, unless another pain medicine was prescribed. Talk with your healthcare provider before taking any medicines if you have a chronic condition such as diabetes, liver or kidney disease, stomach ulcers, or digestive bleeding, or are taking blood-thinner medicines.    Be careful if you are given prescription medicines, opioids, or medicine for muscle spasm. They can cause drowsiness, and affect your coordination, reflexes, and judgment. Don't drive or operate heavy machinery when taking these types of medicines. Only take pain medicine as prescribed by your healthcare provider.  Follow-up care  Follow up with your healthcare provider, or as advised. You may need physical therapy or more tests if your symptoms get worse.  If you had X-rays, your healthcare provider may be checking for any broken  bones, breaks, or fractures. Bruises and sprains can sometimes hurt as much as a fracture. These injuries can take time to heal fully. If your symptoms don t get better or they get worse, talk with your healthcare provider. You may need a repeat X-ray or other tests.  Call 911  Call 911 if any of the following occur:    Trouble breathing    Confused    Very drowsy or trouble awakening    Fainting or loss of consciousness    Rapid or very slow heart rate    Loss of bowel or bladder control  When to seek medical advice  Call your healthcare provider right away if any of the following occur:    Pain gets worse or spreads to your arms or legs    Weakness or numbness in one or both arms or legs    Numbness in the groin or genital area  [employer] last reviewed this educational content on 11/1/2019 2000-2020 [employer]. [address] [place] [address]. All rights reserved. This information is not intended as a substitute for professional medical care. Always follow your healthcare professional's instructions.           Patient Education     Back Exercises: Arm Reach    Do this exercise on your hands and knees. Keep your knees under your hips and your hands under your shoulders. Keep your spine in a neutral position (not arched or sagging). Be sure to maintain your neck s natural curve:    Stretch one arm straight out in front of you. Don t raise your head or let your supporting shoulder sag.    Hold for 5 seconds.    Return to starting position.    Repeat 5 times.    Switch arms.  [employer] last reviewed this educational content on 3/1/2018    1437-2813 [employer]. [address] [place] [address]. All rights reserved. This information is not intended as a substitute for professional medical care. Always follow your healthcare professional's instructions.           Patient Education     Back Exercises: Leg Pull    To start, lie on your back with your knees bent and feet  flat on the floor. Don t press your neck or lower back to the floor. Breathe deeply. You should feel comfortable and relaxed in this position.    Pull one knee to your chest.    Hold for 30 to 60 seconds. Return to starting position.    Repeat 2 times.    Switch legs.    For a double leg pull, pull both legs to your chest at the same time. Repeat 2 times.  For your safety, check with your healthcare provider before starting an exercise program.    [employer] last reviewed this educational content on 3/1/2018    5145-8870 [employer]. [address] [place] [address]. All rights reserved. This information is not intended as a substitute for professional medical care. Always follow your healthcare professional's instructions.           Patient Education     Back Exercises: Lower Back Rotation    To start, lie on your back with your knees bent and feet flat on the floor. Don t press your neck or lower back to the floor. Breathe deeply. You should feel comfortable and relaxed in this position.    Drop both knees to one side. Turn your head to the other side. Keep your shoulders flat on the floor.    Do not push through pain.    Hold for 20 seconds.    Slowly switch sides.    Repeat 2 to 5 times.  [employer] last reviewed this educational content on 3/1/2018    2180-5160 [employer]. [address] [place] [address]. All rights reserved. This information is not intended as a substitute for professional medical care. Always follow your healthcare professional's instructions.           Patient Education     Back Exercises: Lower Back Stretch    To start, sit in a chair with your feet flat on the floor. Shift your weight slightly forward. Relax, and keep your ears, shoulders, and hips aligned while you do the following:    Sit with your feet well apart.    Bend forward and touch the floor with the backs of your hands. Relax and let your body drop.    Hold for 20 seconds. Return to  starting position.    Repeat 2 times.   [employer] last reviewed this educational content on 11/1/2017 2000-2020 [employer], [employer]. [address] [place] [address], [place] [address]. All rights reserved. This information is not intended as a substitute for professional medical care. Always follow your healthcare professional's instructions.               No follow-ups on file.    Oscar Girard MD  [employer]    Harshil Adan is a 75 year old who presents to clinic today for the following health issues:   Low back pain for the past 6 days or so.  No recent injury or trauma.  No fall, does not recall lifting or moving anything heavy.  Patient status post lower lumbar fusion many years ago.  No lower extremity numbness, weakness, or tingling.  No loss of urine or stool no constipation    Chronic/Recurring Back Pain Follow Up      Where is your back pain located? (Select all that apply) lower/middle where he had his fusion surgery.     How would you describe your back pain?  Sharp/ ache with particular movents.     Where does your back pain spread? center in the area     Since your last clinic visit for back pain, how has your pain changed? gradually worsening    Does your back pain interfere with your job?  Not applicable, Retired    Since your last visit, have you tried any new treatment? No      Back went out 6 days ago.  Not sure if working out might have caused this. He also did have a message a day before his back pain flared up.    Been using Muscle relaxer natalie, ice/heat, rest/taking it easy.      Review of Systems   Constitutional, HEENT, cardiovascular, pulmonary, gi and gu systems are negative, except as otherwise noted.      Objective    There were no vitals taken for this visit.  There is no height or weight on file to calculate BMI.  Physical Exam   GENERAL: healthy, alert and no distress  NEURO: Normal strength and tone, mentation intact and speech  normal  Comprehensive back pain exam:  Tenderness of lower lumbar, bilaterally, Range of motion not limited by pain, Lower extremity strength functional and equal on both sides, Lower extremity reflexes within normal limits bilaterally, Lower extremity sensation normal and equal on both sides and Straight leg raise negative bilaterally      Oscar Girard MD

## 2021-01-26 LAB
ALBUMIN SERPL-MCNC: 3.9 G/DL (ref 3.4–5)
ALP SERPL-CCNC: 62 U/L (ref 40–150)
ALT SERPL W P-5'-P-CCNC: 21 U/L (ref 0–70)
ANION GAP SERPL CALCULATED.3IONS-SCNC: 6 MMOL/L (ref 3–14)
AST SERPL W P-5'-P-CCNC: 16 U/L (ref 0–45)
BILIRUB SERPL-MCNC: 0.8 MG/DL (ref 0.2–1.3)
BUN SERPL-MCNC: 19 MG/DL (ref 7–30)
CALCIUM SERPL-MCNC: 9.1 MG/DL (ref 8.5–10.1)
CHLORIDE SERPL-SCNC: 108 MMOL/L (ref 94–109)
CO2 SERPL-SCNC: 25 MMOL/L (ref 20–32)
CREAT SERPL-MCNC: 1.61 MG/DL (ref 0.66–1.25)
GFR SERPL CREATININE-BSD FRML MDRD: 42 ML/MIN/{1.73_M2}
GLUCOSE SERPL-MCNC: 182 MG/DL (ref 70–99)
POTASSIUM SERPL-SCNC: 3.8 MMOL/L (ref 3.4–5.3)
PROT SERPL-MCNC: 6.6 G/DL (ref 6.8–8.8)
SODIUM SERPL-SCNC: 139 MMOL/L (ref 133–144)

## 2021-01-29 DIAGNOSIS — C91.10 CLL (CHRONIC LYMPHOCYTIC LEUKEMIA) (H): Primary | ICD-10-CM

## 2021-02-01 ENCOUNTER — MYC MEDICAL ADVICE (OUTPATIENT)
Dept: FAMILY MEDICINE | Facility: CLINIC | Age: 76
End: 2021-02-01

## 2021-02-01 DIAGNOSIS — C91.10 CLL (CHRONIC LYMPHOCYTIC LEUKEMIA) (H): Primary | ICD-10-CM

## 2021-02-10 ENCOUNTER — OFFICE VISIT (OUTPATIENT)
Dept: FAMILY MEDICINE | Facility: CLINIC | Age: 76
End: 2021-02-10
Payer: COMMERCIAL

## 2021-02-10 ENCOUNTER — ANCILLARY PROCEDURE (OUTPATIENT)
Dept: GENERAL RADIOLOGY | Facility: CLINIC | Age: 76
End: 2021-02-10
Attending: INTERNAL MEDICINE
Payer: COMMERCIAL

## 2021-02-10 VITALS
WEIGHT: 163.13 LBS | HEIGHT: 66 IN | HEART RATE: 107 BPM | DIASTOLIC BLOOD PRESSURE: 77 MMHG | TEMPERATURE: 97.4 F | SYSTOLIC BLOOD PRESSURE: 129 MMHG | OXYGEN SATURATION: 100 % | BODY MASS INDEX: 26.22 KG/M2

## 2021-02-10 DIAGNOSIS — Z13.220 SCREENING FOR HYPERLIPIDEMIA: Primary | ICD-10-CM

## 2021-02-10 DIAGNOSIS — R05.3 CHRONIC COUGH: ICD-10-CM

## 2021-02-10 DIAGNOSIS — F51.01 PRIMARY INSOMNIA: ICD-10-CM

## 2021-02-10 PROCEDURE — 99397 PER PM REEVAL EST PAT 65+ YR: CPT | Performed by: INTERNAL MEDICINE

## 2021-02-10 PROCEDURE — 99213 OFFICE O/P EST LOW 20 MIN: CPT | Mod: 25 | Performed by: INTERNAL MEDICINE

## 2021-02-10 PROCEDURE — 71046 X-RAY EXAM CHEST 2 VIEWS: CPT | Performed by: RADIOLOGY

## 2021-02-10 RX ORDER — TRAZODONE HYDROCHLORIDE 50 MG/1
50-150 TABLET, FILM COATED ORAL AT BEDTIME
Qty: 90 TABLET | Refills: 3 | Status: SHIPPED | OUTPATIENT
Start: 2021-02-10 | End: 2021-02-10

## 2021-02-10 RX ORDER — TRAZODONE HYDROCHLORIDE 50 MG/1
50-150 TABLET, FILM COATED ORAL AT BEDTIME
Qty: 90 TABLET | Refills: 3 | Status: SHIPPED | OUTPATIENT
Start: 2021-02-10 | End: 2021-01-01

## 2021-02-10 ASSESSMENT — ENCOUNTER SYMPTOMS
CONSTIPATION: 0
ABDOMINAL PAIN: 0
COUGH: 0
HEMATOCHEZIA: 0
CHILLS: 0
HEMATURIA: 0
DIARRHEA: 0

## 2021-02-10 ASSESSMENT — ACTIVITIES OF DAILY LIVING (ADL): CURRENT_FUNCTION: NO ASSISTANCE NEEDED

## 2021-02-10 ASSESSMENT — PAIN SCALES - GENERAL: PAINLEVEL: NO PAIN (0)

## 2021-02-10 ASSESSMENT — MIFFLIN-ST. JEOR: SCORE: 1429.93

## 2021-02-10 NOTE — PROGRESS NOTES
"SUBJECTIVE:   Loco Wood is a 75 year old male who presents for Preventive Visit.      Patient has been advised of split billing requirements and indicates understanding: Yes   Are you in the first 12 months of your Medicare coverage?  No    Healthy Habits:     In general, how would you rate your overall health?  Good    Frequency of exercise:  2-3 days/week    Duration of exercise:  45-60 minutes    Do you usually eat at least 4 servings of fruit and vegetables a day, include whole grains    & fiber and avoid regularly eating high fat or \"junk\" foods?  No    Taking medications regularly:  Yes    Medication side effects:  None    Ability to successfully perform activities of daily living:  No assistance needed    Home Safety:  No safety concerns identified    Hearing Impairment:  No hearing concerns    In the past 6 months, have you been bothered by leaking of urine?  No    In general, how would you rate your overall mental or emotional health?  Good      PHQ-2 Total Score: 0    Additional concerns today:  Yes    Do you feel safe in your environment? Yes  Supplements and iman then creatinine went up  Have you ever done Advance Care Planning? (For example, a Health Directive, POLST, or a discussion with a medical provider or your loved ones about your wishes): Yes, advance care planning is on file.     sleeping poorly - trouble getting to sleep  # am still awake and sleeping towards morning  ambien - previous  Trazodone   remeron  Stopped the supplements   astraglious  Turn to each side heartburn  pepcid 10   pepcid 40 mg   Right ureter and nothing since that time ureter  Did not see the stone - 2 nights in a row     Fall risk  Fallen 2 or more times in the past year?: No  Any fall with injury in the past year?: No    Cognitive Screening   1) Repeat 3 items (Leader, Season, Table)    2) Clock draw: NORMAL  3) 3 item recall: Recalls 3 objects  Results: NORMAL clock, 1-2 items recalled: COGNITIVE IMPAIRMENT " LESS LIKELY    Mini-CogTM Copyright S [employer]. Licensed by the author for use in [facility]; reprinted with permission (nithin@[facility]). All rights reserved.      Do you have sleep apnea, excessive snoring or daytime drowsiness?: no    Reviewed and updated as needed this visit by clinical staff  Tobacco  Allergies  Meds   Med Hx  Surg Hx  Fam Hx  Soc Hx        Reviewed and updated as needed this visit by Provider                Social History     Tobacco Use     Smoking status: Never Smoker     Smokeless tobacco: Never Used   Substance Use Topics     Alcohol use: Yes     Alcohol/week: 0.0 standard drinks     Comment: moderate, social     If you drink alcohol do you typically have >3 drinks per day or >7 drinks per week? Yes      Alcohol Use 2/10/2021   Prescreen: >3 drinks/day or >7 drinks/week? No   Prescreen: >3 drinks/day or >7 drinks/week? -   No flowsheet data found.            Current providers sharing in care for this patient include:   Patient Care Team:  Campos Parra MD as PCP - General (Internal Medicine)  Campos Boyd MD as MD (Urology)  Padmini Whitney RN as Registered Nurse  Nikhil Farah MD as MD (Neurology)  Nikhil Farah MD as Referring Physician (Neurology)  Kamlesh Bonner MD as MD (Ophthalmology)  Campos Parra MD as Assigned PCP  Daya Adams RN as Specialty Care Coordinator (Cardiology)  Virginie Kenney MD as MD (Cardiology)  Jameson Warren MD as Assigned Surgical Provider  Arnaud Melton DO as Assigned Musculoskeletal Provider  He Burns MD as Assigned Cancer Care Provider  Arline Mares MD as Assigned Heart and Vascular Provider    The following health maintenance items are reviewed in Epic and correct as of today:  Health Maintenance   Topic Date Due     FALL RISK ASSESSMENT  10/10/2020     LIPID  03/02/2021     MEDICARE ANNUAL WELLNESS VISIT  02/10/2022     COLORECTAL CANCER SCREENING  08/15/2022     ADVANCE  CARE PLANNING  09/21/2023     DTAP/TDAP/TD IMMUNIZATION (4 - Td) 06/27/2028     HEPATITIS C SCREENING  Completed     PHQ-2  Completed     INFLUENZA VACCINE  Completed     Pneumococcal Vaccine: Pediatrics (0 to 5 Years) and At-Risk Patients (6 to 64 Years)  Completed     Pneumococcal Vaccine: 65+ Years  Completed     ZOSTER IMMUNIZATION  Completed     AORTIC ANEURYSM SCREENING (SYSTEM ASSIGNED)  Completed     IPV IMMUNIZATION  Aged Out     MENINGITIS IMMUNIZATION  Aged Out     HEPATITIS B IMMUNIZATION  Aged Out     Lab work is in process  Labs reviewed in EPIC  BP Readings from Last 3 Encounters:   02/10/21 129/77   01/25/21 116/74   01/04/21 102/67    Wt Readings from Last 3 Encounters:   02/10/21 74 kg (163 lb 2 oz)   01/25/21 75.3 kg (166 lb)   01/04/21 74.4 kg (164 lb)                  Patient Active Problem List   Diagnosis     Esophageal reflux     Lumbago     CLL (chronic lymphocytic leukemia) (H)     HYPERLIPIDEMIA LDL GOAL <130     Mass of skin     Health Care Home     Vitamin D deficiency     Osteoarthritis of hip     OA (osteoarthritis) of hip     Insomnia     BPH (benign prostatic hyperplasia)     Acute bilateral low back pain without sciatica     Prostate nodule     Chondromalacia, knee, right     Complex tear of medial meniscus of right knee as current injury     Herpes zoster with keratoconjunctivitis, od     Post herpetic neuralgia     Aftercare following surgery of the musculoskeletal system     Paroxysmal atrial fibrillation (H)     Chronic kidney disease, stage 3 (moderate)     Fusion of spine, lumbar region     Past Surgical History:   Procedure Laterality Date     ARTHROPLASTY MINIMALLY INVASIVE HIP  5/10/2013    Procedure: ARTHROPLASTY MINIMALLY INVASIVE HIP;  Left Two Incision Total Hip Arthroplasty ;  Surgeon: Desmond Alberts MD;  Location: [facility] OR     ARTHROPLASTY MINIMALLY INVASIVE HIP  2/27/2014    Procedure: ARTHROPLASTY MINIMALLY INVASIVE HIP;  Right Total Hip Arthroplasty Minimally  Invasive Two Incision  *Latex Free Room;  Surgeon: Desmond Alberts MD;  Location: [facility] OR     BACK SURGERY      back fusion      COLONOSCOPY           COLONOSCOPY N/A 8/15/2017    Procedure: COLONOSCOPY;  colonoscopy;  Surgeon: Chun Mcguire MD;  Location:  GI     GI SURGERY      4 hernia repairs in childhood     HERNIA REPAIR      4 since age 4     IR PAE  3/5/2020     [facility] NONSPECIFIC PROCEDURE   &    (R) inguinal herniorrhapy     [facility] NONSPECIFIC PROCEDURE      (L) inguinal herniorrhaphy     [facility] NONSPECIFIC PROCEDURE      L4-5  L5 S1 fusion - spondylolisthesis       Social History     Tobacco Use     Smoking status: Never Smoker     Smokeless tobacco: Never Used   Substance Use Topics     Alcohol use: Yes     Alcohol/week: 0.0 standard drinks     Comment: moderate, social     Family History   Problem Relation Age of Onset     Heart Disease Father      Cerebrovascular Disease Father          OF STROKE      Cancer Father         melonoma     Melanoma Father      Hyperlipidemia Father      Thyroid Disease Father      Cancer Mother         Lung cancer     Other Cancer Mother         lung; heavy smoker     Cerebrovascular Disease Paternal Uncle         Aneurism     Breast Cancer Maternal Aunt          from it     Cancer Paternal Uncle      Cancer Paternal Aunt      Skin Cancer No family hx of      Glaucoma No family hx of      Macular Degeneration No family hx of          Current Outpatient Medications   Medication Sig Dispense Refill     Acetaminophen (TYLENOL PO)        atorvastatin (LIPITOR) 10 MG tablet TAKE 1 TABLET BY MOUTH EVERY 48 HOURS 45 tablet 4     famotidine (PEPCID) 40 MG tablet Take 1 tablet (40 mg) by mouth nightly as needed for heartburn 90 tablet 4     ibrutinib (IMBRUVICA) 420 MG tablet Take 1 tablet (420 mg) by mouth daily 28 tablet 2     ibuprofen (ADVIL/MOTRIN) 200 MG tablet Take 200 mg by mouth every 4 hours as needed for mild pain       levothyroxine  "(SYNTHROID/LEVOTHROID) 50 MCG tablet TAKE 1 TABLET(50 MCG) BY MOUTH DAILY 90 tablet 3     omega 3 1200 MG CAPS Take 2 capsules by mouth daily       omeprazole (PRILOSEC) 10 MG DR capsule TAKE ONE CAPSULE BY MOUTH EVERY DAY 30 TO 60 MINUTES BEFORE A MEAL 90 capsule 1     ondansetron (ZOFRAN) 4 MG tablet Take 1 tablet (4 mg) by mouth every 8 hours as needed 15 tablet 0     Probiotic Product (PROBIOTIC-10 PO) Take by mouth daily       RESVERATROL PO        tiZANidine (ZANAFLEX) 4 MG tablet Take 1 tablet (4 mg) by mouth 3 times daily as needed for muscle spasms 60 tablet 1     traZODone (DESYREL) 50 MG tablet Take 1-3 tablets ( mg) by mouth At Bedtime 90 tablet 3     vitamin D3 (CHOLECALCIFEROL) 50 mcg (2000 units) tablet Take 1 tablet by mouth daily       Allergies   Allergen Reactions     Dilaudid [Hydromorphone] Itching     Morphine Itching         Review of Systems   Constitutional: Negative for chills.   HENT: Negative for congestion.    Respiratory: Negative for cough.    Cardiovascular: Negative for chest pain.   Gastrointestinal: Negative for abdominal pain, constipation, diarrhea and hematochezia.   Genitourinary: Negative for hematuria.     Constitutional, HEENT, cardiovascular, pulmonary, gi and gu systems are negative, except as otherwise noted.    OBJECTIVE:   /77 (BP Location: Right arm, Patient Position: Chair, Cuff Size: Adult Regular)   Pulse 107   Temp 97.4  F (36.3  C) (Oral)   Ht 1.68 m (5' 6.14\")   Wt 74 kg (163 lb 2 oz)   SpO2 100%   BMI 26.22 kg/m   Estimated body mass index is 26.22 kg/m  as calculated from the following:    Height as of this encounter: 1.68 m (5' 6.14\").    Weight as of this encounter: 74 kg (163 lb 2 oz).  Physical Exam  GENERAL: healthy, alert and no distress  EYES: Eyes grossly normal to inspection, PERRL and conjunctivae and sclerae normal  HENT: ear canals and TM's normal, nose and mouth without ulcers or lesions  NECK: no adenopathy, no asymmetry, masses, " or scars and thyroid normal to palpation  RESP: lungs clear to auscultation - rales at the base bilateral    CV: regular rate and rhythm, normal S1 S2, no S3 or S4, no murmur, click or rub, no peripheral edema and peripheral pulses strong  ABDOMEN: soft, nontender, no hepatosplenomegaly, no masses and bowel sounds normal  MS: no gross musculoskeletal defects noted, no edema  SKIN: no suspicious lesions or rashes  NEURO: Normal strength and tone, mentation intact and speech normal  BACK: no CVA tenderness, no paralumbar tenderness  PSYCH: mentation appears normal, affect normal/bright    Diagnostic Test Results:  Labs reviewed in Epic  Results for orders placed or performed in visit on 02/10/21   XR Chest 2 Views     Status: None    Narrative    CHEST TWO VIEWS  2/10/2021 11:48 AM     HISTORY: Chronic cough.    COMPARISON: November 26, 2019       Impression    IMPRESSION:  There are no acute infiltrates. The cardiac silhouette is  not enlarged. Pulmonary vasculature is unremarkable. Stable rounded  density near the medial left hemidiaphragm on frontal view. This is a  small fat-containing diaphragmatic hernia on a recent CT.    MICHEAL FREITAS MD       ASSESSMENT / PLAN:   Loco was seen today for physical and health maintenance.    Diagnoses and all orders for this visit:    Screening for hyperlipidemia  -     Lipid panel reflex to direct LDL Fasting; Future    Primary insomnia  -     Discontinue: traZODone (DESYREL) 50 MG tablet; Take 1-3 tablets ( mg) by mouth At Bedtime  -     traZODone (DESYREL) 50 MG tablet; Take 1-3 tablets ( mg) by mouth At Bedtime    Chronic cough  -     XR Chest 2 Views; Future        Patient has been advised of split billing requirements and indicates understanding: Yes  COUNSELING:  Reviewed preventive health counseling, as reflected in patient instructions       Healthy diet/nutrition       Vision screening       Hearing screening       Dental care       Bladder control        "Aspirin prophylaxis        Colon cancer screening    Estimated body mass index is 26.22 kg/m  as calculated from the following:    Height as of this encounter: 1.68 m (5' 6.14\").    Weight as of this encounter: 74 kg (163 lb 2 oz).    Weight management plan: Discussed healthy diet and exercise guidelines    He reports that he has never smoked. He has never used smokeless tobacco.      Appropriate preventive services were discussed with this patient, including applicable screening as appropriate for cardiovascular disease, diabetes, osteopenia/osteoporosis, and glaucoma.  As appropriate for age/gender, discussed screening for colorectal cancer, prostate cancer, breast cancer, and cervical cancer. Checklist reviewing preventive services available has been given to the patient.    Reviewed patients plan of care and provided an AVS. The Basic Care Plan (routine screening as documented in Health Maintenance) for Loco meets the Care Plan requirement. This Care Plan has been established and reviewed with the Patient.    Counseling Resources:  ATP IV Guidelines  Pooled Cohorts Equation Calculator  Breast Cancer Risk Calculator  Breast Cancer: Medication to Reduce Risk  FRAX Risk Assessment  ICSI Preventive Guidelines  Dietary Guidelines for Americans, 2010  [employer] MyPlate  ASA Prophylaxis  Lung CA Screening    Campos Parra MD  [facility]    Identified Health Risks:  "

## 2021-02-10 NOTE — PATIENT INSTRUCTIONS
[email or web address]      Patient Education     Diet for Chronic Kidney Disease   Following a special diet when you have kidney disease can help you stay as healthy as possible. Your healthcare provider or dietitian should make a special diet plan just for you.    Eating right  Here are some good eating rules to follow:    Protein. Eating protein is important for your body. But too much protein can put a strain on your kidneys. Eating less protein may slow the progression of chronic kidney disease. Foods high in protein include meat, fish, eggs, cheese, and other dairy products. A registered dietitian can help you plan a diet that has the right amount of protein for you.    Sodium. Having too much salt in your diet can make your body hold onto (retain) water. Ask your provider or dietitian how much sodium per day you are allowed. This will help you prevent fluid buildup in your body (fluid retention). It can also help control high blood pressure. Learn to read food labels to know how much sodium is in one serving. Foods high in salt include processed meats, canned and boxed foods, sauces, salted chips and snacks, pickled foods, frozen dinners, and restaurant and fast food.    Fluids. If you have advanced kidney disease, you will need to limit the water and fluids you drink. If you don t, then too much water will build up in your body. The exact amount of fluid you can drink depends on how well your kidneys are working. Ask your provider how much water you can safely drink each day.    Potassium. In advanced kidney disease, your potassium level can get dangerously high. This affects your heart. It can cause an irregular heartbeat (arrhythmia). Ask your provider or dietitian if you should limit potassium in your diet. Foods high in potassium include dairy products (milk, yogurt, cheese), dried beans, bananas, oranges, potatoes, tomatoes, spinach, cantaloupe, honeydew melon, dried  fruits, and nuts. Some salt substitutes also contain potassium, so be sure to read the label before using.    Calcium. Calcium is important to build strong bones. But foods high in calcium are also high in phosphorus, which can take calcium from your bones. Limiting foods high in phosphorus will help keep calcium in your bones. Ask your provider how much calcium you should get each day.    Phosphorus. In advanced kidney disease, your phosphorus level can get dangerously high. This affects many systems in the body and can damage your heart. Limit your intake of phosphorus-rich foods. These include dried beans and peas, nuts, peanut butter, cocoa, beer, cola drinks, and dairy products.  [facility] last reviewed this educational content on 10/1/2019    5806-0119 [employer]. [address] [place] [address], [place] [address]. All rights reserved. This information is not intended as a substitute for professional medical care. Always follow your healthcare professional's instructions.           Patient Education     Kidney Disease: Eating Less Sodium  Sodium is a mineral that the body needs in small amounts. Sodium is found in table salt. Most people eat far more salt than they need. That's because salt is present in high amounts in most processed foods (pre-prepared foods such as breakfast cereals, cookies, and pickles) and in all restaurant foods. In other words, if you are not cooking from fresh ingredients at home, you are very likely eating more salt than you need. When sodium intake is too high, it can increase thirst and cause the body to retain fluid. To avoid these side effects, people with chronic kidney disease are often told to eat less sodium. The tips on this sheet can show you how.   People with chronic kidney disease should restrict their salt intake to less than 1,500 milligrams (mg) of sodium daily. Food labels generally report the sodium content. Table salt is sodium chloride. One level teaspoon of  table salt contains 2.300 mg of sodium. One of the most effective ways to control your salt intake is to cook more often at home.     When you shop for food  Unlike canned and processed foods, fresh foods have no added salt and are better for you. When you re food shopping:     Choose fresh foods when you can.    Read food labels before buying packaged foods. Check the label s nutrition facts for sodium amounts and servings per package. Remember to check the sodium content of drinks as well as food.    Try to pick packaged foods with a sodium content of 140 mg (milligrams) or less per serving.    Don't choose foods with over 400 mg of sodium per serving.    Choose canned, frozen, or fresh vegetables without added salt or seasoning.    Choose fresh or frozen poultry, seafood, and lean meats. Cook them at home instead of buying prepared or ready-to-eat products.  Season instead of salt  Try the seasonings and foods listed below to season without sodium.     Basil: tomatoes, squash, eggplant, soups, fish    Avalos: soups, rice, lentils, chicken    Dill: beets, cucumbers, green beans    Garlic: sauces, beans, vegetables, meats, fish    Clarita: carrots, chicken, cooked fruit, white sauces    Lemon: asparagus, artichokes, broccoli, spinach, fish    Mint: cold soups, salads, fruit dishes    Oregano: eggplant, chicken, salads, sauces    Thyme: chicken, fish, lean meats, soups, stews  Food that has salt added during cooking tastes less salty than if salt is sprinkled on it at the table. So use half the amount of salt you want to have in your food during cooking, and sprinkle the other half on the food at the table.   Don't use seasoned salt or salt substitutes. They may contain sodium or potassium. Potassium is another mineral people with kidney disease are often told to limit.   [employer] last reviewed this educational content on 4/1/2020 2000-2020 [employer]. [address] [place] [address], [place] [address]. All  rights reserved. This information is not intended as a substitute for professional medical care. Always follow your healthcare professional's instructions.

## 2021-02-18 ENCOUNTER — MYC MEDICAL ADVICE (OUTPATIENT)
Dept: FAMILY MEDICINE | Facility: CLINIC | Age: 76
End: 2021-02-18

## 2021-02-19 ENCOUNTER — NURSE TRIAGE (OUTPATIENT)
Dept: FAMILY MEDICINE | Facility: CLINIC | Age: 76
End: 2021-02-19

## 2021-02-19 ENCOUNTER — OFFICE VISIT (OUTPATIENT)
Dept: FAMILY MEDICINE | Facility: CLINIC | Age: 76
End: 2021-02-19
Payer: COMMERCIAL

## 2021-02-19 VITALS
BODY MASS INDEX: 26.55 KG/M2 | TEMPERATURE: 97.6 F | SYSTOLIC BLOOD PRESSURE: 125 MMHG | OXYGEN SATURATION: 99 % | WEIGHT: 165.2 LBS | DIASTOLIC BLOOD PRESSURE: 77 MMHG | HEART RATE: 86 BPM

## 2021-02-19 DIAGNOSIS — K62.5 RECTAL BLEEDING: Primary | ICD-10-CM

## 2021-02-19 LAB
BASOPHILS # BLD AUTO: 0.1 10E9/L (ref 0–0.2)
BASOPHILS NFR BLD AUTO: 1 %
DIFFERENTIAL METHOD BLD: ABNORMAL
EOSINOPHIL # BLD AUTO: 0.1 10E9/L (ref 0–0.7)
EOSINOPHIL NFR BLD AUTO: 1 %
ERYTHROCYTE [DISTWIDTH] IN BLOOD BY AUTOMATED COUNT: 15.2 % (ref 10–15)
HCT VFR BLD AUTO: 36.4 % (ref 40–53)
HGB BLD-MCNC: 11.2 G/DL (ref 13.3–17.7)
LYMPHOCYTES # BLD AUTO: 4.9 10E9/L (ref 0.8–5.3)
LYMPHOCYTES NFR BLD AUTO: 53 %
MCH RBC QN AUTO: 25.7 PG (ref 26.5–33)
MCHC RBC AUTO-ENTMCNC: 30.8 G/DL (ref 31.5–36.5)
MCV RBC AUTO: 84 FL (ref 78–100)
MONOCYTES # BLD AUTO: 1.2 10E9/L (ref 0–1.3)
MONOCYTES NFR BLD AUTO: 13 %
NEUTROPHILS # BLD AUTO: 3 10E9/L (ref 1.6–8.3)
NEUTROPHILS NFR BLD AUTO: 32 %
PLATELET # BLD AUTO: 137 10E9/L (ref 150–450)
PLATELET # BLD EST: ABNORMAL 10*3/UL
RBC # BLD AUTO: 4.35 10E12/L (ref 4.4–5.9)
WBC # BLD AUTO: 9.3 10E9/L (ref 4–11)

## 2021-02-19 PROCEDURE — 85025 COMPLETE CBC W/AUTO DIFF WBC: CPT | Performed by: NURSE PRACTITIONER

## 2021-02-19 PROCEDURE — 36415 COLL VENOUS BLD VENIPUNCTURE: CPT | Performed by: NURSE PRACTITIONER

## 2021-02-19 PROCEDURE — 99213 OFFICE O/P EST LOW 20 MIN: CPT | Performed by: NURSE PRACTITIONER

## 2021-02-19 NOTE — TELEPHONE ENCOUNTER
Spoke with patient. For past two days he has been having rectal bleeding with bowel movements. It is bright red blood and significant amounts that he has never had before in the toilet. Before this, he had noticed blood specs on the toilet paper for a couple of day. Pt has hx of hemorrhoids. The past two days he did not feel the swelling like he normally does when he has a hemorrhoid. A few months ago, he had 6 nose bleeds within 2 week period. When he saw ENT, he did not see anything initially. Then he went back to the ER and they saw a very small fissure that they had to cauterize. He had diarrhea about a week ago. No diarrhea currently  Bleeding only occurs with bowel movements, but is not mixed in with the stool, it is only on the stool and in the toilet water. No blood clots. No significant symptoms.  Notified pt of symptoms to monitor for that would warrant ER. He verbalized understanding.   Disposition: See within 3 days in office.  Scheduled appt for today with provider.      Additional Information    Negative: Vomiting red blood or black (coffee ground) material    Negative: Passed out (i.e., fainted, collapsed and was not responding)    Negative: Shock suspected (e.g., cold/pale/clammy skin, too weak to stand, low BP, rapid pulse)    Negative: Sounds like a life-threatening emergency to the triager    Negative: Diarrhea is the main symptom    Negative: Rectal symptoms    Negative: SEVERE rectal bleeding (large blood clots; on and off, or constant bleeding)    Negative: SEVERE dizziness (e.g., unable to stand, requires support to walk, feels like passing out now)    Negative: MODERATE rectal bleeding (small blood clots, passing blood without stool, or toilet water turns red) more than once a day    Negative: Bloody, black, or tarry bowel movements    Negative: High-risk adult (e.g., prior surgery on aorta, abdominal aortic aneurysm)    Negative: Rectal foreign body (inserted or swallowed)    Negative:  SEVERE abdominal pain (e.g., excruciating)    Negative: Constant abdominal pain lasting > 2 hours    Negative: Pale skin (pallor) of new onset or worsening    Negative: Patient sounds very sick or weak to the triager    Negative: MODERATE rectal bleeding (small blood clots, passing blood without stool, or toilet water turns red)    Negative: Taking Coumadin (warfarin) or other strong blood thinner, or known bleeding disorder (e.g., thrombocytopenia)    MILD rectal bleeding (more than just a few drops or streaks)    Negative: Colonoscopy in past 72 hours    Negative: Known cirrhosis of the liver (or history of liver failure or ascites)    Patient wants to be seen    Protocols used: RECTAL BLEEDING-A-OH    Natalie Hillman RN

## 2021-02-19 NOTE — PATIENT INSTRUCTIONS
Patient Education     When You Have Gastrointestinal (GI) Bleeding    Blood in your vomit or stool can be a sign of gastrointestinal (GI) bleeding. GI bleeding can be scary. But the cause may not be serious. You should always see a doctor if you have GI bleeding.   The GI tract is the path through which food travels in the body. Food passes from the mouth down the esophagus. This is the tube from the mouth to the stomach. Food starts to break down in the stomach. It then moves through the duodenum , the first part of the small intestine . Nutrients are absorbed as food travels through the small intestine. What is left passes into the colon (large intestine) as waste. The colon removes water from the waste. Waste continues from the colon to the rectum (where stool is stored). Waste then leaves the body through the anus . The upper GI tract is from the mouth through the duodenum. The lower GI tract is from the end of the duodenum to the anus.   Causes of GI bleeding  GI bleeding can be caused by many different problems. Some of the more common causes include:     Swollen veins in the anus (hemorrhoids)    Swollen veins in the esophagus (varices)    Sore on the lining of the GI tract (ulcer)    Cuts or scrapes in the mouth or throat    Infection caused by germs such as bacteria or parasites    Food allergies, such as milk allergy in young children    Medicines, especially aspirin, blood thinners, and NSAIDs (non-steroidal anti-inflammatory drugs) such as ibuprofen    Inflammation of the GI tract (gastritis or esophagitis)    Colitis (Crohn's disease or ulcerative colitis)    Cancer (tumors or polyps)    Abnormal pouches in the colon (diverticula)    Tears in the esophagus or anus    Nosebleed    Abnormal blood vessels in the GI tract (angiodysplasia)  Diagnosing the cause of blood in stool   If blood is coming out in your stool, you may have a lower GI tract problem or a very fast upper GI tract bleed. Bleeding from  the GI tract can be bright red. Or it may look dark and tarry. Tests may also find blood in your stool that can t be seen with the eye (occult blood). To find out the cause, tests that may be ordered include:     Blood tests. A blood sample is taken and sent to a lab for exam.    Hemoccult test. Checks a stool sample for blood.    Stool culture. Checks a stool sample for bacteria or parasites.    X-ray, ultrasound, nuclear scan, or CT scan. Imaging tests that take pictures of the digestive tract.    Colonoscopy or sigmoidoscopy. This test uses a flexible tube with a tiny camera. The tube is inserted through your anus into your rectum to see the inside of your colon. Your provider can also take a tiny tissue sample (biopsy) and treat a bleeding source    Capsule endoscopy. This test uses a tiny camera that is swallowed, passes through the intestine, and takes pictures of the small intestine that is more difficult to reach with scopes.  Diagnosing the cause of blood in vomit   If you are vomiting blood or something that looks like coffee grounds, you may have an upper GI tract problem. To find the cause, tests that may be done include:     Upper endoscopy. A flexible tube with a tiny camera is inserted through your mouth and throat to see inside your upper GI tract. This lets your provider take a tiny tissue sample (biopsy) and treat a bleeding source.    Nasogastric lavage. The healthcare provider may withdraw some of the fluid in the stomach to test it for bleeding. This can sometimes tell if you have upper GI or lower GI bleeding.    X-ray, ultrasound, nuclear scan, or CT scan. Imaging tests that take pictures of your digestive tract.    Upper GI series. X-rays of the upper part of your GI tract taken after swallowing a contrast drink. .    Enteroscopy. This sends a flexible tube or a small, swallowed capsule camera into your small intestine.  When to call your healthcare provider  Call your healthcare provider  right away if you have any of the following:     Fever of 100.4  F ( 38.0 ) or higher    Signs of fluid loss (dehydration). These include a dry, sticky mouth, decreased urine output, and very dark urine.    Belly (abdominal) pain  Call 911  Call 911, or get medical care right away  if any of the following occur:     Bleeding from your mouth or anus that can't be stopped    Bleeding along with feeling lightheaded or dizzy  [employer] last reviewed this educational content on 6/1/2019 2000-2020 [employer]. [address] [place], [place] [id number]. All rights reserved. This information is not intended as a substitute for professional medical care. Always follow your healthcare professional's instructions.

## 2021-02-19 NOTE — PROGRESS NOTES
Assessment & Plan     Rectal bleeding  Recommended colonoscopy but patient declines at this time. Counseled on warning signs of when to seek urgent medical care including: fever, worsening bleeding or bleeding that does not resolve, abdominal pain, lightheadedness.  - CBC with platelets differential  - GASTROENTEROLOGY ADULT REF CONSULT ONLY; Future    Return in about 1 week (around 2/26/2021), or if symptoms worsen or fail to improve.    LASHAY Pino CNP  [facility] FABIANO Adan is a 75 year old who presents for the following health issues     HPI     ** Triage note regarding rectal bleeding today **    Concern - Rectal bleeding  Onset: 6 days   Description: the first 3 days were specs of blood and for the last 3 days has been having bright red blood with BM. Notices after stool comes out.  Intensity: moderate  Progression of Symptoms:  worsening  Accompanying Signs & Symptoms: Had diarrhea 1 week ago. Stools have been soft but normal. Blood only occurs during BM. Declines pain or itching.   Previous history of similar problem: History of hemorrhoids but no problems with rectal bleeding in the past; Couple months ago had nose bleeds and went to ER and ENT and 2nd trip to ENT nose was cauterized.  Precipitating factors:        Worsened by:   Alleviating factors:        Improved by:   Therapies tried and outcome:  none     Review of Systems   Constitutional, HEENT, cardiovascular, pulmonary, gi and gu systems are negative, except as otherwise noted.      Objective    /77   Pulse 86   Temp 97.6  F (36.4  C) (Oral)   Wt 74.9 kg (165 lb 3.2 oz)   SpO2 99%   BMI 26.55 kg/m    Body mass index is 26.55 kg/m .  Physical Exam   GENERAL: healthy, alert and no distress  EYES: Eyes grossly normal to inspection, PERRL and conjunctivae and sclerae normal   (male): normal male genitalia without lesions or urethral discharge, no hernia  RECTAL (male): normal sphincter  tone, no rectal masses or fissure  PSYCH: mentation appears normal, affect normal/bright

## 2021-02-20 ENCOUNTER — NURSE TRIAGE (OUTPATIENT)
Dept: NURSING | Facility: CLINIC | Age: 76
End: 2021-02-20

## 2021-02-20 NOTE — TELEPHONE ENCOUNTER
Calling in about his office visit yesterday.  Wondering if GI was notified to contact him, as he hasn't heard from them yet.  Advised that I saw that the consult was put in, and that he would probably hear from them next week, and could call back on Monday during office hours.  Had another BM with bright red blood in it.  Denies dizziness, weakness, faintness etc.  Re-read providers recommendations from clinic visit yesterday.

## 2021-02-22 ENCOUNTER — TELEPHONE (OUTPATIENT)
Dept: SURGERY | Facility: CLINIC | Age: 76
End: 2021-02-22

## 2021-02-22 NOTE — TELEPHONE ENCOUNTER
RECORDS RECEIVED FROM: Rectal bleeding   DATE RECEIVED: 3.4.21   NOTES STATUS DETAILS   OFFICE NOTE from referring provider  Internal 2.19.21 [facility]   OFFICE NOTE from other specialist   N/A    DISCHARGE SUMMARY from hospital  N/A    DISCHARGE REPORT from the ER N/A    OPERATIVE REPORT  N/A    MEDICATION LIST Internal    LABS     PFC TESTING N/A    ANAL PAP N/A    BIOPSIES/PATHOLOGY RELATED TO DIAGNOSIS N/A    DIAGNOSTIC PROCEDURES     COLONOSCOPY N/A    UPPER ENDOSCOPY (EGD) N/A    FLEX SIGMOIDOSCOPY  N/A    ERCP N/A    IMAGING (DISC & REPORT)      CT  N/A    MRI N/A    XRAY N/A    ULTRASOUND (ENDOANAL/ENDORECTAL) N/A

## 2021-02-22 NOTE — TELEPHONE ENCOUNTER
[facility]    Phone Message    May a detailed message be left on voicemail: yes     Reason for Call: Other: New patient // DX Rectal bleeding // Referral in epic // Patient states he has been bleeding for 6 days // schedule in April with [facility] // please call to assess     Action Taken: Message routed to:  [facility]    Travel Screening: Not Applicable

## 2021-02-22 NOTE — TELEPHONE ENCOUNTER
"Patient intake call for \"rectal bleeding\".    Called to assess symptoms.    History:     Patient was seen by Family Medicine by Rula Watson APRN CNP 2/17/2021 for rectal bleeding. They recommended colonoscopy (patient declined) and return in one week of symptoms do not resolve.    Patient had a colonoscopy 8/15/2017 that showed hemorrhoids and diverticula. Due again 2022.  Current Symptoms:    Patient reports bleeding for the last 6 days    Bright red blood with BMs that is dripping into the toilet    No pain with BM    No lightheadedness, dizziness, or faintness    He is curious if it is a fissure because he had a fissure in his nose recently.  Plan:    Determined that it would be best to move up appointment.    Scheduled with Dr. Nataly Church, EMT  Colon and Rectal Surgery    "

## 2021-02-25 ENCOUNTER — TRANSFERRED RECORDS (OUTPATIENT)
Dept: HEALTH INFORMATION MANAGEMENT | Facility: CLINIC | Age: 76
End: 2021-02-25

## 2021-03-04 ENCOUNTER — OFFICE VISIT (OUTPATIENT)
Dept: SURGERY | Facility: CLINIC | Age: 76
End: 2021-03-04
Payer: COMMERCIAL

## 2021-03-04 ENCOUNTER — PRE VISIT (OUTPATIENT)
Dept: SURGERY | Facility: CLINIC | Age: 76
End: 2021-03-04

## 2021-03-04 VITALS
HEART RATE: 92 BPM | BODY MASS INDEX: 26.65 KG/M2 | WEIGHT: 165.8 LBS | HEIGHT: 66 IN | OXYGEN SATURATION: 98 % | SYSTOLIC BLOOD PRESSURE: 120 MMHG | DIASTOLIC BLOOD PRESSURE: 78 MMHG

## 2021-03-04 DIAGNOSIS — K64.2 GRADE III INTERNAL HEMORRHOIDS: Primary | ICD-10-CM

## 2021-03-04 DIAGNOSIS — K62.5 RECTAL BLEEDING: ICD-10-CM

## 2021-03-04 PROCEDURE — 46221 LIGATION OF HEMORRHOID(S): CPT | Performed by: SURGERY

## 2021-03-04 PROCEDURE — 99203 OFFICE O/P NEW LOW 30 MIN: CPT | Mod: 25 | Performed by: SURGERY

## 2021-03-04 ASSESSMENT — MIFFLIN-ST. JEOR: SCORE: 1442.03

## 2021-03-04 ASSESSMENT — PAIN SCALES - GENERAL: PAINLEVEL: NO PAIN (0)

## 2021-03-04 NOTE — LETTER
3/4/2021       RE: Loco Wood  [address] [address]     Dear Colleague,    Thank you for referring your patient, Loco Wood, to the [facility] COLON AND RECTAL SURGERY CLINIC [place] at [facility]. Please see a copy of my visit note below.    Colon and Rectal Surgery Clinic Note    RE: Loco Wood.  : 1945.  PUSHPA: 3/4/2021.    Reason for visit: bleeding per rectum    HPI:     Loco Wood is a 76 y/o gentleman with the following PMH:    1. Chronic Lymphocytic Leukemia  2. GERD  3. Hyperlipidemia  4. Atrial fibrillation    He presents, referred by his PCP, for recent episodes of rectal bleeding and concern for hemorrhoids.    This started 2021, and was associated only with bowel movements.  It has turned the toilet bowl red and also some on the toilet paper.  He has meticulously recorded the amount and character of the bleeding, which essentially has decreased over a matter of weeks and it has now been several days since seeing any blood.    No pain with BM's.  He occasionally strains but did not notice needing to strain before this bleeding started.      He notices no anal masses or prolapsing out of the anus.    He has previously been diagnosed with hemorrhoids several years ago, but they were not associated with bleeding at the time.  They went away on their own, sometimes used hydrocortisone cream.  He has not used it recently.  He mentions being banded in his 30's but no anorectal procedures since then.    He uses metamucil off and on.  He thinks right now he takes in 3 times per week.    He had a colonoscopy in 2017 that showed diverticulosis, internal hemorrhoids, and no other findings.  He was recommended to have 5 year follow up because of a prior polyp.    He is not on any blood thinners or anti-platelet agents.    Medical history:  Past Medical History:   Diagnosis Date     Arthritis      hip and toes     Chronic pain      CLL (chronic lymphocytic leukaemia)      Esophageal reflux      Malignant neoplasm (H)     Leukemia     Paroxysmal atrial fibrillation (H) 2020     PONV (postoperative nausea and vomiting)      Pure hypercholesterolemia        Surgical history:  Past Surgical History:   Procedure Laterality Date     ARTHROPLASTY MINIMALLY INVASIVE HIP  5/10/2013    Procedure: ARTHROPLASTY MINIMALLY INVASIVE HIP;  Left Two Incision Total Hip Arthroplasty ;  Surgeon: Desmond Alberts MD;  Location: [facility]     ARTHROPLASTY MINIMALLY INVASIVE HIP  2014    Procedure: ARTHROPLASTY MINIMALLY INVASIVE HIP;  Right Total Hip Arthroplasty Minimally Invasive Two Incision  *Latex Free Room;  Surgeon: Desmond Alberts MD;  Location: [facility] OR     BACK SURGERY      back fusion      COLONOSCOPY           COLONOSCOPY N/A 8/15/2017    Procedure: COLONOSCOPY;  colonoscopy;  Surgeon: Chun Mcguire MD;  Location:  GI     GI SURGERY      4 hernia repairs in childhood     HERNIA REPAIR      4 since age 4     IR PAE  3/5/2020     Z NONSPECIFIC PROCEDURE   &    (R) inguinal herniorrhapy     ZZ NONSPECIFIC PROCEDURE      (L) inguinal herniorrhaphy     ZZ NONSPECIFIC PROCEDURE      L4-5  L5 S1 fusion - spondylolisthesis       Family history:  Family History   Problem Relation Age of Onset     Heart Disease Father      Cerebrovascular Disease Father          OF STROKE      Cancer Father         melonoma     Melanoma Father      Hyperlipidemia Father      Thyroid Disease Father      Cancer Mother         Lung cancer     Other Cancer Mother         lung; heavy smoker     Cerebrovascular Disease Paternal Uncle         Aneurism     Breast Cancer Maternal Aunt          from it     Cancer Paternal Uncle      Cancer Paternal Aunt      Skin Cancer No family hx of      Glaucoma No family hx of      Macular Degeneration No family hx of        Medications:  Current Outpatient  Medications   Medication Sig Dispense Refill     Acetaminophen (TYLENOL PO)        atorvastatin (LIPITOR) 10 MG tablet TAKE 1 TABLET BY MOUTH EVERY 48 HOURS 45 tablet 4     famotidine (PEPCID) 40 MG tablet Take 1 tablet (40 mg) by mouth nightly as needed for heartburn 90 tablet 4     ibrutinib (IMBRUVICA) 420 MG tablet Take 1 tablet (420 mg) by mouth daily 28 tablet 2     ibuprofen (ADVIL/MOTRIN) 200 MG tablet Take 200 mg by mouth every 4 hours as needed for mild pain       levothyroxine (SYNTHROID/LEVOTHROID) 50 MCG tablet TAKE 1 TABLET(50 MCG) BY MOUTH DAILY 90 tablet 3     omega 3 1200 MG CAPS Take 2 capsules by mouth daily       omeprazole (PRILOSEC) 10 MG DR capsule TAKE ONE CAPSULE BY MOUTH EVERY DAY 30 TO 60 MINUTES BEFORE A MEAL 90 capsule 1     ondansetron (ZOFRAN) 4 MG tablet Take 1 tablet (4 mg) by mouth every 8 hours as needed (Patient not taking: Reported on 2/19/2021) 15 tablet 0     Probiotic Product (PROBIOTIC-10 PO) Take by mouth daily       RESVERATROL PO        tiZANidine (ZANAFLEX) 4 MG tablet Take 1 tablet (4 mg) by mouth 3 times daily as needed for muscle spasms 60 tablet 1     traZODone (DESYREL) 50 MG tablet Take 1-3 tablets ( mg) by mouth At Bedtime (Patient not taking: Reported on 2/19/2021) 90 tablet 3     vitamin D3 (CHOLECALCIFEROL) 50 mcg (2000 units) tablet Take 1 tablet by mouth daily         Allergies:  Allergies   Allergen Reactions     Dilaudid [Hydromorphone] Itching     Morphine Itching       Social history:   Social History     Tobacco Use     Smoking status: Never Smoker     Smokeless tobacco: Never Used   Substance Use Topics     Alcohol use: Yes     Alcohol/week: 0.0 standard drinks     Comment: moderate, social     Marital status: single.    ROS:  A complete review of systems was performed with the patient and all systems negative except as per HPI.    Physical Examination:  Exam was chaperoned by Griselda Farrell LPN  There were no vitals taken for this visit.  General:  Well hydrated. No acute distress.  Abdomen: not distended  Perianal external examination:  Perianal skin: Intact with no excoriation or lichenification.  Lesions: No evidence of an external lesion, nodularity, or induration in the perianal region.  Eversion of buttocks: There was not evidence of an anal fissure. Details: N/A.  Skin tags or external hemorrhoids: No.  Digital rectal examination: Was performed.   Sphincter tone: Good.  Palpable lesions: No.  Other: None.  Bimanual examination: was not performed.    Anoscopy: Was performed.   Hemorrhoids: Yes.  Right anterior hemorrhoid, stigmata of recent bleeding  Lesions: No    Procedures:  After discussing the risks and benefits, the patient agreed to proceed with internal hemorrhoidal banding.    Prior to the start of the procedure and with procedural staff participation, I verbally confirmed the patient s identity using two indicators, relevant allergies, that the procedure was appropriate and matched the consent or emergent situation, and that the correct equipment/implants were available. Immediately prior to starting the procedure I conducted the Time Out with the procedural staff and re-confirmed the patient s name, procedure, and site/side. (The Joint Commission universal protocol was followed.)  Yes    Sedation (Moderate or Deep): None    A suction hemorrhoidal  was used to place a total of 1 band(s) in the right anterior position(s).    There was no significant bleeding. The patient tolerated the procedure fairly well.      Laboratory values reviewed:  Recent Labs   Lab Test 02/19/21  1159 01/25/21  0957 12/01/20  1425 12/01/20  1425   WBC 9.3 7.2   < > 17.8*   HGB 11.2* 11.5*   < > 13.7   * 145*   < > 174   CR  --  1.61*   < > 1.32*   ALBUMIN  --  3.9   < >  --    BILITOTAL  --  0.8   < >  --    ALKPHOS  --  62   < >  --    ALT  --  21   < >  --    AST  --  16   < >  --    INR  --   --   --  1.15*    < > = values in this interval not  displayed.       Imaging personally reviewed by me:  Non    ASSESSMENT  76 y/o gentleman with grade III internal hemorrhoids.    Risks, benefits, and alternatives of operative treatment were thoroughly discussed with the patient, he/she understands these well and agrees to proceed.    PLAN  1. Hemorrhoid banding today (see note above)  2. Metamucil daily  3. Follow up if needed in 4 weeks for repeat banding  4. Call/ED if excessive bleeding, fevers, or inability to urinate    E/M time: 30 minutes >50% spent in discussing, counseling and coordinating care.  Procedure time: 15 minutes  Time spent: 45 minutes.      Stevo Christian MD, PhD    Division of Colon and Rectal Surgery  [facility]    Referring Provider:  LASHAY Vang CNP  [address] [place] [address]     Primary Care Provider:  Campos Parra        Again, thank you for allowing me to participate in the care of your patient.      Sincerely,    Stevo Christian MD

## 2021-03-04 NOTE — NURSING NOTE
"Chief Complaint   Patient presents with     Consult     New patient consult for rectal bleeding.        Vitals:    03/04/21 1031   BP: 120/78   Pulse: 92   SpO2: 98%   Weight: 165 lb 12.8 oz   Height: 5' 6.14\"       Body mass index is 26.65 kg/m .    Griselda Farrell LPN      "

## 2021-03-04 NOTE — PROGRESS NOTES
Colon and Rectal Surgery Clinic Note    RE: Loco Wood.  : 1945.  PUSHPA: 3/4/2021.    Reason for visit: bleeding per rectum    HPI:     Loco Wood is a 74 y/o gentleman with the following PMH:    1. Chronic Lymphocytic Leukemia  2. GERD  3. Hyperlipidemia  4. Atrial fibrillation    He presents, referred by his PCP, for recent episodes of rectal bleeding and concern for hemorrhoids.    This started 2021, and was associated only with bowel movements.  It has turned the toilet bowl red and also some on the toilet paper.  He has meticulously recorded the amount and character of the bleeding, which essentially has decreased over a matter of weeks and it has now been several days since seeing any blood.    No pain with BM's.  He occasionally strains but did not notice needing to strain before this bleeding started.      He notices no anal masses or prolapsing out of the anus.    He has previously been diagnosed with hemorrhoids several years ago, but they were not associated with bleeding at the time.  They went away on their own, sometimes used hydrocortisone cream.  He has not used it recently.  He mentions being banded in his 30's but no anorectal procedures since then.    He uses metamucil off and on.  He thinks right now he takes in 3 times per week.    He had a colonoscopy in 2017 that showed diverticulosis, internal hemorrhoids, and no other findings.  He was recommended to have 5 year follow up because of a prior polyp.    He is not on any blood thinners or anti-platelet agents.    Medical history:  Past Medical History:   Diagnosis Date     Arthritis     hip and toes     Chronic pain      CLL (chronic lymphocytic leukaemia)      Esophageal reflux      Malignant neoplasm (H)     Leukemia     Paroxysmal atrial fibrillation (H) 2020     PONV (postoperative nausea and vomiting)      Pure hypercholesterolemia        Surgical history:  Past Surgical History:   Procedure Laterality  Date     ARTHROPLASTY MINIMALLY INVASIVE HIP  5/10/2013    Procedure: ARTHROPLASTY MINIMALLY INVASIVE HIP;  Left Two Incision Total Hip Arthroplasty ;  Surgeon: Desmond Alberts MD;  [facility]     ARTHROPLASTY MINIMALLY INVASIVE HIP  2014    Procedure: ARTHROPLASTY MINIMALLY INVASIVE HIP;  Right Total Hip Arthroplasty Minimally Invasive Two Incision  *Latex Free Room;  Surgeon: Desmond Alberts MD;  [facility] OR     BACK SURGERY      back fusion      COLONOSCOPY           COLONOSCOPY N/A 8/15/2017    Procedure: COLONOSCOPY;  colonoscopy;  Surgeon: Chun Mcguire MD;  Location: [facility] GI     GI SURGERY      4 hernia repairs in childhood     HERNIA REPAIR      4 since age 4     IR PAE  3/5/2020     Z NONSPECIFIC PROCEDURE   &    (R) inguinal herniorrhapy     Z NONSPECIFIC PROCEDURE      (L) inguinal herniorrhaphy     Z NONSPECIFIC PROCEDURE      L4-5  L5 S1 fusion - spondylolisthesis       Family history:  Family History   Problem Relation Age of Onset     Heart Disease Father      Cerebrovascular Disease Father          OF STROKE      Cancer Father         melonoma     Melanoma Father      Hyperlipidemia Father      Thyroid Disease Father      Cancer Mother         Lung cancer     Other Cancer Mother         lung; heavy smoker     Cerebrovascular Disease Paternal Uncle         Aneurism     Breast Cancer Maternal Aunt          from it     Cancer Paternal Uncle      Cancer Paternal Aunt      Skin Cancer No family hx of      Glaucoma No family hx of      Macular Degeneration No family hx of        Medications:  Current Outpatient Medications   Medication Sig Dispense Refill     Acetaminophen (TYLENOL PO)        atorvastatin (LIPITOR) 10 MG tablet TAKE 1 TABLET BY MOUTH EVERY 48 HOURS 45 tablet 4     famotidine (PEPCID) 40 MG tablet Take 1 tablet (40 mg) by mouth nightly as needed for heartburn 90 tablet 4     ibrutinib (IMBRUVICA) 420 MG tablet Take 1 tablet  (420 mg) by mouth daily 28 tablet 2     ibuprofen (ADVIL/MOTRIN) 200 MG tablet Take 200 mg by mouth every 4 hours as needed for mild pain       levothyroxine (SYNTHROID/LEVOTHROID) 50 MCG tablet TAKE 1 TABLET(50 MCG) BY MOUTH DAILY 90 tablet 3     omega 3 1200 MG CAPS Take 2 capsules by mouth daily       omeprazole (PRILOSEC) 10 MG DR capsule TAKE ONE CAPSULE BY MOUTH EVERY DAY 30 TO 60 MINUTES BEFORE A MEAL 90 capsule 1     ondansetron (ZOFRAN) 4 MG tablet Take 1 tablet (4 mg) by mouth every 8 hours as needed (Patient not taking: Reported on 2/19/2021) 15 tablet 0     Probiotic Product (PROBIOTIC-10 PO) Take by mouth daily       RESVERATROL PO        tiZANidine (ZANAFLEX) 4 MG tablet Take 1 tablet (4 mg) by mouth 3 times daily as needed for muscle spasms 60 tablet 1     traZODone (DESYREL) 50 MG tablet Take 1-3 tablets ( mg) by mouth At Bedtime (Patient not taking: Reported on 2/19/2021) 90 tablet 3     vitamin D3 (CHOLECALCIFEROL) 50 mcg (2000 units) tablet Take 1 tablet by mouth daily         Allergies:  Allergies   Allergen Reactions     Dilaudid [Hydromorphone] Itching     Morphine Itching       Social history:   Social History     Tobacco Use     Smoking status: Never Smoker     Smokeless tobacco: Never Used   Substance Use Topics     Alcohol use: Yes     Alcohol/week: 0.0 standard drinks     Comment: moderate, social     Marital status: single.    ROS:  A complete review of systems was performed with the patient and all systems negative except as per HPI.    Physical Examination:  Exam was chaperoned by Griselda Farrell LPN  There were no vitals taken for this visit.  General: Well hydrated. No acute distress.  Abdomen: not distended  Perianal external examination:  Perianal skin: Intact with no excoriation or lichenification.  Lesions: No evidence of an external lesion, nodularity, or induration in the perianal region.  Eversion of buttocks: There was not evidence of an anal fissure. Details: N/A.  Skin tags  or external hemorrhoids: No.  Digital rectal examination: Was performed.   Sphincter tone: Good.  Palpable lesions: No.  Other: None.  Bimanual examination: was not performed.    Anoscopy: Was performed.   Hemorrhoids: Yes.  Right anterior hemorrhoid, stigmata of recent bleeding  Lesions: No    Procedures:  After discussing the risks and benefits, the patient agreed to proceed with internal hemorrhoidal banding.    Prior to the start of the procedure and with procedural staff participation, I verbally confirmed the patient s identity using two indicators, relevant allergies, that the procedure was appropriate and matched the consent or emergent situation, and that the correct equipment/implants were available. Immediately prior to starting the procedure I conducted the Time Out with the procedural staff and re-confirmed the patient s name, procedure, and site/side. (The Joint Commission universal protocol was followed.)  Yes    Sedation (Moderate or Deep): None    A suction hemorrhoidal  was used to place a total of 1 band(s) in the right anterior position(s).    There was no significant bleeding. The patient tolerated the procedure fairly well.      Laboratory values reviewed:  Recent Labs   Lab Test 02/19/21  1159 01/25/21  0957 12/01/20  1425 12/01/20  1425   WBC 9.3 7.2   < > 17.8*   HGB 11.2* 11.5*   < > 13.7   * 145*   < > 174   CR  --  1.61*   < > 1.32*   ALBUMIN  --  3.9   < >  --    BILITOTAL  --  0.8   < >  --    ALKPHOS  --  62   < >  --    ALT  --  21   < >  --    AST  --  16   < >  --    INR  --   --   --  1.15*    < > = values in this interval not displayed.       Imaging personally reviewed by me:  Non    ASSESSMENT  74 y/o gentleman with grade III internal hemorrhoids.    Risks, benefits, and alternatives of operative treatment were thoroughly discussed with the patient, he/she understands these well and agrees to proceed.    PLAN  1. Hemorrhoid banding today (see note above)  2.  Metamucil daily  3. Follow up if needed in 4 weeks for repeat banding  4. Call/ED if excessive bleeding, fevers, or inability to urinate    E/M time: 30 minutes >50% spent in discussing, counseling and coordinating care.  Procedure time: 15 minutes  Time spent: 45 minutes.      Stevo Christian MD, PhD    Division of Colon and Rectal Surgery  [facility]    Referring Provider:  Rula Watson, LASHAY CNP  [address] [place] [address]     Primary Care Provider:  Campos Parra

## 2021-03-08 ENCOUNTER — TRANSFERRED RECORDS (OUTPATIENT)
Dept: HEALTH INFORMATION MANAGEMENT | Facility: CLINIC | Age: 76
End: 2021-03-08

## 2021-03-11 ENCOUNTER — MYC MEDICAL ADVICE (OUTPATIENT)
Dept: FAMILY MEDICINE | Facility: CLINIC | Age: 76
End: 2021-03-11

## 2021-03-11 DIAGNOSIS — M54.50 CHRONIC BILATERAL LOW BACK PAIN WITHOUT SCIATICA: Primary | ICD-10-CM

## 2021-03-11 DIAGNOSIS — C91.10 CLL (CHRONIC LYMPHOCYTIC LEUKEMIA) (H): ICD-10-CM

## 2021-03-11 DIAGNOSIS — G89.29 CHRONIC BILATERAL LOW BACK PAIN WITHOUT SCIATICA: Primary | ICD-10-CM

## 2021-03-13 ENCOUNTER — ANCILLARY PROCEDURE (OUTPATIENT)
Dept: CT IMAGING | Facility: CLINIC | Age: 76
End: 2021-03-13
Attending: INTERNAL MEDICINE
Payer: COMMERCIAL

## 2021-03-13 DIAGNOSIS — M54.50 CHRONIC BILATERAL LOW BACK PAIN WITHOUT SCIATICA: ICD-10-CM

## 2021-03-13 DIAGNOSIS — C91.10 CLL (CHRONIC LYMPHOCYTIC LEUKEMIA) (H): ICD-10-CM

## 2021-03-13 DIAGNOSIS — G89.29 CHRONIC BILATERAL LOW BACK PAIN WITHOUT SCIATICA: ICD-10-CM

## 2021-03-13 PROCEDURE — 72131 CT LUMBAR SPINE W/O DYE: CPT | Mod: GC | Performed by: RADIOLOGY

## 2021-03-16 DIAGNOSIS — M54.16 LUMBAR RADICULOPATHY: Primary | ICD-10-CM

## 2021-03-17 ENCOUNTER — TELEPHONE (OUTPATIENT)
Dept: PALLIATIVE MEDICINE | Facility: CLINIC | Age: 76
End: 2021-03-17

## 2021-03-17 NOTE — TELEPHONE ENCOUNTER
Order received for Injection to be Determined by Pain Management Specialist.    Routing to review.    Jess PRADHAN    [facility]

## 2021-03-18 ENCOUNTER — TELEPHONE (OUTPATIENT)
Dept: FAMILY MEDICINE | Facility: CLINIC | Age: 76
End: 2021-03-18

## 2021-03-18 NOTE — TELEPHONE ENCOUNTER
Pt will call back to schedule L2-3 interlaminar epidural, left paramedial approach.    Jess PRADHAN    [facility]

## 2021-03-18 NOTE — TELEPHONE ENCOUNTER
Patient called would like to know if someone could call him back and let him know when was the earliest he got diagnosis ed with a prostate nodule this was done by Dr. Powell at a physical.  Leni Taylor,

## 2021-03-19 NOTE — TELEPHONE ENCOUNTER
Pt was given message below. Pt had no further questions.    Shayla Lazcano RN  [facility]

## 2021-03-19 NOTE — TELEPHONE ENCOUNTER
Called patient. Left voice message to return call at [phone].    Note Dx of prostate nodule under problems list. Prostate Nodule is listed on visit with Fidel 12/21/2017. BPH is listed on visit with Tanna on 04/29/2015. And benign nodular prostatic hyperplasia with lower urinary tract symptoms on 04/11/2016.  Per Problems List:  BPH (benign prostatic hyperplasia) 5 years ago    Prostate nodule 3 years ago

## 2021-03-23 ENCOUNTER — MYC MEDICAL ADVICE (OUTPATIENT)
Dept: TRANSPLANT | Facility: CLINIC | Age: 76
End: 2021-03-23

## 2021-03-23 DIAGNOSIS — G47.01 INSOMNIA DUE TO MEDICAL CONDITION: ICD-10-CM

## 2021-03-23 DIAGNOSIS — C91.10 CLL (CHRONIC LYMPHOCYTIC LEUKEMIA) (H): Primary | ICD-10-CM

## 2021-03-24 RX ORDER — ZOLPIDEM TARTRATE 5 MG/1
5 TABLET ORAL
Qty: 10 TABLET | Refills: 0 | Status: SHIPPED | OUTPATIENT
Start: 2021-03-24 | End: 2021-04-13

## 2021-04-05 ENCOUNTER — TRANSFERRED RECORDS (OUTPATIENT)
Dept: HEALTH INFORMATION MANAGEMENT | Facility: CLINIC | Age: 76
End: 2021-04-05

## 2021-04-05 DIAGNOSIS — C91.10 CLL (CHRONIC LYMPHOCYTIC LEUKEMIA) (H): ICD-10-CM

## 2021-04-05 DIAGNOSIS — Z13.220 SCREENING FOR HYPERLIPIDEMIA: ICD-10-CM

## 2021-04-05 DIAGNOSIS — R97.20 ELEVATED PROSTATE SPECIFIC ANTIGEN (PSA): ICD-10-CM

## 2021-04-05 LAB
ALBUMIN SERPL-MCNC: 4 G/DL (ref 3.4–5)
ALP SERPL-CCNC: 61 U/L (ref 40–150)
ALT SERPL W P-5'-P-CCNC: 25 U/L (ref 0–70)
ANION GAP SERPL CALCULATED.3IONS-SCNC: 4 MMOL/L (ref 3–14)
AST SERPL W P-5'-P-CCNC: 24 U/L (ref 0–45)
BASOPHILS # BLD AUTO: 0 10E9/L (ref 0–0.2)
BASOPHILS NFR BLD AUTO: 0.5 %
BILIRUB SERPL-MCNC: 0.9 MG/DL (ref 0.2–1.3)
BUN SERPL-MCNC: 17 MG/DL (ref 7–30)
CALCIUM SERPL-MCNC: 9.1 MG/DL (ref 8.5–10.1)
CHLORIDE SERPL-SCNC: 108 MMOL/L (ref 94–109)
CHOLEST SERPL-MCNC: 142 MG/DL
CO2 SERPL-SCNC: 28 MMOL/L (ref 20–32)
CREAT SERPL-MCNC: 1.52 MG/DL (ref 0.66–1.25)
DIFFERENTIAL METHOD BLD: ABNORMAL
EOSINOPHIL # BLD AUTO: 0.2 10E9/L (ref 0–0.7)
EOSINOPHIL NFR BLD AUTO: 2.5 %
ERYTHROCYTE [DISTWIDTH] IN BLOOD BY AUTOMATED COUNT: 15.5 % (ref 10–15)
GFR SERPL CREATININE-BSD FRML MDRD: 45 ML/MIN/{1.73_M2}
GLUCOSE SERPL-MCNC: 91 MG/DL (ref 70–99)
HCT VFR BLD AUTO: 36.1 % (ref 40–53)
HDLC SERPL-MCNC: 39 MG/DL
HGB BLD-MCNC: 11 G/DL (ref 13.3–17.7)
LDLC SERPL CALC-MCNC: 84 MG/DL
LYMPHOCYTES # BLD AUTO: 2.9 10E9/L (ref 0.8–5.3)
LYMPHOCYTES NFR BLD AUTO: 45.5 %
MCH RBC QN AUTO: 24.7 PG (ref 26.5–33)
MCHC RBC AUTO-ENTMCNC: 30.5 G/DL (ref 31.5–36.5)
MCV RBC AUTO: 81 FL (ref 78–100)
MONOCYTES # BLD AUTO: 0.5 10E9/L (ref 0–1.3)
MONOCYTES NFR BLD AUTO: 7.7 %
NEUTROPHILS # BLD AUTO: 2.8 10E9/L (ref 1.6–8.3)
NEUTROPHILS NFR BLD AUTO: 43.8 %
NONHDLC SERPL-MCNC: 103 MG/DL
PLATELET # BLD AUTO: 151 10E9/L (ref 150–450)
POTASSIUM SERPL-SCNC: 4.2 MMOL/L (ref 3.4–5.3)
PROT SERPL-MCNC: 6.4 G/DL (ref 6.8–8.8)
PSA SERPL-MCNC: 7.09 UG/L (ref 0–4)
RBC # BLD AUTO: 4.45 10E12/L (ref 4.4–5.9)
SODIUM SERPL-SCNC: 140 MMOL/L (ref 133–144)
TRIGL SERPL-MCNC: 96 MG/DL
WBC # BLD AUTO: 6.3 10E9/L (ref 4–11)

## 2021-04-05 PROCEDURE — 80061 LIPID PANEL: CPT | Performed by: INTERNAL MEDICINE

## 2021-04-05 PROCEDURE — 84153 ASSAY OF PSA TOTAL: CPT | Performed by: INTERNAL MEDICINE

## 2021-04-05 PROCEDURE — 36415 COLL VENOUS BLD VENIPUNCTURE: CPT | Performed by: INTERNAL MEDICINE

## 2021-04-05 PROCEDURE — 85025 COMPLETE CBC W/AUTO DIFF WBC: CPT | Performed by: INTERNAL MEDICINE

## 2021-04-05 PROCEDURE — 80053 COMPREHEN METABOLIC PANEL: CPT | Performed by: INTERNAL MEDICINE

## 2021-04-12 ENCOUNTER — OFFICE VISIT (OUTPATIENT)
Dept: CARDIOLOGY | Facility: CLINIC | Age: 76
End: 2021-04-12
Attending: INTERNAL MEDICINE
Payer: COMMERCIAL

## 2021-04-12 VITALS
OXYGEN SATURATION: 99 % | DIASTOLIC BLOOD PRESSURE: 88 MMHG | WEIGHT: 166 LBS | SYSTOLIC BLOOD PRESSURE: 138 MMHG | HEART RATE: 89 BPM | BODY MASS INDEX: 26.06 KG/M2 | HEIGHT: 67 IN

## 2021-04-12 DIAGNOSIS — I73.9 INTERMITTENT CLAUDICATION (H): ICD-10-CM

## 2021-04-12 DIAGNOSIS — I25.10 CORONARY ARTERY CALCIFICATION: ICD-10-CM

## 2021-04-12 DIAGNOSIS — R07.9 CHEST PAIN, UNSPECIFIED TYPE: Primary | ICD-10-CM

## 2021-04-12 DIAGNOSIS — R42 DIZZINESS: ICD-10-CM

## 2021-04-12 DIAGNOSIS — E78.5 HYPERLIPIDEMIA LDL GOAL <130: ICD-10-CM

## 2021-04-12 PROCEDURE — G0463 HOSPITAL OUTPT CLINIC VISIT: HCPCS | Mod: 25

## 2021-04-12 PROCEDURE — 93005 ELECTROCARDIOGRAM TRACING: CPT

## 2021-04-12 PROCEDURE — 99214 OFFICE O/P EST MOD 30 MIN: CPT | Performed by: INTERNAL MEDICINE

## 2021-04-12 RX ORDER — ATORVASTATIN CALCIUM 20 MG/1
20 TABLET, FILM COATED ORAL DAILY
Qty: 90 TABLET | Refills: 3 | Status: SHIPPED | OUTPATIENT
Start: 2021-04-12 | End: 2021-04-12

## 2021-04-12 RX ORDER — FINASTERIDE 5 MG/1
TABLET, FILM COATED ORAL
COMMUNITY
End: 2021-06-08

## 2021-04-12 RX ORDER — TAMSULOSIN HYDROCHLORIDE 0.4 MG/1
CAPSULE ORAL
COMMUNITY
End: 2022-01-01

## 2021-04-12 RX ORDER — ATORVASTATIN CALCIUM 20 MG/1
20 TABLET, FILM COATED ORAL DAILY
Qty: 90 TABLET | Refills: 3 | Status: SHIPPED | OUTPATIENT
Start: 2021-04-12 | End: 2022-01-01

## 2021-04-12 ASSESSMENT — PAIN SCALES - GENERAL: PAINLEVEL: NO PAIN (0)

## 2021-04-12 ASSESSMENT — MIFFLIN-ST. JEOR: SCORE: 1453.42

## 2021-04-12 NOTE — LETTER
4/12/2021      RE: Loco Wood  [address] [id number]       Dear Colleague,    Thank you for the opportunity to participate in the care of your patient, Loco Wood, at the [facility] [place] at [facility]. Please see a copy of my visit note below.    HPI:     HPI:      I had the privilege to evaluate and examine Mr Loco Wood, who is a 76 yr old male patient with a Hx of    1. Chronic Lymphocytic Leukemia  2. GERD  3. Hyperlipidemia  4. Paroxysmal Atrial fibrillation     Patient has a family Hx of MI, stroke and hyperlipidemia  Patient has sometimes chest pain at rest and during effort, Patient has sometimes dizziness and cramps in the legs. He denies palpitations.          PAST MEDICAL HISTORY:  Past Medical History:   Diagnosis Date     Arthritis     hip and toes     Chronic pain      CLL (chronic lymphocytic leukaemia)      Esophageal reflux      Malignant neoplasm (H)     Leukemia     Paroxysmal atrial fibrillation (H) 2/5/2020     PONV (postoperative nausea and vomiting)      Pure hypercholesterolemia        CURRENT MEDICATIONS:  Current Outpatient Medications   Medication Sig Dispense Refill     Acetaminophen (TYLENOL PO)        atorvastatin (LIPITOR) 10 MG tablet TAKE 1 TABLET BY MOUTH EVERY 48 HOURS 45 tablet 4     famotidine (PEPCID) 40 MG tablet Take 1 tablet (40 mg) by mouth nightly as needed for heartburn 90 tablet 4     finasteride (PROSCAR) 5 MG tablet        ibrutinib (IMBRUVICA) 420 MG tablet Take 1 tablet (420 mg) by mouth daily 28 tablet 2     ibuprofen (ADVIL/MOTRIN) 200 MG tablet Take 200 mg by mouth every 4 hours as needed for mild pain       levothyroxine (SYNTHROID/LEVOTHROID) 50 MCG tablet TAKE 1 TABLET(50 MCG) BY MOUTH DAILY 90 tablet 3     omeprazole (PRILOSEC) 10 MG DR capsule TAKE ONE CAPSULE BY MOUTH EVERY DAY 30 TO 60 MINUTES BEFORE A MEAL 90 capsule 1     RESVERATROL PO         tamsulosin (FLOMAX) 0.4 MG capsule        tiZANidine (ZANAFLEX) 4 MG tablet Take 1 tablet (4 mg) by mouth 3 times daily as needed for muscle spasms 60 tablet 1     vitamin D3 (CHOLECALCIFEROL) 50 mcg (2000 units) tablet Take 1 tablet by mouth daily       zolpidem (AMBIEN) 5 MG tablet Take 1 tablet (5 mg) by mouth nightly as needed for sleep 10 tablet 0     omega 3 1200 MG CAPS Take 2 capsules by mouth daily       ondansetron (ZOFRAN) 4 MG tablet Take 1 tablet (4 mg) by mouth every 8 hours as needed (Patient not taking: Reported on 2/19/2021) 15 tablet 0     Probiotic Product (PROBIOTIC-10 PO) Take by mouth daily       traZODone (DESYREL) 50 MG tablet Take 1-3 tablets ( mg) by mouth At Bedtime (Patient not taking: Reported on 2/19/2021) 90 tablet 3       PAST SURGICAL HISTORY:  Past Surgical History:   Procedure Laterality Date     ARTHROPLASTY MINIMALLY INVASIVE HIP  5/10/2013    Procedure: ARTHROPLASTY MINIMALLY INVASIVE HIP;  Left Two Incision Total Hip Arthroplasty ;  Surgeon: Desmond Alberts MD;  [facility]     ARTHROPLASTY MINIMALLY INVASIVE HIP  2/27/2014    Procedure: ARTHROPLASTY MINIMALLY INVASIVE HIP;  Right Total Hip Arthroplasty Minimally Invasive Two Incision  *Latex Free Room;  Surgeon: Desmond Alberts MD;  [facility] OR     BACK SURGERY      back fusion 1994     COLONOSCOPY      2007     COLONOSCOPY N/A 8/15/2017    Procedure: COLONOSCOPY;  colonoscopy;  Surgeon: Chun Mcguire MD;  Location:  GI     GI SURGERY      4 hernia repairs in childhood     HERNIA REPAIR      4 since age 4     IR PAE  3/5/2020     [email or web address] NONSPECIFIC PROCEDURE  1964 &'95    (R) inguinal herniorrhapy     [facility] [email or web address] NONSPECIFIC PROCEDURE  1949    (L) inguinal herniorrhaphy     [email or web address] NONSPECIFIC PROCEDURE  1994    L4-5  L5 S1 fusion - spondylolisthesis       ALLERGIES     Allergies   Allergen Reactions     Dilaudid [Hydromorphone] Itching     Morphine Itching       FAMILY HISTORY:  Family History   Problem Relation  Age of Onset     Heart Disease Father      Cerebrovascular Disease Father          OF STROKE      Cancer Father         melonoma     Melanoma Father      Hyperlipidemia Father      Thyroid Disease Father      Cancer Mother         Lung cancer     Other Cancer Mother         lung; heavy smoker     Cerebrovascular Disease Paternal Uncle         Aneurism     Breast Cancer Maternal Aunt          from it     Cancer Paternal Uncle      Cancer Paternal Aunt      Skin Cancer No family hx of      Glaucoma No family hx of      Macular Degeneration No family hx of        SOCIAL HISTORY:  Social History     Socioeconomic History     Marital status: Single     Spouse name: Ellen     Number of children: 0     Years of education: PHD     Highest education level: None   Occupational History     Occupation: Teacher     Employer: [employer]   Social Needs     Financial resource strain: None     Food insecurity     Worry: None     Inability: None     Transportation needs     Medical: None     Non-medical: None   Tobacco Use     Smoking status: Never Smoker     Smokeless tobacco: Never Used   Substance and Sexual Activity     Alcohol use: Yes     Alcohol/week: 0.0 standard drinks     Comment: moderate, social     Drug use: No     Sexual activity: Not Currently     Partners: Female   Lifestyle     Physical activity     Days per week: None     Minutes per session: None     Stress: None   Relationships     Social connections     Talks on phone: None     Gets together: None     Attends Islam service: None     Active member of club or organization: None     Attends meetings of clubs or organizations: None     Relationship status: None     Intimate partner violence     Fear of current or ex partner: None     Emotionally abused: None     Physically abused: None     Forced sexual activity: None   Other Topics Concern      Service No     Blood Transfusions No     Caffeine Concern No      "Occupational Exposure No     Hobby Hazards No     Sleep Concern No     Stress Concern No     Weight Concern No     Special Diet No     Back Care Yes     Exercise Yes     Comment: Several times a week     Bike Helmet No     Seat Belt Yes     Self-Exams No     Parent/sibling w/ CABG, MI or angioplasty before 65F 55M? No   Social History Narrative    Social Documentation:7/10        Balanced Diet: YES    Calcium intake:milk and food    Caffeine: 2 cups of coffee per day    Exercise:  type of activity  Wts , stretching, cardio;  3 times per week    Sunscreen:no    Seatbelts:  Yes    Self Breast Exam:  No -     Self Testicular Exam: No    Physical/Emotional/Sexual Abuse: No     Do you feel safe in your environment? Yes        Cholesterol screen up to date: today    Eye Exam up to date: Yes    Dental Exam up to date: Yes    Pap smear up to date: Does Not Apply    Mammogram up to date: Does Not Apply    Dexa Scan up to date: Does Not Apply    Colonoscopy up to date: Yes-2007    Immunizations up to date: Yes-2008    Glucose screen if over 40:  No     Luis Alfredo Knox ma                   ROS:   Constitutional: No fever, chills, or sweats. No weight gain/loss   ENT: No visual disturbance, ear ache, epistaxis, sore throat  Allergies/Immunologic: Negative.   Respiratory: No cough, hemoptysia  Cardiovascular: As per HPI  GI: No nausea, vomiting, hematemesis, melena, or hematochezia  : No urinary frequency, dysuria, or hematuria  Integument: Negative  Psychiatric: Negative  Neuro: Negative  Endocrinology: Negative   Musculoskeletal: Negative    EXAM:  /88 (BP Location: Right arm, Patient Position: Chair, Cuff Size: Adult Regular)   Pulse 89   Ht 1.697 m (5' 6.8\")   Wt 75.3 kg (166 lb)   SpO2 99%   BMI 26.16 kg/m    In general, the patient is a pleasant male in no apparent distress.    HEENT: NC/AT.  PERRLA.  EOMI.  Sclerae white, not injected.  Nares clear.  Pharynx without erythema or exudate.  Dentition intact.  "   Neck: No adenopathy.  No thyromegaly. Carotids +4/4 bilaterally without bruits.  No jugular venous distension.   Heart: RRR. Normal S1, S2 splits physiologically. No murmur, rub, click, or gallop. The PMI is in the 5th ICS in the midclavicular line. There is no heave.    Lungs: CTA.  No ronchi, wheezes, rales.  No dullness to percussion.   Abdomen: Soft, nontender, nondistended. No organomegaly.  No bruits.   Extremities: No clubbing, cyanosis, or edema.  The pulses are +4/4 at the radial, brachial, femoral, popliteal, DP, and PT sites bilaterally.  No bruits are noted.  Neurologic: Alert and oriented to person/place/time, normal speech, gait and affect  Skin: No petechiae, purpura or rash.    Labs:  LIPID RESULTS:  Lab Results   Component Value Date    CHOL 142 04/05/2021    HDL 39 (L) 04/05/2021    LDL 84 04/05/2021    TRIG 96 04/05/2021    CHOLHDLRATIO 3.8 04/29/2015    NHDL 103 04/05/2021       LIVER ENZYME RESULTS:  Lab Results   Component Value Date    AST 24 04/05/2021    ALT 25 04/05/2021       CBC RESULTS:  Lab Results   Component Value Date    WBC 6.3 04/05/2021    RBC 4.45 04/05/2021    HGB 11.0 (L) 04/05/2021    HCT 36.1 (L) 04/05/2021    MCV 81 04/05/2021    MCH 24.7 (L) 04/05/2021    MCHC 30.5 (L) 04/05/2021    RDW 15.5 (H) 04/05/2021     04/05/2021       BMP RESULTS:  Lab Results   Component Value Date     04/05/2021    POTASSIUM 4.2 04/05/2021    CHLORIDE 108 04/05/2021    CO2 28 04/05/2021    ANIONGAP 4 04/05/2021    GLC 91 04/05/2021    BUN 17 04/05/2021    CR 1.52 (H) 04/05/2021    GFRESTIMATED 45 (L) 04/05/2021    GFRESTBLACK 52 (L) 04/05/2021    DAVID 9.1 04/05/2021        A1C RESULTS:  No results found for: A1C    INR RESULTS:  Lab Results   Component Value Date    INR 1.15 (H) 12/01/2020    INR 1.16 (H) 03/05/2020       Procedures:    EKG, Echocardiogram, Dobutamine stress echocardiogram, US carotid arteries, ultrasound lower extremities are described in other note of this  day    Assessment and Plan:   We discussed the results with patient.  We discussed the importance of a heart healthy diet and lifestyle.  We discussed the results of all the testing and medication changes.    Medication Changes:   - Increase atorvastatin to 20 mg every day.  - Start Aspirin 81 mg every day.     Recommendations: Labs at your convenience.     Studies Ordered:  - Dobutamine Stress Echocardiogram  - Echocardiogram  - Ultrasound of the Carotids  - Ultrasound of the Lower Extremities with Exercise     The results from today include: EKG.     Please follow up: With Dr. Lee based on results of testing.      Henri Lee MD, PhD  Professor of Medicine  Division of Cardiology    CC  Patient Care Team:  Campos Parra MD as PCP - General (Internal Medicine)  Campos Boyd MD as MD (Urology)  Padmini Whitney RN as Registered Nurse  Nikhil Farah MD as MD (Neurology)  Nikhil Farah MD as Referring Physician (Neurology)  Kamlesh Bonner MD as MD (Ophthalmology)  Campos Prara MD as Assigned PCP  Daya Adams RN as Specialty Care Coordinator (Cardiology)  Virginie Kenney MD as MD (Cardiology)  Jameson Warren MD as Assigned Surgical Provider  Arnaud Melton DO as Assigned Musculoskeletal Provider  Arline Mares MD as Assigned Heart and Vascular Provider  He Burns MD as Assigned Cancer Care Provider  SELF, REFERRED    HPI:     I had the privilege to evaluate and examine Mr Loco Wood, who is a 76 yr old male patient with a Hx of    1. Chronic Lymphocytic Leukemia  2. GERD  3. Hyperlipidemia  4. Paroxysmal Atrial fibrillation    Patient has a family Hx of MI, stroke and hyperlipidemia  Patient has sometimes chest pain at rest and during effort, Patient has sometimes dizziness and cramps in the legs. He denies palpitations.           PAST MEDICAL HISTORY:  Past Medical History:   Diagnosis Date     Arthritis     hip and toes     Chronic  pain      CLL (chronic lymphocytic leukaemia)      Esophageal reflux      Malignant neoplasm (H)     Leukemia     Paroxysmal atrial fibrillation (H) 2/5/2020     PONV (postoperative nausea and vomiting)      Pure hypercholesterolemia        CURRENT MEDICATIONS:  Current Outpatient Medications   Medication Sig Dispense Refill     Acetaminophen (TYLENOL PO)        atorvastatin (LIPITOR) 10 MG tablet TAKE 1 TABLET BY MOUTH EVERY 48 HOURS 45 tablet 4     famotidine (PEPCID) 40 MG tablet Take 1 tablet (40 mg) by mouth nightly as needed for heartburn 90 tablet 4     finasteride (PROSCAR) 5 MG tablet        ibrutinib (IMBRUVICA) 420 MG tablet Take 1 tablet (420 mg) by mouth daily 28 tablet 2     ibuprofen (ADVIL/MOTRIN) 200 MG tablet Take 200 mg by mouth every 4 hours as needed for mild pain       levothyroxine (SYNTHROID/LEVOTHROID) 50 MCG tablet TAKE 1 TABLET(50 MCG) BY MOUTH DAILY 90 tablet 3     omeprazole (PRILOSEC) 10 MG DR capsule TAKE ONE CAPSULE BY MOUTH EVERY DAY 30 TO 60 MINUTES BEFORE A MEAL 90 capsule 1     RESVERATROL PO        tamsulosin (FLOMAX) 0.4 MG capsule        tiZANidine (ZANAFLEX) 4 MG tablet Take 1 tablet (4 mg) by mouth 3 times daily as needed for muscle spasms 60 tablet 1     vitamin D3 (CHOLECALCIFEROL) 50 mcg (2000 units) tablet Take 1 tablet by mouth daily       zolpidem (AMBIEN) 5 MG tablet Take 1 tablet (5 mg) by mouth nightly as needed for sleep 10 tablet 0     omega 3 1200 MG CAPS Take 2 capsules by mouth daily       ondansetron (ZOFRAN) 4 MG tablet Take 1 tablet (4 mg) by mouth every 8 hours as needed (Patient not taking: Reported on 2/19/2021) 15 tablet 0     Probiotic Product (PROBIOTIC-10 PO) Take by mouth daily       traZODone (DESYREL) 50 MG tablet Take 1-3 tablets ( mg) by mouth At Bedtime (Patient not taking: Reported on 2/19/2021) 90 tablet 3       PAST SURGICAL HISTORY:  Past Surgical History:   Procedure Laterality Date     ARTHROPLASTY MINIMALLY INVASIVE HIP  5/10/2013     Procedure: ARTHROPLASTY MINIMALLY INVASIVE HIP;  Left Two Incision Total Hip Arthroplasty ;  Surgeon: Desmond Alberts MD;  [facility]     ARTHROPLASTY MINIMALLY INVASIVE HIP  2014    Procedure: ARTHROPLASTY MINIMALLY INVASIVE HIP;  Right Total Hip Arthroplasty Minimally Invasive Two Incision  *Latex Free Room;  Surgeon: Desmond Alberts MD;  [facility]     BACK SURGERY      back fusion      COLONOSCOPY           COLONOSCOPY N/A 8/15/2017    Procedure: COLONOSCOPY;  colonoscopy;  Surgeon: Chun Mcguire MD;  Location:  GI     GI SURGERY      4 hernia repairs in childhood     HERNIA REPAIR      4 since age 4     IR PAE  3/5/2020     ZZ NONSPECIFIC PROCEDURE   &    (R) inguinal herniorrhapy     ZZC NONSPECIFIC PROCEDURE      (L) inguinal herniorrhaphy     ZZ NONSPECIFIC PROCEDURE      L4-5  L5 S1 fusion - spondylolisthesis       ALLERGIES     Allergies   Allergen Reactions     Dilaudid [Hydromorphone] Itching     Morphine Itching       FAMILY HISTORY:  Family History   Problem Relation Age of Onset     Heart Disease Father      Cerebrovascular Disease Father          OF STROKE      Cancer Father         melonoma     Melanoma Father      Hyperlipidemia Father      Thyroid Disease Father      Cancer Mother         Lung cancer     Other Cancer Mother         lung; heavy smoker     Cerebrovascular Disease Paternal Uncle         Aneurism     Breast Cancer Maternal Aunt          from it     Cancer Paternal Uncle      Cancer Paternal Aunt      Skin Cancer No family hx of      Glaucoma No family hx of      Macular Degeneration No family hx of        SOCIAL HISTORY:  Social History     Socioeconomic History     Marital status: Single     Spouse name: Ellen     Number of children: 0     Years of education: PHD     Highest education level: None   Occupational History     Occupation: Teacher     Employer: [employer]   Social Needs      Financial resource strain: None     Food insecurity     Worry: None     Inability: None     Transportation needs     Medical: None     Non-medical: None   Tobacco Use     Smoking status: Never Smoker     Smokeless tobacco: Never Used   Substance and Sexual Activity     Alcohol use: Yes     Alcohol/week: 0.0 standard drinks     Comment: moderate, social     Drug use: No     Sexual activity: Not Currently     Partners: Female   Lifestyle     Physical activity     Days per week: None     Minutes per session: None     Stress: None   Relationships     Social connections     Talks on phone: None     Gets together: None     Attends Shinto service: None     Active member of club or organization: None     Attends meetings of clubs or organizations: None     Relationship status: None     Intimate partner violence     Fear of current or ex partner: None     Emotionally abused: None     Physically abused: None     Forced sexual activity: None   Other Topics Concern      Service No     Blood Transfusions No     Caffeine Concern No     Occupational Exposure No     Hobby Hazards No     Sleep Concern No     Stress Concern No     Weight Concern No     Special Diet No     Back Care Yes     Exercise Yes     Comment: Several times a week     Bike Helmet No     Seat Belt Yes     Self-Exams No     Parent/sibling w/ CABG, MI or angioplasty before 65F 55M? No   Social History Narrative    Social Documentation:7/10        Balanced Diet: YES    Calcium intake:milk and food    Caffeine: 2 cups of coffee per day    Exercise:  type of activity  Wts , stretching, cardio;  3 times per week    Sunscreen:no    Seatbelts:  Yes    Self Breast Exam:  No -     Self Testicular Exam: No    Physical/Emotional/Sexual Abuse: No     Do you feel safe in your environment? Yes        Cholesterol screen up to date: today    Eye Exam up to date: Yes    Dental Exam up to date: Yes    Pap smear up to date: Does Not Apply    Mammogram up to date: Does Not  "Apply    Dexa Scan up to date: Does Not Apply    Colonoscopy up to date: Yes-2007    Immunizations up to date: Yes-2008    Glucose screen if over 40:  No     Luis Alfredo Knox ma                   ROS:   Constitutional: No fever, chills, or sweats. No weight gain/loss   ENT: No visual disturbance, ear ache, epistaxis, sore throat  Allergies/Immunologic: Negative.   Respiratory: No cough, hemoptysia  Cardiovascular: As per HPI  GI: No nausea, vomiting, hematemesis, melena, or hematochezia  : No urinary frequency, dysuria, or hematuria  Integument: Negative  Psychiatric: Negative  Neuro: Negative  Endocrinology: Negative   Musculoskeletal: Negative    EXAM:  /88 (BP Location: Right arm, Patient Position: Chair, Cuff Size: Adult Regular)   Pulse 89   Ht 1.697 m (5' 6.8\")   Wt 75.3 kg (166 lb)   SpO2 99%   BMI 26.16 kg/m    In general, the patient is a pleasant male in no apparent distress.    HEENT: NC/AT.  PERRLA.  EOMI.  Sclerae white, not injected.  Nares clear.  Pharynx without erythema or exudate.  Dentition intact.    Neck: No adenopathy.  No thyromegaly. Carotids +4/4 bilaterally without bruits.  No jugular venous distension.   Heart: RRR. Normal S1, S2 splits physiologically. No murmur, rub, click, or gallop. The PMI is in the 5th ICS in the midclavicular line. There is no heave.    Lungs: CTA.  No ronchi, wheezes, rales.  No dullness to percussion.   Abdomen: Soft, nontender, nondistended. No organomegaly.  No bruits.   Extremities: No clubbing, cyanosis, or edema.  The pulses are +4/4 at the radial, brachial, femoral, popliteal, DP, and PT sites bilaterally.  No bruits are noted.  Neurologic: Alert and oriented to person/place/time, normal speech, gait and affect  Skin: No petechiae, purpura or rash.    Labs:  LIPID RESULTS:  Lab Results   Component Value Date    CHOL 142 04/05/2021    HDL 39 (L) 04/05/2021    LDL 84 04/05/2021    TRIG 96 04/05/2021    CHOLHDLRATIO 3.8 04/29/2015    [facility] 103 " 04/05/2021       LIVER ENZYME RESULTS:  Lab Results   Component Value Date    AST 24 04/05/2021    ALT 25 04/05/2021       CBC RESULTS:  Lab Results   Component Value Date    WBC 6.3 04/05/2021    RBC 4.45 04/05/2021    HGB 11.0 (L) 04/05/2021    HCT 36.1 (L) 04/05/2021    MCV 81 04/05/2021    MCH 24.7 (L) 04/05/2021    MCHC 30.5 (L) 04/05/2021    RDW 15.5 (H) 04/05/2021     04/05/2021       BMP RESULTS:  Lab Results   Component Value Date     04/05/2021    POTASSIUM 4.2 04/05/2021    CHLORIDE 108 04/05/2021    CO2 28 04/05/2021    ANIONGAP 4 04/05/2021    GLC 91 04/05/2021    BUN 17 04/05/2021    CR 1.52 (H) 04/05/2021    GFRESTIMATED 45 (L) 04/05/2021    GFRESTBLACK 52 (L) 04/05/2021    DAVID 9.1 04/05/2021        INR RESULTS:  Lab Results   Component Value Date    INR 1.15 (H) 12/01/2020    INR 1.16 (H) 03/05/2020       Procedures:    EKG: sin rythm    Echocardiogram with two-dimensional, color and spectral Doppler performed.  ______________________________________________________________________________  Interpretation Summary  Global and regional left ventricular function is normal with an EF of 55-60%.  Right ventricular function, chamber size, wall motion, and thickness are  normal.  Pulmonary artery systolic pressure is normal.  The inferior vena cava is normal.  No pericardial effusion is present.  There is no prior study for direct comparison.  ______________________________________________________________________________  Left Ventricle  Global and regional left ventricular function is normal with an EF of 55-60%.  Left ventricular wall thickness is normal. Left ventricular size is normal.  Grade I or early diastolic dysfunction. No regional wall motion abnormalities  are seen.     Right Ventricle  Right ventricular function, chamber size, wall motion, and thickness are  normal.     Atria  Both atria appear normal. The atrial septum is intact as assessed by color  Doppler .     Mitral  Valve  The mitral valve is normal. Trace to mild mitral insufficiency is present.     Aortic Valve  The aortic valve is tricuspid. Trace to mild aortic insufficiency is present.     Tricuspid Valve  The tricuspid valve is normal. Trace to mild tricuspid insufficiency is  present. The right ventricular systolic pressure is approximated at 16.6 mmHg  plus the right atrial pressure. Pulmonary artery systolic pressure is normal.     Pulmonic Valve  The pulmonic valve is normal. Trace to mild pulmonic insufficiency is present.     Vessels  The thoracic aorta is normal. The pulmonary artery and bifurcation cannot be  assessed. The inferior vena cava is normal.     Pericardium  No pericardial effusion is present.     Compared to Previous Study  There is no prior study for direct comparison.  ______________________________________________________________________________  MMode/2D Measurements & Calculations  IVSd: 1.0 cm     LVIDd: 4.3 cm  LVIDs: 3.0 cm  LVPWd: 0.88 cm  FS: 29.5 %  LV mass(C)d: 132.9 grams  LV mass(C)dI: 71.9 grams/m2  Ao root diam: 3.6 cm  asc Aorta Diam: 3.2 cm  LVOT diam: 2.3 cm  LVOT area: 4.2 cm2  LA Volume (BP): 38.0 ml  LA Volume Index (BP): 20.5 ml/m2  RWT: 0.41     Doppler Measurements & Calculations  MV E max toshia: 68.9 cm/sec  MV A max toshia: 81.0 cm/sec  MV E/A: 0.85  MV dec slope: 372.0 cm/sec2  PA acc time: 0.12 sec  TR max toshia: 204.0 cm/sec  TR max P.6 mmHg  E/E' avg: 10.9  Lateral E/e': 11.1  Medial E/e': 10.7    Dobutamine stress echocardiogram     Normal dobutamine stress echocardiogram without evidence of inducible  ischemia.     Target heart rate was achieved. Heart rate and blood pressure response to  dobutamine were normal. No regional wall motion abnormalities at rest. With  stress, the left ventricular ejection fraction increased from 55-60% to  greater than 65% and the left ventricular size decreased appropriately.  No subjective symptoms to suggest ischemia.  There was no ECG  evidence of ischemia.  ______________________________________________________________________________  Stress  The drug infusion was stopped due to target heart rate achieved.  The patient did not exhibit any symptoms during drug infusion.  The maximum dose of dobutamine was 30mcg/kg/min.  The maximum dose of metoprolol was 2.0mg.  Definity (NDC #78727-888-09) given intravenously.  Patient was given 8ml mixture of 1.5ml Definity and 8.5ml saline.  2 ml wasted.  Definity Lot # [id number] .  Definity Expiration 11/01/2021 .     Stress Results                                       Maximum Predicted HR:   147 bpm             Target HR: 125 bpm        % Maximum Predicted HR: 85 %                               Stage Heart Rate  BP   Dose                                     (bpm)                          Baseline    77    123/69                          Low Dose    94    180/5720.00                           Peak HR    125   176/5230.00                          Recovery    81    177/65                         Stress Duration:   8:43 mm:ss *                      Maximum Stress HR: 125 bpm     Procedure  Dobutamine stress echo with two dimensional color and spectral Doppler  performed.      BILATERAL CAROTID DUPLEX DOPPLER ULTRASOUND 4/26/2021 11:51 AM     CLINICAL HISTORY: Carotid Bruit; Carotid bruit     COMPARISON: None available.     REFERRING PROVIDER: EVI MCFADDEN     TECHNIQUE: Grayscale (B-mode) and duplex and spectral Doppler  ultrasound of the common carotid, extracranial internal carotid,  external carotid, and vertebral artery origins. Velocity measurements  obtained with angle correction at or less than 60 degrees.     FINDINGS:     RIGHT SIDE:      Plaque Morphology: Predominantly echogenic and smooth atherosclerotic  plaque resulting in less than 50% stenosis on grayscale and color  Doppler imaging.        Proximal CCA: 76/11 cm/s     Mid CCA: 81/16 cm/s     Distal CCA: 72/21 cm/s     Carotid bifurcation: 67/21  cm/s     External CA: 83/13 cm/s        Proximal ICA: 57/19 cm/s     Mid ICA: 67/21 cm/s     Distal ICA: 68/22 cm/s        Vertebral artery: Antegrade: 44/16 cm/s      Innominate artery: 58/11 cm/s     Proximal subclavian: 107/0 cm/s     ICA/CCA ratio: 0.9      LEFT SIDE:      Plaque Morphology: No plaque visualized.         Origin CCA: 82/17 cm/s     Proximal CCA: 100/24 cm/s     Mid CCA: 84/23 cm/s     Distal CCA: 79/28 cm/s     Carotid bifurcation: 65/23 cm/s     External CA: 80/13 cm/s        Proximal ICA: 80/31 cm/s     Mid ICA: 62/25 cm/s     Distal ICA: 67/28 cm/s        Vertebral artery: Antegrade: 57/20 cm/s      Subclavian origin: 78/0 cm/s     Proximal subclavian: 92/0 cm/s     ICA/CCA ratio: 1.0                                                                       IMPRESSION:     1. RIGHT ICA: Less than 50% diameter narrowing by grayscale imaging  and sonographic velocity criteria.     2. LEFT ICA:  Normal.      BILATERAL LOWER EXTREMITY ANKLE-BRACHIAL INDICES (ABIS) AND SEGMENTAL  PRESSURES WITH EXERCISE 4/26/2021 11:59 AM     CLINICAL HISTORY: Intermittent claudication; Hyperlipidemia LDL goal  <130; Chest pain, unspecified type; Intermittent claudication (H) .     COMPARISON: None available.     REFERRING PROVIDER: EVI MCFADDEN     TECHNIQUE: Baseline ankle brachial index, segmental pressures, and VPR  obtained at rest, followed by exercise ankle brachial index using  Arreaga-Araya Exercise Protocol at treadmill speed 2 mph, beginning  at 0 percent grade increased to 14 percent for a total of 16 minutes..     FINDINGS:     Resting LILIANA:          RIGHT:            Brachial: 121 mmHg / Index            High Thigh: 153 mmHg / 1.26            Low Thigh: 148 mmHg / 1.22            Calf: 139 mmHg / 1.15            Ankle (PT): 154 mmHg / 1.27            Ankle (DP): 141 mmHg / 1.17               LILIANA: 1.27               Pulse volume recordings or VPR:                  High thigh: Normal                  Lower thigh: Normal                 Proximal calf: Normal                 Ankle: Normal          LEFT:            Brachial: 120 mmHg / Index            High Thigh: 156 mmHg / 1.29            Low Thigh: 143 mmHg / 1.18            Calf: 149 mmHg / 1.23            Ankle (PT): 149 mmHg / 1.23            Ankle (DP): 130 mmHg / 1.07               LILIANA: 1.23               Pulse volume recordings or VPR:                  High thigh: Normal                 Lower thigh: Normal                 Proximal calf: Normal                 Ankle: Normal     EXERCISE STUDY:     Baseline severity of pain in legs prior to exercise on a scale of  1-10: 0/10     Severity of pain during exercise:           Right: No symptoms elicited. 0/10 through the study.        Left: No symptoms elicited. 0/10 through the study.      Post exercise LILIANA:          Right le.45.       Left le.43.     Post exercise recovery time:          Right leg: No pressure drop.       Left leg: No pressure drop.                                                                      IMPRESSION:   1. RIGHT LEG:       A. Resting LILIANA is normal, 1.27.       B. Negative segmental pressures.       C. Exercise study: Negative     2. LEFT LEG:       A. Resting LILIANA is normal, 1.23.       B. Negative segmental pressures.       C. Exercise study: Negative       Assessment and Plan:      I discussed the results with patient.  I discussed the importance of a heart healthy diet and lifestyle  We discussed with patient following points:      Medication Changes:   - Increase atorvastatin to 20 mg every day.  - Start Aspirin 81 mg every day.     Recommendations: Labs at your convenience.= results included      Studies Ordered - results were discussed with patient and included in this note  - Dobutamine Stress Echocardiogram  - Echocardiogram  - Ultrasound of the Carotids  - Ultrasound of the Lower Extremities with Exercise     The results from today include: EKG.     Please follow up:  With Dr. Lee based on results of testing.    Henri Lee MD, PhD  Professor of Medicine  Division of Cardiology      CC  Patient Care Team:  Campos Parra MD as PCP - General (Internal Medicine)  Campos Boyd MD as MD (Urology)  Padmini Whitney, RN as Registered Nurse  Nikhil Farah MD as MD (Neurology)  Kamlesh Bonner MD as MD (Ophthalmology)  Daya Adams RN as Specialty Care Coordinator (Cardiology)  Virginie Kenney MD as MD (Cardiology)  Jameson Warren MD as Assigned Surgical Provider  Arnaud Melton DO as Assigned Musculoskeletal Provider  Arline Mares MD as Assigned Heart and Vascular Provider  He Burns MD as Assigned Cancer Care Provider

## 2021-04-12 NOTE — PROGRESS NOTES
HPI:     HPI:      I had the privilege to evaluate and examine Mr Loco Wood, who is a 76 yr old male patient with a Hx of    1. Chronic Lymphocytic Leukemia  2. GERD  3. Hyperlipidemia  4. Paroxysmal Atrial fibrillation     Patient has a family Hx of MI, stroke and hyperlipidemia  Patient has sometimes chest pain at rest and during effort, Patient has sometimes dizziness and cramps in the legs. He denies palpitations.          PAST MEDICAL HISTORY:  Past Medical History:   Diagnosis Date     Arthritis     hip and toes     Chronic pain      CLL (chronic lymphocytic leukaemia)      Esophageal reflux      Malignant neoplasm (H)     Leukemia     Paroxysmal atrial fibrillation (H) 2/5/2020     PONV (postoperative nausea and vomiting)      Pure hypercholesterolemia        CURRENT MEDICATIONS:  Current Outpatient Medications   Medication Sig Dispense Refill     Acetaminophen (TYLENOL PO)        atorvastatin (LIPITOR) 10 MG tablet TAKE 1 TABLET BY MOUTH EVERY 48 HOURS 45 tablet 4     famotidine (PEPCID) 40 MG tablet Take 1 tablet (40 mg) by mouth nightly as needed for heartburn 90 tablet 4     finasteride (PROSCAR) 5 MG tablet        ibrutinib (IMBRUVICA) 420 MG tablet Take 1 tablet (420 mg) by mouth daily 28 tablet 2     ibuprofen (ADVIL/MOTRIN) 200 MG tablet Take 200 mg by mouth every 4 hours as needed for mild pain       levothyroxine (SYNTHROID/LEVOTHROID) 50 MCG tablet TAKE 1 TABLET(50 MCG) BY MOUTH DAILY 90 tablet 3     omeprazole (PRILOSEC) 10 MG DR capsule TAKE ONE CAPSULE BY MOUTH EVERY DAY 30 TO 60 MINUTES BEFORE A MEAL 90 capsule 1     RESVERATROL PO        tamsulosin (FLOMAX) 0.4 MG capsule        tiZANidine (ZANAFLEX) 4 MG tablet Take 1 tablet (4 mg) by mouth 3 times daily as needed for muscle spasms 60 tablet 1     vitamin D3 (CHOLECALCIFEROL) 50 mcg (2000 units) tablet Take 1 tablet by mouth daily       zolpidem (AMBIEN) 5 MG tablet Take 1 tablet (5 mg) by mouth nightly as needed for sleep 10 tablet 0      omega 3 1200 MG CAPS Take 2 capsules by mouth daily       ondansetron (ZOFRAN) 4 MG tablet Take 1 tablet (4 mg) by mouth every 8 hours as needed (Patient not taking: Reported on 2021) 15 tablet 0     Probiotic Product (PROBIOTIC-10 PO) Take by mouth daily       traZODone (DESYREL) 50 MG tablet Take 1-3 tablets ( mg) by mouth At Bedtime (Patient not taking: Reported on 2021) 90 tablet 3       PAST SURGICAL HISTORY:  Past Surgical History:   Procedure Laterality Date     ARTHROPLASTY MINIMALLY INVASIVE HIP  5/10/2013    Procedure: ARTHROPLASTY MINIMALLY INVASIVE HIP;  Left Two Incision Total Hip Arthroplasty ;  Surgeon: Desmond Alberts MD;  Location: [facility] OR     ARTHROPLASTY MINIMALLY INVASIVE HIP  2014    Procedure: ARTHROPLASTY MINIMALLY INVASIVE HIP;  Right Total Hip Arthroplasty Minimally Invasive Two Incision  *Latex Free Room;  Surgeon: Desmond Alberts MD;  Location: [facility] OR     BACK SURGERY      back fusion      COLONOSCOPY           COLONOSCOPY N/A 8/15/2017    Procedure: COLONOSCOPY;  colonoscopy;  Surgeon: Chun Mcguire MD;  Location:  GI     GI SURGERY      4 hernia repairs in childhood     HERNIA REPAIR      4 since age 4     IR PAE  3/5/2020     ZZ NONSPECIFIC PROCEDURE   &    (R) inguinal herniorrhapy     ZZ NONSPECIFIC PROCEDURE      (L) inguinal herniorrhaphy     ZZ NONSPECIFIC PROCEDURE      L4-5  L5 S1 fusion - spondylolisthesis       ALLERGIES     Allergies   Allergen Reactions     Dilaudid [Hydromorphone] Itching     Morphine Itching       FAMILY HISTORY:  Family History   Problem Relation Age of Onset     Heart Disease Father      Cerebrovascular Disease Father          OF STROKE      Cancer Father         melonoma     Melanoma Father      Hyperlipidemia Father      Thyroid Disease Father      Cancer Mother         Lung cancer     Other Cancer Mother         lung; heavy smoker     Cerebrovascular Disease Paternal Uncle          Aneurism     Breast Cancer Maternal Aunt          from it     Cancer Paternal Uncle      Cancer Paternal Aunt      Skin Cancer No family hx of      Glaucoma No family hx of      Macular Degeneration No family hx of        SOCIAL HISTORY:  Social History     Socioeconomic History     Marital status: Single     Spouse name: Ellen     Number of children: 0     Years of education: PHD     Highest education level: None   Occupational History     Occupation: Teacher     Employer: [employer]   Social Needs     Financial resource strain: None     Food insecurity     Worry: None     Inability: None     Transportation needs     Medical: None     Non-medical: None   Tobacco Use     Smoking status: Never Smoker     Smokeless tobacco: Never Used   Substance and Sexual Activity     Alcohol use: Yes     Alcohol/week: 0.0 standard drinks     Comment: moderate, social     Drug use: No     Sexual activity: Not Currently     Partners: Female   Lifestyle     Physical activity     Days per week: None     Minutes per session: None     Stress: None   Relationships     Social connections     Talks on phone: None     Gets together: None     Attends [employer] service: None     Active member of club or organization: None     Attends meetings of clubs or organizations: None     Relationship status: None     Intimate partner violence     Fear of current or ex partner: None     Emotionally abused: None     Physically abused: None     Forced sexual activity: None   Other Topics Concern      Service No     Blood Transfusions No     Caffeine Concern No     Occupational Exposure No     Hobby Hazards No     Sleep Concern No     Stress Concern No     Weight Concern No     Special Diet No     Back Care Yes     Exercise Yes     Comment: Several times a week     Bike Helmet No     Seat Belt Yes     Self-Exams No     Parent/sibling w/ CABG, MI or angioplasty before 65F 55M? No   Social History Narrative    Social  "Documentation:7/10        Balanced Diet: YES    Calcium intake:milk and food    Caffeine: 2 cups of coffee per day    Exercise:  type of activity  Wts , stretching, cardio;  3 times per week    Sunscreen:no    Seatbelts:  Yes    Self Breast Exam:  No -     Self Testicular Exam: No    Physical/Emotional/Sexual Abuse: No     Do you feel safe in your environment? Yes        Cholesterol screen up to date: today    Eye Exam up to date: Yes    Dental Exam up to date: Yes    Pap smear up to date: Does Not Apply    Mammogram up to date: Does Not Apply    Dexa Scan up to date: Does Not Apply    Colonoscopy up to date: Yes-2007    Immunizations up to date: Yes-2008    Glucose screen if over 40:  No     Luis Alfredo Knox ma                   ROS:   Constitutional: No fever, chills, or sweats. No weight gain/loss   ENT: No visual disturbance, ear ache, epistaxis, sore throat  Allergies/Immunologic: Negative.   Respiratory: No cough, hemoptysia  Cardiovascular: As per HPI  GI: No nausea, vomiting, hematemesis, melena, or hematochezia  : No urinary frequency, dysuria, or hematuria  Integument: Negative  Psychiatric: Negative  Neuro: Negative  Endocrinology: Negative   Musculoskeletal: Negative    EXAM:  /88 (BP Location: Right arm, Patient Position: Chair, Cuff Size: Adult Regular)   Pulse 89   Ht 1.697 m (5' 6.8\")   Wt 75.3 kg (166 lb)   SpO2 99%   BMI 26.16 kg/m    In general, the patient is a pleasant male in no apparent distress.    HEENT: NC/AT.  PERRLA.  EOMI.  Sclerae white, not injected.  Nares clear.  Pharynx without erythema or exudate.  Dentition intact.    Neck: No adenopathy.  No thyromegaly. Carotids +4/4 bilaterally without bruits.  No jugular venous distension.   Heart: RRR. Normal S1, S2 splits physiologically. No murmur, rub, click, or gallop. The PMI is in the 5th ICS in the midclavicular line. There is no heave.    Lungs: CTA.  No ronchi, wheezes, rales.  No dullness to percussion.   Abdomen: " Soft, nontender, nondistended. No organomegaly.  No bruits.   Extremities: No clubbing, cyanosis, or edema.  The pulses are +4/4 at the radial, brachial, femoral, popliteal, DP, and PT sites bilaterally.  No bruits are noted.  Neurologic: Alert and oriented to person/place/time, normal speech, gait and affect  Skin: No petechiae, purpura or rash.    Labs:  LIPID RESULTS:  Lab Results   Component Value Date    CHOL 142 04/05/2021    HDL 39 (L) 04/05/2021    LDL 84 04/05/2021    TRIG 96 04/05/2021    CHOLHDLRATIO 3.8 04/29/2015    NHDL 103 04/05/2021       LIVER ENZYME RESULTS:  Lab Results   Component Value Date    AST 24 04/05/2021    ALT 25 04/05/2021       CBC RESULTS:  Lab Results   Component Value Date    WBC 6.3 04/05/2021    RBC 4.45 04/05/2021    HGB 11.0 (L) 04/05/2021    HCT 36.1 (L) 04/05/2021    MCV 81 04/05/2021    MCH 24.7 (L) 04/05/2021    MCHC 30.5 (L) 04/05/2021    RDW 15.5 (H) 04/05/2021     04/05/2021       BMP RESULTS:  Lab Results   Component Value Date     04/05/2021    POTASSIUM 4.2 04/05/2021    CHLORIDE 108 04/05/2021    CO2 28 04/05/2021    ANIONGAP 4 04/05/2021    GLC 91 04/05/2021    BUN 17 04/05/2021    CR 1.52 (H) 04/05/2021    GFRESTIMATED 45 (L) 04/05/2021    GFRESTBLACK 52 (L) 04/05/2021    DAVID 9.1 04/05/2021        A1C RESULTS:  No results found for: A1C    INR RESULTS:  Lab Results   Component Value Date    INR 1.15 (H) 12/01/2020    INR 1.16 (H) 03/05/2020       Procedures:    EKG, Echocardiogram, Dobutamine stress echocardiogram, US carotid arteries, ultrasound lower extremities are described in other note of this day    Assessment and Plan:   We discussed the results with patient.  We discussed the importance of a heart healthy diet and lifestyle.  We discussed the results of all the testing and medication changes.    Medication Changes:   - Increase atorvastatin to 20 mg every day.  - Start Aspirin 81 mg every day.     Recommendations: Labs at your  convenience.     Studies Ordered:  - Dobutamine Stress Echocardiogram  - Echocardiogram  - Ultrasound of the Carotids  - Ultrasound of the Lower Extremities with Exercise     The results from today include: EKG.     Please follow up: With Dr. Lee based on results of testing.      Henri Lee MD, PhD  Professor of Medicine  Division of Cardiology    CC  Patient Care Team:  Campos Parra MD as PCP - General (Internal Medicine)  Campos Boyd MD as MD (Urology)  Padmini Whitney RN as Registered Nurse  Nikhil Farah MD as MD (Neurology)  Nikhil Farah MD as Referring Physician (Neurology)  Kamlesh Bonner MD as MD (Ophthalmology)  Campos Parra MD as Assigned PCP  Daya Adams RN as Specialty Care Coordinator (Cardiology)  Virginie Kenney MD as MD (Cardiology)  Jameson Warren MD as Assigned Surgical Provider  Arnaud Melton DO as Assigned Musculoskeletal Provider  Arline Mares MD as Assigned Heart and Vascular Provider  He Burns MD as Assigned Cancer Care Provider  SELF, REFERRED

## 2021-04-12 NOTE — NURSING NOTE
Chief Complaint   Patient presents with     Follow Up     Atherosclerosis     Vitals were taken and medications where reconciled.   Bryan Dominguez, EMT  4:07 PM

## 2021-04-12 NOTE — PATIENT INSTRUCTIONS
Patient Instructions:  It was a pleasure to see you in the cardiology clinic today.      If you have any questions, you can reach my nurse, Daisy SUN LPN, at [phone].  Press Option #1 for the [facility], and then press Option #4 for nursing.    We are encouraging the use of UNATIONt to communicate with your HealthCare Provider    Medication Changes:   - Increase atorvastatin to 20 mg every day.  - Start Aspirin 81 mg every day.    Recommendations: Labs at your convenience.    Studies Ordered:  - Dobutamine Stress Echocardiogram  - Echocardiogram  - Ultrasound of the Carotids  - Ultrasound of the Lower Extremities with Exercise    The results from today include: EKG.    Please follow up: With Dr. Lee based on results of testing.    Sincerely,    Henri Lee MD     If you have an urgent need after hours (8:00 am to 4:30 pm) please call [phone] and ask for the cardiology fellow on call.

## 2021-04-13 ENCOUNTER — VIRTUAL VISIT (OUTPATIENT)
Dept: TRANSPLANT | Facility: CLINIC | Age: 76
End: 2021-04-13
Payer: COMMERCIAL

## 2021-04-13 DIAGNOSIS — G47.01 INSOMNIA DUE TO MEDICAL CONDITION: ICD-10-CM

## 2021-04-13 DIAGNOSIS — C91.10 CLL (CHRONIC LYMPHOCYTIC LEUKEMIA) (H): ICD-10-CM

## 2021-04-13 LAB — INTERPRETATION ECG - MUSE: NORMAL

## 2021-04-13 PROCEDURE — 99213 OFFICE O/P EST LOW 20 MIN: CPT | Mod: 95 | Performed by: INTERNAL MEDICINE

## 2021-04-13 RX ORDER — ZOLPIDEM TARTRATE 5 MG/1
5 TABLET ORAL
Qty: 15 TABLET | Refills: 5 | Status: SHIPPED | OUTPATIENT
Start: 2021-04-13

## 2021-04-13 NOTE — PROGRESS NOTES
"Loco is a 75 year old who is being evaluated via a billable video visit.      How would you like to obtain your AVS? MyChart  If the video visit is dropped, the invitation should be resent by: Text to cell phone: [phone]  Will anyone else be joining your video visit? No     Vitals - Patient Reported  Weight (Patient Reported): 72.6 kg (160 lb)  Height (Patient Reported): 168.9 cm (5' 6.5\")  BMI (Based on Pt Reported Ht/Wt): 25.44  Pain Score: No Pain (0)    Kate Banda CMA April 13, 2021  3:37 PM         Video did not work.  Transitioned to phone call for 17 minutes.       Oncology/Hematology Visit Note  Apr 13, 2021    Reason for Visit: follow up of CLL    History of Present Illness: Loco Wood is a 75 year old male with CLL. He was diagnosed 2/12/2007 with CLL, Lyles stage 0, followed clinically since.  At diagnosis WBC 17.8, ALC 11.2, hemoglobin 15.2, platelets 188.  Flow consistent with CLL.  No opportunistic infections, with IgG in the normal range during follow up. Due to rising lymphocyte count, it was recommended he consider starting on treatment. He is currently undergoing treatment with ibrutinib, which he began on 5/14/19. Please see previous notes for further details on the patient's history.     Interval History:  Loco is here for routine follow up.  No further nosebleeds or other unusual bleeding/bruising since our last visit.  Undergoing medical therapy for weak urine stream. No fevers, chills, increasing adenopathy, weight loss, or any other symptoms.    Current Outpatient Medications   Medication Sig Dispense Refill     Acetaminophen (TYLENOL PO)        aspirin (ASA) 81 MG EC tablet Take 1 tablet (81 mg) by mouth daily 90 tablet 3     atorvastatin (LIPITOR) 20 MG tablet Take 1 tablet (20 mg) by mouth daily 90 tablet 3     famotidine (PEPCID) 40 MG tablet Take 1 tablet (40 mg) by mouth nightly as needed for heartburn 90 tablet 4     finasteride (PROSCAR) 5 MG tablet        " ibrutinib (IMBRUVICA) 420 MG tablet Take 1 tablet (420 mg) by mouth daily 28 tablet 2     ibuprofen (ADVIL/MOTRIN) 200 MG tablet Take 200 mg by mouth every 4 hours as needed for mild pain       levothyroxine (SYNTHROID/LEVOTHROID) 50 MCG tablet TAKE 1 TABLET(50 MCG) BY MOUTH DAILY 90 tablet 3     omeprazole (PRILOSEC) 10 MG DR capsule TAKE ONE CAPSULE BY MOUTH EVERY DAY 30 TO 60 MINUTES BEFORE A MEAL 90 capsule 1     RESVERATROL PO        tamsulosin (FLOMAX) 0.4 MG capsule        tiZANidine (ZANAFLEX) 4 MG tablet Take 1 tablet (4 mg) by mouth 3 times daily as needed for muscle spasms 60 tablet 1     traZODone (DESYREL) 50 MG tablet Take 1-3 tablets ( mg) by mouth At Bedtime 90 tablet 3     vitamin D3 (CHOLECALCIFEROL) 50 mcg (2000 units) tablet Take 1 tablet by mouth daily       zolpidem (AMBIEN) 5 MG tablet Take 1 tablet (5 mg) by mouth nightly as needed for sleep 15 tablet 5       There were no vitals taken for this visit.  Wt Readings from Last 10 Encounters:   04/12/21 75.3 kg (166 lb)   03/04/21 75.2 kg (165 lb 12.8 oz)   02/19/21 74.9 kg (165 lb 3.2 oz)   02/10/21 74 kg (163 lb 2 oz)   01/25/21 75.3 kg (166 lb)   01/04/21 74.4 kg (164 lb)   12/01/20 70.3 kg (154 lb 15.7 oz)   11/19/20 70.3 kg (155 lb)   09/22/20 72.5 kg (159 lb 14.4 oz)   03/18/20 73.9 kg (163 lb)     Physical Examination:  Not possible during telephone visit      Lab Results   Component Value Date    WBC 6.3 04/05/2021    ANEU 2.8 04/05/2021    HGB 11.0 (L) 04/05/2021    HCT 36.1 (L) 04/05/2021     04/05/2021     04/05/2021    POTASSIUM 4.2 04/05/2021    CHLORIDE 108 04/05/2021    CO2 28 04/05/2021    GLC 91 04/05/2021    BUN 17 04/05/2021    CR 1.52 (H) 04/05/2021    MAG 1.9 02/28/2014    INR 1.15 (H) 12/01/2020         Assessment and Plan:    #Chronic lymphocytic leukemia, in response to ibrutinib  # Multifactorial anemia     Patient started on ibrutinib 420 mg daily on 5/14/19. He is tolerating treatment very well thus  far. He had a brief episode of atrial fibrillation after starting ibrutinib but has been doing well lately. Due to cost considerations, he is taking ibrutinib only 1-2 times per week at this time.  With this lower dosing, his blood counts remained satisfactory, with the overall trajectory of his lymphocytes continuing to decrease.  He is more anemic, undoubtedly due to his recent nosebleeds, although his hemoglobin is not improving at this visit despite no recurrence of epistaxis.  Reaussringly, neutrophils and platelets remain normal. Recommend clinical follow up with repeat labs toward the end of this month, with additional workup as needed if his anemia continues to worsen.    Our telephone call was of 17 minutes duration today.  Multiple questions answered.  We will have him recheck labs in 3-4 weeks and return for a visit with me in 4 months, sooner if needed.      He Burns MD

## 2021-04-13 NOTE — LETTER
"    4/13/2021         RE: Loco Wood  [address] [facility] [address]        Dear Colleague,    Thank you for referring your patient, Loco Wood, to the [facility] [place]. Please see a copy of my visit note below.    Loco is a 75 year old who is being evaluated via a billable video visit.      How would you like to obtain your AVS? MyChart  If the video visit is dropped, the invitation should be resent by: Text to cell phone: [phone]  Will anyone else be joining your video visit? No     Vitals - Patient Reported  Weight (Patient Reported): 72.6 kg (160 lb)  Height (Patient Reported): 168.9 cm (5' 6.5\")  BMI (Based on Pt Reported Ht/Wt): 25.44  Pain Score: No Pain (0)    Kate Banda CMA April 13, 2021  3:37 PM         Video did not work.  Transitioned to phone call for 17 minutes.       Oncology/Hematology Visit Note  Apr 13, 2021    Reason for Visit: follow up of CLL    History of Present Illness: Loco Wood is a 75 year old male with CLL. He was diagnosed 2/12/2007 with CLL, Lyles stage 0, followed clinically since.  At diagnosis WBC 17.8, ALC 11.2, hemoglobin 15.2, platelets 188.  Flow consistent with CLL.  No opportunistic infections, with IgG in the normal range during follow up. Due to rising lymphocyte count, it was recommended he consider starting on treatment. He is currently undergoing treatment with ibrutinib, which he began on 5/14/19. Please see previous notes for further details on the patient's history.     Interval History:  Loco is here for routine follow up.  No further nosebleeds or other unusual bleeding/bruising since our last visit.  Undergoing medical therapy for weak urine stream. No fevers, chills, increasing adenopathy, weight loss, or any other symptoms.    Current Outpatient Medications   Medication Sig Dispense Refill     Acetaminophen (TYLENOL PO)        aspirin (ASA) 81 MG EC tablet Take 1 " tablet (81 mg) by mouth daily 90 tablet 3     atorvastatin (LIPITOR) 20 MG tablet Take 1 tablet (20 mg) by mouth daily 90 tablet 3     famotidine (PEPCID) 40 MG tablet Take 1 tablet (40 mg) by mouth nightly as needed for heartburn 90 tablet 4     finasteride (PROSCAR) 5 MG tablet        ibrutinib (IMBRUVICA) 420 MG tablet Take 1 tablet (420 mg) by mouth daily 28 tablet 2     ibuprofen (ADVIL/MOTRIN) 200 MG tablet Take 200 mg by mouth every 4 hours as needed for mild pain       levothyroxine (SYNTHROID/LEVOTHROID) 50 MCG tablet TAKE 1 TABLET(50 MCG) BY MOUTH DAILY 90 tablet 3     omeprazole (PRILOSEC) 10 MG DR capsule TAKE ONE CAPSULE BY MOUTH EVERY DAY 30 TO 60 MINUTES BEFORE A MEAL 90 capsule 1     RESVERATROL PO        tamsulosin (FLOMAX) 0.4 MG capsule        tiZANidine (ZANAFLEX) 4 MG tablet Take 1 tablet (4 mg) by mouth 3 times daily as needed for muscle spasms 60 tablet 1     traZODone (DESYREL) 50 MG tablet Take 1-3 tablets ( mg) by mouth At Bedtime 90 tablet 3     vitamin D3 (CHOLECALCIFEROL) 50 mcg (2000 units) tablet Take 1 tablet by mouth daily       zolpidem (AMBIEN) 5 MG tablet Take 1 tablet (5 mg) by mouth nightly as needed for sleep 15 tablet 5       There were no vitals taken for this visit.  Wt Readings from Last 10 Encounters:   04/12/21 75.3 kg (166 lb)   03/04/21 75.2 kg (165 lb 12.8 oz)   02/19/21 74.9 kg (165 lb 3.2 oz)   02/10/21 74 kg (163 lb 2 oz)   01/25/21 75.3 kg (166 lb)   01/04/21 74.4 kg (164 lb)   12/01/20 70.3 kg (154 lb 15.7 oz)   11/19/20 70.3 kg (155 lb)   09/22/20 72.5 kg (159 lb 14.4 oz)   03/18/20 73.9 kg (163 lb)     Physical Examination:  Not possible during telephone visit      Lab Results   Component Value Date    WBC 6.3 04/05/2021    ANEU 2.8 04/05/2021    HGB 11.0 (L) 04/05/2021    HCT 36.1 (L) 04/05/2021     04/05/2021     04/05/2021    POTASSIUM 4.2 04/05/2021    CHLORIDE 108 04/05/2021    CO2 28 04/05/2021    GLC 91 04/05/2021    BUN 17 04/05/2021     CR 1.52 (H) 04/05/2021    MAG 1.9 02/28/2014    INR 1.15 (H) 12/01/2020         Assessment and Plan:    #Chronic lymphocytic leukemia, in response to ibrutinib  # Multifactorial anemia     Patient started on ibrutinib 420 mg daily on 5/14/19. He is tolerating treatment very well thus far. He had a brief episode of atrial fibrillation after starting ibrutinib but has been doing well lately. Due to cost considerations, he is taking ibrutinib only 1-2 times per week at this time.  With this lower dosing, his blood counts remained satisfactory, with the overall trajectory of his lymphocytes continuing to decrease.  He is more anemic, undoubtedly due to his recent nosebleeds, although his hemoglobin is not improving at this visit despite no recurrence of epistaxis.  Reaussringly, neutrophils and platelets remain normal. Recommend clinical follow up with repeat labs toward the end of this month, with additional workup as needed if his anemia continues to worsen.    Our telephone call was of 17 minutes duration today.  Multiple questions answered.  We will have him recheck labs in 3-4 weeks and return for a visit with me in 4 months, sooner if needed.      He Burns MD            Again, thank you for allowing me to participate in the care of your patient.        Sincerely,        He Burns MD

## 2021-04-16 DIAGNOSIS — E06.3 HYPOTHYROIDISM DUE TO HASHIMOTO'S THYROIDITIS: ICD-10-CM

## 2021-04-18 ENCOUNTER — TELEPHONE (OUTPATIENT)
Dept: ONCOLOGY | Facility: CLINIC | Age: 76
End: 2021-04-18

## 2021-04-19 ENCOUNTER — MEDICAL CORRESPONDENCE (OUTPATIENT)
Dept: HEALTH INFORMATION MANAGEMENT | Facility: CLINIC | Age: 76
End: 2021-04-19

## 2021-04-19 ENCOUNTER — TRANSFERRED RECORDS (OUTPATIENT)
Dept: HEALTH INFORMATION MANAGEMENT | Facility: CLINIC | Age: 76
End: 2021-04-19

## 2021-04-19 RX ORDER — LEVOTHYROXINE SODIUM 50 UG/1
TABLET ORAL
Qty: 90 TABLET | Refills: 3 | Status: SHIPPED | OUTPATIENT
Start: 2021-04-19 | End: 2022-01-01

## 2021-04-26 ENCOUNTER — ANCILLARY PROCEDURE (OUTPATIENT)
Dept: ULTRASOUND IMAGING | Facility: CLINIC | Age: 76
End: 2021-04-26
Attending: INTERNAL MEDICINE
Payer: COMMERCIAL

## 2021-04-26 ENCOUNTER — HOSPITAL ENCOUNTER (OUTPATIENT)
Dept: CARDIOLOGY | Facility: CLINIC | Age: 76
End: 2021-04-26
Attending: INTERNAL MEDICINE
Payer: COMMERCIAL

## 2021-04-26 DIAGNOSIS — I25.10 CORONARY ARTERY CALCIFICATION: ICD-10-CM

## 2021-04-26 DIAGNOSIS — C91.10 CLL (CHRONIC LYMPHOCYTIC LEUKEMIA) (H): ICD-10-CM

## 2021-04-26 DIAGNOSIS — R07.9 CHEST PAIN, UNSPECIFIED TYPE: ICD-10-CM

## 2021-04-26 DIAGNOSIS — I73.9 INTERMITTENT CLAUDICATION (H): ICD-10-CM

## 2021-04-26 DIAGNOSIS — E78.5 HYPERLIPIDEMIA LDL GOAL <130: ICD-10-CM

## 2021-04-26 DIAGNOSIS — R09.89 CAROTID BRUIT: ICD-10-CM

## 2021-04-26 LAB
ALBUMIN SERPL-MCNC: 3.7 G/DL (ref 3.4–5)
ALP SERPL-CCNC: 59 U/L (ref 40–150)
ALT SERPL W P-5'-P-CCNC: 26 U/L (ref 0–70)
ANION GAP SERPL CALCULATED.3IONS-SCNC: 6 MMOL/L (ref 3–14)
ANISOCYTOSIS BLD QL SMEAR: SLIGHT
AST SERPL W P-5'-P-CCNC: 20 U/L (ref 0–45)
BASOPHILS # BLD AUTO: 0 10E9/L (ref 0–0.2)
BASOPHILS NFR BLD AUTO: 0 %
BILIRUB SERPL-MCNC: 1 MG/DL (ref 0.2–1.3)
BUN SERPL-MCNC: 18 MG/DL (ref 7–30)
CALCIUM SERPL-MCNC: 8.8 MG/DL (ref 8.5–10.1)
CHLORIDE SERPL-SCNC: 106 MMOL/L (ref 94–109)
CO2 SERPL-SCNC: 28 MMOL/L (ref 20–32)
CREAT SERPL-MCNC: 1.58 MG/DL (ref 0.66–1.25)
DIFFERENTIAL METHOD BLD: ABNORMAL
EOSINOPHIL # BLD AUTO: 0.1 10E9/L (ref 0–0.7)
EOSINOPHIL NFR BLD AUTO: 2 %
ERYTHROCYTE [DISTWIDTH] IN BLOOD BY AUTOMATED COUNT: 16.8 % (ref 10–15)
GFR SERPL CREATININE-BSD FRML MDRD: 42 ML/MIN/{1.73_M2}
GLUCOSE SERPL-MCNC: 119 MG/DL (ref 70–99)
HCT VFR BLD AUTO: 35.5 % (ref 40–53)
HGB BLD-MCNC: 10.4 G/DL (ref 13.3–17.7)
LYMPHOCYTES # BLD AUTO: 4.7 10E9/L (ref 0.8–5.3)
LYMPHOCYTES NFR BLD AUTO: 64 %
MCH RBC QN AUTO: 25.1 PG (ref 26.5–33)
MCHC RBC AUTO-ENTMCNC: 29.3 G/DL (ref 31.5–36.5)
MCV RBC AUTO: 86 FL (ref 78–100)
MONOCYTES # BLD AUTO: 0.3 10E9/L (ref 0–1.3)
MONOCYTES NFR BLD AUTO: 4 %
NEUTROPHILS # BLD AUTO: 2.2 10E9/L (ref 1.6–8.3)
NEUTROPHILS NFR BLD AUTO: 30 %
PLATELET # BLD AUTO: 113 10E9/L (ref 150–450)
PLATELET # BLD EST: ABNORMAL 10*3/UL
POTASSIUM SERPL-SCNC: 3.6 MMOL/L (ref 3.4–5.3)
PROT SERPL-MCNC: 6 G/DL (ref 6.8–8.8)
RBC # BLD AUTO: 4.14 10E12/L (ref 4.4–5.9)
SODIUM SERPL-SCNC: 141 MMOL/L (ref 133–144)
WBC # BLD AUTO: 7.4 10E9/L (ref 4–11)

## 2021-04-26 PROCEDURE — 255N000002 HC RX 255 OP 636: Performed by: INTERNAL MEDICINE

## 2021-04-26 PROCEDURE — 93016 CV STRESS TEST SUPVJ ONLY: CPT | Performed by: INTERNAL MEDICINE

## 2021-04-26 PROCEDURE — 93325 DOPPLER ECHO COLOR FLOW MAPG: CPT | Mod: 26 | Performed by: INTERNAL MEDICINE

## 2021-04-26 PROCEDURE — 80053 COMPREHEN METABOLIC PANEL: CPT | Performed by: PATHOLOGY

## 2021-04-26 PROCEDURE — 93306 TTE W/DOPPLER COMPLETE: CPT

## 2021-04-26 PROCEDURE — 93018 CV STRESS TEST I&R ONLY: CPT | Performed by: INTERNAL MEDICINE

## 2021-04-26 PROCEDURE — 36415 COLL VENOUS BLD VENIPUNCTURE: CPT | Performed by: PATHOLOGY

## 2021-04-26 PROCEDURE — 93880 EXTRACRANIAL BILAT STUDY: CPT | Mod: GC | Performed by: RADIOLOGY

## 2021-04-26 PROCEDURE — 250N000009 HC RX 250: Performed by: INTERNAL MEDICINE

## 2021-04-26 PROCEDURE — 85025 COMPLETE CBC W/AUTO DIFF WBC: CPT | Performed by: PATHOLOGY

## 2021-04-26 PROCEDURE — 93924 LWR XTR VASC STDY BILAT: CPT | Performed by: RADIOLOGY

## 2021-04-26 PROCEDURE — 85045 AUTOMATED RETICULOCYTE COUNT: CPT | Performed by: PATHOLOGY

## 2021-04-26 PROCEDURE — 93321 DOPPLER ECHO F-UP/LMTD STD: CPT | Mod: TC

## 2021-04-26 PROCEDURE — 93321 DOPPLER ECHO F-UP/LMTD STD: CPT | Mod: 26 | Performed by: INTERNAL MEDICINE

## 2021-04-26 PROCEDURE — 93350 STRESS TTE ONLY: CPT | Mod: 26 | Performed by: INTERNAL MEDICINE

## 2021-04-26 PROCEDURE — 250N000011 HC RX IP 250 OP 636: Performed by: INTERNAL MEDICINE

## 2021-04-26 PROCEDURE — 83695 ASSAY OF LIPOPROTEIN(A): CPT | Mod: 90 | Performed by: PATHOLOGY

## 2021-04-26 PROCEDURE — 99000 SPECIMEN HANDLING OFFICE-LAB: CPT | Performed by: PATHOLOGY

## 2021-04-26 PROCEDURE — 82728 ASSAY OF FERRITIN: CPT | Performed by: PATHOLOGY

## 2021-04-26 RX ORDER — ATROPINE SULFATE 0.4 MG/ML
.2-2 AMPUL (ML) INJECTION
Status: DISCONTINUED | OUTPATIENT
Start: 2021-04-26 | End: 2021-04-26 | Stop reason: CLARIF

## 2021-04-26 RX ORDER — METOPROLOL TARTRATE 1 MG/ML
1-20 INJECTION, SOLUTION INTRAVENOUS
Status: ACTIVE | OUTPATIENT
Start: 2021-04-26 | End: 2021-04-26

## 2021-04-26 RX ORDER — DOBUTAMINE HYDROCHLORIDE 200 MG/100ML
10-50 INJECTION INTRAVENOUS CONTINUOUS
Status: ACTIVE | OUTPATIENT
Start: 2021-04-26 | End: 2021-04-26

## 2021-04-26 RX ORDER — SODIUM CHLORIDE 9 MG/ML
INJECTION, SOLUTION INTRAVENOUS CONTINUOUS
Status: ACTIVE | OUTPATIENT
Start: 2021-04-26 | End: 2021-04-26

## 2021-04-26 RX ADMIN — METOPROLOL TARTRATE 2 MG: 5 INJECTION INTRAVENOUS at 13:52

## 2021-04-26 RX ADMIN — PERFLUTREN 8 ML: 6.52 INJECTION, SUSPENSION INTRAVENOUS at 13:56

## 2021-04-26 RX ADMIN — DOBUTAMINE HYDROCHLORIDE 10 MCG/KG/MIN: 200 INJECTION INTRAVENOUS at 13:41

## 2021-04-27 DIAGNOSIS — C91.10 CLL (CHRONIC LYMPHOCYTIC LEUKEMIA) (H): Primary | ICD-10-CM

## 2021-04-27 LAB
FERRITIN SERPL-MCNC: 6 NG/ML (ref 26–388)
RETICS # AUTO: 111.6 10E9/L (ref 25–95)
RETICS/RBC NFR AUTO: 2.7 % (ref 0.5–2)

## 2021-04-28 LAB — LPA SERPL-MCNC: <6 MG/DL

## 2021-04-29 ENCOUNTER — TELEPHONE (OUTPATIENT)
Dept: FAMILY MEDICINE | Facility: CLINIC | Age: 76
End: 2021-04-29

## 2021-04-29 ENCOUNTER — OFFICE VISIT (OUTPATIENT)
Dept: FAMILY MEDICINE | Facility: CLINIC | Age: 76
End: 2021-04-29
Payer: COMMERCIAL

## 2021-04-29 VITALS
OXYGEN SATURATION: 97 % | SYSTOLIC BLOOD PRESSURE: 127 MMHG | HEART RATE: 82 BPM | BODY MASS INDEX: 26.63 KG/M2 | TEMPERATURE: 98.1 F | DIASTOLIC BLOOD PRESSURE: 70 MMHG | WEIGHT: 169 LBS

## 2021-04-29 DIAGNOSIS — I48.0 PAROXYSMAL ATRIAL FIBRILLATION (H): ICD-10-CM

## 2021-04-29 DIAGNOSIS — D50.0 IRON DEFICIENCY ANEMIA DUE TO CHRONIC BLOOD LOSS: Primary | ICD-10-CM

## 2021-04-29 DIAGNOSIS — E61.1 IRON DEFICIENCY: Primary | ICD-10-CM

## 2021-04-29 DIAGNOSIS — C91.10 CLL (CHRONIC LYMPHOCYTIC LEUKEMIA) (H): ICD-10-CM

## 2021-04-29 PROCEDURE — 99214 OFFICE O/P EST MOD 30 MIN: CPT | Performed by: INTERNAL MEDICINE

## 2021-04-29 RX ORDER — QUINIDINE GLUCONATE 324 MG
240 TABLET, EXTENDED RELEASE ORAL 2 TIMES DAILY
Qty: 180 TABLET | Refills: 4 | Status: SHIPPED | OUTPATIENT
Start: 2021-04-29 | End: 2021-01-01

## 2021-04-29 NOTE — PROGRESS NOTES
Assessment & Plan   Problem List Items Addressed This Visit     CLL (chronic lymphocytic leukemia) (H)    Paroxysmal atrial fibrillation (H)      Other Visit Diagnoses     Iron deficiency anemia due to chronic blood loss    -  Primary    Relevant Medications    iron (FERROUS GLUCONATE) 256 (28 Fe) MG tablet         Patient with reduction in the hemoglobin and ferritin  No clear source of bleeding  Had recent colonoscopy with diverticular and internal hemorroids  No loss in urine   Possible reaction from finasteride by timing ( but low rates reported) or imbruvica    But should respond with iron supplements  Would direct coomb's be helpful in this situation?        [id number]     Regular exercise    No follow-ups on file.    Campos Parra MD  [facility] FABIANO Adan is a 76 year old who presents for the following health issues     HPI     Medication recheck  Getting more winded and dizzy and shortness of breath  Heart working well  Oncologist hemoglobin trending down   Ferritin and reticulocytyes  April 5th labs     151-113    April 5th finasteride and tamsulosin  Finasteride    Iron as well  Banded march 4th ( few days) dripping blood  Colonoscopy   When laying down thought heart burn  Feels like might pass out when happens  1 minute lost consciousness        Review of Systems   Constitutional, HEENT, cardiovascular, pulmonary, gi and gu systems are negative, except as otherwise noted.      Objective    /70 (BP Location: Right arm, Patient Position: Chair, Cuff Size: Adult Regular)   Pulse 82   Temp 98.1  F (36.7  C)   Wt 76.7 kg (169 lb)   SpO2 97%   BMI 26.63 kg/m    Body mass index is 26.63 kg/m .  Physical Exam   GENERAL: healthy, alert and no distress  EYES: Eyes grossly normal to inspection, PERRL and conjunctivae and sclerae normal  HENT: ear canals and TM's normal, nose and mouth without ulcers or lesions  NECK: no adenopathy, no asymmetry, masses, or scars and  thyroid normal to palpation  RESP: lungs clear to auscultation - no rales, rhonchi or wheezes  CV: regular rate and rhythm, normal S1 S2, no S3 or S4, no murmur, click or rub, no peripheral edema and peripheral pulses strong  ABDOMEN: soft, nontender, no hepatosplenomegaly, no masses and bowel sounds normal  MS: no gross musculoskeletal defects noted, no edema  SKIN: no suspicious lesions or rashes  NEURO: Normal strength and tone, mentation intact and speech normal  PSYCH: mentation appears normal, affect normal/bright  LYMPH: no cervical, supraclavicular, axillary, or inguinal adenopathy    No results found for any visits on 04/29/21.

## 2021-04-29 NOTE — TELEPHONE ENCOUNTER
Please advise:    Pharmacy calling regarding iron (FERROUS GLUCONATE) 256 (28 Fe) MG tablet prescribed today    They do not carry this strength and would like to know if 240/27 strength could be substituted.     Myra Russell RN

## 2021-05-16 NOTE — PROGRESS NOTES
HPI:     I had the privilege to evaluate and examine Mr Loco Wood, who is a 76 yr old male patient with a Hx of    1. Chronic Lymphocytic Leukemia  2. GERD  3. Hyperlipidemia  4. Paroxysmal Atrial fibrillation    Patient has a family Hx of MI, stroke and hyperlipidemia  Patient has sometimes chest pain at rest and during effort, Patient has sometimes dizziness and cramps in the legs. He denies palpitations.           PAST MEDICAL HISTORY:  Past Medical History:   Diagnosis Date     Arthritis     hip and toes     Chronic pain      CLL (chronic lymphocytic leukaemia)      Esophageal reflux      Malignant neoplasm (H)     Leukemia     Paroxysmal atrial fibrillation (H) 2/5/2020     PONV (postoperative nausea and vomiting)      Pure hypercholesterolemia        CURRENT MEDICATIONS:  Current Outpatient Medications   Medication Sig Dispense Refill     Acetaminophen (TYLENOL PO)        atorvastatin (LIPITOR) 10 MG tablet TAKE 1 TABLET BY MOUTH EVERY 48 HOURS 45 tablet 4     famotidine (PEPCID) 40 MG tablet Take 1 tablet (40 mg) by mouth nightly as needed for heartburn 90 tablet 4     finasteride (PROSCAR) 5 MG tablet        ibrutinib (IMBRUVICA) 420 MG tablet Take 1 tablet (420 mg) by mouth daily 28 tablet 2     ibuprofen (ADVIL/MOTRIN) 200 MG tablet Take 200 mg by mouth every 4 hours as needed for mild pain       levothyroxine (SYNTHROID/LEVOTHROID) 50 MCG tablet TAKE 1 TABLET(50 MCG) BY MOUTH DAILY 90 tablet 3     omeprazole (PRILOSEC) 10 MG DR capsule TAKE ONE CAPSULE BY MOUTH EVERY DAY 30 TO 60 MINUTES BEFORE A MEAL 90 capsule 1     RESVERATROL PO        tamsulosin (FLOMAX) 0.4 MG capsule        tiZANidine (ZANAFLEX) 4 MG tablet Take 1 tablet (4 mg) by mouth 3 times daily as needed for muscle spasms 60 tablet 1     vitamin D3 (CHOLECALCIFEROL) 50 mcg (2000 units) tablet Take 1 tablet by mouth daily       zolpidem (AMBIEN) 5 MG tablet Take 1 tablet (5 mg) by mouth nightly as needed for sleep 10 tablet 0     omega  3 1200 MG CAPS Take 2 capsules by mouth daily       ondansetron (ZOFRAN) 4 MG tablet Take 1 tablet (4 mg) by mouth every 8 hours as needed (Patient not taking: Reported on 2021) 15 tablet 0     Probiotic Product (PROBIOTIC-10 PO) Take by mouth daily       traZODone (DESYREL) 50 MG tablet Take 1-3 tablets ( mg) by mouth At Bedtime (Patient not taking: Reported on 2021) 90 tablet 3       PAST SURGICAL HISTORY:  Past Surgical History:   Procedure Laterality Date     ARTHROPLASTY MINIMALLY INVASIVE HIP  5/10/2013    Procedure: ARTHROPLASTY MINIMALLY INVASIVE HIP;  Left Two Incision Total Hip Arthroplasty ;  Surgeon: Desmond Alberts MD;  Location: [facility]     ARTHROPLASTY MINIMALLY INVASIVE HIP  2014    Procedure: ARTHROPLASTY MINIMALLY INVASIVE HIP;  Right Total Hip Arthroplasty Minimally Invasive Two Incision  *Latex Free Room;  Surgeon: Desmond Alberts MD;  Location: [facility] OR     BACK SURGERY      back fusion      COLONOSCOPY           COLONOSCOPY N/A 8/15/2017    Procedure: COLONOSCOPY;  colonoscopy;  Surgeon: Chun Mcguire MD;  Location:  GI     GI SURGERY      4 hernia repairs in childhood     HERNIA REPAIR      4 since age 4     IR PAE  3/5/2020     Z NONSPECIFIC PROCEDURE   &    (R) inguinal herniorrhapy     ZZ NONSPECIFIC PROCEDURE      (L) inguinal herniorrhaphy     ZZ NONSPECIFIC PROCEDURE      L4-5  L5 S1 fusion - spondylolisthesis       ALLERGIES     Allergies   Allergen Reactions     Dilaudid [Hydromorphone] Itching     Morphine Itching       FAMILY HISTORY:  Family History   Problem Relation Age of Onset     Heart Disease Father      Cerebrovascular Disease Father          OF STROKE      Cancer Father         melonoma     Melanoma Father      Hyperlipidemia Father      Thyroid Disease Father      Cancer Mother         Lung cancer     Other Cancer Mother         lung; heavy smoker     Cerebrovascular Disease Paternal Uncle         Aneurism      Breast Cancer Maternal Aunt          from it     Cancer Paternal Uncle      Cancer Paternal Aunt      Skin Cancer No family hx of      Glaucoma No family hx of      Macular Degeneration No family hx of        SOCIAL HISTORY:  Social History     Socioeconomic History     Marital status: Single     Spouse name: Ellen     Number of children: 0     Years of education: PHD     Highest education level: None   Occupational History     Occupation: Teacher     Employer: [employer]   Social Needs     Financial resource strain: None     Food insecurity     Worry: None     Inability: None     Transportation needs     Medical: None     Non-medical: None   Tobacco Use     Smoking status: Never Smoker     Smokeless tobacco: Never Used   Substance and Sexual Activity     Alcohol use: Yes     Alcohol/week: 0.0 standard drinks     Comment: moderate, social     Drug use: No     Sexual activity: Not Currently     Partners: Female   Lifestyle     Physical activity     Days per week: None     Minutes per session: None     Stress: None   Relationships     Social connections     Talks on phone: None     Gets together: None     Attends Sikhism service: None     Active member of club or organization: None     Attends meetings of clubs or organizations: None     Relationship status: None     Intimate partner violence     Fear of current or ex partner: None     Emotionally abused: None     Physically abused: None     Forced sexual activity: None   Other Topics Concern      Service No     Blood Transfusions No     Caffeine Concern No     Occupational Exposure No     Hobby Hazards No     Sleep Concern No     Stress Concern No     Weight Concern No     Special Diet No     Back Care Yes     Exercise Yes     Comment: Several times a week     Bike Helmet No     Seat Belt Yes     Self-Exams No     Parent/sibling w/ CABG, MI or angioplasty before 65F 55M? No   Social History Narrative    Social  "Documentation:7/10        Balanced Diet: YES    Calcium intake:milk and food    Caffeine: 2 cups of coffee per day    Exercise:  type of activity  Wts , stretching, cardio;  3 times per week    Sunscreen:no    Seatbelts:  Yes    Self Breast Exam:  No -     Self Testicular Exam: No    Physical/Emotional/Sexual Abuse: No     Do you feel safe in your environment? Yes        Cholesterol screen up to date: today    Eye Exam up to date: Yes    Dental Exam up to date: Yes    Pap smear up to date: Does Not Apply    Mammogram up to date: Does Not Apply    Dexa Scan up to date: Does Not Apply    Colonoscopy up to date: Yes-2007    Immunizations up to date: Yes-2008    Glucose screen if over 40:  No     Luis Alfredo Knox ma                   ROS:   Constitutional: No fever, chills, or sweats. No weight gain/loss   ENT: No visual disturbance, ear ache, epistaxis, sore throat  Allergies/Immunologic: Negative.   Respiratory: No cough, hemoptysia  Cardiovascular: As per HPI  GI: No nausea, vomiting, hematemesis, melena, or hematochezia  : No urinary frequency, dysuria, or hematuria  Integument: Negative  Psychiatric: Negative  Neuro: Negative  Endocrinology: Negative   Musculoskeletal: Negative    EXAM:  /88 (BP Location: Right arm, Patient Position: Chair, Cuff Size: Adult Regular)   Pulse 89   Ht 1.697 m (5' 6.8\")   Wt 75.3 kg (166 lb)   SpO2 99%   BMI 26.16 kg/m    In general, the patient is a pleasant male in no apparent distress.    HEENT: NC/AT.  PERRLA.  EOMI.  Sclerae white, not injected.  Nares clear.  Pharynx without erythema or exudate.  Dentition intact.    Neck: No adenopathy.  No thyromegaly. Carotids +4/4 bilaterally without bruits.  No jugular venous distension.   Heart: RRR. Normal S1, S2 splits physiologically. No murmur, rub, click, or gallop. The PMI is in the 5th ICS in the midclavicular line. There is no heave.    Lungs: CTA.  No ronchi, wheezes, rales.  No dullness to percussion.   Abdomen: " Soft, nontender, nondistended. No organomegaly.  No bruits.   Extremities: No clubbing, cyanosis, or edema.  The pulses are +4/4 at the radial, brachial, femoral, popliteal, DP, and PT sites bilaterally.  No bruits are noted.  Neurologic: Alert and oriented to person/place/time, normal speech, gait and affect  Skin: No petechiae, purpura or rash.    Labs:  LIPID RESULTS:  Lab Results   Component Value Date    CHOL 142 04/05/2021    HDL 39 (L) 04/05/2021    LDL 84 04/05/2021    TRIG 96 04/05/2021    CHOLHDLRATIO 3.8 04/29/2015    NHDL 103 04/05/2021       LIVER ENZYME RESULTS:  Lab Results   Component Value Date    AST 24 04/05/2021    ALT 25 04/05/2021       CBC RESULTS:  Lab Results   Component Value Date    WBC 6.3 04/05/2021    RBC 4.45 04/05/2021    HGB 11.0 (L) 04/05/2021    HCT 36.1 (L) 04/05/2021    MCV 81 04/05/2021    MCH 24.7 (L) 04/05/2021    MCHC 30.5 (L) 04/05/2021    RDW 15.5 (H) 04/05/2021     04/05/2021       BMP RESULTS:  Lab Results   Component Value Date     04/05/2021    POTASSIUM 4.2 04/05/2021    CHLORIDE 108 04/05/2021    CO2 28 04/05/2021    ANIONGAP 4 04/05/2021    GLC 91 04/05/2021    BUN 17 04/05/2021    CR 1.52 (H) 04/05/2021    GFRESTIMATED 45 (L) 04/05/2021    GFRESTBLACK 52 (L) 04/05/2021    DAVID 9.1 04/05/2021        INR RESULTS:  Lab Results   Component Value Date    INR 1.15 (H) 12/01/2020    INR 1.16 (H) 03/05/2020       Procedures:    EKG: sin rythm    Echocardiogram with two-dimensional, color and spectral Doppler performed.  ______________________________________________________________________________  Interpretation Summary  Global and regional left ventricular function is normal with an EF of 55-60%.  Right ventricular function, chamber size, wall motion, and thickness are  normal.  Pulmonary artery systolic pressure is normal.  The inferior vena cava is normal.  No pericardial effusion is present.  There is no prior study for direct  comparison.  ______________________________________________________________________________  Left Ventricle  Global and regional left ventricular function is normal with an EF of 55-60%.  Left ventricular wall thickness is normal. Left ventricular size is normal.  Grade I or early diastolic dysfunction. No regional wall motion abnormalities  are seen.     Right Ventricle  Right ventricular function, chamber size, wall motion, and thickness are  normal.     Atria  Both atria appear normal. The atrial septum is intact as assessed by color  Doppler .     Mitral Valve  The mitral valve is normal. Trace to mild mitral insufficiency is present.     Aortic Valve  The aortic valve is tricuspid. Trace to mild aortic insufficiency is present.     Tricuspid Valve  The tricuspid valve is normal. Trace to mild tricuspid insufficiency is  present. The right ventricular systolic pressure is approximated at 16.6 mmHg  plus the right atrial pressure. Pulmonary artery systolic pressure is normal.     Pulmonic Valve  The pulmonic valve is normal. Trace to mild pulmonic insufficiency is present.     Vessels  The thoracic aorta is normal. The pulmonary artery and bifurcation cannot be  assessed. The inferior vena cava is normal.     Pericardium  No pericardial effusion is present.     Compared to Previous Study  There is no prior study for direct comparison.  ______________________________________________________________________________  MMode/2D Measurements & Calculations  IVSd: 1.0 cm     LVIDd: 4.3 cm  LVIDs: 3.0 cm  LVPWd: 0.88 cm  FS: 29.5 %  LV mass(C)d: 132.9 grams  LV mass(C)dI: 71.9 grams/m2  Ao root diam: 3.6 cm  asc Aorta Diam: 3.2 cm  LVOT diam: 2.3 cm  LVOT area: 4.2 cm2  LA Volume (BP): 38.0 ml  LA Volume Index (BP): 20.5 ml/m2  RWT: 0.41     Doppler Measurements & Calculations  MV E max toshia: 68.9 cm/sec  MV A max toshia: 81.0 cm/sec  MV E/A: 0.85  MV dec slope: 372.0 cm/sec2  PA acc time: 0.12 sec  TR max toshia: 204.0  cm/sec  TR max P.6 mmHg  E/E' avg: 10.9  Lateral E/e': 11.1  Medial E/e': 10.7    Dobutamine stress echocardiogram     Normal dobutamine stress echocardiogram without evidence of inducible  ischemia.     Target heart rate was achieved. Heart rate and blood pressure response to  dobutamine were normal. No regional wall motion abnormalities at rest. With  stress, the left ventricular ejection fraction increased from 55-60% to  greater than 65% and the left ventricular size decreased appropriately.  No subjective symptoms to suggest ischemia.  There was no ECG evidence of ischemia.  ______________________________________________________________________________  Stress  The drug infusion was stopped due to target heart rate achieved.  The patient did not exhibit any symptoms during drug infusion.  The maximum dose of dobutamine was 30mcg/kg/min.  The maximum dose of metoprolol was 2.0mg.  Definity (NDC #36689-194-06) given intravenously.  Patient was given 8ml mixture of 1.5ml Definity and 8.5ml saline.  2 ml wasted.  Definity Lot # [id number] .  Definity Expiration 2021 .     Stress Results                                       Maximum Predicted HR:   147 bpm             Target HR: 125 bpm        % Maximum Predicted HR: 85 %                               Stage Heart Rate  BP   Dose                                     (bpm)                          Baseline    77    123/69                          Low Dose    94    180/5720.00                           Peak HR    125   176/5230.00                          Recovery    81    177/65                         Stress Duration:   8:43 mm:ss *                      Maximum Stress HR: 125 bpm     Procedure  Dobutamine stress echo with two dimensional color and spectral Doppler  performed.      BILATERAL CAROTID DUPLEX DOPPLER ULTRASOUND 2021 11:51 AM     CLINICAL HISTORY: Carotid Bruit; Carotid bruit     COMPARISON: None available.     REFERRING PROVIDER: EVI JULIEN  BOGDAN     TECHNIQUE: Grayscale (B-mode) and duplex and spectral Doppler  ultrasound of the common carotid, extracranial internal carotid,  external carotid, and vertebral artery origins. Velocity measurements  obtained with angle correction at or less than 60 degrees.     FINDINGS:     RIGHT SIDE:      Plaque Morphology: Predominantly echogenic and smooth atherosclerotic  plaque resulting in less than 50% stenosis on grayscale and color  Doppler imaging.        Proximal CCA: 76/11 cm/s     Mid CCA: 81/16 cm/s     Distal CCA: 72/21 cm/s     Carotid bifurcation: 67/21 cm/s     External CA: 83/13 cm/s        Proximal ICA: 57/19 cm/s     Mid ICA: 67/21 cm/s     Distal ICA: 68/22 cm/s        Vertebral artery: Antegrade: 44/16 cm/s      Innominate artery: 58/11 cm/s     Proximal subclavian: 107/0 cm/s     ICA/CCA ratio: 0.9      LEFT SIDE:      Plaque Morphology: No plaque visualized.         Origin CCA: 82/17 cm/s     Proximal CCA: 100/24 cm/s     Mid CCA: 84/23 cm/s     Distal CCA: 79/28 cm/s     Carotid bifurcation: 65/23 cm/s     External CA: 80/13 cm/s        Proximal ICA: 80/31 cm/s     Mid ICA: 62/25 cm/s     Distal ICA: 67/28 cm/s        Vertebral artery: Antegrade: 57/20 cm/s      Subclavian origin: 78/0 cm/s     Proximal subclavian: 92/0 cm/s     ICA/CCA ratio: 1.0                                                                       IMPRESSION:     1. RIGHT ICA: Less than 50% diameter narrowing by grayscale imaging  and sonographic velocity criteria.     2. LEFT ICA:  Normal.      BILATERAL LOWER EXTREMITY ANKLE-BRACHIAL INDICES (ABIS) AND SEGMENTAL  PRESSURES WITH EXERCISE 4/26/2021 11:59 AM     CLINICAL HISTORY: Intermittent claudication; Hyperlipidemia LDL goal  <130; Chest pain, unspecified type; Intermittent claudication (H) .     COMPARISON: None available.     REFERRING PROVIDER: EVI MCFADDEN     TECHNIQUE: Baseline ankle brachial index, segmental pressures, and VPR  obtained at rest, followed by  exercise ankle brachial index using  Arreaga-Araya Exercise Protocol at treadmill speed 2 mph, beginning  at 0 percent grade increased to 14 percent for a total of 16 minutes..     FINDINGS:     Resting LILIANA:          RIGHT:            Brachial: 121 mmHg / Index            High Thigh: 153 mmHg / 1.26            Low Thigh: 148 mmHg / 1.22            Calf: 139 mmHg / 1.15            Ankle (PT): 154 mmHg / 1.27            Ankle (DP): 141 mmHg / 1.17               LILIANA: 1.27               Pulse volume recordings or VPR:                  High thigh: Normal                 Lower thigh: Normal                 Proximal calf: Normal                 Ankle: Normal          LEFT:            Brachial: 120 mmHg / Index            High Thigh: 156 mmHg / 1.29            Low Thigh: 143 mmHg / 1.18            Calf: 149 mmHg / 1.23            Ankle (PT): 149 mmHg / 1.23            Ankle (DP): 130 mmHg / 1.07               LILIANA: 1.23               Pulse volume recordings or VPR:                  High thigh: Normal                 Lower thigh: Normal                 Proximal calf: Normal                 Ankle: Normal     EXERCISE STUDY:     Baseline severity of pain in legs prior to exercise on a scale of  1-10: 0/10     Severity of pain during exercise:           Right: No symptoms elicited. 0/10 through the study.        Left: No symptoms elicited. 0/10 through the study.      Post exercise LILIANA:          Right le.45.       Left le.43.     Post exercise recovery time:          Right leg: No pressure drop.       Left leg: No pressure drop.                                                                      IMPRESSION:   1. RIGHT LEG:       A. Resting LILIANA is normal, 1.27.       B. Negative segmental pressures.       C. Exercise study: Negative     2. LEFT LEG:       A. Resting LILIANA is normal, 1.23.       B. Negative segmental pressures.       C. Exercise study: Negative       Assessment and Plan:      I discussed the results with  patient.  I discussed the importance of a heart healthy diet and lifestyle  We discussed with patient following points:      Medication Changes:   - Increase atorvastatin to 20 mg every day.  - Start Aspirin 81 mg every day.     Recommendations: Labs at your convenience.= results included      Studies Ordered - results were discussed with patient and included in this note  - Dobutamine Stress Echocardiogram  - Echocardiogram  - Ultrasound of the Carotids  - Ultrasound of the Lower Extremities with Exercise     The results from today include: EKG.     Please follow up: With Dr. Lee based on results of testing.    Henri Lee MD, PhD  Professor of Medicine  Division of Cardiology      CC  Patient Care Team:  Campos Parra MD as PCP - General (Internal Medicine)  Campos Boyd MD as MD (Urology)  Padmini Whitney RN as Registered Nurse  Nikhil Farah MD as MD (Neurology)  Nikhil Farah MD as Referring Physician (Neurology)  Kamlesh Bonner MD as MD (Ophthalmology)  Campos Parra MD as Assigned PCP  Daya Adams RN as Specialty Care Coordinator (Cardiology)  Virginie Kenney MD as MD (Cardiology)  Jameson Warren MD as Assigned Surgical Provider  Arnaud Melton DO as Assigned Musculoskeletal Provider  Arline Mares MD as Assigned Heart and Vascular Provider  He Burns MD as Assigned Cancer Care Provider  SELF, REFERRED

## 2021-05-24 ENCOUNTER — MYC MEDICAL ADVICE (OUTPATIENT)
Dept: FAMILY MEDICINE | Facility: CLINIC | Age: 76
End: 2021-05-24

## 2021-05-24 DIAGNOSIS — D50.8 OTHER IRON DEFICIENCY ANEMIA: ICD-10-CM

## 2021-05-24 DIAGNOSIS — C91.10 CLL (CHRONIC LYMPHOCYTIC LEUKEMIA) (H): Primary | ICD-10-CM

## 2021-05-27 DIAGNOSIS — D50.8 OTHER IRON DEFICIENCY ANEMIA: ICD-10-CM

## 2021-05-27 DIAGNOSIS — C91.10 CLL (CHRONIC LYMPHOCYTIC LEUKEMIA) (H): ICD-10-CM

## 2021-05-27 LAB
ANISOCYTOSIS BLD QL SMEAR: ABNORMAL
DIFFERENTIAL METHOD BLD: ABNORMAL
EOSINOPHIL # BLD AUTO: 0.3 10E9/L (ref 0–0.7)
EOSINOPHIL NFR BLD AUTO: 3 %
ERYTHROCYTE [DISTWIDTH] IN BLOOD BY AUTOMATED COUNT: 20.2 % (ref 10–15)
HCT VFR BLD AUTO: 37.7 % (ref 40–53)
HGB BLD-MCNC: 12.3 G/DL (ref 13.3–17.7)
IRON SATN MFR SERPL: 30 % (ref 15–46)
IRON SERPL-MCNC: 106 UG/DL (ref 35–180)
LYMPHOCYTES # BLD AUTO: 3.8 10E9/L (ref 0.8–5.3)
LYMPHOCYTES NFR BLD AUTO: 47 %
MACROCYTES BLD QL SMEAR: PRESENT
MCH RBC QN AUTO: 29.1 PG (ref 26.5–33)
MCHC RBC AUTO-ENTMCNC: 32.6 G/DL (ref 31.5–36.5)
MCV RBC AUTO: 89 FL (ref 78–100)
MONOCYTES # BLD AUTO: 0.7 10E9/L (ref 0–1.3)
MONOCYTES NFR BLD AUTO: 8 %
NEUTROPHILS # BLD AUTO: 3.5 10E9/L (ref 1.6–8.3)
NEUTROPHILS NFR BLD AUTO: 42 %
OVALOCYTES BLD QL SMEAR: SLIGHT
PLATELET # BLD AUTO: 117 10E9/L (ref 150–450)
PLATELET # BLD EST: ABNORMAL 10*3/UL
RBC # BLD AUTO: 4.23 10E12/L (ref 4.4–5.9)
TIBC SERPL-MCNC: 356 UG/DL (ref 240–430)
WBC # BLD AUTO: 8.3 10E9/L (ref 4–11)

## 2021-05-27 PROCEDURE — 85025 COMPLETE CBC W/AUTO DIFF WBC: CPT | Performed by: INTERNAL MEDICINE

## 2021-05-27 PROCEDURE — 83540 ASSAY OF IRON: CPT | Performed by: INTERNAL MEDICINE

## 2021-05-27 PROCEDURE — 86880 COOMBS TEST DIRECT: CPT | Performed by: INTERNAL MEDICINE

## 2021-05-27 PROCEDURE — 83550 IRON BINDING TEST: CPT | Performed by: INTERNAL MEDICINE

## 2021-05-27 PROCEDURE — 36415 COLL VENOUS BLD VENIPUNCTURE: CPT | Performed by: INTERNAL MEDICINE

## 2021-05-28 ENCOUNTER — MYC MEDICAL ADVICE (OUTPATIENT)
Dept: FAMILY MEDICINE | Facility: CLINIC | Age: 76
End: 2021-05-28

## 2021-05-28 LAB
BLOOD BANK CMNT PATIENT-IMP: NORMAL
DAT C3-SP REAG RBC QL: NORMAL
DAT IGG-SP REAG RBC-IMP: NORMAL
DAT POLY-SP REAG RBC QL: NORMAL

## 2021-06-08 ENCOUNTER — OFFICE VISIT (OUTPATIENT)
Dept: FAMILY MEDICINE | Facility: CLINIC | Age: 76
End: 2021-06-08
Payer: COMMERCIAL

## 2021-06-08 DIAGNOSIS — D50.8 OTHER IRON DEFICIENCY ANEMIA: ICD-10-CM

## 2021-06-08 DIAGNOSIS — I48.0 PAROXYSMAL ATRIAL FIBRILLATION (H): ICD-10-CM

## 2021-06-08 DIAGNOSIS — B02.33: Primary | ICD-10-CM

## 2021-06-08 DIAGNOSIS — C91.10 CLL (CHRONIC LYMPHOCYTIC LEUKEMIA) (H): ICD-10-CM

## 2021-06-08 PROCEDURE — 99213 OFFICE O/P EST LOW 20 MIN: CPT | Performed by: INTERNAL MEDICINE

## 2021-06-10 VITALS — DIASTOLIC BLOOD PRESSURE: 68 MMHG | SYSTOLIC BLOOD PRESSURE: 132 MMHG

## 2021-06-18 DIAGNOSIS — K21.9 GASTROESOPHAGEAL REFLUX DISEASE WITHOUT ESOPHAGITIS: ICD-10-CM

## 2021-06-18 RX ORDER — OMEPRAZOLE 10 MG/1
CAPSULE, DELAYED RELEASE ORAL
Qty: 90 CAPSULE | Refills: 1 | Status: SHIPPED | OUTPATIENT
Start: 2021-06-18 | End: 2021-01-01

## 2021-07-02 NOTE — TELEPHONE ENCOUNTER
Routing to provider to add CBC order to labs drawn 7/2/21 if recommended.    Selma CRISTINA RN, BSN  [facility]

## 2021-07-02 NOTE — PROGRESS NOTES
Patient is expecting a CBC lab to be done. Lab has a sample and is waiting for orders.     Thanks, Maryann Palmer

## 2021-07-27 NOTE — LETTER
"    7/27/2021         RE: Loco Wood  [address] [id number]        Dear Colleague,    Thank you for referring your patient, Loco Wood, to the [facility] [place]. Please see a copy of my visit note below.    Loco is a 76 year old who is being evaluated via a billable video visit.      How would you like to obtain your AVS? MyChart  If the video visit is dropped, the invitation should be resent by: Text to cell phone: [phone]  Will anyone else be joining your video visit? No       I have reviewed and updated the patient's allergies and medication list.    Concerns: none  Refills: none     Vitals - Patient Reported  Weight (Patient Reported): 72.6 kg (160 lb)  Height (Patient Reported): 170.2 cm (5' 7\")  BMI (Based on Pt Reported Ht/Wt): 25.06  Pain Score: No Pain (0)    Kat Roberts CMA        Video Start Time: 4:56 PM    Video-Visit Details    Type of service:  Video Visit    Video End Time:5:08 PM    Originating Location (pt. Location): Home    Distant Location (provider location):  [facility] [place]     Platform used for Video Visit: [employer]      Oncology/Hematology Visit Note  Jul 27, 2021    Reason for Visit: follow up of CLL    History of Present Illness: Loco Wood is a 76 year old male with CLL. He was diagnosed 2/12/2007 with CLL, Lyles stage 0, followed clinically since.  At diagnosis WBC 17.8, ALC 11.2, hemoglobin 15.2, platelets 188.  Flow consistent with CLL.  No opportunistic infections, with IgG in the normal range during follow up. Due to rising lymphocyte count, it was recommended he consider starting on treatment. He is currently undergoing treatment with ibrutinib, which he began on 5/14/19. Please see previous notes for further details on the patient's history.     Interval History:  Plantar warts on left foot. Has also noticed some blood spots on left " arm but healed quickly. He is taking ibrutinib about once every six days. Noticing some bumps on scalp; precancerous per dermatologist, who froze them. Notes occasional hiccups.      Current Outpatient Medications   Medication Sig Dispense Refill     Acetaminophen (TYLENOL PO)        aspirin (ASA) 81 MG EC tablet Take 1 tablet (81 mg) by mouth daily 90 tablet 3     atorvastatin (LIPITOR) 20 MG tablet Take 1 tablet (20 mg) by mouth daily 90 tablet 3     famotidine (PEPCID) 40 MG tablet Take 1 tablet (40 mg) by mouth nightly as needed for heartburn 90 tablet 4     Ferrous Gluconate 240 (27 Fe) MG TABS Take 240 mg by mouth 2 times daily 180 tablet 4     ibrutinib (IMBRUVICA) 420 MG tablet Take 1 tablet (420 mg) by mouth daily 28 tablet 2     ibuprofen (ADVIL/MOTRIN) 200 MG tablet Take 200 mg by mouth every 4 hours as needed for mild pain       levothyroxine (SYNTHROID/LEVOTHROID) 50 MCG tablet TAKE 1 TABLET BY MOUTH DAILY 90 tablet 3     Niacin (VITAMIN B-3 OR)        omeprazole (PRILOSEC) 10 MG DR capsule TAKE ONE CAPSULE BY MOUTH EVERY DAY 30 TO 60 MINUTES BEFORE A MEAL 90 capsule 1     RESVERATROL PO        tamsulosin (FLOMAX) 0.4 MG capsule        tiZANidine (ZANAFLEX) 4 MG tablet Take 1 tablet (4 mg) by mouth 3 times daily as needed for muscle spasms 60 tablet 1     traZODone (DESYREL) 50 MG tablet Take 1-3 tablets ( mg) by mouth At Bedtime 90 tablet 3     vitamin D3 (CHOLECALCIFEROL) 50 mcg (2000 units) tablet Take 1 tablet by mouth daily       zolpidem (AMBIEN) 5 MG tablet Take 1 tablet (5 mg) by mouth nightly as needed for sleep 15 tablet 5     ketoconazole (NIZORAL) 2 % external shampoo          There were no vitals taken for this visit.  Wt Readings from Last 10 Encounters:   04/29/21 76.7 kg (169 lb)   04/12/21 75.3 kg (166 lb)   03/04/21 75.2 kg (165 lb 12.8 oz)   02/19/21 74.9 kg (165 lb 3.2 oz)   02/10/21 74 kg (163 lb 2 oz)   01/25/21 75.3 kg (166 lb)   01/04/21 74.4 kg (164 lb)   12/01/20 70.3 kg  (154 lb 15.7 oz)   11/19/20 70.3 kg (155 lb)   09/22/20 72.5 kg (159 lb 14.4 oz)     Physical Examination:  He is pleasant, interactive, sclerae anicteric, cranial nerves grossly intact, no oral mucosal lesions, normal respiratory effort, no JVD, normal perfusion, abdomen non-distended, skin without rash, no edema, normal affect.      Lab Results   Component Value Date    WBC 8.3 07/02/2021    ANEU 2.9 07/02/2021    HGB 11.7 (L) 07/02/2021    HCT 35.8 (L) 07/02/2021     (L) 07/02/2021     07/02/2021    POTASSIUM 4.2 07/02/2021    CHLORIDE 110 (H) 07/02/2021    CO2 28 07/02/2021     (H) 07/02/2021    BUN 23 07/02/2021    CR 1.47 (H) 07/02/2021    MAG 1.9 02/28/2014    INR 1.15 (H) 12/01/2020         Assessment and Plan:    #Chronic lymphocytic leukemia, in response to ibrutinib  # Multifactorial anemia     Patient started on ibrutinib 420 mg daily on 5/14/19. He is tolerating treatment very well thus far. He had a brief episode of atrial fibrillation after starting ibrutinib but has been doing well lately. Due to cost considerations, he is taking ibrutinib only 1-2 times per week at this time, although he understands that the medical recommendation is to take it daily.  With this lower dosing, his blood counts remained satisfactory, with his ALC remaining in the normal range.  Reviewed that the purpura on his arms could be exacerbated by ibrutinib, and that his skin conditions (plantar warts and pre-cancerous skin lesions) could be due to a lower immune system related to CLL and its therapy.      He has iron deficiency anemia with a ferritin of just 6 in April 2019. He declined endoscopic evaluation and started on iron supplementation.  He also has a positive YEN suggesting a possible immune mediated component to the anemia.  However, his hemoglobin remains relatively stable and in a safe range. We will continue to monitor.       Known issues that I take into account for medical decisions, with  salient changes to the plan considering these complexities noted above.    Patient Active Problem List   Diagnosis     Esophageal reflux     Lumbago     CLL (chronic lymphocytic leukemia) (H)     HYPERLIPIDEMIA LDL GOAL <130     Mass of skin     Health Care Home     Vitamin D deficiency     Osteoarthritis of hip     OA (osteoarthritis) of hip     Insomnia     BPH (benign prostatic hyperplasia)     Acute bilateral low back pain without sciatica     Prostate nodule     Chondromalacia, knee, right     Complex tear of medial meniscus of right knee as current injury     Herpes zoster with keratoconjunctivitis, od     Post herpetic neuralgia     Aftercare following surgery of the musculoskeletal system     Paroxysmal atrial fibrillation (H)     Chronic kidney disease, stage 3 (moderate)     Fusion of spine, lumbar region       Today's summary: Continue ibrutinib, RTC in 3 months for evaluation, sooner if needed    I spent 30 minutes in the care of this patient today, which included time necessary for preparation for the visit, obtaining history, ordering medications/tests/procedures as medically indicated, review of pertinent medical literature, counseling of the patient, communication of recommendations to the care team, and documentation time.    He Burns        Again, thank you for allowing me to participate in the care of your patient.        Sincerely,        He Burns MD

## 2021-07-27 NOTE — PROGRESS NOTES
"Loco is a 76 year old who is being evaluated via a billable video visit.      How would you like to obtain your AVS? MyChart  If the video visit is dropped, the invitation should be resent by: Text to cell phone: [phone]  Will anyone else be joining your video visit? No       I have reviewed and updated the patient's allergies and medication list.    Concerns: none  Refills: none     Vitals - Patient Reported  Weight (Patient Reported): 72.6 kg (160 lb)  Height (Patient Reported): 170.2 cm (5' 7\")  BMI (Based on Pt Reported Ht/Wt): 25.06  Pain Score: No Pain (0)    Kat Roberts CMA        Video Start Time: 4:56 PM    Video-Visit Details    Type of service:  Video Visit    Video End Time:5:08 PM    Originating Location (pt. Location): Home    Distant Location (provider location):  [facility] [place]     Platform used for Video Visit: [facility]      Oncology/Hematology Visit Note  Jul 27, 2021    Reason for Visit: follow up of CLL    History of Present Illness: Loco Wood is a 76 year old male with CLL. He was diagnosed 2/12/2007 with CLL, Lyles stage 0, followed clinically since.  At diagnosis WBC 17.8, ALC 11.2, hemoglobin 15.2, platelets 188.  Flow consistent with CLL.  No opportunistic infections, with IgG in the normal range during follow up. Due to rising lymphocyte count, it was recommended he consider starting on treatment. He is currently undergoing treatment with ibrutinib, which he began on 5/14/19. Please see previous notes for further details on the patient's history.     Interval History:  Plantar warts on left foot. Has also noticed some blood spots on left arm but healed quickly. He is taking ibrutinib about once every six days. Noticing some bumps on scalp; precancerous per dermatologist, who froze them. Notes occasional hiccups.      Current Outpatient Medications   Medication Sig Dispense Refill     Acetaminophen (TYLENOL PO)        aspirin " (ASA) 81 MG EC tablet Take 1 tablet (81 mg) by mouth daily 90 tablet 3     atorvastatin (LIPITOR) 20 MG tablet Take 1 tablet (20 mg) by mouth daily 90 tablet 3     famotidine (PEPCID) 40 MG tablet Take 1 tablet (40 mg) by mouth nightly as needed for heartburn 90 tablet 4     Ferrous Gluconate 240 (27 Fe) MG TABS Take 240 mg by mouth 2 times daily 180 tablet 4     ibrutinib (IMBRUVICA) 420 MG tablet Take 1 tablet (420 mg) by mouth daily 28 tablet 2     ibuprofen (ADVIL/MOTRIN) 200 MG tablet Take 200 mg by mouth every 4 hours as needed for mild pain       levothyroxine (SYNTHROID/LEVOTHROID) 50 MCG tablet TAKE 1 TABLET BY MOUTH DAILY 90 tablet 3     Niacin (VITAMIN B-3 OR)        omeprazole (PRILOSEC) 10 MG DR capsule TAKE ONE CAPSULE BY MOUTH EVERY DAY 30 TO 60 MINUTES BEFORE A MEAL 90 capsule 1     RESVERATROL PO        tamsulosin (FLOMAX) 0.4 MG capsule        tiZANidine (ZANAFLEX) 4 MG tablet Take 1 tablet (4 mg) by mouth 3 times daily as needed for muscle spasms 60 tablet 1     traZODone (DESYREL) 50 MG tablet Take 1-3 tablets ( mg) by mouth At Bedtime 90 tablet 3     vitamin D3 (CHOLECALCIFEROL) 50 mcg (2000 units) tablet Take 1 tablet by mouth daily       zolpidem (AMBIEN) 5 MG tablet Take 1 tablet (5 mg) by mouth nightly as needed for sleep 15 tablet 5     ketoconazole (NIZORAL) 2 % external shampoo          There were no vitals taken for this visit.  Wt Readings from Last 10 Encounters:   04/29/21 76.7 kg (169 lb)   04/12/21 75.3 kg (166 lb)   03/04/21 75.2 kg (165 lb 12.8 oz)   02/19/21 74.9 kg (165 lb 3.2 oz)   02/10/21 74 kg (163 lb 2 oz)   01/25/21 75.3 kg (166 lb)   01/04/21 74.4 kg (164 lb)   12/01/20 70.3 kg (154 lb 15.7 oz)   11/19/20 70.3 kg (155 lb)   09/22/20 72.5 kg (159 lb 14.4 oz)     Physical Examination:  He is pleasant, interactive, sclerae anicteric, cranial nerves grossly intact, no oral mucosal lesions, normal respiratory effort, no JVD, normal perfusion, abdomen non-distended, skin  without rash, no edema, normal affect.      Lab Results   Component Value Date    WBC 8.3 07/02/2021    ANEU 2.9 07/02/2021    HGB 11.7 (L) 07/02/2021    HCT 35.8 (L) 07/02/2021     (L) 07/02/2021     07/02/2021    POTASSIUM 4.2 07/02/2021    CHLORIDE 110 (H) 07/02/2021    CO2 28 07/02/2021     (H) 07/02/2021    BUN 23 07/02/2021    CR 1.47 (H) 07/02/2021    MAG 1.9 02/28/2014    INR 1.15 (H) 12/01/2020         Assessment and Plan:    #Chronic lymphocytic leukemia, in response to ibrutinib  # Multifactorial anemia     Patient started on ibrutinib 420 mg daily on 5/14/19. He is tolerating treatment very well thus far. He had a brief episode of atrial fibrillation after starting ibrutinib but has been doing well lately. Due to cost considerations, he is taking ibrutinib only 1-2 times per week at this time, although he understands that the medical recommendation is to take it daily.  With this lower dosing, his blood counts remained satisfactory, with his ALC remaining in the normal range.  Reviewed that the purpura on his arms could be exacerbated by ibrutinib, and that his skin conditions (plantar warts and pre-cancerous skin lesions) could be due to a lower immune system related to CLL and its therapy.      He has iron deficiency anemia with a ferritin of just 6 in April 2019. He declined endoscopic evaluation and started on iron supplementation.  He also has a positive YEN suggesting a possible immune mediated component to the anemia.  However, his hemoglobin remains relatively stable and in a safe range. We will continue to monitor.       Known issues that I take into account for medical decisions, with salient changes to the plan considering these complexities noted above.    Patient Active Problem List   Diagnosis     Esophageal reflux     Lumbago     CLL (chronic lymphocytic leukemia) (H)     HYPERLIPIDEMIA LDL GOAL <130     [facility]     Vitamin D deficiency      Osteoarthritis of hip     OA (osteoarthritis) of hip     Insomnia     BPH (benign prostatic hyperplasia)     Acute bilateral low back pain without sciatica     Prostate nodule     Chondromalacia, knee, right     Complex tear of medial meniscus of right knee as current injury     Herpes zoster with keratoconjunctivitis, od     Post herpetic neuralgia     Aftercare following surgery of the musculoskeletal system     Paroxysmal atrial fibrillation (H)     Chronic kidney disease, stage 3 (moderate)     Fusion of spine, lumbar region       Today's summary: Continue ibrutinib, RTC in 3 months for evaluation, sooner if needed    I spent 30 minutes in the care of this patient today, which included time necessary for preparation for the visit, obtaining history, ordering medications/tests/procedures as medically indicated, review of pertinent medical literature, counseling of the patient, communication of recommendations to the care team, and documentation time.    He Burns

## 2021-09-01 NOTE — PROGRESS NOTES
Loco Wood  :  1945  DOS: 2021  MRN: [id number]    Sports Medicine Clinic Visit    PCP: Campos Parra    Loco Wood is a 76 year old male who is seen as a self referral presenting with acute on chronic low back pain.    Injury: Gradual onset of flare of chronic low back, buttocks pain over the past ~ 6+ months.  Pain located over right lower lumbar spine, right posterior lateral hip, nonradiating.  Denies any radiating symptoms at this time.  Additional Features:  Positive: weakness.  Symptoms are better with Rest.  Symptoms are worse with: lying on right hip or supine, walking, bending, .  Other evaluation and/or treatments so far consists of: Ice, Heat, Tylenol, Rest and massage therapy, stretching.  Recent imaging completed: CT lumbar spine completed 3/21/21.  Prior History of related problems: history of status post lumbar spine fusion ~ 30 years ago, status post bilateral total hip arthroplasty, left , right .    Social History: retired    Review of Systems  Musculoskeletal: as above  Remainder of review of systems is negative including constitutional, CV, pulmonary, GI, Skin and Neurologic except as noted in HPI or medical history.    Past Medical History:   Diagnosis Date     Arthritis     hip and toes     Chronic pain      CLL (chronic lymphocytic leukaemia)      Esophageal reflux      Malignant neoplasm (H)     Leukemia     Paroxysmal atrial fibrillation (H) 2020     PONV (postoperative nausea and vomiting)      Pure hypercholesterolemia      Past Surgical History:   Procedure Laterality Date     ARTHROPLASTY MINIMALLY INVASIVE HIP  5/10/2013    Procedure: ARTHROPLASTY MINIMALLY INVASIVE HIP;  Left Two Incision Total Hip Arthroplasty ;  Surgeon: Desmond Alberts MD;  Location: [facility] OR     ARTHROPLASTY MINIMALLY INVASIVE HIP  2014    Procedure: ARTHROPLASTY MINIMALLY INVASIVE HIP;  Right Total Hip Arthroplasty Minimally Invasive Two Incision  *Latex Free Room;   "Surgeon: Desmond Alberts MD;  Location: [facility] OR     BACK SURGERY      back fusion      COLONOSCOPY           COLONOSCOPY N/A 8/15/2017    Procedure: COLONOSCOPY;  colonoscopy;  Surgeon: Chun Mcguire MD;  Location:  GI     GI SURGERY      4 hernia repairs in childhood     HERNIA REPAIR      4 since age 4     IR PAE  3/5/2020     [facility] NONSPECIFIC PROCEDURE   &    (R) inguinal herniorrhapy     Z NONSPECIFIC PROCEDURE      (L) inguinal herniorrhaphy     Z NONSPECIFIC PROCEDURE      L4-5  L5 S1 fusion - spondylolisthesis     Family History   Problem Relation Age of Onset     Heart Disease Father      Cerebrovascular Disease Father          OF STROKE      Cancer Father         melonoma     Melanoma Father      Hyperlipidemia Father      Thyroid Disease Father      Cancer Mother         Lung cancer     Other Cancer Mother         lung; heavy smoker     Cerebrovascular Disease Paternal Uncle         Aneurism     Breast Cancer Maternal Aunt          from it     Cancer Paternal Uncle      Cancer Paternal Aunt      Skin Cancer No family hx of      Glaucoma No family hx of      Macular Degeneration No family hx of        Objective  /73   Ht 1.697 m (5' 6.8\")   Wt 75.3 kg (166 lb)   BMI 26.16 kg/m        General: healthy, alert and in no distress      HEENT: no scleral icterus or conjunctival erythema     Skin: no suspicious lesions or rash. No jaundice.     CV: regular rhythm by palpation, 2+ distal pulses, no pedal edema      Resp: normal respiratory effort without conversational dyspnea     Psych: normal mood and affect      Gait: nonantalgic, appropriate coordination and balance     Neuro: normal light touch sensory exam of the extremities. Motor strength as noted below     Low back exam:    Inspection:       no visible deformity in the low back, relative straightening of lumbar lordosis       normal skin       normal vascular       normal lymphatic       Prior surgical " scars noted    ROM:       full flexion, mild pain on the right lower lumbar       Baseline extension, mild pain       asymmetric rotation of the pelvis with flexion    Tender:       paraspinal muscles       Right QL, erectors, glute med, piriformis, b/l proximal hamstrings    Non Tender:       remainder of lumbar spine       midline    Strength:       hip flexion 5/5       knee extension 5/5       ankle dorsiflexion 5/5       ankle plantarflexion 5/5       dorsiflexion of the great toe 5/5    Reflexes:       patellar (L3, L4) symmetric normal       achilles tendons (S1) symmetric diminished    Sensation:      grossly intact throughout lower extremities    Skin:       well perfused       capillary refill brisk    Special tests:       straight leg raise left neg        straight leg raise right neg       Equivocal right FABRICE and SI compression        slump test neg       Radiology:  CT LUMBAR SPINE W/O CONTRAST 3/13/2021 1:52 PM     History: Low back pain, > 6 wks; history of CLL; Chronic bilateral low  back pain without sciatica; Chronic bilateral low back pain without  sciatica; CLL (chronic lymphocytic leukemia) (H).  ICD-10: Chronic bilateral low back pain without sciatica; Chronic  bilateral low back pain without sciatica; CLL (chronic lymphocytic  leukemia) (H)     Comparison: MR lumbar spine 6/29/2018. CT abdomen and pelvis 1/4/2021.     Technique: Using multidetector thin collimation helical acquisition  technique, axial, coronal and sagittal CT images through the lumbar  spine were obtained without intravenous contrast.      Findings:   Posterior transpedicular fusion hardware from L4-S1. Hardware appears  intact without marianna-hardware lucency to suggest loosening or fracture.     Vertebral body heights are maintained without evidence of fracture.  There is stable grade 2 anterolisthesis of L5 on S1 secondary to  bilateral pars interarticularis defects. Degenerative retrolisthesis  L2 on L3 and L3 on L4.  Moderate to severe disc space narrowing at L4-5  and L5-S1. The sacroiliac joints demonstrate mild degenerative changes  although are patent.     L1-L2: Mild disc bulge without significant spinal canal stenosis. No  neural foraminal stenosis..     L2-L3:  Retrolisthesis and disc bulge results in mild spinal canal  stenosis when coupled with ligamentum flavum thickening.  Mild-to-moderate bilateral neural foraminal stenosis secondary to disc  bulge and facet arthropathy.     L3-L4: Retrolisthesis with mild spinal canal stenosis. Severe right  and moderate left neural foraminal stenosis secondary to facet  arthropathy and disc bulge.     L4-L5: Decompressive laminectomy. Spinal canal and neural foramen  appear grossly patent as visualized although limited by hardware  artifact.     L5-S1: Decompressive laminectomy. Spinal canal and neural foramina  appear gross patent as visualized although limited by hardware  artifact.     Overall degenerative changes are not significantly changed since  1/4/2021.     Right renal parapelvic cyst. No obstructing bilateral renal calculi.  Atherosclerotic disease of the aorta. Small hiatal hernia..                                                                      Impression:  Stable fusion and alignment of the lumbar spine as above. Fusion  hardware is intact with no evidence of loosening or fracture. Solid  dorsolateral fusion L4-S1. Severe left L3-4 neural foraminal stenosis  and likely impinges the exiting L3 nerve root. Overall degenerative  changes are seen in the change since 1/4/2021 abdomen CT.     CT ABDOMEN PELVIS WITHOUT CONTRAST 1/4/2021 8:07 PM     CLINICAL HISTORY: Flank pain, stone disease suspected, right.     TECHNIQUE: CT scan of the abdomen and pelvis was performed without IV  contrast. Multiplanar reformats were obtained. Dose reduction  techniques were used.     CONTRAST: None.     COMPARISON: 2/11/2020     FINDINGS:   LOWER CHEST: Dependent density in both lung  bases most likely  represents mild atelectasis. Fat-containing Bochdalek-type  diaphragmatic hernia on the left. Small hiatal hernia.     HEPATOBILIARY: Normal.     PANCREAS: Normal.     SPLEEN: Normal.     ADRENAL GLANDS: Normal.     KIDNEYS/BLADDER: Mild right hydronephrosis. There is a 1 mm  nonobstructing right renal calculus. 2 mm nonobstructing left renal  calculus. Probable 2 mm obstructing calculus in the distal right  ureter on series 4 image 322, immediately proximal to the  ureterovesical junction. Stable pelvic phleboliths.     BOWEL: Sigmoid colonic diverticulosis.     LYMPH NODES: Normal.     VASCULATURE: New embolization coils in a branch of the right internal  iliac artery.     PELVIC ORGANS: The prostate gland is not well visualized due to streak  artifact from bilateral hip arthroplasties.     OTHER: None.     MUSCULOSKELETAL: Bilateral hip arthroplasties. Lower lumbar fusion.                                                                      IMPRESSION: Mild right hydronephrosis secondary to a 2 mm obstructing  calculus in the distal right ureter.    MRI LUMBAR SPINE WITHOUT CONTRAST   6/29/2018 12:39 PM      HISTORY: Approximately 6 months of right low back and leg pain.  Surgery in 1990.     COMPARISON: A CT on 7/20/2017.     TECHNIQUE: Multiplanar MR imaging was performed without contrast.     FINDINGS: Numbering of the levels is based on what appear to be five  lumbar type vertebral bodies. The majority of the vertebral bodies and  the entire spinal canal from L3 to the sacrum is obscured by metal  artifact. The visualized vertebral bodies are well aligned. No  fracture is seen. No pars interarticularis defect is demonstrated.  There is an approximately 1 cm benign-appearing hemangioma within the  L1 vertebral body. Mild degenerative signal changes are seen in the  endplates adjacent to the L2-L3 disc. The conus medullaris terminates  at the level of the T12-L1 disc. No intrathecal  abnormality is seen.  The adjacent soft tissues are unremarkable.     Findings by specific level:     T12-L1: The disc and facet joints are normal. No stenosis is seen.     L1-L2: The disc height is well-preserved. There is a minimal disc  bulge with no herniation or stenosis seen. The facet joints are  unremarkable.     L2-L3: There appears to mild posterior disc height loss. There is a  mild disc bulge with no focal herniation seen. The facet joints are  partially obscured by metal artifact, but I suspect there are mild if  not moderate degenerative changes. The central canal is partially  obscured by metal artifact. There is moderate left and mild right  neural foraminal stenosis.     L3-L4, L4-L5, and L5-S1: These levels are completely obscured by  surgical metal artifact.                                                                      IMPRESSION:   1. Surgical changes from L3 to S1. Metal artifact obscures these  levels and thus they cannot be further evaluated.  2. At L2-L3 degenerative changes result in moderate left and mild  right neural foraminal stenosis.    Assessment:  1. Gluteal pain    2. Pelvic obliquity    3. Hamstring tightness    4. History of spinal fusion    5. Right hip pain    6. Hx of bilateral hip replacements        Plan:  Discussed the assessment with the patient.  Follow up: prn based on clinical progress  CT and MR images independently visualized and reviewed with patient today in clinic  Pelvic obliquity with hx of prior lumbar fusion L3-S1, s/p b/l HERMINIO, now with persistent right lower lumbar and hip/gluteal pain  No concerning new findings relative to hardware on radiology read of most recent imaging, defer formal opinion of these to spine and ortho teams  Muscle energy and FPR used today to help with discomfort and assess how amenable his dysfunction is to manual therapies  He came to me today given my background with manual therapies  I reviewed that I am happy to continue to  see him and work on OMT  Order placed today to work with Zita Ramsay in [place], who can also provide various manual modalities and also help him to develop safe and effective HEP and home therapies  I can discuss further at any time with PT or patient relative to progress with this approach  Supportive care reviewed  All questions were answered today  Contact us with additional questions or concerns  Signs and sx of concern reviewed      Jayy Christianson DO, ALEJANDRO  Sports Medicine Physician  [facility]        Time spent in chart review, one-on-one evaluation, discussion with patient regarding nature of problem, course, prior treatments, and therapeutic options, share-decision making, procedures and referrals as appropriate/documented, and charting related to the visit: 34 minutes          Disclaimer: This note consists of symbols derived from keyboarding, dictation and/or voice recognition software. As a result, there may be errors in the script that have gone undetected. Please consider this when interpreting information found in this chart.

## 2021-09-01 NOTE — LETTER
2021         RE: Loco Wood  [address] [address] [id number]        Dear Colleague,    Thank you for referring your patient, Loco Wood, to the [facility]. Please see a copy of my visit note below.    Loco Wood  :  1945  DOS: 2021  MRN: [id number]    Sports Medicine Clinic Visit    PCP: Campos Parra    Loco Wood is a 76 year old male who is seen as a self referral presenting with acute on chronic low back pain.    Injury: Gradual onset of flare of chronic low back, buttocks pain over the past ~ 6+ months.  Pain located over right lower lumbar spine, right posterior lateral hip, nonradiating.  Denies any radiating symptoms at this time.  Additional Features:  Positive: weakness.  Symptoms are better with Rest.  Symptoms are worse with: lying on right hip or supine, walking, bending, .  Other evaluation and/or treatments so far consists of: Ice, Heat, Tylenol, Rest and massage therapy, stretching.  Recent imaging completed: CT lumbar spine completed 3/21/21.  Prior History of related problems: history of status post lumbar spine fusion ~ 30 years ago, status post bilateral total hip arthroplasty, left , right .    Social History: retired    Review of Systems  Musculoskeletal: as above  Remainder of review of systems is negative including constitutional, CV, pulmonary, GI, Skin and Neurologic except as noted in HPI or medical history.    Past Medical History:   Diagnosis Date     Arthritis     hip and toes     Chronic pain      CLL (chronic lymphocytic leukaemia)      Esophageal reflux      Malignant neoplasm (H)     Leukemia     Paroxysmal atrial fibrillation (H) 2020     PONV (postoperative nausea and vomiting)      Pure hypercholesterolemia      Past Surgical History:   Procedure Laterality Date     ARTHROPLASTY MINIMALLY INVASIVE HIP  5/10/2013    Procedure: ARTHROPLASTY MINIMALLY INVASIVE HIP;  Left  "Two Incision Total Hip Arthroplasty ;  Surgeon: Desmond Alberts MD;  Location: [facility] OR     ARTHROPLASTY MINIMALLY INVASIVE HIP  2014    Procedure: ARTHROPLASTY MINIMALLY INVASIVE HIP;  Right Total Hip Arthroplasty Minimally Invasive Two Incision  *Latex Free Room;  Surgeon: Desmond Alberts MD;  Location: [facility] OR     BACK SURGERY      back fusion      COLONOSCOPY           COLONOSCOPY N/A 8/15/2017    Procedure: COLONOSCOPY;  colonoscopy;  Surgeon: Chun Mcguire MD;  Location:  GI     GI SURGERY      4 hernia repairs in childhood     HERNIA REPAIR      4 since age 4     IR PAE  3/5/2020     ZZC NONSPECIFIC PROCEDURE   &    (R) inguinal herniorrhapy     ZZC NONSPECIFIC PROCEDURE      (L) inguinal herniorrhaphy     ZZC NONSPECIFIC PROCEDURE      L4-5  L5 S1 fusion - spondylolisthesis     Family History   Problem Relation Age of Onset     Heart Disease Father      Cerebrovascular Disease Father          OF [place]      Cancer Father         melonoma     Melanoma Father      Hyperlipidemia Father      Thyroid Disease Father      Cancer Mother         Lung cancer     Other Cancer Mother         lung; heavy smoker     Cerebrovascular Disease Paternal Uncle         Aneurism     Breast Cancer Maternal Aunt          from it     Cancer Paternal Uncle      Cancer Paternal Aunt      Skin Cancer No family hx of      Glaucoma No family hx of      Macular Degeneration No family hx of        Objective  /73   Ht 1.697 m (5' 6.8\")   Wt 75.3 kg (166 lb)   BMI 26.16 kg/m        General: healthy, alert and in no distress      HEENT: no scleral icterus or conjunctival erythema     Skin: no suspicious lesions or rash. No jaundice.     CV: regular rhythm by palpation, 2+ distal pulses, no pedal edema      Resp: normal respiratory effort without conversational dyspnea     Psych: normal mood and affect      Gait: nonantalgic, appropriate coordination and balance     Neuro: normal light " touch sensory exam of the extremities. Motor strength as noted below     Low back exam:    Inspection:       no visible deformity in the low back, relative straightening of lumbar lordosis       normal skin       normal vascular       normal lymphatic       Prior surgical scars noted    ROM:       full flexion, mild pain on the right lower lumbar       Baseline extension, mild pain       asymmetric rotation of the pelvis with flexion    Tender:       paraspinal muscles       Right QL, erectors, glute med, piriformis, b/l proximal hamstrings    Non Tender:       remainder of lumbar spine       midline    Strength:       hip flexion 5/5       knee extension 5/5       ankle dorsiflexion 5/5       ankle plantarflexion 5/5       dorsiflexion of the great toe 5/5    Reflexes:       patellar (L3, L4) symmetric normal       achilles tendons (S1) symmetric diminished    Sensation:      grossly intact throughout lower extremities    Skin:       well perfused       capillary refill brisk    Special tests:       straight leg raise left neg        straight leg raise right neg       Equivocal right FABRICE and SI compression        slump test neg       Radiology:  CT LUMBAR SPINE W/O CONTRAST 3/13/2021 1:52 PM     History: Low back pain, > 6 wks; history of CLL; Chronic bilateral low  back pain without sciatica; Chronic bilateral low back pain without  sciatica; CLL (chronic lymphocytic leukemia) (H).  ICD-10: Chronic bilateral low back pain without sciatica; Chronic  bilateral low back pain without sciatica; CLL (chronic lymphocytic  leukemia) (H)     Comparison: MR lumbar spine 6/29/2018. CT abdomen and pelvis 1/4/2021.     Technique: Using multidetector thin collimation helical acquisition  technique, axial, coronal and sagittal CT images through the lumbar  spine were obtained without intravenous contrast.      Findings:   Posterior transpedicular fusion hardware from L4-S1. Hardware appears  intact without marianna-hardware  lucency to suggest loosening or fracture.     Vertebral body heights are maintained without evidence of fracture.  There is stable grade 2 anterolisthesis of L5 on S1 secondary to  bilateral pars interarticularis defects. Degenerative retrolisthesis  L2 on L3 and L3 on L4. Moderate to severe disc space narrowing at L4-5  and L5-S1. The sacroiliac joints demonstrate mild degenerative changes  although are patent.     L1-L2: Mild disc bulge without significant spinal canal stenosis. No  neural foraminal stenosis..     L2-L3:  Retrolisthesis and disc bulge results in mild spinal canal  stenosis when coupled with ligamentum flavum thickening.  Mild-to-moderate bilateral neural foraminal stenosis secondary to disc  bulge and facet arthropathy.     L3-L4: Retrolisthesis with mild spinal canal stenosis. Severe right  and moderate left neural foraminal stenosis secondary to facet  arthropathy and disc bulge.     L4-L5: Decompressive laminectomy. Spinal canal and neural foramen  appear grossly patent as visualized although limited by hardware  artifact.     L5-S1: Decompressive laminectomy. Spinal canal and neural foramina  appear gross patent as visualized although limited by hardware  artifact.     Overall degenerative changes are not significantly changed since  1/4/2021.     Right renal parapelvic cyst. No obstructing bilateral renal calculi.  Atherosclerotic disease of the aorta. Small hiatal hernia..                                                                      Impression:  Stable fusion and alignment of the lumbar spine as above. Fusion  hardware is intact with no evidence of loosening or fracture. Solid  dorsolateral fusion L4-S1. Severe left L3-4 neural foraminal stenosis  and likely impinges the exiting L3 nerve root. Overall degenerative  changes are seen in the change since 1/4/2021 abdomen CT.     CT ABDOMEN PELVIS WITHOUT CONTRAST 1/4/2021 8:07 PM     CLINICAL HISTORY: Flank pain, stone disease  suspected, right.     TECHNIQUE: CT scan of the abdomen and pelvis was performed without IV  contrast. Multiplanar reformats were obtained. Dose reduction  techniques were used.     CONTRAST: None.     COMPARISON: 2/11/2020     FINDINGS:   LOWER CHEST: Dependent density in both lung bases most likely  represents mild atelectasis. Fat-containing Bochdalek-type  diaphragmatic hernia on the left. Small hiatal hernia.     HEPATOBILIARY: Normal.     PANCREAS: Normal.     SPLEEN: Normal.     ADRENAL GLANDS: Normal.     KIDNEYS/BLADDER: Mild right hydronephrosis. There is a 1 mm  nonobstructing right renal calculus. 2 mm nonobstructing left renal  calculus. Probable 2 mm obstructing calculus in the distal right  ureter on series 4 image 322, immediately proximal to the  ureterovesical junction. Stable pelvic phleboliths.     BOWEL: Sigmoid colonic diverticulosis.     LYMPH NODES: Normal.     VASCULATURE: New embolization coils in a branch of the right internal  iliac artery.     PELVIC ORGANS: The prostate gland is not well visualized due to streak  artifact from bilateral hip arthroplasties.     OTHER: None.     MUSCULOSKELETAL: Bilateral hip arthroplasties. Lower lumbar fusion.                                                                      IMPRESSION: Mild right hydronephrosis secondary to a 2 mm obstructing  calculus in the distal right ureter.    MRI LUMBAR SPINE WITHOUT CONTRAST   6/29/2018 12:39 PM      HISTORY: Approximately 6 months of right low back and leg pain.  Surgery in 1990.     COMPARISON: A CT on 7/20/2017.     TECHNIQUE: Multiplanar MR imaging was performed without contrast.     FINDINGS: Numbering of the levels is based on what appear to be five  lumbar type vertebral bodies. The majority of the vertebral bodies and  the entire spinal canal from L3 to the sacrum is obscured by metal  artifact. The visualized vertebral bodies are well aligned. No  fracture is seen. No pars interarticularis defect is  demonstrated.  There is an approximately 1 cm benign-appearing hemangioma within the  L1 vertebral body. Mild degenerative signal changes are seen in the  endplates adjacent to the L2-L3 disc. The conus medullaris terminates  at the level of the T12-L1 disc. No intrathecal abnormality is seen.  The adjacent soft tissues are unremarkable.     Findings by specific level:     T12-L1: The disc and facet joints are normal. No stenosis is seen.     L1-L2: The disc height is well-preserved. There is a minimal disc  bulge with no herniation or stenosis seen. The facet joints are  unremarkable.     L2-L3: There appears to mild posterior disc height loss. There is a  mild disc bulge with no focal herniation seen. The facet joints are  partially obscured by metal artifact, but I suspect there are mild if  not moderate degenerative changes. The central canal is partially  obscured by metal artifact. There is moderate left and mild right  neural foraminal stenosis.     L3-L4, L4-L5, and L5-S1: These levels are completely obscured by  surgical metal artifact.                                                                      IMPRESSION:   1. Surgical changes from L3 to S1. Metal artifact obscures these  levels and thus they cannot be further evaluated.  2. At L2-L3 degenerative changes result in moderate left and mild  right neural foraminal stenosis.    Assessment:  1. Gluteal pain    2. Pelvic obliquity    3. Hamstring tightness    4. History of spinal fusion    5. Right hip pain    6. Hx of bilateral hip replacements        Plan:  Discussed the assessment with the patient.  Follow up: prn based on clinical progress  CT and MR images independently visualized and reviewed with patient today in clinic  Pelvic obliquity with hx of prior lumbar fusion L3-S1, s/p b/l HERMINIO, now with persistent right lower lumbar and hip/gluteal pain  No concerning new findings relative to hardware on radiology read of most recent imaging, defer  formal opinion of these to spine and ortho teams  Muscle energy and FPR used today to help with discomfort and assess how amenable his dysfunction is to manual therapies  He came to me today given my background with manual therapies  I reviewed that I am happy to continue to see him and work on OMT  Order placed today to work with Zita Ramsay in [place], who can also provide various manual modalities and also help him to develop safe and effective HEP and home therapies  I can discuss further at any time with PT or patient relative to progress with this approach  Supportive care reviewed  All questions were answered today  Contact us with additional questions or concerns  Signs and sx of concern reviewed      Jayy Christianson DO, ALEJANDRO  Sports Medicine Physician  [facility]        Time spent in chart review, one-on-one evaluation, discussion with patient regarding nature of problem, course, prior treatments, and therapeutic options, share-decision making, procedures and referrals as appropriate/documented, and charting related to the visit: 34 minutes          Disclaimer: This note consists of symbols derived from keyboarding, dictation and/or voice recognition software. As a result, there may be errors in the script that have gone undetected. Please consider this when interpreting information found in this chart.        Again, thank you for allowing me to participate in the care of your patient.        Sincerely,        Jayy Christianson DO

## 2021-09-05 NOTE — PATIENT INSTRUCTIONS
Patient Education     Atopic Dermatitis (Adult)  Atopic dermatitis is a dry, itchy, red rash. It s also called eczema. The rash is chronic, or ongoing. It can come and go over time. The disease is often passed down in families. It causes a problem with the skin barrier that makes the skin more sensitive to the environment and other factors. The increased skin sensitivity causes an itch, which causes scratching. Scratching can worsen the itching or also break the skin. This can put the skin at risk of infection.   The condition is most common in people with asthma, hay fever, hives, or dry or sensitive skin. The rash may be caused by extreme heat or heavy sweating. Skin irritants can cause the rash to flare up. These can include wool or silk clothing, grease, oils, some medicines, and harsh soaps and detergents. Emotional stress can also be a trigger.   Treatment is done to relieve the itching and inflammation of the skin. This is often done with home care and over-the-counter treatments. Your healthcare provider may prescribe other treatments.   Home care  Follow these tips to care for your condition:    Keep the areas of rash clean by bathing at least every other day. Use lukewarm water to bathe. Don t use hot water, which can dry out the skin.    Don t use soaps with strong detergents. Use mild soaps made for sensitive skin.    Apply a cream or ointment to damp skin right after bathing.    Avoid things that irritate your skin. Wear absorbent, soft fabrics next to the skin rather than rough or scratchy materials.    Use mild laundry soap free of scents and perfumes. Make sure to rinse all the soap out of your clothes.    Treat any skin infection as directed.    Use oral diphenhydramine to help reduce itching. This is an antihistamine you can buy at drug and grocery stores. It can make you sleepy, so use lower doses during the daytime. Be cautious of driving or operating machinery. Or you can use loratadine or  other antihistamines that will not make you sleepy. Don't use diphenhydramine if you have glaucoma or have trouble urinating due to an enlarged prostate.  Follow-up care  See your healthcare provider, or as advised. If your symptoms don t get better or if they get worse in the next 7 days, make an appointment with your healthcare provider.   When to seek medical advice  Call your healthcare provider right away  if any of these occur:    Increasing area of redness or pain in the skin    Yellow crusts or wet drainage from the rash    Fever of 100.4 F (38 C) or higher, or as directed by your healthcare provider  Eva last reviewed this educational content on 8/1/2019 2000-2021 [employer]. All rights reserved. This information is not intended as a substitute for professional medical care. Always follow your healthcare professional's instructions.

## 2021-09-05 NOTE — TELEPHONE ENCOUNTER
Patient calling in today stating he has a rash that has been itching for 4 days. NO difficulty breathing, no hives. Taking allergy medication, allerest is the name. Rash on legs, forearms, hands, waist. Right leg is the worst.   Massage a few days ago.   Rash started after his trip to [place] last week. Stayed in Air BNB, doesn't look like a bed bug rash.   Booster of Moderna COVID19 vaccine on 8/16/21  NO fever, fluid filled sacs on the rash.   Per RN protocol patient should be seen within 24 hours.   Declined to schedule.   Home care suggestions reviewed with caller per RN protocol.   COVID 19 Nurse Triage Plan/Patient Instructions    Please be aware that novel coronavirus (COVID-19) may be circulating in the community. If you develop symptoms such as fever, cough, or SOB or if you have concerns about the presence of another infection including coronavirus (COVID-19), please contact your health care provider or visit [email or web address].     Disposition/Instructions    In-Person Visit with provider recommended. Reference Visit Selection Guide.    Thank you for taking steps to prevent the spread of this virus.  o Limit your contact with others.  o Wear a simple mask to cover your cough.  o Wash your hands well and often.    Resources    [facility]: About COVID-19: [email or web address]    CDC: What to Do If You're Sick: www.cdc.gov/coronavirus/2019-ncov/about/steps-when-sick.html    CDC: Ending Home Isolation: www.cdc.gov/coronavirus/2019-ncov/hcp/disposition-in-home-patients.html     CDC: Caring for Someone: www.cdc.gov/coronavirus/2019-ncov/if-you-are-sick/care-for-someone.html     [facility]: Interim Guidance for Hospital Discharge to Home: www.health.[facility].[place]/diseases/coronavirus/hcp/hospdischarge.pdf    [facility] clinical trials (COVID-19 research studies): [email or web address]/umn-clinical-trials     Below are the COVID-19 hotlines at the [place] Department of [facility]  ([facility]). Interpreters are available.   o For health questions: Call 956-254-7828 or 1-972.751.4696 (7 a.m. to 7 p.m.)  o For questions about schools and childcare: Call 712-059-8125 or 1-811.553.4668 (7 a.m. to 7 p.m.)     Radha Sanchez RN, BSN [facility] Triage Nurse                  Reason for Disposition    SEVERE itching (i.e., interferes with sleep, normal activities or school)    Additional Information    Negative: [1] Life-threatening reaction (anaphylaxis) in the past to similar substance (e.g., food, insect bite/sting, chemical, etc.) AND [2] < 2 hours since exposure    Negative: [1] Sudden onset of rash (within last 2 hours) AND [2] difficulty with breathing or swallowing    Negative: Shock suspected (e.g., cold/pale/clammy skin, too weak to stand, low BP, rapid pulse)    Negative: Difficult to awaken or acting confused (e.g., disoriented, slurred speech)    Negative: [1] Purple or blood-colored spots or dots AND [2] fever    Negative: Sounds like a life-threatening emergency to the triager    Negative: Insect bites suspected    Negative: Swimmer's Itch suspected    Negative: Sunburn suspected    Negative: Hives suspected    Negative: Measles suspected AND [2] known exposure to measles in past 3 weeks    Negative: [1] Chickenpox suspected AND [2] known exposure to chickenpox in past 3 weeks    Negative: [1] Drug rash suspected AND [2] started taking new medicine within last 2 weeks(Exception: antihistamine, eye drops, ear drops, decongestant or other OTC cough/cold medicines)    Negative: [1] Widespread rash AND [2] bright red, sunburn-like AND [3] current tampon use or nasal packing    Negative: [1] Widespread rash AND [2] bright red, sunburn-like AND [3] wound infection or recent surgery    Negative: [1] Bright red skin AND [2] peels off in sheets    Negative: Stiff neck (unable to touch chin to chest)    Negative: Fever    Negative: Joint pain or swelling    Negative: Rash looks like large or small  blisters (i.e., fluid filled bubbles or sacs on the skin)    Negative: Patient sounds very sick or weak to the triager    Negative: [1] Purple or blood-colored rash (spots or dots) AND [2] no fever AND [3] sounds well to triager    Negative: Sores in mouth    Negative: Face becomes swollen    Negative: [1] Headache AND [2] no fever    Protocols used: RASH OR REDNESS - WIDESPREAD-A-AH

## 2021-09-05 NOTE — PROGRESS NOTES
Dermatitis  - triamcinolone (KENALOG) 0.1 % external cream; Apply topically 2 times daily    20 minutes spent on the date of the encounter doing chart review, history and exam, documentation and further activities per the note     See Patient Instructions  Patient Instructions     Patient Education     Atopic Dermatitis (Adult)  Atopic dermatitis is a dry, itchy, red rash. It s also called eczema. The rash is chronic, or ongoing. It can come and go over time. The disease is often passed down in families. It causes a problem with the skin barrier that makes the skin more sensitive to the environment and other factors. The increased skin sensitivity causes an itch, which causes scratching. Scratching can worsen the itching or also break the skin. This can put the skin at risk of infection.   The condition is most common in people with asthma, hay fever, hives, or dry or sensitive skin. The rash may be caused by extreme heat or heavy sweating. Skin irritants can cause the rash to flare up. These can include wool or silk clothing, grease, oils, some medicines, and harsh soaps and detergents. Emotional stress can also be a trigger.   Treatment is done to relieve the itching and inflammation of the skin. This is often done with home care and over-the-counter treatments. Your healthcare provider may prescribe other treatments.   Home care  Follow these tips to care for your condition:    Keep the areas of rash clean by bathing at least every other day. Use lukewarm water to bathe. Don t use hot water, which can dry out the skin.    Don t use soaps with strong detergents. Use mild soaps made for sensitive skin.    Apply a cream or ointment to damp skin right after bathing.    Avoid things that irritate your skin. Wear absorbent, soft fabrics next to the skin rather than rough or scratchy materials.    Use mild laundry soap free of scents and perfumes. Make sure to rinse all the soap out of your clothes.    Treat any skin  infection as directed.    Use oral diphenhydramine to help reduce itching. This is an antihistamine you can buy at drug and grocery stores. It can make you sleepy, so use lower doses during the daytime. Be cautious of driving or operating machinery. Or you can use loratadine or other antihistamines that will not make you sleepy. Don't use diphenhydramine if you have glaucoma or have trouble urinating due to an enlarged prostate.  Follow-up care  See your healthcare provider, or as advised. If your symptoms don t get better or if they get worse in the next 7 days, make an appointment with your healthcare provider.   When to seek medical advice  Call your healthcare provider right away  if any of these occur:    Increasing area of redness or pain in the skin    Yellow crusts or wet drainage from the rash    Fever of 100.4 F (38 C) or higher, or as directed by your healthcare provider  Eva last reviewed this educational content on 8/1/2019 2000-2021 [employer]. All rights reserved. This information is not intended as a substitute for professional medical care. Always follow your healthcare professional's instructions.               Emory Narvaez PA-C  [facility]    Subjective   76 year old who presents to clinic today for the following health issues:    Urgent Care and Derm Problem       HPI     Rash  Onset/Duration: 5 days  Description  Location: Right arm, right leg, and lower back  Character: blotchy, flakey, red  Itching: moderate  Intensity:  moderate  Progression of Symptoms:  same  Accompanying signs and symptoms:   Fever: no  Body aches or joint pain: no  Sore throat symptoms: no  Recent cold symptoms: no  History:           Previous episodes of similar rash: None  New exposures:  None  Recent travel: Patient went to [place]   Exposure to similar rash: no  Precipitating or alleviating factors: None   Therapies tried and outcome: calamine lotion and  Benadryl/diphenhydramine -  not effective      Review of Systems   Review of Systems   Constitutional: Negative.    HENT: Negative.    Skin: Positive for rash.   All other systems reviewed and are negative.       Objective    Temp: 98.2  F (36.8  C) Temp src: Oral BP: 114/70 Pulse: 96   Resp: 12 SpO2: 97 %       Physical Exam   Physical Exam  Constitutional:       General: He is not in acute distress.     Appearance: Normal appearance. He is normal weight. He is not ill-appearing, toxic-appearing or diaphoretic.   HENT:      Head: Normocephalic and atraumatic.   Cardiovascular:      Rate and Rhythm: Normal rate.      Pulses: Normal pulses.   Pulmonary:      Effort: Pulmonary effort is normal. No respiratory distress.   Skin:     General: Skin is cool and dry.      Findings: Erythema and rash present. Rash is scaling. Rash is not macular, nodular, purpuric or vesicular.          Neurological:      General: No focal deficit present.      Mental Status: He is alert and oriented to person, place, and time. Mental status is at baseline.      Gait: Gait normal.   Psychiatric:         Mood and Affect: Mood normal.         Behavior: Behavior normal.         Thought Content: Thought content normal.         Judgment: Judgment normal.

## 2021-09-07 NOTE — TELEPHONE ENCOUNTER
Spoke with pt. Has had hives x 5 days. Has been taking antihistamines with no relief. Hot and cold with no relief. [facility] he went to  [facility] and was misdiagnosed and and was given lotion for dermatitis. Would like something that would help with itching. This is waking him at night. Hives are all over but mostly his legs, buttocks and lower back. No new medications or foods. No new soaps or detergents. Was in [place] x 1 week and was 105 degrees daily. No itching while he was there. Started when he got back. No difficulty breathing. Pt is requesting an in person appt. No openings at [facility], [facility], [facility], [facility]. Recommended evisit through mychart with photos or [facility]. Pt would like provider's recommendations.    Reason for Disposition    Patient wants to be seen    Additional Information    Negative: Difficulty breathing or wheezing now    Negative: Rapid onset of swollen tongue    Negative: Rapid onset of hoarseness or cough    Negative: Very weak (can't stand)    Negative: Difficult to awaken or acting confused (e.g., disoriented, slurred speech)    Negative: Life-threatening reaction (anaphylaxis) in the past to similar substance (e.g., food, insect bite/sting, chemical, etc.) and < 2 hours since exposure    Negative: Sounds like a life-threatening emergency to the triager    Negative: Bee, wasp, or yellow jacket sting within last 24 hours    Negative: Taking a new medicine now or within last 3 days (Exception: antihistamine, decongestant or other OTC cough/cold medicines)    Negative: Doesn't match the SYMPTOMS of hives    Negative: Swollen tongue    Negative: Widespread hives, itching, or facial swelling and onset < 2 hours of exposure to high-risk allergen (e.g., 1st dose of antibiotic, nuts, sting)    Negative: Patient sounds very sick or weak to the triager    Negative: Fever    Negative: Joint swelling    Negative: Abdominal pain    Negative: Hives have become worse and taking oral steroids (e.g., prednisone) > 24 hours     Negative: MODERATE-SEVERE hives persist (i.e., hives interfere with normal activities or work) and taking antihistamine (e.g., Benadryl, Claritin) > 24 hours    Protocols used: HIVES-A-OH

## 2021-09-07 NOTE — TELEPHONE ENCOUNTER
Provider E-Visit time total (minutes): 6    Thighs and buttocks, day or few hours   Itching driving crazy   Antihistamines last few days   Then stronger

## 2021-09-07 NOTE — TELEPHONE ENCOUNTER
Patient has had hives for 5 days. It is keeping him up at night and driving him crazy.     No appointments at [place]     Soft transferred to RNMary Bocanegra,

## 2021-09-07 NOTE — TELEPHONE ENCOUNTER
Reason for Call:  Other appointment    Detailed comments:  Patient needs an appointment today and Deepak didn't have in person OV neiter [place] and [place] had a couple of opennings but has [facility] ACUTE ONLY, please call patient back    Phone Number Patient can be reached at: Cell number on file:    Telephone Information:   Mobile [phone]       Best Time: As soon as possible     Can we leave a detailed message on this number? YES    Call taken on 9/7/2021 at 7:18 AM by Andria Rojas

## 2021-09-07 NOTE — TELEPHONE ENCOUNTER
Pt was given provider's message. Explained evisit to pt. He will do this.    Shayla Lazcano RN  [facility]

## 2021-09-18 NOTE — TELEPHONE ENCOUNTER
"Started yesterday, bright red blood from rectum while pooped. Banded hemorrhoid one year ago. Can this wait until Monday?He declines going to the urgent care since he thinks they won't be able to do anything like band the hemorrhoid today. He said he will call his GI clinic on Monday instead and will call back if the bleeding worsens this weekend.  Miya Dixon RN  [facility]      Reason for Disposition    MODERATE rectal bleeding (small blood clots, passing blood without stool, or toilet water turns red)    Additional Information    Negative: Shock suspected (e.g., cold/pale/clammy skin, too weak to stand, low BP, rapid pulse)    Negative: Difficult to awaken or acting confused (e.g., disoriented, slurred speech)    Negative: Passed out (i.e., lost consciousness, collapsed and was not responding)    Negative: [1] Vomiting AND [2] contains red blood or black (\"coffee ground\") material  (Exception: few red streaks in vomit that only happened once)    Negative: Sounds like a life-threatening emergency to the triager    Negative: Diarrhea is main symptom    Negative: Stool color other than brown or tan is main concern  (no bleeding and no melena)    Negative: SEVERE rectal bleeding (large blood clots; on and off, or constant bleeding)    Negative: SEVERE dizziness (e.g., unable to stand, requires support to walk, feels like passing out now)    Negative: [1] MODERATE rectal bleeding (small blood clots, passing blood without stool, or toilet water turns red) AND [2] more than once a day    Negative: Pale skin (pallor) of new onset or worsening    Negative: Tarry or jet black-colored stool (not dark green)    Negative: [1] Constant abdominal pain AND [2] present > 2 hours    Negative: Rectal foreign body (i.e., now or within past week;  inserted or swallowed)    Negative: High-risk adult (e.g., prior surgery on aorta, abdominal aortic aneurysm)    Negative: Taking Coumadin (warfarin) or other strong blood " thinner, or known bleeding disorder (e.g., thrombocytopenia)    Negative: Known cirrhosis of the liver (or history of liver failure or ascites)    Negative: [1] Colonoscopy AND [2] in past 72 hours    Negative: Patient sounds very sick or weak to the triager    Protocols used: RECTAL BLEEDING-A-AH

## 2021-09-20 PROBLEM — M79.18 GLUTEAL PAIN: Status: ACTIVE | Noted: 2021-01-01

## 2021-09-20 PROBLEM — M25.551 RIGHT HIP PAIN: Status: ACTIVE | Noted: 2021-01-01

## 2021-09-20 PROBLEM — Z98.1 HISTORY OF SPINAL FUSION: Status: ACTIVE | Noted: 2021-01-01

## 2021-09-20 PROBLEM — M62.89 HAMSTRING TIGHTNESS: Status: ACTIVE | Noted: 2021-01-01

## 2021-09-20 PROBLEM — M95.5 PELVIC OBLIQUITY: Status: ACTIVE | Noted: 2021-01-01

## 2021-09-20 NOTE — PROGRESS NOTES
"[facility] Physical Therapy Initial Evaluation  9/20/2021     Precautions/Restrictions/MD instructions: evaluate and treat low back pain, work with maryellen dudley in [place] on core/hip HEP and stability, manual therapies to low back and pelvis, hx of spine and hip surgery, previously worked with Do for manual therapy    Therapist Assessment: Loco Wood is a 76 year old male patient presenting to Physical Therapy with chronic LBP. Patient demonstrates decreased ROM, decreased joint mobility, decreased strength, pelvic obliquity, decreased proprioception/balance. Signs and symptoms are consistent with chronic LBP with deconditioning. These impairments limit their ability to walk, play golf. Skilled PT services are necessary in order to reduce impairments and improve independent function.    SUBJECTIVE    Chief Complaint: right buttock pain  Associated symptoms: stiffness; episodic back pain for 35 years  Paresthesia (yes/no):  no  Onset date: 6 months ago, date of order 9/1/21  Symptoms commenced as a result of: no apparent reason; this episode started 6 months ago, was \"different\" than previous back episodes - pt describes back \"going out\" to the point that he needed to be carried out by firefighters  Condition occurred in the following environment:   Home/community   Pain severity: 1/10 at rest; 1/10 current, 1/10 worst  Location of pain: right glute/buttock; also currently in back   Quality of Pain: aching, sometimes sharp, cramping  Better: maintaining exercise level, sometimes golf helps  Worse:  Walking and glute exercise increases glute cramping, golf usually makes the back worse but hasn't recently  Time of day: no effect  Progression of Symptoms since onset:  Since onset, these symptoms are improving  Previous treatment: has included [facility]; effect was good last was a few years ago; massage 1 X month  Current Functional Status: impaired  Previous Functional Status:  fully " functional prior to pain onset/injury.  Functional disability score (FESTUS/STarT Back):  17% FESTUS, Jason low    General health as reported by patient: good.    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 None. He denies any significant current illness or recent hospital admissions.   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[employer], acting/singing/comedy on the side  Job duties: taking care of home and yard  Current exercise regimen/Recreational activities: gardening, golf, exercise: walking, pickleball/tennis (hasn't done in the past 2 years), 3 days/week whole body weight lifting and cardio on bike  Barriers to treatment: none    Red Flags: (Bold when present) - reviewed the following and denies  Cauda equina: progressive motor or sensory loss, urinary retention or overflow incontinence, new fecal incontinence, saddle anesthesia, significant motor deficits encompassing multiple nerve roots  Fracture: Significant trauma, prolonged corticosteroid use, osteoporosis, age >70  Infection: spinal procedure in the last 12 months, IV drug use, immunosuppression, fever, wound in spinal region, generally unwell  Malignancy: history of metastatic cancer, unexplained weight loss      Patient's Goal(s): learn exercises to manage glute pain    OBJECTIVE    Posture:   Sitting (good/fair/poor): fair  Standing (good/fair/poor):fair  Lordosis (red/acc/normal): fair  Correction of posture (better/worse/no effect): NE    Lateral Shift (right/left/nil): shoulders tilted R, R iliac crest high  Relevant (yes/no):  Unclear - likely degenerative changes  Other Observations: L leg appears long in hook lying and long sit    Neurological:    Motor deficit:  none  Reflexes:  2+ patellar, 1+  achilles  Sensory deficit:  none Dural signs:  Slump negative    Movement Loss:   Edmar Mod Min Nil Pain   Flexion   X  no   Extension X    no   Side Gliding R  X   no   Side Gliding L  X   no     Other Tests:   SIJ Tests Positive (+)/Negative (-)/painful but not provocative of patient complaint (+/-)   Thigh thrust -   Sacral thrust -   Distraction -   Compression -   Gaenslen's      Hip screen: good ROM bilateral hips given hx of bilateral HERMINIO - flex 115, IR 20, ER 30, ext to neutral or just beyond.     Significant atrophy of R glutes    MMT L hip flex 5/5, abd 4/5, IR 5/5, ER 4+/5; R hip flex 4+/5, abd 3+/5, IR 4-/5, ER 4-/5.    Doming noted midline during transitional movements.    ASSESSMENT/PLAN  Patient is a 76 year old male with lumbar and right hip complaints.    Patient has the following significant findings with corresponding treatment plan.                Diagnosis 1:  Chronic LBP with underlying deconditioning/weakness of core and R hip    Pain -  manual therapy (as needed), self management, education and home program  Decreased ROM/flexibility - manual therapy, therapeutic exercise and home program  Decreased joint mobility - manual therapy, therapeutic exercise and home program  Decreased strength - therapeutic exercise, therapeutic activities and home program  Impaired muscle performance - neuro re-education and home program  Impaired posture - neuro re-education and home program    Therapy Evaluation Codes:   1) History comprised of:   Personal factors that impact the plan of care:      Coping style, Overall behavior pattern, Past/current experiences and Time since onset of symptoms.    Comorbidity factors that impact the plan of care are:      see above.     Medications impacting care: see above.  2) Examination of Body Systems comprised of:   Body structures and functions that impact the plan of care:      Hip, Lumbar spine and Pelvis.   Activity limitations that impact the plan of care are:       Sports, Standing and Walking.  3) Clinical presentation characteristics are:   Evolving/Changing.  4) Decision-Making    Moderate complexity using standardized patient assessment instrument and/or measureable assessment of functional outcome.  Cumulative Therapy Evaluation is: Moderate complexity.    Previous and current functional limitations:  (See Goal Flow Sheet for this information)    Short term and Long term goals: (See Goal Flow Sheet for this information)     Communication ability:  Patient appears to be able to clearly communicate and understand verbal and written communication and follow directions correctly.  Treatment Explanation - The following has been discussed with the patient:   RX ordered/plan of care  Anticipated outcomes  Possible risks and side effects  This patient would benefit from PT intervention to resume normal activities.   Rehab potential is good.    Frequency:  1 X week, once daily  Duration:  for 4 weeks tapering to 2 X a month over 4 weeks  Discharge Plan:  Achieve all LTG.  Independent in home treatment program.  Reach maximal therapeutic benefit.    Please refer to the daily flowsheet for treatment today, total treatment time and time spent performing 1:1 timed codes.

## 2021-09-21 NOTE — TELEPHONE ENCOUNTER
Health Call Center    Phone Message    May a detailed message be left on voicemail: yes     Reason for Call: Other: Loco is calling in asking for a call back. He states that he is dealing with Rectal Bleeding and believes that he may have another hemorrhoid. Writer scheduled patient for next available appointment on 10/21, but patient has asked if there would be any way to be seen sooner. Please call patient back as soon as possible to discuss.     Action Taken: Message routed to:  [facility]Rectal    Travel Screening: Not Applicable

## 2021-09-23 NOTE — LETTER
2021       RE: Loco Wood  [address] [facility] [address]     Dear Colleague,    Thank you for referring your patient, Loco Wood, to the [facility] COLON AND RECTAL SURGERY CLINIC [place] at [facility]. Please see a copy of my visit note below.    Colon and Rectal Surgery Clinic Note    RE: Loco Wood.  : 1945.  PUSHPA: 2021.    Reason for visit: follow up hemorrhoids    HPI:     Mr Loco Wood is a 75 y/o gentleman.  I performed hemorrhoid banding on him (right anterior) on 2021.  He comes in today for follow up.    Overall he is doing oK.  He is noticing SOME bleeding per rectum, usually a few drops per day.  Bleeding never went away completely after banding.  He noticed blood in the toilet bowl a week ago and then intermittent bleeding since then.  He noticed a lot of discomfort [place].  This was the first time he has noticed severe anal pain since banding.    BM's are mostly soft/solid/easy to pass.  He does fiber supplement (equate powder) nearly every evening, but notes missing a few each week.      Of note, last colonoscopy was in 2017 and he was told he had 5 year follow up.      Medical history:  Past Medical History:   Diagnosis Date     Arthritis     hip and toes     Chronic pain      CLL (chronic lymphocytic leukaemia)      Esophageal reflux      Malignant neoplasm (H)     Leukemia     Paroxysmal atrial fibrillation (H) 2020     PONV (postoperative nausea and vomiting)      Pure hypercholesterolemia        Surgical history:  Past Surgical History:   Procedure Laterality Date     ARTHROPLASTY MINIMALLY INVASIVE HIP  5/10/2013    Procedure: ARTHROPLASTY MINIMALLY INVASIVE HIP;  Left Two Incision Total Hip Arthroplasty ;  Surgeon: Desmond Alberts MD;  [facility]     ARTHROPLASTY MINIMALLY INVASIVE HIP  2014    Procedure:  ARTHROPLASTY MINIMALLY INVASIVE HIP;  Right Total Hip Arthroplasty Minimally Invasive Two Incision  *Latex Free Room;  Surgeon: Desmond Alberts MD;  [facility]     BACK SURGERY      back fusion      COLONOSCOPY           COLONOSCOPY N/A 8/15/2017    Procedure: COLONOSCOPY;  colonoscopy;  Surgeon: Chun Mcguire MD;  Location:  GI     GI SURGERY      4 hernia repairs in childhood     HERNIA REPAIR      4 since age 4     IR PAE  3/5/2020     [facility] NONSPECIFIC PROCEDURE   &    (R) inguinal herniorrhapy     [facility] NONSPECIFIC PROCEDURE      (L) inguinal herniorrhaphy     [facility] NONSPECIFIC PROCEDURE      L4-5  L5 S1 fusion - spondylolisthesis       Family history:  Family History   Problem Relation Age of Onset     Heart Disease Father      Cerebrovascular Disease Father          OF STROKE      Cancer Father         melonoma     Melanoma Father      Hyperlipidemia Father      Thyroid Disease Father      Cancer Mother         Lung cancer     Other Cancer Mother         lung; heavy smoker     Cerebrovascular Disease Paternal Uncle         Aneurism     Breast Cancer Maternal Aunt          from it     Cancer Paternal Uncle      Cancer Paternal Aunt      Skin Cancer No family hx of      Glaucoma No family hx of      Macular Degeneration No family hx of        Medications:  Current Outpatient Medications   Medication Sig Dispense Refill     Acetaminophen (TYLENOL PO)        aspirin (ASA) 81 MG EC tablet Take 1 tablet (81 mg) by mouth daily 90 tablet 3     atorvastatin (LIPITOR) 20 MG tablet Take 1 tablet (20 mg) by mouth daily 90 tablet 3     famotidine (PEPCID) 40 MG tablet Take 1 tablet (40 mg) by mouth nightly as needed for heartburn 90 tablet 4     Ferrous Gluconate 240 (27 Fe) MG TABS Take 240 mg by mouth 2 times daily 180 tablet 4     ibrutinib (IMBRUVICA) 420 MG tablet Take 1 tablet (420 mg) by mouth daily 28 tablet 2     ibrutinib (IMBRUVICA) 420 MG tablet Take 1 tablet (420 mg)  by mouth daily 28 tablet 2     ibuprofen (ADVIL/MOTRIN) 200 MG tablet Take 200 mg by mouth every 4 hours as needed for mild pain       ketoconazole (NIZORAL) 2 % external shampoo        levothyroxine (SYNTHROID/LEVOTHROID) 50 MCG tablet TAKE 1 TABLET BY MOUTH DAILY 90 tablet 3     Niacin (VITAMIN B-3 OR)        omeprazole (PRILOSEC) 10 MG DR capsule TAKE ONE CAPSULE BY MOUTH EVERY DAY 30 TO 60 MINUTES BEFORE A MEAL 90 capsule 1     predniSONE (DELTASONE) 20 MG tablet Take 3 tabs by mouth daily x 3 days, then 2 tabs daily x 3 days, then 1 tab daily x 3 days, then 1/2 tab daily x 3 days. 20 tablet 0     RESVERATROL PO        tamsulosin (FLOMAX) 0.4 MG capsule        tiZANidine (ZANAFLEX) 4 MG tablet Take 1 tablet (4 mg) by mouth 3 times daily as needed for muscle spasms 60 tablet 1     traZODone (DESYREL) 50 MG tablet Take 1-3 tablets ( mg) by mouth At Bedtime (Patient not taking: Reported on 9/5/2021) 90 tablet 3     triamcinolone (KENALOG) 0.1 % external cream Apply topically 2 times daily 80 g 0     vitamin D3 (CHOLECALCIFEROL) 50 mcg (2000 units) tablet Take 1 tablet by mouth daily       zolpidem (AMBIEN) 5 MG tablet Take 1 tablet (5 mg) by mouth nightly as needed for sleep 15 tablet 5       Allergies:  Allergies   Allergen Reactions     Dilaudid [Hydromorphone] Itching     Morphine Itching       Social history:   Social History     Tobacco Use     Smoking status: Never Smoker     Smokeless tobacco: Never Used   Substance Use Topics     Alcohol use: Yes     Alcohol/week: 0.0 standard drinks     Comment: moderate, social     Marital status: .    ROS:  A complete review of systems was performed with the patient and all systems negative except as per HPI.    Physical Examination:  Exam was chaperoned by Jeanette Boyd CMA  There were no vitals taken for this visit.  General: Well hydrated. No acute distress.  Abdomen: Soft, NT  Perianal external examination:  Perianal skin: small excoriations noted  anteriorly.  Lesions: No evidence of an external lesion, nodularity, or induration in the perianal region.  Eversion of buttocks: There was not evidence of an anal fissure. Details: N/A.  Skin tags or external hemorrhoids: None.  Digital rectal examination: Was performed.   Sphincter tone: Good.  Palpable lesions: No.  Other: None.  Bimanual examination: was not performed.    Anoscopy: Was performed.   Hemorrhoids: Yes.  Lesions: Yes: right posterior; right anterior and left lateral looked good    Procedures:  After discussing the risks and benefits, the patient agreed to proceed with internal hemorrhoidal banding.    Prior to the start of the procedure and with procedural staff participation, I verbally confirmed the patient s identity using two indicators, relevant allergies, that the procedure was appropriate and matched the consent or emergent situation, and that the correct equipment/implants were available. Immediately prior to starting the procedure I conducted the Time Out with the procedural staff and re-confirmed the patient s name, procedure, and site/side. (The Joint Commission universal protocol was followed.)  Yes    Sedation (Moderate or Deep): None    A suction hemorrhoidal  was used to place a total of 1 band(s) in the right posterior position(s).    There was no significant bleeding. The patient tolerated the procedure well.  .    Laboratory values reviewed:  Recent Labs   Lab Test 09/20/21  0952 12/11/20  0937 12/01/20  1425   WBC 9.3   < > 17.8*   HGB 11.9*   < > 13.7   *   < > 174   CR 1.47*   < > 1.32*   ALBUMIN 3.7   < >  --    BILITOTAL 1.1   < >  --    ALKPHOS 67   < >  --    ALT 29   < >  --    AST 23   < >  --    INR  --   --  1.15*    < > = values in this interval not displayed.       Imaging personally reviewed by me:  None today.    ASSESSMENT  77 y/o gentleman with grade III internal hemorrhoid/     Risks, benefits, and alternatives of operative treatment were thoroughly  discussed with the patient, he/she understands these well and agrees to proceed.    PLAN  1. Hemorrhoid banding in right posterior position today  2. Increase fiber to bid  3. RTC in 4 weeks if still having symptoms    30 minutes spent on the date of the encounter doing chart review, history and exam, imaging review, documentation and further activities as noted above.      Stevo Christian MD, PhD    Division of Colon and Rectal Surgery  [facility]    Referring Provider:  Referred Self, MD  No address on file     Primary Care Provider:  Campos Parra

## 2021-09-23 NOTE — NURSING NOTE
"Chief Complaint   Patient presents with     Rectal Bleeding       Vitals:    09/23/21 0845   BP: 135/73   BP Location: Left arm   Patient Position: Sitting   Cuff Size: Adult Regular   Pulse: 96   SpO2: 99%   Weight: 166 lb 14.4 oz   Height: 5' 7\"       Body mass index is 26.14 kg/m .    Jeanette Boyd CMA    "

## 2021-09-23 NOTE — PROGRESS NOTES
Colon and Rectal Surgery Clinic Note    RE: Loco Wood.  : 1945.  PUSHPA: 2021.    Reason for visit: follow up hemorrhoids    HPI:     Mr Loco Wood is a 77 y/o gentleman.  I performed hemorrhoid banding on him (right anterior) on 2021.  He comes in today for follow up.    Overall he is doing oK.  He is noticing SOME bleeding per rectum, usually a few drops per day.  Bleeding never went away completely after banding.  He noticed blood in the toilet bowl a week ago and then intermittent bleeding since then.  He noticed a lot of discomfort yesterday [place].  This was the first time he has noticed severe anal pain since banding.    BM's are mostly soft/solid/easy to pass.  He does fiber supplement (equate powder) nearly every evening, but notes missing a few each week.      Of note, last colonoscopy was in  and he was told he had 5 year follow up.      Medical history:  Past Medical History:   Diagnosis Date     Arthritis     hip and toes     Chronic pain      CLL (chronic lymphocytic leukaemia)      Esophageal reflux      Malignant neoplasm (H)     Leukemia     Paroxysmal atrial fibrillation (H) 2020     PONV (postoperative nausea and vomiting)      Pure hypercholesterolemia        Surgical history:  Past Surgical History:   Procedure Laterality Date     ARTHROPLASTY MINIMALLY INVASIVE HIP  5/10/2013    Procedure: ARTHROPLASTY MINIMALLY INVASIVE HIP;  Left Two Incision Total Hip Arthroplasty ;  Surgeon: Desmond Alberts MD;  [facility]     ARTHROPLASTY MINIMALLY INVASIVE HIP  2014    Procedure: ARTHROPLASTY MINIMALLY INVASIVE HIP;  Right Total Hip Arthroplasty Minimally Invasive Two Incision  *Latex Free Room;  Surgeon: Desmond Alberts MD;  [facility]     BACK SURGERY      back fusion      COLONOSCOPY           COLONOSCOPY N/A 8/15/2017    Procedure: COLONOSCOPY;  colonoscopy;  Surgeon: Chun Mcguire MD;  Location:   GI     GI SURGERY      4 hernia repairs in childhood     HERNIA REPAIR      4 since age 4     IR PAE  3/5/2020     [facility] NONSPECIFIC PROCEDURE   &    (R) inguinal herniorrhapy     [facility] NONSPECIFIC PROCEDURE      (L) inguinal herniorrhaphy     [facility] NONSPECIFIC PROCEDURE      L4-5  L5 S1 fusion - spondylolisthesis       Family history:  Family History   Problem Relation Age of Onset     Heart Disease Father      Cerebrovascular Disease Father          OF STROKE      Cancer Father         melonoma     Melanoma Father      Hyperlipidemia Father      Thyroid Disease Father      Cancer Mother         Lung cancer     Other Cancer Mother         lung; heavy smoker     Cerebrovascular Disease Paternal Uncle         Aneurism     Breast Cancer Maternal Aunt          from it     Cancer Paternal Uncle      Cancer Paternal Aunt      Skin Cancer No family hx of      Glaucoma No family hx of      Macular Degeneration No family hx of        Medications:  Current Outpatient Medications   Medication Sig Dispense Refill     Acetaminophen (TYLENOL PO)        aspirin (ASA) 81 MG EC tablet Take 1 tablet (81 mg) by mouth daily 90 tablet 3     atorvastatin (LIPITOR) 20 MG tablet Take 1 tablet (20 mg) by mouth daily 90 tablet 3     famotidine (PEPCID) 40 MG tablet Take 1 tablet (40 mg) by mouth nightly as needed for heartburn 90 tablet 4     Ferrous Gluconate 240 (27 Fe) MG TABS Take 240 mg by mouth 2 times daily 180 tablet 4     ibrutinib (IMBRUVICA) 420 MG tablet Take 1 tablet (420 mg) by mouth daily 28 tablet 2     ibrutinib (IMBRUVICA) 420 MG tablet Take 1 tablet (420 mg) by mouth daily 28 tablet 2     ibuprofen (ADVIL/MOTRIN) 200 MG tablet Take 200 mg by mouth every 4 hours as needed for mild pain       ketoconazole (NIZORAL) 2 % external shampoo        levothyroxine (SYNTHROID/LEVOTHROID) 50 MCG tablet TAKE 1 TABLET BY MOUTH DAILY 90 tablet 3     Niacin (VITAMIN B-3 OR)        omeprazole (PRILOSEC) 10 MG   capsule TAKE ONE CAPSULE BY MOUTH EVERY DAY 30 TO 60 MINUTES BEFORE A MEAL 90 capsule 1     predniSONE (DELTASONE) 20 MG tablet Take 3 tabs by mouth daily x 3 days, then 2 tabs daily x 3 days, then 1 tab daily x 3 days, then 1/2 tab daily x 3 days. 20 tablet 0     RESVERATROL PO        tamsulosin (FLOMAX) 0.4 MG capsule        tiZANidine (ZANAFLEX) 4 MG tablet Take 1 tablet (4 mg) by mouth 3 times daily as needed for muscle spasms 60 tablet 1     traZODone (DESYREL) 50 MG tablet Take 1-3 tablets ( mg) by mouth At Bedtime (Patient not taking: Reported on 9/5/2021) 90 tablet 3     triamcinolone (KENALOG) 0.1 % external cream Apply topically 2 times daily 80 g 0     vitamin D3 (CHOLECALCIFEROL) 50 mcg (2000 units) tablet Take 1 tablet by mouth daily       zolpidem (AMBIEN) 5 MG tablet Take 1 tablet (5 mg) by mouth nightly as needed for sleep 15 tablet 5       Allergies:  Allergies   Allergen Reactions     Dilaudid [Hydromorphone] Itching     Morphine Itching       Social history:   Social History     Tobacco Use     Smoking status: Never Smoker     Smokeless tobacco: Never Used   Substance Use Topics     Alcohol use: Yes     Alcohol/week: 0.0 standard drinks     Comment: moderate, social     Marital status: .    ROS:  A complete review of systems was performed with the patient and all systems negative except as per HPI.    Physical Examination:  Exam was chaperoned by Jeanette Boyd CMA  There were no vitals taken for this visit.  General: Well hydrated. No acute distress.  Abdomen: Soft, NT  Perianal external examination:  Perianal skin: small excoriations noted anteriorly.  Lesions: No evidence of an external lesion, nodularity, or induration in the perianal region.  Eversion of buttocks: There was not evidence of an anal fissure. Details: N/A.  Skin tags or external hemorrhoids: None.  Digital rectal examination: Was performed.   Sphincter tone: Good.  Palpable lesions: No.  Other: None.  Bimanual  examination: was not performed.    Anoscopy: Was performed.   Hemorrhoids: Yes.  Lesions: Yes: right posterior; right anterior and left lateral looked good    Procedures:  After discussing the risks and benefits, the patient agreed to proceed with internal hemorrhoidal banding.    Prior to the start of the procedure and with procedural staff participation, I verbally confirmed the patient s identity using two indicators, relevant allergies, that the procedure was appropriate and matched the consent or emergent situation, and that the correct equipment/implants were available. Immediately prior to starting the procedure I conducted the Time Out with the procedural staff and re-confirmed the patient s name, procedure, and site/side. (The Joint Commission universal protocol was followed.)  Yes    Sedation (Moderate or Deep): None    A suction hemorrhoidal  was used to place a total of 1 band(s) in the right posterior position(s).    There was no significant bleeding. The patient tolerated the procedure well.  .    Laboratory values reviewed:  Recent Labs   Lab Test 09/20/21  0952 12/11/20  0937 12/01/20  1425   WBC 9.3   < > 17.8*   HGB 11.9*   < > 13.7   *   < > 174   CR 1.47*   < > 1.32*   ALBUMIN 3.7   < >  --    BILITOTAL 1.1   < >  --    ALKPHOS 67   < >  --    ALT 29   < >  --    AST 23   < >  --    INR  --   --  1.15*    < > = values in this interval not displayed.       Imaging personally reviewed by me:  None today.    ASSESSMENT  75 y/o gentleman with grade III internal hemorrhoid/     Risks, benefits, and alternatives of operative treatment were thoroughly discussed with the patient, he/she understands these well and agrees to proceed.    PLAN  1. Hemorrhoid banding in right posterior position today  2. Increase fiber to bid  3. RTC in 4 weeks if still having symptoms    30 minutes spent on the date of the encounter doing chart review, history and exam, imaging review, documentation and further  activities as noted above.      Stevo Christian MD, PhD    Division of Colon and Rectal Surgery  [facility]    Referring Provider:  Referred Self, MD  No address on file     Primary Care Provider:  Campos Parra

## 2021-09-24 NOTE — TELEPHONE ENCOUNTER
I discussed with Loco that this is from the hemorrhoid band causing pressure on his prostate and that this is normal and can happen. We reviewed hemorrhoid pain management. I told him to call on Monday if any new issues come up.

## 2021-09-24 NOTE — TELEPHONE ENCOUNTER
[facility] Call Center    Phone Message    May a detailed message be left on voicemail: yes     Reason for Call: Other: Pt is requesting a call back please, he states he had to use the bathroom about 20x last night after his hemorrhoid banding and thinks he may now have a UTI. Please advise. Thank you!     Action Taken: Message routed to:  [facility] Rectal     Travel Screening: Not Applicable

## 2021-09-24 NOTE — TELEPHONE ENCOUNTER
[facility] Call Center    Phone Message    May a detailed message be left on voicemail: yes     Reason for Call: Other: Pt is requesting Dottie please give a call back, he is unable to have a bowel movement and is wanting to make sure this is also normal. Please advise. Thank you!     Action Taken: Message routed to:  [facility] Colon and Rectal    Travel Screening: Not Applicable

## 2021-09-24 NOTE — TELEPHONE ENCOUNTER
"Pt calling. States he had hemorrhoid banding done yesterday by colon and rectal surgeon at [facility]. He has not had a BM in 2 days. Has been taking fiber. Denies nausea or vomiting. States he is very uncomfortable. Feels like he has a \"mass down there\". Is able to pass gas. Recommended pt contact the surgeon. Pt will contact Dr. Christian' office.    Shayla Lazcano RN  [facility]    "

## 2021-09-27 NOTE — TELEPHONE ENCOUNTER
"Called and spoke with patient.  Informed of the patient of the provider's recommendations.  Patient reports noticing scant/small amounts of \"pink, mucus discharge\" on the toilet paper.  These symptoms have been on-going X 5 days (since procedure).    Patient was advised to call the clinic [phone] or seek medical assistance at the UC or ER if the symptoms persist or worsen with the home care recommendations.  Patient verbalized understanding and has no further questions or concerns at this time.      "

## 2021-09-27 NOTE — TELEPHONE ENCOUNTER
Patient called regarding pain and bleeding after hemorrhoid banding. He has had constant pain, weeping and bleeding from site. Pain is rated 8/10, constant and hurts sitting standing and laying down. Fluid is pink and mucousy, saturating through underwear, pants and onto sheets. He also noticed a new mas near his anus since the following day.     He has 2 calls into Colon and Rectal nurse, they have not called back.     Please advise.     Myra Russell RN

## 2021-09-27 NOTE — TELEPHONE ENCOUNTER
Ok to using ice over the area to reduce swelling and to monitor for fever or inez pus draining     If the area is open and only minimally bleeding, it will heal in a few days     Only need to worry if it shows signs of infection

## 2021-09-28 NOTE — TELEPHONE ENCOUNTER
I spoke with patient who states he feels a mass that is very hard and painful near his anus. He is in 8/10 pain and he cannot sit. He feels a lot of pelvic pressure. He is constipated despite fiber and miralax. He has difficulty urinating still and pooping. Due to symptoms not getting better set up visit today for assessment.

## 2021-09-28 NOTE — PROGRESS NOTES
"Colon and Rectal Surgery Follow-Up Clinic Note    RE: Loco Wood  : 1945  PUSHPA: 2021    Loco Wood is a very pleasant 76 year old male here for follow-up of hemorrhoids.    Interval history: Loco saw Dr. Christian on 2021 with rectal bleeding and hemorrhoid banding at that time.  He did well the day of the banding and worked out that day.  However, the day after he started developing fairly severe pain.  He reports that he has a mass that is oozing a pink viscous solution.  This is severely painful.  It is most painful with standing and walking.  Once he is sitting if he stays in the same position for a while it is the most comfortable.  He feels like he needs to poop all of the time.  He had trouble voiding right after the banding and voided about 20 times a first night.  He is now urinating without difficulty but still feels like it is more frequent.  He was having a soft bowel movements prior to banding but is now having small, hard bowel movements.    Physical Examination: Exam was chaperoned by Jeanette Boyd MA   /77   Pulse 107   Ht 5' 7\"   Wt 164 lb 9.6 oz   SpO2 100%   BMI 25.78 kg/m    General: alert, oriented, in no acute distress, sitting comfortably  HEENT: mucous membranes moist  Perianal external examination:  Perianal skin: Intact with no excoriation or lichenification.  Lesions: No evidence of an external lesion, nodularity, or induration in the perianal region.  Eversion of buttocks: There was not evidence of an anal fissure. Details: N/A.  Skin tags or external hemorrhoids: Yes: Prolapsed thrombosed internal hemorrhoid in the left lateral position.  I was able to easily manually reduce this.  No necrosis.  No bleeding.  Several clots visible at the surface of the hemorrhoid..    Assessment/Plan: 76 year old male with thrombosed internal hemorrhoid following hemorrhoid banding. I advised gently reducing this if possible. Warm tub baths frequently. I " would expect some bleeding but if he has uncontrolled bleeding, fevers, chills, or worsening pain, contact the clinic. I recommended increasing his Miralax to twice a day to get stools of the soft to loose side. Patient's questions were answered to his stated satisfaction and he is in agreement with this plan.    Medical history:  Past Medical History:   Diagnosis Date     Arthritis     hip and toes     Chronic pain      CLL (chronic lymphocytic leukaemia)      Esophageal reflux      Malignant neoplasm (H)     Leukemia     Paroxysmal atrial fibrillation (H) 2/5/2020     PONV (postoperative nausea and vomiting)      Pure hypercholesterolemia        Surgical history:  Past Surgical History:   Procedure Laterality Date     ARTHROPLASTY MINIMALLY INVASIVE HIP  5/10/2013    Procedure: ARTHROPLASTY MINIMALLY INVASIVE HIP;  Left Two Incision Total Hip Arthroplasty ;  Surgeon: Desmond Alberts MD;  Location: [facility]     ARTHROPLASTY MINIMALLY INVASIVE HIP  2/27/2014    Procedure: ARTHROPLASTY MINIMALLY INVASIVE HIP;  Right Total Hip Arthroplasty Minimally Invasive Two Incision  *Latex Free Room;  Surgeon: Desmond Alberts MD;  Location: [facility]     BACK SURGERY      back fusion 1994     COLONOSCOPY      2007     COLONOSCOPY N/A 8/15/2017    Procedure: COLONOSCOPY;  colonoscopy;  Surgeon: Chun Mcguire MD;  Location:  GI     GI SURGERY      4 hernia repairs in childhood     HERNIA REPAIR      4 since age 4     IR PAE  3/5/2020     Z NONSPECIFIC PROCEDURE  1964 &'95    (R) inguinal herniorrhapy     ZZ NONSPECIFIC PROCEDURE  1949    (L) inguinal herniorrhaphy     ZZ NONSPECIFIC PROCEDURE  1994    L4-5  L5 S1 fusion - spondylolisthesis       Problem list:  Patient Active Problem List    Diagnosis Date Noted     Gluteal pain 09/20/2021     Priority: Medium     Pelvic obliquity 09/20/2021     Priority: Medium     Hamstring tightness 09/20/2021     Priority: Medium     History of spinal fusion 09/20/2021     Priority:  Medium     Right hip pain 09/20/2021     Priority: Medium     Fusion of spine, lumbar region 01/25/2021     Priority: Medium     Paroxysmal atrial fibrillation (H) 02/05/2020     Priority: Medium     Chronic kidney disease, stage 3 (moderate) 02/05/2020     Priority: Medium     IMO Regulatory Load OCT 2020       Post herpetic neuralgia 11/21/2017     Priority: Medium     Herpes zoster with keratoconjunctivitis, od 10/29/2017     Priority: Medium     Chondromalacia, knee, right 08/01/2017     Priority: Medium     Complex tear of medial meniscus of right knee as current injury 08/01/2017     Priority: Medium     Prostate nodule 06/16/2017     Priority: Medium     Acute bilateral low back pain without sciatica 05/31/2017     Priority: Medium     BPH (benign prostatic hyperplasia) 04/29/2015     Priority: Medium     Aftercare following surgery of the musculoskeletal system 04/17/2015     Priority: Medium     Insomnia 06/12/2014     Priority: Medium     Hx of bilateral hip replacements 05/05/2014     Priority: Medium     OA (osteoarthritis) of hip 05/10/2013     Priority: Medium     Osteoarthritis of hip 04/29/2013     Priority: Medium     Do you wish to do the replacement in the background? yes         Vitamin D deficiency 04/30/2012     Priority: Medium     Health Care Home 06/29/2011     Priority: Medium     x  DX V65.8 REPLACED WITH [facility] (04/08/2013)       Mass of skin 03/28/2011     Priority: Medium     Left thumb.       HYPERLIPIDEMIA LDL GOAL <130 10/31/2010     Priority: Medium     CLL (chronic lymphocytic leukemia) (H) 05/11/2007     Priority: Medium     Lumbago 08/04/2006     Priority: Medium     Esophageal reflux 06/23/2005     Priority: Medium       Medications:  Current Outpatient Medications   Medication Sig Dispense Refill     Acetaminophen (TYLENOL PO)        aspirin (ASA) 81 MG EC tablet Take 1 tablet (81 mg) by mouth daily 90 tablet 3     atorvastatin (LIPITOR) 20 MG tablet Take 1  tablet (20 mg) by mouth daily 90 tablet 3     famotidine (PEPCID) 40 MG tablet Take 1 tablet (40 mg) by mouth nightly as needed for heartburn 90 tablet 4     ibrutinib (IMBRUVICA) 420 MG tablet Take 1 tablet (420 mg) by mouth daily 28 tablet 2     ibrutinib (IMBRUVICA) 420 MG tablet Take 1 tablet (420 mg) by mouth daily 28 tablet 2     ibuprofen (ADVIL/MOTRIN) 200 MG tablet Take 200 mg by mouth every 4 hours as needed for mild pain       ketoconazole (NIZORAL) 2 % external shampoo        levothyroxine (SYNTHROID/LEVOTHROID) 50 MCG tablet TAKE 1 TABLET BY MOUTH DAILY 90 tablet 3     Niacin (VITAMIN B-3 OR)        omeprazole (PRILOSEC) 10 MG DR capsule TAKE ONE CAPSULE BY MOUTH EVERY DAY 30 TO 60 MINUTES BEFORE A MEAL 90 capsule 1     predniSONE (DELTASONE) 20 MG tablet Take 3 tabs by mouth daily x 3 days, then 2 tabs daily x 3 days, then 1 tab daily x 3 days, then 1/2 tab daily x 3 days. 20 tablet 0     RESVERATROL PO        tamsulosin (FLOMAX) 0.4 MG capsule        tiZANidine (ZANAFLEX) 4 MG tablet Take 1 tablet (4 mg) by mouth 3 times daily as needed for muscle spasms 60 tablet 1     triamcinolone (KENALOG) 0.1 % external cream Apply topically 2 times daily 80 g 0     vitamin D3 (CHOLECALCIFEROL) 50 mcg (2000 units) tablet Take 1 tablet by mouth daily       zolpidem (AMBIEN) 5 MG tablet Take 1 tablet (5 mg) by mouth nightly as needed for sleep 15 tablet 5     Ferrous Gluconate 240 (27 Fe) MG TABS Take 240 mg by mouth 2 times daily 180 tablet 4     traZODone (DESYREL) 50 MG tablet Take 1-3 tablets ( mg) by mouth At Bedtime (Patient not taking: Reported on 2021) 90 tablet 3       Allergies:  Allergies   Allergen Reactions     Dilaudid [Hydromorphone] Itching     Morphine Itching       Family history:  Family History   Problem Relation Age of Onset     Heart Disease Father      Cerebrovascular Disease Father          OF STROKE      Cancer Father         melonoma     Melanoma Father       "Hyperlipidemia Father      Thyroid Disease Father      Cancer Mother         Lung cancer     Other Cancer Mother         lung; heavy smoker     Cerebrovascular Disease Paternal Uncle         Aneurism     Breast Cancer Maternal Aunt          from it     Cancer Paternal Uncle      Cancer Paternal Aunt      Skin Cancer No family hx of      Glaucoma No family hx of      Macular Degeneration No family hx of        Social history:  Social History     Tobacco Use     Smoking status: Never Smoker     Smokeless tobacco: Never Used   Substance Use Topics     Alcohol use: Yes     Alcohol/week: 0.0 standard drinks     Comment: moderate, social     Marital status: .    Nursing Notes:   Jeanette Boyd  2021 11:33 AM  Signed  Chief Complaint   Patient presents with     Follow Up     hemorrhoid check        Vitals:    21 1130   BP: 129/77   Pulse: 107   SpO2: 100%   Weight: 164 lb 9.6 oz   Height: 5' 7\"       Body mass index is 25.78 kg/m .    Jeanette Boyd CMA         15 minutes spent on the date of the encounter doing chart review, history and exam, documentation and further activities as noted above.     LON VaughanC  Colon and Rectal Surgery  [facility]    "

## 2021-09-28 NOTE — LETTER
"2021       RE: Loco Wood  [address] [id number]     Dear Colleague,    Thank you for referring your patient, Loco Wood, to the [facility] COLON AND RE[facility] [place] at [facility]. Please see a copy of my visit note below.    Colon and Rectal Surgery Follow-Up Clinic Note    RE: Loco Wood  : 1945  PUSHPA: 2021    Loco Wood is a very pleasant 76 year old male here for follow-up of hemorrhoids.    Interval history: Loco saw Dr. Christian on 2021 with rectal bleeding and hemorrhoid banding at that time.  He did well the day of the banding and worked out that day.  However, the day after he started developing fairly severe pain.  He reports that he has a mass that is oozing a pink viscous solution.  This is severely painful.  It is most painful with standing and walking.  Once he is sitting if he stays in the same position for a while it is the most comfortable.  He feels like he needs to poop all of the time.  He had trouble voiding right after the banding and voided about 20 times a first night.  He is now urinating without difficulty but still feels like it is more frequent.  He was having a soft bowel movements prior to banding but is now having small, hard bowel movements.    Physical Examination: Exam was chaperoned by Jeanette Boyd MA   /77   Pulse 107   Ht 5' 7\"   Wt 164 lb 9.6 oz   SpO2 100%   BMI 25.78 kg/m    General: alert, oriented, in no acute distress, sitting comfortably  HEENT: mucous membranes moist  Perianal external examination:  Perianal skin: Intact with no excoriation or lichenification.  Lesions: No evidence of an external lesion, nodularity, or induration in the perianal region.  Eversion of buttocks: There was not evidence of an anal fissure. Details: N/A.  Skin tags or external hemorrhoids: Yes: Prolapsed thrombosed internal hemorrhoid " in the left lateral position.  I was able to easily manually reduce this.  No necrosis.  No bleeding.  Several clots visible at the surface of the hemorrhoid..    Assessment/Plan: 76 year old male with thrombosed internal hemorrhoid following hemorrhoid banding. I advised gently reducing this if possible. Warm tub baths frequently. I would expect some bleeding but if he has uncontrolled bleeding, fevers, chills, or worsening pain, contact the clinic. I recommended increasing his Miralax to twice a day to get stools of the soft to loose side. Patient's questions were answered to his stated satisfaction and he is in agreement with this plan.    Medical history:  Past Medical History:   Diagnosis Date     Arthritis     hip and toes     Chronic pain      CLL (chronic lymphocytic leukaemia)      Esophageal reflux      Malignant neoplasm (H)     Leukemia     Paroxysmal atrial fibrillation (H) 2/5/2020     PONV (postoperative nausea and vomiting)      Pure hypercholesterolemia        Surgical history:  Past Surgical History:   Procedure Laterality Date     ARTHROPLASTY MINIMALLY INVASIVE HIP  5/10/2013    Procedure: ARTHROPLASTY MINIMALLY INVASIVE HIP;  Left Two Incision Total Hip Arthroplasty ;  Surgeon: Desmond Alberts MD;  [facility]     ARTHROPLASTY MINIMALLY INVASIVE HIP  2/27/2014    Procedure: ARTHROPLASTY MINIMALLY INVASIVE HIP;  Right Total Hip Arthroplasty Minimally Invasive Two Incision  *Latex Free Room;  Surgeon: Desmond Alberts MD;  [facility]     BACK SURGERY      back fusion 1994     COLONOSCOPY      2007     COLONOSCOPY N/A 8/15/2017    Procedure: COLONOSCOPY;  colonoscopy;  Surgeon: Chun Mcguire MD;  Location:  GI     GI SURGERY      4 hernia repairs in childhood     HERNIA REPAIR      4 since age 4     IR PAE  3/5/2020     [facility] NONSPECIFIC PROCEDURE  1964 &'95    (R) inguinal herniorrhapy     [facility] NONSPECIFIC PROCEDURE  1949    (L) inguinal herniorrhaphy     [facility] NONSPECIFIC  PROCEDURE  1994    L4-5  L5 S1 fusion - spondylolisthesis       Problem list:  Patient Active Problem List    Diagnosis Date Noted     Gluteal pain 09/20/2021     Priority: Medium     Pelvic obliquity 09/20/2021     Priority: Medium     Hamstring tightness 09/20/2021     Priority: Medium     History of spinal fusion 09/20/2021     Priority: Medium     Right hip pain 09/20/2021     Priority: Medium     Fusion of spine, lumbar region 01/25/2021     Priority: Medium     Paroxysmal atrial fibrillation (H) 02/05/2020     Priority: Medium     Chronic kidney disease, stage 3 (moderate) 02/05/2020     Priority: Medium     IMO Regulatory Load OCT 2020       Post herpetic neuralgia 11/21/2017     Priority: Medium     Herpes zoster with keratoconjunctivitis, od 10/29/2017     Priority: Medium     Chondromalacia, knee, right 08/01/2017     Priority: Medium     Complex tear of medial meniscus of right knee as current injury 08/01/2017     Priority: Medium     Prostate nodule 06/16/2017     Priority: Medium     Acute bilateral low back pain without sciatica 05/31/2017     Priority: Medium     BPH (benign prostatic hyperplasia) 04/29/2015     Priority: Medium     Aftercare following surgery of the musculoskeletal system 04/17/2015     Priority: Medium     Insomnia 06/12/2014     Priority: Medium     Hx of bilateral hip replacements 05/05/2014     Priority: Medium     OA (osteoarthritis) of hip 05/10/2013     Priority: Medium     Osteoarthritis of hip 04/29/2013     Priority: Medium     Do you wish to do the replacement in the background? yes         Vitamin D deficiency 04/30/2012     Priority: Medium     Health Care Home 06/29/2011     Priority: Medium     x  DX V65.8 REPLACED WITH 75903 [facility] (04/08/2013)       Mass of skin 03/28/2011     Priority: Medium     Left thumb.       HYPERLIPIDEMIA LDL GOAL <130 10/31/2010     Priority: Medium     CLL (chronic lymphocytic leukemia) (H) 05/11/2007     Priority: Medium      Lumbago 08/04/2006     Priority: Medium     Esophageal reflux 06/23/2005     Priority: Medium       Medications:  Current Outpatient Medications   Medication Sig Dispense Refill     Acetaminophen (TYLENOL PO)        aspirin (ASA) 81 MG EC tablet Take 1 tablet (81 mg) by mouth daily 90 tablet 3     atorvastatin (LIPITOR) 20 MG tablet Take 1 tablet (20 mg) by mouth daily 90 tablet 3     famotidine (PEPCID) 40 MG tablet Take 1 tablet (40 mg) by mouth nightly as needed for heartburn 90 tablet 4     ibrutinib (IMBRUVICA) 420 MG tablet Take 1 tablet (420 mg) by mouth daily 28 tablet 2     ibrutinib (IMBRUVICA) 420 MG tablet Take 1 tablet (420 mg) by mouth daily 28 tablet 2     ibuprofen (ADVIL/MOTRIN) 200 MG tablet Take 200 mg by mouth every 4 hours as needed for mild pain       ketoconazole (NIZORAL) 2 % external shampoo        levothyroxine (SYNTHROID/LEVOTHROID) 50 MCG tablet TAKE 1 TABLET BY MOUTH DAILY 90 tablet 3     Niacin (VITAMIN B-3 OR)        omeprazole (PRILOSEC) 10 MG DR capsule TAKE ONE CAPSULE BY MOUTH EVERY DAY 30 TO 60 MINUTES BEFORE A MEAL 90 capsule 1     predniSONE (DELTASONE) 20 MG tablet Take 3 tabs by mouth daily x 3 days, then 2 tabs daily x 3 days, then 1 tab daily x 3 days, then 1/2 tab daily x 3 days. 20 tablet 0     RESVERATROL PO        tamsulosin (FLOMAX) 0.4 MG capsule        tiZANidine (ZANAFLEX) 4 MG tablet Take 1 tablet (4 mg) by mouth 3 times daily as needed for muscle spasms 60 tablet 1     triamcinolone (KENALOG) 0.1 % external cream Apply topically 2 times daily 80 g 0     vitamin D3 (CHOLECALCIFEROL) 50 mcg (2000 units) tablet Take 1 tablet by mouth daily       zolpidem (AMBIEN) 5 MG tablet Take 1 tablet (5 mg) by mouth nightly as needed for sleep 15 tablet 5     Ferrous Gluconate 240 (27 Fe) MG TABS Take 240 mg by mouth 2 times daily 180 tablet 4     traZODone (DESYREL) 50 MG tablet Take 1-3 tablets ( mg) by mouth At Bedtime (Patient not taking: Reported on 9/5/2021) 90 tablet  "3       Allergies:  Allergies   Allergen Reactions     Dilaudid [Hydromorphone] Itching     Morphine Itching       Family history:  Family History   Problem Relation Age of Onset     Heart Disease Father      Cerebrovascular Disease Father          OF STROKE      Cancer Father         melonoma     Melanoma Father      Hyperlipidemia Father      Thyroid Disease Father      Cancer Mother         Lung cancer     Other Cancer Mother         lung; heavy smoker     Cerebrovascular Disease Paternal Uncle         Aneurism     Breast Cancer Maternal Aunt          from it     Cancer Paternal Uncle      Cancer Paternal Aunt      Skin Cancer No family hx of      Glaucoma No family hx of      Macular Degeneration No family hx of        Social history:  Social History     Tobacco Use     Smoking status: Never Smoker     Smokeless tobacco: Never Used   Substance Use Topics     Alcohol use: Yes     Alcohol/week: 0.0 standard drinks     Comment: moderate, social     Marital status: .    Nursing Notes:   Jeanette Boyd  2021 11:33 AM  Signed  Chief Complaint   Patient presents with     Follow Up     hemorrhoid check        Vitals:    21 1130   BP: 129/77   Pulse: 107   SpO2: 100%   Weight: 164 lb 9.6 oz   Height: 5' 7\"       Body mass index is 25.78 kg/m .    Jeanette Boyd CMA         15 minutes spent on the date of the encounter doing chart review, history and exam, documentation and further activities as noted above.     LON VaughanC  Colon and Rectal Surgery  [facility]        Again, thank you for allowing me to participate in the care of your patient.      Sincerely,    Karen Greer, APRN CNP      "

## 2021-09-28 NOTE — NURSING NOTE
"Chief Complaint   Patient presents with     Follow Up     hemorrhoid check        Vitals:    09/28/21 1130   BP: 129/77   Pulse: 107   SpO2: 100%   Weight: 164 lb 9.6 oz   Height: 5' 7\"       Body mass index is 25.78 kg/m .    Jeanette Boyd CMA    "

## 2021-11-02 NOTE — PATIENT INSTRUCTIONS
WHAT IS A CALF STRAIN?      A calf strain is a stretch or tear of a muscle or tendon in the back of your leg below your knee. This area of your leg is called the calf. Tendons are strong bands of tissue that attach muscle to bone.    This type of injury is often called a pulled muscle.    WHAT IS THE CAUSE?    You can strain your calf muscle when you do an activity that involves pushing off forcefully from your toes. For example, it may happen when you are running, jumping, or lunging.    WHAT ARE THE SYMPTOMS?    Symptoms may include:    A snapping or popping sound at the time of the injury  Pain or burning in the back of your lower leg  A feeling that someone has hit you in the back of the leg  Trouble rising up on your toes  Swelling and bruising    HOW IS IT DIAGNOSED?    Your healthcare provider will ask about your symptoms, activities, and medical history and examine your leg.    HOW IS IT TREATED?    You will need to change or stop doing the activities that cause pain until your muscle or tendon has healed. For example, you may need to swim instead of run. You may need to use crutches if it s too painful to walk.    Your healthcare provider may recommend stretching and strengthening exercises. Your provider may refer you to physical therapy.    Your healthcare provider or physical therapist may tape the injured muscle while it heals to help you return to athletic activities. Taping helps reduce movement that may cause more muscle damage. They may recommend wearing an elastic or neoprene sleeve around your calf.    A mild strain may heal within a few weeks. A more severe strain may take 6 weeks or longer to heal.    HOW CAN I TAKE CARE OF MYSELF?    To help relieve swelling and pain:    Put an ice pack, gel pack, or package of frozen vegetables wrapped in a cloth on the sore area every 3 to 4 hours for up to 20 minutes at a time.  Do ice massage. To do this, freeze water in a Styrofoam cup, then peel the top  of the cup away to expose the ice. Hold the bottom of the cup and rub the ice over the painful area for 5 to 10 minutes. Do this several times a day while you have pain.  Keep your leg up on pillows when you sit or lie down.  Take nonprescription pain medicine, such as acetaminophen, ibuprofen, or naproxen. Read the label and take as directed. Nonsteroidal anti-inflammatory medicines (NSAIDs), such as ibuprofen or naproxen, may cause stomach bleeding and other problems. These risks increase with age. Unless recommended by your healthcare provider, do not take an NSAID for more than 10 days.  After you recover from your injury, moist heat may help relax your muscles and make it easier to use them. Put moist heat on your calf for 10 to 15 minutes before you do warm-up and stretching exercises. Moist heat includes heat patches or moist heating pads that you can buy at most [employer], a wet washcloth or towel that has been heated in a microwave or the dryer, or a hot shower. Don t use heat if you have swelling.    Use an elastic bandage when you return to your activities as directed by your provider.    Follow your healthcare provider's instructions, including any exercises recommended by your provider. Ask your provider:    How long it will take to recover  What activities you should avoid and when you can return to your normal activities  How to take care of yourself at home  What symptoms or problems you should watch for and what to do if you have them  Make sure you know when you should come back for a checkup.    HOW CAN I HELP PREVENT A CALF STRAIN?    Warm-up exercises and stretching before activities can help prevent injuries. This is especially important if you are doing jumping or sprinting sports.    Developed by [employer].  Published by [employer].  Copyright  2014 [employer] and/or one of its subsidiaries. All rights reserved.      CALF STRAIN EXERCISES   You can start gently stretching your  calf muscle with the towel stretch right away. Make sure you feel only a gentle pull and not a sharp pain in your calf while you are doing the stretch.    Wearing a quarter to half-inch heel lift in each shoe might reduce your pain as you start to recover from your injury. You can purchase heel lifts at most pharmacies. You can stop using the heel lift when you have no pain while walking.    Towel stretch: Sit on a hard surface with your injured leg stretched out in front of you. Loop a towel around your toes and the ball of your foot and pull the towel toward your body keeping your leg straight. Hold this position for 15 to 30 seconds and then relax. Repeat 3 times.  After you can do the towel stretch easily, you can start the standing calf stretch.    Standing calf stretch: Stand facing a wall with your hands on the wall at about eye level. Keep your injured leg back with your heel on the floor. Keep the other leg forward with the knee bent. Turn your back foot slightly inward (as if you were pigeon-toed). Slowly lean into the wall until you feel a stretch in the back of your calf. Hold the stretch for 15 to 30 seconds. Return to the starting position. Repeat 3 times. Do this exercise several times each day.    After a couple days of stretching (pain free walking and pain free stretching), you can begin strengthening your calf and lower leg muscles using elastic tubing as described in the next exercise.    Resisted ankle plantar flexion: Sit with your injured leg stretched out in front of you. Loop the tubing around the ball of your foot. Hold the ends of the tubing with both hands. Gently press the ball of your foot down and point your toes, stretching the tubing. Return to the starting position. Do 2 sets of 15.  You may do the last 4 exercises when you can stand on your toes without pain.    Heel raise: Stand behind a chair or counter with both feet flat on the floor. Using the chair or counter as a support,  "rise up onto your toes and hold for 5 seconds. Then slowly lower yourself down without holding onto the support. (It's OK to keep holding onto the support if you need to.) When this exercise becomes less painful, try doing this exercise while you are standing on the injured leg only. Repeat 15 times. Do 2 sets of 15. Rest 30 seconds between sets.  You can challenge yourself by standing on just your injured leg as you do this exercise.    Single leg balance: Stand without any support and try to balance on your injured leg. Keep standing on the one leg for 30 seconds. Repeat 3 times. Begin with your eyes open and then try to do the exercise with your eyes closed. When you have mastered this, try doing the exercise standing on a pillow.    Nose touch: Stand on one leg facing a wall. Stand 4 inches (10 centimeters) from the wall. Keep your body and leg straight. Slowly lean forward, trying to touch your nose to the wall and then return to the starting position. Make sure you do not bend forward at your waist. Do 2 sets of 10.    Wall jump: Face a wall and place a piece of masking tape about 2 feet (50 to 60 centimeters) above your head. Jump up with your arms above your head and try to touch the piece of tape. Make sure you do a \"spring\" type of motion and try to land softly on your feet. As the exercise gets easier, jump on just your injured leg. Do 2 sets of 15.    Side-lying leg lift: Lie on your uninjured side. Tighten the front thigh muscles on your injured leg and lift that leg 8 to 10 inches (20 to 25 centimeters) away from the other leg. Keep the leg straight and lower it slowly. Do 2 sets of 15.    When you can do these exercises without pain, you can start a light jogging program. You can return to your sport when there is no difference between your injured side and noninjured side when you do the exercises.    Developed by [employer].  Published by [employer].  Copyright  2014 [employer] and/or " one of its subsidiaries. All rights reserved.

## 2021-11-02 NOTE — PROGRESS NOTES
CHIEF COMPLAINT:  Same Day Appointment (left leg )     HISTORY OF PRESENT ILLNESS  Mr. Wood is a pleasant 76 year old year old male who presents to clinic today.  Loco explains that the left upper calf started about 6 months ago. Pain is worse with weightbearing activities. Reports history of left lower leg pain and was seen by myself, last on 3/18/20. He attempted to play pickle ball last week and had too much pain and had to stop the activity.     Onset: gradual  Location: left calf  Quality:  Unable to describe  Duration: 6 months   Severity: 8/10 at worst  Timing:intermittent episodes, worsened by weightbearing activities  Modifying factors:  resting and non-use makes it better, movement and use makes it worse  Associated signs & symptoms: pain and tenderness  Previous similar pain: Yes, reports recieving heel lifts when he was last seen on 3/18/20 and this improved his pain up until about 6 months ago.  Treatments to date:Heel lifts    Additional history: As above    Review of Systems:    Have you recently had a a fever, chills, weight loss? No    Do you have any vision problems? No    Do you have any chest pain or edema? No    Do you have any shortness of breath or wheezing?  No    Do you have stomach problems? No    Do you have any numbness or focal weakness? No    Do you have diabetes? No    Do you have problems with bleeding or clotting? No    Do you have an rashes or other skin lesions? No    MEDICAL HISTORY  Patient Active Problem List   Diagnosis     Esophageal reflux     Lumbago     CLL (chronic lymphocytic leukemia) (H)     HYPERLIPIDEMIA LDL GOAL <130     Mass of skin     Health Care Home     Vitamin D deficiency     Osteoarthritis of hip     OA (osteoarthritis) of hip     Hx of bilateral hip replacements     Insomnia     BPH (benign prostatic hyperplasia)     Acute bilateral low back pain without sciatica     Prostate nodule     Chondromalacia, knee, right     Complex tear of medial meniscus of  right knee as current injury     Herpes zoster with keratoconjunctivitis, od     Post herpetic neuralgia     Aftercare following surgery of the musculoskeletal system     Paroxysmal atrial fibrillation (H)     Chronic kidney disease, stage 3 (moderate)     Fusion of spine, lumbar region     Gluteal pain     Pelvic obliquity     Hamstring tightness     History of spinal fusion     Right hip pain       Current Outpatient Medications   Medication Sig Dispense Refill     Acetaminophen (TYLENOL PO)        aspirin (ASA) 81 MG EC tablet Take 1 tablet (81 mg) by mouth daily 90 tablet 3     atorvastatin (LIPITOR) 20 MG tablet Take 1 tablet (20 mg) by mouth daily 90 tablet 3     famotidine (PEPCID) 40 MG tablet Take 1 tablet (40 mg) by mouth nightly as needed for heartburn 90 tablet 4     Ferrous Gluconate 240 (27 Fe) MG TABS Take 240 mg by mouth 2 times daily 180 tablet 4     ibrutinib (IMBRUVICA) 420 MG tablet Take 1 tablet (420 mg) by mouth daily 28 tablet 2     ibrutinib (IMBRUVICA) 420 MG tablet Take 1 tablet (420 mg) by mouth daily 28 tablet 2     ibuprofen (ADVIL/MOTRIN) 200 MG tablet Take 200 mg by mouth every 4 hours as needed for mild pain       ketoconazole (NIZORAL) 2 % external shampoo        levothyroxine (SYNTHROID/LEVOTHROID) 50 MCG tablet TAKE 1 TABLET BY MOUTH DAILY 90 tablet 3     Niacin (VITAMIN B-3 OR)        omeprazole (PRILOSEC) 10 MG DR capsule TAKE ONE CAPSULE BY MOUTH EVERY DAY 30 TO 60 MINUTES BEFORE A MEAL 90 capsule 1     predniSONE (DELTASONE) 20 MG tablet Take 3 tabs by mouth daily x 3 days, then 2 tabs daily x 3 days, then 1 tab daily x 3 days, then 1/2 tab daily x 3 days. 20 tablet 0     RESVERATROL PO        tamsulosin (FLOMAX) 0.4 MG capsule        tiZANidine (ZANAFLEX) 4 MG tablet Take 1 tablet (4 mg) by mouth 3 times daily as needed for muscle spasms 60 tablet 1     traZODone (DESYREL) 50 MG tablet Take 1-3 tablets ( mg) by mouth At Bedtime (Patient not taking: Reported on 9/5/2021)  "90 tablet 3     triamcinolone (KENALOG) 0.1 % external cream Apply topically 2 times daily 80 g 0     vitamin D3 (CHOLECALCIFEROL) 50 mcg (2000 units) tablet Take 1 tablet by mouth daily       zolpidem (AMBIEN) 5 MG tablet Take 1 tablet (5 mg) by mouth nightly as needed for sleep 15 tablet 5       Allergies   Allergen Reactions     Dilaudid [Hydromorphone] Itching     Morphine Itching       Family History   Problem Relation Age of Onset     Heart Disease Father      Cerebrovascular Disease Father          OF [place]      Cancer Father         melonoma     Melanoma Father      Hyperlipidemia Father      Thyroid Disease Father      Cancer Mother         Lung cancer     Other Cancer Mother         lung; heavy smoker     Cerebrovascular Disease Paternal Uncle         Aneurism     Breast Cancer Maternal Aunt          from it     Cancer Paternal Uncle      Cancer Paternal Aunt      Skin Cancer No family hx of      Glaucoma No family hx of      Macular Degeneration No family hx of        Additional medical/Social/Surgical histories reviewed in [facility] and updated as appropriate.       PHYSICAL EXAM  Ht 1.702 m (5' 7\")   Wt 74.4 kg (164 lb)   BMI 25.69 kg/m      General  - normal appearance, in no obvious distress  Musculoskeletal -Left lower leg  - stance: normal gait without limp  - inspection: no swelling of lower leg, normal bone and joint alignment, no obvious deformity. No ecchymosis.  - palpation: TTP of medial head of gastrocnemius into medial gastroc head tendon.  - ROM: Full knee and ankle ROM without pain. Decreased passive ankle dorsiflexion.  - strength: 5/5 plantarflexion and dorsiflexion at 0 and 90 degrees with mild pain at proximal medial gastroc.  Neuro  - no sensory or motor deficit  Skin  - no ecchymosis, erythema, warmth, or induration, no obvious rash  Psych  - interactive, appropriate, normal mood and affect    IMAGING : Deferred     ASSESSMENT & PLAN  Mr. Wood is a 76 year old year old " male hx of lateral gastrocnemius strain of left lower leg who presents to clinic today with medial gastrocnemius strain acutely worsened by playing pickle ball last week.    Other MSK issues including hamstring tightness gluteal pain as noted by visit with Dr. Christianson 9/1/21 and I do think he would benefit greatly from comprehensive lower extremity evaluation with PT.    Diagnosis: Acute pain of left lower leg    -Tall cam boot provided for use with weightbearing activity, no crutches needed  - Gentle HEP given today  - Continue in boot x 2 weeks then try gentle wean  - PT referral placed, no previous PT after last gastroc injury  - Follow up in 4 weeks. Will be away in [place] until then.    It was a pleasure seeing Loco today.    Arnaud Melton DO, CAQSM  Primary Care Sports Medicine    32 minutes on date of the encounter doing chart review, history and examination, independent imaging review, documentation, and additional activities noted above.

## 2021-11-02 NOTE — LETTER
11/2/2021      RE: Loco Wood  [address] [id number]       CHIEF COMPLAINT:  Same Day Appointment (left leg )     HISTORY OF PRESENT ILLNESS  Mr. Wood is a pleasant 76 year old year old male who presents to clinic today.  Loco explains that the left upper calf started about 6 months ago. Pain is worse with weightbearing activities. Reports history of left lower leg pain and was seen by myself, last on 3/18/20. He attempted to play pickle ball last week and had too much pain and had to stop the activity.     Onset: gradual  Location: left calf  Quality:  Unable to describe  Duration: 6 months   Severity: 8/10 at worst  Timing:intermittent episodes, worsened by weightbearing activities  Modifying factors:  resting and non-use makes it better, movement and use makes it worse  Associated signs & symptoms: pain and tenderness  Previous similar pain: Yes, reports recieving heel lifts when he was last seen on 3/18/20 and this improved his pain up until about 6 months ago.  Treatments to date:Heel lifts    Additional history: As above    Review of Systems:    Have you recently had a a fever, chills, weight loss? No    Do you have any vision problems? No    Do you have any chest pain or edema? No    Do you have any shortness of breath or wheezing?  No    Do you have stomach problems? No    Do you have any numbness or focal weakness? No    Do you have diabetes? No    Do you have problems with bleeding or clotting? No    Do you have an rashes or other skin lesions? No    MEDICAL HISTORY  Patient Active Problem List   Diagnosis     Esophageal reflux     Lumbago     CLL (chronic lymphocytic leukemia) (H)     HYPERLIPIDEMIA LDL GOAL <130     Mass of skin     Health Care Home     Vitamin D deficiency     Osteoarthritis of hip     OA (osteoarthritis) of hip     Hx of bilateral hip replacements     Insomnia     BPH (benign prostatic hyperplasia)     Acute bilateral low back pain without  sciatica     Prostate nodule     Chondromalacia, knee, right     Complex tear of medial meniscus of right knee as current injury     Herpes zoster with keratoconjunctivitis, od     Post herpetic neuralgia     Aftercare following surgery of the musculoskeletal system     Paroxysmal atrial fibrillation (H)     Chronic kidney disease, stage 3 (moderate)     Fusion of spine, lumbar region     Gluteal pain     Pelvic obliquity     Hamstring tightness     History of spinal fusion     Right hip pain       Current Outpatient Medications   Medication Sig Dispense Refill     Acetaminophen (TYLENOL PO)        aspirin (ASA) 81 MG EC tablet Take 1 tablet (81 mg) by mouth daily 90 tablet 3     atorvastatin (LIPITOR) 20 MG tablet Take 1 tablet (20 mg) by mouth daily 90 tablet 3     famotidine (PEPCID) 40 MG tablet Take 1 tablet (40 mg) by mouth nightly as needed for heartburn 90 tablet 4     Ferrous Gluconate 240 (27 Fe) MG TABS Take 240 mg by mouth 2 times daily 180 tablet 4     ibrutinib (IMBRUVICA) 420 MG tablet Take 1 tablet (420 mg) by mouth daily 28 tablet 2     ibrutinib (IMBRUVICA) 420 MG tablet Take 1 tablet (420 mg) by mouth daily 28 tablet 2     ibuprofen (ADVIL/MOTRIN) 200 MG tablet Take 200 mg by mouth every 4 hours as needed for mild pain       ketoconazole (NIZORAL) 2 % external shampoo        levothyroxine (SYNTHROID/LEVOTHROID) 50 MCG tablet TAKE 1 TABLET BY MOUTH DAILY 90 tablet 3     Niacin (VITAMIN B-3 OR)        omeprazole (PRILOSEC) 10 MG DR capsule TAKE ONE CAPSULE BY MOUTH EVERY DAY 30 TO 60 MINUTES BEFORE A MEAL 90 capsule 1     predniSONE (DELTASONE) 20 MG tablet Take 3 tabs by mouth daily x 3 days, then 2 tabs daily x 3 days, then 1 tab daily x 3 days, then 1/2 tab daily x 3 days. 20 tablet 0     RESVERATROL PO        tamsulosin (FLOMAX) 0.4 MG capsule        tiZANidine (ZANAFLEX) 4 MG tablet Take 1 tablet (4 mg) by mouth 3 times daily as needed for muscle spasms 60 tablet 1     traZODone (DESYREL) 50 MG  "tablet Take 1-3 tablets ( mg) by mouth At Bedtime (Patient not taking: Reported on 2021) 90 tablet 3     triamcinolone (KENALOG) 0.1 % external cream Apply topically 2 times daily 80 g 0     vitamin D3 (CHOLECALCIFEROL) 50 mcg (2000 units) tablet Take 1 tablet by mouth daily       zolpidem (AMBIEN) 5 MG tablet Take 1 tablet (5 mg) by mouth nightly as needed for sleep 15 tablet 5       Allergies   Allergen Reactions     Dilaudid [Hydromorphone] Itching     Morphine Itching       Family History   Problem Relation Age of Onset     Heart Disease Father      Cerebrovascular Disease Father          OF [place]      Cancer Father         melonoma     Melanoma Father      Hyperlipidemia Father      Thyroid Disease Father      Cancer Mother         Lung cancer     Other Cancer Mother         lung; heavy smoker     Cerebrovascular Disease Paternal Uncle         Aneurism     Breast Cancer Maternal Aunt          from it     Cancer Paternal Uncle      Cancer Paternal Aunt      Skin Cancer No family hx of      Glaucoma No family hx of      Macular Degeneration No family hx of        Additional medical/Social/Surgical histories reviewed in [facility] and updated as appropriate.       PHYSICAL EXAM  Ht 1.702 m (5' 7\")   Wt 74.4 kg (164 lb)   BMI 25.69 kg/m      General  - normal appearance, in no obvious distress  Musculoskeletal -Left lower leg  - stance: normal gait without limp  - inspection: no swelling of lower leg, normal bone and joint alignment, no obvious deformity. No ecchymosis.  - palpation: TTP of medial head of gastrocnemius into medial gastroc head tendon.  - ROM: Full knee and ankle ROM without pain. Decreased passive ankle dorsiflexion.  - strength: 5/5 plantarflexion and dorsiflexion at 0 and 90 degrees with mild pain at proximal medial gastroc.  Neuro  - no sensory or motor deficit  Skin  - no ecchymosis, erythema, warmth, or induration, no obvious rash  Psych  - interactive, appropriate, normal " mood and affect    IMAGING : Deferred     ASSESSMENT & PLAN  Mr. Wood is a 76 year old year old male hx of lateral gastrocnemius strain of left lower leg who presents to clinic today with medial gastrocnemius strain acutely worsened by playing pickle ball last week.    Other MSK issues including hamstring tightness gluteal pain as noted by visit with Dr. Christianson 9/1/21 and I do think he would benefit greatly from comprehensive lower extremity evaluation with PT.    Diagnosis: Acute pain of left lower leg    -Tall cam boot provided for use with weightbearing activity, no crutches needed  - Gentle HEP given today  - Continue in boot x 2 weeks then try gentle wean  - PT referral placed, no previous PT after last gastroc injury  - Follow up in 4 weeks. Will be away in [place] until then.    It was a pleasure seeing Loco today.    Arnaud Melton DO, [facility]  Primary Care Sports Medicine    32 minutes on date of the encounter doing chart review, history and examination, independent imaging review, documentation, and additional activities noted above.

## 2021-11-09 PROBLEM — S86.112A STRAIN OF GASTROCNEMIUS MUSCLE OF LEFT LOWER EXTREMITY: Status: ACTIVE | Noted: 2021-01-01

## 2021-11-09 NOTE — PROGRESS NOTES
"Loco is a 76 year old who is being evaluated via a billable video visit.       How would you like to obtain your AVS? MyChart  If the video visit is dropped, the invitation should be resent by: Text to cell phone: [phone] anyone else be joining your video visit? No    Video Start Time: 9:38 AM    Assessment & Plan   Problem List Items Addressed This Visit     Chronic kidney disease, stage 3 (moderate)    CLL (chronic lymphocytic leukemia) (H)    Paroxysmal atrial fibrillation (H)      Other Visit Diagnoses     Prostate nodule with urinary obstruction    -  Primary    Pruritic disorder        Relevant Orders    Hepatic panel (Albumin, ALT, AST, Bili, Alk Phos, TP) (Completed)    TSH with free T4 reflex (Completed)    CBC with platelets and differential (Completed)    Pain in joint involving ankle and foot, right        Relevant Orders    XR Ankle Right G/E 3 Views (Completed)    Special screening for malignant neoplasm of prostate        Relevant Orders    PSA, screen (Completed)                  BMI:   Estimated body mass index is 25.69 kg/m  as calculated from the following:    Height as of 11/2/21: 1.702 m (5' 7\").    Weight as of 11/2/21: 74.4 kg (164 lb).   Weight management plan: Discussed healthy diet and exercise guidelines    Work on weight loss  Regular exercise    No follow-ups on file.    Campos Parra MD  [facility]    Harshil Adan is a 76 year old who presents for the following health issues  HPI   Has labs tomorrow  psa  Xray of right ankle on the exercise bike   Hard to isolate   Few years ago broken bone and then pain went away   No recent injry   Itching on the thighs and buttocks and lower back   Saw an allergist - no positive testing   Systemic and moistizing   Lidocaine   Calf injury Rodriguez injury boot ( has not worn)          Review of Systems   Constitutional, HEENT, cardiovascular, pulmonary, gi and gu systems are negative, except as otherwise " noted.      Objective         Start: 11/09/2021 09:38 am  Stop: 11/09/2021 09:53 am  Vitals:  No vitals were obtained today due to virtual visit.    Physical Exam   GENERAL: Healthy, alert and no distress  EYES: Eyes grossly normal to inspection.  No discharge or erythema, or obvious scleral/conjunctival abnormalities.  HENT: Normal cephalic/atraumatic.  External ears, nose and mouth without ulcers or lesions.  No nasal drainage visible.  NECK: No asymmetry, visible masses or scars  RESP: No audible wheeze, cough, or visible cyanosis.  No visible retractions or increased work of breathing.    SKIN: Visible skin clear. No significant rash, abnormal pigmentation or lesions.  NEURO: Cranial nerves grossly intact.  Mentation and speech appropriate for age.  PSYCH: Mentation appears normal, affect normal/bright, judgement and insight intact, normal speech and appearance well-groomed.    No results found for any visits on 11/09/21.            Video-Visit Details    Type of service:  Video Visit    Video End Time:9:52 AM    Originating Location (pt. Location): Home    Distant Location (provider location):  [facility]     Platform used for Video Visit: [employer]

## 2021-11-09 NOTE — PROGRESS NOTES
Physical Therapy Initial Evaluation  Subjective:  The history is provided by the patient. No  was used.   Patient Health History  Loco Wood being seen for left calf pain about 4 weeks ago.     Date of Onset: 11/2/21 date of order.      Pain is reported as 2/10 on pain scale.  General health as reported by patient is good.  Health conditions: cancer, calf pain, osteoarthritis.   Red flags:  None as reported by patient.  Medical allergies: none.   Surgeries include:  Orthopedic surgery. Other surgery history details: back and bilateral HERMINIO.    Current medications:  Thyroid medication. Other medications details: omeprazole, atorvastatin, flomax.    Current occupation is Retired.   Primary job tasks include:  Prolonged sitting.                  Therapist Generated HPI Evaluation  Problem details: The pt presents today with left gastroc pain that initially started a few years ago. He was suggested to try wearing a heel lift in both shoes, which eliminated his pain. In the past month or so he is having left gastroc pain with going for walks. He tried playing pickelball and had to stop due to pain. His pain feels like it is high in his calf muscle near his knee. He has had minimal pain this past week. He has not been wearing his CAM boot consistently.    He has a long history of low back pain. Three days ago it flared up. He also has sharp pain in his right ankle when he is on the exercise bike.    The pt is leaving for [place] by the end of the month.    Activity: past week: machine weights, exercise bike stretching 2x a week    Goals: return to prior activity, play pickle ball .         Affected Side: left calf near proximal insertion.    This is a new condition.  Condition occurred with:  Repetition/overuse.  Where condition occurred: during recreation/sport and other.  Site of Pain: proxmal gastroc muscle on left side.  Pain is described as aching and is intermittent.  Pain radiates to:  No  radiation. Pain timing: worse with strenuous activity.  Since onset symptoms are gradually improving.  Associated with: none. Symptoms are exacerbated by weight bearing, walking, descending stairs and activity  and relieved by rest.  Special tests included:  X-ray.    Work activity restrictions: retired.  Barriers include:  None as reported by patient.                        Objective:    Gait:    Gait Type:  Normal         Flexibility/Screens:       Lower Extremity:  Decreased left lower extremity flexibility:Hamstrings    Decreased right lower extremity flexibility:  Hamstrings          Ankle/Foot Evaluation  ROM:  AROM is normal (gastroc length: L: 12 degrees, R: 15 degrees, soleus length: L: 15 degrees, R: 18 degrees).      Strength:    Dorsiflexion:  Right: 5/5   Pain:  Plantarflexion: Left: 5-/5   Pain:   Right: 5/5  Pain:  Inversion:Left: 5/5  Pain:     Right: 5/5  Pain:  Eversion:Left: 5/5  Pain:  Right: 5/5  Pain:                      PALPATION:     Left ankle tenderness not present at:   gastroc/soleus or achilles tendon    EDEMA: normal                                                        Knee Evaluation:  ROM:            Strength:       Flexion:  Left: 5/5   Pain:      Right: 5/5   Pain:                        General     ROS    Assessment/Plan:    Patient is a 76 year old male with left side knee complaints.    Patient has the following significant findings with corresponding treatment plan.                Diagnosis 1:  Left gastroc strain  Pain -  hot/cold therapy, US, electric stimulation, mechanical traction, manual therapy, STS, splint/taping/bracing/orthotics, self management, education, directional preference exercise and home program  Decreased ROM/flexibility - manual therapy, therapeutic exercise, therapeutic activity and home program  Decreased strength - therapeutic exercise, therapeutic activities and home program    Therapy Evaluation Codes:   Cumulative Therapy Evaluation is: Low  complexity.    Previous and current functional limitations:  (See Goal Flow Sheet for this information)    Short term and Long term goals: (See Goal Flow Sheet for this information)     Communication ability:  Patient appears to be able to clearly communicate and understand verbal and written communication and follow directions correctly.  Treatment Explanation - The following has been discussed with the patient:   RX ordered/plan of care  Anticipated outcomes  Possible risks and side effects  This patient would benefit from PT intervention to resume normal activities.   Rehab potential is good.    Frequency:  1 X week, once daily  Duration:  for 6 weeks  Discharge Plan:  Achieve all LTG.  Independent in home treatment program.  Reach maximal therapeutic benefit.    Please refer to the daily flowsheet for treatment today, total treatment time and time spent performing 1:1 timed codes.

## 2021-11-10 NOTE — PROGRESS NOTES
[facility]    OUTPATIENT Physical Therapy ORTHOPEDIC EVALUATION  PLAN OF TREATMENT FOR OUTPATIENT REHABILITATION  (COMPLETE FOR INITIAL CLAIMS ONLY)  Patient's Last Name, First Name, M.I.  YOB: 1945  Loco Wood    Provider s Name:  [facility]   Medical Record No.  [id number]   Start of Care Date:  11/09/21   Onset Date:   11/02/21   Type:     _X__PT   ___OT Medical Diagnosis:    Encounter Diagnosis   Name Primary?     Strain of gastrocnemius muscle of left lower extremity         Treatment Diagnosis:  gastroc strain L        Goals:     11/09/21 0500   Body Part   Goals listed below are for L gastroc   Goal #1   Goal #1 ambulation   Current Functional Level Minutes patient can walk   Performance Level 30 minutes 3/10p   STG Target Performance Minutes patient will be able to walk   Performance Level 30 minutes 1/10p   Rationale for safe household ambulation;for safe outdoor household ambulation;for safe community ambulation;to maintain proper body mechanics/posture while ambulating to avoid additional compensatory injury due to improper gait mechanics;to promote a healthy and active lifestyle   Due Date 12/07/21    LTG Target Performance Minutes patient will be able to  walk   Performance Level 60 minutes 1/10p   Rationale for safe household ambulation;for safe outdoor household ambulation;for safe community ambulation;to maintain proper body mechanics/posture while ambulating to avoid additional compensatory injury due to improper gait mechanics;to promote a healthy and active lifestyle   Due Date 12/21/21       Therapy Frequency:  1x a week  Predicted Duration of Therapy Intervention:  6 weeks    Kianna Britton, PT                 I CERTIFY THE NEED FOR THESE SERVICES FURNISHED UNDER        THIS PLAN OF TREATMENT AND WHILE UNDER MY CARE     (Physician  attestation of this document indicates review and certification of the therapy plan).                       Certification Date From:  11/09/21   Certification Date To:  12/21/21    Referring Provider:  Arnaud Melton    Initial Assessment        See Epic Evaluation SOC Date: 11/09/21

## 2021-11-16 NOTE — TELEPHONE ENCOUNTER
Continues to have alot of itching, has tried several things but nothing has helped. Has tried benadryl, Claritin, lotions, lidocaine noting has made much of a difference in the itching. Itching has been going on for about 1 month. Nothing he has noticed makes it better or worse.   No new fabric softner, laundry detergent, or body washes. Saw an allergist and is not allergic to any of the environmental things that they tested for.   Wondering if the itching is from his CLL?     Routed to Dr Burns and to the [facility].

## 2021-11-16 NOTE — TELEPHONE ENCOUNTER
[facility] Triage Lm 1238 to call [phone] Option 5 option 2    MyChart Message: Itching

## 2021-11-19 NOTE — TELEPHONE ENCOUNTER
"Prescription approved per [facility] Refill Protocol.    Requested Prescriptions   Signed Prescriptions Disp Refills    omeprazole (PRILOSEC) 10 MG DR capsule 90 capsule 1     Sig: TAKE 1 CAPSULE BY MOUTH EVERY DAY 30 TO 60 MINUTES BEFORE A MEAL       PPI Protocol Passed - 11/19/2021 12:47 PM        Passed - Not on Clopidogrel (unless Pantoprazole ordered)        Passed - No diagnosis of osteoporosis on record        Passed - Recent (12 mo) or future (30 days) visit within the authorizing provider's specialty     Patient has had an office visit with the authorizing provider or a provider within the authorizing providers department within the previous 12 mos or has a future within next 30 days. See \"Patient Info\" tab in inbasket, or \"Choose Columns\" in Meds & Orders section of the refill encounter.              Passed - Medication is active on med list        Passed - Patient is age 18 or older               "

## 2021-11-22 NOTE — TELEPHONE ENCOUNTER
Per Dr Burns: It would be unusual to have itching from CLL, although ibrutinib has been associated with itching in 14% of patients.   My chart message sent to patient with above information.

## 2022-01-01 ENCOUNTER — APPOINTMENT (OUTPATIENT)
Dept: CT IMAGING | Facility: CLINIC | Age: 77
DRG: 207 | End: 2022-01-01
Payer: COMMERCIAL

## 2022-01-01 ENCOUNTER — APPOINTMENT (OUTPATIENT)
Dept: GENERAL RADIOLOGY | Facility: CLINIC | Age: 77
DRG: 207 | End: 2022-01-01
Attending: NURSE PRACTITIONER
Payer: COMMERCIAL

## 2022-01-01 ENCOUNTER — APPOINTMENT (OUTPATIENT)
Dept: PHYSICAL THERAPY | Facility: CLINIC | Age: 77
DRG: 207 | End: 2022-01-01
Payer: COMMERCIAL

## 2022-01-01 ENCOUNTER — MYC MEDICAL ADVICE (OUTPATIENT)
Dept: FAMILY MEDICINE | Facility: CLINIC | Age: 77
End: 2022-01-01
Payer: COMMERCIAL

## 2022-01-01 ENCOUNTER — OFFICE VISIT (OUTPATIENT)
Dept: FAMILY MEDICINE | Facility: CLINIC | Age: 77
End: 2022-01-01
Payer: COMMERCIAL

## 2022-01-01 ENCOUNTER — PRE VISIT (OUTPATIENT)
Dept: ORTHOPEDICS | Facility: CLINIC | Age: 77
End: 2022-01-01
Payer: COMMERCIAL

## 2022-01-01 ENCOUNTER — MYC MEDICAL ADVICE (OUTPATIENT)
Dept: TRANSPLANT | Facility: CLINIC | Age: 77
End: 2022-01-01
Payer: COMMERCIAL

## 2022-01-01 ENCOUNTER — APPOINTMENT (OUTPATIENT)
Dept: GENERAL RADIOLOGY | Facility: CLINIC | Age: 77
DRG: 207 | End: 2022-01-01
Attending: STUDENT IN AN ORGANIZED HEALTH CARE EDUCATION/TRAINING PROGRAM
Payer: COMMERCIAL

## 2022-01-01 ENCOUNTER — APPOINTMENT (OUTPATIENT)
Dept: GENERAL RADIOLOGY | Facility: CLINIC | Age: 77
DRG: 207 | End: 2022-01-01
Payer: COMMERCIAL

## 2022-01-01 ENCOUNTER — APPOINTMENT (OUTPATIENT)
Dept: GENERAL RADIOLOGY | Facility: CLINIC | Age: 77
DRG: 207 | End: 2022-01-01
Attending: INTERNAL MEDICINE
Payer: COMMERCIAL

## 2022-01-01 ENCOUNTER — OFFICE VISIT (OUTPATIENT)
Dept: ORTHOPEDICS | Facility: CLINIC | Age: 77
End: 2022-01-01
Payer: COMMERCIAL

## 2022-01-01 ENCOUNTER — MEDICAL CORRESPONDENCE (OUTPATIENT)
Dept: HEALTH INFORMATION MANAGEMENT | Facility: CLINIC | Age: 77
End: 2022-01-01
Payer: COMMERCIAL

## 2022-01-01 ENCOUNTER — APPOINTMENT (OUTPATIENT)
Dept: ULTRASOUND IMAGING | Facility: CLINIC | Age: 77
DRG: 207 | End: 2022-01-01
Payer: COMMERCIAL

## 2022-01-01 ENCOUNTER — APPOINTMENT (OUTPATIENT)
Dept: LAB | Facility: CLINIC | Age: 77
End: 2022-01-01
Attending: INTERNAL MEDICINE
Payer: COMMERCIAL

## 2022-01-01 ENCOUNTER — ANCILLARY PROCEDURE (OUTPATIENT)
Dept: GENERAL RADIOLOGY | Facility: CLINIC | Age: 77
End: 2022-01-01
Attending: INTERNAL MEDICINE
Payer: COMMERCIAL

## 2022-01-01 ENCOUNTER — APPOINTMENT (OUTPATIENT)
Dept: CT IMAGING | Facility: CLINIC | Age: 77
DRG: 207 | End: 2022-01-01
Attending: STUDENT IN AN ORGANIZED HEALTH CARE EDUCATION/TRAINING PROGRAM
Payer: COMMERCIAL

## 2022-01-01 ENCOUNTER — TRANSFERRED RECORDS (OUTPATIENT)
Dept: HEALTH INFORMATION MANAGEMENT | Facility: CLINIC | Age: 77
End: 2022-01-01
Payer: COMMERCIAL

## 2022-01-01 ENCOUNTER — PATIENT OUTREACH (OUTPATIENT)
Dept: ONCOLOGY | Facility: CLINIC | Age: 77
End: 2022-01-01
Payer: COMMERCIAL

## 2022-01-01 ENCOUNTER — ANCILLARY PROCEDURE (OUTPATIENT)
Dept: MRI IMAGING | Facility: CLINIC | Age: 77
End: 2022-01-01
Attending: FAMILY MEDICINE
Payer: COMMERCIAL

## 2022-01-01 ENCOUNTER — OFFICE VISIT (OUTPATIENT)
Dept: TRANSPLANT | Facility: CLINIC | Age: 77
End: 2022-01-01
Attending: INTERNAL MEDICINE
Payer: COMMERCIAL

## 2022-01-01 ENCOUNTER — APPOINTMENT (OUTPATIENT)
Dept: PHYSICAL THERAPY | Facility: CLINIC | Age: 77
DRG: 207 | End: 2022-01-01
Attending: STUDENT IN AN ORGANIZED HEALTH CARE EDUCATION/TRAINING PROGRAM
Payer: COMMERCIAL

## 2022-01-01 ENCOUNTER — HOSPITAL ENCOUNTER (INPATIENT)
Facility: CLINIC | Age: 77
LOS: 30 days | DRG: 207 | End: 2022-06-22
Attending: STUDENT IN AN ORGANIZED HEALTH CARE EDUCATION/TRAINING PROGRAM | Admitting: HOSPITALIST
Payer: COMMERCIAL

## 2022-01-01 ENCOUNTER — NURSE TRIAGE (OUTPATIENT)
Dept: ONCOLOGY | Facility: CLINIC | Age: 77
End: 2022-01-01
Payer: COMMERCIAL

## 2022-01-01 ENCOUNTER — NURSE TRIAGE (OUTPATIENT)
Dept: NURSING | Facility: CLINIC | Age: 77
End: 2022-01-01
Payer: COMMERCIAL

## 2022-01-01 ENCOUNTER — HEALTH MAINTENANCE LETTER (OUTPATIENT)
Age: 77
End: 2022-01-01

## 2022-01-01 ENCOUNTER — DOCUMENTATION ONLY (OUTPATIENT)
Dept: ONCOLOGY | Facility: CLINIC | Age: 77
End: 2022-01-01
Payer: COMMERCIAL

## 2022-01-01 ENCOUNTER — APPOINTMENT (OUTPATIENT)
Dept: PHYSICAL THERAPY | Facility: CLINIC | Age: 77
DRG: 207 | End: 2022-01-01
Attending: PHYSICIAN ASSISTANT
Payer: COMMERCIAL

## 2022-01-01 ENCOUNTER — ONCOLOGY VISIT (OUTPATIENT)
Dept: ONCOLOGY | Facility: CLINIC | Age: 77
End: 2022-01-01
Attending: INTERNAL MEDICINE
Payer: COMMERCIAL

## 2022-01-01 ENCOUNTER — ANCILLARY PROCEDURE (OUTPATIENT)
Dept: GENERAL RADIOLOGY | Facility: CLINIC | Age: 77
End: 2022-01-01
Attending: FAMILY MEDICINE
Payer: COMMERCIAL

## 2022-01-01 ENCOUNTER — APPOINTMENT (OUTPATIENT)
Dept: CARDIOLOGY | Facility: CLINIC | Age: 77
DRG: 207 | End: 2022-01-01
Attending: STUDENT IN AN ORGANIZED HEALTH CARE EDUCATION/TRAINING PROGRAM
Payer: COMMERCIAL

## 2022-01-01 ENCOUNTER — MYC MEDICAL ADVICE (OUTPATIENT)
Dept: ORTHOPEDICS | Facility: CLINIC | Age: 77
End: 2022-01-01

## 2022-01-01 ENCOUNTER — VIRTUAL VISIT (OUTPATIENT)
Dept: ORTHOPEDICS | Facility: CLINIC | Age: 77
End: 2022-01-01
Payer: COMMERCIAL

## 2022-01-01 ENCOUNTER — NURSE TRIAGE (OUTPATIENT)
Dept: FAMILY MEDICINE | Facility: CLINIC | Age: 77
End: 2022-01-01
Payer: COMMERCIAL

## 2022-01-01 VITALS
RESPIRATION RATE: 30 BRPM | OXYGEN SATURATION: 96 % | HEART RATE: 106 BPM | HEIGHT: 66 IN | SYSTOLIC BLOOD PRESSURE: 138 MMHG | TEMPERATURE: 100 F | WEIGHT: 161 LBS | DIASTOLIC BLOOD PRESSURE: 58 MMHG | BODY MASS INDEX: 25.88 KG/M2

## 2022-01-01 VITALS — WEIGHT: 164 LBS | BODY MASS INDEX: 25.74 KG/M2 | HEIGHT: 67 IN

## 2022-01-01 VITALS
HEART RATE: 94 BPM | OXYGEN SATURATION: 98 % | TEMPERATURE: 98 F | DIASTOLIC BLOOD PRESSURE: 80 MMHG | RESPIRATION RATE: 16 BRPM | WEIGHT: 159.8 LBS | SYSTOLIC BLOOD PRESSURE: 126 MMHG | BODY MASS INDEX: 25.03 KG/M2

## 2022-01-01 VITALS
RESPIRATION RATE: 28 BRPM | SYSTOLIC BLOOD PRESSURE: 113 MMHG | OXYGEN SATURATION: 89 % | BODY MASS INDEX: 24.57 KG/M2 | DIASTOLIC BLOOD PRESSURE: 66 MMHG | WEIGHT: 156.53 LBS | TEMPERATURE: 97.8 F | HEIGHT: 67 IN

## 2022-01-01 VITALS
TEMPERATURE: 97 F | SYSTOLIC BLOOD PRESSURE: 142 MMHG | BODY MASS INDEX: 25.69 KG/M2 | DIASTOLIC BLOOD PRESSURE: 75 MMHG | OXYGEN SATURATION: 99 % | HEART RATE: 71 BPM | RESPIRATION RATE: 16 BRPM | WEIGHT: 164 LBS

## 2022-01-01 VITALS — HEIGHT: 67 IN | WEIGHT: 164 LBS | BODY MASS INDEX: 25.74 KG/M2

## 2022-01-01 DIAGNOSIS — Z11.52 ENCOUNTER FOR SCREENING LABORATORY TESTING FOR SEVERE ACUTE RESPIRATORY SYNDROME CORONAVIRUS 2 (SARS-COV-2): ICD-10-CM

## 2022-01-01 DIAGNOSIS — R97.20 ELEVATED PROSTATE SPECIFIC ANTIGEN (PSA): ICD-10-CM

## 2022-01-01 DIAGNOSIS — M79.661 PAIN IN RIGHT LOWER LEG: Primary | ICD-10-CM

## 2022-01-01 DIAGNOSIS — D59.10 AUTOIMMUNE HEMOLYTIC ANEMIA (H): ICD-10-CM

## 2022-01-01 DIAGNOSIS — E78.5 HYPERLIPIDEMIA LDL GOAL <130: ICD-10-CM

## 2022-01-01 DIAGNOSIS — M50.30 DEGENERATION OF CERVICAL INTERVERTEBRAL DISC: Primary | ICD-10-CM

## 2022-01-01 DIAGNOSIS — C91.10 CLL (CHRONIC LYMPHOCYTIC LEUKEMIA) (H): ICD-10-CM

## 2022-01-01 DIAGNOSIS — M50.30 DEGENERATION OF CERVICAL DISC WITHOUT MYELOPATHY: ICD-10-CM

## 2022-01-01 DIAGNOSIS — R07.89 ATYPICAL CHEST PAIN: Primary | ICD-10-CM

## 2022-01-01 DIAGNOSIS — R10.31 ABDOMINAL PAIN, RIGHT LOWER QUADRANT: ICD-10-CM

## 2022-01-01 DIAGNOSIS — R06.02 SHORTNESS OF BREATH: ICD-10-CM

## 2022-01-01 DIAGNOSIS — M79.601 PAIN IN BOTH UPPER EXTREMITIES: ICD-10-CM

## 2022-01-01 DIAGNOSIS — M79.641 PAIN OF RIGHT HAND: Primary | ICD-10-CM

## 2022-01-01 DIAGNOSIS — M65.319 STENOSING TENOSYNOVITIS OF THUMB: ICD-10-CM

## 2022-01-01 DIAGNOSIS — M50.30 DEGENERATION OF CERVICAL INTERVERTEBRAL DISC: ICD-10-CM

## 2022-01-01 DIAGNOSIS — R25.2 CRAMPING OF HANDS: ICD-10-CM

## 2022-01-01 DIAGNOSIS — E06.3 HYPOTHYROIDISM DUE TO HASHIMOTO'S THYROIDITIS: ICD-10-CM

## 2022-01-01 DIAGNOSIS — M79.602 PAIN IN BOTH UPPER EXTREMITIES: ICD-10-CM

## 2022-01-01 DIAGNOSIS — M79.661 PAIN IN RIGHT LOWER LEG: ICD-10-CM

## 2022-01-01 DIAGNOSIS — D59.10 AUTOIMMUNE HEMOLYTIC ANEMIA (H): Primary | ICD-10-CM

## 2022-01-01 DIAGNOSIS — M65.841 STENOSING TENOSYNOVITIS OF FINGER OF RIGHT HAND: Primary | ICD-10-CM

## 2022-01-01 DIAGNOSIS — R09.02 HYPOXIA: Primary | ICD-10-CM

## 2022-01-01 DIAGNOSIS — R07.89 ATYPICAL CHEST PAIN: ICD-10-CM

## 2022-01-01 DIAGNOSIS — C91.10 CLL (CHRONIC LYMPHOCYTIC LEUKEMIA) (H): Primary | ICD-10-CM

## 2022-01-01 DIAGNOSIS — J18.9 PNEUMONIA OF BOTH LUNGS DUE TO INFECTIOUS ORGANISM, UNSPECIFIED PART OF LUNG: ICD-10-CM

## 2022-01-01 DIAGNOSIS — R07.9 CHEST PAIN, UNSPECIFIED TYPE: ICD-10-CM

## 2022-01-01 DIAGNOSIS — R35.0 URINARY FREQUENCY: Primary | ICD-10-CM

## 2022-01-01 DIAGNOSIS — M65.841 STENOSING TENOSYNOVITIS OF FINGER OF RIGHT HAND: ICD-10-CM

## 2022-01-01 LAB
1,3 BETA GLUCAN SER-MCNC: >500 PG/ML
ABO/RH(D): ABNORMAL
ABO/RH(D): NORMAL
ALBUMIN SERPL-MCNC: 1.9 G/DL (ref 3.4–5)
ALBUMIN SERPL-MCNC: 2 G/DL (ref 3.4–5)
ALBUMIN SERPL-MCNC: 2.2 G/DL (ref 3.4–5)
ALBUMIN SERPL-MCNC: 2.3 G/DL (ref 3.4–5)
ALBUMIN SERPL-MCNC: 2.4 G/DL (ref 3.4–5)
ALBUMIN SERPL-MCNC: 2.5 G/DL (ref 3.4–5)
ALBUMIN SERPL-MCNC: 2.6 G/DL (ref 3.4–5)
ALBUMIN SERPL-MCNC: 2.6 G/DL (ref 3.4–5)
ALBUMIN SERPL-MCNC: 2.7 G/DL (ref 3.4–5)
ALBUMIN SERPL-MCNC: 2.7 G/DL (ref 3.4–5)
ALBUMIN SERPL-MCNC: 2.8 G/DL (ref 3.4–5)
ALBUMIN SERPL-MCNC: 2.9 G/DL (ref 3.4–5)
ALBUMIN SERPL-MCNC: 3.2 G/DL (ref 3.4–5)
ALBUMIN SERPL-MCNC: 3.5 G/DL (ref 3.4–5)
ALBUMIN SERPL-MCNC: 3.6 G/DL (ref 3.4–5)
ALBUMIN UR-MCNC: 10 MG/DL
ALBUMIN UR-MCNC: 50 MG/DL
ALBUMIN UR-MCNC: NEGATIVE MG/DL
ALBUMIN UR-MCNC: NEGATIVE MG/DL
ALP SERPL-CCNC: 102 U/L (ref 40–150)
ALP SERPL-CCNC: 103 U/L (ref 40–150)
ALP SERPL-CCNC: 107 U/L (ref 40–150)
ALP SERPL-CCNC: 110 U/L (ref 40–150)
ALP SERPL-CCNC: 42 U/L (ref 40–150)
ALP SERPL-CCNC: 44 U/L (ref 40–150)
ALP SERPL-CCNC: 46 U/L (ref 40–150)
ALP SERPL-CCNC: 50 U/L (ref 40–150)
ALP SERPL-CCNC: 52 U/L (ref 40–150)
ALP SERPL-CCNC: 56 U/L (ref 40–150)
ALP SERPL-CCNC: 56 U/L (ref 40–150)
ALP SERPL-CCNC: 58 U/L (ref 40–150)
ALP SERPL-CCNC: 68 U/L (ref 40–150)
ALP SERPL-CCNC: 74 U/L (ref 40–150)
ALP SERPL-CCNC: 75 U/L (ref 40–150)
ALP SERPL-CCNC: 75 U/L (ref 40–150)
ALP SERPL-CCNC: 76 U/L (ref 40–150)
ALP SERPL-CCNC: 78 U/L (ref 40–150)
ALP SERPL-CCNC: 79 U/L (ref 40–150)
ALP SERPL-CCNC: 82 U/L (ref 40–150)
ALP SERPL-CCNC: 82 U/L (ref 40–150)
ALP SERPL-CCNC: 87 U/L (ref 40–150)
ALP SERPL-CCNC: 89 U/L (ref 40–150)
ALP SERPL-CCNC: 90 U/L (ref 40–150)
ALP SERPL-CCNC: 91 U/L (ref 40–150)
ALP SERPL-CCNC: 92 U/L (ref 40–150)
ALP SERPL-CCNC: 92 U/L (ref 40–150)
ALP SERPL-CCNC: 94 U/L (ref 40–150)
ALP SERPL-CCNC: 98 U/L (ref 40–150)
ALP SERPL-CCNC: 99 U/L (ref 40–150)
ALP SERPL-CCNC: 99 U/L (ref 40–150)
ALT SERPL W P-5'-P-CCNC: 15 U/L (ref 0–70)
ALT SERPL W P-5'-P-CCNC: 18 U/L (ref 0–70)
ALT SERPL W P-5'-P-CCNC: 19 U/L (ref 0–70)
ALT SERPL W P-5'-P-CCNC: 21 U/L (ref 0–70)
ALT SERPL W P-5'-P-CCNC: 22 U/L (ref 0–70)
ALT SERPL W P-5'-P-CCNC: 22 U/L (ref 0–70)
ALT SERPL W P-5'-P-CCNC: 25 U/L (ref 0–70)
ALT SERPL W P-5'-P-CCNC: 27 U/L (ref 0–70)
ALT SERPL W P-5'-P-CCNC: 27 U/L (ref 0–70)
ALT SERPL W P-5'-P-CCNC: 28 U/L (ref 0–70)
ALT SERPL W P-5'-P-CCNC: 28 U/L (ref 0–70)
ALT SERPL W P-5'-P-CCNC: 31 U/L (ref 0–70)
ALT SERPL W P-5'-P-CCNC: 33 U/L (ref 0–70)
ALT SERPL W P-5'-P-CCNC: 33 U/L (ref 0–70)
ALT SERPL W P-5'-P-CCNC: 34 U/L (ref 0–70)
ALT SERPL W P-5'-P-CCNC: 34 U/L (ref 0–70)
ALT SERPL W P-5'-P-CCNC: 40 U/L (ref 0–70)
ALT SERPL W P-5'-P-CCNC: 42 U/L (ref 0–70)
ALT SERPL W P-5'-P-CCNC: 43 U/L (ref 0–70)
ALT SERPL W P-5'-P-CCNC: 45 U/L (ref 0–70)
ALT SERPL W P-5'-P-CCNC: 50 U/L (ref 0–70)
ALT SERPL W P-5'-P-CCNC: 51 U/L (ref 0–70)
ALT SERPL W P-5'-P-CCNC: 60 U/L (ref 0–70)
ALT SERPL W P-5'-P-CCNC: 61 U/L (ref 0–70)
ALT SERPL W P-5'-P-CCNC: 65 U/L (ref 0–70)
ALT SERPL-CCNC: 17 LU/L (ref 5–60)
ANION GAP SERPL CALCULATED.3IONS-SCNC: 10 MMOL/L (ref 3–14)
ANION GAP SERPL CALCULATED.3IONS-SCNC: 11 MMOL/L (ref 3–14)
ANION GAP SERPL CALCULATED.3IONS-SCNC: 13 MMOL/L (ref 3–14)
ANION GAP SERPL CALCULATED.3IONS-SCNC: 5 MMOL/L (ref 3–14)
ANION GAP SERPL CALCULATED.3IONS-SCNC: 6 MMOL/L (ref 3–14)
ANION GAP SERPL CALCULATED.3IONS-SCNC: 7 MMOL/L (ref 3–14)
ANION GAP SERPL CALCULATED.3IONS-SCNC: 8 MMOL/L (ref 3–14)
ANION GAP SERPL CALCULATED.3IONS-SCNC: 9 MMOL/L (ref 3–14)
ANTIBODY SCREEN: NEGATIVE
ANTIBODY SCREEN: POSITIVE
ANTIBODY UNIDENTIFIED: NORMAL
APPEARANCE FLD: ABNORMAL
APPEARANCE UR: CLEAR
APTT PPP: 26 SECONDS (ref 22–38)
ASPERGILLUS AB TITR SER CF: NORMAL {TITER}
AST SERPL W P-5'-P-CCNC: 11 U/L (ref 0–45)
AST SERPL W P-5'-P-CCNC: 12 U/L (ref 0–45)
AST SERPL W P-5'-P-CCNC: 12 U/L (ref 0–45)
AST SERPL W P-5'-P-CCNC: 13 U/L (ref 0–45)
AST SERPL W P-5'-P-CCNC: 14 U/L (ref 0–45)
AST SERPL W P-5'-P-CCNC: 16 U/L (ref 0–45)
AST SERPL W P-5'-P-CCNC: 17 U/L (ref 0–45)
AST SERPL W P-5'-P-CCNC: 19 U/L (ref 0–45)
AST SERPL W P-5'-P-CCNC: 19 U/L (ref 0–45)
AST SERPL W P-5'-P-CCNC: 20 U/L (ref 0–45)
AST SERPL W P-5'-P-CCNC: 21 U/L (ref 0–45)
AST SERPL W P-5'-P-CCNC: 22 U/L (ref 0–45)
AST SERPL W P-5'-P-CCNC: 22 U/L (ref 0–45)
AST SERPL W P-5'-P-CCNC: 23 U/L (ref 0–45)
AST SERPL W P-5'-P-CCNC: 23 U/L (ref 0–45)
AST SERPL W P-5'-P-CCNC: 24 U/L (ref 0–45)
AST SERPL W P-5'-P-CCNC: 26 U/L (ref 0–45)
AST SERPL W P-5'-P-CCNC: 27 U/L (ref 0–45)
AST SERPL W P-5'-P-CCNC: 28 U/L (ref 0–45)
AST SERPL W P-5'-P-CCNC: 30 U/L (ref 0–45)
AST SERPL W P-5'-P-CCNC: 32 U/L (ref 0–45)
AST SERPL W P-5'-P-CCNC: 33 U/L (ref 0–45)
AST SERPL W P-5'-P-CCNC: 43 U/L (ref 0–45)
AST SERPL W P-5'-P-CCNC: 43 U/L (ref 0–45)
AST SERPL W P-5'-P-CCNC: 47 U/L (ref 0–45)
AST SERPL W P-5'-P-CCNC: 47 U/L (ref 0–45)
AST SERPL W P-5'-P-CCNC: 50 U/L (ref 0–45)
AST SERPL-CCNC: 31 LU/L (ref 12–47)
ATRIAL RATE - MUSE: 102 BPM
ATRIAL RATE - MUSE: 102 BPM
ATRIAL RATE - MUSE: 105 BPM
ATRIAL RATE - MUSE: 107 BPM
ATRIAL RATE - MUSE: 117 BPM
ATRIAL RATE - MUSE: 122 BPM
ATRIAL RATE - MUSE: 131 BPM
ATRIAL RATE - MUSE: 131 BPM
ATRIAL RATE - MUSE: 138 BPM
ATRIAL RATE - MUSE: 138 BPM
ATRIAL RATE - MUSE: 147 BPM
ATRIAL RATE - MUSE: 166 BPM
ATRIAL RATE - MUSE: 202 BPM
ATRIAL RATE - MUSE: 78 BPM
ATRIAL RATE - MUSE: 91 BPM
B DERMAT AB SER-ACNC: 0.3 IV
B2 MICROGLOB TUMOR MARKER SER-MCNC: 3.2 MG/L
BACTERIA #/AREA URNS HPF: ABNORMAL /HPF
BACTERIA BLD CULT: NO GROWTH
BACTERIA BRONCH: NO GROWTH
BACTERIA SPT CULT: NORMAL
BACTERIA SPT CULT: NORMAL
BACTERIA UR CULT: NO GROWTH
BASE EXCESS BLDA CALC-SCNC: -0.2 MMOL/L (ref -9–1.8)
BASE EXCESS BLDA CALC-SCNC: -0.2 MMOL/L (ref -9–1.8)
BASE EXCESS BLDA CALC-SCNC: -0.3 MMOL/L (ref -9–1.8)
BASE EXCESS BLDA CALC-SCNC: -0.3 MMOL/L (ref -9–1.8)
BASE EXCESS BLDA CALC-SCNC: -0.6 MMOL/L (ref -9–1.8)
BASE EXCESS BLDA CALC-SCNC: -1.3 MMOL/L (ref -9–1.8)
BASE EXCESS BLDA CALC-SCNC: -1.4 MMOL/L (ref -9–1.8)
BASE EXCESS BLDA CALC-SCNC: -1.5 MMOL/L (ref -9–1.8)
BASE EXCESS BLDA CALC-SCNC: -1.8 MMOL/L (ref -9–1.8)
BASE EXCESS BLDA CALC-SCNC: -2.1 MMOL/L (ref -9–1.8)
BASE EXCESS BLDA CALC-SCNC: -2.2 MMOL/L (ref -9–1.8)
BASE EXCESS BLDA CALC-SCNC: 0 MMOL/L (ref -9–1.8)
BASE EXCESS BLDA CALC-SCNC: 0.1 MMOL/L (ref -9–1.8)
BASE EXCESS BLDA CALC-SCNC: 0.2 MMOL/L (ref -9–1.8)
BASE EXCESS BLDA CALC-SCNC: 0.2 MMOL/L (ref -9–1.8)
BASE EXCESS BLDA CALC-SCNC: 0.3 MMOL/L (ref -9–1.8)
BASE EXCESS BLDA CALC-SCNC: 0.4 MMOL/L (ref -9–1.8)
BASE EXCESS BLDA CALC-SCNC: 0.5 MMOL/L (ref -9–1.8)
BASE EXCESS BLDA CALC-SCNC: 0.7 MMOL/L (ref -9–1.8)
BASE EXCESS BLDA CALC-SCNC: 0.8 MMOL/L (ref -9–1.8)
BASE EXCESS BLDA CALC-SCNC: 0.8 MMOL/L (ref -9–1.8)
BASE EXCESS BLDA CALC-SCNC: 1.2 MMOL/L (ref -9–1.8)
BASE EXCESS BLDA CALC-SCNC: 1.3 MMOL/L (ref -9–1.8)
BASE EXCESS BLDA CALC-SCNC: 1.3 MMOL/L (ref -9–1.8)
BASE EXCESS BLDA CALC-SCNC: 1.4 MMOL/L (ref -9–1.8)
BASE EXCESS BLDA CALC-SCNC: 1.5 MMOL/L (ref -9–1.8)
BASE EXCESS BLDA CALC-SCNC: 1.5 MMOL/L (ref -9–1.8)
BASE EXCESS BLDA CALC-SCNC: 1.6 MMOL/L (ref -9–1.8)
BASE EXCESS BLDA CALC-SCNC: 1.9 MMOL/L (ref -9–1.8)
BASE EXCESS BLDA CALC-SCNC: 2.2 MMOL/L (ref -9–1.8)
BASE EXCESS BLDA CALC-SCNC: 2.4 MMOL/L (ref -9–1.8)
BASE EXCESS BLDA CALC-SCNC: 2.6 MMOL/L (ref -9–1.8)
BASE EXCESS BLDA CALC-SCNC: 2.7 MMOL/L (ref -9–1.8)
BASE EXCESS BLDA CALC-SCNC: 2.9 MMOL/L (ref -9–1.8)
BASE EXCESS BLDA CALC-SCNC: 3 MMOL/L (ref -9–1.8)
BASE EXCESS BLDA CALC-SCNC: 3.1 MMOL/L (ref -9–1.8)
BASE EXCESS BLDA CALC-SCNC: 3.7 MMOL/L (ref -9–1.8)
BASE EXCESS BLDA CALC-SCNC: 4.1 MMOL/L (ref -9–1.8)
BASE EXCESS BLDA CALC-SCNC: 4.4 MMOL/L (ref -9–1.8)
BASE EXCESS BLDV CALC-SCNC: 1.6 MMOL/L (ref -7.7–1.9)
BASO STIPL BLD QL SMEAR: PRESENT
BASO STIPL BLD QL SMEAR: PRESENT
BASOPHILS # BLD AUTO: 0 10E3/UL (ref 0–0.2)
BASOPHILS # BLD MANUAL: 0 10E3/UL (ref 0–0.2)
BASOPHILS NFR BLD AUTO: 0 %
BASOPHILS NFR BLD MANUAL: 0 %
BILIRUB DIRECT SERPL-MCNC: 0.4 MG/DL (ref 0–0.2)
BILIRUB DIRECT SERPL-MCNC: 0.4 MG/DL (ref 0–0.2)
BILIRUB DIRECT SERPL-MCNC: 0.6 MG/DL (ref 0–0.2)
BILIRUB SERPL-MCNC: 0.4 MG/DL (ref 0.2–1.3)
BILIRUB SERPL-MCNC: 0.5 MG/DL (ref 0.2–1.3)
BILIRUB SERPL-MCNC: 0.6 MG/DL (ref 0.2–1.3)
BILIRUB SERPL-MCNC: 0.7 MG/DL (ref 0.2–1.3)
BILIRUB SERPL-MCNC: 0.8 MG/DL (ref 0.2–1.3)
BILIRUB SERPL-MCNC: 0.9 MG/DL (ref 0.2–1.3)
BILIRUB SERPL-MCNC: 1.1 MG/DL (ref 0.2–1.3)
BILIRUB SERPL-MCNC: 1.3 MG/DL (ref 0.2–1.3)
BILIRUB SERPL-MCNC: 1.3 MG/DL (ref 0.2–1.3)
BILIRUB SERPL-MCNC: 1.4 MG/DL (ref 0.2–1.3)
BILIRUB SERPL-MCNC: 1.6 MG/DL (ref 0.2–1.3)
BILIRUB SERPL-MCNC: 1.7 MG/DL (ref 0.2–1.3)
BILIRUB SERPL-MCNC: 1.8 MG/DL (ref 0.2–1.3)
BILIRUB SERPL-MCNC: 2.5 MG/DL (ref 0.2–1.3)
BILIRUB SERPL-MCNC: 4.2 MG/DL (ref 0.2–1.3)
BILIRUB UR QL STRIP: NEGATIVE
BLAIMP ISLT/SPM QL: NOT DETECTED
BLAKPC ISLT/SPM QL: NOT DETECTED
BLAOXA-48 ISLT/SPM QL: NOT DETECTED
BLAVIM ISLT/SPM QL: NOT DETECTED
BLD PROD TYP BPU: NORMAL
BLOOD BANK CHART COMMENT: NORMAL
BLOOD BANK CHART COMMENT: NORMAL
BLOOD COMPONENT TYPE: NORMAL
BUN SERPL-MCNC: 13 MG/DL (ref 7–30)
BUN SERPL-MCNC: 14 MG/DL (ref 7–30)
BUN SERPL-MCNC: 15 MG/DL (ref 7–30)
BUN SERPL-MCNC: 17 MG/DL (ref 7–30)
BUN SERPL-MCNC: 17 MG/DL (ref 7–30)
BUN SERPL-MCNC: 18 MG/DL (ref 7–30)
BUN SERPL-MCNC: 19 MG/DL (ref 7–30)
BUN SERPL-MCNC: 20 MG/DL (ref 7–30)
BUN SERPL-MCNC: 21 MG/DL (ref 7–30)
BUN SERPL-MCNC: 21 MG/DL (ref 7–30)
BUN SERPL-MCNC: 23 MG/DL (ref 7–30)
BUN SERPL-MCNC: 24 MG/DL (ref 7–30)
BUN SERPL-MCNC: 25 MG/DL (ref 7–30)
BUN SERPL-MCNC: 26 MG/DL (ref 7–30)
BUN SERPL-MCNC: 27 MG/DL (ref 7–30)
BUN SERPL-MCNC: 27 MG/DL (ref 7–30)
BUN SERPL-MCNC: 28 MG/DL (ref 7–30)
BUN SERPL-MCNC: 29 MG/DL (ref 7–30)
BUN SERPL-MCNC: 29 MG/DL (ref 7–30)
BUN SERPL-MCNC: 30 MG/DL (ref 7–30)
BUN SERPL-MCNC: 31 MG/DL (ref 7–30)
BUN SERPL-MCNC: 50 MG/DL (ref 7–30)
BUN SERPL-MCNC: 59 MG/DL (ref 7–30)
BUN SERPL-MCNC: 70 MG/DL (ref 7–30)
BUN SERPL-MCNC: 75 MG/DL (ref 7–30)
BUN SERPL-MCNC: 76 MG/DL (ref 7–30)
BURR CELLS BLD QL SMEAR: ABNORMAL
BURR CELLS BLD QL SMEAR: SLIGHT
C PNEUM DNA SPEC QL NAA+PROBE: NOT DETECTED
C PNEUM DNA SPEC QL NAA+PROBE: NOT DETECTED
CA-I BLD-MCNC: 4.4 MG/DL (ref 4.4–5.2)
CA-I BLD-MCNC: 4.5 MG/DL (ref 4.4–5.2)
CALCIUM SERPL-MCNC: 7.7 MG/DL (ref 8.5–10.1)
CALCIUM SERPL-MCNC: 7.7 MG/DL (ref 8.5–10.1)
CALCIUM SERPL-MCNC: 7.9 MG/DL (ref 8.5–10.1)
CALCIUM SERPL-MCNC: 8.2 MG/DL (ref 8.5–10.1)
CALCIUM SERPL-MCNC: 8.3 MG/DL (ref 8.5–10.1)
CALCIUM SERPL-MCNC: 8.4 MG/DL (ref 8.5–10.1)
CALCIUM SERPL-MCNC: 8.5 MG/DL (ref 8.5–10.1)
CALCIUM SERPL-MCNC: 8.6 MG/DL (ref 8.5–10.1)
CALCIUM SERPL-MCNC: 8.7 MG/DL (ref 8.5–10.1)
CALCIUM SERPL-MCNC: 8.8 MG/DL (ref 8.5–10.1)
CALCIUM SERPL-MCNC: 8.9 MG/DL (ref 8.5–10.1)
CALCIUM SERPL-MCNC: 9 MG/DL (ref 8.5–10.1)
CD19 CELLS NFR BRONCH: 0 %
CD3 CELLS # BLD: 1080 CELLS/UL (ref 603–2990)
CD3 CELLS NFR BLD: 98 % (ref 49–84)
CD3 CELLS NFR BRONCH: 97 %
CD3+CD4+ CELLS # BLD: 136 CELLS/UL (ref 441–2156)
CD3+CD4+ CELLS NFR BLD: 12 % (ref 28–63)
CD3+CD4+ CELLS NFR BRONCH: 20 %
CD3+CD4+ CELLS/CD3+CD8+ CLL BLD: 0.16 % (ref 1.4–2.6)
CD3+CD4+ CELLS/CD3+CD8+ CLL BRONCH: 0.28 %
CD3+CD8+ CELLS # BLD: 873 CELLS/UL (ref 125–1312)
CD3+CD8+ CELLS NFR BLD: 79 % (ref 10–40)
CD3+CD8+ CELLS NFR BRONCH: 71 %
CD3-CD16+CD56+ CELLS NFR SPEC: 2 %
CELL COUNT BODY FLUID SOURCE: ABNORMAL
CHLORIDE BLD-SCNC: 100 MMOL/L (ref 94–109)
CHLORIDE BLD-SCNC: 101 MMOL/L (ref 94–109)
CHLORIDE BLD-SCNC: 102 MMOL/L (ref 94–109)
CHLORIDE BLD-SCNC: 103 MMOL/L (ref 94–109)
CHLORIDE BLD-SCNC: 103 MMOL/L (ref 94–109)
CHLORIDE BLD-SCNC: 104 MMOL/L (ref 94–109)
CHLORIDE BLD-SCNC: 104 MMOL/L (ref 94–109)
CHLORIDE BLD-SCNC: 106 MMOL/L (ref 94–109)
CHLORIDE BLD-SCNC: 107 MMOL/L (ref 94–109)
CHLORIDE BLD-SCNC: 94 MMOL/L (ref 94–109)
CHLORIDE BLD-SCNC: 95 MMOL/L (ref 94–109)
CHLORIDE BLD-SCNC: 96 MMOL/L (ref 94–109)
CHLORIDE BLD-SCNC: 97 MMOL/L (ref 94–109)
CHLORIDE BLD-SCNC: 98 MMOL/L (ref 94–109)
CHLORIDE BLD-SCNC: 99 MMOL/L (ref 94–109)
CMV DNA SPEC NAA+PROBE-ACNC: NOT DETECTED IU/ML
CMV DNA SPEC NAA+PROBE-ACNC: NOT DETECTED IU/ML
CMV IGG SERPL IA-ACNC: 9.1 U/ML
CMV IGG SERPL IA-ACNC: ABNORMAL
CO2 SERPL-SCNC: 22 MMOL/L (ref 20–32)
CO2 SERPL-SCNC: 22 MMOL/L (ref 20–32)
CO2 SERPL-SCNC: 23 MMOL/L (ref 20–32)
CO2 SERPL-SCNC: 24 MMOL/L (ref 20–32)
CO2 SERPL-SCNC: 25 MMOL/L (ref 20–32)
CO2 SERPL-SCNC: 26 MMOL/L (ref 20–32)
CO2 SERPL-SCNC: 27 MMOL/L (ref 20–32)
CO2 SERPL-SCNC: 28 MMOL/L (ref 20–32)
CO2 SERPL-SCNC: 29 MMOL/L (ref 20–32)
CO2 SERPL-SCNC: 29 MMOL/L (ref 20–32)
CO2 SERPL-SCNC: 30 MMOL/L (ref 20–32)
CO2 SERPL-SCNC: 30 MMOL/L (ref 20–32)
COCCIDIOIDES AB TITR SER CF: NORMAL {TITER}
CODING SYSTEM: NORMAL
COLOR FLD: ABNORMAL
COLOR UR AUTO: ABNORMAL
COLOR UR AUTO: YELLOW
CREAT SERPL-MCNC: 0.9 MG/DL (ref 0.66–1.25)
CREAT SERPL-MCNC: 0.95 MG/DL (ref 0.66–1.25)
CREAT SERPL-MCNC: 0.98 MG/DL (ref 0.66–1.25)
CREAT SERPL-MCNC: 1 MG/DL (ref 0.66–1.25)
CREAT SERPL-MCNC: 1.05 MG/DL (ref 0.66–1.25)
CREAT SERPL-MCNC: 1.11 MG/DL (ref 0.66–1.25)
CREAT SERPL-MCNC: 1.13 MG/DL (ref 0.66–1.25)
CREAT SERPL-MCNC: 1.15 MG/DL (ref 0.66–1.25)
CREAT SERPL-MCNC: 1.18 MG/DL (ref 0.66–1.25)
CREAT SERPL-MCNC: 1.18 MG/DL (ref 0.66–1.25)
CREAT SERPL-MCNC: 1.22 MG/DL (ref 0.66–1.25)
CREAT SERPL-MCNC: 1.25 MG/DL (ref 0.66–1.25)
CREAT SERPL-MCNC: 1.27 MG/DL (ref 0.66–1.25)
CREAT SERPL-MCNC: 1.28 MG/DL (ref 0.66–1.25)
CREAT SERPL-MCNC: 1.29 MG/DL (ref 0.66–1.25)
CREAT SERPL-MCNC: 1.31 MG/DL (ref 0.66–1.25)
CREAT SERPL-MCNC: 1.31 MG/DL (ref 0.66–1.25)
CREAT SERPL-MCNC: 1.34 MG/DL (ref 0.66–1.25)
CREAT SERPL-MCNC: 1.37 MG/DL (ref 0.66–1.25)
CREAT SERPL-MCNC: 1.38 MG/DL (ref 0.66–1.25)
CREAT SERPL-MCNC: 1.38 MG/DL (ref 0.66–1.25)
CREAT SERPL-MCNC: 1.41 MG/DL (ref 0.66–1.25)
CREAT SERPL-MCNC: 1.41 MG/DL (ref 0.66–1.25)
CREAT SERPL-MCNC: 1.42 MG/DL (ref 0.66–1.25)
CREAT SERPL-MCNC: 1.43 MG/DL (ref 0.66–1.25)
CREAT SERPL-MCNC: 1.45 MG/DL (ref 0.66–1.25)
CREAT SERPL-MCNC: 1.45 MG/DL (ref 0.66–1.25)
CREAT SERPL-MCNC: 1.5 MG/DL (ref 0.66–1.25)
CREAT SERPL-MCNC: 1.53 MG/DL (ref 0.66–1.25)
CREAT SERPL-MCNC: 1.53 MG/DL (ref 0.66–1.25)
CREAT SERPL-MCNC: 1.59 MG/DL (ref 0.66–1.25)
CREAT SERPL-MCNC: 1.61 MG/DL (ref 0.66–1.25)
CREAT SERPL-MCNC: 1.98 MG/DL (ref 0.66–1.25)
CREAT SERPL-MCNC: 2.15 MG/DL (ref 0.66–1.25)
CREAT SERPL-MCNC: 2.18 MG/DL (ref 0.66–1.25)
CREAT SERPL-MCNC: 2.24 MG/DL (ref 0.66–1.25)
CREAT SERPL-MCNC: 2.31 MG/DL (ref 0.66–1.25)
CREAT SERPL-MCNC: 2.35 MG/DL (ref 0.66–1.25)
CREAT UR-MCNC: 73 MG/DL
CREAT UR-MCNC: 73 MG/DL
CREATININE (EXTERNAL): 1.47 MG/DL (ref 0.6–1.5)
CROSSMATCH: NORMAL
CRP SERPL-MCNC: 100 MG/L (ref 0–8)
CRP SERPL-MCNC: 11 MG/L (ref 0–8)
CRP SERPL-MCNC: 19 MG/L (ref 0–8)
CRP SERPL-MCNC: 82 MG/L (ref 0–8)
CRYPTOC AG SPEC QL: NEGATIVE
CRYPTOC AG SPEC QL: NEGATIVE
CULTURE HARVEST COMPLETE DATE: NORMAL
CULTURE HARVEST COMPLETE DATE: NORMAL
D DIMER PPP FEU-MCNC: <0.27 UG/ML FEU (ref 0–0.5)
DACRYOCYTES BLD QL SMEAR: SLIGHT
DAT POLY: NORMAL
DAT POLY: NORMAL
DAT, ANTI-C3: NORMAL
DAT, ANTI-C3: NORMAL
DAT, ANTI-IGG, TUBE: NORMAL
DAT, ANTI-IGG, TUBE: NORMAL
DIASTOLIC BLOOD PRESSURE - MUSE: NORMAL MMHG
EBV DNA # SPEC NAA+PROBE: NOT DETECTED COPIES/ML
ELLIPTOCYTES BLD QL SMEAR: SLIGHT
ELUTION: NORMAL
ENTEROCOCCUS FAECALIS: NOT DETECTED
ENTEROCOCCUS FAECIUM: DETECTED
EOSINOPHIL # BLD AUTO: 0.1 10E3/UL (ref 0–0.7)
EOSINOPHIL # BLD MANUAL: 0 10E3/UL (ref 0–0.7)
EOSINOPHIL # BLD MANUAL: 0.2 10E3/UL (ref 0–0.7)
EOSINOPHIL # BLD MANUAL: 0.2 10E3/UL (ref 0–0.7)
EOSINOPHIL NFR BLD AUTO: 1 %
EOSINOPHIL NFR BLD MANUAL: 0 %
EOSINOPHIL NFR BLD MANUAL: 1 %
EOSINOPHIL NFR BLD MANUAL: 1 %
ERYTHROCYTE [DISTWIDTH] IN BLOOD BY AUTOMATED COUNT: 15.9 % (ref 10–15)
ERYTHROCYTE [DISTWIDTH] IN BLOOD BY AUTOMATED COUNT: 16.1 % (ref 10–15)
ERYTHROCYTE [DISTWIDTH] IN BLOOD BY AUTOMATED COUNT: 16.2 % (ref 10–15)
ERYTHROCYTE [DISTWIDTH] IN BLOOD BY AUTOMATED COUNT: 16.3 % (ref 10–15)
ERYTHROCYTE [DISTWIDTH] IN BLOOD BY AUTOMATED COUNT: 16.3 % (ref 10–15)
ERYTHROCYTE [DISTWIDTH] IN BLOOD BY AUTOMATED COUNT: 16.4 % (ref 10–15)
ERYTHROCYTE [DISTWIDTH] IN BLOOD BY AUTOMATED COUNT: 16.4 % (ref 10–15)
ERYTHROCYTE [DISTWIDTH] IN BLOOD BY AUTOMATED COUNT: 16.5 % (ref 10–15)
ERYTHROCYTE [DISTWIDTH] IN BLOOD BY AUTOMATED COUNT: 16.6 % (ref 10–15)
ERYTHROCYTE [DISTWIDTH] IN BLOOD BY AUTOMATED COUNT: 16.8 % (ref 10–15)
ERYTHROCYTE [DISTWIDTH] IN BLOOD BY AUTOMATED COUNT: 16.8 % (ref 10–15)
ERYTHROCYTE [DISTWIDTH] IN BLOOD BY AUTOMATED COUNT: 17.3 % (ref 10–15)
ERYTHROCYTE [DISTWIDTH] IN BLOOD BY AUTOMATED COUNT: 17.4 % (ref 10–15)
ERYTHROCYTE [DISTWIDTH] IN BLOOD BY AUTOMATED COUNT: 17.4 % (ref 10–15)
ERYTHROCYTE [DISTWIDTH] IN BLOOD BY AUTOMATED COUNT: 17.5 % (ref 10–15)
ERYTHROCYTE [DISTWIDTH] IN BLOOD BY AUTOMATED COUNT: 17.6 % (ref 10–15)
ERYTHROCYTE [DISTWIDTH] IN BLOOD BY AUTOMATED COUNT: 17.7 % (ref 10–15)
ERYTHROCYTE [DISTWIDTH] IN BLOOD BY AUTOMATED COUNT: 17.7 % (ref 10–15)
ERYTHROCYTE [DISTWIDTH] IN BLOOD BY AUTOMATED COUNT: 17.8 % (ref 10–15)
ERYTHROCYTE [DISTWIDTH] IN BLOOD BY AUTOMATED COUNT: 17.8 % (ref 10–15)
ERYTHROCYTE [DISTWIDTH] IN BLOOD BY AUTOMATED COUNT: 18.1 % (ref 10–15)
ERYTHROCYTE [DISTWIDTH] IN BLOOD BY AUTOMATED COUNT: 18.2 % (ref 10–15)
ERYTHROCYTE [DISTWIDTH] IN BLOOD BY AUTOMATED COUNT: 18.6 % (ref 10–15)
ERYTHROCYTE [DISTWIDTH] IN BLOOD BY AUTOMATED COUNT: 18.8 % (ref 10–15)
ERYTHROCYTE [DISTWIDTH] IN BLOOD BY AUTOMATED COUNT: 19.8 % (ref 10–15)
ERYTHROCYTE [DISTWIDTH] IN BLOOD BY AUTOMATED COUNT: 20 % (ref 10–15)
ERYTHROCYTE [DISTWIDTH] IN BLOOD BY AUTOMATED COUNT: 20.3 % (ref 10–15)
ERYTHROCYTE [DISTWIDTH] IN BLOOD BY AUTOMATED COUNT: 20.3 % (ref 10–15)
ERYTHROCYTE [DISTWIDTH] IN BLOOD BY AUTOMATED COUNT: 20.6 % (ref 10–15)
ERYTHROCYTE [DISTWIDTH] IN BLOOD BY AUTOMATED COUNT: 21 % (ref 10–15)
ERYTHROCYTE [DISTWIDTH] IN BLOOD BY AUTOMATED COUNT: 21 % (ref 10–15)
ERYTHROCYTE [DISTWIDTH] IN BLOOD BY AUTOMATED COUNT: 21.2 % (ref 10–15)
ERYTHROCYTE [DISTWIDTH] IN BLOOD BY AUTOMATED COUNT: 21.3 % (ref 10–15)
FERRITIN SERPL-MCNC: 239 NG/ML (ref 26–388)
FLUAV H1 2009 PAND RNA SPEC QL NAA+PROBE: NOT DETECTED
FLUAV H1 2009 PAND RNA SPEC QL NAA+PROBE: NOT DETECTED
FLUAV H1 RNA SPEC QL NAA+PROBE: NOT DETECTED
FLUAV H1 RNA SPEC QL NAA+PROBE: NOT DETECTED
FLUAV H3 RNA SPEC QL NAA+PROBE: NOT DETECTED
FLUAV H3 RNA SPEC QL NAA+PROBE: NOT DETECTED
FLUAV RNA SPEC QL NAA+PROBE: NEGATIVE
FLUAV RNA SPEC QL NAA+PROBE: NOT DETECTED
FLUAV RNA SPEC QL NAA+PROBE: NOT DETECTED
FLUBV RNA RESP QL NAA+PROBE: NEGATIVE
FLUBV RNA SPEC QL NAA+PROBE: NOT DETECTED
FLUBV RNA SPEC QL NAA+PROBE: NOT DETECTED
FOLATE SERPL-MCNC: 38.1 NG/ML
FRACT EXCRET NA UR+SERPL-RTO: 0.2 %
G6PD RBC-CCNT: 21.7 U/G HB
GALACTOMANNAN AG BAL QL: NEGATIVE
GALACTOMANNAN AG SERPL QL IA: NEGATIVE
GALACTOMANNAN AG SERPL QL IA: POSITIVE
GALACTOMANNAN AG SPEC IA-ACNC: 0.03
GALACTOMANNAN AG SPEC IA-ACNC: 0.1
GALACTOMANNAN AG SPEC IA-ACNC: 1.42
GAMMA INTERFERON BACKGROUND BLD IA-ACNC: 0.04 IU/ML
GFR ESTIMATED (EXTERNAL): 49 ML/MIN/1.73M2
GFR SERPL CREATININE-BSD FRML MDRD: 28 ML/MIN/1.73M2
GFR SERPL CREATININE-BSD FRML MDRD: 30 ML/MIN/1.73M2
GFR SERPL CREATININE-BSD FRML MDRD: 31 ML/MIN/1.73M2
GFR SERPL CREATININE-BSD FRML MDRD: 31 ML/MIN/1.73M2
GFR SERPL CREATININE-BSD FRML MDRD: 35 ML/MIN/1.73M2
GFR SERPL CREATININE-BSD FRML MDRD: 44 ML/MIN/1.73M2
GFR SERPL CREATININE-BSD FRML MDRD: 45 ML/MIN/1.73M2
GFR SERPL CREATININE-BSD FRML MDRD: 47 ML/MIN/1.73M2
GFR SERPL CREATININE-BSD FRML MDRD: 47 ML/MIN/1.73M2
GFR SERPL CREATININE-BSD FRML MDRD: 48 ML/MIN/1.73M2
GFR SERPL CREATININE-BSD FRML MDRD: 50 ML/MIN/1.73M2
GFR SERPL CREATININE-BSD FRML MDRD: 50 ML/MIN/1.73M2
GFR SERPL CREATININE-BSD FRML MDRD: 51 ML/MIN/1.73M2
GFR SERPL CREATININE-BSD FRML MDRD: 52 ML/MIN/1.73M2
GFR SERPL CREATININE-BSD FRML MDRD: 53 ML/MIN/1.73M2
GFR SERPL CREATININE-BSD FRML MDRD: 53 ML/MIN/1.73M2
GFR SERPL CREATININE-BSD FRML MDRD: 54 ML/MIN/1.73M2
GFR SERPL CREATININE-BSD FRML MDRD: 55 ML/MIN/1.73M2
GFR SERPL CREATININE-BSD FRML MDRD: 57 ML/MIN/1.73M2
GFR SERPL CREATININE-BSD FRML MDRD: 57 ML/MIN/1.73M2
GFR SERPL CREATININE-BSD FRML MDRD: 58 ML/MIN/1.73M2
GFR SERPL CREATININE-BSD FRML MDRD: 58 ML/MIN/1.73M2
GFR SERPL CREATININE-BSD FRML MDRD: 59 ML/MIN/1.73M2
GFR SERPL CREATININE-BSD FRML MDRD: 60 ML/MIN/1.73M2
GFR SERPL CREATININE-BSD FRML MDRD: 62 ML/MIN/1.73M2
GFR SERPL CREATININE-BSD FRML MDRD: 64 ML/MIN/1.73M2
GFR SERPL CREATININE-BSD FRML MDRD: 64 ML/MIN/1.73M2
GFR SERPL CREATININE-BSD FRML MDRD: 66 ML/MIN/1.73M2
GFR SERPL CREATININE-BSD FRML MDRD: 68 ML/MIN/1.73M2
GFR SERPL CREATININE-BSD FRML MDRD: 69 ML/MIN/1.73M2
GFR SERPL CREATININE-BSD FRML MDRD: 74 ML/MIN/1.73M2
GFR SERPL CREATININE-BSD FRML MDRD: 78 ML/MIN/1.73M2
GFR SERPL CREATININE-BSD FRML MDRD: 80 ML/MIN/1.73M2
GFR SERPL CREATININE-BSD FRML MDRD: 83 ML/MIN/1.73M2
GFR SERPL CREATININE-BSD FRML MDRD: 89 ML/MIN/1.73M2
GLUCOSE BLD-MCNC: 100 MG/DL (ref 70–99)
GLUCOSE BLD-MCNC: 100 MG/DL (ref 70–99)
GLUCOSE BLD-MCNC: 107 MG/DL (ref 70–99)
GLUCOSE BLD-MCNC: 115 MG/DL (ref 70–99)
GLUCOSE BLD-MCNC: 119 MG/DL (ref 70–99)
GLUCOSE BLD-MCNC: 120 MG/DL (ref 70–99)
GLUCOSE BLD-MCNC: 133 MG/DL (ref 70–99)
GLUCOSE BLD-MCNC: 134 MG/DL (ref 70–99)
GLUCOSE BLD-MCNC: 136 MG/DL (ref 70–99)
GLUCOSE BLD-MCNC: 136 MG/DL (ref 70–99)
GLUCOSE BLD-MCNC: 147 MG/DL (ref 70–99)
GLUCOSE BLD-MCNC: 153 MG/DL (ref 70–99)
GLUCOSE BLD-MCNC: 160 MG/DL (ref 70–99)
GLUCOSE BLD-MCNC: 160 MG/DL (ref 70–99)
GLUCOSE BLD-MCNC: 162 MG/DL (ref 70–99)
GLUCOSE BLD-MCNC: 166 MG/DL (ref 70–99)
GLUCOSE BLD-MCNC: 168 MG/DL (ref 70–99)
GLUCOSE BLD-MCNC: 170 MG/DL (ref 70–99)
GLUCOSE BLD-MCNC: 175 MG/DL (ref 70–99)
GLUCOSE BLD-MCNC: 180 MG/DL (ref 70–99)
GLUCOSE BLD-MCNC: 197 MG/DL (ref 70–99)
GLUCOSE BLD-MCNC: 211 MG/DL (ref 70–99)
GLUCOSE BLD-MCNC: 226 MG/DL (ref 70–99)
GLUCOSE BLD-MCNC: 77 MG/DL (ref 70–99)
GLUCOSE BLD-MCNC: 85 MG/DL (ref 70–99)
GLUCOSE BLD-MCNC: 86 MG/DL (ref 70–99)
GLUCOSE BLD-MCNC: 88 MG/DL (ref 70–99)
GLUCOSE BLD-MCNC: 91 MG/DL (ref 70–99)
GLUCOSE BLD-MCNC: 91 MG/DL (ref 70–99)
GLUCOSE BLD-MCNC: 92 MG/DL (ref 70–99)
GLUCOSE BLD-MCNC: 94 MG/DL (ref 70–99)
GLUCOSE BLD-MCNC: 94 MG/DL (ref 70–99)
GLUCOSE BLD-MCNC: 95 MG/DL (ref 70–99)
GLUCOSE BLD-MCNC: 98 MG/DL (ref 70–99)
GLUCOSE BLDC GLUCOMTR-MCNC: 100 MG/DL (ref 70–99)
GLUCOSE BLDC GLUCOMTR-MCNC: 100 MG/DL (ref 70–99)
GLUCOSE BLDC GLUCOMTR-MCNC: 104 MG/DL (ref 70–99)
GLUCOSE BLDC GLUCOMTR-MCNC: 104 MG/DL (ref 70–99)
GLUCOSE BLDC GLUCOMTR-MCNC: 107 MG/DL (ref 70–99)
GLUCOSE BLDC GLUCOMTR-MCNC: 107 MG/DL (ref 70–99)
GLUCOSE BLDC GLUCOMTR-MCNC: 109 MG/DL (ref 70–99)
GLUCOSE BLDC GLUCOMTR-MCNC: 110 MG/DL (ref 70–99)
GLUCOSE BLDC GLUCOMTR-MCNC: 110 MG/DL (ref 70–99)
GLUCOSE BLDC GLUCOMTR-MCNC: 111 MG/DL (ref 70–99)
GLUCOSE BLDC GLUCOMTR-MCNC: 111 MG/DL (ref 70–99)
GLUCOSE BLDC GLUCOMTR-MCNC: 113 MG/DL (ref 70–99)
GLUCOSE BLDC GLUCOMTR-MCNC: 114 MG/DL (ref 70–99)
GLUCOSE BLDC GLUCOMTR-MCNC: 116 MG/DL (ref 70–99)
GLUCOSE BLDC GLUCOMTR-MCNC: 117 MG/DL (ref 70–99)
GLUCOSE BLDC GLUCOMTR-MCNC: 117 MG/DL (ref 70–99)
GLUCOSE BLDC GLUCOMTR-MCNC: 118 MG/DL (ref 70–99)
GLUCOSE BLDC GLUCOMTR-MCNC: 119 MG/DL (ref 70–99)
GLUCOSE BLDC GLUCOMTR-MCNC: 120 MG/DL (ref 70–99)
GLUCOSE BLDC GLUCOMTR-MCNC: 122 MG/DL (ref 70–99)
GLUCOSE BLDC GLUCOMTR-MCNC: 122 MG/DL (ref 70–99)
GLUCOSE BLDC GLUCOMTR-MCNC: 123 MG/DL (ref 70–99)
GLUCOSE BLDC GLUCOMTR-MCNC: 124 MG/DL (ref 70–99)
GLUCOSE BLDC GLUCOMTR-MCNC: 124 MG/DL (ref 70–99)
GLUCOSE BLDC GLUCOMTR-MCNC: 126 MG/DL (ref 70–99)
GLUCOSE BLDC GLUCOMTR-MCNC: 127 MG/DL (ref 70–99)
GLUCOSE BLDC GLUCOMTR-MCNC: 128 MG/DL (ref 70–99)
GLUCOSE BLDC GLUCOMTR-MCNC: 130 MG/DL (ref 70–99)
GLUCOSE BLDC GLUCOMTR-MCNC: 131 MG/DL (ref 70–99)
GLUCOSE BLDC GLUCOMTR-MCNC: 136 MG/DL (ref 70–99)
GLUCOSE BLDC GLUCOMTR-MCNC: 137 MG/DL (ref 70–99)
GLUCOSE BLDC GLUCOMTR-MCNC: 138 MG/DL (ref 70–99)
GLUCOSE BLDC GLUCOMTR-MCNC: 141 MG/DL (ref 70–99)
GLUCOSE BLDC GLUCOMTR-MCNC: 141 MG/DL (ref 70–99)
GLUCOSE BLDC GLUCOMTR-MCNC: 142 MG/DL (ref 70–99)
GLUCOSE BLDC GLUCOMTR-MCNC: 143 MG/DL (ref 70–99)
GLUCOSE BLDC GLUCOMTR-MCNC: 144 MG/DL (ref 70–99)
GLUCOSE BLDC GLUCOMTR-MCNC: 144 MG/DL (ref 70–99)
GLUCOSE BLDC GLUCOMTR-MCNC: 145 MG/DL (ref 70–99)
GLUCOSE BLDC GLUCOMTR-MCNC: 145 MG/DL (ref 70–99)
GLUCOSE BLDC GLUCOMTR-MCNC: 148 MG/DL (ref 70–99)
GLUCOSE BLDC GLUCOMTR-MCNC: 148 MG/DL (ref 70–99)
GLUCOSE BLDC GLUCOMTR-MCNC: 150 MG/DL (ref 70–99)
GLUCOSE BLDC GLUCOMTR-MCNC: 150 MG/DL (ref 70–99)
GLUCOSE BLDC GLUCOMTR-MCNC: 151 MG/DL (ref 70–99)
GLUCOSE BLDC GLUCOMTR-MCNC: 151 MG/DL (ref 70–99)
GLUCOSE BLDC GLUCOMTR-MCNC: 152 MG/DL (ref 70–99)
GLUCOSE BLDC GLUCOMTR-MCNC: 153 MG/DL (ref 70–99)
GLUCOSE BLDC GLUCOMTR-MCNC: 153 MG/DL (ref 70–99)
GLUCOSE BLDC GLUCOMTR-MCNC: 154 MG/DL (ref 70–99)
GLUCOSE BLDC GLUCOMTR-MCNC: 154 MG/DL (ref 70–99)
GLUCOSE BLDC GLUCOMTR-MCNC: 156 MG/DL (ref 70–99)
GLUCOSE BLDC GLUCOMTR-MCNC: 158 MG/DL (ref 70–99)
GLUCOSE BLDC GLUCOMTR-MCNC: 160 MG/DL (ref 70–99)
GLUCOSE BLDC GLUCOMTR-MCNC: 161 MG/DL (ref 70–99)
GLUCOSE BLDC GLUCOMTR-MCNC: 161 MG/DL (ref 70–99)
GLUCOSE BLDC GLUCOMTR-MCNC: 163 MG/DL (ref 70–99)
GLUCOSE BLDC GLUCOMTR-MCNC: 163 MG/DL (ref 70–99)
GLUCOSE BLDC GLUCOMTR-MCNC: 164 MG/DL (ref 70–99)
GLUCOSE BLDC GLUCOMTR-MCNC: 164 MG/DL (ref 70–99)
GLUCOSE BLDC GLUCOMTR-MCNC: 165 MG/DL (ref 70–99)
GLUCOSE BLDC GLUCOMTR-MCNC: 166 MG/DL (ref 70–99)
GLUCOSE BLDC GLUCOMTR-MCNC: 167 MG/DL (ref 70–99)
GLUCOSE BLDC GLUCOMTR-MCNC: 168 MG/DL (ref 70–99)
GLUCOSE BLDC GLUCOMTR-MCNC: 169 MG/DL (ref 70–99)
GLUCOSE BLDC GLUCOMTR-MCNC: 170 MG/DL (ref 70–99)
GLUCOSE BLDC GLUCOMTR-MCNC: 171 MG/DL (ref 70–99)
GLUCOSE BLDC GLUCOMTR-MCNC: 171 MG/DL (ref 70–99)
GLUCOSE BLDC GLUCOMTR-MCNC: 172 MG/DL (ref 70–99)
GLUCOSE BLDC GLUCOMTR-MCNC: 175 MG/DL (ref 70–99)
GLUCOSE BLDC GLUCOMTR-MCNC: 176 MG/DL (ref 70–99)
GLUCOSE BLDC GLUCOMTR-MCNC: 177 MG/DL (ref 70–99)
GLUCOSE BLDC GLUCOMTR-MCNC: 177 MG/DL (ref 70–99)
GLUCOSE BLDC GLUCOMTR-MCNC: 180 MG/DL (ref 70–99)
GLUCOSE BLDC GLUCOMTR-MCNC: 180 MG/DL (ref 70–99)
GLUCOSE BLDC GLUCOMTR-MCNC: 182 MG/DL (ref 70–99)
GLUCOSE BLDC GLUCOMTR-MCNC: 188 MG/DL (ref 70–99)
GLUCOSE BLDC GLUCOMTR-MCNC: 192 MG/DL (ref 70–99)
GLUCOSE BLDC GLUCOMTR-MCNC: 192 MG/DL (ref 70–99)
GLUCOSE BLDC GLUCOMTR-MCNC: 206 MG/DL (ref 70–99)
GLUCOSE BLDC GLUCOMTR-MCNC: 208 MG/DL (ref 70–99)
GLUCOSE BLDC GLUCOMTR-MCNC: 215 MG/DL (ref 70–99)
GLUCOSE BLDC GLUCOMTR-MCNC: 216 MG/DL (ref 70–99)
GLUCOSE BLDC GLUCOMTR-MCNC: 220 MG/DL (ref 70–99)
GLUCOSE BLDC GLUCOMTR-MCNC: 222 MG/DL (ref 70–99)
GLUCOSE BLDC GLUCOMTR-MCNC: 224 MG/DL (ref 70–99)
GLUCOSE BLDC GLUCOMTR-MCNC: 229 MG/DL (ref 70–99)
GLUCOSE BLDC GLUCOMTR-MCNC: 230 MG/DL (ref 70–99)
GLUCOSE BLDC GLUCOMTR-MCNC: 232 MG/DL (ref 70–99)
GLUCOSE BLDC GLUCOMTR-MCNC: 243 MG/DL (ref 70–99)
GLUCOSE BLDC GLUCOMTR-MCNC: 244 MG/DL (ref 70–99)
GLUCOSE BLDC GLUCOMTR-MCNC: 246 MG/DL (ref 70–99)
GLUCOSE BLDC GLUCOMTR-MCNC: 273 MG/DL (ref 70–99)
GLUCOSE BLDC GLUCOMTR-MCNC: 80 MG/DL (ref 70–99)
GLUCOSE BLDC GLUCOMTR-MCNC: 82 MG/DL (ref 70–99)
GLUCOSE BLDC GLUCOMTR-MCNC: 83 MG/DL (ref 70–99)
GLUCOSE BLDC GLUCOMTR-MCNC: 85 MG/DL (ref 70–99)
GLUCOSE BLDC GLUCOMTR-MCNC: 93 MG/DL (ref 70–99)
GLUCOSE BLDC GLUCOMTR-MCNC: 94 MG/DL (ref 70–99)
GLUCOSE UR STRIP-MCNC: NEGATIVE MG/DL
GRAM STAIN RESULT: NORMAL
H CAPSUL AG UR QL IA: NOT DETECTED
H CAPSUL AG UR-MCNC: NOT DETECTED NG/ML
H CAPSUL MYC AB TITR SER CF: NORMAL {TITER}
H CAPSUL YST AB TITR SER CF: NORMAL {TITER}
HADV DNA # SPEC NAA+PROBE: NOT DETECTED COPIES/ML
HADV DNA SPEC QL NAA+PROBE: NOT DETECTED
HADV DNA SPEC QL NAA+PROBE: NOT DETECTED
HAPTOGLOB SERPL-MCNC: 150 MG/DL (ref 32–197)
HAPTOGLOB SERPL-MCNC: 192 MG/DL (ref 32–197)
HAPTOGLOB SERPL-MCNC: 198 MG/DL (ref 32–197)
HAPTOGLOB SERPL-MCNC: 200 MG/DL (ref 32–197)
HAPTOGLOB SERPL-MCNC: 233 MG/DL (ref 32–197)
HAPTOGLOB SERPL-MCNC: 288 MG/DL (ref 32–197)
HBA1C MFR BLD: 5 % (ref 0–5.6)
HBV CORE AB SERPL QL IA: REACTIVE
HBV DNA SERPL NAA+PROBE-ACNC: NOT DETECTED IU/ML
HBV SURFACE AB SERPL IA-ACNC: 288.89 M[IU]/ML
HBV SURFACE AG SERPL QL IA: NONREACTIVE
HCO3 BLD-SCNC: 22 MMOL/L (ref 21–28)
HCO3 BLD-SCNC: 23 MMOL/L (ref 21–28)
HCO3 BLD-SCNC: 24 MMOL/L (ref 21–28)
HCO3 BLD-SCNC: 25 MMOL/L (ref 21–28)
HCO3 BLD-SCNC: 26 MMOL/L (ref 21–28)
HCO3 BLD-SCNC: 27 MMOL/L (ref 21–28)
HCO3 BLD-SCNC: 28 MMOL/L (ref 21–28)
HCO3 BLD-SCNC: 29 MMOL/L (ref 21–28)
HCO3 BLD-SCNC: 30 MMOL/L (ref 21–28)
HCO3 BLD-SCNC: 30 MMOL/L (ref 21–28)
HCO3 BLDV-SCNC: 27 MMOL/L (ref 21–28)
HCOV PNL SPEC NAA+PROBE: NOT DETECTED
HCOV PNL SPEC NAA+PROBE: NOT DETECTED
HCT VFR BLD AUTO: 22.1 % (ref 40–53)
HCT VFR BLD AUTO: 24.5 % (ref 40–53)
HCT VFR BLD AUTO: 24.9 % (ref 40–53)
HCT VFR BLD AUTO: 25 % (ref 40–53)
HCT VFR BLD AUTO: 25.4 % (ref 40–53)
HCT VFR BLD AUTO: 25.5 % (ref 40–53)
HCT VFR BLD AUTO: 25.9 % (ref 40–53)
HCT VFR BLD AUTO: 25.9 % (ref 40–53)
HCT VFR BLD AUTO: 26.2 % (ref 40–53)
HCT VFR BLD AUTO: 26.3 % (ref 40–53)
HCT VFR BLD AUTO: 26.6 % (ref 40–53)
HCT VFR BLD AUTO: 26.8 % (ref 40–53)
HCT VFR BLD AUTO: 26.8 % (ref 40–53)
HCT VFR BLD AUTO: 27 % (ref 40–53)
HCT VFR BLD AUTO: 27.1 % (ref 40–53)
HCT VFR BLD AUTO: 27.2 % (ref 40–53)
HCT VFR BLD AUTO: 27.3 % (ref 40–53)
HCT VFR BLD AUTO: 27.6 % (ref 40–53)
HCT VFR BLD AUTO: 28 % (ref 40–53)
HCT VFR BLD AUTO: 28.4 % (ref 40–53)
HCT VFR BLD AUTO: 28.5 % (ref 40–53)
HCT VFR BLD AUTO: 28.7 % (ref 40–53)
HCT VFR BLD AUTO: 28.7 % (ref 40–53)
HCT VFR BLD AUTO: 28.8 % (ref 40–53)
HCT VFR BLD AUTO: 28.8 % (ref 40–53)
HCT VFR BLD AUTO: 29 % (ref 40–53)
HCT VFR BLD AUTO: 29.6 % (ref 40–53)
HCT VFR BLD AUTO: 30.2 % (ref 40–53)
HCT VFR BLD AUTO: 30.2 % (ref 40–53)
HCT VFR BLD AUTO: 30.8 % (ref 40–53)
HCT VFR BLD AUTO: 32.6 % (ref 40–53)
HCT VFR BLD AUTO: 32.7 % (ref 40–53)
HCT VFR BLD AUTO: 36.7 % (ref 40–53)
HGB BLD-MCNC: 10 G/DL (ref 13.3–17.7)
HGB BLD-MCNC: 10 G/DL (ref 13.3–17.7)
HGB BLD-MCNC: 10.4 G/DL (ref 13.3–17.7)
HGB BLD-MCNC: 10.6 G/DL (ref 13.3–17.7)
HGB BLD-MCNC: 11.4 G/DL (ref 13.3–17.7)
HGB BLD-MCNC: 6.9 G/DL (ref 13.3–17.7)
HGB BLD-MCNC: 7.5 G/DL (ref 13.3–17.7)
HGB BLD-MCNC: 8 G/DL (ref 13.3–17.7)
HGB BLD-MCNC: 8.2 G/DL (ref 13.3–17.7)
HGB BLD-MCNC: 8.3 G/DL (ref 13.3–17.7)
HGB BLD-MCNC: 8.3 G/DL (ref 13.3–17.7)
HGB BLD-MCNC: 8.4 G/DL (ref 13.3–17.7)
HGB BLD-MCNC: 8.5 G/DL (ref 13.3–17.7)
HGB BLD-MCNC: 8.6 G/DL (ref 13.3–17.7)
HGB BLD-MCNC: 8.7 G/DL (ref 13.3–17.7)
HGB BLD-MCNC: 8.7 G/DL (ref 13.3–17.7)
HGB BLD-MCNC: 8.8 G/DL (ref 13.3–17.7)
HGB BLD-MCNC: 8.9 G/DL (ref 13.3–17.7)
HGB BLD-MCNC: 9 G/DL (ref 13.3–17.7)
HGB BLD-MCNC: 9.1 G/DL (ref 13.3–17.7)
HGB BLD-MCNC: 9.2 G/DL (ref 13.3–17.7)
HGB BLD-MCNC: 9.3 G/DL (ref 13.3–17.7)
HGB BLD-MCNC: 9.4 G/DL (ref 13.3–17.7)
HGB BLD-MCNC: 9.4 G/DL (ref 13.3–17.7)
HGB BLD-MCNC: 9.5 G/DL (ref 13.3–17.7)
HGB BLD-MCNC: 9.7 G/DL (ref 13.3–17.7)
HGB UR QL STRIP: ABNORMAL
HGB UR QL STRIP: NEGATIVE
HIV 1+2 AB+HIV1 P24 AG SERPL QL IA: NONREACTIVE
HMPV RNA SPEC QL NAA+PROBE: NOT DETECTED
HMPV RNA SPEC QL NAA+PROBE: NOT DETECTED
HOLD SPECIMEN: NORMAL
HPIV1 RNA SPEC QL NAA+PROBE: NOT DETECTED
HPIV1 RNA SPEC QL NAA+PROBE: NOT DETECTED
HPIV2 RNA SPEC QL NAA+PROBE: NOT DETECTED
HPIV2 RNA SPEC QL NAA+PROBE: NOT DETECTED
HPIV3 RNA SPEC QL NAA+PROBE: NOT DETECTED
HPIV3 RNA SPEC QL NAA+PROBE: NOT DETECTED
HPIV4 RNA SPEC QL NAA+PROBE: NOT DETECTED
HPIV4 RNA SPEC QL NAA+PROBE: NOT DETECTED
HSV1 DNA SPEC QL NAA+PROBE: NEGATIVE
HSV1 IGG SERPL QL IA: 23.5 INDEX
HSV1 IGG SERPL QL IA: ABNORMAL
HSV2 DNA SPEC QL NAA+PROBE: NEGATIVE
HSV2 IGG SERPL QL IA: 2.56 INDEX
HSV2 IGG SERPL QL IA: ABNORMAL
IGG SERPL-MCNC: 312 MG/DL (ref 610–1616)
IGG SERPL-MCNC: 338 MG/DL (ref 610–1616)
IGG SERPL-MCNC: 446 MG/DL (ref 610–1616)
IGG SERPL-MCNC: 628 MG/DL (ref 610–1616)
IMM GRANULOCYTES # BLD: 0 10E3/UL
IMM GRANULOCYTES NFR BLD: 1 %
INR PPP: 1.07 (ref 0.85–1.15)
INTERPRETATION ECG - MUSE: NORMAL
INTERPRETATION: NORMAL
INTERPRETATION: NORMAL
IRON SATN MFR SERPL: 13 % (ref 15–46)
IRON SERPL-MCNC: 36 UG/DL (ref 35–180)
ISSUE DATE AND TIME: NORMAL
KETONES UR STRIP-MCNC: NEGATIVE MG/DL
KOH PREPARATION: NORMAL
KOH PREPARATION: NORMAL
L PNEUMO DNA SPEC QL NAA+PROBE: NOT DETECTED
LACTATE SERPL-SCNC: 1.3 MMOL/L (ref 0.7–2)
LACTATE SERPL-SCNC: 1.4 MMOL/L (ref 0.7–2)
LACTATE SERPL-SCNC: 1.4 MMOL/L (ref 0.7–2)
LACTATE SERPL-SCNC: 1.6 MMOL/L (ref 0.7–2)
LACTATE SERPL-SCNC: 1.8 MMOL/L (ref 0.7–2)
LACTATE SERPL-SCNC: 2 MMOL/L (ref 0.7–2)
LACTATE SERPL-SCNC: 2.2 MMOL/L (ref 0.7–2)
LACTATE SERPL-SCNC: 2.3 MMOL/L (ref 0.7–2)
LACTATE SERPL-SCNC: 2.3 MMOL/L (ref 0.7–2)
LACTATE SERPL-SCNC: 2.4 MMOL/L (ref 0.7–2)
LACTATE SERPL-SCNC: 2.4 MMOL/L (ref 0.7–2)
LACTATE SERPL-SCNC: 2.5 MMOL/L (ref 0.7–2)
LACTATE SERPL-SCNC: 2.9 MMOL/L (ref 0.7–2)
LACTATE SERPL-SCNC: 3.2 MMOL/L (ref 0.7–2)
LACTATE SERPL-SCNC: 3.2 MMOL/L (ref 0.7–2)
LACTATE SERPL-SCNC: 3.3 MMOL/L (ref 0.7–2)
LACTATE SERPL-SCNC: 3.5 MMOL/L (ref 0.7–2)
LACTATE SERPL-SCNC: 3.5 MMOL/L (ref 0.7–2)
LACTATE SERPL-SCNC: 3.6 MMOL/L (ref 0.7–2)
LACTATE SERPL-SCNC: 3.6 MMOL/L (ref 0.7–2)
LACTATE SERPL-SCNC: 3.7 MMOL/L (ref 0.7–2)
LACTATE SERPL-SCNC: 3.7 MMOL/L (ref 0.7–2)
LACTATE SERPL-SCNC: 4 MMOL/L (ref 0.7–2)
LACTATE SERPL-SCNC: 4.1 MMOL/L (ref 0.7–2)
LDH SERPL L TO P-CCNC: 254 U/L (ref 85–227)
LDH SERPL L TO P-CCNC: 294 U/L (ref 85–227)
LDH SERPL L TO P-CCNC: 299 U/L (ref 85–227)
LDH SERPL L TO P-CCNC: 299 U/L (ref 85–227)
LDH SERPL L TO P-CCNC: 300 U/L (ref 85–227)
LDH SERPL L TO P-CCNC: 303 U/L (ref 85–227)
LDH SERPL L TO P-CCNC: 303 U/L (ref 85–227)
LDH SERPL L TO P-CCNC: 307 U/L (ref 85–227)
LDH SERPL L TO P-CCNC: 309 U/L (ref 85–227)
LDH SERPL L TO P-CCNC: 309 U/L (ref 85–227)
LDH SERPL L TO P-CCNC: 322 U/L (ref 85–227)
LDH SERPL L TO P-CCNC: 323 U/L (ref 85–227)
LDH SERPL L TO P-CCNC: 328 U/L (ref 85–227)
LDH SERPL L TO P-CCNC: 333 U/L (ref 85–227)
LDH SERPL L TO P-CCNC: 348 U/L (ref 85–227)
LEGIONELLA DNA SPEC NAA+PROBE: NOT DETECTED
LEUKOCYTE ESTERASE UR QL STRIP: NEGATIVE
LISTERIA SPECIES (DETECTED/NOT DETECTED): NOT DETECTED
LVEF ECHO: NORMAL
LYMPHOCYTE SUBSET BRONCHIAL EXTERNAL COMMENT: NORMAL
LYMPHOCYTES # BLD AUTO: 4.6 10E3/UL (ref 0.8–5.3)
LYMPHOCYTES # BLD MANUAL: 10.2 10E3/UL (ref 0.8–5.3)
LYMPHOCYTES # BLD MANUAL: 11.4 10E3/UL (ref 0.8–5.3)
LYMPHOCYTES # BLD MANUAL: 12.5 10E3/UL (ref 0.8–5.3)
LYMPHOCYTES # BLD MANUAL: 12.5 10E3/UL (ref 0.8–5.3)
LYMPHOCYTES # BLD MANUAL: 12.9 10E3/UL (ref 0.8–5.3)
LYMPHOCYTES # BLD MANUAL: 13 10E3/UL (ref 0.8–5.3)
LYMPHOCYTES # BLD MANUAL: 13.1 10E3/UL (ref 0.8–5.3)
LYMPHOCYTES # BLD MANUAL: 13.6 10E3/UL (ref 0.8–5.3)
LYMPHOCYTES # BLD MANUAL: 13.9 10E3/UL (ref 0.8–5.3)
LYMPHOCYTES # BLD MANUAL: 14.9 10E3/UL (ref 0.8–5.3)
LYMPHOCYTES # BLD MANUAL: 15.3 10E3/UL (ref 0.8–5.3)
LYMPHOCYTES # BLD MANUAL: 15.7 10E3/UL (ref 0.8–5.3)
LYMPHOCYTES # BLD MANUAL: 16 10E3/UL (ref 0.8–5.3)
LYMPHOCYTES # BLD MANUAL: 16.5 10E3/UL (ref 0.8–5.3)
LYMPHOCYTES # BLD MANUAL: 17.1 10E3/UL (ref 0.8–5.3)
LYMPHOCYTES # BLD MANUAL: 17.6 10E3/UL (ref 0.8–5.3)
LYMPHOCYTES # BLD MANUAL: 18 10E3/UL (ref 0.8–5.3)
LYMPHOCYTES # BLD MANUAL: 18.2 10E3/UL (ref 0.8–5.3)
LYMPHOCYTES # BLD MANUAL: 18.4 10E3/UL (ref 0.8–5.3)
LYMPHOCYTES # BLD MANUAL: 18.8 10E3/UL (ref 0.8–5.3)
LYMPHOCYTES # BLD MANUAL: 19.2 10E3/UL (ref 0.8–5.3)
LYMPHOCYTES # BLD MANUAL: 19.3 10E3/UL (ref 0.8–5.3)
LYMPHOCYTES # BLD MANUAL: 19.3 10E3/UL (ref 0.8–5.3)
LYMPHOCYTES # BLD MANUAL: 19.6 10E3/UL (ref 0.8–5.3)
LYMPHOCYTES # BLD MANUAL: 20.3 10E3/UL (ref 0.8–5.3)
LYMPHOCYTES # BLD MANUAL: 20.5 10E3/UL (ref 0.8–5.3)
LYMPHOCYTES # BLD MANUAL: 22.4 10E3/UL (ref 0.8–5.3)
LYMPHOCYTES # BLD MANUAL: 25.4 10E3/UL (ref 0.8–5.3)
LYMPHOCYTES # BLD MANUAL: 25.8 10E3/UL (ref 0.8–5.3)
LYMPHOCYTES # BLD MANUAL: 3.4 10E3/UL (ref 0.8–5.3)
LYMPHOCYTES # BLD MANUAL: 32.2 10E3/UL (ref 0.8–5.3)
LYMPHOCYTES # BLD MANUAL: 9.5 10E3/UL (ref 0.8–5.3)
LYMPHOCYTES NFR BLD AUTO: 55 %
LYMPHOCYTES NFR BLD MANUAL: 22 %
LYMPHOCYTES NFR BLD MANUAL: 45 %
LYMPHOCYTES NFR BLD MANUAL: 46 %
LYMPHOCYTES NFR BLD MANUAL: 48 %
LYMPHOCYTES NFR BLD MANUAL: 50 %
LYMPHOCYTES NFR BLD MANUAL: 51 %
LYMPHOCYTES NFR BLD MANUAL: 52 %
LYMPHOCYTES NFR BLD MANUAL: 53 %
LYMPHOCYTES NFR BLD MANUAL: 53 %
LYMPHOCYTES NFR BLD MANUAL: 54 %
LYMPHOCYTES NFR BLD MANUAL: 56 %
LYMPHOCYTES NFR BLD MANUAL: 58 %
LYMPHOCYTES NFR BLD MANUAL: 58 %
LYMPHOCYTES NFR BLD MANUAL: 59 %
LYMPHOCYTES NFR BLD MANUAL: 60 %
LYMPHOCYTES NFR BLD MANUAL: 61 %
LYMPHOCYTES NFR BLD MANUAL: 63 %
LYMPHOCYTES NFR BLD MANUAL: 64 %
LYMPHOCYTES NFR BLD MANUAL: 64 %
LYMPHOCYTES NFR BLD MANUAL: 65 %
LYMPHOCYTES NFR BLD MANUAL: 65 %
LYMPHOCYTES NFR BLD MANUAL: 66 %
LYMPHOCYTES NFR BLD MANUAL: 68 %
LYMPHOCYTES NFR BLD MANUAL: 68 %
LYMPHOCYTES NFR BLD MANUAL: 71 %
LYMPHOCYTES NFR BLD MANUAL: 73 %
LYMPHOCYTES NFR FLD MANUAL: 7 %
Lab: NORMAL
Lab: NORMAL
M PNEUMO DNA SPEC QL NAA+PROBE: NOT DETECTED
M TB IFN-G BLD-IMP: NEGATIVE
M TB IFN-G CD4+ BCKGRND COR BLD-ACNC: 8.78 IU/ML
MAGNESIUM SERPL-MCNC: 2 MG/DL (ref 1.6–2.3)
MAGNESIUM SERPL-MCNC: 2.1 MG/DL (ref 1.6–2.3)
MAGNESIUM SERPL-MCNC: 2.2 MG/DL (ref 1.6–2.3)
MAGNESIUM SERPL-MCNC: 2.3 MG/DL (ref 1.6–2.3)
MAGNESIUM SERPL-MCNC: 2.4 MG/DL (ref 1.6–2.3)
MAGNESIUM SERPL-MCNC: 2.5 MG/DL (ref 1.6–2.3)
MAGNESIUM SERPL-MCNC: 2.5 MG/DL (ref 1.6–2.3)
MAGNESIUM SERPL-MCNC: 2.8 MG/DL (ref 1.6–2.3)
MAGNESIUM SERPL-MCNC: 2.9 MG/DL (ref 1.6–2.3)
MAGNESIUM SERPL-MCNC: 3 MG/DL (ref 1.6–2.3)
MAYO MISC RESULT: NORMAL
MCH RBC QN AUTO: 34.3 PG (ref 26.5–33)
MCH RBC QN AUTO: 34.7 PG (ref 26.5–33)
MCH RBC QN AUTO: 34.7 PG (ref 26.5–33)
MCH RBC QN AUTO: 34.8 PG (ref 26.5–33)
MCH RBC QN AUTO: 35.2 PG (ref 26.5–33)
MCH RBC QN AUTO: 35.2 PG (ref 26.5–33)
MCH RBC QN AUTO: 35.3 PG (ref 26.5–33)
MCH RBC QN AUTO: 35.4 PG (ref 26.5–33)
MCH RBC QN AUTO: 35.5 PG (ref 26.5–33)
MCH RBC QN AUTO: 35.6 PG (ref 26.5–33)
MCH RBC QN AUTO: 35.6 PG (ref 26.5–33)
MCH RBC QN AUTO: 35.7 PG (ref 26.5–33)
MCH RBC QN AUTO: 35.9 PG (ref 26.5–33)
MCH RBC QN AUTO: 36 PG (ref 26.5–33)
MCH RBC QN AUTO: 36.1 PG (ref 26.5–33)
MCH RBC QN AUTO: 36.2 PG (ref 26.5–33)
MCH RBC QN AUTO: 36.3 PG (ref 26.5–33)
MCH RBC QN AUTO: 36.4 PG (ref 26.5–33)
MCH RBC QN AUTO: 36.5 PG (ref 26.5–33)
MCH RBC QN AUTO: 36.5 PG (ref 26.5–33)
MCH RBC QN AUTO: 36.6 PG (ref 26.5–33)
MCH RBC QN AUTO: 36.7 PG (ref 26.5–33)
MCH RBC QN AUTO: 37 PG (ref 26.5–33)
MCH RBC QN AUTO: 37.1 PG (ref 26.5–33)
MCH RBC QN AUTO: 37.2 PG (ref 26.5–33)
MCH RBC QN AUTO: 37.5 PG (ref 26.5–33)
MCH RBC QN AUTO: 37.6 PG (ref 26.5–33)
MCH RBC QN AUTO: 37.6 PG (ref 26.5–33)
MCH RBC QN AUTO: 37.8 PG (ref 26.5–33)
MCHC RBC AUTO-ENTMCNC: 29.1 G/DL (ref 31.5–36.5)
MCHC RBC AUTO-ENTMCNC: 31.1 G/DL (ref 31.5–36.5)
MCHC RBC AUTO-ENTMCNC: 31.2 G/DL (ref 31.5–36.5)
MCHC RBC AUTO-ENTMCNC: 31.6 G/DL (ref 31.5–36.5)
MCHC RBC AUTO-ENTMCNC: 31.8 G/DL (ref 31.5–36.5)
MCHC RBC AUTO-ENTMCNC: 31.9 G/DL (ref 31.5–36.5)
MCHC RBC AUTO-ENTMCNC: 31.9 G/DL (ref 31.5–36.5)
MCHC RBC AUTO-ENTMCNC: 32 G/DL (ref 31.5–36.5)
MCHC RBC AUTO-ENTMCNC: 32 G/DL (ref 31.5–36.5)
MCHC RBC AUTO-ENTMCNC: 32.1 G/DL (ref 31.5–36.5)
MCHC RBC AUTO-ENTMCNC: 32.2 G/DL (ref 31.5–36.5)
MCHC RBC AUTO-ENTMCNC: 32.4 G/DL (ref 31.5–36.5)
MCHC RBC AUTO-ENTMCNC: 32.5 G/DL (ref 31.5–36.5)
MCHC RBC AUTO-ENTMCNC: 32.7 G/DL (ref 31.5–36.5)
MCHC RBC AUTO-ENTMCNC: 32.8 G/DL (ref 31.5–36.5)
MCHC RBC AUTO-ENTMCNC: 33 G/DL (ref 31.5–36.5)
MCHC RBC AUTO-ENTMCNC: 33.1 G/DL (ref 31.5–36.5)
MCHC RBC AUTO-ENTMCNC: 33.1 G/DL (ref 31.5–36.5)
MCHC RBC AUTO-ENTMCNC: 33.2 G/DL (ref 31.5–36.5)
MCHC RBC AUTO-ENTMCNC: 33.6 G/DL (ref 31.5–36.5)
MCV RBC AUTO: 107 FL (ref 78–100)
MCV RBC AUTO: 108 FL (ref 78–100)
MCV RBC AUTO: 109 FL (ref 78–100)
MCV RBC AUTO: 110 FL (ref 78–100)
MCV RBC AUTO: 111 FL (ref 78–100)
MCV RBC AUTO: 111 FL (ref 78–100)
MCV RBC AUTO: 112 FL (ref 78–100)
MCV RBC AUTO: 113 FL (ref 78–100)
MCV RBC AUTO: 114 FL (ref 78–100)
MCV RBC AUTO: 114 FL (ref 78–100)
MCV RBC AUTO: 116 FL (ref 78–100)
MCV RBC AUTO: 118 FL (ref 78–100)
MCV RBC AUTO: 129 FL (ref 78–100)
METAMYELOCYTES # BLD MANUAL: 0.3 10E3/UL
METAMYELOCYTES NFR BLD MANUAL: 1 %
METHGB MFR BLD: 0.6 % (ref 0–3)
MITOGEN IGNF BCKGRD COR BLD-ACNC: 0 IU/ML
MITOGEN IGNF BCKGRD COR BLD-ACNC: 0.01 IU/ML
MONOCYTES # BLD AUTO: 0.3 10E3/UL (ref 0–1.3)
MONOCYTES # BLD MANUAL: 0 10E3/UL (ref 0–1.3)
MONOCYTES # BLD MANUAL: 0.2 10E3/UL (ref 0–1.3)
MONOCYTES # BLD MANUAL: 0.3 10E3/UL (ref 0–1.3)
MONOCYTES # BLD MANUAL: 0.4 10E3/UL (ref 0–1.3)
MONOCYTES # BLD MANUAL: 0.5 10E3/UL (ref 0–1.3)
MONOCYTES # BLD MANUAL: 0.6 10E3/UL (ref 0–1.3)
MONOCYTES # BLD MANUAL: 0.7 10E3/UL (ref 0–1.3)
MONOCYTES # BLD MANUAL: 0.8 10E3/UL (ref 0–1.3)
MONOCYTES # BLD MANUAL: 0.9 10E3/UL (ref 0–1.3)
MONOCYTES NFR BLD AUTO: 4 %
MONOCYTES NFR BLD MANUAL: 0 %
MONOCYTES NFR BLD MANUAL: 1 %
MONOCYTES NFR BLD MANUAL: 2 %
MONOCYTES NFR BLD MANUAL: 3 %
MONOS+MACROS NFR FLD MANUAL: NORMAL %
MRSA DNA SPEC QL NAA+PROBE: NEGATIVE
MUCOUS THREADS #/AREA URNS LPF: PRESENT /LPF
MYELOCYTES # BLD MANUAL: 0.2 10E3/UL
MYELOCYTES NFR BLD MANUAL: 1 %
NDM TARGET DNA: NOT DETECTED
NEUTROPHILS # BLD AUTO: 3.2 10E3/UL (ref 1.6–8.3)
NEUTROPHILS # BLD MANUAL: 10.4 10E3/UL (ref 1.6–8.3)
NEUTROPHILS # BLD MANUAL: 10.8 10E3/UL (ref 1.6–8.3)
NEUTROPHILS # BLD MANUAL: 11.1 10E3/UL (ref 1.6–8.3)
NEUTROPHILS # BLD MANUAL: 11.8 10E3/UL (ref 1.6–8.3)
NEUTROPHILS # BLD MANUAL: 12.2 10E3/UL (ref 1.6–8.3)
NEUTROPHILS # BLD MANUAL: 13.6 10E3/UL (ref 1.6–8.3)
NEUTROPHILS # BLD MANUAL: 14.4 10E3/UL (ref 1.6–8.3)
NEUTROPHILS # BLD MANUAL: 15.6 10E3/UL (ref 1.6–8.3)
NEUTROPHILS # BLD MANUAL: 16 10E3/UL (ref 1.6–8.3)
NEUTROPHILS # BLD MANUAL: 16.4 10E3/UL (ref 1.6–8.3)
NEUTROPHILS # BLD MANUAL: 17 10E3/UL (ref 1.6–8.3)
NEUTROPHILS # BLD MANUAL: 17.4 10E3/UL (ref 1.6–8.3)
NEUTROPHILS # BLD MANUAL: 17.7 10E3/UL (ref 1.6–8.3)
NEUTROPHILS # BLD MANUAL: 19.2 10E3/UL (ref 1.6–8.3)
NEUTROPHILS # BLD MANUAL: 19.4 10E3/UL (ref 1.6–8.3)
NEUTROPHILS # BLD MANUAL: 21.4 10E3/UL (ref 1.6–8.3)
NEUTROPHILS # BLD MANUAL: 22.2 10E3/UL (ref 1.6–8.3)
NEUTROPHILS # BLD MANUAL: 5.7 10E3/UL (ref 1.6–8.3)
NEUTROPHILS # BLD MANUAL: 6.3 10E3/UL (ref 1.6–8.3)
NEUTROPHILS # BLD MANUAL: 6.7 10E3/UL (ref 1.6–8.3)
NEUTROPHILS # BLD MANUAL: 7.1 10E3/UL (ref 1.6–8.3)
NEUTROPHILS # BLD MANUAL: 7.3 10E3/UL (ref 1.6–8.3)
NEUTROPHILS # BLD MANUAL: 7.7 10E3/UL (ref 1.6–8.3)
NEUTROPHILS # BLD MANUAL: 7.7 10E3/UL (ref 1.6–8.3)
NEUTROPHILS # BLD MANUAL: 8 10E3/UL (ref 1.6–8.3)
NEUTROPHILS # BLD MANUAL: 8 10E3/UL (ref 1.6–8.3)
NEUTROPHILS # BLD MANUAL: 8.4 10E3/UL (ref 1.6–8.3)
NEUTROPHILS # BLD MANUAL: 8.5 10E3/UL (ref 1.6–8.3)
NEUTROPHILS # BLD MANUAL: 9.2 10E3/UL (ref 1.6–8.3)
NEUTROPHILS # BLD MANUAL: 9.6 10E3/UL (ref 1.6–8.3)
NEUTROPHILS # BLD MANUAL: 9.9 10E3/UL (ref 1.6–8.3)
NEUTROPHILS NFR BLD AUTO: 39 %
NEUTROPHILS NFR BLD MANUAL: 27 %
NEUTROPHILS NFR BLD MANUAL: 29 %
NEUTROPHILS NFR BLD MANUAL: 30 %
NEUTROPHILS NFR BLD MANUAL: 31 %
NEUTROPHILS NFR BLD MANUAL: 33 %
NEUTROPHILS NFR BLD MANUAL: 34 %
NEUTROPHILS NFR BLD MANUAL: 35 %
NEUTROPHILS NFR BLD MANUAL: 37 %
NEUTROPHILS NFR BLD MANUAL: 37 %
NEUTROPHILS NFR BLD MANUAL: 38 %
NEUTROPHILS NFR BLD MANUAL: 38 %
NEUTROPHILS NFR BLD MANUAL: 39 %
NEUTROPHILS NFR BLD MANUAL: 40 %
NEUTROPHILS NFR BLD MANUAL: 41 %
NEUTROPHILS NFR BLD MANUAL: 43 %
NEUTROPHILS NFR BLD MANUAL: 46 %
NEUTROPHILS NFR BLD MANUAL: 49 %
NEUTROPHILS NFR BLD MANUAL: 49 %
NEUTROPHILS NFR BLD MANUAL: 50 %
NEUTROPHILS NFR BLD MANUAL: 53 %
NEUTROPHILS NFR BLD MANUAL: 53 %
NEUTROPHILS NFR BLD MANUAL: 54 %
NEUTROPHILS NFR BLD MANUAL: 54 %
NEUTS BAND NFR FLD MANUAL: 55 %
NITRATE UR QL: NEGATIVE
NRBC # BLD AUTO: 0 10E3/UL
NRBC # BLD AUTO: 0.3 10E3/UL
NRBC # BLD AUTO: 0.5 10E3/UL
NRBC # BLD AUTO: 0.6 10E3/UL
NRBC BLD AUTO-RTO: 0 /100
NRBC BLD MANUAL-RTO: 1 %
NRBC BLD MANUAL-RTO: 1 %
NRBC BLD MANUAL-RTO: 2 %
NT-PROBNP SERPL-MCNC: 32 PG/ML (ref 0–900)
O2/TOTAL GAS SETTING VFR VENT: 100 %
O2/TOTAL GAS SETTING VFR VENT: 15 %
O2/TOTAL GAS SETTING VFR VENT: 30 %
O2/TOTAL GAS SETTING VFR VENT: 55 %
O2/TOTAL GAS SETTING VFR VENT: 60 %
O2/TOTAL GAS SETTING VFR VENT: 65 %
O2/TOTAL GAS SETTING VFR VENT: 70 %
O2/TOTAL GAS SETTING VFR VENT: 75 %
O2/TOTAL GAS SETTING VFR VENT: 80 %
O2/TOTAL GAS SETTING VFR VENT: 85 %
O2/TOTAL GAS SETTING VFR VENT: 90 %
OBSERVATION IMP: POSITIVE
OSMOLALITY SERPL: 271 MMOL/KG (ref 280–301)
OSMOLALITY SERPL: 273 MMOL/KG (ref 280–301)
OSMOLALITY UR: 479 MMOL/KG (ref 100–1200)
OSMOLALITY UR: 552 MMOL/KG (ref 100–1200)
OTHER CELLS # BLD MANUAL: 0.6 10E3/UL
OTHER CELLS # BLD MANUAL: 6.4 10E3/UL
OTHER CELLS FLD MANUAL: 39 %
OTHER CELLS NFR BLD MANUAL: 3 %
OTHER CELLS NFR BLD MANUAL: 41 %
OXYHGB MFR BLD: 94 % (ref 92–100)
P AXIS - MUSE: 49 DEGREES
P AXIS - MUSE: 51 DEGREES
P AXIS - MUSE: 51 DEGREES
P AXIS - MUSE: 54 DEGREES
P AXIS - MUSE: 56 DEGREES
P AXIS - MUSE: 57 DEGREES
P AXIS - MUSE: 59 DEGREES
P AXIS - MUSE: 66 DEGREES
P AXIS - MUSE: 69 DEGREES
P AXIS - MUSE: 74 DEGREES
P AXIS - MUSE: NORMAL DEGREES
P JIROVECII AG SPEC QL IF: POSITIVE
P JIROVECII DNA SPEC QL NAA+PROBE: DETECTED
P JIROVECII DNA SPEC QL NAA+PROBE: DETECTED
PATH REPORT.COMMENTS IMP SPEC: NORMAL
PATH REPORT.FINAL DX SPEC: NORMAL
PATH REPORT.GROSS SPEC: NORMAL
PATH REPORT.GROSS SPEC: NORMAL
PATH REPORT.MICROSCOPIC SPEC OTHER STN: NORMAL
PATH REPORT.RELEVANT HX SPEC: NORMAL
PATH REV: ABNORMAL
PATH REV: ABNORMAL
PCO2 BLD: 34 MM HG (ref 35–45)
PCO2 BLD: 34 MM HG (ref 35–45)
PCO2 BLD: 35 MM HG (ref 35–45)
PCO2 BLD: 36 MM HG (ref 35–45)
PCO2 BLD: 37 MM HG (ref 35–45)
PCO2 BLD: 37 MM HG (ref 35–45)
PCO2 BLD: 38 MM HG (ref 35–45)
PCO2 BLD: 39 MM HG (ref 35–45)
PCO2 BLD: 40 MM HG (ref 35–45)
PCO2 BLD: 41 MM HG (ref 35–45)
PCO2 BLD: 42 MM HG (ref 35–45)
PCO2 BLD: 43 MM HG (ref 35–45)
PCO2 BLD: 48 MM HG (ref 35–45)
PCO2 BLD: 50 MM HG (ref 35–45)
PCO2 BLD: 50 MM HG (ref 35–45)
PCO2 BLD: 51 MM HG (ref 35–45)
PCO2 BLD: 52 MM HG (ref 35–45)
PCO2 BLD: 54 MM HG (ref 35–45)
PCO2 BLD: 55 MM HG (ref 35–45)
PCO2 BLD: 56 MM HG (ref 35–45)
PCO2 BLD: 57 MM HG (ref 35–45)
PCO2 BLD: 58 MM HG (ref 35–45)
PCO2 BLD: 60 MM HG (ref 35–45)
PCO2 BLD: 67 MM HG (ref 35–45)
PCO2 BLD: 72 MM HG (ref 35–45)
PCO2 BLD: 74 MM HG (ref 35–45)
PCO2 BLDV: 45 MM HG (ref 40–50)
PERFORMING LABORATORY: NORMAL
PERFORMING LABORATORY: NORMAL
PF4 HEPARIN CMPLX AB SER QL: NEGATIVE
PH BLD: 7.2 [PH] (ref 7.35–7.45)
PH BLD: 7.22 [PH] (ref 7.35–7.45)
PH BLD: 7.25 [PH] (ref 7.35–7.45)
PH BLD: 7.29 [PH] (ref 7.35–7.45)
PH BLD: 7.3 [PH] (ref 7.35–7.45)
PH BLD: 7.31 [PH] (ref 7.35–7.45)
PH BLD: 7.32 [PH] (ref 7.35–7.45)
PH BLD: 7.33 [PH] (ref 7.35–7.45)
PH BLD: 7.34 [PH] (ref 7.35–7.45)
PH BLD: 7.35 [PH] (ref 7.35–7.45)
PH BLD: 7.36 [PH] (ref 7.35–7.45)
PH BLD: 7.38 [PH] (ref 7.35–7.45)
PH BLD: 7.39 [PH] (ref 7.35–7.45)
PH BLD: 7.4 [PH] (ref 7.35–7.45)
PH BLD: 7.41 [PH] (ref 7.35–7.45)
PH BLD: 7.42 [PH] (ref 7.35–7.45)
PH BLD: 7.43 [PH] (ref 7.35–7.45)
PH BLD: 7.43 [PH] (ref 7.35–7.45)
PH BLD: 7.44 [PH] (ref 7.35–7.45)
PH BLD: 7.44 [PH] (ref 7.35–7.45)
PH BLD: 7.45 [PH] (ref 7.35–7.45)
PH BLD: 7.45 [PH] (ref 7.35–7.45)
PH BLD: 7.47 [PH] (ref 7.35–7.45)
PH BLD: 7.47 [PH] (ref 7.35–7.45)
PH BLDV: 7.39 [PH] (ref 7.32–7.43)
PH UR STRIP: 5.5 [PH] (ref 5–7)
PH UR STRIP: 6.5 [PH] (ref 5–7)
PH UR STRIP: 6.5 [PH] (ref 5–7)
PH UR STRIP: 7 [PH] (ref 5–7)
PHOSPHATE SERPL-MCNC: 2.5 MG/DL (ref 2.5–4.5)
PHOSPHATE SERPL-MCNC: 2.7 MG/DL (ref 2.5–4.5)
PHOSPHATE SERPL-MCNC: 3 MG/DL (ref 2.5–4.5)
PHOSPHATE SERPL-MCNC: 3.1 MG/DL (ref 2.5–4.5)
PHOSPHATE SERPL-MCNC: 3.1 MG/DL (ref 2.5–4.5)
PHOSPHATE SERPL-MCNC: 3.2 MG/DL (ref 2.5–4.5)
PHOSPHATE SERPL-MCNC: 3.3 MG/DL (ref 2.5–4.5)
PHOSPHATE SERPL-MCNC: 3.3 MG/DL (ref 2.5–4.5)
PHOSPHATE SERPL-MCNC: 3.4 MG/DL (ref 2.5–4.5)
PHOSPHATE SERPL-MCNC: 3.5 MG/DL (ref 2.5–4.5)
PHOSPHATE SERPL-MCNC: 3.6 MG/DL (ref 2.5–4.5)
PHOSPHATE SERPL-MCNC: 4.2 MG/DL (ref 2.5–4.5)
PHOSPHATE SERPL-MCNC: 4.2 MG/DL (ref 2.5–4.5)
PHOSPHATE SERPL-MCNC: 4.3 MG/DL (ref 2.5–4.5)
PHOSPHATE SERPL-MCNC: 4.3 MG/DL (ref 2.5–4.5)
PHOSPHATE SERPL-MCNC: 4.4 MG/DL (ref 2.5–4.5)
PHOSPHATE SERPL-MCNC: 4.5 MG/DL (ref 2.5–4.5)
PHOSPHATE SERPL-MCNC: 4.7 MG/DL (ref 2.5–4.5)
PHOSPHATE SERPL-MCNC: 5 MG/DL (ref 2.5–4.5)
PHOSPHATE SERPL-MCNC: 5.3 MG/DL (ref 2.5–4.5)
PHOSPHATE SERPL-MCNC: 5.3 MG/DL (ref 2.5–4.5)
PHOSPHATE SERPL-MCNC: 5.6 MG/DL (ref 2.5–4.5)
PLAT MORPH BLD: ABNORMAL
PLATELET # BLD AUTO: 103 10E3/UL (ref 150–450)
PLATELET # BLD AUTO: 132 10E3/UL (ref 150–450)
PLATELET # BLD AUTO: 143 10E3/UL (ref 150–450)
PLATELET # BLD AUTO: 145 10E3/UL (ref 150–450)
PLATELET # BLD AUTO: 145 10E3/UL (ref 150–450)
PLATELET # BLD AUTO: 147 10E3/UL (ref 150–450)
PLATELET # BLD AUTO: 147 10E3/UL (ref 150–450)
PLATELET # BLD AUTO: 148 10E3/UL (ref 150–450)
PLATELET # BLD AUTO: 160 10E3/UL (ref 150–450)
PLATELET # BLD AUTO: 163 10E3/UL (ref 150–450)
PLATELET # BLD AUTO: 163 10E3/UL (ref 150–450)
PLATELET # BLD AUTO: 165 10E3/UL (ref 150–450)
PLATELET # BLD AUTO: 169 10E3/UL (ref 150–450)
PLATELET # BLD AUTO: 169 10E3/UL (ref 150–450)
PLATELET # BLD AUTO: 170 10E3/UL (ref 150–450)
PLATELET # BLD AUTO: 170 10E3/UL (ref 150–450)
PLATELET # BLD AUTO: 171 10E3/UL (ref 150–450)
PLATELET # BLD AUTO: 171 10E3/UL (ref 150–450)
PLATELET # BLD AUTO: 174 10E3/UL (ref 150–450)
PLATELET # BLD AUTO: 181 10E3/UL (ref 150–450)
PLATELET # BLD AUTO: 182 10E3/UL (ref 150–450)
PLATELET # BLD AUTO: 183 10E3/UL (ref 150–450)
PLATELET # BLD AUTO: 184 10E3/UL (ref 150–450)
PLATELET # BLD AUTO: 189 10E3/UL (ref 150–450)
PLATELET # BLD AUTO: 197 10E3/UL (ref 150–450)
PLATELET # BLD AUTO: 201 10E3/UL (ref 150–450)
PLATELET # BLD AUTO: 211 10E3/UL (ref 150–450)
PLATELET # BLD AUTO: 214 10E3/UL (ref 150–450)
PLATELET # BLD AUTO: 215 10E3/UL (ref 150–450)
PLATELET # BLD AUTO: 221 10E3/UL (ref 150–450)
PLATELET # BLD AUTO: 241 10E3/UL (ref 150–450)
PLATELET # BLD AUTO: 251 10E3/UL (ref 150–450)
PLATELET # BLD AUTO: 262 10E3/UL (ref 150–450)
PLATELET # BLD AUTO: 263 10E3/UL (ref 150–450)
PLATELET # BLD AUTO: 268 10E3/UL (ref 150–450)
PO2 BLD: 100 MM HG (ref 80–105)
PO2 BLD: 101 MM HG (ref 80–105)
PO2 BLD: 102 MM HG (ref 80–105)
PO2 BLD: 106 MM HG (ref 80–105)
PO2 BLD: 110 MM HG (ref 80–105)
PO2 BLD: 111 MM HG (ref 80–105)
PO2 BLD: 113 MM HG (ref 80–105)
PO2 BLD: 115 MM HG (ref 80–105)
PO2 BLD: 120 MM HG (ref 80–105)
PO2 BLD: 126 MM HG (ref 80–105)
PO2 BLD: 133 MM HG (ref 80–105)
PO2 BLD: 137 MM HG (ref 80–105)
PO2 BLD: 139 MM HG (ref 80–105)
PO2 BLD: 156 MM HG (ref 80–105)
PO2 BLD: 49 MM HG (ref 80–105)
PO2 BLD: 50 MM HG (ref 80–105)
PO2 BLD: 57 MM HG (ref 80–105)
PO2 BLD: 59 MM HG (ref 80–105)
PO2 BLD: 62 MM HG (ref 80–105)
PO2 BLD: 64 MM HG (ref 80–105)
PO2 BLD: 65 MM HG (ref 80–105)
PO2 BLD: 66 MM HG (ref 80–105)
PO2 BLD: 68 MM HG (ref 80–105)
PO2 BLD: 68 MM HG (ref 80–105)
PO2 BLD: 71 MM HG (ref 80–105)
PO2 BLD: 71 MM HG (ref 80–105)
PO2 BLD: 72 MM HG (ref 80–105)
PO2 BLD: 73 MM HG (ref 80–105)
PO2 BLD: 73 MM HG (ref 80–105)
PO2 BLD: 74 MM HG (ref 80–105)
PO2 BLD: 78 MM HG (ref 80–105)
PO2 BLD: 80 MM HG (ref 80–105)
PO2 BLD: 81 MM HG (ref 80–105)
PO2 BLD: 81 MM HG (ref 80–105)
PO2 BLD: 83 MM HG (ref 80–105)
PO2 BLD: 84 MM HG (ref 80–105)
PO2 BLD: 84 MM HG (ref 80–105)
PO2 BLD: 85 MM HG (ref 80–105)
PO2 BLD: 87 MM HG (ref 80–105)
PO2 BLD: 91 MM HG (ref 80–105)
PO2 BLD: 96 MM HG (ref 80–105)
PO2 BLD: 97 MM HG (ref 80–105)
PO2 BLD: 99 MM HG (ref 80–105)
PO2 BLDV: 35 MM HG (ref 25–47)
POLYCHROMASIA BLD QL SMEAR: ABNORMAL
POLYCHROMASIA BLD QL SMEAR: SLIGHT
POTASSIUM BLD-SCNC: 3.6 MMOL/L (ref 3.4–5.3)
POTASSIUM BLD-SCNC: 3.6 MMOL/L (ref 3.4–5.3)
POTASSIUM BLD-SCNC: 3.7 MMOL/L (ref 3.4–5.3)
POTASSIUM BLD-SCNC: 3.7 MMOL/L (ref 3.4–5.3)
POTASSIUM BLD-SCNC: 3.8 MMOL/L (ref 3.4–5.3)
POTASSIUM BLD-SCNC: 3.9 MMOL/L (ref 3.4–5.3)
POTASSIUM BLD-SCNC: 3.9 MMOL/L (ref 3.4–5.3)
POTASSIUM BLD-SCNC: 4 MMOL/L (ref 3.4–5.3)
POTASSIUM BLD-SCNC: 4 MMOL/L (ref 3.4–5.3)
POTASSIUM BLD-SCNC: 4.1 MMOL/L (ref 3.4–5.3)
POTASSIUM BLD-SCNC: 4.2 MMOL/L (ref 3.4–5.3)
POTASSIUM BLD-SCNC: 4.3 MMOL/L (ref 3.4–5.3)
POTASSIUM BLD-SCNC: 4.4 MMOL/L (ref 3.4–5.3)
POTASSIUM BLD-SCNC: 4.5 MMOL/L (ref 3.4–5.3)
POTASSIUM BLD-SCNC: 4.6 MMOL/L (ref 3.4–5.3)
POTASSIUM BLD-SCNC: 4.7 MMOL/L (ref 3.4–5.3)
POTASSIUM BLD-SCNC: 4.9 MMOL/L (ref 3.4–5.3)
PR INTERVAL - MUSE: 118 MS
PR INTERVAL - MUSE: 122 MS
PR INTERVAL - MUSE: 124 MS
PR INTERVAL - MUSE: 124 MS
PR INTERVAL - MUSE: 126 MS
PR INTERVAL - MUSE: 128 MS
PR INTERVAL - MUSE: 128 MS
PR INTERVAL - MUSE: 132 MS
PR INTERVAL - MUSE: 134 MS
PR INTERVAL - MUSE: 168 MS
PR INTERVAL - MUSE: NORMAL MS
PROCALCITONIN SERPL-MCNC: 0.06 NG/ML
PROCALCITONIN SERPL-MCNC: 0.07 NG/ML
PROCALCITONIN SERPL-MCNC: 0.09 NG/ML
PROT SERPL-MCNC: 4.5 G/DL (ref 6.8–8.8)
PROT SERPL-MCNC: 4.5 G/DL (ref 6.8–8.8)
PROT SERPL-MCNC: 4.6 G/DL (ref 6.8–8.8)
PROT SERPL-MCNC: 5 G/DL (ref 6.8–8.8)
PROT SERPL-MCNC: 5 G/DL (ref 6.8–8.8)
PROT SERPL-MCNC: 5.2 G/DL (ref 6.8–8.8)
PROT SERPL-MCNC: 5.3 G/DL (ref 6.8–8.8)
PROT SERPL-MCNC: 5.4 G/DL (ref 6.8–8.8)
PROT SERPL-MCNC: 5.5 G/DL (ref 6.8–8.8)
PROT SERPL-MCNC: 5.6 G/DL (ref 6.8–8.8)
PROT SERPL-MCNC: 5.7 G/DL (ref 6.8–8.8)
PROT SERPL-MCNC: 5.8 G/DL (ref 6.8–8.8)
PROT SERPL-MCNC: 5.9 G/DL (ref 6.8–8.8)
PROT SERPL-MCNC: 5.9 G/DL (ref 6.8–8.8)
PROT SERPL-MCNC: 6 G/DL (ref 6.8–8.8)
PROT SERPL-MCNC: 6.1 G/DL (ref 6.8–8.8)
PROT SERPL-MCNC: 6.3 G/DL (ref 6.8–8.8)
PROT SERPL-MCNC: 6.7 G/DL (ref 6.8–8.8)
PROT UR-MCNC: 0.75 G/L
PROT/CREAT 24H UR: 1.03 G/G CR (ref 0–0.2)
PSA SERPL-MCNC: 6.52 UG/L (ref 0–4)
QRS DURATION - MUSE: 64 MS
QRS DURATION - MUSE: 64 MS
QRS DURATION - MUSE: 66 MS
QRS DURATION - MUSE: 68 MS
QRS DURATION - MUSE: 70 MS
QRS DURATION - MUSE: 70 MS
QRS DURATION - MUSE: 72 MS
QRS DURATION - MUSE: 74 MS
QRS DURATION - MUSE: 86 MS
QT - MUSE: 222 MS
QT - MUSE: 230 MS
QT - MUSE: 250 MS
QT - MUSE: 262 MS
QT - MUSE: 276 MS
QT - MUSE: 278 MS
QT - MUSE: 288 MS
QT - MUSE: 304 MS
QT - MUSE: 320 MS
QT - MUSE: 324 MS
QT - MUSE: 332 MS
QT - MUSE: 336 MS
QT - MUSE: 338 MS
QT - MUSE: 346 MS
QT - MUSE: 382 MS
QTC - MUSE: 368 MS
QTC - MUSE: 376 MS
QTC - MUSE: 383 MS
QTC - MUSE: 387 MS
QTC - MUSE: 410 MS
QTC - MUSE: 417 MS
QTC - MUSE: 418 MS
QTC - MUSE: 425 MS
QTC - MUSE: 427 MS
QTC - MUSE: 428 MS
QTC - MUSE: 432 MS
QTC - MUSE: 435 MS
QTC - MUSE: 437 MS
QTC - MUSE: 440 MS
QTC - MUSE: 483 MS
QUANTIFERON MITOGEN: 8.82 IU/ML
QUANTIFERON NIL TUBE: 0.04 IU/ML
QUANTIFERON TB1 TUBE: 0.05 IU/ML
QUANTIFERON TB2 TUBE: 0.04
R AXIS - MUSE: -10 DEGREES
R AXIS - MUSE: -19 DEGREES
R AXIS - MUSE: -8 DEGREES
R AXIS - MUSE: 10 DEGREES
R AXIS - MUSE: 3 DEGREES
R AXIS - MUSE: 33 DEGREES
R AXIS - MUSE: 37 DEGREES
R AXIS - MUSE: 4 DEGREES
R AXIS - MUSE: 4 DEGREES
R AXIS - MUSE: 42 DEGREES
R AXIS - MUSE: 44 DEGREES
R AXIS - MUSE: 5 DEGREES
R AXIS - MUSE: 52 DEGREES
R AXIS - MUSE: 59 DEGREES
R AXIS - MUSE: 60 DEGREES
RADIOLOGIST FLAGS: ABNORMAL
RADIOLOGIST FLAGS: ABNORMAL
RBC # BLD AUTO: 1.88 10E6/UL (ref 4.4–5.9)
RBC # BLD AUTO: 2.15 10E6/UL (ref 4.4–5.9)
RBC # BLD AUTO: 2.22 10E6/UL (ref 4.4–5.9)
RBC # BLD AUTO: 2.24 10E6/UL (ref 4.4–5.9)
RBC # BLD AUTO: 2.27 10E6/UL (ref 4.4–5.9)
RBC # BLD AUTO: 2.32 10E6/UL (ref 4.4–5.9)
RBC # BLD AUTO: 2.33 10E6/UL (ref 4.4–5.9)
RBC # BLD AUTO: 2.34 10E6/UL (ref 4.4–5.9)
RBC # BLD AUTO: 2.35 10E6/UL (ref 4.4–5.9)
RBC # BLD AUTO: 2.36 10E6/UL (ref 4.4–5.9)
RBC # BLD AUTO: 2.41 10E6/UL (ref 4.4–5.9)
RBC # BLD AUTO: 2.42 10E6/UL (ref 4.4–5.9)
RBC # BLD AUTO: 2.44 10E6/UL (ref 4.4–5.9)
RBC # BLD AUTO: 2.45 10E6/UL (ref 4.4–5.9)
RBC # BLD AUTO: 2.45 10E6/UL (ref 4.4–5.9)
RBC # BLD AUTO: 2.48 10E6/UL (ref 4.4–5.9)
RBC # BLD AUTO: 2.5 10E6/UL (ref 4.4–5.9)
RBC # BLD AUTO: 2.51 10E6/UL (ref 4.4–5.9)
RBC # BLD AUTO: 2.54 10E6/UL (ref 4.4–5.9)
RBC # BLD AUTO: 2.6 10E6/UL (ref 4.4–5.9)
RBC # BLD AUTO: 2.61 10E6/UL (ref 4.4–5.9)
RBC # BLD AUTO: 2.64 10E6/UL (ref 4.4–5.9)
RBC # BLD AUTO: 2.68 10E6/UL (ref 4.4–5.9)
RBC # BLD AUTO: 2.69 10E6/UL (ref 4.4–5.9)
RBC # BLD AUTO: 2.72 10E6/UL (ref 4.4–5.9)
RBC # BLD AUTO: 2.76 10E6/UL (ref 4.4–5.9)
RBC # BLD AUTO: 2.77 10E6/UL (ref 4.4–5.9)
RBC # BLD AUTO: 2.9 10E6/UL (ref 4.4–5.9)
RBC # BLD AUTO: 2.92 10E6/UL (ref 4.4–5.9)
RBC # BLD AUTO: 3.23 10E6/UL (ref 4.4–5.9)
RBC #/AREA URNS AUTO: ABNORMAL /HPF
RBC MORPH BLD: ABNORMAL
RBC URINE: 1 /HPF
RETICS # AUTO: 0.12 10E6/UL (ref 0.03–0.1)
RETICS # AUTO: 0.14 10E6/UL (ref 0.03–0.1)
RETICS # AUTO: 0.16 10E6/UL (ref 0.03–0.1)
RETICS # AUTO: 0.16 10E6/UL (ref 0.03–0.1)
RETICS # AUTO: 0.17 10E6/UL (ref 0.03–0.1)
RETICS # AUTO: 0.18 10E6/UL (ref 0.03–0.1)
RETICS # AUTO: 0.19 10E6/UL (ref 0.03–0.1)
RETICS # AUTO: 0.19 10E6/UL (ref 0.03–0.1)
RETICS # AUTO: 0.2 10E6/UL (ref 0.03–0.1)
RETICS # AUTO: 0.21 10E6/UL (ref 0.03–0.1)
RETICS # AUTO: 0.24 10E6/UL (ref 0.03–0.1)
RETICS # AUTO: 0.26 10E6/UL (ref 0.03–0.1)
RETICS # AUTO: 0.28 10E6/UL (ref 0.03–0.1)
RETICS # AUTO: 0.29 10E6/UL (ref 0.03–0.1)
RETICS # AUTO: 0.3 10E6/UL (ref 0.03–0.1)
RETICS/RBC NFR AUTO: 10 % (ref 0.5–2)
RETICS/RBC NFR AUTO: 10.7 % (ref 0.5–2)
RETICS/RBC NFR AUTO: 12.5 % (ref 0.5–2)
RETICS/RBC NFR AUTO: 12.8 % (ref 0.5–2)
RETICS/RBC NFR AUTO: 4.6 % (ref 0.5–2)
RETICS/RBC NFR AUTO: 5.6 % (ref 0.5–2)
RETICS/RBC NFR AUTO: 5.8 % (ref 0.5–2)
RETICS/RBC NFR AUTO: 6.1 % (ref 0.5–2)
RETICS/RBC NFR AUTO: 6.2 % (ref 0.5–2)
RETICS/RBC NFR AUTO: 6.4 % (ref 0.5–2)
RETICS/RBC NFR AUTO: 6.7 % (ref 0.5–2)
RETICS/RBC NFR AUTO: 6.8 % (ref 0.5–2)
RETICS/RBC NFR AUTO: 7.1 % (ref 0.5–2)
RETICS/RBC NFR AUTO: 7.2 % (ref 0.5–2)
RETICS/RBC NFR AUTO: 7.6 % (ref 0.5–2)
RETICS/RBC NFR AUTO: 7.6 % (ref 0.5–2)
RETICS/RBC NFR AUTO: 7.7 % (ref 0.5–2)
RETICS/RBC NFR AUTO: 7.9 % (ref 0.5–2)
RETICS/RBC NFR AUTO: 8.1 % (ref 0.5–2)
RETICS/RBC NFR AUTO: 8.2 % (ref 0.5–2)
RETICS/RBC NFR AUTO: 9.5 % (ref 0.5–2)
RETICS/RBC NFR AUTO: 9.5 % (ref 0.5–2)
RETICS/RBC NFR AUTO: 9.7 % (ref 0.5–2)
RSV RNA SPEC NAA+PROBE: NEGATIVE
RSV RNA SPEC QL NAA+PROBE: NOT DETECTED
RV+EV RNA SPEC QL NAA+PROBE: NOT DETECTED
RV+EV RNA SPEC QL NAA+PROBE: NOT DETECTED
SA TARGET DNA: NEGATIVE
SARS-COV-2 RNA RESP QL NAA+PROBE: NEGATIVE
SCANNED LAB RESULT: ABNORMAL
SCANNED LAB RESULT: NORMAL
SIGNIFICANT RESULTS: NORMAL
SMUDGE CELLS BLD QL SMEAR: PRESENT
SMUDGE CELLS BLD QL SMEAR: PRESENT
SODIUM SERPL-SCNC: 128 MMOL/L (ref 133–144)
SODIUM SERPL-SCNC: 129 MMOL/L (ref 133–144)
SODIUM SERPL-SCNC: 130 MMOL/L (ref 133–144)
SODIUM SERPL-SCNC: 131 MMOL/L (ref 133–144)
SODIUM SERPL-SCNC: 131 MMOL/L (ref 133–144)
SODIUM SERPL-SCNC: 132 MMOL/L (ref 133–144)
SODIUM SERPL-SCNC: 133 MMOL/L (ref 133–144)
SODIUM SERPL-SCNC: 133 MMOL/L (ref 133–144)
SODIUM SERPL-SCNC: 134 MMOL/L (ref 133–144)
SODIUM SERPL-SCNC: 135 MMOL/L (ref 133–144)
SODIUM SERPL-SCNC: 136 MMOL/L (ref 133–144)
SODIUM SERPL-SCNC: 137 MMOL/L (ref 133–144)
SODIUM SERPL-SCNC: 138 MMOL/L (ref 133–144)
SODIUM SERPL-SCNC: 141 MMOL/L (ref 133–144)
SODIUM SERPL-SCNC: 142 MMOL/L (ref 133–144)
SODIUM UR-SCNC: 104 MMOL/L
SODIUM UR-SCNC: 8 MMOL/L
SP GR UR STRIP: 1.01 (ref 1–1.03)
SP GR UR STRIP: 1.02 (ref 1–1.03)
SPECIMEN DESCRIPTION: NORMAL
SPECIMEN EXPIRATION DATE: ABNORMAL
SPECIMEN EXPIRATION DATE: NORMAL
SPECIMEN STATUS: NORMAL
SPECIMEN STATUS: NORMAL
SQUAMOUS #/AREA URNS AUTO: ABNORMAL /LPF
STAPHYLOCOCCUS AUREUS: NOT DETECTED
STAPHYLOCOCCUS EPIDERMIDIS: NOT DETECTED
STAPHYLOCOCCUS LUGDUNENSIS: NOT DETECTED
STAPHYLOCOCCUS SPECIES: NOT DETECTED
STREPTOCOCCUS AGALACTIAE: NOT DETECTED
STREPTOCOCCUS ANGINOSUS GROUP: NOT DETECTED
STREPTOCOCCUS PNEUMONIAE: NOT DETECTED
STREPTOCOCCUS PYOGENES: NOT DETECTED
STREPTOCOCCUS SPECIES: NOT DETECTED
SYSTOLIC BLOOD PRESSURE - MUSE: NORMAL MMHG
T AXIS - MUSE: 24 DEGREES
T AXIS - MUSE: 32 DEGREES
T AXIS - MUSE: 33 DEGREES
T AXIS - MUSE: 43 DEGREES
T AXIS - MUSE: 46 DEGREES
T AXIS - MUSE: 48 DEGREES
T AXIS - MUSE: 56 DEGREES
T AXIS - MUSE: 58 DEGREES
T AXIS - MUSE: 58 DEGREES
T AXIS - MUSE: 64 DEGREES
T AXIS - MUSE: 69 DEGREES
T AXIS - MUSE: 72 DEGREES
T AXIS - MUSE: 80 DEGREES
T AXIS - MUSE: 86 DEGREES
T AXIS - MUSE: 89 DEGREES
T CELL COMMENT: ABNORMAL
T GONDII IGG SER QL: <3 IU/ML (ref 0–7.1)
TARGETS BLD QL SMEAR: SLIGHT
TEST DETAILS, MDL: NORMAL
TEST NAME: NORMAL
TEST NAME: NORMAL
TIBC SERPL-MCNC: 271 UG/DL (ref 240–430)
TRANSITIONAL EPI: 1 /HPF
TROPONIN I SERPL HS-MCNC: 10 NG/L
TSH SERPL DL<=0.005 MIU/L-ACNC: 1.94 MU/L (ref 0.4–4)
UNIT ABO/RH: NORMAL
UNIT NUMBER: NORMAL
UNIT STATUS: NORMAL
UNIT TYPE ISBT: 9500
URATE SERPL-MCNC: 1.8 MG/DL (ref 3.5–7.2)
URATE SERPL-MCNC: 1.8 MG/DL (ref 3.5–7.2)
URATE SERPL-MCNC: 1.9 MG/DL (ref 3.5–7.2)
URATE SERPL-MCNC: 2 MG/DL (ref 3.5–7.2)
URATE SERPL-MCNC: 2.1 MG/DL (ref 3.5–7.2)
URATE SERPL-MCNC: 2.4 MG/DL (ref 3.5–7.2)
URATE SERPL-MCNC: 2.5 MG/DL (ref 3.5–7.2)
URATE SERPL-MCNC: 2.5 MG/DL (ref 3.5–7.2)
URATE SERPL-MCNC: 2.9 MG/DL (ref 3.5–7.2)
URATE SERPL-MCNC: 3 MG/DL (ref 3.5–7.2)
URATE SERPL-MCNC: 3 MG/DL (ref 3.5–7.2)
URATE SERPL-MCNC: 3.2 MG/DL (ref 3.5–7.2)
URATE SERPL-MCNC: 3.5 MG/DL (ref 3.5–7.2)
URATE SERPL-MCNC: 3.9 MG/DL (ref 3.5–7.2)
URATE SERPL-MCNC: 4.1 MG/DL (ref 3.5–7.2)
URATE SERPL-MCNC: 4.4 MG/DL (ref 3.5–7.2)
URATE SERPL-MCNC: 4.6 MG/DL (ref 3.5–7.2)
URATE SERPL-MCNC: 5 MG/DL (ref 3.5–7.2)
URATE SERPL-MCNC: 5.3 MG/DL (ref 3.5–7.2)
UROBILINOGEN UR STRIP-ACNC: 0.2 E.U./DL
UROBILINOGEN UR STRIP-MCNC: 2 MG/DL
UROBILINOGEN UR STRIP-MCNC: NORMAL MG/DL
UROBILINOGEN UR STRIP-MCNC: NORMAL MG/DL
VENTRICULAR RATE- MUSE: 102 BPM
VENTRICULAR RATE- MUSE: 105 BPM
VENTRICULAR RATE- MUSE: 107 BPM
VENTRICULAR RATE- MUSE: 117 BPM
VENTRICULAR RATE- MUSE: 122 BPM
VENTRICULAR RATE- MUSE: 129 BPM
VENTRICULAR RATE- MUSE: 136 BPM
VENTRICULAR RATE- MUSE: 138 BPM
VENTRICULAR RATE- MUSE: 152 BPM
VENTRICULAR RATE- MUSE: 166 BPM
VENTRICULAR RATE- MUSE: 198 BPM
VENTRICULAR RATE- MUSE: 78 BPM
VENTRICULAR RATE- MUSE: 91 BPM
VIT B12 SERPL-MCNC: 800 PG/ML (ref 193–986)
WBC # BLD AUTO: 15.5 10E3/UL (ref 4–11)
WBC # BLD AUTO: 16.9 10E3/UL (ref 4–11)
WBC # BLD AUTO: 19 10E3/UL (ref 4–11)
WBC # BLD AUTO: 20.4 10E3/UL (ref 4–11)
WBC # BLD AUTO: 21.1 10E3/UL (ref 4–11)
WBC # BLD AUTO: 21.5 10E3/UL (ref 4–11)
WBC # BLD AUTO: 21.6 10E3/UL (ref 4–11)
WBC # BLD AUTO: 22 10E3/UL (ref 4–11)
WBC # BLD AUTO: 22.2 10E3/UL (ref 4–11)
WBC # BLD AUTO: 23.5 10E3/UL (ref 4–11)
WBC # BLD AUTO: 23.6 10E3/UL (ref 4–11)
WBC # BLD AUTO: 24.9 10E3/UL (ref 4–11)
WBC # BLD AUTO: 25.2 10E3/UL (ref 4–11)
WBC # BLD AUTO: 26.5 10E3/UL (ref 4–11)
WBC # BLD AUTO: 27.1 10E3/UL (ref 4–11)
WBC # BLD AUTO: 28.1 10E3/UL (ref 4–11)
WBC # BLD AUTO: 28.4 10E3/UL (ref 4–11)
WBC # BLD AUTO: 28.9 10E3/UL (ref 4–11)
WBC # BLD AUTO: 29.4 10E3/UL (ref 4–11)
WBC # BLD AUTO: 29.4 10E3/UL (ref 4–11)
WBC # BLD AUTO: 29.6 10E3/UL (ref 4–11)
WBC # BLD AUTO: 29.8 10E3/UL (ref 4–11)
WBC # BLD AUTO: 31.7 10E3/UL (ref 4–11)
WBC # BLD AUTO: 34.7 10E3/UL (ref 4–11)
WBC # BLD AUTO: 35.6 10E3/UL (ref 4–11)
WBC # BLD AUTO: 35.9 10E3/UL (ref 4–11)
WBC # BLD AUTO: 36.1 10E3/UL (ref 4–11)
WBC # BLD AUTO: 36.8 10E3/UL (ref 4–11)
WBC # BLD AUTO: 38 10E3/UL (ref 4–11)
WBC # BLD AUTO: 39.1 10E3/UL (ref 4–11)
WBC # BLD AUTO: 41.9 10E3/UL (ref 4–11)
WBC # BLD AUTO: 42.4 10E3/UL (ref 4–11)
WBC # BLD AUTO: 42.8 10E3/UL (ref 4–11)
WBC # BLD AUTO: 49.6 10E3/UL (ref 4–11)
WBC # BLD AUTO: 8.2 10E3/UL (ref 4–11)
WBC # FLD AUTO: 54 /UL
WBC #/AREA URNS AUTO: ABNORMAL /HPF
WBC URINE: 1 /HPF
WBC URINE: 2 /HPF
WBC URINE: 2 /HPF

## 2022-01-01 PROCEDURE — 36415 COLL VENOUS BLD VENIPUNCTURE: CPT | Performed by: INTERNAL MEDICINE

## 2022-01-01 PROCEDURE — 97110 THERAPEUTIC EXERCISES: CPT | Mod: GP

## 2022-01-01 PROCEDURE — 250N000013 HC RX MED GY IP 250 OP 250 PS 637: Performed by: STUDENT IN AN ORGANIZED HEALTH CARE EDUCATION/TRAINING PROGRAM

## 2022-01-01 PROCEDURE — 250N000012 HC RX MED GY IP 250 OP 636 PS 637: Performed by: STUDENT IN AN ORGANIZED HEALTH CARE EDUCATION/TRAINING PROGRAM

## 2022-01-01 PROCEDURE — 85045 AUTOMATED RETICULOCYTE COUNT: CPT | Performed by: STUDENT IN AN ORGANIZED HEALTH CARE EDUCATION/TRAINING PROGRAM

## 2022-01-01 PROCEDURE — 200N000002 HC R&B ICU UMMC

## 2022-01-01 PROCEDURE — 83605 ASSAY OF LACTIC ACID: CPT | Performed by: STUDENT IN AN ORGANIZED HEALTH CARE EDUCATION/TRAINING PROGRAM

## 2022-01-01 PROCEDURE — 87071 CULTURE AEROBIC QUANT OTHER: CPT | Performed by: INTERNAL MEDICINE

## 2022-01-01 PROCEDURE — 82330 ASSAY OF CALCIUM: CPT | Performed by: INTERNAL MEDICINE

## 2022-01-01 PROCEDURE — 120N000003 HC R&B IMCU UMMC

## 2022-01-01 PROCEDURE — 250N000011 HC RX IP 250 OP 636: Performed by: STUDENT IN AN ORGANIZED HEALTH CARE EDUCATION/TRAINING PROGRAM

## 2022-01-01 PROCEDURE — 82310 ASSAY OF CALCIUM: CPT | Performed by: PHYSICIAN ASSISTANT

## 2022-01-01 PROCEDURE — 250N000011 HC RX IP 250 OP 636

## 2022-01-01 PROCEDURE — 250N000012 HC RX MED GY IP 250 OP 636 PS 637: Performed by: NURSE PRACTITIONER

## 2022-01-01 PROCEDURE — 250N000011 HC RX IP 250 OP 636: Performed by: INTERNAL MEDICINE

## 2022-01-01 PROCEDURE — 88188 FLOWCYTOMETRY/READ 9-15: CPT | Mod: GC | Performed by: PATHOLOGY

## 2022-01-01 PROCEDURE — 84100 ASSAY OF PHOSPHORUS: CPT | Performed by: PHYSICIAN ASSISTANT

## 2022-01-01 PROCEDURE — 85007 BL SMEAR W/DIFF WBC COUNT: CPT | Performed by: STUDENT IN AN ORGANIZED HEALTH CARE EDUCATION/TRAINING PROGRAM

## 2022-01-01 PROCEDURE — 999N000043 HC STATISTIC CTO2 CONT OXYGEN TECH TIME EA 90 MIN

## 2022-01-01 PROCEDURE — 36415 COLL VENOUS BLD VENIPUNCTURE: CPT | Performed by: STUDENT IN AN ORGANIZED HEALTH CARE EDUCATION/TRAINING PROGRAM

## 2022-01-01 PROCEDURE — 86360 T CELL ABSOLUTE COUNT/RATIO: CPT | Performed by: STUDENT IN AN ORGANIZED HEALTH CARE EDUCATION/TRAINING PROGRAM

## 2022-01-01 PROCEDURE — 94002 VENT MGMT INPAT INIT DAY: CPT

## 2022-01-01 PROCEDURE — 250N000013 HC RX MED GY IP 250 OP 250 PS 637: Performed by: PHYSICIAN ASSISTANT

## 2022-01-01 PROCEDURE — 31624 DX BRONCHOSCOPE/LAVAGE: CPT

## 2022-01-01 PROCEDURE — 94642 AEROSOL INHALATION TREATMENT: CPT

## 2022-01-01 PROCEDURE — 88184 FLOWCYTOMETRY/ TC 1 MARKER: CPT | Performed by: STUDENT IN AN ORGANIZED HEALTH CARE EDUCATION/TRAINING PROGRAM

## 2022-01-01 PROCEDURE — 83735 ASSAY OF MAGNESIUM: CPT | Performed by: NURSE PRACTITIONER

## 2022-01-01 PROCEDURE — 99233 SBSQ HOSP IP/OBS HIGH 50: CPT | Mod: GC | Performed by: INTERNAL MEDICINE

## 2022-01-01 PROCEDURE — 99233 SBSQ HOSP IP/OBS HIGH 50: CPT | Performed by: INTERNAL MEDICINE

## 2022-01-01 PROCEDURE — 999N000157 HC STATISTIC RCP TIME EA 10 MIN

## 2022-01-01 PROCEDURE — 93010 ELECTROCARDIOGRAM REPORT: CPT | Mod: 59 | Performed by: STUDENT IN AN ORGANIZED HEALTH CARE EDUCATION/TRAINING PROGRAM

## 2022-01-01 PROCEDURE — 82803 BLOOD GASES ANY COMBINATION: CPT | Performed by: STUDENT IN AN ORGANIZED HEALTH CARE EDUCATION/TRAINING PROGRAM

## 2022-01-01 PROCEDURE — 71045 X-RAY EXAM CHEST 1 VIEW: CPT

## 2022-01-01 PROCEDURE — 250N000009 HC RX 250: Performed by: STUDENT IN AN ORGANIZED HEALTH CARE EDUCATION/TRAINING PROGRAM

## 2022-01-01 PROCEDURE — 82803 BLOOD GASES ANY COMBINATION: CPT

## 2022-01-01 PROCEDURE — 99231 SBSQ HOSP IP/OBS SF/LOW 25: CPT | Mod: GC | Performed by: STUDENT IN AN ORGANIZED HEALTH CARE EDUCATION/TRAINING PROGRAM

## 2022-01-01 PROCEDURE — 258N000003 HC RX IP 258 OP 636: Performed by: STUDENT IN AN ORGANIZED HEALTH CARE EDUCATION/TRAINING PROGRAM

## 2022-01-01 PROCEDURE — 999N000248 HC STATISTIC IV INSERT WITH US BY RN

## 2022-01-01 PROCEDURE — 87798 DETECT AGENT NOS DNA AMP: CPT | Performed by: INTERNAL MEDICINE

## 2022-01-01 PROCEDURE — 73590 X-RAY EXAM OF LOWER LEG: CPT | Mod: RT | Performed by: RADIOLOGY

## 2022-01-01 PROCEDURE — 999N000128 HC STATISTIC PERIPHERAL IV START W/O US GUIDANCE

## 2022-01-01 PROCEDURE — 87581 M.PNEUMON DNA AMP PROBE: CPT | Performed by: INTERNAL MEDICINE

## 2022-01-01 PROCEDURE — 97530 THERAPEUTIC ACTIVITIES: CPT | Mod: GP

## 2022-01-01 PROCEDURE — 82310 ASSAY OF CALCIUM: CPT | Performed by: INTERNAL MEDICINE

## 2022-01-01 PROCEDURE — 99233 SBSQ HOSP IP/OBS HIGH 50: CPT | Performed by: STUDENT IN AN ORGANIZED HEALTH CARE EDUCATION/TRAINING PROGRAM

## 2022-01-01 PROCEDURE — 87449 NOS EACH ORGANISM AG IA: CPT | Performed by: INTERNAL MEDICINE

## 2022-01-01 PROCEDURE — 250N000011 HC RX IP 250 OP 636: Performed by: NURSE PRACTITIONER

## 2022-01-01 PROCEDURE — 80053 COMPREHEN METABOLIC PANEL: CPT | Performed by: STUDENT IN AN ORGANIZED HEALTH CARE EDUCATION/TRAINING PROGRAM

## 2022-01-01 PROCEDURE — 93010 ELECTROCARDIOGRAM REPORT: CPT | Mod: 76 | Performed by: INTERNAL MEDICINE

## 2022-01-01 PROCEDURE — 86481 TB AG RESPONSE T-CELL SUSP: CPT | Performed by: PHYSICIAN ASSISTANT

## 2022-01-01 PROCEDURE — 99233 SBSQ HOSP IP/OBS HIGH 50: CPT | Mod: GC | Performed by: STUDENT IN AN ORGANIZED HEALTH CARE EDUCATION/TRAINING PROGRAM

## 2022-01-01 PROCEDURE — 82040 ASSAY OF SERUM ALBUMIN: CPT | Performed by: STUDENT IN AN ORGANIZED HEALTH CARE EDUCATION/TRAINING PROGRAM

## 2022-01-01 PROCEDURE — 258N000003 HC RX IP 258 OP 636: Performed by: NURSE PRACTITIONER

## 2022-01-01 PROCEDURE — 83735 ASSAY OF MAGNESIUM: CPT | Performed by: PHYSICIAN ASSISTANT

## 2022-01-01 PROCEDURE — 82805 BLOOD GASES W/O2 SATURATION: CPT | Performed by: STUDENT IN AN ORGANIZED HEALTH CARE EDUCATION/TRAINING PROGRAM

## 2022-01-01 PROCEDURE — 999N000111 HC STATISTIC OT IP EVAL DEFER

## 2022-01-01 PROCEDURE — 85027 COMPLETE CBC AUTOMATED: CPT | Performed by: STUDENT IN AN ORGANIZED HEALTH CARE EDUCATION/TRAINING PROGRAM

## 2022-01-01 PROCEDURE — 250N000013 HC RX MED GY IP 250 OP 250 PS 637

## 2022-01-01 PROCEDURE — 74018 RADEX ABDOMEN 1 VIEW: CPT | Mod: 26 | Performed by: RADIOLOGY

## 2022-01-01 PROCEDURE — 999N000127 HC STATISTIC PERIPHERAL IV START W US GUIDANCE

## 2022-01-01 PROCEDURE — 36620 INSERTION CATHETER ARTERY: CPT

## 2022-01-01 PROCEDURE — 99285 EMERGENCY DEPT VISIT HI MDM: CPT | Mod: CS,25

## 2022-01-01 PROCEDURE — 87205 SMEAR GRAM STAIN: CPT | Performed by: STUDENT IN AN ORGANIZED HEALTH CARE EDUCATION/TRAINING PROGRAM

## 2022-01-01 PROCEDURE — 83615 LACTATE (LD) (LDH) ENZYME: CPT | Performed by: PHYSICIAN ASSISTANT

## 2022-01-01 PROCEDURE — 94660 CPAP INITIATION&MGMT: CPT

## 2022-01-01 PROCEDURE — 94003 VENT MGMT INPAT SUBQ DAY: CPT

## 2022-01-01 PROCEDURE — 84550 ASSAY OF BLOOD/URIC ACID: CPT | Performed by: PHYSICIAN ASSISTANT

## 2022-01-01 PROCEDURE — 86880 COOMBS TEST DIRECT: CPT | Performed by: STUDENT IN AN ORGANIZED HEALTH CARE EDUCATION/TRAINING PROGRAM

## 2022-01-01 PROCEDURE — 87633 RESP VIRUS 12-25 TARGETS: CPT | Performed by: INTERNAL MEDICINE

## 2022-01-01 PROCEDURE — 84145 PROCALCITONIN (PCT): CPT | Performed by: STUDENT IN AN ORGANIZED HEALTH CARE EDUCATION/TRAINING PROGRAM

## 2022-01-01 PROCEDURE — 93005 ELECTROCARDIOGRAM TRACING: CPT

## 2022-01-01 PROCEDURE — 250N000009 HC RX 250: Performed by: NURSE PRACTITIONER

## 2022-01-01 PROCEDURE — 120N000002 HC R&B MED SURG/OB UMMC

## 2022-01-01 PROCEDURE — 72141 MRI NECK SPINE W/O DYE: CPT | Performed by: STUDENT IN AN ORGANIZED HEALTH CARE EDUCATION/TRAINING PROGRAM

## 2022-01-01 PROCEDURE — 89051 BODY FLUID CELL COUNT: CPT | Performed by: INTERNAL MEDICINE

## 2022-01-01 PROCEDURE — 87305 ASPERGILLUS AG IA: CPT | Performed by: INTERNAL MEDICINE

## 2022-01-01 PROCEDURE — 87899 AGENT NOS ASSAY W/OPTIC: CPT | Performed by: INTERNAL MEDICINE

## 2022-01-01 PROCEDURE — 94640 AIRWAY INHALATION TREATMENT: CPT

## 2022-01-01 PROCEDURE — 999N000065 XR ABDOMEN PORT 1 VIEWS

## 2022-01-01 PROCEDURE — 87040 BLOOD CULTURE FOR BACTERIA: CPT | Performed by: INTERNAL MEDICINE

## 2022-01-01 PROCEDURE — 83605 ASSAY OF LACTIC ACID: CPT | Performed by: INTERNAL MEDICINE

## 2022-01-01 PROCEDURE — 81001 URINALYSIS AUTO W/SCOPE: CPT | Performed by: FAMILY MEDICINE

## 2022-01-01 PROCEDURE — 999N000065 XR CHEST PORT 1 VIEW

## 2022-01-01 PROCEDURE — 82784 ASSAY IGA/IGD/IGG/IGM EACH: CPT | Performed by: INTERNAL MEDICINE

## 2022-01-01 PROCEDURE — 84295 ASSAY OF SERUM SODIUM: CPT

## 2022-01-01 PROCEDURE — 84550 ASSAY OF BLOOD/URIC ACID: CPT | Performed by: NURSE PRACTITIONER

## 2022-01-01 PROCEDURE — 99214 OFFICE O/P EST MOD 30 MIN: CPT | Performed by: FAMILY MEDICINE

## 2022-01-01 PROCEDURE — 87040 BLOOD CULTURE FOR BACTERIA: CPT | Performed by: STUDENT IN AN ORGANIZED HEALTH CARE EDUCATION/TRAINING PROGRAM

## 2022-01-01 PROCEDURE — 258N000003 HC RX IP 258 OP 636

## 2022-01-01 PROCEDURE — 84484 ASSAY OF TROPONIN QUANT: CPT | Performed by: STUDENT IN AN ORGANIZED HEALTH CARE EDUCATION/TRAINING PROGRAM

## 2022-01-01 PROCEDURE — 86140 C-REACTIVE PROTEIN: CPT | Performed by: STUDENT IN AN ORGANIZED HEALTH CARE EDUCATION/TRAINING PROGRAM

## 2022-01-01 PROCEDURE — 36600 WITHDRAWAL OF ARTERIAL BLOOD: CPT

## 2022-01-01 PROCEDURE — 99291 CRITICAL CARE FIRST HOUR: CPT | Mod: GC | Performed by: INTERNAL MEDICINE

## 2022-01-01 PROCEDURE — 71045 X-RAY EXAM CHEST 1 VIEW: CPT | Mod: 26 | Performed by: RADIOLOGY

## 2022-01-01 PROCEDURE — 99291 CRITICAL CARE FIRST HOUR: CPT | Mod: GC | Performed by: STUDENT IN AN ORGANIZED HEALTH CARE EDUCATION/TRAINING PROGRAM

## 2022-01-01 PROCEDURE — 81263 IGH VARI REGIONAL MUTATION: CPT | Performed by: STUDENT IN AN ORGANIZED HEALTH CARE EDUCATION/TRAINING PROGRAM

## 2022-01-01 PROCEDURE — 83605 ASSAY OF LACTIC ACID: CPT | Performed by: PHYSICIAN ASSISTANT

## 2022-01-01 PROCEDURE — 80053 COMPREHEN METABOLIC PANEL: CPT | Performed by: PHYSICIAN ASSISTANT

## 2022-01-01 PROCEDURE — 88108 CYTOPATH CONCENTRATE TECH: CPT | Mod: 26 | Performed by: PATHOLOGY

## 2022-01-01 PROCEDURE — U0005 INFEC AGEN DETEC AMPLI PROBE: HCPCS | Performed by: STUDENT IN AN ORGANIZED HEALTH CARE EDUCATION/TRAINING PROGRAM

## 2022-01-01 PROCEDURE — 87102 FUNGUS ISOLATION CULTURE: CPT | Performed by: INTERNAL MEDICINE

## 2022-01-01 PROCEDURE — 87040 BLOOD CULTURE FOR BACTERIA: CPT | Performed by: NURSE PRACTITIONER

## 2022-01-01 PROCEDURE — 84450 TRANSFERASE (AST) (SGOT): CPT | Performed by: INTERNAL MEDICINE

## 2022-01-01 PROCEDURE — 84550 ASSAY OF BLOOD/URIC ACID: CPT | Performed by: STUDENT IN AN ORGANIZED HEALTH CARE EDUCATION/TRAINING PROGRAM

## 2022-01-01 PROCEDURE — C9113 INJ PANTOPRAZOLE SODIUM, VIA: HCPCS | Performed by: STUDENT IN AN ORGANIZED HEALTH CARE EDUCATION/TRAINING PROGRAM

## 2022-01-01 PROCEDURE — 250N000013 HC RX MED GY IP 250 OP 250 PS 637: Performed by: INTERNAL MEDICINE

## 2022-01-01 PROCEDURE — 82728 ASSAY OF FERRITIN: CPT | Performed by: STUDENT IN AN ORGANIZED HEALTH CARE EDUCATION/TRAINING PROGRAM

## 2022-01-01 PROCEDURE — 99222 1ST HOSP IP/OBS MODERATE 55: CPT | Mod: GC | Performed by: STUDENT IN AN ORGANIZED HEALTH CARE EDUCATION/TRAINING PROGRAM

## 2022-01-01 PROCEDURE — 82248 BILIRUBIN DIRECT: CPT | Performed by: STUDENT IN AN ORGANIZED HEALTH CARE EDUCATION/TRAINING PROGRAM

## 2022-01-01 PROCEDURE — 258N000003 HC RX IP 258 OP 636: Performed by: PHYSICIAN ASSISTANT

## 2022-01-01 PROCEDURE — 81001 URINALYSIS AUTO W/SCOPE: CPT | Performed by: STUDENT IN AN ORGANIZED HEALTH CARE EDUCATION/TRAINING PROGRAM

## 2022-01-01 PROCEDURE — 87070 CULTURE OTHR SPECIMN AEROBIC: CPT | Performed by: INTERNAL MEDICINE

## 2022-01-01 PROCEDURE — 94645 CONT INHLJ TX EACH ADDL HOUR: CPT

## 2022-01-01 PROCEDURE — 88305 TISSUE EXAM BY PATHOLOGIST: CPT | Mod: 26 | Performed by: PATHOLOGY

## 2022-01-01 PROCEDURE — 87449 NOS EACH ORGANISM AG IA: CPT | Performed by: STUDENT IN AN ORGANIZED HEALTH CARE EDUCATION/TRAINING PROGRAM

## 2022-01-01 PROCEDURE — 85018 HEMOGLOBIN: CPT | Performed by: STUDENT IN AN ORGANIZED HEALTH CARE EDUCATION/TRAINING PROGRAM

## 2022-01-01 PROCEDURE — 87799 DETECT AGENT NOS DNA QUANT: CPT | Performed by: INTERNAL MEDICINE

## 2022-01-01 PROCEDURE — 88368 INSITU HYBRIDIZATION MANUAL: CPT | Mod: 26 | Performed by: MEDICAL GENETICS

## 2022-01-01 PROCEDURE — 86704 HEP B CORE ANTIBODY TOTAL: CPT | Performed by: PHYSICIAN ASSISTANT

## 2022-01-01 PROCEDURE — 88271 CYTOGENETICS DNA PROBE: CPT | Performed by: STUDENT IN AN ORGANIZED HEALTH CARE EDUCATION/TRAINING PROGRAM

## 2022-01-01 PROCEDURE — 85060 BLOOD SMEAR INTERPRETATION: CPT | Performed by: PATHOLOGY

## 2022-01-01 PROCEDURE — 83615 LACTATE (LD) (LDH) ENZYME: CPT | Performed by: STUDENT IN AN ORGANIZED HEALTH CARE EDUCATION/TRAINING PROGRAM

## 2022-01-01 PROCEDURE — 85007 BL SMEAR W/DIFF WBC COUNT: CPT | Performed by: FAMILY MEDICINE

## 2022-01-01 PROCEDURE — 32551 INSERTION OF CHEST TUBE: CPT | Mod: GC | Performed by: STUDENT IN AN ORGANIZED HEALTH CARE EDUCATION/TRAINING PROGRAM

## 2022-01-01 PROCEDURE — 250N000013 HC RX MED GY IP 250 OP 250 PS 637: Performed by: NURSE PRACTITIONER

## 2022-01-01 PROCEDURE — 88312 SPECIAL STAINS GROUP 1: CPT | Mod: TC | Performed by: INTERNAL MEDICINE

## 2022-01-01 PROCEDURE — 71260 CT THORAX DX C+: CPT | Mod: 26 | Performed by: RADIOLOGY

## 2022-01-01 PROCEDURE — 81479 UNLISTED MOLECULAR PATHOLOGY: CPT | Performed by: STUDENT IN AN ORGANIZED HEALTH CARE EDUCATION/TRAINING PROGRAM

## 2022-01-01 PROCEDURE — 93970 EXTREMITY STUDY: CPT

## 2022-01-01 PROCEDURE — 87081 CULTURE SCREEN ONLY: CPT | Performed by: INTERNAL MEDICINE

## 2022-01-01 PROCEDURE — 86922 COMPATIBILITY TEST ANTIGLOB: CPT | Performed by: STUDENT IN AN ORGANIZED HEALTH CARE EDUCATION/TRAINING PROGRAM

## 2022-01-01 PROCEDURE — 83935 ASSAY OF URINE OSMOLALITY: CPT | Performed by: STUDENT IN AN ORGANIZED HEALTH CARE EDUCATION/TRAINING PROGRAM

## 2022-01-01 PROCEDURE — 82803 BLOOD GASES ANY COMBINATION: CPT | Performed by: NURSE PRACTITIONER

## 2022-01-01 PROCEDURE — 87637 SARSCOV2&INF A&B&RSV AMP PRB: CPT | Performed by: STUDENT IN AN ORGANIZED HEALTH CARE EDUCATION/TRAINING PROGRAM

## 2022-01-01 PROCEDURE — 5A09457 ASSISTANCE WITH RESPIRATORY VENTILATION, 24-96 CONSECUTIVE HOURS, CONTINUOUS POSITIVE AIRWAY PRESSURE: ICD-10-PCS | Performed by: INTERNAL MEDICINE

## 2022-01-01 PROCEDURE — 93010 ELECTROCARDIOGRAM REPORT: CPT | Performed by: INTERNAL MEDICINE

## 2022-01-01 PROCEDURE — 84153 ASSAY OF PSA TOTAL: CPT | Performed by: INTERNAL MEDICINE

## 2022-01-01 PROCEDURE — 87070 CULTURE OTHR SPECIMN AEROBIC: CPT | Performed by: STUDENT IN AN ORGANIZED HEALTH CARE EDUCATION/TRAINING PROGRAM

## 2022-01-01 PROCEDURE — 96367 TX/PROPH/DG ADDL SEQ IV INF: CPT

## 2022-01-01 PROCEDURE — 82607 VITAMIN B-12: CPT | Performed by: STUDENT IN AN ORGANIZED HEALTH CARE EDUCATION/TRAINING PROGRAM

## 2022-01-01 PROCEDURE — 86850 RBC ANTIBODY SCREEN: CPT | Performed by: STUDENT IN AN ORGANIZED HEALTH CARE EDUCATION/TRAINING PROGRAM

## 2022-01-01 PROCEDURE — 83930 ASSAY OF BLOOD OSMOLALITY: CPT | Performed by: STUDENT IN AN ORGANIZED HEALTH CARE EDUCATION/TRAINING PROGRAM

## 2022-01-01 PROCEDURE — 999N000215 HC STATISTIC HFNC ADULT NON-CPAP

## 2022-01-01 PROCEDURE — 84145 PROCALCITONIN (PCT): CPT | Performed by: PHYSICIAN ASSISTANT

## 2022-01-01 PROCEDURE — 71250 CT THORAX DX C-: CPT

## 2022-01-01 PROCEDURE — 70450 CT HEAD/BRAIN W/O DYE: CPT | Mod: 26 | Performed by: RADIOLOGY

## 2022-01-01 PROCEDURE — 86612 BLASTOMYCES ANTIBODY: CPT | Performed by: INTERNAL MEDICINE

## 2022-01-01 PROCEDURE — 84999 UNLISTED CHEMISTRY PROCEDURE: CPT | Performed by: INTERNAL MEDICINE

## 2022-01-01 PROCEDURE — 87385 HISTOPLASMA CAPSUL AG IA: CPT | Performed by: INTERNAL MEDICINE

## 2022-01-01 PROCEDURE — G0452 MOLECULAR PATHOLOGY INTERPR: HCPCS | Mod: 26 | Performed by: PATHOLOGY

## 2022-01-01 PROCEDURE — 82232 ASSAY OF BETA-2 PROTEIN: CPT | Performed by: STUDENT IN AN ORGANIZED HEALTH CARE EDUCATION/TRAINING PROGRAM

## 2022-01-01 PROCEDURE — 87529 HSV DNA AMP PROBE: CPT | Performed by: INTERNAL MEDICINE

## 2022-01-01 PROCEDURE — 71275 CT ANGIOGRAPHY CHEST: CPT

## 2022-01-01 PROCEDURE — 85004 AUTOMATED DIFF WBC COUNT: CPT | Performed by: INTERNAL MEDICINE

## 2022-01-01 PROCEDURE — 96375 TX/PRO/DX INJ NEW DRUG ADDON: CPT

## 2022-01-01 PROCEDURE — 86696 HERPES SIMPLEX TYPE 2 TEST: CPT | Performed by: INTERNAL MEDICINE

## 2022-01-01 PROCEDURE — 87205 SMEAR GRAM STAIN: CPT | Performed by: INTERNAL MEDICINE

## 2022-01-01 PROCEDURE — 85027 COMPLETE CBC AUTOMATED: CPT | Performed by: FAMILY MEDICINE

## 2022-01-01 PROCEDURE — 99215 OFFICE O/P EST HI 40 MIN: CPT | Performed by: PHYSICIAN ASSISTANT

## 2022-01-01 PROCEDURE — 71045 X-RAY EXAM CHEST 1 VIEW: CPT | Mod: 77

## 2022-01-01 PROCEDURE — 84156 ASSAY OF PROTEIN URINE: CPT

## 2022-01-01 PROCEDURE — 99207 PR SC NO CHARGE VISIT: CPT | Mod: GC | Performed by: STUDENT IN AN ORGANIZED HEALTH CARE EDUCATION/TRAINING PROGRAM

## 2022-01-01 PROCEDURE — 87517 HEPATITIS B DNA QUANT: CPT | Performed by: INTERNAL MEDICINE

## 2022-01-01 PROCEDURE — 83735 ASSAY OF MAGNESIUM: CPT | Performed by: STUDENT IN AN ORGANIZED HEALTH CARE EDUCATION/TRAINING PROGRAM

## 2022-01-01 PROCEDURE — G0463 HOSPITAL OUTPT CLINIC VISIT: HCPCS

## 2022-01-01 PROCEDURE — 87581 M.PNEUMON DNA AMP PROBE: CPT | Performed by: STUDENT IN AN ORGANIZED HEALTH CARE EDUCATION/TRAINING PROGRAM

## 2022-01-01 PROCEDURE — 03HY32Z INSERTION OF MONITORING DEVICE INTO UPPER ARTERY, PERCUTANEOUS APPROACH: ICD-10-PCS | Performed by: STUDENT IN AN ORGANIZED HEALTH CARE EDUCATION/TRAINING PROGRAM

## 2022-01-01 PROCEDURE — 999N000015 HC STATISTIC ARTERIAL MONITORING DAILY

## 2022-01-01 PROCEDURE — 87641 MR-STAPH DNA AMP PROBE: CPT | Performed by: STUDENT IN AN ORGANIZED HEALTH CARE EDUCATION/TRAINING PROGRAM

## 2022-01-01 PROCEDURE — P9047 ALBUMIN (HUMAN), 25%, 50ML: HCPCS | Performed by: STUDENT IN AN ORGANIZED HEALTH CARE EDUCATION/TRAINING PROGRAM

## 2022-01-01 PROCEDURE — 87798 DETECT AGENT NOS DNA AMP: CPT | Performed by: STUDENT IN AN ORGANIZED HEALTH CARE EDUCATION/TRAINING PROGRAM

## 2022-01-01 PROCEDURE — 87799 DETECT AGENT NOS DNA QUANT: CPT | Performed by: STUDENT IN AN ORGANIZED HEALTH CARE EDUCATION/TRAINING PROGRAM

## 2022-01-01 PROCEDURE — 84100 ASSAY OF PHOSPHORUS: CPT | Performed by: NURSE PRACTITIONER

## 2022-01-01 PROCEDURE — 81351 TP53 GENE FULL GENE SEQUENCE: CPT | Performed by: STUDENT IN AN ORGANIZED HEALTH CARE EDUCATION/TRAINING PROGRAM

## 2022-01-01 PROCEDURE — 71275 CT ANGIOGRAPHY CHEST: CPT | Mod: 26 | Performed by: RADIOLOGY

## 2022-01-01 PROCEDURE — 86860 RBC ANTIBODY ELUTION: CPT | Performed by: INTERNAL MEDICINE

## 2022-01-01 PROCEDURE — 82310 ASSAY OF CALCIUM: CPT | Performed by: PEDIATRICS

## 2022-01-01 PROCEDURE — 97110 THERAPEUTIC EXERCISES: CPT | Mod: GP | Performed by: PHYSICAL THERAPIST

## 2022-01-01 PROCEDURE — 36415 COLL VENOUS BLD VENIPUNCTURE: CPT | Performed by: NURSE PRACTITIONER

## 2022-01-01 PROCEDURE — 82784 ASSAY IGA/IGD/IGG/IGM EACH: CPT | Performed by: STUDENT IN AN ORGANIZED HEALTH CARE EDUCATION/TRAINING PROGRAM

## 2022-01-01 PROCEDURE — 250N000009 HC RX 250

## 2022-01-01 PROCEDURE — 99291 CRITICAL CARE FIRST HOUR: CPT | Mod: FS | Performed by: NURSE PRACTITIONER

## 2022-01-01 PROCEDURE — 0B9D8ZX DRAINAGE OF RIGHT MIDDLE LUNG LOBE, VIA NATURAL OR ARTIFICIAL OPENING ENDOSCOPIC, DIAGNOSTIC: ICD-10-PCS | Performed by: STUDENT IN AN ORGANIZED HEALTH CARE EDUCATION/TRAINING PROGRAM

## 2022-01-01 PROCEDURE — 87077 CULTURE AEROBIC IDENTIFY: CPT | Performed by: INTERNAL MEDICINE

## 2022-01-01 PROCEDURE — U0005 INFEC AGEN DETEC AMPLI PROBE: HCPCS | Performed by: INTERNAL MEDICINE

## 2022-01-01 PROCEDURE — P9047 ALBUMIN (HUMAN), 25%, 50ML: HCPCS

## 2022-01-01 PROCEDURE — 87507 IADNA-DNA/RNA PROBE TQ 12-25: CPT | Performed by: INTERNAL MEDICINE

## 2022-01-01 PROCEDURE — 83010 ASSAY OF HAPTOGLOBIN QUANT: CPT | Performed by: STUDENT IN AN ORGANIZED HEALTH CARE EDUCATION/TRAINING PROGRAM

## 2022-01-01 PROCEDURE — 82784 ASSAY IGA/IGD/IGG/IGM EACH: CPT | Performed by: NURSE PRACTITIONER

## 2022-01-01 PROCEDURE — 82803 BLOOD GASES ANY COMBINATION: CPT | Performed by: INTERNAL MEDICINE

## 2022-01-01 PROCEDURE — 36415 COLL VENOUS BLD VENIPUNCTURE: CPT | Performed by: PHYSICIAN ASSISTANT

## 2022-01-01 PROCEDURE — 86022 PLATELET ANTIBODIES: CPT | Performed by: STUDENT IN AN ORGANIZED HEALTH CARE EDUCATION/TRAINING PROGRAM

## 2022-01-01 PROCEDURE — 85730 THROMBOPLASTIN TIME PARTIAL: CPT | Performed by: STUDENT IN AN ORGANIZED HEALTH CARE EDUCATION/TRAINING PROGRAM

## 2022-01-01 PROCEDURE — 88305 TISSUE EXAM BY PATHOLOGIST: CPT | Mod: TC | Performed by: INTERNAL MEDICINE

## 2022-01-01 PROCEDURE — 83605 ASSAY OF LACTIC ACID: CPT

## 2022-01-01 PROCEDURE — 87116 MYCOBACTERIA CULTURE: CPT | Performed by: INTERNAL MEDICINE

## 2022-01-01 PROCEDURE — 88185 FLOWCYTOMETRY/TC ADD-ON: CPT | Performed by: STUDENT IN AN ORGANIZED HEALTH CARE EDUCATION/TRAINING PROGRAM

## 2022-01-01 PROCEDURE — 87541 LEGION PNEUMO DNA AMP PROB: CPT | Performed by: INTERNAL MEDICINE

## 2022-01-01 PROCEDURE — 71046 X-RAY EXAM CHEST 2 VIEWS: CPT

## 2022-01-01 PROCEDURE — 83735 ASSAY OF MAGNESIUM: CPT

## 2022-01-01 PROCEDURE — 83880 ASSAY OF NATRIURETIC PEPTIDE: CPT | Performed by: STUDENT IN AN ORGANIZED HEALTH CARE EDUCATION/TRAINING PROGRAM

## 2022-01-01 PROCEDURE — 84100 ASSAY OF PHOSPHORUS: CPT | Performed by: STUDENT IN AN ORGANIZED HEALTH CARE EDUCATION/TRAINING PROGRAM

## 2022-01-01 PROCEDURE — 99291 CRITICAL CARE FIRST HOUR: CPT | Performed by: INTERNAL MEDICINE

## 2022-01-01 PROCEDURE — 85014 HEMATOCRIT: CPT | Performed by: PHYSICIAN ASSISTANT

## 2022-01-01 PROCEDURE — 84999 UNLISTED CHEMISTRY PROCEDURE: CPT | Performed by: STUDENT IN AN ORGANIZED HEALTH CARE EDUCATION/TRAINING PROGRAM

## 2022-01-01 PROCEDURE — 99232 SBSQ HOSP IP/OBS MODERATE 35: CPT | Mod: GC | Performed by: STUDENT IN AN ORGANIZED HEALTH CARE EDUCATION/TRAINING PROGRAM

## 2022-01-01 PROCEDURE — 93306 TTE W/DOPPLER COMPLETE: CPT | Mod: 26 | Performed by: INTERNAL MEDICINE

## 2022-01-01 PROCEDURE — 74177 CT ABD & PELVIS W/CONTRAST: CPT

## 2022-01-01 PROCEDURE — 83550 IRON BINDING TEST: CPT | Performed by: STUDENT IN AN ORGANIZED HEALTH CARE EDUCATION/TRAINING PROGRAM

## 2022-01-01 PROCEDURE — 84100 ASSAY OF PHOSPHORUS: CPT | Performed by: PEDIATRICS

## 2022-01-01 PROCEDURE — 3E033XZ INTRODUCTION OF VASOPRESSOR INTO PERIPHERAL VEIN, PERCUTANEOUS APPROACH: ICD-10-PCS | Performed by: STUDENT IN AN ORGANIZED HEALTH CARE EDUCATION/TRAINING PROGRAM

## 2022-01-01 PROCEDURE — 250N000009 HC RX 250: Performed by: INTERNAL MEDICINE

## 2022-01-01 PROCEDURE — 93308 TTE F-UP OR LMTD: CPT

## 2022-01-01 PROCEDURE — 83605 ASSAY OF LACTIC ACID: CPT | Performed by: PEDIATRICS

## 2022-01-01 PROCEDURE — 86880 COOMBS TEST DIRECT: CPT | Performed by: INTERNAL MEDICINE

## 2022-01-01 PROCEDURE — 82374 ASSAY BLOOD CARBON DIOXIDE: CPT | Performed by: STUDENT IN AN ORGANIZED HEALTH CARE EDUCATION/TRAINING PROGRAM

## 2022-01-01 PROCEDURE — 03HY32Z INSERTION OF MONITORING DEVICE INTO UPPER ARTERY, PERCUTANEOUS APPROACH: ICD-10-PCS | Performed by: INTERNAL MEDICINE

## 2022-01-01 PROCEDURE — 88369 M/PHMTRC ALYSISHQUANT/SEMIQ: CPT | Mod: 26 | Performed by: MEDICAL GENETICS

## 2022-01-01 PROCEDURE — 86356 MONONUCLEAR CELL ANTIGEN: CPT | Performed by: INTERNAL MEDICINE

## 2022-01-01 PROCEDURE — 71046 X-RAY EXAM CHEST 2 VIEWS: CPT | Mod: TC | Performed by: RADIOLOGY

## 2022-01-01 PROCEDURE — 85014 HEMATOCRIT: CPT | Performed by: STUDENT IN AN ORGANIZED HEALTH CARE EDUCATION/TRAINING PROGRAM

## 2022-01-01 PROCEDURE — 99291 CRITICAL CARE FIRST HOUR: CPT | Performed by: PEDIATRICS

## 2022-01-01 PROCEDURE — 71260 CT THORAX DX C+: CPT

## 2022-01-01 PROCEDURE — 86901 BLOOD TYPING SEROLOGIC RH(D): CPT | Performed by: STUDENT IN AN ORGANIZED HEALTH CARE EDUCATION/TRAINING PROGRAM

## 2022-01-01 PROCEDURE — 36415 COLL VENOUS BLD VENIPUNCTURE: CPT

## 2022-01-01 PROCEDURE — 96365 THER/PROPH/DIAG IV INF INIT: CPT

## 2022-01-01 PROCEDURE — 86706 HEP B SURFACE ANTIBODY: CPT | Performed by: PHYSICIAN ASSISTANT

## 2022-01-01 PROCEDURE — 99213 OFFICE O/P EST LOW 20 MIN: CPT | Performed by: FAMILY MEDICINE

## 2022-01-01 PROCEDURE — 82330 ASSAY OF CALCIUM: CPT | Performed by: STUDENT IN AN ORGANIZED HEALTH CARE EDUCATION/TRAINING PROGRAM

## 2022-01-01 PROCEDURE — 85049 AUTOMATED PLATELET COUNT: CPT | Performed by: STUDENT IN AN ORGANIZED HEALTH CARE EDUCATION/TRAINING PROGRAM

## 2022-01-01 PROCEDURE — 87389 HIV-1 AG W/HIV-1&-2 AB AG IA: CPT | Performed by: STUDENT IN AN ORGANIZED HEALTH CARE EDUCATION/TRAINING PROGRAM

## 2022-01-01 PROCEDURE — 88312 SPECIAL STAINS GROUP 1: CPT | Mod: 26 | Performed by: PATHOLOGY

## 2022-01-01 PROCEDURE — 86777 TOXOPLASMA ANTIBODY: CPT | Performed by: STUDENT IN AN ORGANIZED HEALTH CARE EDUCATION/TRAINING PROGRAM

## 2022-01-01 PROCEDURE — 99232 SBSQ HOSP IP/OBS MODERATE 35: CPT | Performed by: INTERNAL MEDICINE

## 2022-01-01 PROCEDURE — 86644 CMV ANTIBODY: CPT | Performed by: INTERNAL MEDICINE

## 2022-01-01 PROCEDURE — 99222 1ST HOSP IP/OBS MODERATE 55: CPT | Mod: AI | Performed by: HOSPITALIST

## 2022-01-01 PROCEDURE — 85610 PROTHROMBIN TIME: CPT | Performed by: STUDENT IN AN ORGANIZED HEALTH CARE EDUCATION/TRAINING PROGRAM

## 2022-01-01 PROCEDURE — 83010 ASSAY OF HAPTOGLOBIN QUANT: CPT | Performed by: INTERNAL MEDICINE

## 2022-01-01 PROCEDURE — 87340 HEPATITIS B SURFACE AG IA: CPT | Performed by: PHYSICIAN ASSISTANT

## 2022-01-01 PROCEDURE — 272N000010 HC KIT CATH ARTERIAL EXT SUPPLY

## 2022-01-01 PROCEDURE — 36415 COLL VENOUS BLD VENIPUNCTURE: CPT | Performed by: FAMILY MEDICINE

## 2022-01-01 PROCEDURE — 88184 FLOWCYTOMETRY/ TC 1 MARKER: CPT | Performed by: INTERNAL MEDICINE

## 2022-01-01 PROCEDURE — 97161 PT EVAL LOW COMPLEX 20 MIN: CPT | Mod: GP

## 2022-01-01 PROCEDURE — 87086 URINE CULTURE/COLONY COUNT: CPT

## 2022-01-01 PROCEDURE — 82248 BILIRUBIN DIRECT: CPT | Performed by: PHYSICIAN ASSISTANT

## 2022-01-01 PROCEDURE — 84300 ASSAY OF URINE SODIUM: CPT

## 2022-01-01 PROCEDURE — 82310 ASSAY OF CALCIUM: CPT | Performed by: STUDENT IN AN ORGANIZED HEALTH CARE EDUCATION/TRAINING PROGRAM

## 2022-01-01 PROCEDURE — 93970 EXTREMITY STUDY: CPT | Mod: 26 | Performed by: RADIOLOGY

## 2022-01-01 PROCEDURE — 31500 INSERT EMERGENCY AIRWAY: CPT | Mod: GC | Performed by: STUDENT IN AN ORGANIZED HEALTH CARE EDUCATION/TRAINING PROGRAM

## 2022-01-01 PROCEDURE — 82947 ASSAY GLUCOSE BLOOD QUANT: CPT | Performed by: STUDENT IN AN ORGANIZED HEALTH CARE EDUCATION/TRAINING PROGRAM

## 2022-01-01 PROCEDURE — 70450 CT HEAD/BRAIN W/O DYE: CPT

## 2022-01-01 PROCEDURE — 86870 RBC ANTIBODY IDENTIFICATION: CPT | Performed by: STUDENT IN AN ORGANIZED HEALTH CARE EDUCATION/TRAINING PROGRAM

## 2022-01-01 PROCEDURE — 99222 1ST HOSP IP/OBS MODERATE 55: CPT | Mod: GC | Performed by: INTERNAL MEDICINE

## 2022-01-01 PROCEDURE — 85379 FIBRIN DEGRADATION QUANT: CPT | Performed by: STUDENT IN AN ORGANIZED HEALTH CARE EDUCATION/TRAINING PROGRAM

## 2022-01-01 PROCEDURE — 258N000003 HC RX IP 258 OP 636: Performed by: INTERNAL MEDICINE

## 2022-01-01 PROCEDURE — 80053 COMPREHEN METABOLIC PANEL: CPT | Performed by: INTERNAL MEDICINE

## 2022-01-01 PROCEDURE — 87486 CHLMYD PNEUM DNA AMP PROBE: CPT | Performed by: INTERNAL MEDICINE

## 2022-01-01 PROCEDURE — 99291 CRITICAL CARE FIRST HOUR: CPT | Mod: 25 | Performed by: STUDENT IN AN ORGANIZED HEALTH CARE EDUCATION/TRAINING PROGRAM

## 2022-01-01 PROCEDURE — 84450 TRANSFERASE (AST) (SGOT): CPT | Performed by: STUDENT IN AN ORGANIZED HEALTH CARE EDUCATION/TRAINING PROGRAM

## 2022-01-01 PROCEDURE — 99214 OFFICE O/P EST MOD 30 MIN: CPT | Mod: 95 | Performed by: FAMILY MEDICINE

## 2022-01-01 PROCEDURE — 5A1955Z RESPIRATORY VENTILATION, GREATER THAN 96 CONSECUTIVE HOURS: ICD-10-PCS | Performed by: PEDIATRICS

## 2022-01-01 PROCEDURE — 88185 FLOWCYTOMETRY/TC ADD-ON: CPT | Performed by: INTERNAL MEDICINE

## 2022-01-01 PROCEDURE — 0W9930Z DRAINAGE OF RIGHT PLEURAL CAVITY WITH DRAINAGE DEVICE, PERCUTANEOUS APPROACH: ICD-10-PCS | Performed by: STUDENT IN AN ORGANIZED HEALTH CARE EDUCATION/TRAINING PROGRAM

## 2022-01-01 PROCEDURE — 999N000185 HC STATISTIC TRANSPORT TIME EA 15 MIN

## 2022-01-01 PROCEDURE — 255N000002 HC RX 255 OP 636: Performed by: INTERNAL MEDICINE

## 2022-01-01 PROCEDURE — 250N000009 HC RX 250: Performed by: PHYSICIAN ASSISTANT

## 2022-01-01 PROCEDURE — 87103 BLOOD FUNGUS CULTURE: CPT | Performed by: INTERNAL MEDICINE

## 2022-01-01 PROCEDURE — 80051 ELECTROLYTE PANEL: CPT | Performed by: STUDENT IN AN ORGANIZED HEALTH CARE EDUCATION/TRAINING PROGRAM

## 2022-01-01 PROCEDURE — 99214 OFFICE O/P EST MOD 30 MIN: CPT | Performed by: INTERNAL MEDICINE

## 2022-01-01 PROCEDURE — 82565 ASSAY OF CREATININE: CPT | Performed by: NURSE PRACTITIONER

## 2022-01-01 PROCEDURE — 31624 DX BRONCHOSCOPE/LAVAGE: CPT | Mod: GC | Performed by: STUDENT IN AN ORGANIZED HEALTH CARE EDUCATION/TRAINING PROGRAM

## 2022-01-01 PROCEDURE — 87210 SMEAR WET MOUNT SALINE/INK: CPT | Performed by: INTERNAL MEDICINE

## 2022-01-01 PROCEDURE — 86140 C-REACTIVE PROTEIN: CPT | Performed by: INTERNAL MEDICINE

## 2022-01-01 PROCEDURE — C9803 HOPD COVID-19 SPEC COLLECT: HCPCS

## 2022-01-01 PROCEDURE — 36415 COLL VENOUS BLD VENIPUNCTURE: CPT | Performed by: PEDIATRICS

## 2022-01-01 PROCEDURE — 83010 ASSAY OF HAPTOGLOBIN QUANT: CPT | Performed by: PHYSICIAN ASSISTANT

## 2022-01-01 PROCEDURE — 84300 ASSAY OF URINE SODIUM: CPT | Performed by: STUDENT IN AN ORGANIZED HEALTH CARE EDUCATION/TRAINING PROGRAM

## 2022-01-01 PROCEDURE — 87015 SPECIMEN INFECT AGNT CONCNTJ: CPT | Performed by: STUDENT IN AN ORGANIZED HEALTH CARE EDUCATION/TRAINING PROGRAM

## 2022-01-01 PROCEDURE — 88184 FLOWCYTOMETRY/ TC 1 MARKER: CPT | Performed by: PATHOLOGY

## 2022-01-01 PROCEDURE — 93970 EXTREMITY STUDY: CPT | Mod: XS

## 2022-01-01 PROCEDURE — 87641 MR-STAPH DNA AMP PROBE: CPT

## 2022-01-01 PROCEDURE — 87149 DNA/RNA DIRECT PROBE: CPT | Performed by: INTERNAL MEDICINE

## 2022-01-01 PROCEDURE — 93005 ELECTROCARDIOGRAM TRACING: CPT | Mod: 59

## 2022-01-01 PROCEDURE — 99285 EMERGENCY DEPT VISIT HI MDM: CPT | Mod: CS | Performed by: STUDENT IN AN ORGANIZED HEALTH CARE EDUCATION/TRAINING PROGRAM

## 2022-01-01 PROCEDURE — 82746 ASSAY OF FOLIC ACID SERUM: CPT | Performed by: STUDENT IN AN ORGANIZED HEALTH CARE EDUCATION/TRAINING PROGRAM

## 2022-01-01 PROCEDURE — 74018 RADEX ABDOMEN 1 VIEW: CPT

## 2022-01-01 PROCEDURE — 30233S1 TRANSFUSION OF NONAUTOLOGOUS GLOBULIN INTO PERIPHERAL VEIN, PERCUTANEOUS APPROACH: ICD-10-PCS | Performed by: STUDENT IN AN ORGANIZED HEALTH CARE EDUCATION/TRAINING PROGRAM

## 2022-01-01 PROCEDURE — 83036 HEMOGLOBIN GLYCOSYLATED A1C: CPT | Performed by: STUDENT IN AN ORGANIZED HEALTH CARE EDUCATION/TRAINING PROGRAM

## 2022-01-01 PROCEDURE — 71046 X-RAY EXAM CHEST 2 VIEWS: CPT | Mod: 26 | Performed by: RADIOLOGY

## 2022-01-01 PROCEDURE — 83050 HGB METHEMOGLOBIN QUAN: CPT | Performed by: STUDENT IN AN ORGANIZED HEALTH CARE EDUCATION/TRAINING PROGRAM

## 2022-01-01 PROCEDURE — 74018 RADEX ABDOMEN 1 VIEW: CPT | Mod: 26 | Performed by: STUDENT IN AN ORGANIZED HEALTH CARE EDUCATION/TRAINING PROGRAM

## 2022-01-01 PROCEDURE — P9016 RBC LEUKOCYTES REDUCED: HCPCS | Performed by: STUDENT IN AN ORGANIZED HEALTH CARE EDUCATION/TRAINING PROGRAM

## 2022-01-01 PROCEDURE — 84443 ASSAY THYROID STIM HORMONE: CPT | Performed by: STUDENT IN AN ORGANIZED HEALTH CARE EDUCATION/TRAINING PROGRAM

## 2022-01-01 PROCEDURE — 99223 1ST HOSP IP/OBS HIGH 75: CPT | Performed by: INTERNAL MEDICINE

## 2022-01-01 PROCEDURE — 82955 ASSAY OF G6PD ENZYME: CPT | Performed by: STUDENT IN AN ORGANIZED HEALTH CARE EDUCATION/TRAINING PROGRAM

## 2022-01-01 PROCEDURE — 71250 CT THORAX DX C-: CPT | Mod: 26 | Performed by: RADIOLOGY

## 2022-01-01 PROCEDURE — 258N000001 HC RX 258: Performed by: STUDENT IN AN ORGANIZED HEALTH CARE EDUCATION/TRAINING PROGRAM

## 2022-01-01 PROCEDURE — 74177 CT ABD & PELVIS W/CONTRAST: CPT | Mod: 26 | Performed by: RADIOLOGY

## 2022-01-01 PROCEDURE — 84155 ASSAY OF PROTEIN SERUM: CPT | Performed by: INTERNAL MEDICINE

## 2022-01-01 PROCEDURE — 80048 BASIC METABOLIC PNL TOTAL CA: CPT | Performed by: STUDENT IN AN ORGANIZED HEALTH CARE EDUCATION/TRAINING PROGRAM

## 2022-01-01 PROCEDURE — 94644 CONT INHLJ TX 1ST HOUR: CPT

## 2022-01-01 PROCEDURE — 36620 INSERTION CATHETER ARTERY: CPT | Mod: GC | Performed by: STUDENT IN AN ORGANIZED HEALTH CARE EDUCATION/TRAINING PROGRAM

## 2022-01-01 PROCEDURE — 83735 ASSAY OF MAGNESIUM: CPT | Performed by: PEDIATRICS

## 2022-01-01 PROCEDURE — 82565 ASSAY OF CREATININE: CPT

## 2022-01-01 PROCEDURE — 99207 PR SC NO CHARGE VISIT: CPT | Performed by: STUDENT IN AN ORGANIZED HEALTH CARE EDUCATION/TRAINING PROGRAM

## 2022-01-01 PROCEDURE — 87385 HISTOPLASMA CAPSUL AG IA: CPT | Performed by: STUDENT IN AN ORGANIZED HEALTH CARE EDUCATION/TRAINING PROGRAM

## 2022-01-01 PROCEDURE — 36556 INSERT NON-TUNNEL CV CATH: CPT | Mod: GC | Performed by: STUDENT IN AN ORGANIZED HEALTH CARE EDUCATION/TRAINING PROGRAM

## 2022-01-01 PROCEDURE — 999N000208 ECHOCARDIOGRAM COMPLETE

## 2022-01-01 PROCEDURE — 86635 COCCIDIOIDES ANTIBODY: CPT | Performed by: INTERNAL MEDICINE

## 2022-01-01 PROCEDURE — 93308 TTE F-UP OR LMTD: CPT | Mod: 26 | Performed by: STUDENT IN AN ORGANIZED HEALTH CARE EDUCATION/TRAINING PROGRAM

## 2022-01-01 RX ORDER — DIPHENHYDRAMINE HCL 25 MG
25 CAPSULE ORAL EVERY 24 HOURS
Status: DISCONTINUED | OUTPATIENT
Start: 2022-01-01 | End: 2022-01-01 | Stop reason: CLARIF

## 2022-01-01 RX ORDER — AMINO AC/PROTEIN HYDR/WHEY PRO 10G-100/30
1 LIQUID (ML) ORAL 2 TIMES DAILY
Status: DISCONTINUED | OUTPATIENT
Start: 2022-01-01 | End: 2022-01-01 | Stop reason: HOSPADM

## 2022-01-01 RX ORDER — ACETAMINOPHEN 325 MG/1
650 TABLET ORAL
Status: DISCONTINUED | OUTPATIENT
Start: 2022-01-01 | End: 2022-01-01 | Stop reason: CLARIF

## 2022-01-01 RX ORDER — DEXTROSE MONOHYDRATE 25 G/50ML
25-50 INJECTION, SOLUTION INTRAVENOUS
Status: DISCONTINUED | OUTPATIENT
Start: 2022-01-01 | End: 2022-01-01 | Stop reason: HOSPADM

## 2022-01-01 RX ORDER — ACETAMINOPHEN 325 MG/1
650 TABLET ORAL EVERY 4 HOURS PRN
Status: DISCONTINUED | OUTPATIENT
Start: 2022-01-01 | End: 2022-01-01 | Stop reason: HOSPADM

## 2022-01-01 RX ORDER — METOCLOPRAMIDE 5 MG/1
5 TABLET ORAL 4 TIMES DAILY PRN
Status: DISCONTINUED | OUTPATIENT
Start: 2022-01-01 | End: 2022-01-01

## 2022-01-01 RX ORDER — GUAIFENESIN 600 MG/1
15 TABLET, EXTENDED RELEASE ORAL DAILY
Status: DISCONTINUED | OUTPATIENT
Start: 2022-01-01 | End: 2022-01-01 | Stop reason: HOSPADM

## 2022-01-01 RX ORDER — AMOXICILLIN 250 MG
2 CAPSULE ORAL 2 TIMES DAILY
Status: DISCONTINUED | OUTPATIENT
Start: 2022-01-01 | End: 2022-01-01 | Stop reason: HOSPADM

## 2022-01-01 RX ORDER — PROPOFOL 10 MG/ML
5-75 INJECTION, EMULSION INTRAVENOUS CONTINUOUS
Status: DISCONTINUED | OUTPATIENT
Start: 2022-01-01 | End: 2022-01-01

## 2022-01-01 RX ORDER — VITAMIN B COMPLEX
50 TABLET ORAL DAILY
Status: DISCONTINUED | OUTPATIENT
Start: 2022-01-01 | End: 2022-01-01 | Stop reason: HOSPADM

## 2022-01-01 RX ORDER — IOPAMIDOL 755 MG/ML
93 INJECTION, SOLUTION INTRAVASCULAR ONCE
Status: COMPLETED | OUTPATIENT
Start: 2022-01-01 | End: 2022-01-01

## 2022-01-01 RX ORDER — LIDOCAINE 40 MG/G
CREAM TOPICAL
Status: DISCONTINUED | OUTPATIENT
Start: 2022-01-01 | End: 2022-01-01

## 2022-01-01 RX ORDER — DEXTROSE MONOHYDRATE 100 MG/ML
INJECTION, SOLUTION INTRAVENOUS CONTINUOUS PRN
Status: DISCONTINUED | OUTPATIENT
Start: 2022-01-01 | End: 2022-01-01 | Stop reason: HOSPADM

## 2022-01-01 RX ORDER — VITAMIN B COMPLEX
50 TABLET ORAL DAILY
Status: DISCONTINUED | OUTPATIENT
Start: 2022-01-01 | End: 2022-01-01

## 2022-01-01 RX ORDER — CALCIUM GLUCONATE 20 MG/ML
2 INJECTION, SOLUTION INTRAVENOUS ONCE
Status: COMPLETED | OUTPATIENT
Start: 2022-01-01 | End: 2022-01-01

## 2022-01-01 RX ORDER — OLANZAPINE 5 MG/1
5 TABLET, ORALLY DISINTEGRATING ORAL
Status: DISCONTINUED | OUTPATIENT
Start: 2022-01-01 | End: 2022-01-01

## 2022-01-01 RX ORDER — FUROSEMIDE 10 MG/ML
40 INJECTION INTRAMUSCULAR; INTRAVENOUS ONCE
Status: COMPLETED | OUTPATIENT
Start: 2022-01-01 | End: 2022-01-01

## 2022-01-01 RX ORDER — METOPROLOL TARTRATE 1 MG/ML
5 INJECTION, SOLUTION INTRAVENOUS EVERY 5 MIN PRN
Status: COMPLETED | OUTPATIENT
Start: 2022-01-01 | End: 2022-01-01

## 2022-01-01 RX ORDER — ACYCLOVIR 200 MG/5ML
400 SUSPENSION ORAL 2 TIMES DAILY
Status: DISCONTINUED | OUTPATIENT
Start: 2022-01-01 | End: 2022-01-01

## 2022-01-01 RX ORDER — DIPHENHYDRAMINE HYDROCHLORIDE 50 MG/ML
25 INJECTION INTRAMUSCULAR; INTRAVENOUS EVERY 6 HOURS PRN
Status: DISCONTINUED | OUTPATIENT
Start: 2022-01-01 | End: 2022-01-01

## 2022-01-01 RX ORDER — EPINEPHRINE 1 MG/ML
0.3 INJECTION, SOLUTION, CONCENTRATE INTRAVENOUS EVERY 5 MIN PRN
Status: CANCELLED | OUTPATIENT
Start: 2022-06-23

## 2022-01-01 RX ORDER — HEPARIN SODIUM (PORCINE) LOCK FLUSH IV SOLN 100 UNIT/ML 100 UNIT/ML
5 SOLUTION INTRAVENOUS
Status: CANCELLED | OUTPATIENT
Start: 2022-01-01

## 2022-01-01 RX ORDER — ACYCLOVIR 400 MG/1
800 TABLET ORAL 2 TIMES DAILY
Status: DISCONTINUED | OUTPATIENT
Start: 2022-01-01 | End: 2022-01-01

## 2022-01-01 RX ORDER — ALBUTEROL SULFATE 90 UG/1
1-2 AEROSOL, METERED RESPIRATORY (INHALATION)
Status: CANCELLED
Start: 2022-06-23

## 2022-01-01 RX ORDER — IOPAMIDOL 755 MG/ML
65 INJECTION, SOLUTION INTRAVASCULAR ONCE
Status: COMPLETED | OUTPATIENT
Start: 2022-01-01 | End: 2022-01-01

## 2022-01-01 RX ORDER — METHYLPREDNISOLONE SODIUM SUCCINATE 40 MG/ML
40 INJECTION, POWDER, LYOPHILIZED, FOR SOLUTION INTRAMUSCULAR; INTRAVENOUS EVERY 8 HOURS
Status: DISCONTINUED | OUTPATIENT
Start: 2022-01-01 | End: 2022-01-01

## 2022-01-01 RX ORDER — DIPHENHYDRAMINE HYDROCHLORIDE 50 MG/ML
50 INJECTION INTRAMUSCULAR; INTRAVENOUS ONCE
Status: COMPLETED | OUTPATIENT
Start: 2022-01-01 | End: 2022-01-01

## 2022-01-01 RX ORDER — CARBOXYMETHYLCELLULOSE SODIUM 5 MG/ML
1 SOLUTION/ DROPS OPHTHALMIC
Status: DISCONTINUED | OUTPATIENT
Start: 2022-01-01 | End: 2022-01-01 | Stop reason: HOSPADM

## 2022-01-01 RX ORDER — ALBUTEROL SULFATE 0.83 MG/ML
2.5 SOLUTION RESPIRATORY (INHALATION)
Status: CANCELLED | OUTPATIENT
Start: 2022-06-23

## 2022-01-01 RX ORDER — DIPHENHYDRAMINE HYDROCHLORIDE 50 MG/ML
50 INJECTION INTRAMUSCULAR; INTRAVENOUS
Status: CANCELLED
Start: 2022-06-23

## 2022-01-01 RX ORDER — HEPARIN SODIUM,PORCINE 10 UNIT/ML
5 VIAL (ML) INTRAVENOUS
Status: CANCELLED | OUTPATIENT
Start: 2022-01-01

## 2022-01-01 RX ORDER — LEVOTHYROXINE SODIUM 50 UG/1
50 TABLET ORAL DAILY
Status: DISCONTINUED | OUTPATIENT
Start: 2022-01-01 | End: 2022-01-01

## 2022-01-01 RX ORDER — UBIDECARENONE 75 MG
100 CAPSULE ORAL DAILY
Status: DISCONTINUED | OUTPATIENT
Start: 2022-01-01 | End: 2022-01-01

## 2022-01-01 RX ORDER — NALOXONE HYDROCHLORIDE 0.4 MG/ML
0.2 INJECTION, SOLUTION INTRAMUSCULAR; INTRAVENOUS; SUBCUTANEOUS
Status: DISCONTINUED | OUTPATIENT
Start: 2022-01-01 | End: 2022-01-01 | Stop reason: HOSPADM

## 2022-01-01 RX ORDER — METHYLPREDNISOLONE SODIUM SUCCINATE 125 MG/2ML
125 INJECTION, POWDER, LYOPHILIZED, FOR SOLUTION INTRAMUSCULAR; INTRAVENOUS ONCE
Status: COMPLETED | OUTPATIENT
Start: 2022-01-01 | End: 2022-01-01

## 2022-01-01 RX ORDER — MIDAZOLAM HCL IN 0.9 % NACL/PF 1 MG/ML
1-12 PLASTIC BAG, INJECTION (ML) INTRAVENOUS CONTINUOUS
Status: DISCONTINUED | OUTPATIENT
Start: 2022-01-01 | End: 2022-01-01 | Stop reason: HOSPADM

## 2022-01-01 RX ORDER — FUROSEMIDE 10 MG/ML
20 INJECTION INTRAMUSCULAR; INTRAVENOUS ONCE
Status: DISCONTINUED | OUTPATIENT
Start: 2022-01-01 | End: 2022-01-01

## 2022-01-01 RX ORDER — VECURONIUM BROMIDE 1 MG/ML
10 INJECTION, POWDER, LYOPHILIZED, FOR SOLUTION INTRAVENOUS ONCE
Status: COMPLETED | OUTPATIENT
Start: 2022-01-01 | End: 2022-01-01

## 2022-01-01 RX ORDER — DIPHENHYDRAMINE HYDROCHLORIDE 50 MG/ML
50 INJECTION INTRAMUSCULAR; INTRAVENOUS
Status: ACTIVE | OUTPATIENT
Start: 2022-01-01 | End: 2022-01-01

## 2022-01-01 RX ORDER — ACETAMINOPHEN 325 MG/1
650 TABLET ORAL EVERY 4 HOURS PRN
Status: DISCONTINUED | OUTPATIENT
Start: 2022-01-01 | End: 2022-01-01

## 2022-01-01 RX ORDER — PENTAMIDINE ISETHIONATE 300 MG/300MG
300 INHALANT RESPIRATORY (INHALATION)
Status: COMPLETED | OUTPATIENT
Start: 2022-01-01 | End: 2022-01-01

## 2022-01-01 RX ORDER — VORICONAZOLE 40 MG/ML
250 POWDER, FOR SUSPENSION ORAL EVERY 12 HOURS SCHEDULED
Status: DISCONTINUED | OUTPATIENT
Start: 2022-01-01 | End: 2022-01-01

## 2022-01-01 RX ORDER — LIDOCAINE 40 MG/G
CREAM TOPICAL
Status: CANCELLED | OUTPATIENT
Start: 2022-01-01

## 2022-01-01 RX ORDER — GLYCOPYRROLATE 0.2 MG/ML
0.2 INJECTION, SOLUTION INTRAMUSCULAR; INTRAVENOUS ONCE
Status: COMPLETED | OUTPATIENT
Start: 2022-01-01 | End: 2022-01-01

## 2022-01-01 RX ORDER — IOPAMIDOL 755 MG/ML
68 INJECTION, SOLUTION INTRAVASCULAR ONCE
Status: COMPLETED | OUTPATIENT
Start: 2022-01-01 | End: 2022-01-01

## 2022-01-01 RX ORDER — METHYLPREDNISOLONE SODIUM SUCCINATE 40 MG/ML
40 INJECTION, POWDER, LYOPHILIZED, FOR SOLUTION INTRAMUSCULAR; INTRAVENOUS EVERY 12 HOURS
Status: DISCONTINUED | OUTPATIENT
Start: 2022-01-01 | End: 2022-01-01 | Stop reason: HOSPADM

## 2022-01-01 RX ORDER — METOPROLOL TARTRATE 1 MG/ML
5 INJECTION, SOLUTION INTRAVENOUS ONCE
Status: DISCONTINUED | OUTPATIENT
Start: 2022-01-01 | End: 2022-01-01

## 2022-01-01 RX ORDER — ATORVASTATIN CALCIUM 10 MG/1
20 TABLET, FILM COATED ORAL DAILY
Status: DISCONTINUED | OUTPATIENT
Start: 2022-01-01 | End: 2022-01-01

## 2022-01-01 RX ORDER — FAMOTIDINE 20 MG/1
40 TABLET, FILM COATED ORAL
Status: DISCONTINUED | OUTPATIENT
Start: 2022-01-01 | End: 2022-01-01

## 2022-01-01 RX ORDER — FAMOTIDINE 20 MG/1
20 TABLET, FILM COATED ORAL
Status: DISCONTINUED | OUTPATIENT
Start: 2022-01-01 | End: 2022-01-01

## 2022-01-01 RX ORDER — AMIODARONE HYDROCHLORIDE 200 MG/1
200 TABLET ORAL 2 TIMES DAILY
Status: DISCONTINUED | OUTPATIENT
Start: 2022-01-01 | End: 2022-01-01

## 2022-01-01 RX ORDER — AMOXICILLIN 250 MG
2 CAPSULE ORAL 2 TIMES DAILY
Status: DISCONTINUED | OUTPATIENT
Start: 2022-01-01 | End: 2022-01-01

## 2022-01-01 RX ORDER — NALOXONE HYDROCHLORIDE 0.4 MG/ML
0.2 INJECTION, SOLUTION INTRAMUSCULAR; INTRAVENOUS; SUBCUTANEOUS
Status: CANCELLED | OUTPATIENT
Start: 2022-01-01

## 2022-01-01 RX ORDER — PRIMAQUINE PHOSPHATE 15 MG/1
30 TABLET, FILM COATED ORAL DAILY
Status: DISCONTINUED | OUTPATIENT
Start: 2022-01-01 | End: 2022-01-01

## 2022-01-01 RX ORDER — IOPAMIDOL 755 MG/ML
55 INJECTION, SOLUTION INTRAVASCULAR ONCE
Status: COMPLETED | OUTPATIENT
Start: 2022-01-01 | End: 2022-01-01

## 2022-01-01 RX ORDER — POLYETHYLENE GLYCOL 3350 17 G/17G
17 POWDER, FOR SOLUTION ORAL DAILY PRN
Status: DISCONTINUED | OUTPATIENT
Start: 2022-01-01 | End: 2022-01-01

## 2022-01-01 RX ORDER — DRONABINOL 2.5 MG/1
2.5 CAPSULE ORAL DAILY
Status: DISCONTINUED | OUTPATIENT
Start: 2022-01-01 | End: 2022-01-01

## 2022-01-01 RX ORDER — AMOXICILLIN 250 MG
1 CAPSULE ORAL 2 TIMES DAILY
Status: DISCONTINUED | OUTPATIENT
Start: 2022-01-01 | End: 2022-01-01

## 2022-01-01 RX ORDER — METOCLOPRAMIDE HYDROCHLORIDE 5 MG/ML
5 INJECTION INTRAMUSCULAR; INTRAVENOUS
Status: DISPENSED | OUTPATIENT
Start: 2022-01-01 | End: 2022-01-01

## 2022-01-01 RX ORDER — METHYLPREDNISOLONE SODIUM SUCCINATE 125 MG/2ML
1 INJECTION, POWDER, LYOPHILIZED, FOR SOLUTION INTRAMUSCULAR; INTRAVENOUS DAILY
Status: DISCONTINUED | OUTPATIENT
Start: 2022-01-01 | End: 2022-01-01

## 2022-01-01 RX ORDER — ONDANSETRON 2 MG/ML
8 INJECTION INTRAMUSCULAR; INTRAVENOUS EVERY 6 HOURS PRN
Status: DISCONTINUED | OUTPATIENT
Start: 2022-01-01 | End: 2022-01-01

## 2022-01-01 RX ORDER — DIPHENHYDRAMINE HCL 25 MG
25 CAPSULE ORAL EVERY 6 HOURS PRN
Status: DISCONTINUED | OUTPATIENT
Start: 2022-01-01 | End: 2022-01-01

## 2022-01-01 RX ORDER — ADENOSINE 3 MG/ML
6 INJECTION, SOLUTION INTRAVENOUS ONCE
Status: COMPLETED | OUTPATIENT
Start: 2022-01-01 | End: 2022-01-01

## 2022-01-01 RX ORDER — ACETAMINOPHEN 325 MG/1
650 TABLET ORAL ONCE
Status: COMPLETED | OUTPATIENT
Start: 2022-01-01 | End: 2022-01-01

## 2022-01-01 RX ORDER — NOREPINEPHRINE BITARTRATE 0.06 MG/ML
.01-.6 INJECTION, SOLUTION INTRAVENOUS CONTINUOUS
Status: DISCONTINUED | OUTPATIENT
Start: 2022-01-01 | End: 2022-01-01

## 2022-01-01 RX ORDER — IPRATROPIUM BROMIDE AND ALBUTEROL SULFATE 2.5; .5 MG/3ML; MG/3ML
3 SOLUTION RESPIRATORY (INHALATION) EVERY 4 HOURS PRN
Status: DISCONTINUED | OUTPATIENT
Start: 2022-01-01 | End: 2022-01-01 | Stop reason: HOSPADM

## 2022-01-01 RX ORDER — LEVOTHYROXINE SODIUM 50 UG/1
50 TABLET ORAL DAILY
Qty: 90 TABLET | Refills: 3 | Status: SHIPPED | OUTPATIENT
Start: 2022-01-01

## 2022-01-01 RX ORDER — FOLIC ACID 1 MG/1
1 TABLET ORAL DAILY
Status: DISCONTINUED | OUTPATIENT
Start: 2022-01-01 | End: 2022-01-01

## 2022-01-01 RX ORDER — METOCLOPRAMIDE 5 MG/1
5 TABLET ORAL 4 TIMES DAILY PRN
Status: DISCONTINUED | OUTPATIENT
Start: 2022-01-01 | End: 2022-01-01 | Stop reason: HOSPADM

## 2022-01-01 RX ORDER — ONDANSETRON 2 MG/ML
4 INJECTION INTRAMUSCULAR; INTRAVENOUS EVERY 6 HOURS PRN
Status: DISCONTINUED | OUTPATIENT
Start: 2022-01-01 | End: 2022-01-01

## 2022-01-01 RX ORDER — PROCHLORPERAZINE MALEATE 5 MG
5 TABLET ORAL EVERY 6 HOURS PRN
Status: DISCONTINUED | OUTPATIENT
Start: 2022-01-01 | End: 2022-01-01

## 2022-01-01 RX ORDER — ALLOPURINOL 300 MG/1
300 TABLET ORAL DAILY
Status: DISCONTINUED | OUTPATIENT
Start: 2022-01-01 | End: 2022-01-01 | Stop reason: HOSPADM

## 2022-01-01 RX ORDER — PIPERACILLIN SODIUM, TAZOBACTAM SODIUM 4; .5 G/20ML; G/20ML
4.5 INJECTION, POWDER, LYOPHILIZED, FOR SOLUTION INTRAVENOUS EVERY 6 HOURS
Status: DISCONTINUED | OUTPATIENT
Start: 2022-01-01 | End: 2022-01-01

## 2022-01-01 RX ORDER — LEVOFLOXACIN 500 MG/1
500 TABLET, FILM COATED ORAL ONCE
Status: COMPLETED | OUTPATIENT
Start: 2022-01-01 | End: 2022-01-01

## 2022-01-01 RX ORDER — PROCHLORPERAZINE 25 MG
12.5 SUPPOSITORY, RECTAL RECTAL EVERY 12 HOURS PRN
Status: DISCONTINUED | OUTPATIENT
Start: 2022-01-01 | End: 2022-01-01 | Stop reason: HOSPADM

## 2022-01-01 RX ORDER — MEPERIDINE HYDROCHLORIDE 25 MG/ML
25 INJECTION INTRAMUSCULAR; INTRAVENOUS; SUBCUTANEOUS EVERY 30 MIN PRN
Status: CANCELLED | OUTPATIENT
Start: 2022-06-23

## 2022-01-01 RX ORDER — DIPHENHYDRAMINE HYDROCHLORIDE 50 MG/ML
50 INJECTION INTRAMUSCULAR; INTRAVENOUS
Status: DISCONTINUED | OUTPATIENT
Start: 2022-01-01 | End: 2022-01-01 | Stop reason: CLARIF

## 2022-01-01 RX ORDER — ALBUMIN (HUMAN) 12.5 G/50ML
SOLUTION INTRAVENOUS
Status: COMPLETED
Start: 2022-01-01 | End: 2022-01-01

## 2022-01-01 RX ORDER — CEFTRIAXONE 1 G/1
1 INJECTION, POWDER, FOR SOLUTION INTRAMUSCULAR; INTRAVENOUS ONCE
Status: COMPLETED | OUTPATIENT
Start: 2022-01-01 | End: 2022-01-01

## 2022-01-01 RX ORDER — VORICONAZOLE 40 MG/ML
300 POWDER, FOR SUSPENSION ORAL ONCE
Status: DISCONTINUED | OUTPATIENT
Start: 2022-01-01 | End: 2022-01-01

## 2022-01-01 RX ORDER — PROPOFOL 10 MG/ML
INJECTION, EMULSION INTRAVENOUS
Status: COMPLETED
Start: 2022-01-01 | End: 2022-01-01

## 2022-01-01 RX ORDER — METHYLPREDNISOLONE SODIUM SUCCINATE 125 MG/2ML
125 INJECTION, POWDER, LYOPHILIZED, FOR SOLUTION INTRAMUSCULAR; INTRAVENOUS
Status: DISCONTINUED | OUTPATIENT
Start: 2022-01-01 | End: 2022-01-01 | Stop reason: CLARIF

## 2022-01-01 RX ORDER — UBIDECARENONE 75 MG
100 CAPSULE ORAL DAILY
Status: DISCONTINUED | OUTPATIENT
Start: 2022-01-01 | End: 2022-01-01 | Stop reason: HOSPADM

## 2022-01-01 RX ORDER — CEFTRIAXONE 1 G/1
1 INJECTION, POWDER, FOR SOLUTION INTRAMUSCULAR; INTRAVENOUS EVERY 24 HOURS
Status: DISCONTINUED | OUTPATIENT
Start: 2022-01-01 | End: 2022-01-01

## 2022-01-01 RX ORDER — POLYETHYLENE GLYCOL 3350 17 G/17G
17 POWDER, FOR SOLUTION ORAL DAILY
Status: DISCONTINUED | OUTPATIENT
Start: 2022-01-01 | End: 2022-01-01 | Stop reason: HOSPADM

## 2022-01-01 RX ORDER — PROPOFOL 10 MG/ML
5-75 INJECTION, EMULSION INTRAVENOUS CONTINUOUS
Status: DISCONTINUED | OUTPATIENT
Start: 2022-01-01 | End: 2022-01-01 | Stop reason: HOSPADM

## 2022-01-01 RX ORDER — DEXTROSE MONOHYDRATE 50 MG/ML
10-20 INJECTION, SOLUTION INTRAVENOUS EVERY 24 HOURS
Status: DISCONTINUED | OUTPATIENT
Start: 2022-01-01 | End: 2022-01-01

## 2022-01-01 RX ORDER — ALBUMIN (HUMAN) 12.5 G/50ML
25 SOLUTION INTRAVENOUS ONCE
Status: COMPLETED | OUTPATIENT
Start: 2022-01-01 | End: 2022-01-01

## 2022-01-01 RX ORDER — ADENOSINE 3 MG/ML
INJECTION, SOLUTION INTRAVENOUS
Status: COMPLETED
Start: 2022-01-01 | End: 2022-01-01

## 2022-01-01 RX ORDER — METHYLPREDNISOLONE SODIUM SUCCINATE 125 MG/2ML
125 INJECTION, POWDER, LYOPHILIZED, FOR SOLUTION INTRAMUSCULAR; INTRAVENOUS
Status: CANCELLED
Start: 2022-06-23

## 2022-01-01 RX ORDER — VORICONAZOLE 40 MG/ML
300 POWDER, FOR SUSPENSION ORAL ONCE
Status: COMPLETED | OUTPATIENT
Start: 2022-01-01 | End: 2022-01-01

## 2022-01-01 RX ORDER — ACETAMINOPHEN 325 MG/1
650 TABLET ORAL
Status: ACTIVE | OUTPATIENT
Start: 2022-01-01 | End: 2022-01-01

## 2022-01-01 RX ORDER — NALOXONE HYDROCHLORIDE 0.4 MG/ML
0.4 INJECTION, SOLUTION INTRAMUSCULAR; INTRAVENOUS; SUBCUTANEOUS
Status: DISCONTINUED | OUTPATIENT
Start: 2022-01-01 | End: 2022-01-01 | Stop reason: HOSPADM

## 2022-01-01 RX ORDER — PREDNISONE 20 MG/1
20 TABLET ORAL DAILY
Status: DISCONTINUED | OUTPATIENT
Start: 2022-01-01 | End: 2022-01-01

## 2022-01-01 RX ORDER — ALLOPURINOL 300 MG/1
300 TABLET ORAL DAILY
Status: DISCONTINUED | OUTPATIENT
Start: 2022-01-01 | End: 2022-01-01

## 2022-01-01 RX ORDER — AMIODARONE HYDROCHLORIDE 200 MG/1
200 TABLET ORAL DAILY
Status: DISCONTINUED | OUTPATIENT
Start: 2022-01-01 | End: 2022-01-01

## 2022-01-01 RX ORDER — LIDOCAINE 40 MG/G
CREAM TOPICAL
Status: ACTIVE | OUTPATIENT
Start: 2022-01-01 | End: 2022-01-01

## 2022-01-01 RX ORDER — LANOLIN ALCOHOL/MO/W.PET/CERES
3 CREAM (GRAM) TOPICAL
Status: DISCONTINUED | OUTPATIENT
Start: 2022-01-01 | End: 2022-01-01

## 2022-01-01 RX ORDER — FOLIC ACID 1 MG/1
1 TABLET ORAL DAILY
Status: DISCONTINUED | OUTPATIENT
Start: 2022-01-01 | End: 2022-01-01 | Stop reason: HOSPADM

## 2022-01-01 RX ORDER — ONDANSETRON 2 MG/ML
8 INJECTION INTRAMUSCULAR; INTRAVENOUS EVERY 8 HOURS PRN
Status: DISCONTINUED | OUTPATIENT
Start: 2022-01-01 | End: 2022-01-01 | Stop reason: HOSPADM

## 2022-01-01 RX ORDER — METHYLPREDNISOLONE SODIUM SUCCINATE 125 MG/2ML
125 INJECTION, POWDER, LYOPHILIZED, FOR SOLUTION INTRAMUSCULAR; INTRAVENOUS
Status: ACTIVE | OUTPATIENT
Start: 2022-01-01 | End: 2022-01-01

## 2022-01-01 RX ORDER — LIDOCAINE HYDROCHLORIDE 20 MG/ML
5 SOLUTION OROPHARYNGEAL ONCE
Status: COMPLETED | OUTPATIENT
Start: 2022-01-01 | End: 2022-01-01

## 2022-01-01 RX ORDER — CLINDAMYCIN PHOSPHATE 900 MG/50ML
900 INJECTION, SOLUTION INTRAVENOUS EVERY 8 HOURS
Status: DISCONTINUED | OUTPATIENT
Start: 2022-01-01 | End: 2022-01-01

## 2022-01-01 RX ORDER — METOPROLOL TARTRATE 1 MG/ML
INJECTION, SOLUTION INTRAVENOUS
Status: COMPLETED
Start: 2022-01-01 | End: 2022-01-01

## 2022-01-01 RX ORDER — METHYLPREDNISOLONE SODIUM SUCCINATE 125 MG/2ML
125 INJECTION, POWDER, LYOPHILIZED, FOR SOLUTION INTRAMUSCULAR; INTRAVENOUS
Status: CANCELLED
Start: 2022-01-01

## 2022-01-01 RX ORDER — ACETAMINOPHEN 325 MG/1
650 TABLET ORAL EVERY 24 HOURS
Status: DISCONTINUED | OUTPATIENT
Start: 2022-01-01 | End: 2022-01-01

## 2022-01-01 RX ORDER — DIPHENHYDRAMINE HCL 50 MG
50 CAPSULE ORAL ONCE
Status: COMPLETED | OUTPATIENT
Start: 2022-01-01 | End: 2022-01-01

## 2022-01-01 RX ORDER — DIPHENHYDRAMINE HYDROCHLORIDE 50 MG/ML
25 INJECTION INTRAMUSCULAR; INTRAVENOUS EVERY 24 HOURS
Status: DISCONTINUED | OUTPATIENT
Start: 2022-01-01 | End: 2022-01-01 | Stop reason: CLARIF

## 2022-01-01 RX ORDER — IOPAMIDOL 755 MG/ML
95 INJECTION, SOLUTION INTRAVASCULAR ONCE
Status: DISCONTINUED | OUTPATIENT
Start: 2022-01-01 | End: 2022-01-01 | Stop reason: CLARIF

## 2022-01-01 RX ORDER — SULFAMETHOXAZOLE AND TRIMETHOPRIM 400; 80 MG/1; MG/1
3 TABLET ORAL
Status: DISCONTINUED | OUTPATIENT
Start: 2022-01-01 | End: 2022-01-01

## 2022-01-01 RX ORDER — METHYLPREDNISOLONE SODIUM SUCCINATE 40 MG/ML
40 INJECTION, POWDER, LYOPHILIZED, FOR SOLUTION INTRAMUSCULAR; INTRAVENOUS DAILY
Status: DISCONTINUED | OUTPATIENT
Start: 2022-01-01 | End: 2022-01-01

## 2022-01-01 RX ORDER — ZOLPIDEM TARTRATE 5 MG/1
5 TABLET ORAL
Status: DISCONTINUED | OUTPATIENT
Start: 2022-01-01 | End: 2022-01-01

## 2022-01-01 RX ORDER — PENTAMIDINE ISETHIONATE 300 MG/300MG
300 INHALANT RESPIRATORY (INHALATION)
Status: CANCELLED
Start: 2022-06-23

## 2022-01-01 RX ORDER — ALBUTEROL SULFATE 0.83 MG/ML
2.5 SOLUTION RESPIRATORY (INHALATION) ONCE
Status: CANCELLED
Start: 2022-06-23 | End: 2022-06-23

## 2022-01-01 RX ORDER — FOLIC ACID 1 MG/1
1 TABLET ORAL DAILY
Start: 2022-01-01

## 2022-01-01 RX ORDER — AMOXICILLIN 250 MG
1 CAPSULE ORAL 2 TIMES DAILY
Status: DISCONTINUED | OUTPATIENT
Start: 2022-01-01 | End: 2022-01-01 | Stop reason: HOSPADM

## 2022-01-01 RX ORDER — AZITHROMYCIN 250 MG/1
500 TABLET, FILM COATED ORAL DAILY
Status: COMPLETED | OUTPATIENT
Start: 2022-01-01 | End: 2022-01-01

## 2022-01-01 RX ORDER — ACYCLOVIR 800 MG/1
800 TABLET ORAL 2 TIMES DAILY
Qty: 60 TABLET | Refills: 0 | Status: SHIPPED | OUTPATIENT
Start: 2022-01-01

## 2022-01-01 RX ORDER — ATORVASTATIN CALCIUM 20 MG/1
20 TABLET, FILM COATED ORAL DAILY
Qty: 90 TABLET | Refills: 3 | Status: SHIPPED | OUTPATIENT
Start: 2022-01-01

## 2022-01-01 RX ORDER — ACYCLOVIR 800 MG/1
800 TABLET ORAL 2 TIMES DAILY
Qty: 60 TABLET | Refills: 0 | Status: SHIPPED | OUTPATIENT
Start: 2022-01-01 | End: 2022-01-01

## 2022-01-01 RX ORDER — METOPROLOL TARTRATE 1 MG/ML
5 INJECTION, SOLUTION INTRAVENOUS ONCE
Status: COMPLETED | OUTPATIENT
Start: 2022-01-01 | End: 2022-01-01

## 2022-01-01 RX ORDER — DIPHENHYDRAMINE HCL 25 MG
50 CAPSULE ORAL
Status: DISCONTINUED | OUTPATIENT
Start: 2022-01-01 | End: 2022-01-01 | Stop reason: CLARIF

## 2022-01-01 RX ORDER — FLUCONAZOLE 200 MG/1
400 TABLET ORAL DAILY
Status: DISCONTINUED | OUTPATIENT
Start: 2022-01-01 | End: 2022-01-01

## 2022-01-01 RX ORDER — ALBUTEROL SULFATE 0.83 MG/ML
2.5 SOLUTION RESPIRATORY (INHALATION)
Status: COMPLETED | OUTPATIENT
Start: 2022-01-01 | End: 2022-01-01

## 2022-01-01 RX ORDER — PREDNISONE 20 MG/1
60 TABLET ORAL DAILY
Qty: 180 TABLET | Refills: 1 | Status: SHIPPED | OUTPATIENT
Start: 2022-01-01

## 2022-01-01 RX ORDER — ALBUTEROL SULFATE 90 UG/1
1-2 AEROSOL, METERED RESPIRATORY (INHALATION)
Status: CANCELLED
Start: 2022-01-01

## 2022-01-01 RX ORDER — DIPHENHYDRAMINE HCL 25 MG
50 CAPSULE ORAL ONCE
Status: COMPLETED | OUTPATIENT
Start: 2022-01-01 | End: 2022-01-01

## 2022-01-01 RX ORDER — NICOTINE POLACRILEX 4 MG
15-30 LOZENGE BUCCAL
Status: DISCONTINUED | OUTPATIENT
Start: 2022-01-01 | End: 2022-01-01 | Stop reason: HOSPADM

## 2022-01-01 RX ORDER — FUROSEMIDE 10 MG/ML
20 INJECTION INTRAMUSCULAR; INTRAVENOUS ONCE
Status: COMPLETED | OUTPATIENT
Start: 2022-01-01 | End: 2022-01-01

## 2022-01-01 RX ORDER — LIDOCAINE HYDROCHLORIDE 10 MG/ML
INJECTION, SOLUTION EPIDURAL; INFILTRATION; INTRACAUDAL; PERINEURAL
Status: COMPLETED
Start: 2022-01-01 | End: 2022-01-01

## 2022-01-01 RX ORDER — UBIDECARENONE 75 MG
100 CAPSULE ORAL DAILY
Start: 2022-01-01

## 2022-01-01 RX ORDER — PANTOPRAZOLE SODIUM 20 MG/1
20 TABLET, DELAYED RELEASE ORAL DAILY
Status: DISCONTINUED | OUTPATIENT
Start: 2022-01-01 | End: 2022-01-01

## 2022-01-01 RX ORDER — AMOXICILLIN 250 MG
1 CAPSULE ORAL 2 TIMES DAILY PRN
Status: DISCONTINUED | OUTPATIENT
Start: 2022-01-01 | End: 2022-01-01

## 2022-01-01 RX ORDER — BISACODYL 10 MG
10 SUPPOSITORY, RECTAL RECTAL DAILY PRN
Status: DISCONTINUED | OUTPATIENT
Start: 2022-01-01 | End: 2022-01-01 | Stop reason: HOSPADM

## 2022-01-01 RX ORDER — LEVOFLOXACIN 250 MG/1
250 TABLET, FILM COATED ORAL DAILY
Status: DISCONTINUED | OUTPATIENT
Start: 2022-01-01 | End: 2022-01-01

## 2022-01-01 RX ORDER — PROCHLORPERAZINE MALEATE 5 MG
5 TABLET ORAL EVERY 6 HOURS PRN
Status: DISCONTINUED | OUTPATIENT
Start: 2022-01-01 | End: 2022-01-01 | Stop reason: HOSPADM

## 2022-01-01 RX ORDER — OLANZAPINE 5 MG/1
5 TABLET, ORALLY DISINTEGRATING ORAL
Status: DISCONTINUED | OUTPATIENT
Start: 2022-01-01 | End: 2022-01-01 | Stop reason: HOSPADM

## 2022-01-01 RX ORDER — POLYETHYLENE GLYCOL 3350 17 G/17G
17 POWDER, FOR SOLUTION ORAL DAILY
Status: DISCONTINUED | OUTPATIENT
Start: 2022-01-01 | End: 2022-01-01

## 2022-01-01 RX ORDER — DIPHENHYDRAMINE HYDROCHLORIDE 50 MG/ML
50 INJECTION INTRAMUSCULAR; INTRAVENOUS
Status: CANCELLED
Start: 2022-01-01

## 2022-01-01 RX ORDER — AMOXICILLIN 250 MG
2 CAPSULE ORAL 2 TIMES DAILY PRN
Status: DISCONTINUED | OUTPATIENT
Start: 2022-01-01 | End: 2022-01-01

## 2022-01-01 RX ORDER — ALBUTEROL SULFATE 0.83 MG/ML
2.5 SOLUTION RESPIRATORY (INHALATION)
Status: CANCELLED | OUTPATIENT
Start: 2022-01-01

## 2022-01-01 RX ORDER — POTASSIUM CHLORIDE 1.5 G/1.58G
40 POWDER, FOR SOLUTION ORAL ONCE
Status: COMPLETED | OUTPATIENT
Start: 2022-01-01 | End: 2022-01-01

## 2022-01-01 RX ORDER — HEPARIN SODIUM 5000 [USP'U]/.5ML
5000 INJECTION, SOLUTION INTRAVENOUS; SUBCUTANEOUS EVERY 12 HOURS
Status: DISCONTINUED | OUTPATIENT
Start: 2022-01-01 | End: 2022-01-01

## 2022-01-01 RX ORDER — LINEZOLID 2 MG/ML
600 INJECTION, SOLUTION INTRAVENOUS EVERY 12 HOURS
Status: DISCONTINUED | OUTPATIENT
Start: 2022-01-01 | End: 2022-01-01

## 2022-01-01 RX ORDER — MEPERIDINE HYDROCHLORIDE 25 MG/ML
25 INJECTION INTRAMUSCULAR; INTRAVENOUS; SUBCUTANEOUS EVERY 30 MIN PRN
Status: CANCELLED | OUTPATIENT
Start: 2022-01-01

## 2022-01-01 RX ORDER — DIPHENHYDRAMINE HCL 50 MG
50 CAPSULE ORAL
Status: ACTIVE | OUTPATIENT
Start: 2022-01-01 | End: 2022-01-01

## 2022-01-01 RX ORDER — ONDANSETRON 2 MG/ML
8 INJECTION INTRAMUSCULAR; INTRAVENOUS DAILY
Status: DISCONTINUED | OUTPATIENT
Start: 2022-01-01 | End: 2022-01-01

## 2022-01-01 RX ORDER — NALOXONE HYDROCHLORIDE 0.4 MG/ML
0.2 INJECTION, SOLUTION INTRAMUSCULAR; INTRAVENOUS; SUBCUTANEOUS
Status: CANCELLED | OUTPATIENT
Start: 2022-06-23

## 2022-01-01 RX ORDER — PROCHLORPERAZINE 25 MG
12.5 SUPPOSITORY, RECTAL RECTAL EVERY 12 HOURS PRN
Status: DISCONTINUED | OUTPATIENT
Start: 2022-01-01 | End: 2022-01-01

## 2022-01-01 RX ORDER — EPINEPHRINE 1 MG/ML
0.3 INJECTION, SOLUTION, CONCENTRATE INTRAVENOUS EVERY 5 MIN PRN
Status: CANCELLED | OUTPATIENT
Start: 2022-01-01

## 2022-01-01 RX ORDER — ACETAMINOPHEN 325 MG/1
650 TABLET ORAL EVERY 24 HOURS
Status: DISCONTINUED | OUTPATIENT
Start: 2022-01-01 | End: 2022-01-01 | Stop reason: CLARIF

## 2022-01-01 RX ORDER — ACYCLOVIR 200 MG/5ML
800 SUSPENSION ORAL 2 TIMES DAILY
Status: DISCONTINUED | OUTPATIENT
Start: 2022-01-01 | End: 2022-01-01

## 2022-01-01 RX ORDER — SULFAMETHOXAZOLE/TRIMETHOPRIM 800-160 MG
2 TABLET ORAL 2 TIMES DAILY
Status: DISCONTINUED | OUTPATIENT
Start: 2022-01-01 | End: 2022-01-01

## 2022-01-01 RX ORDER — METOPROLOL TARTRATE 1 MG/ML
2.5 INJECTION, SOLUTION INTRAVENOUS
Status: DISCONTINUED | OUTPATIENT
Start: 2022-01-01 | End: 2022-01-01 | Stop reason: HOSPADM

## 2022-01-01 RX ADMIN — HUMAN INSULIN 11 UNITS/HR: 100 INJECTION, SOLUTION SUBCUTANEOUS at 17:12

## 2022-01-01 RX ADMIN — Medication 100 MCG: at 03:19

## 2022-01-01 RX ADMIN — IBRUTINIB 420 MG: 140 CAPSULE ORAL at 08:51

## 2022-01-01 RX ADMIN — SENNOSIDES AND DOCUSATE SODIUM 1 TABLET: 8.6; 5 TABLET ORAL at 20:01

## 2022-01-01 RX ADMIN — SODIUM CHLORIDE 320 MG: 900 INJECTION, SOLUTION INTRAVENOUS at 05:32

## 2022-01-01 RX ADMIN — PIPERACILLIN SODIUM AND TAZOBACTAM SODIUM 4.5 G: 4; .5 INJECTION, POWDER, LYOPHILIZED, FOR SOLUTION INTRAVENOUS at 19:52

## 2022-01-01 RX ADMIN — MIDAZOLAM HYDROCHLORIDE 8 MG/HR: 1 INJECTION, SOLUTION INTRAVENOUS at 01:18

## 2022-01-01 RX ADMIN — METOCLOPRAMIDE 5 MG: 5 TABLET ORAL at 08:27

## 2022-01-01 RX ADMIN — Medication 1 PACKET: at 08:22

## 2022-01-01 RX ADMIN — PRIMAQUINE PHOSPHATE 30 MG: 15 TABLET ORAL at 12:56

## 2022-01-01 RX ADMIN — ATORVASTATIN CALCIUM 20 MG: 20 TABLET, FILM COATED ORAL at 07:56

## 2022-01-01 RX ADMIN — SENNOSIDES AND DOCUSATE SODIUM 2 TABLET: 8.6; 5 TABLET ORAL at 19:55

## 2022-01-01 RX ADMIN — Medication 125 MCG/HR: at 20:54

## 2022-01-01 RX ADMIN — PSYLLIUM HUSK 1 PACKET: 3.4 POWDER ORAL at 09:15

## 2022-01-01 RX ADMIN — ATORVASTATIN CALCIUM 20 MG: 20 TABLET, FILM COATED ORAL at 08:39

## 2022-01-01 RX ADMIN — PSYLLIUM HUSK 1 PACKET: 3.4 POWDER ORAL at 21:59

## 2022-01-01 RX ADMIN — POLYETHYLENE GLYCOL 3350 17 G: 17 POWDER, FOR SOLUTION ORAL at 21:46

## 2022-01-01 RX ADMIN — PSYLLIUM HUSK 1 PACKET: 3.4 POWDER ORAL at 07:33

## 2022-01-01 RX ADMIN — POLYETHYLENE GLYCOL 3350 17 G: 17 POWDER, FOR SOLUTION ORAL at 22:06

## 2022-01-01 RX ADMIN — SODIUM CHLORIDE, POTASSIUM CHLORIDE, SODIUM LACTATE AND CALCIUM CHLORIDE 500 ML: 600; 310; 30; 20 INJECTION, SOLUTION INTRAVENOUS at 12:50

## 2022-01-01 RX ADMIN — ACYCLOVIR 800 MG: 800 TABLET ORAL at 08:00

## 2022-01-01 RX ADMIN — FOLIC ACID 1 MG: 1 TABLET ORAL at 08:09

## 2022-01-01 RX ADMIN — PSYLLIUM HUSK 1 PACKET: 3.4 POWDER ORAL at 19:57

## 2022-01-01 RX ADMIN — METHYLPREDNISOLONE SODIUM SUCCINATE 40 MG: 40 INJECTION, POWDER, FOR SOLUTION INTRAMUSCULAR; INTRAVENOUS at 08:39

## 2022-01-01 RX ADMIN — ATORVASTATIN CALCIUM 20 MG: 20 TABLET, FILM COATED ORAL at 08:56

## 2022-01-01 RX ADMIN — ATORVASTATIN CALCIUM 20 MG: 20 TABLET, FILM COATED ORAL at 08:51

## 2022-01-01 RX ADMIN — Medication 100 MCG: at 08:23

## 2022-01-01 RX ADMIN — ACYCLOVIR 800 MG: 800 TABLET ORAL at 20:44

## 2022-01-01 RX ADMIN — METHYLPREDNISOLONE SODIUM SUCCINATE 40 MG: 40 INJECTION, POWDER, FOR SOLUTION INTRAMUSCULAR; INTRAVENOUS at 19:21

## 2022-01-01 RX ADMIN — FOLIC ACID 1 MG: 1 TABLET ORAL at 08:23

## 2022-01-01 RX ADMIN — LEVOTHYROXINE SODIUM 50 MCG: 0.05 TABLET ORAL at 08:24

## 2022-01-01 RX ADMIN — DEXTROSE MONOHYDRATE 20 ML: 50 INJECTION, SOLUTION INTRAVENOUS at 19:53

## 2022-01-01 RX ADMIN — SULFAMETHOXAZOLE AND TRIMETHOPRIM 2 TABLET: 800; 160 TABLET ORAL at 20:55

## 2022-01-01 RX ADMIN — METOPROLOL TARTRATE 2.5 MG: 5 INJECTION INTRAVENOUS at 03:17

## 2022-01-01 RX ADMIN — MICAFUNGIN 150 MG: 10 INJECTION, POWDER, LYOPHILIZED, FOR SOLUTION INTRAVENOUS at 09:08

## 2022-01-01 RX ADMIN — SENNOSIDES AND DOCUSATE SODIUM 2 TABLET: 8.6; 5 TABLET ORAL at 07:39

## 2022-01-01 RX ADMIN — ONDANSETRON 8 MG: 2 INJECTION INTRAMUSCULAR; INTRAVENOUS at 08:19

## 2022-01-01 RX ADMIN — SENNOSIDES AND DOCUSATE SODIUM 1 TABLET: 8.6; 5 TABLET ORAL at 20:43

## 2022-01-01 RX ADMIN — VORICONAZOLE 300 MG: 40 POWDER, FOR SUSPENSION ORAL at 19:33

## 2022-01-01 RX ADMIN — FOLIC ACID 1 MG: 1 TABLET ORAL at 07:58

## 2022-01-01 RX ADMIN — IBRUTINIB 420 MG: 140 CAPSULE ORAL at 08:04

## 2022-01-01 RX ADMIN — IBRUTINIB 420 MG: 140 CAPSULE ORAL at 08:47

## 2022-01-01 RX ADMIN — ACYCLOVIR 800 MG: 200 SUSPENSION ORAL at 19:22

## 2022-01-01 RX ADMIN — DOCUSATE SODIUM 50 MG AND SENNOSIDES 8.6 MG 2 TABLET: 8.6; 5 TABLET, FILM COATED ORAL at 21:22

## 2022-01-01 RX ADMIN — METHYLPREDNISOLONE SODIUM SUCCINATE 40 MG: 40 INJECTION, POWDER, FOR SOLUTION INTRAMUSCULAR; INTRAVENOUS at 19:31

## 2022-01-01 RX ADMIN — ONDANSETRON 8 MG: 2 INJECTION INTRAMUSCULAR; INTRAVENOUS at 09:48

## 2022-01-01 RX ADMIN — CLINDAMYCIN IN 5 PERCENT DEXTROSE 900 MG: 18 INJECTION, SOLUTION INTRAVENOUS at 11:05

## 2022-01-01 RX ADMIN — Medication 50 MCG: at 09:28

## 2022-01-01 RX ADMIN — FOLIC ACID 1 MG: 1 TABLET ORAL at 09:32

## 2022-01-01 RX ADMIN — ATORVASTATIN CALCIUM 20 MG: 20 TABLET, FILM COATED ORAL at 08:23

## 2022-01-01 RX ADMIN — SODIUM PHOSPHATE 1 ENEMA: 7; 19 ENEMA RECTAL at 08:29

## 2022-01-01 RX ADMIN — AZITHROMYCIN MONOHYDRATE 500 MG: 500 INJECTION, POWDER, LYOPHILIZED, FOR SOLUTION INTRAVENOUS at 00:22

## 2022-01-01 RX ADMIN — METHYLPREDNISOLONE SODIUM SUCCINATE 40 MG: 40 INJECTION, POWDER, FOR SOLUTION INTRAMUSCULAR; INTRAVENOUS at 20:38

## 2022-01-01 RX ADMIN — ONDANSETRON 4 MG: 2 INJECTION INTRAMUSCULAR; INTRAVENOUS at 03:21

## 2022-01-01 RX ADMIN — FOLIC ACID 1 MG: 1 TABLET ORAL at 08:19

## 2022-01-01 RX ADMIN — HUMAN INSULIN 7 UNITS/HR: 100 INJECTION, SOLUTION SUBCUTANEOUS at 15:05

## 2022-01-01 RX ADMIN — ACYCLOVIR 800 MG: 200 SUSPENSION ORAL at 08:20

## 2022-01-01 RX ADMIN — LEVOTHYROXINE SODIUM 50 MCG: 0.05 TABLET ORAL at 05:43

## 2022-01-01 RX ADMIN — CLINDAMYCIN IN 5 PERCENT DEXTROSE 900 MG: 18 INJECTION, SOLUTION INTRAVENOUS at 11:19

## 2022-01-01 RX ADMIN — Medication 100 MCG: at 08:56

## 2022-01-01 RX ADMIN — Medication 50 MCG: at 13:48

## 2022-01-01 RX ADMIN — Medication 200 MCG/HR: at 00:39

## 2022-01-01 RX ADMIN — HEPARIN SODIUM 5000 UNITS: 5000 INJECTION, SOLUTION INTRAVENOUS; SUBCUTANEOUS at 08:07

## 2022-01-01 RX ADMIN — Medication 10 MG: at 20:10

## 2022-01-01 RX ADMIN — PIPERACILLIN AND TAZOBACTAM 4.5 G: 4; .5 INJECTION, POWDER, FOR SOLUTION INTRAVENOUS at 05:52

## 2022-01-01 RX ADMIN — ATORVASTATIN CALCIUM 20 MG: 20 TABLET, FILM COATED ORAL at 07:59

## 2022-01-01 RX ADMIN — METHYLPREDNISOLONE 40 MG: 40 INJECTION, POWDER, LYOPHILIZED, FOR SOLUTION INTRAMUSCULAR; INTRAVENOUS at 08:01

## 2022-01-01 RX ADMIN — SENNOSIDES AND DOCUSATE SODIUM 2 TABLET: 8.6; 5 TABLET ORAL at 08:10

## 2022-01-01 RX ADMIN — SODIUM CHLORIDE 1000 ML: 9 INJECTION, SOLUTION INTRAVENOUS at 22:15

## 2022-01-01 RX ADMIN — METHYLPREDNISOLONE SODIUM SUCCINATE 40 MG: 40 INJECTION, POWDER, FOR SOLUTION INTRAMUSCULAR; INTRAVENOUS at 20:15

## 2022-01-01 RX ADMIN — PROCHLORPERAZINE EDISYLATE 5 MG: 5 INJECTION INTRAMUSCULAR; INTRAVENOUS at 17:38

## 2022-01-01 RX ADMIN — PSYLLIUM HUSK 1 PACKET: 3.4 POWDER ORAL at 08:27

## 2022-01-01 RX ADMIN — PSYLLIUM HUSK 1 PACKET: 3.4 POWDER ORAL at 15:34

## 2022-01-01 RX ADMIN — MIDAZOLAM HYDROCHLORIDE 3 MG/HR: 1 INJECTION, SOLUTION INTRAVENOUS at 20:20

## 2022-01-01 RX ADMIN — HEPARIN SODIUM 5000 UNITS: 5000 INJECTION, SOLUTION INTRAVENOUS; SUBCUTANEOUS at 19:44

## 2022-01-01 RX ADMIN — ACYCLOVIR 800 MG: 800 TABLET ORAL at 07:52

## 2022-01-01 RX ADMIN — PRIMAQUINE PHOSPHATE 30 MG: 15 TABLET ORAL at 08:09

## 2022-01-01 RX ADMIN — METHYLPREDNISOLONE 40 MG: 40 INJECTION, POWDER, LYOPHILIZED, FOR SOLUTION INTRAMUSCULAR; INTRAVENOUS at 08:07

## 2022-01-01 RX ADMIN — SULFAMETHOXAZOLE AND TRIMETHOPRIM 320 MG: 80; 16 INJECTION, SOLUTION, CONCENTRATE INTRAVENOUS at 05:43

## 2022-01-01 RX ADMIN — Medication 200 MCG/HR: at 23:31

## 2022-01-01 RX ADMIN — METHYLPREDNISOLONE SODIUM SUCCINATE 40 MG: 40 INJECTION, POWDER, FOR SOLUTION INTRAMUSCULAR; INTRAVENOUS at 08:36

## 2022-01-01 RX ADMIN — LEVOTHYROXINE SODIUM 50 MCG: 0.05 TABLET ORAL at 05:36

## 2022-01-01 RX ADMIN — PANTOPRAZOLE SODIUM 20 MG: 20 TABLET, DELAYED RELEASE ORAL at 07:56

## 2022-01-01 RX ADMIN — PSYLLIUM HUSK 1 PACKET: 3.4 POWDER ORAL at 20:27

## 2022-01-01 RX ADMIN — HEPARIN SODIUM 5000 UNITS: 5000 INJECTION, SOLUTION INTRAVENOUS; SUBCUTANEOUS at 09:43

## 2022-01-01 RX ADMIN — Medication 150 MCG/HR: at 10:57

## 2022-01-01 RX ADMIN — PRIMAQUINE PHOSPHATE 30 MG: 15 TABLET ORAL at 07:29

## 2022-01-01 RX ADMIN — METHYLPREDNISOLONE 40 MG: 40 INJECTION, POWDER, LYOPHILIZED, FOR SOLUTION INTRAMUSCULAR; INTRAVENOUS at 00:52

## 2022-01-01 RX ADMIN — METHYLPREDNISOLONE SODIUM SUCCINATE 40 MG: 40 INJECTION, POWDER, FOR SOLUTION INTRAMUSCULAR; INTRAVENOUS at 08:27

## 2022-01-01 RX ADMIN — PSYLLIUM HUSK 1 PACKET: 3.4 POWDER ORAL at 08:00

## 2022-01-01 RX ADMIN — METHYLPREDNISOLONE SODIUM SUCCINATE 68.75 MG: 125 INJECTION, POWDER, FOR SOLUTION INTRAMUSCULAR; INTRAVENOUS at 18:51

## 2022-01-01 RX ADMIN — FLUCONAZOLE 400 MG: 200 TABLET ORAL at 08:23

## 2022-01-01 RX ADMIN — PROCHLORPERAZINE EDISYLATE 5 MG: 5 INJECTION INTRAMUSCULAR; INTRAVENOUS at 23:54

## 2022-01-01 RX ADMIN — Medication 50 MCG: at 08:36

## 2022-01-01 RX ADMIN — IOPAMIDOL 65 ML: 755 INJECTION, SOLUTION INTRAVENOUS at 22:06

## 2022-01-01 RX ADMIN — AZITHROMYCIN MONOHYDRATE 500 MG: 250 TABLET ORAL at 00:15

## 2022-01-01 RX ADMIN — HEPARIN SODIUM 5000 UNITS: 5000 INJECTION, SOLUTION INTRAVENOUS; SUBCUTANEOUS at 18:16

## 2022-01-01 RX ADMIN — Medication 1 PACKET: at 08:13

## 2022-01-01 RX ADMIN — METHYLPREDNISOLONE SODIUM SUCCINATE 40 MG: 40 INJECTION, POWDER, FOR SOLUTION INTRAMUSCULAR; INTRAVENOUS at 09:14

## 2022-01-01 RX ADMIN — CLINDAMYCIN IN 5 PERCENT DEXTROSE 900 MG: 18 INJECTION, SOLUTION INTRAVENOUS at 20:01

## 2022-01-01 RX ADMIN — FOLIC ACID 1 MG: 1 TABLET ORAL at 08:20

## 2022-01-01 RX ADMIN — ONDANSETRON 8 MG: 2 INJECTION INTRAMUSCULAR; INTRAVENOUS at 09:15

## 2022-01-01 RX ADMIN — CEFEPIME 2 G: 2 INJECTION, POWDER, FOR SOLUTION INTRAVENOUS at 20:46

## 2022-01-01 RX ADMIN — HUMAN INSULIN 6 UNITS/HR: 100 INJECTION, SOLUTION SUBCUTANEOUS at 11:21

## 2022-01-01 RX ADMIN — METHYLPREDNISOLONE 40 MG: 40 INJECTION, POWDER, LYOPHILIZED, FOR SOLUTION INTRAMUSCULAR; INTRAVENOUS at 00:18

## 2022-01-01 RX ADMIN — LINEZOLID 600 MG: 600 INJECTION, SOLUTION INTRAVENOUS at 13:04

## 2022-01-01 RX ADMIN — METHYLPREDNISOLONE SODIUM SUCCINATE 40 MG: 40 INJECTION, POWDER, FOR SOLUTION INTRAMUSCULAR; INTRAVENOUS at 07:39

## 2022-01-01 RX ADMIN — SODIUM CHLORIDE, POTASSIUM CHLORIDE, SODIUM LACTATE AND CALCIUM CHLORIDE 500 ML: 600; 310; 30; 20 INJECTION, SOLUTION INTRAVENOUS at 15:08

## 2022-01-01 RX ADMIN — VITAM B12 100 MCG: 100 TAB at 07:39

## 2022-01-01 RX ADMIN — SODIUM CHLORIDE 500 ML: 9 INJECTION, SOLUTION INTRAVENOUS at 17:10

## 2022-01-01 RX ADMIN — MIDAZOLAM 2 MG: 1 INJECTION INTRAMUSCULAR; INTRAVENOUS at 13:21

## 2022-01-01 RX ADMIN — AMIODARONE HYDROCHLORIDE 1 MG/MIN: 50 INJECTION, SOLUTION INTRAVENOUS at 13:52

## 2022-01-01 RX ADMIN — MULTIVITAMIN 15 ML: LIQUID ORAL at 07:33

## 2022-01-01 RX ADMIN — LEVOTHYROXINE SODIUM 50 MCG: 0.05 TABLET ORAL at 06:05

## 2022-01-01 RX ADMIN — HEPARIN SODIUM 5000 UNITS: 5000 INJECTION, SOLUTION INTRAVENOUS; SUBCUTANEOUS at 08:21

## 2022-01-01 RX ADMIN — PANTOPRAZOLE SODIUM 20 MG: 20 TABLET, DELAYED RELEASE ORAL at 08:23

## 2022-01-01 RX ADMIN — PROCHLORPERAZINE EDISYLATE 5 MG: 5 INJECTION INTRAMUSCULAR; INTRAVENOUS at 11:49

## 2022-01-01 RX ADMIN — PANTOPRAZOLE SODIUM 20 MG: 20 TABLET, DELAYED RELEASE ORAL at 08:37

## 2022-01-01 RX ADMIN — AMIODARONE HYDROCHLORIDE 400 MG: 200 TABLET ORAL at 20:04

## 2022-01-01 RX ADMIN — ALLOPURINOL 300 MG: 300 TABLET ORAL at 08:41

## 2022-01-01 RX ADMIN — SULFAMETHOXAZOLE AND TRIMETHOPRIM 320 MG: 80; 16 INJECTION, SOLUTION, CONCENTRATE INTRAVENOUS at 11:05

## 2022-01-01 RX ADMIN — METOCLOPRAMIDE 5 MG: 5 TABLET ORAL at 18:06

## 2022-01-01 RX ADMIN — VITAM B12 100 MCG: 100 TAB at 09:14

## 2022-01-01 RX ADMIN — ALLOPURINOL 300 MG: 300 TABLET ORAL at 09:32

## 2022-01-01 RX ADMIN — PANTOPRAZOLE SODIUM 20 MG: 20 TABLET, DELAYED RELEASE ORAL at 08:00

## 2022-01-01 RX ADMIN — Medication 12.5 MG: at 20:16

## 2022-01-01 RX ADMIN — HEPARIN SODIUM 5000 UNITS: 5000 INJECTION, SOLUTION INTRAVENOUS; SUBCUTANEOUS at 08:28

## 2022-01-01 RX ADMIN — Medication 1 PACKET: at 20:16

## 2022-01-01 RX ADMIN — METHYLPREDNISOLONE SODIUM SUCCINATE 40 MG: 40 INJECTION, POWDER, FOR SOLUTION INTRAMUSCULAR; INTRAVENOUS at 19:53

## 2022-01-01 RX ADMIN — Medication 20 NG/KG/MIN: at 02:52

## 2022-01-01 RX ADMIN — IBRUTINIB 280 MG: 140 CAPSULE ORAL at 11:59

## 2022-01-01 RX ADMIN — PANTOPRAZOLE SODIUM 20 MG: 20 TABLET, DELAYED RELEASE ORAL at 08:51

## 2022-01-01 RX ADMIN — PSYLLIUM HUSK 1 PACKET: 3.4 POWDER ORAL at 21:58

## 2022-01-01 RX ADMIN — SULFAMETHOXAZOLE AND TRIMETHOPRIM 320 MG: 80; 16 INJECTION, SOLUTION, CONCENTRATE INTRAVENOUS at 21:29

## 2022-01-01 RX ADMIN — ACYCLOVIR 800 MG: 800 TABLET ORAL at 19:57

## 2022-01-01 RX ADMIN — HUMAN INSULIN 8 UNITS/HR: 100 INJECTION, SOLUTION SUBCUTANEOUS at 21:33

## 2022-01-01 RX ADMIN — Medication 100 MCG: at 08:47

## 2022-01-01 RX ADMIN — METHYLPREDNISOLONE 40 MG: 40 INJECTION, POWDER, LYOPHILIZED, FOR SOLUTION INTRAMUSCULAR; INTRAVENOUS at 00:40

## 2022-01-01 RX ADMIN — PIPERACILLIN SODIUM AND TAZOBACTAM SODIUM 4.5 G: 4; .5 INJECTION, POWDER, LYOPHILIZED, FOR SOLUTION INTRAVENOUS at 08:17

## 2022-01-01 RX ADMIN — HEPARIN SODIUM 5000 UNITS: 5000 INJECTION, SOLUTION INTRAVENOUS; SUBCUTANEOUS at 08:38

## 2022-01-01 RX ADMIN — PRIMAQUINE PHOSPHATE 30 MG: 15 TABLET ORAL at 08:22

## 2022-01-01 RX ADMIN — PANTOPRAZOLE SODIUM 20 MG: 20 TABLET, DELAYED RELEASE ORAL at 08:28

## 2022-01-01 RX ADMIN — Medication 12.5 MG: at 09:02

## 2022-01-01 RX ADMIN — Medication 50 MCG: at 07:34

## 2022-01-01 RX ADMIN — MIDAZOLAM HYDROCHLORIDE 10 MG/HR: 1 INJECTION, SOLUTION INTRAVENOUS at 14:47

## 2022-01-01 RX ADMIN — IBRUTINIB 280 MG: 140 CAPSULE ORAL at 12:49

## 2022-01-01 RX ADMIN — SULFAMETHOXAZOLE AND TRIMETHOPRIM 360 MG: 80; 16 INJECTION, SOLUTION, CONCENTRATE INTRAVENOUS at 20:00

## 2022-01-01 RX ADMIN — HEPARIN SODIUM 5000 UNITS: 5000 INJECTION, SOLUTION INTRAVENOUS; SUBCUTANEOUS at 07:39

## 2022-01-01 RX ADMIN — Medication 50 MCG: at 07:53

## 2022-01-01 RX ADMIN — PSYLLIUM HUSK 1 PACKET: 3.4 POWDER ORAL at 21:21

## 2022-01-01 RX ADMIN — HEPARIN SODIUM 5000 UNITS: 5000 INJECTION, SOLUTION INTRAVENOUS; SUBCUTANEOUS at 08:23

## 2022-01-01 RX ADMIN — LEVOFLOXACIN 250 MG: 250 TABLET, FILM COATED ORAL at 07:55

## 2022-01-01 RX ADMIN — AMIODARONE HYDROCHLORIDE 150 MG: 1.5 INJECTION, SOLUTION INTRAVENOUS at 22:35

## 2022-01-01 RX ADMIN — CEFTRIAXONE SODIUM 1 G: 1 INJECTION, POWDER, FOR SOLUTION INTRAMUSCULAR; INTRAVENOUS at 00:11

## 2022-01-01 RX ADMIN — AMIODARONE HYDROCHLORIDE 400 MG: 200 TABLET ORAL at 08:10

## 2022-01-01 RX ADMIN — Medication: at 07:36

## 2022-01-01 RX ADMIN — CLINDAMYCIN IN 5 PERCENT DEXTROSE 900 MG: 18 INJECTION, SOLUTION INTRAVENOUS at 19:53

## 2022-01-01 RX ADMIN — ALLOPURINOL 300 MG: 300 TABLET ORAL at 11:42

## 2022-01-01 RX ADMIN — PSYLLIUM HUSK 1 PACKET: 3.4 POWDER ORAL at 22:16

## 2022-01-01 RX ADMIN — ACYCLOVIR 800 MG: 800 TABLET ORAL at 18:50

## 2022-01-01 RX ADMIN — ONDANSETRON 8 MG: 2 INJECTION INTRAMUSCULAR; INTRAVENOUS at 15:36

## 2022-01-01 RX ADMIN — SULFAMETHOXAZOLE AND TRIMETHOPRIM 320 MG: 80; 16 INJECTION, SOLUTION, CONCENTRATE INTRAVENOUS at 23:21

## 2022-01-01 RX ADMIN — MULTIVITAMIN 15 ML: LIQUID ORAL at 09:14

## 2022-01-01 RX ADMIN — FOLIC ACID 1 MG: 1 TABLET ORAL at 09:14

## 2022-01-01 RX ADMIN — Medication 100 MCG: at 07:59

## 2022-01-01 RX ADMIN — SENNOSIDES AND DOCUSATE SODIUM 1 TABLET: 8.6; 5 TABLET ORAL at 08:55

## 2022-01-01 RX ADMIN — PIPERACILLIN SODIUM AND TAZOBACTAM SODIUM 4.5 G: 4; .5 INJECTION, POWDER, LYOPHILIZED, FOR SOLUTION INTRAVENOUS at 20:50

## 2022-01-01 RX ADMIN — PSYLLIUM HUSK 1 PACKET: 3.4 POWDER ORAL at 22:26

## 2022-01-01 RX ADMIN — Medication 20 NG/KG/MIN: at 19:12

## 2022-01-01 RX ADMIN — PROCHLORPERAZINE EDISYLATE 5 MG: 5 INJECTION INTRAMUSCULAR; INTRAVENOUS at 14:09

## 2022-01-01 RX ADMIN — Medication 50 MCG: at 09:48

## 2022-01-01 RX ADMIN — DIPHENHYDRAMINE HYDROCHLORIDE 50 MG: 25 CAPSULE ORAL at 14:01

## 2022-01-01 RX ADMIN — IBRUTINIB 420 MG: 140 CAPSULE ORAL at 08:08

## 2022-01-01 RX ADMIN — CLINDAMYCIN IN 5 PERCENT DEXTROSE 900 MG: 18 INJECTION, SOLUTION INTRAVENOUS at 20:55

## 2022-01-01 RX ADMIN — ALLOPURINOL 300 MG: 300 TABLET ORAL at 07:56

## 2022-01-01 RX ADMIN — PENTAMIDINE ISETHIONATE 300 MG: 300 INHALANT RESPIRATORY (INHALATION) at 16:26

## 2022-01-01 RX ADMIN — LEVOTHYROXINE SODIUM 50 MCG: 0.05 TABLET ORAL at 05:51

## 2022-01-01 RX ADMIN — ALLOPURINOL 300 MG: 300 TABLET ORAL at 08:00

## 2022-01-01 RX ADMIN — PSYLLIUM HUSK 1 PACKET: 3.4 POWDER ORAL at 08:22

## 2022-01-01 RX ADMIN — METHYLPREDNISOLONE 40 MG: 40 INJECTION, POWDER, LYOPHILIZED, FOR SOLUTION INTRAMUSCULAR; INTRAVENOUS at 09:20

## 2022-01-01 RX ADMIN — SENNOSIDES AND DOCUSATE SODIUM 2 TABLET: 8.6; 5 TABLET ORAL at 07:34

## 2022-01-01 RX ADMIN — VITAM B12 100 MCG: 100 TAB at 08:19

## 2022-01-01 RX ADMIN — PSYLLIUM HUSK 1 PACKET: 3.4 POWDER ORAL at 21:01

## 2022-01-01 RX ADMIN — LEVOTHYROXINE SODIUM 50 MCG: 0.05 TABLET ORAL at 06:38

## 2022-01-01 RX ADMIN — IBRUTINIB 280 MG: 140 CAPSULE ORAL at 12:20

## 2022-01-01 RX ADMIN — HUMAN IMMUNOGLOBULIN G 30 G: 20 LIQUID INTRAVENOUS at 18:56

## 2022-01-01 RX ADMIN — Medication 100 MCG: at 09:03

## 2022-01-01 RX ADMIN — FLUCONAZOLE 400 MG: 200 TABLET ORAL at 08:28

## 2022-01-01 RX ADMIN — Medication 12.5 MG: at 09:17

## 2022-01-01 RX ADMIN — FUROSEMIDE 40 MG: 10 INJECTION, SOLUTION INTRAVENOUS at 13:51

## 2022-01-01 RX ADMIN — FLUCONAZOLE 400 MG: 200 TABLET ORAL at 18:16

## 2022-01-01 RX ADMIN — ATORVASTATIN CALCIUM 20 MG: 20 TABLET, FILM COATED ORAL at 08:42

## 2022-01-01 RX ADMIN — SODIUM CHLORIDE 320 MG: 900 INJECTION, SOLUTION INTRAVENOUS at 11:38

## 2022-01-01 RX ADMIN — CLINDAMYCIN IN 5 PERCENT DEXTROSE 900 MG: 18 INJECTION, SOLUTION INTRAVENOUS at 19:21

## 2022-01-01 RX ADMIN — Medication 50 MCG: at 08:38

## 2022-01-01 RX ADMIN — HEPARIN SODIUM 5000 UNITS: 5000 INJECTION, SOLUTION INTRAVENOUS; SUBCUTANEOUS at 21:19

## 2022-01-01 RX ADMIN — PROPOFOL 40 MCG/KG/MIN: 10 INJECTION, EMULSION INTRAVENOUS at 13:23

## 2022-01-01 RX ADMIN — POLYETHYLENE GLYCOL 3350 17 G: 17 POWDER, FOR SOLUTION ORAL at 21:40

## 2022-01-01 RX ADMIN — IOPAMIDOL 93 ML: 755 INJECTION, SOLUTION INTRAVENOUS at 11:53

## 2022-01-01 RX ADMIN — PIPERACILLIN SODIUM AND TAZOBACTAM SODIUM 4.5 G: 4; .5 INJECTION, POWDER, LYOPHILIZED, FOR SOLUTION INTRAVENOUS at 08:05

## 2022-01-01 RX ADMIN — ATORVASTATIN CALCIUM 20 MG: 20 TABLET, FILM COATED ORAL at 08:35

## 2022-01-01 RX ADMIN — PANTOPRAZOLE SODIUM 20 MG: 20 TABLET, DELAYED RELEASE ORAL at 08:24

## 2022-01-01 RX ADMIN — BISACODYL 10 MG: 10 SUPPOSITORY RECTAL at 11:11

## 2022-01-01 RX ADMIN — ACYCLOVIR 800 MG: 800 TABLET ORAL at 07:58

## 2022-01-01 RX ADMIN — PIPERACILLIN AND TAZOBACTAM 4.5 G: 4; .5 INJECTION, POWDER, FOR SOLUTION INTRAVENOUS at 13:22

## 2022-01-01 RX ADMIN — SULFAMETHOXAZOLE AND TRIMETHOPRIM 2 TABLET: 800; 160 TABLET ORAL at 20:12

## 2022-01-01 RX ADMIN — POLYETHYLENE GLYCOL 3350 17 G: 17 POWDER, FOR SOLUTION ORAL at 21:52

## 2022-01-01 RX ADMIN — Medication 20 NG/KG/MIN: at 04:24

## 2022-01-01 RX ADMIN — ACYCLOVIR 800 MG: 800 TABLET ORAL at 09:18

## 2022-01-01 RX ADMIN — ALLOPURINOL 300 MG: 300 TABLET ORAL at 07:27

## 2022-01-01 RX ADMIN — PIPERACILLIN SODIUM AND TAZOBACTAM SODIUM 4.5 G: 4; .5 INJECTION, POWDER, LYOPHILIZED, FOR SOLUTION INTRAVENOUS at 01:39

## 2022-01-01 RX ADMIN — SENNOSIDES AND DOCUSATE SODIUM 2 TABLET: 8.6; 5 TABLET ORAL at 09:15

## 2022-01-01 RX ADMIN — IBRUTINIB 420 MG: 140 CAPSULE ORAL at 07:57

## 2022-01-01 RX ADMIN — ALLOPURINOL 300 MG: 300 TABLET ORAL at 09:19

## 2022-01-01 RX ADMIN — FOLIC ACID 1 MG: 1 TABLET ORAL at 08:51

## 2022-01-01 RX ADMIN — METHYLPREDNISOLONE SODIUM SUCCINATE 40 MG: 40 INJECTION, POWDER, FOR SOLUTION INTRAMUSCULAR; INTRAVENOUS at 20:04

## 2022-01-01 RX ADMIN — ACYCLOVIR 800 MG: 200 SUSPENSION ORAL at 19:32

## 2022-01-01 RX ADMIN — IOPAMIDOL 55 ML: 755 INJECTION, SOLUTION INTRAVENOUS at 01:10

## 2022-01-01 RX ADMIN — ATORVASTATIN CALCIUM 20 MG: 20 TABLET, FILM COATED ORAL at 08:09

## 2022-01-01 RX ADMIN — POLYETHYLENE GLYCOL 3350 17 G: 17 POWDER, FOR SOLUTION ORAL at 08:52

## 2022-01-01 RX ADMIN — LEVOTHYROXINE SODIUM 50 MCG: 0.05 TABLET ORAL at 04:52

## 2022-01-01 RX ADMIN — HEPARIN SODIUM 5000 UNITS: 5000 INJECTION, SOLUTION INTRAVENOUS; SUBCUTANEOUS at 08:01

## 2022-01-01 RX ADMIN — PRIMAQUINE PHOSPHATE 30 MG: 15 TABLET ORAL at 09:11

## 2022-01-01 RX ADMIN — HEPARIN SODIUM 5000 UNITS: 5000 INJECTION, SOLUTION INTRAVENOUS; SUBCUTANEOUS at 20:40

## 2022-01-01 RX ADMIN — ONDANSETRON 4 MG: 2 INJECTION INTRAMUSCULAR; INTRAVENOUS at 08:31

## 2022-01-01 RX ADMIN — METHYLPREDNISOLONE SODIUM SUCCINATE 40 MG: 40 INJECTION, POWDER, FOR SOLUTION INTRAMUSCULAR; INTRAVENOUS at 20:31

## 2022-01-01 RX ADMIN — METOCLOPRAMIDE 5 MG: 5 TABLET ORAL at 19:40

## 2022-01-01 RX ADMIN — ACETAMINOPHEN 650 MG: 325 TABLET ORAL at 17:10

## 2022-01-01 RX ADMIN — FOLIC ACID 1 MG: 1 TABLET ORAL at 09:05

## 2022-01-01 RX ADMIN — FOLIC ACID 1 MG: 1 TABLET ORAL at 07:56

## 2022-01-01 RX ADMIN — LINEZOLID 600 MG: 600 INJECTION, SOLUTION INTRAVENOUS at 10:58

## 2022-01-01 RX ADMIN — SODIUM CHLORIDE, PRESERVATIVE FREE 73 ML: 5 INJECTION INTRAVENOUS at 18:22

## 2022-01-01 RX ADMIN — PIPERACILLIN AND TAZOBACTAM 4.5 G: 4; .5 INJECTION, POWDER, FOR SOLUTION INTRAVENOUS at 17:53

## 2022-01-01 RX ADMIN — ONDANSETRON 8 MG: 2 INJECTION INTRAMUSCULAR; INTRAVENOUS at 07:28

## 2022-01-01 RX ADMIN — PSYLLIUM HUSK 1 PACKET: 3.4 POWDER ORAL at 08:51

## 2022-01-01 RX ADMIN — SENNOSIDES AND DOCUSATE SODIUM 2 TABLET: 8.6; 5 TABLET ORAL at 19:32

## 2022-01-01 RX ADMIN — FLUCONAZOLE 400 MG: 200 TABLET ORAL at 08:57

## 2022-01-01 RX ADMIN — PSYLLIUM HUSK 1 PACKET: 3.4 POWDER ORAL at 07:29

## 2022-01-01 RX ADMIN — PIPERACILLIN AND TAZOBACTAM 4.5 G: 4; .5 INJECTION, POWDER, FOR SOLUTION INTRAVENOUS at 01:57

## 2022-01-01 RX ADMIN — SULFAMETHOXAZOLE AND TRIMETHOPRIM 320 MG: 80; 16 INJECTION, SOLUTION, CONCENTRATE INTRAVENOUS at 04:52

## 2022-01-01 RX ADMIN — Medication 100 MCG: at 11:36

## 2022-01-01 RX ADMIN — ALLOPURINOL 300 MG: 300 TABLET ORAL at 09:29

## 2022-01-01 RX ADMIN — Medication 100 MCG: at 09:29

## 2022-01-01 RX ADMIN — Medication 20 MG: at 08:02

## 2022-01-01 RX ADMIN — PANTOPRAZOLE SODIUM 20 MG: 20 TABLET, DELAYED RELEASE ORAL at 08:56

## 2022-01-01 RX ADMIN — AMIODARONE HYDROCHLORIDE 200 MG: 200 TABLET ORAL at 20:15

## 2022-01-01 RX ADMIN — METHYLPREDNISOLONE SODIUM SUCCINATE 40 MG: 40 INJECTION, POWDER, FOR SOLUTION INTRAMUSCULAR; INTRAVENOUS at 07:34

## 2022-01-01 RX ADMIN — METOCLOPRAMIDE 5 MG: 5 TABLET ORAL at 05:49

## 2022-01-01 RX ADMIN — DOCUSATE SODIUM 50 MG AND SENNOSIDES 8.6 MG 2 TABLET: 8.6; 5 TABLET, FILM COATED ORAL at 20:37

## 2022-01-01 RX ADMIN — LEVOTHYROXINE SODIUM 50 MCG: 0.05 TABLET ORAL at 05:54

## 2022-01-01 RX ADMIN — PROCHLORPERAZINE EDISYLATE 5 MG: 5 INJECTION INTRAMUSCULAR; INTRAVENOUS at 18:41

## 2022-01-01 RX ADMIN — PROCHLORPERAZINE EDISYLATE 5 MG: 5 INJECTION INTRAMUSCULAR; INTRAVENOUS at 19:58

## 2022-01-01 RX ADMIN — CLINDAMYCIN IN 5 PERCENT DEXTROSE 900 MG: 18 INJECTION, SOLUTION INTRAVENOUS at 03:07

## 2022-01-01 RX ADMIN — PIPERACILLIN SODIUM AND TAZOBACTAM SODIUM 4.5 G: 4; .5 INJECTION, POWDER, LYOPHILIZED, FOR SOLUTION INTRAVENOUS at 14:36

## 2022-01-01 RX ADMIN — SENNOSIDES AND DOCUSATE SODIUM 2 TABLET: 8.6; 5 TABLET ORAL at 20:15

## 2022-01-01 RX ADMIN — PIPERACILLIN SODIUM AND TAZOBACTAM SODIUM 4.5 G: 4; .5 INJECTION, POWDER, LYOPHILIZED, FOR SOLUTION INTRAVENOUS at 02:37

## 2022-01-01 RX ADMIN — DIPHENHYDRAMINE HYDROCHLORIDE 50 MG: 50 CAPSULE ORAL at 18:14

## 2022-01-01 RX ADMIN — DIPHENHYDRAMINE HYDROCHLORIDE 25 MG: 50 INJECTION, SOLUTION INTRAMUSCULAR; INTRAVENOUS at 16:16

## 2022-01-01 RX ADMIN — ACYCLOVIR 800 MG: 800 TABLET ORAL at 20:56

## 2022-01-01 RX ADMIN — ACYCLOVIR 800 MG: 800 TABLET ORAL at 19:46

## 2022-01-01 RX ADMIN — ACYCLOVIR 800 MG: 800 TABLET ORAL at 20:26

## 2022-01-01 RX ADMIN — LEVOFLOXACIN 250 MG: 250 TABLET, FILM COATED ORAL at 08:28

## 2022-01-01 RX ADMIN — POLYETHYLENE GLYCOL 3350 17 G: 17 POWDER, FOR SOLUTION ORAL at 09:06

## 2022-01-01 RX ADMIN — METHYLPREDNISOLONE 40 MG: 40 INJECTION, POWDER, LYOPHILIZED, FOR SOLUTION INTRAMUSCULAR; INTRAVENOUS at 01:20

## 2022-01-01 RX ADMIN — LINEZOLID 600 MG: 600 INJECTION, SOLUTION INTRAVENOUS at 10:52

## 2022-01-01 RX ADMIN — LEVOTHYROXINE SODIUM 50 MCG: 0.05 TABLET ORAL at 05:41

## 2022-01-01 RX ADMIN — ONDANSETRON 8 MG: 2 INJECTION INTRAMUSCULAR; INTRAVENOUS at 08:20

## 2022-01-01 RX ADMIN — SULFAMETHOXAZOLE AND TRIMETHOPRIM 360 MG: 80; 16 INJECTION, SOLUTION, CONCENTRATE INTRAVENOUS at 20:34

## 2022-01-01 RX ADMIN — SULFAMETHOXAZOLE AND TRIMETHOPRIM 320 MG: 80; 16 INJECTION, SOLUTION, CONCENTRATE INTRAVENOUS at 16:01

## 2022-01-01 RX ADMIN — HEPARIN SODIUM 5000 UNITS: 5000 INJECTION, SOLUTION INTRAVENOUS; SUBCUTANEOUS at 19:32

## 2022-01-01 RX ADMIN — FUROSEMIDE 40 MG: 10 INJECTION, SOLUTION INTRAVENOUS at 02:24

## 2022-01-01 RX ADMIN — METOCLOPRAMIDE 5 MG: 5 TABLET ORAL at 05:54

## 2022-01-01 RX ADMIN — LINEZOLID 600 MG: 600 INJECTION, SOLUTION INTRAVENOUS at 23:20

## 2022-01-01 RX ADMIN — ATORVASTATIN CALCIUM 20 MG: 20 TABLET, FILM COATED ORAL at 08:00

## 2022-01-01 RX ADMIN — ACYCLOVIR 400 MG: 200 SUSPENSION ORAL at 07:36

## 2022-01-01 RX ADMIN — MICAFUNGIN 150 MG: 10 INJECTION, POWDER, LYOPHILIZED, FOR SOLUTION INTRAVENOUS at 09:00

## 2022-01-01 RX ADMIN — METHYLPREDNISOLONE SODIUM SUCCINATE 40 MG: 40 INJECTION, POWDER, FOR SOLUTION INTRAMUSCULAR; INTRAVENOUS at 07:26

## 2022-01-01 RX ADMIN — SENNOSIDES AND DOCUSATE SODIUM 2 TABLET: 8.6; 5 TABLET ORAL at 20:04

## 2022-01-01 RX ADMIN — THIAMINE HCL TAB 100 MG 100 MG: 100 TAB at 07:39

## 2022-01-01 RX ADMIN — FUROSEMIDE 20 MG: 10 INJECTION, SOLUTION INTRAVENOUS at 11:04

## 2022-01-01 RX ADMIN — Medication 50 MCG: at 08:56

## 2022-01-01 RX ADMIN — METOPROLOL TARTRATE 5 MG: 5 INJECTION INTRAVENOUS at 11:57

## 2022-01-01 RX ADMIN — CLINDAMYCIN IN 5 PERCENT DEXTROSE 900 MG: 18 INJECTION, SOLUTION INTRAVENOUS at 03:38

## 2022-01-01 RX ADMIN — SULFAMETHOXAZOLE AND TRIMETHOPRIM 320 MG: 80; 16 INJECTION, SOLUTION, CONCENTRATE INTRAVENOUS at 20:43

## 2022-01-01 RX ADMIN — AMIODARONE HYDROCHLORIDE 1 MG/MIN: 50 INJECTION, SOLUTION INTRAVENOUS at 22:47

## 2022-01-01 RX ADMIN — Medication 50 MCG: at 12:25

## 2022-01-01 RX ADMIN — ONDANSETRON 8 MG: 2 INJECTION INTRAMUSCULAR; INTRAVENOUS at 09:00

## 2022-01-01 RX ADMIN — Medication 20 MG: at 08:30

## 2022-01-01 RX ADMIN — FOLIC ACID 1 MG: 1 TABLET ORAL at 08:43

## 2022-01-01 RX ADMIN — CLINDAMYCIN IN 5 PERCENT DEXTROSE 900 MG: 18 INJECTION, SOLUTION INTRAVENOUS at 04:00

## 2022-01-01 RX ADMIN — PANTOPRAZOLE SODIUM 20 MG: 40 INJECTION, POWDER, FOR SOLUTION INTRAVENOUS at 07:57

## 2022-01-01 RX ADMIN — CLINDAMYCIN IN 5 PERCENT DEXTROSE 900 MG: 18 INJECTION, SOLUTION INTRAVENOUS at 03:57

## 2022-01-01 RX ADMIN — Medication 1 PACKET: at 07:40

## 2022-01-01 RX ADMIN — PSYLLIUM HUSK 1 PACKET: 3.4 POWDER ORAL at 18:51

## 2022-01-01 RX ADMIN — METOCLOPRAMIDE 5 MG: 5 TABLET ORAL at 14:46

## 2022-01-01 RX ADMIN — PROCHLORPERAZINE EDISYLATE 5 MG: 5 INJECTION INTRAMUSCULAR; INTRAVENOUS at 11:22

## 2022-01-01 RX ADMIN — PRIMAQUINE PHOSPHATE 30 MG: 15 TABLET ORAL at 09:09

## 2022-01-01 RX ADMIN — SULFAMETHOXAZOLE AND TRIMETHOPRIM 360 MG: 80; 16 INJECTION, SOLUTION, CONCENTRATE INTRAVENOUS at 04:47

## 2022-01-01 RX ADMIN — FOLIC ACID 1 MG: 1 TABLET ORAL at 09:29

## 2022-01-01 RX ADMIN — ZOLPIDEM TARTRATE 5 MG: 5 TABLET ORAL at 23:28

## 2022-01-01 RX ADMIN — METHYLPREDNISOLONE SODIUM SUCCINATE 68.75 MG: 125 INJECTION, POWDER, FOR SOLUTION INTRAMUSCULAR; INTRAVENOUS at 08:29

## 2022-01-01 RX ADMIN — HEPARIN SODIUM 5000 UNITS: 5000 INJECTION, SOLUTION INTRAVENOUS; SUBCUTANEOUS at 20:15

## 2022-01-01 RX ADMIN — Medication 20 NG/KG/MIN: at 09:00

## 2022-01-01 RX ADMIN — Medication 25 MCG/HR: at 13:52

## 2022-01-01 RX ADMIN — METHYLPREDNISOLONE SODIUM SUCCINATE 40 MG: 40 INJECTION, POWDER, FOR SOLUTION INTRAMUSCULAR; INTRAVENOUS at 09:18

## 2022-01-01 RX ADMIN — Medication 1 PACKET: at 19:33

## 2022-01-01 RX ADMIN — VECURONIUM BROMIDE 0.8 MCG/KG/MIN: 1 INJECTION, POWDER, LYOPHILIZED, FOR SOLUTION INTRAVENOUS at 16:20

## 2022-01-01 RX ADMIN — HUMAN INSULIN 7 UNITS/HR: 100 INJECTION, SOLUTION SUBCUTANEOUS at 14:27

## 2022-01-01 RX ADMIN — LEVOTHYROXINE SODIUM 50 MCG: 0.05 TABLET ORAL at 05:58

## 2022-01-01 RX ADMIN — ZOLPIDEM TARTRATE 5 MG: 5 TABLET ORAL at 00:19

## 2022-01-01 RX ADMIN — Medication 1 DROP: at 11:11

## 2022-01-01 RX ADMIN — ACYCLOVIR 800 MG: 800 TABLET ORAL at 07:31

## 2022-01-01 RX ADMIN — CLINDAMYCIN IN 5 PERCENT DEXTROSE 900 MG: 18 INJECTION, SOLUTION INTRAVENOUS at 04:03

## 2022-01-01 RX ADMIN — HUMAN INSULIN 4 UNITS/HR: 100 INJECTION, SOLUTION SUBCUTANEOUS at 01:21

## 2022-01-01 RX ADMIN — Medication 100 MCG: at 08:35

## 2022-01-01 RX ADMIN — AMPHOTERICIN B 300 MG: 50 INJECTION, POWDER, LYOPHILIZED, FOR SOLUTION INTRAVENOUS at 17:39

## 2022-01-01 RX ADMIN — PANTOPRAZOLE SODIUM 20 MG: 20 TABLET, DELAYED RELEASE ORAL at 07:59

## 2022-01-01 RX ADMIN — ALLOPURINOL 300 MG: 300 TABLET ORAL at 07:59

## 2022-01-01 RX ADMIN — SULFAMETHOXAZOLE AND TRIMETHOPRIM 3 TABLET: 400; 80 TABLET ORAL at 12:08

## 2022-01-01 RX ADMIN — PROCHLORPERAZINE EDISYLATE 5 MG: 5 INJECTION INTRAMUSCULAR; INTRAVENOUS at 01:29

## 2022-01-01 RX ADMIN — METHYLPREDNISOLONE SODIUM SUCCINATE 40 MG: 40 INJECTION, POWDER, FOR SOLUTION INTRAMUSCULAR; INTRAVENOUS at 08:16

## 2022-01-01 RX ADMIN — MICAFUNGIN 100 MG: 10 INJECTION, POWDER, LYOPHILIZED, FOR SOLUTION INTRAVENOUS at 16:44

## 2022-01-01 RX ADMIN — HEPARIN SODIUM 5000 UNITS: 5000 INJECTION, SOLUTION INTRAVENOUS; SUBCUTANEOUS at 19:53

## 2022-01-01 RX ADMIN — SODIUM CHLORIDE, POTASSIUM CHLORIDE, SODIUM LACTATE AND CALCIUM CHLORIDE 500 ML: 600; 310; 30; 20 INJECTION, SOLUTION INTRAVENOUS at 04:53

## 2022-01-01 RX ADMIN — PIPERACILLIN AND TAZOBACTAM 4.5 G: 4; .5 INJECTION, POWDER, FOR SOLUTION INTRAVENOUS at 18:12

## 2022-01-01 RX ADMIN — SULFAMETHOXAZOLE AND TRIMETHOPRIM 320 MG: 80; 16 INJECTION, SOLUTION, CONCENTRATE INTRAVENOUS at 03:05

## 2022-01-01 RX ADMIN — ALLOPURINOL 300 MG: 300 TABLET ORAL at 08:56

## 2022-01-01 RX ADMIN — Medication 20 NG/KG/MIN: at 20:21

## 2022-01-01 RX ADMIN — LEVOTHYROXINE SODIUM 50 MCG: 0.05 TABLET ORAL at 05:55

## 2022-01-01 RX ADMIN — IBRUTINIB 420 MG: 140 CAPSULE ORAL at 10:18

## 2022-01-01 RX ADMIN — LEVOTHYROXINE SODIUM 50 MCG: 0.05 TABLET ORAL at 05:18

## 2022-01-01 RX ADMIN — SENNOSIDES AND DOCUSATE SODIUM 1 TABLET: 8.6; 5 TABLET ORAL at 20:56

## 2022-01-01 RX ADMIN — ATORVASTATIN CALCIUM 20 MG: 20 TABLET, FILM COATED ORAL at 07:26

## 2022-01-01 RX ADMIN — SULFAMETHOXAZOLE AND TRIMETHOPRIM 320 MG: 80; 16 INJECTION, SOLUTION, CONCENTRATE INTRAVENOUS at 10:29

## 2022-01-01 RX ADMIN — ATORVASTATIN CALCIUM 20 MG: 10 TABLET, FILM COATED ORAL at 07:34

## 2022-01-01 RX ADMIN — Medication 100 MCG: at 08:09

## 2022-01-01 RX ADMIN — PANTOPRAZOLE SODIUM 20 MG: 20 TABLET, DELAYED RELEASE ORAL at 09:02

## 2022-01-01 RX ADMIN — VORICONAZOLE 250 MG: 40 POWDER, FOR SUSPENSION ORAL at 09:05

## 2022-01-01 RX ADMIN — LINEZOLID 600 MG: 600 INJECTION, SOLUTION INTRAVENOUS at 22:34

## 2022-01-01 RX ADMIN — THIAMINE HCL TAB 100 MG 100 MG: 100 TAB at 08:09

## 2022-01-01 RX ADMIN — Medication 10 MG: at 19:21

## 2022-01-01 RX ADMIN — Medication 50 MCG: at 08:21

## 2022-01-01 RX ADMIN — Medication 100 MCG: at 14:23

## 2022-01-01 RX ADMIN — SULFAMETHOXAZOLE AND TRIMETHOPRIM 320 MG: 80; 16 INJECTION, SOLUTION, CONCENTRATE INTRAVENOUS at 14:27

## 2022-01-01 RX ADMIN — SULFAMETHOXAZOLE AND TRIMETHOPRIM 320 MG: 80; 16 INJECTION, SOLUTION, CONCENTRATE INTRAVENOUS at 22:19

## 2022-01-01 RX ADMIN — ACYCLOVIR 800 MG: 800 TABLET ORAL at 08:09

## 2022-01-01 RX ADMIN — FOLIC ACID 1 MG: 1 TABLET ORAL at 07:30

## 2022-01-01 RX ADMIN — ATORVASTATIN CALCIUM 20 MG: 20 TABLET, FILM COATED ORAL at 09:18

## 2022-01-01 RX ADMIN — Medication 150 MCG/HR: at 22:33

## 2022-01-01 RX ADMIN — PRIMAQUINE PHOSPHATE 30 MG: 15 TABLET ORAL at 10:43

## 2022-01-01 RX ADMIN — METHYLPREDNISOLONE SODIUM SUCCINATE 40 MG: 40 INJECTION, POWDER, FOR SOLUTION INTRAMUSCULAR; INTRAVENOUS at 09:54

## 2022-01-01 RX ADMIN — PIPERACILLIN SODIUM AND TAZOBACTAM SODIUM 4.5 G: 4; .5 INJECTION, POWDER, LYOPHILIZED, FOR SOLUTION INTRAVENOUS at 13:42

## 2022-01-01 RX ADMIN — CLINDAMYCIN IN 5 PERCENT DEXTROSE 900 MG: 18 INJECTION, SOLUTION INTRAVENOUS at 21:04

## 2022-01-01 RX ADMIN — SODIUM CHLORIDE 500 ML: 9 INJECTION, SOLUTION INTRAVENOUS at 16:15

## 2022-01-01 RX ADMIN — ALLOPURINOL 300 MG: 300 TABLET ORAL at 09:13

## 2022-01-01 RX ADMIN — Medication 50 MCG: at 08:27

## 2022-01-01 RX ADMIN — ZOLPIDEM TARTRATE 5 MG: 5 TABLET ORAL at 23:53

## 2022-01-01 RX ADMIN — METOCLOPRAMIDE 5 MG: 5 TABLET ORAL at 09:05

## 2022-01-01 RX ADMIN — PIPERACILLIN AND TAZOBACTAM 4.5 G: 4; .5 INJECTION, POWDER, FOR SOLUTION INTRAVENOUS at 12:09

## 2022-01-01 RX ADMIN — Medication 100 MCG: at 08:00

## 2022-01-01 RX ADMIN — HEPARIN SODIUM 5000 UNITS: 5000 INJECTION, SOLUTION INTRAVENOUS; SUBCUTANEOUS at 07:35

## 2022-01-01 RX ADMIN — PSYLLIUM HUSK 1 PACKET: 3.4 POWDER ORAL at 22:06

## 2022-01-01 RX ADMIN — LEVOTHYROXINE SODIUM 50 MCG: 0.05 TABLET ORAL at 06:39

## 2022-01-01 RX ADMIN — CLINDAMYCIN IN 5 PERCENT DEXTROSE 900 MG: 18 INJECTION, SOLUTION INTRAVENOUS at 12:33

## 2022-01-01 RX ADMIN — ONDANSETRON 8 MG: 2 INJECTION INTRAMUSCULAR; INTRAVENOUS at 16:36

## 2022-01-01 RX ADMIN — CEFTRIAXONE SODIUM 1 G: 1 INJECTION, POWDER, FOR SOLUTION INTRAMUSCULAR; INTRAVENOUS at 00:43

## 2022-01-01 RX ADMIN — ACYCLOVIR 800 MG: 800 TABLET ORAL at 08:35

## 2022-01-01 RX ADMIN — SULFAMETHOXAZOLE AND TRIMETHOPRIM 3 TABLET: 400; 80 TABLET ORAL at 11:47

## 2022-01-01 RX ADMIN — CLINDAMYCIN IN 5 PERCENT DEXTROSE 900 MG: 18 INJECTION, SOLUTION INTRAVENOUS at 21:13

## 2022-01-01 RX ADMIN — ZOLPIDEM TARTRATE 5 MG: 5 TABLET ORAL at 00:05

## 2022-01-01 RX ADMIN — INSULIN GLARGINE 20 UNITS: 100 INJECTION, SOLUTION SUBCUTANEOUS at 10:18

## 2022-01-01 RX ADMIN — Medication 100 MCG: at 07:31

## 2022-01-01 RX ADMIN — CEFTRIAXONE SODIUM 1 G: 1 INJECTION, POWDER, FOR SOLUTION INTRAMUSCULAR; INTRAVENOUS at 23:42

## 2022-01-01 RX ADMIN — ACYCLOVIR 400 MG: 200 SUSPENSION ORAL at 07:39

## 2022-01-01 RX ADMIN — SULFAMETHOXAZOLE AND TRIMETHOPRIM 2 TABLET: 800; 160 TABLET ORAL at 08:28

## 2022-01-01 RX ADMIN — MICAFUNGIN 100 MG: 10 INJECTION, POWDER, LYOPHILIZED, FOR SOLUTION INTRAVENOUS at 15:18

## 2022-01-01 RX ADMIN — ACYCLOVIR 800 MG: 800 TABLET ORAL at 19:52

## 2022-01-01 RX ADMIN — Medication 10 MG: at 19:43

## 2022-01-01 RX ADMIN — ACETAMINOPHEN 650 MG: 325 TABLET ORAL at 14:02

## 2022-01-01 RX ADMIN — CLINDAMYCIN IN 5 PERCENT DEXTROSE 900 MG: 18 INJECTION, SOLUTION INTRAVENOUS at 11:56

## 2022-01-01 RX ADMIN — PSYLLIUM HUSK 1 PACKET: 3.4 POWDER ORAL at 21:48

## 2022-01-01 RX ADMIN — ACYCLOVIR 800 MG: 800 TABLET ORAL at 19:40

## 2022-01-01 RX ADMIN — PSYLLIUM HUSK 1 PACKET: 3.4 POWDER ORAL at 19:52

## 2022-01-01 RX ADMIN — SULFAMETHOXAZOLE AND TRIMETHOPRIM 2 TABLET: 800; 160 TABLET ORAL at 20:40

## 2022-01-01 RX ADMIN — Medication 100 MCG: at 08:38

## 2022-01-01 RX ADMIN — ACYCLOVIR SODIUM 800 MG: 1000 INJECTION, SOLUTION INTRAVENOUS at 20:40

## 2022-01-01 RX ADMIN — CLINDAMYCIN IN 5 PERCENT DEXTROSE 900 MG: 18 INJECTION, SOLUTION INTRAVENOUS at 03:50

## 2022-01-01 RX ADMIN — METHYLPREDNISOLONE SODIUM SUCCINATE 40 MG: 40 INJECTION, POWDER, FOR SOLUTION INTRAMUSCULAR; INTRAVENOUS at 09:03

## 2022-01-01 RX ADMIN — SULFAMETHOXAZOLE AND TRIMETHOPRIM 2 TABLET: 800; 160 TABLET ORAL at 20:09

## 2022-01-01 RX ADMIN — SULFAMETHOXAZOLE AND TRIMETHOPRIM 2 TABLET: 800; 160 TABLET ORAL at 07:52

## 2022-01-01 RX ADMIN — PSYLLIUM HUSK 1 PACKET: 3.4 POWDER ORAL at 22:33

## 2022-01-01 RX ADMIN — THIAMINE HCL TAB 100 MG 100 MG: 100 TAB at 08:20

## 2022-01-01 RX ADMIN — AMIODARONE HYDROCHLORIDE 400 MG: 200 TABLET ORAL at 12:58

## 2022-01-01 RX ADMIN — LEVOTHYROXINE SODIUM 50 MCG: 0.05 TABLET ORAL at 05:49

## 2022-01-01 RX ADMIN — HEPARIN SODIUM 5000 UNITS: 5000 INJECTION, SOLUTION INTRAVENOUS; SUBCUTANEOUS at 05:54

## 2022-01-01 RX ADMIN — Medication 150 MCG/HR: at 14:08

## 2022-01-01 RX ADMIN — MICAFUNGIN 150 MG: 10 INJECTION, POWDER, LYOPHILIZED, FOR SOLUTION INTRAVENOUS at 08:55

## 2022-01-01 RX ADMIN — SULFAMETHOXAZOLE AND TRIMETHOPRIM 3 TABLET: 400; 80 TABLET ORAL at 12:20

## 2022-01-01 RX ADMIN — LEVOFLOXACIN 250 MG: 250 TABLET, FILM COATED ORAL at 07:31

## 2022-01-01 RX ADMIN — CLINDAMYCIN IN 5 PERCENT DEXTROSE 900 MG: 18 INJECTION, SOLUTION INTRAVENOUS at 20:40

## 2022-01-01 RX ADMIN — MICAFUNGIN 150 MG: 10 INJECTION, POWDER, LYOPHILIZED, FOR SOLUTION INTRAVENOUS at 12:04

## 2022-01-01 RX ADMIN — SENNOSIDES AND DOCUSATE SODIUM 1 TABLET: 8.6; 5 TABLET ORAL at 07:30

## 2022-01-01 RX ADMIN — SULFAMETHOXAZOLE AND TRIMETHOPRIM 2 TABLET: 800; 160 TABLET ORAL at 07:56

## 2022-01-01 RX ADMIN — ACYCLOVIR 800 MG: 800 TABLET ORAL at 10:05

## 2022-01-01 RX ADMIN — MICAFUNGIN 100 MG: 10 INJECTION, POWDER, LYOPHILIZED, FOR SOLUTION INTRAVENOUS at 15:38

## 2022-01-01 RX ADMIN — FOLIC ACID 1 MG: 1 TABLET ORAL at 07:34

## 2022-01-01 RX ADMIN — PANTOPRAZOLE SODIUM 20 MG: 20 TABLET, DELAYED RELEASE ORAL at 07:57

## 2022-01-01 RX ADMIN — SULFAMETHOXAZOLE AND TRIMETHOPRIM 360 MG: 80; 16 INJECTION, SOLUTION, CONCENTRATE INTRAVENOUS at 12:11

## 2022-01-01 RX ADMIN — FOLIC ACID 1 MG: 1 TABLET ORAL at 08:56

## 2022-01-01 RX ADMIN — DEXTROSE MONOHYDRATE 1000 ML: 100 INJECTION, SOLUTION INTRAVENOUS at 22:30

## 2022-01-01 RX ADMIN — ONDANSETRON 4 MG: 2 INJECTION INTRAMUSCULAR; INTRAVENOUS at 17:52

## 2022-01-01 RX ADMIN — POLYETHYLENE GLYCOL 3350 17 G: 17 POWDER, FOR SOLUTION ORAL at 22:18

## 2022-01-01 RX ADMIN — MULTIVITAMIN 15 ML: LIQUID ORAL at 08:21

## 2022-01-01 RX ADMIN — ALLOPURINOL 300 MG: 300 TABLET ORAL at 08:46

## 2022-01-01 RX ADMIN — MICAFUNGIN 150 MG: 10 INJECTION, POWDER, LYOPHILIZED, FOR SOLUTION INTRAVENOUS at 08:29

## 2022-01-01 RX ADMIN — PIPERACILLIN AND TAZOBACTAM 4.5 G: 4; .5 INJECTION, POWDER, FOR SOLUTION INTRAVENOUS at 00:13

## 2022-01-01 RX ADMIN — Medication 1 DROP: at 11:20

## 2022-01-01 RX ADMIN — POLYETHYLENE GLYCOL 3350 17 G: 17 POWDER, FOR SOLUTION ORAL at 22:00

## 2022-01-01 RX ADMIN — Medication 12.5 MG: at 19:55

## 2022-01-01 RX ADMIN — PSYLLIUM HUSK 1 PACKET: 3.4 POWDER ORAL at 21:40

## 2022-01-01 RX ADMIN — LEVOTHYROXINE SODIUM 50 MCG: 0.05 TABLET ORAL at 05:35

## 2022-01-01 RX ADMIN — PIPERACILLIN SODIUM AND TAZOBACTAM SODIUM 4.5 G: 4; .5 INJECTION, POWDER, LYOPHILIZED, FOR SOLUTION INTRAVENOUS at 20:48

## 2022-01-01 RX ADMIN — DEXTROSE MONOHYDRATE 20 ML: 50 INJECTION, SOLUTION INTRAVENOUS at 17:39

## 2022-01-01 RX ADMIN — ONDANSETRON 8 MG: 2 INJECTION INTRAMUSCULAR; INTRAVENOUS at 08:00

## 2022-01-01 RX ADMIN — VITAM B12 100 MCG: 100 TAB at 08:21

## 2022-01-01 RX ADMIN — Medication 10 MG: at 21:23

## 2022-01-01 RX ADMIN — PANTOPRAZOLE SODIUM 20 MG: 20 TABLET, DELAYED RELEASE ORAL at 09:30

## 2022-01-01 RX ADMIN — MICAFUNGIN 150 MG: 10 INJECTION, POWDER, LYOPHILIZED, FOR SOLUTION INTRAVENOUS at 11:58

## 2022-01-01 RX ADMIN — METHYLPREDNISOLONE SODIUM SUCCINATE 68.75 MG: 125 INJECTION, POWDER, FOR SOLUTION INTRAMUSCULAR; INTRAVENOUS at 08:18

## 2022-01-01 RX ADMIN — ATORVASTATIN CALCIUM 20 MG: 20 TABLET, FILM COATED ORAL at 08:28

## 2022-01-01 RX ADMIN — IBRUTINIB 420 MG: 140 CAPSULE ORAL at 08:38

## 2022-01-01 RX ADMIN — METOPROLOL TARTRATE 2.5 MG: 5 INJECTION INTRAVENOUS at 07:58

## 2022-01-01 RX ADMIN — FOLIC ACID 1 MG: 1 TABLET ORAL at 07:46

## 2022-01-01 RX ADMIN — ONDANSETRON 8 MG: 2 INJECTION INTRAMUSCULAR; INTRAVENOUS at 22:14

## 2022-01-01 RX ADMIN — PIPERACILLIN SODIUM AND TAZOBACTAM SODIUM 4.5 G: 4; .5 INJECTION, POWDER, LYOPHILIZED, FOR SOLUTION INTRAVENOUS at 08:43

## 2022-01-01 RX ADMIN — INSULIN ASPART 2 UNITS: 100 INJECTION, SOLUTION INTRAVENOUS; SUBCUTANEOUS at 03:53

## 2022-01-01 RX ADMIN — Medication 100 MCG: at 08:24

## 2022-01-01 RX ADMIN — AMIODARONE HYDROCHLORIDE 200 MG: 200 TABLET ORAL at 05:56

## 2022-01-01 RX ADMIN — ACYCLOVIR 800 MG: 800 TABLET ORAL at 19:48

## 2022-01-01 RX ADMIN — PSYLLIUM HUSK 1 PACKET: 3.4 POWDER ORAL at 08:53

## 2022-01-01 RX ADMIN — Medication 20 MG: at 09:14

## 2022-01-01 RX ADMIN — PIPERACILLIN AND TAZOBACTAM 4.5 G: 4; .5 INJECTION, POWDER, FOR SOLUTION INTRAVENOUS at 18:17

## 2022-01-01 RX ADMIN — Medication 0.03 MCG/KG/MIN: at 19:52

## 2022-01-01 RX ADMIN — CLINDAMYCIN IN 5 PERCENT DEXTROSE 900 MG: 18 INJECTION, SOLUTION INTRAVENOUS at 11:03

## 2022-01-01 RX ADMIN — BISACODYL 10 MG: 10 SUPPOSITORY RECTAL at 09:14

## 2022-01-01 RX ADMIN — Medication 20 NG/KG/MIN: at 07:40

## 2022-01-01 RX ADMIN — HEPARIN SODIUM 5000 UNITS: 5000 INJECTION, SOLUTION INTRAVENOUS; SUBCUTANEOUS at 08:16

## 2022-01-01 RX ADMIN — IBRUTINIB 420 MG: 140 CAPSULE ORAL at 08:41

## 2022-01-01 RX ADMIN — LEVOTHYROXINE SODIUM 50 MCG: 0.05 TABLET ORAL at 07:47

## 2022-01-01 RX ADMIN — MICAFUNGIN 150 MG: 10 INJECTION, POWDER, LYOPHILIZED, FOR SOLUTION INTRAVENOUS at 10:18

## 2022-01-01 RX ADMIN — ACYCLOVIR 800 MG: 200 SUSPENSION ORAL at 20:04

## 2022-01-01 RX ADMIN — ONDANSETRON 8 MG: 2 INJECTION INTRAMUSCULAR; INTRAVENOUS at 14:45

## 2022-01-01 RX ADMIN — IOPAMIDOL 68 ML: 755 INJECTION, SOLUTION INTRAVENOUS at 18:20

## 2022-01-01 RX ADMIN — LEVOTHYROXINE SODIUM 50 MCG: 0.05 TABLET ORAL at 06:09

## 2022-01-01 RX ADMIN — VECURONIUM BROMIDE 10 MG: 1 INJECTION, POWDER, LYOPHILIZED, FOR SOLUTION INTRAVENOUS at 14:54

## 2022-01-01 RX ADMIN — LIDOCAINE HYDROCHLORIDE 5 ML: 20 SOLUTION ORAL; TOPICAL at 10:33

## 2022-01-01 RX ADMIN — PHENYLEPHRINE HYDROCHLORIDE 2 MCG/KG/MIN: 10 INJECTION INTRAVENOUS at 13:47

## 2022-01-01 RX ADMIN — AMPHOTERICIN B 300 MG: 50 INJECTION, POWDER, LYOPHILIZED, FOR SOLUTION INTRAVENOUS at 16:53

## 2022-01-01 RX ADMIN — HEPARIN SODIUM 5000 UNITS: 5000 INJECTION, SOLUTION INTRAVENOUS; SUBCUTANEOUS at 09:06

## 2022-01-01 RX ADMIN — PREDNISONE 20 MG: 20 TABLET ORAL at 10:05

## 2022-01-01 RX ADMIN — AMIODARONE HYDROCHLORIDE 400 MG: 200 TABLET ORAL at 08:51

## 2022-01-01 RX ADMIN — SULFAMETHOXAZOLE AND TRIMETHOPRIM 320 MG: 80; 16 INJECTION, SOLUTION, CONCENTRATE INTRAVENOUS at 11:22

## 2022-01-01 RX ADMIN — Medication 20 NG/KG/MIN: at 15:39

## 2022-01-01 RX ADMIN — IBRUTINIB 280 MG: 140 CAPSULE ORAL at 10:41

## 2022-01-01 RX ADMIN — CLINDAMYCIN IN 5 PERCENT DEXTROSE 900 MG: 18 INJECTION, SOLUTION INTRAVENOUS at 11:36

## 2022-01-01 RX ADMIN — LINEZOLID 600 MG: 600 INJECTION, SOLUTION INTRAVENOUS at 00:26

## 2022-01-01 RX ADMIN — HEPARIN SODIUM 5000 UNITS: 5000 INJECTION, SOLUTION INTRAVENOUS; SUBCUTANEOUS at 20:38

## 2022-01-01 RX ADMIN — FLUCONAZOLE 400 MG: 200 TABLET ORAL at 09:27

## 2022-01-01 RX ADMIN — SENNOSIDES AND DOCUSATE SODIUM 1 TABLET: 8.6; 5 TABLET ORAL at 07:59

## 2022-01-01 RX ADMIN — METHYLPREDNISOLONE SODIUM SUCCINATE 40 MG: 40 INJECTION, POWDER, FOR SOLUTION INTRAMUSCULAR; INTRAVENOUS at 07:54

## 2022-01-01 RX ADMIN — PROCHLORPERAZINE EDISYLATE 5 MG: 5 INJECTION INTRAMUSCULAR; INTRAVENOUS at 09:32

## 2022-01-01 RX ADMIN — METHYLPREDNISOLONE SODIUM SUCCINATE 40 MG: 40 INJECTION, POWDER, FOR SOLUTION INTRAMUSCULAR; INTRAVENOUS at 07:51

## 2022-01-01 RX ADMIN — ATORVASTATIN CALCIUM 20 MG: 20 TABLET, FILM COATED ORAL at 07:31

## 2022-01-01 RX ADMIN — PIPERACILLIN SODIUM AND TAZOBACTAM SODIUM 4.5 G: 4; .5 INJECTION, POWDER, LYOPHILIZED, FOR SOLUTION INTRAVENOUS at 07:46

## 2022-01-01 RX ADMIN — SODIUM CHLORIDE 320 MG: 900 INJECTION, SOLUTION INTRAVENOUS at 17:43

## 2022-01-01 RX ADMIN — HUMAN IMMUNOGLOBULIN G 30 G: 20 LIQUID INTRAVENOUS at 14:51

## 2022-01-01 RX ADMIN — PSYLLIUM HUSK 1 PACKET: 3.4 POWDER ORAL at 07:39

## 2022-01-01 RX ADMIN — MIDAZOLAM HYDROCHLORIDE 2 MG/HR: 1 INJECTION, SOLUTION INTRAVENOUS at 01:19

## 2022-01-01 RX ADMIN — ALLOPURINOL 300 MG: 300 TABLET ORAL at 07:30

## 2022-01-01 RX ADMIN — PIPERACILLIN SODIUM AND TAZOBACTAM SODIUM 4.5 G: 4; .5 INJECTION, POWDER, LYOPHILIZED, FOR SOLUTION INTRAVENOUS at 13:58

## 2022-01-01 RX ADMIN — SODIUM CHLORIDE, POTASSIUM CHLORIDE, SODIUM LACTATE AND CALCIUM CHLORIDE 500 ML: 600; 310; 30; 20 INJECTION, SOLUTION INTRAVENOUS at 10:18

## 2022-01-01 RX ADMIN — FUROSEMIDE 40 MG: 10 INJECTION, SOLUTION INTRAMUSCULAR; INTRAVENOUS at 12:17

## 2022-01-01 RX ADMIN — ATORVASTATIN CALCIUM 20 MG: 20 TABLET, FILM COATED ORAL at 08:46

## 2022-01-01 RX ADMIN — ACYCLOVIR 800 MG: 800 TABLET ORAL at 20:35

## 2022-01-01 RX ADMIN — ATORVASTATIN CALCIUM 20 MG: 20 TABLET, FILM COATED ORAL at 09:29

## 2022-01-01 RX ADMIN — FOLIC ACID 1 MG: 1 TABLET ORAL at 08:46

## 2022-01-01 RX ADMIN — SULFAMETHOXAZOLE AND TRIMETHOPRIM 320 MG: 80; 16 INJECTION, SOLUTION, CONCENTRATE INTRAVENOUS at 16:15

## 2022-01-01 RX ADMIN — PIPERACILLIN AND TAZOBACTAM 4.5 G: 4; .5 INJECTION, POWDER, FOR SOLUTION INTRAVENOUS at 18:32

## 2022-01-01 RX ADMIN — PIPERACILLIN AND TAZOBACTAM 4.5 G: 4; .5 INJECTION, POWDER, FOR SOLUTION INTRAVENOUS at 07:25

## 2022-01-01 RX ADMIN — SODIUM CHLORIDE 320 MG: 900 INJECTION, SOLUTION INTRAVENOUS at 23:56

## 2022-01-01 RX ADMIN — SENNOSIDES AND DOCUSATE SODIUM 1 TABLET: 8.6; 5 TABLET ORAL at 20:47

## 2022-01-01 RX ADMIN — HUMAN INSULIN 7 UNITS/HR: 100 INJECTION, SOLUTION SUBCUTANEOUS at 06:44

## 2022-01-01 RX ADMIN — ATORVASTATIN CALCIUM 20 MG: 10 TABLET, FILM COATED ORAL at 08:21

## 2022-01-01 RX ADMIN — METOPROLOL TARTRATE 5 MG: 5 INJECTION INTRAVENOUS at 14:26

## 2022-01-01 RX ADMIN — Medication: at 16:56

## 2022-01-01 RX ADMIN — ACYCLOVIR 800 MG: 800 TABLET ORAL at 20:47

## 2022-01-01 RX ADMIN — PSYLLIUM HUSK 1 PACKET: 3.4 POWDER ORAL at 08:57

## 2022-01-01 RX ADMIN — HEPARIN SODIUM 5000 UNITS: 5000 INJECTION, SOLUTION INTRAVENOUS; SUBCUTANEOUS at 20:43

## 2022-01-01 RX ADMIN — ALLOPURINOL 300 MG: 300 TABLET ORAL at 08:10

## 2022-01-01 RX ADMIN — PSYLLIUM HUSK 1 PACKET: 3.4 POWDER ORAL at 20:36

## 2022-01-01 RX ADMIN — Medication 20 NG/KG/MIN: at 14:15

## 2022-01-01 RX ADMIN — ACETAMINOPHEN 325MG 650 MG: 325 TABLET ORAL at 18:15

## 2022-01-01 RX ADMIN — FOLIC ACID 1 MG: 1 TABLET ORAL at 07:39

## 2022-01-01 RX ADMIN — POLYETHYLENE GLYCOL 3350 17 G: 17 POWDER, FOR SOLUTION ORAL at 22:26

## 2022-01-01 RX ADMIN — ADENOSINE 6 MG: 3 INJECTION, SOLUTION INTRAVENOUS at 13:25

## 2022-01-01 RX ADMIN — LEVOFLOXACIN 250 MG: 250 TABLET, FILM COATED ORAL at 09:28

## 2022-01-01 RX ADMIN — MICAFUNGIN 100 MG: 10 INJECTION, POWDER, LYOPHILIZED, FOR SOLUTION INTRAVENOUS at 16:25

## 2022-01-01 RX ADMIN — MICAFUNGIN 150 MG: 10 INJECTION, POWDER, LYOPHILIZED, FOR SOLUTION INTRAVENOUS at 09:20

## 2022-01-01 RX ADMIN — ALLOPURINOL 300 MG: 300 TABLET ORAL at 08:51

## 2022-01-01 RX ADMIN — ACETAMINOPHEN 325MG 650 MG: 325 TABLET ORAL at 08:37

## 2022-01-01 RX ADMIN — POLYETHYLENE GLYCOL 3350 17 G: 17 POWDER, FOR SOLUTION ORAL at 21:47

## 2022-01-01 RX ADMIN — CEFEPIME 2 G: 2 INJECTION, POWDER, FOR SOLUTION INTRAVENOUS at 22:10

## 2022-01-01 RX ADMIN — MICAFUNGIN 150 MG: 10 INJECTION, POWDER, LYOPHILIZED, FOR SOLUTION INTRAVENOUS at 08:17

## 2022-01-01 RX ADMIN — ACYCLOVIR 800 MG: 800 TABLET ORAL at 08:46

## 2022-01-01 RX ADMIN — CEFEPIME 2 G: 2 INJECTION, POWDER, FOR SOLUTION INTRAVENOUS at 08:01

## 2022-01-01 RX ADMIN — THIAMINE HCL TAB 100 MG 100 MG: 100 TAB at 09:15

## 2022-01-01 RX ADMIN — METHYLPREDNISOLONE 40 MG: 40 INJECTION, POWDER, LYOPHILIZED, FOR SOLUTION INTRAMUSCULAR; INTRAVENOUS at 16:51

## 2022-01-01 RX ADMIN — PIPERACILLIN AND TAZOBACTAM 4.5 G: 4; .5 INJECTION, POWDER, FOR SOLUTION INTRAVENOUS at 00:32

## 2022-01-01 RX ADMIN — DOCUSATE SODIUM 50 MG AND SENNOSIDES 8.6 MG 2 TABLET: 8.6; 5 TABLET, FILM COATED ORAL at 08:35

## 2022-01-01 RX ADMIN — Medication 100 MCG: at 08:28

## 2022-01-01 RX ADMIN — ACYCLOVIR 800 MG: 200 SUSPENSION ORAL at 08:01

## 2022-01-01 RX ADMIN — Medication: at 23:27

## 2022-01-01 RX ADMIN — Medication 12.5 MG: at 05:19

## 2022-01-01 RX ADMIN — METOPROLOL TARTRATE 2.5 MG: 5 INJECTION INTRAVENOUS at 07:34

## 2022-01-01 RX ADMIN — ALLOPURINOL 300 MG: 300 TABLET ORAL at 09:02

## 2022-01-01 RX ADMIN — SULFAMETHOXAZOLE AND TRIMETHOPRIM 360 MG: 80; 16 INJECTION, SOLUTION, CONCENTRATE INTRAVENOUS at 12:45

## 2022-01-01 RX ADMIN — ACETAMINOPHEN 325MG 650 MG: 325 TABLET ORAL at 23:49

## 2022-01-01 RX ADMIN — Medication 20 MG: at 07:28

## 2022-01-01 RX ADMIN — Medication 1 PACKET: at 20:08

## 2022-01-01 RX ADMIN — IBRUTINIB 420 MG: 140 CAPSULE ORAL at 08:27

## 2022-01-01 RX ADMIN — SODIUM CHLORIDE, POTASSIUM CHLORIDE, SODIUM LACTATE AND CALCIUM CHLORIDE 1000 ML: 600; 310; 30; 20 INJECTION, SOLUTION INTRAVENOUS at 16:08

## 2022-01-01 RX ADMIN — CEFEPIME HYDROCHLORIDE 2 G: 2 INJECTION, POWDER, FOR SOLUTION INTRAVENOUS at 08:43

## 2022-01-01 RX ADMIN — SULFAMETHOXAZOLE AND TRIMETHOPRIM 320 MG: 80; 16 INJECTION, SOLUTION, CONCENTRATE INTRAVENOUS at 02:27

## 2022-01-01 RX ADMIN — ACYCLOVIR 800 MG: 800 TABLET ORAL at 20:20

## 2022-01-01 RX ADMIN — SODIUM CHLORIDE 500 ML: 9 INJECTION, SOLUTION INTRAVENOUS at 20:00

## 2022-01-01 RX ADMIN — PSYLLIUM HUSK 1 PACKET: 3.4 POWDER ORAL at 21:54

## 2022-01-01 RX ADMIN — HEPARIN SODIUM 5000 UNITS: 5000 INJECTION, SOLUTION INTRAVENOUS; SUBCUTANEOUS at 07:57

## 2022-01-01 RX ADMIN — FOLIC ACID 1 MG: 1 TABLET ORAL at 08:35

## 2022-01-01 RX ADMIN — PANTOPRAZOLE SODIUM 20 MG: 20 TABLET, DELAYED RELEASE ORAL at 08:47

## 2022-01-01 RX ADMIN — LEVOFLOXACIN 250 MG: 250 TABLET, FILM COATED ORAL at 07:56

## 2022-01-01 RX ADMIN — Medication 50 MCG: at 07:56

## 2022-01-01 RX ADMIN — ALLOPURINOL 300 MG: 300 TABLET ORAL at 08:19

## 2022-01-01 RX ADMIN — POTASSIUM CHLORIDE 40 MEQ: 1.5 POWDER, FOR SOLUTION ORAL at 08:21

## 2022-01-01 RX ADMIN — METOCLOPRAMIDE HYDROCHLORIDE 5 MG: 5 INJECTION INTRAMUSCULAR; INTRAVENOUS at 11:06

## 2022-01-01 RX ADMIN — ACYCLOVIR 400 MG: 200 SUSPENSION ORAL at 19:52

## 2022-01-01 RX ADMIN — IBRUTINIB 420 MG: 140 CAPSULE ORAL at 09:53

## 2022-01-01 RX ADMIN — VASOPRESSIN 0.5 UNITS/HR: 20 INJECTION INTRAVENOUS at 12:08

## 2022-01-01 RX ADMIN — Medication 50 MCG: at 17:30

## 2022-01-01 RX ADMIN — ACYCLOVIR 800 MG: 800 TABLET ORAL at 20:01

## 2022-01-01 RX ADMIN — Medication 50 MCG: at 11:00

## 2022-01-01 RX ADMIN — Medication 20 NG/KG/MIN: at 08:02

## 2022-01-01 RX ADMIN — Medication 50 MCG: at 01:00

## 2022-01-01 RX ADMIN — FAMOTIDINE 20 MG: 20 TABLET ORAL at 01:48

## 2022-01-01 RX ADMIN — ACYCLOVIR 800 MG: 200 SUSPENSION ORAL at 20:01

## 2022-01-01 RX ADMIN — SODIUM CHLORIDE, POTASSIUM CHLORIDE, SODIUM LACTATE AND CALCIUM CHLORIDE 500 ML: 600; 310; 30; 20 INJECTION, SOLUTION INTRAVENOUS at 08:15

## 2022-01-01 RX ADMIN — SODIUM CHLORIDE 320 MG: 900 INJECTION, SOLUTION INTRAVENOUS at 23:40

## 2022-01-01 RX ADMIN — ALBUMIN HUMAN 12.5 G: 0.25 SOLUTION INTRAVENOUS at 04:50

## 2022-01-01 RX ADMIN — ONDANSETRON 8 MG: 2 INJECTION INTRAMUSCULAR; INTRAVENOUS at 09:05

## 2022-01-01 RX ADMIN — ALLOPURINOL 300 MG: 300 TABLET ORAL at 08:35

## 2022-01-01 RX ADMIN — SULFAMETHOXAZOLE AND TRIMETHOPRIM 320 MG: 80; 16 INJECTION, SOLUTION, CONCENTRATE INTRAVENOUS at 01:37

## 2022-01-01 RX ADMIN — SODIUM CHLORIDE, POTASSIUM CHLORIDE, SODIUM LACTATE AND CALCIUM CHLORIDE 1000 ML: 600; 310; 30; 20 INJECTION, SOLUTION INTRAVENOUS at 19:58

## 2022-01-01 RX ADMIN — METHYLPREDNISOLONE SODIUM SUCCINATE 125 MG: 125 INJECTION, POWDER, FOR SOLUTION INTRAMUSCULAR; INTRAVENOUS at 08:18

## 2022-01-01 RX ADMIN — MIDAZOLAM HYDROCHLORIDE 3 MG/HR: 1 INJECTION, SOLUTION INTRAVENOUS at 05:07

## 2022-01-01 RX ADMIN — ACYCLOVIR 800 MG: 800 TABLET ORAL at 21:02

## 2022-01-01 RX ADMIN — Medication 125 MCG/HR: at 16:23

## 2022-01-01 RX ADMIN — LEVOFLOXACIN 250 MG: 250 TABLET, FILM COATED ORAL at 08:56

## 2022-01-01 RX ADMIN — INSULIN ASPART 2 UNITS: 100 INJECTION, SOLUTION INTRAVENOUS; SUBCUTANEOUS at 00:22

## 2022-01-01 RX ADMIN — MIDAZOLAM HYDROCHLORIDE 3 MG/HR: 1 INJECTION, SOLUTION INTRAVENOUS at 03:32

## 2022-01-01 RX ADMIN — Medication 150 MCG/HR: at 03:39

## 2022-01-01 RX ADMIN — HUMAN INSULIN 7 UNITS/HR: 100 INJECTION, SOLUTION SUBCUTANEOUS at 22:33

## 2022-01-01 RX ADMIN — ACYCLOVIR 400 MG: 200 SUSPENSION ORAL at 20:14

## 2022-01-01 RX ADMIN — ACYCLOVIR 800 MG: 800 TABLET ORAL at 19:50

## 2022-01-01 RX ADMIN — POLYETHYLENE GLYCOL 3350 17 G: 17 POWDER, FOR SOLUTION ORAL at 22:32

## 2022-01-01 RX ADMIN — ALLOPURINOL 300 MG: 300 TABLET ORAL at 07:39

## 2022-01-01 RX ADMIN — ALLOPURINOL 300 MG: 300 TABLET ORAL at 08:28

## 2022-01-01 RX ADMIN — Medication: at 23:31

## 2022-01-01 RX ADMIN — METHYLPREDNISOLONE SODIUM SUCCINATE 40 MG: 40 INJECTION, POWDER, FOR SOLUTION INTRAMUSCULAR; INTRAVENOUS at 08:21

## 2022-01-01 RX ADMIN — ACYCLOVIR 800 MG: 200 SUSPENSION ORAL at 07:27

## 2022-01-01 RX ADMIN — PRIMAQUINE PHOSPHATE 30 MG: 15 TABLET ORAL at 07:40

## 2022-01-01 RX ADMIN — ACYCLOVIR 800 MG: 200 SUSPENSION ORAL at 09:13

## 2022-01-01 RX ADMIN — ALLOPURINOL 300 MG: 300 TABLET ORAL at 07:34

## 2022-01-01 RX ADMIN — INSULIN ASPART 2 UNITS: 100 INJECTION, SOLUTION INTRAVENOUS; SUBCUTANEOUS at 19:21

## 2022-01-01 RX ADMIN — ACYCLOVIR 800 MG: 800 TABLET ORAL at 09:27

## 2022-01-01 RX ADMIN — Medication 100 MCG: at 07:53

## 2022-01-01 RX ADMIN — ACYCLOVIR SODIUM 800 MG: 1000 INJECTION, SOLUTION INTRAVENOUS at 08:32

## 2022-01-01 RX ADMIN — VITAM B12 100 MCG: 100 TAB at 08:10

## 2022-01-01 RX ADMIN — MICAFUNGIN 150 MG: 10 INJECTION, POWDER, LYOPHILIZED, FOR SOLUTION INTRAVENOUS at 09:03

## 2022-01-01 RX ADMIN — HUMAN ALBUMIN MICROSPHERES AND PERFLUTREN 5 ML: 10; .22 INJECTION, SOLUTION INTRAVENOUS at 17:12

## 2022-01-01 RX ADMIN — PROCHLORPERAZINE EDISYLATE 5 MG: 5 INJECTION INTRAMUSCULAR; INTRAVENOUS at 13:04

## 2022-01-01 RX ADMIN — CLINDAMYCIN IN 5 PERCENT DEXTROSE 900 MG: 18 INJECTION, SOLUTION INTRAVENOUS at 21:10

## 2022-01-01 RX ADMIN — PIPERACILLIN SODIUM AND TAZOBACTAM SODIUM 4.5 G: 4; .5 INJECTION, POWDER, LYOPHILIZED, FOR SOLUTION INTRAVENOUS at 19:46

## 2022-01-01 RX ADMIN — PSYLLIUM HUSK 1 PACKET: 3.4 POWDER ORAL at 19:32

## 2022-01-01 RX ADMIN — Medication 100 MCG: at 07:56

## 2022-01-01 RX ADMIN — MICAFUNGIN 150 MG: 10 INJECTION, POWDER, LYOPHILIZED, FOR SOLUTION INTRAVENOUS at 08:44

## 2022-01-01 RX ADMIN — ALLOPURINOL 300 MG: 300 TABLET ORAL at 10:11

## 2022-01-01 RX ADMIN — METOCLOPRAMIDE 5 MG: 5 TABLET ORAL at 11:56

## 2022-01-01 RX ADMIN — FLUCONAZOLE 400 MG: 200 TABLET ORAL at 07:57

## 2022-01-01 RX ADMIN — PANTOPRAZOLE SODIUM 20 MG: 20 TABLET, DELAYED RELEASE ORAL at 09:32

## 2022-01-01 RX ADMIN — METHYLPREDNISOLONE SODIUM SUCCINATE 68.75 MG: 125 INJECTION, POWDER, FOR SOLUTION INTRAMUSCULAR; INTRAVENOUS at 08:43

## 2022-01-01 RX ADMIN — SENNOSIDES AND DOCUSATE SODIUM 1 TABLET: 8.6; 5 TABLET ORAL at 20:11

## 2022-01-01 RX ADMIN — SULFAMETHOXAZOLE AND TRIMETHOPRIM 3 TABLET: 400; 80 TABLET ORAL at 11:03

## 2022-01-01 RX ADMIN — SODIUM CHLORIDE 320 MG: 900 INJECTION, SOLUTION INTRAVENOUS at 05:54

## 2022-01-01 RX ADMIN — METHYLPREDNISOLONE SODIUM SUCCINATE 40 MG: 40 INJECTION, POWDER, FOR SOLUTION INTRAMUSCULAR; INTRAVENOUS at 09:39

## 2022-01-01 RX ADMIN — ACYCLOVIR 800 MG: 800 TABLET ORAL at 21:54

## 2022-01-01 RX ADMIN — IBRUTINIB 280 MG: 140 CAPSULE ORAL at 09:58

## 2022-01-01 RX ADMIN — ACYCLOVIR 800 MG: 800 TABLET ORAL at 08:51

## 2022-01-01 RX ADMIN — CALCIUM GLUCONATE 2 G: 20 INJECTION, SOLUTION INTRAVENOUS at 15:05

## 2022-01-01 RX ADMIN — PIPERACILLIN AND TAZOBACTAM 4.5 G: 4; .5 INJECTION, POWDER, FOR SOLUTION INTRAVENOUS at 12:08

## 2022-01-01 RX ADMIN — IBRUTINIB 420 MG: 140 CAPSULE ORAL at 12:24

## 2022-01-01 RX ADMIN — DRONABINOL 2.5 MG: 2.5 CAPSULE ORAL at 18:48

## 2022-01-01 RX ADMIN — ATORVASTATIN CALCIUM 20 MG: 20 TABLET, FILM COATED ORAL at 08:24

## 2022-01-01 RX ADMIN — METHYLPREDNISOLONE 40 MG: 40 INJECTION, POWDER, LYOPHILIZED, FOR SOLUTION INTRAMUSCULAR; INTRAVENOUS at 15:17

## 2022-01-01 RX ADMIN — ALBUMIN HUMAN 25 G: 0.25 SOLUTION INTRAVENOUS at 03:45

## 2022-01-01 RX ADMIN — SULFAMETHOXAZOLE AND TRIMETHOPRIM 320 MG: 80; 16 INJECTION, SOLUTION, CONCENTRATE INTRAVENOUS at 16:24

## 2022-01-01 RX ADMIN — PIPERACILLIN SODIUM AND TAZOBACTAM SODIUM 4.5 G: 4; .5 INJECTION, POWDER, LYOPHILIZED, FOR SOLUTION INTRAVENOUS at 13:31

## 2022-01-01 RX ADMIN — INSULIN ASPART 2 UNITS: 100 INJECTION, SOLUTION INTRAVENOUS; SUBCUTANEOUS at 07:50

## 2022-01-01 RX ADMIN — ACYCLOVIR 800 MG: 800 TABLET ORAL at 21:22

## 2022-01-01 RX ADMIN — Medication 100 MCG: at 07:47

## 2022-01-01 RX ADMIN — PSYLLIUM HUSK 1 PACKET: 3.4 POWDER ORAL at 19:40

## 2022-01-01 RX ADMIN — Medication 50 MCG: at 08:51

## 2022-01-01 RX ADMIN — Medication 1 PACKET: at 08:26

## 2022-01-01 RX ADMIN — CLINDAMYCIN IN 5 PERCENT DEXTROSE 900 MG: 18 INJECTION, SOLUTION INTRAVENOUS at 04:02

## 2022-01-01 RX ADMIN — Medication 20 NG/KG/MIN: at 03:41

## 2022-01-01 RX ADMIN — LEVOTHYROXINE SODIUM 50 MCG: 0.05 TABLET ORAL at 06:18

## 2022-01-01 RX ADMIN — FOLIC ACID 1 MG: 1 TABLET ORAL at 08:00

## 2022-01-01 RX ADMIN — IBRUTINIB 280 MG: 140 CAPSULE ORAL at 12:19

## 2022-01-01 RX ADMIN — ONDANSETRON 8 MG: 2 INJECTION INTRAMUSCULAR; INTRAVENOUS at 07:59

## 2022-01-01 RX ADMIN — PIPERACILLIN SODIUM AND TAZOBACTAM SODIUM 4.5 G: 4; .5 INJECTION, POWDER, LYOPHILIZED, FOR SOLUTION INTRAVENOUS at 08:14

## 2022-01-01 RX ADMIN — HEPARIN SODIUM 5000 UNITS: 5000 INJECTION, SOLUTION INTRAVENOUS; SUBCUTANEOUS at 18:33

## 2022-01-01 RX ADMIN — Medication 1 PACKET: at 19:21

## 2022-01-01 RX ADMIN — SODIUM CHLORIDE 320 MG: 900 INJECTION, SOLUTION INTRAVENOUS at 11:45

## 2022-01-01 RX ADMIN — Medication 1 PACKET: at 10:27

## 2022-01-01 RX ADMIN — IBRUTINIB 280 MG: 140 CAPSULE ORAL at 12:58

## 2022-01-01 RX ADMIN — SODIUM CHLORIDE, POTASSIUM CHLORIDE, SODIUM LACTATE AND CALCIUM CHLORIDE 500 ML: 600; 310; 30; 20 INJECTION, SOLUTION INTRAVENOUS at 22:25

## 2022-01-01 RX ADMIN — HUMAN INSULIN 2.5 UNITS/HR: 100 INJECTION, SOLUTION SUBCUTANEOUS at 22:44

## 2022-01-01 RX ADMIN — AZITHROMYCIN MONOHYDRATE 500 MG: 250 TABLET ORAL at 00:10

## 2022-01-01 RX ADMIN — CEFTRIAXONE SODIUM 1 G: 1 INJECTION, POWDER, FOR SOLUTION INTRAMUSCULAR; INTRAVENOUS at 00:16

## 2022-01-01 RX ADMIN — Medication 50 MCG: at 07:39

## 2022-01-01 RX ADMIN — CLINDAMYCIN IN 5 PERCENT DEXTROSE 900 MG: 18 INJECTION, SOLUTION INTRAVENOUS at 03:47

## 2022-01-01 RX ADMIN — CLINDAMYCIN IN 5 PERCENT DEXTROSE 900 MG: 18 INJECTION, SOLUTION INTRAVENOUS at 03:02

## 2022-01-01 RX ADMIN — AMIODARONE HYDROCHLORIDE 200 MG: 200 TABLET ORAL at 19:32

## 2022-01-01 RX ADMIN — ACETAMINOPHEN 650 MG: 325 TABLET ORAL at 16:15

## 2022-01-01 RX ADMIN — MICAFUNGIN 150 MG: 10 INJECTION, POWDER, LYOPHILIZED, FOR SOLUTION INTRAVENOUS at 11:18

## 2022-01-01 RX ADMIN — HEPARIN SODIUM 5000 UNITS: 5000 INJECTION, SOLUTION INTRAVENOUS; SUBCUTANEOUS at 19:21

## 2022-01-01 RX ADMIN — PROCHLORPERAZINE EDISYLATE 5 MG: 5 INJECTION INTRAMUSCULAR; INTRAVENOUS at 16:47

## 2022-01-01 RX ADMIN — IBRUTINIB 420 MG: 140 CAPSULE ORAL at 08:52

## 2022-01-01 RX ADMIN — SULFAMETHOXAZOLE AND TRIMETHOPRIM 360 MG: 80; 16 INJECTION, SOLUTION, CONCENTRATE INTRAVENOUS at 19:36

## 2022-01-01 RX ADMIN — Medication 100 MCG: at 08:42

## 2022-01-01 RX ADMIN — SODIUM CHLORIDE, POTASSIUM CHLORIDE, SODIUM LACTATE AND CALCIUM CHLORIDE 500 ML: 600; 310; 30; 20 INJECTION, SOLUTION INTRAVENOUS at 09:16

## 2022-01-01 RX ADMIN — MICAFUNGIN 150 MG: 10 INJECTION, POWDER, LYOPHILIZED, FOR SOLUTION INTRAVENOUS at 11:43

## 2022-01-01 RX ADMIN — MIDAZOLAM HYDROCHLORIDE 10 MG/HR: 1 INJECTION, SOLUTION INTRAVENOUS at 04:51

## 2022-01-01 RX ADMIN — VORICONAZOLE 250 MG: 40 POWDER, FOR SUSPENSION ORAL at 10:24

## 2022-01-01 RX ADMIN — INSULIN ASPART 1 UNITS: 100 INJECTION, SOLUTION INTRAVENOUS; SUBCUTANEOUS at 15:19

## 2022-01-01 RX ADMIN — PANTOPRAZOLE SODIUM 20 MG: 20 TABLET, DELAYED RELEASE ORAL at 07:30

## 2022-01-01 RX ADMIN — ATORVASTATIN CALCIUM 20 MG: 20 TABLET, FILM COATED ORAL at 09:31

## 2022-01-01 RX ADMIN — ACYCLOVIR 800 MG: 800 TABLET ORAL at 08:23

## 2022-01-01 RX ADMIN — LEVOTHYROXINE SODIUM 50 MCG: 0.05 TABLET ORAL at 06:04

## 2022-01-01 RX ADMIN — CLINDAMYCIN IN 5 PERCENT DEXTROSE 900 MG: 18 INJECTION, SOLUTION INTRAVENOUS at 20:58

## 2022-01-01 RX ADMIN — ACYCLOVIR 800 MG: 800 TABLET ORAL at 20:09

## 2022-01-01 RX ADMIN — POLYETHYLENE GLYCOL 3350 17 G: 17 POWDER, FOR SOLUTION ORAL at 08:44

## 2022-01-01 RX ADMIN — ONDANSETRON 8 MG: 2 INJECTION INTRAMUSCULAR; INTRAVENOUS at 08:38

## 2022-01-01 RX ADMIN — Medication 50 MCG: at 08:12

## 2022-01-01 RX ADMIN — PSYLLIUM HUSK 1 PACKET: 3.4 POWDER ORAL at 22:18

## 2022-01-01 RX ADMIN — HEPARIN SODIUM 5000 UNITS: 5000 INJECTION, SOLUTION INTRAVENOUS; SUBCUTANEOUS at 20:26

## 2022-01-01 RX ADMIN — DOCUSATE SODIUM 50 MG AND SENNOSIDES 8.6 MG 1 TABLET: 8.6; 5 TABLET, FILM COATED ORAL at 14:25

## 2022-01-01 RX ADMIN — ACYCLOVIR 800 MG: 800 TABLET ORAL at 08:42

## 2022-01-01 RX ADMIN — SULFAMETHOXAZOLE AND TRIMETHOPRIM 2 TABLET: 800; 160 TABLET ORAL at 09:32

## 2022-01-01 RX ADMIN — SENNOSIDES AND DOCUSATE SODIUM 1 TABLET: 8.6; 5 TABLET ORAL at 07:57

## 2022-01-01 RX ADMIN — MULTIVITAMIN 15 ML: LIQUID ORAL at 07:38

## 2022-01-01 RX ADMIN — LEVOTHYROXINE SODIUM 50 MCG: 0.05 TABLET ORAL at 05:33

## 2022-01-01 RX ADMIN — MICAFUNGIN 150 MG: 10 INJECTION, POWDER, LYOPHILIZED, FOR SOLUTION INTRAVENOUS at 10:43

## 2022-01-01 RX ADMIN — PANTOPRAZOLE SODIUM 20 MG: 20 TABLET, DELAYED RELEASE ORAL at 08:35

## 2022-01-01 RX ADMIN — LEVOTHYROXINE SODIUM 50 MCG: 0.05 TABLET ORAL at 06:24

## 2022-01-01 RX ADMIN — PRIMAQUINE PHOSPHATE 30 MG: 15 TABLET ORAL at 09:32

## 2022-01-01 RX ADMIN — PIPERACILLIN AND TAZOBACTAM 4.5 G: 4; .5 INJECTION, POWDER, FOR SOLUTION INTRAVENOUS at 12:55

## 2022-01-01 RX ADMIN — ACYCLOVIR 800 MG: 800 TABLET ORAL at 19:32

## 2022-01-01 RX ADMIN — ACYCLOVIR 800 MG: 800 TABLET ORAL at 19:09

## 2022-01-01 RX ADMIN — FOLIC ACID 1 MG: 1 TABLET ORAL at 09:19

## 2022-01-01 RX ADMIN — Medication 20 NG/KG/MIN: at 16:27

## 2022-01-01 RX ADMIN — LEVOTHYROXINE SODIUM 50 MCG: 0.05 TABLET ORAL at 08:42

## 2022-01-01 RX ADMIN — PSYLLIUM HUSK 1 PACKET: 3.4 POWDER ORAL at 21:52

## 2022-01-01 RX ADMIN — LEVOTHYROXINE SODIUM 50 MCG: 0.05 TABLET ORAL at 05:30

## 2022-01-01 RX ADMIN — SODIUM CHLORIDE, POTASSIUM CHLORIDE, SODIUM LACTATE AND CALCIUM CHLORIDE 500 ML: 600; 310; 30; 20 INJECTION, SOLUTION INTRAVENOUS at 10:15

## 2022-01-01 RX ADMIN — PANTOPRAZOLE SODIUM 20 MG: 20 TABLET, DELAYED RELEASE ORAL at 08:09

## 2022-01-01 RX ADMIN — PANTOPRAZOLE SODIUM 20 MG: 20 TABLET, DELAYED RELEASE ORAL at 07:48

## 2022-01-01 RX ADMIN — IBRUTINIB 280 MG: 140 CAPSULE ORAL at 09:07

## 2022-01-01 RX ADMIN — VORICONAZOLE 250 MG: 40 POWDER, FOR SUSPENSION ORAL at 21:47

## 2022-01-01 RX ADMIN — CLINDAMYCIN IN 5 PERCENT DEXTROSE 900 MG: 18 INJECTION, SOLUTION INTRAVENOUS at 20:05

## 2022-01-01 RX ADMIN — THIAMINE HCL TAB 100 MG 100 MG: 100 TAB at 07:35

## 2022-01-01 RX ADMIN — MULTIVITAMIN 15 ML: LIQUID ORAL at 08:01

## 2022-01-01 RX ADMIN — Medication 50 MCG: at 08:19

## 2022-01-01 RX ADMIN — PIPERACILLIN AND TAZOBACTAM 4.5 G: 4; .5 INJECTION, POWDER, FOR SOLUTION INTRAVENOUS at 06:21

## 2022-01-01 RX ADMIN — IBRUTINIB 420 MG: 140 CAPSULE ORAL at 09:14

## 2022-01-01 RX ADMIN — SULFAMETHOXAZOLE AND TRIMETHOPRIM 360 MG: 80; 16 INJECTION, SOLUTION, CONCENTRATE INTRAVENOUS at 04:02

## 2022-01-01 RX ADMIN — AMIODARONE HYDROCHLORIDE 200 MG: 200 TABLET ORAL at 09:13

## 2022-01-01 RX ADMIN — HEPARIN SODIUM 5000 UNITS: 5000 INJECTION, SOLUTION INTRAVENOUS; SUBCUTANEOUS at 08:19

## 2022-01-01 RX ADMIN — HUMAN INSULIN 8 UNITS/HR: 100 INJECTION, SOLUTION SUBCUTANEOUS at 08:47

## 2022-01-01 RX ADMIN — METHYLPREDNISOLONE SODIUM SUCCINATE 40 MG: 40 INJECTION, POWDER, FOR SOLUTION INTRAMUSCULAR; INTRAVENOUS at 08:10

## 2022-01-01 RX ADMIN — BISACODYL 10 MG: 10 SUPPOSITORY RECTAL at 11:34

## 2022-01-01 RX ADMIN — METHYLPREDNISOLONE 40 MG: 40 INJECTION, POWDER, LYOPHILIZED, FOR SOLUTION INTRAMUSCULAR; INTRAVENOUS at 16:15

## 2022-01-01 RX ADMIN — Medication 50 MCG: at 09:03

## 2022-01-01 RX ADMIN — LEVOFLOXACIN 500 MG: 500 TABLET, FILM COATED ORAL at 15:04

## 2022-01-01 RX ADMIN — ACETAMINOPHEN 325MG 650 MG: 325 TABLET ORAL at 04:54

## 2022-01-01 RX ADMIN — PIPERACILLIN AND TAZOBACTAM 4.5 G: 4; .5 INJECTION, POWDER, FOR SOLUTION INTRAVENOUS at 05:30

## 2022-01-01 RX ADMIN — PIPERACILLIN AND TAZOBACTAM 4.5 G: 4; .5 INJECTION, POWDER, FOR SOLUTION INTRAVENOUS at 06:17

## 2022-01-01 RX ADMIN — ATORVASTATIN CALCIUM 20 MG: 10 TABLET, FILM COATED ORAL at 07:39

## 2022-01-01 RX ADMIN — PSYLLIUM HUSK 1 PACKET: 3.4 POWDER ORAL at 23:38

## 2022-01-01 RX ADMIN — PIPERACILLIN SODIUM AND TAZOBACTAM SODIUM 4.5 G: 4; .5 INJECTION, POWDER, LYOPHILIZED, FOR SOLUTION INTRAVENOUS at 02:57

## 2022-01-01 RX ADMIN — PROPOFOL 40 MCG/KG/MIN: 10 INJECTION, EMULSION INTRAVENOUS at 17:47

## 2022-01-01 RX ADMIN — PIPERACILLIN SODIUM AND TAZOBACTAM SODIUM 4.5 G: 4; .5 INJECTION, POWDER, LYOPHILIZED, FOR SOLUTION INTRAVENOUS at 01:31

## 2022-01-01 RX ADMIN — FLUCONAZOLE 400 MG: 200 TABLET ORAL at 07:54

## 2022-01-01 RX ADMIN — AMIODARONE HYDROCHLORIDE 150 MG: 1.5 INJECTION, SOLUTION INTRAVENOUS at 13:34

## 2022-01-01 RX ADMIN — LINEZOLID 600 MG: 600 INJECTION, SOLUTION INTRAVENOUS at 11:47

## 2022-01-01 RX ADMIN — CEFEPIME HYDROCHLORIDE 2 G: 2 INJECTION, POWDER, FOR SOLUTION INTRAVENOUS at 22:28

## 2022-01-01 RX ADMIN — PSYLLIUM HUSK 1 PACKET: 3.4 POWDER ORAL at 21:49

## 2022-01-01 RX ADMIN — PROCHLORPERAZINE EDISYLATE 5 MG: 5 INJECTION INTRAMUSCULAR; INTRAVENOUS at 12:49

## 2022-01-01 RX ADMIN — MULTIVITAMIN 15 ML: LIQUID ORAL at 15:19

## 2022-01-01 RX ADMIN — ACYCLOVIR 800 MG: 800 TABLET ORAL at 07:57

## 2022-01-01 RX ADMIN — PRIMAQUINE PHOSPHATE 30 MG: 15 TABLET ORAL at 09:15

## 2022-01-01 RX ADMIN — SENNOSIDES AND DOCUSATE SODIUM 1 TABLET: 8.6; 5 TABLET ORAL at 19:44

## 2022-01-01 RX ADMIN — DAPTOMYCIN 750 MG: 500 INJECTION, POWDER, LYOPHILIZED, FOR SOLUTION INTRAVENOUS at 13:51

## 2022-01-01 RX ADMIN — CLINDAMYCIN IN 5 PERCENT DEXTROSE 900 MG: 18 INJECTION, SOLUTION INTRAVENOUS at 05:09

## 2022-01-01 RX ADMIN — POLYETHYLENE GLYCOL 3350 17 G: 17 POWDER, FOR SOLUTION ORAL at 21:21

## 2022-01-01 RX ADMIN — Medication 100 MCG: at 07:29

## 2022-01-01 RX ADMIN — CLINDAMYCIN IN 5 PERCENT DEXTROSE 900 MG: 18 INJECTION, SOLUTION INTRAVENOUS at 11:43

## 2022-01-01 RX ADMIN — CLINDAMYCIN IN 5 PERCENT DEXTROSE 900 MG: 18 INJECTION, SOLUTION INTRAVENOUS at 11:59

## 2022-01-01 RX ADMIN — CLINDAMYCIN IN 5 PERCENT DEXTROSE 900 MG: 18 INJECTION, SOLUTION INTRAVENOUS at 12:21

## 2022-01-01 RX ADMIN — SODIUM CHLORIDE 320 MG: 900 INJECTION, SOLUTION INTRAVENOUS at 17:33

## 2022-01-01 RX ADMIN — CEFEPIME HYDROCHLORIDE 2 G: 2 INJECTION, POWDER, FOR SOLUTION INTRAVENOUS at 16:26

## 2022-01-01 RX ADMIN — FOLIC ACID 1 MG: 1 TABLET ORAL at 08:41

## 2022-01-01 RX ADMIN — SENNOSIDES AND DOCUSATE SODIUM 2 TABLET: 8.6; 5 TABLET ORAL at 08:21

## 2022-01-01 RX ADMIN — PSYLLIUM HUSK 1 PACKET: 3.4 POWDER ORAL at 09:06

## 2022-01-01 RX ADMIN — SODIUM CHLORIDE 500 ML: 9 INJECTION, SOLUTION INTRAVENOUS at 13:14

## 2022-01-01 RX ADMIN — Medication 20 MG: at 08:21

## 2022-01-01 RX ADMIN — Medication 20 NG/KG/MIN: at 14:40

## 2022-01-01 RX ADMIN — Medication 20 NG/KG/MIN: at 01:21

## 2022-01-01 RX ADMIN — AMIODARONE HYDROCHLORIDE 0.5 MG/MIN: 50 INJECTION, SOLUTION INTRAVENOUS at 22:04

## 2022-01-01 RX ADMIN — ALLOPURINOL 300 MG: 300 TABLET ORAL at 08:09

## 2022-01-01 RX ADMIN — METOPROLOL TARTRATE 2.5 MG: 5 INJECTION INTRAVENOUS at 09:08

## 2022-01-01 RX ADMIN — METHYLPREDNISOLONE SODIUM SUCCINATE 40 MG: 40 INJECTION, POWDER, FOR SOLUTION INTRAMUSCULAR; INTRAVENOUS at 08:51

## 2022-01-01 RX ADMIN — MICAFUNGIN 150 MG: 10 INJECTION, POWDER, LYOPHILIZED, FOR SOLUTION INTRAVENOUS at 09:59

## 2022-01-01 RX ADMIN — SODIUM CHLORIDE, POTASSIUM CHLORIDE, SODIUM LACTATE AND CALCIUM CHLORIDE 500 ML: 600; 310; 30; 20 INJECTION, SOLUTION INTRAVENOUS at 11:03

## 2022-01-01 RX ADMIN — Medication 100 MCG: at 08:51

## 2022-01-01 RX ADMIN — IBRUTINIB 420 MG: 140 CAPSULE ORAL at 09:27

## 2022-01-01 RX ADMIN — Medication 150 MCG/HR: at 06:32

## 2022-01-01 RX ADMIN — PSYLLIUM HUSK 1 PACKET: 3.4 POWDER ORAL at 08:36

## 2022-01-01 RX ADMIN — CLINDAMYCIN IN 5 PERCENT DEXTROSE 900 MG: 18 INJECTION, SOLUTION INTRAVENOUS at 19:31

## 2022-01-01 RX ADMIN — MICAFUNGIN 100 MG: 10 INJECTION, POWDER, LYOPHILIZED, FOR SOLUTION INTRAVENOUS at 15:54

## 2022-01-01 RX ADMIN — METHYLPREDNISOLONE SODIUM SUCCINATE 40 MG: 40 INJECTION, POWDER, FOR SOLUTION INTRAMUSCULAR; INTRAVENOUS at 08:19

## 2022-01-01 RX ADMIN — HEPARIN SODIUM 5000 UNITS: 5000 INJECTION, SOLUTION INTRAVENOUS; SUBCUTANEOUS at 20:10

## 2022-01-01 RX ADMIN — Medication 100 MCG: at 09:19

## 2022-01-01 RX ADMIN — HEPARIN SODIUM 5000 UNITS: 5000 INJECTION, SOLUTION INTRAVENOUS; SUBCUTANEOUS at 18:12

## 2022-01-01 RX ADMIN — FUROSEMIDE 40 MG: 10 INJECTION, SOLUTION INTRAVENOUS at 06:40

## 2022-01-01 RX ADMIN — Medication 20 MG: at 07:40

## 2022-01-01 RX ADMIN — MICAFUNGIN 150 MG: 10 INJECTION, POWDER, LYOPHILIZED, FOR SOLUTION INTRAVENOUS at 07:43

## 2022-01-01 RX ADMIN — VORICONAZOLE 300 MG: 200 TABLET ORAL at 12:10

## 2022-01-01 RX ADMIN — PSYLLIUM HUSK 1 PACKET: 3.4 POWDER ORAL at 19:46

## 2022-01-01 RX ADMIN — SENNOSIDES AND DOCUSATE SODIUM 1 TABLET: 8.6; 5 TABLET ORAL at 09:04

## 2022-01-01 RX ADMIN — HEPARIN SODIUM 5000 UNITS: 5000 INJECTION, SOLUTION INTRAVENOUS; SUBCUTANEOUS at 05:50

## 2022-01-01 RX ADMIN — ATORVASTATIN CALCIUM 20 MG: 20 TABLET, FILM COATED ORAL at 09:02

## 2022-01-01 RX ADMIN — ATORVASTATIN CALCIUM 20 MG: 20 TABLET, FILM COATED ORAL at 07:46

## 2022-01-01 RX ADMIN — SENNOSIDES AND DOCUSATE SODIUM 1 TABLET: 50; 8.6 TABLET ORAL at 08:20

## 2022-01-01 RX ADMIN — VITAM B12 100 MCG: 100 TAB at 07:35

## 2022-01-01 RX ADMIN — IBRUTINIB 280 MG: 140 CAPSULE ORAL at 09:33

## 2022-01-01 RX ADMIN — THIAMINE HCL TAB 100 MG 100 MG: 100 TAB at 15:19

## 2022-01-01 RX ADMIN — VORICONAZOLE 250 MG: 40 POWDER, FOR SUSPENSION ORAL at 09:01

## 2022-01-01 RX ADMIN — PSYLLIUM HUSK 1 PACKET: 3.4 POWDER ORAL at 21:46

## 2022-01-01 RX ADMIN — ACYCLOVIR SODIUM 800 MG: 1000 INJECTION, SOLUTION INTRAVENOUS at 01:20

## 2022-01-01 RX ADMIN — Medication: at 15:03

## 2022-01-01 RX ADMIN — ONDANSETRON 8 MG: 2 INJECTION INTRAMUSCULAR; INTRAVENOUS at 23:19

## 2022-01-01 RX ADMIN — POLYETHYLENE GLYCOL 3350 17 G: 17 POWDER, FOR SOLUTION ORAL at 21:49

## 2022-01-01 RX ADMIN — PSYLLIUM HUSK 1 PACKET: 3.4 POWDER ORAL at 08:21

## 2022-01-01 RX ADMIN — HEPARIN SODIUM 5000 UNITS: 5000 INJECTION, SOLUTION INTRAVENOUS; SUBCUTANEOUS at 09:11

## 2022-01-01 RX ADMIN — SULFAMETHOXAZOLE AND TRIMETHOPRIM 360 MG: 80; 16 INJECTION, SOLUTION, CONCENTRATE INTRAVENOUS at 11:56

## 2022-01-01 RX ADMIN — ATORVASTATIN CALCIUM 20 MG: 10 TABLET, FILM COATED ORAL at 08:10

## 2022-01-01 RX ADMIN — IBRUTINIB 420 MG: 140 CAPSULE ORAL at 08:39

## 2022-01-01 RX ADMIN — CLINDAMYCIN IN 5 PERCENT DEXTROSE 900 MG: 18 INJECTION, SOLUTION INTRAVENOUS at 12:29

## 2022-01-01 RX ADMIN — Medication 50 MCG: at 07:26

## 2022-01-01 RX ADMIN — DOCUSATE SODIUM 50 MG AND SENNOSIDES 8.6 MG 2 TABLET: 8.6; 5 TABLET, FILM COATED ORAL at 19:52

## 2022-01-01 RX ADMIN — DIPHENHYDRAMINE HYDROCHLORIDE 25 MG: 25 CAPSULE ORAL at 17:11

## 2022-01-01 RX ADMIN — ALLOPURINOL 300 MG: 300 TABLET ORAL at 08:20

## 2022-01-01 RX ADMIN — VORICONAZOLE 250 MG: 40 POWDER, FOR SUSPENSION ORAL at 21:49

## 2022-01-01 RX ADMIN — METHYLPREDNISOLONE SODIUM SUCCINATE 40 MG: 40 INJECTION, POWDER, FOR SOLUTION INTRAMUSCULAR; INTRAVENOUS at 21:47

## 2022-01-01 RX ADMIN — HEPARIN SODIUM 5000 UNITS: 5000 INJECTION, SOLUTION INTRAVENOUS; SUBCUTANEOUS at 20:11

## 2022-01-01 RX ADMIN — ONDANSETRON 8 MG: 2 INJECTION INTRAMUSCULAR; INTRAVENOUS at 07:52

## 2022-01-01 RX ADMIN — ACYCLOVIR 800 MG: 800 TABLET ORAL at 08:28

## 2022-01-01 RX ADMIN — SULFAMETHOXAZOLE AND TRIMETHOPRIM 320 MG: 80; 16 INJECTION, SOLUTION, CONCENTRATE INTRAVENOUS at 10:11

## 2022-01-01 RX ADMIN — METHYLPREDNISOLONE SODIUM SUCCINATE 40 MG: 40 INJECTION, POWDER, FOR SOLUTION INTRAMUSCULAR; INTRAVENOUS at 08:01

## 2022-01-01 RX ADMIN — SULFAMETHOXAZOLE AND TRIMETHOPRIM 320 MG: 80; 16 INJECTION, SOLUTION, CONCENTRATE INTRAVENOUS at 10:23

## 2022-01-01 RX ADMIN — THIAMINE HCL TAB 100 MG 100 MG: 100 TAB at 08:21

## 2022-01-01 RX ADMIN — HEPARIN SODIUM 5000 UNITS: 5000 INJECTION, SOLUTION INTRAVENOUS; SUBCUTANEOUS at 21:11

## 2022-01-01 RX ADMIN — ACYCLOVIR 800 MG: 800 TABLET ORAL at 09:32

## 2022-01-01 RX ADMIN — Medication 12.5 MG: at 19:32

## 2022-01-01 RX ADMIN — ACYCLOVIR 800 MG: 800 TABLET ORAL at 08:57

## 2022-01-01 RX ADMIN — HEPARIN SODIUM 5000 UNITS: 5000 INJECTION, SOLUTION INTRAVENOUS; SUBCUTANEOUS at 09:14

## 2022-01-01 RX ADMIN — PSYLLIUM HUSK 1 PACKET: 3.4 POWDER ORAL at 08:06

## 2022-01-01 RX ADMIN — METOCLOPRAMIDE 5 MG: 5 TABLET ORAL at 19:57

## 2022-01-01 RX ADMIN — PROCHLORPERAZINE EDISYLATE 5 MG: 5 INJECTION INTRAMUSCULAR; INTRAVENOUS at 10:23

## 2022-01-01 RX ADMIN — Medication 200 MCG/HR: at 12:06

## 2022-01-01 RX ADMIN — MIDAZOLAM HYDROCHLORIDE 10 MG/HR: 1 INJECTION, SOLUTION INTRAVENOUS at 21:10

## 2022-01-01 RX ADMIN — METOPROLOL TARTRATE 2.5 MG: 5 INJECTION INTRAVENOUS at 02:34

## 2022-01-01 RX ADMIN — DEXTROSE MONOHYDRATE 20 ML: 50 INJECTION, SOLUTION INTRAVENOUS at 16:49

## 2022-01-01 RX ADMIN — HEPARIN SODIUM 5000 UNITS: 5000 INJECTION, SOLUTION INTRAVENOUS; SUBCUTANEOUS at 08:11

## 2022-01-01 RX ADMIN — MICAFUNGIN 100 MG: 10 INJECTION, POWDER, LYOPHILIZED, FOR SOLUTION INTRAVENOUS at 16:57

## 2022-01-01 RX ADMIN — ACYCLOVIR 800 MG: 800 TABLET ORAL at 08:37

## 2022-01-01 RX ADMIN — FOLIC ACID 1 MG: 1 TABLET ORAL at 08:24

## 2022-01-01 RX ADMIN — PRIMAQUINE PHOSPHATE 30 MG: 15 TABLET ORAL at 08:10

## 2022-01-01 RX ADMIN — PROCHLORPERAZINE EDISYLATE 5 MG: 5 INJECTION INTRAMUSCULAR; INTRAVENOUS at 18:16

## 2022-01-01 RX ADMIN — METHYLPREDNISOLONE 40 MG: 40 INJECTION, POWDER, LYOPHILIZED, FOR SOLUTION INTRAMUSCULAR; INTRAVENOUS at 09:06

## 2022-01-01 RX ADMIN — Medication 50 MCG: at 09:07

## 2022-01-01 RX ADMIN — PRIMAQUINE PHOSPHATE 30 MG: 15 TABLET ORAL at 21:23

## 2022-01-01 RX ADMIN — NOREPINEPHRINE BITARTRATE 0.03 MCG/KG/MIN: 1 INJECTION, SOLUTION, CONCENTRATE INTRAVENOUS at 15:05

## 2022-01-01 RX ADMIN — IBRUTINIB 280 MG: 140 CAPSULE ORAL at 11:58

## 2022-01-01 RX ADMIN — Medication 1 PACKET: at 20:11

## 2022-01-01 RX ADMIN — SENNOSIDES AND DOCUSATE SODIUM 2 TABLET: 8.6; 5 TABLET ORAL at 19:21

## 2022-01-01 RX ADMIN — LIDOCAINE HYDROCHLORIDE 30 MG: 10 INJECTION, SOLUTION EPIDURAL; INFILTRATION; INTRACAUDAL; PERINEURAL at 15:57

## 2022-01-01 RX ADMIN — METOCLOPRAMIDE 5 MG: 5 TABLET ORAL at 16:36

## 2022-01-01 RX ADMIN — Medication 50 MCG: at 09:15

## 2022-01-01 RX ADMIN — CLINDAMYCIN IN 5 PERCENT DEXTROSE 900 MG: 18 INJECTION, SOLUTION INTRAVENOUS at 11:22

## 2022-01-01 RX ADMIN — FOLIC ACID 1 MG: 1 TABLET ORAL at 08:28

## 2022-01-01 RX ADMIN — SENNOSIDES AND DOCUSATE SODIUM 1 TABLET: 8.6; 5 TABLET ORAL at 07:40

## 2022-01-01 RX ADMIN — ATORVASTATIN CALCIUM 20 MG: 10 TABLET, FILM COATED ORAL at 09:13

## 2022-01-01 RX ADMIN — PSYLLIUM HUSK 1 PACKET: 3.4 POWDER ORAL at 21:47

## 2022-01-01 RX ADMIN — HEPARIN SODIUM 5000 UNITS: 5000 INJECTION, SOLUTION INTRAVENOUS; SUBCUTANEOUS at 07:27

## 2022-01-01 RX ADMIN — CLINDAMYCIN IN 5 PERCENT DEXTROSE 900 MG: 18 INJECTION, SOLUTION INTRAVENOUS at 11:11

## 2022-01-01 RX ADMIN — PIPERACILLIN AND TAZOBACTAM 4.5 G: 4; .5 INJECTION, POWDER, FOR SOLUTION INTRAVENOUS at 12:37

## 2022-01-01 RX ADMIN — PANTOPRAZOLE SODIUM 20 MG: 20 TABLET, DELAYED RELEASE ORAL at 09:19

## 2022-01-01 RX ADMIN — SULFAMETHOXAZOLE AND TRIMETHOPRIM 360 MG: 80; 16 INJECTION, SOLUTION, CONCENTRATE INTRAVENOUS at 05:19

## 2022-01-01 RX ADMIN — IBRUTINIB 420 MG: 140 CAPSULE ORAL at 08:24

## 2022-01-01 RX ADMIN — HEPARIN SODIUM 5000 UNITS: 5000 INJECTION, SOLUTION INTRAVENOUS; SUBCUTANEOUS at 20:04

## 2022-01-01 RX ADMIN — PIPERACILLIN AND TAZOBACTAM 4.5 G: 4; .5 INJECTION, POWDER, FOR SOLUTION INTRAVENOUS at 00:19

## 2022-01-01 RX ADMIN — FOLIC ACID 1 MG: 1 TABLET ORAL at 07:27

## 2022-01-01 RX ADMIN — HEPARIN SODIUM 5000 UNITS: 5000 INJECTION, SOLUTION INTRAVENOUS; SUBCUTANEOUS at 19:50

## 2022-01-01 RX ADMIN — ALBUTEROL SULFATE 2.5 MG: 2.5 SOLUTION RESPIRATORY (INHALATION) at 16:25

## 2022-01-01 RX ADMIN — METHYLPREDNISOLONE 40 MG: 40 INJECTION, POWDER, LYOPHILIZED, FOR SOLUTION INTRAMUSCULAR; INTRAVENOUS at 15:32

## 2022-01-01 RX ADMIN — ACYCLOVIR 800 MG: 800 TABLET ORAL at 09:14

## 2022-01-01 RX ADMIN — HEPARIN SODIUM 5000 UNITS: 5000 INJECTION, SOLUTION INTRAVENOUS; SUBCUTANEOUS at 21:08

## 2022-01-01 RX ADMIN — METOCLOPRAMIDE 5 MG: 5 TABLET ORAL at 13:28

## 2022-01-01 RX ADMIN — HEPARIN SODIUM 5000 UNITS: 5000 INJECTION, SOLUTION INTRAVENOUS; SUBCUTANEOUS at 20:01

## 2022-01-01 RX ADMIN — Medication 20 MG: at 07:34

## 2022-01-01 RX ADMIN — PANTOPRAZOLE SODIUM 20 MG: 20 TABLET, DELAYED RELEASE ORAL at 08:42

## 2022-01-01 RX ADMIN — POLYETHYLENE GLYCOL 3350 17 G: 17 POWDER, FOR SOLUTION ORAL at 22:16

## 2022-01-01 RX ADMIN — CLINDAMYCIN IN 5 PERCENT DEXTROSE 900 MG: 18 INJECTION, SOLUTION INTRAVENOUS at 20:14

## 2022-01-01 RX ADMIN — HUMAN INSULIN 9 UNITS/HR: 100 INJECTION, SOLUTION SUBCUTANEOUS at 23:17

## 2022-01-01 RX ADMIN — AMIODARONE HYDROCHLORIDE 400 MG: 200 TABLET ORAL at 19:21

## 2022-01-01 RX ADMIN — GLYCOPYRROLATE 0.2 MG: 0.2 INJECTION INTRAMUSCULAR; INTRAVENOUS at 13:21

## 2022-01-01 RX ADMIN — SODIUM CHLORIDE, POTASSIUM CHLORIDE, SODIUM LACTATE AND CALCIUM CHLORIDE 500 ML: 600; 310; 30; 20 INJECTION, SOLUTION INTRAVENOUS at 22:24

## 2022-01-01 RX ADMIN — INSULIN ASPART 1 UNITS: 100 INJECTION, SOLUTION INTRAVENOUS; SUBCUTANEOUS at 12:39

## 2022-01-01 RX ADMIN — PRIMAQUINE PHOSPHATE 30 MG: 15 TABLET ORAL at 07:33

## 2022-01-01 RX ADMIN — HUMAN INSULIN 8 UNITS/HR: 100 INJECTION, SOLUTION SUBCUTANEOUS at 10:47

## 2022-01-01 ASSESSMENT — ACTIVITIES OF DAILY LIVING (ADL)
ADLS_ACUITY_SCORE: 26
ADLS_ACUITY_SCORE: 26
ADLS_ACUITY_SCORE: 28
ADLS_ACUITY_SCORE: 20
ADLS_ACUITY_SCORE: 26
ADLS_ACUITY_SCORE: 26
ADLS_ACUITY_SCORE: 32
ADLS_ACUITY_SCORE: 26
ADLS_ACUITY_SCORE: 20
ADLS_ACUITY_SCORE: 28
ADLS_ACUITY_SCORE: 22
ADLS_ACUITY_SCORE: 26
ADLS_ACUITY_SCORE: 23
ADLS_ACUITY_SCORE: 26
ADLS_ACUITY_SCORE: 36
ADLS_ACUITY_SCORE: 20
ADLS_ACUITY_SCORE: 28
ADLS_ACUITY_SCORE: 23
ADLS_ACUITY_SCORE: 26
ADLS_ACUITY_SCORE: 26
ADLS_ACUITY_SCORE: 23
ADLS_ACUITY_SCORE: 28
ADLS_ACUITY_SCORE: 20
ADLS_ACUITY_SCORE: 24
ADLS_ACUITY_SCORE: 26
ADLS_ACUITY_SCORE: 26
ADLS_ACUITY_SCORE: 22
ADLS_ACUITY_SCORE: 20
ADLS_ACUITY_SCORE: 35
ADLS_ACUITY_SCORE: 26
ADLS_ACUITY_SCORE: 26
ADLS_ACUITY_SCORE: 22
ADLS_ACUITY_SCORE: 37
ADLS_ACUITY_SCORE: 24
ADLS_ACUITY_SCORE: 26
ADLS_ACUITY_SCORE: 28
ADLS_ACUITY_SCORE: 23
ADLS_ACUITY_SCORE: 26
ADLS_ACUITY_SCORE: 26
ADLS_ACUITY_SCORE: 20
DEPENDENT_IADLS:: INDEPENDENT
ADLS_ACUITY_SCORE: 26
ADLS_ACUITY_SCORE: 23
ADLS_ACUITY_SCORE: 28
ADLS_ACUITY_SCORE: 22
ADLS_ACUITY_SCORE: 36
ADLS_ACUITY_SCORE: 22
ADLS_ACUITY_SCORE: 20
ADLS_ACUITY_SCORE: 22
ADLS_ACUITY_SCORE: 28
ADLS_ACUITY_SCORE: 26
ADLS_ACUITY_SCORE: 23
ADLS_ACUITY_SCORE: 28
ADLS_ACUITY_SCORE: 26
ADLS_ACUITY_SCORE: 28
ADLS_ACUITY_SCORE: 23
ADLS_ACUITY_SCORE: 20
ADLS_ACUITY_SCORE: 26
ADLS_ACUITY_SCORE: 26
ADLS_ACUITY_SCORE: 20
ADLS_ACUITY_SCORE: 37
ADLS_ACUITY_SCORE: 32
ADLS_ACUITY_SCORE: 28
ADLS_ACUITY_SCORE: 23
ADLS_ACUITY_SCORE: 28
ADLS_ACUITY_SCORE: 20
ADLS_ACUITY_SCORE: 28
ADLS_ACUITY_SCORE: 20
ADLS_ACUITY_SCORE: 26
ADLS_ACUITY_SCORE: 28
ADLS_ACUITY_SCORE: 28
ADLS_ACUITY_SCORE: 32
ADLS_ACUITY_SCORE: 32
ADLS_ACUITY_SCORE: 28
ADLS_ACUITY_SCORE: 37
ADLS_ACUITY_SCORE: 26
ADLS_ACUITY_SCORE: 20
ADLS_ACUITY_SCORE: 20
ADLS_ACUITY_SCORE: 26
ADLS_ACUITY_SCORE: 23
ADLS_ACUITY_SCORE: 26
ADLS_ACUITY_SCORE: 28
ADLS_ACUITY_SCORE: 35
ADLS_ACUITY_SCORE: 20
ADLS_ACUITY_SCORE: 28
ADLS_ACUITY_SCORE: 23
ADLS_ACUITY_SCORE: 36
ADLS_ACUITY_SCORE: 26
ADLS_ACUITY_SCORE: 28
ADLS_ACUITY_SCORE: 24
ADLS_ACUITY_SCORE: 26
ADLS_ACUITY_SCORE: 23
ADLS_ACUITY_SCORE: 26
ADLS_ACUITY_SCORE: 23
ADLS_ACUITY_SCORE: 24
ADLS_ACUITY_SCORE: 22
ADLS_ACUITY_SCORE: 20
ADLS_ACUITY_SCORE: 32
ADLS_ACUITY_SCORE: 36
ADLS_ACUITY_SCORE: 20
ADLS_ACUITY_SCORE: 30
ADLS_ACUITY_SCORE: 24
ADLS_ACUITY_SCORE: 20
ADLS_ACUITY_SCORE: 26
ADLS_ACUITY_SCORE: 20
ADLS_ACUITY_SCORE: 26
ADLS_ACUITY_SCORE: 23
ADLS_ACUITY_SCORE: 28
ADLS_ACUITY_SCORE: 21
ADLS_ACUITY_SCORE: 28
ADLS_ACUITY_SCORE: 30
ADLS_ACUITY_SCORE: 26
ADLS_ACUITY_SCORE: 34
ADLS_ACUITY_SCORE: 28
ADLS_ACUITY_SCORE: 23
ADLS_ACUITY_SCORE: 26
ADLS_ACUITY_SCORE: 32
ADLS_ACUITY_SCORE: 28
ADLS_ACUITY_SCORE: 23
ADLS_ACUITY_SCORE: 32
ADLS_ACUITY_SCORE: 20
ADLS_ACUITY_SCORE: 35
ADLS_ACUITY_SCORE: 28
ADLS_ACUITY_SCORE: 26
ADLS_ACUITY_SCORE: 26
ADLS_ACUITY_SCORE: 34
ADLS_ACUITY_SCORE: 26
ADLS_ACUITY_SCORE: 26
ADLS_ACUITY_SCORE: 22
ADLS_ACUITY_SCORE: 26
ADLS_ACUITY_SCORE: 26
ADLS_ACUITY_SCORE: 32
ADLS_ACUITY_SCORE: 23
ADLS_ACUITY_SCORE: 22
ADLS_ACUITY_SCORE: 26
ADLS_ACUITY_SCORE: 20
ADLS_ACUITY_SCORE: 26
ADLS_ACUITY_SCORE: 20
ADLS_ACUITY_SCORE: 26
ADLS_ACUITY_SCORE: 22
ADLS_ACUITY_SCORE: 26
ADLS_ACUITY_SCORE: 28
ADLS_ACUITY_SCORE: 26
ADLS_ACUITY_SCORE: 28
ADLS_ACUITY_SCORE: 26
ADLS_ACUITY_SCORE: 20
ADLS_ACUITY_SCORE: 26
ADLS_ACUITY_SCORE: 32
ADLS_ACUITY_SCORE: 24
ADLS_ACUITY_SCORE: 32
ADLS_ACUITY_SCORE: 26
ADLS_ACUITY_SCORE: 26
ADLS_ACUITY_SCORE: 22
ADLS_ACUITY_SCORE: 28
ADLS_ACUITY_SCORE: 32
ADLS_ACUITY_SCORE: 34
ADLS_ACUITY_SCORE: 24
ADLS_ACUITY_SCORE: 20
ADLS_ACUITY_SCORE: 32
ADLS_ACUITY_SCORE: 20
ADLS_ACUITY_SCORE: 32
ADLS_ACUITY_SCORE: 26
ADLS_ACUITY_SCORE: 22
ADLS_ACUITY_SCORE: 20
ADLS_ACUITY_SCORE: 20
ADLS_ACUITY_SCORE: 28
ADLS_ACUITY_SCORE: 28
ADLS_ACUITY_SCORE: 20
ADLS_ACUITY_SCORE: 26
ADLS_ACUITY_SCORE: 28
ADLS_ACUITY_SCORE: 26
ADLS_ACUITY_SCORE: 32
ADLS_ACUITY_SCORE: 26
ADLS_ACUITY_SCORE: 30
ADLS_ACUITY_SCORE: 26
ADLS_ACUITY_SCORE: 24
ADLS_ACUITY_SCORE: 20
ADLS_ACUITY_SCORE: 28
ADLS_ACUITY_SCORE: 28
ADLS_ACUITY_SCORE: 20
ADLS_ACUITY_SCORE: 26
ADLS_ACUITY_SCORE: 20
ADLS_ACUITY_SCORE: 26
ADLS_ACUITY_SCORE: 20
ADLS_ACUITY_SCORE: 26
ADLS_ACUITY_SCORE: 20
ADLS_ACUITY_SCORE: 23
ADLS_ACUITY_SCORE: 21
ADLS_ACUITY_SCORE: 20
ADLS_ACUITY_SCORE: 28
ADLS_ACUITY_SCORE: 23
ADLS_ACUITY_SCORE: 28
ADLS_ACUITY_SCORE: 28
ADLS_ACUITY_SCORE: 20
ADLS_ACUITY_SCORE: 26
ADLS_ACUITY_SCORE: 20
ADLS_ACUITY_SCORE: 32
ADLS_ACUITY_SCORE: 23
ADLS_ACUITY_SCORE: 26
ADLS_ACUITY_SCORE: 26
ADLS_ACUITY_SCORE: 35
ADLS_ACUITY_SCORE: 26
ADLS_ACUITY_SCORE: 26
ADLS_ACUITY_SCORE: 23
ADLS_ACUITY_SCORE: 26
ADLS_ACUITY_SCORE: 20
ADLS_ACUITY_SCORE: 32
ADLS_ACUITY_SCORE: 23
ADLS_ACUITY_SCORE: 26
ADLS_ACUITY_SCORE: 20
ADLS_ACUITY_SCORE: 35
ADLS_ACUITY_SCORE: 28
ADLS_ACUITY_SCORE: 26
ADLS_ACUITY_SCORE: 26
ADLS_ACUITY_SCORE: 36
ADLS_ACUITY_SCORE: 34
ADLS_ACUITY_SCORE: 20
ADLS_ACUITY_SCORE: 26
ADLS_ACUITY_SCORE: 23
ADLS_ACUITY_SCORE: 30
ADLS_ACUITY_SCORE: 26
ADLS_ACUITY_SCORE: 26
ADLS_ACUITY_SCORE: 30
ADLS_ACUITY_SCORE: 23
ADLS_ACUITY_SCORE: 35
ADLS_ACUITY_SCORE: 37
ADLS_ACUITY_SCORE: 22
ADLS_ACUITY_SCORE: 28
ADLS_ACUITY_SCORE: 20
ADLS_ACUITY_SCORE: 26
ADLS_ACUITY_SCORE: 26
ADLS_ACUITY_SCORE: 22
ADLS_ACUITY_SCORE: 23
ADLS_ACUITY_SCORE: 26
ADLS_ACUITY_SCORE: 22
ADLS_ACUITY_SCORE: 32
ADLS_ACUITY_SCORE: 20
ADLS_ACUITY_SCORE: 34
ADLS_ACUITY_SCORE: 32
ADLS_ACUITY_SCORE: 24
ADLS_ACUITY_SCORE: 26
ADLS_ACUITY_SCORE: 22
ADLS_ACUITY_SCORE: 34
ADLS_ACUITY_SCORE: 26
ADLS_ACUITY_SCORE: 24
ADLS_ACUITY_SCORE: 26
ADLS_ACUITY_SCORE: 28
ADLS_ACUITY_SCORE: 20
ADLS_ACUITY_SCORE: 20
ADLS_ACUITY_SCORE: 26
ADLS_ACUITY_SCORE: 26
ADLS_ACUITY_SCORE: 30
ADLS_ACUITY_SCORE: 20
ADLS_ACUITY_SCORE: 26
ADLS_ACUITY_SCORE: 23
ADLS_ACUITY_SCORE: 36
ADLS_ACUITY_SCORE: 26
ADLS_ACUITY_SCORE: 34
ADLS_ACUITY_SCORE: 20
ADLS_ACUITY_SCORE: 28
ADLS_ACUITY_SCORE: 22
ADLS_ACUITY_SCORE: 20
ADLS_ACUITY_SCORE: 20
ADLS_ACUITY_SCORE: 28
ADLS_ACUITY_SCORE: 26
ADLS_ACUITY_SCORE: 32
ADLS_ACUITY_SCORE: 26
ADLS_ACUITY_SCORE: 34
ADLS_ACUITY_SCORE: 20
ADLS_ACUITY_SCORE: 20
ADLS_ACUITY_SCORE: 32
ADLS_ACUITY_SCORE: 23
ADLS_ACUITY_SCORE: 26
ADLS_ACUITY_SCORE: 26
ADLS_ACUITY_SCORE: 30
ADLS_ACUITY_SCORE: 28
ADLS_ACUITY_SCORE: 22
ADLS_ACUITY_SCORE: 26
ADLS_ACUITY_SCORE: 20
ADLS_ACUITY_SCORE: 22
ADLS_ACUITY_SCORE: 28
ADLS_ACUITY_SCORE: 26
ADLS_ACUITY_SCORE: 28
ADLS_ACUITY_SCORE: 20
ADLS_ACUITY_SCORE: 26
ADLS_ACUITY_SCORE: 32
ADLS_ACUITY_SCORE: 21
ADLS_ACUITY_SCORE: 24
ADLS_ACUITY_SCORE: 26
ADLS_ACUITY_SCORE: 28
ADLS_ACUITY_SCORE: 20
ADLS_ACUITY_SCORE: 26
ADLS_ACUITY_SCORE: 30
ADLS_ACUITY_SCORE: 20
ADLS_ACUITY_SCORE: 26
ADLS_ACUITY_SCORE: 28
ADLS_ACUITY_SCORE: 23
ADLS_ACUITY_SCORE: 32
ADLS_ACUITY_SCORE: 32
ADLS_ACUITY_SCORE: 22
ADLS_ACUITY_SCORE: 20
ADLS_ACUITY_SCORE: 37
ADLS_ACUITY_SCORE: 26
ADLS_ACUITY_SCORE: 21
ADLS_ACUITY_SCORE: 28
ADLS_ACUITY_SCORE: 26
ADLS_ACUITY_SCORE: 26
ADLS_ACUITY_SCORE: 20
ADLS_ACUITY_SCORE: 26
ADLS_ACUITY_SCORE: 32
ADLS_ACUITY_SCORE: 34
ADLS_ACUITY_SCORE: 22
ADLS_ACUITY_SCORE: 26
ADLS_ACUITY_SCORE: 26
ADLS_ACUITY_SCORE: 28
ADLS_ACUITY_SCORE: 26
ADLS_ACUITY_SCORE: 34
ADLS_ACUITY_SCORE: 20
ADLS_ACUITY_SCORE: 26
ADLS_ACUITY_SCORE: 23
ADLS_ACUITY_SCORE: 20

## 2022-01-01 ASSESSMENT — ENCOUNTER SYMPTOMS
CHEST TIGHTNESS: 1
DIZZINESS: 1
RHINORRHEA: 1
VOMITING: 0
BLOOD IN STOOL: 0
FREQUENCY: 1
SHORTNESS OF BREATH: 1

## 2022-01-01 ASSESSMENT — PAIN SCALES - GENERAL
PAINLEVEL: NO PAIN (0)
PAINLEVEL: MODERATE PAIN (4)
PAINLEVEL: SEVERE PAIN (7)

## 2022-02-20 NOTE — TELEPHONE ENCOUNTER
Routing refill request to provider for review/approval because:  Labs out of range:    TSH   Date Value Ref Range Status   11/10/2021 4.62 (H) 0.40 - 4.00 mU/L Final   07/17/2020 3.75 0.40 - 4.00 mU/L Final     T4 Free   Date Value Ref Range Status   01/10/2020 1.07 0.76 - 1.46 ng/dL Final     Free T4   Date Value Ref Range Status   11/10/2021 0.92 0.76 - 1.46 ng/dL Final

## 2022-04-07 NOTE — PROGRESS NOTES
"  [facility]  SPORTS & ORTHOPEDIC CLINIC VISIT     Apr 7, 2022        ASSESSMENT & PLAN    76-year-old with right lower leg pain that seems consistent with muscular etiology, likely peroneal musculature.  Also considered fibular nerve irritation.    Reviewed imaging and assessment with patient in detail  We discussed the use of tizanidine at night as this is when he seems to have the most spasm aching pain.  We also discussed use of topical NSAIDs which should be safe for him to use up to 3 times daily  He was provided with home exercises and he will contact us if he would like to pursue physical therapy  He will follow-up in clinic if he is not experiencing any improvement after 2 weeks or sooner if he is having new or worsening symptoms.    Severiano Royal MD  [facility] SPORTS MEDICINE CLINIC [place]    -----  Chief Complaint   Patient presents with     Consult For     right lower leg        SUBJECTIVE  Locosofia Wood is a/an 76 year old male who is seen as a self referral for evaluation of  Right lower leg pain. He was most recently seen by Dr. Melton on 3/18/20 for left lower leg pain and has seen myself in the past for other musculoskeletal problems. Today, lateral shin. Patient reports that it feels like \"horrible shin splints\" Denies any trauma or injury. Pain started about 5 days ago, he did just drive from [place] this past week. He had a massage right before leaving [place] and did receive treatment to the leg and it did hurt during that time. Denies any sort of regular running/walking. He has anemia and reports he has backed off on a lot of exertion in the past month. He does report having leukemia     The patient is seen by themselves.  The patient is Right handed    Onset: 5 day(s) ago. Reports insidious onset without acute precipitating event.  Location of Pain: right lateral shin  Worsened by: Sleeping at night  Better with: Rest  Treatments tried: no treatment " "tried to date  Associated symptoms: no distal numbness or tingling; denies swelling or warmth    Orthopedic/Surgical history: NO  Social History/Occupation: Retired       REVIEW OF SYSTEMS:    Do you have fever, chills, weight loss? No    Do you have any vision problems? No    Do you have any chest pain or edema? No    Do you have any shortness of breath or wheezing?  No    Do you have stomach problems? No    Do you have any numbness or focal weakness? No    Do you have diabetes? No    Do you have problems with bleeding or clotting? 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 No    OBJECTIVE:  Ht 1.702 m (5' 7\")   Wt 74.4 kg (164 lb)   BMI 25.69 kg/m       General: Alert, pleasant, no distress  Right lower leg: warm, well perfused, SILT throughout     Inspection: No swelling ecchymosis or deformity     ROM: Full range of motion of the ankle and knee     Strength: Has some pain with eversion and dorsiflexion against resistance.  Strength intact     Palpation: TTP over the anterior lateral lower leg in the region of the peroneal musculature.  No TTP over the tibia or fibula     Special Tests: None      RADIOLOGY:    2 view xrays of right tibia and fibula performed and reviewed independently demonstrating no acute fracture dislocation.  No significant DJD.  Prior osteochondral lesion and DJD in the ankle is noted.. See EMR for formal radiology report.               "

## 2022-04-07 NOTE — LETTER
"  4/7/2022      RE: Loco Wood  [address] [id number]         [facility]  SPORTS & ORTHOPEDIC CLINIC VISIT     Apr 7, 2022        ASSESSMENT & PLAN    76-year-old with right lower leg pain that seems consistent with muscular etiology, likely peroneal musculature.  Also considered fibular nerve irritation.    Reviewed imaging and assessment with patient in detail  We discussed the use of tizanidine at night as this is when he seems to have the most spasm aching pain.  We also discussed use of topical NSAIDs which should be safe for him to use up to 3 times daily  He was provided with home exercises and he will contact us if he would like to pursue physical therapy  He will follow-up in clinic if he is not experiencing any improvement after 2 weeks or sooner if he is having new or worsening symptoms.    Severiano Royal MD  [facility] SPORTS MEDICINE [facility]    -----  Chief Complaint   Patient presents with     Consult For     right lower leg        SUBJECTIVE  Loco Wood is a/an 76 year old male who is seen as a self referral for evaluation of  Right lower leg pain. He was most recently seen by Dr. Melton on 3/18/20 for left lower leg pain and has seen myself in the past for other musculoskeletal problems. Today, lateral shin. Patient reports that it feels like \"horrible shin splints\" Denies any trauma or injury. Pain started about 5 days ago, he did just drive from [place] this past week. He had a massage right before leaving [place] and did receive treatment to the leg and it did hurt during that time. Denies any sort of regular running/walking. He has anemia and reports he has backed off on a lot of exertion in the past month. He does report having leukemia     The patient is seen by themselves.  The patient is Right handed    Onset: 5 day(s) ago. Reports insidious onset without acute precipitating event.  Location of Pain: right lateral " "shin  Worsened by: Sleeping at night  Better with: Rest  Treatments tried: no treatment tried to date  Associated symptoms: no distal numbness or tingling; denies swelling or warmth    Orthopedic/Surgical history: NO  Social History/Occupation: Retired       REVIEW OF SYSTEMS:    Do you have fever, chills, weight loss? No    Do you have any vision problems? No    Do you have any chest pain or edema? No    Do you have any shortness of breath or wheezing?  No    Do you have stomach problems? No    Do you have any numbness or focal weakness? No    Do you have diabetes? No    Do you have problems with bleeding or clotting? 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 No    OBJECTIVE:  Ht 1.702 m (5' 7\")   Wt 74.4 kg (164 lb)   BMI 25.69 kg/m       General: Alert, pleasant, no distress  Right lower leg: warm, well perfused, SILT throughout     Inspection: No swelling ecchymosis or deformity     ROM: Full range of motion of the ankle and knee     Strength: Has some pain with eversion and dorsiflexion against resistance.  Strength intact     Palpation: TTP over the anterior lateral lower leg in the region of the peroneal musculature.  No TTP over the tibia or fibula     Special Tests: None      RADIOLOGY:    2 view xrays of right tibia and fibula performed and reviewed independently demonstrating no acute fracture dislocation.  No significant DJD.  Prior osteochondral lesion and DJD in the ankle is noted.. See EMR for formal radiology report.       Severiano Royal MD  "

## 2022-04-07 NOTE — PATIENT INSTRUCTIONS
Ok to use tizanidine at night if needed for night time pain  Try using topical diclofenac for pain. This is available over the counter.   Contact us if you would like to try physical therapy.     PERONEAL TENDONITIS  What is a peroneal tendon injury?   A peroneal tendon injury is a problem with the tendons and muscles on the outer side of your lower leg and foot. Tendons are strong bands of tissue that attach muscle to bone. The peroneal tendons help keep your foot and ankle stable when you walk.   Tendons can be injured suddenly or they may be slowly damaged over time. You can have tiny or partial tears in your tendon. If you have a complete tear of your tendon, it is called a rupture. Other tendon injuries may be called a strain, tendinosis, or tendonitis.  What is the cause?   Peroneal injuries can be caused by:  Overuse of the tendon from a sport or work activity that causes your foot and ankle to roll inward, like when you run on sloped surfaces or run in shoes that are getting worn out on the outside of the heel.   A sudden activity that forces your foot upward toward your shin, like landing on your feet after a fall, or rolling your ankle on a rock while running.   What are the symptoms?   You may hear a pop or a snap when the injury happens. You may have pain and swelling on the outer side of your lower leg or ankle.   How is it diagnosed?   Your healthcare provider will examine you and ask about your symptoms, activities, and medical history. You may have X-rays or other scans.  How is it treated?   While you are recovering from your injury, you will need to change your sport or activity to one that will not make your condition worse. For example, you may need to swim instead of run.   Your healthcare provider may recommend stretching and strengthening exercises to help you heal.  Use an elastic bandage or an ankle brace as directed by your provider. You may need to use crutches until you can walk without  pain.   The pain often gets better within a few weeks with self-care, but some injuries may take several months or longer to heal. It s important to follow all of your healthcare provider s instructions.  How can I take care of myself?   To help relieve swelling and pain:  Put an ice pack, gel pack, or package of frozen vegetables wrapped in a cloth, on the area every 3 to 4 hours for up to 20 minutes at a time.   Do ice massage. To do this, first freeze water in a Styrofoam cup, then peel the top of the cup away to expose the ice. Hold the bottom of the cup and rub the ice over your tendon for 5 to 10 minutes. Do this several times a day while you have pain.   Keep your ankle up on a pillow when you sit or lie down.   Take pain medicine, such as acetaminophen, ibuprofen, or other medicine as directed by your provider. Nonsteroidal anti-inflammatory medicines (NSAIDs), such as ibuprofen, may cause stomach bleeding and other problems. These risks increase with age. Read the label and take as directed. Unless recommended by your healthcare provider, do not take for more than 10 days.  Moist heat may help relax your muscles and make it easier to move your leg. Put moist heat on the injured area for 10 to 15 minutes at a time before you do warm-up and stretching exercises. Moist heat includes heat patches or moist heating pads that you can purchase at most drugstores, a wet washcloth or towel that has been heated in the dryer, or a hot shower. Don t use heat if you have swelling.   Follow your healthcare provider's instructions, including any exercises recommended by your provider. Ask your provider:  How and when you will hear your test results   How long it will take to recover   What activities you should avoid, including how much you can lift, and when you can return to your normal activities   How to take care of yourself at home   What symptoms or problems you should watch for and what to do if you have them  Make  sure you know when you should come back for a checkup.  How can I help prevent a peroneal tendon injury?   Warm-up exercises and stretching before activities can help prevent injuries. For example, do exercises that keep your ankles and leg muscles strong. If your leg or ankle hurts after exercise, putting ice on it may help keep it from getting injured.   Follow safety rules and use any protective equipment recommended for your work or sport. For example, wear high-top athletic shoes or a supportive ankle brace. When you run, choose level surfaces and avoid rocks or holes.  Developed by [employer].  Published by [employer].  Copyright  2014 [employer] and/or one of its subsidiaries. All rights reserved.                Peroneal Tendon Exercises  You may start these exercises when you can stand comfortably on your injured leg with your heel resting on the floor and your full weight evenly distributed on both legs.  Towel stretch: Sit on a hard surface with your injured leg stretched out in front of you. Loop a towel around your toes and the ball of your foot and pull the towel toward your body keeping your leg straight. Hold this position for 15 to 30 seconds and then relax. Repeat 3 times.  When you don't feel much of a stretch using the towel, you can start the following exercises.  Standing calf stretch: Stand facing a wall with your hands on the wall at about eye level. Keep your injured leg back with your heel on the floor. Keep the other leg forward with the knee bent. Turn your back foot slightly inward (as if you were pigeon-toed). Slowly lean into the wall until you feel a stretch in the back of your calf. Hold the stretch for 15 to 30 seconds. Return to the starting position. Repeat 3 times. Do this exercise several times each day.   Standing soleus stretch: Stand facing a wall with your hands on the wall at about chest height. Keep your injured leg back with your heel on the floor. Keep the  other leg forward with the knee bent. Turn your back foot slightly inward (as if you were pigeon-toed). Bend your back knee slightly and gently lean into the wall until you feel a stretch in the lower calf of your injured leg. Hold the stretch for 15 to 30 seconds. Return to the starting position. Repeat 3 times.   Achilles stretch: Stand with the ball of one foot on a stair. Reach for the step below with your heel until you feel a stretch in the arch of your foot. Hold this position for 15 to 30 seconds and then relax. Repeat 3 times.   Heel raise: Stand behind a chair or counter with both feet flat on the floor. Using the chair or counter as a support, rise up onto your toes and hold for 5 seconds. Then slowly lower yourself down without holding onto the support. (It's OK to keep holding onto the support if you need to.) When this exercise becomes less painful, try doing this exercise while you are standing on the injured leg only. Repeat 15 times. Do 2 sets of 15. Rest 30 seconds between sets.   Step-up: Stand with the foot of your injured leg on a support 3 to 5 inches (8 to 13 centimeters) high --like a small step or block of wood. Keep your other foot flat on the floor. Shift your weight onto the injured leg on the support. Straighten your injured leg as the other leg comes off the floor. Return to the starting position by bending your injured leg and slowly lowering your uninjured leg back to the floor. Do 2 sets of 15.   Resisted ankle eversion: Sit with both legs stretched out in front of you, with your feet about a shoulder's width apart. Tie a loop in one end of elastic tubing. Put the foot of your injured leg through the loop so that the tubing goes around the arch of that foot and wraps around the outside of the other foot. Hold onto the other end of the tubing with your hand to provide tension. Turn the foot of your injured leg up and out. Make sure you keep your other foot still so that it will allow  the tubing to stretch as you move the foot of your injured leg. Return to the starting position. Do 2 sets of 15.   Balance and reach exercises: Stand next to a chair with your injured leg farther from the chair. The chair will provide support if you need it. Stand on the foot of your injured leg and bend your knee slightly. Try to raise the arch of this foot while keeping your big toe on the floor. Keep your foot in this position.   With the hand that is farther away from the chair, reach forward in front of you by bending at the waist. Avoid bending your knee any more as you do this. Repeat this 15 times. To make the exercise more challenging, reach farther in front of you. Do 2 sets of 15.   While keeping your arch raised, reach the hand that is farther away from the chair across your body toward the chair. The farther you reach, the more challenging the exercise. Do 2 sets of 15.  If you have access to a wobble board, do the following exercises:  Wobble board exercises   Stand on a wobble board with your feet shoulder-width apart.   Rock the board forwards and backwards 30 times, then side to side 30 times. Hold on to a chair if you need support.   Rotate the wobble board around so that the edge of the board is in contact with the floor at all times. Do this 30 times in a clockwise and then a counterclockwise direction.   Balance on the wobble board for as long as you can without letting the edges touch the floor. Try to do this for 2 minutes without touching the floor.   Rotate the wobble board in clockwise and counterclockwise circles, but do not let the edge of the board touch the floor.   When you have mastered the wobble exercises standing on both legs, try repeating them while standing on just your injured leg. After you are able to do these exercises on one leg, try to do them with your eyes closed. Make sure you have something nearby to support you in case you lose your balance.  Developed by  [employer].  Published by [employer].  Copyright  2014 [employer] and/or one of its subsidiaries. All rights reserved.

## 2022-04-12 NOTE — PROGRESS NOTES
Oncology/Hematology Visit Note  Apr 12, 2022    Reason for Visit: follow up of CLL    History of Present Illness: Loco Wood is a 76 year old male with CLL. He was diagnosed 2/12/2007 with CLL, Lyles stage 0, followed clinically since.  At diagnosis WBC 17.8, ALC 11.2, hemoglobin 15.2, platelets 188.  Flow consistent with CLL.  No opportunistic infections, with IgG in the normal range during follow up. Due to rising lymphocyte count, it was recommended he consider starting on treatment. He is currently undergoing treatment with ibrutinib, which he began on 5/14/19. Please see previous notes for further details on the patient's history.     Interval History:  Back from [place]. He purchased a place while there but unfortunately had some complications, including hospitalization for pneumonia. He got winded after playing tennis and was found to have a low hemoglobin as low as 7.3 g/dL, diagnosed with hemolytic anemia. He started prednisone 60 mg x 2 weeks, 40 mg x 1 week, and then he stopped. Hemoglobin went up to 11 g/dL. He went back up to ibrutinib three times weekly (still has a large supply at home). Energy level is low, everything feels like an effort.      Current Outpatient Medications   Medication Sig Dispense Refill     acyclovir (ZOVIRAX) 800 MG tablet Take 1 tablet (800 mg) by mouth 2 times daily 60 tablet 0     cyanocobalamin (VITAMIN B-12) 100 MCG tablet Take 1 tablet (100 mcg) by mouth daily       folic acid (FOLVITE) 1 MG tablet Take 1 tablet (1 mg) by mouth daily       predniSONE (DELTASONE) 20 MG tablet Take 3 tablets (60 mg) by mouth daily 180 tablet 1     atorvastatin (LIPITOR) 20 MG tablet Take 1 tablet (20 mg) by mouth daily 90 tablet 3     famotidine (PEPCID) 40 MG tablet Take 1 tablet (40 mg) by mouth nightly as needed for heartburn 90 tablet 4     ibrutinib (IMBRUVICA) 420 MG tablet Take 1 tablet (420 mg) by mouth daily 28 tablet 2     levothyroxine (SYNTHROID/LEVOTHROID) 50 MCG tablet  Take 1 tablet (50 mcg) by mouth daily 90 tablet 3     omeprazole (PRILOSEC) 10 MG DR capsule TAKE 1 CAPSULE BY MOUTH EVERY DAY 30 TO 60 MINUTES BEFORE A MEAL 90 capsule 1     vitamin D3 (CHOLECALCIFEROL) 50 mcg (2000 units) tablet Take 1 tablet by mouth daily       zolpidem (AMBIEN) 5 MG tablet Take 1 tablet (5 mg) by mouth nightly as needed for sleep 15 tablet 5       /80   Pulse 94   Temp 98  F (36.7  C)   Resp 16   Wt 72.5 kg (159 lb 12.8 oz)   SpO2 98%   BMI 25.03 kg/m    Wt Readings from Last 10 Encounters:   04/12/22 72.5 kg (159 lb 12.8 oz)   04/07/22 74.4 kg (164 lb)   11/02/21 74.4 kg (164 lb)   09/28/21 74.7 kg (164 lb 9.6 oz)   09/23/21 75.7 kg (166 lb 14.4 oz)   09/05/21 74.8 kg (165 lb)   09/01/21 75.3 kg (166 lb)   04/29/21 76.7 kg (169 lb)   04/12/21 75.3 kg (166 lb)   03/04/21 75.2 kg (165 lb 12.8 oz)     Physical Examination:  He is pleasant, interactive, sclerae anicteric, cranial nerves grossly intact, no oral mucosal lesions, normal respiratory effort, no JVD, normal perfusion, abdomen non-distended, skin without rash, no edema, normal affect.      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 He is tolerating treatment very well thus far. He had a brief episode of atrial fibrillation after starting ibrutinib but has been doing well lately. Due to cost considerations, he is taking ibrutinib only 3 times per week at this time.    In [place], he developed Alexander' positive  hemolytic anemia, which responded well to a brief course of prednisone. With his anemia and elevated bilirubin today, it is clear this has recurred. We will resume 60 mg prednisone. We discussed adding rituximab and he prefers not to do that at this time as long as the prednisone works.     He understands that going forward I will be focusing only on transplant patients and would like to refer him to Dr. Monahan for long-term CLL care.      # History of shingles  Acyclovir 800 mg BID as long as he is on prednisone to reduce risk of shingles.    Known issues that I take into account for medical decisions, with salient changes to the plan considering these complexities noted above.    Patient Active Problem List   Diagnosis     Esophageal reflux     Lumbago     CLL (chronic lymphocytic leukemia) (H)     HYPERLIPIDEMIA LDL GOAL <130     Mass of skin     Health Care Home     Vitamin D deficiency     Osteoarthritis of hip     OA (osteoarthritis) of hip     Hx of bilateral hip replacements     Insomnia     BPH (benign prostatic hyperplasia)     Acute bilateral low back pain without sciatica     Prostate nodule     Chondromalacia, knee, right     Complex tear of medial meniscus of right knee as current injury     Herpes zoster with keratoconjunctivitis, od     Post herpetic neuralgia     Aftercare following surgery of the musculoskeletal system     Paroxysmal atrial fibrillation (H)     Chronic kidney disease, stage 3 (moderate)     Fusion of spine, lumbar region     Gluteal pain     Pelvic obliquity     Hamstring tightness     History of spinal fusion     Right hip pain     Strain of gastrocnemius muscle of left lower extremity       Today's summary: Start prednisone 60 mg daily for AIHA, start ACV for shingles prophylaxis, RTC with labs in 2 weeks, sooner if needed.     I spent 30 minutes in the care of this patient today, which included time necessary for preparation for the visit, obtaining history, ordering  medications/tests/procedures as medically indicated, review of pertinent medical literature, counseling of the patient, communication of recommendations to the care team, and documentation time.    He Burns

## 2022-04-12 NOTE — NURSING NOTE
Chief Complaint   Patient presents with     Blood Draw     Labs drawn by RN via , vitals taken.     Labs collected from venipuncture by RN. Vitals taken. Checked in for appointment(s).    Kendra Victoria RN

## 2022-04-12 NOTE — TELEPHONE ENCOUNTER
DIAGNOSIS: R hand 2 digit possible trigger finger / cannot hold utensils etc. , per pt , no images   APPOINTMENT DATE: 4.16.22   NOTES STATUS DETAILS   MEDICATION LIST Internal

## 2022-04-12 NOTE — LETTER
4/12/2022         RE: Loco Wood  [address] [id number]        Dear Colleague,    Thank you for referring your patient, Loco Wood, to the [facility] [place]. Please see a copy of my visit note below.    Oncology/Hematology Visit Note  Apr 12, 2022    Reason for Visit: follow up of CLL    History of Present Illness: Loco Wood is a 76 year old male with CLL. He was diagnosed 2/12/2007 with CLL, Lyles stage 0, followed clinically since.  At diagnosis WBC 17.8, ALC 11.2, hemoglobin 15.2, platelets 188.  Flow consistent with CLL.  No opportunistic infections, with IgG in the normal range during follow up. Due to rising lymphocyte count, it was recommended he consider starting on treatment. He is currently undergoing treatment with ibrutinib, which he began on 5/14/19. Please see previous notes for further details on the patient's history.     Interval History:  Back from [place]. He purchased a place while there but unfortunately had some complications, including hospitalization for pneumonia. He got winded after playing tennis and was found to have a low hemoglobin as low as 7.3 g/dL, diagnosed with hemolytic anemia. He started prednisone 60 mg x 2 weeks, 40 mg x 1 week, and then he stopped. Hemoglobin went up to 11 g/dL. He went back up to ibrutinib three times weekly (still has a large supply at home). Energy level is low, everything feels like an effort.      Current Outpatient Medications   Medication Sig Dispense Refill     acyclovir (ZOVIRAX) 800 MG tablet Take 1 tablet (800 mg) by mouth 2 times daily 60 tablet 0     cyanocobalamin (VITAMIN B-12) 100 MCG tablet Take 1 tablet (100 mcg) by mouth daily       folic acid (FOLVITE) 1 MG tablet Take 1 tablet (1 mg) by mouth daily       predniSONE (DELTASONE) 20 MG tablet Take 3 tablets (60 mg) by mouth daily 180 tablet 1     atorvastatin (LIPITOR) 20 MG tablet Take 1 tablet  (20 mg) by mouth daily 90 tablet 3     famotidine (PEPCID) 40 MG tablet Take 1 tablet (40 mg) by mouth nightly as needed for heartburn 90 tablet 4     ibrutinib (IMBRUVICA) 420 MG tablet Take 1 tablet (420 mg) by mouth daily 28 tablet 2     levothyroxine (SYNTHROID/LEVOTHROID) 50 MCG tablet Take 1 tablet (50 mcg) by mouth daily 90 tablet 3     omeprazole (PRILOSEC) 10 MG DR capsule TAKE 1 CAPSULE BY MOUTH EVERY DAY 30 TO 60 MINUTES BEFORE A MEAL 90 capsule 1     vitamin D3 (CHOLECALCIFEROL) 50 mcg (2000 units) tablet Take 1 tablet by mouth daily       zolpidem (AMBIEN) 5 MG tablet Take 1 tablet (5 mg) by mouth nightly as needed for sleep 15 tablet 5       /80   Pulse 94   Temp 98  F (36.7  C)   Resp 16   Wt 72.5 kg (159 lb 12.8 oz)   SpO2 98%   BMI 25.03 kg/m    Wt Readings from Last 10 Encounters:   04/12/22 72.5 kg (159 lb 12.8 oz)   04/07/22 74.4 kg (164 lb)   11/02/21 74.4 kg (164 lb)   09/28/21 74.7 kg (164 lb 9.6 oz)   09/23/21 75.7 kg (166 lb 14.4 oz)   09/05/21 74.8 kg (165 lb)   09/01/21 75.3 kg (166 lb)   04/29/21 76.7 kg (169 lb)   04/12/21 75.3 kg (166 lb)   03/04/21 75.2 kg (165 lb 12.8 oz)     Physical Examination:  He is pleasant, interactive, sclerae anicteric, cranial nerves grossly intact, no oral mucosal lesions, normal respiratory effort, no JVD, normal perfusion, abdomen non-distended, skin without rash, no edema, normal affect.      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 He is tolerating treatment very well thus far. He had a brief episode of atrial fibrillation after starting ibrutinib but has been doing well lately. Due to cost considerations, he is taking ibrutinib only 3 times per week at this time.    In [place], he developed Alexander' positive hemolytic anemia, which responded well to a brief course of prednisone. With his anemia and elevated bilirubin today, it is clear this has recurred. We will resume 60 mg prednisone. We discussed adding rituximab and he prefers not to do that at this time as long as the prednisone works.     He understands that going forward I will be focusing only on transplant patients and would like to refer him to Dr. Monahan for long-term CLL care.      # History of shingles  Acyclovir 800 mg BID as long as he is on prednisone to reduce risk of shingles.    Known issues that I take into account for medical decisions, with salient changes to the plan considering these complexities noted above.    Patient Active Problem List   Diagnosis     Esophageal reflux     Lumbago     CLL (chronic lymphocytic leukemia) (H)     HYPERLIPIDEMIA LDL GOAL <130     [facility]     Health Care Home     Vitamin D deficiency     Osteoarthritis of hip     OA (osteoarthritis) of hip     Hx of bilateral hip replacements     Insomnia     BPH (benign prostatic hyperplasia)     Acute bilateral low back pain without sciatica     Prostate nodule     Chondromalacia, knee, right     Complex tear of medial meniscus of right knee as current injury     Herpes zoster with keratoconjunctivitis, od     Post herpetic neuralgia     Aftercare following surgery of the musculoskeletal system     Paroxysmal atrial fibrillation (H)     Chronic kidney disease, stage 3 (moderate)     Fusion of spine, lumbar region     Gluteal pain     Pelvic obliquity     Hamstring tightness     History of spinal fusion     Right hip pain     Strain of gastrocnemius muscle of left lower extremity        Today's summary: Start prednisone 60 mg daily for AIHA, start ACV for shingles prophylaxis, RTC with labs in 2 weeks, sooner if needed.     I spent 30 minutes in the care of this patient today, which included time necessary for preparation for the visit, obtaining history, ordering medications/tests/procedures as medically indicated, review of pertinent medical literature, counseling of the patient, communication of recommendations to the care team, and documentation time.          Again, thank you for allowing me to participate in the care of your patient.      Sincerely,    He Burns MD

## 2022-04-13 NOTE — TELEPHONE ENCOUNTER
[facility] Triage    MyChart Message: Labs and night sweats    Saw Dr. Burns yesterday last sid had night sweats - drenching many t-shirts - hasn't had these for 15 years.    No N/V/D fever  No pain  Dizziness and fatigue but that is from anemia  No alcohol yesterday  No spicy foods    Copied from Dr. Burns's note of 4/12/22  Today's summary: Start prednisone 60 mg daily for AIHA, start ACV for shingles prophylaxis, RTC with labs in 2 weeks, sooner if needed.     Routing to Dr. Burns and Winter Zaragoza to advise

## 2022-04-14 NOTE — PROGRESS NOTES
Patient called asking he appointment with an MELLY be scheduled in two weeks from last visit with Dr Burns 4/14/2022. Message sent to scheduling to re-schedule as Dr Burns recommended. Patient has not had night sweats since starting Prednisone. Instructed patient to make sure he eats before he takes the Prednisone.  Answered all patient's questions and verbalized understanding. Winter Zaragoza RN, BSN.

## 2022-04-16 NOTE — PATIENT INSTRUCTIONS
"  TRIGGER FINGER (STENOSING TENOSYNOVITIS)    WHAT IS TRIGGER FINGER?        Trigger finger is a problem with the tendons in your hand that makes it hard for you to straighten 1 or more fingers after you bend them. Tendons are strong bands of tissue that attach muscle to bone. A sheath, or covering, surrounds the tendons that go to your fingers. Tendons usually move easily through the sheaths. Trigger finger is an irritation and thickening of a tendon sheath that traps your tendon or makes it difficult for your tendon to move through the sheath.    Trigger finger is also called stenosing tenosynovitis or digital flexor tenosynovitis.    WHAT IS THE CAUSE?    The exact cause of trigger finger is not known. It may be that trigger finger is caused by overuse of the hand and fingers, such as from work or sports that use your fingers a lot. You may be more likely to get trigger finger if you have diabetes or rheumatoid arthritis.    WHAT ARE THE SYMPTOMS?    Trigger finger can happen in your thumb or any finger, but your middle and ring finger are affected most often. Symptoms may include:    A snap, pop or sudden jerk (\"trigger\" motion) when you try to straighten your finger  Not being able to straighten your finger at all  Pain at the base of your finger or palm, or in your fingers    HOW IS IT DIAGNOSED?    Your healthcare provider will examine you and ask about your symptoms, activities, and medical history. You may have X-rays or other scans.    HOW IS IT TREATED?    Your provider may give your finger a shot of steroid medicine to reduce the irritation and swelling so that the tendon can slide more easily through the sheath. In some cases you may need surgery to remove part of the tendon sheath.    HOW CAN I TAKE CARE OF MYSELF?    To help relieve swelling and pain:    Put an ice pack, gel pack, or package of frozen vegetables wrapped in a cloth, on the area every 3 to 4 hours for up to 20 minutes at a time.  Take " pain medicine, such as acetaminophen, ibuprofen, or other medicine as directed by your provider. Nonsteroidal anti-inflammatory medicines (NSAIDs), such as ibuprofen, may cause stomach bleeding and other problems. These risks increase with age. Read the label and take as directed. Unless recommended by your healthcare provider, do not take for more than 10 days.  Follow your healthcare provider's instructions. Ask your provider:    How and when you will hear your test results  How long it will take to recover  What activities you should avoid, including how much you can lift, and when you can return to your normal activities  How to take care of yourself at home  What symptoms or problems you should watch for and what to do if you have them  Make sure you know when you should come back for a checkup.    HOW CAN I HELP PREVENT TRIGGER FINGER?    Since the cause of trigger finger is not known, there is no sure way to prevent it. Follow safety rules and use any protective equipment recommended for your work or sport. Avoid activities that cause pain.    EXERCISES:    https://www.[employer]/blogs/articles/trigger-finger-exercises    Developed by [employer].  Published by [employer].  Copyright  2014 [employer] and/or one of its subsidiaries. All rights reserved.

## 2022-04-16 NOTE — PROGRESS NOTES
CHIEF COMPLAINT:  Consult of the Right Hand and Consult of the Left Hand       HISTORY OF PRESENT ILLNESS  Mr. Wood is a pleasant 76 year old year old male who presents to clinic today with right index finger and thumb triggering.  Loco explains that within th    Onset: gradual  Location: right index finger and thumb  Quality:  sharp  Duration: 5 days but patient reports in one week he had two episodes of locking.   Severity: 0/10 at worst  Timing:intermittent episodes of locking.   Modifying factors:  resting and non-use makes it better, movement and use makes it worse  Associated signs & symptoms: pain and locking  Previous similar pain: Yes, believes he has been seen at a hand clinic before for this issue.   Treatments to date: None    Additional history: as documented    Review of Systems:    Have you recently had a a fever, chills, weight loss? No    Do you have any vision problems? No    Do you have any chest pain or edema? No    Do you have any shortness of breath or wheezing?  Yes, shortness of breath with recent anemia.     Do you have stomach problems? No    Do you have any numbness or focal weakness? No    Do you have diabetes? No    Do you have problems with bleeding or clotting? No    Do you have an rashes or other skin lesions? No    MEDICAL HISTORY  Patient Active Problem List   Diagnosis     Esophageal reflux     Lumbago     CLL (chronic lymphocytic leukemia) (H)     HYPERLIPIDEMIA LDL GOAL <130     Mass of skin     Health Care Home     Vitamin D deficiency     Osteoarthritis of hip     OA (osteoarthritis) of hip     Hx of bilateral hip replacements     Insomnia     BPH (benign prostatic hyperplasia)     Acute bilateral low back pain without sciatica     Prostate nodule     Chondromalacia, knee, right     Complex tear of medial meniscus of right knee as current injury     Herpes zoster with keratoconjunctivitis, od     Post herpetic neuralgia     Aftercare following surgery of the  musculoskeletal system     Paroxysmal atrial fibrillation (H)     Chronic kidney disease, stage 3 (moderate)     Fusion of spine, lumbar region     Gluteal pain     Pelvic obliquity     Hamstring tightness     History of spinal fusion     Right hip pain     Strain of gastrocnemius muscle of left lower extremity       Current Outpatient Medications   Medication Sig Dispense Refill     acyclovir (ZOVIRAX) 800 MG tablet Take 1 tablet (800 mg) by mouth 2 times daily 60 tablet 0     atorvastatin (LIPITOR) 20 MG tablet Take 1 tablet (20 mg) by mouth daily 90 tablet 3     cyanocobalamin (VITAMIN B-12) 100 MCG tablet Take 1 tablet (100 mcg) by mouth daily       famotidine (PEPCID) 40 MG tablet Take 1 tablet (40 mg) by mouth nightly as needed for heartburn 90 tablet 4     folic acid (FOLVITE) 1 MG tablet Take 1 tablet (1 mg) by mouth daily       ibrutinib (IMBRUVICA) 420 MG tablet Take 1 tablet (420 mg) by mouth daily 28 tablet 2     levothyroxine (SYNTHROID/LEVOTHROID) 50 MCG tablet Take 1 tablet (50 mcg) by mouth daily 90 tablet 3     omeprazole (PRILOSEC) 10 MG DR capsule TAKE 1 CAPSULE BY MOUTH EVERY DAY 30 TO 60 MINUTES BEFORE A MEAL 90 capsule 1     predniSONE (DELTASONE) 20 MG tablet Take 3 tablets (60 mg) by mouth daily 180 tablet 1     vitamin D3 (CHOLECALCIFEROL) 50 mcg (2000 units) tablet Take 1 tablet by mouth daily       zolpidem (AMBIEN) 5 MG tablet Take 1 tablet (5 mg) by mouth nightly as needed for sleep 15 tablet 5       Allergies   Allergen Reactions     Dilaudid [Hydromorphone] Itching     Morphine Itching       Family History   Problem Relation Age of Onset     Heart Disease Father      Cerebrovascular Disease Father          OF STROKE      Cancer Father         melonoma     Melanoma Father      Hyperlipidemia Father      Thyroid Disease Father      Cancer Mother         Lung cancer     Other Cancer Mother         lung; heavy smoker     Cerebrovascular Disease Paternal Uncle         Aneurism      Breast Cancer Maternal Aunt          from it     Cancer Paternal Uncle      Cancer Paternal Aunt      Skin Cancer No family hx of      Glaucoma No family hx of      Macular Degeneration No family hx of        Additional medical/Social/Surgical histories reviewed in [facility] and updated as appropriate.       PHYSICAL EXAM  There were no vitals taken for this visit.    General  - normal appearance, in no obvious distress  Musculoskeletal - right thumb and index finger  - inspection: no atrophy, normal joint alignment, no swelling  - palpation: No tenderness palmar A1 pulley   - ROM:  MCP 90 deg flexion   0 deg extension    deg flexion   0 deg extension   DIP 80 deg flexion   0 deg extension   NO*palpable snap at the A1 pulley with active flexion, reproducible  -ROM thumb full at IP and MP  - strength: 5/5  strength   Neuro  - no numbness, no motor deficit, grossly normal coordination, normal muscle tone  Skin  - no ecchymosis, erythema, warmth, or induration, no obvious rash    IMAGING : Deferred     ASSESSMENT & PLAN  Mr. Wood is a 76 year old year old male who presents to clinic today with intermittent trigger finger of right thumb and index fingers.  No evidence for triggering or painful palpable nodules today.    Diagnosis: Trigger finger right first and second.    Loco and I had a discussion regarding suspected trigger finger.  At this time he is not currently symptomatic, but clinical history is suggestive of stenosing tenosynovitis.  I recommended that we start with the most conservative measures including use of Voltaren gel for the thumb and second digit.  I would like him to use an oval 8 splint for his index finger as this is the most symptomatic historically.  We discussed consideration for corticosteroid injection under ultrasound guidance and at that this would be a bit early to pursue this unless more consistent.  We also discussed future surgical intervention at his last resort.    Of  late finger splint was dispensed.  Handout with home exercises, discussion of use of Voltaren gel provided and he can follow-up as needed.    It was a pleasure seeing Loco today.    Arnaud Melton DO, CAQSM  Primary Care Sports Medicine

## 2022-04-16 NOTE — LETTER
4/16/2022    RE: Loco Wood  [address] [id number]       CHIEF COMPLAINT:  Consult of the Right Hand and Consult of the Left Hand       HISTORY OF PRESENT ILLNESS  Mr. Wood is a pleasant 76 year old year old male who presents to clinic today with right index finger and thumb triggering.  Loco explains that within th    Onset: gradual  Location: right index finger and thumb  Quality:  sharp  Duration: 5 days but patient reports in one week he had two episodes of locking.   Severity: 0/10 at worst  Timing:intermittent episodes of locking.   Modifying factors:  resting and non-use makes it better, movement and use makes it worse  Associated signs & symptoms: pain and locking  Previous similar pain: Yes, believes he has been seen at a hand clinic before for this issue.   Treatments to date: None    Additional history: as documented    Review of Systems:    Have you recently had a a fever, chills, weight loss? No    Do you have any vision problems? No    Do you have any chest pain or edema? No    Do you have any shortness of breath or wheezing?  Yes, shortness of breath with recent anemia.     Do you have stomach problems? No    Do you have any numbness or focal weakness? No    Do you have diabetes? No    Do you have problems with bleeding or clotting? No    Do you have an rashes or other skin lesions? No    MEDICAL HISTORY  Patient Active Problem List   Diagnosis     Esophageal reflux     Lumbago     CLL (chronic lymphocytic leukemia) (H)     HYPERLIPIDEMIA LDL GOAL <130     Mass of skin     Health Care Home     Vitamin D deficiency     Osteoarthritis of hip     OA (osteoarthritis) of hip     Hx of bilateral hip replacements     Insomnia     BPH (benign prostatic hyperplasia)     Acute bilateral low back pain without sciatica     Prostate nodule     Chondromalacia, knee, right     Complex tear of medial meniscus of right knee as current injury     Herpes zoster with  keratoconjunctivitis, od     Post herpetic neuralgia     Aftercare following surgery of the musculoskeletal system     Paroxysmal atrial fibrillation (H)     Chronic kidney disease, stage 3 (moderate)     Fusion of spine, lumbar region     Gluteal pain     Pelvic obliquity     Hamstring tightness     History of spinal fusion     Right hip pain     Strain of gastrocnemius muscle of left lower extremity       Current Outpatient Medications   Medication Sig Dispense Refill     acyclovir (ZOVIRAX) 800 MG tablet Take 1 tablet (800 mg) by mouth 2 times daily 60 tablet 0     atorvastatin (LIPITOR) 20 MG tablet Take 1 tablet (20 mg) by mouth daily 90 tablet 3     cyanocobalamin (VITAMIN B-12) 100 MCG tablet Take 1 tablet (100 mcg) by mouth daily       famotidine (PEPCID) 40 MG tablet Take 1 tablet (40 mg) by mouth nightly as needed for heartburn 90 tablet 4     folic acid (FOLVITE) 1 MG tablet Take 1 tablet (1 mg) by mouth daily       ibrutinib (IMBRUVICA) 420 MG tablet Take 1 tablet (420 mg) by mouth daily 28 tablet 2     levothyroxine (SYNTHROID/LEVOTHROID) 50 MCG tablet Take 1 tablet (50 mcg) by mouth daily 90 tablet 3     omeprazole (PRILOSEC) 10 MG DR capsule TAKE 1 CAPSULE BY MOUTH EVERY DAY 30 TO 60 MINUTES BEFORE A MEAL 90 capsule 1     predniSONE (DELTASONE) 20 MG tablet Take 3 tablets (60 mg) by mouth daily 180 tablet 1     vitamin D3 (CHOLECALCIFEROL) 50 mcg (2000 units) tablet Take 1 tablet by mouth daily       zolpidem (AMBIEN) 5 MG tablet Take 1 tablet (5 mg) by mouth nightly as needed for sleep 15 tablet 5       Allergies   Allergen Reactions     Dilaudid [Hydromorphone] Itching     Morphine Itching       Family History   Problem Relation Age of Onset     Heart Disease Father      Cerebrovascular Disease Father          OF STROKE      Cancer Father         melonoma     Melanoma Father      Hyperlipidemia Father      Thyroid Disease Father      Cancer Mother         Lung cancer     Other Cancer Mother          lung; heavy smoker     Cerebrovascular Disease Paternal Uncle         Aneurism     Breast Cancer Maternal Aunt          from it     Cancer Paternal Uncle      Cancer Paternal Aunt      Skin Cancer No family hx of      Glaucoma No family hx of      Macular Degeneration No family hx of        Additional medical/Social/Surgical histories reviewed in [facility] and updated as appropriate.       PHYSICAL EXAM  There were no vitals taken for this visit.    General  - normal appearance, in no obvious distress  Musculoskeletal - right thumb and index finger  - inspection: no atrophy, normal joint alignment, no swelling  - palpation: No tenderness palmar A1 pulley   - ROM:  MCP 90 deg flexion   0 deg extension    deg flexion   0 deg extension   DIP 80 deg flexion   0 deg extension   NO*palpable snap at the A1 pulley with active flexion, reproducible  -ROM thumb full at IP and MP  - strength: 5/5  strength   Neuro  - no numbness, no motor deficit, grossly normal coordination, normal muscle tone  Skin  - no ecchymosis, erythema, warmth, or induration, no obvious rash    IMAGING : Deferred     ASSESSMENT & PLAN  Mr. Wood is a 76 year old year old male who presents to clinic today with intermittent trigger finger of right thumb and index fingers.  No evidence for triggering or painful palpable nodules today.    Diagnosis: Trigger finger right first and second.    Loco and I had a discussion regarding suspected trigger finger.  At this time he is not currently symptomatic, but clinical history is suggestive of stenosing tenosynovitis.  I recommended that we start with the most conservative measures including use of Voltaren gel for the thumb and second digit.  I would like him to use an oval 8 splint for his index finger as this is the most symptomatic historically.  We discussed consideration for corticosteroid injection under ultrasound guidance and at that this would be a bit early to pursue this unless  more consistent.  We also discussed future surgical intervention at his last resort.    Of late finger splint was dispensed.  Handout with home exercises, discussion of use of Voltaren gel provided and he can follow-up as needed.    It was a pleasure seeing Loco today.    Arnaud Melton DO, CAM  Primary Care Sports Medicine

## 2022-04-25 NOTE — PROGRESS NOTES
Oncology/Hematology Visit Note  Apr 26, 2022   Oncologist: Dr. He Burns-->Dr. Gavino Monahan    Reason for Visit: follow up of CLL    History of Present Illness: Loco Wood is a 76 year old male with CLL. He was diagnosed 2/12/2007 with CLL, Lyles stage 0, followed clinically since.  At diagnosis WBC 17.8, ALC 11.2, hemoglobin 15.2, platelets 188.  Flow consistent with CLL.  No opportunistic infections, with IgG in the normal range during follow up. Due to rising lymphocyte count, it was recommended he consider starting on treatment. He is currently undergoing treatment with ibrutinib, which he began on 5/14/19. Please see previous notes for further details on the patient's history.     Interval History:  Loco presents for oncology follow-up visit today. He has been following with Dr. Burns, but will be establishing care with Dr. Monahan next month. He continues to tolerate ibrutinib well without major issues.     He is doing well. His energy is better and he has been able to work out since restarting prednisone 2 weeks ago. He denies acute concerns today. Denies unintentional weight loss, fatigue, or fevers. He had 1 episode of night sweats after visit with Dr. Burns 2 weeks ago, but this only happened once and has not recurred. No nausea/vomiting. No bleeding or new lumps or bumps. Breathing is good. No CP. Normal bowel function. Eating OK. ROS is otherwise negative.     Physical Exam:   BP (!) 142/75 (BP Location: Right arm, Patient Position: Sitting, Cuff Size: Adult Regular)   Pulse 71   Temp 97  F (36.1  C) (Tympanic)   Resp 16   Wt 74.4 kg (164 lb)   SpO2 99%   BMI 25.69 kg/m     General: well appearing, no acute distress, pleasant male  HEENT: normocephalic, atraumatic, PERRLA, some scleral icterus b/l (L>>R)  Lymph: no palpable cervical, supraclavicular or axillary lymphadenopathy   CV: regular rate and rhythm, no murmurs  Lungs: clear to auscultation bilaterally, no  wheezes/rales/rhonchi  Abd: soft, positive bowel sounds, non-distended, non-tender  MSK: full range of motion in all four extremities, no peripheral edema  Neuro: alert and oriented x3, CN grossly intact   Psych: appropriate mood and affect  Skin: no rashes or lesions    Laboratory Data: Reviewed labs today.   Latest Reference Range & Units 04/26/22 09:28   Sodium 133 - 144 mmol/L 142   Potassium 3.4 - 5.3 mmol/L 3.7   Chloride 94 - 109 mmol/L 104   Carbon Dioxide 20 - 32 mmol/L 30   Urea Nitrogen 7 - 30 mg/dL 20   Creatinine 0.66 - 1.25 mg/dL 1.25   GFR Estimate >60 mL/min/1.73m2 60 (L) [1]   Calcium 8.5 - 10.1 mg/dL 8.9   Anion Gap 3 - 14 mmol/L 8   Albumin 3.4 - 5.0 g/dL 3.6   Protein Total 6.8 - 8.8 g/dL 5.7 (L)   Bilirubin Total 0.2 - 1.3 mg/dL 1.8 (H)   Alkaline Phosphatase 40 - 150 U/L 74   ALT 0 - 70 U/L 27   AST 0 - 45 U/L 14   Glucose 70 - 99 mg/dL 160 (H)   WBC 4.0 - 11.0 10e3/uL 25.2 (H)   Hemoglobin 13.3 - 17.7 g/dL 11.4 (L)   Hematocrit 40.0 - 53.0 % 36.7 (L)   Platelet Count 150 - 450 10e3/uL 181   RBC Count 4.40 - 5.90 10e6/uL 3.23 (L)   MCV 78 - 100 fL 114 (H)   MCH 26.5 - 33.0 pg 35.3 (H)   MCHC 31.5 - 36.5 g/dL 31.1 (L)   RDW 10.0 - 15.0 % 18.2 (H)   % Neutrophils % 38   % Lymphocytes % 59   % Monocytes % 3   % Eosinophils % 0   % Basophils % 0   Absolute Basophils 0.0 - 0.2 10e3/uL 0.0   Absolute Neutrophil 1.6 - 8.3 10e3/uL 9.6 (H)   Absolute Lymphocytes 0.8 - 5.3 10e3/uL 14.9 (H)   Absolute Monocytes 0.0 - 1.3 10e3/uL 0.8   Absolute Eosinophils 0.0 - 0.7 10e3/uL 0.0     Assessment and Plan:    # Chronic lymphocytic leukemia, in response to ibrutinib  # 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 He is tolerating treatment very well thus far. He had a brief episode of atrial fibrillation after starting ibrutinib but has been doing well lately. Due to cost considerations, he decided on his own to wean down on ibrutinib and not take it  daily as prescribed. Currently, he is only taking ibrutinib 2-3 times per week and still has a large supply at home. His ALC today is elevated to 14.9. His WBC is elevated, but I suspect this is related to steroid use (see below) rather than infection. He has been afebrile. He had 1 episode of NS since last visit and this did not recur. His platelet count is normal today. Will discuss ALC result with Dr. Burns.     In [place] (1/2022), he developed Alexander' positive hemolytic anemia, which responded well to a brief course of prednisone. He then followed up with Dr. Burns 4/2022 and was anemic with hgb 8.4 and elevated bilirubin of 4.2 on 4/12. Dr. Burns resumed 60 mg daily prednisone at that time for recurrence of AIHA. Dr. Burns also previously discussed adding rituximab with patient, but he prefers not to do that at this time as long as the prednisone works.     Today his hgb improved to 11.4 and his bilirubin also improved to 1.8. Will discuss steroid plan with Dr. Burns.     He is being referred to Dr. Monahan for long-term CLL care and has appointment on 5/27/22 to establish care.     # History of shingles  Continue Acyclovir 800 mg BID as long as he is on prednisone to reduce risk of shingles.    Addendum: Per Dr. Burns, decrease prednisone by 10mg every week until he gets to 20mg daily, and we should likely slow the taper from there. I called patient and reviewed this plan with patient who verbalized understanding. He will discuss further taper plans from 20mg with Dr. Monahan at next visit on 5/27. Also discussed that his ALC is elevated and we recommend taking the ibrutinib daily as prescribed. However, he is hesitant about taking it daily due to cost of the medication and states that his ALC was previously fine when he was not taking it daily. Discussed that he should at least try to increase ibrutinib to 4-5 times per week since he is currently only taking it 2-3 times per week. Patient states he will try  to take ibrutinib minimum 3 times per week.     45 minutes spent on the date of the encounter doing chart review, review of test results, interpretation of tests, patient visit, documentation and discussion with other provider(s)     Jackie Nowak PA-C  [facility] [address] [address] [phone]

## 2022-04-26 NOTE — LETTER
4/26/2022         RE: Loco Wood  [address] [facility] [id number]      Oncology/Hematology Visit Note  Apr 26, 2022   Oncologist: Dr. He Burns-->Dr. Gavino Monahan    Reason for Visit: follow up of CLL    History of Present Illness: Loco Wood is a 76 year old male with CLL. He was diagnosed 2/12/2007 with CLL, Lyles stage 0, followed clinically since.  At diagnosis WBC 17.8, ALC 11.2, hemoglobin 15.2, platelets 188.  Flow consistent with CLL.  No opportunistic infections, with IgG in the normal range during follow up. Due to rising lymphocyte count, it was recommended he consider starting on treatment. He is currently undergoing treatment with ibrutinib, which he began on 5/14/19. Please see previous notes for further details on the patient's history.     Interval History:  Loco presents for oncology follow-up visit today. He has been following with Dr. Burns, but will be establishing care with Dr. Monahan next month. He continues to tolerate ibrutinib well without major issues.     He is doing well. His energy is better and he has been able to work out since restarting prednisone 2 weeks ago. He denies acute concerns today. Denies unintentional weight loss, fatigue, or fevers. He had 1 episode of night sweats after visit with Dr. Burns 2 weeks ago, but this only happened once and has not recurred. No nausea/vomiting. No bleeding or new lumps or bumps. Breathing is good. No CP. Normal bowel function. Eating OK. ROS is otherwise negative.     Physical Exam:   BP (!) 142/75 (BP Location: Right arm, Patient Position: Sitting, Cuff Size: Adult Regular)   Pulse 71   Temp 97  F (36.1  C) (Tympanic)   Resp 16   Wt 74.4 kg (164 lb)   SpO2 99%   BMI 25.69 kg/m     General: well appearing, no acute distress, pleasant male  HEENT: normocephalic, atraumatic, PERRLA, some scleral icterus b/l (L>>R)  Lymph: no palpable cervical, supraclavicular or axillary lymphadenopathy   CV:  regular rate and rhythm, no murmurs  Lungs: clear to auscultation bilaterally, no wheezes/rales/rhonchi  Abd: soft, positive bowel sounds, non-distended, non-tender  MSK: full range of motion in all four extremities, no peripheral edema  Neuro: alert and oriented x3, CN grossly intact   Psych: appropriate mood and affect  Skin: no rashes or lesions    Laboratory Data: Reviewed labs today.   Latest Reference Range & Units 04/26/22 09:28   Sodium 133 - 144 mmol/L 142   Potassium 3.4 - 5.3 mmol/L 3.7   Chloride 94 - 109 mmol/L 104   Carbon Dioxide 20 - 32 mmol/L 30   Urea Nitrogen 7 - 30 mg/dL 20   Creatinine 0.66 - 1.25 mg/dL 1.25   GFR Estimate >60 mL/min/1.73m2 60 (L) [1]   Calcium 8.5 - 10.1 mg/dL 8.9   Anion Gap 3 - 14 mmol/L 8   Albumin 3.4 - 5.0 g/dL 3.6   Protein Total 6.8 - 8.8 g/dL 5.7 (L)   Bilirubin Total 0.2 - 1.3 mg/dL 1.8 (H)   Alkaline Phosphatase 40 - 150 U/L 74   ALT 0 - 70 U/L 27   AST 0 - 45 U/L 14   Glucose 70 - 99 mg/dL 160 (H)   WBC 4.0 - 11.0 10e3/uL 25.2 (H)   Hemoglobin 13.3 - 17.7 g/dL 11.4 (L)   Hematocrit 40.0 - 53.0 % 36.7 (L)   Platelet Count 150 - 450 10e3/uL 181   RBC Count 4.40 - 5.90 10e6/uL 3.23 (L)   MCV 78 - 100 fL 114 (H)   MCH 26.5 - 33.0 pg 35.3 (H)   MCHC 31.5 - 36.5 g/dL 31.1 (L)   RDW 10.0 - 15.0 % 18.2 (H)   % Neutrophils % 38   % Lymphocytes % 59   % Monocytes % 3   % Eosinophils % 0   % Basophils % 0   Absolute Basophils 0.0 - 0.2 10e3/uL 0.0   Absolute Neutrophil 1.6 - 8.3 10e3/uL 9.6 (H)   Absolute Lymphocytes 0.8 - 5.3 10e3/uL 14.9 (H)   Absolute Monocytes 0.0 - 1.3 10e3/uL 0.8   Absolute Eosinophils 0.0 - 0.7 10e3/uL 0.0     Assessment and Plan:    # Chronic lymphocytic leukemia, in response to ibrutinib  # 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 He is tolerating treatment very well thus far. He had a brief episode of atrial fibrillation after starting ibrutinib but has been doing well lately. Due to cost  considerations, he decided on his own to wean down on ibrutinib and not take it daily as prescribed. Currently, he is only taking ibrutinib 2-3 times per week and still has a large supply at home. His ALC today is elevated to 14.9. His WBC is elevated, but I suspect this is related to steroid use (see below) rather than infection. He has been afebrile. He had 1 episode of NS since last visit and this did not recur. His platelet count is normal today. Will discuss ALC result with Dr. Burns.     In [place] (1/2022), he developed Alexander' positive hemolytic anemia, which responded well to a brief course of prednisone. He then followed up with Dr. Burns 4/2022 and was anemic with hgb 8.4 and elevated bilirubin of 4.2 on 4/12. Dr. Burns resumed 60 mg daily prednisone at that time for recurrence of AIHA. Dr. Burns also previously discussed adding rituximab with patient, but he prefers not to do that at this time as long as the prednisone works.     Today his hgb improved to 11.4 and his bilirubin also improved to 1.8. Will discuss steroid plan with Dr. Burns.     He is being referred to Dr. Monahan for long-term CLL care and has appointment on 5/27/22 to establish care.     # History of shingles  Continue Acyclovir 800 mg BID as long as he is on prednisone to reduce risk of shingles.    45 minutes spent on the date of the encounter doing chart review, review of test results, interpretation of tests, patient visit, documentation and discussion with other provider(s)         Jackie Nowak PA-C   [facility]

## 2022-04-26 NOTE — NURSING NOTE
"Oncology Rooming Note    April 26, 2022 10:04 AM   Loco Wood is a 76 year old male who presents for:    Chief Complaint   Patient presents with     Blood Draw     Labs drawn by venipuncture by rn in lab. Vital signs taken.     Oncology Clinic Visit     CLL (chronic lymphocytic leukemia) (H)     Initial Vitals: BP (!) 142/75 (BP Location: Right arm, Patient Position: Sitting, Cuff Size: Adult Regular)   Pulse 71   Temp 97  F (36.1  C) (Tympanic)   Resp 16   Wt 74.4 kg (164 lb)   SpO2 99%   BMI 25.69 kg/m   Estimated body mass index is 25.69 kg/m  as calculated from the following:    Height as of 4/7/22: 1.702 m (5' 7\").    Weight as of this encounter: 74.4 kg (164 lb). Body surface area is 1.88 meters squared.  No Pain (0) Comment: Data Unavailable   No LMP for male patient.  Allergies reviewed: Yes  Medications reviewed: Yes    Medications: Medication refills not needed today.  Pharmacy name entered into [facility]:    [id number] - [place], [place] - [address] AT [facility] [address]  [facility] [place] - [address]  [facility] [place] - [address] [facility] [place], [place] - [address] [facility] [place] [facility] [place], [place] - [address]  [facility] - [place], [place] - [address] AT [place]    Clinical concerns: Please discuss lab results and plan of care for long term prednisone use.        Evangelina Anderson LPN April 26, 2022 10:05 AM                "

## 2022-04-26 NOTE — NURSING NOTE
Chief Complaint   Patient presents with     Blood Draw     Labs drawn by venipuncture by rn in lab. Vital signs taken.     Labs drawn by venipuncture by RN in lab. Vital signs taken. Pt checked in to next appointment.    Emy Candelario RN

## 2022-05-10 NOTE — TELEPHONE ENCOUNTER
"Refill Request: Acyclovir 800mg     Last prescribing provider Dr. Burns     Last clinic visit date 4/28/22    Any missed appointments/no show since last visit no     Recommendations for requested medication from note \"Continue Acyclovir 800 mg BID as long as he is on prednisone to reduce risk of shingle\"      Next clinic date 5/27/22     Any other pertinent information NA         "

## 2022-05-13 NOTE — LETTER
"  5/13/2022      RE: Loco Wood  [address] [facility] [id number]     Dear Colleague,    Thank you for referring your patient, Loco Wood, to the [facility] [place]. Please see a copy of my visit note below.    ESTABLISHED PATIENT FOLLOW-UP:  Follow Up of the Right Hand       HISTORY OF PRESENT ILLNESS  Mr. Wood is a pleasant 77 year old year old male who presents to clinic today for follow-up of right hand/finger pain    Diagnosis symptoms are increasing on the right hand especially thumb and second digit.  He also describes more of a sensation of cramping of his hand and spasm preventing him from holding a pencil or performing activities/fine motor skills.  Interestingly enough he also notes that this is now occurring as well as in the left hand, but unable to specifically describe which joint.    He denies any neck pain today.  He does note that he has had some pain in the left forearm that radiates down toward the wrist.    He has not specifically noted any catching or locking of the digits of his right hand.  At his last visit he was asymptomatic on exam, but stenosing tenosynovitis was most likely consideration.    Denies any noticeable weakness of his hands when spasms are not present    Additional medical/Social/Surgical histories reviewed in EPIC and updated as appropriate.    REVIEW OF SYSTEMS (5/13/2022)  CONSTITUTIONAL: Denies fever and weight loss  GASTROINTESTINAL: Denies abdominal pain, nausea, vomiting  MUSCULOSKELETAL: See HPI  SKIN: Denies any recent rash or lesion  NEUROLOGICAL: Denies numbness or focal weakness     PHYSICAL EXAM  Ht 1.702 m (5' 7\")   Wt 74.4 kg (164 lb)   BMI 25.69 kg/m      General  - normal appearance, in no obvious distress  Musculoskeletal - cervical spine  - inspection: normal bone and joint alignment, no obvious kyphosis  - palpation: no paravertebral or bony tenderness  - ROM: Decreased range of motion " sidebending rotation without significant pain.  - strength: upper extremities 5/5 in all planes  - special tests:  (-) Spurling bilaterally  Musculoskeletal - bilateral hands  - inspection: no atrophy, normal joint alignment, no swelling  - palpation: No tenderness palmar A1 pulley of any digit  - ROM: Full ROM of all digits in flexion extension. No palpable snap at the A1 pulley with active flexion, reproducible  -ROM thumb full at IP and MP  - strength: 5/5  strength   Neuro  - no numbness, no motor deficit, grossly normal coordination, normal muscle tone  Skin  - no ecchymosis, erythema, warmth, or induration, no obvious rash    IMAGING : Cervical x-ray 2 views. Final results and radiologist's interpretation, available in the [facility] health record. Images were reviewed with the patient/family members in the office today. My personal interpretation of the performed imaging is significant, multilevel moderate to severe degenerative disc disease, greatest at C6/C7 and C3-C4.      ASSESSMENT & PLAN  Mr. Wood is a 77 year old year old male who presents to clinic today with Follow Up of the Right Hand    New symptoms progressing to left hand and now described as bilateral intermittent hand cramping and spasms preventing patient from utilizing their pencil.  This is not consistent with previous concern of stenosing tenosynovitis.  Denies numbness or tingling, but does have additional pain of left forearm at times.  Cervical x-ray concerning for more severe degenerative disc disease which may be playing a role in current symptomatology.    Recommend we continue diagnostic measures to rule out neurologic impingement.  Recent blood work including CMP, CBC with stable anemia.  No other electrolyte disturbances.      We may consider EMG in the future, but for now I think an MRI would provide the most information for us.  He will continue with a symptom diary.    It was a pleasure seeing Loco.    Arnaud Melton, DO  CAQSM  Primary Care Sports Medicine  [facility]        Again, thank you for allowing me to participate in the care of your patient.      Sincerely,    Arnaud Melton, DO     declines

## 2022-05-13 NOTE — PROGRESS NOTES
"ESTABLISHED PATIENT FOLLOW-UP:  Follow Up of the Right Hand       HISTORY OF PRESENT ILLNESS  Mr. Wood is a pleasant 77 year old year old male who presents to clinic today for follow-up of right hand/finger pain    Diagnosis symptoms are increasing on the right hand especially thumb and second digit.  He also describes more of a sensation of cramping of his hand and spasm preventing him from holding a pencil or performing activities/fine motor skills.  Interestingly enough he also notes that this is now occurring as well as in the left hand, but unable to specifically describe which joint.    He denies any neck pain today.  He does note that he has had some pain in the left forearm that radiates down toward the wrist.    He has not specifically noted any catching or locking of the digits of his right hand.  At his last visit he was asymptomatic on exam, but stenosing tenosynovitis was most likely consideration.    Denies any noticeable weakness of his hands when spasms are not present    Additional medical/Social/Surgical histories reviewed in [facility] and updated as appropriate.    REVIEW OF SYSTEMS (5/13/2022)  CONSTITUTIONAL: Denies fever and weight loss  GASTROINTESTINAL: Denies abdominal pain, nausea, vomiting  MUSCULOSKELETAL: See HPI  SKIN: Denies any recent rash or lesion  NEUROLOGICAL: Denies numbness or focal weakness     PHYSICAL EXAM  Ht 1.702 m (5' 7\")   Wt 74.4 kg (164 lb)   BMI 25.69 kg/m      General  - normal appearance, in no obvious distress  Musculoskeletal - cervical spine  - inspection: normal bone and joint alignment, no obvious kyphosis  - palpation: no paravertebral or bony tenderness  - ROM: Decreased range of motion sidebending rotation without significant pain.  - strength: upper extremities 5/5 in all planes  - special tests:  (-) Spurling bilaterally  Musculoskeletal - bilateral hands  - inspection: no atrophy, normal joint alignment, no swelling  - palpation: No tenderness palmar A1 " pulley of any digit  - ROM: Full ROM of all digits in flexion extension. No palpable snap at the A1 pulley with active flexion, reproducible  -ROM thumb full at IP and MP  - strength: 5/5  strength   Neuro  - no numbness, no motor deficit, grossly normal coordination, normal muscle tone  Skin  - no ecchymosis, erythema, warmth, or induration, no obvious rash    IMAGING : Cervical x-ray 2 views. Final results and radiologist's interpretation, available in the [facility] health record. Images were reviewed with the patient/family members in the office today. My personal interpretation of the performed imaging is significant, multilevel moderate to severe degenerative disc disease, greatest at C6/C7 and C3-C4.      ASSESSMENT & PLAN  Mr. Wood is a 77 year old year old male who presents to clinic today with Follow Up of the Right Hand    New symptoms progressing to left hand and now described as bilateral intermittent hand cramping and spasms preventing patient from utilizing their pencil.  This is not consistent with previous concern of stenosing tenosynovitis.  Denies numbness or tingling, but does have additional pain of left forearm at times.  Cervical x-ray concerning for more severe degenerative disc disease which may be playing a role in current symptomatology.    Recommend we continue diagnostic measures to rule out neurologic impingement.  Recent blood work including CMP, CBC with stable anemia.  No other electrolyte disturbances.      We may consider EMG in the future, but for now I think an MRI would provide the most information for us.  He will continue with a symptom diary.    It was a pleasure seeing Loco.    Arnaud Melton,  [facility]  Primary Care Sports Medicine  [facility]

## 2022-05-19 NOTE — PROGRESS NOTES
Assessment & Plan     Urinary frequency  Negative for UA  - UA Macro with Reflex to Micro and Culture - lab collect; Future  - CBC with platelets and differential; Future  - CBC with platelets and differential  - UA Macro with Reflex to Micro and Culture - lab collect  - Urine Microscopic    CLL (chronic lymphocytic leukemia) (H)  Reviewed CBC  Hemoglobin on low side  Continue with Prednisone, 20 mg a day  Follow up with Oncology     Abdominal pain, right lower quadrant  Exam was normal today.  Advised with supportive care.  Discussed with patient if symptoms recur or worsen he is to follow-up in the ER       Chest discomfort, when taking a deep breath, no pain.   Exam was normal, no fever, no cough, no wheezing,  No shortness of breath, no history of fever or chills.  Patient does takes prednisone, discussed with patient chest discomfort, feeling of palpitation could be related to use of prednisone.  Patient did have Stress/ echo  a year ago which was normal.    Did offer to do CXR, however, pt would like to anamaria and see if symptoms resolved.    No follow-ups on file.    Oscar Girard MD  [facility]    Harshil Adan is a 77 year old who presents for the following health issues:  History of hemolytic anemia he has been on prednisone since April 12, 2022.  Currently, he is on 20 mg daily.  He reports for the past few days he has been having, feeling discomfort in his chest when he take a deep breath.  Otherwise,  he has no cough, no nausea, no fever, no short of breath, no wheezing.  No chest pain, no chest pain, or  wheezing or cough with exertion.  Denies lower extremity edema.    Concern of right lower abdominal pain comes and goes for the past few weeks.  He has no trauma, he reports he feels it more when he is standing.  He has no constipation or diarrhea.  No fever.    No chest pain, no short of breath.  Patient did have an echo stress exam, dobutamine stress, in  April,  12/2021, was normal.    History of Present Illness       Reason for visit:  Discomfort in chest breathing  Symptom onset:  1-3 days ago  Symptoms include:  Discomfort in chest when breathing (heaviness)  Symptom intensity:  Moderate  Symptom progression:  Staying the same  Had these symptoms before:  No  What makes it worse:  No  What makes it better:  No    He eats 2-3 servings of fruits and vegetables daily.He consumes 1 sweetened beverage(s) daily.He exercises with enough effort to increase his heart rate 30 to 60 minutes per day.  He exercises with enough effort to increase his heart rate 4 days per week.   He is taking medications regularly.       Review of Systems   Constitutional, HEENT, cardiovascular, pulmonary, GI, , musculoskeletal, neuro, skin, endocrine and psych systems are negative, except as otherwise noted.      Objective    There were no vitals taken for this visit.  There is no height or weight on file to calculate BMI.  Physical Exam   GENERAL: healthy, alert and no distress  HENT: ear canals and TM's normal, nose and mouth without ulcers or lesions  RESP: lungs clear to auscultation - no rales, rhonchi or wheezes  CV: regular rate and rhythm, normal S1 S2, no S3 or S4, no murmur, click or rub, no peripheral edema and peripheral pulses strong  ABDOMEN: soft, nontender, no hepatosplenomegaly, no masses and bowel sounds normal   (male): normal male genitalia without lesions or urethral discharge, no hernia  MS: no gross musculoskeletal defects noted, no edema  NEURO: Normal strength and tone, mentation intact and speech normal    CBC RESULTS: Recent Labs   Lab Test 05/19/22  1355   WBC 21.5*   RBC 2.88*   HGB 10.7*   HCT 32.9*   *   MCH 37.2*   MCHC 32.5   RDW 18.3*        UA RESULTS:  Recent Labs   Lab Test 05/19/22  1355   COLOR Yellow   APPEARANCE Clear   URINEGLC Negative   URINEBILI Negative   URINEKETONE Negative   SG 1.020   UBLD Trace*   URINEPH 7.0   PROTEIN Negative    UROBILINOGEN 0.2   NITRITE Negative   LEUKEST Negative   RBCU 2-5*   WBCU 0-5         Oscar Girard MD

## 2022-05-19 NOTE — TELEPHONE ENCOUNTER
Having 2 days of pain in chest and feeling of pressure.    Rates pain 4/10 and with each breath.    Declines protocol ER and asks for scheduling.    Warm transferred to scheduling    Kate Mulligan RN  [facility] Nurse Advisor    Reason for Disposition    Difficulty breathing    Additional Information    Negative: Severe difficulty breathing (e.g., struggling for each breath, speaks in single words)    Negative: Difficult to awaken or acting confused (e.g., disoriented, slurred speech)    Negative: Passed out (i.e., fainted, collapsed and was not responding)    Negative: Shock suspected (e.g., cold/pale/clammy skin, too weak to stand, low BP, rapid pulse)    Negative: Chest pain lasting longer than 5 minutes and ANY of the following:* Over 45 years old* Over 30 years old and at least one cardiac risk factor (e.g., diabetes, high blood pressure, high cholesterol, smoker, or strong family history of heart disease)* History of heart disease (i.e., angina, heart attack, heart failure, bypass surgery, takes nitroglycerin)* Pain is crushing, pressure-like, or heavy    Negative: Heart beating < 50 beats per minute OR > 140 beats per minute    Negative: Visible sweat on face or sweat dripping down face    Negative: Sounds like a life-threatening emergency to the triager    Negative: Followed an injury to chest    Negative: SEVERE chest pain    Negative: Pain also in shoulder(s) or arm(s) or jaw    Protocols used: CHEST PAIN-A-[place]

## 2022-05-23 PROBLEM — J18.9 PNEUMONIA: Status: ACTIVE | Noted: 2022-01-01

## 2022-05-23 NOTE — TELEPHONE ENCOUNTER
Pt calling. States he was seen at [facility] on 5[facility] for chest pain. Has had the chest pain with breathing x 5 days. States his lungs and heart were ok. [facility], but pt declined thinking it would not provide additional information. States he has been on prednisone for quite some time.  Symptoms are constant and haven't changed much since seen. Also has anemia. Pt does not think it's heart related. Pt did an at home covid test 1 hour ago and was negative. States he can breathe ok, but with deeper breath feels discomfort. Has been dizzy a little bit, but thought it was from the anemia. Recommended pt go to ER. Pt declined ER. He doesn't want to sit for hours in ER. If serious trouble breathing, would go to ER. Pt requesting order for chest x ray. Offered to send a message to provider he was seen by and pt declined requesting message be sent to PCP to advise.     Reason for Disposition    Difficulty breathing    Additional Information    Negative: Pain also in shoulder(s) or arm(s) or jaw    Negative: SEVERE chest pain    Negative: Followed an injury to chest    Negative: Severe difficulty breathing (e.g., struggling for each breath, speaks in single words)    Negative: Passed out (i.e., fainted, collapsed and was not responding)    Negative: Difficult to awaken or acting confused (e.g., disoriented, slurred speech)    Negative: Shock suspected (e.g., cold/pale/clammy skin, too weak to stand, low BP, rapid pulse)    Negative: Chest pain lasting longer than 5 minutes and ANY of the following:* Over 45 years old* Over 30 years old and at least one cardiac risk factor (e.g., diabetes, high blood pressure, high cholesterol, smoker, or strong family history of heart disease)* History of heart disease (i.e., angina, heart attack, heart failure, bypass surgery, takes nitroglycerin)* Pain is crushing, pressure-like, or heavy    Negative: Heart beating < 50 beats per minute OR > 140 beats per minute    Negative:  Visible sweat on face or sweat dripping down face    Negative: Sounds like a life-threatening emergency to the triager    Protocols used: CHEST PAIN-A-[place]    Shayla Lazcano RN  [facility]

## 2022-05-23 NOTE — ED PROVIDER NOTES
[place] EMERGENCY DEPARTMENT ([facility])  5/23/22  History     Chief Complaint   Patient presents with     Shortness of Breath     Dizziness     Chest Pain     The history is provided by the patient.     Loco Wood is a 77 year old male who has a significant medical history of hemolytic anemia, chronic lymphocytic leukemia, benign prostatic hyperplasia with outflow obstruction, CKD stage III, lumbar fusion, bilateral hip replacements, amongst others, who presents to the Emergency Department with c/o increased shortness of breath, dizziness, chest discomfort and rhinorrhea.  Patient states that the symptoms have been ongoing for the last 5 days.  He reports being worked up in clinic, UA and COVID test were both benign.  He states that symptoms feel different from hemolytic anemia. Patient states that his chest discomfort feels like a pressure, states that makes him feel like passing out at times.  Reports going to the gym on Saturday, was unable to complete his cardio exercises.  He denies any swelling in his legs. He denies dark bloody stools or dark bloody emesis. Patient reports increased urinary frequency and urgency, stating that he gets up approximately 6 times per night to urinate as opposed to, has been urinating a lot more during the day. Reports taking prednisone for the last last 6 weeks due to hemolytic anemia, has been slowly weaning up the prednisone.  Last hemoglobin level was 10.6 on 5/19/2022. He reports a family history of clotting; his father passed away of stroke and his uncle passed away from a brain aneurysm. Denies feeling itchy, but reports itchy in [place] this summer when his bilirubin was approximately 4.  He reports having outpatient follow-up on 5/27, he states he will be seeing a new oncologist then.    Past Medical History  Past Medical History:   Diagnosis Date     Arthritis     hip and toes     Chronic pain      CLL (chronic lymphocytic leukaemia)      Esophageal  reflux      Malignant neoplasm (H)     Leukemia     Paroxysmal atrial fibrillation (H) 2020     PONV (postoperative nausea and vomiting)      Pure hypercholesterolemia      Past Surgical History:   Procedure Laterality Date     ARTHROPLASTY MINIMALLY INVASIVE HIP  5/10/2013    Procedure: ARTHROPLASTY MINIMALLY INVASIVE HIP;  Left Two Incision Total Hip Arthroplasty ;  Surgeon: Desmond Alberts MD;  Location: [facility] OR     ARTHROPLASTY MINIMALLY INVASIVE HIP  2014    Procedure: ARTHROPLASTY MINIMALLY INVASIVE HIP;  Right Total Hip Arthroplasty Minimally Invasive Two Incision  *Latex Free Room;  Surgeon: Desmond Alberts MD;  Location: [facility] OR     BACK SURGERY      back fusion      COLONOSCOPY           COLONOSCOPY N/A 8/15/2017    Procedure: COLONOSCOPY;  colonoscopy;  Surgeon: Chun Mcguire MD;  Location:  GI     GI SURGERY      4 hernia repairs in childhood     HERNIA REPAIR      4 since age 4     IR PAE  3/5/2020     [facility] NONSPECIFIC PROCEDURE   &    (R) inguinal herniorrhapy     [facility] NONSPECIFIC PROCEDURE      (L) inguinal herniorrhaphy     [facility] NONSPECIFIC PROCEDURE      L4-5  L5 S1 fusion - spondylolisthesis     acyclovir (ZOVIRAX) 800 MG tablet  atorvastatin (LIPITOR) 20 MG tablet  cyanocobalamin (VITAMIN B-12) 100 MCG tablet  famotidine (PEPCID) 40 MG tablet  folic acid (FOLVITE) 1 MG tablet  ibrutinib (IMBRUVICA) 420 MG tablet  levothyroxine (SYNTHROID/LEVOTHROID) 50 MCG tablet  omeprazole (PRILOSEC) 10 MG DR capsule  predniSONE (DELTASONE) 20 MG tablet  vitamin D3 (CHOLECALCIFEROL) 50 mcg (2000 units) tablet  zolpidem (AMBIEN) 5 MG tablet      Allergies   Allergen Reactions     Morphine Itching and Rash     Dilaudid [Hydromorphone] Itching     Family History  Family History   Problem Relation Age of Onset     Heart Disease Father      Cerebrovascular Disease Father          OF STROKE      Cancer Father         melonoma     Melanoma Father      Hyperlipidemia Father       Thyroid Disease Father      Cancer Mother         Lung cancer     Other Cancer Mother         lung; heavy smoker     Cerebrovascular Disease Paternal Uncle         Aneurism     Breast Cancer Maternal Aunt          from it     Cancer Paternal Uncle      Cancer Paternal Aunt      Skin Cancer No family hx of      Glaucoma No family hx of      Macular Degeneration No family hx of      Social History   Social History     Tobacco Use     Smoking status: Never Smoker     Smokeless tobacco: Never Used   Vaping Use     Vaping Use: Never used   Substance Use Topics     Alcohol use: Yes     Alcohol/week: 0.0 standard drinks     Comment: moderate, social     Drug use: No      Past medical history, past surgical history, medications, allergies, family history, and social history were reviewed with the patient. No additional pertinent items.     I have reviewed the Medications, Allergies, Past Medical and Surgical History, and Social History in the Epic system.    Review of Systems   HENT: Positive for rhinorrhea.    Respiratory: Positive for chest tightness and shortness of breath.    Cardiovascular: Positive for chest pain. Negative for leg swelling.   Gastrointestinal: Negative for blood in stool and vomiting.   Genitourinary: Positive for frequency and urgency.   Neurological: Positive for dizziness.   All other systems reviewed and are negative.    A complete review of systems was performed with pertinent positives and negatives noted in the HPI, and all other systems negative.    Physical Exam   BP: 132/86  Pulse: 114  Temp: 98.3  F (36.8  C)  Resp: 16  Weight: 73 kg (161 lb)  SpO2: 100 %      Physical Exam  Vitals and nursing note reviewed.   Constitutional:       Appearance: He is not toxic-appearing.   HENT:      Head: Normocephalic and atraumatic.      Right Ear: External ear normal.      Left Ear: External ear normal.      Nose: Nose normal.   Eyes:      Extraocular Movements: Extraocular movements intact.       Conjunctiva/sclera: Conjunctivae normal.   Cardiovascular:      Rate and Rhythm: Regular rhythm. Tachycardia present.   Pulmonary:      Effort: Pulmonary effort is normal.      Breath sounds: Normal breath sounds.   Chest:      Chest wall: No tenderness.   Abdominal:      General: There is no distension.      Palpations: Abdomen is soft.      Tenderness: There is no abdominal tenderness.   Musculoskeletal:         General: No deformity or signs of injury.      Cervical back: Neck supple. No rigidity.      Right lower leg: No edema.      Left lower leg: No edema.   Skin:     General: Skin is warm.      Findings: No rash.   Neurological:      General: No focal deficit present.      Mental Status: He is alert. Mental status is at baseline.      Comments: GCS 15, no facial droop, moving all extremities spontaneously   Psychiatric:         Mood and Affect: Mood normal.         Behavior: Behavior normal.         ED Course     At 5:24 PM the patient was seen and examined by Katya Enriquez MD in Room ED12.        Procedures  Results for orders placed during the hospital encounter of 05/23/22    POC US ECHO LIMITED    [facility] Procedure Note    Limited Bedside ED Cardiac Ultrasound:    PROCEDURE: PERFORMED BY: Dr. Katya Enriquez MD  INDICATIONS/SYMPTOM:  Chest Pain and Shortness of Breath  PROBE: Cardiac phased array probe  BODY LOCATION: Chest  FINDINGS:  The ultrasound was performed utilizing the subcostal, parasternal long axis and parasternal short axis views.  Cardiac contractility:  Present  Gross estimation of cardiac kinesis: normal  Pericardial Effusion:  None  RV:LV ratio: LV > RV  IVC:  Diameter:  IVC diameter expiration (IVCe) <2 cm  IVC diameter inspiration (IVCi) <2 cm  Collapsibility:  IVC collapses < 50% with inspiration  INTERPRETATION:    Chamber size and motion were grossly normal with LV > RV, normal cardiac kinesis.  No pericardial effusion was found.  IVC visualized and findings  indicate normovolemia.  IMAGE DOCUMENTATION: Images were archived to PACs system.              EKG Interpretation:      Interpreted by Katya Enriquez MD  Time reviewed: 1715  Symptoms at time of EKG: CP, SOB   Rhythm: Sinus Tachycardia   Rate: normal  Axis: normal  Ectopy: none  Conduction: normal  ST Segments/ T Waves: No ST-T wave changes  Q Waves: none  Comparison to prior: Unchanged aside from rate compared to 4/12/21    Clinical Impression: normal EKG    The medical record was reviewed and interpreted.  Current labs reviewed and interpreted.  Current images reviewed and interpreted: PNA, no PE or CHF.  EKG reviewed and interpreted: sinus tachycardia, no STEMI, no clinically significant interval prolongation.     Results for orders placed or performed during the hospital encounter of 05/23/22 (from the past 24 hour(s))   EKG 12-lead, tracing only   Result Value Ref Range    Systolic Blood Pressure  mmHg    Diastolic Blood Pressure  mmHg    Ventricular Rate 105 BPM    Atrial Rate 105 BPM    OR Interval 126 ms    QRS Duration 66 ms     ms    QTc 428 ms    P Axis 69 degrees    R AXIS 33 degrees    T Axis 58 degrees    Interpretation ECG       Sinus tachycardia with Premature atrial complexes  Otherwise normal ECG     POC US ECHO LIMITED    Impression    [facility] Procedure Note      Limited Bedside ED Cardiac Ultrasound:    PROCEDURE: PERFORMED BY: Dr. Katya Enriquez MD  INDICATIONS/SYMPTOM:  Chest Pain and Shortness of Breath  PROBE: Cardiac phased array probe  BODY LOCATION: Chest  FINDINGS:   The ultrasound was performed utilizing the subcostal, parasternal long axis and parasternal short axis views.  Cardiac contractility:  Present  Gross estimation of cardiac kinesis: normal  Pericardial Effusion:  None  RV:LV ratio: LV > RV  IVC:    Diameter:  IVC diameter expiration (IVCe) <2 cm                                                   IVC diameter inspiration (IVCi) <2 cm                                                        Collapsibility:  IVC collapses < 50% with inspiration  INTERPRETATION:    Chamber size and motion were grossly normal with LV > RV, normal cardiac kinesis.  No pericardial effusion was found.  IVC visualized and findings indicate normovolemia.  IMAGE DOCUMENTATION: Images were archived to PACs system.         CBC with platelets differential    Narrative    The following orders were created for panel order CBC with platelets differential.  Procedure                               Abnormality         Status                     ---------                               -----------         ------                     CBC with platelets and d...[[id number]]  Abnormal            Final result               Manual Differential[[id number]]          Abnormal            Final result                 Please view results for these tests on the individual orders.   Comprehensive metabolic panel   Result Value Ref Range    Sodium 138 133 - 144 mmol/L    Potassium 4.0 3.4 - 5.3 mmol/L    Chloride 104 94 - 109 mmol/L    Carbon Dioxide (CO2) 25 20 - 32 mmol/L    Anion Gap 9 3 - 14 mmol/L    Urea Nitrogen 25 7 - 30 mg/dL    Creatinine 1.31 (H) 0.66 - 1.25 mg/dL    Calcium 8.8 8.5 - 10.1 mg/dL    Glucose 180 (H) 70 - 99 mg/dL    Alkaline Phosphatase 56 40 - 150 U/L    AST 19 0 - 45 U/L    ALT 28 0 - 70 U/L    Protein Total 5.9 (L) 6.8 - 8.8 g/dL    Albumin 3.2 (L) 3.4 - 5.0 g/dL    Bilirubin Total 2.5 (H) 0.2 - 1.3 mg/dL    GFR Estimate 57 (L) >60 mL/min/1.73m2   Troponin I   Result Value Ref Range    Troponin I High Sensitivity 10 <79 ng/L   CBC with platelets and differential   Result Value Ref Range    WBC Count 24.9 (H) 4.0 - 11.0 10e3/uL    RBC Count 2.60 (L) 4.40 - 5.90 10e6/uL    Hemoglobin 9.5 (L) 13.3 - 17.7 g/dL    Hematocrit 28.8 (L) 40.0 - 53.0 %     (H) 78 - 100 fL    MCH 36.5 (H) 26.5 - 33.0 pg    MCHC 33.0 31.5 - 36.5 g/dL    RDW 17.7 (H) 10.0 - 15.0 %    Platelet Count 171 150 - 450 10e3/uL    Lactate Dehydrogenase   Result Value Ref Range    Lactate Dehydrogenase 294 (H) 85 - 227 U/L   Manual Differential   Result Value Ref Range    % Neutrophils 27 %    % Lymphocytes 73 %    % Monocytes 0 %    % Eosinophils 0 %    % Basophils 0 %    Absolute Neutrophils 6.7 1.6 - 8.3 10e3/uL    Absolute Lymphocytes 18.2 (H) 0.8 - 5.3 10e3/uL    Absolute Monocytes 0.0 0.0 - 1.3 10e3/uL    Absolute Eosinophils 0.0 0.0 - 0.7 10e3/uL    Absolute Basophils 0.0 0.0 - 0.2 10e3/uL    RBC Morphology Confirmed RBC Indices     Platelet Assessment  Automated Count Confirmed. Platelet morphology is normal.     Automated Count Confirmed. Platelet morphology is normal.    Smudge Cells Present (A) None Seen   D dimer quantitative   Result Value Ref Range    D-Dimer Quantitative <0.27 0.00 - 0.50 ug/mL FEU    Narrative    This D-dimer assay is intended for use in conjunction with a clinical pretest probability assessment model to exclude pulmonary embolism (PE) and deep venous thrombosis (DVT) in outpatients suspected of PE or DVT. The cut-off value is 0.50 ug/mL FEU.   UA with Microscopic reflex to Culture    Specimen: Urine, Midstream   Result Value Ref Range    Color Urine Light Yellow Colorless, Straw, Light Yellow, Yellow    Appearance Urine Clear Clear    Glucose Urine Negative Negative mg/dL    Bilirubin Urine Negative Negative    Ketones Urine Negative Negative mg/dL    Specific Gravity Urine 1.009 1.003 - 1.035    Blood Urine Negative Negative    pH Urine 6.5 5.0 - 7.0    Protein Albumin Urine Negative Negative mg/dL    Urobilinogen Urine Normal Normal, 2.0 mg/dL    Nitrite Urine Negative Negative    Leukocyte Esterase Urine Negative Negative    Mucus Urine Present (A) None Seen /LPF    RBC Urine 1 <=2 /HPF    WBC Urine 1 <=5 /HPF    Narrative    Urine Culture not indicated   Symptomatic; Unknown Influenza A/B & SARS-CoV2 (COVID-19) Virus PCR Multiplex Nasopharyngeal    Specimen: Nasopharyngeal; Swab   Result Value Ref Range     Influenza A PCR Negative Negative    Influenza B PCR Negative Negative    RSV PCR Negative Negative    SARS CoV2 PCR Negative Negative, Testing sent to reference lab. Results will be returned via unsolicited result    Narrative    Testing was performed using the Xpert Xpress CoV2/Flu/RSV Assay on the Cepheid GeneXpert Instrument. This test should be ordered for the detection of SARS-CoV-2 and influenza viruses in individuals who meet clinical and/or epidemiological criteria. Test performance is unknown in asymptomatic patients. This test is for in vitro diagnostic use under the FDA EUA for laboratories certified under CLIA to perform high or moderate complexity testing. This test has not been FDA cleared or approved. A negative result does not rule out the presence of PCR inhibitors in the specimen or target RNA in concentration below the limit of detection for the assay. If only one viral target is positive but coinfection with multiple targets is suspected, the sample should be re-tested with another FDA cleared, approved, or authorized test, if coinfection would change clinical management. This test was validated by the [facility]. These laboratories are certified under the Clinical  Laboratory Improvement Amendments of 1988 (CLIA-88) as qualified to perform high complexity laboratory testing.   CT Chest Pulmonary Embolism w Contrast    Narrative    EXAM: CT CHEST PULMONARY EMBOLISM W CONTRAST  LOCATION: [facility]  DATE/TIME: 5/23/2022 10:04 PM    INDICATION: Increased shortness of breath. Dizziness. Chest discomfort.  COMPARISON: None.  TECHNIQUE: CT chest pulmonary angiogram during arterial phase injection of IV contrast. Multiplanar reformats and MIP reconstructions were performed. Dose reduction techniques were used.   CONTRAST: 88 mL Isovue-370.    FINDINGS:  ANGIOGRAM CHEST: Pulmonary arteries are normal caliber and negative for pulmonary  emboli. Thoracic aorta is negative for dissection. No CT evidence of right heart strain.    LUNGS AND PLEURA: There are bilateral lower lobe dependent consolidations. Bilateral multifocal groundglass pulmonary infiltrates and nodular infiltrates. Bilateral lower lobe bronchiectasis. No pneumothorax or pleural effusion.    MEDIASTINUM/AXILLAE: No lymph node enlargement. The heart size is normal. Small hiatal hernia.    CORONARY ARTERY CALCIFICATION: Mild.    UPPER ABDOMEN: No acute upper abdominal abnormality.    MUSCULOSKELETAL: Degenerative disease in the spine.      Impression    IMPRESSION:  1.  There is no pulmonary embolus, aortic aneurysm or dissection.  2.  Bilateral multifocal pulmonary infiltrates and nodules or nodular infiltrates may be pneumonia. Short-term follow-up recommended.  3.  Bilateral lower lobe bronchiectasis.  4.  Bilateral lower lobe consolidations, atelectasis versus pneumonia.     *Note: Due to a large number of results and/or encounters for the requested time period, some results have not been displayed. A complete set of results can be found in Results Review.     Medications   cefTRIAXone (ROCEPHIN) 1 g vial to attach to  mL bag for ADULTS or NS 50 mL bag for PEDS (1 g Intravenous New Bag 5/23/22 2342)   azithromycin (ZITHROMAX) 500 mg in sodium chloride 0.9 % 250 mL intermittent infusion (has no administration in time range)   iopamidol (ISOVUE-370) solution 65 mL (65 mLs Intravenous Given 5/23/22 2206)   sodium chloride (PF) 0.9% PF flush 88 mL (88 mLs Intravenous Given 5/23/22 2206)             Assessments & Plan (with Medical Decision Making)   Loco Wood is a 77 year old male who presents for SOB, chest pain, fatigue. Hypoxic here to 88% on RA, improving on 2L NC.  CXR from [facility] with small PNA, not sufficient I feel to explain his symptoms.  Bedside US with preserved EF, no b-lines, no signs of R heart strain.  Labs with WBC 24, his baseline per previous review of  recent labs 2/2 CLL, hb mildly downtrending.  CT shows bilateral PNA, no PE.  Blood cultures drawn and he was started on IV abx.  Admitted to medicine.  I have discussed all test results and the plan of care with the patient, who is agreeable for admission.    I have reviewed the nursing notes.    I have reviewed the findings, diagnosis, plan and need for follow up with the patient.    New Prescriptions    No medications on file       Final diagnoses:   Hypoxia   Chest pain, unspecified type   Shortness of breath   Pneumonia of both lungs due to infectious organism, unspecified part of lung       I, Raúl Robledo am serving as a trained medical scribe to document services personally performed by Katya Enriquez, based on the provider's statements to me.      I, Katya Enriquez, was physically present and have reviewed and verified the accuracy of this note documented by Raúl Robledo.     Katya Enriquez MD  5/23/2022   [facility] EMERGENCY DEPARTMENT

## 2022-05-23 NOTE — TELEPHONE ENCOUNTER
Received additional call from patient. He is asking if he has been scheduled for a CXR yet. Writer advised that this message has not been addressed yet and in order to get scheduled, he would first need an order.     He states that he has decided he would like this message sent to Dr. Girard too (which was provider that originally evaluated him for chest pain)- routing per patient request.     GITA Grimes RN  [facility]

## 2022-05-23 NOTE — TELEPHONE ENCOUNTER
Pt notified. Pt had duplicate orders, so cancelled one of the orders.    Shayla Lazcano RN  [facility]

## 2022-05-23 NOTE — ED TRIAGE NOTES
Pt comes in form home for increased shortness of breath, dizziness, chest discomfort, runny nose for the last 5 days.  Pt was seen at the clinic had a UA, covid test and all tests were negative.

## 2022-05-24 NOTE — PROGRESS NOTES
ESTABLISHED PATIENT FOLLOW-UP VIRTUAL:  Telephone (MRI results )       This encounter was conducted via telephone in lieu of face-to-face encounter due to precautions implemented during COVID-19 pandemic.     HISTORY OF PRESENT ILLNESS  Mr. Wood is a pleasant 77 year old year old male who presents virtually today for follow-up of MRI of cervical spine.    Loco notes that he is incidentally in the emergency room right now, diagnosed with pneumonia after having chest pain.      He also notes increasing pain in right hand, namely thumb and second/third digits of his right hand.  Moderate intensity now.     Of note his initial complaint was sensation of cramping in his hand, fingers not cooperating with task at times, ie writing and difficulty holding pencil.    Additional medical/Social/Surgical histories reviewed in [facility] and updated as appropriate.    REVIEW OF SYSTEMS (5/24/2022)  CONSTITUTIONAL: Denies fever and weight loss  GASTROINTESTINAL: Denies abdominal pain, nausea, vomiting  MUSCULOSKELETAL: See HPI  SKIN: Denies any recent rash or lesion  NEUROLOGICAL: Denies numbness or focal weakness    PHYSICAL EXAMINATION  Unable to perform via telephone visit      IMAGING :   MRI cervical Spine 5/16/22    Impression:   1. Multilevel cervical spondylosis as described in great detail above,  resulting in multilevel neural foraminal narrowing. No high-grade  spinal canal stenosis. No myelopathic cord signal.   2. No abnormal bone marrow signal corresponding to linear lucency seen  on prior radiograph C4, C5 and C6 to suggest an acute fracture. These  are likely artifactual retrospectively.      LIANNA WEINBERG MD      ASSESSMENT & PLAN  Mr. Wood is a 77 year old year old male who presents virtually today with Telephone (MRI results )    Cervical spine MRI does reveal findings of neuroforaminal stenosis, namely at C4-C5 level on right, C3-C4 on left.  No spinal canal stenosis.    Diagnosis:  1. Pain of fingers of  right hand  2. Cervical degenerative disc disease    Given ongoing evolution of symptoms which now fits distribution of carpal tunnel syndrome, we discussed consideration for EMG vs. Diagnostic carpal tunnel injections.  He opts for diagnostic injection and we can proceed once safely discharged from the hospital.    Additionally we may consider EMG if lacking adequate response. Although he does have neuroforaminal stenosis, it does not fit clinical picture of pain in first three digits of hand, therefore we discussed holding off on epidural steroid injections.    Follow up: Schedule US guided carpal tunnel injections when discharged from hospital.     It was a pleasure seeing Loco.    Telephone time: 20 minutes    Arnaud Melton DO, OLLIE  Primary Care Sports Medicine  [facility]

## 2022-05-24 NOTE — CONSULTS
Malignant Hematology Consult Note    Loco Wood   : 1945   MRN: [id number]  Date of service: 2022     REASON FOR CONSULTATION:  We are asked Ketty Jensen MD to evaluate Loco Wood for concern for of AIHA 2/2 CLL.    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 Imaging findings were concerning for bilateral pneumonia, and he was admitted for acute hypoxemic respiratory failure. He was started on Rocephin and Azithromycin as well as supplemental oxygen. His ibrutinib was held due to concerns for infection.    At present, he still has ongoing pleuritic chest pain but is breathing well on oxygen. Denies any cough or shortness of breath. In terms of his CLL, he states that he has been taking ibrutinib about every 8 days up until a month ago as he was concerned about cost and this frequency was maintaining his counts. He was seen at the [facility] for follow up where he was advised to take ibrutinib daily. He decided to start taking it three times a week which he had been doing for the past month. He denies any fevers, chills, night sweats, lymphadenopathy.    REVIEW OF SYSTEMS  A comprehensive review of systems was performed and was otherwise negative except as mentioned in the HPI.     PAST MEDICAL HISTORY  Past Medical History:   Diagnosis Date     Arthritis     hip and toes     Chronic pain      CLL (chronic lymphocytic leukaemia)      Esophageal reflux      Malignant neoplasm (H)     Leukemia     Paroxysmal atrial fibrillation (H) 2020     PONV (postoperative nausea and vomiting)      Pure hypercholesterolemia      PAST SURGICAL HISTORY  Past Surgical History:   Procedure Laterality Date      ARTHROPLASTY MINIMALLY INVASIVE HIP  5/10/2013    Procedure: ARTHROPLASTY MINIMALLY INVASIVE HIP;  Left Two Incision Total Hip Arthroplasty ;  Surgeon: Desmond Alberts MD;  Location: [facility] OR     ARTHROPLASTY MINIMALLY INVASIVE HIP  2/27/2014    Procedure: ARTHROPLASTY MINIMALLY INVASIVE HIP;  Right Total Hip Arthroplasty Minimally Invasive Two Incision  *Latex Free Room;  Surgeon: Desmond Alberts MD;  Location: [facility] OR     BACK SURGERY      back fusion 1994     COLONOSCOPY      2007     COLONOSCOPY N/A 8/15/2017    Procedure: COLONOSCOPY;  colonoscopy;  Surgeon: Chun Mcguire MD;  Location:  GI     GI SURGERY      4 hernia repairs in childhood     HERNIA REPAIR      4 since age 4     IR PAE  3/5/2020     ZZC NONSPECIFIC PROCEDURE  1964 &'95    (R) inguinal herniorrhapy     ZZC NONSPECIFIC PROCEDURE  1949    (L) inguinal herniorrhaphy     ZZ NONSPECIFIC PROCEDURE  1994    L4-5  L5 S1 fusion - spondylolisthesis     SOCIAL HISTORY  Social History     Socioeconomic History     Marital status:      Spouse name: Ellen     Number of children: 0     Years of education: PHD     Highest education level: Not on file   Occupational History     Occupation: Teacher     Employer: [employer]   Tobacco Use     Smoking status: Never Smoker     Smokeless tobacco: Never Used   Vaping Use     Vaping Use: Never used   Substance and Sexual Activity     Alcohol use: Yes     Alcohol/week: 0.0 standard drinks     Comment: moderate, social     Drug use: No     Sexual activity: Not Currently     Partners: Female   Other Topics Concern      Service No     Blood Transfusions No     Caffeine Concern No     Occupational Exposure No     Hobby Hazards No     Sleep Concern No     Stress Concern No     Weight Concern No     Special Diet No     Back Care Yes     Exercise Yes     Comment: Several times a week     Bike Helmet No     Seat Belt Yes     Self-Exams No     Parent/sibling w/ CABG, MI or  angioplasty before 65F 55M? No   Social History Narrative    Social Documentation:7/10        Balanced Diet: YES    Calcium intake:milk and food    Caffeine: 2 cups of coffee per day    Exercise:  type of activity  Wts , stretching, cardio;  3 times per week    Sunscreen:no    Seatbelts:  Yes    Self Breast Exam:  No -     Self Testicular Exam: No    Physical/Emotional/Sexual Abuse: No     Do you feel safe in your environment? Yes        Cholesterol screen up to date: today    Eye Exam up to date: Yes    Dental Exam up to date: Yes    Pap smear up to date: Does Not Apply    Mammogram up to date: Does Not Apply    Dexa Scan up to date: Does Not Apply    Colonoscopy up to date: Yes-    Immunizations up to date: Yes-    Glucose screen if over 40:  No     Luis Alfredo Knox ma                 Social Determinants of Health     Financial Resource Strain: Not on file   Food Insecurity: Not on file   Transportation Needs: Not on file   Physical Activity: Not on file   Stress: Not on file   Social Connections: Not on file   Intimate Partner Violence: Not on file   Housing Stability: Not on file     FAMILY HISTORY  Family History   Problem Relation Age of Onset     Heart Disease Father      Cerebrovascular Disease Father          OF [place]      Cancer Father         melonoma     Melanoma Father      Hyperlipidemia Father      Thyroid Disease Father      Cancer Mother         Lung cancer     Other Cancer Mother         lung; heavy smoker     Cerebrovascular Disease Paternal Uncle         Aneurism     Breast Cancer Maternal Aunt          from it     Cancer Paternal Uncle      Cancer Paternal Aunt      Skin Cancer No family hx of      Glaucoma No family hx of      Macular Degeneration No family hx of        MEDICATIONS  Current Facility-Administered Medications   Medication     acyclovir (ZOVIRAX) tablet 800 mg     atorvastatin (LIPITOR) tablet 20 mg     [START ON 2022] azithromycin (ZITHROMAX) tablet 500  mg     cefTRIAXone (ROCEPHIN) 1 g vial to attach to  mL bag for ADULTS or NS 50 mL bag for PEDS     cyanocobalamin (VITAMIN B-12) tablet 100 mcg     famotidine (PEPCID) tablet 40 mg     folic acid (FOLVITE) tablet 1 mg     levothyroxine (SYNTHROID/LEVOTHROID) tablet 50 mcg     lidocaine (LMX4) cream     lidocaine 1 % 0.1-1 mL     melatonin tablet 1 mg     pantoprazole (PROTONIX) EC tablet 20 mg     polyethylene glycol (MIRALAX) Packet 17 g     predniSONE (DELTASONE) tablet 20 mg     senna-docusate (SENOKOT-S/PERICOLACE) 8.6-50 MG per tablet 1 tablet    Or     senna-docusate (SENOKOT-S/PERICOLACE) 8.6-50 MG per tablet 2 tablet     sodium chloride (PF) 0.9% PF flush 3 mL     sodium chloride (PF) 0.9% PF flush 3 mL     Vitamin D3 (CHOLECALCIFEROL) tablet 50 mcg     Current Outpatient Medications   Medication     acyclovir (ZOVIRAX) 800 MG tablet     atorvastatin (LIPITOR) 20 MG tablet     cyanocobalamin (VITAMIN B-12) 100 MCG tablet     famotidine (PEPCID) 40 MG tablet     folic acid (FOLVITE) 1 MG tablet     ibrutinib (IMBRUVICA) 420 MG tablet     levothyroxine (SYNTHROID/LEVOTHROID) 50 MCG tablet     omeprazole (PRILOSEC) 10 MG DR capsule     predniSONE (DELTASONE) 20 MG tablet     vitamin D3 (CHOLECALCIFEROL) 50 mcg (2000 units) tablet     zolpidem (AMBIEN) 5 MG tablet     ALLERGIES  Allergies   Allergen Reactions     Morphine Itching and Rash     Dilaudid [Hydromorphone] Itching     PHYSICAL EXAM  /61   Pulse 113   Temp 99.9  F (37.7  C) (Oral)   Resp 17   Wt 73 kg (161 lb)   SpO2 92%   BMI 25.99 kg/m     Wt Readings from Last 10 Encounters:   05/23/22 73 kg (161 lb)   05/19/22 73 kg (161 lb)   05/13/22 74.4 kg (164 lb)   04/26/22 74.4 kg (164 lb)   04/12/22 72.5 kg (159 lb 12.8 oz)   04/07/22 74.4 kg (164 lb)   11/02/21 74.4 kg (164 lb)   09/28/21 74.7 kg (164 lb 9.6 oz)   09/23/21 75.7 kg (166 lb 14.4 oz)   09/05/21 74.8 kg (165 lb)     Constitutional: Awake, alert, cooperative, in NAD.  Eyes:  Pupils equal and round, EOMI, sclera clear, conjunctiva normal.  Respiratory: Non-labored breathing, on supplemental oxygen. No use of accessory muscles.  Cardiovascular: No signs of cyanosis. Regular rate and rhythm on telemetry.  Skin: No concerning lesions or rash on exposed areas.  Neurologic: Awake, alert & oriented x3.  Cranial nerves II-XII are grossly intact.   Psych: appropriate affect    LABS  Lab Results   Component Value Date    WBC 15.5 (H) 05/24/2022    WBC 24.9 (H) 05/23/2022    WBC 21.5 (H) 05/19/2022    HGB 8.5 (L) 05/24/2022    HGB 9.5 (L) 05/23/2022    HGB 10.6 (L) 05/19/2022     (L) 05/24/2022     05/23/2022     05/19/2022    ANEU 5.7 05/24/2022    ANEU 6.7 05/23/2022    ANEU 8.4 (H) 05/19/2022    ALYM 3.4 05/24/2022    ALYM 18.2 (H) 05/23/2022    ALYM 12.5 (H) 05/19/2022     (H) 05/24/2022     (H) 05/23/2022     (H) 05/19/2022     Lab Results   Component Value Date     05/23/2022     04/26/2022     04/12/2022    POTASSIUM 4.0 05/23/2022    POTASSIUM 3.7 04/26/2022    POTASSIUM 3.9 04/12/2022    CO2 25 05/23/2022    CO2 30 04/26/2022    CO2 26 04/12/2022    BUN 25 05/23/2022    BUN 20 04/26/2022    BUN 17 04/12/2022    CR 1.31 (H) 05/23/2022    CR 1.25 04/26/2022    CR 1.29 (H) 04/12/2022    DAVID 8.8 05/23/2022    DAVID 8.9 04/26/2022    DAVID 8.3 (L) 04/12/2022    MAG 1.9 02/28/2014     (H) 05/23/2022     06/12/2019     05/31/2019    URIC 5.0 04/29/2015    URIC 5.8 09/19/2014     Lab Results   Component Value Date    AST 19 05/23/2022    AST 14 04/26/2022    AST 32 04/12/2022    ALT 28 05/23/2022    ALT 27 04/26/2022    ALT 34 04/12/2022    ALKPHOS 56 05/23/2022    ALKPHOS 74 04/26/2022    ALKPHOS 79 04/12/2022       IMAGING  As mentioned above in HPI    PERFORMANCE STATUS: ECOG 0-1    IMPRESSION AND PLAN  Loco Wood is a 77 year old man with a history of CKD stage III, paroxysmal afib, and autoimmune hemolytic  anemia secondary to CLL, who was admitted for acute hypoxemic respiratory failure due to bilateral pneumonia with labs concerning for worsening anemia.    #CLL  Considering that his CLL was first diagnosed in 2007, there have been new data since then looking into the cytogenetics of the disease that would help us with prognostication. We will check FISH and cytogenetics on peripheral blood to look for mutations that will help us answer this. After a long discuss with Mr. Wood, we advised him that he should continue taking ibrutinib daily as this would give him the best control of the disease. His underlying pneumonia is not a contraindication to withhold therapy as it appears to be more bacterial in nature and less likely PJP or fungal. With his underlying paroxysmal afib, we went over other treatment options that may have less cardiac side effects such as acalabrutinib vs venetoclax + obinutuzumab which he could consider in the future if his CLL does not respond to ibrutinib. Will also check IgG levels and give IVIG if levels are low. Will set up follow up with Dr. Monahan when he is closer to discharge.     #Autoimmune Hemolytic Anemia  Based on labs, it appears that his anemia has been worsening with a taper of prednisone. His hemoglobin dropped from 10.6 to 8.5 in less than a week. His hemolytic labs show an increase in LDH but it is not a profound rise compared to his worsening anemia. Retic is 8.7% indicating that his marrow is functioning and responding to the hemolysis. With the worsening anemia, we discussed that to treat AIHA would be to treat the underlying cause, in this case, CLL. For now, we can try a higher dose of steroids to help improve his counts. Will also re-check YEN to confirm autoimmune cause of anemia as well as checking for nutritional deficiencies.     Recommendations:  - Check PBS to evaluate for schistocytes (Ordered for you)  - Obtain flow cytometry, cytogenetics, IgH mutation, TP53  mutation on peripheral blood  (Ordered for you)  - Check YEN, B12, folate, iron studies (Ordered for you)  - Check IgG levels  (Ordered for you)  - Discontinue prednisone, start methylprednisolone 1 mg/kg. Please start new dose of steroids after obtaining above labs.  - Restart home ibrutinib 420 mg daily (Ordered for you)    Patient was seen and plan of care was discussed with attending physician Dr. Karthikeyan Thompson.    Thank you for allowing us to participate in the care of this patient. Please do not hesitate to contact us if there are any concerns or questions.      Keyur Raza MD   Heme/Onc/Transplant Fellow  Pgr #[phone]

## 2022-05-24 NOTE — LETTER
5/24/2022       RE: Loco Wood  [address] [id number]     Dear Colleague,    Thank you for referring your patient, Loco Wood, to the [facility] [place] at [facility]. Please see a copy of my visit note below.    ESTABLISHED PATIENT FOLLOW-UP VIRTUAL:  Telephone (MRI results )       This encounter was conducted via telephone in lieu of face-to-face encounter due to precautions implemented during COVID-19 pandemic.     HISTORY OF PRESENT ILLNESS  Mr. Wood is a pleasant 77 year old year old male who presents virtually today for follow-up of MRI of cervical spine.    Loco notes that he is incidentally in the emergency room right now, diagnosed with pneumonia after having chest pain.      He also notes increasing pain in right hand, namely thumb and second/third digits of his right hand.  Moderate intensity now.     Of note his initial complaint was sensation of cramping in his hand, fingers not cooperating with task at times, ie writing and difficulty holding pencil.    Additional medical/Social/Surgical histories reviewed in [facility] and updated as appropriate.    REVIEW OF SYSTEMS (5/24/2022)  CONSTITUTIONAL: Denies fever and weight loss  GASTROINTESTINAL: Denies abdominal pain, nausea, vomiting  MUSCULOSKELETAL: See HPI  SKIN: Denies any recent rash or lesion  NEUROLOGICAL: Denies numbness or focal weakness    PHYSICAL EXAMINATION  Unable to perform via telephone visit      IMAGING :   MRI cervical Spine 5/16/22    Impression:   1. Multilevel cervical spondylosis as described in great detail above,  resulting in multilevel neural foraminal narrowing. No high-grade  spinal canal stenosis. No myelopathic cord signal.   2. No abnormal bone marrow signal corresponding to linear lucency seen  on prior radiograph C4, C5 and C6 to suggest an acute fracture. These  are likely artifactual retrospectively.       LIANNA WEINBERG MD      ASSESSMENT & PLAN  Mr. Wood is a 77 year old year old male who presents virtually today with Telephone (MRI results )    Cervical spine MRI does reveal findings of neuroforaminal stenosis, namely at C4-C5 level on right, C3-C4 on left.  No spinal canal stenosis.    Diagnosis:  1. Pain of fingers of right hand  2. Cervical degenerative disc disease    Given ongoing evolution of symptoms which now fits distribution of carpal tunnel syndrome, we discussed consideration for EMG vs. Diagnostic carpal tunnel injections.  He opts for diagnostic injection and we can proceed once safely discharged from the hospital.    Additionally we may consider EMG if lacking adequate response. Although he does have neuroforaminal stenosis, it does not fit clinical picture of pain in first three digits of hand, therefore we discussed holding off on epidural steroid injections.    Follow up: Schedule US guided carpal tunnel injections when discharged from hospital.     It was a pleasure seeing Loco.    Telephone time: 20 minutes    Arnaud Melton DO, [facility]  Primary [facility] Medicine  [facility]

## 2022-05-24 NOTE — H&P
[facility]    History and Physical - Medicine Service, [facility] TEAM        Date of Admission:  5/23/2022    Assessment & Plan      Loco Wood is a 77 year old male admitted on 5/23/2022. He has a history of CLL on Ibrutinib, Auto-immune hemolytic anemia on prednisone taper, hx Herpes zoster, and CKD3a and is admitted for several days of SOB, chills, pleuritic chest pain with CT imaging suspicious for a bilateral pneumonia vs other infiltrate.     # AHRF secondary to possiblebilateral bacterial pneumonia vs AIHA  # Bilateral lower lobe bronchiectasis  # Bilateral lower lobe consolidations  Pt p/w several days of progressive fatigue/dyspnea, subjective chills, and pleuritic chest pain after having been seen by a PCP for similar issue. Found to be hypoxic here requiring 3-4L on admission shortly after arriving to ED. No/rare non-productive (non-bloody) cough. Has had intermittent chills for several days with associated nausea, no emesis; no other localizing symptoms for infection. No changes in urination. CTPE imaging neg for PE but has bl infiltrates (nodules and infiltrates vs nodular infiltrates) which may be suspicious for pneumonia, also with bronchiectasis and bilateral lower lobe consolidations (atelectasis vs PNA). Pt says this does not feel like his previous PNA. No cardiac/HF hx (neg cardiac workup 2021, bedside POCUS grossly WNL), no hx ARDS but will keep high level of suspicion. Does have hx AIHA with mildly worsened labs, which could also present in a similar manner with overlapping symptoms of fatigue/fevers. UA bland and without casts; less concerning for pulmonary-renal syndrome.   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   Developed Alexander' positive hemolytic anemia 1/2022, which responded well to brief course of prednisone. During 4/12 Heme/Onc f/u pt restarted on a prednisone taper (60-->20). Previously discussed adding Rituximab. Currently hemodynamically stable, however with some reported dyspnea/hypoxia per above. No cough/hemoptysis. Also c/o pleuritic chest pain, high sensitivity trop 10 and no acute EKG changes overall not c/f cardiac ischemia.    - Heme/Onc consult   - Continue Prednisone 20 for now   - Continue PTA folate, B12   - Haptoglobin, LDH, DBili, retic count, smear ordered    # CLL (dx 2/2007)  Started Ibrutinib in 5/2019. Last saw Oncology in clinic 4/26/22, however is going to be est care with new oncologist. On admission WBC 24.9 (baseline in past 2yrs has been ~6-10) with Absolute lymphocytes 18.2 in setting of acute illness c/f infection above. Pt has most recently been taking his Ibrutinib 3-4x/week; also on Prednisone taper for AIHA.     - CBC trend   - Hematology/Oncology consult   - Defer Ibrutinib to day team/consulting services   - Has OP appt with Dr. Monahan to est care on 5/27    # Hx Herpes Zoster c/b post-herpetic neuralgia   - PTA ppx Acyclovir while on prednisone     # CKD Stage 3A  Cr baseline approx 1.3-1.6. eGFR 57.   - Avoid nephrotoxic agents as able   - Routine renal function monitoring    # Degenerative disc disease  # Bilateral intermittent hand cramping, spasms suspicious for cervical impingement  Has follow-up phone appointment with Dr. Melton TODAY 5/24 at 8:20am    "- Phone is currently dead in the ED; no chargers available. Pt requests that someone call Dr. Melton's office and give them his room's landline number to call so that he may still have his appointment    # Hypothyroid   - PTA levothyroxine   - Check TSH given chills, fatigue    # HLD- PTA atorvastatin  # GERD- PTA omeprazole + PRN famotidine (also on Pred)  # Insomnia- has PRN Ambien as OP  # Vitamin supplements- PTA B12, folate, D3       Diet: Combination Diet Regular Diet AdultRegular  DVT Prophylaxis: Mechanical  Piña Catheter: Not present  Fluids: None/PO  Central Lines: None  Cardiac Monitoring: None  Code Status: Full CodeFull, confirmed with pt    Clinically Significant Risk Factors Present on Admission             # Hypoalbuminemia: Albumin = 3.2 g/dL (Ref range: 3.4 - 5.0 g/dL) on admission, will monitor as appropriate      # Overweight: Estimated body mass index is 25.99 kg/m  as calculated from the following:    Height as of 5/19/22: 1.676 m (5' 6\").    Weight as of this encounter: 73 kg (161 lb).      Disposition Plan   Expected Discharge:  2-3 days   Anticipated discharge location:  Awaiting care coordination huddle  Delays:     workup of pulmonary infiltrates and narrowing treatment as able; workup and acute management of CLL, AIHA        The patient's care was discussed with the Attending Physician, Dr. Kenyon.    Ketty Mukherjee MD  Medicine Service, [facility]  Securely message with the Vocera Web Console (learn more here)  Text page via [employer] Paging/Directory   Please see signed in provider for up to date coverage information    ______________________________________________________________________    Chief Complaint   Subacute pleuritic chest pain, fatigue, chills    History is obtained from the patient    History of Present Illness   Loco Wood is a 77 year old male who presents with several days of pleuritic chest pain, " fatigue, and chills. Pleuritic chest pain was concerning so pt saw PCP on 5/19 for several symptoms and was discharged with recommendations to follow-up with oncology for his other medical problems (CLL, AIHA) and slightly more abnormal labs at the time. Denies much of any cough- reports it as rare and non-productive. No hemoptysis. Says he has felt a little more nauseated in past 1-2 days associated with more frequent chills, has not taken temp at home. No hematemesis, still able to take PO. No dysuria or changes in urination, no diarrhea/constipation. No HA/vision changes. No changes in weight/swelling. No PND/orthopena.     Has had a few episodes of abrupt onset abdominal pain, first on R side and then later on L side, which he first noticed while performing ab exercises on a captain's chair at the gym several days ago. Has also had a few episodes of severe bl anterior leg pain, always while laying in bed, lasting approx 5-10min which resolve spontaneously. Additionally has seen a provider for bl hand pain and cramps thought to be trigger fingers at first, but later found to have DDD and suspicion for a nerve impingement and is supposed to have f/u phone appt today 5/24 for his symptoms.    In ED: requiring 3-4L O2, CT shows bl infiltrates, atelectasis, bronchiectasis with some suspicion for PNA. s/p CTX, Azithromycin. Admit to medicine.     Review of Systems    The 10 point Review of Systems is negative other than noted in the HPI or here.     Past Medical History    I have reviewed this patient's medical history and updated it with pertinent information if needed.   Past Medical History:   Diagnosis Date     Arthritis     hip and toes     Chronic pain      CLL (chronic lymphocytic leukaemia)      Esophageal reflux      Malignant neoplasm (H)     Leukemia     Paroxysmal atrial fibrillation (H) 2/5/2020     PONV (postoperative nausea and vomiting)      Pure hypercholesterolemia         Past Surgical History   I  have reviewed this patient's surgical history and updated it with pertinent information if needed.  Past Surgical History:   Procedure Laterality Date     ARTHROPLASTY MINIMALLY INVASIVE HIP  5/10/2013    Procedure: ARTHROPLASTY MINIMALLY INVASIVE HIP;  Left Two Incision Total Hip Arthroplasty ;  Surgeon: Desmond Alberts MD;  Location: [facility] OR     ARTHROPLASTY MINIMALLY INVASIVE HIP  2014    Procedure: ARTHROPLASTY MINIMALLY INVASIVE HIP;  Right Total Hip Arthroplasty Minimally Invasive Two Incision  *Latex Free Room;  Surgeon: Desmond Alberts MD;  Location: [facility] OR     BACK SURGERY      back fusion      COLONOSCOPY           COLONOSCOPY N/A 8/15/2017    Procedure: COLONOSCOPY;  colonoscopy;  Surgeon: Chun Mcguire MD;  Location:  GI     GI SURGERY      4 hernia repairs in childhood     HERNIA REPAIR      4 since age 4     IR PAE  3/5/2020     Z NONSPECIFIC PROCEDURE   &    (R) inguinal herniorrhapy     Z NONSPECIFIC PROCEDURE      (L) inguinal herniorrhaphy     ZZ NONSPECIFIC PROCEDURE      L4-5  L5 S1 fusion - spondylolisthesis        Social History   I have reviewed this patient's social history and updated it with pertinent information if needed. Loco Wood  reports that he has never smoked. He has never used smokeless tobacco. He reports current alcohol use. He reports that he does not use drugs.    Family History   I have reviewed this patient's family history and updated it with pertinent information if needed.  Family History   Problem Relation Age of Onset     Heart Disease Father      Cerebrovascular Disease Father          OF [place]      Cancer Father         melonoma     Melanoma Father      Hyperlipidemia Father      Thyroid Disease Father      Cancer Mother         Lung cancer     Other Cancer Mother         lung; heavy smoker     Cerebrovascular Disease Paternal Uncle         Aneurism     Breast Cancer Maternal Aunt          from it      Cancer Paternal Uncle      Cancer Paternal Aunt      Skin Cancer No family hx of      Glaucoma No family hx of      Macular Degeneration No family hx of        Prior to Admission Medications   Prior to Admission Medications   Prescriptions Last Dose Informant Patient Reported? Taking?   acyclovir (ZOVIRAX) 800 MG tablet   No No   Sig: Take 1 tablet (800 mg) by mouth 2 times daily   atorvastatin (LIPITOR) 20 MG tablet   No No   Sig: Take 1 tablet (20 mg) by mouth daily   cyanocobalamin (VITAMIN B-12) 100 MCG tablet   No No   Sig: Take 1 tablet (100 mcg) by mouth daily   famotidine (PEPCID) 40 MG tablet   No No   Sig: Take 1 tablet (40 mg) by mouth nightly as needed for heartburn   folic acid (FOLVITE) 1 MG tablet   No No   Sig: Take 1 tablet (1 mg) by mouth daily   ibrutinib (IMBRUVICA) 420 MG tablet   No No   Sig: Take 1 tablet (420 mg) by mouth daily   levothyroxine (SYNTHROID/LEVOTHROID) 50 MCG tablet   No No   Sig: Take 1 tablet (50 mcg) by mouth daily   omeprazole (PRILOSEC) 10 MG DR capsule   No No   Sig: TAKE 1 CAPSULE BY MOUTH EVERY DAY 30 TO 60 MINUTES BEFORE A MEAL   predniSONE (DELTASONE) 20 MG tablet   No No   Sig: Take 3 tablets (60 mg) by mouth daily   vitamin D3 (CHOLECALCIFEROL) 50 mcg (2000 units) tablet   Yes No   Sig: Take 1 tablet by mouth daily   zolpidem (AMBIEN) 5 MG tablet   No No   Sig: Take 1 tablet (5 mg) by mouth nightly as needed for sleep      Facility-Administered Medications: None     Allergies   Allergies   Allergen Reactions     Morphine Itching and Rash     Dilaudid [Hydromorphone] Itching       Physical Exam   Vital Signs: Temp: 98.3  F (36.8  C) Temp src: Oral BP: 114/59 Pulse: 87   Resp: 16 SpO2: 96 % O2 Device: Nasal cannula Oxygen Delivery: 6 LPM  Weight: 161 lbs 0 oz    GEN: NAD, alert, pleasant, laying flat comfortably in bed  HEENT: NCAT, EOMI, MMM  CVS: RRR, Normal S1, S2. Grade 2/6 systolic murmur heard best LUSB  Pulm: CTAB, no W/R/R, on 3L NC. No cough during exam. Chest  wall pain reported with deep inspiration  Abd: soft, NTND, no rebound/guarding  Ext: WWP, no ANALY  Skin: no rash, lesion, or bruising noted on exposed surfaces  Neuro: A&Ox4, answering questions appropriately, moving all extremities spontaneously      Data   Data reviewed today: I reviewed all medications, new labs and imaging results over the last 24 hours. EKG with Sinus tach, no acute ST changes.    Recent Labs   Lab 05/24/22  0321 05/23/22  1724 05/19/22  1355   WBC 15.5* 24.9* 21.5*   HGB 8.5* 9.5* 10.6*   * 111* 112*   * 171 165   NA  --  138  --    POTASSIUM  --  4.0  --    CHLORIDE  --  104  --    CO2  --  25  --    BUN  --  25  --    CR  --  1.31*  --    ANIONGAP  --  9  --    DAVID  --  8.8  --    GLC  --  180*  --    ALBUMIN  --  3.2*  --    PROTTOTAL  --  5.9*  --    BILITOTAL  --  2.5*  --    ALKPHOS  --  56  --    ALT  --  28  --    AST  --  19  --      Recent Results (from the past 24 hour(s))   XR Chest 2 Views    Narrative    CHEST TWO VIEWS  5/23/2022 2:48 PM     HISTORY: Atypical chest pain.    COMPARISON: February 10, 2021       Impression    IMPRESSION: Question some patchy infiltrates at the right base  compared to previous. Left lung grossly clear. The cardiac silhouette  is not enlarged. Pulmonary vasculature is unremarkable.     MICHEAL FREITAS MD         SYSTEM ID:  [id number]   POC US ECHO LIMITED    Impression    [facility] Procedure Note      Limited Bedside ED Cardiac Ultrasound:    PROCEDURE: PERFORMED BY: Dr. Katya Enriquez MD  INDICATIONS/SYMPTOM:  Chest Pain and Shortness of Breath  PROBE: Cardiac phased array probe  BODY LOCATION: Chest  FINDINGS:   The ultrasound was performed utilizing the subcostal, parasternal long axis and parasternal short axis views.  Cardiac contractility:  Present  Gross estimation of cardiac kinesis: normal  Pericardial Effusion:  None  RV:LV ratio: LV > RV  IVC:    Diameter:  IVC diameter expiration (IVCe) <2 cm                                                    IVC diameter inspiration (IVCi) <2 cm                                                       Collapsibility:  IVC collapses < 50% with inspiration  INTERPRETATION:    Chamber size and motion were grossly normal with LV > RV, normal cardiac kinesis.  No pericardial effusion was found.  IVC visualized and findings indicate normovolemia.  IMAGE DOCUMENTATION: Images were archived to PACs system.         CT Chest Pulmonary Embolism w Contrast    Narrative    EXAM: CT CHEST PULMONARY EMBOLISM W CONTRAST  LOCATION: [facility]  DATE/TIME: 5/23/2022 10:04 PM    INDICATION: Increased shortness of breath. Dizziness. Chest discomfort.  COMPARISON: None.  TECHNIQUE: CT chest pulmonary angiogram during arterial phase injection of IV contrast. Multiplanar reformats and MIP reconstructions were performed. Dose reduction techniques were used.   CONTRAST: 88 mL Isovue-[id number].    FINDINGS:  ANGIOGRAM CHEST: Pulmonary arteries are normal caliber and negative for pulmonary emboli. Thoracic aorta is negative for dissection. No CT evidence of right heart strain.    LUNGS AND PLEURA: There are bilateral lower lobe dependent consolidations. Bilateral multifocal groundglass pulmonary infiltrates and nodular infiltrates. Bilateral lower lobe bronchiectasis. No pneumothorax or pleural effusion.    MEDIASTINUM/AXILLAE: No lymph node enlargement. The heart size is normal. Small hiatal hernia.    CORONARY ARTERY CALCIFICATION: Mild.    UPPER ABDOMEN: No acute upper abdominal abnormality.    MUSCULOSKELETAL: Degenerative disease in the spine.      Impression    IMPRESSION:  1.  There is no pulmonary embolus, aortic aneurysm or dissection.  2.  Bilateral multifocal pulmonary infiltrates and nodules or nodular infiltrates may be pneumonia. Short-term follow-up recommended.  3.  Bilateral lower lobe bronchiectasis.  4.  Bilateral lower lobe consolidations, atelectasis versus pneumonia.

## 2022-05-24 NOTE — PROGRESS NOTES
[facility]    Medicine Progress Note - Medicine Service, [place] TEAM 4    Date of Admission:  5/23/2022    Assessment & Plan   Loco Wood is a 77 year old male with history of CLL, auto-immune hemolytic anemia (on prednisone taper), and CKD3a presenting with several days of SOB, chills, pleuritic chest pain with CT imaging suspicious for bilateral pneumonia vs other infiltrate. Differential concerning for bacterial vs fungal pneumonia (history of immunosuppression) with possible worsening of dyspnea in setting of anemia. Following blood/sputum cultures and fungal serologies for further evaluation and treating with 7 day course of empiric antibiotics. Heme/Onc following for auto-immune hemolytic anemia and CLL work up.    Interval Changes:  1. Sputum samples  2. Labs: Type/Screen, Beta-D Glucan, Mycobacterium   3. Methylprednisolone 1 mg/kg  4. Labs: Hemolytic and CLL Labs      # AHRF secondary to possiblebilateral bacterial pneumonia vs AIHA  # Bilateral lower lobe bronchiectasis  # Bilateral lower lobe consolidations  Several days of worsenign fatigue/dyspnea, subjective chills, and pleuritic chest pain with 3-4L oxygen requirement with bilateral nodules/infiltrates, bronchiectasis, and bilateral lower lobe consolidations on CT (5/23). Elevated inflammatory markers (WBC, Procal) concerning for infectious etiology including community acquired PNA and atypical/fungal PNA (immunosupression). Patient also not on PJP prophylaxis so some concern for PJP as well. Anemia and history of CLL may also be contributing as well.  Plan to treat with empiric antibiotics for 7 days given immunocompromised state with ceftriaxone and azithromycin. COVID, Flu, RSV, MRSA negative. Following blood cultures. Will check Beta-D Glucan, mycoplasma PNA using respiratory panel, and sputum sample for further evaluation. Continue to monitor.   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  Hgb on admission 9.5 down from baseline (~10-12 past year) and now 8.5 on recheck (5/24). Currently hemodynamically stable with labs concerning for marrow responsive anemia (elevated LDH, bilirubin, reticulocyte count). To treat will likely need to treat underlying CLL, but for now will attempt to manage with high dose steroids. Continue to monitor   - Heme/Onc consulted, appreciate recommendations    - Methylprednisolone 1 mg/kg (discontinue prednisone)   - Following Haptoglobin, LDH, DBili   - YEN, B12, folate, iron studies, IgG levels   - CBC with peripheral blood smear   - Continue PTA folate, B12   - Transfuse if Hgb < 7 (Type and Screen)     # CLL (dx 2/2007)  History of CLL (2007) managed on Ibrutinib (5/2019) which patient self selects to not take daily. WBC 24.9 on admission (up from baseline ~6-10) down to 15.5 on recheck (5/24). Some concern for conversion to leukemia in setting of elevated white count with abnormal CT findings, however presentation is likely secondary to infectious etiology in setting of acute illness. Will further evaluate with FISH and cytogenetics on peripheral smear to help with disease prognostication. Plan to restart daily ibrutinib to help control disease (may consider acalabrutinib vs venetoclax + obinutuzumab for less cardiotoxic effects if ibrutinib resistance).   - Hematology/Oncology consulted, appreciate recommendations   -Flow cytometry, cytogenetics, IgH mutation, TP53 mutation   - Has OP appt with Dr. Monahan to [facility] on 5/27   - CBC trend    # CKD Stage 3A  Cr of 1.31 on admission up from baseline (~1.3-1.6).  Suspect Cr elevation due of decreased fluid intake in setting of acute illness. Will continue to monitor.    - Avoid nephrotoxic agents as able   - Routine renal function monitoring      Chronic/Resolved Problems:   # Degenerative disc disease  # Bilateral intermittent hand cramping, spasms suspicious for cervical impingement  Patient with history of degenerative disc disorder with concern for cervical impingement in setting of new bilateral intermittent hand cramping. Patient met with Dr. Melton his sports medicine doctor (AM 5/24) over phone for follow up appointment to discuss results of his MRI. Will defer to outpatient management.     # Hx Herpes Zoster c/b post-herpetic neuralgia: PTA ppx Acyclovir while on prednisone     # Hypothyroid - TSH 1.94 on admission WNL. Continue PTA levothyroxine  # HLD- PTA atorvastatin  # GERD- PTA omeprazole + PRN famotidine (also on Pred)  # Insomnia- has PRN Ambien as OP  # Vitamin supplements- PTA B12, folate, D3          Diet: Combination Diet Regular Diet Adult    DVT Prophylaxis: Pneumatic Compression Devices  Piña Catheter: Not present  Central Lines: None  Cardiac Monitoring: None  Code Status: Full Code      Disposition Plan   Expected Discharge: 2-3 days pending progress  Anticipated discharge location:  Awaiting care coordination huddle       The patient's care was discussed with the Attending Physician, Dr. Zamora.    Jerry Arevalo  Medicine Service, [facility] TEAM [address] [place] [address]  Securely message with the Vocera Web Console (learn more here)  Text page via [facility] Paging/Directory   Please see signed in provider for up to date coverage information    Resident/Fellow Attestation   I, Siddhartha Mcgee MD, was present with the medical/MELLY student who participated in the service and in the documentation of the note.  I have verified the history and personally performed the physical exam and medical decision making.  I agree  "with the assessment and plan of care as documented in the note.        Siddhartha Mcgee MD  PGY3  Date of Service (when I saw the patient): 05/24/22      Clinically Significant Risk Factors Present on Admission             # Hypoalbuminemia: Albumin = 3.2 g/dL (Ref range: 3.4 - 5.0 g/dL) on admission, will monitor as appropriate      # Overweight: Estimated body mass index is 25.11 kg/m  as calculated from the following:    Height as of 5/19/22: 1.676 m (5' 6\").    Weight as of this encounter: 70.6 kg (155 lb 9.6 oz).      ______________________________________________________________________    Interval History    No acute overnight events. Patient up-titrated to 6 L overnight, but was breathing comfortably on 3-4 L this morning. Patient continues to endorse chest pain with deep inspiration, fatigue, and chills. Patient mentions they never fully reached baseline respiratory status in [place] following their pneumonia in [place] and have been unable to engage in his usual activities like tennis or pickle ball. Denies headache, vision changes, diarrhea or constipation.    Of note, patient spoke with his Sports Medicine doctor over the phone this morning to discuss his degenerative disc disorder.       Data reviewed today: I reviewed all medications, new labs and imaging results over the last 24 hours. I personally reviewed the chest x-ray image(s) showing Hazy ill-defined bilateral pulmonary infiltrates.    Physical Exam   Vital Signs: Temp: 98.1  F (36.7  C) Temp src: Oral BP: 114/77 Pulse: 104   Resp: 20 SpO2: 96 % O2 Device: Nasal cannula Oxygen Delivery: 3 LPM  Weight: 155 lbs 9.6 oz  GEN: Ill-appearing, laying in bed  HEENT: Normocephalic   CVS: RRR, Normal S1, S2. Grade 2/6 systolic murmur heard best LUSB  Pulm: No acute respiratory distress, but with shallow breathing. No chest wall tenderness to palpation. Lung bases with crackles and decreased air movement.  Abd: Soft, non-tender to palpation  Ext: No " ANALY, cap-refill < 2 seconds, peripheral pulses symmetric  Skin: no rash, lesion, or bruising noted on exposed surfaces  Neuro: A&Ox4, answering questions appropriately, moving all extremities spontaneously  Psych: Pleasant, engaged in exam    Data   Recent Labs   Lab 05/24/22  0321 05/23/22  1724 05/19/22  1355   WBC 15.5* 24.9* 21.5*   HGB 8.5* 9.5* 10.6*   * 111* 112*   * 171 165   NA  --  138  --    POTASSIUM  --  4.0  --    CHLORIDE  --  104  --    CO2  --  25  --    BUN  --  25  --    CR  --  1.31*  --    ANIONGAP  --  9  --    DAVID  --  8.8  --    GLC  --  180*  --    ALBUMIN  --  3.2*  --    PROTTOTAL  --  5.9*  --    BILITOTAL  --  2.5*  --    ALKPHOS  --  56  --    ALT  --  28  --    AST  --  19  --

## 2022-05-25 NOTE — PLAN OF CARE
Goal Outcome Evaluation:    [id number]    /55 (BP Location: Right arm)   Pulse 89   Temp 97.3  F (36.3  C) (Oral)   Resp 20   Wt 70.6 kg (155 lb 9.6 oz)   SpO2 92%   BMI 25.11 kg/m      Reason for admission: admitted for several days of SOB, chills, pleuritic chest pain with CT imaging suspicious for a bilateral pneumonia vs other infiltrate.  Activity: SBA  Pain: Denies  Neuro: AxOx4. Neuros intact.   Cardiac: Intermittent tachycardia in 100s, triggered sepsis BPA, LA back at 1.6. Afebrile. Normotensive.   Respiratory: BARAKAT. O2 sats 92-93 on 3LNC. Bilateral crackles noted in lower lobes.   GI/: Pt reporting constipation, last good BM 5/22. Requested and ordered for Metamucil. Voiding spontaneously not saving, reports WDL.  Diet: Regular.   Lines: PIV intact, SL. Site WDL.   Wounds: No noted deficits.   Labs/imaging: Reviewed. See chart. RVP collected and sent, droplet isolation during r/o. Sputum sample needs collection, pt educated and has specimen cup at bedside.       Continue to monitor and follow POC

## 2022-05-25 NOTE — PROGRESS NOTES
[facility]    Hematology / Oncology Progress Note    Patient: Loco Wood  MRN: [id number]  Date of Service (when I saw the patient): 05/25/2022  Hospital Day # 2    Subjective:  Nursing notes reviewed and noted. States that his breathing is better than when he first presented.     Objective:  Physical Examination:  Vital Signs: /70 (BP Location: Right arm)   Pulse 87   Temp (!) 96.2  F (35.7  C) (Oral)   Resp 18   Wt 70 kg (154 lb 4.8 oz)   SpO2 92%   BMI 24.90 kg/m      General:  NAD.   HEENT: Sclera anciteric  Chest: Non labored breathing   Cardiovascular: RRR.  Abdomen:  Soft, nontender, nondistended  Extremities:  No peripheral edema.   Skin:  No jaundice, rashes, petechiae   Neurological:  Alert, calm and oriented     Labs and data reviewed and noted.    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     #CLL  WBC stable with ALC of 13.1. Still awaiting results to determine prognosis of his current CLL, but results will not change his management. Continue ibrutinib at current dose. IgG levels are low. Recommend giving IVIG. Will set up follow up with Dr. Monahan when he is closer to discharge.      #Autoimmune Hemolytic Anemia  Hemoglobin is stable at 8.8 with retic count of 7.6%. LDH unchanged. Tbili improved to 1.7. YEN was weakly positive. Vit B12 and folate wnl. No evidence of iron deficiency, more attributable to anemia of chronic disease. Hemolytic labs are stable. Cont the trial of methylprednisolone 1 mg/kg.      Recommendations:  - F/u PBS to evaluate for schistocytes   - F/u flow cytometry, cytogenetics, IgH mutation, TP53 mutation on peripheral blood   - Start IVIG 0.4 mg/kg x1 (ordered for you)  - Cont  methylprednisolone 1 mg/kg  - Cont home ibrutinib 420 mg daily   - Cont to monitor hemolytic labs: LDH, tbili, haptoglobin, retic     Patient was seen and plan of care was discussed with attending physician Dr. Karthikeyan Thompson.     Keyur Raza MD   Heme/Onc/Transplant Fellow  Pgr #[id number]

## 2022-05-25 NOTE — PLAN OF CARE
[id number]:  A&O x 4.  Neuro intact.  Afebrile.  Unable to wean down O2.  Desat to 88%-89% on room air, 90% on 2 lpm, kept on 3 lpm to keep SpO2 92% and above.  OVSS.  Administered ibrutinib per home regimen without incident.  Denies pain, nausea, vomiting, chest pain and SOB.  Good PO intake.  Voiding spontaneously per pt.  Had BM this AM.  Up ad rosario.  No complaints and/or acute events.  Continue to monitor and with POC.

## 2022-05-25 NOTE — PROGRESS NOTES
[facility]    Medicine Progress Note - Medicine Service, [place] TEAM 4    Date of Admission:  5/23/2022    Assessment & Plan   Loco Wood is a 77 year old male with history of CLL, auto-immune hemolytic anemia (on prednisone taper), and CKD3a presenting with several days of SOB, chills, pleuritic chest pain with CT imaging suspicious for bilateral pneumonia vs other infiltrate. Presentation consistent with CAP and patient showing significant improvement with IV ceftriaxone and PO azithromycin. Low concern for atypical, fungal at this time, but will maintain index of suspicion given history of immunosuppression. Following blood/sputum cultures and fungal serologies. Heme/Onc following for auto-immune hemolytic anemia and CLL work up.      # AHRF secondary to possiblebilateral bacterial pneumonia vs AIHA  # Bilateral lower lobe bronchiectasis  # Bilateral lower lobe consolidations  Several days of worsenign fatigue/dyspnea, subjective chills, and pleuritic chest pain with 3-4L oxygen requirement with elevated inflammatory markers and CT imaging findings (bilateral nodules/infiltrates, bronchiectasis, bilateral lower lobe consolidations) concerning for CAP (5/23). Patient clinically improved with decreased O2 requirements on IV ceftriaxone and PO azithromycin treatment which is reassuring (3L O2). RVP, COVID, Flu, RSV, MRSA negative with low concern at this time for atypical/fungal PNA. Restarted PJP prophylaxis as well. Plan to continue 5 day course of antibiotics and switch to PO at discharge. Will continue to monitor.   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  Hgb on admission 9.5 down from baseline (~10-12 past year) and now  ~8. Currently hemodynamically stable with labs concerning for marrow responsive anemia (elevated LDH, bilirubin, reticulocyte count). B12, folate, and iron studies are normal. Will likely need to treat underlying CLL for long-term solution, but for now will continue to manage with high dose steroids. Continue to monitor   - Heme/Onc consulted, appreciate recommendations    - Methylprednisolone 1 mg/kg   - YEN, IgG levels   - CBC with peripheral blood smear   - Continue PTA Folate, B12   - Transfuse if Hgb < 7 (Type and Screen)     # CLL (dx 2/2007)  History of CLL (2007) managed on Ibrutinib (5/2019) which patient is not taking daily. WBC 24.9 on admission (up from baseline ~6-10) now down to 20.4 (5/24). Some concern for conversion to leukemia in setting of elevated white count with abnormal CT findings, however presentation is likely secondary to infectious etiology in setting of acute illness with improving WBC count. Will further evaluate with FISH and cytogenetics on peripheral smear to help with disease prognostication. Plan to restart daily ibrutinib to help control disease (may consider acalabrutinib vs venetoclax + obinutuzumab for less cardiotoxic effects if ibrutinib resistance).   - Hematology/Oncology consulted, appreciate recommendations   -Following Flow cytometry, cytogenetics, IgH mutation, TP53 mutation   - Has OP appt with Dr. Monahan to [facility] on 5/27   - CBC trend    # CKD Stage 3A  Cr of 1.31 on admission up from baseline (~1.3-1.6) now down 1.27 (5/25). Suspect Cr elevation due of decreased fluid intake in setting of acute illness given improvement with fluids. Will continue to monitor.    - Avoid nephrotoxic agents as able   - Routine renal function monitoring      Chronic/Resolved Problems:   # Degenerative disc disease  # Bilateral intermittent  hand cramping, spasms suspicious for cervical impingement  Patient with history of degenerative disc disorder with concern for cervical impingement in setting of new bilateral intermittent hand cramping. Patient met with Dr. Melton his sports medicine doctor (AM 5/24) over phone for follow up appointment to discuss results of his MRI. Will defer to outpatient management.     # Hx Herpes Zoster c/b post-herpetic neuralgia: PTA ppx Acyclovir while on prednisone     # Hypothyroid - TSH 1.94 on admission WNL. Continue PTA levothyroxine  # HLD- PTA atorvastatin  # GERD- PTA omeprazole + PRN famotidine (also on Pred)  # Insomnia- has PRN Ambien as OP  # Vitamin supplements- PTA B12, folate, D3          Diet: Combination Diet Regular Diet Adult    DVT Prophylaxis: Pneumatic Compression Devices  Piña Catheter: Not present  Central Lines: None  Cardiac Monitoring: None  Code Status: Full Code      Disposition Plan   Expected Discharge: 2-3 days pending progress  Anticipated discharge location:  Awaiting care coordination huddle       The patient's care was discussed with the Attending Physician, Dr. Zamora .    Jerry Arevalo, MS4  Medicine Service, [address]  Securely message with the Vocera Web Console (learn more here)  Text page via [facility] Paging/Directory   Please see signed in provider for up to date coverage information    Resident/Fellow Attestation   I, Siddhartha Mcgee MD, was present with the medical/MELLY student who participated in the service and in the documentation of the note.  I have verified the history and personally performed the physical exam and medical decision making.  I agree with the assessment and plan of care as documented in the note.      Siddhartha Mcgee MD  PGY3  Date of Service (when I saw the patient): 05/25/22      Clinically Significant Risk Factors Present on Admission                   ______________________________________________________________________    Interval History    No acute overnight events, patient breathing comfortably on 3L overnight. Patient reports that they are breathing more comfortably today with less pleuritic chest pain. He denies headache, abdominal pain, nausea/vomiting, and reports that he has a good appetite today.    Data reviewed today: I reviewed all medications, new labs and imaging results over the last 24 hours.       Physical Exam   Vital Signs: Temp: (!) 96.2  F (35.7  C) Temp src: Oral BP: 121/70 Pulse: 87   Resp: 18 SpO2: 92 % O2 Device: None (Room air) Oxygen Delivery: 3 LPM  Weight: 154 lbs 4.8 oz  GEN: Resting comfortably in bed, less shallow breathing  HEENT: Normocephalic   CVS: RRR, Normal S1, S2. Grade 2/6 systolic murmur heard best LUSB  Pulm: No acute respiratory distress. No chest wall tenderness to palpation. Lung bases with crackles and decreased air movement.  Abd: Soft, non-tender to palpation  Ext: No ANALY, cap-refill < 2 seconds, peripheral pulses symmetric  Skin: no rash, lesion, or bruising noted on exposed surfaces  Neuro: A&Ox4, answering questions appropriately, moving all extremities spontaneously  Psych: Pleasant, engaged in exam    Data   Recent Labs   Lab 05/25/22  0616 05/24/22  1651 05/24/22  0321 05/23/22  1724   WBC 20.4* 27.1* 15.5* 24.9*   HGB 8.8* 10.0* 8.5* 9.5*   * 112* 112* 111*    201 147* 171     --   --  138   POTASSIUM 4.1  --   --  4.0   CHLORIDE 103  --   --  104   CO2 27  --   --  25   BUN 28  --   --  25   CR 1.27*  --   --  1.31*   ANIONGAP 8  --   --  9   DAVID 9.0  --   --  8.8   *  --   --  180*   ALBUMIN  --   --   --  3.2*   PROTTOTAL  --   --   --  5.9*   BILITOTAL 1.7*  --   --  2.5*   ALKPHOS  --   --   --  56   ALT  --   --   --  28   AST  --   --   --  19

## 2022-05-25 NOTE — PLAN OF CARE
Goal Outcome Evaluation:    /61 (BP Location: Right arm)   Pulse 85   Temp (!) 95.2  F (35.1  C) (Oral)   Resp 18   Wt 70.6 kg (155 lb 9.6 oz)   SpO2 92%   BMI 25.11 kg/m      Patient VSS, 3L oxygen nasal canal, alert and oriented, reports no pain or nausea.      .Leatha Sanchez RN on 5/25/2022 at 5:11 AM

## 2022-05-26 PROBLEM — D59.10 AUTOIMMUNE HEMOLYTIC ANEMIA (H): Status: ACTIVE | Noted: 2022-01-01

## 2022-05-26 NOTE — PLAN OF CARE
Alert and oriented x 4. Denies pain. Shortness of breath on exertion. On 3 liters of Oxygen, saturations 92 -96%. Up independently to the BR and voided. Antibiotic given and PIV at TKO.

## 2022-05-26 NOTE — PLAN OF CARE
[id number]:   A&O x 4.  Afebrile.  Briefly decreased O2 to 2 lpm but increased back to 3 lpm to keep SpO2 at/above 92%.  Denies pain, nausea, vomiting and chest pain.  Pleuritic chest pain improving overall per pt.  Sputum culture collected, results pending.  6 minute walk tested ordered, pt requested to do later this afternoon.  Albuterol and pentamidine RT ordered.  Plan for Rituxan tomorrow for refractory AIHA tomorrow (5/27).  Good PO intake.  Voiding spontaneously, not saving urine and had a BM per pt.  Up ad rosario, ambulated hallway x 1.  Continue to monitor and with POC.

## 2022-05-26 NOTE — PLAN OF CARE
"Goal Outcome Evaluation:    [id number]    /63 (BP Location: Right arm)   Pulse 81   Temp (!) 96.5  F (35.8  C) (Oral)   Resp 18   Ht 1.676 m (5' 6\")   Wt 71 kg (156 lb 8 oz)   SpO2 94%   BMI 25.26 kg/m      Reason for admission: Admitted for several days of SOB, chills, pleuritic chest pain with CT imaging suspicious for a bilateral pneumonia vs other infiltrate.  Activity: UAL  Pain: Denies  Neuro: AxOx4. Neuros intact.   Cardiac: RRR. Afebrile. Normotensive.   Respiratory: BARAKAT. O2 sats in mid 90s on 3LNC attempted to further wean O2 without success. Bilateral crackles noted in lower lobes.   GI/: Voiding spontaneously, not saving. Reports WDL. LBM today 5/25.   Diet: Regular.   Lines: PIV intact, SL. Site WDL.   Wounds: No noted deficits.   Labs/imaging: Reviewed. See chart.     Pt tolerated IVIG infusion without s/s of reaction. Premedications of Tylenol and Benadryl given. Ramped up per orders without issue.       Continue to monitor and follow POC      "

## 2022-05-26 NOTE — PROGRESS NOTES
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 Admitted for AHRF due to community acquired pneumonia and worsening anemia (10?8) .       #CLL  #Hypogammaglobulinemia    He was diagnosed 2/12/2007 with CLL, Lyles stage 0, followed clinically initially. At diagnosis WBC 17.8, ALC 11.2, hemoglobin 15.2, platelets 188. Flow consistent with CLL.  No opportunistic infections, IgG in the normal range. Due to rising lymphocyte count he was initiated on Ibrutinib monotherapy 5/14/19. He was tolerating treatment except for one brief episode of atrial fibrillation after starting ibrutinib, no since recurrence. Due to cost considerations, he decided on his own to wean down on ibrutinib and not take it daily as prescribed in April 2022. At that time he was only taking ibrutinib 2-3 times per week and still has a large supply at home. On admission WBC stable with ALC of 13.1. IgG levels 312, s/p IVIG given inpatient 5/24.   - Peripheral flow (5/24) showed 50% CD5+ lambda monotypic B cells. NGS pending.   - Continue Ibrutinib 420 mg daily, tolerating without side effects. Encourage daily dosing and not skipping dosing.   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 He then followed up with Dr. Burns 4/2022 and was anemic with hgb 8.4 and elevated bilirubin of 4.2 on 4/12. Dr. Burns resumed 60 mg daily prednisone at that time for recurrence  of AIHA. Dr. Burns also previously discussed adding rituximab with patient, but patient deferred at that time. On admission hgb 8.8 (down from ~10.6) with retic count of 7.6%. LDH unchanged. Tbili improved to 1.7. YEN was weakly positive. Vit B12 and folate wnl. No evidence of iron deficiency. Of note, patient was on a steroid taper per outpatient team decreasing dose 10 mg every week.   - Worsening AIHA in the setting of recent steroid taper. Start methylprednisolone 1 mg/kg on admission (5/24-x).   - Ongoing anemia despite initiation of HD steroids this admission, discussed case with heme staff Dr Espinosa who recommended initiation of Rituxan for refractory AIHA.    - Plan for weekly Rituxan 375 mg/m2 x4 weeks (C1D1 = 5/27/22)   - Continue daily Folic acid supplementation.   - Monitor CBC w/ diff, LDH, CMP and retic daily   - Transfused for hgb <7 and plts <10k (outpatient therapy plan entered)     #TLS ppx  At risk for tumor lysis syndrome with initiation of Rituxan as above.   - Encourage PO fluids. Started on Allopurinol 300 mg daily  - Check uric acid, LDH, Mg, Phos (ordered for you)     #CAP   Presented with dyspnea and new hypoxia requiring 2-3L NC. CT PE negative for clot but did show bilateral multifocal pulmonary infiltrates and nodules or nodular infiltrates may be pneumonia. BCx, RVP, COVID negative. Of note, patient was not on PJP ppx prior to admission.   - Antibiotics per primary team. Plan to complete 5 day course of Ceftriaxone.   - Incentive spirometry. Walk test ordered      #ID PPx  - Pentamidine nebulizer q 28 days; given 5/26/22 (therapy plan entered)   - Tiffanie requested outpatient for COVID ppx     #Hx of of shingles  Continue Acyclovir 800 mg BID as long as he is on prednisone to reduce risk of shingles.      Recommendations:   - Pentamidine neb for PJP ppx (ordered for you) while on high dose steroids    - Continue Methylred 1 mg/kg daily; transition to PO equivalent at discharge.  "  - Hepatitis B Tess and core pending. If negative, will proceed with Rituxan infusion tomorrow.   - Weekly labs and Rituxan infusion requested outpatient starting 6/3; in process       Patient was seen and plan of care was discussed with attending physician Dr. Thompson.    Thank you for the opportunity to partake in this patients plan of care. Please do not hesitate to page with questions. We will continue to follow.     I spent 60 minutes face-to-face and/or coordinating or discussing care plan. Over 50% of our time on the unit was spent counseling the patient and/or coordinating care.    Marley Kamara PA-C ([facility])    Hematology/Oncology   Pager: [phone]   ___________________________________________________________________    Subjective & Interval History:    No acute events noted overnight. O2 stable on 2-3L NC. Denies dyspnea, cough, fevers or chills. No changes in bowels or bladder. Walked the halls yesterday without issue and showered without O2. Good appetite. Hopeful to discharge home and play 9 holes of golf tomorrow and be seen in our clinic tomorrow afternoon.     Physical Exam:    Blood pressure 107/60, pulse 83, temperature (!) 96.1  F (35.6  C), temperature source Oral, resp. rate 18, height 1.676 m (5' 6\"), weight 70.8 kg (156 lb 1.6 oz), SpO2 94 %.    General: sitting up in bed, no acute distress   HEENT: sclera anicteric, EOMI, MMM  Neck: supple, normal ROM  CV: RRR, normal S1/S2, no m/r/g, using incentive spirometer   Resp: CTAB, no wheezing/crackles, normal respiratory effort on 1-2L NC   GI: soft, non-tender, non-distended, bowel sounds present and normoactive  MSK: warm and well-perfused, normal tone  Skin: no rashes on limited exam, no jaundice  Neuro: Alert and interactive, moves all extremities equally, no focal deficits    Labs & Studies: I personally reviewed the following studies:  ROUTINE LABS (Last four results):  CMP  Recent Labs   Lab 05/26/22  0549 05/25/22  0616 " 05/23/22  1724    138 138   POTASSIUM 3.8 4.1 4.0   CHLORIDE 106 103 104   CO2 25 27 25   ANIONGAP 7 8 9   * 168* 180*   BUN 29 28 25   CR 1.18 1.27* 1.31*   GFRESTIMATED 64 59* 57*   DAVID 8.7 9.0 8.8   PROTTOTAL 5.6*  --  5.9*   ALBUMIN 2.5*  --  3.2*   BILITOTAL 0.8 1.7* 2.5*   ALKPHOS 52  --  56   AST 13  --  19   ALT 19  --  28     CBC  Recent Labs   Lab 05/26/22  0549 05/25/22  0616 05/24/22  1651 05/24/22  0321   WBC 22.0* 20.4* 27.1* 15.5*   RBC 2.22* 2.44* 2.76* 2.33*   HGB 8.0* 8.8* 10.0* 8.5*   HCT 25.0* 27.3* 30.8* 26.2*   * 112* 112* 112*   MCH 36.0* 36.1* 36.2* 36.5*   MCHC 32.0 32.2 32.5 32.4   RDW 17.5* 17.4* 17.8* 17.6*    163 201 147*     INRNo lab results found in last 7 days.    Medications list for reference:  Current Facility-Administered Medications   Medication     acetaminophen (TYLENOL) tablet 650 mg     acyclovir (ZOVIRAX) tablet 800 mg     albuterol (PROVENTIL) neb solution 2.5 mg    Followed by     pentamidine (NEBUPENT) neb solution 300 mg     atorvastatin (LIPITOR) tablet 20 mg     cefTRIAXone (ROCEPHIN) 1 g vial to attach to  mL bag for ADULTS or NS 50 mL bag for PEDS     cyanocobalamin (VITAMIN B-12) tablet 100 mcg     diphenhydrAMINE (BENADRYL) capsule 50 mg    Or     diphenhydrAMINE (BENADRYL) injection 50 mg     diphenhydrAMINE (BENADRYL) injection 50 mg     EPINEPHrine (ADRENALIN) kit 0.3 mg     famotidine (PEPCID) infusion 20 mg     famotidine (PEPCID) tablet 40 mg     folic acid (FOLVITE) tablet 1 mg     ibrutinib (IMBRUVICA) capsule 420 mg     levothyroxine (SYNTHROID/LEVOTHROID) tablet 50 mcg     lidocaine (LMX4) cream     lidocaine 1 % 0.1-1 mL     melatonin tablet 1 mg     methylPREDNISolone sodium succinate (solu-MEDROL) injection 125 mg     methylPREDNISolone sodium succinate (solu-MEDROL) injection 68.75 mg     pantoprazole (PROTONIX) EC tablet 20 mg     polyethylene glycol (MIRALAX) Packet 17 g     psyllium (METAMUCIL/KONSYL) Packet 1  packet     senna-docusate (SENOKOT-S/PERICOLACE) 8.6-50 MG per tablet 1 tablet    Or     senna-docusate (SENOKOT-S/PERICOLACE) 8.6-50 MG per tablet 2 tablet     sodium chloride (PF) 0.9% PF flush 3 mL     sodium chloride (PF) 0.9% PF flush 3 mL     Vitamin D3 (CHOLECALCIFEROL) tablet 50 mcg

## 2022-05-27 PROBLEM — Z79.2 NEED FOR PROPHYLACTIC ANTIBIOTIC: Status: ACTIVE | Noted: 2022-01-01

## 2022-05-27 NOTE — PROVIDER NOTIFICATION
"   05/27/22 1000   Call Information   Date of Call 05/27/22   Time of Call 1000   Name of person requesting the team Pedro Pearl   Title of person requesting team RN   RRT Arrival time 1001   Time RRT ended 1033   Reason for call   Type of RRT Adult   Primary reason for call Sepsis suspected   Sepsis Suspected Temperature <95 or >101;WBC <4 or >12;Elevated Lactate level;Heart Rate > 100;RR > 20, SaO2 <90% OR increasing O2 need   Was patient transferred from the ED, ICU, or PACU within last 24 hours prior to RRT call? No   SBAR   Situation Lactic Acid 2.3, WBC 23.5, , RR 21, Temp. 101.1.   Background History of CLL, auto-immune hemolytic anemia (on prednisone taper), and CKD3a presenting with several days of SOB, chills, pleuritic chest pain.   Notable History/Conditions Cancer;Cardiac  (Afib.)   Assessment A+O x 4.  Endorses SOB with exertion, fever, chills, nausea.  Denies C.P..  Temp.  101.1, , normotensive.  Remains on 45% HFNC with 02 sats upper 90's.  Abdomen distended, firm.  Pt. reports \"I'm having trouble have a BM\".  Additional Metamucil dose ordered.   Interventions Fluid bolus;Labs;Meds;O2 per N/C or mask;Portable monitor   Adjustments to Recommend Added Zosyn and Microfungin.   Patient Outcome   Patient Outcome Stabilized on unit   RRT Team   Attending/Primary/Covering Physician Primary Team at bedside.  ID Team notified.   Date Attending Physician notified 05/27/22   Time Attending Physician notified 1000   Physician(s) Clarisa Lofton PA-C   Lead RN Kat Pearl   RT none   Post RRT Intervention Assessment   Post RRT Assessment Stable/Improved   Date Follow Up Done 05/27/22   Time Follow Up Done 1246   Comments Afebrile.  98.5 with other vss.  Resting comfortably.  No increase in Fi02 needs. Continue to monitor closely for s/s sepsis.     "

## 2022-05-27 NOTE — PLAN OF CARE
"Goal Outcome Evaluation:    [id number]    /54 (BP Location: Right arm)   Pulse 113   Temp (!) 101.7  F (38.7  C) (Oral)   Resp 18   Ht 1.676 m (5' 6\")   Wt 70.8 kg (156 lb 1.6 oz)   SpO2 90%   BMI 25.20 kg/m      Reason for admission: Admitted for several days of SOB, chills, pleuritic chest pain with CT imaging suspicious for a bilateral pneumonia vs other infiltrate.  Activity: UAL  Pain: Denies  Neuro: AxOx4. Neuros intact.   Cardiac: Soft Bps within parameters. Tmax 101.7. Full work up completed, bcx collected, CXR, UA/UC collected and sent. Continue IV Rocephin.   Respiratory: BARAKAT. Currently on 6LNC. Found with O2 sats at 85% on 3LNC at 2000, increased supplemental O2 to 4LNC with sats in low 90s. Found with sats at 90 when fever spiked, increased to 6LNC with effect. Bilateral crackles noted in lower lobes.   GI/: Voiding spontaneously, not saving. Reports WDL. BM x2 on days.   Diet: Regular.   Lines: PIV intact, SL. Site WDL.   Wounds: No noted deficits.   Labs/imaging: Reviewed. See chart.     Plan to have Rituximab infusion tomorrow 5/27.     Continue to monitor and follow POC  "

## 2022-05-27 NOTE — PLAN OF CARE
Patient was on HiFlow for most of the day, was febrile, tachycardic and hypotensive this AM.  Code Sepsis was triggered and called for lactate 2.3.  Fluids and rechecks were done.      Patient doing much better this afternoon, BP's better and on Oxymizer nasal cannula @ 12 L.  Ambulated in [facility] with RN    See flowsheets for detailed vitals and assessments.

## 2022-05-27 NOTE — PLAN OF CARE
"Neuro: A&Ox4. Calls appropriately and makes needs known.   Cardiac: ST. No tele orders. VSS.   Respiratory: Sating >92 on 9L oxymask. Pt has orders to go to HFNC if needed. BARAKAT, otherwise no SOB reported. IS instruction with teach back.  GI/: Adequate urine output. Lasix given with high output. Has some urgency and hesitancy. Last BM 5/26. Denies abdominal discomfort.   Diet/appetite: Tolerating regular diet. Eating well. Denies nausea.   Activity:  SBA  Pain: At acceptable level on current regimen. Denied pain overnight.   Skin: No new deficits noted.   LDA's: PIV    Plan: Continue with POC. Notify primary team with changes. Continue with abx and monitoring O2 demand    /71 (BP Location: Right arm)   Pulse 109   Temp 98.7  F (37.1  C) (Oral)   Resp 26   Ht 1.676 m (5' 6\")   Wt 70.6 kg (155 lb 11.2 oz)   SpO2 92%   BMI 25.13 kg/m      RN assumed care from 0427 until 0700.  "

## 2022-05-27 NOTE — PROGRESS NOTES
Transfer  Transferred from: 7D  Via:bed  Reason for transfer: Pt appropriate for 6B- Increased O2 needs  Family: Aware of transfer  Belongings: Received with pt  Chart: Received with pt  Medications: Meds received from old unit with pt  Code Status verified on armband: yes  2 RN Skin Assessment Completed By: Alma GEORGE and Francicsa RN  Med rec completed: no- RN to follow up during day  Bed surface reassessed with algorithm and charted: yes  New bed surface ordered: no  Suction/Ambu bag/Flowmeter at bedside: yes    Report received from: Michelle GEORGE on 7D  Pt status: AOx4, denies pain, VSS, on 9L oxymask.

## 2022-05-27 NOTE — PROGRESS NOTES
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 Admitted for AHRF due to community acquired pneumonia and worsening anemia (10?8) .    Recommendations:   - Will hold Rituxan today given acute decline; will reassess timing once patient has stabilized   - Recommend decreasing to Methylpred 40 mg IV daily, in the setting of possible infection    - Agree with further infectious work up and consult for infectious disease     #CLL  #Hypogammaglobulinemia    He was diagnosed 2/12/2007 with CLL, Lyles stage 0, followed clinically initially. At diagnosis WBC 17.8, ALC 11.2, hemoglobin 15.2, platelets 188. Flow consistent with CLL.  No opportunistic infections, IgG in the normal range. Due to rising lymphocyte count he was initiated on Ibrutinib monotherapy 5/14/19. He was tolerating treatment except for one brief episode of atrial fibrillation after starting ibrutinib, no since recurrence. Due to cost considerations, he decided on his own to wean down on ibrutinib and not take it daily as prescribed in April 2022. At that time he was only taking ibrutinib 2-3 times per week and still has a large supply at home. On admission WBC stable with ALC of 13.1. IgG levels 312, s/p IVIG given inpatient 5/24.   - Peripheral flow (5/24) showed 50% CD5+ lambda monotypic B cells. NGS pending.   - Continue Ibrutinib 420 mg daily, tolerating without side effects. Encourage daily dosing and not skipping dosing.   - Plan to establish care with Dr Monahan 6/10     #Autoimmune Hemolytic Anemia 2/2 CLL   In [place]  (1/2022), he developed Alexander' positive hemolytic anemia, which responded well to a brief course of prednisone. He then followed up with Dr. Burns 4/2022 and was anemic with hgb 8.4 and elevated bilirubin of 4.2 on 4/12. Dr. Burns resumed 60 mg daily prednisone at that time for recurrence of AIHA. Dr. Burns also previously discussed adding rituximab with patient, but patient deferred at that time. On admission hgb 8.8 (down from ~10.6) with retic count of 7.6%. LDH unchanged. Tbili improved to 1.7. YEN was weakly positive. Vit B12 and folate wnl. No evidence of iron deficiency. Of note, patient was on a steroid taper per outpatient team decreasing dose 10 mg every week.   - Worsening AIHA in the setting of recent steroid taper. Start methylprednisolone 1 mg/kg on admission (5/24-5/27). Decreased to IV Methylprednisolone 40 mg in the setting of infection.   - Ongoing anemia despite initiation of HD steroids this admission, discussed case with heme staff Dr Espinosa who recommended initiation of Rituxan for refractory AIHA.    - Plan for weekly Rituxan 375 mg/m2 x4 weeks; Rituxan held in the setting of acute decompensation. Will reassess when to administer   - Continue daily Folic acid supplementation.   - Monitor CBC w/ diff, LDH, CMP and retic daily   - Transfused for hgb <7 and plts <10k (outpatient therapy plan entered)     #TLS ppx  At risk for tumor lysis syndrome with initiation of Rituxan as above.   - Encourage PO fluids. Started on Allopurinol 300 mg daily  - Check uric acid, LDH, Mg, Phos (ordered for you)     # CAP   # Worsening AHRF 5/26   Presented with dyspnea and new hypoxia requiring 2-3L NC. CT PE negative for clot but did show bilateral multifocal pulmonary infiltrates and nodules or nodular infiltrates may be pneumonia. BCx, RVP, COVID negative. Of note, patient was not on PJP ppx prior to admission.   - Antibiotics per primary team. Currently on IV Zosyn, Micafungin and Bactrim   - Further  "infectious work up given acute decline; agree with infectious disease consult.    - CT Chest showed worsening bilateral GGO     #ID PPx  - Pentamidine nebulizer q 28 days; given 5/26/22 (therapy plan entered)   - Tiffanie requested outpatient for COVID ppx     #Hx of of shingles  Continue Acyclovir 800 mg BID as long as he is on prednisone to reduce risk of shingles.    Patient was seen and plan of care was discussed with attending physician Dr. Thompson.    Thank you for the opportunity to partake in this patients plan of care. Please do not hesitate to page with questions. We will continue to follow.     I spent 60 minutes face-to-face and/or coordinating or discussing care plan. Over 50% of our time on the unit was spent counseling the patient and/or coordinating care.    Asiya Huitron PA-C ([facility])  Hematology/Oncology  Pager #[phone]  ___________________________________________________________________    Subjective & Interval History:    Nursing notes reviewed. Febrile 103.1, tachycardic, tachypnic, and increasing O2 requirements. Reviewed imaging and concerning for GGO on CT scan. ID consulted. Restarted IV antibiotics, and antifungal. Patient was seen resting comfortably in bed. Appears lethargic.     Physical Exam:    Blood pressure 99/58, pulse 111, temperature (!) 101.1  F (38.4  C), temperature source Oral, resp. rate 21, height 1.676 m (5' 6\"), weight 70.6 kg (155 lb 11.2 oz), SpO2 99 %.    General: laying in bed, no acute distress   HEENT: sclera anicteric, EOMI, MMM  Neck: supple, normal ROM  CV: Tachycardic   Resp: Breathing comfortably on HFNC with FiO2 50%    MSK: warm and well-perfused, normal tone  Skin: no rashes on limited exam, no jaundice  Neuro: Lethargic, moves all extremities equally, no focal deficits    Labs & Studies: I personally reviewed the following studies:  ROUTINE LABS (Last four results):  CMP  Recent Labs   Lab 05/27/22  0532 05/26/22  0549 05/25/22  0616 05/23/22  1724    " 138 138 138   POTASSIUM 3.6 3.8 4.1 4.0   CHLORIDE 96 106 103 104   CO2 29 25 27 25   ANIONGAP 10 7 8 9   GLC 88 168* 168* 180*   BUN 26 29 28 25   CR 1.50* 1.18 1.27* 1.31*   GFRESTIMATED 48* 64 59* 57*   DAVID 9.0  9.0 8.7 9.0 8.8   MAG 2.0  --   --   --    PHOS 3.2  --   --   --    PROTTOTAL 6.7* 5.6*  --  5.9*   ALBUMIN 2.9* 2.5*  --  3.2*   BILITOTAL 1.3 0.8 1.7* 2.5*   ALKPHOS 56 52  --  56   AST 21 13  --  19   ALT 25 19  --  28     CBC  Recent Labs   Lab 05/27/22  0532 05/26/22  2219 05/26/22  0549 05/25/22  0616   WBC 23.5* 23.6* 22.0* 20.4*   RBC 2.92* 2.33* 2.22* 2.44*   HGB 10.4* 8.4* 8.0* 8.8*   HCT 32.7* 26.6* 25.0* 27.3*   * 114* 113* 112*   MCH 35.6* 36.1* 36.0* 36.1*   MCHC 31.8 31.6 32.0 32.2   RDW 17.7* 17.8* 17.5* 17.4*    214 170 163     INRNo lab results found in last 7 days.    Medications list for reference:  Current Facility-Administered Medications   Medication     acetaminophen (TYLENOL) tablet 650 mg     acyclovir (ZOVIRAX) tablet 800 mg     allopurinol (ZYLOPRIM) tablet 300 mg     atorvastatin (LIPITOR) tablet 20 mg     cyanocobalamin (VITAMIN B-12) tablet 100 mcg     famotidine (PEPCID) tablet 40 mg     folic acid (FOLVITE) tablet 1 mg     ibrutinib (IMBRUVICA) capsule 420 mg     ipratropium - albuterol 0.5 mg/2.5 mg/3 mL (DUONEB) neb solution 3 mL     levothyroxine (SYNTHROID/LEVOTHROID) tablet 50 mcg     lidocaine (LMX4) cream     lidocaine 1 % 0.1-1 mL     lidocaine 1 % 0.1-1 mL     melatonin tablet 1 mg     methylPREDNISolone sodium succinate (solu-MEDROL) injection 68.75 mg     micafungin (MYCAMINE) 150 mg in sodium chloride 0.9 % 100 mL intermittent infusion     ondansetron (ZOFRAN) injection 4 mg     pantoprazole (PROTONIX) EC tablet 20 mg     piperacillin-tazobactam (ZOSYN) 4.5 g vial to attach to  mL bag     polyethylene glycol (MIRALAX) Packet 17 g     psyllium (METAMUCIL/KONSYL) Packet 1 packet     senna-docusate (SENOKOT-S/PERICOLACE) 8.6-50 MG per tablet  1 tablet    Or     senna-docusate (SENOKOT-S/PERICOLACE) 8.6-50 MG per tablet 2 tablet     sodium chloride (PF) 0.9% PF flush 3 mL     sodium chloride (PF) 0.9% PF flush 3 mL     sodium chloride (PF) 0.9% PF flush 3 mL     sodium chloride (PF) 0.9% PF flush 3 mL     sulfamethoxazole-trimethoprim (BACTRIM) 320 mg in D5W 570 mL intermittent infusion     Vitamin D3 (CHOLECALCIFEROL) tablet 50 mcg

## 2022-05-27 NOTE — PROGRESS NOTES
Overnight Events:    Called that patient was febrile and more tachycardic, added Tylenol and started repeat infectious work-up (UA, blood culture, CXR, CBC, procal). Fever peaked at 103 F. Then paged that patient was becoming increasingly hypoxic on nasal canula and nonrebreather. Patient comfortable, mentating appropriately, no increased work of breathing. Ordered CT PE due to sudden increase in hypoxia, tachycardia (sinus tach on EKG), and fever. This was negative for PE. CXR and CT with more infiltrates, concerned that he appeared wet on my read of CXR. Diminished BLL. No wheezing. Ordered 40 IV Lasix with good response and repeated dose this AM due to increased oxygen requirements throughout the night despite bump in creatinine. OVSS. Fungitell positive, at risk for PJP so ordered IV Bactrim at treatment dosing. Ordered PJP, Blasto, histo eval. Ordered DuoNebs but not needed as not wheezing throughout night. Patient transferred to [facility] due to increasing oxygen requirements overnight. Considered micafungin vs azole but did not add on overnight.     Hayley Severson, MD-MPH  Internal Medicine-Pediatrics, PGY-1

## 2022-05-27 NOTE — PROVIDER NOTIFICATION
Paged Hayley Severson MD at #[phone]        Pt temp 103.1 Tachy 122. Requiring 5L O2. Respiratory rate 28. Any interventions?

## 2022-05-27 NOTE — PROVIDER NOTIFICATION
Paged Hayley Severson MD at #[phone]    Pt is consistantly below 90% on 7L of Oxygen. . We think he needs to go to a higher level of care. Can you come see him?

## 2022-05-27 NOTE — PLAN OF CARE
Mar 4 notified of temp 101.1, BP 99/58, patient feeling flushed, Lactic=2.3.  Fluid bolus ordered.  Will continue to assess patient.

## 2022-05-27 NOTE — CODE/RAPID RESPONSE
"Rapid Response Team Note    Assessment   In assessment a rapid response was called on Loco Wood due to SIRS/Sepsis trigger. This presentation is likely due to known pneumonia     Plan   - Agree with fluid bolus  -Antibiotics changed to Zosyn and Micafungin per ID  -Adjusted bowel regimen per patient request as he is constipated   -repeat lactic acid in 2 hours  -  The Internal Medicine primary team was able to be reached and they are in agreement with the above plan.  -  Disposition: The patient will remain on the current unit. We will continue to monitor this patient closely.  -  Reassessment and plan follow-up will be performed by the primary team      Clarisa Lofton PA-C  [facility] Job Code Contact #[phone]  [facility] Paging/Directory    Hospital Course   Brief Summary of events leading to rapid response:   Rapid response called for lactic acid 2.3, drawn for sepsis triggers of fever, hypotension, leukocytosis, and tachycardia.    Patient admits he's feeling \"crummy\" today as he was up most of the night for his septic work up.  His main complaint is that he remains constipated. He denies worsening dyspnea, cough, abdominal pain    Admission Diagnosis:   Shortness of breath [R06.02]  Pneumonia [J18.9]  Hypoxia [R09.02]  Pneumonia of both lungs due to infectious organism, unspecified part of lung [J18.9]  Chest pain, unspecified type [R07.9]    Physical Exam   Temp: (!) 101.1  F (38.4  C) Temp  Min: 96.3  F (35.7  C)  Max: 103.1  F (39.5  C)  Resp: 21 Resp  Min: 18  Max: 28  SpO2: 99 % SpO2  Min: 84 %  Max: 99 %  Pulse: 111 Pulse  Min: 83  Max: 119    No data recorded  BP: 99/58 Systolic (24hrs), Av , Min:87 , Max:115   Diastolic (24hrs), Av, Min:48, Max:71     I/Os: I/O last 3 completed shifts:  In: 1870 [P.O.:1200; I.V.:670]  Out: 5 [Urine:2225]     Exam:   GENERAL: Alert and oriented x 3. NAD. Ambulatory. Cooperative.   HEENT: Anicteric sclera. Mucous membranes moist. NC. AT.   CV: " "Tachycardia. S1, S2. No murmurs appreciated.   RESPIRATORY: Effort normal on HFNC. Lungs CTAB with no wheezing, rales, rhonchi.   GI: Abdomen soft and mildly distended with hypoactive bowel sounds present in all quadrants. No tenderness, rebound, guarding. No lesions.   NEUROLOGICAL: No focal deficits. Moves all extremities.  CN 2-12 grossly intact.  EXTREMITIES: No peripheral edema. Intact bilateral pedal pulses.   SKIN: No jaundice. No rashes.        Significant Results and Procedures   Lactic Acid:   Recent Labs   Lab Test 05/27/22  0916 05/26/22  1634 05/24/22  1651   LACT 2.3* 1.8 1.6     CBC:   Recent Labs   Lab Test 05/27/22  0532 05/26/22  2219 05/26/22  0549   WBC 23.5* 23.6* 22.0*   HGB 10.4* 8.4* 8.0*   HCT 32.7* 26.6* 25.0*    214 170        Sepsis Evaluation   The patient is known to have an infection.  Loco Wood meets SIRS criteria AND has a lactate >2 or other evidence of acute organ damage.  These vital signs, lab and physical exam findings constitute a diagnosis of SEVERE SEPSIS.    Sepsis Time-Zero (time severe sepsis diagnosis confirmed): 1000  05/27/22 as this was the time when Lactate resulted, and the level was > 2.0     Anti-infectives (From now, onward)    Start     Dose/Rate Route Frequency Ordered Stop    05/27/22 0800  micafungin (MYCAMINE) 150 mg in sodium chloride 0.9 % 100 mL intermittent infusion         150 mg  100 mL/hr over 60 Minutes Intravenous EVERY 24 HOURS 05/27/22 0743      05/27/22 0800  piperacillin-tazobactam (ZOSYN) 4.5 g vial to attach to  mL bag        Note to Pharmacy: For [facility], [facility] and [facility]: For Zosyn-naive patients, use the \"Zosyn initial dose + extended infusion\" order panel.    4.5 g  over 30 Minutes Intravenous EVERY 6 HOURS 05/27/22 0748      05/27/22 0200  sulfamethoxazole-trimethoprim (BACTRIM) 320 mg in D5W 570 mL intermittent infusion         320 mg  380 mL/hr over 90 Minutes Intravenous EVERY 6 HOURS 05/27/22 0049      05/24/22 0800  " acyclovir (ZOVIRAX) tablet 800 mg         800 mg Oral 2 TIMES DAILY 05/24/22 0258          Current antibiotic coverage is appropriate for source of infection.    3 Hour Severe Sepsis Bundle Completion:  1. Initial Lactic Acid result shown above. Repeat lactic acid ordered for 2 hours from now.   2. Blood Cultures before Antibiotics: Yes  3. Broad Spectrum Antibiotics Administered: yes  4. Fluids: 500 mL fluids ORDERED to be given

## 2022-05-27 NOTE — CONSULTS
[facility]  Transplant Infectious Disease Consult Note - New Patient     Patient:  Loco Wood, Date of birth 1945, Medical record number [id number]  Date of Visit:  05/27/2022  Consult requested by Dr. Rula Zamora for evaluation of progressive pulmonary process suspected to be infectious         Assessment and Recommendations:   Recommendations:  - Start micafungin 150 mg IV today & daily.   - OK to change ceftriaxone to zosyn.   - Thank you for ordering Blast ag, Histo ag, hep B S ag, Quantiferon, sputum Pneumocystis PCR, sputum culture, BCx.   - Continue IV bactrim.  - Continue ACV as viral prophylaxis.   - I have ordered a check of fungal antibodies, Asp GM ag, hep B viral load, HSV serology, CMV serology, induced sputum for cytology (may return faster than sp cx & Pneumocystis PCR).   - If there is further clinical decompensation, would check RVP.     Thank you very much for this consultation. Transplant Infectious Disease will continue to follow with you.    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 Received pred courses in 12/2021 from derm, 1/2022 for lung symptoms in [place], in 3/2022 for anemia, then most recently for 3 weeks & 6 weeks for AIHA.   Infectious Disease issues include:  - Progressive pulmonary process suspected to be infectious. With elevated Fungitell & LDH, strong possibility that this is at least in part due to Pneumocystis, moses since a clinical setback occurred following a dose of neb pent on 5/26/2022. IV bactrim started 5/27/2022 while workup pending. DDx definitely includes fungal infection with all the recent steroid use, so testing has been ordered. Since he started ibrutinib 5/25/2022, and there were some LFT issues over the winter with hepatitis status undergoing current assessment, would provide fungal coverage with micafungin 150 mg IV daily. No contraindication to changing ceftriaxone to zosyn for a change in  bacterial coverage.   - Hep B core ab & S ab+ 5/26/2022, so Hep B S ag pending. Consider check of hep B viral load, since he had the elevated LFTs with a bilirubin of 4 over the winter.   - Extensive travel hx. Quantiferon sent 5/26/2022.   - QTc interval: 410 msec on 5/27/2022 EKG  - Bacterial prophylaxis: zosyn  - Pneumocystis prophylaxis: pent 5/26/2022, started on bactrim today  - Viral serostatus & prophylaxis: on acyclovir prophy; gets canker sores; would HSV serology, CMV serology.   - Fungal prophylaxis: none  - Immunization status: he has had 3 covid vaccinations.   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 Given IV IG x 1 on 5/25/2022 at 30 g.   - Isolation status: Good hand hygiene.    Bree Garcia MD   Pager [phone]         History of Infectious Disease Illness:   77 year old man with CLL and AIHA. Not responding that well to treatment, also on steroids. Received pred courses in 12/2021 from derm, 1/2022 for lung symptoms in [place], in 3/2022 for anemia, then most recently for 3 weeks & 6 weeks for AIHA. Was in [place] 12/3/2021 to 4/4/2022, as he bought a place there recently. Admitted 5/23/2022, several days of progressive fatigue/dyspnea, subjective chills, and pleuritic chest pain after having been seen by a PCP for similar issue. Found to be hypoxic here requiring 3-4L on admission shortly after arriving to ED. No/rare non-productive (non-bloody) cough. Had intermittent chills for several days with associated nausea, no emesis; no other localizing symptoms for infection. No changes in urination. CTPE imaging neg for PE but had bl infiltrates (nodules and infiltrates vs nodular infiltrates) which may be suspicious for pneumonia, also with bronchiectasis and bilateral lower lobe consolidations (atelectasis vs PNA). UA bland. He was admitted & improving on ceftriaxone & azithro. Was improving, weaned oxygen down to 2L. He started ibrutinib 5/25/2022. Given pent 5/26/2022 for pneumocystis  prophylaxis. Team planning to start rituximab today but decompensated overnight & received extra steroids. Fungitell from 5/23/2022 + today. Team started bactrim overnight. Ceftriaxone changed to zosyn today. For exposure hx see soc hx.     Review of Systems:  CONSTITUTIONAL:  + fever to 103F overnight. No chills last night, but has had them a couple of times in the past few days. Had sweats for 2 nights earlier in the week, also on 4/12/2022, otherwise no sweats in 15 yrs. Has been having cramping in his hands and legs. Sometimes will get an excruciating pain down his legs for 5 minutes.   EYES: negative for icterus or acute vision changes  ENT:  negative for acute hearing loss, tinnitus, sore throat  RESPIRATORY:  No cough, no sputum. + dyspnea  CARDIOVASCULAR:  Has chest pain with deep breaths, no palpitations  GASTROINTESTINAL:  Some nausea, negative for vomiting, diarrhea or constipation. However, abd a bit firm, so stool softener ordered.   GENITOURINARY:  negative for dysuria or hematuria. Urinating a lot with diuresis.   HEME:  No easy bruising or bleeding  INTEGUMENT:  negative for rash or pruritus  NEURO:  Negative for headache or tremor. A little dizzy when he gets up.     Past Medical History:   Diagnosis Date     Arthritis     hip and toes     Chronic pain      CLL (chronic lymphocytic leukaemia)      Esophageal reflux      Malignant neoplasm (H)     Leukemia     Paroxysmal atrial fibrillation (H) 2/5/2020     PONV (postoperative nausea and vomiting)      Pure hypercholesterolemia        Past Surgical History:   Procedure Laterality Date     ARTHROPLASTY MINIMALLY INVASIVE HIP  5/10/2013    Procedure: ARTHROPLASTY MINIMALLY INVASIVE HIP;  Left Two Incision Total Hip Arthroplasty ;  Surgeon: Desmond Alberts MD;  Location: [facility] OR     ARTHROPLASTY MINIMALLY INVASIVE HIP  2/27/2014    Procedure: ARTHROPLASTY MINIMALLY INVASIVE HIP;  Right Total Hip Arthroplasty Minimally Invasive Two Incision  *Latex  Free Room;  Surgeon: Desmond Alberts MD;  Location: [facility] OR     BACK SURGERY      back fusion      COLONOSCOPY           COLONOSCOPY N/A 8/15/2017    Procedure: COLONOSCOPY;  colonoscopy;  Surgeon: Chun Mcguire MD;  Location:  GI     GI SURGERY      4 hernia repairs in childhood     HERNIA REPAIR      4 since age 4     IR PAE  3/5/2020     [facility] NONSPECIFIC PROCEDURE   &    (R) inguinal herniorrhapy     [facility] NONSPECIFIC PROCEDURE      (L) inguinal herniorrhaphy     [facility] NONSPECIFIC PROCEDURE      L4-5  L5 S1 fusion - spondylolisthesis       Family History   Problem Relation Age of Onset     Heart Disease Father      Cerebrovascular Disease Father          OF [place]      Cancer Father         melonoma     Melanoma Father      Hyperlipidemia Father      Thyroid Disease Father      Cancer Mother         Lung cancer     Other Cancer Mother         lung; heavy smoker     Cerebrovascular Disease Paternal Uncle         Aneurism     Breast Cancer Maternal Aunt          from it     Cancer Paternal Uncle      Cancer Paternal Aunt      Skin Cancer No family hx of      Glaucoma No family hx of      Macular Degeneration No family hx of        Social History     Social History Narrative    He is a retired psychologist, 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 Grew up in [place]. He lives by himself in [facility], no pets. Has a huge garden, likes to play scrabble and billiards. He has no known TB exposures, but did travel for a year when he was in his 20s to [place], [place], [place], [place], [place], [place], [place], [place], [place], and [place]. Has also travelled to [place], other areas in [place].      Social History     Tobacco Use     Smoking status: Never Smoker     Smokeless tobacco: Never Used   Vaping Use     Vaping Use: Never used   Substance Use Topics     Alcohol use: Yes     Alcohol/week: 0.0 standard drinks     Comment: moderate, social     Drug use: No       Immunization History   Administered  Date(s) Administered     COVID-19,PF,Moderna 02/26/2021, 03/26/2021, 08/16/2021     HEPA 04/11/2006, 05/11/2007     Influenza (H1N1) 12/16/2009     Influenza (High Dose) 3 valent vaccine 09/23/2013, 10/02/2015, 10/05/2017, 11/21/2018, 10/08/2019     Influenza (IIV3) PF 02/14/2007, 11/01/2007, 10/15/2008, 09/07/2010     Influenza, Quad, High Dose, Pf, 65yr+ (Fluzone HD) 10/14/2020     Pneumo Conj 13-V (2010&after) 04/29/2015     Pneumococcal 23 valent 02/14/2007, 08/02/2012     TD (ADULT, 7+) 12/28/1998, 06/27/2018     TDAP Vaccine (Adacel) 05/21/2008     Zoster vaccine recombinant adjuvanted (SHINGRIX) 07/09/2018, 09/26/2018     Zoster vaccine, live 05/26/2009       Patient Active Problem List   Diagnosis     Esophageal reflux     Lumbago     CLL (chronic lymphocytic leukemia) (H)     HYPERLIPIDEMIA LDL GOAL <130     Mass of skin     Health Care Home     Vitamin D deficiency     Osteoarthritis of hip     OA (osteoarthritis) of hip     Hx of bilateral hip replacements     Insomnia     BPH (benign prostatic hyperplasia)     Acute bilateral low back pain without sciatica     Prostate nodule     Chondromalacia, knee, right     Complex tear of medial meniscus of right knee as current injury     Herpes zoster with keratoconjunctivitis, od     Post herpetic neuralgia     Aftercare following surgery of the musculoskeletal system     Paroxysmal atrial fibrillation (H)     Chronic kidney disease, stage 3 (moderate)     Fusion of spine, lumbar region     Gluteal pain     Pelvic obliquity     Hamstring tightness     History of spinal fusion     Right hip pain     Strain of gastrocnemius muscle of left lower extremity     Pneumonia     Autoimmune hemolytic anemia (H)            Current Medications & Allergies:       acyclovir  800 mg Oral BID     allopurinol  300 mg Oral Daily     atorvastatin  20 mg Oral Daily     cefTRIAXone  1 g Intravenous Q24H     cyanocobalamin  100 mcg Oral Daily     folic acid  1 mg Oral Daily      ibrutinib  420 mg Oral Daily     levothyroxine  50 mcg Oral Daily     methylPREDNISolone  1 mg/kg Intravenous Daily     pantoprazole  20 mg Oral Daily     psyllium  1 packet Oral Daily     sodium chloride (PF)  3 mL Intracatheter Q8H     sulfamethoxazole-trimethoprim  320 mg Intravenous Q6H     vitamin D3  50 mcg Oral Daily       Infusions/Drips:         Allergies   Allergen Reactions     Blood Transfusion Related (Informational Only) Other (See Comments)     Patient has a history of a clinically significant antibody against RBC antigens.  A delay in compatible RBCs may occur.      Morphine Itching and Rash     Dilaudid [Hydromorphone] Itching            Physical Exam:     Patient Vitals for the past 24 hrs:   BP Temp Temp src Pulse Resp SpO2 Weight   05/27/22 0653 -- -- -- -- -- 98 % --   05/27/22 0626 -- -- -- 99 24 95 % --   05/27/22 0600 -- -- -- -- -- 92 % --   05/27/22 0439 109/71 98.7  F (37.1  C) Oral 109 26 92 % 70.6 kg (155 lb 11.2 oz)   05/27/22 0345 112/67 99.4  F (37.4  C) -- 102 28 95 % --   05/27/22 0343 -- -- -- 102 -- 92 % --   05/27/22 0342 -- -- -- -- -- 90 % --   05/27/22 0340 -- -- -- -- -- (!) 89 % --   05/27/22 0236 -- -- -- 100 -- 95 % --   05/27/22 0209 -- -- -- 105 -- 90 % --   05/27/22 0053 -- (!) 101.4  F (38.6  C) Oral -- -- -- --   05/26/22 2343 115/64 (!) 103.1  F (39.5  C) Oral 119 28 93 % --   05/26/22 2119 110/54 (!) 101.7  F (38.7  C) Oral 113 18 90 % --   05/26/22 2009 -- -- -- 102 -- 93 % --   05/26/22 2004 -- -- -- -- -- 94 % --   05/26/22 1951 105/59 100.1  F (37.8  C) Oral 114 18 (!) 84 % --   05/26/22 1800 108/55 -- -- -- -- 93 % --   05/26/22 1625 -- -- -- -- -- (!) 88 % --   05/26/22 1610 92/53 -- -- -- -- -- --   05/26/22 1552 (!) 87/48 97.6  F (36.4  C) Oral 83 18 90 % --   05/26/22 1231 -- -- -- -- -- 92 % --   05/26/22 1230 -- -- -- -- -- (!) 88 % --   05/26/22 1140 102/63 (!) 96.3  F (35.7  C) Oral 85 18 91 % --   05/26/22 0843 -- -- -- 83 -- 94 % --   05/26/22 0841 --  -- -- 83 -- (!) 88 % --   05/26/22 0839 -- -- -- 91 -- (!) 87 % --   05/26/22 0753 107/60 (!) 96.1  F (35.6  C) Oral 63 18 96 % 70.8 kg (156 lb 1.6 oz)     Ranges for vital signs:  Temp:  [96.1  F (35.6  C)-103.1  F (39.5  C)] 98.7  F (37.1  C)  Pulse:  [] 99  Resp:  [18-28] 24  BP: ()/(48-71) 109/71  FiO2 (%):  [40 %-45 %] 45 %  SpO2:  [84 %-98 %] 98 %  Vitals:    05/25/22 1844 05/26/22 0753 05/27/22 0439   Weight: 71 kg (156 lb 8 oz) 70.8 kg (156 lb 1.6 oz) 70.6 kg (155 lb 11.2 oz)       Physical Examination:  GENERAL:  well-developed, well-nourished man, resting in bed in no acute distress.  HEAD:  Head is normocephalic, atraumatic   EYES:  Eyes have anicteric sclerae without conjunctival injection   ENT:  Oropharynx is dry without exudates or ulcers. Tongue is midline. High flow nasal cannula oxygen in place.   NECK:  Supple.   LUNGS:  Coarse resp to auscultation bilateral, anteriorly.   CARDIOVASCULAR:  Tachycardic rate and rhythm with no murmur, but there are premature extras beats at times.   ABDOMEN:  Normal bowel sounds, soft, nontender, minimally distended.   SKIN:  No acute rashes. PIV in place without any surrounding erythema or exudate.  NEUROLOGIC:  Grossly nonfocal. Active x4 extremities         Laboratory Data:     Inflammatory Markers    Recent Labs   Lab Test 04/12/22  1701 12/21/17  0951 10/04/17  2250 05/15/17  1001 04/11/16  1136   SED  --   --  1  --   --    CRP  --   --   --   --  <0.2  Reference Values:   Low Risk:           <1.0 mg/L   Average Risk:       1.0-3.0 mg/L   High Risk:          >3.0 mg/L   Acute Inflammation: >8.0 mg/L     PSA 6.52*   < >  --    < >  --     < > = values in this interval not displayed.       Immune Globulin Studies     Recent Labs   Lab Test 05/24/22  1651 06/12/19  0806 05/31/19  1021 04/30/19  1219 12/21/18  1103 11/07/18  0844 04/29/15  1012 09/19/14  1227   * 394* 409* 429* 443* 520*   < > 729   IGM  --   --   --   --   --   --   --  28*    IGA  --   --   --   --   --   --   --  72    < > = values in this interval not displayed.       Metabolic Studies       Recent Labs   Lab Test 05/27/22  0532 05/26/22  2219 05/26/22  1634 05/26/22  0549 05/25/22  0616 05/24/22  1651 05/23/22  1802     --   --  138   < >  --   --    POTASSIUM 3.6  --   --  3.8   < >  --   --    CHLORIDE 96  --   --  106   < >  --   --    CO2 29  --   --  25   < >  --   --    ANIONGAP 10  --   --  7   < >  --   --    BUN 26  --   --  29   < >  --   --    CR 1.50*  --   --  1.18   < >  --   --    GFRESTIMATED 48*  --   --  64   < >  --   --    GLC 88  --   --  168*   < >  --   --    DAVID 9.0  9.0  --   --  8.7   < >  --   --    PHOS 3.2  --   --   --   --   --   --    MAG 2.0  --   --   --   --   --   --    LACT  --   --  1.8  --   --  1.6  --    PCAL  --  0.06*  --   --   --   --   --    FGTL  --   --   --   --   --   --  >500    < > = values in this interval not displayed.       Hepatic Studies    Recent Labs   Lab Test 05/27/22  0532 05/26/22  0549 05/25/22  0616 04/12/22  1701 03/08/22  1244   BILITOTAL 1.3 0.8 1.7*   < >  --    DBIL  --   --  0.4*   < >  --    ALKPHOS 56 52  --    < >  --    PROTTOTAL 6.7* 5.6*  --    < >  --    ALBUMIN 2.9* 2.5*  --    < >  --    AST 21 13  --    < >  --    77274  --   --   --   --  31   ALT 25 19  --    < >  --    86804  --   --   --   --  17   * 254* 300*   < >  --     < > = values in this interval not displayed.       Pancreatitis testing    Recent Labs   Lab Test 04/05/21  1137 01/04/21  1743   LIPASE  --  127   TRIG 96  --        Gout Labs      Recent Labs   Lab Test 05/27/22  0532 05/26/22  1123 04/29/15  1012 09/19/14  1227   URIC 5.0 5.3 5.0 5.8       Hematology Studies   Recent Labs   Lab Test 05/27/22  0532 05/26/22  2219 05/26/22  0549 05/25/22  0616 04/26/22  0928 04/12/22  1701 11/10/21  1314 09/20/21  0952   WBC 23.5* 23.6* 22.0* 20.4*   < > 8.2   < > 9.3   ANEU 9.2* 8.0 7.7 7.1   < >  --    < >  --    ANEUTAUTO  --    --   --   --   --  3.2  --  4.8   ALYM 13.6* 15.3* 13.9* 13.1*   < >  --    < >  --    ALYMPAUTO  --   --   --   --   --  4.6  --  3.7   ANY 0.5 0.2 0.4 0.0   < >  --    < >  --    AMONOAUTO  --   --   --   --   --  0.3  --  0.6   AEOS 0.2 0.0 0.0 0.0   < >  --    < >  --    AEOSAUTO  --   --   --   --   --  0.1  --  0.2   ABSBASO  --   --   --   --   --  0.0  --  0.0   HGB 10.4* 8.4* 8.0* 8.8*   < > 8.4*   < > 11.9*   HCT 32.7* 26.6* 25.0* 27.3*   < > 26.3*   < > 35.7*    214 170 163   < > 103*   < > 108*    < > = values in this interval not displayed.       Clotting Studies    Recent Labs   Lab Test 12/01/20  1425 03/05/20  0710   INR 1.15* 1.16*   PTT 28 29       Iron Testing    Recent Labs   Lab Test 05/27/22  0532 05/26/22  2219 05/26/22  0549 05/25/22  0616 05/24/22  1651 07/02/21  0845 05/27/21  1145   IRON  --   --   --   --  36  --  106   FEB  --   --   --   --  271  --  356   IRONSAT  --   --   --   --  13*  --  30   NURY  --   --   --   --  239  --   --    *   < > 113*   < > 112*   < > 89   FOLIC  --   --   --   --  38.1  --   --    B12  --   --   --   --  800  --   --    HAPT  --   --   --   --  200*   < >  --    RETP  --   --  9.5*   < > 7.6*   < >  --    RETICABSCT  --   --  0.208*   < > 0.211*   < >  --     < > = values in this interval not displayed.       Thyroid Studies     Recent Labs   Lab Test 05/23/22  1724 11/10/21  1314 07/17/20  0932 01/10/20  0825 12/10/19  0845   TSH 1.94 4.62* 3.75 7.10* 12.30*   T4  --  0.92  --  1.07 0.95       Urine Studies     Recent Labs   Lab Test 05/26/22  2240 05/23/22  1906 05/19/22  1355 01/04/21  1744 01/07/20  0000 11/12/19  1322   URINEPH 5.5 6.5 7.0 5.5 6.0 5.0   NITRITE Negative Negative Negative Negative Neg Negative   LEUKEST Negative Negative Negative Negative Neg Negative   WBCU 2 1 0-5 1  --  1       Microbiology:  Fungal testing  Recent Labs   Lab Test 05/23/22  1802   FGTL >500   FGTLI Positive*       Last Culture results   Rapid Strep  A Screen   Date Value Ref Range Status   10/22/2010   Final    NEGATIVE: No Group A streptococcal antigen detected by immunoassay, await   culture report.     Culture   Date Value Ref Range Status   05/26/2022   Final    >10 Squamous epithelial cells/low power field indicates oral contamination. Please recollect.   05/23/2022 No growth after 3 days  Preliminary   05/23/2022 No growth after 3 days  Preliminary     Culture Micro   Date Value Ref Range Status   03/02/2014 No growth  Final   10/22/2010 No Beta Streptococcus isolated  Final   04/21/2010   Final    No Salmonella, Shigella, Campylobacter or E coli 0157 isolated.   04/18/2010 No growth  Final   03/16/2009 No Beta Streptococcus isolated  Final   01/13/2007 No growth  Final   04/06/2004 No Beta Streptococcus isolated  Final           Quantiferon testing   Recent Labs   Lab Test 05/27/22  0532 05/26/22  2219   LYMPH 58 65       Virology:  Coronavirus-19 testing    Recent Labs   Lab Test 05/23/22  1946 06/23/20  0843   [id number]  --  Negative   [id number]  --  Not Applicable   [id number] Negative  --        Respiratory virus (non-coronavirus-19) testing    Recent Labs   Lab Test 05/24/22  1916 05/23/22  1946   IFLUA Not Detected  --    INFZA  --  Negative   FLUAH1 Not Detected  --    BP1034 Not Detected  --    FLUAH3 Not Detected  --    IFLUB Not Detected  --    INFZB  --  Negative   PIV1 Not Detected  --    PIV2 Not Detected  --    PIV3 Not Detected  --    PIV4 Not Detected  --    IRSV  --  Negative   RSVA Not Detected  --    RSVB Not Detected  --    HMPV Not Detected  --    ADENOV Not Detected  --    CORONA Not Detected  --        Hepatitis B Testing     Recent Labs   Lab Test 05/26/22  1123   AUSAB 288.89   HBCAB Reactive*        Hepatitis C Antibody   Date Value Ref Range Status   04/11/2016  NR Final    Nonreactive   Assay performance characteristics have not been established for newborns,   infants, and children         Imaging:  Recent Results (from the past  48 hour(s))   CT Chest Pulmonary Embolism w Contrast    Narrative    EXAMINATION: CT CHEST PULMONARY EMBOLISM W CONTRAST, 5/27/2022 1:20 AM      COMPARISON: 5/23/2022    HISTORY: Negative d-dimer low probability of PE    TECHNIQUE: Volumetric helical acquisition of CT images of the chest  from the lung apices to the kidneys were acquired after the  administration of 55 mL of Isovue-370 IV contrast.     Findings:   Lungs: There is adequate contrast bolus in the study. Exam is negative  for pulmonary embolism    The heart is normal size. Aorta and pulmonary artery are normal  caliber. Unchanged minimally prominent mediastinal lymph nodes,  presumably reactive. Mild calcification of the coronaries.    Central airways are clear. Multifocal bilateral groundglass opacities  increased from prior. There is also consolidative opacity in the right  lung base similar to slightly improved from prior. No pneumothorax or  pleural effusion. Lower lobe right greater than left bronchiectasis  similar to prior.    Upper abdomen: No acute abnormality. Cyst at the superior pole the  right kidney similar to prior CT. Sliding hiatal hernia.    Bones: Degenerative changes of the spine. No suspicious osseous  lesions.      Impression    Impression:  1.  Exam is negative for pulmonary embolism.  2.  Increased upper lobe predominant scattered groundglass opacities  which may be infectious in etiology.  3.  Consolidation in the right lung base is similar to slightly  improved from 5/23/2020.    I have personally reviewed the examination and initial interpretation  and I agree with the findings.    ESTEFANI REYES MD         SYSTEM ID:  [id number]

## 2022-05-27 NOTE — PROGRESS NOTES
[id number]    Pt requiring increased oxygen needs at rest and with activity. Worse with activity. Averaging 6-9L O2, stating mostly at 89-94%. Tachypenic and SOB. Tachycardic in 100s up to 140s. No wheezing noted. Occasional non productive cough present. 40mg lasix given IV with good urine output since. No edema noted to BLE. Rocephin and bactrim started IV. Chest CT completed and ruled out PE. Tylenol given and ice packs applied for Tmax 103.1 and was effective. MD saw pt at bedside multiple times this shift. Transferring to  for higher level of care. Report given to  nurseFrancisca.

## 2022-05-27 NOTE — PROGRESS NOTES
[facility]    Medicine Progress Note - Medicine Service, MAROON TEAM 4    Date of Admission:  5/23/2022    Assessment & Plan   Loco Wood is a 77 year old male with history of CLL, auto-immune hemolytic anemia (on prednisone taper), and CKD3a admitted for several days of SOB, chills, pleuritic chest pain with infiltrates/consolidations on CT imaging. Initially treated for CAP (ceft + azithro) with improvement. On 5/27 patient transferred to [facility] [place] for worsening respiratory status and fever, currently hemodynamically stable on 45L on HFNC with 500 mL fluid bolus x2. Antibiotics broadened to Zosyn and started on Micafungin (B-D glucan > 500).  Following blood/sputum cultures, fungal serologies, and viral respiratory panel + HSV/CMV PCR.   Heme/Onc following for auto-immune hemolytic anemia and CLL, work up held for now and steroids decreased in setting of decompensation.    Interval Changes:  -Discontinue Ceftriaxone and Start Zosyn. Started bactrim and micafungin.   -Infectious Disease Consulted  -Labs: Fungal serologies, viral (HSV, CMV, Hep B), blood/sputum cultures  -Decrease Methylpred to 40mg     # Acute hypoxic respiratory failure 2/2 possible Bacterial PNA vs Fungal PNA vs ARDS  # Severe sepsis  # Bilateral lower lobe bronchiectasis/consolidations  Admitted for several days of worsening fatigue/dyspnea, subjective chills, and pleuritic chest pain with 3-4L oxygen requirement (w/ elevated WBC) and CT w/ infiltrates and bilateral lower lobe consolidations concerning for infection. Patient initially treated with 5 day abx course starting 5/24 with azithromycin (d/c 5/26) + ceftriaxone for presumed CAP with clinical improvement. On 5/27, patient developed fever with worsening respiratory status (45 L high flow NC) and was transferred to [facility] [place].   Patient managed with multiple fluids boluses for concerns of sepsis (lactic acid ~3.4), abx coverage was broadened to  Zosyn, and start Micafungin (positive B-Glucan). He was also started on bactrim for concern for PJP. For workup will order induced sputum for cytology, fungal workup, Hep B serologies, and HSV/CMV serology. RVP negative.Continue to monitor and if patient's respiratory status worsens will escalate to BiPAP.  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 Hgb on admission 9.5 down from baseline (~10-12 past year) and now  ~8. Currently hemodynamically stable with labs concerning for marrow responsive anemia (elevated LDH, bilirubin, reticulocyte count). B12, folate, iron panel normal with slightly elevated YEN levels. Will likely need to treat underlying CLL for long-term solution.  In setting of patient's decompensation will hold off on giving rituximab infusion today and decrease methylprednisolone from 1 mg/kg to 0.4 mg/kg. Continue to monitor.   - Heme/Onc consulted, appreciate recommendations    - Decrease Methylprednisolone to 40mg   - Hold Rituximab infusion   - Follow Hep B serologies   - CBC with peripheral blood smear   - Continue PTA Folate, B12   - Transfuse if Hgb < 7 (Type and Screen)    # CKD Stage 3A  Cr of 1.31 on admission up from baseline originally down to ~1.2 with fluids and now up to 1.50 (5/27). Suspect Cr elevation due to recent diuresis and will improve with fluid  boluses. Will continue to monitor.    - Avoid nephrotoxic agents as able   - Routine renal function monitoring     # CLL (dx 2/2007)  History of CLL (2007) managed on Ibrutinib (5/2019) which patient is not taking daily. WBC 24.9 on admission (up from baseline ~6-10) now down to 20.4 (5/24). Some concern for conversion to leukemia in setting of elevated white count with abnormal CT findings, however presentation is likely secondary to infectious etiology in setting of acute illness with improving WBC count. IVIG infusion given (5/25). Following FISH and cytogenetics for disease prognostication and will continue to monitor with peripheral smear.   Continue daily ibrutinib to help control disease for now but may consider holding if patient continues to deteriorate (unlikley to significantly improve immune suppression).    - Hematology/Oncology consulted, appreciate recommendations   -Following Flow cytometry, cytogenetics, IgH mutation, TP53 mutation   - CBC trend   - Heme/Onc outpatient follow up on discharge    Chronic/Resolved Problems:   # Degenerative disc disease  # Bilateral intermittent hand cramping, spasms suspicious for cervical impingement  Patient with history of degenerative disc disorder with concern for cervical impingement in setting of new bilateral intermittent hand cramping. Patient met with Dr. Melton his sports medicine doctor (AM 5/24) over phone for follow up appointment to discuss results of his MRI. Will defer to outpatient management.     # Hx Herpes Zoster c/b post-herpetic neuralgia: PTA ppx Acyclovir while on prednisone     # Hypothyroid - TSH 1.94 on admission WNL. Continue PTA levothyroxine  # HLD- PTA atorvastatin  # GERD- PTA omeprazole + PRN famotidine (also on Pred)  # Insomnia- has PRN Ambien as OP  # Vitamin supplements- PTA B12, folate, D3        Diet: Combination Diet Regular Diet Adult    DVT Prophylaxis: Pneumatic Compression Devices  Piña Catheter: Not present  Central Lines:  "None  Cardiac Monitoring: ACTIVE order. Indication: Tachyarrhythmias, acute (48 hours)  Code Status: Full Code      Disposition Plan   Expected Discharge: 2-3 days pending progress  Anticipated discharge location:  Awaiting care coordination huddle       The patient's care was discussed with the Attending Physician, Dr. Zamora .    Jerry Arevalo, MS4  Medicine Service, [facility] TEAM 4  [facility]  Securely message with the Vocera Web Console (learn more here)  Text page via [facility] Paging/Directory   Please see signed in provider for up to date coverage information      Clinically Significant Risk Factors Present on Admission              # Overweight: Estimated body mass index is 25.13 kg/m  as calculated from the following:    Height as of this encounter: 1.676 m (5' 6\").    Weight as of this encounter: 70.6 kg (155 lb 11.2 oz).       Physician Attestation   I, Rula Zamora MD, was present with the medical/MELLY student who participated in the service and in the documentation of the note.  I have verified the history and personally performed the physical exam and medical decision making.  I agree with the assessment and plan of care as documented in the note.      I personally reviewed vital signs, medications, labs and imaging.    Key findings: Worsening acute hypoxic respiratory failure and severe sepsis. Broadened antimicrobial coverage and asked ID to evaluate. Will hold rituximab and reduce methylpred dose.     Rula Zamora MD  Date of Service (when I saw the patient): 05/27/22    ______________________________________________________________________    Interval History    Patient was febrile and required increasing respiratory support. Fever was managed with Tylenol and Ice (peaked at 103 F), but he had to be eventually be transferred to [facility] for worsening hypoxia. This morning patient is on 45L high flow NC with increased work of breathing and fatigue. Patient " feels uncomfortable on the nasal cannula and also endorses complaints for pleuritic chest pain and constipation. Denies fever/chills, abdominal pain, diarrhea, or nausea/vomiting.     Data reviewed today: I reviewed all medications, new labs and imaging results over the last 24 hours.       Physical Exam   Vital Signs: Temp: 98.5  F (36.9  C) Temp src: Oral BP: 105/56 Pulse: 92   Resp: 20 SpO2: 93 % O2 Device: High Flow Nasal Cannula (HFNC) Oxygen Delivery: 35 LPM  Weight: 155 lbs 11.2 oz  GEN: Ill-appearing, sitting up in bed on High Flow NC   HEENT: Normocephalic   CVS: RRR, Normal S1, S2. No murmurs  Pulm: No acute respiratory distress, appears short of breath. No chest wall tenderness to palpation. Lung bases with crackles in posterior and anterior lung fields.  Abd: Soft, non-tender to palpation  Ext: No ANALY. Warm and well perfused.   Skin: No rash, lesion, or bruising noted on exposed surfaces  Neuro: A&Ox4, answering questions appropriately, moving all extremities spontaneously  Psych: Pleasant, engaged in exam    Data   Recent Labs   Lab 05/27/22  0532 05/26/22  2219 05/26/22  0549 05/25/22  0616   WBC 23.5* 23.6* 22.0* 20.4*   HGB 10.4* 8.4* 8.0* 8.8*   * 114* 113* 112*    214 170 163     --  138 138   POTASSIUM 3.6  --  3.8 4.1   CHLORIDE 96  --  106 103   CO2 29  --  25 27   BUN 26  --  29 28   CR 1.50*  --  1.18 1.27*   ANIONGAP 10  --  7 8   DAVID 9.0  9.0  --  8.7 9.0   GLC 88  --  168* 168*   ALBUMIN 2.9*  --  2.5*  --    PROTTOTAL 6.7*  --  5.6*  --    BILITOTAL 1.3  --  0.8 1.7*   ALKPHOS 56  --  52  --    ALT 25  --  19  --    AST 21  --  13  --

## 2022-05-28 NOTE — PROGRESS NOTES
[facility]  Transplant Infectious Disease Progress Note      Patient:  Loco Wood, Date of birth 1945, Medical record number [id number]  Date of Visit:  05/28/2022         Assessment and Recommendations:   Recommendations:  - Continue zosyn.   - Continue IV bactrim.  - Continue micafungin 150 mg IV   - Continue ACV as viral prophylaxis.   - Await pending Blast ag, Histo ag, hep B S ag, Quantiferon, sputum Pneumocystis PCR, sputum culture, BCx, fungal antibodies, Asp GM ag, hep B viral load, induced sputum for cytology (may return faster than sp cx & Pneumocystis PCR).     Transplant ID will continue to follow this patient after the holiday weekend, unless called. On Sunday 5/29/2022, Dr. Kavitha Blake (staff, pager [phone]) is available for calls. On Monday 5/30/2022, Dr. Barry Leblanc (staff, pager [phone]) is available for calls. I will re-assume the service on Tuesday 5/31/2022.    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 Received pred courses in 12/2021 from derm, 1/2022 for lung symptoms in [place], in 3/2022 for anemia, then most recently for 3 weeks & 6 weeks for AIHA.   Infectious Disease issues include:  - Progressive pulmonary process suspected to be infectious. With elevated Fungitell & LDH, strong possibility that this is at least in part due to Pneumocystis, moses since a clinical setback occurred following a dose of neb pent on 5/26/2022. IV bactrim started 5/27/2022 while workup pending. DDx definitely includes fungal infection with all the recent steroid use, so testing has been ordered. Since he started ibrutinib 5/25/2022, and there were some LFT issues over the winter with hepatitis status undergoing current assessment, would provide fungal coverage with micafungin 150 mg IV daily as opposed to an azole at this point in time. On zosyn for bacterial coverage. Travel to [place] over the last winter does make him at risk for  cocci, this last winter was particularly windy, and fungal antibody panel is pending  - Hep B core ab & S ab+ 5/26/2022, so Hep B S ag & hep B viral load pending.  - Extensive travel hx. Quantiferon sent 5/26/2022. Fungal antibodies pending.  - QTc interval: 410 msec on 5/27/2022 EKG  - Bacterial prophylaxis: zosyn  - Pneumocystis prophylaxis: pent 5/26/2022, started on bactrim today  - Viral serostatus & prophylaxis: HSV1+, HSV2+, CMV+; on acyclovir prophy  - Fungal prophylaxis: Fungitell > 500; on micafungin  - Immunization status: he has had 3 covid vaccinations.   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 Given IV IG x 1 on 5/25/2022 at 30 g.   - Isolation status: Good hand hygiene.    Bree Garcia MD   Pager [phone]         Interval History:   Since Loco was last seen by infectious disease on 5/27/2022, he had additional diagnostic testing sessions as well as fungal therapy added. He remains on zosyn and IV factor. His fever has resolved. His white blood cell count has come down from 23.5, to 21.1. Supplemental oxygen needs are still high, but he is on an oximyzer nasal cannula. He has not had a bowel movement, so he is trying both Metamucil and senna today. He asks about probiotics. We discussed how he has had many onofre in [place], but this last winter was very windy. Methylprednisolone dose was decreased.    Review of Systems:  CONSTITUTIONAL:  No fever.   EYES: negative for icterus or acute vision changes  ENT:  negative for acute hearing loss, tinnitus, sore throat  RESPIRATORY:  No cough, no sputum. + dyspnea  CARDIOVASCULAR:  Has chest pain with deep breaths, no palpitations  GASTROINTESTINAL:  Some nausea, negative for vomiting. He has not had a bowel movement.   GENITOURINARY:  negative for dysuria or hematuria. Urinating a lot with diuresis.   HEME:  No easy bruising or bleeding  INTEGUMENT:  negative for rash or pruritus  NEURO:  Negative for headache or tremor.           Current Medications & Allergies:       acyclovir  800 mg Oral BID     allopurinol  300 mg Oral Daily     atorvastatin  20 mg Oral Daily     cyanocobalamin  100 mcg Oral Daily     folic acid  1 mg Oral Daily     ibrutinib  420 mg Oral Daily     levothyroxine  50 mcg Oral Daily     methylPREDNISolone  40 mg Intravenous Daily     micafungin  150 mg Intravenous Q24H     pantoprazole  20 mg Oral Daily     piperacillin-tazobactam  4.5 g Intravenous Q6H     psyllium  1 packet Oral BID     senna-docusate  1 tablet Oral BID    Or     senna-docusate  2 tablet Oral BID     sodium chloride (PF)  3 mL Intracatheter Q8H     sodium chloride (PF)  3 mL Intracatheter Q8H     sulfamethoxazole-trimethoprim  320 mg Intravenous Q6H     vitamin D3  50 mcg Oral Daily       Infusions/Drips:         Allergies   Allergen Reactions     Blood Transfusion Related (Informational Only) Other (See Comments)     Patient has a history of a clinically significant antibody against RBC antigens.  A delay in compatible RBCs may occur.      Morphine Itching and Rash     Dilaudid [Hydromorphone] Itching            Physical Exam:     Patient Vitals for the past 24 hrs:   BP Temp Temp src Pulse Resp SpO2 Weight   05/28/22 0758 119/64 97.7  F (36.5  C) Oral 79 18 95 % --   05/28/22 0600 -- -- -- -- -- -- 72.3 kg (159 lb 6.4 oz)   05/28/22 0515 113/63 -- -- 75 -- 93 % --   05/28/22 0320 103/60 -- -- 75 -- 94 % --   05/28/22 0300 99/54 98.1  F (36.7  C) Oral 80 16 (!) 88 % --   05/27/22 2316 109/65 97.4  F (36.3  C) Oral 86 18 92 % --   05/27/22 1936 110/62 97.5  F (36.4  C) Oral 86 16 98 % --   05/27/22 1530 96/57 97.3  F (36.3  C) Oral 93 20 91 % --   05/27/22 1221 105/56 -- -- -- -- -- --   05/27/22 1215 -- -- -- -- -- 93 % --   05/27/22 1200 -- 98.5  F (36.9  C) Oral -- -- (!) 86 % --   05/27/22 1133 90/56 -- -- -- -- -- --   05/27/22 1130 (!) 84/40 -- -- 92 -- 95 % --   05/27/22 1124 110/63 98.2  F (36.8  C) Oral 95 20 93 % --   05/27/22 1115 -- -- -- --  -- 94 % --   05/27/22 1030 100/61 -- -- 94 -- 96 % --   05/27/22 1015 91/59 -- -- -- -- -- --     Ranges for vital signs:  Temp:  [97.3  F (36.3  C)-98.5  F (36.9  C)] 97.7  F (36.5  C)  Pulse:  [75-95] 79  Resp:  [16-20] 18  BP: ()/(40-65) 119/64  Cuff Mean (mmHg):  [70] 70  FiO2 (%):  [45 %-50 %] 50 %  SpO2:  [86 %-98 %] 95 %  Vitals:    05/26/22 0753 05/27/22 0439 05/28/22 0600   Weight: 70.8 kg (156 lb 1.6 oz) 70.6 kg (155 lb 11.2 oz) 72.3 kg (159 lb 6.4 oz)       Physical Examination:  GENERAL:  well-developed, well-nourished man, resting in bed in no acute distress.  HEAD:  Head is normocephalic, atraumatic   EYES:  Eyes have anicteric sclerae without conjunctival injection   ENT:  Oropharynx is dry without exudates or ulcers. Tongue is midline. Oximyzer nasal cannula oxygen in place.   NECK:  Supple.   LUNGS:  Coarse resp to auscultation bilateral, anteriorly.   CARDIOVASCULAR:  Regular rate and rhythm with no murmur, but there are premature extras beats at times.   ABDOMEN:  Normal bowel sounds, soft, nontender, minimally distended.   SKIN:  No acute rashes. PIV in place without any surrounding erythema or exudate.  NEUROLOGIC:  Grossly nonfocal. Active x4 extremities         Laboratory Data:     Inflammatory Markers    Recent Labs   Lab Test 04/12/22  1701 12/21/17  0951 10/04/17  2250 05/15/17  1001 04/11/16  1136   SED  --   --  1  --   --    CRP  --   --   --   --  <0.2  Reference Values:   Low Risk:           <1.0 mg/L   Average Risk:       1.0-3.0 mg/L   High Risk:          >3.0 mg/L   Acute Inflammation: >8.0 mg/L     PSA 6.52*   < >  --    < >  --     < > = values in this interval not displayed.       Immune Globulin Studies     Recent Labs   Lab Test 05/24/22  1651 06/12/19  0806 05/31/19  1021 04/30/19  1219 12/21/18  1103 11/07/18  0844 04/29/15  1012 09/19/14  1227   * 394* 409* 429* 443* 520*   < > 729   IGM  --   --   --   --   --   --   --  28*   IGA  --   --   --   --   --   --    --  72    < > = values in this interval not displayed.       Metabolic Studies       Recent Labs   Lab Test 05/28/22  0539 05/27/22  2109 05/27/22  0916 05/27/22  0532 05/26/22  2219 05/26/22  0549 05/24/22  1651 05/23/22  1802     --   --  135  --   --    < >  --    POTASSIUM 3.7  --   --  3.6  --   --    < >  --    CHLORIDE 98  --   --  96  --   --    < >  --    CO2 28  --   --  29  --   --    < >  --    ANIONGAP 8  --   --  10  --   --    < >  --    BUN 28  --   --  26  --   --    < >  --    CR 1.61*  --   --  1.50*  --   --    < >  --    GFRESTIMATED 44*  --   --  48*  --   --    < >  --    *  --   --  88  --   --    < >  --    DAVID 8.2*  8.2*  --   --  9.0  9.0  --   --    < >  --    PHOS 4.4  --   --  3.2  --    < >  --   --    MAG 2.2  --   --  2.0  --    < >  --   --    LACT 2.0 2.4*   < >  --   --   --    < >  --    PCAL  --   --   --   --  0.06*  --   --   --    FGTL  --   --   --   --   --   --   --  >500    < > = values in this interval not displayed.       Hepatic Studies    Recent Labs   Lab Test 05/28/22  0539 05/27/22  0532 05/26/22  0549 05/25/22  0616 04/12/22  1701 03/08/22  1244   BILITOTAL 0.7 1.3   < > 1.7*   < >  --    DBIL  --   --   --  0.4*   < >  --    ALKPHOS 42 56   < >  --    < >  --    PROTTOTAL 5.2* 6.7*   < >  --    < >  --    ALBUMIN 2.3* 2.9*   < >  --    < >  --    AST 22 21   < >  --    < >  --    65037  --   --   --   --   --  31   ALT 19 25   < >  --    < >  --    03807  --   --   --   --   --  17   * 333*   < > 300*   < >  --     < > = values in this interval not displayed.       Pancreatitis testing    Recent Labs   Lab Test 04/05/21  1137 01/04/21  1743   LIPASE  --  127   TRIG 96  --        Gout Labs      Recent Labs   Lab Test 05/28/22  0539 05/27/22  0532 05/26/22  1123 04/29/15  1012 09/19/14  1227   URIC 3.2* 5.0 5.3 5.0 5.8       Hematology Studies   Recent Labs   Lab Test 05/28/22  0539 05/27/22  0532 05/26/22  2219 05/26/22  0549 04/26/22  0928  04/12/22  1701 11/10/21  1314 09/20/21  0952   WBC 21.1* 23.5* 23.6* 22.0*   < > 8.2   < > 9.3   ANEU 8.0 9.2* 8.0 7.7   < >  --    < >  --    ANEUTAUTO  --   --   --   --   --  3.2  --  4.8   ALYM 12.9* 13.6* 15.3* 13.9*   < >  --    < >  --    ALYMPAUTO  --   --   --   --   --  4.6  --  3.7   ANY 0.2 0.5 0.2 0.4   < >  --    < >  --    AMONOAUTO  --   --   --   --   --  0.3  --  0.6   AEOS 0.0 0.2 0.0 0.0   < >  --    < >  --    AEOSAUTO  --   --   --   --   --  0.1  --  0.2   ABSBASO  --   --   --   --   --  0.0  --  0.0   HGB 8.0* 10.4* 8.4* 8.0*   < > 8.4*   < > 11.9*   HCT 24.5* 32.7* 26.6* 25.0*   < > 26.3*   < > 35.7*    211 214 170   < > 103*   < > 108*    < > = values in this interval not displayed.       Clotting Studies    Recent Labs   Lab Test 12/01/20  1425 03/05/20  0710   INR 1.15* 1.16*   PTT 28 29       Iron Testing    Recent Labs   Lab Test 05/28/22  0539 05/25/22  0616 05/24/22  1651 07/02/21  0845 05/27/21  1145   IRON  --   --  36  --  106   FEB  --   --  271  --  356   IRONSAT  --   --  13*  --  30   NURY  --   --  239  --   --    *   < > 112*   < > 89   FOLIC  --   --  38.1  --   --    B12  --   --  800  --   --    HAPT  --   --  200*   < >  --    RETP 7.1*   < > 7.6*   < >  --    RETICABSCT 0.159*   < > 0.211*   < >  --     < > = values in this interval not displayed.       Thyroid Studies     Recent Labs   Lab Test 05/23/22  1724 11/10/21  1314 07/17/20  0932 01/10/20  0825 12/10/19  0845   TSH 1.94 4.62* 3.75 7.10* 12.30*   T4  --  0.92  --  1.07 0.95       Urine Studies     Recent Labs   Lab Test 05/26/22  2240 05/23/22  1906 05/19/22  1355 01/04/21  1744 01/07/20  0000 11/12/19  1322   URINEPH 5.5 6.5 7.0 5.5 6.0 5.0   NITRITE Negative Negative Negative Negative Neg Negative   LEUKEST Negative Negative Negative Negative Neg Negative   WBCU 2 1 0-5 1  --  1       Microbiology:  Fungal testing  Recent Labs   Lab Test 05/23/22  1802   FGTL >500   FGTLI Positive*       Last  Culture results   Rapid Strep A Screen   Date Value Ref Range Status   10/22/2010   Final    NEGATIVE: No Group A streptococcal antigen detected by immunoassay, await   culture report.     Culture   Date Value Ref Range Status   05/27/2022 No growth after 12 hours  Preliminary   05/27/2022 No growth after 12 hours  Preliminary   05/26/2022 No growth after 1 day  Preliminary   05/26/2022 No growth after 1 day  Preliminary   05/26/2022   Final    >10 Squamous epithelial cells/low power field indicates oral contamination. Please recollect.   05/23/2022 No growth after 4 days  Preliminary   05/23/2022 No growth after 4 days  Preliminary     Culture Micro   Date Value Ref Range Status   03/02/2014 No growth  Final   10/22/2010 No Beta Streptococcus isolated  Final   04/21/2010   Final    No Salmonella, Shigella, Campylobacter or E coli 0157 isolated.   04/18/2010 No growth  Final   03/16/2009 No Beta Streptococcus isolated  Final   01/13/2007 No growth  Final   04/06/2004 No Beta Streptococcus isolated  Final           Quantiferon testing   Recent Labs   Lab Test 05/28/22  0539 05/27/22  0532   LYMPH 61 58       Virology:  Coronavirus-19 testing    Recent Labs   Lab Test 05/23/22  1946 06/23/20  0843   [id number]  --  Negative   [id number]  --  Not Applicable   [id number] Negative  --        Respiratory virus (non-coronavirus-19) testing    Recent Labs   Lab Test 05/24/22  1916 05/23/22 1946   IFLUA Not Detected  --    INFZA  --  Negative   FLUAH1 Not Detected  --    LO9621 Not Detected  --    FLUAH3 Not Detected  --    IFLUB Not Detected  --    INFZB  --  Negative   PIV1 Not Detected  --    PIV2 Not Detected  --    PIV3 Not Detected  --    PIV4 Not Detected  --    IRSV  --  Negative   RSVA Not Detected  --    RSVB Not Detected  --    HMPV Not Detected  --    ADENOV Not Detected  --    CORONA Not Detected  --        Hepatitis B Testing     Recent Labs   Lab Test 05/26/22  1123   AUSAB 288.89   HBCAB Reactive*   HEPBANG  Nonreactive       Imaging:  Recent Results (from the past 48 hour(s))   XR Chest Port 1 View    Narrative    EXAMINATION:  XR CHEST PORT 1 VIEW 5/26/2022 10:04 PM.    COMPARISON: 5/24/2022    HISTORY:  Pneumonia, new fever    FINDINGS: AP view. Cardiac silhouette is unchanged. No pleural  effusion or pneumothorax. Increased peripherally predominant  multifocal patchy airspace opacities bilaterally. Left Bochdalek  hernia unchanged.      Impression    IMPRESSION: Increased peripherally predominant multifocal patchy  airspace opacities bilaterally.    I have personally reviewed the examination and initial interpretation  and I agree with the findings.    ESTEFANI REYES MD         SYSTEM ID:  [id number]   CT Chest Pulmonary Embolism w Contrast    Narrative    EXAMINATION: CT CHEST PULMONARY EMBOLISM W CONTRAST, 5/27/2022 1:20 AM      COMPARISON: 5/23/2022    HISTORY: Negative d-dimer low probability of PE    TECHNIQUE: Volumetric helical acquisition of CT images of the chest  from the lung apices to the kidneys were acquired after the  administration of 55 mL of Isovue-370 IV contrast.     Findings:   Lungs: There is adequate contrast bolus in the study. Exam is negative  for pulmonary embolism    The heart is normal size. Aorta and pulmonary artery are normal  caliber. Unchanged minimally prominent mediastinal lymph nodes,  presumably reactive. Mild calcification of the coronaries.    Central airways are clear. Multifocal bilateral groundglass opacities  increased from prior. There is also consolidative opacity in the right  lung base similar to slightly improved from prior. No pneumothorax or  pleural effusion. Lower lobe right greater than left bronchiectasis  similar to prior.    Upper abdomen: No acute abnormality. Cyst at the superior pole the  right kidney similar to prior CT. Sliding hiatal hernia.    Bones: Degenerative changes of the spine. No suspicious osseous  lesions.      Impression    Impression:  1.   Exam is negative for pulmonary embolism.  2.  Increased upper lobe predominant scattered groundglass opacities  which may be infectious in etiology.  3.  Consolidation in the right lung base is similar to slightly  improved from 5/23/2020.    I have personally reviewed the examination and initial interpretation  and I agree with the findings.    ESTEFANI REYES MD         SYSTEM ID:  [id number]   XR Abdomen Port 1 View    Narrative    EXAMINATION:  XR ABDOMEN PORT 1 VIEWS 5/27/2022 9:28 AM     COMPARISON: CT 1/4/2021    HISTORY: Abdominal distension, nausea    TECHNIQUE: Frontal view of the abdomen.    FINDINGS: No abnormally dilated loops of bowel. Normal stomach  pattern. Gas bubble in the right upper quadrant is likely gas within  the colon at the right hepatic flexure similar to the 1/4/2021 CT. No  abnormally dilated loops of bowel. No pneumatosis. Surgical  sutures/clips in the right pelvis. Pelvic phleboliths. Bibasilar  atelectasis. Lumbosacral instrumented fusion hardware. Bilateral total  hip arthroplasties.      Impression    IMPRESSION: No obstructed bowel gas pattern.    I have personally reviewed the examination and initial interpretation  and I agree with the findings.    MAYRA GOODMAN MD         SYSTEM ID:  [id number]

## 2022-05-28 NOTE — PROGRESS NOTES
[facility]    Medicine Progress Note - Medicine Service, MAROON TEAM 4    Date of Admission:  5/23/2022    Assessment & Plan   Loco Wood is a 77 year old male with history of CLL, auto-immune hemolytic anemia (on prednisone taper), and CKD3a admitted for several days of SOB, chills, pleuritic chest pain with infiltrates/consolidations on CT imaging. Initially treated for CAP (ceft + azithro) with improvement. On 5/27 patient transferred to [facility] for worsening respiratory status and fever, now improved and afebrile (45L HFNC -> 12-15L oxymizer). Continue broad coverage with Zosyn, Micafungin (B-D glucan > 500), and IV bactrim. Following blood/sputum cultures, fungal/viral serologies, and HSV/CMV PCR.     Heme/Onc following for auto-immune hemolytic anemia and CLL, steroids decreased in setting of decompensation.      # Acute hypoxic respiratory failure 2/2 possible Bacterial PNA vs Fungal PNA vs ARDS  # Severe sepsis, resolved  # Bilateral lower lobe bronchiectasis/consolidations  Admitted for several days of worsening fatigue/dyspnea, subjective chills, and pleuritic chest pain with 3-4L oxygen requirement (w/ elevated WBC) and CT w/ infiltrates and bilateral lower lobe consolidations concerning for infection. RVP negative. Patient initially treated with 5 day abx course starting 5/24 with azithromycin (d/c 5/26) + ceftriaxone for presumed CAP with clinical improvement. On 5/27, patient developed fever with worsening respiratory status (45 L high flow NC) and was transferred to [facility].  Patient improving with IVF 2.5L and broadened coverage with Zosyn, Micafungin (positive B-Glucan), and IV bactrim (for possible PJP) with lactic acid ~2 and O2 req of 12-15L. Continue following sputum/blood cultures, fungal serologies, and HSV/CMV serology.   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 B12, folate, iron panel normal with slightly elevated YEN levels. Labs concerning for marrow responsive anemia (elevated LDH, bilirubin, reticulocyte count). Will likely need to treat underlying CLL for long-term solution.    Hgb on admission 9.5 down from baseline (~10-12 past year) and now ~8. Currently hemodynamically stable and suspect recent drop in Hgb secondary to dilutional anemia in setting of 2.5L of IVF given (5/27). Methylpred decreased and rituximab held in setting of recent infection.   - Heme/Onc consulted, appreciate recommendations    - Continue Methylprednisolone 40mg   - Hold Rituximab infusion   - Check uric acid, LDH, Mg, Phos, and retic count daily   - Hep B serologies (Core, Surface Ab +): Hep B ag negative, DNA quant pending   - CBC with peripheral blood smear   - Continue PTA Folate, B12   - Transfuse if Hgb < 7 (Type and Screen)    # CKD Stage 3A  Cr of 1.31 on admission up from baseline now up to 1.6 (5/27). Suspect Cr elevation is pre-renal in setting of recent sepsis and will likely improve with volume resuscitation. Will continue to monitor.    - Avoid nephrotoxic agents as able   - Routine renal function monitoring       Chronic/Resolved Problems:   # CLL (dx 2/2007), stable  History of CLL (2007) managed on Ibrutinib (5/2019) which patient is not taking daily. WBC 24.9 on admission (up from baseline ~6-10) now down to ~21. Suspect secondary to recent  infection with lower concern for conversion to leukemia. IVIG infusion (5/25). Following FISH and cytogenetics for disease prognostication and will continue to monitor with peripheral smear.   Continue daily ibrutinib to help control disease.   - Hematology/Oncology consulted, appreciate recommendations   -Following Flow cytometry, cytogenetics, IgH mutation, TP53 mutation   - CBC trend   - Heme/Onc outpatient follow up on discharge    # Degenerative disc disease  # Bilateral intermittent hand cramping, spasms suspicious for cervical impingement  Patient with history of degenerative disc disorder with concern for cervical impingement in setting of new bilateral intermittent hand cramping. Patient met with Dr. Melton his sports medicine doctor (AM 5/24) over phone for follow up appointment to discuss results of his MRI. Will defer to outpatient management.     # Hx Herpes Zoster c/b post-herpetic neuralgia: PTA ppx Acyclovir while on prednisone  # Hypothyroid - TSH 1.94 on admission WNL. Continue PTA levothyroxine  # HLD- PTA atorvastatin  # GERD- PTA omeprazole + PRN famotidine (also on Pred)  # Insomnia- has PRN Ambien as OP  # Vitamin supplements- PTA B12, folate, D3        Diet: Combination Diet Regular Diet Adult    DVT Prophylaxis: Pneumatic Compression Devices  Piña Catheter: Not present  Central Lines: None  Cardiac Monitoring: ACTIVE order. Indication: Tachyarrhythmias, acute (48 hours)  Code Status: Full Code      Disposition Plan   Expected Discharge: 2-3 days pending progress  Anticipated discharge location:  Awaiting care coordination huddle       The patient's care was discussed with the Attending Physician, Dr. Zamora .    Jerry Arevalo, MS4  Medicine Service, [facility] TEAM [address] [place] [address]  Securely message with the Vocera Web Console (learn more here)  Text page via [facility] Paging/Directory   Please see signed in provider for up to date coverage  "information      Resident/Fellow Attestation   I, Siddhartha Mcgee MD, was present with the medical/MELLY student who participated in the service and in the documentation of the note.  I have verified the history and personally performed the physical exam and medical decision making.  I agree with the assessment and plan of care as documented in the note.      Siddhartha Mcgee MD  PGY3  Date of Service (when I saw the patient): 05/28/22      Clinically Significant Risk Factors Present on Admission                   ______________________________________________________________________    Interval History    No acute overnight events. Patient has been afebrile for the past 24 hours and required decreased respiratory support now on 12-13 L at rest and up to 15 L with ambulation. Patient reports that they feel like their breathing hasn't changed significantly from yesterday and continues to endorse \"tightness\" in his chest with deep inspiration. However, patient does note that they feel less fatigues/ill and deny fever/chills, abdominal pain, nausea/vomiting, or diarrhea. He continues to endorse constipation and decreased appetite. (Last BM 5/27)    Of note, patient endorses concern regarding his drop in Hgb today and states that he suspects his symptoms are due to Valley Fever given his recent travel to [place] (noting that the weather was particularly windy during his stay).    Data reviewed today: I reviewed all medications, new labs and imaging results over the last 24 hours.       Physical Exam   Vital Signs: Temp: 97.7  F (36.5  C) Temp src: Oral BP: 119/64 Pulse: 79   Resp: 18 SpO2: 95 % O2 Device: Oxymizer cannula Oxygen Delivery: 13 LPM  Weight: 159 lbs 6.4 oz  GEN: Comfortably sitting up in bed on oximyzer   HEENT: Normocephalic   CVS: RRR, Normal S1, S2. No murmurs  Pulm: No acute respiratory distress, shallow breathing. No chest wall tenderness to palpation. Lung bases with crackles in posterior lung " fields with decreased air movement (L > R). Anterior lung fields without abnormal lung sounds.  Abd: Soft, non-tender to palpation  Ext: No ANALY. Warm and well perfused.   Skin: No rash, lesion, or bruising noted on exposed surfaces  Neuro: A&Ox4, answering questions appropriately, moving all extremities spontaneously  Psych: Pleasant, engaged in exam    Data   Recent Labs   Lab 05/28/22  0539 05/27/22  0532 05/26/22  2219 05/26/22  0549   WBC 21.1* 23.5* 23.6* 22.0*   HGB 8.0* 10.4* 8.4* 8.0*   * 112* 114* 113*    211 214 170    135  --  138   POTASSIUM 3.7 3.6  --  3.8   CHLORIDE 98 96  --  106   CO2 28 29  --  25   BUN 28 26  --  29   CR 1.61* 1.50*  --  1.18   ANIONGAP 8 10  --  7   DAVID 8.2*  8.2* 9.0  9.0  --  8.7   * 88  --  168*   ALBUMIN 2.3* 2.9*  --  2.5*   PROTTOTAL 5.2* 6.7*  --  5.6*   BILITOTAL 0.7 1.3  --  0.8   ALKPHOS 42 56  --  52   ALT 19 25  --  19   AST 22 21  --  13

## 2022-05-28 NOTE — PROGRESS NOTES
Hematology / Oncology  Daily Progress Note   Date of Service: 05/28/2022  Patient: Loco Wood  MRN: [id number]  Admission Date: 5/23/2022  Hospital Day # 5  Cancer Diagnosis: CLL  Primary Outpatient Oncologist: To establish with Dr Monahan   Current Treatment Plan: Ibrutinib 420 mg daily (C1 = 5/2019)     Assessment & Plan:   Loco Wood is a 77 year old man with a history of CKD stage III, HLD, paroxysmal afib, and 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 Admitted for AHRF due to community acquired pneumonia and worsening anemia (10?8) .    Recommendations:   - Will hold Rituxan given acute decline; will reassess timing once patient has stabilized. May consider IVIG 500 mg/kg if anemia worsens and infection not improving.    - Cont Methylpred 40 mg IV daily  - Check uric acid, LDH, Mg, Phos, retic count daily   - Agree with further infectious work up    #CLL  #Hypogammaglobulinemia    He was diagnosed 2/12/2007 with CLL, Lyles stage 0, followed clinically initially. At diagnosis WBC 17.8, ALC 11.2, hemoglobin 15.2, platelets 188. Flow consistent with CLL.  No opportunistic infections, IgG in the normal range. Due to rising lymphocyte count he was initiated on Ibrutinib monotherapy 5/14/19. He was tolerating treatment except for one brief episode of atrial fibrillation after starting ibrutinib, no recurrence since then. Due to cost considerations, he decided on his own to wean down on ibrutinib and not take it daily as prescribed in April 2022. At that time he was only taking ibrutinib 2-3 times per week and still has a large supply at home. On admission WBC stable with ALC of 13.1. IgG levels 312, s/p IVIG given inpatient 5/24.   - Peripheral flow (5/24) showed 50% CD5+ lambda monotypic B cells. NGS pending.   - Continue Ibrutinib 420 mg daily, tolerating without side effects. Encourage daily dosing and not skipping dosing.   - Plan to establish care with Dr Monahan 6/10     #Autoimmune  Hemolytic Anemia 2/2 CLL   In [place] (1/2022), he developed Alexander' positive hemolytic anemia, which responded well to a brief course of prednisone. He then followed up with Dr. Burns 4/2022 and was anemic with hgb 8.4 and elevated bilirubin of 4.2 on 4/12. Dr. Burns resumed 60 mg daily prednisone at that time for recurrence of AIHA. Dr. Burns also previously discussed adding rituximab with patient, but patient deferred at that time. On admission hgb 8.8 (down from ~10.6) with retic count of 7.6%. LDH unchanged. Tbili improved to 1.7. YEN was weakly positive. Vit B12 and folate wnl. No evidence of iron deficiency. Of note, patient was on a steroid taper per outpatient team decreasing dose 10 mg every week.   - Worsening AIHA in the setting of recent steroid taper. Start methylprednisolone 1 mg/kg on admission (5/24-5/27). Decreased to IV Methylprednisolone 40 mg in the setting of infection.   - Ongoing anemia despite initiation of HD steroids this admission, discussed case with heme staff Dr Espinosa who recommended initiation of Rituxan for refractory AIHA.    - Plan for weekly Rituxan 375 mg/m2 x4 weeks; Rituxan not started in the setting of acute decompensation. Will reassess when to administer. May consider IVIG 500 mg/kg if anemia worsens and infections not improving.   - Continue daily Folic acid supplementation.   - Monitor CBC w/ diff, LDH, CMP and retic daily   - Transfused for hgb <7 and plts <10k (outpatient therapy plan entered)     #TLS ppx  At risk for tumor lysis syndrome with initiation of Rituxan as above.   - Encourage PO fluids. Cont on Allopurinol 300 mg daily  - Check uric acid, LDH, Mg, Phos, retic count daily    # CAP   # Worsening AHRF 5/26   Presented with dyspnea and new hypoxia requiring 2-3L NC. CT PE negative for clot but did show bilateral multifocal pulmonary infiltrates and nodules or nodular infiltrates may be pneumonia. BCx, RVP, COVID negative. Of note, patient was not on PJP ppx  "prior to admission.   - Antibiotics per primary team. Currently on IV Zosyn, Micafungin and Bactrim   - Further infectious work up given acute decline; agree with infectious disease recs    - CT Chest showed worsening bilateral GGO     #ID PPx  - Pentamidine nebulizer q 28 days; given 5/26/22 (therapy plan entered)   - Tiffanie requested outpatient for COVID ppx     #Hx of of shingles  Continue Acyclovir 800 mg BID as long as he is on prednisone to reduce risk of shingles.    Patient was seen and plan of care was discussed with attending physician Dr. Karthikeyan Thompson.     Keyur Raza MD   Heme/Onc/Transplant Fellow  Pgr #[phone]    ___________________________________________________________________    Subjective & Interval History:    Nursing notes reviewed. Hypoxic to 88% overnight but remained afebrile. Was concerned about Valley fever as he recently was in [place] until earlier this year. Breathing has improved and did not feel feverish. Eager to walk with PT today.    Physical Exam:    Blood pressure 113/63, pulse 75, temperature 98.1  F (36.7  C), temperature source Oral, resp. rate 16, height 1.676 m (5' 6\"), weight 72.3 kg (159 lb 6.4 oz), SpO2 93 %.    General: laying in bed, no acute distress   HEENT: sclera anicteric, EOMI, MMM  Neck: supple, normal ROM  CV: Regular rate and rhythm  Resp: Breathing comfortably on 12L nasal cannula  MSK: warm and well-perfused, normal tone  Skin: no rashes on limited exam, no jaundice  Neuro: Lethargic, moves all extremities equally, no focal deficits    Labs & Studies: I personally reviewed the following studies:  ROUTINE LABS:  CMPRecent Labs   Lab 05/28/22  0539 05/27/22  0532 05/26/22  1123 05/26/22  0549 05/25/22  0616 05/23/22  1724    135  --  138 138 138   POTASSIUM 3.7 3.6  --  3.8 4.1 4.0   CHLORIDE 98 96  --  106 103 104   CO2 28 29  --  25 27 25   ANIONGAP 8 10  --  7 8 9   BUN 28 26  --  29 28 25   CR 1.61* 1.50*  --  1.18 1.27* 1.31*   GFRESTIMATED " 44* 48*  --  64 59* 57*   DAVID 8.2*  8.2* 9.0  9.0  --  8.7 9.0 8.8   PROTTOTAL 5.2* 6.7*  --  5.6*  --  5.9*   ALBUMIN 2.3* 2.9*  --  2.5*  --  3.2*   BILITOTAL 0.7 1.3  --  0.8 1.7* 2.5*   ALKPHOS 42 56  --  52  --  56   AST 22 21  --  13  --  19   ALT 19 25  --  19  --  28   * 333*  --  254* 300* 294*   URIC 3.2* 5.0 5.3  --   --   --      Recent Labs   Lab 05/28/22  0539 05/27/22  0532 05/26/22  0549 05/25/22  0616 05/23/22  1724   * 88 168* 168* 180*   MAG 2.2 2.0  --   --   --    PHOS 4.4 3.2  --   --   --      CBC  Recent Labs   Lab 05/28/22  0539 05/27/22  0532 05/26/22  2219 05/26/22  0549   WBC 21.1* 23.5* 23.6* 22.0*   RBC 2.27* 2.92* 2.33* 2.22*   HGB 8.0* 10.4* 8.4* 8.0*   HCT 24.5* 32.7* 26.6* 25.0*   * 112* 114* 113*   MCH 35.2* 35.6* 36.1* 36.0*   MCHC 32.7 31.8 31.6 32.0   RDW 16.5* 17.7* 17.8* 17.5*    211 214 170       Medications list for reference:  Current Facility-Administered Medications   Medication     acetaminophen (TYLENOL) tablet 650 mg     acyclovir (ZOVIRAX) tablet 800 mg     allopurinol (ZYLOPRIM) tablet 300 mg     atorvastatin (LIPITOR) tablet 20 mg     cyanocobalamin (VITAMIN B-12) tablet 100 mcg     famotidine (PEPCID) tablet 40 mg     folic acid (FOLVITE) tablet 1 mg     ibrutinib (IMBRUVICA) capsule 420 mg     ipratropium - albuterol 0.5 mg/2.5 mg/3 mL (DUONEB) neb solution 3 mL     levothyroxine (SYNTHROID/LEVOTHROID) tablet 50 mcg     lidocaine (LMX4) cream     lidocaine 1 % 0.1-1 mL     lidocaine 1 % 0.1-1 mL     melatonin tablet 1 mg     methylPREDNISolone sodium succinate (solu-MEDROL) injection 40 mg     micafungin (MYCAMINE) 150 mg in sodium chloride 0.9 % 100 mL intermittent infusion     ondansetron (ZOFRAN) injection 4 mg     pantoprazole (PROTONIX) EC tablet 20 mg     piperacillin-tazobactam (ZOSYN) 4.5 g vial to attach to  mL bag     polyethylene glycol (MIRALAX) Packet 17 g     psyllium (METAMUCIL/KONSYL) Packet 1 packet      senna-docusate (SENOKOT-S/PERICOLACE) 8.6-50 MG per tablet 1 tablet    Or     senna-docusate (SENOKOT-S/PERICOLACE) 8.6-50 MG per tablet 2 tablet     sodium chloride (PF) 0.9% PF flush 3 mL     sodium chloride (PF) 0.9% PF flush 3 mL     sodium chloride (PF) 0.9% PF flush 3 mL     sodium chloride (PF) 0.9% PF flush 3 mL     sulfamethoxazole-trimethoprim (BACTRIM) 320 mg in D5W 570 mL intermittent infusion     Vitamin D3 (CHOLECALCIFEROL) tablet 50 mcg

## 2022-05-28 NOTE — PLAN OF CARE
Neuro: A&Ox4. Afebrile. Calls appropriately.   Cardiac: SR 70-80s. VSS.   Respiratory: Sating >92% on 12-13L oxymizer.  GI/: Adequate urine output via urinal. No BM this shift, took senna and metamucil. Declined offer to request an order for suppository or enema.   Diet/appetite: Tolerating regular diet. Eating well.  Activity:  SBA up to chair and in halls.   Pain: At acceptable level on current regimen.   Skin: No new deficits noted.  LDA's: 2 R PIV    Plan: Continue with POC. Notify primary team with changes.

## 2022-05-28 NOTE — PLAN OF CARE
"Neuro: A&Ox4. Pleasant  Cardiac: SR occasional PACs. SBP 90s-110s. /63   Pulse 75   Temp 98.1  F (36.7  C) (Oral)   Resp 16   Ht 1.676 m (5' 6\")   Wt 72.3 kg (159 lb 6.4 oz)   SpO2 93%   BMI 25.73 kg/m    Respiratory: oximyzer cannula 12-13L. When ambulating 15L oxymask to portable tank. Denies SOB despite pulse ox readings as low as 85% overnight, tried multiple pulse ox sites/probes with intermittent success  GI/: Adequate urine output. No BM, gave metamucil and senna.  Diet/appetite: Reg diet.   Activity:  SBA - Assist of 1 ambulating in halls  Pain: Pt denies pain this shift  Skin: No new deficits noted.  LDA's: PIV x2 infusing tko with abx    Plan: Pt suspects he might have \"Valley Fever\" from recent stay in [place]. Wants day team updated on this theory. Continue with POC. Notify primary team with changes.    "

## 2022-05-29 NOTE — CODE/RAPID RESPONSE
Rapid Response Team Note    Assessment   In assessment a rapid response was called on Loco Wood due to SIRS/Sepsis trigger. This presentation is likely due to ongoing lung infection with known CLL and autoimmune hemolytic anemia contributing.      Plan   -  Lactic acid elevated to 3.5, likely multifactorial as above  - Give 500cc over 2 hours  - Lactic acid repeat in 2 hours   -  The Internal Medicine primary team was able to be reached and they are in agreement with the above plan.  -  Disposition: The patient will remain on the current unit. We will continue to monitor this patient closely.  -  Reassessment and plan follow-up will be performed by the primary team      Clarisa Lofton PA-C  [facility] RRT [facility] Job Code Contact #[phone]  [facility] Paging/Directory    Hospital Course   Brief Summary of events leading to rapid response:   Rapid response called for lactic acid of 3.5, drawn for sepsis triggers of tachypnea and leukocytosis.     Patient states he feels unchanged from initial presentation. Denies worsening dyspnea or chest pain.  Notes worsening nausea today    Admission Diagnosis:   Shortness of breath [R06.02]  Pneumonia [J18.9]  Hypoxia [R09.02]  Pneumonia of both lungs due to infectious organism, unspecified part of lung [J18.9]  Chest pain, unspecified type [R07.9]    Physical Exam   Temp: 98.1  F (36.7  C) Temp  Min: 97.2  F (36.2  C)  Max: 98.4  F (36.9  C)  Resp: 28 Resp  Min: 18  Max: 28  SpO2: 94 % SpO2  Min: 92 %  Max: 96 %  Pulse: 89 Pulse  Min: 75  Max: 89    No data recorded  BP: 126/66 Systolic (24hrs), Av , Min:112 , Max:136   Diastolic (24hrs), Av, Min:46, Max:70     I/Os: I/O last 3 completed shifts:  In: 480 [P.O.:480]  Out: 3165 [Urine:3165]     Exam:   GENERAL: Alert and oriented x 3. NAD.  Cooperative.   HEENT: Anicteric sclera. Mucous membranes moist. NC. AT.  CV: RRR. S1, S2. No murmurs appreciated.   RESPIRATORY: Effort normal on 13L. Lungs overall diminished. No  "overt wheezes or rhonchi.   GI: Abdomen soft and non distended with normoactive bowel sounds present in all quadrants. No tenderness, rebound, guarding. No lesions.   NEUROLOGICAL: No focal deficits. Moves all extremities.  CN 2-12 grossly intact.  EXTREMITIES: No peripheral edema. Intact bilateral pedal pulses.   SKIN: No jaundice. No rashes.        Significant Results and Procedures   Lactic Acid:   Recent Labs   Lab Test 05/29/22  1234 05/28/22  0539 05/27/22  2109   LACT 3.5* 2.0 2.4*     CBC:   Recent Labs   Lab Test 05/29/22  0542 05/28/22  0539 05/27/22  0532   WBC 21.6* 21.1* 23.5*   HGB 8.4* 8.0* 10.4*   HCT 25.9* 24.5* 32.7*    171 211        Sepsis Evaluation   The patient is known to have an infection.  Loco Wood meets SIRS criteria AND has a lactate >2 or other evidence of acute organ damage.  These vital signs, lab and physical exam findings constitute a diagnosis of SEVERE SEPSIS.    Sepsis Time-Zero (time severe sepsis diagnosis confirmed): 1300  05/29/22 as this was the time when Lactate resulted, and the level was > 2.0     Anti-infectives (From now, onward)    Start     Dose/Rate Route Frequency Ordered Stop    05/27/22 0800  micafungin (MYCAMINE) 150 mg in sodium chloride 0.9 % 100 mL intermittent infusion         150 mg  100 mL/hr over 60 Minutes Intravenous EVERY 24 HOURS 05/27/22 0743      05/27/22 0800  piperacillin-tazobactam (ZOSYN) 4.5 g vial to attach to  mL bag        Note to Pharmacy: For [facility], [facility] and [facility]: For Zosyn-naive patients, use the \"Zosyn initial dose + extended infusion\" order panel.    4.5 g  over 30 Minutes Intravenous EVERY 6 HOURS 05/27/22 0748      05/27/22 0200  sulfamethoxazole-trimethoprim (BACTRIM) 320 mg in D5W 570 mL intermittent infusion         320 mg  380 mL/hr over 90 Minutes Intravenous EVERY 6 HOURS 05/27/22 0049      05/24/22 0800  acyclovir (ZOVIRAX) tablet 800 mg         800 mg Oral 2 TIMES DAILY 05/24/22 0258          Current antibiotic " coverage is appropriate for source of infection.    3 Hour Severe Sepsis Bundle Completion:  1. Initial Lactic Acid result shown above. Repeat lactic acid ordered for 2 hours from now.   2. Blood Cultures before Antibiotics: Yes  3. Broad Spectrum Antibiotics Administered: yes  4. Fluids: 500 mL fluids ORDERED to be given

## 2022-05-29 NOTE — PLAN OF CARE
"Goal Outcome Evaluation:    /73 (BP Location: Left arm)   Pulse 83   Temp 98.2  F (36.8  C) (Oral)   Resp 20   Ht 1.676 m (5' 6\")   Wt 72.8 kg (160 lb 9.6 oz)   SpO2 93%   BMI 25.92 kg/m      Plan of Care Reviewed With: patient      Overall patient progress:improving    Time: 4352-2213  Status:Admitted on 05/23/22 due to acute hypoxic respiratory failure  Neuro/Pain:  A&Ox4, denied pain.   Activity: Assist of one person  Cardiac/vs: denied chest pain, afebrile. /67.   Respiratory: on 11L Oxiplus sating/oxymizer >92%, no SOB at rest. BARAKAT, tachypenic in mid 20s, LS coarse lung sound.   GI/: constipation, active bowel sound. Team notified via pager if they could order enema per pt request, no call back heard yet or no new order. Last BM 05/26/22. Voiding spontaneously using external cath.   Diet: regular diet, intermittent nausea, no intervention needed.   Skin: no skin deficit   LDAs: PIV x 2, saline locked and tko b/n iv abx. External cath for frequency/urgency.   Labs/Imaging: Post bolus LA 3.6, team notified; another  ml bolus ordered, ICU RN with RRT visited patient for recheck LA.   Change this shift: LA 3.6, no new order.   Plan: continue on Oxi plus 10-15L, monitor intermittent nausea, work up on constipation.         "

## 2022-05-29 NOTE — PROVIDER NOTIFICATION
05/29/22 1300   Call Information   Date of Call 05/29/22   Time of Call 1300   Name of person requesting the team Marii GEORGE   Title of person requesting team RN   RRT Arrival time 1302   Time RRT ended 1321   Reason for call   Type of RRT Adult   Primary reason for call Sepsis suspected   Sepsis Suspected Elevated Lactate level;WBC <4 or >12   Was patient transferred from the ED, ICU, or PACU within last 24 hours prior to RRT call? No   SBAR   Situation Lactic acid 3.5, WBC 21.6, RR 28.   Background recent RRT's History of CLL, auto-immune hemolytic anemia (on prednisone taper), and CKD3a presenting with several days of SOB, chills, pleuritic chest pain.   Notable History/Conditions Cancer;Cardiac   Assessment A&O x4. 13 LPM Oxiplus mask O2. RRR, SAT^ 93%. Pt appears not to be in respiratory distress, afebrile. reports feeling nauseated today and has not had the ability to eat/drink much today.   Interventions Fluid bolus;Labs   Adjustments to Recommend 500mL NS bolus over 2 hours, recheck lactic acid in 2 hours.   Patient Outcome   Patient Outcome Stabilized on unit   RRT Team   Attending/Primary/Covering Physician Primary team notified by bedside RN.   Date Attending Physician notified 05/29/22   Physician(s) Clarisa Lofton PA-C   Lead RN Christelle Horton   RT none   Post RRT Intervention Assessment   Post RRT Assessment Stable/Improved   Date Follow Up Done 05/29/22   Time Follow Up Done 1635   Comments Pt stated less nausea, still needs to have BM. Lactic 3.6 from 3.5, no increased O2 needs, afebrile, VSS, resting comfortably. Checked in with bedside DORIS Hawley and primary team already paged with current lactic. Will de-escalate RRT, writer instructed Chandan GEORGE to re-issue RRT if needed or reach out for more resource help.

## 2022-05-29 NOTE — PLAN OF CARE
"Neuro: A&Ox4. Pleasant   Cardiac: SR /66 (BP Location: Left arm)   Pulse 77   Temp 97.7  F (36.5  C) (Oral)   Resp 18   Ht 1.676 m (5' 6\")   Wt 72.8 kg (160 lb 9.6 oz)   SpO2 92%   BMI 25.92 kg/m     Respiratory: oximyzer cannula 13L. When ambulating 15L oxymask to portable tank. Intermittent crackles noted on auscultation  GI/: Adequate urine output. Frequent, urgent. Started primofit this shift d/t pt frustration with frequency. No BM, gave metamucil and senna.  Diet/appetite: Reg diet. Pt admits to some nausea/stomach discomfort. Gave Pepcid x1 and Zofran x1  Activity:  SBA, ambulated in halls x1 this shift  Pain: Pt denies pain this shift  Skin: No new deficits noted.  LDA's: PIV x2. New one placed overnight as old one infiltrated.     Plan: Continue with POC. Continue trying to wean O2  "

## 2022-05-29 NOTE — PROGRESS NOTES
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 Admitted for AHRF due to community acquired pneumonia and worsening anemia (10?8) .    Recommendations:   - Will hold Rituxan given acute decline; will reassess timing once patient has stabilized. May consider IVIG 500 mg/kg if anemia worsens and infection not improving.    - Cont Methylpred 40 mg IV daily  - Check LDH, Tbili and retic count daily   - Agree with further infectious work up  - Obtain accurate O2 sat readings     #CLL  #Hypogammaglobulinemia    He was diagnosed 2/12/2007 with CLL, Lyles stage 0, followed clinically initially. At diagnosis WBC 17.8, ALC 11.2, hemoglobin 15.2, platelets 188. Flow consistent with CLL.  No opportunistic infections, IgG in the normal range. Due to rising lymphocyte count he was initiated on Ibrutinib monotherapy 5/14/19. He was tolerating treatment except for one brief episode of atrial fibrillation after starting ibrutinib, no recurrence since then. Due to cost considerations, he decided on his own to wean down on ibrutinib and not take it daily as prescribed in April 2022. At that time he was only taking ibrutinib 2-3 times per week and still has a large supply at home. On admission WBC stable with ALC of 13.1. IgG levels 312, s/p IVIG given inpatient 5/24.   Today, ALC is 11.4 which has improved on daily dosing of ibrutinib.  - Peripheral flow (5/24) showed 50% CD5+ lambda monotypic B cells. NGS pending.   - Continue Ibrutinib 420 mg daily, tolerating without side effects. Encourage daily dosing  and not skipping dosing.   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 He then followed up with Dr. Burns 4/2022 and was anemic with hgb 8.4 and elevated bilirubin of 4.2 on 4/12. Dr. Burns resumed 60 mg daily prednisone at that time for recurrence of AIHA. Dr. Burns also previously discussed adding rituximab with patient, but patient deferred at that time. On admission hgb 8.8 (down from ~10.6) with retic count of 7.6%. LDH unchanged. Tbili improved to 1.7. YEN was weakly positive. Vit B12 and folate wnl. No evidence of iron deficiency. Of note, patient was on a steroid taper per outpatient team decreasing dose 10 mg every week.   - Worsening AIHA in the setting of recent steroid taper. Start methylprednisolone 1 mg/kg on admission (5/24-5/27). Decreased to IV Methylprednisolone 40 mg in the setting of infection.   - Ongoing anemia despite initiation of HD steroids this admission, discussed case with heme staff Dr Espinosa who recommended initiation of Rituxan for refractory AIHA.    - Plan for weekly Rituxan 375 mg/m2 x4 weeks; Rituxan not started in the setting of acute respiratory decompensation. Will reassess when to administer. May consider IVIG 500 mg/kg if anemia worsens and infections not improving.   - Continue daily Folic acid supplementation.   - Monitor CBC w/ diff, LDH, CMP and retic daily   - Transfused for hgb <7 and plts <10k (outpatient therapy plan entered)   - Obtain accurate O2 sats to help decision making    #TLS ppx  At risk for tumor lysis syndrome with initiation of Rituxan as above.   - Encourage PO fluids. Cont on Allopurinol 300 mg daily  - Check uric acid, LDH, Mg, Phos, retic count daily    # CAP   # Worsening AHRF 5/26   Presented with dyspnea and new hypoxia requiring 2-3L NC. CT PE negative for clot but did show  bilateral multifocal pulmonary infiltrates and nodules or nodular infiltrates may be pneumonia. BCx, RVP, COVID negative. Of note, patient was not on PJP ppx prior to admission.   - Antibiotics per primary team. Currently on IV Zosyn, Micafungin and Bactrim   - Further infectious work up given acute decline; agree with infectious disease recs    - CT Chest showed worsening bilateral GGO   - Follow up sputum culture    #ID PPx  - Pentamidine nebulizer q 28 days; given 5/26/22 (therapy plan entered)   - Tiffanie requested outpatient for COVID ppx     #Hx of of shingles  Continue Acyclovir 800 mg BID as long as he is on prednisone to reduce risk of shingles.    Patient was seen and plan of care was discussed with attending physician Dr. Karthikeyan Thompson.     Keyur Raza MD   Heme/Onc/Transplant Fellow  Pgr #[phone]    ___________________________________________________________________    Subjective & Interval History:    Nursing notes reviewed. Afebrile overnight. Had low O2 sats in the 70s but oximeter reading on ear showed O2 sats in the 90s vs on the finger in the 70s. Loco states that his main issue is constipation this morning as well as some nausea and some abdominal discomfort. No vomiting. Denies any previous abdominal surgeries. Has not had a bowel movement for the past two days. He also coughed up brown phlegm which was collected for testing.    Physical Exam:    Temp:  [97.2  F (36.2  C)-98.1  F (36.7  C)] 97.2  F (36.2  C)  Pulse:  [] 75  Resp:  [18-19] 18  BP: (112-124)/(46-66) 112/65  SpO2:  [88 %-97 %] 92 %    General: laying in bed, no acute distress   HEENT: sclera anicteric, EOMI, MMM  Neck: supple, normal ROM  CV: Regular rate and rhythm  Resp: Breathing comfortably on 12L oxymask  MSK: warm and well-perfused, normal tone  Skin: no rashes on limited exam, no jaundice  Neuro: Awake, alert, moves all extremities equally, no focal deficits    Labs & Studies: I personally reviewed the following  studies:  ROUTINE LABS:  CMP  Recent Labs   Lab 05/29/22  0542 05/28/22  0539 05/27/22  0532 05/26/22  1123 05/26/22  0549    134 135  --  138   POTASSIUM 3.8 3.7 3.6  --  3.8   CHLORIDE 102 98 96  --  106   CO2 24 28 29  --  25   ANIONGAP 11 8 10  --  7   BUN 21 28 26  --  29   CR 1.45* 1.61* 1.50*  --  1.18   GFRESTIMATED 50* 44* 48*  --  64   DAVID 8.2*  8.2* 8.2*  8.2* 9.0  9.0  --  8.7   PROTTOTAL 5.3* 5.2* 6.7*  --  5.6*   ALBUMIN 2.3* 2.3* 2.9*  --  2.5*   BILITOTAL 0.4 0.7 1.3  --  0.8   ALKPHOS 44 42 56  --  52   AST 22 22 21  --  13   ALT 21 19 25  --  19   * 307* 333*  --  254*   URIC 2.5* 3.2* 5.0 5.3  --      Recent Labs   Lab 05/29/22  0542 05/28/22  0539 05/27/22  0532 05/26/22  0549 05/25/22  0616   GLC 94 175* 88 168* 168*   MAG 2.4* 2.2 2.0  --   --    PHOS 3.5 4.4 3.2  --   --      CBC  Recent Labs   Lab 05/29/22  0542 05/28/22  0539 05/27/22  0532 05/26/22  2219   WBC 21.6* 21.1* 23.5* 23.6*   RBC 2.35* 2.27* 2.92* 2.33*   HGB 8.4* 8.0* 10.4* 8.4*   HCT 25.9* 24.5* 32.7* 26.6*   * 108* 112* 114*   MCH 35.7* 35.2* 35.6* 36.1*   MCHC 32.4 32.7 31.8 31.6   RDW 16.4* 16.5* 17.7* 17.8*    171 211 214       Medications list for reference:  Current Facility-Administered Medications   Medication     acetaminophen (TYLENOL) tablet 650 mg     acyclovir (ZOVIRAX) tablet 800 mg     allopurinol (ZYLOPRIM) tablet 300 mg     atorvastatin (LIPITOR) tablet 20 mg     cyanocobalamin (VITAMIN B-12) tablet 100 mcg     famotidine (PEPCID) tablet 20 mg     folic acid (FOLVITE) tablet 1 mg     ibrutinib (IMBRUVICA) capsule 420 mg     ipratropium - albuterol 0.5 mg/2.5 mg/3 mL (DUONEB) neb solution 3 mL     levothyroxine (SYNTHROID/LEVOTHROID) tablet 50 mcg     lidocaine (LMX4) cream     lidocaine 1 % 0.1-1 mL     lidocaine 1 % 0.1-1 mL     melatonin tablet 1 mg     methylPREDNISolone sodium succinate (solu-MEDROL) injection 40 mg     micafungin (MYCAMINE) 150 mg in sodium chloride 0.9 % 100  mL intermittent infusion     ondansetron (ZOFRAN) injection 4 mg     pantoprazole (PROTONIX) EC tablet 20 mg     piperacillin-tazobactam (ZOSYN) 4.5 g vial to attach to  mL bag     polyethylene glycol (MIRALAX) Packet 17 g     psyllium (METAMUCIL/KONSYL) Packet 1 packet     senna-docusate (SENOKOT-S/PERICOLACE) 8.6-50 MG per tablet 1 tablet    Or     senna-docusate (SENOKOT-S/PERICOLACE) 8.6-50 MG per tablet 2 tablet     sodium chloride (PF) 0.9% PF flush 3 mL     sodium chloride (PF) 0.9% PF flush 3 mL     sodium chloride (PF) 0.9% PF flush 3 mL     sodium chloride (PF) 0.9% PF flush 3 mL     sulfamethoxazole-trimethoprim (BACTRIM) 320 mg in D5W 570 mL intermittent infusion     Vitamin D3 (CHOLECALCIFEROL) tablet 50 mcg

## 2022-05-29 NOTE — PROVIDER NOTIFICATION
"[id number] Paged Haley Severson \"Pt Loco Wood 6B Got sputum sample can you place an order so we can print labels?\"    No call back no new orders  "

## 2022-05-29 NOTE — PROGRESS NOTES
Dr. Zamora notified that pt triggered sepsis d/t WBC and RR 26-28. Lactic check 3.5. code sepsis called per protocol. RRT PA ordered 500ml NS bolus over 2 hours. No other concerns or orders at this time.

## 2022-05-29 NOTE — PROGRESS NOTES
[facility]    Medicine Progress Note - Medicine Service, MAROON TEAM 4    Date of Admission:  5/23/2022    Assessment & Plan   Loco Wood is a 77 year old male with history of CLL, auto-immune hemolytic anemia (on prednisone taper), and CKD3a admitted for several days of SOB, chills, pleuritic chest pain with infiltrates/consolidations on CT imaging. Initially treated for CAP (ceft + azithro) with improvement. On 5[place]27 [place] [facility] [place] and fever, now improved and afebrile (45L HFNC -> 12-15L oxymizer). Continue broad coverage with Zosyn, Micafungin (B-D glucan > 500), and IV bactrim.  Heme/Onc following for auto-immune hemolytic anemia and CLL, steroids decreased in setting of infection.    # Acute hypoxic respiratory failure 2/2 possible Bacterial PNA vs Fungal PNA vs ARDS  # Severe sepsis  # Bilateral lower lobe bronchiectasis/consolidations  Admitted for several days of worsening fatigue/dyspnea, subjective chills, and pleuritic chest pain with 3-4L oxygen requirement (w/ elevated WBC) and CT w/ infiltrates and bilateral lower lobe consolidations concerning for infection. RVP negative. Patient initially treated with 5 day abx course starting 5/24 with azithromycin (d/c 5/26) + ceftriaxone for presumed CAP with clinical improvement. On 5/27, patient developed fever with worsening respiratory status (45 L high flow NC) and [place] [facility] [place] Patient improving with IVF 2.5L and broadened coverage with Zosyn, Micafungin (positive B-Glucan), and IV bactrim (for possible PJP) O2 req of 12-15L. Lactate elevated again today- currently receiving bolus.   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 B12, folate, iron panel normal with slightly elevated YEN levels. Labs concerning for marrow responsive anemia (elevated LDH, bilirubin, reticulocyte count). Will likely need to treat underlying CLL for long-term solution.    Hgb on admission 9.5 down from baseline (~10-12 past year) and now ~8. Currently hemodynamically stable and suspect recent drop in Hgb secondary to dilutional anemia in setting of 2.5L of IVF given (5/27). Methylpred decreased and rituximab held in setting of recent infection.   - Heme/Onc consulted, appreciate recommendations    - Continue Methylprednisolone 40mg   - Hold Rituximab infusion   - Check uric acid, LDH, Mg, Phos, and retic count daily   - Hep B serologies (Core, Surface Ab +): Hep B ag negative, DNA quant pending   - CBC with peripheral blood smear   - Continue PTA Folate, B12   - Transfuse if Hgb < 7 (Type and Screen)    # CKD Stage 3A  Cr of 1.31 on admission up from baseline now up to 1.6 (5/27), slightly improved to 1.45 today. Suspect Cr elevation is pre-renal in setting of recent sepsis and will likely improve with volume resuscitation. Will continue to monitor.    - Avoid nephrotoxic agents as able   - Routine renal function monitoring     Chronic/Resolved Problems:   # CLL (dx 2/2007), stable  History of CLL (2007) managed on Ibrutinib (5/2019) which patient is not taking daily. WBC 24.9 on admission (up from baseline ~6-10) now down to ~21. Suspect secondary to recent infection with lower concern for conversion to leukemia. IVIG infusion (5/25). Following FISH and  cytogenetics for disease prognostication and will continue to monitor with peripheral smear.   Continue daily ibrutinib to help control disease.   - Hematology/Oncology consulted, appreciate recommendations   -Following Flow cytometry, cytogenetics, IgH mutation, TP53 mutation   - CBC trend   - Heme/Onc outpatient follow up on discharge    # Degenerative disc disease  # Bilateral intermittent hand cramping, spasms suspicious for cervical impingement  Patient with history of degenerative disc disorder with concern for cervical impingement in setting of new bilateral intermittent hand cramping. Patient met with Dr. Melton his sports medicine doctor (AM 5/24) over phone for follow up appointment to discuss results of his MRI. Will defer to outpatient management.     # Hx Herpes Zoster c/b post-herpetic neuralgia: PTA ppx Acyclovir while on prednisone  # Hypothyroid - TSH 1.94 on admission WNL. Continue PTA levothyroxine  # HLD- PTA atorvastatin  # GERD- PTA omeprazole + PRN famotidine (also on Pred)  # Insomnia- has PRN Ambien as OP  # Vitamin supplements- PTA B12, folate, D3        Diet: Combination Diet Regular Diet Adult    DVT Prophylaxis: Pneumatic Compression Devices  Piña Catheter: Not present  Central Lines: None  Cardiac Monitoring: ACTIVE order. Indication: Tachyarrhythmias, acute (48 hours)  Code Status: Full Code      Disposition Plan   Expected Discharge: 06/01/2022     Anticipated discharge location:  Awaiting care coordination huddle  Delays: None     The patient's care was discussed with the Bedside Nurse and Patient.    Rula Zamora MD  Medicine Service, [address]  Securely message with the Vocera Web Console (learn more here)  Text page via [facility] Paging/Directory   Please see signed in provider for up to date coverage information      Clinically Significant Risk Factors Present on Admission                     ______________________________________________________________________    Interval History   No acute events overnight. Patient on 11 LPM via oxymizer. He reports his breathing feels about the same today, but denies significant shortness of breath. He feels very constipated and would like more help with a bowel movement. He endorses nausea as well.     Rapid Response called today for leukocytosis and tachypnea. Found to have elevated lactate of 3.5, receiving IVF. Patient denies feeling any different. His BP is stable.    Data reviewed today: I reviewed all medications, new labs and imaging results over the last 24 hours. I personally reviewed no images or EKG's today.    Physical Exam   Vital Signs: Temp: 98.1  F (36.7  C) Temp src: Oral BP: 126/66 Pulse: 89   Resp: 28 SpO2: 95 % O2 Device: Oxi Plus Oxygen Delivery: 11 LPM  Weight: 160 lbs 9.6 oz  General Appearance: Alert, pleasant, NAD.  Respiratory: Breathing comfortably on 11 LPM. CTAB. No wheezing or crackles.   Cardiovascular: RRR, normal S1/S2, no murmurs.   GI: Soft, non-tender, mildly distended (but patient reports it feels normal to him). Bowel sounds present. No guarding or rebound.  Skin: No rash or lesions noted.  Other: Alert and oriented x3. CNII-XII grossly intact.     Data   Recent Labs   Lab 05/29/22  0542 05/28/22  0539 05/27/22  0532   WBC 21.6* 21.1* 23.5*   HGB 8.4* 8.0* 10.4*   * 108* 112*    171 211    134 135   POTASSIUM 3.8 3.7 3.6   CHLORIDE 102 98 96   CO2 24 28 29   BUN 21 28 26   CR 1.45* 1.61* 1.50*   ANIONGAP 11 8 10   DAVID 8.2*  8.2* 8.2*  8.2* 9.0  9.0   GLC 94 175* 88   ALBUMIN 2.3* 2.3* 2.9*   PROTTOTAL 5.3* 5.2* 6.7*   BILITOTAL 0.4 0.7 1.3   ALKPHOS 44 42 56   ALT 21 19 25   AST 22 22 21

## 2022-05-29 NOTE — PLAN OF CARE
Neuro: A&Ox4. Denies numbness/tingling.  Cardiac: SR, VSS.       Respiratory: Sating 92%+on 11-15L oxiplus, SOB unchanged. RR mid to high 20s  GI/: Adequate urine output, primofit in place d/t frequency/urgency, Mild nausea, compazine given x1. Difficulty with constipation suppository given x1 with minimal response   Diet/appetite: Tolerating regular diet, poor PO intake  Activity:  Assist of 1 to ambulate.  Pain: Denies pain  Skin: No new deficits noted.  LDA's: 2 R PIVs saline locked/TKO     Plan: Code sepsis called for lactic of 3.5, 1x fluid bolus over 2 hours ordered. Continue with POC. Notify primary team with changes.    Goal Outcome Evaluation:    Plan of Care Reviewed With: patient

## 2022-05-30 NOTE — PLAN OF CARE
"Goal Outcome Evaluation:    /70 (BP Location: Left arm)   Pulse 86   Temp 98.1  F (36.7  C) (Oral)   Resp 20   Ht 1.676 m (5' 6\")   Wt 71.7 kg (158 lb)   SpO2 94%   BMI 25.50 kg/m      Overall patient progress: no change    Time: 6272-4888  Status:Admitted on 05/23/22 due to acute hypoxic respiratory failure  Neuro/Pain:  A&Ox4, denied pain.   Activity: Assist of one person  Cardiac/vs: denied chest pain, afebrile. /70  Respiratory: on 11-15L oxymizer >92%, no SOB at rest. BARAKAT, tachypenic in mid 20s, LS coarse and fine crackles on lower lobes.   GI/: medium soft bowel movement once.  Active bowel sound. Primofit for urgency/frequency urination with AUO.   Diet: regular diet, intermittent nausea, Compazine 5 mg iv once this shift. Very poor appetite.   Skin: no skin deficit   LDAs: PIV x 2, saline locked and tko b/n iv abx. External cath for frequency/urgency.   Labs/Imaging: Recheck lactic acid   Change this shift:no change    Plan: continue on Oxymizer 11-15L, monitor intermittent nausea, wean oxygen. ID following. PO and IV abx.   "

## 2022-05-30 NOTE — PLAN OF CARE
"/63 (BP Location: Left arm)   Pulse 85   Temp 98.2  F (36.8  C) (Oral)   Resp 24   Ht 1.676 m (5' 6\")   Wt 72.8 kg (160 lb 9.6 oz)   SpO2 92%   BMI 25.92 kg/m      VSS. Afebrile. Alert and oriented x 4.  Pt. Has been on 12L Oxymizer overnight. Dyspnea with exertion. Denies pain. Tolerating diet with no n/v. Right PIV x 2. TKO for antibiotics. Repositioning self in bed. Up with minimal assist. Adequate UOP per primofit. No BM overnight. Patient requesting an enema in the AM. Continue to monitor.  "

## 2022-05-30 NOTE — PROGRESS NOTES
Hematology / Oncology  Daily Progress Note   Date of Service: 05/30/2022  Patient: Loco Wood  MRN: [id number]  Admission Date: 5/23/2022  Hospital Day # 7  Cancer Diagnosis: CLL  Primary Outpatient Oncologist: To establish with Dr Monahan   Current Treatment Plan: Ibrutinib 420 mg daily (C1 = 5/2019)     Subjective & Interval History:    Nursing notes reviewed. Afebrile overnight. Had rapid response yesterday due to leucocytosis and tachypnea. Lactated was 3.5 and IVF was given. He remained asymptomatic. Still has nausea this morning despite having an enema. States that he does feel relief in his abdomen.     Physical Exam:    Temp:  [98  F (36.7  C)-98.4  F (36.9  C)] 98.1  F (36.7  C)  Pulse:  [81-89] 89  Resp:  [20-28] 22  BP: (109-136)/(62-73) 117/63  FiO2 (%):  [21 %] 21 %  SpO2:  [92 %-95 %] 95 % on 5L NC    General: laying in bed, no acute distress   HEENT: sclera anicteric, EOMI, MMM  Neck: supple, normal ROM  CV: Regular rate and rhythm  Resp: Breathing comfortably on 9L NC  MSK: warm and well-perfused, normal tone  Skin: no rashes on limited exam, no jaundice  Neuro: Awake, alert, moves all extremities equally, no focal deficits    Labs & Studies: I personally reviewed the following studies:  ROUTINE LABS:  CMP  Recent Labs   Lab 05/30/22  0549 05/29/22  0542 05/28/22  0539 05/27/22  0532    137 134 135   POTASSIUM 3.8 3.8 3.7 3.6   CHLORIDE 103 102 98 96   CO2 23 24 28 29   ANIONGAP 9 11 8 10   BUN 14 21 28 26   CR 1.42* 1.45* 1.61* 1.50*   GFRESTIMATED 52* 50* 44* 48*   DAVID 7.9*  7.9* 8.2*  8.2* 8.2*  8.2* 9.0  9.0   PROTTOTAL 5.0* 5.3* 5.2* 6.7*   ALBUMIN 2.3* 2.3* 2.3* 2.9*   BILITOTAL 0.6 0.4 0.7 1.3   ALKPHOS 46 44 42 56   AST 20 22 22 21   ALT 22 21 19 25   * 299* 307* 333*   URIC 2.1* 2.5* 3.2* 5.0     Recent Labs   Lab 05/30/22  0549 05/29/22  0542 05/28/22  0539 05/27/22  0532 05/26/22  0549   * 94 175* 88 168*   MAG 2.1 2.4* 2.2 2.0  --    PHOS 2.5 3.5 4.4  3.2  --      CBC  Recent Labs   Lab 05/30/22  0549 05/29/22  0542 05/28/22  0539 05/27/22  0532   WBC 16.9* 21.6* 21.1* 23.5*   RBC 2.32* 2.35* 2.27* 2.92*   HGB 8.2* 8.4* 8.0* 10.4*   HCT 25.5* 25.9* 24.5* 32.7*   * 110* 108* 112*   MCH 35.3* 35.7* 35.2* 35.6*   MCHC 32.2 32.4 32.7 31.8   RDW 16.1* 16.4* 16.5* 17.7*    170 171 211       Medications list for reference:  Current Facility-Administered Medications   Medication     acetaminophen (TYLENOL) tablet 650 mg     acyclovir (ZOVIRAX) tablet 800 mg     allopurinol (ZYLOPRIM) tablet 300 mg     atorvastatin (LIPITOR) tablet 20 mg     bisacodyl (DULCOLAX) Suppository 10 mg     cyanocobalamin (VITAMIN B-12) tablet 100 mcg     famotidine (PEPCID) tablet 20 mg     folic acid (FOLVITE) tablet 1 mg     ibrutinib (IMBRUVICA) capsule 420 mg     ipratropium - albuterol 0.5 mg/2.5 mg/3 mL (DUONEB) neb solution 3 mL     levothyroxine (SYNTHROID/LEVOTHROID) tablet 50 mcg     lidocaine (LMX4) cream     lidocaine 1 % 0.1-1 mL     lidocaine 1 % 0.1-1 mL     melatonin tablet 1 mg     methylPREDNISolone sodium succinate (solu-MEDROL) injection 40 mg     micafungin (MYCAMINE) 150 mg in sodium chloride 0.9 % 100 mL intermittent infusion     ondansetron (ZOFRAN) injection 4 mg     pantoprazole (PROTONIX) EC tablet 20 mg     piperacillin-tazobactam (ZOSYN) 4.5 g vial to attach to  mL bag     polyethylene glycol (MIRALAX) Packet 17 g     prochlorperazine (COMPAZINE) injection 5 mg    Or     prochlorperazine (COMPAZINE) tablet 5 mg    Or     prochlorperazine (COMPAZINE) suppository 12.5 mg     psyllium (METAMUCIL/KONSYL) Packet 1 packet     senna-docusate (SENOKOT-S/PERICOLACE) 8.6-50 MG per tablet 1 tablet    Or     senna-docusate (SENOKOT-S/PERICOLACE) 8.6-50 MG per tablet 2 tablet     sodium chloride (PF) 0.9% PF flush 3 mL     sodium chloride (PF) 0.9% PF flush 3 mL     sodium chloride (PF) 0.9% PF flush 3 mL     sodium chloride (PF) 0.9% PF flush 3 mL     sodium  phosphate (FLEET ENEMA) 1 enema     sulfamethoxazole-trimethoprim (BACTRIM) 320 mg in D5W 570 mL intermittent infusion     Vitamin D3 (CHOLECALCIFEROL) tablet 50 mcg     Assessment & Plan:  oLco Wood is a 77 year old man with a history of CKD stage III, HLD, paroxysmal afib, and 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 Admitted for AHRF due to community acquired pneumonia and worsening anemia (10?8) .    #CLL  #Hypogammaglobulinemia    He was diagnosed 2/12/2007 with CLL, Lyles stage 0, followed clinically initially. At diagnosis WBC 17.8, ALC 11.2, hemoglobin 15.2, platelets 188. Flow consistent with CLL.  No opportunistic infections, IgG in the normal range. Due to rising lymphocyte count he was initiated on Ibrutinib monotherapy 5/14/19. He was tolerating treatment except for one brief episode of atrial fibrillation after starting ibrutinib, no recurrence since then. Due to cost considerations, he decided on his own to wean down on ibrutinib and not take it daily as prescribed in April 2022. At that time he was only taking ibrutinib 2-3 times per week and still has a large supply at home. On admission WBC stable with ALC of 13.1. IgG levels 312, s/p IVIG given inpatient 5/24.   Today, ALC is 9.5 which has improved on daily dosing of ibrutinib.  - Peripheral flow (5/24) showed 50% CD5+ lambda monotypic B cells. NGS pending.   - Continue Ibrutinib 420 mg daily, tolerating without side effects. Encourage daily dosing and not skipping dosing.   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 He then followed up with Dr. Burns 4/2022 and was anemic with hgb 8.4 and elevated bilirubin of 4.2 on 4/12. Dr. Burns resumed 60 mg daily prednisone at that time for recurrence of AIHA. Dr. Burns also previously discussed adding rituximab with patient, but  patient deferred at that time. On admission hgb 8.8 (down from ~10.6) with retic count of 7.6%. LDH unchanged. Tbili improved to 1.7. YEN was weakly positive. Vit B12 and folate wnl. No evidence of iron deficiency. Of note, patient was on a steroid taper per outpatient team decreasing dose 10 mg every week.   Hemoglobin and hemolytic labs are stable today.   - Had worsening AIHA in the setting of recent steroid taper. Started methylprednisolone 1 mg/kg on admission (5/24-5/27) but decreased to IV Methylprednisolone 40 mg in the setting of infection.   - Ongoing anemia despite initiation of HD steroids this admission, discussed case with heme staff Dr Espinosa who recommended initiation of Rituxan for refractory AIHA.    - Plan for weekly Rituxan 375 mg/m2 x4 weeks; Rituxan not started in the setting of acute respiratory decompensation. Will reassess when to administer. May consider IVIG 500 mg/kg if anemia worsens and infections not improving.   - Continue daily Folic acid supplementation.   - Monitor CBC w/ diff, LDH, CMP and retic daily   - Transfused for hgb <7 and plts <10k (outpatient therapy plan entered)   - Obtain accurate O2 sats to help decision making    #TLS ppx  At risk for tumor lysis syndrome with initiation of Rituxan as above.   - Encourage PO fluids. Cont on Allopurinol 300 mg daily  - Check uric acid, LDH, Mg, Phos, retic count daily    # CAP   # Worsening AHRF 5/26   Presented with dyspnea and new hypoxia requiring 2-3L NC. CT PE negative for clot but did show bilateral multifocal pulmonary infiltrates and nodules or nodular infiltrates may be pneumonia. BCx, RVP, COVID negative. Of note, patient was not on PJP ppx prior to admission.   - Antibiotics per primary team. Currently on IV Zosyn, Micafungin and Bactrim   - Further infectious work up given acute decline; agree with infectious disease recs    - CT Chest showed worsening bilateral GGO   - Follow up sputum culture    #ID PPx  -  Pentamidine nebulizer q 28 days; given 5/26/22 (therapy plan entered)   - Tiffanie requested outpatient for COVID ppx     #Hx of of shingles  Continue Acyclovir 800 mg BID as long as he is on prednisone to reduce risk of shingles.    Recommendations:  - Will hold Rituxan given acute decline; will reassess timing once patient has stabilized. May consider IVIG 500 mg/kg if anemia worsens and infection not improving.    - Cont Methylpred 40 mg IV daily  - Check LDH, Tbili and retic count daily   - Follow up sputum cx  - Obtain accurate O2 sat readings       Patient was seen and plan of care was discussed with attending physician Dr. Karthikeyan Thompson.     Keyur Raza MD   Heme/Onc/Transplant Fellow  Pgr #[phone]

## 2022-05-30 NOTE — PLAN OF CARE
Goal Outcome Evaluation:    Plan of Care Reviewed With: patient     Assumed care of patient at 0700. A&Ox4. Afebrile. Denied pain this shift. SR, rates 80-90s. Lungs coarse with crackle bases on 11-15L via oxymizer. Dyspnea on exertion. Weaning oxygen as able to maintain O2 sat >90%. Constipated at start of shift, fleet enema given, effective. Soft, formed BM x3 this shift. Stool softeners held this morning. Has frequency and urgency with voiding, using primofit with good output. Encouraged patient to try using external cath at night and urinals during the day. Up to chair x1 hour, ambulated hallways. Had pt on 15L for long ambulation. Lactic acid 4.0 this morning, LR bolus ordered this afternoon and recheck at 6PM. Plan for possible antibiotic changes as ID weighs in on his case. Continue with current plan.

## 2022-05-31 NOTE — PROGRESS NOTES
[facility]  Transplant Infectious Disease Progress Note      Patient:  Loco Wood, Date of birth 1945, Medical record number [id number]  Date of Visit:  05/31/2022         Assessment and Recommendations:   Recommendations:  - Please discontinue zosyn, since he is not neutropenic, and sputum bacterial cultures have been without growth. This may assist with his nausea, since he was dosed high for renal function.  - Continue IV bactrim, although check with pharmacy to make sure that there is not some dose reduction which could be done on the basis of his current creatinine.  - Continue micafungin 150 mg IV   - Continue ACV as viral prophylaxis.   - Await pending induced sputum for cytology, Pneumocystis PCR.    Transplant ID will continue to follow.    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 Received pred courses in 12/2021 from derm, 1/2022 for lung symptoms in [place], in 3/2022 for anemia, then most recently for 3 weeks & 6 weeks for AIHA.   Infectious Disease issues include:  - Progressive pulmonary process suspected to be infectious. With elevated Fungitell & LDH, strong possibility that this is at least in part due to Pneumocystis, moses since a clinical setback occurred following a dose of neb pent on 5/26/2022. IV bactrim started 5/27/2022 while workup pending. DDx definitely includes fungal infection with all the recent steroid use, so testing has been ordered. Since he started ibrutinib 5/25/2022, and there were some LFT issues over the winter with hepatitis status undergoing current assessment, would provide fungal coverage with micafungin 150 mg IV daily as opposed to an azole at this point in time. On zosyn for bacterial coverage. Travel to [place] over the last winter does make him at risk for cocci, this last winter was particularly windy, but cocci fungal antibody is return negative.  - Hep B core ab & S ab+ 5/26/2022, with negative hep B  viral load.  - Extensive travel hx. Quantiferon sent 5/26/2022. Fungal antibodies negative.  - QTc interval: 410 msec on 5/27/2022 EKG  - Bacterial prophylaxis: zosyn  - Pneumocystis prophylaxis: pent 5/26/2022, started on bactrim 5/27/2022.  - Viral serostatus & prophylaxis: HSV1+, HSV2+, CMV+; on acyclovir prophy  - Fungal prophylaxis: Fungitell > 500; on micafungin since 5/27/2022.  - Immunization status: he has had 3 covid vaccinations.   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 Given IV IG x 1 on 5/25/2022 at 30 g.   - Isolation status: Good hand hygiene.    Bree Garcia MD   Pager [phone]         Interval History:   Since Loco was last seen by infectious disease on 5/28/2022, he continues to have a lot of symptoms, including nausea. He is up to 15 leaders of supplemental oxygen via facemask, and saturations are just barely hitting 90%. He desaturates with activity. He does not want to wear nasal cannula high flow oxygen. He has had good urine output. Enema was given with good bowel movement response. Appetite is poor. Sputum was induced for pneumocystis testing on 5/31/2022. In general, he is feeling pretty miserable. White blood cell count remains elevated, at 19. Testing that has returned unrevealing so far includes Blast ag, Histo ag, hep B S ag, Quantiferon, sputum culture, BCx, fungal antibodies, Asp GM ag, hep B viral load.     Review of Systems:  CONSTITUTIONAL:  No fever.   EYES: negative for icterus or acute vision changes  ENT:  negative for acute hearing loss, tinnitus, sore throat  RESPIRATORY:  He had induced sputum. + dyspnea  CARDIOVASCULAR:  no palpitations  GASTROINTESTINAL:  Continued nausea. He has had a bowel movement following an enema.   GENITOURINARY:  negative for dysuria or hematuria. Urinating a lot with diuresis.   HEME:  No easy bruising or bleeding  INTEGUMENT:  negative for rash or pruritus  NEURO:  Negative for headache or tremor.          Current  Medications & Allergies:       acyclovir  800 mg Oral BID     allopurinol  300 mg Oral Daily     atorvastatin  20 mg Oral Daily     cyanocobalamin  100 mcg Oral Daily     folic acid  1 mg Oral Daily     ibrutinib  420 mg Oral Daily     levothyroxine  50 mcg Oral Daily     methylPREDNISolone  40 mg Intravenous Daily     micafungin  150 mg Intravenous Q24H     pantoprazole  20 mg Oral Daily     piperacillin-tazobactam  4.5 g Intravenous Q6H     polyethylene glycol  17 g Oral Daily     psyllium  1 packet Oral BID     senna-docusate  1 tablet Oral BID    Or     senna-docusate  2 tablet Oral BID     sodium chloride (PF)  3 mL Intracatheter Q8H     sodium chloride (PF)  3 mL Intracatheter Q8H     sulfamethoxazole-trimethoprim  320 mg Intravenous Q6H     vitamin D3  50 mcg Oral Daily       Infusions/Drips:         Allergies   Allergen Reactions     Blood Transfusion Related (Informational Only) Other (See Comments)     Patient has a history of a clinically significant antibody against RBC antigens.  A delay in compatible RBCs may occur.      Morphine Itching and Rash     Dilaudid [Hydromorphone] Itching            Physical Exam:     Patient Vitals for the past 24 hrs:   BP Temp Temp src Pulse Resp SpO2 Weight   05/31/22 1118 124/68 98.2  F (36.8  C) Oral -- 18 -- --   05/31/22 0739 124/70 98.3  F (36.8  C) Oral 98 18 94 % --   05/31/22 0552 -- -- -- -- -- -- 70.8 kg (156 lb 1.6 oz)   05/31/22 0326 131/79 97.6  F (36.4  C) Oral 79 18 94 % --   05/31/22 0309 -- -- -- -- -- 94 % --   05/30/22 2317 129/72 97.5  F (36.4  C) Oral 79 18 94 % --   05/30/22 1942 -- -- -- -- -- 100 % --   05/30/22 1913 139/74 97.9  F (36.6  C) Oral 83 20 96 % --   05/30/22 1520 127/70 98.1  F (36.7  C) Oral 86 20 94 % --   05/30/22 1330 -- -- -- -- -- 93 % --     Ranges for vital signs:  Temp:  [97.5  F (36.4  C)-98.3  F (36.8  C)] 98.2  F (36.8  C)  Pulse:  [79-98] 98  Resp:  [18-20] 18  BP: (124-139)/(68-79) 124/68  SpO2:  [93 %-100 %] 94  %  Vitals:    05/29/22 0033 05/30/22 0455 05/31/22 0552   Weight: 72.8 kg (160 lb 9.6 oz) 71.7 kg (158 lb) 70.8 kg (156 lb 1.6 oz)       Physical Examination:  GENERAL:  well-developed, well-nourished man, resting in bed in no acute distress. The nausea is making him miserable.  HEAD:  Head is normocephalic, atraumatic   EYES:  Eyes have anicteric sclerae without conjunctival injection   ENT:  Oropharynx is dry without exudates or ulcers. Tongue is midline. Oxygen facemask in place.   NECK:  Supple.   LUNGS:  Coarse resp to auscultation bilateral, anteriorly.   CARDIOVASCULAR:  Regular rate and rhythm with no murmur, but there are premature extras beats at times.   ABDOMEN:  Normal bowel sounds, soft, nontender, minimally distended.   SKIN:  No acute rashes. PIV in place without any surrounding erythema or exudate.  NEUROLOGIC:  Grossly nonfocal. Active x4 extremities         Laboratory Data:     Inflammatory Markers    Recent Labs   Lab Test 04/12/22  1701 12/21/17  0951 10/04/17  2250 05/15/17  1001 04/11/16  1136   SED  --   --  1  --   --    CRP  --   --   --   --  <0.2  Reference Values:   Low Risk:           <1.0 mg/L   Average Risk:       1.0-3.0 mg/L   High Risk:          >3.0 mg/L   Acute Inflammation: >8.0 mg/L     PSA 6.52*   < >  --    < >  --     < > = values in this interval not displayed.       Immune Globulin Studies     Recent Labs   Lab Test 05/24/22  1651 06/12/19  0806 05/31/19  1021 04/30/19  1219 12/21/18  1103 11/07/18  0844 04/29/15  1012 09/19/14  1227   * 394* 409* 429* 443* 520*   < > 729   IGM  --   --   --   --   --   --   --  28*   IGA  --   --   --   --   --   --   --  72    < > = values in this interval not displayed.       Metabolic Studies       Recent Labs   Lab Test 05/31/22  0436 05/30/22  1746 05/30/22  0908 05/30/22  0549 05/27/22  0532 05/26/22  2219 05/24/22  1651 05/23/22  1802     --   --  135   < >  --    < >  --    POTASSIUM 3.9  --   --  3.8   < >  --    < >   --    CHLORIDE 102  --   --  103   < >  --    < >  --    CO2 24  --   --  23   < >  --    < >  --    ANIONGAP 10  --   --  9   < >  --    < >  --    BUN 13  --   --  14   < >  --    < >  --    CR 1.34*  --   --  1.42*   < >  --    < >  --    GFRESTIMATED 55*  --   --  52*   < >  --    < >  --    GLC 85  --   --  147*   < >  --    < >  --    DAVID 8.4*  8.4*  --   --  7.9*  7.9*   < >  --    < >  --    PHOS 3.0  --   --  2.5   < >  --   --   --    MAG 2.3  --   --  2.1   < >  --   --   --    LACT 1.3 3.2*   < >  --    < >  --    < >  --    PCAL  --   --   --   --   --  0.06*  --   --    FGTL  --   --   --   --   --   --   --  >500    < > = values in this interval not displayed.       Hepatic Studies    Recent Labs   Lab Test 05/31/22 0436 05/30/22  0549 05/26/22  0549 05/25/22  0616 04/12/22  1701 03/08/22  1244   BILITOTAL 0.7 0.6   < > 1.7*   < >  --    DBIL  --   --   --  0.4*   < >  --    ALKPHOS 50 46   < >  --    < >  --    PROTTOTAL 5.3* 5.0*   < >  --    < >  --    ALBUMIN 2.2* 2.3*   < >  --    < >  --    AST 17 20   < >  --    < >  --    74925  --   --   --   --   --  31   ALT 21 22   < >  --    < >  --    93923  --   --   --   --   --  17   * 303*   < > 300*   < >  --     < > = values in this interval not displayed.       Pancreatitis testing    Recent Labs   Lab Test 04/05/21  1137 01/04/21  1743   LIPASE  --  127   TRIG 96  --        Gout Labs      Recent Labs   Lab Test 05/31/22 0436 05/30/22  0549 05/29/22  0542 05/28/22  0539 05/27/22  0532   URIC 1.8* 2.1* 2.5* 3.2* 5.0       Hematology Studies   Recent Labs   Lab Test 05/31/22  0438 05/30/22  0549 05/29/22  0542 05/28/22  0539 04/26/22  0928 04/12/22  1701 11/10/21  1314 09/20/21  0952   WBC 19.0* 16.9* 21.6* 21.1*   < > 8.2   < > 9.3   ANEU 6.3 7.3 9.9* 8.0   < >  --    < >  --    ANEUTAUTO  --   --   --   --   --  3.2  --  4.8   ALYM 12.5* 9.5* 11.4* 12.9*   < >  --    < >  --    ALYMPAUTO  --   --   --   --   --  4.6  --  3.7   ANY 0.0  0.2 0.2 0.2   < >  --    < >  --    AMONOAUTO  --   --   --   --   --  0.3  --  0.6   AEOS 0.2 0.0 0.0 0.0   < >  --    < >  --    AEOSAUTO  --   --   --   --   --  0.1  --  0.2   ABSBASO  --   --   --   --   --  0.0  --  0.0   HGB 8.4* 8.2* 8.4* 8.0*   < > 8.4*   < > 11.9*   HCT 26.3* 25.5* 25.9* 24.5*   < > 26.3*   < > 35.7*    169 170 171   < > 103*   < > 108*    < > = values in this interval not displayed.       Clotting Studies    Recent Labs   Lab Test 12/01/20  1425 03/05/20  0710   INR 1.15* 1.16*   PTT 28 29       Iron Testing    Recent Labs   Lab Test 05/31/22  0438 05/25/22  0616 05/24/22  1651 07/02/21  0845 05/27/21  1145   IRON  --   --  36  --  106   FEB  --   --  271  --  356   IRONSAT  --   --  13*  --  30   NURY  --   --  239  --   --    *   < > 112*   < > 89   FOLIC  --   --  38.1  --   --    B12  --   --  800  --   --    HAPT  --   --  200*   < >  --    RETP 5.6*   < > 7.6*   < >  --    RETICABSCT 0.135*   < > 0.211*   < >  --     < > = values in this interval not displayed.       Thyroid Studies     Recent Labs   Lab Test 05/23/22  1724 11/10/21  1314 07/17/20  0932 01/10/20  0825 12/10/19  0845   TSH 1.94 4.62* 3.75 7.10* 12.30*   T4  --  0.92  --  1.07 0.95       Urine Studies     Recent Labs   Lab Test 05/26/22  2240 05/23/22  1906 05/19/22  1355 01/04/21  1744 01/07/20  0000 11/12/19  1322   URINEPH 5.5 6.5 7.0 5.5 6.0 5.0   NITRITE Negative Negative Negative Negative Neg Negative   LEUKEST Negative Negative Negative Negative Neg Negative   WBCU 2 1 0-5 1  --  1       Microbiology:  Fungal testing  Recent Labs   Lab Test 05/27/22  1356 05/27/22  0916 05/23/22  1802   FGTL  --   --  >500   FGTLI  --   --  Positive*   ASPGAI 0.03  --   --    ASPGAA Negative  --   --    COFUNG  --  <1:2  --    FUNBL  --  0.3  --        Last Culture results   Rapid Strep A Screen   Date Value Ref Range Status   10/22/2010   Final    NEGATIVE: No Group A streptococcal antigen detected by immunoassay,  await   culture report.     Culture   Date Value Ref Range Status   05/27/2022 2+ Normal aracely  Final   05/27/2022 No growth after 3 days  Preliminary   05/27/2022 No growth after 4 days  Preliminary   05/26/2022 No growth after 4 days  Preliminary   05/26/2022 No growth after 4 days  Preliminary   05/26/2022   Final    >10 Squamous epithelial cells/low power field indicates oral contamination. Please recollect.   05/23/2022 No Growth  Final   05/23/2022 No Growth  Final     Culture Micro   Date Value Ref Range Status   03/02/2014 No growth  Final   10/22/2010 No Beta Streptococcus isolated  Final   04/21/2010   Final    No Salmonella, Shigella, Campylobacter or E coli 0157 isolated.   04/18/2010 No growth  Final   03/16/2009 No Beta Streptococcus isolated  Final   01/13/2007 No growth  Final   04/06/2004 No Beta Streptococcus isolated  Final           Quantiferon testing   Recent Labs   Lab Test 05/31/22  0438 05/30/22  0549 05/26/22  2219 05/26/22  1123   TBRES  --   --   --  Negative   LYMPH 66 56   < >  --     < > = values in this interval not displayed.       Virology:  Coronavirus-19 testing    Recent Labs   Lab Test 05/31/22  0241 05/23/22  1946 06/23/20  0843   [id number]  --   --  Negative   [id number]  --   --  Not Applicable   [id number] Negative Negative  --        Respiratory virus (non-coronavirus-19) testing    Recent Labs   Lab Test 05/24/22  1916 05/23/22  1946   IFLUA Not Detected  --    INFZA  --  Negative   FLUAH1 Not Detected  --    SV9997 Not Detected  --    FLUAH3 Not Detected  --    IFLUB Not Detected  --    INFZB  --  Negative   PIV1 Not Detected  --    PIV2 Not Detected  --    PIV3 Not Detected  --    PIV4 Not Detected  --    IRSV  --  Negative   RSVA Not Detected  --    RSVB Not Detected  --    HMPV Not Detected  --    ADENOV Not Detected  --    CORONA Not Detected  --        Hepatitis B Testing     Recent Labs   Lab Test 05/26/22  1123   AUSAB 288.89   HBCAB Reactive*   HEPBANG Nonreactive        Imaging:  No results found for this or any previous visit (from the past 48 hour(s)).

## 2022-05-31 NOTE — PLAN OF CARE
Neuro: A&Ox4. No new neuro deficit.   Cardiac: SR. VSS.   Respiratory: Sating >88% 15 LPM Oxymask. De-sats with activities. Pt refuse HFNC.  GI/: Adequate urine output via urinal. BM X4. enema was given on previous shift.   Diet/appetite: Tolerating reg diet. Poor appetite.   Activity:  uses the bedside commode independently. SBA.   Pain: At acceptable level on current regimen. Denies pain.   Skin: No new deficits noted.  LDA's:     Plan: Continue with POC. Notify primary team with changes.   Goal Outcome Evaluation:

## 2022-05-31 NOTE — PROGRESS NOTES
Sputum induction done with hypertonic saline neb , which resulted in a productive cough. Sent to lab.

## 2022-05-31 NOTE — PROGRESS NOTES
[facility]    Medicine Progress Note - Medicine Service, [facility] TEAM 4    Date of Admission:  5/23/2022    Assessment & Plan   Loco Wood is a 77 year old male with history of CLL, auto-immune hemolytic anemia (on prednisone taper), and CKD3a admitted for several days of SOB, chills, pleuritic chest pain with infiltrates/consolidations on CT imaging. Initially treated for CAP (ceft + azithro) with improvement. On 5/27 patient transferred to [facility] [place] respiratory status and fever, now improved and afebrile (45L HFNC -> 12-15L oxymizer). Continue broad coverage with Zosyn, Micafungin (B-D glucan > 500), and IV bactrim.  Heme/Onc following for auto-immune hemolytic anemia and CLL, steroids decreased in setting of infection.    Changes today:   - Lactic acidosis resolved  - Stop Zosyn  - Decrease Bactrim dose per ID and pharmacy    # Acute hypoxic respiratory failure 2/2 possible Bacterial PNA vs Fungal PNA vs ARDS  # Severe sepsis  # Bilateral lower lobe bronchiectasis/consolidations  Admitted for several days of worsening fatigue/dyspnea, subjective chills, and pleuritic chest pain with 3-4L oxygen requirement (w/ elevated WBC) and CT w/ infiltrates and bilateral lower lobe consolidations concerning for infection. RVP negative. Patient initially treated with 5 day abx course starting 5/24 with azithromycin (d/c 5/26) + ceftriaxone for presumed CAP with clinical improvement. On 5/27, patient developed fever with worsening respiratory status (45 L high flow NC) and was transferred to [facility]. Patient improving with IVF 2.5L and broadened coverage with Zosyn, Micafungin (positive B-Glucan), and IV bactrim (for possible PJP) O2 req of 12-15L.  - Infectious Disease following   -Stop Zosyn 5/31   -Continue bactrim and micafungin   -Induced sputum for cytology (may return faster than sp cx and PJP PCR)   -Fungal Workup (Blasto,sputum PJP)   -HSV, CMV serology  -  Supplemental O2; Incentive spirometry; wean as able    Labs:  RVP, COVID/Flu: Negative  MRSA swab: negative  Beta-D Glucan: Positive (> 500)  Following Sputum sample  Blood cultures: NGTD  Sputum: 2+ mixed aracely  Histo antigen: negative  Aspergillus Ag: negative   Blasto antigen: negativ  Fungal ab (blast, aspergillus, cocci, histo): negative    # Nausea  Last AXR 5/27 without evidence of obstruction. Last BM 5/30.   - PRN antiemetics (Zofran, compazine, reglan)    # Lactic acidosis. resolved  Persistent lactic acidosis 3-4 despite antimicrobial treatment and IVF boluses. Appears hemodynamically stable with no evidence of hypoperfusion. Discussed with hem/onc, can sometimes see in malignancies, but unlikely in reasonably controlled CLL. Hepatic function wnl, suggesting against clearance issue. Resolved with additional IVF 5/30.     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 B12, folate, iron panel normal with slightly elevated YEN levels. Labs concerning for marrow responsive anemia (elevated LDH, bilirubin, reticulocyte count). Will likely need to treat underlying CLL for long-term solution.    Hgb on admission 9.5 down from baseline (~10-12 past year) and now ~8. Currently hemodynamically stable and suspect recent drop in Hgb secondary to dilutional anemia in setting of 2.5L of IVF given (5/27). Methylpred decreased and rituximab held in setting of recent infection.   - Heme/Onc consulted    - Continue Methylprednisolone 40mg   - Hold Rituximab infusion   - Check uric acid, LDH, Mg, Phos, and retic count daily   - Hep B serologies (Core, Surface Ab +): Hep B ag negative, DNA quant pending   - CBC with peripheral blood smear   - Continue PTA Folate, B12   - Transfuse if Hgb < 7 (Type and Screen)    # CKD Stage 3A  Cr of 1.31 on  admission up from baseline now up to 1.6 (5/27), slightly improved to 1.45 today. Suspect Cr elevation is pre-renal in setting of recent sepsis and will likely improve with volume resuscitation. Will continue to monitor.    - Avoid nephrotoxic agents as able   - Routine renal function monitoring     Chronic/Resolved Problems:   # CLL (dx 2/2007), stable  History of CLL (2007) managed on Ibrutinib (5/2019) which patient is not taking daily. WBC 24.9 on admission (up from baseline ~6-10) now down to ~21. Suspect secondary to recent infection with lower concern for conversion to leukemia. IVIG infusion (5/25). Following FISH and cytogenetics for disease prognostication and will continue to monitor with peripheral smear.   Continue daily ibrutinib to help control disease.   - Hematology/Oncology consulted   -Following Flow cytometry, cytogenetics, IgH mutation, TP53 mutation   - CBC trend   - Heme/Onc outpatient follow up on discharge    # Degenerative disc disease  # Bilateral intermittent hand cramping, spasms suspicious for cervical impingement  Patient with history of degenerative disc disorder with concern for cervical impingement in setting of new bilateral intermittent hand cramping. Patient met with Dr. Melton his sports medicine doctor (AM 5/24) over phone for follow up appointment to discuss results of his MRI. Will defer to outpatient management.     # Hx Herpes Zoster c/b post-herpetic neuralgia: PTA ppx Acyclovir while on prednisone  # Hypothyroid - TSH 1.94 on admission WNL. Continue PTA levothyroxine  # HLD- PTA atorvastatin  # GERD- PTA omeprazole + PRN famotidine (also on Pred)  # Insomnia- has PRN Ambien as OP  # Vitamin supplements- PTA B12, folate, D3        Diet: Combination Diet Regular Diet Adult    DVT Prophylaxis: Pneumatic Compression Devices  Piña Catheter: Not present  Central Lines: None  Cardiac Monitoring: ACTIVE order. Indication: Sepsis  Code Status: Full Code      Disposition Plan    Expected Discharge: 06/01/2022     Anticipated discharge location:  Awaiting care coordination huddle  Delays: None     The patient's care was discussed with the Attending Physician, Dr. Snell.    Everardo Lau MD  Medicine Service, [facility] TEAM [address] [place] [address]  Securely message with the Vocera Web Console (learn more here)  Text page via [facility] Paging/Directory   Please see signed in provider for up to date coverage information      Clinically Significant Risk Factors Present on Admission                    ______________________________________________________________________    Interval History   No acute events overnight. Has had increased nausea and feels he has not been eating for a few days. PRN zofran and compazine have not been helpful. No fevers, chills, abdominal pain.     Data reviewed today: I reviewed all medications, new labs and imaging results over the last 24 hours.    Physical Exam   Vital Signs: Temp: 97.8  F (36.6  C) Temp src: Oral BP: 120/70 Pulse: 98   Resp: 21 SpO2: 94 % O2 Device: Oxymask Oxygen Delivery: 15 LPM  Weight: 156 lbs 1.6 oz  General Appearance: Alert, pleasant, NAD.  Respiratory: Breathing comfortably on 15 L oxymizer. CTAB. No wheezing or crackles.   Cardiovascular: RRR, normal S1/S2, no murmurs.   GI: Soft, non-tender, mildly distended (but patient reports it feels normal to him). Bowel sounds present. No guarding or rebound.  Skin: No rash or lesions noted.  Other: Alert and oriented x3. CNII-XII grossly intact.     Data   Recent Labs   Lab 05/31/22  0438 05/31/22  0436 05/30/22  0549 05/29/22  0542   WBC 19.0*  --  16.9* 21.6*   HGB 8.4*  --  8.2* 8.4*   *  --  110* 110*     --  169 170   NA  --  136 135 137   POTASSIUM  --  3.9 3.8 3.8   CHLORIDE  --  102 103 102   CO2  --  24 23 24   BUN  --  13 14 21   CR  --  1.34* 1.42* 1.45*   ANIONGAP  --  10 9 11   DAVID  --  8.4*  8.4* 7.9*  7.9* 8.2*  8.2*   GLC  --  85  147* 94   ALBUMIN  --  2.2* 2.3* 2.3*   PROTTOTAL  --  5.3* 5.0* 5.3*   BILITOTAL  --  0.7 0.6 0.4   ALKPHOS  --  50 46 44   ALT  --  21 22 21   AST  --  17 20 22

## 2022-05-31 NOTE — PLAN OF CARE
"/70 (BP Location: Left arm)   Pulse 98   Temp 97.8  F (36.6  C) (Oral)   Resp 21   Ht 1.676 m (5' 6\")   Wt 70.8 kg (156 lb 1.6 oz)   SpO2 94%   BMI 25.20 kg/m      Hours of Care: 5562-7826.    Vitals:  stable BP   Activity:  AAT/SBA, able to stand & move around the bed independently, minimal assist with lines & tubes.  Pain:  Denies  Neuro:  Intact, A+Ox4, sensation intact.  Cardiac:  SR, no c/o chest pain.    Respiratory:  15 L oxymask. Pt still declining High Flow due to discomfort. Desat with activities & talking to mid-low 80's.  GI: BS+, hyperactive, loose stools overnight, held laxatives for today.  :   Voiding very well, large amounts, clear/yellow urine.  Diet:  Regular diet. No appetite due to nausea. RN gave Reglan, Zofran & Compazine PRN, Pt felt somewhat better & was able to eat some for supper. Will try to continue antiemetics when available to improve oral intake.  Lines:  SLx2  Skin/Wounds:  Intact, fragile, no open wounds noted.   Plan:  TBD      Continue to monitor and follow POC    Grant Hernández RN on 5/31/2022 at 6:38 PM          "

## 2022-05-31 NOTE — PROGRESS NOTES
[facility]    Medicine Progress Note - Medicine Service, [facility] TEAM 4    Date of Admission:  5/23/2022    Assessment & Plan   Loco Wood is a 77 year old male with history of CLL, auto-immune hemolytic anemia (on prednisone taper), and CKD3a admitted for several days of SOB, chills, pleuritic chest pain with infiltrates/consolidations on CT imaging. Initially treated for CAP (ceft + azithro) with improvement. On 5/27 [place] [facility] and fever, now improved and afebrile (45L HFNC -> 12-15L oxymizer). Continue broad coverage with Zosyn, Micafungin (B-D glucan > 500), and IV bactrim.  Heme/Onc following for auto-immune hemolytic anemia and CLL, steroids decreased in setting of infection.    Changes today:   -  ml bolus  - Trend lactic acid     # Acute hypoxic respiratory failure 2/2 possible Bacterial PNA vs Fungal PNA vs ARDS  # Severe sepsis  # Bilateral lower lobe bronchiectasis/consolidations  Admitted for several days of worsening fatigue/dyspnea, subjective chills, and pleuritic chest pain with 3-4L oxygen requirement (w/ elevated WBC) and CT w/ infiltrates and bilateral lower lobe consolidations concerning for infection. RVP negative. Patient initially treated with 5 day abx course starting 5/24 with azithromycin (d/c 5/26) + ceftriaxone for presumed CAP with clinical improvement. On 5/27, patient developed fever with worsening respiratory status (45 L high flow NC) and was [place] [facility] Patient improving with IVF 2.5L and broadened coverage with Zosyn, Micafungin (positive B-Glucan), and IV bactrim (for possible PJP) O2 req of 12-15L. Lactate elevated again today- currently receiving bolus.   - Infectious Disease Consulted, appreciate recommendations   -Zosyn, bactrim and micafungin   -Induced sputum for cytology (may return faster than sp cx and PJP PCR)   -Fungal Workup (Blasto,sputum PJP)   -HSV, CMV  serology  - Supplemental O2; Incentive spirometry; wean as able    Labs:  RVP, COVID/Flu: Negative  MRSA swab: negative  Beta-D Glucan: Positive (> 500)  Following Sputum sample  Blood cultures: NGTD  Sputum: 2+ mixed aracely  Histo antigen: negative  Aspergillus Ag: negative     # Lactic acidosis  Persistent lactic acidosis 3-4 despite antimicrobial treatment and IVF boluses. Appears hemodynamically stable with no evidence of hypoperfusion. Discussed with hem/onc, can sometimes see in malignancies, but unlikely in reasonably controlled CLL. Hepatic function wnl, suggesting against clearance issue.  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 B12, folate, iron panel normal with slightly elevated YEN levels. Labs concerning for marrow responsive anemia (elevated LDH, bilirubin, reticulocyte count). Will likely need to treat underlying CLL for long-term solution.    Hgb on admission 9.5 down from baseline (~10-12 past year) and now ~8. Currently hemodynamically stable and suspect recent drop in Hgb secondary to dilutional anemia in setting of 2.5L of IVF given (5/27). Methylpred decreased and rituximab held in setting of recent infection.   - Heme/Onc consulted    - Continue Methylprednisolone 40mg   - Hold Rituximab infusion   - Check uric acid, LDH, Mg, Phos, and retic count daily   - Hep B serologies (Core, Surface Ab +): Hep B ag negative, DNA quant pending   - CBC with peripheral blood smear   - Continue PTA Folate, B12   - Transfuse if Hgb < 7 (Type and Screen)    # CKD Stage 3A  Cr of 1.31 on admission up from baseline now up to 1.6 (5/27), slightly improved to 1.45 today. Suspect Cr elevation is pre-renal in setting of recent sepsis and will likely improve with volume resuscitation. Will  continue to monitor.    - Avoid nephrotoxic agents as able   - Routine renal function monitoring     Chronic/Resolved Problems:   # CLL (dx 2/2007), stable  History of CLL (2007) managed on Ibrutinib (5/2019) which patient is not taking daily. WBC 24.9 on admission (up from baseline ~6-10) now down to ~21. Suspect secondary to recent infection with lower concern for conversion to leukemia. IVIG infusion (5/25). Following FISH and cytogenetics for disease prognostication and will continue to monitor with peripheral smear.   Continue daily ibrutinib to help control disease.   - Hematology/Oncology consulted   -Following Flow cytometry, cytogenetics, IgH mutation, TP53 mutation   - CBC trend   - Heme/Onc outpatient follow up on discharge    # Degenerative disc disease  # Bilateral intermittent hand cramping, spasms suspicious for cervical impingement  Patient with history of degenerative disc disorder with concern for cervical impingement in setting of new bilateral intermittent hand cramping. Patient met with Dr. Melton his sports medicine doctor (AM 5/24) over phone for follow up appointment to discuss results of his MRI. Will defer to outpatient management.     # Hx Herpes Zoster c/b post-herpetic neuralgia: PTA ppx Acyclovir while on prednisone  # Hypothyroid - TSH 1.94 on admission WNL. Continue PTA levothyroxine  # HLD- PTA atorvastatin  # GERD- PTA omeprazole + PRN famotidine (also on Pred)  # Insomnia- has PRN Ambien as OP  # Vitamin supplements- PTA B12, folate, D3        Diet: Combination Diet Regular Diet Adult    DVT Prophylaxis: Pneumatic Compression Devices  Piña Catheter: Not present  Central Lines: None  Cardiac Monitoring: ACTIVE order. Indication: Sepsis  Code Status: Full Code      Disposition Plan   Expected Discharge: 06/01/2022     Anticipated discharge location:  Awaiting care coordination huddle  Delays: None     The patient's care was discussed with the Attending Physician, Dr. Zamora.    Everardo  MD Sid  Medicine Service, [facility] TEAM 4  [facility]  Securely message with the [employer] Web Console (learn more here)  Text page via [facility] Paging/Directory   Please see signed in provider for up to date coverage information      Clinically Significant Risk Factors Present on Admission                    ______________________________________________________________________    Interval History   RRT called overnight for persistent lactic acidosis 3.5 --> 3.6, however no significant clinical change. Patient reports improved nausea, still no BM. This AM, received enema. Oxygen requirement improving, now 15L --> 5L oxymizer. This morning, feeling well. No fever, chills, worsening cough.     Data reviewed today: I reviewed all medications, new labs and imaging results over the last 24 hours. I personally reviewed no images or EKG's today.    Physical Exam   Vital Signs: Temp: 98.1  F (36.7  C) Temp src: Oral BP: 127/70 Pulse: 86   Resp: 20 SpO2: 94 % O2 Device: Oxymizer cannula Oxygen Delivery: 13 LPM  Weight: 158 lbs 0 oz  General Appearance: Alert, pleasant, NAD.  Respiratory: Breathing comfortably on 5 L oxymizer. CTAB. No wheezing or crackles.   Cardiovascular: RRR, normal S1/S2, no murmurs.   GI: Soft, non-tender, mildly distended (but patient reports it feels normal to him). Bowel sounds present. No guarding or rebound.  Skin: No rash or lesions noted.  Other: Alert and oriented x3. CNII-XII grossly intact.     Data   Recent Labs   Lab 05/30/22  0549 05/29/22  0542 05/28/22  0539   WBC 16.9* 21.6* 21.1*   HGB 8.2* 8.4* 8.0*   * 110* 108*    170 171    137 134   POTASSIUM 3.8 3.8 3.7   CHLORIDE 103 102 98   CO2 23 24 28   BUN 14 21 28   CR 1.42* 1.45* 1.61*   ANIONGAP 9 11 8   DAVID 7.9*  7.9* 8.2*  8.2* 8.2*  8.2*   * 94 175*   ALBUMIN 2.3* 2.3* 2.3*   PROTTOTAL 5.0* 5.3* 5.2*   BILITOTAL 0.6 0.4 0.7   ALKPHOS 46 44 42   ALT 22 21 19   AST  20 22 22

## 2022-05-31 NOTE — PROGRESS NOTES
Hematology / Oncology  Daily Progress Note   Date of Service: 05/31/2022  Patient: Loco Wood  MRN: [id number]  Admission Date: 5/23/2022  Hospital Day # 8  Cancer Diagnosis: CLL  Primary Outpatient Oncologist: To establish with Dr Monahan   Current Treatment Plan: Ibrutinib 420 mg daily (C1 = 5/2019)     Subjective & Interval History:    Nursing notes reviewed. Afebrile overnight. Still requiring 15L oxymask overnight with O2 sats in 80s. Lactic acid improved to 1.3 today. Sleeping peacefully.     Physical Exam:    Temp:  [97.5  F (36.4  C)-98.3  F (36.8  C)] 98.3  F (36.8  C)  Pulse:  [79-98] 98  Resp:  [18-22] 18  BP: (119-139)/(69-79) 124/70  SpO2:  [91 %-100 %] 94 % on 5L NC    General: laying in bed, no acute distress   HEENT: sclera anicteric, EOMI, MMM  Neck: supple, normal ROM  CV: Regular rate and rhythm  Resp: Breathing comfortably on 15L oxymask  MSK: warm and well-perfused, normal tone  Skin: no rashes on limited exam, no jaundice  Neuro: Awake, alert, moves all extremities equally, no focal deficits    Labs & Studies: I personally reviewed the following studies:  ROUTINE LABS:  CMP  Recent Labs   Lab 05/31/22  0436 05/30/22  0549 05/29/22  0542 05/28/22  0539    135 137 134   POTASSIUM 3.9 3.8 3.8 3.7   CHLORIDE 102 103 102 98   CO2 24 23 24 28   ANIONGAP 10 9 11 8   BUN 13 14 21 28   CR 1.34* 1.42* 1.45* 1.61*   GFRESTIMATED 55* 52* 50* 44*   DAVID 8.4*  8.4* 7.9*  7.9* 8.2*  8.2* 8.2*  8.2*   PROTTOTAL 5.3* 5.0* 5.3* 5.2*   ALBUMIN 2.2* 2.3* 2.3* 2.3*   BILITOTAL 0.7 0.6 0.4 0.7   ALKPHOS 50 46 44 42   AST 17 20 22 22   ALT 21 22 21 19   * 303* 299* 307*   URIC 1.8* 2.1* 2.5* 3.2*     Recent Labs   Lab 05/31/22  0436 05/30/22  0549 05/29/22  0542 05/28/22  0539 05/27/22  0532   GLC 85 147* 94 175* 88   MAG 2.3 2.1 2.4* 2.2 2.0   PHOS 3.0 2.5 3.5 4.4 3.2     CBC  Recent Labs   Lab 05/31/22  0438 05/30/22  0549 05/29/22  0542 05/28/22  0539   WBC 19.0* 16.9* 21.6* 21.1*    RBC 2.42* 2.32* 2.35* 2.27*   HGB 8.4* 8.2* 8.4* 8.0*   HCT 26.3* 25.5* 25.9* 24.5*   * 110* 110* 108*   MCH 34.7* 35.3* 35.7* 35.2*   MCHC 31.9 32.2 32.4 32.7   RDW 15.9* 16.1* 16.4* 16.5*    169 170 171       Medications list for reference:  Current Facility-Administered Medications   Medication     acetaminophen (TYLENOL) tablet 650 mg     acyclovir (ZOVIRAX) tablet 800 mg     allopurinol (ZYLOPRIM) tablet 300 mg     atorvastatin (LIPITOR) tablet 20 mg     bisacodyl (DULCOLAX) Suppository 10 mg     cyanocobalamin (VITAMIN B-12) tablet 100 mcg     famotidine (PEPCID) tablet 20 mg     folic acid (FOLVITE) tablet 1 mg     ibrutinib (IMBRUVICA) capsule 420 mg     ipratropium - albuterol 0.5 mg/2.5 mg/3 mL (DUONEB) neb solution 3 mL     levothyroxine (SYNTHROID/LEVOTHROID) tablet 50 mcg     lidocaine (LMX4) cream     lidocaine 1 % 0.1-1 mL     lidocaine 1 % 0.1-1 mL     melatonin tablet 1 mg     methylPREDNISolone sodium succinate (solu-MEDROL) injection 40 mg     micafungin (MYCAMINE) 150 mg in sodium chloride 0.9 % 100 mL intermittent infusion     ondansetron (ZOFRAN) injection 4 mg     pantoprazole (PROTONIX) EC tablet 20 mg     piperacillin-tazobactam (ZOSYN) 4.5 g vial to attach to  mL bag     polyethylene glycol (MIRALAX) Packet 17 g     prochlorperazine (COMPAZINE) injection 5 mg    Or     prochlorperazine (COMPAZINE) tablet 5 mg    Or     prochlorperazine (COMPAZINE) suppository 12.5 mg     psyllium (METAMUCIL/KONSYL) Packet 1 packet     senna-docusate (SENOKOT-S/PERICOLACE) 8.6-50 MG per tablet 1 tablet    Or     senna-docusate (SENOKOT-S/PERICOLACE) 8.6-50 MG per tablet 2 tablet     sodium chloride (PF) 0.9% PF flush 3 mL     sodium chloride (PF) 0.9% PF flush 3 mL     sodium chloride (PF) 0.9% PF flush 3 mL     sodium chloride (PF) 0.9% PF flush 3 mL     sulfamethoxazole-trimethoprim (BACTRIM) 320 mg in D5W 570 mL intermittent infusion     Vitamin D3 (CHOLECALCIFEROL) tablet 50 mcg      Assessment & Plan:  Loco Wood is a 77 year old man with a history of CKD stage III, HLD, paroxysmal afib, and 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 Admitted for AHRF due to community acquired pneumonia and worsening anemia (10?8) .    #CLL  #Hypogammaglobulinemia    He was diagnosed 2/12/2007 with CLL, Lyles stage 0, followed clinically initially. At diagnosis WBC 17.8, ALC 11.2, hemoglobin 15.2, platelets 188. Flow consistent with CLL.  No opportunistic infections, IgG in the normal range. Due to rising lymphocyte count he was initiated on Ibrutinib monotherapy 5/14/19. He was tolerating treatment except for one brief episode of atrial fibrillation after starting ibrutinib, no recurrence since then. Due to cost considerations, he decided on his own to wean down on ibrutinib and not take it daily as prescribed in April 2022. At that time he was only taking ibrutinib 2-3 times per week and still has a large supply at home. On admission WBC stable with ALC of 13.1. IgG levels 312, s/p IVIG given inpatient 5/24.   Today, ALC is 12.5 which is stable on daily dosing of ibrutinib.  - Peripheral flow (5/24) showed 50% CD5+ lambda monotypic B cells. NGS pending.   - Continue Ibrutinib 420 mg daily, tolerating without side effects. Encourage daily dosing and not skipping dosing.   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 He then followed up with Dr. Burns 4/2022 and was anemic with hgb 8.4 and elevated bilirubin of 4.2 on 4/12. Dr. Burns resumed 60 mg daily prednisone at that time for recurrence of AIHA. Dr. Burns also previously discussed adding rituximab with patient, but patient deferred at that time. On admission hgb 8.8 (down from ~10.6) with retic count of 7.6%. LDH unchanged. Tbili improved to 1.7. YEN was weakly positive. Vit B12 and  folate wnl. No evidence of iron deficiency. Of note, patient was on a steroid taper per outpatient team decreasing dose 10 mg every week.   Hemoglobin and hemolytic labs are stable today.   - Had worsening AIHA in the setting of recent steroid taper. Started methylprednisolone 1 mg/kg on admission (5/24-5/27) but decreased to IV Methylprednisolone 40 mg in the setting of infection.   - Ongoing anemia despite initiation of HD steroids this admission, discussed case with heme staff Dr Espinosa who recommended initiation of Rituxan for refractory AIHA.    - Plan for weekly Rituxan 375 mg/m2 x4 weeks; Rituxan not started in the setting of acute respiratory decompensation. Will reassess when to administer. May consider IVIG 500 mg/kg if anemia worsens and infections not improving.   - Continue daily Folic acid supplementation.   - Monitor CBC w/ diff, LDH, CMP and retic daily   - Transfused for hgb <7 and plts <10k (outpatient therapy plan entered)   - Obtain accurate O2 sats to help decision making    #TLS ppx  At risk for tumor lysis syndrome with initiation of Rituxan as above.   - Encourage PO fluids. Cont on Allopurinol 300 mg daily  - Check uric acid, LDH, Mg, Phos, retic count daily    # CAP   # Worsening AHRF 5/26   Presented with dyspnea and new hypoxia requiring 2-3L NC. CT PE negative for clot but did show bilateral multifocal pulmonary infiltrates and nodules or nodular infiltrates may be pneumonia. BCx, RVP, COVID negative. Of note, patient was not on PJP ppx prior to admission. Sputum cx with mixed aracely  - Antibiotics per primary team. Currently on Micafungin and Bactrim. Zosyn discontinued per ID given no evidence of bacterial infection and not neutropenic  - Further infectious work up given acute decline; agree with infectious disease recs    - CT Chest showed worsening bilateral GGO     #ID PPx  - Pentamidine nebulizer q 28 days; given 5/26/22 (therapy plan entered)   - Tiffanie requested outpatient  for COVID ppx     #Hx of of shingles  Continue Acyclovir 800 mg BID as long as he is on prednisone to reduce risk of shingles.    Recommendations:  - Will hold Rituxan given acute decline; will reassess timing once patient has stabilized. May consider IVIG 500 mg/kg if anemia worsens and infection not improving.    - Cont Methylpred 40 mg IV daily  - Check LDH, Tbili and retic count daily   - Follow up sputum cx  - Obtain accurate O2 sat readings       Patient was seen and plan of care was discussed with attending physician Dr. Karthikeyan Thompson.     Keyur Raza MD   Heme/Onc/Transplant Fellow  Pgr #[id number]

## 2022-06-01 NOTE — CONSULTS
[facility]   Pulmonary   Consult Note 6/1/2022  Loco Wood MRN: [id number]    We were consulted for evaluation of rapidly progressive hypoxia in immunocompromised patient.     Assessment & Plan      Mr. Wood has rapidly progressive oxygen needs with possible ARDS.  Due to his underlying CLL, he is at increased risk for opportunistic infections, especially in the setting of such a markedly elevated beta-d-glucan.      Unfortunately, at this time, his oxygen needs are too high to safely do a bronchoscopy without intubation.  We discussed this at length with the patient and he does not want to pursue elective intubation at this time.  Any additional invasive testing including biopsy would have lower yield, but similar risk, so would not be recommended either.    He remains at high risk for deterioration requiring emergent intubation and ICU transfer.  At that time, bronchoscopy could be more safely considered.  In the meantime, ongoing antimicrobial therapy, non-invasive work up, and diuresis are the mainstays of therapy, as you are doing so excellently.    Finally, while rare, ibrutinib has some reported association with pneumonitis.  This is ultimately a diagnosis of exclusion and, given the elevated beta-d-glucan, is less likely and is already on steroids.    Recommendations:    - Determine patient's medical decision-maker prior to intubation should respiratory status continue to worsen   - Continue diuresis as able   - Antimicrobial therapies per ID   - Non-invasive infectious work up including Karius, cocci antigen per ID   - No bronchoscopy today due to high oxygen needs.  Will reassess as patient's clinical condition evolves.    Thank you for allowing us to care for this patient.  We will continue to follow along with you.    Patient seen & discussed w/  Dr. Singh, who agrees with the above assessment and plan.    Mariluz Hernandez M.D.  Pulmonary and Critical Care Medicine  Fellow  6/1/2022          History of Present Illness:   Loco Wood is a 77 year old male who presented to [facility] on 5/23/2022 with pleuritic chest pain, fatigue, and chills.  On admission patient was diagnosed w/ pneumonia.    Patient reports his symptoms started a few days prior to presentation.  He has had no sick contacts.  He has had minimal cough.  He was admitted last winter (Dec-Jan) with pneumonia while in [place] and states that at that time, he had a lot of coughing and this time, hasn't felt that way.    Since admission, he has been treated with various antimicrobials as well as ongoing steady state steroids which he takes for his hemolytic anemia.  He initially was requiring 3-4L NC, but increased to oxymask 11-15L on 5/27, and HFNC 5/31.  Overnight last night, he started refusing HFNC, so ABGs were checked which revealed paO2 in the 40s.  Due to not wanting to be intubated, the patient agreed to wearing the HFNC.  His needs have been stable today at 25-30L / 100% HFNC with appropriate saturations.    Negative: Blasto Tess, cocci ab, aspergillus ab, histo ab, Aspergillus galactomannan, expectorated cytology, expectorated sputum, COVID, flu, RSV, blasto ag  Positive Beta-D-Glucan 5/26 (>500).    paO2 66 -> 49 while non-compliant with HFNC -> 68    Possible exposure histories include wintering in [place].  Denies any sick contacts.  Lives alone.  No pets.          Review of Symptoms:   10-point ROS reviewed, & found negative w/ exceptions noted in the HPI.          Past Medical History:     Past Medical History:   Diagnosis Date     Arthritis     hip and toes     Chronic pain      CLL (chronic lymphocytic leukaemia)      Esophageal reflux      Malignant neoplasm (H)     Leukemia     Paroxysmal atrial fibrillation (H) 2/5/2020     PONV (postoperative nausea and vomiting)      Pure hypercholesterolemia        Past Surgical History:   Procedure Laterality Date     ARTHROPLASTY MINIMALLY INVASIVE HIP   5/10/2013    Procedure: ARTHROPLASTY MINIMALLY INVASIVE HIP;  Left Two Incision Total Hip Arthroplasty ;  Surgeon: Desmond Alberts MD;  Location: [facility] OR     ARTHROPLASTY MINIMALLY INVASIVE HIP  2/27/2014    Procedure: ARTHROPLASTY MINIMALLY INVASIVE HIP;  Right Total Hip Arthroplasty Minimally Invasive Two Incision  *Latex Free Room;  Surgeon: Desmond Alberts MD;  Location: [facility] OR     BACK SURGERY      back fusion 1994     COLONOSCOPY      2007     COLONOSCOPY N/A 8/15/2017    Procedure: COLONOSCOPY;  colonoscopy;  Surgeon: Chun Mcguire MD;  Location:  GI     GI SURGERY      4 hernia repairs in childhood     HERNIA REPAIR      4 since age 4     IR PAE  3/5/2020     [facility] NONSPECIFIC PROCEDURE  1964 &'95    (R) inguinal herniorrhapy     [facility] NONSPECIFIC PROCEDURE  1949    (L) inguinal herniorrhaphy     [facility] NONSPECIFIC PROCEDURE  1994    L4-5  L5 S1 fusion - spondylolisthesis            Allergies:     Allergies   Allergen Reactions     Blood Transfusion Related (Informational Only) Other (See Comments)     Patient has a history of a clinically significant antibody against RBC antigens.  A delay in compatible RBCs may occur.      Morphine Itching and Rash     Dilaudid [Hydromorphone] Itching             Outpatient Medications:     No current facility-administered medications on file prior to encounter.  acyclovir (ZOVIRAX) 800 MG tablet, Take 1 tablet (800 mg) by mouth 2 times daily  atorvastatin (LIPITOR) 20 MG tablet, Take 1 tablet (20 mg) by mouth daily  cyanocobalamin (VITAMIN B-12) 100 MCG tablet, Take 1 tablet (100 mcg) by mouth daily  famotidine (PEPCID) 40 MG tablet, Take 1 tablet (40 mg) by mouth nightly as needed for heartburn  folic acid (FOLVITE) 1 MG tablet, Take 1 tablet (1 mg) by mouth daily  ibrutinib (IMBRUVICA) 420 MG tablet, Take 1 tablet (420 mg) by mouth daily  levothyroxine (SYNTHROID/LEVOTHROID) 50 MCG tablet, Take 1 tablet (50 mcg) by mouth daily  omeprazole (PRILOSEC) 10 MG DR  "capsule, TAKE 1 CAPSULE BY MOUTH EVERY DAY 30 TO 60 MINUTES BEFORE A MEAL  predniSONE (DELTASONE) 20 MG tablet, Take 3 tablets (60 mg) by mouth daily  vitamin D3 (CHOLECALCIFEROL) 50 mcg (2000 units) tablet, Take 1 tablet by mouth daily  zolpidem (AMBIEN) 5 MG tablet, Take 1 tablet (5 mg) by mouth nightly as needed for sleep              Family History:     Family History   Problem Relation Age of Onset     Heart Disease Father      Cerebrovascular Disease Father          OF [place]      Cancer Father         melonoma     Melanoma Father      Hyperlipidemia Father      Thyroid Disease Father      Cancer Mother         Lung cancer     Other Cancer Mother         lung; heavy smoker     Cerebrovascular Disease Paternal Uncle         Aneurism     Breast Cancer Maternal Aunt          from it     Cancer Paternal Uncle      Cancer Paternal Aunt      Skin Cancer No family hx of      Glaucoma No family hx of      Macular Degeneration No family hx of                Social History:     Social History     Tobacco Use     Smoking status: Never Smoker     Smokeless tobacco: Never Used   Vaping Use     Vaping Use: Never used   Substance Use Topics     Alcohol use: Yes     Alcohol/week: 0.0 standard drinks     Comment: moderate, social     Drug use: No             Physical Exam:   /74 (BP Location: Left arm)   Pulse 116   Temp 98.8  F (37.1  C) (Oral)   Resp 18   Ht 1.676 m (5' 6\")   Wt 69 kg (152 lb 3.2 oz)   SpO2 98%   BMI 24.57 kg/m      General: elderly male in no acute distress  HENT: external ears without visible abnormalities, no rhinorrhea, no epistaxis  Lungs: bronchial breath sounds, on 30L / 100% HFNC, no accessory muscle use  Heart: regular tachycardia, no murmurs  Abdomen: soft, non-distended, bowel tones present  Extremities: no bilateral pitting edema, no clubbing or cyanosis  Skin: no visible rashes, no mottling  Neurologic: moving all 4 extremities spontaneously, awake and alert  Psych: " appropriate insight and good judgment          Data:   Labs (all laboratory studies reviewed by me): notable labs in HPI above.    Imaging and other diagnostic testing (all imaging studies reviewed by me)    Chest xray (6/1/22): increased opacities in RM lung field, improved aeration in left lower lung field    CT chest (5/23/22): patchy GGOs, mostly subpleural    Transthoracic echocardiogram (4/26/22): EF 55-60%, normal with possible Grade I diastolic dysfunction

## 2022-06-01 NOTE — PLAN OF CARE
ICU End of Shift Summary. See flowsheets for vital signs and detailed assessment.    Changes this shift: Patient alert & oriented, denies pain and nausea, tele sinus R, vital sign stable, remains on 15 liters oximask, occasional desaturation into mid 80's, ABG done overnight with PO2 66 despite on 15 liters oxygen, MD ordered chest xray for concern of ARDS,lung sound coarse/diminished, denies shortness of breath.  Lactic screen fires, lactic acid draw, result 1.3.    Plan: Continue to monitor respiratory status and wean oxygen.    Problem: Respiratory Compromise (Pneumonia)  Goal: Effective Oxygenation and Ventilation  Outcome: Ongoing, Progressing  Intervention: Optimize Oxygenation and Ventilation  Recent Flowsheet Documentation  Taken 6/1/2022 0400 by Kimberly Oseguera, RN  Head of Bed (HOB) Positioning: HOB at 30-45 degrees  Taken 6/1/2022 0000 by Kimberly Oseguera, DORIS  Head of Bed (HOB) Positioning: HOB at 30-45 degrees   Goal Outcome Evaluation:

## 2022-06-01 NOTE — PROGRESS NOTES
[facility]    Medicine Progress Note - Medicine Service, MAROON TEAM 4    Date of Admission:  5/23/2022    Assessment & Plan   Loco Wood is a 77 year old male with history of CLL, auto-immune hemolytic anemia (on prednisone taper), and CKD3a admitted for several days of SOB, chills, pleuritic chest pain with infiltrates/consolidations on CT imaging. Initially treated for CAP (ceft + azithro) with improvement. [place] 5/27 [place] [facility] [place] status and fever, now improved and afebrile (45L HFNC -> 12-15L oxymizer). Continue broad coverage with Zosyn, Micafungin (B-D glucan > 500), and IV bactrim.  Heme/Onc following for auto-immune hemolytic anemia and CLL, steroids decreased in setting of infection.    Changes today:   - Worsening hypoxia: 15L oxymask --> HFNC 25L %   - Add levofloxacin  - IV lasix 40mg   - CT A/P when more stable   - Pulm consulted for possible bronch/biopsy when more stable    # Acute hypoxic respiratory failure 2/2 possible Bacterial PNA vs Fungal PNA vs ARDS  # Severe sepsis  # Bilateral lower lobe bronchiectasis/consolidations  Admitted for several days of worsening fatigue/dyspnea, subjective chills, and pleuritic chest pain with 3-4L oxygen requirement (w/ elevated WBC) and CT w/ infiltrates and bilateral lower lobe consolidations concerning for infection. RVP negative. Patient initially treated with 5 day abx course starting 5/24 with azithromycin (d/c 5/26) + ceftriaxone for presumed CAP with clinical improvement. On 5/27, patient developed fever with worsening respiratory status (45 L high flow NC) and [place] [facility] [place] Patient improving with IVF 2.5L and broadened coverage with Zosyn, Micafungin (positive B-Glucan), and IV bactrim (for possible PJP) O2 req of 12-15L, with increase to HFNC on 6/1. CXR 5/31 with trace effusions and increasing apical opacities concerning for worsening  edema/infection.  - Infectious Disease following   -Stop Zosyn 5/31, start levofloxain 6/1   -Continue bactrim and micafungin   -Induced sputum for cytology (may return faster than sp cx and PJP PCR)   -Fungal Workup (Blasto,sputum PJP)   -HSV, CMV serology   - Cocci antigen   - IV lasix 40 mg once  - Supplemental O2; wean as able; incentive spirometry  - Pulmonary consulted for consideration of bronchoscopy     Labs:  RVP, COVID/Flu: Negative  MRSA swab: negative  Beta-D Glucan: Positive (> 500)  Following Sputum sample  Blood cultures: NG  Sputum: 2+ mixed aracely  Histo antigen: negative  Aspergillus Ag: negative   Blasto antigen: negative  Fungal ab (blast, aspergillus, cocci, histo): negative  Quantiferon Gold: negative     # Nausea  Last AXR 5/27 without evidence of obstruction. Last BM 5/30.   - PRN antiemetics (Zofran, compazine, reglan)    # CLL (dx 2/2007), stable  History of CLL (2007) managed on Ibrutinib (5/2019) which patient is not taking daily. WBC 24.9 on admission (up from baseline ~6-10) now down to ~21. Suspect secondary to recent infection with lower concern for conversion to leukemia. IVIG infusion (5/25). Following FISH and cytogenetics for disease prognostication and will continue to monitor with peripheral smear.   Continue daily ibrutinib to help control disease.   - Hematology/Oncology consulted   -Following Flow cytometry, cytogenetics, IgH mutation, TP53 mutation   - Pulmonary consult for consideration of percutaneous pulmonary biopsy    - CT A/P to complete restaging when more stable   - CBC trend   - Heme/Onc outpatient follow up on discharge    Chronic/Resolved Problems:     # Lactic acidosis, resolved  Persistent lactic acidosis 3-4 despite antimicrobial treatment and IVF boluses. Appears hemodynamically stable with no evidence of hypoperfusion. Discussed with hem/onc, can sometimes see in malignancies, but unlikely in reasonably controlled CLL. Hepatic function wnl, suggesting against  clearance issue. Resolved with additional IVF 5/30.     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 B12, folate, iron panel normal with slightly elevated YEN levels. Labs concerning for marrow responsive anemia (elevated LDH, bilirubin, reticulocyte count). Will likely need to treat underlying CLL for long-term solution.    Hgb on admission 9.5 down from baseline (~10-12 past year) and now ~8. Currently hemodynamically stable and suspect recent drop in Hgb secondary to dilutional anemia in setting of 2.5L of IVF given (5/27). Methylpred decreased and rituximab held in setting of recent infection.   - Heme/Onc consulted    - Continue Methylprednisolone 40mg   - Hold Rituximab infusion   - Check uric acid, LDH, Mg, Phos, and retic count daily   - Hep B serologies (Core, Surface Ab +): Hep B ag negative, DNA quant negative   - Continue PTA Folate, B12   - Transfuse if Hgb < 7 (Type and Screen)    # CKD Stage 3A  Cr of 1.31 on admission up from baseline now up to 1.6 (5/27), now improved to 1.3-1.4. Suspect Cr elevation is pre-renal in setting of recent sepsis and will likely improve with volume resuscitation.    - Avoid nephrotoxic agents as able    # Degenerative disc disease  # Bilateral intermittent hand cramping, spasms suspicious for cervical impingement  Patient with history of degenerative disc disorder with concern for cervical impingement in setting of new bilateral intermittent hand cramping. Patient met with Dr. Melton his sports medicine doctor (AM 5/24) over phone for follow up appointment to discuss results of his MRI. Will defer to outpatient management.     # Hx Herpes Zoster c/b post-herpetic neuralgia: PTA ppx Acyclovir while on prednisone  # Hypothyroid - TSH 1.94 on admission WNL. Continue PTA levothyroxine  # HLD-  PTA atorvastatin  # GERD- PTA omeprazole + PRN famotidine (also on Pred)  # Insomnia- has PRN Ambien as OP  # Vitamin supplements- PTA B12, folate, D3        Diet: Combination Diet Regular Diet Adult    DVT Prophylaxis: Heparin ppx  Piña Catheter: Not present  Central Lines: None  Cardiac Monitoring: ACTIVE order. Indication: Sepsis  Code Status: Full Code      Disposition Plan   Expected Discharge: 06/05/2022     Anticipated discharge location:  Awaiting care coordination huddle  Delays: None     The patient's care was discussed with the Attending Physician, Dr. Snell.    Everardo Lau MD  Medicine Service, [facility] TEAM [address] [place] [address]  Securely message with the Vocera Web Console (learn more here)  Text page via [employer] Paging/Directory   Please see signed in provider for up to date coverage information      Clinically Significant Risk Factors Present on Admission                    ______________________________________________________________________    Interval History   No acute events overnight. On 15L oxymask with SpO2 >90% at rest but becomes hypoxic to 87-88% with movement. Evening ABG with paO2 66 on 15L oxymask. CXR with trace effusions and increasing apical opacities, c/f worsening edema/infection.     This morning, developed worsening hypoxia on 15L oxymask. Upon further discussion, willing to trial HFNC.     Data reviewed today: I reviewed all medications, new labs and imaging results over the last 24 hours.    Physical Exam   Vital Signs: Temp: 98.8  F (37.1  C) Temp src: Oral BP: 116/74 Pulse: 116   Resp: 18 SpO2: 98 % O2 Device: High Flow Nasal Cannula (HFNC) Oxygen Delivery: 30 LPM  Weight: 152 lbs 3.2 oz  General Appearance: Alert, pleasant, in some distress  Respiratory: Breathing comfortably on 15 L oxymizer at rest, increased work of breathing and hypoxia with movement.   Cardiovascular: RRR, normal S1/S2, no murmurs.   GI: Soft, non-tender, mildly  distended (but patient reports it feels normal to him). Bowel sounds present. No guarding or rebound.  Skin: No rash or lesions noted.  Other: Alert and oriented x3. Answering questions appropriately.    Data   Recent Labs   Lab 06/01/22  0437 05/31/22  0438 05/31/22  0436 05/30/22  0549   WBC 22.2* 19.0*  --  16.9*   HGB 8.7* 8.4*  --  8.2*   * 109*  --  110*    183  --  169     --  136 135   POTASSIUM 4.1  --  3.9 3.8   CHLORIDE 100  --  102 103   CO2 24  --  24 23   BUN 15  --  13 14   CR 1.38*  --  1.34* 1.42*   ANIONGAP 10  --  10 9   DAVID 8.7  8.7  --  8.4*  8.4* 7.9*  7.9*   *  --  85 147*   ALBUMIN 2.4*  --  2.2* 2.3*   PROTTOTAL 5.3*  --  5.3* 5.0*   BILITOTAL 0.8  --  0.7 0.6   ALKPHOS 58  --  50 46   ALT 21  --  21 22   AST 16  --  17 20

## 2022-06-01 NOTE — PLAN OF CARE
OT 6B: cancel and defer OT evaluation, per chart review and discussion with physical therapy, pt is mobilizing well with SBA. Main functional limitation is O2 needs at this time. Physical therapy to follow while inpatient for strengthening and endurance training, all IP therapy needs can be met with one therapy discipline following. No concerns with ADLs or cognition. No acute OT needs identified, will complete orders.

## 2022-06-01 NOTE — CONSULTS
Care Management Initial Consult    General Information  Assessment completed with: Patient, VM-chart review=    Type of CM/SW Visit: Initial Assessment    Primary Care Provider verified and updated as needed: Yes   Readmission within the last 30 days: no previous admission in last 30 days   Reason for Consult: elevated risk score  Advance Care Planning: Advance Care Planning Reviewed: present on chart, verified with patient          Communication Assessment  Patient's communication style: spoken language (English or Bilingual)    Hearing Difficulty or Deaf: no   Wear Glasses or Blind: no    Cognitive  Cognitive/Neuro/Behavioral: WDL                      Living Environment:   People in home: alone     Current living Arrangements: house      Able to return to prior arrangements: pending recommendations        Family/Social Support:  Care provided by: self  Provides care for: no one  Marital Status:   Sibling(s) (friends)          Description of Support System: Supportive, Involved    Support Assessment: Adequate family and caregiver support, Adequate social supports    Current Resources:   Patient receiving home care services: No     Community Resources: OP palliative care  Equipment currently used at home: none  Supplies currently used at home: None    Employment/Financial:  Employment Status: retired        Financial Concerns: No concerns identified   Referral to Financial Worker: No       Lifestyle & Psychosocial Needs:  Social Determinants of Health     Tobacco Use: Low Risk      Smoking Tobacco Use: Never Smoker     Smokeless Tobacco Use: Never Used   Alcohol Use: Not on file   Financial Resource Strain: Not on file   Food Insecurity: Not on file   Transportation Needs: Not on file   Physical Activity: Not on file   Stress: Not on file   Social Connections: Not on file   Intimate Partner Violence: Not on file   Depression: Not at risk     PHQ-2 Score: 0   Housing Stability: Not on file       Functional  "Status:  Prior to admission patient needed assistance:   Dependent ADLs:: Independent  Dependent IADLs:: Independent       Mental Health Status:  Mental Health Status: No Current Concerns       Chemical Dependency Status:  Chemical Dependency Status: No Current Concerns             Values/Beliefs:  Spiritual, Cultural Beliefs, Spiritism Practices, Values that affect care: yes  Description of Beliefs that Will Affect Care: Atheist            Additional Information:  Per H&P: Loco Wood is a 77 year old male admitted on 5/23/2022. He has a history of CLL on Ibrutinib, Auto-immune hemolytic anemia on prednisone taper, hx Herpes zoster, and CKD3a and is admitted for several days of SOB, chills, pleuritic chest pain with CT imaging suspicious for a bilateral pneumonia vs other infiltrate.     SW met with pt to complete initial assessment. Introduced self and hospital role. Pt confirmed he lives alone in his home in [place]. Pt is IND with daily activities. Pt has a supportive sister and friends. Sister (April) is primary healthcare agent on pt's HCD. Pt receives outpatient palliative care services through [facility]. Pt reports some anxiety surrounding possible need for intubation this admission and that the thought of being intubated scares him. SW offered inpatient palliative consult for further support with this. Pt stated he would like to wait until more of a plan is determined. He noted that at this point, he is unsure if this admission is a \"blip\" or something that will cause a major \"lifestyle change\". SW validated pt's concerns.     SW/RNCC will remain available for discharge needs.     ALEXA Vargas, [facility]   [facility]  Pager: [phone]       "

## 2022-06-01 NOTE — PROGRESS NOTES
Hematology / Oncology  Daily Progress Note   Date of Service: 06/01/2022  Patient: Loco Wood  MRN: [id number]  Admission Date: 5/23/2022  Hospital Day # 9  Cancer Diagnosis: CLL  Primary Outpatient Oncologist: To establish with Dr Monahan   Current Treatment Plan: Ibrutinib 420 mg daily (C1 = 5/2019)     Subjective & Interval History:    Nursing notes reviewed. Afebrile overnight. Still had desats to mid-80s despite being on 15L oxymask. He was able to eat more at this morning after taking antiemetics.  He was concerned about having a bronchoscopy because he was worried about the risks of remaining intubated after the procedure.  He is able to get up out of bed and walk with oxygen without any difficulties.    Physical Exam:    Temp:  [97.6  F (36.4  C)-99.8  F (37.7  C)] 99.8  F (37.7  C)  Pulse:  [] 95  Resp:  [16-21] 18  BP: (118-129)/(64-73) 118/68  SpO2:  [90 %-98 %] 92 % on 5L NC    General: laying in bed, no acute distress   HEENT: sclera anicteric, EOMI, MMM  Neck: supple, normal ROM  CV: Regular rate and rhythm  Resp: Breathing comfortably on 15L oxymask  MSK: warm and well-perfused, normal tone  Skin: no rashes on limited exam, no jaundice  Neuro: Awake, alert, moves all extremities equally, no focal deficits    Labs & Studies: I personally reviewed the following studies:  ROUTINE LABS:  CMP  Recent Labs   Lab 06/01/22  0437 05/31/22  0436 05/30/22  0549 05/29/22  0542    136 135 137   POTASSIUM 4.1 3.9 3.8 3.8   CHLORIDE 100 102 103 102   CO2 24 24 23 24   ANIONGAP 10 10 9 11   BUN 15 13 14 21   CR 1.38* 1.34* 1.42* 1.45*   GFRESTIMATED 53* 55* 52* 50*   DAVID 8.7  8.7 8.4*  8.4* 7.9*  7.9* 8.2*  8.2*   PROTTOTAL 5.3* 5.3* 5.0* 5.3*   ALBUMIN 2.4* 2.2* 2.3* 2.3*   BILITOTAL 0.8 0.7 0.6 0.4   ALKPHOS 58 50 46 44   AST 16 17 20 22   ALT 21 21 22 21   * 309* 303* 299*   URIC 2.0* 1.8* 2.1* 2.5*     Recent Labs   Lab 06/01/22  0437 05/31/22  0436 05/30/22  0549  05/29/22  0542 05/28/22  0539   * 85 147* 94 175*   MAG 2.1 2.3 2.1 2.4* 2.2   PHOS 3.0 3.0 2.5 3.5 4.4     CBC  Recent Labs   Lab 06/01/22  0437 05/31/22  0438 05/30/22  0549 05/29/22  0542   WBC 22.2* 19.0* 16.9* 21.6*   RBC 2.50* 2.42* 2.32* 2.35*   HGB 8.7* 8.4* 8.2* 8.4*   HCT 26.8* 26.3* 25.5* 25.9*   * 109* 110* 110*   MCH 34.8* 34.7* 35.3* 35.7*   MCHC 32.5 31.9 32.2 32.4   RDW 16.2* 15.9* 16.1* 16.4*    183 169 170       Medications list for reference:  Current Facility-Administered Medications   Medication     acetaminophen (TYLENOL) tablet 650 mg     acyclovir (ZOVIRAX) tablet 800 mg     allopurinol (ZYLOPRIM) tablet 300 mg     atorvastatin (LIPITOR) tablet 20 mg     bisacodyl (DULCOLAX) Suppository 10 mg     cyanocobalamin (VITAMIN B-12) tablet 100 mcg     famotidine (PEPCID) tablet 20 mg     folic acid (FOLVITE) tablet 1 mg     ibrutinib (IMBRUVICA) capsule 420 mg     ipratropium - albuterol 0.5 mg/2.5 mg/3 mL (DUONEB) neb solution 3 mL     levothyroxine (SYNTHROID/LEVOTHROID) tablet 50 mcg     lidocaine (LMX4) cream     lidocaine 1 % 0.1-1 mL     lidocaine 1 % 0.1-1 mL     melatonin tablet 3 mg     methylPREDNISolone sodium succinate (solu-MEDROL) injection 40 mg     metoclopramide (REGLAN) tablet 5 mg     micafungin (MYCAMINE) 150 mg in sodium chloride 0.9 % 100 mL intermittent infusion     OLANZapine zydis (zyPREXA) ODT tab 5 mg     ondansetron (ZOFRAN) injection 8 mg     pantoprazole (PROTONIX) EC tablet 20 mg     polyethylene glycol (MIRALAX) Packet 17 g     prochlorperazine (COMPAZINE) injection 5 mg    Or     prochlorperazine (COMPAZINE) tablet 5 mg    Or     prochlorperazine (COMPAZINE) suppository 12.5 mg     psyllium (METAMUCIL/KONSYL) Packet 1 packet     senna-docusate (SENOKOT-S/PERICOLACE) 8.6-50 MG per tablet 1 tablet    Or     senna-docusate (SENOKOT-S/PERICOLACE) 8.6-50 MG per tablet 2 tablet     sodium chloride (PF) 0.9% PF flush 3 mL     sodium chloride (PF) 0.9%  PF flush 3 mL     sodium chloride (PF) 0.9% PF flush 3 mL     sodium chloride (PF) 0.9% PF flush 3 mL     sulfamethoxazole-trimethoprim (BACTRIM) 360 mg in D5W 572.5 mL intermittent infusion     Vitamin D3 (CHOLECALCIFEROL) tablet 50 mcg     zolpidem (AMBIEN) tablet 5 mg     Assessment & Plan:  Loco Wood is a 77 year old man with a history of CKD stage III, HLD, paroxysmal afib, and 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 Admitted for AHRF due to community acquired pneumonia and worsening anemia (10?8) .    #CLL  #Hypogammaglobulinemia    He was diagnosed 2/12/2007 with CLL, Lyles stage 0, followed clinically initially. At diagnosis WBC 17.8, ALC 11.2, hemoglobin 15.2, platelets 188. Flow consistent with CLL.  No opportunistic infections, IgG in the normal range. Due to rising lymphocyte count he was initiated on Ibrutinib monotherapy 5/14/19. He was tolerating treatment except for one brief episode of atrial fibrillation after starting ibrutinib, no recurrence since then. Due to cost considerations, he decided on his own to wean down on ibrutinib and not take it daily as prescribed in April 2022. At that time he was only taking ibrutinib 2-3 times per week and still has a large supply at home. On admission WBC stable with ALC of 13.1. IgG levels 312, s/p IVIG given inpatient 5/24.   Today, ALC is 10.2 is stable on daily dosing of ibrutinib. Unclear if there is any evidence of lymphadenopathy in the abdomen or splenomegaly.   - To restage, recommend CT CAP w/ contrast, if renal function permits  - Peripheral flow (5/24) showed 50% CD5+ lambda monotypic B cells. NGS pending.   - Continue Ibrutinib 420 mg daily, tolerating without side effects. Encourage daily dosing and not skipping dosing.   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 He then  followed up with Dr. Burns 4/2022 and was anemic with hgb 8.4 and elevated bilirubin of 4.2 on 4/12. Dr. Burns resumed 60 mg daily prednisone at that time for recurrence of AIHA. Dr. Burns also previously discussed adding rituximab with patient, but patient deferred at that time. On admission hgb 8.8 (down from ~10.6) with retic count of 7.6%. LDH unchanged. Tbili improved to 1.7. YEN was weakly positive. Vit B12 and folate wnl. No evidence of iron deficiency. Of note, patient was on a steroid taper per outpatient team decreasing dose 10 mg every week. Had worsening AIHA in the setting of recent steroid taper. Previously on methylprednisolone 1 mg/kg on admission (5/24-5/27) but decreased to IV Methylprednisolone 40 mg in the setting of infection. Ongoing anemia despite initiation of HD steroids this admission, discussed case with heme staff Dr. Espinosa who recommended initiation of Rituxan for refractory AIHA.    - Plan for weekly Rituxan 375 mg/m2 x4 weeks; Rituxan not started in the setting of acute respiratory decompensation. Will reassess when to administer. May consider IVIG 500 mg/kg if anemia worsens and infections not improving.   - Continue daily Folic acid supplementation.   - Monitor CBC w/ diff, LDH, CMP and retic daily   - Transfused for hgb <7 and plts <10k (outpatient therapy plan entered)   - Obtain accurate O2 sats to help decision making    #TLS ppx  At risk for tumor lysis syndrome with initiation of Rituxan as above.   - Encourage PO fluids. Cont on Allopurinol 300 mg daily  - Check uric acid, LDH, Mg, Phos, retic count daily    # CAP   # Worsening AHRF 5/26   Presented with dyspnea and new hypoxia requiring 2-3L NC. CT PE negative for clot but did show bilateral multifocal pulmonary infiltrates and nodules or nodular infiltrates may be pneumonia. BCx, RVP, COVID negative. Of note, patient was not on PJP ppx prior to admission.   - Consider bronchoscopy vs percutaneous biopsy of lung  findings to r/o CLL involvement vs infection  - Antibiotics per primary team. Micafungin and Bactrim. Zosyn discontinued yesterday due to no concerns for bacterial infection  - Further infectious work up given acute decline; agree with infectious disease recs    - CT Chest showed worsening bilateral GGO   - Follow up sputum culture    #ID PPx  - Pentamidine nebulizer q 28 days; given 5/26/22 (therapy plan entered)   - Tiffanie requested outpatient for COVID ppx     #Hx of of shingles  Continue Acyclovir 800 mg BID as long as he is on prednisone to reduce risk of shingles.    Recommendations:  - Will hold Rituxan given acute decline; will reassess timing once patient has stabilized. May consider IVIG 500 mg/kg if anemia worsens and infection not improving.    - Cont Methylpred 40 mg IV daily  - Obtain CT Abdomen/Pelvis with contrast if renal function permits  - Consult pulm for possible bronchoscopy or IR for percutaneous biopsy of lung findings  - Check LDH, Tbili and retic count daily   - Follow up sputum cx  - Obtain accurate O2 sat readings       Patient was seen and plan of care was discussed with attending physician Dr. Alan Macdonald.     Keyur Raza MD   Heme/Onc/Transplant Fellow  Pgr #[phone]

## 2022-06-01 NOTE — PROGRESS NOTES
"/74 (BP Location: Left arm)   Pulse 116   Temp 98.8  F (37.1  C) (Oral)   Resp 18   Ht 1.676 m (5' 6\")   Wt 69 kg (152 lb 3.2 oz)   SpO2 98%   BMI 24.57 kg/m      Hours of Care: [id number].    Vitals:  See flow sheet   Activity:  AAT, SOB O/E +++.  Pain:  denies  Neuro:  Intact, A+Ox4, sensation intact.  Cardiac:  SR w/PAC's, no c/o chest pain.    Respiratory:  Pt was desating all morning down to low-mid 80's on 15 L oxymask. Pt appeared dyspneic & exhausted. MD paged & talked to Pt about High Flow 02, Pt agreed. Now Pt on 30L 100%, sats above 90%. Pt feels better, much more relaxed. Pulmonology follow.  GI: Constant nausea present, most likely due to Abx, PRN antiemetics given during the day.  :  Voiding well, large amount after Lasix today, urine clear/yellow.   Diet:  Regular.   PO intake poor, no appetite.  Lines:  SLx2  Skin/Wounds:  Intact, no wounds noted.   Plan:  TBD      Continue to monitor and follow POC    Grant Hernández RN on 6/1/2022 at 5:11 PM        "

## 2022-06-01 NOTE — PROGRESS NOTES
"CLINICAL NUTRITION SERVICES - ASSESSMENT NOTE     Nutrition Prescription    RECOMMENDATIONS FOR MDs/PROVIDERS TO ORDER:  Recommend scheduling anti-emetics prior to meals per ongoing nausea (currently PRN)    Malnutrition Status:    Severe malnutrition in the context of acute illness     Recommendations already ordered by Registered Dietitian (RD):  - Education regarding small frequent meals, high protein foods    - Begin oral nutrition supplements to optimize intake; trial Ensure with Ice Cream today, and Magic Cup tomorrow (writer will check in tomorrow to check preferences); provided pt with [facility] supplement menu for pt to place orders for any supplement PRN via room-service    - HS snack per pt preference, string cheese stick x2    Future/Additional Recommendations:  Monitor supplement preferences, intake trends, weight trends, POC     REASON FOR ASSESSMENT  Loco Wood is a/an 77 year old male assessed by the dietitian for LOS    CLINICAL HISTORY  Hx of CLL, auto-immune hemolytic anemia, and CKD III, admitted to [facility] with chest pain, fatigue, chills, nausea.    NUTRITION HISTORY  Patient reports having a good appetite prior to onset of acute illness. Typically eats a good breakfast, skips lunch ~half of the time, typically eats a frozen  [employer] meal for dinner, and snacks in the evening. Jokes about having a \"see-food diet\", and also reports that he loves eating sweets.     Of note: Does not take any protein shake supplements PTA but does report \"experimenting\" with other vitamin/mineral supplements PTA. Most recently pt reports starting to take a supplement called \"Keravita\", which writer looked up; depending on which type he is taking (Karavita pro vs pro max) these supplements both contain herbal components (Cat's Claw in Keravita Pro and Horsetail stem extract in pro max) which writer explained have the potential to interact with his medications. Advised pt to stop taking this supplement. "     CURRENT NUTRITION ORDERS  Diet: Regular  Intake/Tolerance: 25-% of meals per flowsheet. Per review of room-service selections, pt has been ordering 1-3 meals per day but with an inadequate energy/protein intake ranging from 344-1107 Kcals and 4-37 gm protein per day % of meals were consumed, which does not appear to be the case. Nausea largely inhibiting intake. Encouraged small frequent meals and adequate Kcals/protein to preserve lean body mass. Discussed supplements and snack options and will set up per pt preference.    LABS  Reviewed, see summary table below. Monitor Na trends with hypoglycemia  Electrolytes  Potassium (mmol/L)   Date Value   06/01/2022 4.1   05/31/2022 3.9   05/30/2022 3.8   07/02/2021 4.2   04/26/2021 3.6   04/05/2021 4.2     Phosphorus (mg/dL)   Date Value   06/01/2022 3.0   05/31/2022 3.0   05/30/2022 2.5   05/29/2022 3.5   05/28/2022 4.4    Blood Glucose  Glucose (mg/dL)   Date Value   06/01/2022 107 (H)   05/31/2022 85   05/30/2022 147 (H)   05/29/2022 94   05/28/2022 175 (H)   07/02/2021 100 (H)   04/26/2021 119 (H)   04/05/2021 91   01/25/2021 182 (H)   01/04/2021 127 (H)    Inflammatory Markers  C-Reactive Protein (mg/dL)   Date Value   12/02/2002 <0.02     CRP Cardiac Risk (mg/L)   Date Value   04/11/2016     <0.2  Reference Values:   Low Risk:           <1.0 mg/L   Average Risk:       1.0-3.0 mg/L   High Risk:          >3.0 mg/L   Acute Inflammation: >8.0 mg/L       CRP Inflammation (mg/L)   Date Value   03/06/2014 33.1 (H)     WBC (10e9/L)   Date Value   07/02/2021 8.3   05/27/2021 8.3   04/26/2021 7.4     WBC Count (10e3/uL)   Date Value   06/01/2022 22.2 (H)   05/31/2022 19.0 (H)   05/30/2022 16.9 (H)     Albumin (g/dL)   Date Value   06/01/2022 2.4 (L)   05/31/2022 2.2 (L)   05/30/2022 2.3 (L)   07/02/2021 4.1   04/26/2021 3.7   04/05/2021 4.0      Magnesium (mg/dL)   Date Value   06/01/2022 2.1   05/31/2022 2.3   05/30/2022 2.1   02/28/2014 1.9     Sodium  "(mmol/L)   Date Value   06/01/2022 134   05/31/2022 136   05/30/2022 135   07/02/2021 143   04/26/2021 141   04/05/2021 140    Renal  Urea Nitrogen (mg/dL)   Date Value   06/01/2022 15   05/31/2022 13   05/30/2022 14   07/02/2021 23   04/26/2021 18   04/05/2021 17     Creatinine (mg/dL)   Date Value   06/01/2022 1.38 (H)   05/31/2022 1.34 (H)   05/30/2022 1.42 (H)   07/02/2021 1.47 (H)   04/26/2021 1.58 (H)   04/05/2021 1.52 (H)     Additional  Triglycerides (mg/dL)   Date Value   04/05/2021 96   03/02/2020 83   03/22/2018 144     Ketones Urine (mg/dL)   Date Value   05/26/2022 Negative   01/04/2021 Negative        MEDICATIONS    Lipitor    B12, 100 mcg/day    Folvite, 1 mg/day    Solumedrol    Protonix    Metamucil 1 pkt BID    Senna, BID; Miralax once daily     Vit D3, 50 mcg/day (not given today)    ANTHROPOMETRICS  Height: 167.6 cm (5' 6\"[no shoes[)  Admit wt 70.6 kg (155 lbs) 5/24/22 [not taking actual admit wt into consideration, appears to be reported]  Most Recent Weight: 69 kg (152 lb 3.2 oz)  IBW: 59 kg  117%IBW   BMI: Normal BMI    Weight History:   -Weight down 2.6 kg (3.7%) since admission, significant for time frame of 10 days  Wt Readings from Last 15 Encounters:   06/01/22 69 kg (152 lb 3.2 oz)   05/19/22 73 kg (161 lb)   05/13/22 74.4 kg (164 lb)   04/26/22 74.4 kg (164 lb)   04/12/22 72.5 kg (159 lb 12.8 oz)   04/07/22 74.4 kg (164 lb)   11/02/21 74.4 kg (164 lb)   09/28/21 74.7 kg (164 lb 9.6 oz)   09/23/21 75.7 kg (166 lb 14.4 oz)   09/05/21 74.8 kg (165 lb)   09/01/21 75.3 kg (166 lb)   04/29/21 76.7 kg (169 lb)   04/12/21 75.3 kg (166 lb)   03/04/21 75.2 kg (165 lb 12.8 oz)   02/19/21 74.9 kg (165 lb 3.2 oz)       Dosing Weight: 68 kg [current wt 6/2, IBW of 59 kg]    ASSESSED NUTRITION NEEDS  Estimated Energy Needs: 5695-6030 kcals/day (25 - 30 kcals/kg)  Justification: Maintenance  Estimated Protein Needs:  grams protein/day (1.2 - 1.5 grams of pro/kg)  Justification: Lean body mass " preservation in acute illness   Estimated Fluid Needs: 1333-6594 mL/day (1 mL/kcal)   Justification: Maintenance    PHYSICAL FINDINGS  See malnutrition section below.    MALNUTRITION  % Intake: </= 50% for >/= 5 days (severe)  % Weight Loss: > 2% in 1 week (severe)  Subcutaneous Fat Loss: None observed  Muscle Loss: Thoracic region (clavicle, acromium bone, deltoid, trapezius, pectoral):  Mild and Upper arm (bicep, tricep):  Mild  Fluid Accumulation/Edema: None noted  Malnutrition Diagnosis: Severe malnutrition in the context of acute illness     NUTRITION DIAGNOSIS  Inadequate protein-energy intake related to acute illness and nausea as evidenced by pt self-report, intake meeting <50% of est nutrition needs, wt loss >2% in 1 week.     INTERVENTIONS  Implementation  Nutrition education for recommended modifications   Collaboration with other providers  Medical food supplement therapy  Modify composition of meals/snacks     Goals  Patient to consume % of nutritionally adequate meal trays TID, or the equivalent with supplements/snacks.     Monitoring/Evaluation  Progress toward goals will be monitored and evaluated per protocol.  Armond Wahl RDN, LD, CNSC  6B RD pager: [phone]   6B work-room RD phone: *[phone]    Weekend/Holiday RD pager [phone]

## 2022-06-01 NOTE — PROGRESS NOTES
[facility]  Transplant Infectious Disease Progress Note      Patient:  Loco Wood, Date of birth 1945, Medical record number [id number]  Date of Visit:  06/01/2022         Assessment and Recommendations:   Recommendations:  - Please send cocci antigen (already has a neg antibody) & blood CMV DNA PCR.   - Since he is starting to have recurrence of fever with discontinuation of zosyn yesterday, would want him covered with a general antibiotic, but now that nausea is better with discontinuation of zosyn, would opt for Levaquin. He has other oral medications in his regimen, so would probably choose the dose of 500 mg PO daily.  - Consider pulmonary involvement for further diagnostic testing, whether bronchoscopy can be successfully performed with his supplemental oxygen requirement.  - Continue IV bactrim, reassessing dose daily based on any changes to his renal function.  - Continue micafungin 150 mg IV   - Continue ACV as viral prophylaxis.   - Await pending Karius assay, Pneumocystis PCR, blood culture.  - If he has a clinical decompensation, augment bacterial therapy and replace acyclovir with ganciclovir while CMV DNA PCR and Karius are pending.    Transplant ID will continue to follow.    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 Received pred courses in 12/2021 from derm, 1/2022 for lung symptoms in [place], in 3/2022 for anemia, then most recently for 3 weeks & 6 weeks for AIHA.   Infectious Disease issues include:  - Progressive pulmonary process suspected to be infectious. With elevated Fungitell & LDH, strong possibility that this is at least in part due to Pneumocystis, moses since a clinical setback occurred following a dose of neb pent on 5/26/2022 (may have started to kill some pneumocystis organisms). IV bactrim started 5/27/2022 while workup pending. DDx definitely includes fungal infection with all the recent steroid use, but testing has  been negative to date including fungal antibody panel, Fungitell and Aspergillus galactomannan antigen. Since he started ibrutinib 5/25/2022, and there were some LFT issues over the winter with hepatitis status undergoing current assessment, would provide fungal coverage with micafungin 150 mg IV daily as opposed to an azole at this point in time. Travel to [place] over the last winter does make him at risk for cocci, this last winter was particularly windy, but cocci fungal antibody returned negative despite several months' worth of symtpoms. Would request a check of cocci fungal antigen. Karius testing drawn 6/1/2022. He has high supplemental oxygen needs. 6/1/2022 portable chest x-ray shows increasing apical predominant opacities. This type of infiltrate could be atypical bacterial, fungal, mycobacterial, or viral. Would add Levaquin for atypical coverage. Consider pulmonary involvement for further diagnostic testing, whether bronchoscopy can be successfully performed with his supplemental oxygen requirement.  - Hep B core ab & S ab+ 5/26/2022, with negative hep B viral load.  - Extensive travel hx. Quantiferon sent 5/26/2022. Fungal antibodies negative.  - QTc interval: 435 msec on 5/28/2022 EKG  - Bacterial prophylaxis: zosyn throw to 5/31/2022.  - Pneumocystis prophylaxis: pent 5/26/2022, started on bactrim 5/27/2022.  - Viral serostatus & prophylaxis: HSV1+, HSV2+, CMV+; on acyclovir prophy.   - Fungal prophylaxis: Fungitell > 500; on micafungin since 5/27/2022.  - Immunization status: he has had 3 covid vaccinations.   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 Given IV IG x 1 on 5/25/2022 at 30 g.   - Isolation status: Good hand hygiene.    Bree Garcia MD   Pager [phone]         Interval History:   Since Loco was last seen by infectious disease on 5/31/2022, he fence zosyn discontinued due to nausea. His nausea is improved. However, he has started to have an upward trend in his  temperature, and that is new. He remains at 15 liters of supplemental oxygen via facemask, and saturations are just barely hitting 90%. He desaturates with activity. He has good urine output. White blood cell count is increasing. He still looks miserable. He is alert and oriented.    Review of Systems:  CONSTITUTIONAL:  Temperature curve is rising, currently at 99.8F.  EYES: negative for icterus or acute vision changes  ENT:  negative for acute hearing loss, tinnitus  RESPIRATORY:  He had induced sputum that still has a pneumocystis PCR pending. + dyspnea both at rest & with activity, despite supplemental oxygen. ABG was performed  CARDIOVASCULAR:  no palpitations  GASTROINTESTINAL:  Improved nausea.   GENITOURINARY:  negative for dysuria or hematuria. Urinating a lot with diuresis.   HEME:  No easy bruising or bleeding  INTEGUMENT:  negative for rash or pruritus  NEURO:  Negative for headache or tremor.          Current Medications & Allergies:       acyclovir  800 mg Oral BID     allopurinol  300 mg Oral Daily     atorvastatin  20 mg Oral Daily     cyanocobalamin  100 mcg Oral Daily     folic acid  1 mg Oral Daily     ibrutinib  420 mg Oral Daily     levothyroxine  50 mcg Oral Daily     methylPREDNISolone  40 mg Intravenous Daily     micafungin  150 mg Intravenous Q24H     pantoprazole  20 mg Oral Daily     polyethylene glycol  17 g Oral Daily     psyllium  1 packet Oral BID     senna-docusate  1 tablet Oral BID    Or     senna-docusate  2 tablet Oral BID     sodium chloride (PF)  3 mL Intracatheter Q8H     sodium chloride (PF)  3 mL Intracatheter Q8H     sulfamethoxazole-trimethoprim  360 mg Intravenous Q8H     vitamin D3  50 mcg Oral Daily       Infusions/Drips:         Allergies   Allergen Reactions     Blood Transfusion Related (Informational Only) Other (See Comments)     Patient has a history of a clinically significant antibody against RBC antigens.  A delay in compatible RBCs may occur.      Morphine  Itching and Rash     Dilaudid [Hydromorphone] Itching            Physical Exam:     Patient Vitals for the past 24 hrs:   BP Temp Temp src Pulse Resp SpO2 Weight   06/01/22 0713 118/68 99.8  F (37.7  C) Axillary 95 18 92 % --   06/01/22 0551 -- -- -- -- -- -- 69 kg (152 lb 3.2 oz)   06/01/22 0500 -- -- -- 95 -- 90 % --   06/01/22 0445 129/64 -- -- 98 18 94 % --   06/01/22 0400 122/70 98.5  F (36.9  C) Oral 102 20 94 % --   06/01/22 0000 -- -- -- 88 -- 93 % --   05/31/22 2319 129/73 97.6  F (36.4  C) Oral 91 16 94 % --   05/31/22 1916 125/70 98.9  F (37.2  C) Oral 93 18 98 % --   05/31/22 1500 120/70 97.8  F (36.6  C) Oral -- 21 -- --   05/31/22 1118 124/68 98.2  F (36.8  C) Oral -- 18 -- --     Ranges for vital signs:  Temp:  [97.6  F (36.4  C)-99.8  F (37.7  C)] 99.8  F (37.7  C)  Pulse:  [] 95  Resp:  [16-21] 18  BP: (118-129)/(64-73) 118/68  SpO2:  [90 %-98 %] 92 %  Vitals:    05/30/22 0455 05/31/22 0552 06/01/22 0551   Weight: 71.7 kg (158 lb) 70.8 kg (156 lb 1.6 oz) 69 kg (152 lb 3.2 oz)       Physical Examination:  GENERAL:  well-developed, well-nourished man, resting in bed.  HEAD:  Head is normocephalic, atraumatic   EYES:  Eyes have anicteric sclerae  ENT:  Oropharynx is dry without exudates or ulcers. Tongue is midline. Oxygen facemask in place.   NECK:  Supple.   LUNGS:  Coarse resp to auscultation bilateral, anteriorly.   CARDIOVASCULAR:  Regular rate and rhythm with no murmur, but there are premature extras beats at times.   ABDOMEN:  Normal bowel sounds, soft, nontender  SKIN:  No acute rashes. PIV in place without any surrounding erythema or exudate.  NEUROLOGIC:  Grossly nonfocal. Active x4 extremities         Laboratory Data:     Inflammatory Markers    Recent Labs   Lab Test 04/12/22  1701 12/21/17  0951 10/04/17  2250 05/15/17  1001 04/11/16  1136   SED  --   --  1  --   --    CRP  --   --   --   --  <0.2  Reference Values:   Low Risk:           <1.0 mg/L   Average Risk:       1.0-3.0 mg/L    High Risk:          >3.0 mg/L   Acute Inflammation: >8.0 mg/L     PSA 6.52*   < >  --    < >  --     < > = values in this interval not displayed.       Immune Globulin Studies     Recent Labs   Lab Test 05/24/22  1651 06/12/19  0806 05/31/19  1021 04/30/19  1219 12/21/18  1103 11/07/18  0844 04/29/15  1012 09/19/14  1227   * 394* 409* 429* 443* 520*   < > 729   IGM  --   --   --   --   --   --   --  28*   IGA  --   --   --   --   --   --   --  72    < > = values in this interval not displayed.       Metabolic Studies       Recent Labs   Lab Test 06/01/22 0437 05/31/22 0436 05/27/22  0532 05/26/22  2219 05/24/22  1651 05/23/22  1802    136   < >  --    < >  --    POTASSIUM 4.1 3.9   < >  --    < >  --    CHLORIDE 100 102   < >  --    < >  --    CO2 24 24   < >  --    < >  --    ANIONGAP 10 10   < >  --    < >  --    BUN 15 13   < >  --    < >  --    CR 1.38* 1.34*   < >  --    < >  --    GFRESTIMATED 53* 55*   < >  --    < >  --    * 85   < >  --    < >  --    DAVID 8.7  8.7 8.4*  8.4*   < >  --    < >  --    PHOS 3.0 3.0   < >  --   --   --    MAG 2.1 2.3   < >  --   --   --    LACT 1.3 1.3   < >  --    < >  --    PCAL  --   --   --  0.06*  --   --    FGTL  --   --   --   --   --  >500    < > = values in this interval not displayed.       Hepatic Studies    Recent Labs   Lab Test 06/01/22 0437 05/31/22  0436 05/26/22  0549 05/25/22  0616 04/12/22  1701 03/08/22  1244   BILITOTAL 0.8 0.7   < > 1.7*   < >  --    DBIL  --   --   --  0.4*   < >  --    ALKPHOS 58 50   < >  --    < >  --    PROTTOTAL 5.3* 5.3*   < >  --    < >  --    ALBUMIN 2.4* 2.2*   < >  --    < >  --    AST 16 17   < >  --    < >  --    84540  --   --   --   --   --  31   ALT 21 21   < >  --    < >  --    09887  --   --   --   --   --  17   * 309*   < > 300*   < >  --     < > = values in this interval not displayed.       Pancreatitis testing    Recent Labs   Lab Test 04/05/21  1137 01/04/21  1743   LIPASE  --  127    TRIG 96  --        Gout Labs      Recent Labs   Lab Test 06/01/22  0437 05/31/22  0436 05/30/22  0549 05/29/22  0542 05/28/22  0539   URIC 2.0* 1.8* 2.1* 2.5* 3.2*       Hematology Studies   Recent Labs   Lab Test 06/01/22  0437 05/31/22  0438 05/30/22  0549 05/29/22  0542 04/26/22  0928 04/12/22  1701 11/10/21  1314 09/20/21  0952   WBC 22.2* 19.0* 16.9* 21.6*   < > 8.2   < > 9.3   ANEU 11.8* 6.3 7.3 9.9*   < >  --    < >  --    ANEUTAUTO  --   --   --   --   --  3.2  --  4.8   ALYM 10.2* 12.5* 9.5* 11.4*   < >  --    < >  --    ALYMPAUTO  --   --   --   --   --  4.6  --  3.7   ANY 0.2 0.0 0.2 0.2   < >  --    < >  --    AMONOAUTO  --   --   --   --   --  0.3  --  0.6   AEOS 0.0 0.2 0.0 0.0   < >  --    < >  --    AEOSAUTO  --   --   --   --   --  0.1  --  0.2   ABSBASO  --   --   --   --   --  0.0  --  0.0   HGB 8.7* 8.4* 8.2* 8.4*   < > 8.4*   < > 11.9*   HCT 26.8* 26.3* 25.5* 25.9*   < > 26.3*   < > 35.7*    183 169 170   < > 103*   < > 108*    < > = values in this interval not displayed.       Clotting Studies    Recent Labs   Lab Test 12/01/20  1425 03/05/20  0710   INR 1.15* 1.16*   PTT 28 29       Iron Testing    Recent Labs   Lab Test 06/01/22  0437 05/25/22  0616 05/24/22  1651 07/02/21  0845 05/27/21  1145   IRON  --   --  36  --  106   FEB  --   --  271  --  356   IRONSAT  --   --  13*  --  30   NURY  --   --  239  --   --    *   < > 112*   < > 89   FOLIC  --   --  38.1  --   --    B12  --   --  800  --   --    HAPT  --   --  200*   < >  --    RETP 4.6*   < > 7.6*   < >  --    RETICABSCT 0.115*   < > 0.211*   < >  --     < > = values in this interval not displayed.       Thyroid Studies     Recent Labs   Lab Test 05/23/22  1724 11/10/21  1314 07/17/20  0932 01/10/20  0825 12/10/19  0845   TSH 1.94 4.62* 3.75 7.10* 12.30*   T4  --  0.92  --  1.07 0.95       Urine Studies     Recent Labs   Lab Test 05/26/22  2240 05/23/22  1906 05/19/22  1355 01/04/21  1744 01/07/20  0000 11/12/19  1322    URINEPH 5.5 6.5 7.0 5.5 6.0 5.0   NITRITE Negative Negative Negative Negative Neg Negative   LEUKEST Negative Negative Negative Negative Neg Negative   WBCU 2 1 0-5 1  --  1       Microbiology:  Fungal testing  Recent Labs   Lab Test 05/27/22  1356 05/27/22  0916 05/23/22  1802   FGTL  --   --  >500   FGTLI  --   --  Positive*   ASPGAI 0.03  --   --    ASPGAA Negative  --   --    COFUNG  --  <1:2  --    FUNBL  --  0.3  --        Last Culture results   Rapid Strep A Screen   Date Value Ref Range Status   10/22/2010   Final    NEGATIVE: No Group A streptococcal antigen detected by immunoassay, await   culture report.     Culture   Date Value Ref Range Status   05/27/2022 2+ Normal aracely  Final   05/27/2022 No growth after 4 days  Preliminary   05/27/2022 No growth after 4 days  Preliminary   05/26/2022 No Growth  Final   05/26/2022 No Growth  Final   05/26/2022   Final    >10 Squamous epithelial cells/low power field indicates oral contamination. Please recollect.   05/23/2022 No Growth  Final   05/23/2022 No Growth  Final     Culture Micro   Date Value Ref Range Status   03/02/2014 No growth  Final   10/22/2010 No Beta Streptococcus isolated  Final   04/21/2010   Final    No Salmonella, Shigella, Campylobacter or E coli 0157 isolated.   04/18/2010 No growth  Final   03/16/2009 No Beta Streptococcus isolated  Final   01/13/2007 No growth  Final   04/06/2004 No Beta Streptococcus isolated  Final           Quantiferon testing   Recent Labs   Lab Test 06/01/22  0437 05/31/22  0438 05/26/22  2219 05/26/22  1123   TBRES  --   --   --  Negative   LYMPH 46 66   < >  --     < > = values in this interval not displayed.       Virology:  Coronavirus-19 testing    Recent Labs   Lab Test 05/31/22  0241 05/23/22  1946 06/23/20  0843   [id number]  --   --  Negative   [id number]  --   --  Not Applicable   [id number] Negative Negative  --        Respiratory virus (non-coronavirus-19) testing    Recent Labs   Lab Test 05/24/22  1916  05/23/22 1946   IFLUA Not Detected  --    INFZA  --  Negative   FLUAH1 Not Detected  --    EQ3977 Not Detected  --    FLUAH3 Not Detected  --    IFLUB Not Detected  --    INFZB  --  Negative   PIV1 Not Detected  --    PIV2 Not Detected  --    PIV3 Not Detected  --    PIV4 Not Detected  --    IRSV  --  Negative   RSVA Not Detected  --    RSVB Not Detected  --    HMPV Not Detected  --    ADENOV Not Detected  --    CORONA Not Detected  --        Hepatitis B Testing     Recent Labs   Lab Test 05/26/22  1123   AUSAB 288.89   HBCAB Reactive*   HEPBANG Nonreactive       Imaging:  Recent Results (from the past 48 hour(s))   XR Chest Port 1 View    Narrative    Exam: XR CHEST PORT 1 VIEW, 6/1/2022 4:05 AM    Indication: bilateral infiltrates interval evaluation    Comparison: Chest x-ray 5/26/2022, CT 5/27/2022    Findings:   Heart size is not enlarged. Trace effusions. No pneumothorax.  Increased mid to upper lung predominant airspace and interstitial  opacities.      Impression    Impression:   Trace effusions and increasing apical predominant opacities, related  to worsening edema/infection.    I have personally reviewed the examination and initial interpretation  and I agree with the findings.    HARRY WILKERSON MD         SYSTEM ID:  [id number]

## 2022-06-02 PROBLEM — B59: Status: ACTIVE | Noted: 2022-01-01

## 2022-06-02 NOTE — PLAN OF CARE
"Goal Outcome Evaluation:    /73 (BP Location: Left arm)   Pulse 111   Temp 98.8  F (37.1  C) (Oral)   Resp 20   Ht 1.676 m (5' 6\")   Wt 68 kg (149 lb 14.6 oz)   SpO2 95%   BMI 24.20 kg/m       A&Ox4.  Lungs course throughout, requiring 100% FiO2 highflow.  ST.  HR 100s.  Active bowel sounds, no BM on shift.  Passing flatus.  Nauseous, receiving Zofran, Reglan and Compazine with minimal relief.  Voiding frequently, UA sent.  SB.  Denies pain.  Continue to monitor.    "

## 2022-06-02 NOTE — PROGRESS NOTES
[facility]   Pulmonary   Consult Note 06/02/2022   Loco Wood MRN: [id number]    We were consulted for evaluation of rapidly progressive hypoxia in immunocompromised patient.     Assessment & Plan      Loco Wood is a 77 year old male who presented to [facility] on 5/23/2022 with pleuritic chest pain, fatigue, and chills.  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    Clinically, he is stable to even slightly worse with decreasing sats on equally max high flow.  He remains at very high risk for needing intubation.    Fortunately, his PJP PCR turned positive, providing a likely organism, making need for bronchoscopy less.  At this time, we would defer bronch in favor of continuing appropriate PJP treatment with hopeful recovery in the coming days.  He has fortunately been on empiric treatment since 5/27 per ID.    Recommendations:    - Determine patient's medical decision-maker prior to intubation should respiratory status continue to worsen   - Continue diuresis as able   - Antimicrobial therapies per ID   - No bronchoscopy planned due to high oxygen needs and likely diagnosis of PJP from non-invasive testing.  We are happy to reassess as patient's clinical condition evolves.    Thank you for allowing us to care for this patient.  We will sign off at this time, but please don't hesitate to page or call with further questions or concerns.    Patient seen & discussed w/  Dr. Singh, who agrees with the above assessment and plan.    Mariluz Hernandez M.D.  Pulmonary and Critical Care Medicine Fellow  06/02/2022 12:42 PM           Subjective:     PJP PCR turned positive.  Pt on IV TMP/SMX renally dosed.  Oxygen needs remained stably maxed, but sats down from upper 90s to low-mid 90s since yesterday.  pAO2 down to 62 this morning.  Patient reports feeling about the same as yesterday.  He is hopeful that he will turn a corner now that we know  we are likely treating PJP.          Review of Symptoms:   10-point ROS reviewed, & found negative w/ exceptions noted in the HPI.          Past Medical History:     Past Medical History:   Diagnosis Date     Arthritis     hip and toes     Chronic pain      CLL (chronic lymphocytic leukaemia)      Esophageal reflux      Malignant neoplasm (H)     Leukemia     Paroxysmal atrial fibrillation (H) 2/5/2020     PONV (postoperative nausea and vomiting)      Pure hypercholesterolemia        Past Surgical History:   Procedure Laterality Date     ARTHROPLASTY MINIMALLY INVASIVE HIP  5/10/2013    Procedure: ARTHROPLASTY MINIMALLY INVASIVE HIP;  Left Two Incision Total Hip Arthroplasty ;  Surgeon: Desmond Alberts MD;  Location: [facility] OR     ARTHROPLASTY MINIMALLY INVASIVE HIP  2/27/2014    Procedure: ARTHROPLASTY MINIMALLY INVASIVE HIP;  Right Total Hip Arthroplasty Minimally Invasive Two Incision  *Latex Free Room;  Surgeon: Desmond Alberts MD;  Location: [facility] OR     BACK SURGERY      back fusion 1994     COLONOSCOPY      2007     COLONOSCOPY N/A 8/15/2017    Procedure: COLONOSCOPY;  colonoscopy;  Surgeon: Chun Mcguire MD;  Location:  GI     GI SURGERY      4 hernia repairs in childhood     HERNIA REPAIR      4 since age 4     IR PAE  3/5/2020     Z NONSPECIFIC PROCEDURE  1964 &'95    (R) inguinal herniorrhapy     Z NONSPECIFIC PROCEDURE  1949    (L) inguinal herniorrhaphy     ZZ NONSPECIFIC PROCEDURE  1994    L4-5  L5 S1 fusion - spondylolisthesis            Allergies:     Allergies   Allergen Reactions     Blood Transfusion Related (Informational Only) Other (See Comments)     Patient has a history of a clinically significant antibody against RBC antigens.  A delay in compatible RBCs may occur.      Morphine Itching and Rash     Dilaudid [Hydromorphone] Itching             Outpatient Medications:     No current facility-administered medications on file prior to encounter.  acyclovir (ZOVIRAX) 800 MG tablet,  Take 1 tablet (800 mg) by mouth 2 times daily  atorvastatin (LIPITOR) 20 MG tablet, Take 1 tablet (20 mg) by mouth daily  cyanocobalamin (VITAMIN B-12) 100 MCG tablet, Take 1 tablet (100 mcg) by mouth daily  famotidine (PEPCID) 40 MG tablet, Take 1 tablet (40 mg) by mouth nightly as needed for heartburn  folic acid (FOLVITE) 1 MG tablet, Take 1 tablet (1 mg) by mouth daily  ibrutinib (IMBRUVICA) 420 MG tablet, Take 1 tablet (420 mg) by mouth daily  levothyroxine (SYNTHROID/LEVOTHROID) 50 MCG tablet, Take 1 tablet (50 mcg) by mouth daily  omeprazole (PRILOSEC) 10 MG DR capsule, TAKE 1 CAPSULE BY MOUTH EVERY DAY 30 TO 60 MINUTES BEFORE A MEAL  predniSONE (DELTASONE) 20 MG tablet, Take 3 tablets (60 mg) by mouth daily  vitamin D3 (CHOLECALCIFEROL) 50 mcg (2000 units) tablet, Take 1 tablet by mouth daily  zolpidem (AMBIEN) 5 MG tablet, Take 1 tablet (5 mg) by mouth nightly as needed for sleep              Family History:     Family History   Problem Relation Age of Onset     Heart Disease Father      Cerebrovascular Disease Father          OF STROKE      Cancer Father         melonoma     Melanoma Father      Hyperlipidemia Father      Thyroid Disease Father      Cancer Mother         Lung cancer     Other Cancer Mother         lung; heavy smoker     Cerebrovascular Disease Paternal Uncle         Aneurism     Breast Cancer Maternal Aunt          from it     Cancer Paternal Uncle      Cancer Paternal Aunt      Skin Cancer No family hx of      Glaucoma No family hx of      Macular Degeneration No family hx of                Social History:     Social History     Tobacco Use     Smoking status: Never Smoker     Smokeless tobacco: Never Used   Vaping Use     Vaping Use: Never used   Substance Use Topics     Alcohol use: Yes     Alcohol/week: 0.0 standard drinks     Comment: moderate, social     Drug use: No             Physical Exam:   /73 (BP Location: Left arm)   Pulse 111   Temp 98.8  F (37.1  C)  "(Oral)   Resp 20   Ht 1.676 m (5' 6\")   Wt 68 kg (149 lb 14.6 oz)   SpO2 95%   BMI 24.20 kg/m      Intake/Output Summary (Last 24 hours) at 6/2/2022 1245  Last data filed at 6/2/2022 1000  Gross per 24 hour   Intake 50 ml   Output 2640 ml   Net -2590 ml        General: elderly male in no acute distress  HENT: external ears without visible abnormalities, no rhinorrhea, no epistaxis  Lungs: bronchial breath sounds in L upper, on 30L / 100% HFNC, no accessory muscle use  Heart: regular tachycardia, no murmurs  Abdomen: soft, non-distended, bowel tones present  Extremities: no bilateral pitting edema, no clubbing or cyanosis  Skin: no visible rashes, no mottling  Neurologic: moving all 4 extremities spontaneously, awake and alert  Psych: appropriate insight and good judgment          Data:   Labs (all laboratory studies reviewed by me): notable labs in HPI above.    Imaging and other diagnostic testing (all imaging studies reviewed by me)    Chest xray (6/1/22): increased opacities in RM lung field, improved aeration in left lower lung field    CT chest (5/23/22): patchy GGOs, mostly subpleural    Transthoracic echocardiogram (4/26/22): EF 55-60%, normal with possible Grade I diastolic dysfunction      "

## 2022-06-02 NOTE — PROGRESS NOTES
[facility]  Transplant Infectious Disease Progress Note      Patient:  Loco Wood, Date of birth 1945, Medical record number [id number]  Date of Visit:  06/02/2022         Assessment and Recommendations:   Recommendations:  - I would be in favor of adding fluconazole (which would make ibrutinib more bioavailable) or amphotericin to his antimicrobial regimen, since echinocandins have weak activity against yeast forms of endemic fungi (they have potent activity against mycelial forms), while awaiting cocci ag.   - CMV PCR result should be available btwn 2-3 pm today, to determine if he needs GCV in place of ACV.   - Continue IV bactrim, reassessing dose daily based on any changes to his renal function.  - Continue Levaquin 250 mg daily, as atypical coverage while we are working him up for a concurrent infection in the setting of rising WBC.  - Continue micafungin 150 mg IV   - Continue ACV as viral prophylaxis.   - Await pending Karius assay, blood cultures, cocci antigen, CMV DNA PCR, EBV PCR.  - If he has a clinical decompensation, consider adding back zosyn, since he had some clinical worsening the day after zosyn was stopped, and nausea has not particularly improved.     Transplant ID will continue to follow.    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 Received pred courses in 12/2021 from derm, 1/2022 for lung symptoms in [place], in 3/2022 for anemia, then most recently for 3 weeks & 6 weeks for AIHA.   Infectious Disease issues include:  - 5/27/2022 Pneumocystis sputum PCR +, in the setting of a progressive pulmonary process. Elevated Fungitell & LDH are consistent with Pneumocystis. Had 1 dose of neb pent on 5/26/2022 (may have started to kill some pneumocystis organisms). IV bactrim started 5/27/2022. DDx includes co-infection with recent steroid use, but testing has been negative to date including fungal antibody panel and Aspergillus  galactomannan antigen. Since he started ibrutinib 5/25/2022, and there were some LFT issues over the winter, he has fungal coverage with micafungin 150 mg IV daily as opposed to an azole at this point in time. Travel to [place] over the last winter does make him at risk for cocci, this last winter was particularly windy, but cocci fungal antibody returned negative despite several months' worth of symtpoms. Cocci antigen pending. Karius testing drawn 6/1/2022. He has high supplemental oxygen needs. Pulmonary following.   - Hep B core ab & S ab+ 5/26/2022, with negative hep B viral load.  - Extensive travel hx. Quantiferon sent 5/26/2022. Fungal antibodies negative.  - QTc interval: 427 msec on 6/2/2022 EKG  - Bacterial prophylaxis: zosyn through to 5/31/2022.  - Pneumocystis prophylaxis: pent 5/26/2022, started on bactrim 5/27/2022.  - Viral serostatus & prophylaxis: HSV1+, HSV2+, CMV+; on acyclovir prophy. CMV PCR pending. EBV PCR pending.   - Fungal prophylaxis: Fungitell > 500; on micafungin since 5/27/2022.  - Immunization status: he has had 3 covid vaccinations.   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 Given IV IG x 1 on 5/25/2022 at 30 g.   - Isolation status: Good hand hygiene.    Bree Garcia MD   Pager [phone]         Interval History:   Since Loco was last seen by infectious disease on 6/1/2022, he feels about the same as yesterday. Nausea continues. He remains at 15 liters of supplemental oxygen via HFNC, and saturations are just barely hitting 90%. He desaturates with activity. White blood cell count is again increasing. He still looks miserable. He is alert and oriented. Pulmonary consult team saw him yesterday, and for now he does not desire elective intubation for bronchoscopy.     Review of Systems:  CONSTITUTIONAL:  Temperature max 99.8F.  EYES: negative for icterus or acute vision changes  ENT:  negative for acute hearing loss, tinnitus  RESPIRATORY:  He has dyspnea both  at rest & with activity, despite supplemental oxygen.  CARDIOVASCULAR:  no palpitations  GASTROINTESTINAL: nausea continues.   GENITOURINARY:  negative for dysuria or hematuria.   HEME:  No easy bruising or bleeding  INTEGUMENT:  negative for rash or pruritus  NEURO:  Negative for headache or tremor.          Current Medications & Allergies:       acyclovir  800 mg Oral BID     allopurinol  300 mg Oral Daily     atorvastatin  20 mg Oral Daily     cyanocobalamin  100 mcg Oral Daily     folic acid  1 mg Oral Daily     heparin ANTICOAGULANT  5,000 Units Subcutaneous Q12H     ibrutinib  420 mg Oral Daily     levofloxacin  250 mg Oral Daily     levothyroxine  50 mcg Oral Daily     methylPREDNISolone  40 mg Intravenous Daily     micafungin  150 mg Intravenous Q24H     pantoprazole  20 mg Oral Daily     polyethylene glycol  17 g Oral Daily     psyllium  1 packet Oral BID     senna-docusate  1 tablet Oral BID    Or     senna-docusate  2 tablet Oral BID     sodium chloride (PF)  3 mL Intracatheter Q8H     sodium chloride (PF)  3 mL Intracatheter Q8H     sulfamethoxazole-trimethoprim  360 mg Intravenous Q8H     vitamin D3  50 mcg Oral Daily       Infusions/Drips:         Allergies   Allergen Reactions     Blood Transfusion Related (Informational Only) Other (See Comments)     Patient has a history of a clinically significant antibody against RBC antigens.  A delay in compatible RBCs may occur.      Morphine Itching and Rash     Dilaudid [Hydromorphone] Itching            Physical Exam:     Patient Vitals for the past 24 hrs:   BP Temp Temp src Pulse Resp SpO2 Weight   06/02/22 1121 125/73 98.8  F (37.1  C) Oral 111 20 95 % --   06/02/22 0741 127/69 98.6  F (37  C) Oral 98 22 98 % --   06/02/22 0601 -- -- -- 99 -- 96 % 68 kg (149 lb 14.6 oz)   06/02/22 0420 124/74 98.3  F (36.8  C) Oral 94 20 94 % --   06/02/22 0310 122/76 98.2  F (36.8  C) Oral 102 20 98 % --   06/01/22 2310 132/63 98.2  F (36.8  C) Oral 92 18 96 % --    06/01/22 1855 122/71 98.2  F (36.8  C) Oral 100 18 100 % --   06/01/22 1630 -- 98.8  F (37.1  C) Oral -- -- -- --   06/01/22 1500 116/74 -- Oral -- 18 -- --   06/01/22 1225 -- -- -- -- -- 98 % --     Ranges for vital signs:  Temp:  [98.2  F (36.8  C)-98.8  F (37.1  C)] 98.8  F (37.1  C)  Pulse:  [] 111  Resp:  [18-22] 20  BP: (116-132)/(63-76) 125/73  FiO2 (%):  [100 %] 100 %  SpO2:  [94 %-100 %] 95 %  Vitals:    05/31/22 0552 06/01/22 0551 06/02/22 0601   Weight: 70.8 kg (156 lb 1.6 oz) 69 kg (152 lb 3.2 oz) 68 kg (149 lb 14.6 oz)       Physical Examination:  GENERAL:  well-developed, well-nourished man, resting in bed.  HEAD:  Head is normocephalic, atraumatic   EYES:  Eyes have anicteric sclerae  ENT:  Oropharynx is dry without exudates or ulcers. Tongue is midline. Oxygen HFNC in place.   NECK:  Supple.   LUNGS:  Coarse resp to auscultation bilateral, anteriorly.   CARDIOVASCULAR:  Regular rate and rhythm with no murmur, but there are premature extras beats at times.   ABDOMEN:  Normal bowel sounds, soft, nontender  SKIN:  No acute rashes. PIVs in place without any surrounding erythema or exudate.  NEUROLOGIC:  Grossly nonfocal. Active x4 extremities         Laboratory Data:     Inflammatory Markers    Recent Labs   Lab Test 06/02/22  0451 04/12/22  1701 12/21/17  0951 10/04/17  2250 05/15/17  1001 04/11/16  1136   SED  --   --   --  1  --   --    .0*  --   --   --   --  <0.2  Reference Values:   Low Risk:           <1.0 mg/L   Average Risk:       1.0-3.0 mg/L   High Risk:          >3.0 mg/L   Acute Inflammation: >8.0 mg/L     PSA  --  6.52*   < >  --    < >  --     < > = values in this interval not displayed.       Immune Globulin Studies     Recent Labs   Lab Test 05/24/22  1651 06/12/19  0806 05/31/19  1021 04/30/19  1219 12/21/18  1103 11/07/18  0844 04/29/15  1012 09/19/14  1227   * 394* 409* 429* 443* 520*   < > 729   IGM  --   --   --   --   --   --   --  28*   IGA  --   --   --   --    --   --   --  72    < > = values in this interval not displayed.       Metabolic Studies       Recent Labs   Lab Test 06/02/22 0451 06/01/22  0437 05/27/22  0532 05/26/22  2219 05/24/22  1651 05/23/22  1802   * 134   < >  --    < >  --    POTASSIUM 4.0 4.1   < >  --    < >  --    CHLORIDE 94 100   < >  --    < >  --    CO2 27 24   < >  --    < >  --    ANIONGAP 8 10   < >  --    < >  --    BUN 20 15   < >  --    < >  --    CR 1.59* 1.38*   < >  --    < >  --    GFRESTIMATED 45* 53*   < >  --    < >  --    GLC 91 107*   < >  --    < >  --    DAVID 8.5  8.5 8.7  8.7   < >  --    < >  --    PHOS 3.4 3.0   < >  --   --   --    MAG 2.0 2.1   < >  --   --   --    LACT 1.3 1.3   < >  --    < >  --    PCAL  --   --   --  0.06*  --   --    FGTL  --   --   --   --   --  >500    < > = values in this interval not displayed.       Hepatic Studies    Recent Labs   Lab Test 06/02/22 0451 06/01/22 0437 05/26/22  0549 05/25/22  0616 04/12/22  1701 03/08/22  1244   BILITOTAL 0.9 0.8   < > 1.7*   < >  --    DBIL  --   --   --  0.4*   < >  --    ALKPHOS 68 58   < >  --    < >  --    PROTTOTAL 5.8* 5.3*   < >  --    < >  --    ALBUMIN 2.5* 2.4*   < >  --    < >  --    AST 19 16   < >  --    < >  --    60905  --   --   --   --   --  31   ALT 22 21   < >  --    < >  --    97106  --   --   --   --   --  17   * 299*   < > 300*   < >  --     < > = values in this interval not displayed.       Pancreatitis testing    Recent Labs   Lab Test 04/05/21  1137 01/04/21  1743   LIPASE  --  127   TRIG 96  --        Gout Labs      Recent Labs   Lab Test 06/02/22  0451 06/01/22  0437 05/31/22  0436 05/30/22  0549 05/29/22  0542   URIC 2.5* 2.0* 1.8* 2.1* 2.5*       Hematology Studies   Recent Labs   Lab Test 06/02/22  0451 06/01/22  0437 05/31/22  0438 05/30/22  0549 04/26/22  0928 04/12/22  1701 11/10/21  1314 09/20/21  0952   WBC 28.9* 22.2* 19.0* 16.9*   < > 8.2   < > 9.3   ANEU 15.6* 11.8* 6.3 7.3   < >  --    < >  --    ANEUTAUTO   --   --   --   --   --  3.2  --  4.8   ALYM 13.0* 10.2* 12.5* 9.5*   < >  --    < >  --    ALYMPAUTO  --   --   --   --   --  4.6  --  3.7   ANY 0.3 0.2 0.0 0.2   < >  --    < >  --    AMONOAUTO  --   --   --   --   --  0.3  --  0.6   AEOS 0.0 0.0 0.2 0.0   < >  --    < >  --    AEOSAUTO  --   --   --   --   --  0.1  --  0.2   ABSBASO  --   --   --   --   --  0.0  --  0.0   HGB 9.5* 8.7* 8.4* 8.2*   < > 8.4*   < > 11.9*   HCT 28.8* 26.8* 26.3* 25.5*   < > 26.3*   < > 35.7*    189 183 169   < > 103*   < > 108*    < > = values in this interval not displayed.       Clotting Studies    Recent Labs   Lab Test 12/01/20  1425 03/05/20  0710   INR 1.15* 1.16*   PTT 28 29       Iron Testing    Recent Labs   Lab Test 06/02/22  0451 05/25/22  0616 05/24/22  1651 07/02/21  0845 05/27/21  1145   IRON  --   --  36  --  106   FEB  --   --  271  --  356   IRONSAT  --   --  13*  --  30   NURY  --   --  239  --   --    *   < > 112*   < > 89   FOLIC  --   --  38.1  --   --    B12  --   --  800  --   --    HAPT  --   --  200*   < >  --    RETP 6.2*   < > 7.6*   < >  --    RETICABSCT 0.166*   < > 0.211*   < >  --     < > = values in this interval not displayed.       Thyroid Studies     Recent Labs   Lab Test 05/23/22  1724 11/10/21  1314 07/17/20  0932 01/10/20  0825 12/10/19  0845   TSH 1.94 4.62* 3.75 7.10* 12.30*   T4  --  0.92  --  1.07 0.95       Urine Studies     Recent Labs   Lab Test 06/02/22  0952 05/26/22  2240 05/23/22  1906 05/19/22  1355 01/04/21  1744   URINEPH 6.5 5.5 6.5 7.0 5.5   NITRITE Negative Negative Negative Negative Negative   LEUKEST Negative Negative Negative Negative Negative   WBCU 2 2 1 0-5 1       Microbiology:  Fungal testing  Recent Labs   Lab Test 05/27/22  1808 05/27/22  1356 05/27/22  0916 05/23/22  1802   FGTL  --   --   --  >500   FGTLI  --   --   --  Positive*   PJRDFA Detected*  --   --   --    ASPGAI  --  0.03  --   --    ASPGAA  --  Negative  --   --    COFUNG  --   --  <1:2  --     ASPA  --   --  <1:8  --    HISTOMYCF  --   --  <1:8  --    HISTOYEACF  --   --  <1:8  --    FUNBL  --   --  0.3  --        Last Culture results   P. jirovecii By PCR   Date Value Ref Range Status   05/27/2022 Detected (A)  Final     Comment:     DETECTED - P. jirovecii DNA detected in this specimen.   Results should be used to aid in the diagnosis of PCP   pneumonia and must be interpreted in the context of host   risk factors, clinical presentation, and radiographic   imaging.    This test was developed and its performance characteristics   determined by [facility]. It has not been cleared or   approved by the US Food and Drug Administration. This test   was performed in a CLIA certified laboratory and is   intended for clinical purposes.  Performed by [facility],  [address] [place] [id number] [phone]  [email or web address], Alexa Cardozo MD, Lab. Director     Rapid Strep A Screen   Date Value Ref Range Status   10/22/2010   Final    NEGATIVE: No Group A streptococcal antigen detected by immunoassay, await   culture report.     Culture   Date Value Ref Range Status   06/02/2022 Culture negative, monitoring continues  Preliminary   05/27/2022 2+ Normal aracely  Final   05/27/2022 No Growth  Final   05/27/2022 No Growth  Final   05/26/2022 No Growth  Final   05/26/2022 No Growth  Final   05/26/2022   Final    >10 Squamous epithelial cells/low power field indicates oral contamination. Please recollect.   05/23/2022 No Growth  Final   05/23/2022 No Growth  Final     Culture Micro   Date Value Ref Range Status   03/02/2014 No growth  Final   10/22/2010 No Beta Streptococcus isolated  Final   04/21/2010   Final    No Salmonella, Shigella, Campylobacter or E coli 0157 isolated.   04/18/2010 No growth  Final   03/16/2009 No Beta Streptococcus isolated  Final   01/13/2007 No growth  Final   04/06/2004 No Beta Streptococcus isolated  Final           Quantiferon testing   Recent Labs   Lab Test 06/02/22  6908  06/01/22  0437 05/26/22  2219 05/26/22  1123   TBRES  --   --   --  Negative   LYMPH 45 46   < >  --     < > = values in this interval not displayed.       Virology:  Coronavirus-19 testing    Recent Labs   Lab Test 05/31/22  0241 05/23/22  1946 06/23/20  0843   [id number]  --   --  Negative   [id number]  --   --  Not Applicable   [id number] Negative Negative  --        Respiratory virus (non-coronavirus-19) testing    Recent Labs   Lab Test 05/24/22  1916 05/23/22 1946   IFLUA Not Detected  --    INFZA  --  Negative   FLUAH1 Not Detected  --    TW1030 Not Detected  --    FLUAH3 Not Detected  --    IFLUB Not Detected  --    INFZB  --  Negative   PIV1 Not Detected  --    PIV2 Not Detected  --    PIV3 Not Detected  --    PIV4 Not Detected  --    IRSV  --  Negative   RSVA Not Detected  --    RSVB Not Detected  --    HMPV Not Detected  --    ADENOV Not Detected  --    CORONA Not Detected  --        Hepatitis B Testing     Recent Labs   Lab Test 05/26/22  1123   AUSAB 288.89   HBCAB Reactive*   HEPBANG Nonreactive       Viral Testing   Recent Labs   Lab Test 05/26/22  1123   H1IGG Positive.  IgG antibody to HSV-1 detected.*   H2IGG Positive.  IgG antibody to HSV-2 detected.*       Imaging:  Recent Results (from the past 48 hour(s))   XR Chest Port 1 View    Narrative    Exam: XR CHEST PORT 1 VIEW, 6/1/2022 4:05 AM    Indication: bilateral infiltrates interval evaluation    Comparison: Chest x-ray 5/26/2022, CT 5/27/2022    Findings:   Heart size is not enlarged. Trace effusions. No pneumothorax.  Increased mid to upper lung predominant airspace and interstitial  opacities.      Impression    Impression:   Trace effusions and increasing apical predominant opacities, related  to worsening edema/infection.    I have personally reviewed the examination and initial interpretation  and I agree with the findings.    HARRY WILKERSON MD         SYSTEM ID:  [id number]

## 2022-06-02 NOTE — PROGRESS NOTES
Hematology / Oncology  Daily Progress Note   Date of Service: 06/02/2022  Patient: Loco Wood  MRN: [id number]  Admission Date: 5/23/2022  Hospital Day # 10  Cancer Diagnosis: CLL  Primary Outpatient Oncologist: To establish with Dr Monahan   Current Treatment Plan: Ibrutinib 420 mg daily (C1 = 5/2019)     Subjective & Interval History:    Nursing notes reviewed. Afebrile overnight. Ongoing desaturation on 15L with increased work of breathing. Switch to HFNC with improvement in oxygenation and comfort.     Physical Exam:    Temp:  [98.2  F (36.8  C)-98.8  F (37.1  C)] 98.8  F (37.1  C)  Pulse:  [] 111  Resp:  [18-22] 20  BP: (116-132)/(63-76) 125/73  FiO2 (%):  [100 %] 100 %  SpO2:  [94 %-100 %] 95 % on 5L NC    General: laying in bed, no acute distress   HEENT: sclera anicteric, EOMI, MMM  Neck: supple, normal ROM  CV: Regular rate and rhythm  Resp: Breathing comfortably on HFNC  MSK: warm and well-perfused, normal tone  Skin: no rashes on limited exam, no jaundice  Neuro: Awake, alert, moves all extremities equally, no focal deficits    Labs & Studies: I personally reviewed the following studies:  ROUTINE LABS:  CMP  Recent Labs   Lab 06/02/22  0451 06/01/22  0437 05/31/22  0436 05/30/22  0549   * 134 136 135   POTASSIUM 4.0 4.1 3.9 3.8   CHLORIDE 94 100 102 103   CO2 27 24 24 23   ANIONGAP 8 10 10 9   BUN 20 15 13 14   CR 1.59* 1.38* 1.34* 1.42*   GFRESTIMATED 45* 53* 55* 52*   DAVID 8.5  8.5 8.7  8.7 8.4*  8.4* 7.9*  7.9*   PROTTOTAL 5.8* 5.3* 5.3* 5.0*   ALBUMIN 2.5* 2.4* 2.2* 2.3*   BILITOTAL 0.9 0.8 0.7 0.6   ALKPHOS 68 58 50 46   AST 19 16 17 20   ALT 22 21 21 22   * 299* 309* 303*   URIC 2.5* 2.0* 1.8* 2.1*     Recent Labs   Lab 06/02/22  0451 06/01/22  0437 05/31/22  0436 05/30/22  0549 05/29/22  0542   GLC 91 107* 85 147* 94   MAG 2.0 2.1 2.3 2.1 2.4*   PHOS 3.4 3.0 3.0 2.5 3.5     CBC  Recent Labs   Lab 06/02/22  0451 06/01/22  0437 05/31/22  0438 05/30/22  0549   WBC  28.9* 22.2* 19.0* 16.9*   RBC 2.69* 2.50* 2.42* 2.32*   HGB 9.5* 8.7* 8.4* 8.2*   HCT 28.8* 26.8* 26.3* 25.5*   * 107* 109* 110*   MCH 35.3* 34.8* 34.7* 35.3*   MCHC 33.0 32.5 31.9 32.2   RDW 16.3* 16.2* 15.9* 16.1*    189 183 169       Medications list for reference:  Current Facility-Administered Medications   Medication     acetaminophen (TYLENOL) tablet 650 mg     acyclovir (ZOVIRAX) tablet 800 mg     allopurinol (ZYLOPRIM) tablet 300 mg     atorvastatin (LIPITOR) tablet 20 mg     bisacodyl (DULCOLAX) Suppository 10 mg     cyanocobalamin (VITAMIN B-12) tablet 100 mcg     famotidine (PEPCID) tablet 20 mg     folic acid (FOLVITE) tablet 1 mg     heparin ANTICOAGULANT injection 5,000 Units     ibrutinib (IMBRUVICA) capsule 420 mg     ipratropium - albuterol 0.5 mg/2.5 mg/3 mL (DUONEB) neb solution 3 mL     levofloxacin (LEVAQUIN) tablet 250 mg     levothyroxine (SYNTHROID/LEVOTHROID) tablet 50 mcg     lidocaine (LMX4) cream     lidocaine 1 % 0.1-1 mL     lidocaine 1 % 0.1-1 mL     melatonin tablet 3 mg     methylPREDNISolone sodium succinate (solu-MEDROL) injection 40 mg     metoclopramide (REGLAN) tablet 5 mg     micafungin (MYCAMINE) 150 mg in sodium chloride 0.9 % 100 mL intermittent infusion     OLANZapine zydis (zyPREXA) ODT tab 5 mg     ondansetron (ZOFRAN) injection 8 mg     pantoprazole (PROTONIX) EC tablet 20 mg     polyethylene glycol (MIRALAX) Packet 17 g     prochlorperazine (COMPAZINE) injection 5 mg    Or     prochlorperazine (COMPAZINE) tablet 5 mg    Or     prochlorperazine (COMPAZINE) suppository 12.5 mg     psyllium (METAMUCIL/KONSYL) Packet 1 packet     senna-docusate (SENOKOT-S/PERICOLACE) 8.6-50 MG per tablet 1 tablet    Or     senna-docusate (SENOKOT-S/PERICOLACE) 8.6-50 MG per tablet 2 tablet     sodium chloride (PF) 0.9% PF flush 3 mL     sodium chloride (PF) 0.9% PF flush 3 mL     sodium chloride (PF) 0.9% PF flush 3 mL     sodium chloride (PF) 0.9% PF flush 3 mL      sulfamethoxazole-trimethoprim (BACTRIM) 360 mg in D5W 572.5 mL intermittent infusion     Vitamin D3 (CHOLECALCIFEROL) tablet 50 mcg     zolpidem (AMBIEN) tablet 5 mg     Assessment & Plan:  Loco Wood is a 77 year old man with a history of CKD stage III, HLD, paroxysmal afib, and 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 Admitted for AHRF due to community acquired pneumonia and worsening anemia (10/8) .    #CLL  #Hypogammaglobulinemia    He was diagnosed 2/12/2007 with CLL, Lyles stage 0, followed clinically initially. At diagnosis WBC 17.8, ALC 11.2, hemoglobin 15.2, platelets 188. Flow consistent with CLL.  No opportunistic infections, IgG in the normal range. Due to rising lymphocyte count he was initiated on Ibrutinib monotherapy 5/14/19. He was tolerating treatment except for one brief episode of atrial fibrillation after starting ibrutinib, no recurrence since then. Due to cost considerations, he decided on his own to wean down on ibrutinib and not take it daily as prescribed in April 2022. At that time he was only taking ibrutinib 2-3 times per week and still has a large supply at home. On admission WBC stable with ALC of 13.1. IgG levels 312, s/p IVIG given inpatient 5/24.   Today, ALC is 10.2 is stable on daily dosing of ibrutinib. Unclear if there is any evidence of lymphadenopathy in the abdomen or splenomegaly.   - To restage, recommend CT CAP w/ contrast, if renal function permits  - Peripheral flow (5/24) showed 50% CD5+ lambda monotypic B cells. NGS pending.   - Continue Ibrutinib 420 mg daily, tolerating without side effects. Encourage daily dosing and not skipping dosing.   - Plan to establish care with Dr Monahan 6/24    #Autoimmune Hemolytic Anemia 2/2 CLL   In [place] (1/2022), he developed Alexadner' positive hemolytic anemia, which responded well to a brief course of prednisone. He then followed up with Dr. Burns 4/2022 and was anemic with hgb 8.4 and elevated bilirubin of 4.2 on 4/12.   Grant resumed 60 mg daily prednisone at that time for recurrence of AIHA. Dr. Burns also previously discussed adding rituximab with patient, but patient deferred at that time. On admission hgb 8.8 (down from ~10.6) with retic count of 7.6%. LDH unchanged. Tbili improved to 1.7. YEN was weakly positive. Vit B12 and folate wnl. No evidence of iron deficiency. Of note, patient was on a steroid taper per outpatient team decreasing dose 10 mg every week. Had worsening AIHA in the setting of recent steroid taper. Previously on methylprednisolone 1 mg/kg on admission (5/24-5/27) but decreased to IV Methylprednisolone 40 mg in the setting of infection. Ongoing anemia despite initiation of HD steroids this admission, discussed case with heme staff Dr. Espinosa who recommended initiation of Rituxan for refractory AIHA.    - Plan for weekly Rituxan 375 mg/m2 x4 weeks; Rituxan not started in the setting of acute respiratory decompensation. Will reassess when to administer. May consider IVIG 500 mg/kg if anemia worsens and infections not improving.   - Continue daily Folic acid supplementation.   - Monitor CBC w/ diff, LDH, CMP and retic daily   - Transfused for hgb <7 and plts <10k (outpatient therapy plan entered)     #TLS ppx  At risk for tumor lysis syndrome with initiation of Rituxan as above.   - Encourage PO fluids. Cont on Allopurinol 300 mg daily  - Check uric acid, LDH, Mg, Phos, retic count daily    # CAP   # Worsening AHRF 5/26   Presented with dyspnea and new hypoxia requiring 2-3L NC. CT PE negative for clot but did show bilateral multifocal pulmonary infiltrates and nodules or nodular infiltrates may be pneumonia. BCx, RVP, COVID negative. Of note, patient was not on PJP ppx prior to admission.   - Consider bronchoscopy vs percutaneous biopsy of lung findings to r/o CLL involvement vs infection if no improvement in respiratory status after adequate treatment of Pneumocystis   - Antibiotics per primary team.  Micafungin and Bactrim. 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    Recommendations:  - Ongoing discussion if we need to hold Ibrutinib in the setting of active infection. Ibrutinib can be myelosuppressive but holding may allow his CLL to rapidly progress.   - Will hold Rituxan given acute decline; will reassess timing once patient has stabilized. May consider IVIG 500 mg/kg if anemia worsens and infection not improving.    - Cont Methylpred 40 mg IV daily  - Check LDH, Tbili and retic count daily     Patient was seen and plan of care was discussed with attending physician .     Asiya Huitron PA-C  Hematology/Oncology  Pager #[phone]

## 2022-06-03 NOTE — PLAN OF CARE
"/68 (BP Location: Left arm, Cuff Size: Adult Regular)   Pulse 107   Temp 98.4  F (36.9  C) (Oral)   Resp 16   Ht 1.676 m (5' 6\")   Wt 68.2 kg (150 lb 5.7 oz)   SpO2 96%   BMI 24.27 kg/m      Neuro: A&Ox4.   Cardiac: SR. VSS.       Respiratory: Sating >90% on % 30L.   GI/: Adequate urine output. No BM.  Diet/appetite: Tolerating Regular diet.  Activity: SBA up to chair.  Pain: Denied pain.   Skin: No new deficits noted.  LDA's: R) PIV x1     C/o continuous nausea, Zofran x1, Companzine x1 given.  ABG ordered d/t high lactic acid.   Plan: Continue with POC. Notify primary team with changes.     Jameson Prado RN on 6/3/2022 at 6:21 PM    "

## 2022-06-03 NOTE — PROGRESS NOTES
CLINICAL NUTRITION SERVICES - BRIEF NOTE  For last full RD assessment, see note dated 6/2/22.      NEW FINDINGS   -Checked on pt for supplement preferences; will adjust ONS order accordingly. Provided with another supplement menu because he lost his other one. Likes the Ensure shake.     INTERVENTIONS  Implementation  Medical food supplement therapy as scheduled + PRN if requested    Monitoring/Evaluation  Will continue to monitor and evaluate per protocol.    Armond Wahl RDN, LD, CNSC  [address] RD pager: [phone] Phone: *[phone]

## 2022-06-03 NOTE — PLAN OF CARE
Neuro: A&Ox4.   Cardiac: SR. VSS.   Respiratory: Sating >90% on 100% 30L.   GI/: Adequate urine output. No BM.  Diet/appetite: Tolerating Regualr diet.  Activity: SBA up to chair and in halls.  Pain: Denied pain.   Skin: No new deficits noted.  LDA's: R)PIV x1    C/o continuous nausea, Zofran x1, Companzine x1 given.   Plan: Continue with POC. Notify primary team with changes.

## 2022-06-03 NOTE — CODE/RAPID RESPONSE
Rapid Response Team Note    Assessment   In assessment a rapid response was called on oLco Wood due to lactic acidosis. This presentation is likely due to severe sepsis from AHRF due to PJP pneumonia, and possible superimposed bacteria/fungal pneumonia.     Plan   -  Repeat lactic acid in 4 hours  -  Check procal, repeat MRSA nares swab  -  Continue Zosyn, Levoquin, micafungin, fluconazole, bactrim  -  The Internal Medicine primary team was able to be reached and they are in agreement with the above plan.  -  Disposition: The patient will remain on the current unit. We will continue to monitor this patient closely.  -  Reassessment and plan follow-up will be performed by the primary team      Lindsay Khan PA-C  [facility] RRT [facility] Job Code Contact #[phone]  [facility] Paging/Directory    Hospital Course   Brief Summary of events leading to rapid response:   RRT was called for lactic 4.1, on repeat.     Loco Wood is a 76 yo M with PMHx of CLL, auto-immune hemolytic anemia (on prednisone taper), CKD III, who presented with SOB, chills and pleuritic chest pain with infiltrates on CT chest with progressive acute hypoxic respiratory failure, found to have PJP pneumonia, and concern for superimposed bacterial or fungal pneumonia, and possible ARDS.      Patient reports feeling slightly better and less SOB over the last several days. States his chest tightness has slowly improve. Denies any F/C, abdominal pain, N/V/D, dysuria.     Admission Diagnosis:   Shortness of breath [R06.02]  Pneumonia [J18.9]  Hypoxia [R09.02]  Pneumonia of both lungs due to infectious organism, unspecified part of lung [J18.9]  Chest pain, unspecified type [R07.9]    Physical Exam   Temp: 98.4  F (36.9  C) Temp  Min: 97.7  F (36.5  C)  Max: 98.7  F (37.1  C)  Resp: 16 Resp  Min: 16  Max: 24  SpO2: 96 % SpO2  Min: 93 %  Max: 96 %  Pulse: 107 Pulse  Min: 91  Max: 107    No data recorded  BP: 138/68 Systolic (24hrs), Av ,  "Min:108 , Max:138   Diastolic (24hrs), Av, Min:66, Max:79     I/Os: I/O last 3 completed shifts:  In: 1353 [P.O.:480; I.V.:873]  Out: 2255 [Urine:2255]     Exam:   General: chronically ill appearing  Mental Status: AAOx4.  CV: Tachycardic rate, regular rhythm   Resp: Lungs CTA. Mildly labored breathing on HFNC.   Abd: Soft. Non-tender.   Extremities: No LE edema.     Significant Results and Procedures   Lactic Acid:   Recent Labs   Lab Test 22  1546 22  0852 22  0451   LACT 4.1* 2.4* 2.2*     CBC:   Recent Labs   Lab Test 22  0423 22  0451 22  0437   WBC 29.4* 28.9* 22.2*   HGB 8.7* 9.5* 8.7*   HCT 27.0* 28.8* 26.8*    215 189        Sepsis Evaluation   The patient is known to have an infection.  Loco Wood meets SIRS criteria AND has a lactate >2 or other evidence of acute organ damage.  These vital signs, lab and physical exam findings constitute a diagnosis of SEVERE SEPSIS.    Sepsis Time-Zero (time severe sepsis diagnosis confirmed): 2022 as this was the time when Lactate resulted, and the level was > 2.0     Anti-infectives (From now, onward)    Start     Dose/Rate Route Frequency Ordered Stop    22 1200  sulfamethoxazole-trimethoprim (BACTRIM) 400-80 MG per tablet 3 tablet         3 tablet Oral DAILY WITH LUNCH 22 1252      22 2000  sulfamethoxazole-trimethoprim (BACTRIM DS) 800-160 MG per tablet 2 tablet         2 tablet Oral 2 TIMES DAILY 22 1250      22 0630  piperacillin-tazobactam (ZOSYN) 4.5 g vial to attach to  mL bag        Note to Pharmacy: For [facility], [facility] and [facility]: For Zosyn-naive patients, use the \"Zosyn initial dose + extended infusion\" order panel.    4.5 g  over 30 Minutes Intravenous EVERY 6 HOURS 22 0615      22 1800  fluconazole (DIFLUCAN) tablet 400 mg         400 mg Oral DAILY 22 1758      22 0800  levofloxacin (LEVAQUIN) tablet 250 mg         250 mg Oral DAILY 22 1401   "    05/27/22 0800  micafungin (MYCAMINE) 150 mg in sodium chloride 0.9 % 100 mL intermittent infusion         150 mg  100 mL/hr over 60 Minutes Intravenous EVERY 24 HOURS 05/27/22 0743      05/24/22 0800  acyclovir (ZOVIRAX) tablet 800 mg         800 mg Oral 2 TIMES DAILY 05/24/22 0258          Current antibiotic coverage is appropriate for source of infection.    3 Hour Severe Sepsis Bundle Completion:  1. Initial Lactic Acid result shown above. Repeat lactic acid is not indicated.   2. Blood Cultures before Antibiotics: Yes  3. Broad Spectrum Antibiotics Administered: yes  4. Fluids: Fluid bolus not indicated due to: pulmonary edema worsening respiratory status. \

## 2022-06-03 NOTE — PROGRESS NOTES
Rapid Response Team Note    Assessment   A rapid response was called on Loco Wood due to SIRS/Sepsis trigger and lactic acidosis. Patient admitted with dyspnea and hypoxia currently on 100% FIO2 and followed by ID. Work-up continues for rising WBC. Zosyn stopped with plan to restart for clinical decompensation. He currently endorses no new symptoms, but Fi02 increased from 90% to 100% to maintain sats. WBC with slight increase 29.4 from 28.9 yesterday. He has remained afebrile.     Latest Reference Range & Units 22 05:49 22 04:38 22 04:37 22 04:51 22 04:23   WBC 4.0 - 11.0 10e3/uL 16.9 (H) 19.0 (H) 22.2 (H) 28.9 (H) 29.4 (H) (P)       Plan   - Recheck lactic acid in 3 hours  - VBG  - blood cultures  - Restart zosyn    -  The Internal Medicine primary team was able to be reached and they are in agreement with the above plan.  -  Disposition: The patient will remain on the current unit. We will continue to monitor this patient closely.  -  Reassessment and plan follow-up will be performed by the primary team      LASHAY Marmolejo CNP  [facility] RRT [facility] Job Code Contact #[phone]  [facility] Paging/Directory    Hospital Course   Brief Summary of events leading to rapid response:   BPA for sepsis eval due to leukocytosis and RR of 24    Admission Diagnosis:   Shortness of breath [R06.02]  Pneumonia [J18.9]  Hypoxia [R09.02]  Pneumonia of both lungs due to infectious organism, unspecified part of lung [J18.9]  Chest pain, unspecified type [R07.9]    Physical Exam   Temp: 98  F (36.7  C) Temp  Min: 97.7  F (36.5  C)  Max: 98.8  F (37.1  C)  Resp: 22 Resp  Min: 18  Max: 24  SpO2: 93 % SpO2  Min: 93 %  Max: 98 %  Pulse: 98 Pulse  Min: 91  Max: 111    No data recorded  BP: 108/79 Systolic (24hrs), Av , Min:108 , Max:128   Diastolic (24hrs), Av, Min:66, Max:79     I/Os: I/O last 3 completed shifts:  In: 175 [P.O.:175]  Out:  [Urine:1850]     Exam:   General:  "chronically ill appearing  Mental Status: AAOx4.  Resp: No increase work of breathing.     Significant Results and Procedures   Lactic Acid:   Recent Labs   Lab Test 06/03/22  0451 06/02/22  0451 06/01/22  0437   LACT 2.2* 1.3 1.3     CBC:   Recent Labs   Lab Test 06/02/22  0451 06/01/22  0437 05/31/22  0438   WBC 28.9* 22.2* 19.0*   HGB 9.5* 8.7* 8.4*   HCT 28.8* 26.8* 26.3*    189 183        Sepsis Evaluation   The patient is known to have an infection.    Anti-infectives (From now, onward)    Start     Dose/Rate Route Frequency Ordered Stop    06/03/22 0630  piperacillin-tazobactam (ZOSYN) 4.5 g vial to attach to  mL bag        Note to Pharmacy: For [facility], [facility] and [facility]: For Zosyn-naive patients, use the \"Zosyn initial dose + extended infusion\" order panel.    4.5 g  over 30 Minutes Intravenous EVERY 6 HOURS 06/03/22 0615      06/02/22 1800  fluconazole (DIFLUCAN) tablet 400 mg         400 mg Oral DAILY 06/02/22 1758      06/02/22 0800  levofloxacin (LEVAQUIN) tablet 250 mg         250 mg Oral DAILY 06/01/22 1401      05/31/22 2000  sulfamethoxazole-trimethoprim (BACTRIM) 360 mg in D5W 572.5 mL intermittent infusion         360 mg Intravenous EVERY 8 HOURS 05/31/22 1328      05/27/22 0800  micafungin (MYCAMINE) 150 mg in sodium chloride 0.9 % 100 mL intermittent infusion         150 mg  100 mL/hr over 60 Minutes Intravenous EVERY 24 HOURS 05/27/22 0743      05/24/22 0800  acyclovir (ZOVIRAX) tablet 800 mg         800 mg Oral 2 TIMES DAILY 05/24/22 0258           Current antibiotic coverage is appropriate for source of infection.     "

## 2022-06-03 NOTE — PROVIDER NOTIFICATION
Notified by tele pt in first-degree AV block since 0800 today. Paged Isabella 4 and updated team. EKG ordered. Will continue with POC and notify primary team with any changes.

## 2022-06-03 NOTE — PROGRESS NOTES
[facility]  Transplant Infectious Disease Progress Note      Patient:  Loco Wood, Date of birth 1945, Medical record number [id number]  Date of Visit:  06/03/2022         Assessment and Recommendations:   Recommendations:  - Continue fluconazole (which would make ibrutinib more bioavailable) while awaiting results of cocci ag and Karius, since echinocandins have weak activity against yeast forms of endemic fungi (they have potent activity against mycelial forms).  - Continue IV bactrim, reassessing dose daily based on any changes to his renal function.  - Continue zosyn & Levaquin, as coverage while we are working him up for a concurrent infection in the setting of rising WBC.  - Continue micafungin 150 mg IV   - Continue ACV as viral prophylaxis.   - Await pending Karius assay, cocci antigen.    Transplant ID will continue to follow this patient after the weekend, unless called. On Saturday & Sunday 6/4/2022 & 6/5/2022, Dr. Trey Mcknight (staff, pager [phone]) is available for calls. On Monday 6/6/2022, Dr. Marybeth Anderson (staff, pager [phone]) will assume the service.    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 Received pred courses in 12/2021 from derm, 1/2022 for lung symptoms in [place], in 3/2022 for anemia, then most recently for 3 weeks & 6 weeks for AIHA.   Infectious Disease issues include:  - 5/27/2022 Pneumocystis sputum PCR +, in the setting of a progressive pulmonary process. Elevated Fungitell & LDH are consistent with Pneumocystis. Had 1 dose of neb pent on 5/26/2022 (may have started to kill some pneumocystis organisms). IV bactrim started 5/27/2022. DDx includes co-infection with recent steroid use, but testing has been negative to date including fungal antibody panel and Aspergillus galactomannan antigen. Since he started ibrutinib 5/25/2022, and there were some LFT issues over the winter, he has fungal coverage with  micafungin 150 mg IV daily as opposed to an azole at this point in time. Travel to [place] over the last winter does make him at risk for cocci, this last winter was particularly windy, but cocci fungal antibody returned negative despite several months' worth of symtpoms. Cocci antigen pending. Karius testing drawn 6/1/2022. He has high supplemental oxygen needs. Pulmonary following. Recs above.   - Hep B core ab & S ab+ 5/26/2022, with negative hep B viral load.  - Extensive travel hx. Quantiferon sent 5/26/2022. Fungal antibodies negative.  - QTc interval: 427 msec on 6/2/2022 EKG  - Bacterial prophylaxis: zosyn through to 5/31/2022.  - Pneumocystis prophylaxis: pent 5/26/2022, started on bactrim 5/27/2022.  - Viral serostatus & prophylaxis: HSV1+, HSV2+, CMV+; on acyclovir prophy. CMV PCR pending. EBV PCR pending.   - Fungal prophylaxis: Fungitell > 500; on micafungin since 5/27/2022.  - Immunization status: he has had 3 covid vaccinations.   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 Given IV IG x 1 on 5/25/2022 at 30 g.   - Isolation status: Good hand hygiene.    Bree aGrcia MD   Pager [phone]         Interval History:   Since Loco was last seen by infectious disease on 6/2/2022, he feels about the same as yesterday. Nausea continues. He is at 30 liters of supplemental oxygen via HFNC, and saturations are just barely hitting 90%. He desaturates with activity. Sepsis protocol triggered and lactic acid resulted at 2.2. Timed lactic recheck, ABG and zosyn restarted today. White blood cell count is again increasing, but of course a large % are lymphs with underlying CLL condition. With the addition of fluconazole yesterday and zosyn today, WBC appears to have plateau'd. He is alert and oriented.     Review of Systems:  CONSTITUTIONAL:  Fever curve improved to normal.   EYES: negative for icterus or acute vision changes  ENT:  negative for acute hearing loss, tinnitus  RESPIRATORY:  He has  dyspnea both at rest & with activity, despite supplemental oxygen.  CARDIOVASCULAR:  Sinus rhythm; intermittent ST. Frequent PACs  GASTROINTESTINAL: nausea continues. No major BM since enema.   GENITOURINARY:  negative for dysuria or hematuria.   HEME:  No easy bruising or bleeding  INTEGUMENT:  negative for rash or pruritus  NEURO:  Negative for headache or tremor.          Current Medications & Allergies:       acyclovir  800 mg Oral BID     allopurinol  300 mg Oral Daily     atorvastatin  20 mg Oral Daily     cyanocobalamin  100 mcg Oral Daily     fluconazole  400 mg Oral Daily     folic acid  1 mg Oral Daily     heparin ANTICOAGULANT  5,000 Units Subcutaneous Q12H     ibrutinib  420 mg Oral Daily     levofloxacin  250 mg Oral Daily     levothyroxine  50 mcg Oral Daily     methylPREDNISolone  40 mg Intravenous Daily     micafungin  150 mg Intravenous Q24H     pantoprazole  20 mg Oral Daily     piperacillin-tazobactam  4.5 g Intravenous Q6H     polyethylene glycol  17 g Oral Daily     psyllium  1 packet Oral BID     senna-docusate  1 tablet Oral BID    Or     senna-docusate  2 tablet Oral BID     sodium chloride (PF)  3 mL Intracatheter Q8H     sodium chloride (PF)  3 mL Intracatheter Q8H     sulfamethoxazole-trimethoprim  360 mg Intravenous Q8H     vitamin D3  50 mcg Oral Daily       Infusions/Drips:         Allergies   Allergen Reactions     Blood Transfusion Related (Informational Only) Other (See Comments)     Patient has a history of a clinically significant antibody against RBC antigens.  A delay in compatible RBCs may occur.      Morphine Itching and Rash     Dilaudid [Hydromorphone] Itching            Physical Exam:     Patient Vitals for the past 24 hrs:   BP Temp Temp src Pulse Resp SpO2 Weight   06/03/22 0730 121/75 98.6  F (37  C) Oral -- 18 -- --   06/03/22 0548 -- -- -- -- -- -- 68.2 kg (150 lb 5.7 oz)   06/03/22 0512 108/79 98  F (36.7  C) Oral 98 22 93 % --   06/03/22 0345 127/72 97.7  F (36.5  C)  Oral 94 24 95 % --   06/02/22 2336 115/66 98.1  F (36.7  C) Oral 91 18 95 % --   06/02/22 1934 128/70 98.4  F (36.9  C) Oral 98 20 94 % --   06/02/22 1538 -- -- -- -- -- 97 % --   06/02/22 1523 127/70 98.1  F (36.7  C) Oral 106 20 96 % --   06/02/22 1121 125/73 98.8  F (37.1  C) Oral 111 20 95 % --     Ranges for vital signs:  Temp:  [97.7  F (36.5  C)-98.8  F (37.1  C)] 98.6  F (37  C)  Pulse:  [] 98  Resp:  [18-24] 18  BP: (108-128)/(66-79) 121/75  FiO2 (%):  [90 %-100 %] 100 %  SpO2:  [93 %-97 %] 93 %  Vitals:    06/01/22 0551 06/02/22 0601 06/03/22 0548   Weight: 69 kg (152 lb 3.2 oz) 68 kg (149 lb 14.6 oz) 68.2 kg (150 lb 5.7 oz)       Physical Examination:  GENERAL:  well-developed, well-nourished man, resting in bed.  HEAD:  Head is normocephalic, atraumatic   EYES:  Eyes have anicteric sclerae  ENT:  Oropharynx is dry without exudates or ulcers. Tongue is midline. Oxygen HFNC in place.   NECK:  Supple.   LUNGS:  Coarse resp to auscultation bilateral, anteriorly.   CARDIOVASCULAR:  Regular rate and rhythm with no murmur, but there are premature extras beats at times.   ABDOMEN:  Normal bowel sounds, soft, nontender  SKIN:  No acute rashes. PIVs in place without any surrounding erythema or exudate.  NEUROLOGIC:  Grossly nonfocal. Active x4 extremities         Laboratory Data:     Inflammatory Markers    Recent Labs   Lab Test 06/02/22  0451 04/12/22  1701 12/21/17  0951 10/04/17  2250 05/15/17  1001 04/11/16  1136   SED  --   --   --  1  --   --    .0*  --   --   --   --  <0.2  Reference Values:   Low Risk:           <1.0 mg/L   Average Risk:       1.0-3.0 mg/L   High Risk:          >3.0 mg/L   Acute Inflammation: >8.0 mg/L     PSA  --  6.52*   < >  --    < >  --     < > = values in this interval not displayed.       Immune Globulin Studies     Recent Labs   Lab Test 05/24/22  1651 06/12/19  0806 05/31/19  1021 04/30/19  1219 12/21/18  1103 11/07/18  0844 04/29/15  1012 09/19/14  1227   *  394* 409* 429* 443* 520*   < > 729   IGM  --   --   --   --   --   --   --  28*   IGA  --   --   --   --   --   --   --  72    < > = values in this interval not displayed.       Metabolic Studies       Recent Labs   Lab Test 06/03/22 0852 06/03/22 0451 06/03/22 0423 06/02/22 0451 05/27/22  0532 05/26/22  2219 05/24/22  1651 05/23/22  1802   NA  --   --  130* 129*   < >  --    < >  --    POTASSIUM  --   --  4.3 4.0   < >  --    < >  --    CHLORIDE  --   --  95 94   < >  --    < >  --    CO2  --   --  24 27   < >  --    < >  --    ANIONGAP  --   --  11 8   < >  --    < >  --    BUN  --   --  24 20   < >  --    < >  --    CR  --   --  1.43* 1.59*   < >  --    < >  --    GFRESTIMATED  --   --  51* 45*   < >  --    < >  --    GLC  --   --  115* 91   < >  --    < >  --    DAVID  --   --  8.9  8.9 8.5  8.5   < >  --    < >  --    PHOS  --   --  3.5 3.4   < >  --   --   --    MAG  --   --  2.1 2.0   < >  --   --   --    LACT 2.4* 2.2*  --  1.3   < >  --    < >  --    PCAL  --   --   --   --   --  0.06*  --   --    FGTL  --   --   --   --   --   --   --  >500    < > = values in this interval not displayed.       Hepatic Studies    Recent Labs   Lab Test 06/03/22 0423 06/02/22 0451 05/26/22  0549 05/25/22  0616 04/12/22  1701 03/08/22  1244   BILITOTAL 0.9 0.9   < > 1.7*   < >  --    DBIL  --   --   --  0.4*   < >  --    ALKPHOS 82 68   < >  --    < >  --    PROTTOTAL 5.6* 5.8*   < >  --    < >  --    ALBUMIN 2.3* 2.5*   < >  --    < >  --    AST 23 19   < >  --    < >  --    05725  --   --   --   --   --  31   ALT 25 22   < >  --    < >  --    71755  --   --   --   --   --  17   * 309*   < > 300*   < >  --     < > = values in this interval not displayed.       Pancreatitis testing    Recent Labs   Lab Test 04/05/21  1137 01/04/21  1743   LIPASE  --  127   TRIG 96  --        Gout Labs      Recent Labs   Lab Test 06/03/22  0423 06/02/22  0451 06/01/22  0437 05/31/22  0436 05/30/22  0549   URIC 2.4* 2.5* 2.0* 1.8*  2.1*       Hematology Studies   Recent Labs   Lab Test 06/03/22  0423 06/02/22  0451 06/01/22  0437 05/31/22  0438 04/26/22  0928 04/12/22  1701 11/10/21  1314 09/20/21  0952   WBC 29.4* 28.9* 22.2* 19.0*   < > 8.2   < > 9.3   ANEU 11.8* 15.6* 11.8* 6.3   < >  --    < >  --    ANEUTAUTO  --   --   --   --   --  3.2  --  4.8   ALYM 17.6* 13.0* 10.2* 12.5*   < >  --    < >  --    ALYMPAUTO  --   --   --   --   --  4.6  --  3.7   ANY 0.0 0.3 0.2 0.0   < >  --    < >  --    AMONOAUTO  --   --   --   --   --  0.3  --  0.6   AEOS 0.0 0.0 0.0 0.2   < >  --    < >  --    AEOSAUTO  --   --   --   --   --  0.1  --  0.2   ABSBASO  --   --   --   --   --  0.0  --  0.0   HGB 8.7* 9.5* 8.7* 8.4*   < > 8.4*   < > 11.9*   HCT 27.0* 28.8* 26.8* 26.3*   < > 26.3*   < > 35.7*    215 189 183   < > 103*   < > 108*    < > = values in this interval not displayed.       Clotting Studies    Recent Labs   Lab Test 12/01/20  1425 03/05/20  0710   INR 1.15* 1.16*   PTT 28 29       Iron Testing    Recent Labs   Lab Test 06/03/22  0423 05/25/22  0616 05/24/22  1651 07/02/21  0845 05/27/21  1145   IRON  --   --  36  --  106   FEB  --   --  271  --  356   IRONSAT  --   --  13*  --  30   NURY  --   --  239  --   --    *   < > 112*   < > 89   FOLIC  --   --  38.1  --   --    B12  --   --  800  --   --    HAPT  --   --  200*   < >  --    RETP 6.4*   < > 7.6*   < >  --    RETICABSCT 0.159*   < > 0.211*   < >  --     < > = values in this interval not displayed.       Thyroid Studies     Recent Labs   Lab Test 05/23/22  1724 11/10/21  1314 07/17/20  0932 01/10/20  0825 12/10/19  0845   TSH 1.94 4.62* 3.75 7.10* 12.30*   T4  --  0.92  --  1.07 0.95       Urine Studies     Recent Labs   Lab Test 06/02/22  0952 05/26/22  2240 05/23/22  1906 05/19/22  1355 01/04/21  1744   URINEPH 6.5 5.5 6.5 7.0 5.5   NITRITE Negative Negative Negative Negative Negative   LEUKEST Negative Negative Negative Negative Negative   WBCU 2 2 1 0-5 1        Microbiology:  Fungal testing  Recent Labs   Lab Test 05/27/22  1808 05/27/22  1356 05/27/22  0916 05/23/22  1802   FGTL  --   --   --  >500   FGTLI  --   --   --  Positive*   PJRDFA Detected*  --   --   --    ASPGAI  --  0.03  --   --    ASPGAA  --  Negative  --   --    COFUNG  --   --  <1:2  --    ASPA  --   --  <1:8  --    HISTOMYCF  --   --  <1:8  --    HISTOYEACF  --   --  <1:8  --    FUNBL  --   --  0.3  --        Last Culture results   P. jirovecii By PCR   Date Value Ref Range Status   05/27/2022 Detected (A)  Final     Comment:     DETECTED - P. jirovecii DNA detected in this specimen.   Results should be used to aid in the diagnosis of PCP   pneumonia and must be interpreted in the context of host   risk factors, clinical presentation, and radiographic   imaging.    This test was developed and its performance characteristics   determined by [facility]. It has not been cleared or   approved by the US Food and Drug Administration. This test   was performed in a CLIA certified laboratory and is   intended for clinical purposes.  Performed by [facility],  [address] [place] [address] [phone]  [email or web address], Alexa Cardozo MD, Lab. Director     Rapid Strep A Screen   Date Value Ref Range Status   10/22/2010   Final    NEGATIVE: No Group A streptococcal antigen detected by immunoassay, await   culture report.     Culture   Date Value Ref Range Status   06/02/2022 Culture negative, monitoring continues  Preliminary   05/27/2022 2+ Normal aracely  Final   05/27/2022 No Growth  Final   05/27/2022 No Growth  Final   05/26/2022 No Growth  Final   05/26/2022 No Growth  Final   05/26/2022   Final    >10 Squamous epithelial cells/low power field indicates oral contamination. Please recollect.   05/23/2022 No Growth  Final   05/23/2022 No Growth  Final     Culture Micro   Date Value Ref Range Status   03/02/2014 No growth  Final   10/22/2010 No Beta Streptococcus isolated  Final   04/21/2010    Final    No Salmonella, Shigella, Campylobacter or E coli 0157 isolated.   04/18/2010 No growth  Final   03/16/2009 No Beta Streptococcus isolated  Final   01/13/2007 No growth  Final   04/06/2004 No Beta Streptococcus isolated  Final           Quantiferon testing   Recent Labs   Lab Test 06/03/22  0423 06/02/22  0451 05/26/22  2219 05/26/22  1123   TBRES  --   --   --  Negative   LYMPH 60 45   < >  --     < > = values in this interval not displayed.       Virology:  Coronavirus-19 testing    Recent Labs   Lab Test 05/31/22  0241 05/23/22  1946 06/23/20  0843   [id number]  --   --  Negative   [id number]  --   --  Not Applicable   [id number] Negative Negative  --        Respiratory virus (non-coronavirus-19) testing    Recent Labs   Lab Test 05/24/22  1916 05/23/22  1946   IFLUA Not Detected  --    INFZA  --  Negative   FLUAH1 Not Detected  --    MG2873 Not Detected  --    FLUAH3 Not Detected  --    IFLUB Not Detected  --    INFZB  --  Negative   PIV1 Not Detected  --    PIV2 Not Detected  --    PIV3 Not Detected  --    PIV4 Not Detected  --    IRSV  --  Negative   RSVA Not Detected  --    RSVB Not Detected  --    HMPV Not Detected  --    ADENOV Not Detected  --    CORONA Not Detected  --        Hepatitis B Testing     Recent Labs   Lab Test 05/26/22  1123   AUSAB 288.89   HBCAB Reactive*   HEPBANG Nonreactive       Viral Testing     Recent Labs   Lab Test 05/26/22  1123   H1IGG Positive.  IgG antibody to HSV-1 detected.*   H2IGG Positive.  IgG antibody to HSV-2 detected.*       Imaging:  No results found for this or any previous visit (from the past 48 hour(s)).

## 2022-06-03 NOTE — PROVIDER NOTIFICATION
06/03/22 0500   Call Information   Date of Call 06/03/22   Time of Call 0516   Name of person requesting the team Mickey ATKINSON   Title of person requesting team RN   RRT Arrival time 0519   Time RRT ended 0533   Reason for call   Type of RRT Adult   Primary reason for call Sepsis suspected   Sepsis Suspected Elevated Lactate level   Was patient transferred from the ED, ICU, or PACU within last 24 hours prior to RRT call? No   SBAR   Situation LA 2.2   Background History of CLL, auto-immune hemolytic anemia (on prednisone taper), and CKD3a presenting with several days of SOB, chills, pleuritic chest pain.   Notable History/Conditions Cardiac;Cancer   Assessment A+Ox4, VSS   Interventions No interventions   Adjustments to Recommend LA recheck @0900   Patient Outcome   Patient Outcome Stabilized on unit   RRT Team   Attending/Primary/Covering Physician Isabella 4   Date Attending Physician notified 06/03/22   Time Attending Physician notified 0516   Physician(s) Nidia Serrato NP   Lead RN Ugo ATKINSON   RT n/a   Post RRT Intervention Assessment   Post RRT Assessment Stable/Improved   Date Follow Up Done 06/03/22   Time Follow Up Done 0751

## 2022-06-03 NOTE — PROGRESS NOTES
[facility]    Medicine Progress Note - Medicine Service, MAROON TEAM 4    Date of Admission:  5/23/2022    Assessment & Plan   Loco Wood is a 77 year old male with history of CLL, auto-immune hemolytic anemia (on prednisone taper), and CKD3a admitted for several days of SOB, chills, pleuritic chest pain with infiltrates/consolidations on CT imaging. Initially treated for CAP (ceft + azithro) with improvement. On 5/27 patient transferred to [facility] for worsening respiratory status and fever, now improved and afebrile (45L HFNC -> 12-15L oxymizer). Continue broad coverage with Zosyn, Micafungin (B-D glucan > 500), and IV bactrim.  Heme/Onc following for auto-immune hemolytic anemia and CLL, steroids decreased in setting of infection.    Changes today:   - Add Zosyn  - Change from IV to PO Bactrim  - Follow ABG and lactic acid    # Acute hypoxic respiratory failure 2/2 possible Bacterial PNA vs Fungal PNA vs ARDS  # PJP pneumonia   # Severe sepsis  # Bilateral lower lobe bronchiectasis/consolidations  Admitted for several days of worsening fatigue/dyspnea, subjective chills, and pleuritic chest pain with 3-4L oxygen requirement (w/ elevated WBC) and CT w/ infiltrates and bilateral lower lobe consolidations concerning for infection. RVP negative. Patient initially treated with 5 day abx course starting 5/24 with azithromycin (d/c 5/26) + ceftriaxone for presumed CAP with clinical improvement. On 5/27, patient developed fever with worsening respiratory status (45 L high flow NC) and was transferred to [facility]. Patient improving with IVF 2.5L and broadened coverage with Zosyn, Micafungin (positive B-Glucan), and IV bactrim (for possible PJP) O2 req of 12-15L, with increase to HFNC on 6/1. CXR 5/31 with trace effusions and increasing apical opacities concerning for worsening edema/infection. PJP PCR 5/27 positive.  - Infectious Disease following   -Stopped Zosyn 5/31, restarted  6/3 given lactic acidosis and increasing leukocytosis   - Continue levofloxacin   - Continue bactrim (switch from IV to PO) and micafungin   - Continue fluconazole for improved endemic fungi coverage   - Await pending Karius assay, cocci antigenblood cultures  - IV lasix 40 mg once 6/1  - Supplemental O2; wean as able; incentive spirometry  - Pulmonology signed off, no bronchoscopy planned at present due to high oxygen needs and likely diagnosis of PJP from non-invasive testing    Labs:  RVP, COVID/Flu: Negative  MRSA swab: negative  Beta-D Glucan: Positive (> 500)  PJP PCR: positive   Following Sputum sample  Blood cultures: NG  Sputum: 2+ mixed aracely  Histo antigen: negative  Aspergillus Ag: negative   Blasto antigen: negative  Cryptococcus ag: negative  CMV PRC negative:   Fungal ab (blast, aspergillus, cocci, histo): negative  Quantiferon Gold: negative     # Nausea  Last AXR 5/27 without evidence of obstruction.  - Scheduled Zofran in AM   - PRN antiemetics (Zofran, compazine, reglan)    # CLL (dx 2/2007), stable  History of CLL (2007) managed on Ibrutinib (5/2019) which patient is not taking daily. WBC 24.9 on admission (up from baseline ~6-10) now down to ~21. Suspect secondary to recent infection with lower concern for conversion to leukemia. IVIG infusion (5/25). Following FISH and cytogenetics for disease prognostication and will continue to monitor with peripheral smear.   Continue daily ibrutinib to help control disease, increased bioavailability with addition of fluconazole 6/2. WBC and ALC uptrending.   - Hematology/Oncology consulted   -Following Flow cytometry, cytogenetics, IgH mutation, TP53 mutation   - Pulmonary signed off, no percutaneous pulmonary biopsy at present    - CT A/P to complete restaging when more stable   - CBC trend   - Heme/Onc outpatient follow up on discharge    Chronic/Resolved Problems:     # Lactic acidosis, resolved  Persistent lactic acidosis 3-4 despite antimicrobial  treatment and IVF boluses. Appears hemodynamically stable with no evidence of hypoperfusion. Discussed with hem/onc, can sometimes see in malignancies, but unlikely in reasonably controlled CLL. Hepatic function wnl, suggesting against clearance issue. Resolved with additional IVF 5/30. Increased 2.2-2.4 on 6/3.   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 B12, folate, iron panel normal with slightly elevated YEN levels. Labs concerning for marrow responsive anemia (elevated LDH, bilirubin, reticulocyte count). Will likely need to treat underlying CLL for long-term solution.    Hgb on admission 9.5 down from baseline (~10-12 past year) and now ~8. Currently hemodynamically stable and suspect recent drop in Hgb secondary to dilutional anemia in setting of 2.5L of IVF given (5/27). Methylpred decreased and rituximab held in setting of recent infection.   - Heme/Onc consulted    - Continue Methylprednisolone 40mg   - Hold Rituximab infusion   - Check uric acid, LDH, Mg, Phos, and retic count daily   - Hep B serologies (Core, Surface Ab +): Hep B ag negative, DNA quant negative   - Continue PTA Folate, B12   - Transfuse if Hgb < 7 (Type and Screen)    # CKD Stage 3A  Cr of 1.31 on admission up from baseline now up to 1.6 (5/27), now improved to 1.3-1.4. Suspect Cr elevation is pre-renal in setting of recent sepsis and will likely improve with volume resuscitation.    - Avoid nephrotoxic agents as able    # Degenerative disc disease  # Bilateral intermittent hand cramping, spasms suspicious for cervical impingement  Patient with history of degenerative disc disorder with concern for cervical impingement in setting of new bilateral intermittent hand cramping. Patient met with Dr. Melton his sports medicine  doctor (AM 5/24) over phone for follow up appointment to discuss results of his MRI. Will defer to outpatient management.     # Hx Herpes Zoster c/b post-herpetic neuralgia: PTA ppx Acyclovir while on prednisone  # Hypothyroid - TSH 1.94 on admission WNL. Continue PTA levothyroxine  # HLD- PTA atorvastatin  # GERD- PTA omeprazole + PRN famotidine (also on Pred)  # Insomnia- has PRN Ambien as OP  # Vitamin supplements- PTA B12, folate, D3        Diet: Combination Diet Regular Diet Adult  Snacks/Supplements Adult: Ensure Enlive; Between Meals  Snacks/Supplements Adult: Other; 8 PM - String cheese stick x2; Between Meals    DVT Prophylaxis: Heparin ppx  Piña Catheter: Not present  Central Lines: None  Cardiac Monitoring: ACTIVE order. Indication: hypoxia  Code Status: Full Code      Disposition Plan   Expected Discharge: 06/06/2022     Anticipated discharge location:  Awaiting care coordination huddle  Delays: None     The patient's care was discussed with the Attending Physician, Dr. Snell.    Everardo Lau MD  Medicine Service, [address]  Securely message with the Vocera Web Console (learn more here)  Text page via [facility] Paging/Directory   Please see signed in provider for up to date coverage information      Clinically Significant Risk Factors Present on Admission                    ______________________________________________________________________    Interval History   No acute events overnight. Still having some nausea. No fevers or chills. Remains on HFNC with stable requirement.    Data reviewed today: I reviewed all medications, new labs and imaging results over the last 24 hours.    Physical Exam   Vital Signs: Temp: 98.7  F (37.1  C) Temp src: Oral BP: 122/66 Pulse: 98   Resp: 16 SpO2: 93 % O2 Device: High Flow Nasal Cannula (HFNC) Oxygen Delivery: 30 LPM  Weight: 150 lbs 5.66 oz  General Appearance: Alert, pleasant, in some  distress  Respiratory: Breathing comfortably on HFNC at rest  Cardiovascular: RRR, normal S1/S2, no murmurs. 2s cap refill.  GI: Soft, non-tender, mildly distended (but patient reports it feels normal to him). Bowel sounds present. No guarding or rebound.  Skin: No rash or lesions noted. Warm, dry.  Other: Alert and oriented x3. Answering questions appropriately.    Data   Recent Labs   Lab 06/03/22  0423 06/02/22  0451 06/01/22  0437   WBC 29.4* 28.9* 22.2*   HGB 8.7* 9.5* 8.7*   * 107* 107*    215 189   * 129* 134   POTASSIUM 4.3 4.0 4.1   CHLORIDE 95 94 100   CO2 24 27 24   BUN 24 20 15   CR 1.43* 1.59* 1.38*   ANIONGAP 11 8 10   DAVID 8.9  8.9 8.5  8.5 8.7  8.7   * 91 107*   ALBUMIN 2.3* 2.5* 2.4*   PROTTOTAL 5.6* 5.8* 5.3*   BILITOTAL 0.9 0.9 0.8   ALKPHOS 82 68 58   ALT 25 22 21   AST 23 19 16

## 2022-06-03 NOTE — PLAN OF CARE
"/79 (BP Location: Left arm, Cuff Size: Adult Regular)   Pulse 98   Temp 98  F (36.7  C) (Oral)   Resp 22   Ht 1.676 m (5' 6\")   Wt 68.2 kg (150 lb 5.7 oz)   SpO2 93%   BMI 24.27 kg/m      Shift events: Sepsis protocol triggered and lactic acid resulted at 2.2. Code sepsis initiated. Remains clinically and vitally stable. Timed lactic recheck, ABG and zosyn ordered to restart. Continues on levofloxacin and micafungin for known infection.    Admission diagnoses: Pneumonia    Neuro: A&Ox4; no deficits noted. Afebrile.  CV: Sinus rhythm; intermittent ST. Frequent PACs noted. BP WDL.  Resp: Requiring 100%/30L HFNC keeping sats >90%. Occasional desaturations in AM at rest. Desaturating with activity this AM.  GI: Continues to have consistent nausea. No vomiting. PRN reglan x2, zofran, compazine. Small BM this shift.   : Voids spontaneously; adequate UOP.  Skin: No new deficits noted.  LDA: PIV x1.    Plan: Continue to follow POC and update team with changes.        "

## 2022-06-03 NOTE — PROGRESS NOTES
Hematology / Oncology  Daily Progress Note   Date of Service: 06/03/2022  Patient: Loco Wood  MRN: [id number]  Admission Date: 5/23/2022  Hospital Day # 11  Cancer Diagnosis: CLL  Primary Outpatient Oncologist: To establish with Dr Monahan   Current Treatment Plan: Ibrutinib 420 mg daily (C1 = 5/2019)     Subjective & Interval History:    Nursing notes reviewed. Afebrile overnight. Triggered code sepsis due to elevated lactate 2.2. Was restarted on Zosyn overnight due to increase oxygen requirements. On HFNC with FiO2 100%. Feels about the same as previous days    Physical Exam:    Temp:  [97.7  F (36.5  C)-98.8  F (37.1  C)] 98.6  F (37  C)  Pulse:  [] 98  Resp:  [18-24] 18  BP: (108-128)/(66-79) 121/75  FiO2 (%):  [90 %-100 %] 100 %  SpO2:  [93 %-97 %] 93 % on HFNC    General: laying in bed, no acute distress   HEENT: sclera anicteric, EOMI, MMM  Neck: supple, normal ROM  CV: Regular rate and rhythm  Resp: Breathing comfortably on HFNC, no use of accessory muscles  MSK: warm and well-perfused, normal tone  Skin: no rashes on limited exam, no jaundice  Neuro: Awake, alert, moves all extremities equally, no focal deficits    Labs & Studies: I personally reviewed the following studies:  ROUTINE LABS:  CMP  Recent Labs   Lab 06/03/22  0423 06/02/22  0451 06/01/22  0437 05/31/22  0436   * 129* 134 136   POTASSIUM 4.3 4.0 4.1 3.9   CHLORIDE 95 94 100 102   CO2 24 27 24 24   ANIONGAP 11 8 10 10   BUN 24 20 15 13   CR 1.43* 1.59* 1.38* 1.34*   GFRESTIMATED 51* 45* 53* 55*   DAVID 8.9  8.9 8.5  8.5 8.7  8.7 8.4*  8.4*   PROTTOTAL 5.6* 5.8* 5.3* 5.3*   ALBUMIN 2.3* 2.5* 2.4* 2.2*   BILITOTAL 0.9 0.9 0.8 0.7   ALKPHOS 82 68 58 50   AST 23 19 16 17   ALT 25 22 21 21   * 309* 299* 309*   URIC 2.4* 2.5* 2.0* 1.8*     Recent Labs   Lab 06/03/22  0423 06/02/22  0451 06/01/22  0437 05/31/22  0436 05/30/22  0549   * 91 107* 85 147*   MAG 2.1 2.0 2.1 2.3 2.1   PHOS 3.5 3.4 3.0 3.0 2.5      CBC  Recent Labs   Lab 06/03/22  0423 06/02/22  0451 06/01/22 0437 05/31/22  0438   WBC 29.4* 28.9* 22.2* 19.0*   RBC 2.51* 2.69* 2.50* 2.42*   HGB 8.7* 9.5* 8.7* 8.4*   HCT 27.0* 28.8* 26.8* 26.3*   * 107* 107* 109*   MCH 34.7* 35.3* 34.8* 34.7*   MCHC 32.2 33.0 32.5 31.9   RDW 16.1* 16.3* 16.2* 15.9*    215 189 183     Recent Labs   Lab 06/03/22 0423 06/02/22 0451 06/01/22 0437   ALYM 17.6* 13.0* 10.2*     Medications list for reference:  Current Facility-Administered Medications   Medication     acetaminophen (TYLENOL) tablet 650 mg     acyclovir (ZOVIRAX) tablet 800 mg     allopurinol (ZYLOPRIM) tablet 300 mg     atorvastatin (LIPITOR) tablet 20 mg     bisacodyl (DULCOLAX) Suppository 10 mg     cyanocobalamin (VITAMIN B-12) tablet 100 mcg     famotidine (PEPCID) tablet 20 mg     fluconazole (DIFLUCAN) tablet 400 mg     folic acid (FOLVITE) tablet 1 mg     heparin ANTICOAGULANT injection 5,000 Units     ibrutinib (IMBRUVICA) capsule 420 mg     ipratropium - albuterol 0.5 mg/2.5 mg/3 mL (DUONEB) neb solution 3 mL     levofloxacin (LEVAQUIN) tablet 250 mg     levothyroxine (SYNTHROID/LEVOTHROID) tablet 50 mcg     lidocaine (LMX4) cream     lidocaine 1 % 0.1-1 mL     lidocaine 1 % 0.1-1 mL     melatonin tablet 3 mg     methylPREDNISolone sodium succinate (solu-MEDROL) injection 40 mg     metoclopramide (REGLAN) tablet 5 mg     micafungin (MYCAMINE) 150 mg in sodium chloride 0.9 % 100 mL intermittent infusion     OLANZapine zydis (zyPREXA) ODT tab 5 mg     ondansetron (ZOFRAN) injection 8 mg     pantoprazole (PROTONIX) EC tablet 20 mg     piperacillin-tazobactam (ZOSYN) 4.5 g vial to attach to  mL bag     polyethylene glycol (MIRALAX) Packet 17 g     prochlorperazine (COMPAZINE) injection 5 mg    Or     prochlorperazine (COMPAZINE) tablet 5 mg    Or     prochlorperazine (COMPAZINE) suppository 12.5 mg     psyllium (METAMUCIL/KONSYL) Packet 1 packet     senna-docusate (SENOKOT-S/PERICOLACE)  8.6-50 MG per tablet 1 tablet    Or     senna-docusate (SENOKOT-S/PERICOLACE) 8.6-50 MG per tablet 2 tablet     sodium chloride (PF) 0.9% PF flush 3 mL     sodium chloride (PF) 0.9% PF flush 3 mL     sodium chloride (PF) 0.9% PF flush 3 mL     sodium chloride (PF) 0.9% PF flush 3 mL     sulfamethoxazole-trimethoprim (BACTRIM) 360 mg in D5W 572.5 mL intermittent infusion     Vitamin D3 (CHOLECALCIFEROL) tablet 50 mcg     zolpidem (AMBIEN) tablet 5 mg     Assessment & Plan:  Loco Wood is a 77 year old man with a history of CKD stage III, HLD, paroxysmal afib, and 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 Admitted for AHRF due to community acquired pneumonia and worsening anemia (10/8) .    #CLL  #Hypogammaglobulinemia    CLL remains stable on daily ibrutinib therapy. ALC 17.6 today which may be attributed to ongoing infectious process. Unclear if there is any evidence of lymphadenopathy in the abdomen or splenomegaly. Due to increased oxygen requirements, we have not been able to obtain repeat CT A/P for restaging of his CLL.  Peripheral flow (5/24) showed 50% CD5+ lambda monotypic B cells which is consistent with his previous CLL. IGHV was not mutated and no deletion 17p detected by FISH and no TP53 mutation seen on NGS. Will need Beta-2 microglobulin as well to predict prognosis. In review of his current clinical picture, will continue ibrutinib therapy as holding it may worsen CLL causing rapid progression.   - CT AP w/ contrast, if renal function permits and once medically stable  - Check Beta-2 microglobulin (ordered for you)  - Continue Ibrutinib 420 mg daily, tolerating without side effects. Encourage daily dosing and not skipping dosing.   - Plan to establish care with Dr Monahan 6/24    #Autoimmune Hemolytic Anemia 2/2 CLL   Had worsening AIHA in the setting of recent steroid taper. Previously on methylprednisolone 1 mg/kg on admission (5/24-5/27) but decreased to IV Methylprednisolone 40 mg daily  in the setting of infection. Ongoing anemia despite initiation of HD steroids this admission, discussed case with heme staff Dr. Espinosa who recommended initiation of Rituxan 375 mg/m2 x4 weeks for refractory AIHA but held off in the setting of acute respiratory decompensation. Will reassess when to administer. May consider IVIG 500 mg/kg if anemia worsens and infections not improving. Per discussion with ID, ok to continue steroids in light of pulmonary infection.   - Continue daily Folic acid supplementation.   - Monitor CBC w/ diff, LDH, CMP and retic daily   - Transfused for hgb <7 and plts <10k (outpatient therapy plan entered)   - Cont Methylprednisolone 40 mg IV daily    #TLS ppx  No indication to start rituxan so can hold off checking TLS labs. Would resume checking TLS labs once starting Rituxan.  - Encourage PO fluids. Cont on Allopurinol 300 mg daily  - Discontinue daily check of uric acid, Mg, Phos,     # CAP   # Worsening AHRF 5/26   Presented with dyspnea and new hypoxia requiring 2-3L NC. CT PE negative for clot but did show bilateral multifocal pulmonary infiltrates and nodules or nodular infiltrates which may represent pneumonia. BCx, RVP, COVID negative. Of note, patient was not on PJP ppx prior to admission. PJP PCR was positive and fluconazole was started. CMV not detected. With positive PJP PCR, would hold off on bronchoscopy at this point as pulmonary findings more likely due to PJP. Zosyn and levaquin were restarted in the setting of increasing WBC with concerns of concurrent bacterial infection.   - Consider bronchoscopy vs percutaneous biopsy of lung findings to r/o CLL involvement vs infection if no improvement in respiratory status after adequate treatment of Pneumocystis   - Antibiotics per primary team. Micafungin, Bactrim, Fluconazole, Zosyn and Levaquin on board.  - Further infectious work up given acute decline; agree with infectious disease recs      #ID PPx  - Pentamidine  nebulizer q 28 days; given 5/26/22 (therapy plan entered)   - Tiffanie requested outpatient for COVID ppx     #Hx of of shingles  Continue Acyclovir 800 mg BID as long as he is on prednisone to reduce risk of shingles.    Recommendations:  - Cont Ibrutinib as holding may allow his CLL to rapidly progress.   - Will hold Rituxan given acute decline; will reassess timing once patient has stabilized. May consider IVIG 500 mg/kg if anemia worsens and infection not improving.    - Cont Methylpred 40 mg IV daily  - Check Beta-2 microglobulin (ordered for you)  - Check CBC w/ diff, LDH, Tbili and retic count daily     Patient was seen and plan of care was discussed with attending physician Dr. Alan Macdonald.     Keyur Raza MD   Heme/Onc/Transplant Fellow  Pgr #[id number]

## 2022-06-03 NOTE — PROGRESS NOTES
[facility]    Medicine Progress Note - Medicine Service, MAROON TEAM 4    Date of Admission:  5/23/2022    Assessment & Plan   Loco Wood is a 77 year old male with history of CLL, auto-immune hemolytic anemia (on prednisone taper), and CKD3a admitted for several days of SOB, chills, pleuritic chest pain with infiltrates/consolidations on CT imaging. Initially treated for CAP (ceft + azithro) with improvement. On 5/27 patient transferred to [facility] [place] for worsening respiratory status and fever, now improved and afebrile (45L HFNC -> 12-15L oxymizer). Continue broad coverage with Zosyn, Micafungin (B-D glucan > 500), and IV bactrim.  Heme/Onc following for auto-immune hemolytic anemia and CLL, steroids decreased in setting of infection.    Changes today:   - PJP PCR positive  - Add fluconazole for improved endemic fungi coverage  - Pulmonology signed off, no bronchoscopy planned at present due to high oxygen needs and likely diagnosis of PJP from non-invasive testing    # Acute hypoxic respiratory failure 2/2 possible Bacterial PNA vs Fungal PNA vs ARDS  # PJP pneumonia   # Severe sepsis  # Bilateral lower lobe bronchiectasis/consolidations  Admitted for several days of worsening fatigue/dyspnea, subjective chills, and pleuritic chest pain with 3-4L oxygen requirement (w/ elevated WBC) and CT w/ infiltrates and bilateral lower lobe consolidations concerning for infection. RVP negative. Patient initially treated with 5 day abx course starting 5/24 with azithromycin (d/c 5/26) + ceftriaxone for presumed CAP with clinical improvement. On 5/27, patient developed fever with worsening respiratory status (45 L high flow NC) and was transferred to [facility] [place]. Patient improving with IVF 2.5L and broadened coverage with Zosyn, Micafungin (positive B-Glucan), and IV bactrim (for possible PJP) O2 req of 12-15L, with increase to HFNC on 6/1. CXR 5/31 with trace effusions and increasing  apical opacities concerning for worsening edema/infection. PJP PCR 5/27 positive.  - Infectious Disease following   -Stop Zosyn 5/31, start levofloxain 6/1   -Continue bactrim and micafungin   - Add fluconazole for improved endemic fungi coverage   - Await pending Karius assay, blood cultures, cocci antigen, CMV DNA PCR, EBV PCR  - IV lasix 40 mg once 6/1  - Supplemental O2; wean as able; incentive spirometry  - Pulmonology signed off, no bronchoscopy planned at present due to high oxygen needs and likely diagnosis of PJP from non-invasive testing    Labs:  RVP, COVID/Flu: Negative  MRSA swab: negative  Beta-D Glucan: Positive (> 500)  PJP PCR: positive   Following Sputum sample  Blood cultures: NG  Sputum: 2+ mixed aracely  Histo antigen: negative  Aspergillus Ag: negative   Blasto antigen: negative  Fungal ab (blast, aspergillus, cocci, histo): negative  Quantiferon Gold: negative     # Nausea  Last AXR 5/27 without evidence of obstruction.   - PRN antiemetics (Zofran, compazine, reglan)    # CLL (dx 2/2007), stable  History of CLL (2007) managed on Ibrutinib (5/2019) which patient is not taking daily. WBC 24.9 on admission (up from baseline ~6-10) now down to ~21. Suspect secondary to recent infection with lower concern for conversion to leukemia. IVIG infusion (5/25). Following FISH and cytogenetics for disease prognostication and will continue to monitor with peripheral smear.   Continue daily ibrutinib to help control disease.   - Hematology/Oncology consulted   -Following Flow cytometry, cytogenetics, IgH mutation, TP53 mutation   - Pulmonary signed off, no percutaneous pulmonary biopsy at present    - CT A/P to complete restaging when more stable   - CBC trend   - Heme/Onc outpatient follow up on discharge    Chronic/Resolved Problems:     # Lactic acidosis, resolved  Persistent lactic acidosis 3-4 despite antimicrobial treatment and IVF boluses. Appears hemodynamically stable with no evidence of  hypoperfusion. Discussed with hem/onc, can sometimes see in malignancies, but unlikely in reasonably controlled CLL. Hepatic function wnl, suggesting against clearance issue. Resolved with additional IVF 5/30.     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 B12, folate, iron panel normal with slightly elevated YEN levels. Labs concerning for marrow responsive anemia (elevated LDH, bilirubin, reticulocyte count). Will likely need to treat underlying CLL for long-term solution.    Hgb on admission 9.5 down from baseline (~10-12 past year) and now ~8. Currently hemodynamically stable and suspect recent drop in Hgb secondary to dilutional anemia in setting of 2.5L of IVF given (5/27). Methylpred decreased and rituximab held in setting of recent infection.   - Heme/Onc consulted    - Continue Methylprednisolone 40mg   - Hold Rituximab infusion   - Check uric acid, LDH, Mg, Phos, and retic count daily   - Hep B serologies (Core, Surface Ab +): Hep B ag negative, DNA quant negative   - Continue PTA Folate, B12   - Transfuse if Hgb < 7 (Type and Screen)    # CKD Stage 3A  Cr of 1.31 on admission up from baseline now up to 1.6 (5/27), now improved to 1.3-1.4. Suspect Cr elevation is pre-renal in setting of recent sepsis and will likely improve with volume resuscitation.    - Avoid nephrotoxic agents as able    # Degenerative disc disease  # Bilateral intermittent hand cramping, spasms suspicious for cervical impingement  Patient with history of degenerative disc disorder with concern for cervical impingement in setting of new bilateral intermittent hand cramping. Patient met with Dr. Melton his sports medicine doctor (AM 5/24) over phone for follow up appointment to discuss results of his MRI. Will defer to outpatient management.     # Hx  Herpes Zoster c/b post-herpetic neuralgia: PTA ppx Acyclovir while on prednisone  # Hypothyroid - TSH 1.94 on admission WNL. Continue PTA levothyroxine  # HLD- PTA atorvastatin  # GERD- PTA omeprazole + PRN famotidine (also on Pred)  # Insomnia- has PRN Ambien as OP  # Vitamin supplements- PTA B12, folate, D3        Diet: Combination Diet Regular Diet Adult  Snacks/Supplements Adult: Ensure Enlive; Between Meals  Snacks/Supplements Adult: Other; 8 PM - String cheese stick x2; Between Meals    DVT Prophylaxis: Heparin ppx  Piña Catheter: Not present  Central Lines: None  Cardiac Monitoring: ACTIVE order. Indication: hypoxia  Code Status: Full Code      Disposition Plan   Expected Discharge: 06/05/2022     Anticipated discharge location:  Awaiting care coordination huddle  Delays: None     The patient's care was discussed with the Attending Physician, Dr. Snell.    Everardo Lau MD  Medicine Service, [address]  Securely message with the Vocera Web Console (learn more here)  Text page via [facility] Paging/Directory   Please see signed in provider for up to date coverage information      Clinically Significant Risk Factors Present on Admission                    ______________________________________________________________________    Interval History   No acute events overnight. Feels about the same as yesterday. Tolerating HFNC without issues, feels pressure is not as bad as before. No fever, chills, abdominal pain.    Data reviewed today: I reviewed all medications, new labs and imaging results over the last 24 hours.    Physical Exam   Vital Signs: Temp: 98.4  F (36.9  C) Temp src: Oral BP: 128/70 Pulse: 98   Resp: 20 SpO2: 94 % O2 Device: High Flow Nasal Cannula (HFNC) Oxygen Delivery: 30 LPM  Weight: 149 lbs 14.6 oz  General Appearance: Alert, pleasant, in some distress  Respiratory: Breathing comfortably on HFNC at rest  Cardiovascular: RRR, normal  S1/S2, no murmurs  GI: Soft, non-tender, mildly distended (but patient reports it feels normal to him). Bowel sounds present. No guarding or rebound.  Skin: No rash or lesions noted.  Other: Alert and oriented x3. Answering questions appropriately.    Data   Recent Labs   Lab 06/02/22  0451 06/01/22  0437 05/31/22  0438 05/31/22  0436   WBC 28.9* 22.2* 19.0*  --    HGB 9.5* 8.7* 8.4*  --    * 107* 109*  --     189 183  --    * 134  --  136   POTASSIUM 4.0 4.1  --  3.9   CHLORIDE 94 100  --  102   CO2 27 24  --  24   BUN 20 15  --  13   CR 1.59* 1.38*  --  1.34*   ANIONGAP 8 10  --  10   DAVID 8.5  8.5 8.7  8.7  --  8.4*  8.4*   GLC 91 107*  --  85   ALBUMIN 2.5* 2.4*  --  2.2*   PROTTOTAL 5.8* 5.3*  --  5.3*   BILITOTAL 0.9 0.8  --  0.7   ALKPHOS 68 58  --  50   ALT 22 21  --  21   AST 19 16  --  17

## 2022-06-04 NOTE — PROVIDER NOTIFICATION
06/03/22 1700   Call Information   Date of Call 06/03/22   Time of Call 1653   Name of person requesting the team Jameson RHOADES   Title of person requesting team RN   RRT Arrival time 1655   Time RRT ended 1710   Reason for call   Type of RRT Adult   Primary reason for call Sepsis suspected   Sepsis Suspected Elevated Lactate level   Was patient transferred from the ED, ICU, or PACU within last 24 hours prior to RRT call? No   SBAR   Situation LA=4   Background PMHX CLL and hemolytic anemia. Now with PNA, on 100%FiO2 via HFNC   Notable History/Conditions Cancer;Cardiac   Assessment A+O, Bedside RN able to ween down  FiO2 to 90% this afternoon. Feels fatigued, but no new complaints   Interventions Labs   Patient Outcome   Patient Outcome Stabilized on unit   RRT Team   Attending/Primary/Covering Physician Isabella 4   Date Attending Physician notified 06/03/22   Time Attending Physician notified 1655   Physician(s) Lindsay Khan PA-C   Lead RN Kera RHOADES   RT n/a   Post RRT Intervention Assessment   Post RRT Assessment Stable/Improved   Date Follow Up Done 06/03/22   Time Follow Up Done 1919

## 2022-06-04 NOTE — CODE/RAPID RESPONSE
Rapid Response Team Note    Assessment   In assessment a rapid response was called on Loco Wood due to SIRS/Sepsis trigger. This presentation is likely due to AHRF due to PJP pneumonia and possible E. Faecium infection currently on Zosyn, Linezolid, Bactrim, and fluconazole.  Stable O2 needs. CXR from this morning with unchanged hazy airspace opacities bilaterally.     Plan   -  Continue current antibiotics, remaining management per primary team.   -  The Internal Medicine primary team was able to be reached and they are in agreement with the above plan.  -  Disposition: The patient will remain on the current unit. We will continue to monitor this patient closely.  -  Reassessment and plan follow-up will be performed by the primary team      Lindsay Khan PA-C  [facility] RRT [facility] Job Code Contact #[phone]  [facility] Paging/Directory    Hospital Course   Brief Summary of events leading to rapid response:   RRT was called for lactic acid of 3.2 triggered from tachycardia and leukocytosis.     Loco Wood is a 78 yo M with PMHx of CLL, auto-immune hemolytic anemia (on prednisone taper), CKD III, who presented with SOB, chills and pleuritic chest pain with infiltrates on CT chest with progressive acute hypoxic respiratory failure, found to have PJP pneumonia, and concern for superimposed bacterial or fungal pneumonia, and possible ARDS. Karius + for PJP and E. Faecium. Started on Linezolid today.     Patient reports no changes in symptoms. Continues to have SOB and chest tightness with occasional cough. Denies any other infectious symptoms, fevers, chills, N/V/D, abdominal pain, or dysuria.     Admission Diagnosis:   Shortness of breath [R06.02]  Pneumonia [J18.9]  Hypoxia [R09.02]  Pneumonia of both lungs due to infectious organism, unspecified part of lung [J18.9]  Chest pain, unspecified type [R07.9]    Physical Exam   Temp: 97.8  F (36.6  C) Temp  Min: 97.8  F (36.6  C)  Max: 98.6  F (37  C)  Resp:  16 Resp  Min: 15  Max: 18  SpO2: 94 % SpO2  Min: 93 %  Max: 98 %  Pulse: 100 Pulse  Min: 80  Max: 113    No data recorded  BP: (!) 142/76 Systolic (24hrs), Av , Min:113 , Max:142   Diastolic (24hrs), Av, Min:66, Max:80     I/Os: I/O last 3 completed shifts:  In: 300 [P.O.:300]  Out: 1900 [Urine:1900]     Exam:   General: in no acute distress  Mental Status: baseline mental status.  CV: Tachycardic rate, regular rhythm   Resp: Lungs CTA. Non-labored breathing on HFNC   Abd: Soft. Non-tender in all quadrants.   Extremities: No LE edema.     Significant Results and Procedures   Lactic Acid:   Recent Labs   Lab Test 22  1727 22  2202 22  1654   LACT 3.2* 2.9* 4.0*     CBC:   Recent Labs   Lab Test 22  0559 22  0423 22  0451   WBC 29.6* 29.4* 28.9*   HGB 8.6* 8.7* 9.5*   HCT 26.3* 27.0* 28.8*    241 215        Sepsis Evaluation   The patient is known to have an infection.  NO EVIDENCE OF SEPSIS at this time.  Vital sign, physical exam, and lab findings are due to AHRF. Already treated for sepsis. .

## 2022-06-04 NOTE — PROGRESS NOTES
Hematology / Oncology  Daily Progress Note   Date of Service: 06/04/2022  Patient: Loco Wood  MRN: [id number]  Admission Date: 5/23/2022  Hospital Day # 12  Cancer Diagnosis: CLL  Primary Outpatient Oncologist: To establish with Dr Monahan   Current Treatment Plan: Ibrutinib 420 mg daily (C1 = 5/2019)     Subjective & Interval History:    Nursing notes reviewed. Afebrile overnight. Patient had fell while attempting to use the commode. CT head did not show any acute intracranial pathology. This morning, Loco feels ok. He is still on HFNC at 35L 100% FiO2. He denies any cough or shortness of breath at present. He did feel lightheaded and dizzy prior to his fall but feels better now.    Physical Exam:    Temp:  [98  F (36.7  C)-98.7  F (37.1  C)] 98  F (36.7  C)  Pulse:  [] 108  Resp:  [16-18] 18  BP: (113-140)/(66-79) 133/79  FiO2 (%):  [40 %-100 %] 100 %  SpO2:  [93 %-98 %] 97 % on HFNC    General: laying in bed, no acute distress   HEENT: sclera anicteric, EOMI, MMM  Neck: supple, normal ROM  CV: Regular rate and rhythm  Resp: Breathing comfortably on HFNC, no use of accessory muscles  MSK: warm and well-perfused, normal tone  Skin: no rashes on limited exam, no jaundice  Neuro: Awake, alert, moves all extremities equally, no focal deficits    Labs & Studies: I personally reviewed the following studies:  ROUTINE LABS:  CMP  Recent Labs   Lab 06/04/22  0559 06/03/22  0423 06/02/22  0451 06/01/22  0437   * 130* 129* 134   POTASSIUM 4.6 4.3 4.0 4.1   CHLORIDE 97 95 94 100   CO2 24 24 27 24   ANIONGAP 9 11 8 10   BUN 23 24 20 15   CR 1.05 1.43* 1.59* 1.38*   GFRESTIMATED 74 51* 45* 53*   DAVID 8.9  8.9 8.9  8.9 8.5  8.5 8.7  8.7   PROTTOTAL 5.5* 5.6* 5.8* 5.3*   ALBUMIN 2.4* 2.3* 2.5* 2.4*   BILITOTAL 0.6 0.9 0.9 0.8   ALKPHOS 90 82 68 58   AST 27 23 19 16   ALT 28 25 22 21   * 323* 309* 299*   URIC 2.1* 2.4* 2.5* 2.0*     Recent Labs   Lab 06/04/22  0559 06/03/22  0423  06/02/22  0451 06/01/22  0437 05/31/22  0436   * 115* 91 107* 85   MAG 2.2 2.1 2.0 2.1 2.3   PHOS 4.2 3.5 3.4 3.0 3.0     CBC  Recent Labs   Lab 06/04/22  0559 06/03/22  0423 06/02/22  0451 06/01/22  0437   WBC 29.6* 29.4* 28.9* 22.2*   RBC 2.41* 2.51* 2.69* 2.50*   HGB 8.6* 8.7* 9.5* 8.7*   HCT 26.3* 27.0* 28.8* 26.8*   * 108* 107* 107*   MCH 35.7* 34.7* 35.3* 34.8*   MCHC 32.7 32.2 33.0 32.5   RDW 16.3* 16.1* 16.3* 16.2*    241 215 189     Recent Labs   Lab 06/04/22  0559 06/03/22 0423 06/02/22 0451   ALYM 15.7* 17.6* 13.0*     Medications list for reference:  Current Facility-Administered Medications   Medication     acetaminophen (TYLENOL) tablet 650 mg     acyclovir (ZOVIRAX) tablet 800 mg     allopurinol (ZYLOPRIM) tablet 300 mg     atorvastatin (LIPITOR) tablet 20 mg     bisacodyl (DULCOLAX) Suppository 10 mg     cyanocobalamin (VITAMIN B-12) tablet 100 mcg     famotidine (PEPCID) tablet 20 mg     fluconazole (DIFLUCAN) tablet 400 mg     folic acid (FOLVITE) tablet 1 mg     [Held by provider] heparin ANTICOAGULANT injection 5,000 Units     ibrutinib (IMBRUVICA) capsule 420 mg     ipratropium - albuterol 0.5 mg/2.5 mg/3 mL (DUONEB) neb solution 3 mL     levofloxacin (LEVAQUIN) tablet 250 mg     levothyroxine (SYNTHROID/LEVOTHROID) tablet 50 mcg     lidocaine (LMX4) cream     lidocaine 1 % 0.1-1 mL     melatonin tablet 3 mg     methylPREDNISolone sodium succinate (solu-MEDROL) injection 40 mg     metoclopramide (REGLAN) tablet 5 mg     micafungin (MYCAMINE) 150 mg in sodium chloride 0.9 % 100 mL intermittent infusion     OLANZapine zydis (zyPREXA) ODT tab 5 mg     ondansetron (ZOFRAN) injection 8 mg     ondansetron (ZOFRAN) injection 8 mg     pantoprazole (PROTONIX) EC tablet 20 mg     piperacillin-tazobactam (ZOSYN) 4.5 g vial to attach to  mL bag     polyethylene glycol (MIRALAX) Packet 17 g     prochlorperazine (COMPAZINE) injection 5 mg    Or     prochlorperazine (COMPAZINE)  tablet 5 mg    Or     prochlorperazine (COMPAZINE) suppository 12.5 mg     psyllium (METAMUCIL/KONSYL) Packet 1 packet     senna-docusate (SENOKOT-S/PERICOLACE) 8.6-50 MG per tablet 1 tablet    Or     senna-docusate (SENOKOT-S/PERICOLACE) 8.6-50 MG per tablet 2 tablet     sodium chloride (PF) 0.9% PF flush 3 mL     sodium chloride (PF) 0.9% PF flush 3 mL     sodium chloride (PF) 0.9% PF flush 3 mL     sodium chloride (PF) 0.9% PF flush 3 mL     sulfamethoxazole-trimethoprim (BACTRIM DS) 800-160 MG per tablet 2 tablet     sulfamethoxazole-trimethoprim (BACTRIM) 400-80 MG per tablet 3 tablet     Vitamin D3 (CHOLECALCIFEROL) tablet 50 mcg     [Held by provider] zolpidem (AMBIEN) tablet 5 mg     Assessment & Plan:  Loco Wood is a 77 year old man with a history of CKD stage III, HLD, paroxysmal afib, and 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 Admitted for AHRF due to community acquired pneumonia and worsening anemia (10/8) .    #CLL  #Hypogammaglobulinemia    CLL remains stable on daily ibrutinib therapy. ALC 17.6 today which may be attributed to ongoing infectious process. Unclear if there is any evidence of lymphadenopathy in the abdomen or splenomegaly. Due to increased oxygen requirements, we have not been able to obtain repeat CT A/P for restaging of his CLL.  Peripheral flow (5/24) showed 50% CD5+ lambda monotypic B cells which is consistent with his previous CLL. IGHV was not mutated and no deletion 17p detected by FISH and no TP53 mutation seen on NGS. Will need Beta-2 microglobulin as well to predict prognosis. In review of his current clinical picture, will continue ibrutinib therapy as holding it may worsen CLL causing rapid progression.   - CT AP w/ contrast, if renal function permits and once medically stable  - Check Beta-2 microglobulin (ordered for you)  - Continue Ibrutinib 420 mg daily, tolerating without side effects. Encourage daily dosing and not skipping dosing. Will check with  pharmacy in terms of coverage to increase dose to 560 mg daily for better control of CLL.   - Plan to establish care with Dr Monahan 6/24    #Autoimmune Hemolytic Anemia 2/2 CLL   Had worsening AIHA in the setting of recent steroid taper. Previously on methylprednisolone 1 mg/kg on admission (5/24-5/27) but decreased to IV Methylprednisolone 40 mg daily in the setting of infection. Ongoing anemia despite initiation of HD steroids this admission, discussed case with heme staff Dr. Espinosa who recommended initiation of Rituxan 375 mg/m2 x4 weeks for refractory AIHA but held off in the setting of acute respiratory decompensation. Will reassess when to administer. May consider IVIG 500 mg/kg if anemia worsens and infections not improving. Per discussion with ID, ok to continue steroids in light of pulmonary infection.   - Continue daily Folic acid supplementation.   - Monitor CBC w/ diff, LDH, CMP and retic daily   - Transfused for hgb <7 and plts <10k (outpatient therapy plan entered)   - Cont Methylprednisolone 40 mg IV daily    #TLS ppx  No indication to start rituxan so can hold off checking TLS labs. Would resume checking TLS labs once starting Rituxan.  - Encourage PO fluids. Cont on Allopurinol 300 mg daily  - Discontinue daily check of uric acid, Mg, Phos,     # CAP   # Worsening AHRF 5/26   Presented with dyspnea and new hypoxia requiring 2-3L NC. CT PE negative for clot but did show bilateral multifocal pulmonary infiltrates and nodules or nodular infiltrates which may represent pneumonia. BCx, RVP, COVID negative. Of note, patient was not on PJP ppx prior to admission. PJP PCR was positive and fluconazole was started. CMV not detected. With positive PJP PCR, would hold off on bronchoscopy at this point as pulmonary findings more likely due to PJP. Zosyn and levaquin were restarted in the setting of increasing WBC with concerns of concurrent bacterial infection.   - Consider bronchoscopy vs percutaneous  biopsy of lung findings to r/o CLL involvement vs infection if no improvement in respiratory status after adequate treatment of Pneumocystis   - Antibiotics per primary team. Micafungin, Bactrim, Fluconazole, Zosyn and Levaquin on board.  - Further infectious work up given acute decline; agree with infectious disease recs      #ID PPx  - Pentamidine nebulizer q 28 days; given 5/26/22 (therapy plan entered)   - Tiffanie requested outpatient for COVID ppx     #Hx of of shingles  Continue Acyclovir 800 mg BID as long as he is on prednisone to reduce risk of shingles.    Recommendations:  - Cont Ibrutinib as holding may allow his CLL to rapidly progress  - Will check with pharmacy in terms of coverage to increase dose to 560 mg daily for better control of CLL. Per pharmacy, they will look into this and get back to us.  - Will hold Rituxan given acute decline; will reassess timing once patient has stabilized. May consider IVIG 500 mg/kg if anemia worsens and infection not improving.    - Cont Methylpred 40 mg IV daily  - Follow up Beta-2 microglobulin  - Check CBC w/ diff, LDH, Tbili and retic count daily     Patient was seen and plan of care was discussed with attending physician Dr. Alan Macdonald.     Keyur Raza MD   Heme/Onc/Transplant Fellow  Pgr #[phone]

## 2022-06-04 NOTE — PLAN OF CARE
Goal Outcome Evaluation:    Neuro: A&Ox4. Lightheaded when ambulating. Q1 neuro checks until further notice d/t fall.   Cardiac: SR. VSS.   Respiratory: Sating 90s on 100% 35L HFNC. Lung sounds diminished  GI/: Adequate urine output. BM X1  Diet/appetite: Tolerating reg diet. Poor appetite due to nausea. Compazine given overnight.   Activity: Patient was stand by assist prior to fall. Patient now assist times 1 with gait belt.   Pain: At acceptable level on current regimen. Did not complain of pain, even after fall.   Skin: No new deficits noted. No injury sustained from fall.   LDA's: RPIV    Plan: Continue with POC. Notify primary team with changes. Keep bed alarm on!    Patient sustained a fall while attempting to use the commode.  Patient reports feeling lightheaded prior to fall. He denies loss of consciousness or palpitations prior to the fall. He is unsure about whether or not he hit his head during the fall. Patient reports taking one dose of ambien at bedtime. Patient was lifted from ground by staff to commode. Team was paged immediately. Head CT was ordered and patient placed on fall precautions.

## 2022-06-04 NOTE — PROGRESS NOTES
Brief cross-cover note    Paged about patient who had sustained a fall while attempting to use the commode.  Patient reports feeling lightheaded prior to fall. He denies loss of consciousness or palpitations preceding the fall. He is unsure about whether or not he hit his head during the fall. Patient reports taking one dose of ambien at bedtime. Per chart review he is on subcutaneous heparin Q12H and has been in a negative fluid balance since admission.    General Appearance:  Alert, pleasant  HEENT: Atraumatic, normocephalic, no periorbital ecchymosis,  PERRL  Respiratory: Breathing comfortably on HFNC and facemask  Cardiovascular: Tachycardic,regular rhythm, extremities are warm and well perfused  Neuro:  Alert and oriented x3. Speech is logical and coherent. Answering questions appropriately. No facial asymmetry, moves all limbs equally        # Fall, likely 2/2 orthostatic hypotension  # Head trauma 2/2 fall  - Orthostatic vital signs (done at bedside showed >20 mmHg difference in SBP) c/w orthostatic hypotension  - CT head w/o contrast given active anticoagulation  - 12 lead EKG  - Q1H Neuro checks until CT head is done  - Fall precautions  - Hold Jerry Jernigan MD  Internal Medicine, PGY2

## 2022-06-04 NOTE — PROGRESS NOTES
[facility]    Medicine Progress Note - Medicine Service, [facility] TEAM 4    Date of Admission:  5/23/2022    Assessment & Plan   Loco Wood is a 77 year old male with history of CLL, auto-immune hemolytic anemia (on prednisone taper), and CKD3a admitted for several days of SOB, chills, pleuritic chest pain with infiltrates/consolidations on CT imaging. Initially treated for CAP (ceft + azithro) with improvement. On 5/27 patient [place] [facility] [place] status and fever, now improved and afebrile (45L HFNC -> 12-15L oxymizer). Continue broad coverage with Zosyn, Micafungin (B-D glucan > 500), and IV bactrim.  Heme/Onc following for auto-immune hemolytic anemia and CLL, steroids decreased in setting of infection.    Changes today:   - Ground level fall, CT head without evidence of intracranial hemorrhage, found to have orthostatic hypotension, received  ml  - Increased oxygen requirement, on HFNC 35-45L 100%   - Karius + PJP and E. faecium, add IV linezolid while awaiting ID recs  - CT A/P when able     # Acute hypoxic respiratory failure 2/2 possible Bacterial PNA vs Fungal PNA vs ARDS  # PJP pneumonia   # E faecalis   # Severe sepsis  # Bilateral lower lobe bronchiectasis/consolidations  Admitted for several days of worsening fatigue/dyspnea, subjective chills, and pleuritic chest pain with 3-4L oxygen requirement (w/ elevated WBC) and CT w/ infiltrates and bilateral lower lobe consolidations concerning for infection. RVP negative. Patient initially treated with 5 day abx course starting 5/24 with azithromycin (d/c 5/26) + ceftriaxone for presumed CAP with clinical improvement. On 5/27, patient developed fever with worsening respiratory status (45 L high flow NC) and was [place] [facility] [place] Patient improving with IVF 2.5L and broadened coverage with Zosyn, Micafungin (positive B-Glucan), and IV bactrim (for possible PJP) O2 req of 12-15L,  with increase to HFNC on 6/1. CXR 5/31 with trace effusions and increasing apical opacities concerning for worsening edema/infection. PJP PCR 5/27 positive. Karius 6/1 + PJP and E. faecium.  - Infectious Disease following   -Stopped Zosyn 5/31, restarted 6/3 given lactic acidosis and increasing leukocytosis   - Add linezolid    - Continue levofloxacin   - Continue bactrim (switch from IV to PO) and micafungin   - Continue fluconazole for improved endemic fungi coverage   - Await pending Karius assay, cocci antigenblood cultures  - IV lasix 40 mg once 6/1  - Supplemental O2; wean as able; incentive spirometry  - Pulmonology signed off, no bronchoscopy planned at present due to high oxygen needs and likely diagnosis of PJP from non-invasive testing    Labs:  RVP, COVID/Flu: Negative  MRSA swab: negative  Beta-D Glucan: Positive (> 500)  Following Sputum sample  Blood cultures: NG  Sputum: 2+ mixed aracely  Histo antigen: negative  Aspergillus Ag: negative   Blasto antigen: negative  Cryptococcus ag: negative  CMV PRC negative:   Fungal ab (blast, aspergillus, cocci, histo): negative  Quantiferon Gold: negative   PJP PCR: positive   Karius: PJP positive, E faecium positive    # Ground level fall secondary to orthostatic hypotension  Early AM 6/4. CT head without evidence of intracranial hemorrhage, EKG unremarkable, found to have orthostatic hypotension.  -  ml  - Discussed holding trazadone with patient, favored continuing but will ask for assistance when ambulating at night  - Fall precautions    # Nausea  Last AXR 5/27 without evidence of obstruction.  - Scheduled Zofran in AM   - PRN antiemetics (Zofran, compazine, reglan)    # CLL (dx 2/2007), stable  History of CLL (2007) managed on Ibrutinib (5/2019) which patient is not taking daily. WBC 24.9 on admission (up from baseline ~6-10) now down to ~21. Suspect secondary to recent infection with lower concern for conversion to leukemia. IVIG infusion (5/25).  Following FISH and cytogenetics for disease prognostication and will continue to monitor with peripheral smear.   Continue daily ibrutinib to help control disease, increased bioavailability with addition of fluconazole 6/2. WBC and ALC uptrending.   - Hematology/Oncology consulted   -Following Flow cytometry, cytogenetics, IgH mutation, TP53 mutation   - Pulmonary signed off, no percutaneous pulmonary biopsy at present    - CT A/P to complete restaging when more stable   - CBC trend   - Heme/Onc outpatient follow up on discharge    Chronic/Resolved Problems:     # Lactic acidosis, improved  Persistent lactic acidosis 3-4 despite antimicrobial treatment and IVF boluses. Appears hemodynamically stable with no evidence of hypoperfusion. Discussed with hem/onc, can sometimes see in malignancies, but unlikely in reasonably controlled CLL. Hepatic function wnl, suggesting against clearance issue. Resolved with additional IVF 5/30. Increased to 4.1 on 6/3, improved with addition of antibiotics above.    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 B12, folate, iron panel normal with slightly elevated YEN levels. Labs concerning for marrow responsive anemia (elevated LDH, bilirubin, reticulocyte count). Will likely need to treat underlying CLL for long-term solution.    Hgb on admission 9.5 down from baseline (~10-12 past year) and now ~8. Currently hemodynamically stable and suspect recent drop in Hgb secondary to dilutional anemia in setting of 2.5L of IVF given (5/27). Methylpred decreased and rituximab held in setting of recent infection.   - Heme/Onc consulted    - Continue Methylprednisolone 40mg   - Hold Rituximab infusion   - Check uric acid, LDH, Mg, Phos, and retic count daily   - Hep B serologies (Core, Surface Ab +): Hep B ag  negative, DNA quant negative   - Continue PTA Folate, B12   - Transfuse if Hgb < 7 (Type and Screen)    # CKD Stage 3A  Cr of 1.31 on admission up from baseline now up to 1.6 (5/27), now improved to 1.05. Suspect Cr elevation is pre-renal in setting of recent sepsis and will likely improve with volume resuscitation.    - Avoid nephrotoxic agents as able    # Degenerative disc disease  # Bilateral intermittent hand cramping, spasms suspicious for cervical impingement  Patient with history of degenerative disc disorder with concern for cervical impingement in setting of new bilateral intermittent hand cramping. Patient met with Dr. Melton his sports medicine doctor (AM 5/24) over phone for follow up appointment to discuss results of his MRI. Will defer to outpatient management.     # Hx Herpes Zoster c/b post-herpetic neuralgia: PTA ppx Acyclovir while on prednisone  # Hypothyroid - TSH 1.94 on admission WNL. Continue PTA levothyroxine  # HLD- PTA atorvastatin  # GERD- PTA omeprazole + PRN famotidine (also on Pred)  # Insomnia- has PRN Ambien as OP  # Vitamin supplements- PTA B12, folate, D3        Diet: Combination Diet Regular Diet Adult  Snacks/Supplements Adult: Other; 8 PM - String cheese stick x2; Between Meals  Snacks/Supplements Adult: Ensure Enlive; Between Meals    DVT Prophylaxis: Heparin ppx  Piña Catheter: Not present  Central Lines: None  Cardiac Monitoring: ACTIVE order. Indication: Bradycardias (48 hours)  Code Status: Full Code    Confirmed patient is full code. His sister Hodan Deleon is his decision maker if he is unable to make decisions for himself.    Disposition Plan   Expected Discharge: 06/06/2022     Anticipated discharge location:  Awaiting care coordination huddle  Delays: None     The patient's care was discussed with the Attending Physician, Dr. Snell.    Everardo Lau MD  Medicine Service, [address]  Securely message with the  [employer] Web Console (learn more here)  Text page via [facility] Paging/Directory   Please see signed in provider for up to date coverage information      Clinically Significant Risk Factors Present on Admission                    ______________________________________________________________________    Interval History   Ground level fall at 4am while ambulating to the bathroom. Reports feeling lightheaded prior to the fall. No LOC, chest pain, or palpitations. Unsure if hit his head. CT head, EKG obtained - unremarkable. Ambulating earlier in the day without issues. This morning, feeling better. No SOB on HFNC at rest. No fevers or chills.     Data reviewed today: I reviewed all medications, new labs and imaging results over the last 24 hours.    Physical Exam   Vital Signs: Temp: 97.8  F (36.6  C) Temp src: Oral BP: 138/80 Pulse: 98   Resp: 16 SpO2: 94 % O2 Device: High Flow Nasal Cannula (HFNC) Oxygen Delivery: 30 LPM  Weight: 150 lbs 5.66 oz  General Appearance: Alert, pleasant, in some distress  Respiratory: Breathing comfortably on HFNC at rest  Cardiovascular: RRR, normal S1/S2, no murmurs. 2s cap refill.  GI: Soft, non-tender, non-distended.   Skin: No rash or lesions noted. Warm, dry.  Other: Alert and oriented x3. Answering questions appropriately.    Data   Recent Labs   Lab 06/04/22  0559 06/03/22  0423 06/02/22  0451   WBC 29.6* 29.4* 28.9*   HGB 8.6* 8.7* 9.5*   * 108* 107*    241 215   * 130* 129*   POTASSIUM 4.6 4.3 4.0   CHLORIDE 97 95 94   CO2 24 24 27   BUN 23 24 20   CR 1.05 1.43* 1.59*   ANIONGAP 9 11 8   DAVID 8.9  8.9 8.9  8.9 8.5  8.5   * 115* 91   ALBUMIN 2.4* 2.3* 2.5*   PROTTOTAL 5.5* 5.6* 5.8*   BILITOTAL 0.6 0.9 0.9   ALKPHOS 90 82 68   ALT 28 25 22   AST 27 23 19

## 2022-06-04 NOTE — PLAN OF CARE
Neuro: A&Ox4. Able to make needs known.  Cardiac: SR. VSS.   Respiratory: Sating in 90s on 100% 30 LPM HFNC.   GI/: Adequate urine output. BM X1  Diet/appetite: Tolerating reg diet. Fair appetite   Activity:  Assist of 1, to commode.  Pain: At acceptable level on current regimen.   Skin: No new deficits noted.  LDA's: L PIV infusing TKO.    Plan: Continue with POC. Notify primary team with changes. Pt had fall overnight recommend using bed alarm.

## 2022-06-05 NOTE — PROGRESS NOTES
[facility]    Medicine Progress Note - Medicine Service, [facility] TEAM 4    Date of Admission:  5/23/2022    Assessment & Plan   Loco Wood is a 77 year old male with history of CLL, auto-immune hemolytic anemia (on prednisone taper), and CKD3a admitted for several days of SOB, chills, pleuritic chest pain with infiltrates/consolidations on CT imaging. Initially treated for CAP (ceft + azithro) with improvement. On 5/27 [place] [facility] [place] and fever, now improved and afebrile (45L HFNC -> 12-15L oxymizer). Continue broad coverage with Zosyn, Micafungin (B-D glucan > 500), and IV bactrim.  Heme/Onc following for auto-immune hemolytic anemia and CLL, steroids decreased in setting of infection.    Changes today:   - CT A/P today for restaging   - continue current antimicrobials      # Acute hypoxic respiratory failure 2/2 possible Bacterial PNA vs Fungal PNA vs ARDS  # PJP pneumonia   # E faecalis   # Severe sepsis  # Bilateral lower lobe bronchiectasis/consolidations  Admitted for several days of worsening fatigue/dyspnea, subjective chills, and pleuritic chest pain with 3-4L oxygen requirement (w/ elevated WBC) and CT w/ infiltrates and bilateral lower lobe consolidations concerning for infection. RVP negative. Patient initially treated with 5 day abx course starting 5/24 with azithromycin (d/c 5/26) + ceftriaxone for presumed CAP with clinical improvement. On 5/27, patient developed fever with worsening respiratory status (45 L high flow NC) and was [place] [facility] [place] Patient improving with IVF 2.5L and broadened coverage with Zosyn, Micafungin (positive B-Glucan), and IV bactrim (for possible PJP) O2 req of 12-15L, with increase to HFNC on 6/1. CXR 5/31 with trace effusions and increasing apical opacities concerning for worsening edema/infection. PJP PCR 5/27 positive. Karius 6/1 + PJP and E. faecium.  - Infectious Disease  following   -Stopped Zosyn 5/31, restarted 6/3 given lactic acidosis and increasing leukocytosis   - Add linezolid    - Continue levofloxacin   - Continue bactrim (switch from IV to PO) and micafungin   - Continue fluconazole for improved endemic fungi coverage   -  cocci antigenblood cultures  - IV lasix 40 mg once 6/1  - Supplemental O2; wean as able; incentive spirometry  - Pulmonology signed off, no bronchoscopy planned at present due to high oxygen needs and likely diagnosis of PJP from non-invasive testing    Labs:  RVP, COVID/Flu: Negative  MRSA swab: negative  Beta-D Glucan: Positive (> 500)  Following Sputum sample  Blood cultures: NG  Sputum: 2+ mixed aracely  Histo antigen: negative  Aspergillus Ag: negative   Blasto antigen: negative  Cryptococcus ag: negative  CMV PRC negative:   Fungal ab (blast, aspergillus, cocci, histo): negative  Quantiferon Gold: negative   PJP PCR: positive   Karius: PJP positive, E faecium positive    # Ground level fall secondary to orthostatic hypotension  Early AM 6/4. CT head without evidence of intracranial hemorrhage, EKG unremarkable, found to have orthostatic hypotension.  -  ml  - Discussed holding trazadone with patient, favored continuing but will ask for assistance when ambulating at night  - Fall precautions    # Nausea  Last AXR 5/27 without evidence of obstruction.  - Scheduled Zofran in AM   - PRN antiemetics (Zofran, compazine, reglan)    # CLL (dx 2/2007), stable  History of CLL (2007) managed on Ibrutinib (5/2019) which patient is not taking daily. WBC 24.9 on admission (up from baseline ~6-10) now down to ~21. Suspect secondary to recent infection with lower concern for conversion to leukemia. IVIG infusion (5/25). Following FISH and cytogenetics for disease prognostication and will continue to monitor with peripheral smear.   Continue daily ibrutinib to help control disease, increased bioavailability with addition of fluconazole 6/2. WBC and ALC  uptrending.   - Hematology/Oncology consulted   -Following Flow cytometry, cytogenetics, IgH mutation, TP53 mutation   - Pulmonary signed off, no percutaneous pulmonary biopsy at present    - CT A/P to complete restaging when more stable   - CBC trend   - Heme/Onc outpatient follow up on discharge    Chronic/Resolved Problems:     # Lactic acidosis, improved  Persistent lactic acidosis 3-4 despite antimicrobial treatment and IVF boluses. Appears hemodynamically stable with no evidence of hypoperfusion. Discussed with hem/onc, can sometimes see in malignancies, but unlikely in reasonably controlled CLL. Hepatic function wnl, suggesting against clearance issue. Resolved with additional IVF 5/30. Increased to 4.1 on 6/3, improved with addition of antibiotics above.    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 B12, folate, iron panel normal with slightly elevated YEN levels. Labs concerning for marrow responsive anemia (elevated LDH, bilirubin, reticulocyte count). Will likely need to treat underlying CLL for long-term solution.    Hgb on admission 9.5 down from baseline (~10-12 past year) and now ~8. Currently hemodynamically stable and suspect recent drop in Hgb secondary to dilutional anemia in setting of 2.5L of IVF given (5/27). Methylpred decreased and rituximab held in setting of recent infection.   - Heme/Onc consulted    - Continue Methylprednisolone 40mg   - Hold Rituximab infusion   - Check uric acid, LDH, Mg, Phos, and retic count daily   - Hep B serologies (Core, Surface Ab +): Hep B ag negative, DNA quant negative   - Continue PTA Folate, B12   - Transfuse if Hgb < 7 (Type and Screen)    # CKD Stage 3A  Cr of 1.31 on admission up from baseline now up to 1.6 (5/27), now improved to 1.05. Suspect Cr elevation is pre-renal  in setting of recent sepsis and will likely improve with volume resuscitation.    - Avoid nephrotoxic agents as able    # Degenerative disc disease  # Bilateral intermittent hand cramping, spasms suspicious for cervical impingement  Patient with history of degenerative disc disorder with concern for cervical impingement in setting of new bilateral intermittent hand cramping. Patient met with Dr. Melton his sports medicine doctor (AM 5/24) over phone for follow up appointment to discuss results of his MRI. Will defer to outpatient management.     # Hx Herpes Zoster c/b post-herpetic neuralgia: PTA ppx Acyclovir while on prednisone  # Hypothyroid - TSH 1.94 on admission WNL. Continue PTA levothyroxine  # HLD- PTA atorvastatin  # GERD- PTA omeprazole + PRN famotidine (also on Pred)  # Insomnia- has PRN Ambien as OP  # Vitamin supplements- PTA B12, folate, D3        Diet: Combination Diet Regular Diet Adult  Snacks/Supplements Adult: Other; 8 PM - String cheese stick x2; Between Meals  Snacks/Supplements Adult: Ensure Enlive; Between Meals    DVT Prophylaxis: Heparin ppx  Piña Catheter: Not present  Central Lines: None  Cardiac Monitoring: ACTIVE order. Indication: Bradycardias (48 hours)  Code Status: Full Code    Confirmed patient is full code. His sister Hodan Deleon is his decision maker if he is unable to make decisions for himself.    Disposition Plan   Expected Discharge: 06/08/2022     Anticipated discharge location:  Awaiting care coordination huddle  Delays: None     The patient's care was discussed with the Attending Physician, Dr. Snell.    Jovany Snell, DO  Medicine Service, [facility] TEAM [address] [place] [address]  Securely message with the [employer] Web Console (learn more here)  Text page via [facility] Paging/Directory   Please see signed in provider for up to date coverage information      Clinically Significant Risk Factors Present on Admission                     ______________________________________________________________________    Interval History     No acute events overnight. Feels about the same today. Continues to have some unchanged chest heaviness. Dyspnea unchanged.     Four point ROS completed and negative unless listed above    Data reviewed today: I reviewed all medications, new labs and imaging results over the last 24 hours.    Physical Exam   Vital Signs: Temp: 97.6  F (36.4  C) Temp src: Oral BP: 130/77 Pulse: 76   Resp: 18 SpO2: 100 % O2 Device: High Flow Nasal Cannula (HFNC) Oxygen Delivery: 40 LPM  Weight: 152 lbs 1.88 oz  General Appearance: Alert, pleasant, in some distress  Respiratory: Breathing comfortably on HFNC at rest  Cardiovascular: RRR, normal S1/S2, no murmurs. 2s cap refill.  GI: Soft, non-tender, non-distended.   Skin: No rash or lesions noted. Warm, dry.  Other: Alert and oriented x3. Answering questions appropriately.    Data   Recent Labs   Lab 06/05/22  0523 06/04/22  0559 06/03/22  0423   WBC 28.4* 29.6* 29.4*   HGB 8.3* 8.6* 8.7*   * 109* 108*    251 241   * 130* 130*   POTASSIUM 4.6 4.6 4.3   CHLORIDE 97 97 95   CO2 25 24 24   BUN 21 23 24   CR 1.37* 1.05 1.43*   ANIONGAP 8 9 11   DAVID 8.8  8.8 8.9  8.9 8.9  8.9   * 119* 115*   ALBUMIN 2.3* 2.4* 2.3*   PROTTOTAL 5.3* 5.5* 5.6*   BILITOTAL 0.5 0.6 0.9   ALKPHOS 87 90 82   ALT 31 28 25   AST 28 27 23

## 2022-06-05 NOTE — PROGRESS NOTES
Hematology / Oncology  Daily Progress Note   Date of Service: 06/05/2022  Patient: Loco Wood  MRN: [id number]  Admission Date: 5/23/2022  Hospital Day # 13  Cancer Diagnosis: CLL  Primary Outpatient Oncologist: To establish with Dr Monahan   Current Treatment Plan: Ibrutinib 420 mg daily (C1 = 5/2019)     Subjective & Interval History:    Nursing notes reviewed. Afebrile overnight. Had another code sepsis triggered due to leucocytosis. Has some nausea this morning due to mucus in chest and multiple medications. Not able to ambulate due to high oxygen requirements.    Physical Exam:    Temp:  [97.8  F (36.6  C)-98.4  F (36.9  C)] 97.8  F (36.6  C)  Pulse:  [] 76  Resp:  [15-20] 18  BP: (115-142)/(71-97) 115/76  FiO2 (%):  [100 %] 100 %  SpO2:  [93 %-100 %] 100 % on 30L HFNC 100%    General: laying in bed, no acute distress   HEENT: sclera anicteric, EOMI, MMM  Neck: supple, normal ROM  CV: Regular rate and rhythm  Resp: Breathing comfortably on HFNC, no use of accessory muscles  MSK: warm and well-perfused, normal tone  Skin: no rashes on limited exam, no jaundice  Neuro: Awake, alert, moves all extremities equally, no focal deficits    Labs & Studies: I personally reviewed the following studies:  ROUTINE LABS:  CMP  Recent Labs   Lab 06/05/22  0523 06/04/22  0559 06/03/22  0423 06/02/22  0451   * 130* 130* 129*   POTASSIUM 4.6 4.6 4.3 4.0   CHLORIDE 97 97 95 94   CO2 25 24 24 27   ANIONGAP 8 9 11 8   BUN 21 23 24 20   CR 1.37* 1.05 1.43* 1.59*   GFRESTIMATED 54* 74 51* 45*   DAVID 8.8  8.8 8.9  8.9 8.9  8.9 8.5  8.5   PROTTOTAL 5.3* 5.5* 5.6* 5.8*   ALBUMIN 2.3* 2.4* 2.3* 2.5*   BILITOTAL 0.5 0.6 0.9 0.9   ALKPHOS 87 90 82 68   AST 28 27 23 19   ALT 31 28 25 22   * 303* 323* 309*   URIC 1.9* 2.1* 2.4* 2.5*     Recent Labs   Lab 06/05/22  0523 06/04/22  0559 06/03/22  0423 06/02/22  0451 06/01/22  0437   * 119* 115* 91 107*   MAG 2.2 2.2 2.1 2.0 2.1   PHOS 3.4 4.2 3.5 3.4  3.0     CBC  Recent Labs   Lab 06/05/22  0523 06/04/22  0559 06/03/22  0423 06/02/22  0451   WBC 28.4* 29.6* 29.4* 28.9*   RBC 2.36* 2.41* 2.51* 2.69*   HGB 8.3* 8.6* 8.7* 9.5*   HCT 25.4* 26.3* 27.0* 28.8*   * 109* 108* 107*   MCH 35.2* 35.7* 34.7* 35.3*   MCHC 32.7 32.7 32.2 33.0   RDW 16.1* 16.3* 16.1* 16.3*    251 241 215     Recent Labs   Lab 06/05/22 0523 06/04/22 0559 06/03/22  0423   ALYM 19.3* 15.7* 17.6*     Medications list for reference:  Current Facility-Administered Medications   Medication     acetaminophen (TYLENOL) tablet 650 mg     acyclovir (ZOVIRAX) tablet 800 mg     allopurinol (ZYLOPRIM) tablet 300 mg     atorvastatin (LIPITOR) tablet 20 mg     bisacodyl (DULCOLAX) Suppository 10 mg     cyanocobalamin (VITAMIN B-12) tablet 100 mcg     famotidine (PEPCID) tablet 20 mg     fluconazole (DIFLUCAN) tablet 400 mg     folic acid (FOLVITE) tablet 1 mg     heparin ANTICOAGULANT injection 5,000 Units     ibrutinib (IMBRUVICA) capsule 420 mg     ipratropium - albuterol 0.5 mg/2.5 mg/3 mL (DUONEB) neb solution 3 mL     levofloxacin (LEVAQUIN) tablet 250 mg     levothyroxine (SYNTHROID/LEVOTHROID) tablet 50 mcg     lidocaine (LMX4) cream     lidocaine 1 % 0.1-1 mL     linezolid (ZYVOX) infusion 600 mg     melatonin tablet 3 mg     methylPREDNISolone sodium succinate (solu-MEDROL) injection 40 mg     metoclopramide (REGLAN) tablet 5 mg     micafungin (MYCAMINE) 150 mg in sodium chloride 0.9 % 100 mL intermittent infusion     OLANZapine zydis (zyPREXA) ODT tab 5 mg     ondansetron (ZOFRAN) injection 8 mg     ondansetron (ZOFRAN) injection 8 mg     pantoprazole (PROTONIX) EC tablet 20 mg     piperacillin-tazobactam (ZOSYN) 4.5 g vial to attach to  mL bag     polyethylene glycol (MIRALAX) Packet 17 g     prochlorperazine (COMPAZINE) injection 5 mg    Or     prochlorperazine (COMPAZINE) tablet 5 mg    Or     prochlorperazine (COMPAZINE) suppository 12.5 mg     psyllium (METAMUCIL/KONSYL)  Packet 1 packet     senna-docusate (SENOKOT-S/PERICOLACE) 8.6-50 MG per tablet 1 tablet    Or     senna-docusate (SENOKOT-S/PERICOLACE) 8.6-50 MG per tablet 2 tablet     sodium chloride (PF) 0.9% PF flush 3 mL     sodium chloride (PF) 0.9% PF flush 3 mL     sodium chloride (PF) 0.9% PF flush 3 mL     sodium chloride (PF) 0.9% PF flush 3 mL     sulfamethoxazole-trimethoprim (BACTRIM DS) 800-160 MG per tablet 2 tablet     sulfamethoxazole-trimethoprim (BACTRIM) 400-80 MG per tablet 3 tablet     Vitamin D3 (CHOLECALCIFEROL) tablet 50 mcg     [Held by provider] zolpidem (AMBIEN) tablet 5 mg     Assessment & Plan:  Loco Wood is a 77 year old man with a history of CKD stage III, HLD, paroxysmal afib, and 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 Admitted for AHRF due to community acquired pneumonia and worsening anemia (10/8) .    #CLL  #Hypogammaglobulinemia    CLL remains stable on daily ibrutinib therapy. ALC 17.6 today which may be attributed to ongoing infectious process. Unclear if there is any evidence of lymphadenopathy in the abdomen or splenomegaly. Due to increased oxygen requirements, we have not been able to obtain repeat CT A/P for restaging of his CLL.  Peripheral flow (5/24) showed 50% CD5+ lambda monotypic B cells which is consistent with his previous CLL. IGHV was not mutated and no deletion 17p detected by FISH and no TP53 mutation seen on NGS. Will need Beta-2 microglobulin as well to predict prognosis. In review of his current clinical picture, will continue ibrutinib therapy as holding it may worsen CLL causing rapid progression.   - CT AP w/ contrast, if renal function permits and once medically stable  - Check Beta-2 microglobulin (ordered for you)  - Continue to monitor ALC. If continues to rise, may need to change therapy going forward.  - Continue Ibrutinib 420 mg daily, tolerating without side effects. Encourage daily dosing and not skipping dosing.   - Plan to establish care with  Dr Monahan 6/24    #Autoimmune Hemolytic Anemia 2/2 CLL   Had worsening AIHA in the setting of recent steroid taper. Previously on methylprednisolone 1 mg/kg on admission (5/24-5/27) but decreased to IV Methylprednisolone 40 mg daily in the setting of infection. Ongoing anemia despite initiation of HD steroids this admission, discussed case with heme staff Dr. Espinosa who recommended initiation of Rituxan 375 mg/m2 x4 weeks for refractory AIHA but held off in the setting of acute respiratory decompensation. Will reassess when to administer. May consider IVIG 500 mg/kg if anemia worsens and infections not improving. Per discussion with ID, ok to continue steroids in light of pulmonary infection.   - Continue daily Folic acid supplementation.   - Monitor CBC w/ diff, LDH, CMP and retic daily   - Transfused for hgb <7 and plts <10k (outpatient therapy plan entered)   - Cont Methylprednisolone 40 mg IV daily    #TLS ppx  No indication to start rituxan so can hold off checking TLS labs. Would resume checking TLS labs once starting Rituxan.  - Encourage PO fluids. Cont on Allopurinol 300 mg daily  - Discontinue daily check of uric acid, Mg, Phos,     # CAP   # Worsening AHRF 5/26   Presented with dyspnea and new hypoxia requiring 2-3L NC. CT PE negative for clot but did show bilateral multifocal pulmonary infiltrates and nodules or nodular infiltrates which may represent pneumonia. BCx, RVP, COVID negative. Of note, patient was not on PJP ppx prior to admission. PJP PCR was positive and fluconazole was started. CMV not detected. With positive PJP PCR, would hold off on bronchoscopy at this point as pulmonary findings more likely due to PJP. Zosyn and levaquin were restarted in the setting of increasing WBC with concerns of concurrent bacterial infection.   - Consider bronchoscopy vs percutaneous biopsy of lung findings to r/o CLL involvement vs infection if no improvement in respiratory status after adequate treatment  of Pneumocystis   - Antibiotics per primary team. Micafungin, Bactrim, Fluconazole, Zosyn and Levaquin on board.  - Further infectious work up given acute decline; agree with infectious disease recs      #ID PPx  - Pentamidine nebulizer q 28 days; given 5/26/22 (therapy plan entered)   - Krissybrian requested outpatient for COVID ppx     #Hx of of shingles  Continue Acyclovir 800 mg BID as long as he is on prednisone to reduce risk of shingles.    Recommendations:  - Cont Ibrutinib as holding may allow his CLL to rapidly progress  - Cont to monitor ALC  - Will hold Rituxan given acute decline; will reassess timing once patient has stabilized. May consider IVIG 500 mg/kg if anemia worsens and infection not improving.    - Cont Methylpred 40 mg IV daily  - Follow up Beta-2 microglobulin  - Check CBC w/ diff, LDH, Tbili and retic count daily     Patient was seen and plan of care was discussed with attending physician Dr. Karthikeyan Thompson.     Keyur Raza MD   Heme/Onc/Transplant Fellow  Pgr #[phone]

## 2022-06-05 NOTE — PLAN OF CARE
Goal Outcome Evaluation:    Plan of Care Reviewed With: patient     Overall Patient Progress: no change    Neuro: A&Ox4.   Cardiac: SR. VSS.   Respiratory: Sating 90s on HFNC 100% at 30L. Desats to mid 80s during activity.   GI/: Adequate urine output.   Diet/appetite: Tolerating reg diet. Poor appetite.  Activity:  Assist of 1, up to commode.  Pain: At acceptable level on current regimen. No complaints of pain  Skin: No new deficits noted.  LDA's: RPIV    Plan: Continue with POC. Notify primary team with changes.

## 2022-06-05 NOTE — PLAN OF CARE
Neuro: A&Ox4. Calls appropriately.   Cardiac: SR/ST. ~1500 33 beat run of SVT, team notified.   Respiratory: Sating >90% on HFNC % FiO2.  GI/: Adequate urine output in bedside urinal. BM X1  Diet/appetite: Tolerating regular diet. Fair appetite.   Activity:  SBA to commode.  Pain: Horacio pain.   Skin: No new deficits noted.  LDA's: R PIV.    Plan: Continue with POC. Notify primary team with changes.

## 2022-06-05 NOTE — PROVIDER NOTIFICATION
06/04/22 1800   Call Information   Date of Call 06/04/22   Time of Call 1755   Name of person requesting the team Jameson Prado   Title of person requesting team RN   RRT Arrival time 1756   Time RRT ended 1809   Reason for call   Type of RRT Adult   Primary reason for call Sepsis suspected   Sepsis Suspected Elevated Lactate level;BMT/Oncology patient with WBC<0.5 or >12 or ANC <500   Was patient transferred from the ED, ICU, or PACU within last 24 hours prior to RRT call? No   SBAR   Situation Lactic Acid 3.2, WBC   Background Per MD note: History of CLL, auto-immune hemolytic anemia (on prednisone taper), and CKD3a admitted for several days of SOB, chills, pleuritic chest pain with infiltrates/consolidations on CT imaging. Initially treated for CAP (ceft + azithro) with improvement. On 5/27 patient transferred to [facility] for worsening respiratory status and fever, now improved and afebrile (45L HFNC -> 12-15L oxymizer). Continue broad coverage with Zosyn, Micafungin (B-D glucan > 500), and IV bactrim.  Heme/Onc following for auto-immune hemolytic anemia and CLL, steroids decreased in setting of infection.   Notable History/Conditions Cancer;Cardiac   Assessment A+O x 4. No c/o H/A or pain related to fall.  Afebrile with vs at baseline.  Increasing Fi02 needs from 35L to 40 L HFNC at 100%.  RR 16, unlabored with 02 sat 92%.   Interventions No interventions   Patient Outcome   Patient Outcome Stabilized on unit   RRT Team   Attending/Primary/Covering Physician Primary team notified.   Date Attending Physician notified 06/04/22   Time Attending Physician notified 1756   Physician(s) Lindsay Khan PA-C   Lead RN Kat Prado   RT none   Post RRT Intervention Assessment   Post RRT Assessment Stable/Improved   Date Follow Up Done 06/04/22   Time Follow Up Done 2055

## 2022-06-06 NOTE — PROGRESS NOTES
[facility]    Medicine Progress Note - Medicine Service, [facility] TEAM 4    Date of Admission:  5/23/2022    Assessment & Plan   Loco Wood is a 77 year old male with history of CLL, auto-immune hemolytic anemia (on prednisone taper), and CKD3a admitted for several days of SOB, chills, pleuritic chest pain with infiltrates/consolidations on CT imaging. Initially treated for CAP (ceft + azithro) with improvement. On 5/27 patient transferred to [facility] for worsening respiratory status and fever, now improved and afebrile (45L HFNC -> 12-15L oxymizer). Continue broad coverage with Zosyn, Micafungin (B-D glucan > 500), and IV bactrim.  Heme/Onc following for auto-immune hemolytic anemia and CLL, steroids decreased in setting of infection.    Changes today:   - Cocci ag negative  - CT A/P without evidence of CLL metastasis  - Continue current antimicrobials    # Acute hypoxic respiratory failure 2/2 possible Bacterial PNA vs Fungal PNA vs ARDS  # PJP pneumonia   # E faecium on Karius assay  # Severe sepsis  # Bilateral lower lobe bronchiectasis/consolidations  Admitted for several days of worsening fatigue/dyspnea, subjective chills, and pleuritic chest pain with 3-4L oxygen requirement (w/ elevated WBC) and CT w/ infiltrates and bilateral lower lobe consolidations concerning for infection. RVP negative. Patient initially treated with 5 day abx course starting 5/24 with azithromycin (d/c 5/26) + ceftriaxone for presumed CAP with clinical improvement. On 5/27, patient developed fever with worsening respiratory status (45 L high flow NC) and was transferred to [facility]. Patient improving with IVF 2.5L and broadened coverage with Zosyn, Micafungin (positive B-Glucan), and IV bactrim (for possible PJP) O2 req of 12-15L, with increase to HFNC on 6/1. CXR 5/31 with trace effusions and increasing apical opacities concerning for worsening edema/infection. PJP PCR 5/27 positive. Karius  6/1 + PJP and E. faecium.  - Infectious Disease following   -Stopped Zosyn 5/31, restarted 6/3 given lactic acidosis and increasing leukocytosis   - Add linezolid    - Continue levofloxacin   - Continue bactrim (switch from IV to PO) and micafungin   - Continue fluconazole for improved endemic fungi coverage   -  blood cultures NGTD  - IV lasix 40 mg once 6/1  - Supplemental O2; wean as able; incentive spirometry  - Pulmonology signed off, no bronchoscopy planned at present due to high oxygen needs and likely diagnosis of PJP from non-invasive testing    Labs:  RVP, COVID/Flu: Negative  MRSA swab: negative  Beta-D Glucan: Positive (> 500)  Following Sputum sample  Blood cultures: NG  Sputum: 2+ mixed aracely  Histo antigen: negative  Aspergillus Ag: negative   Blasto antigen: negative  Cryptococcus ag: negative  CMV PRC negative:   Fungal ab (blast, aspergillus, cocci, histo): negative  Quantiferon Gold: negative   PJP PCR: positive   Karius: PJP positive, E faecium positive  Cocci agn: negative    # Nausea  Last AXR 5/27 without evidence of obstruction.  - Scheduled Zofran in AM   - PRN antiemetics (Zofran, compazine, reglan)    # CLL (dx 2/2007), stable  History of CLL (2007) managed on Ibrutinib (5/2019) which patient is not taking daily. WBC 24.9 on admission (up from baseline ~6-10) now down to ~21. Suspect secondary to recent infection with lower concern for conversion to leukemia. IVIG infusion (5/25). Following FISH and cytogenetics for disease prognostication and will continue to monitor with peripheral smear.   Continue daily ibrutinib to help control disease, increased bioavailability with addition of fluconazole 6/2. WBC and ALC uptrending. CT A/P without evidence of CLL metastasis .    - Hematology/Oncology consulted   -Following Flow cytometry, cytogenetics, IgH mutation, TP53 mutation   - Pulmonary signed off, no percutaneous pulmonary biopsy at present    - CBC trend   - Heme/Onc outpatient follow up  on discharge    # Ground level fall secondary to orthostatic hypotension, resolved  Early AM 6/4. CT head without evidence of intracranial hemorrhage, EKG unremarkable, found to have orthostatic hypotension, improved with  ml.  - Holding Ambien PRN at night  - Fall precautions    # Insomnia  - Hold PTA PRN Ambien as above  - Increase scheduled melatonin from 3 mg to 10 mg  - Considered adding mirtazapine, but will hold off given current linezolid therapy and concern for serotonin syndrome  - Olanzapine PRN    # Hyponatremia  Na 130 --> 129. Has had poor PO intake but appears euvolemic on exam.   - Monitor for now    Chronic/Resolved Problems:     # Lactic acidosis, improved  Persistent lactic acidosis 3-4 despite antimicrobial treatment and IVF boluses. Appears hemodynamically stable with no evidence of hypoperfusion. Discussed with hem/onc, can sometimes see in malignancies, but unlikely in reasonably controlled CLL. Hepatic function wnl, suggesting against clearance issue. Resolved with additional IVF 5/30. Increased to 4.1 on 6/3, improved with addition of antibiotics above.    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 B12, folate, iron panel normal with slightly elevated YEN levels. Labs concerning for marrow responsive anemia (elevated LDH, bilirubin, reticulocyte count). Will likely need to treat underlying CLL for long-term solution.    Hgb on admission 9.5 down from baseline (~10-12 past year) and now ~8. Currently hemodynamically stable and suspect recent drop in Hgb secondary to dilutional anemia in setting of 2.5L of IVF given (5/27). Methylpred decreased and rituximab held in setting of recent infection.   - Heme/Onc consulted    - Continue Methylprednisolone 40mg   - Hold Rituximab infusion   - Check uric acid,  LDH, Mg, Phos, and retic count daily   - Hep B serologies (Core, Surface Ab +): Hep B ag negative, DNA quant negative   - Continue PTA Folate, B12   - Transfuse if Hgb < 7     # CKD Stage 3A  Cr of 1.31 on admission up from baseline now up to 1.6 (5/27), now improved to 1.05. Suspect Cr elevation is pre-renal in setting of recent sepsis and will likely improve with volume resuscitation.    - Avoid nephrotoxic agents as able    # Degenerative disc disease  # Bilateral intermittent hand cramping, spasms suspicious for cervical impingement  Patient with history of degenerative disc disorder with concern for cervical impingement in setting of new bilateral intermittent hand cramping. Patient met with Dr. Melton his sports medicine doctor (AM 5/24) over phone for follow up appointment to discuss results of his MRI. Will defer to outpatient management.     # Hx Herpes Zoster c/b post-herpetic neuralgia: PTA ppx Acyclovir while on prednisone  # Hypothyroid - TSH 1.94 on admission WNL. Continue PTA levothyroxine  # HLD- PTA atorvastatin  # GERD- PTA omeprazole + PRN famotidine (also on Pred)  # Vitamin supplements- PTA B12, folate, D3        Diet: Combination Diet Regular Diet Adult  Snacks/Supplements Adult: Other; 8 PM - String cheese stick x2; Between Meals  Snacks/Supplements Adult: Ensure Enlive; Between Meals    DVT Prophylaxis: Heparin ppx  Piña Catheter: Not present  Central Lines: None  Cardiac Monitoring: ACTIVE order. Indication: Bradycardias (48 hours)  Code Status: Full Code    Confirmed patient is full code. His sister Hodan Deleon is his decision maker if he is unable to make decisions for himself.    Disposition Plan   Expected Discharge: 06/08/2022     Anticipated discharge location:  Awaiting care coordination huddle  Delays: None     The patient's care was discussed with the Attending Physician, Dr. Snell.    Everardo Lau MD  Medicine Service, [facility] TEAM [address] [place] [address]  [facility]  Securely message with the Overinter[employer] Media Web Console (learn more here)  Text page via [facility] Paging/Directory   Please see signed in provider for up to date coverage information      Clinically Significant Risk Factors Present on Admission                    ______________________________________________________________________    Interval History     No acute events overnight. Difficulty sleeping and would like to request something stronger than melatonin. Remains on HFNC with increased FiO2 80% --> 100%. Is alarmed about the possibility of intubation but reiterates he would like to remain full code should he need intubation to survive. No fevers or chills.     Four point ROS completed and negative unless listed above    Data reviewed today: I reviewed all medications, new labs and imaging results over the last 24 hours.    Physical Exam   Vital Signs: Temp: 97.2  F (36.2  C) Temp src: Oral BP: 124/81 Pulse: 93   Resp: 18 SpO2: 92 % O2 Device: High Flow Nasal Cannula (HFNC) Oxygen Delivery: 40 LPM  Weight: 150 lbs 9.19 oz  General Appearance: Alert, pleasant, anxious  Respiratory: Breathing comfortably on HFNC at rest  Cardiovascular: RRR, normal S1/S2, no murmurs. 2s cap refill.  GI: Soft, non-tender, non-distended.   Skin: No rash or lesions noted. Warm, dry.  Other: Alert and oriented x3. Answering questions appropriately.    Data   Recent Labs   Lab 06/06/22  0440 06/05/22  0523 06/04/22  0559   WBC 31.7* 28.4* 29.6*   HGB 8.9* 8.3* 8.6*   * 108* 109*    268 251   * 130* 130*   POTASSIUM 4.5 4.6 4.6   CHLORIDE 96 97 97   CO2 23 25 24   BUN 19 21 23   CR 1.28* 1.37* 1.05   ANIONGAP 10 8 9   DAVID 8.8 8.8  8.8 8.9  8.9   GLC 92 100* 119*   ALBUMIN 2.4* 2.3* 2.4*   PROTTOTAL 5.3* 5.3* 5.5*   BILITOTAL 0.8 0.5 0.6   ALKPHOS 92 87 90   ALT 33 31 28   AST 30 28 27     CT A/P 6/5  Impression:     IMPRESSION:   1. No findings in the abdomen or pelvis to suggest metastatic disease   of  prostate cancer or CLL.   2. Shifting diffuse bilateral groundglass opacities in the visualized   lung bases, with scattered small associated consolidative opacities,   and small bilateral pleural effusions, likely infectious in etiology.   3. Colonic diverticulosis without evidence of acute diverticulitis.

## 2022-06-06 NOTE — PROGRESS NOTES
"Hematology Malignancies Service Follow up    Reviewed notes and reviewed imaging and labs.  Discussed with primary hospitalist today.     /69 (BP Location: Left arm)   Pulse 95   Temp 98  F (36.7  C) (Oral)   Resp 18   Ht 1.676 m (5' 6\")   Wt 68.3 kg (150 lb 9.2 oz)   SpO2 91%   BMI 24.30 kg/m     Patient with high O2 requirements but relatively stable.     Labs/Imaging:  CT CAP with no hepatosplenomegaly or LAD.  ALC is increasing over the last couple of days, but only slightly above the baseline range that we have since April. Flow cytometry late last month was about 50%, so CLL burden is likely around 10k, still relatively low, especially if no LAD or HSM.   Hgb is relatively stable the last few days.   B2MG is 3.2, slightly increased from prior. Nonspecific marker which can be increased in setting of infection.    Assessment:  77 year old with CLL on ibrutinib, compllicated by AIHA, found to have pneumonia and studies positive for PJP, currently receiving treatment for this.    # AIHA is stable. There is likely some suppression in the setting of infection and antibiotics. No clear indication to change treatment, increase steroids, or add anti-CD20 antibody therapy at this point. Continue folic acid. Will need to consider further taper of steroids given the infection, currently on MP 40mg IV daily.    # CLL, possibly increasing ALC - could be transient?, but overall relatively low burden. No evidence that CLL is directly causing current condition. Continue ibrutinib to prevent rebound. If we changed therapy it would be to venetoclax and obinutuzumab, but no indication to change at this time. Venetoclax and obinutuzumab both with their own toxicities and may hinder his recovery from the PJP.     # PJP tx per primary and ID.  # HSV PPx with ACV    Discussed with Dr. Macdonald.    Silas Caba MD  Hematology/BMT Fellow  Pager [phone]  "

## 2022-06-06 NOTE — PLAN OF CARE
"/69 (BP Location: Left arm)   Pulse 95   Temp 98  F (36.7  C) (Oral)   Resp 18   Ht 1.676 m (5' 6\")   Wt 68.3 kg (150 lb 9.2 oz)   SpO2 91%   BMI 24.30 kg/m      Hours of Care: [id number].    Vitals:  stable   Activity:  SBA/Independent  Pain:  denies  Neuro:  Intact, A+Ox4, sensation intact.  Cardiac:  SR w/PAC's, no c/o chest pain    Respiratory:  HFNC 40L 100%, still severe SOB O/E, 02 sats well above 90% at rest.  GI: BS+, passing gas, BM today.  :  Voiding frequently, small amounts.   Diet:  Regular, no appetite  due to constant nausea & overall feeling unwell.  Lines:  SLx1  Skin/Wounds:  Intact, fragile, no wounds noted.   Plan:  Continue with IV Abx's & provide 02 needs.      Continue to monitor and follow POC    Grant Hernández RN on 6/6/2022 at 2:15 PM          "

## 2022-06-06 NOTE — PLAN OF CARE
Goal Outcome Evaluation:    Plan of Care Reviewed With: patient     Overall Patient Progress: no change    Neuro: A&Ox4.   Cardiac: SR. VSS.       Respiratory: Sating 90s on HFNC 100% at 30L. Desats to mid 80s during activity.   GI/: Adequate urine output.   Diet/appetite: Tolerating reg diet. Poor appetite.  Activity:  Assist of 1, up to commode.  Pain: At acceptable level on current regimen. No complaints of pain  Skin: No new deficits noted.  LDA's: RPIV     Plan: Continue with POC. Notify primary team with changes. Patient did not sleep well during shift. Only has melatonin ordered and claims it does not work well.

## 2022-06-07 NOTE — PROGRESS NOTES
[facility]  Transplant Infectious Disease Progress Note     Patient:  Loco Wood, Date of birth 1945, Medical record number [id number]  Date of Visit:  06/07/2022         Assessment and Recommendations:   77 year old man with CLL and AIHI s/p prednisone courses in 12/2021 from derm, 1/2022 for lung symptoms in [place], in 3/2022 for anemia, then most recently for 3 weeks & 6 weeks for AIHA who is being treated for Pneumocystis pneumonia.    Severe Pneumocystis pneumonia: 5/27/22 CT chest showed progressive bilateral groundglass opacities. 5/27/2022 BAL Pneumocystis sputum PCR +, in the setting of a progressive pulmonary process. Elevated Fungitell & LDH are consistent with Pneumocystis. Had 1 dose of neb pent on 5/26/2022 (may have started to kill some pneumocystis organisms). IV bactrim started 5/27/2022. Other infectious work up has included negative MRSA nares, Cryptococcal antigen, EBV PCR, CMV PCR, Coccidiodes antigen, Aspergillus gm, fungal antibodies, Histoplasma antigen, Blastomyces antigen and TB quantiferon. Karius demonstrated 6336 copies of Pneumocystis jirovecii and 353 copies of E faecium. He continues to have increased respiratory distress requiring increased FiO2. He remains on bactrim but the dose is lower as his RICK has recovered. It is not clear that his respiratory status is being driven completely by PJP. He does remain on steroids and has been on steroids for a prolonged period of time. The use of steroid is what put him at risk for PJP so I am hesitant to increase the dose further at this point given the prolonged course. Will recheck a 1-3 BD glucan and if this is significantly lower then it may suggest the infection is more controlled. Another consider is IRIS if there is a degree of immune reconstitution.     Leukocytosis:WBC up trending over the past week. Comparatively the absolute lymphocytes has increased compared to the absolute neutrophils. Unclear  yet is this is the CLL driving this. Remains on ibrutinib. He is has been  methylprednisolone since 5/24 with a dose of 40 mg since 5/28. Methylprednisolone provides treatment for AIHA and adjunctive therapy with tmp/smx to treat PJP.    E faecium on Karius: initially started on linezolid. Has received about 4 days. Will stop since this has not been isolated on clinical cultures.    - Hep B core ab & S ab+ 5/26/2022, with negative hep B viral load.  - Extensive travel hx.     - QTc interval: 427 msec on 6/2/2022 EKG  - Bacterial prophylaxis: zosyn through to 5/31/2022.  - Pneumocystis prophylaxis: pent 5/26/2022, started on bactrim 5/27/2022.  - Viral serostatus & prophylaxis: HSV1+, HSV2+, CMV+; on acyclovir prophy  - Fungal prophylaxis: Fungitell > 500; on micafungin since 5/27/2022.  - Immunization status: he has had 3 covid vaccinations.   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 Given IV IG x 1 on 5/25/2022 at 30 g.   - Isolation status: Good hand hygiene.    Recommendations:  1. Will start to de-escalate antibiotics in a step wise approach.  2. Stop micafungin. Continue fluconazole 400 mg daily. Will likely stop in upcoming days.  3. Stop zosyn and levofloxacin. Zosyn has a high salt content and there is not an indication for double coverage.   4. Start renally dosed cefepime in place of zosyn and levo. Will consider stopping cefepime in the upcoming days.    5. Stop linezolid  6. Recheck 1-3 BD glucan and CRP  7. Bactrim dosing needs to be optimized with RICK improvement. He is 68 kg. Crcl 53.4. Optimal dose for PJP is 20 mg/kg divided in 3-4 doses per day. His recommended daily dose is 1360 mg based on trimethoprim component. Current he is only receive 880 mg. Increased tmp/smx to 800-160 x 2 pills four times a day (total 1280 mg). Given his high O2 requirements would convert po to IV which would be 320 mg IV 4 times daily. IV should be continued until stabilized from an O2  perspective.  8. Consider evaluation (pro BNP) for a component for fluid overload especially since IV bactrim has high fluid content.   9. Agree with steroids but this could be affecting the leucocytosis. Ongoing conversation regarding steroid dose in terms of treating PJP and AIHA. Haptoglobin pending to evaluate AIHA.  10. If he is intubated recommending obtaining a bronchoscopy    Transplant Infectious Disease will continue to follow with you.      Marybeth Anderson DO.   Infectious Diseases Attending  Pager [phone]        Interval History:   Since was last seen by ID, 6/3/22 . Today is my first day seeing this hospital stay. Current remains on acyclovir, fluconazole, levofloxacin, linezolid, micafungin and zosyn. Gets very SOB with minimal activity. + dry cough. We discussed the diagnosis of PJP and how it could affect his activity later after recovery.        Review of Systems:Remaining systems all reviewed and negative       Current Medications & Allergies:       acyclovir  800 mg Oral BID     allopurinol  300 mg Oral Daily     atorvastatin  20 mg Oral Daily     cyanocobalamin  100 mcg Oral Daily     fluconazole  400 mg Oral Daily     folic acid  1 mg Oral Daily     heparin ANTICOAGULANT  5,000 Units Subcutaneous Q12H     ibrutinib  420 mg Oral Daily     lactated ringers  500 mL Intravenous Once     levofloxacin  250 mg Oral Daily     levothyroxine  50 mcg Oral Daily     linezolid  600 mg Intravenous Q12H     melatonin  10 mg Oral At Bedtime     methylPREDNISolone  40 mg Intravenous Daily     micafungin  150 mg Intravenous Q24H     ondansetron  8 mg Intravenous Daily     pantoprazole  20 mg Oral Daily     piperacillin-tazobactam  4.5 g Intravenous Q6H     polyethylene glycol  17 g Oral Daily     psyllium  1 packet Oral BID     senna-docusate  1 tablet Oral BID    Or     senna-docusate  2 tablet Oral BID     sodium chloride (PF)  3 mL Intracatheter Q8H     sodium chloride (PF)  3 mL Intracatheter Q8H      sulfamethoxazole-trimethoprim  2 tablet Oral BID     sulfamethoxazole-trimethoprim  3 tablet Oral Daily with lunch     vitamin D3  50 mcg Oral Daily       Allergies   Allergen Reactions     Blood Transfusion Related (Informational Only) Other (See Comments)     Patient has a history of a clinically significant antibody against RBC antigens.  A delay in compatible RBCs may occur.      Morphine Itching and Rash     Dilaudid [Hydromorphone] Itching            Physical Exam:   Ranges for vital signs:  Temp:  [97.4  F (36.3  C)-98.3  F (36.8  C)] 97.7  F (36.5  C)  Pulse:  [] 95  Resp:  [18-24] 22  BP: (121-136)/(69-82) 127/75  FiO2 (%):  [90 %-100 %] 100 %  SpO2:  [71 %-99 %] 95 %  Vitals:    06/05/22 0142 06/06/22 0100 06/07/22 0600   Weight: 69 kg (152 lb 1.9 oz) 68.3 kg (150 lb 9.2 oz) 68 kg (149 lb 14.6 oz)     Physical Examination:  GENERAL:  Chronically ill appearing. in bed.no acute distress.  HEAD:  Head is normocephalic, atraumatic   EYES:  Eyes have anicteric sclerae without conjunctival injection. Pupils reactive bilaterally.   ENT:  Oropharynx is moist without exudates or ulcers. Tongue is midline. No sinus tenderness.   LUNGS:  Clear to auscultation bilaterally. No accessory muscle use. On high flow nasal cannula  CARDIOVASCULAR:  Regular rate and rhythm with no murmurs  ABDOMEN:  Normal bowel sounds, soft, non-tender, non-distended.  SKIN:  No acute rashes.    EXTREMITIES: No joint erythema or swelling.   NEUROLOGIC:  Grossly nonfocal. Active x4 extremities. Alert. Oriented.   LINES: peripheral IV in place without any surrounding erythema or exudate. Dressing intact.          Laboratory Data:     Metabolic Studies       Recent Labs   Lab Test 06/07/22  0336 06/06/22  0440 06/05/22  0523 06/04/22  1727 06/03/22  2202 06/03/22  1801 05/24/22  1651 05/23/22  1802   * 129*   < >  --    < >  --    < >  --    POTASSIUM 4.9 4.5   < >  --    < >  --    < >  --    CHLORIDE 96 96   < >  --    < >  --     < >  --    CO2 23 23   < >  --    < >  --    < >  --    ANIONGAP 9 10   < >  --    < >  --    < >  --    BUN 19 19   < >  --    < >  --    < >  --    CR 1.15 1.28*   < >  --    < >  --    < >  --    GFRESTIMATED 66 58*   < >  --    < >  --    < >  --    GLC 91 92   < >  --    < >  --    < >  --    DAVID 8.8  8.8 8.8   < >  --    < >  --    < >  --    PHOS 4.3 3.6   < >  --    < >  --    < >  --    MAG 2.3 2.2   < >  --    < >  --    < >  --    LACT 2.3*  --   --  3.2*   < >  --    < >  --    PCAL  --   --   --   --   --  0.07*   < >  --    FGTL  --   --   --   --   --   --   --  >500    < > = values in this interval not displayed.       Hepatic Studies    Recent Labs   Lab Test 06/07/22 0336 06/06/22 0440 06/05/22  0523 05/26/22  0549 05/25/22  0616   BILITOTAL 0.8 0.8 0.5   < > 1.7*   DBIL  --   --   --   --  0.4*   ALKPHOS 99 92 87   < >  --    PROTTOTAL 5.6* 5.3* 5.3*   < >  --    ALBUMIN 2.6* 2.4* 2.3*   < >  --    AST 43 30 28   < >  --    ALT 42 33 31   < >  --    * 322* 328*   < > 300*    < > = values in this interval not displayed.       Hematology Studies      Recent Labs   Lab Test 06/07/22 0336 06/06/22 0440 06/05/22  0523 06/04/22  0559 06/03/22  0423 06/02/22  0451   WBC 36.8* 31.7* 28.4* 29.6* 29.4* 28.9*   ANEU 14.4* 11.1* 8.5* 13.6* 11.8* 15.6*   ALYM 22.4* 20.3* 19.3* 15.7* 17.6* 13.0*   ANY 0.0 0.3 0.6 0.3 0.0 0.3   AEOS 0.0 0.0 0.0 0.0 0.0 0.0   HGB 9.5* 8.9* 8.3* 8.6* 8.7* 9.5*   HCT 28.7* 27.2* 25.4* 26.3* 27.0* 28.8*    263 268 251 241 215       No results found for: ACD4    Arterial Blood Gas Testing    Recent Labs   Lab Test 06/03/22  1654 06/03/22  0852 06/03/22  0644 06/02/22  0929 06/01/22  1445 06/01/22  1021   PH 7.41  --  7.42 7.47* 7.45 7.47*   PCO2 35  --  38 38 34* 34*   PO2 81  --  72* 62* 68* 49*   HCO3 22  --  25 28 24 25   O2PER 100 30 100 100 100 15      Inflammatory Markers    Recent Labs   Lab Test 06/03/22  0423 06/02/22  0451 10/04/17  2250 04/11/16  1136    SED  --   --  1  --    CRP 82.0* 100.0*  --  <0.2  Reference Values:   Low Risk:           <1.0 mg/L   Average Risk:       1.0-3.0 mg/L   High Risk:          >3.0 mg/L   Acute Inflammation: >8.0 mg/L         Immune Globulin Studies     Recent Labs   Lab Test 06/03/22  0856 05/24/22  1651 06/12/19  0806 05/31/19  1021 04/30/19  1219 12/21/18  1103 04/29/15  1012 09/19/14  1227   * 312* 394* 409* 429* 443*   < > 729   IGM  --   --   --   --   --   --   --  28*   IGA  --   --   --   --   --   --   --  72    < > = values in this interval not displayed.       Gout Labs      Recent Labs   Lab Test 06/07/22  0336 06/06/22  0440 06/05/22  0523 06/04/22  0559 06/03/22  0423   URIC 1.8* 2.1* 1.9* 2.1* 2.4*     Microbiology:  Fungal testing  Recent Labs   Lab Test 05/27/22  1356 05/27/22  0916 05/23/22  1802   FGTL  --   --  >500   ASPGAI 0.03  --   --    ASPGAA Negative  --   --    COFUNG  --  <1:2  --        Last Culture results with specimen source  Culture   Date Value Ref Range Status   06/03/2022 No growth after 4 days  Preliminary   06/03/2022 No growth after 4 days  Preliminary   06/02/2022 No growth after 5 days  Preliminary   05/27/2022 2+ Normal aracely  Final   05/27/2022 No Growth  Final   05/27/2022 No Growth  Final   05/26/2022 No Growth  Final   05/26/2022 No Growth  Final   05/26/2022   Final    >10 Squamous epithelial cells/low power field indicates oral contamination. Please recollect.   05/23/2022 No Growth  Final   05/23/2022 No Growth  Final     Culture Micro   Date Value Ref Range Status   03/02/2014 No growth  Final   10/22/2010 No Beta Streptococcus isolated  Final   04/21/2010   Final    No Salmonella, Shigella, Campylobacter or E coli 0157 isolated.   04/18/2010 No growth  Final   03/16/2009 No Beta Streptococcus isolated  Final   01/13/2007 No growth  Final   04/06/2004 No Beta Streptococcus isolated  Final        Virology:  Coronavirus-19 testing    Recent Labs   Lab Test 06/07/22  0903  05/31/22  0241 05/23/22  1946 06/23/20  0843   [id number]  --   --   --  Negative   [id number]  --   --   --  Not Applicable   [id number] Negative Negative Negative  --        Respiratory virus testing    Recent Labs   Lab Test 05/24/22  1916 05/23/22  1946   IFLUA Not Detected  --    INFZA  --  Negative   FLUAH1 Not Detected  --    BY0152 Not Detected  --    FLUAH3 Not Detected  --    IFLUB Not Detected  --    INFZB  --  Negative   PIV1 Not Detected  --    PIV2 Not Detected  --    PIV3 Not Detected  --    PIV4 Not Detected  --    IRSV  --  Negative   RSVA Not Detected  --    RSVB Not Detected  --    HMPV Not Detected  --    ADENOV Not Detected  --    CORONA Not Detected  --        CMV viral loads    Recent Labs   Lab Test 06/01/22  1448   CMVQNT Not Detected       EBV DNA Copies/mL   Date Value Ref Range Status   06/02/2022 Not Detected Not Detected copies/mL Final     Urine Studies     Recent Labs   Lab Test 06/02/22  0952 05/26/22  2240 05/23/22  1906 05/19/22  1355 01/04/21  1744   URINEPH 6.5 5.5 6.5 7.0 5.5   NITRITE Negative Negative Negative Negative Negative   LEUKEST Negative Negative Negative Negative Negative   WBCU 2 2 1 0-5 1     Imaging:  CT Abdomen Pelvis w Contrast  Narrative: EXAMINATION: CT ABDOMEN PELVIS W CONTRAST  6/5/2022 12:05 PM      CLINICAL HISTORY: Prostate cancer, restaging    COMPARISON: CT abdomen and pelvis 1/4/2021, CT chest on 5/27/2022    PROCEDURE COMMENTS: CT of the abdomen was performed with iopamidol  (ISOVUE-370) solution 93 mL intravenous and oral contrast. Coronal and  sagittal reformatted images were obtained.    FINDINGS:  Lower thorax:   Diffuse bilateral groundglass opacities in the visualized lungs with  scattered solid consolidative opacities right greater than left. Small  bilateral pleural effusions.  Small hiatal hernia. Small fat-containing Bochdalek-type diaphragmatic  hernia on the left.    Abdomen and pelvis:   Liver: Tiny hypodense lesion in the liver in segment IV  appears  unchanged compared to 1/4/2021. Calcified granuloma in the right  hepatic lobe. No suspicious liver lesions.   Gallbladder: No gallstones. No evidence of acute cholecystitis.  Spleen: Normal size.  Pancreas: Fatty atrophy of the pancreas. No suspicious pancreatic  lesions. The pancreatic duct is not dilated.  Adrenal glands: No adrenal nodules.  Urinary system: No kidney masses. Large peripelvic simple cyst on the  right. Tiny hypodense lesions in the bilateral kidneys are too small  to characterize. 3 mm stone in the right and left inferior poles. No  hydronephrosis, hydroureter, or obstructing renal stones. Urinary  bladder is distended and partially obscured due to artifact but  appears unremarkable.  Reproductive organs: Pelvic phleboliths. The prostate is not  well-visualized due to streak artifact.  Bowel: No abnormally dilated loops of large or small bowel. No  abnormal bowel wall thickening. Colonic diverticulosis without  evidence of diverticulitis. The appendix is unremarkable.  Lymph nodes: No retroperitoneal, mesenteric, or pelvic  lymphadenopathy.  Fluid: No free fluid within the abdomen.  Vessels: Atherosclerotic calcifications. No infrarenal aortic  aneurysm.     Bones and soft tissues: No suspicious osseous abnormalities.  Degenerative changes of the visualized thoracic and lumbar spine.  Bilateral total hip arthroplasties. Posterior spinal fusion and the  lower lumbar spine.  Impression: IMPRESSION:  1. No findings in the abdomen or pelvis to suggest metastatic disease  of prostate cancer or CLL.   2. Shifting diffuse bilateral groundglass opacities in the visualized  lung bases, with scattered small associated consolidative opacities,  and small bilateral pleural effusions, likely infectious in etiology.  3. Colonic diverticulosis without evidence of acute diverticulitis.    I have personally reviewed the examination and initial interpretation  and I agree with the findings.    ESTEFANI  MD ERIC         SYSTEM ID:  [id number]

## 2022-06-07 NOTE — PROGRESS NOTES
End of Shift Summary. See flowsheets for vital signs and detailed assessment.     Changes this shift: Pt alert, oriented x4. HiFL NC 45L %O2.  Remained afebrile, normotensive and in SR 80-90's. Pt would often desatt into 70-80's and become tachycardic 110-120's with activity (standing up to urinate). Sats would improve immediately upon laying down. BM x1 this shift. Pt denies pain. Aferile.     Plan: Provide emotional reassurance to pt. Wean HiFL as tolerated.

## 2022-06-07 NOTE — PROGRESS NOTES
ICU End of Shift Summary. See flowsheets for vital signs and detailed assessment.    Changes this shift: Pt alert, oriented x4. Emotionally fearful and anxious regarding AM MD conversation as MD resident stated that he would have to be intubated if his O2 needs do not improve. Required frequent reassurance as he remained on HiFL NC 45L %O2.  Remained afebrile, normotensive and in SR 80-90's. Pt would often desatt into 70-80's and become tachycardic 110-120's with activity (standing up to urinate). Sats would improve immediately upon laying down. Otherwise no BM overnight with increased urine frequency. No pain per pt report. No prn nausea medications needed. Notable labs include lactic 2.3, team aware and elevating WBC 36.8 (remains afebrile) trending up from 31.7.    Plan: Provide emotional reassurance to pt. Wean HiFL as tolerated.

## 2022-06-07 NOTE — PROGRESS NOTES
[facility]    Medicine Progress Note - Medicine Service, MAROON TEAM 4    Date of Admission:  5/23/2022    Assessment & Plan   Loco Wood is a 77 year old male with history of CLL, auto-immune hemolytic anemia (on prednisone taper), and CKD3a admitted for several days of SOB, chills, pleuritic chest pain with infiltrates/consolidations on CT imaging. Initially treated for CAP (ceft + azithro) with improvement. On 5/27 patient transferred to [facility] [place] for worsening respiratory status and fever, now improved and afebrile (45L HFNC -> 12-15L oxymizer). Continue broad coverage with Zosyn, Micafungin (B-D glucan > 500), and IV bactrim.  Heme/Onc following for auto-immune hemolytic anemia and CLL, steroids decreased in setting of infection.    Changes today:   - Trial 500 ml LR given worsening hyponatremia and concern for hypovolemic hyponatremia, however, sodium studies suggestive of SIADH, will start fluid restriction   - Per ID review:    - Stop micafungin   - Change zosyn and levofloxacin to cefepime   - Check 1,3 BD glucan, CRP, NT pro BNP   - Bactrim back from PO to IV, increase dose given improvement in RICK    # Acute hypoxic respiratory failure 2/2 possible Bacterial PNA vs Fungal PNA vs ARDS  # PJP pneumonia   # E faecium on Karius assay  # Severe sepsis  # Bilateral lower lobe bronchiectasis/consolidations  Admitted for several days of worsening fatigue/dyspnea, subjective chills, and pleuritic chest pain with 3-4L oxygen requirement (w/ elevated WBC) and CT w/ infiltrates and bilateral lower lobe consolidations concerning for infection. RVP negative. Patient initially treated with 5 day abx course starting 5/24 with azithromycin (d/c 5/26) + ceftriaxone for presumed CAP with clinical improvement. On 5/27, patient developed fever with worsening respiratory status (45 L high flow NC) and was transferred to [facility] [place]. Patient improving with IVF 2.5L and broadened coverage  with Zosyn, Micafungin (positive B-Glucan), and IV bactrim (for possible PJP) O2 req of 12-15L, with increase to HFNC on 6/1. CXR 5/31 with trace effusions and increasing apical opacities concerning for worsening edema/infection. PJP PCR 5/27 positive. Karius 6/1 + PJP and E. Faecium.  - Current regimen: micafungin, bactrim (IV --> PO 6/3), levofloxacin, zosyn, fluconazole, linezolid (for e faecium)  - Infectious Disease following   - Stop micafungin, linezolid   - Change zosyn and levofloxacin to cefepime   - Lau Bactrim back from PO to IV, increase dose given improvement in RICK   - Check 1,3 BD glucan, CRP, NT-pro BNP  - IV lasix 40 mg once 6/1  - Supplemental O2; wean as able; incentive spirometry  - Pulmonology signed off, no bronchoscopy planned at present due to high oxygen needs and likely diagnosis of PJP from non-invasive testing    Labs:  RVP, COVID/Flu: Negative  MRSA swab: negative  Beta-D Glucan: Positive (> 500)  Following Sputum sample  Blood cultures: NG  Sputum: 2+ mixed aracely  Histo antigen: negative  Aspergillus Ag: negative   Blasto antigen: negative  Cryptococcus ag: negative  CMV PRC negative:   Fungal ab (blast, aspergillus, cocci, histo): negative  Quantiferon Gold: negative   PJP PCR: positive   Karius: PJP positive, E faecium positive  Cocci agn: negative    # Nausea  Last AXR 5/27 without evidence of obstruction.  - Scheduled Zofran in AM   - PRN antiemetics (Zofran, compazine, reglan)    # CLL (dx 2/2007), stable  History of CLL (2007) managed on Ibrutinib (5/2019) which patient is not taking daily. WBC 24.9 on admission (up from baseline ~6-10) now down to ~21. Suspect secondary to recent infection with lower concern for conversion to leukemia. IVIG infusion (5/25). Following FISH and cytogenetics for disease prognostication and will continue to monitor with peripheral smear.   Continue daily ibrutinib to help control disease, increased bioavailability with addition of fluconazole 6/2.  WBC and ALC uptrending. CT A/P without evidence of CLL metastasis .    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 Trialed 500 ml LR 6/7, however, sodium studies suggestive of SIADH.  - Fluid restriction 1000 ml  - Change IV medication carriers to NS    # Ground level fall secondary to orthostatic hypotension, resolved  Early AM 6/4. CT head without evidence of intracranial hemorrhage, EKG unremarkable, found to have orthostatic hypotension, improved with  ml.  - Holding Ambien PRN at night  - Fall precautions    # Insomnia  - Hold PTA PRN Ambien as above  - Increase scheduled melatonin from 3 mg to 10 mg  - Considered adding mirtazapine, but will hold off given recent linezolid therapy and concern for serotonin syndrome  - Olanzapine PRN    Chronic/Resolved Problems:     # Lactic acidosis, improved  Persistent lactic acidosis 3-4 despite antimicrobial treatment and IVF boluses. Appears hemodynamically stable with no evidence of hypoperfusion. Discussed with hem/onc, can sometimes see in malignancies, but unlikely in reasonably controlled CLL. Hepatic function wnl, suggesting against clearance issue. Resolved with additional IVF 5/30. Increased to 4.1 on 6/3, improved with addition of antibiotics above.    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 B12, folate, iron panel normal with slightly elevated YEN levels. Labs concerning for marrow responsive anemia (elevated LDH, bilirubin, reticulocyte count). Will likely need to treat underlying CLL for  long-term solution.    Hgb on admission 9.5 down from baseline (~10-12 past year) and now ~8. Currently hemodynamically stable and suspect recent drop in Hgb secondary to dilutional anemia in setting of 2.5L of IVF given (5/27). Methylpred decreased and rituximab held in setting of recent infection.   - Heme/Onc consulted    - Continue Methylprednisolone 40mg   - Hold Rituximab infusion   - Check uric acid, LDH, Mg, Phos, and retic count daily   - Hep B serologies (Core, Surface Ab +): Hep B ag negative, DNA quant negative   - Continue PTA Folate, B12   - Transfuse if Hgb < 7     # CKD Stage 3A  Cr of 1.31 on admission up from baseline now up to 1.6 (5/27), now improved to 1.05. Suspect Cr elevation is pre-renal in setting of recent sepsis and will likely improve with volume resuscitation.    - Avoid nephrotoxic agents as able    # Degenerative disc disease  # Bilateral intermittent hand cramping, spasms suspicious for cervical impingement  Patient with history of degenerative disc disorder with concern for cervical impingement in setting of new bilateral intermittent hand cramping. Patient met with Dr. Melton his sports medicine doctor (AM 5/24) over phone for follow up appointment to discuss results of his MRI. Will defer to outpatient management.     # Hx Herpes Zoster c/b post-herpetic neuralgia: PTA ppx Acyclovir while on prednisone  # Hypothyroid - TSH 1.94 on admission WNL. Continue PTA levothyroxine  # HLD- PTA atorvastatin  # GERD- PTA omeprazole + PRN famotidine (also on Pred)  # Vitamin supplements- PTA B12, folate, D3        Diet: Combination Diet Regular Diet Adult  Snacks/Supplements Adult: Other; 8 PM - String cheese stick x2; Between Meals  Snacks/Supplements Adult: Ensure Enlive; Between Meals    DVT Prophylaxis: Heparin ppx  Piña Catheter: Not present  Central Lines: None  Cardiac Monitoring: ACTIVE order. Indication: hypoxia  Code Status: Full Code    Confirmed patient is full code. His sister  Hodan Deleon is his decision maker if he is unable to make decisions for himself.    Disposition Plan   Expected Discharge: 06/10/2022     Anticipated discharge location:  Awaiting care coordination huddle  Delays: None     The patient's care was discussed with the Attending Physician, Dr. Arreguin.    Everardo Lau MD  Medicine Service, MAROON TEAM 4  [facility]  Securely message with the Vocera Web Console (learn more here)  Text page via [facility] Paging/Directory   Please see signed in provider for up to date coverage information      Clinically Significant Risk Factors Present on Admission                    ______________________________________________________________________    Interval History     No acute events overnight. Better sleep last night. No fevers, chills. Still having shortness of breath with activity out of bed.     Four point ROS completed and negative unless listed above    Data reviewed today: I reviewed all medications, new labs and imaging results over the last 24 hours.    Physical Exam   Vital Signs: Temp: 97.7  F (36.5  C) Temp src: Oral BP: 127/75 Pulse: 95   Resp: 22 SpO2: 95 % O2 Device: High Flow Nasal Cannula (HFNC) Oxygen Delivery: 45 LPM  Weight: 149 lbs 14.6 oz  General Appearance: Alert, pleasant, anxious  Respiratory: Breathing comfortably on HFNC at rest  Cardiovascular: RRR, normal S1/S2, no murmurs. 2s cap refill.  GI: Soft, non-tender, non-distended.   Skin: No rash or lesions noted. Warm, dry.  Other: Alert and oriented x3. Answering questions appropriately.     Data   Recent Labs   Lab 06/07/22  0336 06/06/22  0440 06/05/22  0523   WBC 36.8* 31.7* 28.4*   HGB 9.5* 8.9* 8.3*   * 108* 108*    263 268   * 129* 130*   POTASSIUM 4.9 4.5 4.6   CHLORIDE 96 96 97   CO2 23 23 25   BUN 19 19 21   CR 1.15 1.28* 1.37*   ANIONGAP 9 10 8   DAVID 8.8  8.8 8.8 8.8  8.8   GLC 91 92 100*   ALBUMIN 2.6* 2.4* 2.3*   PROTTOTAL 5.6*  5.3* 5.3*   BILITOTAL 0.8 0.8 0.5   ALKPHOS 99 92 87   ALT 42 33 31   AST 43 30 28

## 2022-06-08 NOTE — PLAN OF CARE
Goal Outcome Evaluation:    Plan of Care Reviewed With: patient     Vitals: Afebrile, VSS  Neuros: AOX4. Pt was fearful he might not be able to be weaned completely off of  supplemental oxygen and  return to his usual hobbies.Empathetic listening, Emotional support was provided.  Cardiac:SR  Respiratory:Pt is on HFNC 45L, Fio2 80% sating >92% at rest . Desats to 80's with minimal activity, able to go back up to 90's when pt is at rest. Pt stated this evening is the first time of not being SOB with conversations after falling sick.   IV: R PIV infusing   Diet: Regular diet, 1L fluid restriction. Poor appetite. ate snacks.  GI:No BM  :  Uses the urinal with adequate output  Skin: Scattered bruising  Pain: Denies  Activity: AX1  Plan: Continue with poc and report any changes to the primary provider  Please refer to flow sheet for detailed information.

## 2022-06-08 NOTE — PLAN OF CARE
"Neuro: A&Ox4.   Cardiac: SR-Stach with frequent PAC's. 's.   Respiratory: On HFNC % FiO2. Required an additional 15 lpm via oxymask with activity- would desat to the 70's but quickly recovered. Clear/diminished.   GI/: Adequate urine output. BM X1. Intermittent nausea, prn compazine given.   Diet/appetite: Regular diet. 1 L FR. Poor appetite.   Activity:  Assist of 1, activity minimized due to respiratory. Was able to briefly stand at the side of the bed x1.   Pain: Denied.   Skin: No new deficits noted.  LDA's: R PIV, TKO.     Plan: Continue with POC. Notify primary team with changes.  /68 (BP Location: Left arm)   Pulse 108   Temp 97.9  F (36.6  C) (Oral)   Resp 20   Ht 1.676 m (5' 6\")   Wt 66.5 kg (146 lb 9.7 oz)   SpO2 90%   BMI 23.66 kg/m        "

## 2022-06-08 NOTE — PLAN OF CARE
ICU End of Shift Summary. See flowsheets for vital signs and detailed assessment.    Changes this shift: Patient alert & oriented x 4, denies pain,vital stable, tele SR with occasional PAC's, dyspnea on exertion, desaturate in 70-80's with minimum activities, lung sound coarse/diminished, on HFNC 80%, 50 LPM, on 1 liter fluid restriction, NA+ 130 this morning, adequate urine output.    Plan: Continue to monitor respiratory status and wean FIO2 per patient tolerance.    Problem: Respiratory Compromise (Pneumonia)  Goal: Effective Oxygenation and Ventilation  Outcome: Ongoing, Progressing  Intervention: Promote Airway Secretion Clearance  Recent Flowsheet Documentation  Taken 6/8/2022 0500 by Kimberly Oseguera RN  Cough And Deep Breathing: done independently per patient  Taken 6/8/2022 0000 by Kimberly Oseguera RN  Cough And Deep Breathing: done independently per patient  Intervention: Optimize Oxygenation and Ventilation  Recent Flowsheet Documentation  Taken 6/8/2022 0500 by Kimberly Oseguera RN  Head of Bed (HOB) Positioning: HOB at 20-30 degrees  Taken 6/8/2022 0000 by Kimberly Oseguera RN  Head of Bed (HOB) Positioning: HOB at 20-30 degrees   Goal Outcome Evaluation:          Overall Patient Progress: no change

## 2022-06-08 NOTE — PROGRESS NOTES
[facility]  Transplant Infectious Disease Progress Note     Patient:  Loco Wood, Date of birth 1945, Medical record number [id number]  Date of Visit:  06/08/2022         Assessment and Recommendations:   77 year old man with CLL and AIHI s/p prednisone courses in 12/2021 from derm, 1/2022 for lung symptoms in [place], in 3/2022 for anemia, then most recently for 3 weeks & 6 weeks for AIHA who is being treated for Pneumocystis pneumonia.    Severe Pneumocystis pneumonia: 5/27/22 CT chest showed progressive bilateral groundglass opacities. 5/27/2022 BAL Pneumocystis sputum PCR +, in the setting of a progressive pulmonary process. Elevated Fungitell & LDH are also consistent with Pneumocystis. Had 1 dose of neb pent on 5/26/2022 (may have started to kill some pneumocystis organisms). IV bactrim started 5/27/2022. Other infectious work up has included negative MRSA nares, Cryptococcal antigen, EBV PCR, CMV PCR, Coccidiodes antigen, Aspergillus gm, fungal antibodies, Histoplasma antigen, Blastomyces antigen and TB quantiferon. Karius demonstrated 6336 copies of Pneumocystis jirovecii and 353 copies of E faecium. He continues to have increased respiratory distress requiring high amounts of FiO2. He remains on bactrim but has required ongoing dose adjustments due to renal function. It is not clear that his respiratory status is being driven completely by PJP (? CLL). He does remain on steroids and has been on steroids for a prolonged period of time. The use of steroids is what put him at risk for PJP so I am hesitant to increase the dose further at this point given the prolonged course. 1-3 BD glucan is pending. The 1-3 BD glucan cutoff is 500 so unless it is < 500 it will not be clear the trajectory but if less than 500 this could be helpful to understand that the PJP treatment is working.  Today WBC improved but absolute lymphocytes remain elevated. Creatinine increased again which  maybe reflective of tmp/smx dose increase but will monitor for now. Yesterday we started to deescalate antibiotics-stopped micafungin, zosyn, levofloxacin, and linezolid. Started cefepime in replace of pip/tazo and levo due to high salt content of pip/tazo but will consider deescalating this as well in the upcoming days. Fluid overload at the present seems to be less of a concern given normal proBNP.    Leukocytosis  History of CLL and AIHA:  WBC up trending over the past week but decreased today. Comparatively the absolute lymphocytes has increased compared to the absolute neutrophils. Unclear yet if CLL vs infection is driving this increase. Remains on ibrutinib for CLL. He is has been methylprednisolone since 5/24 with a dose of 40 mg daily since 5/28. Methylprednisolone provides treatment for AIHA and adjunctive therapy with tmp/smx to treat PJP.     E faecium on Karius: initially started on linezolid. Has received about 4 days. Will stop since this has not been isolated on clinical cultures.    History of herpes zoster: on acyclovir    - Hep B core ab & S ab+ 5/26/2022, with negative hep B viral load.  - Extensive travel hx.     - QTc interval: 427 msec on 6/2/2022 EKG  - Bacterial prophylaxis: zosyn through to 5/31/2022.  - Pneumocystis prophylaxis: pent 5/26/2022, started on treatment bactrim 5/27/2022.  - Viral serostatus & prophylaxis: HSV1+, HSV2+, CMV+; acyclovir prophy  - Fungal prophylaxis: Fungitell > 500; on micafungin since 5/27/2022-6/7/22.  - Immunization status:3 covid vaccinations.   - Gamma globulin status:hypogammaglobulinemic. 30g IVIG 5/25/2022  - Isolation status: Good hand hygiene.    Recommendations:  1. Stop fluconazole  2. Continue cefepime for now. Will consider stopping tomorrow.  3. Repeat 1-3 BD glucan is pending  4. Obtain HIV screen and CD4 count  5. Continue bactrim 320 mg IV q 6 hours (crcl 43). Optimal dose for PJP is 20 mg/kg divided in 3-4 doses per day. His recommended daily  dose is 1360 mg based on trimethoprim component.  IV should be continued until stabilized from an O2 perspective.   6. Continue to trend creatinine. If we get in a situation if there is a dramatic increase in creatinine then will either decrease dose of tmp/smx to 15 mg/kg per day or change to an alternate therapy.  7. Optimize fluid status given high fluid content associated with tmp/smx  8. Remains on methylprednisolone which is a adjunct treatment for PJP and treatment for AIHA     Transplant Infectious Disease will continue to follow with you.      Marybeth Anderson DO.   Infectious Diseases Attending  Pager [phone]        Interval History:   Afebrile. WBC decreased to 26.5. CRP also decreased to 19. Antibiotics de-escalated yesterday. He continues to feel about the same but did note an ability to talk easier with a friend yesterday for about 2 hours. Numerous questions regarding PJP treatment and mortality. Has had ongoing nausea and decreased appetite.        Review of Systems:Remaining systems all reviewed and negative       Current Medications & Allergies:       acyclovir  800 mg Oral BID     allopurinol  300 mg Oral Daily     atorvastatin  20 mg Oral Daily     ceFEPIme (MAXIPIME) IV  2 g Intravenous Q8H     cyanocobalamin  100 mcg Oral Daily     fluconazole  400 mg Oral Daily     folic acid  1 mg Oral Daily     heparin ANTICOAGULANT  5,000 Units Subcutaneous Q12H     ibrutinib  420 mg Oral Daily     levothyroxine  50 mcg Oral Daily     melatonin  10 mg Oral At Bedtime     methylPREDNISolone  40 mg Intravenous Daily     ondansetron  8 mg Intravenous Daily     pantoprazole  20 mg Oral Daily     polyethylene glycol  17 g Oral Daily     psyllium  1 packet Oral BID     senna-docusate  1 tablet Oral BID    Or     senna-docusate  2 tablet Oral BID     sodium chloride (PF)  3 mL Intracatheter Q8H     sodium chloride (PF)  3 mL Intracatheter Q8H     sulfamethoxazole-trimethoprim  320 mg Intravenous Q6H      vitamin D3  50 mcg Oral Daily       Allergies   Allergen Reactions     Blood Transfusion Related (Informational Only) Other (See Comments)     Patient has a history of a clinically significant antibody against RBC antigens.  A delay in compatible RBCs may occur.      Morphine Itching and Rash     Dilaudid [Hydromorphone] Itching            Physical Exam:   Ranges for vital signs:  Temp:  [97.5  F (36.4  C)-98.4  F (36.9  C)] 98.3  F (36.8  C)  Pulse:  [82-99] 88  Resp:  [19-26] 20  BP: (126-137)/(64-80) 137/80  FiO2 (%):  [80 %-90 %] 90 %  SpO2:  [90 %-98 %] 97 %  Vitals:    06/06/22 0100 06/07/22 0600 06/08/22 0615   Weight: 68.3 kg (150 lb 9.2 oz) 68 kg (149 lb 14.6 oz) 66.5 kg (146 lb 9.7 oz)     Physical Examination:  GENERAL:  Chronically ill appearing. in bed.no acute distress.  HEAD:  Head is normocephalic, atraumatic   EYES:  Eyes have anicteric sclerae without conjunctival injection. Pupils reactive bilaterally.   ENT:  Oropharynx is moist without exudates or ulcers. Tongue is midline. No sinus tenderness.   LUNGS:  Clear to auscultation bilaterally. No accessory muscle use. On high flow nasal cannula at 100%  CARDIOVASCULAR:  Regular rate and rhythm with no murmurs  ABDOMEN:  Normal bowel sounds, soft, non-tender, non-distended.  SKIN:  No acute rashes.    EXTREMITIES: No joint erythema or swelling.   NEUROLOGIC:  Grossly nonfocal. Active x4 extremities. Alert. Oriented.   LINES: peripheral IV in place without any surrounding erythema or exudate. Dressing intact.          Laboratory Data:     Metabolic Studies       Recent Labs   Lab Test 06/08/22  0816 06/08/22  0445 06/07/22  0336 06/03/22  2202 06/03/22  1801 05/24/22  1651 05/23/22  1802   NA  --  130* 128*   < >  --    < >  --    POTASSIUM  --  4.6 4.9   < >  --    < >  --    CHLORIDE  --  97 96   < >  --    < >  --    CO2  --  25 23   < >  --    < >  --    ANIONGAP  --  8 9   < >  --    < >  --    BUN  --  17 19   < >  --    < >  --    CR  --  1.41*  1.15   < >  --    < >  --    GFRESTIMATED  --  52* 66   < >  --    < >  --    GLC  --  86 91   < >  --    < >  --    DAVID  --  8.6  8.6 8.8  8.8   < >  --    < >  --    PHOS  --  3.1 4.3   < >  --    < >  --    MAG  --  2.3 2.3   < >  --    < >  --    LACT 1.4  --  2.3*   < >  --    < >  --    PCAL  --   --   --   --  0.07*   < >  --    FGTL  --   --   --   --   --   --  >500    < > = values in this interval not displayed.       Hepatic Studies    Recent Labs   Lab Test 06/08/22 0445 06/07/22 0336 06/06/22 0440 05/26/22  0549 05/25/22  0616   BILITOTAL 0.8 0.8 0.8   < > 1.7*   DBIL  --   --   --   --  0.4*   ALKPHOS 92 99 92   < >  --    PROTTOTAL 5.2* 5.6* 5.3*   < >  --    ALBUMIN 2.4* 2.6* 2.4*   < >  --    AST 43 43 30   < >  --    ALT 43 42 33   < >  --    LDH  --  348* 322*   < > 300*    < > = values in this interval not displayed.       Hematology Studies      Recent Labs   Lab Test 06/08/22 0445 06/07/22 0336 06/06/22 0440 06/05/22  0523 06/04/22  0559 06/03/22  0423   WBC 26.5* 36.8* 31.7* 28.4* 29.6* 29.4*   ANEU 7.7 14.4* 11.1* 8.5* 13.6* 11.8*   ALYM 18.8* 22.4* 20.3* 19.3* 15.7* 17.6*   ANY 0.0 0.0 0.3 0.6 0.3 0.0   AEOS 0.0 0.0 0.0 0.0 0.0 0.0   HGB 9.0* 9.5* 8.9* 8.3* 8.6* 8.7*   HCT 26.8* 28.7* 27.2* 25.4* 26.3* 27.0*    262 263 268 251 241       Arterial Blood Gas Testing    Recent Labs   Lab Test 06/03/22  1654 06/03/22  0852 06/03/22  0644 06/02/22  0929 06/01/22  1445 06/01/22  1021   PH 7.41  --  7.42 7.47* 7.45 7.47*   PCO2 35  --  38 38 34* 34*   PO2 81  --  72* 62* 68* 49*   HCO3 22  --  25 28 24 25   O2PER 100 30 100 100 100 15      Inflammatory Markers    Recent Labs   Lab Test 06/08/22  0445 06/03/22  0423 06/02/22  0451 10/04/17  2250 04/11/16  1136   SED  --   --   --  1  --    CRP 19.0* 82.0* 100.0*  --  <0.2  Reference Values:   Low Risk:           <1.0 mg/L   Average Risk:       1.0-3.0 mg/L   High Risk:          >3.0 mg/L   Acute Inflammation: >8.0 mg/L         Immune  Globulin Studies     Recent Labs   Lab Test 06/03/22  0856 05/24/22  1651 06/12/19  0806 05/31/19  1021 04/30/19  1219 12/21/18  1103 04/29/15  1012 09/19/14  1227   * 312* 394* 409* 429* 443*   < > 729   IGM  --   --   --   --   --   --   --  28*   IGA  --   --   --   --   --   --   --  72    < > = values in this interval not displayed.       Gout Labs      Recent Labs   Lab Test 06/07/22  0336 06/06/22  0440 06/05/22  0523 06/04/22  0559 06/03/22  0423   URIC 1.8* 2.1* 1.9* 2.1* 2.4*     Microbiology:  Fungal testing  Recent Labs   Lab Test 05/27/22  1356 05/27/22  0916 05/23/22  1802   FGTL  --   --  >500   ASPGAI 0.03  --   --    ASPGAA Negative  --   --    COFUNG  --  <1:2  --        Last Culture results with specimen source  Culture   Date Value Ref Range Status   06/03/2022 No Growth  Final   06/03/2022 No Growth  Final   06/02/2022 No growth after 6 days  Preliminary   05/27/2022 2+ Normal aracely  Final   05/27/2022 No Growth  Final   05/27/2022 No Growth  Final   05/26/2022 No Growth  Final   05/26/2022 No Growth  Final   05/26/2022   Final    >10 Squamous epithelial cells/low power field indicates oral contamination. Please recollect.   05/23/2022 No Growth  Final   05/23/2022 No Growth  Final     Culture Micro   Date Value Ref Range Status   03/02/2014 No growth  Final   10/22/2010 No Beta Streptococcus isolated  Final   04/21/2010   Final    No Salmonella, Shigella, Campylobacter or E coli 0157 isolated.   04/18/2010 No growth  Final   03/16/2009 No Beta Streptococcus isolated  Final   01/13/2007 No growth  Final   04/06/2004 No Beta Streptococcus isolated  Final        Virology:  Coronavirus-19 testing    Recent Labs   Lab Test 06/07/22  0903 05/31/22  0241 05/23/22  1946 06/23/20  0843   [id number]  --   --   --  Negative   [id number]  --   --   --  Not Applicable   [id number] Negative Negative Negative  --        Respiratory virus testing    Recent Labs   Lab Test 05/24/22  1916 05/23/22 1944    IFLUA Not Detected  --    INFZA  --  Negative   FLUAH1 Not Detected  --    MS1003 Not Detected  --    FLUAH3 Not Detected  --    IFLUB Not Detected  --    INFZB  --  Negative   PIV1 Not Detected  --    PIV2 Not Detected  --    PIV3 Not Detected  --    PIV4 Not Detected  --    IRSV  --  Negative   RSVA Not Detected  --    RSVB Not Detected  --    HMPV Not Detected  --    ADENOV Not Detected  --    CORONA Not Detected  --        CMV viral loads    Recent Labs   Lab Test 06/01/22  1448   CMVQNT Not Detected       EBV DNA Copies/mL   Date Value Ref Range Status   06/02/2022 Not Detected Not Detected copies/mL Final     Urine Studies     Recent Labs   Lab Test 06/02/22  0952 05/26/22  2240 05/23/22  1906 05/19/22  1355 01/04/21  1744   URINEPH 6.5 5.5 6.5 7.0 5.5   NITRITE Negative Negative Negative Negative Negative   LEUKEST Negative Negative Negative Negative Negative   WBCU 2 2 1 0-5 1     Imaging:  CT Abdomen Pelvis w Contrast  Narrative: EXAMINATION: CT ABDOMEN PELVIS W CONTRAST  6/5/2022 12:05 PM      CLINICAL HISTORY: Prostate cancer, restaging    COMPARISON: CT abdomen and pelvis 1/4/2021, CT chest on 5/27/2022    PROCEDURE COMMENTS: CT of the abdomen was performed with iopamidol  (ISOVUE-370) solution 93 mL intravenous and oral contrast. Coronal and  sagittal reformatted images were obtained.    FINDINGS:  Lower thorax:   Diffuse bilateral groundglass opacities in the visualized lungs with  scattered solid consolidative opacities right greater than left. Small  bilateral pleural effusions.  Small hiatal hernia. Small fat-containing Bochdalek-type diaphragmatic  hernia on the left.    Abdomen and pelvis:   Liver: Tiny hypodense lesion in the liver in segment IV appears  unchanged compared to 1/4/2021. Calcified granuloma in the right  hepatic lobe. No suspicious liver lesions.   Gallbladder: No gallstones. No evidence of acute cholecystitis.  Spleen: Normal size.  Pancreas: Fatty atrophy of the pancreas. No  suspicious pancreatic  lesions. The pancreatic duct is not dilated.  Adrenal glands: No adrenal nodules.  Urinary system: No kidney masses. Large peripelvic simple cyst on the  right. Tiny hypodense lesions in the bilateral kidneys are too small  to characterize. 3 mm stone in the right and left inferior poles. No  hydronephrosis, hydroureter, or obstructing renal stones. Urinary  bladder is distended and partially obscured due to artifact but  appears unremarkable.  Reproductive organs: Pelvic phleboliths. The prostate is not  well-visualized due to streak artifact.  Bowel: No abnormally dilated loops of large or small bowel. No  abnormal bowel wall thickening. Colonic diverticulosis without  evidence of diverticulitis. The appendix is unremarkable.  Lymph nodes: No retroperitoneal, mesenteric, or pelvic  lymphadenopathy.  Fluid: No free fluid within the abdomen.  Vessels: Atherosclerotic calcifications. No infrarenal aortic  aneurysm.     Bones and soft tissues: No suspicious osseous abnormalities.  Degenerative changes of the visualized thoracic and lumbar spine.  Bilateral total hip arthroplasties. Posterior spinal fusion and the  lower lumbar spine.  Impression: IMPRESSION:  1. No findings in the abdomen or pelvis to suggest metastatic disease  of prostate cancer or CLL.   2. Shifting diffuse bilateral groundglass opacities in the visualized  lung bases, with scattered small associated consolidative opacities,  and small bilateral pleural effusions, likely infectious in etiology.  3. Colonic diverticulosis without evidence of acute diverticulitis.    I have personally reviewed the examination and initial interpretation  and I agree with the findings.    ESTEFANI REYES MD         SYSTEM ID:  [id number]

## 2022-06-08 NOTE — PROGRESS NOTES
[facility]    Medicine Progress Note - Medicine Service, MAROON TEAM 4    Date of Admission:  5/23/2022    Assessment & Plan   Loco Wood is a 77 year old male with history of CLL, auto-immune hemolytic anemia (on prednisone taper), and CKD3a admitted for several days of SOB, chills, pleuritic chest pain with infiltrates/consolidations on CT imaging. Initially treated for CAP (ceft + azithro) with improvement. On 5/27 patient transferred to [facility] [place] respiratory status and fever, now improved and afebrile (45L HFNC -> 12-15L oxymizer). Continue broad coverage with Zosyn, Micafungin (B-D glucan > 500), and IV bactrim.  Heme/Onc following for auto-immune hemolytic anemia and CLL, steroids decreased in setting of infection.    Changes today:   - Continue current antimicrobials    # Acute hypoxic respiratory failure 2/2 PJP pneumonia   # E faecium on Karius assay  # Severe sepsis, improved  # Bilateral lower lobe bronchiectasis/consolidations  Admitted for several days of worsening fatigue/dyspnea, subjective chills, and pleuritic chest pain with 3-4L oxygen requirement (w/ elevated WBC) and CT w/ infiltrates and bilateral lower lobe consolidations concerning for infection. RVP negative. Patient initially treated with 5 day abx course starting 5/24 with azithromycin (d/c 5/26) + ceftriaxone for presumed CAP with clinical improvement. On 5/27, patient developed fever with worsening respiratory status (45 L high flow NC) and was transferred to [facility] [place]. Patient improving with IVF 2.5L and broadened coverage with Zosyn, Micafungin (positive B-Glucan), and IV bactrim (for possible PJP) O2 req of 12-15L, with increase to HFNC on 6/1. CXR 5/31 with trace effusions and increasing apical opacities concerning for worsening edema/infection. PJP PCR 5/27 positive. Karius 6/1 + PJP and E. Faecium (less likely pathogenic as not growing on other cultures). CRP downtrending.   -  Current regimen: IV bactrim (PJP), cefepime, fluconazole; also on acyclovir for ppx while on steroids  - Discontinued: micafungin, levofloxacin, zosyn, linezolid  - Infectious Disease following   - Check 1,3 BD glucan  - IV lasix 40 mg once 6/1  - Supplemental O2; wean as able; incentive spirometry  - Pulmonology signed off, no bronchoscopy planned at present due to high oxygen needs and likely diagnosis of PJP from non-invasive testing    Labs:  RVP, COVID/Flu: Negative  MRSA swab: negative  Beta-D Glucan: Positive (> 500)  Following Sputum sample  Blood cultures: NG  Sputum: 2+ mixed aracely  Histo antigen: negative  Aspergillus Ag: negative   Blasto antigen: negative  Cryptococcus ag: negative  CMV PRC negative:   Fungal ab (blast, aspergillus, cocci, histo): negative  Quantiferon Gold: negative   PJP PCR: positive   Karius: PJP positive, E faecium positive  Cocci agn: negative    # Nausea  Last AXR 5/27 without evidence of obstruction.  - Scheduled Zofran in AM   - PRN antiemetics (Zofran, compazine, reglan)    # CLL (dx 2/2007), stable  History of CLL (2007) managed on Ibrutinib (5/2019) which patient is not taking daily. WBC 24.9 on admission (up from baseline ~6-10) now down to ~21. Suspect secondary to recent infection with lower concern for conversion to leukemia. IVIG infusion (5/25). Following FISH and cytogenetics for disease prognostication and will continue to monitor with peripheral smear.   Continue daily ibrutinib to help control disease, increased bioavailability with addition of fluconazole 6/2. WBC and ALC uptrending. CT A/P without evidence of CLL metastasis .    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 Trialed 500 ml LR 6/7, however, sodium studies suggestive of SIADH. Improved 6/8. NT pro BNP without evidence of volume overload.  - Fluid restriction 1000 ml  - Change IV medication carriers to NS     # Ground level fall secondary to orthostatic hypotension, resolved  Early AM 6/4. CT head without evidence of intracranial hemorrhage, EKG unremarkable, found to have orthostatic hypotension, improved with  ml.  - Holding Ambien PRN at night  - Fall precautions    # Insomnia  - Hold PTA PRN Ambien as above  - Increase scheduled melatonin from 3 mg to 10 mg  - Considered adding mirtazapine, but will hold off given recent linezolid therapy and concern for serotonin syndrome  - Olanzapine PRN    Chronic/Resolved Problems:     # Lactic acidosis, improved  Persistent lactic acidosis 3-4 despite antimicrobial treatment and IVF boluses. Appears hemodynamically stable with no evidence of hypoperfusion. Discussed with hem/onc, can sometimes see in malignancies, but unlikely in reasonably controlled CLL. Hepatic function wnl, suggesting against clearance issue. Resolved with additional IVF 5/30. Increased to 4.1 on 6/3, improved with addition of antibiotics above.    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 B12, folate, iron panel normal with slightly elevated YEN levels. Labs concerning for marrow responsive anemia (elevated LDH, bilirubin, reticulocyte count). Will likely need to treat underlying CLL for long-term solution.    Hgb on admission 9.5 down from baseline (~10-12 past year) and now ~8. Currently hemodynamically stable and suspect recent drop in Hgb secondary to dilutional anemia in setting of 2.5L of IVF given (5/27). Methylpred decreased and rituximab held in setting of recent infection.  Haptoglobin remains elevated, suggesting against worsening hemolytic anemia.   - Heme/Onc consulted    - Continue Methylprednisolone 40mg   - Hold Rituximab infusion   - Check uric acid, LDH, Mg, Phos, and retic count q48h    - Continue allopurinol for TLS prevention    - Hep B serologies (Core, Surface Ab +): Hep B ag negative, DNA quant negative   - Continue PTA Folate, B12   - Transfuse if Hgb < 7     # CKD Stage 3A  Cr of 1.31 on admission up from baseline now up to 1.6 (5/27), now improved to 1.05. Suspect Cr elevation is pre-renal in setting of recent sepsis and will likely improve with volume resuscitation.    - Avoid nephrotoxic agents as able    # Degenerative disc disease  # Bilateral intermittent hand cramping, spasms suspicious for cervical impingement  Patient with history of degenerative disc disorder with concern for cervical impingement in setting of new bilateral intermittent hand cramping. Patient met with Dr. Melton his sports medicine doctor (AM 5/24) over phone for follow up appointment to discuss results of his MRI. Will defer to outpatient management.     # Hx Herpes Zoster c/b post-herpetic neuralgia: PTA ppx Acyclovir while on prednisone  # Hypothyroid - TSH 1.94 on admission WNL. Continue PTA levothyroxine  # HLD- PTA atorvastatin  # GERD- PTA omeprazole + PRN famotidine (also on Pred)  # Vitamin supplements- PTA B12, folate, D3        Diet: Combination Diet Regular Diet Adult  Snacks/Supplements Adult: Other; 8 PM - String cheese stick x2; Between Meals  Snacks/Supplements Adult: Ensure Enlive; Between Meals  Fluid restriction 1000 ML FLUID    DVT Prophylaxis: Heparin ppx  Piña Catheter: Not present  Central Lines: None  Cardiac Monitoring: ACTIVE order. Indication: hypoxia  Code Status: Full Code    Confirmed patient is full code. His sister Hodan Deleon is his decision maker if he is unable to make decisions for himself.    Disposition Plan   Expected Discharge: 06/12/2022     Anticipated  discharge location:  Awaiting care coordination huddle  Delays: None     The patient's care was discussed with the Attending Physician, Dr. Arreguin.    Everardo Lau MD  [facility]  [facility]  Securely message with the Vocera Web Console (learn more here)  Text page via [facility] Paging/Directory   Please see signed in provider for up to date coverage information      Clinically Significant Risk Factors Present on Admission                    ______________________________________________________________________    Interval History     No acute events overnight. No fevers, chills. Still having shortness of breath with movement and some nausea. Read online Bactrim may be related to his GI side effects.     Four point ROS completed and negative unless listed above    Data reviewed today: I reviewed all medications, new labs and imaging results over the last 24 hours.    Physical Exam   Vital Signs: Temp: 98.3  F (36.8  C) Temp src: Oral BP: 137/80 Pulse: 88   Resp: 20 SpO2: 97 % O2 Device: High Flow Nasal Cannula (HFNC) Oxygen Delivery: 50 LPM  Weight: 146 lbs 9.69 oz  General Appearance: Alert, pleasant, anxious  Respiratory: Breathing comfortably on HFNC at rest  Cardiovascular: RRR, normal S1/S2, no murmurs. 2s cap refill.  GI: Soft, non-tender, non-distended.   Skin: No rash or lesions noted. Warm, dry.  Other: Alert and oriented x3. Answering questions appropriately.     Data   Recent Labs   Lab 06/08/22  0445 06/07/22  0336 06/06/22  0440   WBC 26.5* 36.8* 31.7*   HGB 9.0* 9.5* 8.9*   * 107* 108*    262 263   * 128* 129*   POTASSIUM 4.6 4.9 4.5   CHLORIDE 97 96 96   CO2 25 23 23   BUN 17 19 19   CR 1.41* 1.15 1.28*   ANIONGAP 8 9 10   DAVID 8.6  8.6 8.8  8.8 8.8   GLC 86 91 92   ALBUMIN 2.4* 2.6* 2.4*   PROTTOTAL 5.2* 5.6* 5.3*   BILITOTAL 0.8 0.8 0.8   ALKPHOS 92 99 92   ALT 43 42 33   AST 43 43 30

## 2022-06-08 NOTE — PROVIDER NOTIFICATION
"Time of notification: 6:57 PM  Provider notified: Maroon 4 [phone]  Patient status: Tele notification of increase in PAC's- up to 46/min.   /68 (BP Location: Left arm)   Pulse 108   Temp 97.9  F (36.6  C) (Oral)   Resp 20   Ht 1.676 m (5' 6\")   Wt 66.5 kg (146 lb 9.7 oz)   SpO2 90%   BMI 23.66 kg/m              "

## 2022-06-09 NOTE — PROGRESS NOTES
[facility]    Medicine Progress Note - Medicine Service, KENNY TEAM 4    Date of Admission:  5/23/2022    Assessment & Plan   Loco Wood is a 77 year old male with history of CLL, auto-immune hemolytic anemia (on prednisone taper), and CKD3a admitted for several days of SOB, chills, pleuritic chest pain with infiltrates/consolidations on CT imaging. Initially treated for CAP (ceft + azithro) with improvement. On 5/27 patient transferred to [facility] [place] for worsening respiratory status and fever, now improved and afebrile (45L HFNC -> 12-15L oxymizer). Continue broad coverage with Zosyn, Micafungin (B-D glucan > 500), and IV bactrim.  Heme/Onc following for auto-immune hemolytic anemia and CLL, steroids decreased in setting of infection.    Changes today:   - Stop cefepime  - Increasing HFNC requirements with paO2 59 on 100% 50L, improved to paO2 97 with BiPAP, will continue to encourage BiPAP tonight  - CT chest with worsening PJP and concern for organizing pneumonia and pulmonary hypertension  - TTE with normal cardiac function and without evidence of volume overload  - Discussed with ID and onc, will switch to second line treatment for PJP with clindamycin and primaquine. G6PD pending but ok with close monitoring per onc  - Start marinol and calorie counts for malnutrition  - Will plan for multidisciplinary discussion between ID, onc, and pulm tomorrow    # Acute hypoxic respiratory failure 2/2 PJP pneumonia   # E faecium on Karius assay  # Severe sepsis, improved  # Bilateral lower lobe bronchiectasis/consolidations  Admitted for several days of worsening fatigue/dyspnea, subjective chills, and pleuritic chest pain with 3-4L oxygen requirement (w/ elevated WBC) and CT w/ infiltrates and bilateral lower lobe consolidations concerning for infection. RVP negative. Patient initially treated with 5 day abx course starting 5/24 with azithromycin (d/c 5/26) + ceftriaxone for  presumed CAP with clinical improvement. On 5/27, patient developed fever with worsening respiratory status (45 L high flow NC) and was transferred to [facility] [place]. Patient improving with IVF 2.5L and broadened coverage with Zosyn, Micafungin (positive B-Glucan), and IV bactrim (for possible PJP) O2 req of 12-15L, with increase to HFNC on 6/1. CXR 5/31 with trace effusions and increasing apical opacities concerning for worsening edema/infection. PJP PCR 5/27 positive. Karius 6/1 + PJP and E. Faecium (less likely pathogenic as not growing on other cultures). On 6/9, worsening hypoxia with paO2 59 on 100% 50L. Telemetry also with frequent PACs and episode of SVT and lactic acid elevated at 4, likely in setting of worsening hypoxia. CT chest with worsening PJP and concern for organizing pneumonia and pulmonary hypertension. TTE with normal cardiac function and without evidence of volume overload. Currently believe CLL not driving pulmonary symptoms though need biopsy to definitively rule out. Patient is on steroids for hemolytic anemia which would treat organizing pneumonia and unclear if increasing dose would have additional benefits > risks.  - Discussed with ID and onc, will switch to second line treatment for PJP with clindamycin and primaquine. G6PD pending but ok with close monitoring per onc  - Will plan for multidisciplinary discussion between ID, onc, and pulm tomorrow  - Current regimen: clindamycin, primaquine, fluconazole; also on acyclovir for ppx while on steroids  - Discontinued: micafungin, levofloxacin, zosyn, linezolid, cefepime, Bactrim   - BiPAP; incentive spirometry  - Will consider bronchoscopy and percutaneous pulmonary biopsy if intubated    Labs:  RVP, COVID/Flu: Negative  MRSA swab: negative  Beta-D Glucan: Positive (> 500)  Following Sputum sample  Blood cultures: NG  Sputum: 2+ mixed aracely  Histo antigen: negative  Aspergillus Ag: negative   Blasto antigen: negative  Cryptococcus ag: negative  CMV PRC  negative:   Fungal ab (blast, aspergillus, cocci, histo): negative  Quantiferon Gold: negative   PJP PCR: positive   Karius: PJP positive, E faecium positive  Cocci agn: negative    # CLL (dx 2/2007)  History of CLL (2007) managed on Ibrutinib (5/2019) which patient is not taking daily. WBC 24.9 on admission (up from baseline ~6-10) now down to ~21. Suspect secondary to recent infection with lower concern for conversion to leukemia. IVIG infusion (5/25). Following FISH and cytogenetics for disease prognostication and will continue to monitor with peripheral smear.   Continue daily ibrutinib to help control disease, increased bioavailability with addition of fluconazole 6/2. WBC and ALC uptrending. CT A/P without evidence of CLL metastasis.    - Hematology/Oncology following    - PTA ibrutinib, pursuing prior authorization for venotoclax   -Following Flow cytometry, cytogenetics, IgH mutation, TP53 mutation  - Plan for bronch and biopsy as above if intubated   - Heme/Onc outpatient follow up on discharge    # Lactic acidosis  Persistent lactic acidosis 3-4 despite antimicrobial treatment and IVF boluses. Appears hemodynamically stable with no evidence of hypoperfusion. Discussed with hem/onc, can sometimes see in malignancies, but unlikely in reasonably controlled CLL. Hepatic function wnl, suggesting against clearance issue. Resolved with additional IVF 5/30. Increased to 4.1 on 6/3, improved with addition of antibiotics above. Again increased to 4 on 6/9, likely in setting of worsening hypoxia as above.    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 B12, folate, iron panel normal with slightly elevated YEN levels. Labs concerning for marrow responsive anemia (elevated LDH, bilirubin, reticulocyte count). Will likely need to  treat underlying CLL for long-term solution.    Hgb on admission 9.5 down from baseline (~10-12 past year) and now ~8. Currently hemodynamically stable and suspect recent drop in Hgb secondary to dilutional anemia in setting of 2.5L of IVF given (5/27). Methylpred decreased and rituximab held in setting of recent infection. Haptoglobin remains elevated, suggesting against worsening hemolytic anemia.   - Heme/Onc consulted    - Continue Methylprednisolone 40mg   - Hold Rituximab infusion   - Check uric acid, LDH, Mg, Phos, and retic count q48h    - Continue allopurinol for TLS prevention    - Hep B serologies (Core, Surface Ab +): Hep B ag negative, DNA quant negative   - Continue PTA Folate, B12   - Transfuse if Hgb < 7     # Malnutrition:    - Level of malnutrition: Severe protein-calorie malnutrition  - Trial marinol 2.5 mg every day  - Patient currently declining feeding tube placement  - Nutrition following    # Nausea  Last AXR 5/27 without evidence of obstruction. Likely in setting of medications.  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 Trialed 500 ml LR 6/7, however, sodium studies suggestive of SIADH. Improved 6/8. NT pro BNP and TTE without evidence of volume overload.  - Fluid restriction 1000 ml  - Change IV medication carriers to NS     # Insomnia  - Hold PTA PRN Ambien as above  - Increase scheduled melatonin from 3 mg to 10 mg  - Considered adding mirtazapine, but will hold off given recent linezolid therapy and concern for serotonin syndrome  - Olanzapine PRN    Chronic/Resolved Problems:   # Ground level fall secondary to orthostatic hypotension, resolved  Early AM 6/4. CT head without evidence of intracranial hemorrhage, EKG unremarkable, found to have orthostatic hypotension, improved with  ml.  - Holding Ambien PRN at night  - Fall precautions    # CKD Stage 3A  Cr of 1.31 on admission  up from baseline now up to 1.6 (5/27), now improved to 1.05. Suspect Cr elevation is pre-renal in setting of recent sepsis and will likely improve with volume resuscitation.    - Avoid nephrotoxic agents as able    # Degenerative disc disease  # Bilateral intermittent hand cramping, spasms suspicious for cervical impingement  Patient with history of degenerative disc disorder with concern for cervical impingement in setting of new bilateral intermittent hand cramping. Patient met with Dr. Melton his sports medicine doctor (AM 5/24) over phone for follow up appointment to discuss results of his MRI. Will defer to outpatient management.     # Hx Herpes Zoster c/b post-herpetic neuralgia: PTA ppx Acyclovir while on prednisone  # Hypothyroid - TSH 1.94 on admission WNL. Continue PTA levothyroxine  # HLD- PTA atorvastatin  # GERD- PTA omeprazole + PRN famotidine (also on Pred)  # Vitamin supplements- PTA B12, folate, D3        Diet: Combination Diet Regular Diet Adult  Snacks/Supplements Adult: Ensure Enlive; Between Meals  Fluid restriction 1000 ML FLUID  Calorie Counts  Snacks/Supplements Adult: Other; 8 PM - Hard boiled egg; Between Meals    DVT Prophylaxis: Heparin ppx  Piña Catheter: Not present  Central Lines: None  Cardiac Monitoring: ACTIVE order. Indication: QTc prolonging medication (48 hours)  Code Status: Full Code    Confirmed patient is full code. His sister Hodan Deleon is his decision maker if he is unable to make decisions for himself.    Disposition Plan   Expected Discharge: 06/23/2022     Anticipated discharge location:  Awaiting care coordination huddle  Delays: None     The patient's care was discussed with the Attending Physician, Dr. Arreguin.    Everardo Lau MD  Medicine Service, [address]  Securely message with the Vocera Web Console (learn more here)  Text page via [employer] Paging/Directory   Please see signed in provider for up to date  coverage information      Clinically Significant Risk Factors Present on Admission                    ______________________________________________________________________    Interval History     Worsening HFNC requirements, now up to 50L 100%. Still with shortness of breath with movement. Had a friend bring in his favorite walnut shrimp yesterday to try to improve his appetite, but still limited intake. No fevers or chills.     Four point ROS completed and negative unless listed above    Data reviewed today: I reviewed all medications, new labs and imaging results over the last 24 hours.    Physical Exam   Vital Signs: Temp: 98  F (36.7  C) Temp src: Oral BP: (!) 143/82 Pulse: 107   Resp: 18 SpO2: 100 % O2 Device: BiPAP/CPAP Oxygen Delivery: 50 LPM  Weight: 147 lbs 4.28 oz  General Appearance: Alert, pleasant, anxious  Respiratory: Breathing comfortably on HFNC at rest, CTAB  Cardiovascular: RRR, normal S1/S2, no murmurs. 2s cap refill.  GI: Soft, non-tender, non-distended.   Skin: No rash or lesions noted. Warm, dry.  Other: Alert and oriented x3. Answering questions appropriately.     Data   Recent Labs   Lab 06/09/22  0413 06/08/22 2009 06/08/22  0445 06/07/22  0336   WBC 28.1*  --  26.5* 36.8*   HGB 9.1*  --  9.0* 9.5*   *  --  107* 107*     --  221 262   * 129* 130* 128*   POTASSIUM 4.9 4.9 4.6 4.9   CHLORIDE 99 97 97 96   CO2 22 22 25 23   BUN 18 19 17 19   CR 1.41* 1.38* 1.41* 1.15   ANIONGAP 10 10 8 9   DAVID 8.5  8.5 8.4* 8.6  8.6 8.8  8.8   GLC 95 153* 86 91   ALBUMIN 2.4*  --  2.4* 2.6*   PROTTOTAL 5.3*  --  5.2* 5.6*   BILITOTAL 0.6  --  0.8 0.8   ALKPHOS 98  --  92 99   ALT 50  --  43 42   AST 50*  --  43 43

## 2022-06-09 NOTE — PROGRESS NOTES
CLINICAL NUTRITION SERVICES - REASSESSMENT NOTE     Nutrition Prescription    RECOMMENDATIONS FOR MDs/PROVIDERS TO ORDER:  Recommend pt to consistently consume at least ~1150 Kcals and 55 gm protein PO vs need to discuss enteral nutrition support     Consider adding appetite stimulant such as Marinol, if not contraindicated     Malnutrition Status:    Severe malnutrition in the setting of acute illness     Recommendations already ordered by Registered Dietitian (RD):  1. Darian cts 6/10-6/12  2. Continue Ensure Enlive mixed with Ice Cream once daily. (~482 Kcals, 60 gm CHO, 22 gm protein per shake)  3. Add Special K bar at 2 PM [180 Kcals, 12 gm protein, 22 gm CHO per bar] and modify HS snack to 1 hard boiled egg per pt request   4. Education materials r/t nutrition recommendations if experiencing nausea, low appetite, small frequent meals, high protein foods    Future/Additional Recommendations:  - Monitor adequacy of darian cts, improvements to PO intake, supplement/snack preferences, weight trends vs need to consider FT placement in future if pt agreeable.     If patient requires FT placement for enteral nutrition support, see recs below:  Use dosing weight 68 kg  EN access: None at present    Formula: Osmolite 1.5 Darian @ goal of  55ml/hr  (1320ml/day)  will provide: 1980 kcals, 83 g PRO, 1005 ml free H20, 268 g CHO, and 0 g fiber daily  - Do not start or advance TF rate unless Mg++ >1.5, K+ is >3, and phos >2  - Initiate @ 15 ml/hr and advance by 10 ml q8hr pending pt's tolerance and lytes.  - Recommend 30 ml q4hr fluid flushes for tube patency. Additional fluids and/or adjustments per MD.    - Order multivitamin/mineral (15 ml/day via FT) to help ensure micronutrient needs being met with suspected hypermetabolic demands and potential interruptions to TF infusions.   - Consider additional 100 mg Thiamine x 5-7 days to prevent lyte depletion with risk for refeeding syndrome    - HOB >30-45 degrees if FT is gastric.       "        -Send a nutrition consult for \"Registered Dietitian to Order TF per Medical Nutrition Therapy Guidelines\" if desire RD to order TFs.       EVALUATION OF THE PROGRESS TOWARD GOALS   Diet: Regular, 1L fluid restriction  -Snacks: String cheese stick x2 @ 8 pm  -Supplements: Ensure with ice cream @ 2 pm + additional by request, PRN    PO Intake: Variable intake trends since last assessment; 0-100% of meals per flowsheet documentation. Per review of room-service selections, patient has only been ordering 1 small meal per day. Receives a snack at HS and a supplement. Calorie intake <1000/day, and protein intake <20-40 gm/day IF patient were consuming 100% of meals provided which is not the case. This is meeting ~58% of low end est energy needs, and <50% of low end est protein needs.    NEW FINDINGS   General:    Pt reports minimal appetite/desire to eat. Endorses only ordering 1 small meal per day; reports only drinking supplement ~25-50% and only eating the cheese stick HS snack on occasion. Acknowledge that he has been losing weight. Reviewed education for optimizing PO intake w/ small frequent meals, high protein foods, adjustments of oral nutrition supplements/snacks.       Encouraged pt to order meals more frequently, even if only consisting of 1 item such as yogurt; pt did not seem open to increasing his meal frequency. He did agree to modifying his HS snack, continuing his protein shake, and adding a protein bar between meals. He is open to the idea of starting an appetite stimulant, if MD agrees (discussed w/ resident).       Also gently gauged pt's knowledge/interest in nutrition support/FT placement if needed in future. Pt was open to discussing with writer but at this time he would like to hold off on this unless absolutely necessary. MD aware of conversation. Darian cts ordered and appetite stimulant likely to start.     GI:    1-2 BMs per day    Weights:    Weight down ~7.6 kg compared to  lbs " (last weighed this 5/13/22 PTA; This represents a significant 10.2% loss over the past 1-month.     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 provider)  Electrolytes  Potassium (mmol/L)   Date Value   06/09/2022 4.9   06/08/2022 4.9   06/08/2022 4.6   07/02/2021 4.2   04/26/2021 3.6   04/05/2021 4.2     Phosphorus (mg/dL)   Date Value   06/09/2022 3.3   06/08/2022 3.1   06/07/2022 4.3   06/06/2022 3.6   06/05/2022 3.4    Blood Glucose  Glucose (mg/dL)   Date Value   06/09/2022 95   06/08/2022 153 (H)   06/08/2022 86   06/07/2022 91   06/06/2022 92   07/02/2021 100 (H)   04/26/2021 119 (H)   04/05/2021 91   01/25/2021 182 (H)   01/04/2021 127 (H)    Inflammatory Markers  C-Reactive Protein (mg/dL)   Date Value   12/02/2002 <0.02     CRP Cardiac Risk (mg/L)   Date Value   04/11/2016     <0.2  Reference Values:   Low Risk:           <1.0 mg/L   Average Risk:       1.0-3.0 mg/L   High Risk:          >3.0 mg/L   Acute Inflammation: >8.0 mg/L       CRP Inflammation (mg/L)   Date Value   06/08/2022 19.0 (H)   06/03/2022 82.0 (H)   06/02/2022 100.0 (H)   03/06/2014 33.1 (H)     WBC (10e9/L)   Date Value   07/02/2021 8.3   05/27/2021 8.3   04/26/2021 7.4     WBC Count (10e3/uL)   Date Value   06/09/2022 28.1 (H)   06/08/2022 26.5 (H)   06/07/2022 36.8 (H)     Albumin (g/dL)   Date Value   06/09/2022 2.4 (L)   06/08/2022 2.4 (L)   06/07/2022 2.6 (L)   07/02/2021 4.1   04/26/2021 3.7   04/05/2021 4.0      Magnesium (mg/dL)   Date Value   06/09/2022 2.5 (H)   06/08/2022 2.3   06/08/2022 2.3   02/28/2014 1.9     Sodium (mmol/L)   Date Value   06/09/2022 131 (L)   06/08/2022 129 (L)   06/08/2022 130 (L)   07/02/2021 143   04/26/2021 141   04/05/2021 140    Renal  Urea Nitrogen (mg/dL)   Date Value   06/09/2022 18   06/08/2022 19   06/08/2022 17   07/02/2021 23   04/26/2021 18   04/05/2021 17     Creatinine (mg/dL)   Date Value   06/09/2022 1.41 (H)   06/08/2022 1.38 (H)   06/08/2022 1.41 (H)   07/02/2021  1.47 (H)   04/26/2021 1.58 (H)   04/05/2021 1.52 (H)     Additional  Triglycerides (mg/dL)   Date Value   04/05/2021 96   03/02/2020 83   03/22/2018 144     Ketones Urine (mg/dL)   Date Value   06/02/2022 Negative   01/04/2021 Negative           Medications:    Liptor    Folvite 1 mg/day    B12 100 mcg/day    Solumedrol    Zofran scheduled in AM     Metamucil 1 pkt BID (refused this AM)     Senna, BID     Vit D3, 50 mcg/day    MALNUTRITION  % Intake: </= 50% for >/= 5 days (severe)  % Weight Loss: > 5% in 1 month (severe)  Subcutaneous Fat Loss: Facial region:  Mild, Upper arm:  Moderate and Thoracic/intercostal:  Mild to moderate  Muscle Loss: Temporal:  Mild, Facial & jaw region:  Mld, Thoracic region (clavicle, acromium bone, deltoid, trapezius, pectoral):  Mild to moderate, Upper arm (bicep, tricep):  Moderate, Dorsal hand:  Mild and Posterior calf:  Mild to moderate  Fluid Accumulation/Edema: None noted  Malnutrition Diagnosis: Severe malnutrition in the context of acute illness     Previous Goals   Patient to consume % of nutritionally adequate meal trays TID, or the equivalent with supplements/snacks.  Evaluation: Not met    Previous Nutrition Diagnosis  Inadequate protein-energy intake related to acute illness and nausea as evidenced by pt self-report, intake meeting <50% of est nutrition needs, wt loss >2% in 1 week.   Evaluation: Declining    CURRENT NUTRITION DIAGNOSIS  Malnutrition related to poor appetite 2/2 nausea and acute illness as evidenced by variable intake meeting <60% of est energy needs and <50% est protein needs, weight loss >5% over past 1-month, mild to moderate muscle/fat depletion    INTERVENTIONS  Implementation  Collaboration with other nutrition professionals - Darian cts   Collaboration with other providers - Primary team MD   Medical food supplement therapy - Continue + PRN if requested   Modify composition of meals/snacks - Modify   Nutrition education for nutrition relationship  to health/disease - Nausea nutrition therapy/recommended foods; attempted ed r/t Kcal/protein content of menu items and high Kcal/pro education but declined     Goals  1. Pt to consistently consume at least ~1150 Kcals and 55 gm protein PO vs need to discuss enteral nutrition support   2. Weight not to fall below 66 kg    Monitoring/Evaluation  Progress toward goals will be monitored and evaluated per protocol.    Armond Wahl RDN, LD, CNSC  6B RD pager: [phone]   6B work-room RD phone: *[phone]    Weekend/Holiday RD pager [phone]

## 2022-06-09 NOTE — PROGRESS NOTES
Informal Recommendations Note    I was called by patient's primary team on 6/9/22 to provide input for Loco Wood. The nature of this request for input does not permit comprehensive review of health records or patient interview.  I was not requested to examine the patient at this time.    Loco is a 77 year old male with history of CLL and AIHA who was admitted on 5/23/22 with chest pain and hypoxia that has progressed to needing 100% over the last week. His chest imaging since admission shows worsening pulmonary infiltrates, including CT Chest that was obtained today. He was empirically started on PJP treatment on 5/27 and then was found to have positive PJP PCR on sputum. Pulmonary had previously been consulted for bronchoscopy earlier this admission, but was requiring too much oxygen to safely perform, which remains the case. He was on steroids prior to admission and has remained on at least methylpred 40mg daily.     Based on the information provided, my recommendations are as follows:   Considerations for worsening pulmonary infiltrates on CT include PJP PNA not being adequately treated by Bactrim and note that ID is considering changing treatment plan vs ARDS from PJP PNA for which treatment would supportive. Organizing pneumonia can occur with any infection, but is usually very steroid responsive and seems less likely given his persistent steroid use. Could consider increasing steroids to 1mg/kg for treatment of organizing pneumonia, but the risks may outweigh the benefits. Another consideration would be pneumonitis secondary to ibrutinib, which has been reported in literature, but case reports have typically described symptoms within a few months of starting ibrutinib. Patient has been assessed by oncology and pulmonary findings do not seem directly related to CLL itself. Primary team has considered volume overload, but felt to be less likely given normal BNP and good urine output.     These  recommendations are not intended to take the place of the care team's clinical judgement, which should always be utilized to provide the most appropriate care to meet the unique needs of each patient.     Shayla Pierre MD  Pulmonary

## 2022-06-09 NOTE — PLAN OF CARE
Neuro: A&Ox4. Calm and able to follow commands.  Cardiac: SR-STach. With frequent PACs.  HR 80-110s SBP. 130-140s.    Respiratory: Sating >90% on HFNC 100% FiO2 and 50L. Desats with minimal movement, recovers quickly.   GI/: Adequate urine output. No BM during shift. Intermittent nausea, PRN compazine given.   Diet/appetite: Regular diet, 1L FR. Poor appetite.   Activity:  Assist of 1, not oob during shift. Activity minimized due to respiratory status.   Pain: Denied pain during shift.   Skin: No new deficits noted.  LDA's: R PIV running TKO for antibiotics.     Plan: Continue with POC. Notify primary team with changes.

## 2022-06-09 NOTE — PROVIDER NOTIFICATION
Notified MD that pt's Lactic Acid came back at 4.0. Pt just left for CT. Will call code sepsis once he returns.

## 2022-06-09 NOTE — PROGRESS NOTES
[facility]  Transplant Infectious Disease Progress Note     Patient:  Loco Wood, Date of birth 1945, Medical record number [id number]  Date of Visit:  06/09/2022         Assessment and Recommendations:   77 year old man with CLL and AIHI s/p prednisone courses in 12/2021 from derm, 1/2022 for lung symptoms in [place], in 3/2022 for anemia, then most recently for 3 weeks & 6 weeks for AIHA who is being treated for Pneumocystis pneumonia.    Severe Pneumocystis pneumonia: 5/27/22 CT chest showed progressive bilateral groundglass opacities. 5/27/2022 BAL Pneumocystis sputum PCR +, in the setting of a progressive pulmonary process. Elevated Fungitell & LDH are also consistent with Pneumocystis. Had 1 dose of neb pent on 5/26/2022 (may have started to kill some pneumocystis organisms). IV bactrim started 5/27/2022. Other infectious work up has included negative MRSA nares, Cryptococcal antigen, EBV PCR, CMV PCR, Coccidiodes antigen, Aspergillus gm, fungal antibodies, Histoplasma antigen, Blastomyces antigen and TB quantiferon. Karius demonstrated 6336 copies of Pneumocystis jirovecii and 353 copies of E faecium. He continues to have increased respiratory distress requiring high amounts of FiO2. He remains on bactrim but has required ongoing dose adjustments due to renal function. AT this point he has been on bactrim since 5/27 which is a total of ~ 2 weeks. I expect some clinical improvement by now with treatment but his O2 requirements have actually worsened over the past week. Although patient with hematologic malignancies can be slow to respond to treatment I would expect some response within in weeks. This raises my concern for treatment failure and/or another issue is also contributing to it. There is concern that his CLL has progressed but it would unusual to involve only the lung. Will repeat CT chest and potentially change to clindamycin and primaquine.  The use of steroids is  what put him at risk for PJP so I am hesitant to increase the dose further at this point given the prolonged course. 1-3 BD glucan is still > 500. This is not as helpful since the value is not measurable past 500. If it had dropped to < 500 this would have been more helpful in determining the trajectory.  Creatinine increase is stable which may be reflective of tmp/smx. We have de-escalated other antibiotics over the course of the past few days including stopping the micafungin, zosyn, levofloxacin, and linezolid. Started cefepime in replace of pip/tazo and levo due to high salt content of pip/tazo but will stop cefepime today.      Leukocytosis  History of CLL and AIHA:  WBC up trending over the past week with increased lymphocytes. There is concern for CLL progression based on flow. Previously taking ibrutinib at home weekly but now on daily. He is has been methylprednisolone since 5/24 with a dose of 40 mg daily since 5/28. Methylprednisolone provides treatment for AIHA and adjunctive therapy with tmp/smx to treat PJP.     E faecium on Karius: initially started on linezolid. Has received about 4 days. Will stop since this has not been isolated on clinical cultures.    History of herpes zoster: on acyclovir    - Hep B core ab & S ab+ 5/26/2022, with negative hep B viral load.  - Extensive travel hx.     - QTc interval: 427 msec on 6/2/2022 EKG  - Bacterial prophylaxis: antibiotics stopped 6/9/22.  - Pneumocystis prophylaxis: pent 5/26/2022, treatment bactrim 5/27/2022.  - Viral serostatus & prophylaxis: HSV1+, HSV2+, CMV+; acyclovir prophy  - Fungal prophylaxis: Fungitell > 500; micafungin 5/27/2022-6/7/22.  - Immunization status:3 covid vaccinations.   - Gamma globulin status: 30g IVIG 5/25/2022  - Isolation status: Good hand hygiene.    Recommendations:  1. Stop cefepime  2. Obtain CT chest w/o contrast  3. Obtain Toxoplasma and Adenovirus PCR   4. Screen for G6PD deficiency  5. Continue bactrim 320 mg IV q 6  hours (crcl 43). Optimal dose for PJP is 20 mg/kg divided in 3-4 doses per day. The recommended daily dose is 1360 mg based on trimethoprim component.    6. Pending CT results may opt to change bactrim to clindamycin 900 mg IV q 8 hours + primaquine 30 mg daily. If a change is needed will discuss with hematology about switching to this regimen before G6PD results.  7. Remains on methylprednisolone which is a adjunct treatment for PJP and treatment for AIHA     Transplant Infectious Disease will continue to follow with you.      Marybeth Anderson DO.   Infectious Diseases Attending  Pager [phone]        Interval History:   Afebrile. WBC stable. Remains on 100% and 50L. 1-3 BD glucan still > 500. Remains on tmp/smx. Haptoglobin level is not low.  CD4 count is low < 200. HIV negative. He slept better last night. He stated that he was on 80% most of the night.       Review of Systems:Remaining systems all reviewed and negative       Current Medications & Allergies:       acyclovir  800 mg Oral BID     allopurinol  300 mg Oral Daily     atorvastatin  20 mg Oral Daily     ceFEPIme (MAXIPIME) IV  2 g Intravenous Q12H     cyanocobalamin  100 mcg Oral Daily     folic acid  1 mg Oral Daily     heparin ANTICOAGULANT  5,000 Units Subcutaneous Q12H     ibrutinib  420 mg Oral Daily     levothyroxine  50 mcg Oral Daily     melatonin  10 mg Oral At Bedtime     methylPREDNISolone  40 mg Intravenous Daily     ondansetron  8 mg Intravenous Daily     pantoprazole  20 mg Oral Daily     polyethylene glycol  17 g Oral Daily     psyllium  1 packet Oral BID     senna-docusate  1 tablet Oral BID    Or     senna-docusate  2 tablet Oral BID     sodium chloride (PF)  3 mL Intracatheter Q8H     sodium chloride (PF)  3 mL Intracatheter Q8H     sulfamethoxazole-trimethoprim  320 mg Intravenous Q6H     vitamin D3  50 mcg Oral Daily       Allergies   Allergen Reactions     Blood Transfusion Related (Informational Only) Other (See Comments)      Patient has a history of a clinically significant antibody against RBC antigens.  A delay in compatible RBCs may occur.      Morphine Itching and Rash     Dilaudid [Hydromorphone] Itching            Physical Exam:   Ranges for vital signs:  Temp:  [97.8  F (36.6  C)-98.4  F (36.9  C)] 98.4  F (36.9  C)  Pulse:  [] 92  Resp:  [18-20] 18  BP: (125-145)/(68-83) 125/72  FiO2 (%):  [90 %-100 %] 100 %  SpO2:  [89 %-98 %] 93 %  Vitals:    06/07/22 0600 06/08/22 0615 06/09/22 0602   Weight: 68 kg (149 lb 14.6 oz) 66.5 kg (146 lb 9.7 oz) 66.8 kg (147 lb 4.3 oz)     Physical Examination:  GENERAL:  Chronically ill appearing. in bed.no acute distress.  HEAD:  Head is normocephalic, atraumatic   EYES:  Eyes have anicteric sclerae without conjunctival injection. Pupils reactive bilaterally.   ENT:  Oropharynx is moist without exudates or ulcers. Tongue is midline. No sinus tenderness.   LUNGS:  Clear to auscultation bilaterally. No accessory muscle use. On high flow nasal cannula at 90%  CARDIOVASCULAR:  Regular rate and rhythm with no murmurs  ABDOMEN:  Normal bowel sounds, soft, non-tender, non-distended.  SKIN:  No acute rashes.    EXTREMITIES: No joint erythema or swelling.   NEUROLOGIC:  Grossly nonfocal. Active x4 extremities. Alert. Oriented.   LINES: peripheral IV in place without any surrounding erythema or exudate. Dressing intact.          Laboratory Data:     Metabolic Studies       Recent Labs   Lab Test 06/09/22  0413 06/08/22 2009 06/08/22  0816 06/08/22  0445 06/07/22  1420 06/07/22  0336 06/03/22  2202 06/03/22  1801   * 129*  --    < >  --  128*   < >  --    POTASSIUM 4.9 4.9  --    < >  --  4.9   < >  --    CHLORIDE 99 97  --    < >  --  96   < >  --    CO2 22 22  --    < >  --  23   < >  --    ANIONGAP 10 10  --    < >  --  9   < >  --    BUN 18 19  --    < >  --  19   < >  --    CR 1.41* 1.38*  --    < >  --  1.15   < >  --    GFRESTIMATED 52* 53*  --    < >  --  66   < >  --    GLC 95 153*  --     < >  --  91   < >  --    DAVID 8.5  8.5 8.4*  --    < >  --  8.8  8.8   < >  --    PHOS 3.3  --   --    < >  --  4.3   < >  --    MAG 2.5* 2.3  --    < >  --  2.3   < >  --    LACT  --   --  1.4  --   --  2.3*   < >  --    PCAL  --   --   --   --   --   --   --  0.07*   FGTL  --   --   --   --  >500  --   --   --     < > = values in this interval not displayed.       Hepatic Studies    Recent Labs   Lab Test 06/09/22 0413 06/08/22 0445 06/07/22 0336 06/06/22 0440 05/26/22  0549 05/25/22  0616   BILITOTAL 0.6 0.8 0.8 0.8   < > 1.7*   DBIL  --   --   --   --   --  0.4*   ALKPHOS 98 92 99 92   < >  --    PROTTOTAL 5.3* 5.2* 5.6* 5.3*   < >  --    ALBUMIN 2.4* 2.4* 2.6* 2.4*   < >  --    AST 50* 43 43 30   < >  --    ALT 50 43 42 33   < >  --    LDH  --   --  348* 322*   < > 300*    < > = values in this interval not displayed.       Hematology Studies      Recent Labs   Lab Test 06/09/22 0413 06/08/22 0445 06/07/22 0336 06/06/22 0440 06/05/22  0523 06/04/22  0559   WBC 28.1* 26.5* 36.8* 31.7* 28.4* 29.6*   ANEU 10.4* 7.7 14.4* 11.1* 8.5* 13.6*   ALYM 17.1* 18.8* 22.4* 20.3* 19.3* 15.7*   ANY 0.3 0.0 0.0 0.3 0.6 0.3   AEOS 0.0 0.0 0.0 0.0 0.0 0.0   HGB 9.1* 9.0* 9.5* 8.9* 8.3* 8.6*   HCT 27.6* 26.8* 28.7* 27.2* 25.4* 26.3*    221 262 263 268 251       Arterial Blood Gas Testing    Recent Labs   Lab Test 06/03/22  1654 06/03/22  0852 06/03/22  0644 06/02/22  0929 06/01/22  1445 06/01/22  1021   PH 7.41  --  7.42 7.47* 7.45 7.47*   PCO2 35  --  38 38 34* 34*   PO2 81  --  72* 62* 68* 49*   HCO3 22  --  25 28 24 25   O2PER 100 30 100 100 100 15      Inflammatory Markers    Recent Labs   Lab Test 06/08/22  0445 06/03/22  0423 06/02/22  0451 10/04/17  2250 04/11/16  1136   SED  --   --   --  1  --    CRP 19.0* 82.0* 100.0*  --  <0.2  Reference Values:   Low Risk:           <1.0 mg/L   Average Risk:       1.0-3.0 mg/L   High Risk:          >3.0 mg/L   Acute Inflammation: >8.0 mg/L         Immune Globulin  Studies     Recent Labs   Lab Test 06/03/22  0856 05/24/22  1651 06/12/19  0806 05/31/19  1021 04/30/19  1219 12/21/18  1103 04/29/15  1012 09/19/14  1227   * 312* 394* 409* 429* 443*   < > 729   IGM  --   --   --   --   --   --   --  28*   IGA  --   --   --   --   --   --   --  72    < > = values in this interval not displayed.       Gout Labs      Recent Labs   Lab Test 06/09/22  0413 06/07/22  0336 06/06/22  0440 06/05/22  0523 06/04/22  0559   URIC 2.9* 1.8* 2.1* 1.9* 2.1*     Microbiology:  Fungal testing  Recent Labs   Lab Test 06/07/22  1420 05/27/22  1356 05/27/22  0916 05/23/22  1802   FGTL >500  --   --  >500   ASPGAI  --  0.03  --   --    ASPGAA  --  Negative  --   --    COFUNG  --   --  <1:2  --        Last Culture results with specimen source  Culture   Date Value Ref Range Status   06/03/2022 No Growth  Final   06/03/2022 No Growth  Final   06/02/2022 No growth after 6 days  Preliminary   05/27/2022 2+ Normal aracely  Final   05/27/2022 No Growth  Final   05/27/2022 No Growth  Final   05/26/2022 No Growth  Final   05/26/2022 No Growth  Final   05/26/2022   Final    >10 Squamous epithelial cells/low power field indicates oral contamination. Please recollect.   05/23/2022 No Growth  Final   05/23/2022 No Growth  Final     Culture Micro   Date Value Ref Range Status   03/02/2014 No growth  Final   10/22/2010 No Beta Streptococcus isolated  Final   04/21/2010   Final    No Salmonella, Shigella, Campylobacter or E coli 0157 isolated.   04/18/2010 No growth  Final   03/16/2009 No Beta Streptococcus isolated  Final   01/13/2007 No growth  Final   04/06/2004 No Beta Streptococcus isolated  Final        Virology:  Coronavirus-19 testing    Recent Labs   Lab Test 06/07/22  0903 05/31/22  0241 05/23/22  1946 06/23/20  0843   [id number]  --   --   --  Negative   VHT96FKN  --   --   --  Not Applicable   [id number] Negative Negative Negative  --        Respiratory virus testing    Recent Labs   Lab Test  05/24/22  1916 05/23/22  1946   IFLUA Not Detected  --    INFZA  --  Negative   FLUAH1 Not Detected  --    YM4349 Not Detected  --    FLUAH3 Not Detected  --    IFLUB Not Detected  --    INFZB  --  Negative   PIV1 Not Detected  --    PIV2 Not Detected  --    PIV3 Not Detected  --    PIV4 Not Detected  --    IRSV  --  Negative   RSVA Not Detected  --    RSVB Not Detected  --    HMPV Not Detected  --    ADENOV Not Detected  --    CORONA Not Detected  --        CMV viral loads    Recent Labs   Lab Test 06/01/22  1448   CMVQNT Not Detected       EBV DNA Copies/mL   Date Value Ref Range Status   06/02/2022 Not Detected Not Detected copies/mL Final     Urine Studies     Recent Labs   Lab Test 06/02/22  0952 05/26/22  2240 05/23/22  1906 05/19/22  1355 01/04/21  1744   URINEPH 6.5 5.5 6.5 7.0 5.5   NITRITE Negative Negative Negative Negative Negative   LEUKEST Negative Negative Negative Negative Negative   WBCU 2 2 1 0-5 1     Imaging:  CT Abdomen Pelvis w Contrast  Narrative: EXAMINATION: CT ABDOMEN PELVIS W CONTRAST  6/5/2022 12:05 PM      CLINICAL HISTORY: Prostate cancer, restaging    COMPARISON: CT abdomen and pelvis 1/4/2021, CT chest on 5/27/2022    PROCEDURE COMMENTS: CT of the abdomen was performed with iopamidol  (ISOVUE-370) solution 93 mL intravenous and oral contrast. Coronal and  sagittal reformatted images were obtained.    FINDINGS:  Lower thorax:   Diffuse bilateral groundglass opacities in the visualized lungs with  scattered solid consolidative opacities right greater than left. Small  bilateral pleural effusions.  Small hiatal hernia. Small fat-containing Bochdalek-type diaphragmatic  hernia on the left.    Abdomen and pelvis:   Liver: Tiny hypodense lesion in the liver in segment IV appears  unchanged compared to 1/4/2021. Calcified granuloma in the right  hepatic lobe. No suspicious liver lesions.   Gallbladder: No gallstones. No evidence of acute cholecystitis.  Spleen: Normal size.  Pancreas: Fatty  atrophy of the pancreas. No suspicious pancreatic  lesions. The pancreatic duct is not dilated.  Adrenal glands: No adrenal nodules.  Urinary system: No kidney masses. Large peripelvic simple cyst on the  right. Tiny hypodense lesions in the bilateral kidneys are too small  to characterize. 3 mm stone in the right and left inferior poles. No  hydronephrosis, hydroureter, or obstructing renal stones. Urinary  bladder is distended and partially obscured due to artifact but  appears unremarkable.  Reproductive organs: Pelvic phleboliths. The prostate is not  well-visualized due to streak artifact.  Bowel: No abnormally dilated loops of large or small bowel. No  abnormal bowel wall thickening. Colonic diverticulosis without  evidence of diverticulitis. The appendix is unremarkable.  Lymph nodes: No retroperitoneal, mesenteric, or pelvic  lymphadenopathy.  Fluid: No free fluid within the abdomen.  Vessels: Atherosclerotic calcifications. No infrarenal aortic  aneurysm.     Bones and soft tissues: No suspicious osseous abnormalities.  Degenerative changes of the visualized thoracic and lumbar spine.  Bilateral total hip arthroplasties. Posterior spinal fusion and the  lower lumbar spine.  Impression: IMPRESSION:  1. No findings in the abdomen or pelvis to suggest metastatic disease  of prostate cancer or CLL.   2. Shifting diffuse bilateral groundglass opacities in the visualized  lung bases, with scattered small associated consolidative opacities,  and small bilateral pleural effusions, likely infectious in etiology.  3. Colonic diverticulosis without evidence of acute diverticulitis.    I have personally reviewed the examination and initial interpretation  and I agree with the findings.    ESTEFANI REYES MD         SYSTEM ID:  [id number]

## 2022-06-09 NOTE — PROGRESS NOTES
This is a 77-year-old gentleman with CLL and PJP pneumonia.  Patient had progressive CLL on ibrutinib.  However, patient was taking ibrutinib once a week at some point.  He has hypoxemic respiratory failure due to PJP pneumonia.  Patient has not been improving on treatment of PJP.  Plan to do a CT scan today and possibly change to second line treatment in case of worsening infiltrates.  It has been question whether lung infiltrates are related to CLL.  It is unusual for CLL to have increasing lung infiltrates in absence of significant and rising burden in peripheral blood, lymph nodes as well as spleen.  Patient does not have any lymphadenopathy or splenomegaly.  Peripheral blood lymphocytosis has been stable.  At this point clinically it seems unlikely that long infiltrates are due to CLL.  Accurate diagnosis requires biopsy of the infiltrates.  If, at any time patient is undergoing intubation, we recommend doing bronchoscopy and biopsy.  If patient does not improve on the second line PJP treatment, we can consider anti-CD20 antibody obinutuzumab, that can lead to rapid improvement of CLL in all compartments.  If patient improves and obinutuzumab that we will be in evidence in favor of CLL.  It is of note that treatment with anti-CD20 antibody can compromise patient's immunity.  Risk of infusion reactions with anti-CD20 antibody can be life-threatening in this patient with hypoxemic respiratory failure.  My recommendation would be to try if patient can get better on second line PJP treatment.    Alan Macdonald MD  Attending Physician  Pager [phone]

## 2022-06-09 NOTE — PLAN OF CARE
"Neuro: A&Ox4.   Cardiac: SR-Stach with frequent PAC's, runs of SVT- team notified, EKG ordered. -130's.  Respiratory: Diminished throughout. HFNC 100% with 50 lpm.   GI/: Adequate urine output. No BM.   Diet/appetite: Regular diet, 1 L FR-poor appetite. Darian counts initiated today.   Activity:  Assist of 1, activity minimized due to respiratory status.   Pain: Denied.   Skin: No new deficits noted.  LDA's: R PIV, SL.     Plan: ABG drawn and Chest CT ordered for this evening. Continue with POC. Notify primary team with changes.  /74 (BP Location: Left arm)   Pulse 101   Temp 98  F (36.7  C) (Oral)   Resp 18   Ht 1.676 m (5' 6\")   Wt 66.8 kg (147 lb 4.3 oz)   SpO2 94%   BMI 23.77 kg/m          "

## 2022-06-09 NOTE — CODE/RAPID RESPONSE
Rapid Response Team Note    Assessment   In assessment a rapid response was called on Loco Wood due to SIRS/Sepsis trigger and lactic acidosis. This presentation is likely due to worsening pulmonary infiltrates, ARDS 2/2 inadequately treated PJP PNA, organizing pneumonia vs pneumonitis 2/2 ibrutinib. He is a 77-year-old gentleman with CLL and PJP pneumonia.  Patient had progressive CLL on ibrutinib.  However, patient was taking ibrutinib once a week at some point.  He has hypoxemic respiratory failure due to PJP pneumonia.  Patient has not been improving on treatment of PJP.     Plan   -  In this patient with ongoing extensive workup, no additional testing is indicated at this time. CT chest completed and results pending. Echo is being completed at this time. Will continue current antibiotics at present. No clear indication for additional fluids at this time. Defer further testing/treatment pending results of currently pending/in-process testing. Internal medicine is actively being managed by internal medicine primary team in conjunction w/ hem/onc and ID specialists w/ informal input from pulm. There is discussion of patient potentially requiring elective intubation if fails current BiPAP trial, repeat VBG pending.  -  The Internal Medicine primary team was able to be reached and they are in agreement with the above plan.  -  Disposition: The patient will remain on the current unit. We will continue to monitor this patient closely.  -  Reassessment and plan follow-up will be performed by the primary team    Loco is critically ill with lactic acidosis >4. These features are alarming and indicate that the patient is at imminent risk of life-threatening organ failure. Acute deterioration is being managed by the following critical interventions: oxygen by BiPAP at 100% FiO2 and await results of pending studies    Total Critical Care time spent by me, excluding procedures, was 20 minutes.  VINCENT OZUNA,  "PA  [facility] RRT [facility] Job Code Contact #[phone]  [facility] Paging/Directory    Hospital Course   Brief Summary of events leading to rapid response:   RRT called for lactic acid 4.0, triggered by tachycardia (101) and WBC 28.1. He was started on Bactrim for PJP PNA on ; he continues to have increased respiratory distress despite ongoing treatment concerning for treatment failure. ID is considering changing to clindamycin and primaquine. He was switched from HFNC to BiPAP this afternoon. A CT chest and echocardiogram are pending. Patient reports he is \"not feeling any worse than usual\". He is tolerating BiPAP well at present. He has been afebrile. He denies any pain anywhere. He is aware of the current workup and plan as laid out by his specialty services.    Admission Diagnosis:   Shortness of breath [R06.02]  Pneumonia [J18.9]  Hypoxia [R09.02]  Pneumonia of both lungs due to infectious organism, unspecified part of lung [J18.9]  Chest pain, unspecified type [R07.9]    Physical Exam   Temp: 98  F (36.7  C) Temp  Min: 97.8  F (36.6  C)  Max: 98.4  F (36.9  C)  Resp: 18 Resp  Min: 18  Max: 20  SpO2: 92 % SpO2  Min: 89 %  Max: 98 %  Pulse: 107 Pulse  Min: 92  Max: 118    No data recorded  BP: (!) 143/82 Systolic (24hrs), Av , Min:122 , Max:145   Diastolic (24hrs), Av, Min:72, Max:83     I/Os: I/O last 3 completed shifts:  In: 950 [P.O.:280; I.V.:670]  Out:  [Urine:]     Exam:   General: in no acute distress  Mental Status: AAOx4.  CV: RRR  Resp: Diminished right base, o/w CTAB. On BiPAP, FiO2 100%. NO increased WOB noted at this time.  GI: Soft, NT/ND, +BS  MSK: No edema    Significant Results and Procedures   Lactic Acid:   Recent Labs   Lab Test 22  1603 22  0816 22  0336   LACT 4.0* 1.4 2.3*     CBC:   Recent Labs   Lab Test 22  0413 22  0445 22  0336   WBC 28.1* 26.5* 36.8*   HGB 9.1* 9.0* 9.5*   HCT 27.6* 26.8* 28.7*    221 262        Sepsis " Evaluation   The patient is known to have an infection.  Loco Wood meets SIRS criteria AND has a lactate >2 or other evidence of acute organ damage.  These vital signs, lab and physical exam findings constitute a diagnosis of SEVERE SEPSIS.    Sepsis Time-Zero (time severe sepsis diagnosis confirmed): 1603  06/09/22 as this was the time when Lactate resulted, and the level was > 2.0 and Acute Resp Failure requiring BiPAP or Mechanical Vent     Anti-infectives (From now, onward)    Start     Dose/Rate Route Frequency Ordered Stop    06/08/22 2043  ceFEPIme (MAXIPIME) 2 g vial to attach to  ml bag for ADULTS or 50 ml bag for PEDS        Note to Pharmacy: Empiric gram neg coverage per ID    2 g  over 30 Minutes Intravenous EVERY 12 HOURS 06/08/22 1243      06/07/22 1500  sulfamethoxazole-trimethoprim (BACTRIM) 320 mg in sodium chloride 0.9 % 570 mL intermittent infusion        Note to Pharmacy: Per ID, switching from PO to IV  Increasing dose given improving RICK   Patient notably has hyponatremia, concerning for SIADH, request changing carriers to NS    320 mg Intravenous EVERY 6 HOURS 06/07/22 1432      05/24/22 0800  acyclovir (ZOVIRAX) tablet 800 mg         800 mg Oral 2 TIMES DAILY 05/24/22 0258          Current antibiotic coverage is appropriate for source of infection.    3 Hour Severe Sepsis Bundle Completion:  1. Initial Lactic Acid result shown above. Repeat lactic acid with next VBG.   2. Blood Cultures before Antibiotics: Yes  3. Broad Spectrum Antibiotics Administered: yes  4. Fluids: Fluid bolus not indicated due to: CHF & Pulmonary Edema

## 2022-06-10 PROBLEM — R09.02 HYPOXIA: Status: ACTIVE | Noted: 2022-01-01

## 2022-06-10 PROBLEM — Z11.52 ENCOUNTER FOR SCREENING LABORATORY TESTING FOR SEVERE ACUTE RESPIRATORY SYNDROME CORONAVIRUS 2 (SARS-COV-2): Status: ACTIVE | Noted: 2022-01-01

## 2022-06-10 PROBLEM — R07.9 CHEST PAIN, UNSPECIFIED TYPE: Status: ACTIVE | Noted: 2022-01-01

## 2022-06-10 PROBLEM — J18.9 PNEUMONIA OF BOTH LUNGS DUE TO INFECTIOUS ORGANISM, UNSPECIFIED PART OF LUNG: Status: ACTIVE | Noted: 2022-01-01

## 2022-06-10 PROBLEM — R06.02 SHORTNESS OF BREATH: Status: ACTIVE | Noted: 2022-01-01

## 2022-06-10 NOTE — PROVIDER NOTIFICATION
Pt went into A flutter at 2200 and stayed in this rhythm for 11 minutes. Now will go back into sinus rhythm for 30 seconds [facility] 12 lead E[facility] obtained. MD was called and will place ABG order. 500mL LR bolus ordered.

## 2022-06-10 NOTE — PROGRESS NOTES
[facility]   Pulmonary   Consult Note 06/10/2022   Loco Wood MRN: [id number]    We were consulted for evaluation of rapidly progressive hypoxia in immunocompromised patient previously diagnosed with PJP pneumonia.     Assessment & Plan      Loco Wood is a 77 year old male who presented to [facility] on 5/23/2022 with pleuritic chest pain, fatigue, and chills.  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  His hospital course has been complicated by persistently severe to worsening hypoxemia despite completing 2 weeks of PJP treatment with TMP/SMX.    #Acute hypoxic respiratory failure  #Severe PJP pneumonia  #CLL (on ibrutinib)  #Autoimmune hemolytic anemia (on prolonged prednisone taper)  Respiratory status tenuous.  Unable to tolerate HFNC without desatting into mid-80s.  Overall unclear whether additional steroids would help.  With CRP improved to 11, suspect that it will not, but will defer to ICU team since patient is transferring.    Counseled patient on need for treatment for his PJP and he was agreeable to take the primaquine after further discussion.    Discussed with ID, primary team, and MICU team.  Prognosis remains guarded.  Patient's family is reportedly coming to visit today.  He has texted them.    Recommendations:    - Determine patient's medical decision-maker prior to intubation should respiratory status continue to worsen   - Continue diuresis as able   - Antimicrobial therapies per ID   - Defer steroid decision to MICU, given plans to transfer   - No plans for elective intubation, but remains high risk for needing intubation today.      Thank you for allowing us to care for this patient.  We will sign off as the patient is transferring to the ICU. Please don't hesitate to page or call with further questions or concerns.    Patient seen & discussed w/  Dr. Pierre, who agrees with the above assessment and plan.    Mariluz MORTON  BECCA Hernandez.  Pulmonary and Critical Care Medicine Fellow  06/10/2022 7:36 AM           Interval History/Subjective:     Since starting treatment for PJP on 5/27, patient oxygen needs improved initially down to 11-15L oxymask 5/29-5/31.  Since that time, he has been on increasing amounts of HFNC from 30L/100% up to 50L/100% and also 12/8 Bipap 100% for the past day.  ABG this morning 7.40/41/80 on 100% Bipap.    He is currently receiving:  Methylprednisolone 40 mg (since 5/28, 69 mg 5/24-5/27)  Antimicrobials: cefepime, clinda, primaquine  Prophylaxis: acyclovir  Ibrutinib  Previous treatments: micafungin, zosyn, levofloxacin, linezolid    Beta-D-Glucan has remained persistently elevated >500 since at least 5/23.  Histo negative 5/27  Blasto negative 5/28  Cocci negative 5/27, 6/1  Aspergillus Ab negative 5/27  Aspergillus Galactomannon negative 5/27  Cryptococcus Ag neg 6/2  PJP PCR on sputum positive 5/27  Quant gold neg 5/26  LDH remains unchanged in the 300-350 range    Patient reports that he feels the same as he has the whole hospital stay.  Some shortness of breath, but overall not too bad and certainly not worse.  He has been up to the bedside commode, but not today due to his oxygen needs.  No coughing or sputum production.          Review of Symptoms:   10-point ROS reviewed, & found negative w/ exceptions noted in the HPI.          Past Medical History:     Past Medical History:   Diagnosis Date     Arthritis     hip and toes     Chronic pain      CLL (chronic lymphocytic leukaemia)      Esophageal reflux      Malignant neoplasm (H)     Leukemia     Paroxysmal atrial fibrillation (H) 2/5/2020     PONV (postoperative nausea and vomiting)      Pure hypercholesterolemia        Past Surgical History:   Procedure Laterality Date     ARTHROPLASTY MINIMALLY INVASIVE HIP  5/10/2013    Procedure: ARTHROPLASTY MINIMALLY INVASIVE HIP;  Left Two Incision Total Hip Arthroplasty ;  Surgeon: Desmond Alberts MD;   Location: [facility] OR     ARTHROPLASTY MINIMALLY INVASIVE HIP  2/27/2014    Procedure: ARTHROPLASTY MINIMALLY INVASIVE HIP;  Right Total Hip Arthroplasty Minimally Invasive Two Incision  *Latex Free Room;  Surgeon: Desmond Alberts MD;  Location: [facility] OR     BACK SURGERY      back fusion 1994     COLONOSCOPY      2007     COLONOSCOPY N/A 8/15/2017    Procedure: COLONOSCOPY;  colonoscopy;  Surgeon: Chun Mcguire MD;  Location:  GI     GI SURGERY      4 hernia repairs in childhood     HERNIA REPAIR      4 since age 4     IR PAE  3/5/2020     [facility] NONSPECIFIC PROCEDURE  1964 &'95    (R) inguinal herniorrhapy     [facility] NONSPECIFIC PROCEDURE  1949    (L) inguinal herniorrhaphy     [facility] NONSPECIFIC PROCEDURE  1994    L4-5  L5 S1 fusion - spondylolisthesis            Allergies:     Allergies   Allergen Reactions     Blood Transfusion Related (Informational Only) Other (See Comments)     Patient has a history of a clinically significant antibody against RBC antigens.  A delay in compatible RBCs may occur.      Morphine Itching and Rash     Dilaudid [Hydromorphone] Itching             Outpatient Medications:     No current facility-administered medications on file prior to encounter.  acyclovir (ZOVIRAX) 800 MG tablet, Take 1 tablet (800 mg) by mouth 2 times daily  atorvastatin (LIPITOR) 20 MG tablet, Take 1 tablet (20 mg) by mouth daily  cyanocobalamin (VITAMIN B-12) 100 MCG tablet, Take 1 tablet (100 mcg) by mouth daily  famotidine (PEPCID) 40 MG tablet, Take 1 tablet (40 mg) by mouth nightly as needed for heartburn  folic acid (FOLVITE) 1 MG tablet, Take 1 tablet (1 mg) by mouth daily  ibrutinib (IMBRUVICA) 420 MG tablet, Take 1 tablet (420 mg) by mouth daily  levothyroxine (SYNTHROID/LEVOTHROID) 50 MCG tablet, Take 1 tablet (50 mcg) by mouth daily  omeprazole (PRILOSEC) 10 MG DR capsule, TAKE 1 CAPSULE BY MOUTH EVERY DAY 30 TO 60 MINUTES BEFORE A MEAL  predniSONE (DELTASONE) 20 MG tablet, Take 3 tablets (60 mg) by mouth  "daily  vitamin D3 (CHOLECALCIFEROL) 50 mcg (2000 units) tablet, Take 1 tablet by mouth daily  zolpidem (AMBIEN) 5 MG tablet, Take 1 tablet (5 mg) by mouth nightly as needed for sleep              Family History:     Family History   Problem Relation Age of Onset     Heart Disease Father      Cerebrovascular Disease Father          OF [place]      Cancer Father         melonoma     Melanoma Father      Hyperlipidemia Father      Thyroid Disease Father      Cancer Mother         Lung cancer     Other Cancer Mother         lung; heavy smoker     Cerebrovascular Disease Paternal Uncle         Aneurism     Breast Cancer Maternal Aunt          from it     Cancer Paternal Uncle      Cancer Paternal Aunt      Skin Cancer No family hx of      Glaucoma No family hx of      Macular Degeneration No family hx of                Social History:     Social History     Tobacco Use     Smoking status: Never Smoker     Smokeless tobacco: Never Used   Vaping Use     Vaping Use: Never used   Substance Use Topics     Alcohol use: Yes     Alcohol/week: 0.0 standard drinks     Comment: moderate, social     Drug use: No             Physical Exam:   /79 (BP Location: Left arm, Patient Position: Semi-Way's)   Pulse 94   Temp 98.5  F (36.9  C) (Oral)   Resp 22   Ht 1.676 m (5' 6\")   Wt 65.9 kg (145 lb 4.5 oz)   SpO2 90%   BMI 23.45 kg/m        Intake/Output Summary (Last 24 hours) at 6/10/2022 0736  Last data filed at 6/10/2022 0500  Gross per 24 hour   Intake 1710 ml   Output 2995 ml   Net -1285 ml     General: elderly male in no acute distress  HENT: external ears without visible abnormalities, no rhinorrhea, no epistaxis, slight conjunctival erythema  Lungs: no increased WOB or accessory muscle use on 45L / 100% HFNC, no coughing, CTAB to anterior exam.  Posterior exam deferred due to desaturations.  Heart: irregular tachycardia, no murmurs  Abdomen: soft, non-distended, bowel tones present  Extremities: no " bilateral pitting edema, no clubbing or cyanosis  Skin: no visible rashes, no mottling  Neurologic: moving all 4 extremities spontaneously, awake and alert  Psych: appropriate insight and good judgment          Data:   Labs (all laboratory studies reviewed by me): notable labs in HPI above.    Imaging and other diagnostic testing (all imaging studies reviewed by me)    Chest xray (6/1 -> 6/4): progressively increasing bilateral nodular GGOs with upper lobe predominance    CT chest (5/23/22): patchy GGOs, mostly subpleural  CTPE (5/27): no PE, upper lobe repdominant GGOs, some bronchiectesis  CT chest (6/9): persisent GGOs coalescing into increasingly dense consolidations, worsening traction bronchiectesis and air bronchograms    Transthoracic echocardiogram (4/26/22): EF 55-60%, normal with possible Grade I diastolic dysfunction

## 2022-06-10 NOTE — PLAN OF CARE
ICU End of Shift Summary. See flowsheets for vital signs and detailed assessment.    Changes this shift: Assumed care at 1500. A&Ox4. Makes needs known. Denies pain. SR. MAP > 65 with no interventions. Melissa placed. BiPAP12/8 90%; desating with turns/movement. NPO; MICU aware unable to take oral medication. No BM this shift. Voiding spontaneously via urinal.     Plan: PICC placement tomorrow. Continue to monitor resp status, follow POC and notify team of any changes.      Goal Outcome Evaluation:    Plan of Care Reviewed With: patient     Overall Patient Progress: no change

## 2022-06-10 NOTE — PROGRESS NOTES
[facility]  Transplant Infectious Disease Progress Note     Patient:  Loco Wood, Date of birth 1945, Medical record number [id number]  Date of Visit:  06/10/2022         Assessment and Recommendations:   77 year old man with CLL and AIHI s/p prednisone courses in 12/2021 from derm, 1/2022 for lung symptoms in [place], in 3/2022 for anemia, then most recently for 3 weeks & 6 weeks for AIHA who is being treated for severe Pneumocystis pneumonia.    Refractory, Severe Pneumocystis pneumonia: 5/27/22 CT chest showed progressive bilateral groundglass opacities. 5/27/2022 BAL Pneumocystis sputum PCR +, in the setting of a progressive pulmonary process. Elevated Fungitell >500 & LDH are also consistent with Pneumocystis. Had 1 dose of neb pent on 5/26/2022 (may have started to kill some pneumocystis organisms). IV bactrim started 5/27/2022. Other infectious work up has including negative MRSA nares, Cryptococcal antigen, EBV PCR, CMV PCR, Coccidiodes antigen, Aspergillus gm, fungal antibodies, Histoplasma antigen, Blastomyces antigen and TB quantiferon. Chivoius demonstrated 6336 copies of Pneumocystis jirovecii and 353 copies of E faecium. He continues to have increased respiratory distress requiring high amounts of FiO2 and may be intubated soon. He remains on bactrim but has required ongoing dose adjustments due to renal function. At this point he has been on bactrim since 5/27 which is a total of ~ 2 weeks. Although patients with hematologic malignancies can be slow to respond to PJP treatment I would expect some response within in 2 weeks. This raises my concern for treatment failure and/or another issue is also contributing to it (ARDS, organizing pneumonia, CLL?). Interesting despite clinical worsening his inflammatory markers have down trended. The 1-3 BD glucan is still > 500 but this is not as helpful since the value is not measurable past 500. If it had dropped to < 500 this  would have been more helpful in determining the trajectory. The 6/9 CT chest has increased consolidations and there is concern for organizing pneumonia. The CLL has progressed so lung involvement is a consideration although it is rare. Pulmonary toxicity from ibrutinib can occur but is also relatively rare.   At this point given that he has received 2 weeks of bactrim with ~ 10 days being IV therapy I will recommend switching to clindamycin + primaquine. I discussed initiating primaquine before G6PD result with Dr. Macdonald given his history of AIHA. We can clinically monitor for evidence of AIHA. There is emerging evidence of the utilization of micafungin with PJP but he had received micafungin ~ 10 days without improvement. Despite this I will resume micafungin given the potential effect, clinical deterioration, minimal side effect profile and to provide additional anti-fungal prophylaxis with the steroid dose increase. There is not evidence for combination therapy with bactrim + clindamycin/primaquine. MICU team plans to increase the dose of steroids incase there is a component of organizing pneumonia or ARDS. Ideally he will get a bronchoscopy if intubated including flow cytometry to assess for CLL and a concomitant infection.    Leukocytosis/History of CLL/AIHA:  WBC up trending over the past week with increased lymphocytes. There is concern for CLL progression based on flow. Previously taking ibrutinib at home weekly but now on daily. He is has been methylprednisolone since 5/24 with a dose of 40 mg daily since 5/28. Methylprednisolone provides treatment for AIHA and adjunctive therapy with tmp/smx to treat PJP. Dose will be increased due to possibility of organizing pneumonia.    E faecium on Karius: s/p 4 days of linezolid. No isolated on clinical cultures.    History of herpes zoster: on acyclovir    - Hep B core ab & S ab+ 5/26/2022, with negative hep B viral load.  - Extensive travel hx.     - QTc interval:  427 msec on 6/2/2022 EKG  - Bacterial prophylaxis: antibiotics stopped 6/9/22.  - Pneumocystis prophylaxis: pent 5/26/2022, treatment bactrim 5/27/2022.  - Viral serostatus & prophylaxis: HSV1+, HSV2+, CMV+; acyclovir prophy  - Fungal prophylaxis: Fungitell > 500; micafungin 5/27/2022-6/7/22.  - Immunization status:3 covid vaccinations.   - Gamma globulin status: 30g IVIG 5/25/2022  - Isolation status: Good hand hygiene.    Recommendations:  1. Continue clindamycin 900 mg IV q 8 hours + primaquine 30 mg daily. Primaquine can be crushed if gastric tube is placed after intubation.   2. Restart micafungin 100 mg IV daily  3. Dose of steroids increased per MICU team  4. Given his history of AIHA and that we don't have the G6PD results yet consider periodically checking haptoglobin pre-emptively  5. If intubated please perform bronchoscopy and send immunocompromised panel and flow cytometry    Transplant Infectious Disease will continue to follow with you.      Marybeth Anderson DO.   Infectious Diseases Attending  Pager [phone]        Interval History:   Afebrile. Blood gases are not good and there is a chance that he will be intubated later in the day. Currently on bipap. Patient expressed being scared about being intubated and that he could die. We discussed the use of bactrim and micafungin. Although he has to write because of Bipap he is very clear in his thoughts.    Review of Systems:Remaining systems all reviewed and negative       Current Medications & Allergies:       acyclovir  800 mg Oral BID     allopurinol  300 mg Oral Daily     atorvastatin  20 mg Oral Daily     clindamycin  900 mg Intravenous Q8H     cyanocobalamin  100 mcg Oral Daily     dronabinol  2.5 mg Oral Daily     folic acid  1 mg Oral Daily     heparin ANTICOAGULANT  5,000 Units Subcutaneous Q12H     ibrutinib  420 mg Oral Daily     levothyroxine  50 mcg Oral Daily     melatonin  10 mg Oral At Bedtime     methylPREDNISolone  40 mg Intravenous  Q8H     ondansetron  8 mg Intravenous Daily     pantoprazole  20 mg Oral Daily     polyethylene glycol  17 g Oral Daily     primaquine  30 mg Oral Daily     psyllium  1 packet Oral BID     senna-docusate  1 tablet Oral BID    Or     senna-docusate  2 tablet Oral BID     sodium chloride (PF)  3 mL Intracatheter Q8H     sodium chloride (PF)  3 mL Intracatheter Q8H     sodium chloride (PF)  3 mL Intracatheter Q8H     vitamin D3  50 mcg Oral Daily       Allergies   Allergen Reactions     Blood Transfusion Related (Informational Only) Other (See Comments)     Patient has a history of a clinically significant antibody against RBC antigens.  A delay in compatible RBCs may occur.      Morphine Itching and Rash     Dilaudid [Hydromorphone] Itching            Physical Exam:   Ranges for vital signs:  Temp:  [97.8  F (36.6  C)-99  F (37.2  C)] 99  F (37.2  C)  Pulse:  [] 105  Resp:  [18-32] 32  BP: (122-143)/(66-84) 122/84  FiO2 (%):  [100 %] 100 %  SpO2:  [85 %-100 %] 97 %  Vitals:    06/09/22 0602 06/10/22 0500 06/10/22 1500   Weight: 66.8 kg (147 lb 4.3 oz) 65.9 kg (145 lb 4.5 oz) 67.1 kg (147 lb 14.9 oz)     Physical Examination:  GENERAL:  Chronically ill appearing. in bed. Although on bipap he does not appear to be in acute distress.  HEAD:  Head is normocephalic, atraumatic   EYES:  Eyes have anicteric sclerae without conjunctival injection. Pupils reactive bilaterally.   ENT:  Oropharynx is moist without exudates or ulcers. Tongue is midline. No sinus tenderness.   LUNGS:  Clear to auscultation bilaterally. No accessory muscle use. On bipap  CARDIOVASCULAR:  Regular rate and rhythm with no murmurs  ABDOMEN:  Normal bowel sounds, soft, non-tender, non-distended.  SKIN:  No acute rashes.     EXTREMITIES: No joint erythema or swelling.   NEUROLOGIC:  Grossly nonfocal. Active x4 extremities. Alert. Oriented.   LINES: peripheral IV in place without any surrounding erythema or exudate. Dressing intact.           Laboratory Data:     Metabolic Studies       Recent Labs   Lab Test 06/10/22  1518 06/10/22  0448 06/09/22  1603 06/09/22  0413 06/08/22  0445 06/07/22  1420 06/03/22  2202 06/03/22  1801   NA  --  131*  --  131*   < >  --    < >  --    POTASSIUM  --  4.7  --  4.9   < >  --    < >  --    CHLORIDE  --  98  --  99   < >  --    < >  --    CO2  --  25  --  22   < >  --    < >  --    ANIONGAP  --  8  --  10   < >  --    < >  --    BUN  --  20  --  18   < >  --    < >  --    CR  --  1.45*  --  1.41*   < >  --    < >  --    GFRESTIMATED  --  50*  --  52*   < >  --    < >  --    * 94  --  95   < >  --    < >  --    DAVID  --  8.7  --  8.5  8.5   < >  --    < >  --    PHOS  --  3.1  --  3.3   < >  --    < >  --    MAG  --  2.3  --  2.5*   < >  --    < >  --    LACT  --  1.4 4.0*  --    < >  --    < >  --    PCAL  --   --   --   --   --   --   --  0.07*   FGTL  --   --   --   --   --  >500  --   --     < > = values in this interval not displayed.       Hepatic Studies    Recent Labs   Lab Test 06/10/22  0448 06/09/22  0413 06/08/22  0445 06/07/22  0336 06/06/22  0440 05/26/22  0549 05/25/22  0616   BILITOTAL 0.6 0.6 0.8 0.8 0.8   < > 1.7*   DBIL  --   --   --   --   --   --  0.4*   ALKPHOS 103 98 92 99 92   < >  --    PROTTOTAL 5.4* 5.3* 5.2* 5.6* 5.3*   < >  --    ALBUMIN 2.5* 2.4* 2.4* 2.6* 2.4*   < >  --    AST 47* 50* 43 43 30   < >  --    ALT 61 50 43 42 33   < >  --    LDH  --   --   --  348* 322*   < > 300*    < > = values in this interval not displayed.       Hematology Studies      Recent Labs   Lab Test 06/10/22  0448 06/09/22  0413 06/08/22 0445 06/07/22  0336 06/06/22  0440 06/05/22  0523   WBC 29.4* 28.1* 26.5* 36.8* 31.7* 28.4*   ANEU 11.8* 10.4* 7.7 14.4* 11.1* 8.5*   ALYM 17.6* 17.1* 18.8* 22.4* 20.3* 19.3*   ANY 0.0 0.3 0.0 0.0 0.3 0.6   AEOS 0.0 0.0 0.0 0.0 0.0 0.0   HGB 9.4* 9.1* 9.0* 9.5* 8.9* 8.3*   HCT 28.5* 27.6* 26.8* 28.7* 27.2* 25.4*    197 221 262 263 268       Arterial Blood Gas  Testing    Recent Labs   Lab Test 06/10/22  0403 06/09/22  2229 06/09/22  1827 06/09/22  1447 06/03/22  1654   PH 7.40 7.40 7.40 7.38 7.41   PCO2 41 38 37 39 35   PO2 80 81 97 59* 81   HCO3 25 23 23 23 22   O2PER 100 100 100 100 100      Inflammatory Markers    Recent Labs   Lab Test 06/10/22  0448 06/08/22  0445 06/03/22  0423 06/02/22  0451 10/04/17  2250 04/11/16  1136   SED  --   --   --   --  1  --    CRP 11.0* 19.0* 82.0* 100.0*  --  <0.2  Reference Values:   Low Risk:           <1.0 mg/L   Average Risk:       1.0-3.0 mg/L   High Risk:          >3.0 mg/L   Acute Inflammation: >8.0 mg/L         Immune Globulin Studies     Recent Labs   Lab Test 06/03/22  0856 05/24/22  1651 06/12/19  0806 05/31/19  1021 04/30/19  1219 12/21/18  1103 04/29/15  1012 09/19/14  1227   * 312* 394* 409* 429* 443*   < > 729   IGM  --   --   --   --   --   --   --  28*   IGA  --   --   --   --   --   --   --  72    < > = values in this interval not displayed.       Gout Labs      Recent Labs   Lab Test 06/09/22  0413 06/07/22  0336 06/06/22  0440 06/05/22  0523 06/04/22  0559   URIC 2.9* 1.8* 2.1* 1.9* 2.1*     Microbiology:  Fungal testing  Recent Labs   Lab Test 06/07/22  1420 05/27/22  1356 05/27/22  0916 05/23/22  1802   FGTL >500  --   --  >500   ASPGAI  --  0.03  --   --    ASPGAA  --  Negative  --   --    COFUNG  --   --  <1:2  --        Last Culture results with specimen source  Culture   Date Value Ref Range Status   06/03/2022 No Growth  Final   06/03/2022 No Growth  Final   06/02/2022 No growth after 8 days  Preliminary   05/27/2022 2+ Normal aracely  Final   05/27/2022 No Growth  Final   05/27/2022 No Growth  Final   05/26/2022 No Growth  Final   05/26/2022 No Growth  Final   05/26/2022   Final    >10 Squamous epithelial cells/low power field indicates oral contamination. Please recollect.   05/23/2022 No Growth  Final   05/23/2022 No Growth  Final     Culture Micro   Date Value Ref Range Status   03/02/2014 No growth   Final   10/22/2010 No Beta Streptococcus isolated  Final   2010   Final    No Salmonella, Shigella, Campylobacter or E coli 0157 isolated.   2010 No growth  Final   2009 No Beta Streptococcus isolated  Final   2007 No growth  Final   2004 No Beta Streptococcus isolated  Final        Virology:  Coronavirus-19 testing    Recent Labs   Lab Test 22  0903 22  0241 22  1946 20  0843   [id number]  --   --   --  Negative   [id number]  --   --   --  Not Applicable   [id number] Negative Negative Negative  --        Respiratory virus testing    Recent Labs   Lab Test 22  1916 22  1944   IFLUA Not Detected  --    INFZA  --  Negative   FLUAH1 Not Detected  --    IT1228 Not Detected  --    FLUAH3 Not Detected  --    IFLUB Not Detected  --    INFZB  --  Negative   PIV1 Not Detected  --    PIV2 Not Detected  --    PIV3 Not Detected  --    PIV4 Not Detected  --    IRSV  --  Negative   RSVA Not Detected  --    RSVB Not Detected  --    HMPV Not Detected  --    ADENOV Not Detected  --    CORONA Not Detected  --        CMV viral loads    Recent Labs   Lab Test 22  1448   CMVQNT Not Detected       EBV DNA Copies/mL   Date Value Ref Range Status   2022 Not Detected Not Detected copies/mL Final     Urine Studies     Recent Labs   Lab Test 22  0952 22  2240 22  1906 22  1355 21  1744   URINEPH 6.5 5.5 6.5 7.0 5.5   NITRITE Negative Negative Negative Negative Negative   LEUKEST Negative Negative Negative Negative Negative   WBCU 2 2 1 0-5 1     Imaging:  Echo Complete  [id number]  [id number]  [id number]  [id number]^MIKEL     [facility],[place]  Echocardiography Laboratory  [address] [place] [id number]     Name: ATIF JAMES  MRN: [id number]  : 1945  Study Date: 2022 05:01 PM  Age: 77 yrs  Gender: Male  Patient Location: [facility]  Reason For Study: Arrhythmia  Ordering Physician: OLGA  BICKEY  Performed By: Rahel Bridges RDCS     BSA: 1.8 m2  Height: 66 in  Weight: 147 lb  HR: 99  BP: 143/82 mmHg  ______________________________________________________________________________  Procedure  Complete Portable Echo Adult. Contrast Optison. Patient was given 5 ml mixture  of 3 ml Optison and 6 ml saline. 4 ml wasted.  ______________________________________________________________________________  Interpretation Summary  Technically difficult study.     Left ventricular size, wall motion and function are normal. The ejection  fraction is 60-65%.     Right ventricular function, chamber size, wall motion, and thickness are  normal.     No significant valvular abnormalities present.     No pericardial effusion is present.     Compared to baseline images on 4/26/2021 there are no hemodynamically  signifcant findings but today's study is of poor quality.  ______________________________________________________________________________  Left Ventricle  Left ventricular size, wall motion and function are normal. The ejection  fraction is 60-65%. Left ventricular diastolic function is not assessable.     Right Ventricle  Right ventricular function, chamber size, wall motion, and thickness are  normal.     Atria  The atria cannot be assessed.     Mitral Valve  The mitral valve is normal.     Aortic Valve  Aortic valve is normal in structure and function. Trace aortic insufficiency  is present.     Tricuspid Valve  The tricuspid valve is normal. Trace tricuspid insufficiency is present. The  peak velocity of the tricuspid regurgitant jet is not obtainable. Pulmonary  artery systolic pressure cannot be assessed.     Pulmonic Valve  The pulmonic valve is normal.     Vessels  The inferior vena cava is normal. Sinuses of Valsalva 3.8 cm. Ascending aorta  3.2 cm. IVC diameter <2.1 cm collapsing >50% with sniff suggests a normal RA  pressure of 3 mmHg.     Pericardium  No pericardial effusion is present.      Miscellaneous  Technically difficult study. No significant valvular abnormalities present.     Compared to Previous Study  Compared to baseline images on 2021 there are no hemodynamically  signifcant findings but today's study is of poor quality.  ______________________________________________________________________________  MMode/2D Measurements & Calculations  IVSd: 0.88 cm  LVIDd: 3.8 cm  LVIDs: 1.7 cm  LVPWd: 0.87 cm  FS: 55.4 %  LV mass(C)d: 96.8 grams  LV mass(C)dI: 55.1 grams/m2  Ao root diam: 3.8 cm  asc Aorta Diam: 3.2 cm  LVOT diam: 2.5 cm  LVOT area: 4.9 cm2  RWT: 0.46     Doppler Measurements & Calculations  MV E max toshia: 69.8 cm/sec  MV A max toshia: 84.4 cm/sec  MV E/A: 0.83  MV dec slope: 249.0 cm/sec2  PA acc time: 0.08 sec     E/E' av.8  Lateral E/e': 5.9  Medial E/e': 11.7     ______________________________________________________________________________  Report approved by: Marichuy Nguyen 2022 05:46 PM        CT Chest w/o Contrast  Narrative: CT of the chest without contrast    HISTORY: PE JENNIFER concern for treatment failure    COMPARISON STUDY: 2022    FINDINGS: Peribronchovascular areas of consolidation, crazy paving and  mild central airway dilatation with architectural distortion. Areas of  consolidation are increased since 2022. Small pleural effusions  bilaterally.    Main pulmonary artery measures 4 cm in diameter. Mild coronary  calcification. Heart is not enlarged. No mediastinal, hilar or  axillary adenopathy. Small hiatal hernia.    Evaluation of the upper abdomen is limited and is without contrast.  Cyst in the parapelvic portion of the partially imaged right kidney.  Punctate calcification of the pancreas.    bones: No suspicious bony lesions.  Impression: IMPRESSION:  1. Increased consolidation in the lungs with a pattern most suggestive  of organizing pneumonia superimposed crazy paving likely secondary to  known pneumocystis Jirovecii pneumonia.  2.  Pulmonary artery enlargement suggestive of pulmonary hypertension.    AMANDA BAIRES MD         SYSTEM ID:  [id number]

## 2022-06-10 NOTE — H&P
MEDICAL ICU H&P  06/10/2022    Date of Hospital Admission: 05/23/22  Date of ICU Admission: 06/10/22  Reason for Critical Care Admission: acute hypoxic respiratory failure  Date of Service (when I saw the patient): 06/10/2022    ASSESSMENT: Loco Wood is a 77 year old male with PMH CLL, auto-immune hemolytic anemia (on prednisone taper), and CKD3a who was admitted to ICU on 06/10 for AHRF due to PJP pneumonia.    PLAN:    Neuro:  # Degenerative disc disease  # Bilateral intermittent hand cramping, spasms suspicious for cervical impingement  Patient with history of degenerative disc disorder with concern for cervical impingement in setting of new bilateral intermittent hand cramping. Patient met with Dr. Melton his sports medicine doctor (AM 5/24) over phone for follow up appointment to discuss results of his MRI. Will defer to outpatient management.     Pulmonary:  # Acute hypoxic respiratory failure 2/2 PJP pneumonia   # Bilateral lower lobe bronchiectasis/consolidations  # Positive beta D glucan >500  Admitted for several days of worsening fatigue/dyspnea, subjective chills, and pleuritic chest pain with 3-4L oxygen requirement (w/ elevated WBC) and CT w/ infiltrates and bilateral lower lobe consolidations concerning for infection. RVP negative. Patient initially treated with 5 day abx course starting 5/24 with azithromycin (d/c 5/26) + ceftriaxone for presumed CAP with clinical improvement. On 5/27, patient developed fever with worsening respiratory status (45 L high flow NC) and was transferred to C. Patient improving with IVF 2.5L and broadened coverage with Zosyn, Micafungin (positive B-Glucan), and IV bactrim (for possible PJP) O2 req of 12-15L, with increase to HFNC on 6/1. CXR 5/31 with trace effusions and increasing apical opacities concerning for worsening edema/infection. PJP PCR 5/27 positive. Karius 6/1 + PJP and E. Faecium (less likely pathogenic as not growing on other cultures). On 6/9,  worsening hypoxia with paO2 59 on 100% 50L. Telemetry also with frequent PACs and episode of SVT and lactic acid elevated at 4, likely in setting of worsening hypoxia. CT chest with worsening PJP and concern for organizing pneumonia and pulmonary hypertension. TTE with normal cardiac function and without evidence of volume overload. Currently believe CLL not driving pulmonary symptoms though need biopsy to definitively rule out. Patient is on steroids for hemolytic anemia which would treat organizing pneumonia and unclear if increasing dose would have additional benefits > risks.  - Discussed with ID and onc, will switch to second line treatment for PJP with clindamycin and primaquine. G6PD pending but ok with close monitoring per onc  - Methylpred 40 mg Q8H  - Current regimen: clindamycin, primaquine, fluconazole; also on acyclovir for ppx while on steroids  - Discontinued: micafungin, levofloxacin, zosyn, linezolid, cefepime, Bactrim   - BiPAP; incentive spirometry  - Patient at risk for respiratory decompensation requiring intubation, will bronch if so for BAL including IC panel and flow cytometry    Cardiovascular:  # AF with RVR spontaneously resolved, remains in AF  No known history of AF. Echo unremarkable.  - On heparin SQ    # Ground level fall secondary to orthostatic hypotension, resolved  Early AM 6/4. CT head without evidence of intracranial hemorrhage, EKG unremarkable, found to have orthostatic hypotension, improved with  ml.  - Holding Ambien PRN at night  - Fall precautions    # HLD  PTA atorvastatin    GI/Nutrition:  # Malnutrition:    - Level of malnutrition: Severe protein-calorie malnutrition  - Trial marinol 2.5 mg every day  - Patient currently declining feeding tube placement  - Nutrition following     # Nausea  Last AXR 5/27 without evidence of obstruction. Likely in setting of medications.  - Scheduled Zofran in AM   - PRN antiemetics (Zofran, compazine, reglan)    # GERD  PTA  omeprazole + PRN famotidine (also on Pred)    Renal/Fluids/Electrolytes:  # Lactic acidosis  Persistent lactic acidosis 3-4 despite antimicrobial treatment and IVF boluses. Appears hemodynamically stable with no evidence of hypoperfusion. Discussed with hem/onc, can sometimes see in malignancies, but unlikely in reasonably controlled CLL. Hepatic function wnl, suggesting against clearance issue. Resolved with additional IVF 5/30. Increased to 4.1 on 6/3, improved with addition of antibiotics above. Again increased to 4 on 6/9, likely in setting of worsening hypoxia as above.    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 Trialed 500 ml LR 6/7, however, sodium studies suggestive of SIADH. Now ~ 129-131. NT pro BNP and TTE without evidence of volume overload.  - Fluid restriction 1000 ml  - Change IV medication carriers to NS     # RICK on CKD Stage 3A resolved  Baseline ~1.3-1.6.   - Avoid nephrotoxic agents as able    Endocrine:  # Hypothyroid  TSH 1.94 on admission WNL. Continue PTA levothyroxine    ID:  # E faecium on Karius assay  # Severe sepsis, improved  # Positive beta D glucan >500  # PJP pneumonia  - Discussed with ID and onc, will switch to second line treatment for PJP with clindamycin and primaquine. G6PD pending but ok with close monitoring per onc  - Methylpred 40 mg Q8H  - Current regimen: clindamycin, primaquine, fluconazole; also on acyclovir for ppx while on steroids  - Discontinued: micafungin, levofloxacin, zosyn, linezolid, cefepime, Bactrim     # Hx Herpes Zoster c/b post-herpetic neuralgia  PTA ppx Acyclovir while on prednisone    Hematology:    # CLL (dx 2/2007)  History of CLL (2007) managed on Ibrutinib (5/2019) which patient is not taking daily. WBC 24.9 on admission (up from baseline ~6-10) now down to ~21. Suspect secondary to recent infection with lower concern for conversion to leukemia. IVIG infusion (5/25). Following FISH and cytogenetics for disease  prognostication and will continue to monitor with peripheral smear.   Continue daily ibrutinib to help control disease, increased bioavailability with addition of fluconazole 6/2. WBC and ALC uptrending. CT A/P without evidence of CLL metastasis.    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 B12, folate, iron panel normal with slightly elevated YEN levels. Labs concerning for marrow responsive anemia (elevated LDH, bilirubin, reticulocyte count). Will likely need to treat underlying CLL for long-term solution.    Hgb on admission 9.5 down from baseline (~10-12 past year) and now ~8. Currently hemodynamically stable and suspect recent drop in Hgb secondary to dilutional anemia in setting of 2.5L of IVF given (5/27). Methylpred decreased and rituximab held in setting of recent infection. Haptoglobin remains elevated, suggesting against worsening hemolytic anemia.   - Heme/Onc consulted              - Continue Methylprednisolone 40mg (Q8H)              - Hold Rituximab infusion              - Check uric acid, LDH, Mg, Phos, and retic count q48h               - Continue allopurinol for TLS prevention    - Hep B serologies (Core, Surface Ab +): Hep B ag negative, DNA quant negative   - Continue PTA Folate, B12   - Transfuse if Hgb < 7     Musculoskeletal:  No issues    Skin:  No issues    General Cares/Prophylaxis:    DVT Prophylaxis: Enoxaparin (Lovenox) SQ  GI Prophylaxis: PPI  Restraints: none  Family Communication: April  Code Status:  Full code    Lines/tubes/drains:  - PIV  - PICC    Disposition:  - Medical ICU    Patient seen and findings/plan discussed with medical ICU staff, Dr. Pozo.    Azucena Ryan MD  PGY-2 internal medicine    -----------------------------------------------------------------------    HISTORY PRESENTING ILLNESS: Loco Wood is a 77 year old male with history of CLL, auto-immune hemolytic anemia (on prednisone taper), and CKD3a admitted for several days of SOB, chills, pleuritic chest pain with infiltrates/consolidations on CT imaging. Initially treated for CAP (ceft + azithro) with improvement. On 5/27 patient transferred to IMC for worsening respiratory status and fever, then improved and afebrile (45L HFNC -> 12-15L oxymizer). Found to have PJP pneumonia. Heme/Onc following for auto-immune hemolytic anemia and CLL, steroids decreased in setting of infection. Now transferred to ICU due to increased oxygen requirements which may lead to intubation.    REVIEW OF SYSTEMS:  ROS: 10 point ROS neg other than the symptoms noted above in the HPI.    PAST MEDICAL HISTORY:   Past Medical History:   Diagnosis Date     Arthritis     hip and toes     Chronic pain      CLL (chronic lymphocytic leukaemia)      Esophageal reflux      Malignant neoplasm (H)     Leukemia     Paroxysmal atrial fibrillation (H) 2/5/2020     PONV (postoperative nausea and vomiting)      Pure hypercholesterolemia      SURGICAL HISTORY:  Past Surgical History:   Procedure Laterality Date     ARTHROPLASTY MINIMALLY INVASIVE HIP  5/10/2013    Procedure: ARTHROPLASTY MINIMALLY INVASIVE HIP;  Left Two Incision Total Hip Arthroplasty ;  Surgeon: Desmond Alberts MD;  Location: [facility] OR     ARTHROPLASTY MINIMALLY INVASIVE HIP  2/27/2014    Procedure: ARTHROPLASTY MINIMALLY INVASIVE HIP;  Right Total Hip Arthroplasty Minimally Invasive Two Incision  *Latex Free Room;  Surgeon: Desmond Alberts MD;  Location: [facility] OR     BACK SURGERY      back fusion 1994      COLONOSCOPY      2007     COLONOSCOPY N/A 8/15/2017    Procedure: COLONOSCOPY;  colonoscopy;  Surgeon: Chun Mcguire MD;  Location:  GI     GI SURGERY      4 hernia repairs in childhood     HERNIA REPAIR      4 since age 4     IR PAE  3/5/2020     [facility] NONSPECIFIC PROCEDURE  1964 &'95    (R) inguinal herniorrhapy     [facility] NONSPECIFIC PROCEDURE  1949    (L) inguinal herniorrhaphy     [facility] NONSPECIFIC PROCEDURE  1994    L4-5  L5 S1 fusion - spondylolisthesis     SOCIAL HISTORY:  Social History     Socioeconomic History     Marital status:      Spouse name: Ellen     Number of children: 0     Years of education: PHD     Highest education level: None   Occupational History     Occupation: Teacher     Employer: [employer]   Tobacco Use     Smoking status: Never Smoker     Smokeless tobacco: Never Used   Vaping Use     Vaping Use: Never used   Substance and Sexual Activity     Alcohol use: Yes     Alcohol/week: 0.0 standard drinks     Comment: moderate, social     Drug use: No     Sexual activity: Not Currently     Partners: Female   Other Topics Concern      Service No     Blood Transfusions No     Caffeine Concern No     Occupational Exposure No     Hobby Hazards No     Sleep Concern No     Stress Concern No     Weight Concern No     Special Diet No     Back Care Yes     Exercise Yes     Comment: Several times a week     Bike Helmet No     Seat Belt Yes     Self-Exams No     Parent/sibling w/ CABG, MI or angioplasty before 65F 55M? 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 Grew up in [place]. He lives by himself in [facility], no pets. Has a huge garden, likes to play scrabble and billiards. He has no known TB exposures, but did travel for a year when he was in his 20s to [place], [place], [place], [place], [place], [place], [place], [place], [place], and [place]. Has also travelled to [place], other areas in [place].      FAMILY  HISTORY:   Family History   Problem Relation Age of Onset     Heart Disease Father      Cerebrovascular Disease Father          OF [place]      Cancer Father         melonoma     Melanoma Father      Hyperlipidemia Father      Thyroid Disease Father      Cancer Mother         Lung cancer     Other Cancer Mother         lung; heavy smoker     Cerebrovascular Disease Paternal Uncle         Aneurism     Breast Cancer Maternal Aunt          from it     Cancer Paternal Uncle      Cancer Paternal Aunt      Skin Cancer No family hx of      Glaucoma No family hx of      Macular Degeneration No family hx of      ALLERGIES:   Allergies   Allergen Reactions     Blood Transfusion Related (Informational Only) Other (See Comments)     Patient has a history of a clinically significant antibody against RBC antigens.  A delay in compatible RBCs may occur.      Morphine Itching and Rash     Dilaudid [Hydromorphone] Itching     MEDICATIONS:  No current facility-administered medications on file prior to encounter.  acyclovir (ZOVIRAX) 800 MG tablet, Take 1 tablet (800 mg) by mouth 2 times daily  atorvastatin (LIPITOR) 20 MG tablet, Take 1 tablet (20 mg) by mouth daily  cyanocobalamin (VITAMIN B-12) 100 MCG tablet, Take 1 tablet (100 mcg) by mouth daily  famotidine (PEPCID) 40 MG tablet, Take 1 tablet (40 mg) by mouth nightly as needed for heartburn  folic acid (FOLVITE) 1 MG tablet, Take 1 tablet (1 mg) by mouth daily  ibrutinib (IMBRUVICA) 420 MG tablet, Take 1 tablet (420 mg) by mouth daily  levothyroxine (SYNTHROID/LEVOTHROID) 50 MCG tablet, Take 1 tablet (50 mcg) by mouth daily  omeprazole (PRILOSEC) 10 MG DR capsule, TAKE 1 CAPSULE BY MOUTH EVERY DAY 30 TO 60 MINUTES BEFORE A MEAL  predniSONE (DELTASONE) 20 MG tablet, Take 3 tablets (60 mg) by mouth daily  vitamin D3 (CHOLECALCIFEROL) 50 mcg (2000 units) tablet, Take 1 tablet by mouth daily  zolpidem (AMBIEN) 5 MG tablet, Take 1 tablet (5 mg) by mouth nightly as needed  for sleep        PHYSICAL EXAMINATION:  Temp:  [97.8  F (36.6  C)-98.5  F (36.9  C)] 97.8  F (36.6  C)  Pulse:  [] 98  Resp:  [18-26] 20  BP: (122-143)/(66-82) 122/78  FiO2 (%):  [100 %] 100 %  SpO2:  [90 %-100 %] 94 %     General Appearance:  Alert, pleasant, comfortable  Respiratory: diffuse bilateral coarse sounds; on BiPAP  Cardiovascular: irregularly irregular rate and rhythm, no m/r/g  GI: Soft, non-tender, non-distended.   Skin: No rash or lesions noted. Warm, dry.  Other:  Alert and oriented x3. Answering questions appropriately.    LABS: Reviewed.   Arterial Blood Gases   Recent Labs   Lab 06/10/22  0403 06/09/22  2229 06/09/22  1827 06/09/22  1447   PH 7.40 7.40 7.40 7.38   PCO2 41 38 37 39   PO2 80 81 97 59*   HCO3 25 23 23 23     Complete Blood Count   Recent Labs   Lab 06/10/22  0448 06/09/22  0413 06/08/22  0445 06/07/22  0336   WBC 29.4* 28.1* 26.5* 36.8*   HGB 9.4* 9.1* 9.0* 9.5*    197 221 262     Basic Metabolic Panel  Recent Labs   Lab 06/10/22  0448 06/09/22  0413 06/08/22  2009 06/08/22  0445   * 131* 129* 130*   POTASSIUM 4.7 4.9 4.9 4.6   CHLORIDE 98 99 97 97   CO2 25 22 22 25   BUN 20 18 19 17   CR 1.45* 1.41* 1.38* 1.41*   GLC 94 95 153* 86     Liver Function Tests  Recent Labs   Lab 06/10/22  0448 06/09/22  0413 06/08/22  0445 06/07/22  0336   AST 47* 50* 43 43   ALT 61 50 43 42   ALKPHOS 103 98 92 99   BILITOTAL 0.6 0.6 0.8 0.8   ALBUMIN 2.5* 2.4* 2.4* 2.6*     Coagulation Profile  No lab results found in last 7 days.    IMAGING:  Recent Results (from the past 24 hour(s))   CT Chest w/o Contrast    Narrative    CT of the chest without contrast    HISTORY: PE JENNIFER concern for treatment failure    COMPARISON STUDY: 6/4/2022    FINDINGS: Peribronchovascular areas of consolidation, crazy paving and  mild central airway dilatation with architectural distortion. Areas of  consolidation are increased since 5/27/2022. Small pleural effusions  bilaterally.    Main pulmonary artery  measures 4 cm in diameter. Mild coronary  calcification. Heart is not enlarged. No mediastinal, hilar or  axillary adenopathy. Small hiatal hernia.    Evaluation of the upper abdomen is limited and is without contrast.  Cyst in the parapelvic portion of the partially imaged right kidney.  Punctate calcification of the pancreas.    bones: No suspicious bony lesions.      Impression    IMPRESSION:  1. Increased consolidation in the lungs with a pattern most suggestive  of organizing pneumonia superimposed crazy paving likely secondary to  known pneumocystis Jirovecii pneumonia.  2. Pulmonary artery enlargement suggestive of pulmonary hypertension.    AMANDA BAIRES MD         SYSTEM ID:  [id number]   Echo Complete   Result Value    LVEF  60-65%    Narrative    [id number]  [id number]  [id number]  [id number]^OLGA^MIKEL     [facility]  Echocardiography Laboratory  [address] [place] [id number]     Name: ATIF JAMES  MRN: [id number]  : 1945  Study Date: 2022 05:01 PM  Age: 77 yrs  Gender: Male  Patient Location: [facility]  Reason For Study: Arrhythmia  Ordering Physician: MIKEL ESPINOZA  Performed By: Rahel Bridges RDCS     BSA: 1.8 m2  Height: 66 in  Weight: 147 lb  HR: 99  BP: 143/82 mmHg  ______________________________________________________________________________  Procedure  Complete Portable Echo Adult. Contrast Optison. Patient was given 5 ml mixture  of 3 ml Optison and 6 ml saline. 4 ml wasted.  ______________________________________________________________________________  Interpretation Summary  Technically difficult study.     Left ventricular size, wall motion and function are normal. The ejection  fraction is 60-65%.     Right ventricular function, chamber size, wall motion, and thickness are  normal.     No significant valvular abnormalities present.     No pericardial effusion is present.     Compared to baseline images on 2021 there are no  hemodynamically  signifcant findings but today's study is of poor quality.  ______________________________________________________________________________  Left Ventricle  Left ventricular size, wall motion and function are normal. The ejection  fraction is 60-65%. Left ventricular diastolic function is not assessable.     Right Ventricle  Right ventricular function, chamber size, wall motion, and thickness are  normal.     Atria  The atria cannot be assessed.     Mitral Valve  The mitral valve is normal.     Aortic Valve  Aortic valve is normal in structure and function. Trace aortic insufficiency  is present.     Tricuspid Valve  The tricuspid valve is normal. Trace tricuspid insufficiency is present. The  peak velocity of the tricuspid regurgitant jet is not obtainable. Pulmonary  artery systolic pressure cannot be assessed.     Pulmonic Valve  The pulmonic valve is normal.     Vessels  The inferior vena cava is normal. Sinuses of Valsalva 3.8 cm. Ascending aorta  3.2 cm. IVC diameter <2.1 cm collapsing >50% with sniff suggests a normal RA  pressure of 3 mmHg.     Pericardium  No pericardial effusion is present.     Miscellaneous  Technically difficult study. No significant valvular abnormalities present.     Compared to Previous Study  Compared to baseline images on 2021 there are no hemodynamically  signifcant findings but today's study is of poor quality.  ______________________________________________________________________________  MMode/2D Measurements & Calculations  IVSd: 0.88 cm  LVIDd: 3.8 cm  LVIDs: 1.7 cm  LVPWd: 0.87 cm  FS: 55.4 %  LV mass(C)d: 96.8 grams  LV mass(C)dI: 55.1 grams/m2  Ao root diam: 3.8 cm  asc Aorta Diam: 3.2 cm  LVOT diam: 2.5 cm  LVOT area: 4.9 cm2  RWT: 0.46     Doppler Measurements & Calculations  MV E max toshia: 69.8 cm/sec  MV A max toshia: 84.4 cm/sec  MV E/A: 0.83  MV dec slope: 249.0 cm/sec2  PA acc time: 0.08 sec     E/E' av.8  Lateral E/e': 5.9  Medial E/e': 11.7      ______________________________________________________________________________  Report approved by: Marichuy Nguyen 06/09/2022 05:46 PM

## 2022-06-10 NOTE — PROCEDURES
[facility]    Arterial line placement    Date/Time: 6/10/2022 4:10 PM  Performed by: Alivia Fernandez MD  Authorized by: Alivia Fernandez MD       UNIVERSAL PROTOCOL   Site Marked: NA  Prior Images Obtained and Reviewed:  NA  Required items: Required blood products, implants, devices and special equipment available    Patient identity confirmed:  Verbally with patient, arm band, provided demographic data and hospital-assigned identification number  NA - No sedation, light sedation, or local anesthesia  Confirmation Checklist:  Patient's identity using two indicators, relevant allergies and procedure was appropriate and matched the consent or emergent situation  Time out: Immediately prior to the procedure a time out was called    Universal Protocol: the Joint Commission Universal Protocol was followed    Preparation: Patient was prepped and draped in usual sterile fashion    Indication: multiple ABGs respiratory failure hemodynamic monitoring  Location:  Left radial       ANESTHESIA    Local Anesthetic: Lidocaine 1% without epinephrine      SEDATION    Patient Sedated: No      PROCEDURE DETAILS          Number of Attempts:  1  Post-procedure:  Line sutured and dressing applied      PROCEDURE    Patient Tolerance:  Patient tolerated the procedure well with no immediate complications  Length of time physician/provider present for 1:1 monitoring during sedation: 0        Alivia Fernandez MD  Internal Medicine, PGY-3

## 2022-06-10 NOTE — PLAN OF CARE
Transfer  Transferred to:   Via:bed  Reason for transfer:Pt no longer appropriate for 6B- worsened patient condition  Family: Aware of transfer  Belongings: Packed and sent with pt  Chart: Delivered with pt to next unit  Medications: Meds sent to new unit with pt  Report given to:  nurse    Pt status:   Neuro: A&Ox4. Afebrile, calls appropriately.   Cardiac: SR/ST, HR 's with frequent PACs, VSS. Denies chest pain. Respiratory: Sating >92% on HFNC at 100% FiO2 on 50 LPM, unable to maintain oxygen saturations greater than 89% on HFNC. Patient transitioned to BIPAP at 100% FiO2. Desats to O2 70-80% with any activity.    GI/: Adequate urine output via urinal, BM X1 during shift.   Diet/appetite: NPO due to oxygenation status. Intermittent nausea, unable to give morning oral meds safely due to oxygenation status, desatting to 60-70% when swallowing, MICU team aware. Poor appetite.   Activity: Strict bedrest due to oxygenation status.   Pain: At acceptable level on current regimen.   Skin: No new deficits noted, ulcer on chest due to EKG lead, WILLIAM.   LDA's: Right PIV x1, left PIV x1.     Plan: Transfer to ICU for closer monitoring when bed becomes available. Continue with POC. Notify primary team with changes.

## 2022-06-10 NOTE — PLAN OF CARE
Admitted/transferred from: 6B at 1500  Reason for admission/transfer: Increasing oxygen requirements (100% BiPAP)  Patient status upon admission/transfer: 100% BiPAP, stable otherwise  Interventions: Melissa, possible intubation  2 RN skin assessment: completed by Syeda Yun  Result of skin assessment and interventions/actions: Blanchable redness on coccyx-  mepi, skin tear R chest- covered  Height, weight, drug calc weight: Done  Patient belongings (see Flowsheet - Adult Profile for details): $7 cash, credit card, ID, phone, watch, clothing, shoes  MDRO education (if applicable): N/A

## 2022-06-10 NOTE — PROGRESS NOTES
[facility]    Medicine Progress Note - Medicine Service, MAROON TEAM 4    Date of Admission:  5/23/2022    Assessment & Plan   Loco Wood is a 77 year old male with history of CLL, auto-immune hemolytic anemia (on prednisone taper), and CKD3a admitted for several days of SOB, chills, pleuritic chest pain with infiltrates/consolidations on CT imaging. Initially treated for CAP (ceft + azithro) with improvement, subsequently found to have PJP pneumonia (sputum 5/27 + Karius 6/1).    Changes today:   - Transfer to ICU  - Plan for bronchoscopy and percutaneous lung biopsy if intubated  - Increase IV methylprednisolone to 40 mg q8h   - Continue treatment for PJP with primaquine and clindamycin  - Await G6PD study    # Acute hypoxic respiratory failure 2/2 PJP pneumonia   # E faecium on Karius assay  # Severe sepsis, improved  # Bilateral lower lobe bronchiectasis/consolidations  Admitted for several days of worsening fatigue/dyspnea, subjective chills, and pleuritic chest pain with 3-4L oxygen requirement (w/ elevated WBC) and CT w/ infiltrates and bilateral lower lobe consolidations concerning for infection. RVP negative. Patient initially treated with 5 day abx course starting 5/24 with azithromycin (d/c 5/26) + ceftriaxone for presumed CAP with clinical improvement. On 5/27, patient developed fever with worsening respiratory status (45 L high flow NC) and [place] [facility] [place] improving with IVF 2.5L and broadened coverage with Zosyn, Micafungin (positive B-Glucan), and IV bactrim (for possible PJP) O2 req of 12-15L, with increase to HFNC on 6/1. CXR 5/31 with trace effusions and increasing apical opacities concerning for worsening edema/infection. PJP PCR 5/27 positive. Karius 6/1 + PJP and E. Faecium (less likely pathogenic as not growing on other cultures). On 6/9, worsening hypoxia with paO2 59 on 100% 50L. Telemetry also with frequent PACs and episode  of SVT and lactic acid elevated at 4, likely in setting of worsening hypoxia. CT chest with worsening PJP and concern for organizing pneumonia and pulmonary hypertension. TTE with normal cardiac function and without evidence of volume overload (estimated CVP of 3). Currently believe CLL not driving pulmonary symptoms though need biopsy to definitively rule out. Patient is on steroids for hemolytic anemia which would treat organizing pneumonia and unclear if increasing dose would have additional benefits > risks, given worsening, will increase as below.  - Discussed with ID and onc, will switch to second line treatment for PJP with clindamycin and primaquine. G6PD pending but ok with close monitoring per onc  - Will plan for multidisciplinary discussion between ID, onc, and pulm tomorrow  - Current regimen: clindamycin, primaquine, fluconazole; also on acyclovir for ppx while on steroids  - Discontinued: micafungin, levofloxacin, zosyn, linezolid, cefepime, Bactrim  - Increase IV methylprednisolone to 40 mg q8h   - BiPAP; incentive spirometry; anticipate will need intubation today  - Will plan for bronchoscopy and percutaneous pulmonary biopsy if intubated    Labs:  RVP, COVID/Flu: Negative  MRSA swab: negative  Beta-D Glucan: Positive (> 500)  Following Sputum sample  Blood cultures: NG  Sputum: 2+ mixed aracely  Histo antigen: negative  Aspergillus Ag: negative   Blasto antigen: negative  Cryptococcus ag: negative  CMV PRC negative:   Fungal ab (blast, aspergillus, cocci, histo): negative  Quantiferon Gold: negative   PJP PCR: positive   Karius: PJP positive, E faecium positive  Cocci agn: negative    # Increasing episode of tachyarrhythmia  Has had increasing PAC burden (~40s per minute) and episodes of tachyarrhythmias, including short run of SVT and most recently A fib with RVR overnight 6/10 which improved with 500 ml IVF. Possibly increased cardiac stress in setting of worsening hypoxia above.  - Telemetry    #  CLL (dx 2/2007)  History of CLL (2007) managed on Ibrutinib (5/2019) which patient is not taking daily (self-titrating). WBC 24.9 on admission (up from baseline ~6-10) now down to ~21. Suspect secondary to recent infection with lower concern for conversion to leukemia. IVIG infusion (5/25). Following FISH and cytogenetics for disease prognostication and will continue to monitor with peripheral smear.   Currently continuing daily ibrutinib to help control disease. CT A/P without evidence of CLL metastasis.    - Hematology/Oncology following    - PTA ibrutinib, pursuing prior authorization for venotoclax   -Following Flow cytometry, cytogenetics, IgH mutation, TP53 mutation  - Plan for bronch and biopsy as above if intubated  - Heme/Onc outpatient follow up on discharge    # Lactic acidosis  Persistent lactic acidosis 3-4 despite antimicrobial treatment and IVF boluses. Appears hemodynamically stable with no evidence of hypoperfusion. Discussed with hem/onc, can sometimes see in malignancies, but unlikely in reasonably controlled CLL. Hepatic function wnl, suggesting against clearance issue. Resolved with additional IVF 5/30. Increased to 4.1 on 6/3, improved with addition of antibiotics above. Again increased to 4 on 6/9, likely in setting of hypoxia, improved on BiPAP.     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 B12, folate, iron panel normal with slightly elevated YEN levels. Labs concerning for marrow responsive anemia (elevated LDH, bilirubin, reticulocyte count). Will likely need to treat underlying CLL for long-term solution.    Hgb on admission 9.5 down from baseline (~10-12 past year). Methylpred decreased and rituximab held in setting of recent infection. Haptoglobin remains elevated, suggesting against worsening  hemolytic anemia.   - Heme/Onc consulted    - Increase Methylprednisolone 40mg to q8h as above   - Hold Rituximab infusion   - Check uric acid, LDH, Mg, Phos, and retic count q48h    - Continue allopurinol for TLS prevention    - Hep B serologies (Core, Surface Ab +): Hep B ag negative, DNA quant negative   - Continue PTA Folate, B12   - Transfuse if Hgb < 7     # Malnutrition:    - Level of malnutrition: Severe protein-calorie malnutrition  - Trial marinol 2.5 mg every day (typical dose BID)  - Patient currently declining feeding tube placement  - Nutrition following    # Nausea  Last AXR 5/27 without evidence of obstruction. Likely in setting of medications.  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 Trialed 500 ml LR 6/7, however, sodium studies suggestive of SIADH. Improved 6/8. NT pro BNP and TTE without evidence of volume overload.  - Fluid restriction 1000 ml  - Change IV medication carriers to NS     # Insomnia  - Hold PTA PRN Ambien as above  - Increase scheduled melatonin from 3 mg to 10 mg  - Considered adding mirtazapine, but will hold off given recent linezolid therapy and concern for serotonin syndrome  - Olanzapine PRN    Chronic/Resolved Problems:   # Ground level fall secondary to orthostatic hypotension, resolved  Early AM 6/4. CT head without evidence of intracranial hemorrhage, EKG unremarkable, found to have orthostatic hypotension, improved with  ml.  - Holding Ambien PRN at night  - Fall precautions    # CKD Stage 3A  Cr of 1.31 on admission up from baseline now up to 1.6 (5/27), now improved to 1.05. Suspect Cr elevation is pre-renal in setting of recent sepsis and will likely improve with volume resuscitation.    - Avoid nephrotoxic agents as able    # Degenerative disc disease  # Bilateral intermittent hand cramping, spasms suspicious for cervical impingement  Patient with history  of degenerative disc disorder with concern for cervical impingement in setting of new bilateral intermittent hand cramping. Patient met with Dr. Melton his sports medicine doctor (AM 5/24) over phone for follow up appointment to discuss results of his MRI. Will defer to outpatient management.     # Hx Herpes Zoster c/b post-herpetic neuralgia: PTA ppx Acyclovir while on prednisone  # Hypothyroid - TSH 1.94 on admission WNL. Continue PTA levothyroxine  # HLD- PTA atorvastatin  # GERD- PTA omeprazole + PRN famotidine (also on Pred)  # Vitamin supplements- PTA B12, folate, D3        Diet: Combination Diet Regular Diet Adult  Snacks/Supplements Adult: Ensure Enlive; Between Meals  Fluid restriction 1000 ML FLUID  Calorie Counts  Snacks/Supplements Adult: Other; 8 PM - Hard boiled egg; Between Meals    DVT Prophylaxis: Heparin ppx  Piña Catheter: Not present  Central Lines: None  Cardiac Monitoring: ACTIVE order. Indication: QTc prolonging medication (48 hours)  Code Status: Full Code    Confirmed patient is full code. His sister Hodan Deleon is his decision maker if he is unable to make decisions for himself.    Disposition Plan   Expected Discharge: 06/23/2022     Anticipated discharge location:  Awaiting care coordination huddle  Delays: None     The patient's care was discussed with the Attending Physician, Dr. Arreguin.    Everardo Lau MD  Medicine Service, [address]  Securely message with the Vocera Web Console (learn more here)  Text page via [facility] Paging/Directory   Please see signed in provider for up to date coverage information      Clinically Significant Risk Factors Present on Admission                    ______________________________________________________________________    Interval History     Episode of a fib RVR overnight with rates up to 180s, improved with  ml. paO2 improved on BiPAP but remains tenuous. Remained on BiPAP most of the  night. Worsening hypoxemia with desaturations to 70-80% with taking medications and minimal movement on maximum setting HFNC this moring. Willing to continue to trial BiPAP after further discussion and would prefer holding off intubation unless he absolutely needs it but agreeable for transfer to ICU. Declining PJP treatment change to primaquine due to hematologic concerns but agreeable after additional information provided and reassurance this was discussed with Dr. Macdonald. Appetite improved with marinol but reports some hallucinations, wondering if there is a lower dose. No fevers or chills.     Four point ROS completed and negative unless listed above    Data reviewed today: I reviewed all medications, new labs and imaging results over the last 24 hours.    Physical Exam   Vital Signs: Temp: 97.8  F (36.6  C) Temp src: Oral BP: 122/78 Pulse: 98   Resp: 20 SpO2: 94 % O2 Device: High Flow Nasal Cannula (HFNC) Oxygen Delivery: 40 LPM  Weight: 145 lbs 4.53 oz  General Appearance: Alert, pleasant, anxious  Respiratory: Desaturations with minimal movement on HFNC and BiPAP at rest, CTAB  Cardiovascular: RRR, normal S1/S2, no murmurs. 2s cap refill.  GI: Soft, non-tender, non-distended.   Skin: No rash or lesions noted. Warm, dry.  Other: Alert and oriented x3. Answering questions appropriately.     Data   Recent Labs   Lab 06/10/22  0448 06/09/22  0413 06/08/22 2009 06/08/22  0445   WBC 29.4* 28.1*  --  26.5*   HGB 9.4* 9.1*  --  9.0*   * 110*  --  107*    197  --  221   * 131* 129* 130*   POTASSIUM 4.7 4.9 4.9 4.6   CHLORIDE 98 99 97 97   CO2 25 22 22 25   BUN 20 18 19 17   CR 1.45* 1.41* 1.38* 1.41*   ANIONGAP 8 10 10 8   DAVID 8.7 8.5  8.5 8.4* 8.6  8.6   GLC 94 95 153* 86   ALBUMIN 2.5* 2.4*  --  2.4*   PROTTOTAL 5.4* 5.3*  --  5.2*   BILITOTAL 0.6 0.6  --  0.8   ALKPHOS 103 98  --  92   ALT 61 50  --  43   AST 47* 50*  --  43

## 2022-06-10 NOTE — PROVIDER NOTIFICATION
06/09/22 1700   Call Information   Date of Call 06/09/22   Time of Call 1647   Name of person requesting the team Chitra   Title of person requesting team RN   RRT Arrival time 1650   Time RRT ended 1700   Reason for call   Type of RRT Adult   Primary reason for call Sepsis suspected   Sepsis Suspected Elevated Lactate level   Was patient transferred from the ED, ICU, or PACU within last 24 hours prior to RRT call? No   SBAR   Situation LA 4.1   Background Per MD note: History of CLL, auto-immune hemolytic anemia (on prednisone taper), and CKD3a admitted for several days of SOB, chills, pleuritic chest pain with infiltrates/consolidations on CT imaging. Initially treated for CAP (ceft + azithro) with improvement. On 5/27 patient [place] [facility] [place] worsening respiratory status and fever, now improved and afebrile (45L HFNC -> 12-15L oxymizer). Continue broad coverage with Zosyn, Micafungin (B-D glucan > 500), and IV bactrim.  Heme/Onc following for auto-immune hemolytic anemia and CLL, steroids decreased in setting of infection.   Notable History/Conditions Cancer;Cardiac   Assessment A+Ox3, NAD. VSS,   Patient Outcome   Patient Outcome Stabilized on unit   RRT Team   Attending/Primary/Covering Physician Isabella 4   Date Attending Physician notified 06/09/22   Time Attending Physician notified 1650   Physician(s) Amanda, PAC   Lead DORIS Buenrostro   Post RRT Intervention Assessment   Post RRT Assessment Stable/Improved   Date Follow Up Done 06/09/22   Time Follow Up Done 2005     Pt appears comfortable, repeat ABG slightly improved. Discussion had with pt and bedside nurse, pt acknowledges need for bipap overnight and agrees to wear it. Will wait for morning labs to evaluate further action if necessary.

## 2022-06-10 NOTE — PROGRESS NOTES
Prior Authorization Approval    Venclexta 100mg tabs  Date Initiated: 6/10/2022  Date Completed: 6/10/2022  Prior Auth Type: Clinical                Status: Approved    Effective Date: 03/12/2022 - 06/10/2023  Copay: 3050.80     Filling Pharmacy: [facility] [place], [place] - [address] [place] [address]    Insurance: [facility] - Phone [phone] Fax [phone]  ID: [id number]  Case Number: [id number]   Submitted Via: Neville Pennington  [facility] Liaison  Ph: [phone] Pager: [phone]

## 2022-06-10 NOTE — PROGRESS NOTES
Hematology / Oncology  Daily Progress Note   Date of Service: 06/10/2022  Patient: Loco Wood  MRN: [id number]  Admission Date: 5/23/2022  Hospital Day # 18    Assessment & Plan:   Loco Wood is a 77 year old man with a history of CKD stage III, HLD, paroxysmal afib, and 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 Admitted for AHRF due to community acquired pneumonia and worsening anemia (10/8)     Recommendations:  - If intubated agree with plan for bronchoscopy and lung biopsy, please add flow cytometry  - Continue Ibrutinib 420mg daily as it is not known to cause drug-induced lung injury and may be controlling his CLL. However, if the bronchoscopy reveals concern that the AHRF is related to his CLL, will consider switching therapies to obinutuzumab+venetoclax.    # PJP pneumonia  # AHRF  Presented with dyspnea and new hypoxia requiring 2-3L NC. CT PE negative for clot but did show bilateral multifocal pulmonary infiltrates and nodules or nodular infiltrates which may represent pneumonia. BCx, RVP, COVID negative. Of note, patient was not on PJP ppx prior to admission. PJP PCR was positive and fluconazole was started. CMV not detected. With positive PJP PCR and high O2 needs a bronchoscopy was held. However, now with worsening respiratory status despite steroids and antibiotics. Most recently mid-80s on 100% HFNC, requiring continuous BIPAP. See below discussion regarding his CLL and ibrutinib.   - Continue ibrutinib as below  - If intubated would recommend bronchoscopy vs percutaneous biopsy of lung findings to r/o CLL involvement vs infection  - Antibiotics per ID    #CLL  #Hypogammaglobulinemia    CLL appears stable on daily ibrutinib therapy. CT CAP with no hepatosplenomegaly or LAD. ALC is increasing only slightly above baseline range. Peripheral flow (5/24) showed 50% CD5+ lambda monotypic B cells which is consistent with his previous CLL. IGHV was not mutated and no deletion 17p  detected by FISH and no TP53 mutation seen on NGS. Beta-2 microglobulin is 3.2, slightly increased from prior but is nonspecific and can be increased in the setting of infection. Overall his CLL burden appears low and does not seem to be driving his AHRF. Additionally, ibrutinib is not known to cause drug-induced lung injury or pneumonitis. If this were pneumonitis, the mgmt would be steroids which he is already on without improvement. In review of his current clinical picture, will continue ibrutinib therapy as holding it may worsen CLL causing rapid progression.   - Continue Ibrutinib 420 mg daily. Encourage daily dosing and not skipping dosing.   - If bronchoscopy reveals concern that the CLL is driving his AHRF (which seems unlikely), would consider switching therapies to obinutuzumab+venetoclax. Venetoclax co-pay is about $3000, no grants are available, but if we choose to start venetoclax will reach out to the pharmacy liaison and see if we can obtain free drug approval.   - Plan to establish care with Dr Monahan 6/24, was previously followed by Dr. Burns     #Autoimmune Hemolytic Anemia 2/2 CLL   Had worsening AIHA in the setting of recent steroid taper. Previously on methylprednisolone 1 mg/kg on admission (5/24-5/27) but decreased to IV Methylprednisolone 40 mg daily in the setting of infection. Ongoing anemia despite initiation of HD steroids this admission, discussed case with heme staff Dr. Espinosa who recommended initiation of Rituxan 375 mg/m2 x4 weeks for refractory AIHA but held off in the setting of acute respiratory decompensation. May consider IVIG 500 mg/kg if anemia worsens and infections not improving. Per discussion with ID, ok to continue steroids in light of pulmonary infection. There is likely some suppression in the setting of infection and antibiotics.   - No clear indication to change treatment, increase steroids, or add anti-CD20 antibody therapy at this point. Continue folic acid  "and steroids per the primary team/ICU  - Continue daily Folic acid supplementation.   - Monitor CBC w/ diff, LDH, CMP and retic daily   - Transfuse for hgb <7 and plts <10k (outpatient therapy plan entered)      #TLS ppx  No indication to start rituxan so can hold off checking TLS labs. Would resume checking TLS labs once starting Rituxan.  - Encourage PO fluids. Cont on Allopurinol 300 mg daily     #ID PPx  - ACV 400mg BID  - Pentamidine nebulizer q 28 days; given 5/26/22 (therapy plan entered)   - Tiffanie requested outpatient for COVID ppx      #Hx of of shingles  Continue Acyclovir 800 mg BID as long as he is on prednisone to reduce risk of shingles.      CODE: Full  Follow up: TBD    Patient was seen and plan of care was discussed with attending physician Dr. Macdonald.    I spent 45 minutes face-to-face and/or coordinating or discussing care plan. Over 50% of our time on the unit was spent counseling the patient and/or coordinating care.      Pia Persaud PA-C  Hematology/Oncology  Pager # [phone]  ___________________________________________________________________    Subjective & Interval History:    Overnight requiring 100% FiO2 on HFNC, alternating with BiPAP. LA up to 4. He was hallucinating and forgetful. Alert and oriented today. Also went into afib w/ RVR, SVT x 22 beats, and aflutter. Received IVF and was sinus tachy this morning. Increased work of breathing and saturating mid-80s on 100% HFNC. Any time he moves he feels very winded and like he has \"ran a mile\". Per discussions with the primary team, plan for ICU transfer and probably intubation today.     Physical Exam:    Blood pressure 122/84, pulse 105, temperature 98.5  F (36.9  C), temperature source Axillary, resp. rate 22, height 1.676 m (5' 6\"), weight 65.9 kg (145 lb 4.5 oz), SpO2 97 %.    General: Ill-appearing but remains alert and cooperative, lying in bed  HEENT: sclera anicteric, EOMI, MMM  Neck: supple, normal ROM  CV: tachycardia, " regular rhythm  Resp: increased respiratory effort on HFNC, using accessory muscles  MSK: warm and well-perfused, no edema  Skin: no rashes on limited exam, no jaundice  Neuro: AOx3, moves all extremities equally, no focal deficits    Labs & Studies: I personally reviewed the following studies:  ROUTINE LABS (Last four results):  CMP  Recent Labs   Lab 06/10/22  0448 06/09/22  0413 06/08/22  2009 06/08/22  0445 06/07/22  0336   * 131* 129* 130* 128*   POTASSIUM 4.7 4.9 4.9 4.6 4.9   CHLORIDE 98 99 97 97 96   CO2 25 22 22 25 23   ANIONGAP 8 10 10 8 9   GLC 94 95 153* 86 91   BUN 20 18 19 17 19   CR 1.45* 1.41* 1.38* 1.41* 1.15   GFRESTIMATED 50* 52* 53* 52* 66   DAVID 8.7 8.5  8.5 8.4* 8.6  8.6 8.8  8.8   MAG 2.3 2.5* 2.3 2.3 2.3   PHOS 3.1 3.3  --  3.1 4.3   PROTTOTAL 5.4* 5.3*  --  5.2* 5.6*   ALBUMIN 2.5* 2.4*  --  2.4* 2.6*   BILITOTAL 0.6 0.6  --  0.8 0.8   ALKPHOS 103 98  --  92 99   AST 47* 50*  --  43 43   ALT 61 50  --  43 42     CBC  Recent Labs   Lab 06/10/22  0448 06/09/22  0413 06/08/22  0445 06/07/22  0336   WBC 29.4* 28.1* 26.5* 36.8*   RBC 2.64* 2.51* 2.51* 2.68*   HGB 9.4* 9.1* 9.0* 9.5*   HCT 28.5* 27.6* 26.8* 28.7*   * 110* 107* 107*   MCH 35.6* 36.3* 35.9* 35.4*   MCHC 33.0 33.0 33.6 33.1   RDW 18.1* 17.3* 16.8* 16.8*    197 221 262     INRNo lab results found in last 7 days.    Microbiology  Recent Labs   Lab 06/10/22  0448 06/09/22  1603 06/08/22  0816 06/07/22  0336   LACT 1.4 4.0* 1.4 2.3*       Pertinent Imaging    Medications list for reference:  Current Facility-Administered Medications   Medication     acetaminophen (TYLENOL) tablet 650 mg     acyclovir (ZOVIRAX) tablet 800 mg     allopurinol (ZYLOPRIM) tablet 300 mg     atorvastatin (LIPITOR) tablet 20 mg     bisacodyl (DULCOLAX) Suppository 10 mg     clindamycin (CLEOCIN) infusion 900 mg     cyanocobalamin (VITAMIN B-12) tablet 100 mcg     dronabinol (MARINOL) capsule 2.5 mg     famotidine (PEPCID) tablet 20 mg      folic acid (FOLVITE) tablet 1 mg     heparin ANTICOAGULANT injection 5,000 Units     ibrutinib (IMBRUVICA) capsule 420 mg     ipratropium - albuterol 0.5 mg/2.5 mg/3 mL (DUONEB) neb solution 3 mL     levothyroxine (SYNTHROID/LEVOTHROID) tablet 50 mcg     lidocaine (LMX4) cream     lidocaine (LMX4) cream     lidocaine (LMX4) cream     lidocaine 1 % 0.1-1 mL     lidocaine 1 % 0.1-1 mL     lidocaine 1 % 0.1-5 mL     melatonin tablet 1 mg     melatonin tablet 10 mg     methylPREDNISolone sodium succinate (solu-MEDROL) injection 40 mg     metoclopramide (REGLAN) tablet 5 mg     OLANZapine zydis (zyPREXA) ODT tab 5 mg     ondansetron (ZOFRAN) injection 8 mg     ondansetron (ZOFRAN) injection 8 mg     pantoprazole (PROTONIX) EC tablet 20 mg     polyethylene glycol (MIRALAX) Packet 17 g     primaquine tablet 30 mg     prochlorperazine (COMPAZINE) injection 5 mg    Or     prochlorperazine (COMPAZINE) tablet 5 mg    Or     prochlorperazine (COMPAZINE) suppository 12.5 mg     psyllium (METAMUCIL/KONSYL) Packet 1 packet     senna-docusate (SENOKOT-S/PERICOLACE) 8.6-50 MG per tablet 1 tablet    Or     senna-docusate (SENOKOT-S/PERICOLACE) 8.6-50 MG per tablet 2 tablet     sodium chloride (PF) 0.9% PF flush 10-40 mL     sodium chloride (PF) 0.9% PF flush 3 mL     sodium chloride (PF) 0.9% PF flush 3 mL     sodium chloride (PF) 0.9% PF flush 3 mL     sodium chloride (PF) 0.9% PF flush 3 mL     sodium chloride (PF) 0.9% PF flush 3 mL     sodium chloride (PF) 0.9% PF flush 3 mL     Vitamin D3 (CHOLECALCIFEROL) tablet 50 mcg     [Held by provider] zolpidem (AMBIEN) tablet 5 mg

## 2022-06-10 NOTE — PLAN OF CARE
B/P: 134/82, T: 98.2, P: 173, R: 22    Neuro: A&Ox4.   Cardiac: SR/ST 's. 22 beat run of SVT at 172 bpm at 1910. AT 2200 pt went into A flutter per monitor tech. LR 500mL bolus running and pt back in SR.   Respiratory: Sating 98% on 100% BiPAP. PCo2 up from 59 to 97 after 2 hrs of BiPAP. Encouraging BiPAP use as much as possible.   GI/: Adequate urine output via urinal. No BM this evening.   Diet/appetite: Tolerating reg diet and 1L FR. Eating well. Calorie Count to be started tomorrow 6/10.   Activity:  Encouraged independent repo in bed. Not oob d/t oxygen needs.   Pain: Denies.   Skin: R chest lead removed and open skin underneath from metal part of lead. Covered with primapore. MD to assess. Sacral mepi placed on coccyx.   LDA's:  R PIV x1. HFNC/ BiPAP at 100%.     Plan: Repeat bolus if pt has more issues with heart rhythm. Monitor ABG and encurage BiPAP. Continue with POC. Notify primary team with changes.

## 2022-06-11 NOTE — PROGRESS NOTES
MEDICAL ICU PROGRESS NOTE  06/11/2022      Date of Service (when I saw the patient): 06/11/2022    ASSESSMENT: Loco Wood is a 77 year old male with PMH CLL, auto-immune hemolytic anemia (on prednisone taper), and CKD3a who was admitted to ICU on 06/10 for AHRF due to PJP pneumonia.    Changes today  - Change scheduled zofran to PRN  - Place PICC today  - CTM ABGs, overall slowly improving BiPAP requirements     PLAN:     Neuro:  # Degenerative disc disease  # Bilateral intermittent hand cramping, spasms suspicious for cervical impingement  Patient with history of degenerative disc disorder with concern for cervical impingement in setting of new bilateral intermittent hand cramping. Patient met with Dr. Melton his sports medicine doctor (AM 5/24) over phone for follow up appointment to discuss results of his MRI. Will defer to outpatient management.      Pulmonary:  # Acute hypoxic respiratory failure 2/2 PJP pneumonia   # Bilateral lower lobe bronchiectasis/consolidations  # Positive beta D glucan >500  Admitted for several days of worsening fatigue/dyspnea, subjective chills, and pleuritic chest pain with 3-4L oxygen requirement (w/ elevated WBC) and CT w/ infiltrates and bilateral lower lobe consolidations concerning for infection. RVP negative. Patient initially treated with 5 day abx course starting 5/24 with azithromycin (d/c 5/26) + ceftriaxone for presumed CAP with clinical improvement. On 5/27, patient developed fever with worsening respiratory status (45 L high flow NC) and was transferred to C. Patient improving with IVF 2.5L and broadened coverage with Zosyn, Micafungin (positive B-Glucan), and IV bactrim (for possible PJP) O2 req of 12-15L, with increase to HFNC on 6/1. CXR 5/31 with trace effusions and increasing apical opacities concerning for worsening edema/infection. PJP PCR 5/27 positive. Karius 6/1 + PJP and E. Faecium (less likely pathogenic as not growing on other cultures). On 6/9,  worsening hypoxia with paO2 59 on 100% 50L. Telemetry also with frequent PACs and episode of SVT and lactic acid elevated at 4, likely in setting of worsening hypoxia. CT chest with worsening PJP and concern for organizing pneumonia and pulmonary hypertension. TTE with normal cardiac function and without evidence of volume overload. Currently believe CLL not driving pulmonary symptoms though need biopsy to definitively rule out. Patient is on steroids for hemolytic anemia which would treat organizing pneumonia and unclear if increasing dose would have additional benefits > risks.  - Discussed with ID and onc, will switch to second line treatment for PJP with clindamycin and primaquine. G6PD pending but ok with close monitoring per onc  - Methylpred 40 mg Q8H  - Current regimen: clindamycin, primaquine, fluconazole; also on acyclovir for ppx while on steroids  - Discontinued: micafungin, levofloxacin, zosyn, linezolid, cefepime, Bactrim   - BiPAP; incentive spirometry  - Patient at risk for respiratory decompensation requiring intubation, will bronch if so for BAL including IC panel and flow cytometry     Cardiovascular:  # AF with RVR spontaneously resolved, remains in AF  No known history of AF. Echo unremarkable.  - On heparin SQ     # Ground level fall secondary to orthostatic hypotension, resolved  Early AM 6/4. CT head without evidence of intracranial hemorrhage, EKG unremarkable, found to have orthostatic hypotension, improved with  ml.  - Holding Ambien PRN at night  - Fall precautions     # HLD  PTA atorvastatin held on BiPAP     GI/Nutrition:  # Malnutrition:    - Level of malnutrition: Severe protein-calorie malnutrition  - Marinol 2.5 mg every day held on BiPAP  - Patient currently declining feeding tube placement  - Nutrition following     # Nausea  Last AXR 5/27 without evidence of obstruction. Likely in setting of medications.  - PRN antiemetics (Zofran, compazine, reglan)     # GERD  PTA  omeprazole + PRN famotidine (also on Pred)     Renal/Fluids/Electrolytes:  # Lactic acidosis  Persistent lactic acidosis 3-4 despite antimicrobial treatment and IVF boluses. Appears hemodynamically stable with no evidence of hypoperfusion. Discussed with hem/onc, can sometimes see in malignancies, but unlikely in reasonably controlled CLL. Hepatic function wnl, suggesting against clearance issue. Resolved with additional IVF 5/30. Increased to 4.1 on 6/3, improved with addition of antibiotics above. Again increased to 4 on 6/9, likely in setting of worsening hypoxia as above.    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 Trialed 500 ml LR 6/7, however, sodium studies suggestive of SIADH. Now ~ 129-131. NT pro BNP and TTE without evidence of volume overload.  - Fluid restriction 1000 ml  - Change IV medication carriers to NS      # RICK on CKD Stage 3A resolved  Baseline ~1.3-1.6.   - Avoid nephrotoxic agents as able     Endocrine:  # Hypothyroid  TSH 1.94 on admission WNL. PTA levothyroxine held on BiPAP     ID:  # E faecium on Karius assay  # Severe sepsis, improved  # Positive beta D glucan >500  # PJP pneumonia  - Discussed with ID and onc, will switch to second line treatment for PJP with clindamycin and primaquine. G6PD pending but ok with close monitoring per onc  - Methylpred 40 mg Q8H  - Current regimen: clindamycin, primaquine, fluconazole; also on acyclovir for ppx while on steroids  - Discontinued: micafungin, levofloxacin, zosyn, linezolid, cefepime, Bactrim      # Hx Herpes Zoster c/b post-herpetic neuralgia  PTA ppx Acyclovir while on prednisone     Hematology:    # CLL (dx 2/2007)  History of CLL (2007) managed on Ibrutinib (5/2019) which patient is not taking daily. WBC 24.9 on admission (up from baseline ~6-10) now down to ~21. Suspect secondary to recent infection with lower concern for conversion to leukemia. IVIG infusion (5/25). Following FISH and cytogenetics for  disease prognostication and will continue to monitor with peripheral smear.   Continue daily ibrutinib to help control disease, increased bioavailability with addition of fluconazole 6/2. WBC and ALC uptrending. CT A/P without evidence of CLL metastasis.    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 B12, folate, iron panel normal with slightly elevated YEN levels. Labs concerning for marrow responsive anemia (elevated LDH, bilirubin, reticulocyte count). Will likely need to treat underlying CLL for long-term solution.    Hgb on admission 9.5 down from baseline (~10-12 past year) and now ~8. Currently hemodynamically stable and suspect recent drop in Hgb secondary to dilutional anemia in setting of 2.5L of IVF given (5/27). Methylpred decreased and rituximab held in setting of recent infection. Haptoglobin remains elevated, suggesting against worsening hemolytic anemia.   - Heme/Onc consulted              - Continue Methylprednisolone 40mg (Q8H)              - Hold Rituximab infusion              - Check uric acid, LDH, Mg, Phos, and retic count q48h               - Continue allopurinol for TLS prevention    - Hep B serologies (Core, Surface Ab +): Hep B ag negative, DNA quant negative   - Continue PTA Folate, B12   - Transfuse if Hgb < 7      Musculoskeletal:  No issues     Skin:  No issues     General Cares/Prophylaxis:    DVT Prophylaxis: Heparin SQ  GI Prophylaxis: PPI  Restraints: none  Family Communication: April  Code Status:  Full code     Lines/tubes/drains:  - PIV  - PICC  - Arterial line     Disposition:  - Medical ICU     Patient seen and findings/plan discussed with medical ICU staff, Dr. Perlman.     Azucena Ryan MD  PGY-2 internal medicine    ====================================  INTERVAL HISTORY:   Nursing notes reviewed. NAEO. Patient slept well and had a few questions regarding our plan today. BiPAP at 90% overnight.    OBJECTIVE:   1. VITAL SIGNS:   Temp:  [97.6  F (36.4  C)-99  F (37.2  C)] 97.6  F (36.4  C)  Pulse:  [] 95  Resp:  [22-34] 28  BP: (120-135)/() 120/102  MAP:  [72 mmHg-96 mmHg] 96 mmHg  Arterial Line BP: ()/(56-79) 140/79  FiO2 (%):  [80 %-100 %] 90 %  SpO2:  [93 %-99 %] 98 %  FiO2 (%): 90 %  Resp: 28    2. INTAKE/ OUTPUT:   I/O last 3 completed shifts:  In: 300 [I.V.:300]  Out: 1440 [Urine:1440]    3. PHYSICAL EXAMINATION:  General Appearance: Alert, pleasant, comfortable  Respiratory: diffuse bilateral coarse sounds; on BiPAP  Cardiovascular: irregularly irregular rate and rhythm, no m/r/g  GI: Soft, non-tender, non-distended  Skin: No rash or lesions noted. Warm, dry  Other:  Alert and oriented x3. Answering questions appropriately    4. LABS:   Arterial Blood Gases   Recent Labs   Lab 06/11/22  0442 06/10/22  1603 06/10/22  0403 06/09/22  2229   PH 7.41 7.43 7.40 7.40   PCO2 39 37 41 38   PO2 83 113* 80 81   HCO3 25 24 25 23     Complete Blood Count   Recent Labs   Lab 06/11/22  0353 06/10/22  0448 06/09/22  0413 06/08/22  0445   WBC 38.0* 29.4* 28.1* 26.5*   HGB 9.7* 9.4* 9.1* 9.0*    184 197 221     Basic Metabolic Panel  Recent Labs   Lab 06/11/22  0353 06/10/22  1518 06/10/22  0448 06/09/22  0413 06/08/22 2009     --  131* 131* 129*   POTASSIUM 4.7  --  4.7 4.9 4.9   CHLORIDE 98  --  98 99 97   CO2 25  --  25 22 22   BUN 25  --  20 18 19   CR 1.31*  --  1.45* 1.41* 1.38*   GLC 98 148* 94 95 153*     Liver Function Tests  Recent Labs   Lab 06/11/22  0353 06/10/22  0448  06/09/22  0413 06/08/22  0445   AST 47* 47* 50* 43   ALT 65 61 50 43   ALKPHOS 107 103 98 92   BILITOTAL 1.1 0.6 0.6 0.8   ALBUMIN 2.7* 2.5* 2.4* 2.4*     Coagulation Profile  No lab results found in last 7 days.    5. RADIOLOGY:   No results found for this or any previous visit (from the past 24 hour(s)).

## 2022-06-11 NOTE — PROGRESS NOTES
"Hematology Malignancies Service Follow up    Steroids increased, now on clinda and primaquine for alternative PJP tx. Transferred to ICU yesterday, remains on BiPAP, 90% FiO2. Doing ok.    BP (!) 120/102   Pulse 95   Temp 97.6  F (36.4  C) (Axillary)   Resp 28   Ht 1.702 m (5' 7\")   Wt 67.1 kg (147 lb 14.9 oz)   SpO2 98%   BMI 23.17 kg/m     Patient on BiPAP    Constitutional: Awake, alert, cooperative, on BiPAP  Eyes: PERRL, EOMI  ENT: Normocephalic, without obvious abnormality,   Respiratory: On BiPAP  Cardiovascular: RRR, no murmur noted.  GI: + bowel sounds, soft, non-distended, non-tender  Skin: No concerning lesions or rash on exposed areas.  Musculoskeletal: No edema anton LEs.  Neurologic: Awake, alert & oriented x3.  Psych: appropriate affect     Labs/Imaging:  Reviewed in Epic    Assessment:  77 year old with CLL on ibrutinib (to establish with Dr. Monahan 6/24), compllicated by AIHA, found to have pneumonia and acute hypoxic respiratory failure and studies positive for PJP    Hematology Treatments:  Ibrutinib 420mg daily (PTA with poor adherence, taking ~weekly)  MP 1mg/kg 5/24-5/27, MP 40mg daily 5/27-6/10, MP 40 q8 (6/10-)    # AIHA is stable. Robust retic count, Hgb is improving, haptoglobin neg last checked 6/7. No clear indication to change treatment, increase steroids (for the AIHA), or add anti-CD20 antibody therapy at this point. Continue folic acid.     # CLL. No HSM/LAD on recent CT. PB flow 5/24 with 50% CLL cells. Increased ALC - could be reactive to infections and also because he is now taking ibrutinib as directed (ibrutinib does cause a transient increase in ALC for a few weeks before we start seeing improvement), but overall relatively low burden. Would be rare for CLL to cause this degree of lung involvement for otherwise low burden of disease. Rare for ibrutinib to cause pneumonitis, but it does likely predispose to opportunistic infection. Alternative tx with obinutuzumab and " venetoclax would give more rapid reduction in disease but also worsen his immunity. As ibrutinib itself is immunosuppressive, could try changing to venetoclax which is more myelosuppresive (pending financial approval) to keep CLL in check while getting him off ibrutinib and avoiding anti-CD20 therapy which affect adaptive immunity.    # Hypogammaglobulinemia  # PJP tx per primary and ID.  # HSV PPx with ACV    Recs:  - CBC and retic daily. Haptoglobin every few days.  - If intubated, get bronch and transbronchial biopsies to assess for CLL involvement  - Continue ibrutinib for now to prevent rebound. Decrease dose to 280mg. Requested venetoclax, await response.  - Defer steroid management to pulmonology/ICU/ID, would be ok with decreasing steroids from AIHA perspective.  - Give IVIG 0.4 mg/kg with premeds for hypogammaglobulinemia. Ok to recheck IgG level prior.  - Will need Evusheld as outpt    Discussed with Dr. Macdonald.    Silas Caba MD  Hematology Fellow  Pager [phone]

## 2022-06-11 NOTE — PLAN OF CARE
ICU End of Shift Summary. See flowsheets for vital signs and detailed assessment.    Changes this shift: Patient A&O x4. Afebrile. Sinus rhythm 80s-90s. Tolerated HFNC 55L % FiO2 throughout the night. This morning saturation dropped to low 80s and patient not able to recover on his own - placed back on BiPAP around 0400 14/10 @ 90%. No BM.     Plan: Continue to wean oxygen as tolerated.     Bella Whitney RN      Goal Outcome Evaluation:    Plan of Care Reviewed With: patient     Overall Patient Progress: no change    Outcome Evaluation: Patient remains BiPAP dependent. 12/8 90%

## 2022-06-11 NOTE — PROGRESS NOTES
[facility]  Transplant Infectious Disease Progress Note     Patient:  Loco Wood, Date of birth 1945, Medical record number [id number]  Date of Visit:  06/11/2022         Assessment and Recommendations:   77 year old man with CLL and AIHI s/p prednisone courses in 12/2021 from derm, 1/2022 for lung symptoms in [place], in 3/2022 for anemia, then most recently for 3 weeks & 6 weeks for AIHA who is being treated for severe Pneumocystis pneumonia.    Refractory, Severe Pneumocystis pneumonia: 5/27/22 CT chest showed progressive bilateral groundglass opacities. 5/27/2022 BAL Pneumocystis sputum PCR +, in the setting of a progressive pulmonary process. Elevated Fungitell >500 & LDH are also consistent with Pneumocystis. Had 1 dose of neb pent on 5/26/2022 (may have started to kill some pneumocystis organisms). IV bactrim started 5/27/2022. Other infectious work up has including negative MRSA nares, Cryptococcal antigen, EBV PCR, CMV PCR, Coccidiodes antigen, Aspergillus gm, fungal antibodies, Histoplasma antigen, Blastomyces antigen and TB quantiferon. Heena demonstrated 6336 copies of Pneumocystis jirovecii and 353 copies of E faecium. He continues to have increased respiratory distress requiring high amounts of FiO2 requiring bipap. Received tmp/smx from 5/27 to 6/10 (~2 weeks). The dose had been adjusted intermittently due to renal dysfunction.  Although patients with hematologic malignancies can be slow to respond to PJP treatment I would expect some response within in 2 weeks. 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 It is more challenging to change host factors but there is also concern for treatment failure and/or another issue contributing to it (ARDS, organizing pneumonia, CLL?). Inflammatory markers have down trended suggesting that the degree of inflammation is improving on steroids. The 1-3 BD glucan is  still > 500 but this is not as helpful since the value is not measurable past 500. If it had dropped to < 500 this would have been more helpful in determining the trajectory. The 6/9 CT chest has increased consolidations and there is concern for organizing pneumonia or ARDS. The CLL has progressed so lung involvement is a consideration although it is rare. Pulmonary toxicity from ibrutinib can occur but is also relatively rare.   On 6/10 we changed from tmp/smx to clindamycin + primaquine. There is emerging evidence of the utilization of micafungin with PJP but he had received micafungin ~ 10 days without improvement. Despite this I resumed micafungin on 6/10 given the potential effect, clinical deterioration, minimal side effect profile and to provide additional anti-fungal prophylaxis with the steroid dose increase. He is still high risk for needing intubation but has not been yet. Improved blood gases.     Leukocytosis/History of CLL/AIHA:  WBC up trending over the past week with increased lymphocytes. There is concern for CLL progression based on flow. Previously taking ibrutinib at home weekly but now on daily. He is has been methylprednisolone since 5/24 with a dose of 40 mg daily since 5/28-6/10. Dose increased on 6/10 to 40 mg IV q 8 hours. Also on ibrutinib for CLL-dose will be decreased today. Plans to receiving IVIG. Hematology discussing other CLL treatment options as well.     E faecium on Karius: s/p 4 days of linezolid. No isolated on clinical cultures.    History of herpes zoster: on acyclovir    - Hep B core ab & S ab+ 5/26/2022, with negative hep B viral load.  - Extensive travel hx.     - QTc interval: 427 msec on 6/2/2022 EKG  - Bacterial prophylaxis: antibiotics stopped 6/9/22.  - Pneumocystis prophylaxis: pent 5/26/2022, treatment bactrim 5/27/2022.  - Viral serostatus & prophylaxis: HSV1+, HSV2+, CMV+; acyclovir prophy  - Fungal prophylaxis: Fungitell > 500; micafungin 5/27/2022-6/7/22.  -  Immunization status:3 covid vaccinations.   - Gamma globulin status: 30g IVIG 5/25/2022  - Isolation status: Good hand hygiene.    Recommendations:  1. Continue clindamycin 900 mg IV q 8 hours + primaquine 30 mg po daily. Primaquine can be crushed if gastric tube is placed after intubation.   2. Continue micafungin 100 mg IV daily  3. Dose of steroids increased per MICU team  4. Given his history of AIHA and that we don't have the G6PD results yet consider periodically checking haptoglobin preemptively. Hemoglobin stable.   5. If intubated please perform bronchoscopy and send immunocompromised panel and flow cytometry  6. Hematology and I discussed CLL treatment options and the pros/cons of changing CLL treatment in relation to PJP. Planning for IVIG and ibrutinib dose reduction today. Will readdress again tomorrow.    Transplant Infectious Disease will continue to follow with you.      Marybeth Anderson DO.   Infectious Diseases Attending  Pager [phone]        Interval History:   Afebrile. Started on clindamycin and primaquine. Methylprednisolone dose increased to 40 mg IV q 8 hours. Increased retic count today. Still in ICU on Bipap but not yet intubated. No new imaging. ABG improved.      Review of Systems:Remaining systems all reviewed and negative       Current Medications & Allergies:       acyclovir (ZOVIRAX) IV  800 mg Intravenous BID     [Held by provider] acyclovir  800 mg Oral BID     [Held by provider] allopurinol  300 mg Oral Daily     [Held by provider] atorvastatin  20 mg Oral Daily     clindamycin  900 mg Intravenous Q8H     [Held by provider] cyanocobalamin  100 mcg Oral Daily     [Held by provider] dronabinol  2.5 mg Oral Daily     [Held by provider] folic acid  1 mg Oral Daily     heparin ANTICOAGULANT  5,000 Units Subcutaneous Q12H     [START ON 6/12/2022] ibrutinib  280 mg Oral Daily     [Held by provider] levothyroxine  50 mcg Oral Daily     melatonin  10 mg Oral At Bedtime      methylPREDNISolone  40 mg Intravenous Q8H     micafungin  100 mg Intravenous Q24H     pantoprazole (PROTONIX) IV  20 mg Intravenous QAM AC     [Held by provider] pantoprazole  20 mg Oral Daily     polyethylene glycol  17 g Oral Daily     primaquine  30 mg Oral Daily     psyllium  1 packet Oral BID     [Held by provider] senna-docusate  1 tablet Oral BID    Or     [Held by provider] senna-docusate  2 tablet Oral BID     sodium chloride (PF)  3 mL Intracatheter Q8H     sodium chloride (PF)  3 mL Intracatheter Q8H     sodium chloride (PF)  3 mL Intracatheter Q8H     vitamin D3  50 mcg Oral Daily       Allergies   Allergen Reactions     Blood Transfusion Related (Informational Only) Other (See Comments)     Patient has a history of a clinically significant antibody against RBC antigens.  A delay in compatible RBCs may occur.      Morphine Itching and Rash     Dilaudid [Hydromorphone] Itching            Physical Exam:   Ranges for vital signs:  Temp:  [97.5  F (36.4  C)-99  F (37.2  C)] 97.5  F (36.4  C)  Pulse:  [] 95  Resp:  [26-34] 29  BP: (120-135)/() 120/102  MAP:  [72 mmHg-96 mmHg] 96 mmHg  Arterial Line BP: ()/(56-79) 140/79  FiO2 (%):  [80 %-100 %] 100 %  SpO2:  [93 %-99 %] 98 %  Vitals:    06/09/22 0602 06/10/22 0500 06/10/22 1500   Weight: 66.8 kg (147 lb 4.3 oz) 65.9 kg (145 lb 4.5 oz) 67.1 kg (147 lb 14.9 oz)     Physical Examination:  GENERAL:  Chronically ill appearing. in bed. Although on bipap he does not appear to be in acute distress.  HEAD:  Head is normocephalic, atraumatic   EYES:  Eyes have anicteric sclerae without conjunctival injection. Pupils reactive bilaterally.   ENT:  Oropharynx is moist without exudates or ulcers. Tongue is midline. No sinus tenderness.   LUNGS:  Clear to auscultation bilaterally. No accessory muscle use. On bipap  CARDIOVASCULAR:  Regular rate and rhythm with no murmurs  ABDOMEN:  Normal bowel sounds, soft, non-tender, non-distended.  SKIN:  No acute  rashes.     EXTREMITIES: No joint erythema or swelling.   NEUROLOGIC:  Grossly nonfocal. Active x4 extremities. Alert. Oriented.   LINES: peripheral IV in place without any surrounding erythema or exudate. Dressing intact.          Laboratory Data:     Metabolic Studies       Recent Labs   Lab Test 06/11/22  0353 06/10/22  1518 06/10/22  0448 06/09/22  1603 06/08/22  0445 06/07/22  1420 06/03/22  2202 06/03/22  1801     --  131*  --    < >  --    < >  --    POTASSIUM 4.7  --  4.7  --    < >  --    < >  --    CHLORIDE 98  --  98  --    < >  --    < >  --    CO2 25  --  25  --    < >  --    < >  --    ANIONGAP 11  --  8  --    < >  --    < >  --    BUN 25  --  20  --    < >  --    < >  --    CR 1.31*  --  1.45*  --    < >  --    < >  --    GFRESTIMATED 57*  --  50*  --    < >  --    < >  --    GLC 98   < > 94  --    < >  --    < >  --    DAVID 8.5  --  8.7  --    < >  --    < >  --    PHOS 4.2  --  3.1  --    < >  --    < >  --    MAG 2.3  --  2.3  --    < >  --    < >  --    LACT  --   --  1.4 4.0*   < >  --    < >  --    PCAL  --   --   --   --   --   --   --  0.07*   FGTL  --   --   --   --   --  >500  --   --     < > = values in this interval not displayed.       Hepatic Studies    Recent Labs   Lab Test 06/11/22  0353 06/10/22  0448 06/09/22  0413 06/08/22  0445 06/07/22  0336 06/06/22  0440 05/26/22  0549 05/25/22  0616   BILITOTAL 1.1 0.6 0.6   < > 0.8 0.8   < > 1.7*   DBIL  --   --   --   --   --   --   --  0.4*   ALKPHOS 107 103 98   < > 99 92   < >  --    PROTTOTAL 5.5* 5.4* 5.3*   < > 5.6* 5.3*   < >  --    ALBUMIN 2.7* 2.5* 2.4*   < > 2.6* 2.4*   < >  --    AST 47* 47* 50*   < > 43 30   < >  --    ALT 65 61 50   < > 42 33   < >  --    LDH  --   --   --   --  348* 322*   < > 300*    < > = values in this interval not displayed.       Hematology Studies      Recent Labs   Lab Test 06/11/22  0353 06/10/22  0448 06/09/22  0413 06/08/22  0445 06/07/22  0336 06/06/22  0440   WBC 38.0* 29.4* 28.1* 26.5* 36.8*  31.7*   ANEU 11.8* 11.8* 10.4* 7.7 14.4* 11.1*   ALYM 25.8* 17.6* 17.1* 18.8* 22.4* 20.3*   ANY 0.4 0.0 0.3 0.0 0.0 0.3   AEOS 0.0 0.0 0.0 0.0 0.0 0.0   HGB 9.7* 9.4* 9.1* 9.0* 9.5* 8.9*   HCT 29.6* 28.5* 27.6* 26.8* 28.7* 27.2*    184 197 221 262 263       Arterial Blood Gas Testing    Recent Labs   Lab Test 06/11/22  1107 06/11/22  0442 06/10/22  1603 06/10/22  0403 06/09/22  2229   PH 7.42 7.41 7.43 7.40 7.40   PCO2 38 39 37 41 38   PO2 73* 83 113* 80 81   HCO3 24 25 24 25 23   O2PER 90 90 100 100 100      Inflammatory Markers    Recent Labs   Lab Test 06/10/22  0448 06/08/22  0445 06/03/22  0423 06/02/22  0451 10/04/17  2250 04/11/16  1136   SED  --   --   --   --  1  --    CRP 11.0* 19.0* 82.0* 100.0*  --  <0.2  Reference Values:   Low Risk:           <1.0 mg/L   Average Risk:       1.0-3.0 mg/L   High Risk:          >3.0 mg/L   Acute Inflammation: >8.0 mg/L         Immune Globulin Studies     Recent Labs   Lab Test 06/03/22  0856 05/24/22  1651 06/12/19  0806 05/31/19  1021 04/30/19  1219 12/21/18  1103 04/29/15  1012 09/19/14  1227   * 312* 394* 409* 429* 443*   < > 729   IGM  --   --   --   --   --   --   --  28*   IGA  --   --   --   --   --   --   --  72    < > = values in this interval not displayed.       Gout Labs      Recent Labs   Lab Test 06/11/22  0353 06/09/22  0413 06/07/22  0336 06/06/22  0440 06/05/22  0523   URIC 3.0* 2.9* 1.8* 2.1* 1.9*     Microbiology:  Fungal testing  Recent Labs   Lab Test 06/07/22  1420 05/27/22  1356 05/27/22  0916 05/23/22  1802   FGTL >500  --   --  >500   ASPGAI  --  0.03  --   --    ASPGAA  --  Negative  --   --    COFUNG  --   --  <1:2  --        Last Culture results with specimen source  Culture   Date Value Ref Range Status   06/03/2022 No Growth  Final   06/03/2022 No Growth  Final   06/02/2022 No growth after 9 days  Preliminary   05/27/2022 2+ Normal aracely  Final   05/27/2022 No Growth  Final   05/27/2022 No Growth  Final   05/26/2022 No Growth   Final   05/26/2022 No Growth  Final   05/26/2022   Final    >10 Squamous epithelial cells/low power field indicates oral contamination. Please recollect.   05/23/2022 No Growth  Final   05/23/2022 No Growth  Final     Culture Micro   Date Value Ref Range Status   03/02/2014 No growth  Final   10/22/2010 No Beta Streptococcus isolated  Final   04/21/2010   Final    No Salmonella, Shigella, Campylobacter or E coli 0157 isolated.   04/18/2010 No growth  Final   03/16/2009 No Beta Streptococcus isolated  Final   01/13/2007 No growth  Final   04/06/2004 No Beta Streptococcus isolated  Final        Virology:  Coronavirus-19 testing    Recent Labs   Lab Test 06/07/22  0903 05/31/22  0241 05/23/22  1946 06/23/20  0843   [id number]  --   --   --  Negative   CZG25BTL  --   --   --  Not Applicable   [id number] Negative Negative Negative  --        Respiratory virus testing    Recent Labs   Lab Test 05/24/22  1916 05/23/22  1946   IFLUA Not Detected  --    INFZA  --  Negative   FLUAH1 Not Detected  --    MO3465 Not Detected  --    FLUAH3 Not Detected  --    IFLUB Not Detected  --    INFZB  --  Negative   PIV1 Not Detected  --    PIV2 Not Detected  --    PIV3 Not Detected  --    PIV4 Not Detected  --    IRSV  --  Negative   RSVA Not Detected  --    RSVB Not Detected  --    HMPV Not Detected  --    ADENOV Not Detected  --    CORONA Not Detected  --        CMV viral loads    Recent Labs   Lab Test 06/01/22  1448   CMVQNT Not Detected       EBV DNA Copies/mL   Date Value Ref Range Status   06/02/2022 Not Detected Not Detected copies/mL Final     Urine Studies     Recent Labs   Lab Test 06/02/22  0952 05/26/22  2240 05/23/22  1906 05/19/22  1355 01/04/21  1744   URINEPH 6.5 5.5 6.5 7.0 5.5   NITRITE Negative Negative Negative Negative Negative   LEUKEST Negative Negative Negative Negative Negative   WBCU 2 2 1 0-5 1     Imaging:  Echo Complete  [id number]  [id number]  [id number]  [id number]^MIKEL     [facility]  [place]  Echocardiography Laboratory  [address] [place] [id number]     Name: ATIF JAMES  MRN: [id number]  : 1945  Study Date: 2022 05:01 PM  Age: 77 yrs  Gender: Male  Patient Location: [facility]  Reason For Study: Arrhythmia  Ordering Physician: MIKEL ESPINOZA  Performed By: Rahel Bridges RDCS     BSA: 1.8 m2  Height: 66 in  Weight: 147 lb  HR: 99  BP: 143/82 mmHg  ______________________________________________________________________________  Procedure  Complete Portable Echo Adult. Contrast Optison. Patient was given 5 ml mixture  of 3 ml Optison and 6 ml saline. 4 ml wasted.  ______________________________________________________________________________  Interpretation Summary  Technically difficult study.     Left ventricular size, wall motion and function are normal. The ejection  fraction is 60-65%.     Right ventricular function, chamber size, wall motion, and thickness are  normal.     No significant valvular abnormalities present.     No pericardial effusion is present.     Compared to baseline images on 2021 there are no hemodynamically  signifcant findings but today's study is of poor quality.  ______________________________________________________________________________  Left Ventricle  Left ventricular size, wall motion and function are normal. The ejection  fraction is 60-65%. Left ventricular diastolic function is not assessable.     Right Ventricle  Right ventricular function, chamber size, wall motion, and thickness are  normal.     Atria  The atria cannot be assessed.     Mitral Valve  The mitral valve is normal.     Aortic Valve  Aortic valve is normal in structure and function. Trace aortic insufficiency  is present.     Tricuspid Valve  The tricuspid valve is normal. Trace tricuspid insufficiency is present. The  peak velocity of the tricuspid regurgitant jet is not obtainable. Pulmonary  artery systolic pressure cannot be assessed.     Pulmonic  Valve  The pulmonic valve is normal.     Vessels  The inferior vena cava is normal. Sinuses of Valsalva 3.8 cm. Ascending aorta  3.2 cm. IVC diameter <2.1 cm collapsing >50% with sniff suggests a normal RA  pressure of 3 mmHg.     Pericardium  No pericardial effusion is present.     Miscellaneous  Technically difficult study. No significant valvular abnormalities present.     Compared to Previous Study  Compared to baseline images on 2021 there are no hemodynamically  signifcant findings but today's study is of poor quality.  ______________________________________________________________________________  MMode/2D Measurements & Calculations  IVSd: 0.88 cm  LVIDd: 3.8 cm  LVIDs: 1.7 cm  LVPWd: 0.87 cm  FS: 55.4 %  LV mass(C)d: 96.8 grams  LV mass(C)dI: 55.1 grams/m2  Ao root diam: 3.8 cm  asc Aorta Diam: 3.2 cm  LVOT diam: 2.5 cm  LVOT area: 4.9 cm2  RWT: 0.46     Doppler Measurements & Calculations  MV E max toshia: 69.8 cm/sec  MV A max toshia: 84.4 cm/sec  MV E/A: 0.83  MV dec slope: 249.0 cm/sec2  PA acc time: 0.08 sec     E/E' av.8  Lateral E/e': 5.9  Medial E/e': 11.7     ______________________________________________________________________________  Report approved by: Marichuy Nguyen 2022 05:46 PM        CT Chest w/o Contrast  Narrative: CT of the chest without contrast    HISTORY: PE JENNIFER concern for treatment failure    COMPARISON STUDY: 2022    FINDINGS: Peribronchovascular areas of consolidation, crazy paving and  mild central airway dilatation with architectural distortion. Areas of  consolidation are increased since 2022. Small pleural effusions  bilaterally.    Main pulmonary artery measures 4 cm in diameter. Mild coronary  calcification. Heart is not enlarged. No mediastinal, hilar or  axillary adenopathy. Small hiatal hernia.    Evaluation of the upper abdomen is limited and is without contrast.  Cyst in the parapelvic portion of the partially imaged right kidney.  Punctate  calcification of the pancreas.    bones: No suspicious bony lesions.  Impression: IMPRESSION:  1. Increased consolidation in the lungs with a pattern most suggestive  of organizing pneumonia superimposed crazy paving likely secondary to  known pneumocystis Jirovecii pneumonia.  2. Pulmonary artery enlargement suggestive of pulmonary hypertension.    AMANDA BAIRES MD         SYSTEM ID:  [id number]

## 2022-06-11 NOTE — PROVIDER NOTIFICATION
RN contacted MICU 2. ABG @ 8656 had a paO2 of 73 on 16/10 90% BiPAP . RN notified service that she increased FiO2 to 100% on BiPAP. Will get a follow up ABG at 1430.

## 2022-06-12 NOTE — PROGRESS NOTES
"Hematology Malignancies Service Follow up    HFNC overnight, back on bipap this morning. Feeling ok.    BP (!) 120/102 (BP Location: Left arm, Cuff Size: Adult Regular)   Pulse 96   Temp 98.2  F (36.8  C) (Axillary)   Resp 26   Ht 1.702 m (5' 7\")   Wt 66.1 kg (145 lb 11.6 oz)   SpO2 98%   BMI 22.82 kg/m     Patient on BiPAP    Constitutional: Awake, alert, cooperative, on HFNC, resting comfortably  Eyes: PERRL, EOMI  ENT: Normocephalic, without obvious abnormality,   Respiratory: On BiPAP  Cardiovascular: RRR, no murmur noted.  GI: + bowel sounds, soft, non-distended, non-tender  Skin: No concerning lesions or rash on exposed areas.  Musculoskeletal: No edema anton LEs.  Neurologic: Awake, alert & oriented x3.  Psych: appropriate affect     Labs/Imaging:  Reviewed in Epic  Cr better today 1.18, Bili up to 1.6, Alb 2.8, Ca 8.7  WBC up to 49.6, ANC 17.4, ALC 32.2, Hgb 10, retic yesterday 260  Last YEN 6/7 still weak + C3, IgG, hapto 233 on 6/7    Assessment:  77 year old with CLL on ibrutinib (to establish with Dr. Monahan 6/24), compllicated by AIHA, found to have pneumonia and acute hypoxic respiratory failure and studies positive for PJP    Hematology Treatments:  Ibrutinib 420mg daily (PTA with poor adherence, taking ~weekly)  MP 1mg/kg 5/24-5/27, MP 40mg daily 5/27-6/10, MP 40 q8 (6/10-)  IVIG 0.4g/kg 5/25    # AIHA is stable. Robust retic count, Hgb is improving, haptoglobin neg last checked 6/7. No clear indication to change treatment, increase steroids (for the AIHA), or add anti-CD20 antibody therapy at this point.     # CLL. No HSM/LAD on recent CT. PB flow 5/24 with 50% CLL cells. Increased ALC - could be reactive to infections and also because he is now taking ibrutinib as directed (ibrutinib does cause a transient increase in ALC for a few weeks before we start seeing improvement), but overall relatively low burden. Would be rare for CLL to cause this degree of lung involvement for otherwise low " burden of disease. Rare for ibrutinib to cause pneumonitis, but it does affect cytotoxic T cells (through the BTK) and predisposes to opportunistic infection.   Alternatives   - Venetoclax is more myelosuppressive but doesn't affect T cells. The problem is financial clearance, mosse if he had issues obtaining ibrutinib in past. Also there is a 4 week ramp up so it is slower to work.  - Obinutuzumab acts quickly, wipes out B-cell immunity but this could be replaced in the short term with IVIg.   - Chlorambucil, well tolerated, usually not quite as effective as above therapies, does also affect T cells. Could be used as a short term solution.  If his respiratory status improves on current treatment, we would likely continue him on ibrutinib. Otherwise we could try obinutuzumab to rapidly reduce CLL so that he could come off ibrutinib. Then try chlorambucil if venetoclax ramp up is too slow for what is needed, or if it isn't covered.    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 Received IVIG 0.4g/kg last on 5/25.   # PJP tx per primary and ID.  # HSV PPx with ACV    Recs:  - CBC and retic daily. Haptoglobin every few days.  - Continue folic acid.   - If intubated, get bronch and transbronchial biopsies to assess for CLL involvement  - Continue ibrutinib for now to prevent rebound. Decreased dose to 280mg 6/11. Requested venetoclax, await response.  - Defer steroid management to pulmonology/ICU/ID, would be ok with decreasing steroids from AIHA perspective.  - Give IVIG 0.4 mg/kg with premeds for hypogammaglobulinemia. Follow up IgG level, no need to wait.  - Will need Evusheld as outpt    Discussed with Dr. Macdonald.    Silas Caba MD  Hematology Fellow  Pager [phone]

## 2022-06-12 NOTE — PROGRESS NOTES
MEDICAL ICU PROGRESS NOTE  06/12/2022      Date of Service (when I saw the patient): 06/12/2022    ASSESSMENT: Loco Wood is a 77 year old male with PMH CLL, auto-immune hemolytic anemia (on prednisone taper), and CKD3a who was admitted to ICU on 06/10 for AHRF due to PJP pneumonia.    Changes today  - Intermittently on BiPAP vs HFNC; SaO2 goal 88%, PaO2 goal >60  - Unhold oral meds  - Continue primaquine, clinda, methylpred  - Give IVIg 0.4 g/kg per hem/onc     PLAN:     Neuro:  # Degenerative disc disease  # Bilateral intermittent hand cramping, spasms suspicious for cervical impingement  Patient with history of degenerative disc disorder with concern for cervical impingement in setting of new bilateral intermittent hand cramping. Patient met with Dr. Melton his sports medicine doctor (AM 5/24) over phone for follow up appointment to discuss results of his MRI. Will defer to outpatient management.      Pulmonary:  # Acute hypoxic respiratory failure 2/2 PJP pneumonia   # Bilateral lower lobe bronchiectasis/consolidations  # Positive beta D glucan >500  Admitted for several days of worsening fatigue/dyspnea, subjective chills, and pleuritic chest pain with 3-4L oxygen requirement (w/ elevated WBC) and CT w/ infiltrates and bilateral lower lobe consolidations concerning for infection. RVP negative. Patient initially treated with 5 day abx course starting 5/24 with azithromycin (d/c 5/26) + ceftriaxone for presumed CAP with clinical improvement. On 5/27, patient developed fever with worsening respiratory status (45 L high flow NC) and was transferred to [facility]. Patient improving with IVF 2.5L and broadened coverage with Zosyn, Micafungin (positive B-Glucan), and IV bactrim (for possible PJP) O2 req of 12-15L, with increase to HFNC on 6/1. CXR 5/31 with trace effusions and increasing apical opacities concerning for worsening edema/infection. PJP PCR 5/27 positive. Karius 6/1 + PJP and E. Faecium (less likely  pathogenic as not growing on other cultures). On 6/9, worsening hypoxia with paO2 59 on 100% 50L. Telemetry also with frequent PACs and episode of SVT and lactic acid elevated at 4, likely in setting of worsening hypoxia. CT chest with worsening PJP and concern for organizing pneumonia and pulmonary hypertension. TTE with normal cardiac function and without evidence of volume overload. Currently believe CLL not driving pulmonary symptoms though need biopsy to definitively rule out. Patient is on steroids for hemolytic anemia which would treat organizing pneumonia and unclear if increasing dose would have additional benefits > risks.  - Discussed with ID and onc, will switch to second line treatment for PJP with clindamycin and primaquine. G6PD pending but ok with close monitoring per onc  - Methylpred 40 mg Q8H  - Current regimen: clindamycin, primaquine, fluconazole; also on acyclovir for ppx while on steroids  - Discontinued: micafungin, levofloxacin, zosyn, linezolid, cefepime, Bactrim   - BiPAP; incentive spirometry  - Patient at risk for respiratory decompensation requiring intubation, will bronch if so for BAL including IC panel and flow cytometry     Cardiovascular:  # AF with RVR spontaneously resolved, remains in AF  No known history of AF. Echo unremarkable.  - On heparin SQ     # Ground level fall secondary to orthostatic hypotension, resolved  Early AM 6/4. CT head without evidence of intracranial hemorrhage, EKG unremarkable, found to have orthostatic hypotension, improved with  ml.  - Holding Ambien PRN at night  - Fall precautions     # HLD  PTA atorvastatin held on BiPAP     GI/Nutrition:  # Malnutrition:    - Level of malnutrition: Severe protein-calorie malnutrition  - Marinol 2.5 mg every day held on BiPAP  - Patient currently declining feeding tube placement  - FLD while on HFNC  - Nutrition following     # Nausea  Last AXR 5/27 without evidence of obstruction. Likely in setting of  medications.  - PRN antiemetics (Zofran, compazine, reglan)     # GERD  PTA omeprazole + PRN famotidine (also on Pred)     Renal/Fluids/Electrolytes:  # Lactic acidosis  Persistent lactic acidosis 3-4 despite antimicrobial treatment and IVF boluses. Appears hemodynamically stable with no evidence of hypoperfusion. Discussed with hem/onc, can sometimes see in malignancies, but unlikely in reasonably controlled CLL. Hepatic function wnl, suggesting against clearance issue. Resolved with additional IVF 5/30. Increased to 4.1 on 6/3, improved with addition of antibiotics above. Again increased to 4 on 6/9, likely in setting of worsening hypoxia as above.    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 Trialed 500 ml LR 6/7, however, sodium studies suggestive of SIADH. Now ~ 129-131. NT pro BNP and TTE without evidence of volume overload.  - Fluid restriction 1000 ml  - Change IV medication carriers to NS      # RICK on CKD Stage 3A resolved  Baseline ~1.3-1.6.   - Avoid nephrotoxic agents as able     Endocrine:  # Hypothyroid  TSH 1.94 on admission WNL. PTA levothyroxine held on BiPAP     ID:  # E faecium on Karius assay  # Severe sepsis, improved  # Positive beta D glucan >500  # PJP pneumonia  - Discussed with ID and onc, will switch to second line treatment for PJP with clindamycin and primaquine. G6PD pending but ok with close monitoring per onc  - Methylpred 40 mg Q8H  - Current regimen: clindamycin, primaquine, fluconazole; also on acyclovir for ppx while on steroids  - Discontinued: micafungin, levofloxacin, zosyn, linezolid, cefepime, Bactrim      # Hx Herpes Zoster c/b post-herpetic neuralgia  PTA ppx Acyclovir while on prednisone     Hematology:    # CLL (dx 2/2007)  History of CLL (2007) managed on Ibrutinib (5/2019) which patient is not taking daily. WBC 24.9 on admission (up from baseline ~6-10) now down to ~21. Suspect secondary to recent infection with lower concern for conversion  to leukemia. IVIG infusion (5/25). Following FISH and cytogenetics for disease prognostication and will continue to monitor with peripheral smear.   - Continue daily ibrutinib to help control disease, increased bioavailability with addition of fluconazole 6/2. WBC and ALC uptrending. 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 B12, folate, iron panel normal with slightly elevated YEN levels. Labs concerning for marrow responsive anemia (elevated LDH, bilirubin, reticulocyte count). Will likely need to treat underlying CLL for long-term solution.    Hgb on admission 9.5 down from baseline (~10-12 past year) and now ~8. Currently hemodynamically stable and suspect recent drop in Hgb secondary to dilutional anemia in setting of 2.5L of IVF given (5/27). Methylpred decreased and rituximab held in setting of recent infection. Haptoglobin remains elevated, suggesting against worsening hemolytic anemia.   - Heme/Onc consulted              - Continue Methylprednisolone 40mg (Q8H)              - Hold Rituximab infusion              - Check uric acid, LDH, Mg, Phos, and retic count q48h               - Continue allopurinol for TLS prevention    - Hep B serologies (Core, Surface Ab +): Hep B ag negative, DNA quant negative   - Continue PTA Folate, B12   - Transfuse if Hgb < 7      Musculoskeletal:  No issues     Skin:  No issues     General Cares/Prophylaxis:    DVT  Prophylaxis: Heparin SQ  GI Prophylaxis: PPI  Restraints: none  Family Communication: April  Code Status: Full code     Lines/tubes/drains:  - PIV  - PICC  - Arterial line     Disposition:  - Medical ICU     Patient seen and findings/plan discussed with medical ICU staff, Dr. Perlman.     Azucena Ryan MD  PGY-2 internal medicine    ====================================  INTERVAL HISTORY:   Nursing notes reviewed. NAEO. Patient was on HFNC for most of the evening and had a FLD. This morning was back on BiPAP, continues to be asymptomatic.    OBJECTIVE:   1. VITAL SIGNS:   Temp:  [97.3  F (36.3  C)-98.5  F (36.9  C)] 98.2  F (36.8  C)  Pulse:  [] 96  Resp:  [19-32] 26  MAP:  [79 mmHg-99 mmHg] 86 mmHg  Arterial Line BP: (108-146)/(60-78) 121/69  FiO2 (%):  [80 %-100 %] 90 %  SpO2:  [88 %-100 %] 98 %  FiO2 (%): 90 % (14/10)  Resp: 26    2. INTAKE/ OUTPUT:   I/O last 3 completed shifts:  In: 1596 [P.O.:600; I.V.:996]  Out: 1375 [Urine:1375]    3. PHYSICAL EXAMINATION:  General Appearance: Alert, pleasant, comfortable  Respiratory: diffuse bilateral coarse sounds; on BiPAP  Cardiovascular: irregularly irregular rate and rhythm, no m/r/g  GI: Soft, non-tender, non-distended  Skin: No rash or lesions noted. Warm, dry  Other:  Alert and oriented x3. Answering questions appropriately    4. LABS:   Arterial Blood Gases   Recent Labs   Lab 06/12/22  0401 06/11/22  2155 06/11/22  1812 06/11/22  1433   PH 7.41 7.39 7.41 7.40   PCO2 41 39 40 40   PO2 68* 139* 101 126*   HCO3 26 24 26 25     Complete Blood Count   Recent Labs   Lab 06/12/22  0401 06/11/22  0353 06/10/22  0448 06/09/22  0413   WBC 49.6* 38.0* 29.4* 28.1*   HGB 10.0* 9.7* 9.4* 9.1*    182 184 197     Basic Metabolic Panel  Recent Labs   Lab 06/12/22  0401 06/11/22  0353 06/10/22  1518 06/10/22  0448 06/09/22  0413   * 134  --  131* 131*   POTASSIUM 4.7 4.7  --  4.7 4.9   CHLORIDE 99 98  --  98 99   CO2 23 25  --  25 22   BUN 29 25  --  20 18   CR  1.18 1.31*  --  1.45* 1.41*   * 98 148* 94 95     Liver Function Tests  Recent Labs   Lab 06/12/22  0401 06/11/22  0353 06/10/22  0448 06/09/22  0413   AST 33 47* 47* 50*   ALT 60 65 61 50   ALKPHOS 99 107 103 98   BILITOTAL 1.6* 1.1 0.6 0.6   ALBUMIN 2.8* 2.7* 2.5* 2.4*     Coagulation Profile  No lab results found in last 7 days.    5. RADIOLOGY:   No results found for this or any previous visit (from the past 24 hour(s)).

## 2022-06-12 NOTE — PROGRESS NOTES
Calorie Count  Intake recorded for: 6/11  Total Kcals: 0 Total Protein: 0g  Kcals from Hospital Food: 0   Protein: 0g  Kcals from Outside Food (average):0 Protein: 0g  # Meals Ordered from Kitchen: 1 meal  # Meals Recorded: No food intake recorded  # Supplements Recorded: No food intake recorded

## 2022-06-12 NOTE — PLAN OF CARE
Goal Outcome Evaluation:  Plan of Care Reviewed With: patient   Overall Patient Progress: improving  Outcome Evaluation: Patient remains BiPAP dependent. Pt's FiO2 decreased to 80%.    Major Shift Events: Patient remains oriented x4. Pt complained of mild aching in right chest. EKG done. Pt remains BiPAP dependent. Pt's FiO2 was decreased from 100% to 80%. Pt remained NPO throughout day.     Plan: Continue to monitor respiratory status. Attempt eating with High Flow nasal cannula. Will follow up with ABG.     For vital signs and complete assessments, please see documentation flowsheets.

## 2022-06-12 NOTE — PLAN OF CARE
ICU End of Shift Summary. See flowsheets for vital signs and detailed assessment.    Changes this shift: VSS. No acute events this shift. No new skin concerns.    Plan: Continue plan of care, adjust as needed.    Goal Outcome Evaluation:    Plan of Care Reviewed With: patient     Overall Patient Progress: no change    Outcome Evaluation: Patient tolerated HFNC today with better vbgs. No significant changes.

## 2022-06-12 NOTE — PROGRESS NOTES
Pt likes full mask more than under the nose mask. At the moment there no skin break. Will keep monitoring

## 2022-06-13 NOTE — PROGRESS NOTES
77 year old male who has a significant medical history of hemolytic anemia, chronic lymphocytic leukemia, benign prostatic hyperplasia with outflow obstruction, CKD stage III, lumbar fusion, bilateral hip replacements, amongst others, who presents to the Emergency Department with c/o increased shortness of breath, dizziness, chest discomfort and rhinorrhea.  Patient states that the symptoms have been ongoing for the last 5 days.  He reports being worked up in clinic, UA and COVID test were both benign.(Per MD notes)      Neuro: A&Ox4. Denies numbness and tingling.   Cardiac: Sinus arrhythmia PACS PVS . VSS and afebrile.  Respiratory: Sating >92% on 90% FI02. Tachypnea and desating with minimal exertion.  GI/: Adequate urine output Tea colored.  Endocrine: No accuchecks.  Diet/appetite: Tolerating regular diet with 1.5 liter fluid restriction Eating fair.  Activity:  Assist of 1-2 depending on activity. Patient refused up to chair x2 with myself and PT  Musculoskeletal: BARRON 5/5 upper extremities and 4/5 on lower extremities. Generalized weakness.  Pain: At acceptable level on current regimen.   Skin: No new deficits noted. Generalized/scattered bruising.  LDA's:    PIV one LFA and one RFA.  Plan: Continue with POC. Notify primary team with changes.  Leatha Quinones RN on 6/13/2022 at 6:16 PM

## 2022-06-13 NOTE — PLAN OF CARE
ICU End of Shift Summary. See flowsheets for vital signs and detailed assessment.    Changes this shift: A&O x4. VSS. Afebrile. HFNC 55L 100% through the night. Blood gases trending q6h. PO2 dropped to 57 this morning - placed back on BiPAP.     Plan: Continue to wean oxygen as tolerated.     Bella Whitney RN      Goal Outcome Evaluation:    Plan of Care Reviewed With: patient     Overall Patient Progress: no change    Outcome Evaluation: Patient tolerated HFNC through the night. Blood gases trending q6h. No significant changes

## 2022-06-13 NOTE — PROGRESS NOTES
"Hematology Malignancies Service Follow up    Going between HFNC and BiPAP. Feeling ok. Happy to be able to order some food today.     /72 (BP Location: Left arm)   Pulse 98   Temp 97.9  F (36.6  C) (Axillary)   Resp 26   Ht 1.702 m (5' 7\")   Wt 66.1 kg (145 lb 11.6 oz)   SpO2 91%   BMI 22.82 kg/m       Constitutional: Awake, alert, cooperative, on HFNC, resting comfortably  Eyes: PERRL, EOMI  ENT: Normocephalic, without obvious abnormality,   Respiratory: On HFNC, breathing comfortably  Cardiovascular: RRR, no murmur noted.  GI: + bowel sounds, soft, non-distended, non-tender  Skin: No concerning lesions or rash on exposed areas.  Musculoskeletal: No edema anton LEs.  Neurologic: Awake, alert & oriented x3.  Psych: appropriate affect     Labs/Imaging:  Reviewed in Epic  Cr better today 1.00 (below his baseline), Bili down a little to 1.4, Alb 2.6, Ca 8.6  WBC down to 35.9, ANC up to 19.4, ALC down from 32.2 to 16.5, Hgb stable 9.5, retic 280  Last YEN 6/7 still weak + C3, IgG, hapto 233 on 6/7    Assessment:  77 year old with CLL on ibrutinib (to establish with Dr. Monahan 6/24), compllicated by AIHA, found to have pneumonia and acute hypoxic respiratory failure and studies positive for PJP    # AIHA is stable. Robust retic count, Hgb is improving, haptoglobin neg last checked 6/7. No clear indication to change treatment, increase steroids (for the AIHA), or add anti-CD20 antibody therapy at this point.     # CLL. No HSM/LAD on recent CT. PB flow 5/24 with 50% CLL cells. Increased ALC - better today, likely reactive to infections and also because he is now taking ibrutinib as directed (ibrutinib does cause a transient increase in ALC for a few weeks-months before we start seeing improvement), but overall relatively low burden. Would be rare for CLL to cause this degree of lung involvement for otherwise low burden of disease. Rare for ibrutinib to cause pneumonitis, but it does affect cytotoxic T cells " (through BTK inhibition) and predisposes to opportunistic infection.   Alternatives   - Venetoclax is more myelosuppressive but doesn't affect T cells. The problem is he has a high copay $1-3k. Also there is a 4 week ramp up so it is slower to work.  - Obinutuzumab acts quickly, wipes out B-cell immunity but this could be replaced in the short term with IVIg. Will request this in case. Usually needs to be paired with something else for long term treatment.  - Chlorambucil, well tolerated, usually not quite as effective as above therapies, does also affect T cells. Could be used as a short term solution.  If his respiratory status improves on current treatment, we would likely continue him on ibrutinib. Otherwise we could try obinutuzumab to rapidly reduce CLL so that he could come off ibrutinib. Then try chlorambucil if venetoclax ramp up is too slow for what is needed, or if it isn't covered. We would still aim to go back to ibrutinib after infection improved.    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 IgG 312 on 5/24, 446 on 6/3, 338 on 6/12  # PJP tx per primary and ID.  # HSV PPx with ACV    Hematology Treatments:  Ibrutinib 420mg daily (PTA with poor adherence, taking ~weekly)  MP 1mg/kg 5/24-5/27, MP 40mg daily 5/27-6/10, MP 40 q8 (6/10-)  IVIG 0.4g/kg 5/25, 6/12    Recs:  - CBC and retic daily. Haptoglobin every few days.  - Continue folic acid.   - Continue ibrutinib for now to prevent rebound. Decreased dose to 280mg 6/11. Requested obinutuzumab. Will reassess tomorrow whether to try obinutuzumab to give a holiday off ibrutinib.  - Defer steroid management to pulmonology/ICU/ID, would be ok with decreasing steroids from AIHA perspective.  - Check IgG level again around 6/27. Replete IVIg if <400.  - If intubated, get bronch and transbronchial biopsies to assess for CLL involvement  - Will need Evusheld as outpt    Discussed with Dr. Macdonald.    Silas Caba MD  Hematology Fellow  Pager [phone]

## 2022-06-13 NOTE — PROGRESS NOTES
MEDICAL ICU PROGRESS NOTE  06/13/2022      Date of Service (when I saw the patient): 06/13/2022    ASSESSMENT: Loco Wood is a 77 year old male with PMH CLL, auto-immune hemolytic anemia (on prednisone taper), and CKD3a who was admitted to ICU on 06/10 for AHRF due to PJP pneumonia.    Changes today  - Intermittently on BiPAP vs HFNC; SaO2 goal 88%, PaO2 goal >60  - If consistently on HFNC, can switch to solid diet  - Continue primaquine, clinda, methylpred     PLAN:     Neuro:  # Degenerative disc disease  # Bilateral intermittent hand cramping, spasms suspicious for cervical impingement  Patient with history of degenerative disc disorder with concern for cervical impingement in setting of new bilateral intermittent hand cramping. Patient met with Dr. Melton his sports medicine doctor (AM 5/24) over phone for follow up appointment to discuss results of his MRI. Will defer to outpatient management.      Pulmonary:  # Acute hypoxic respiratory failure 2/2 PJP pneumonia   # Bilateral lower lobe bronchiectasis/consolidations  # Positive beta D glucan >500  Admitted for several days of worsening fatigue/dyspnea, subjective chills, and pleuritic chest pain with 3-4L oxygen requirement (w/ elevated WBC) and CT w/ infiltrates and bilateral lower lobe consolidations concerning for infection. RVP negative. Patient initially treated with 5 day abx course starting 5/24 with azithromycin (d/c 5/26) + ceftriaxone for presumed CAP with clinical improvement. On 5/27, patient developed fever with worsening respiratory status (45 L high flow NC) and was transferred to [facility] [place]. Patient improving with IVF 2.5L and broadened coverage with Zosyn, Micafungin (positive B-Glucan), and IV bactrim (for possible PJP) O2 req of 12-15L, with increase to HFNC on 6/1. CXR 5/31 with trace effusions and increasing apical opacities concerning for worsening edema/infection. PJP PCR 5/27 positive. Karius 6/1 + PJP and E. Faecium (less likely  pathogenic as not growing on other cultures). On 6/9, worsening hypoxia with paO2 59 on 100% 50L. Telemetry also with frequent PACs and episode of SVT and lactic acid elevated at 4, likely in setting of worsening hypoxia. CT chest with worsening PJP and concern for organizing pneumonia and pulmonary hypertension. TTE with normal cardiac function and without evidence of volume overload. Currently believe CLL not driving pulmonary symptoms though need biopsy to definitively rule out. Patient is on steroids for hemolytic anemia which would treat organizing pneumonia and unclear if increasing dose would have additional benefits > risks.  - Discussed with ID and onc, will switch to second line treatment for PJP with clindamycin and primaquine. G6PD pending but ok with close monitoring per onc  - Methylpred 40 mg Q8H  - Current regimen: clindamycin, primaquine, fluconazole; also on acyclovir for ppx while on steroids  - Discontinued: micafungin, levofloxacin, zosyn, linezolid, cefepime, Bactrim   - BiPAP; incentive spirometry  - Patient at risk for respiratory decompensation requiring intubation, will bronch if so for BAL including IC panel and flow cytometry     Cardiovascular:  # AF with RVR spontaneously resolved, remains in AF  No known history of AF. Echo unremarkable.  - On heparin SQ     # Ground level fall secondary to orthostatic hypotension, resolved  Early AM 6/4. CT head without evidence of intracranial hemorrhage, EKG unremarkable, found to have orthostatic hypotension, improved with  ml.  - Holding Ambien PRN at night  - Fall precautions     # HLD  PTA atorvastatin held on BiPAP     GI/Nutrition:  # Malnutrition:    - Level of malnutrition: Severe protein-calorie malnutrition  - Marinol 2.5 mg every day held on BiPAP  - Patient currently declining feeding tube placement  - FLD while on HFNC  - Nutrition following     # Nausea  Last AXR 5/27 without evidence of obstruction. Likely in setting of  medications.  - PRN antiemetics (Zofran, compazine, reglan)     # GERD  PTA omeprazole + PRN famotidine (also on Pred)     Renal/Fluids/Electrolytes:  # Lactic acidosis  Persistent lactic acidosis 3-4 despite antimicrobial treatment and IVF boluses. Appears hemodynamically stable with no evidence of hypoperfusion. Discussed with hem/onc, can sometimes see in malignancies, but unlikely in reasonably controlled CLL. Hepatic function wnl, suggesting against clearance issue. Resolved with additional IVF 5/30. Increased to 4.1 on 6/3, improved with addition of antibiotics above. Again increased to 4 on 6/9, likely in setting of worsening hypoxia as above.    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 Trialed 500 ml LR 6/7, however, sodium studies suggestive of SIADH. Now ~ 129-131. NT pro BNP and TTE without evidence of volume overload.  - Fluid restriction 1000 ml  - Change IV medication carriers to NS      # RICK on CKD Stage 3A resolved  Baseline ~1.3-1.6.   - Avoid nephrotoxic agents as able     Endocrine:  # Hypothyroid  TSH 1.94 on admission WNL. PTA levothyroxine held on BiPAP     ID:  # E faecium on Karius assay  # Severe sepsis, improved  # Positive beta D glucan >500  # PJP pneumonia  - Discussed with ID and onc, will switch to second line treatment for PJP with clindamycin and primaquine. G6PD pending but ok with close monitoring per onc  - Methylpred 40 mg Q8H  - Current regimen: clindamycin, primaquine, fluconazole; also on acyclovir for ppx while on steroids  - Discontinued: micafungin, levofloxacin, zosyn, linezolid, cefepime, Bactrim      # Hx Herpes Zoster c/b post-herpetic neuralgia  PTA ppx Acyclovir while on prednisone     Hematology:    # CLL (dx 2/2007)  History of CLL (2007) managed on Ibrutinib (5/2019) which patient is not taking daily. WBC 24.9 on admission (up from baseline ~6-10) now down to ~21. Suspect secondary to recent infection with lower concern for conversion  to leukemia. IVIG infusion (5/25). Following FISH and cytogenetics for disease prognostication and will continue to monitor with peripheral smear.   - Continue daily ibrutinib to help control disease, increased bioavailability with addition of fluconazole 6/2. WBC and ALC uptrending. 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 B12, folate, iron panel normal with slightly elevated YEN levels. Labs concerning for marrow responsive anemia (elevated LDH, bilirubin, reticulocyte count). Will likely need to treat underlying CLL for long-term solution.    Hgb on admission 9.5 down from baseline (~10-12 past year) and now ~8. Currently hemodynamically stable and suspect recent drop in Hgb secondary to dilutional anemia in setting of 2.5L of IVF given (5/27). Methylpred decreased and rituximab held in setting of recent infection. Haptoglobin remains elevated, suggesting against worsening hemolytic anemia.   - Heme/Onc consulted              - Continue Methylprednisolone 40mg (Q8H)              - Hold Rituximab infusion              - Check uric acid, LDH, Mg, Phos, and retic count q48h               - Continue allopurinol for TLS prevention    - Hep B serologies (Core, Surface Ab +): Hep B ag negative, DNA quant negative   - Continue PTA Folate, B12   - Transfuse if Hgb < 7      Musculoskeletal:  No issues     Skin:  No issues     General Cares/Prophylaxis:    DVT  Prophylaxis: Heparin SQ  GI Prophylaxis: PPI  Restraints: none  Family Communication: April  Code Status: Full code     Lines/tubes/drains:  - PIV  - PICC  - Arterial line     Disposition:  - Medical ICU     Patient seen and findings/plan discussed with medical ICU staff, Dr. Perlman.     Azucena Ryan MD  PGY-2 internal medicine    ====================================  INTERVAL HISTORY:   Nursing notes reviewed. NAEO. Patient was on HFNC for almost 24h, back on BiPAP this AM due to lower O2 on ABG. Denies chest pain or SOB. Wants to eat solids today.    OBJECTIVE:   1. VITAL SIGNS:   Temp:  [97.1  F (36.2  C)-98.2  F (36.8  C)] 98  F (36.7  C)  Pulse:  [] 88  Resp:  [22-26] 26  BP: (110-115)/(70-72) 115/72  MAP:  [70 mmHg-155 mmHg] 77 mmHg  Arterial Line BP: ()/() 109/61  FiO2 (%):  [85 %-100 %] (P) 100 %  SpO2:  [88 %-100 %] 97 %  FiO2 (%): 100 %  Resp: 26    2. INTAKE/ OUTPUT:   I/O last 3 completed shifts:  In: 685 [P.O.:510; I.V.:175]  Out: 1400 [Urine:1400]    3. PHYSICAL EXAMINATION:  General Appearance: Alert, pleasant, comfortable  Respiratory: diffuse bilateral coarse sounds; on BiPAP  Cardiovascular: irregularly irregular rate and rhythm, no m/r/g  GI: Soft, non-tender, non-distended  Skin: No rash or lesions noted. Warm, dry  Other:  Alert and oriented x3. Answering questions appropriately    4. LABS:   Arterial Blood Gases   Recent Labs   Lab 06/13/22  0410 06/13/22  0046 06/12/22  2226 06/12/22  1531   PH 7.43 7.41 7.40 7.40   PCO2 42 43 41 40   PO2 57* 85 71* 84   HCO3 28 27 25 25     Complete Blood Count   Recent Labs   Lab 06/13/22  0410 06/12/22  0401 06/11/22  0353 06/10/22  0448   WBC 35.9* 49.6* 38.0* 29.4*   HGB 9.5* 10.0* 9.7* 9.4*   * 169 182 184     Basic Metabolic Panel  Recent Labs   Lab 06/13/22  0410 06/12/22  0401 06/11/22  0353 06/10/22  1518 06/10/22  0448   * 132* 134  --  131*   POTASSIUM 4.5 4.7 4.7  --  4.7   CHLORIDE 101 99 98  --  98   CO2 25 23 25   --  25   BUN 27 29 25  --  20   CR 1.00 1.18 1.31*  --  1.45*   * 133* 98 148* 94     Liver Function Tests  Recent Labs   Lab 06/13/22  0410 06/12/22  0401 06/11/22  0353 06/10/22  0448   AST 26 33 47* 47*   ALT 51 60 65 61   ALKPHOS 94 99 107 103   BILITOTAL 1.4* 1.6* 1.1 0.6   ALBUMIN 2.6* 2.8* 2.7* 2.5*     Coagulation Profile  No lab results found in last 7 days.    5. RADIOLOGY:   No results found for this or any previous visit (from the past 24 hour(s)).

## 2022-06-13 NOTE — PROGRESS NOTES
[facility]  Transplant Infectious Disease Progress Note     Patient:  Loco Wood, Date of birth 1945, Medical record number [id number]  Date of Visit:  06/13/2022         Assessment and Recommendations:   77 year old man with CLL and AIHI s/p prednisone courses in 12/2021 from derm, 1/2022 for lung symptoms in [place], in 3/2022 for anemia, then most recently for 3 weeks & 6 weeks for AIHA who is being treated for severe Pneumocystis pneumonia.    Refractory, Severe Pneumocystis pneumonia: 5/27/22 CT chest showed progressive bilateral groundglass opacities. 5/27/2022 BAL Pneumocystis sputum PCR +, in the setting of a progressive pulmonary process. Elevated Fungitell >500 & LDH are also consistent with Pneumocystis. Had 1 dose of neb pent on 5/26/2022 (may have started to kill some pneumocystis organisms). IV bactrim started 5/27/2022. Other infectious work up has including negative MRSA nares, Cryptococcal antigen, EBV PCR, CMV PCR, Coccidiodes antigen, Aspergillus gm, fungal antibodies, Histoplasma antigen, Blastomyces antigen and TB quantiferon. Chivoius demonstrated 6336 copies of Pneumocystis jirovecii and 353 copies of E faecium. He continues to have increased respiratory distress requiring high amounts of FiO2 requiring bipap and high flow. Received tmp/smx from 5/27 to 6/10 (~2 weeks). The dose had been adjusted intermittently due to renal dysfunction.  Although patients with hematologic malignancies can be slow to respond to PJP treatment I would expect some response within in 2 weeks. 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 It is more challenging to change host factors but there is also concern for treatment failure and/or another issue contributing to it (ARDS, organizing pneumonia, CLL?). Inflammatory markers have down trended suggesting that the degree of inflammation is improving on steroids. The 1-3  BD glucan is still > 500 but this is not as helpful since the value is not measurable past 500. If it had dropped to < 500 this would have been more helpful in determining the trajectory. The 6/9 CT chest has increased consolidations and there is concern for organizing pneumonia or ARDS. The CLL lung involvement is a consideration although it is rare. Pulmonary toxicity from ibrutinib can occur but is also relatively rare.   On 6/10 we changed from tmp/smx to clindamycin + primaquine. There is emerging evidence of the utilization of micafungin with PJP but he had received micafungin ~ 10 days without improvement. Despite this I resumed micafungin on 6/10 given the potential effect, clinical deterioration, minimal side effect profile and to provide additional anti-fungal prophylaxis with the steroid dose increase. He is still high risk for needing intubation but there has been slight improvement over the past 48 hours.     Leukocytosis/History of CLL/AIHA:  WBC up trending over the past week with increased lymphocytes. There is concern for CLL progression based on flow. Previously taking ibrutinib at home weekly but now on daily. He is has been methylprednisolone since 5/24 with a dose of 40 mg daily since 5/28-6/10. Dose increased on 6/10 to 40 mg IV q 8 hours. Also on ibrutinib for CLL-dose decreased 6/11/22. Plans to receiving IVIG. Hematology discussing other CLL treatment options as well.     E faecium on Karius: s/p 4 days of linezolid. No isolated on clinical cultures.    History of herpes zoster: on acyclovir    - Hep B core ab & S ab+ 5/26/2022, with negative hep B viral load.  - Extensive travel hx.     - QTc interval: 427 msec on 6/2/2022 EKG  - Bacterial prophylaxis: antibiotics stopped 6/9/22.  - Pneumocystis prophylaxis: pent 5/26/2022, treatment bactrim 5/27/2022. G6PD normal.  - Viral serostatus & prophylaxis: HSV1+, HSV2+, CMV+; acyclovir prophy  - Fungal prophylaxis: micafungin 5/27/2022-6/7/22,  6/10-C  - Immunization status:3 covid vaccinations.   - Gamma globulin status: 30g IVIG 5/25/2022 and 6/12/22  - Isolation status: Good hand hygiene.    Recommendations:  1. Continue current regimen of clindamycin 900 mg IV q 8 hours + primaquine 30 mg po daily. Primaquine can be crushed if needed.   2. Continue micafungin 100 mg IV daily   3. Dose of steroids per MICU and heme/onc teams  4. If intubated please perform bronchoscopy and send immunocompromised panel and flow cytometry    Transplant Infectious Disease will continue to follow with you.      Marybeth Anderson DO.   Infectious Diseases Attending  Pager [phone]        Interval History:   Remains afebrile. ICU team thinks that there has been improvement in his respiratory status. Per nursing he does get BARAKAT with small movements. He expresses frustration with the fact that he has not eaten. He feels that he had go for longer periods with high flow and does not need the Bipap as often. Denies subjective fevers, chills, chest pain, cough. He subjective thinks he feels better on primaquine + clinda.      Review of Systems:Remaining systems all reviewed and negative       Current Medications & Allergies:       acyclovir  800 mg Oral BID     allopurinol  300 mg Oral Daily     atorvastatin  20 mg Oral Daily     clindamycin  900 mg Intravenous Q8H     cyanocobalamin  100 mcg Oral Daily     [Held by provider] dronabinol  2.5 mg Oral Daily     folic acid  1 mg Oral Daily     [Held by provider] heparin ANTICOAGULANT  5,000 Units Subcutaneous Q12H     ibrutinib  280 mg Oral Daily     levothyroxine  50 mcg Oral Daily     melatonin  10 mg Oral At Bedtime     methylPREDNISolone  40 mg Intravenous Q8H     micafungin  100 mg Intravenous Q24H     pantoprazole  20 mg Oral Daily     polyethylene glycol  17 g Oral Daily     primaquine  30 mg Oral Daily     psyllium  1 packet Oral BID     senna-docusate  1 tablet Oral BID    Or     senna-docusate  2 tablet Oral BID     sodium  chloride (PF)  3 mL Intracatheter Q8H     sodium chloride (PF)  3 mL Intracatheter Q8H     sodium chloride (PF)  3 mL Intracatheter Q8H     vitamin D3  50 mcg Oral Daily       Allergies   Allergen Reactions     Blood Transfusion Related (Informational Only) Other (See Comments)     Patient has a history of a clinically significant antibody against RBC antigens.  A delay in compatible RBCs may occur.      Morphine Itching and Rash     Dilaudid [Hydromorphone] Itching            Physical Exam:   Ranges for vital signs:  Temp:  [97.1  F (36.2  C)-98  F (36.7  C)] 97.5  F (36.4  C)  Pulse:  [] 110  Resp:  [24-26] 25  BP: (110-115)/(70-72) 115/72  MAP:  [70 mmHg-212 mmHg] 105 mmHg  Arterial Line BP: ()/() 143/80  FiO2 (%):  [85 %-100 %] 90 %  SpO2:  [88 %-100 %] 94 %  Vitals:    06/10/22 0500 06/10/22 1500 06/12/22 0000   Weight: 65.9 kg (145 lb 4.5 oz) 67.1 kg (147 lb 14.9 oz) 66.1 kg (145 lb 11.6 oz)     Physical Examination:  GENERAL:  Chronically ill appearing. in bed. Does not appear to be in acute distress.  HEAD:  Head is normocephalic, atraumatic   EYES:  Eyes have anicteric sclerae without conjunctival injection. Pupils reactive bilaterally.   ENT:  Oropharynx is moist without exudates or ulcers. Tongue is midline. No sinus tenderness.   LUNGS:  Clear to auscultation bilaterally in the uppers and diminished in the bases. No accessory muscle use. +high flow nasal cannula 90%  CARDIOVASCULAR:  Regular rate and rhythm with no murmurs  ABDOMEN:  Normal bowel sounds, soft, non-tender, non-distended.  SKIN:  No acute rashes.     EXTREMITIES: No joint erythema or swelling.   NEUROLOGIC:  Grossly nonfocal. Active x4 extremities. Alert. Oriented.   LINES: peripheral IV in place without any surrounding erythema or exudate. Dressing intact.          Laboratory Data:     Metabolic Studies       Recent Labs   Lab Test 06/13/22  0410 06/12/22  0401 06/10/22  1518 06/10/22  0448 06/09/22  1603 06/08/22  7155  06/07/22  1420 06/03/22  2202 06/03/22  1801   * 132*   < > 131*  --    < >  --    < >  --    POTASSIUM 4.5 4.7   < > 4.7  --    < >  --    < >  --    CHLORIDE 101 99   < > 98  --    < >  --    < >  --    CO2 25 23   < > 25  --    < >  --    < >  --    ANIONGAP 6 10   < > 8  --    < >  --    < >  --    BUN 27 29   < > 20  --    < >  --    < >  --    CR 1.00 1.18   < > 1.45*  --    < >  --    < >  --    GFRESTIMATED 78 64   < > 50*  --    < >  --    < >  --    * 133*   < > 94  --    < >  --    < >  --    DAVID 8.6  8.6 8.7  8.7   < > 8.7  --    < >  --    < >  --    PHOS 2.7 3.3   < > 3.1  --    < >  --    < >  --    MAG 2.3 2.4*   < > 2.3  --    < >  --    < >  --    LACT  --   --   --  1.4 4.0*   < >  --    < >  --    PCAL  --   --   --   --   --   --   --   --  0.07*   FGTL  --   --   --   --   --   --  >500  --   --     < > = values in this interval not displayed.       Hepatic Studies    Recent Labs   Lab Test 06/13/22  0410 06/12/22  0401 06/11/22  0353 06/08/22  0445 06/07/22  0336 06/06/22  0440 05/26/22  0549 05/25/22  0616   BILITOTAL 1.4* 1.6* 1.1   < > 0.8 0.8   < > 1.7*   DBIL  --   --   --   --   --   --   --  0.4*   ALKPHOS 94 99 107   < > 99 92   < >  --    PROTTOTAL 6.1* 5.6* 5.5*   < > 5.6* 5.3*   < >  --    ALBUMIN 2.6* 2.8* 2.7*   < > 2.6* 2.4*   < >  --    AST 26 33 47*   < > 43 30   < >  --    ALT 51 60 65   < > 42 33   < >  --    LDH  --   --   --   --  348* 322*   < > 300*    < > = values in this interval not displayed.       Hematology Studies      Recent Labs   Lab Test 06/13/22  0410 06/12/22  0401 06/11/22  0353 06/10/22  0448 06/09/22 0413 06/08/22 0445   WBC 35.9* 49.6* 38.0* 29.4* 28.1* 26.5*   ANEU 19.4* 17.4* 11.8* 11.8* 10.4* 7.7   ALYM 16.5* 32.2* 25.8* 17.6* 17.1* 18.8*   ANY 0.0 0.0 0.4 0.0 0.3 0.0   AEOS 0.0 0.0 0.0 0.0 0.0 0.0   HGB 9.5* 10.0* 9.7* 9.4* 9.1* 9.0*   HCT 29.0* 30.2* 29.6* 28.5* 27.6* 26.8*   * 169 182 184 197 221       Arterial Blood Gas  Testing    Recent Labs   Lab Test 06/13/22  1141 06/13/22  0856 06/13/22  0410 06/13/22  0046 06/12/22  2226   PH 7.44 7.45 7.43 7.41 7.40   PCO2 43 42 42 43 41   PO2 64* 96 57* 85 71*   HCO3 29* 29* 28 27 25   O2PER 90 100 100 100 100      Inflammatory Markers    Recent Labs   Lab Test 06/10/22  0448 06/08/22  0445 06/03/22  0423 06/02/22  0451 10/04/17  2250 04/11/16  1136   SED  --   --   --   --  1  --    CRP 11.0* 19.0* 82.0* 100.0*  --  <0.2  Reference Values:   Low Risk:           <1.0 mg/L   Average Risk:       1.0-3.0 mg/L   High Risk:          >3.0 mg/L   Acute Inflammation: >8.0 mg/L         Immune Globulin Studies     Recent Labs   Lab Test 06/12/22  0401 06/03/22  0856 05/24/22  1651 06/12/19  0806 05/31/19  1021 04/30/19  1219 04/29/15  1012 09/19/14  1227   * 446* 312* 394* 409* 429*   < > 729   IGM  --   --   --   --   --   --   --  28*   IGA  --   --   --   --   --   --   --  72    < > = values in this interval not displayed.       Gout Labs      Recent Labs   Lab Test 06/13/22  0410 06/11/22  0353 06/09/22  0413 06/07/22  0336 06/06/22  0440   URIC 3.9 3.0* 2.9* 1.8* 2.1*     Microbiology:  Fungal testing  Recent Labs   Lab Test 06/07/22  1420 05/27/22  1356 05/27/22  0916 05/23/22  1802   FGTL >500  --   --  >500   ASPGAI  --  0.03  --   --    ASPGAA  --  Negative  --   --    COFUNG  --   --  <1:2  --        Last Culture results with specimen source  Culture   Date Value Ref Range Status   06/03/2022 No Growth  Final   06/03/2022 No Growth  Final   06/02/2022 No growth after 11 days  Preliminary   05/27/2022 2+ Normal aracely  Final   05/27/2022 No Growth  Final   05/27/2022 No Growth  Final   05/26/2022 No Growth  Final   05/26/2022 No Growth  Final   05/26/2022   Final    >10 Squamous epithelial cells/low power field indicates oral contamination. Please recollect.   05/23/2022 No Growth  Final   05/23/2022 No Growth  Final     Culture Micro   Date Value Ref Range Status   03/02/2014 No  growth  Final   10/22/2010 No Beta Streptococcus isolated  Final   2010   Final    No Salmonella, Shigella, Campylobacter or E coli 0157 isolated.   2010 No growth  Final   2009 No Beta Streptococcus isolated  Final   2007 No growth  Final   2004 No Beta Streptococcus isolated  Final        Virology:  Coronavirus-19 testing    Recent Labs   Lab Test 22  0903 22  0241 22  1946 20  0843   SBU52FU  --   --   --  Negative   FUH69QMN  --   --   --  Not Applicable   [id number] Negative Negative Negative  --        Respiratory virus testing    Recent Labs   Lab Test 22  1916 22  1946   IFLUA Not Detected  --    INFZA  --  Negative   FLUAH1 Not Detected  --    TO1817 Not Detected  --    FLUAH3 Not Detected  --    IFLUB Not Detected  --    INFZB  --  Negative   PIV1 Not Detected  --    PIV2 Not Detected  --    PIV3 Not Detected  --    PIV4 Not Detected  --    IRSV  --  Negative   RSVA Not Detected  --    RSVB Not Detected  --    HMPV Not Detected  --    ADENOV Not Detected  --    CORONA Not Detected  --        CMV viral loads    Recent Labs   Lab Test 22  1448   CMVQNT Not Detected       EBV DNA Copies/mL   Date Value Ref Range Status   2022 Not Detected Not Detected copies/mL Final     Urine Studies     Recent Labs   Lab Test 22  0952 22  2240 22  1906 22  1355 21  1744   URINEPH 6.5 5.5 6.5 7.0 5.5   NITRITE Negative Negative Negative Negative Negative   LEUKEST Negative Negative Negative Negative Negative   WBCU 2 2 1 0-5 1     Imaging:  Echo Complete  [id number]  [id number]  [id number]  [id number]^ESPIONZA^MIKEL     [facility],[place]  Echocardiography Laboratory  [address] [place] [id number]     Name: ATIF JAMES  MRN: [id number]  : 1945  Study Date: 2022 05:01 PM  Age: 77 yrs  Gender: Male  Patient Location: [facility]  Reason For Study: Arrhythmia  Ordering Physician:  MIKEL ESPINOZA  Performed By: Rahel Bridges RDCS     BSA: 1.8 m2  Height: 66 in  Weight: 147 lb  HR: 99  BP: 143/82 mmHg  ______________________________________________________________________________  Procedure  Complete Portable Echo Adult. Contrast Optison. Patient was given 5 ml mixture  of 3 ml Optison and 6 ml saline. 4 ml wasted.  ______________________________________________________________________________  Interpretation Summary  Technically difficult study.     Left ventricular size, wall motion and function are normal. The ejection  fraction is 60-65%.     Right ventricular function, chamber size, wall motion, and thickness are  normal.     No significant valvular abnormalities present.     No pericardial effusion is present.     Compared to baseline images on 4/26/2021 there are no hemodynamically  signifcant findings but today's study is of poor quality.  ______________________________________________________________________________  Left Ventricle  Left ventricular size, wall motion and function are normal. The ejection  fraction is 60-65%. Left ventricular diastolic function is not assessable.     Right Ventricle  Right ventricular function, chamber size, wall motion, and thickness are  normal.     Atria  The atria cannot be assessed.     Mitral Valve  The mitral valve is normal.     Aortic Valve  Aortic valve is normal in structure and function. Trace aortic insufficiency  is present.     Tricuspid Valve  The tricuspid valve is normal. Trace tricuspid insufficiency is present. The  peak velocity of the tricuspid regurgitant jet is not obtainable. Pulmonary  artery systolic pressure cannot be assessed.     Pulmonic Valve  The pulmonic valve is normal.     Vessels  The inferior vena cava is normal. Sinuses of Valsalva 3.8 cm. Ascending aorta  3.2 cm. IVC diameter <2.1 cm collapsing >50% with sniff suggests a normal RA  pressure of 3 mmHg.     Pericardium  No pericardial effusion is present.      Miscellaneous  Technically difficult study. No significant valvular abnormalities present.     Compared to Previous Study  Compared to baseline images on 2021 there are no hemodynamically  signifcant findings but today's study is of poor quality.  ______________________________________________________________________________  MMode/2D Measurements & Calculations  IVSd: 0.88 cm  LVIDd: 3.8 cm  LVIDs: 1.7 cm  LVPWd: 0.87 cm  FS: 55.4 %  LV mass(C)d: 96.8 grams  LV mass(C)dI: 55.1 grams/m2  Ao root diam: 3.8 cm  asc Aorta Diam: 3.2 cm  LVOT diam: 2.5 cm  LVOT area: 4.9 cm2  RWT: 0.46     Doppler Measurements & Calculations  MV E max toshia: 69.8 cm/sec  MV A max toshia: 84.4 cm/sec  MV E/A: 0.83  MV dec slope: 249.0 cm/sec2  PA acc time: 0.08 sec     E/E' av.8  Lateral E/e': 5.9  Medial E/e': 11.7     ______________________________________________________________________________  Report approved by: Marichuy Nguyen 2022 05:46 PM        CT Chest w/o Contrast  Narrative: CT of the chest without contrast    HISTORY: PE JENNIFER concern for treatment failure    COMPARISON STUDY: 2022    FINDINGS: Peribronchovascular areas of consolidation, crazy paving and  mild central airway dilatation with architectural distortion. Areas of  consolidation are increased since 2022. Small pleural effusions  bilaterally.    Main pulmonary artery measures 4 cm in diameter. Mild coronary  calcification. Heart is not enlarged. No mediastinal, hilar or  axillary adenopathy. Small hiatal hernia.    Evaluation of the upper abdomen is limited and is without contrast.  Cyst in the parapelvic portion of the partially imaged right kidney.  Punctate calcification of the pancreas.    bones: No suspicious bony lesions.  Impression: IMPRESSION:  1. Increased consolidation in the lungs with a pattern most suggestive  of organizing pneumonia superimposed crazy paving likely secondary to  known pneumocystis Jirovecii pneumonia.  2.  Pulmonary artery enlargement suggestive of pulmonary hypertension.    AMANDA BAIRES MD         SYSTEM ID:  [id number]

## 2022-06-13 NOTE — PROGRESS NOTES
CLINICAL NUTRITION SERVICES - BRIEF NOTE  *Please see full assessment note from 6/9/2022    New Findings:  Diet advanced to regular this morning with 1500 ml fluid restriction (previously 1000 ml). Pt expressed frustration with fluid restriction for meal ordering, as he does not drink much fluids on the unit but is limited to 250 ml fluid/meal. Discussed with pt plan to increase meal time fluid allowance, since RN is documenting total fluid intake on the unit. Pt likes the chocolate Ensure shake and has not received a Special K bar yet. Placed orders for snacks and comment in [employer] (meal ordering system) to increase meal time fluids with total daily goal to continue 1500 ml/day.     Lunch arrived during conversation, pt has reported challenges with the menu and finding foods he enjoys. Family/friends might start bringing some food from OSH - encouraged this. Discussed recommendation for a feeding tube and provided support regarding the procedure, however pt politely declined. Will continue calorie counts.     Interventions  Calorie counts - continue  [facility]  Medical food supplement therapy - Ensure Shake, hard boiled egg, and special K bar between meals.   Increased meal time fluid (pt still has an overall fluid restriction of 1500 ml/day)  Nutrition education provided on recommendations    RD to follow per protocol.    Kera Hightower, MS, RD, LD, [facility]  MICU pager: [phone]  ASCOM: [id number]

## 2022-06-13 NOTE — PROGRESS NOTES
Calorie Count  Intake recorded for: 6/12  Total Kcals: 0 Total Protein: 0g  Kcals from Hospital Food: 0   Protein: 0g  Kcals from Outside Food (average):0 Protein: 0g  # Meals Recorded: 0 meals ordered from kitchen, no intake recorded  # Supplements Recorded: no intake recorded

## 2022-06-14 NOTE — PROGRESS NOTES
Transfer  Transferred from:    Via: bed  Reason for transfer: Pt improving and no longer requiring ICU level care  Family: Aware of transfer  Belongings: Received with pt  Chart: Received with pt  Medications: Meds received from old unit with pt  Code Status verified on armband: yes  2 RN Skin Assessment Completed By: DORIS Heredia & DORIS Valencia - no new deficits noted.   Med rec completed: yes  Bed surface reassessed with algorithm and charted: yes  New bed surface ordered: no  Suction/Ambu bag/Flowmeter at bedside: yes    Report received from: DORIS Reeder    Pt status: A&O. Temp: 97.7  F (36.5  C) Temp src: Oral BP: 120/85 Pulse: 118 Resp: 26 SpO2: 95 % O2 Device: High Flow Nasal Cannula (HFNC) Oxygen Delivery: 60 LPM % FIO2. ST with frequent PACs. Denies pain. Denies SOB at rest on HFNC. Diminished lung sounds with fine crackles audible. IS at bedside. Bruising noted throughout; mepilex intact on coccyx/sacrum for prevention. Will continue to monitor closely.

## 2022-06-14 NOTE — PROGRESS NOTES
MEDICAL ICU PROGRESS NOTE  06/14/2022      Date of Service (when I saw the patient): 06/14/2022    ASSESSMENT: Loco Wood is a 77 year old male with PMH CLL, auto-immune hemolytic anemia (on prednisone taper), and CKD3a who was admitted to ICU on 06/10 for AHRF due to PJP pneumonia.    Changes today  - Consistently on HFNC; SaO2 goal 88%, PaO2 goal >60  - Continue primaquine, clinda, methylpred  - Can transfer to floor today     PLAN:     Neuro:  # Degenerative disc disease  # Bilateral intermittent hand cramping, spasms suspicious for cervical impingement  Patient with history of degenerative disc disorder with concern for cervical impingement in setting of new bilateral intermittent hand cramping. Patient met with Dr. Melton his sports medicine doctor (AM 5/24) over phone for follow up appointment to discuss results of his MRI. Will defer to outpatient management.      Pulmonary:  # Acute hypoxic respiratory failure 2/2 PJP pneumonia   # Bilateral lower lobe bronchiectasis/consolidations  # Positive beta D glucan >500  Admitted for several days of worsening fatigue/dyspnea, subjective chills, and pleuritic chest pain with 3-4L oxygen requirement (w/ elevated WBC) and CT w/ infiltrates and bilateral lower lobe consolidations concerning for infection. RVP negative. Patient initially treated with 5 day abx course starting 5/24 with azithromycin (d/c 5/26) + ceftriaxone for presumed CAP with clinical improvement. On 5/27, patient developed fever with worsening respiratory status (45 L high flow NC) [place] [facility] [place] with IVF 2.5L and broadened coverage with Zosyn, Micafungin (positive B-Glucan), and IV bactrim (for possible PJP) O2 req of 12-15L, with increase to HFNC on 6/1. CXR 5/31 with trace effusions and increasing apical opacities concerning for worsening edema/infection. PJP PCR 5/27 positive. Karius 6/1 + PJP and E. Faecium (less likely pathogenic as not growing on other  cultures). On 6/9, worsening hypoxia with paO2 59 on 100% 50L. Telemetry also with frequent PACs and episode of SVT and lactic acid elevated at 4, likely in setting of worsening hypoxia. CT chest with worsening PJP and concern for organizing pneumonia and pulmonary hypertension. TTE with normal cardiac function and without evidence of volume overload. Currently believe CLL not driving pulmonary symptoms though need biopsy to definitively rule out. Patient is on steroids for hemolytic anemia which would treat organizing pneumonia and unclear if increasing dose would have additional benefits > risks.  - Discussed with ID and onc, will switch to second line treatment for PJP with clindamycin and primaquine. G6PD pending but ok with close monitoring per onc  - Methylpred 40 mg Q8H  - Current regimen: clindamycin, primaquine, fluconazole; also on acyclovir for ppx while on steroids  - Discontinued: micafungin, levofloxacin, zosyn, linezolid, cefepime, Bactrim   - BiPAP; incentive spirometry  - Patient at risk for respiratory decompensation requiring intubation, will bronch if so for BAL including IC panel and flow cytometry     Cardiovascular:  # AF with RVR spontaneously resolved, remains in AF  No known history of AF. Echo unremarkable.  - On heparin SQ     # Ground level fall secondary to orthostatic hypotension, resolved  Early AM 6/4. CT head without evidence of intracranial hemorrhage, EKG unremarkable, found to have orthostatic hypotension, improved with  ml.  - Fall precautions     # HLD  PTA atorvastatin     GI/Nutrition:  # Malnutrition:    - Level of malnutrition: Severe protein-calorie malnutrition  - Marinol 2.5 mg every day held on BiPAP  - Patient currently declining feeding tube placement  - Solid diet  - Nutrition following     # Nausea resolved  Last AXR 5/27 without evidence of obstruction. Likely in setting of medications.  - PRN antiemetics (Zofran, compazine, reglan)     # GERD  PTA omeprazole  + PRN famotidine (also on Pred)     Renal/Fluids/Electrolytes:  # Lactic acidosis  Persistent lactic acidosis 3-4 despite antimicrobial treatment and IVF boluses. Appears hemodynamically stable with no evidence of hypoperfusion. Discussed with hem/onc, can sometimes see in malignancies, but unlikely in reasonably controlled CLL. Hepatic function wnl, suggesting against clearance issue. Resolved with additional IVF 5/30. Increased to 4.1 on 6/3, improved with addition of antibiotics above. Again increased to 4 on 6/9, likely in setting of worsening hypoxia as above.    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 Trialed 500 ml LR 6/7, however, sodium studies suggestive of SIADH. Now ~ 129-131. NT pro BNP and TTE without evidence of volume overload.  - Fluid restriction 1000 ml  - Change IV medication carriers to NS      # RICK on CKD Stage 3A resolved  Baseline ~1.3-1.6.   - Avoid nephrotoxic agents as able     Endocrine:  # Hypothyroid  TSH 1.94 on admission WNL. PTA levothyroxine held on BiPAP     ID:  # E faecium on Karius assay  # Severe sepsis, improved  # Positive beta D glucan >500  # PJP pneumonia  - Discussed with ID and onc, will switch to second line treatment for PJP with clindamycin and primaquine. G6PD pending but ok with close monitoring per onc  - Methylpred 40 mg Q8H  - Current regimen: clindamycin, primaquine, fluconazole; also on acyclovir for ppx while on steroids  - Discontinued: micafungin, levofloxacin, zosyn, linezolid, cefepime, Bactrim      # Hx Herpes Zoster c/b post-herpetic neuralgia  PTA ppx Acyclovir while on steroids     Hematology:    # CLL (dx 2/2007)  History of CLL (2007) managed on Ibrutinib (5/2019) which patient is not taking daily. WBC 24.9 on admission (up from baseline ~6-10) now down to ~21. Suspect secondary to recent infection with lower concern for conversion to leukemia. IVIG infusion (5/25). Following FISH and cytogenetics for disease  prognostication and will continue to monitor with peripheral smear.   - Continue daily ibrutinib to help control disease, increased bioavailability with addition of fluconazole 6/2. WBC and ALC uptrending. 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 B12, folate, iron panel normal with slightly elevated YEN levels. Labs concerning for marrow responsive anemia (elevated LDH, bilirubin, reticulocyte count). Will likely need to treat underlying CLL for long-term solution.    Hgb on admission 9.5 down from baseline (~10-12 past year) and now ~8. Currently hemodynamically stable and suspect recent drop in Hgb secondary to dilutional anemia in setting of 2.5L of IVF given (5/27). Methylpred decreased and rituximab held in setting of recent infection. Haptoglobin remains elevated, suggesting against worsening hemolytic anemia.   - Heme/Onc consulted              - Continue Methylprednisolone 40mg (Q8H)              - Hold Rituximab infusion              - Check uric acid, LDH, Mg, Phos, and retic count q48h               - Continue allopurinol for TLS prevention    - Hep B serologies (Core, Surface Ab +): Hep B ag negative, DNA quant negative   - Continue PTA Folate, B12   - Transfuse if Hgb < 7      Musculoskeletal:  No issues     Skin:  No issues     General Cares/Prophylaxis:    DVT Prophylaxis: Heparin SQ  GI Prophylaxis: PPI  Restraints: none  Family  Communication: April  Code Status: Full code     Lines/tubes/drains:  - PIV  - PICC  - Arterial line removed     Disposition:  - Medical ICU     Patient seen and findings/plan discussed with medical ICU staff, Dr. Reyes.     Azucena Ryan MD  PGY-2 internal medicine    ====================================  INTERVAL HISTORY:   Nursing notes reviewed. NAEO. Patient on HFNC with good O2. Denies pain or discomfort. Good appetite.    OBJECTIVE:   1. VITAL SIGNS:   Temp:  [97.5  F (36.4  C)-98.7  F (37.1  C)] 98.6  F (37  C)  Pulse:  [] 105  Resp:  [24-25] 24  BP: (126)/(74) 126/74  MAP:  [73 mmHg-212 mmHg] 96 mmHg  Arterial Line BP: (105-212)/() 124/69  FiO2 (%):  [90 %-100 %] 90 %  SpO2:  [87 %-99 %] 89 %  FiO2 (%): 90 %  Resp: 24    2. INTAKE/ OUTPUT:   I/O last 3 completed shifts:  In: 783 [P.O.:660; I.V.:123]  Out: 1350 [Urine:1350]    3. PHYSICAL EXAMINATION:  General Appearance: Alert, pleasant, comfortable  Respiratory: diffuse bilateral coarse sounds; on HFNC  Cardiovascular: irregularly irregular rate and rhythm, no m/r/g, no ANALY  GI: Soft, non-tender, non-distended, +BM  Skin: No rash or lesions noted. Warm, dry  Other:  Alert and oriented x3. Answering questions appropriately    4. LABS:   Arterial Blood Gases   Recent Labs   Lab 06/14/22  0412 06/13/22  1737 06/13/22  1141 06/13/22  0856   PH 7.41 7.42 7.44 7.45   PCO2 42 42 43 42   PO2 64* 87 64* 96   HCO3 26 28 29* 29*     Complete Blood Count   Recent Labs   Lab 06/14/22  0412 06/13/22  0410 06/12/22  0401 06/11/22  0353   WBC 35.6* 35.9* 49.6* 38.0*   HGB 9.3* 9.5* 10.0* 9.7*   * 145* 169 182     Basic Metabolic Panel  Recent Labs   Lab 06/14/22  0412 06/13/22  0410 06/12/22  0401 06/11/22  0353   * 132* 132* 134   POTASSIUM 4.6 4.5 4.7 4.7   CHLORIDE 98 101 99 98   CO2 24 25 23 25   BUN 30 27 29 25   CR 0.90 1.00 1.18 1.31*   * 120* 133* 98     Liver Function Tests  Recent Labs   Lab 06/14/22  0412 06/13/22  0410  06/12/22  0401 06/11/22  0353   AST 24 26 33 47*   ALT 45 51 60 65   ALKPHOS 102 94 99 107   BILITOTAL 1.8* 1.4* 1.6* 1.1   ALBUMIN 2.5* 2.6* 2.8* 2.7*     Coagulation Profile  No lab results found in last 7 days.    5. RADIOLOGY:   No results found for this or any previous visit (from the past 24 hour(s)).

## 2022-06-14 NOTE — PROGRESS NOTES
Brief Heme [place] Note    Patient not seen today. Pending authorization for obinutuzumab, appreciate Pharmacy efforts. See note 6/13 for detail.    - Continue Ibrutinib 280mg daily  - Tbili up to 1.8, mostly indirect. Hgb stable. Recheck haptoglobin (ordered for you)    Discussed with Dr. Renae Persaud PARosinaC  Hematology/Oncology  Pager # [phone]

## 2022-06-14 NOTE — PLAN OF CARE
[id number]    Uneventful night.  Patient slept intermittently.  Denies pain.  Vitals stable.  ABG this morning - PO2 above 60.  Mar Farrell RN

## 2022-06-14 NOTE — PROGRESS NOTES
[facility]    Medicine Progress Note - Medicine Service, KENNY TEAM 3  ICU TRANSFER ACCEPTANCE NOTE     Date of Admission:  5/23/2022    Assessment & Plan   Loco Wood is a 77 year old male with history of CLL, auto-immune hemolytic anemia (on prednisone taper), and CKD3a admitted 5/23/22 for several days of SOB, chills, pleuritic chest pain with infiltrates/consolidations on CT imaging. Initially treated for CAP (ceft + azithro) with improvement, subsequently found to have PJP pneumonia (sputum 5/27 + Karius 6/1). Transferred to ICU 6/10 due to anticipation of need for intubation. Required BiPAP for prolonged periods but never required intubation. Now appropriate for return to the floor after stable on high flow for last 30+ hours.     Changes today:   - space methylpred q8 -->q12  - plan to re-involve pulmonology tomorrow  - no changes to antimicrobials     # Acute hypoxic respiratory failure 2/2 PJP pneumonia   # E faecium on Karius assay  # Severe sepsis, improved  # Bilateral lower lobe bronchiectasis/consolidations  Admitted for several days of worsening fatigue/dyspnea, subjective chills, and pleuritic chest pain with 3-4L oxygen requirement (w/ elevated WBC) and CT w/ infiltrates and bilateral lower lobe consolidations concerning for infection. RVP negative. Patient initially treated with 5 day abx course starting 5/24 with azithromycin (d/c 5/26) + ceftriaxone for presumed CAP with clinical improvement. On 5/27, patient developed fever with worsening respiratory status (45 L high flow NC) and was transferred [place] [facility] [place]. Patient improving with IVF 2.5L and broadened coverage with Zosyn, Micafungin (positive B-Glucan), and IV bactrim (for possible PJP) O2 req of 12-15L, with increase to HFNC on 6/1. PJP PCR 5/27 positive. Karius 6/1 + PJP and E. Faecium (less likely pathogenic as not growing on other cultures). On 6/9, worsening hypoxia. Repeat CT chest 6/9  with worsening PJP and concern for organizing pneumonia and pulmonary hypertension. TTE with normal cardiac function and without evidence of volume overload (estimated CVP of 3). ID recommended transitioning to second line treatment for PJP with clinda + primaquine. Transferred to ICU 6/10 with concern for impending need for intubation and plan for bronchoscopy. Never required intubation but did requiring increased duration of BiPAP while in ICU. Now stable on HFNC 60L % FiO2 for last 30+ hours though minimal activity tolerance - unable to get up to commode, prolonged conversations.   - Current regimen: clindamycin (6/9- ), primaquine (6/9- ), micafungin (6/10- ); also on acyclovir for ppx while on steroids  - Discontinued: fluconazole, levofloxacin, zosyn, linezolid, cefepime, Bactrim  -  IV methylprednisolone to 40 mg q12h (increased 6/11, had been on 40 qday since admission)  - HFNC, BiPAP if prolonged desats overnight   - Will plan for bronchoscopy and percutaneous pulmonary biopsy if intubated    Labs:  RVP, COVID/Flu: Negative  MRSA swab: negative  Beta-D Glucan: Positive (> 500)  Following Sputum sample  Blood cultures: NG  Sputum: 2+ mixed aracely  Histo antigen: negative  Aspergillus Ag: negative   Blasto antigen: negative  Cryptococcus ag: negative  CMV PRC negative:   Fungal ab (blast, aspergillus, cocci, histo): negative  Quantiferon Gold: negative   PJP PCR: positive   Karius: PJP positive, E faecium positive  Cocci agn: negative    # Increasing episode of tachyarrhythmia  Has had increasing PAC burden (~40s per minute) and episodes of tachyarrhythmias, including short run of SVT and most recently A fib with RVR overnight 6/10 which improved with 500 ml IVF. Remote h/o a fib in 2019 on Holter monitor. Possibly increased cardiac stress in setting of worsening hypoxia above.  - Telemetry    # CLL (dx 2/2007)  History of CLL (2007) managed on Ibrutinib (5/2019) which patient is not taking daily  (self-titrating). WBC 24.9 on admission (up from baseline ~6-10) now down to ~21. Suspect secondary to recent infection with lower concern for conversion to leukemia. IVIG infusion (5/25). Following FISH and cytogenetics for disease prognostication and will continue to monitor with peripheral smear.   Currently continuing daily ibrutinib to help control disease. CT A/P without evidence of CLL metastasis.    - Hematology/Oncology following    - PTA ibrutinib, pursuing prior authorization for venotoclax   -Following Flow cytometry, cytogenetics, IgH mutation, TP53 mutation  - Plan for bronch and biopsy as above if intubated  - Heme/Onc outpatient follow up on discharge    # Elevated lactate, improved   Intermittent elevation of lactate in setting of worsening hypoxia, pH and bicarb have been stable. No evidence for hypoperfusion today.     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 B12, folate, iron panel normal with slightly elevated YEN levels. Labs concerning for marrow responsive anemia (elevated LDH, bilirubin, reticulocyte count). Will likely need to treat underlying CLL for long-term solution.    Hgb on admission 9.5 down from baseline (~10-12 past year). Rituximab held in setting of recent infection. Haptoglobin remains elevated, suggesting against worsening hemolytic anemia.   - Heme/Onc consulted    - Increase Methylprednisolone 40mg to q8h as above   - Hold Rituximab infusion   - Check uric acid, LDH, Mg, Phos, and retic count q48h    - Continue allopurinol for TLS prevention    - Hep B serologies (Core, Surface Ab +): Hep B ag negative, DNA quant negative   - Continue PTA Folate, B12   - Transfuse if Hgb < 7     # Malnutrition:    - Level of malnutrition: Severe protein-calorie malnutrition  - Trial marinol 2.5 mg  every day (typical dose BID)  - Patient currently declining feeding tube placement  - Nutrition following    # Nausea  Last AXR 5/27 without evidence of obstruction. Likely in setting of medications.  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 Trialed 500 ml LR 6/7, however, sodium studies suggestive of SIADH. Improved 6/8. NT pro BNP and TTE without evidence of volume overload.  - Fluid restriction 1000 ml  - Change IV medication carriers to NS     # Insomnia  - Hold PTA PRN Ambien as above  - Increase scheduled melatonin from 3 mg to 10 mg  - Considered adding mirtazapine, but will hold off given recent linezolid therapy and concern for serotonin syndrome  - Olanzapine PRN    Chronic/Resolved Problems:   # Ground level fall secondary to orthostatic hypotension, resolved  Early AM 6/4. CT head without evidence of intracranial hemorrhage, EKG unremarkable, found to have orthostatic hypotension, improved with  ml.  - Holding Ambien PRN at night  - Fall precautions    # CKD Stage 3A  Cr of 1.31 on admission up from baseline now up to 1.6 (5/27), now improved to 1.05. Suspect Cr elevation is pre-renal in setting of recent sepsis and will likely improve with volume resuscitation.    - Avoid nephrotoxic agents as able    # Degenerative disc disease  # Bilateral intermittent hand cramping, spasms suspicious for cervical impingement  Patient with history of degenerative disc disorder with concern for cervical impingement in setting of new bilateral intermittent hand cramping. Patient met with Dr. Melton his sports medicine doctor (AM 5/24) over phone for follow up appointment to discuss results of his MRI. Will defer to outpatient management.     # Hx Herpes Zoster c/b post-herpetic neuralgia: PTA ppx Acyclovir while on prednisone  # Hypothyroid - TSH 1.94 on admission WNL. Continue PTA levothyroxine  # HLD- PTA  "atorvastatin  # GERD- PTA omeprazole + PRN famotidine (also on Pred)  # Vitamin supplements- PTA B12, folate, D3        Diet: Snacks/Supplements Adult: Ensure Enlive; Between Meals  Regular Diet Adult  Fluid restriction 1500 ML FLUID  Snacks/Supplements Adult: Other; 10 am - Special K bar; 8 PM - Hard boiled egg; Between Meals  Calorie Counts    DVT Prophylaxis: Heparin ppx  Piña Catheter: Not present  Central Lines: None  Cardiac Monitoring: ACTIVE order. Indication: Tachyarrhythmias, acute (48 hours)  Code Status: Full Code    Confirmed patient is full code. His sister Hodan Deleon is his decision maker if he is unable to make decisions for himself.    Disposition Plan   Expected Discharge: 06/23/2022     Anticipated discharge location:  Awaiting care coordination huddle  Delays: None     The patient's care was discussed with the Attending Physician, Dr.Jake Matamoros.    Ambreen Zavala MD  Medicine Service, [address]  Securely message with the Vocera Web Console (learn more here)  Text page via [facility] Paging/Directory   Please see signed in provider for up to date coverage information      Clinically Significant Risk Factors Present on Admission                    ______________________________________________________________________    Interval History     Nursing notes reviewed. Patient reports feeling well \"unless I try to exert myself at all.\" He notes he is having to use the bedpan because the commode requires too much exertion causing severe dyspnea and prolonged recovery phase. ICU team had kept patient on full liquids for fear of intubation but patient reports appetite is intact and he is grateful for resumption of regular diet.     Data reviewed today: I reviewed all medications, new labs and imaging results over the last 24 hours.    Physical Exam   Vital Signs: Temp: 98.2  F (36.8  C) Temp src: Oral BP: 126/74 Pulse: 95   Resp: 24 " SpO2: 91 % O2 Device: High Flow Nasal Cannula (HFNC) Oxygen Delivery: 60 LPM  Weight: 145 lbs 11.58 oz  General Appearance: Alert, pleasant, anxious  Respiratory: Desaturations with minimal movement on HFNC at rest, CTAB anteriorly   Cardiovascular: RRR, normal S1/S2, no murmurs. 2s cap refill.  GI: Soft, non-tender, non-distended.   Skin: No rash or lesions noted. Warm, dry.  Other: Alert and oriented x3. Answering questions appropriately.     Data   Recent Labs   Lab 06/14/22  0412 06/13/22  0410 06/12/22  0401   WBC 35.6* 35.9* 49.6*   HGB 9.3* 9.5* 10.0*   * 111* 109*   * 145* 169   * 132* 132*   POTASSIUM 4.6 4.5 4.7   CHLORIDE 98 101 99   CO2 24 25 23   BUN 30 27 29   CR 0.90 1.00 1.18   ANIONGAP 10 6 10   DAVID 8.9  8.9 8.6  8.6 8.7  8.7   * 120* 133*   ALBUMIN 2.5* 2.6* 2.8*   PROTTOTAL 5.9* 6.1* 5.6*   BILITOTAL 1.8* 1.4* 1.6*   ALKPHOS 102 94 99   ALT 45 51 60   AST 24 26 33

## 2022-06-14 NOTE — PLAN OF CARE
Goal Outcome Evaluation:    Transferred to:  at ~1620  Status at time of transfer: VSS on HFNC; transported on BiPap     Belongings: sent with patient  Chart and medications: Sent with patient  Family notified:  Patient A&O, to notify self

## 2022-06-15 NOTE — PLAN OF CARE
ICU End of Shift Summary. See flowsheets for vital signs and detailed assessment.    Changes this shift: RASS -4/-5. On fent gtt @200 and prop @40. SR with frequent PACs. ST up to 180s; 5mg metoprolol given with good effect. Amnio gtt @ 0.5. Peripheral levo titrated to maintain MAP > 65. CMV 60%/400/30/10. Full strength flolan. OG placed. Piña with good UOP. No BM. Bronch and chest CT done.    Plan: CVC placement. Possible prone pending ABG this evening.      Goal Outcome Evaluation:    Plan of Care Reviewed With: patient     Overall Patient Progress: declining

## 2022-06-15 NOTE — PROGRESS NOTES
Calorie Count  Intake recorded for: 6/14  Total Kcals: 337 Total Protein: 9g  Kcals from Hospital Food: 337   Protein: 9g  Kcals from Outside Food (average):0 Protein: 0g  # Meals Ordered from Kitchen: 2 meals  # Meals Recorded: 1 meal (100% oatmeal w/ brown sugar, sausage link, coffee w/ cream and sugar)  # Supplements Recorded: 0

## 2022-06-15 NOTE — PHARMACY-ADMISSION MEDICATION HISTORY
Admission Medication History Completed by Pharmacy    See [facility] Admission Navigator for allergy information, preferred outpatient pharmacy, prior to admission medications and immunization status.     Medication History Sources:     Surescricarlos, [facility]oda    Changes made to PTA medication list (reason):    Added: None    Deleted: None    Changed: None    Additional Information:    None    Prior to Admission medications    Medication Sig Last Dose Taking? Auth Provider Long Term End Date   acyclovir (ZOVIRAX) 800 MG tablet Take 1 tablet (800 mg) by mouth 2 times daily Past Month at Unknown time Yes Jackie Nowak PA-C Yes    atorvastatin (LIPITOR) 20 MG tablet Take 1 tablet (20 mg) by mouth daily Past Month at Unknown time Yes Campos Parra MD Yes    cyanocobalamin (VITAMIN B-12) 100 MCG tablet Take 1 tablet (100 mcg) by mouth daily Past Month at Unknown time Yes He Burns MD     famotidine (PEPCID) 40 MG tablet Take 1 tablet (40 mg) by mouth nightly as needed for heartburn Past Month at Unknown time Yes Campos Parra MD     folic acid (FOLVITE) 1 MG tablet Take 1 tablet (1 mg) by mouth daily Past Month at Unknown time Yes He Burns MD     levothyroxine (SYNTHROID/LEVOTHROID) 50 MCG tablet Take 1 tablet (50 mcg) by mouth daily Past Month at Unknown time Yes Campos Parra MD Yes    omeprazole (PRILOSEC) 10 MG DR capsule TAKE 1 CAPSULE BY MOUTH EVERY DAY 30 TO 60 MINUTES BEFORE A MEAL Past Month at Unknown time Yes Campos Parra MD     predniSONE (DELTASONE) 20 MG tablet Take 3 tablets (60 mg) by mouth daily Past Month at Unknown time Yes He Burns MD     vitamin D3 (CHOLECALCIFEROL) 50 mcg (2000 units) tablet Take 1 tablet by mouth daily Past Month at Unknown time Yes Reported, Patient     zolpidem (AMBIEN) 5 MG tablet Take 1 tablet (5 mg) by mouth nightly as needed for sleep Past Month at Unknown time Yes He Burns MD         Date completed:  06/15/22    Medication history completed by: Ninfa Elizabeth, VikkiD

## 2022-06-15 NOTE — PROGRESS NOTES
[facility]    Medicine Progress Note - Hospitalist Service  ICU TRANSFER ACCEPTANCE NOTE     Date of Admission:  5/23/2022    Assessment & Plan   Loco Wood is a 77 year old male with history of CLL, auto-immune hemolytic anemia (on prednisone taper), and CKD3a admitted 5/23/22 for several days of SOB, chills, pleuritic chest pain with infiltrates/consolidations on CT imaging. Initially treated for CAP (ceft + azithro) with improvement, subsequently found to have PJP pneumonia (sputum 5/27 + Karius 6/1). Transferred to ICU 6/10 due to anticipation of need for intubation. Required BiPAP for prolonged periods but never required intubation. Now appropriate for return to the floor after stable on high flow for last 30+ hours.     Changes today:   - Transferred to ICU for intubation given ongoing hypoxia to high 80%s on 60L 100% HFNC and 15L oxymask  - Started on amiodarone gtt overnight for Afib RVR with rates to 200s    # Acute hypoxic respiratory failure 2/2 PJP pneumonia   # E faecium on Karius assay  # Severe sepsis, improved  # Bilateral lower lobe bronchiectasis/consolidations  Admitted for several days of worsening fatigue/dyspnea, subjective chills, and pleuritic chest pain with 3-4L oxygen requirement (w/ elevated WBC) and CT w/ infiltrates and bilateral lower lobe consolidations concerning for infection. RVP negative. Patient initially treated with 5 day abx course starting 5/24 with azithromycin (d/c 5/26) + ceftriaxone for presumed CAP with clinical improvement. On 5/27, patient developed fever with worsening respiratory status (45 L high flow NC) and [place] [facility] [place] improving with IVF 2.5L and broadened coverage with Zosyn, Micafungin (positive B-Glucan), and IV bactrim (for possible PJP) O2 req of 12-15L, with increase to HFNC on 6/1. PJP PCR 5/27 positive. Karius 6/1 + PJP and E. Faecium (less likely pathogenic as not growing on other  cultures). On 6/9, worsening hypoxia. Repeat CT chest 6/9 with worsening PJP and concern for organizing pneumonia and pulmonary hypertension. TTE with normal cardiac function and without evidence of volume overload (estimated CVP of 3). ID recommended transitioning to second line treatment for PJP with clinda + primaquine. Transferred to ICU 6/10 with concern for impending need for intubation and plan for bronchoscopy. Never required intubation but did requiring increased duration of BiPAP while in ICU. Now stable on HFNC 60L % FiO2 for last 30+ hours though minimal activity tolerance - unable to get up to commode, prolonged conversations.   - Current regimen: clindamycin (6/9- ), primaquine (6/9- ), micafungin (6/10- ); also on acyclovir for ppx while on steroids  - Discontinued: fluconazole, levofloxacin, zosyn, linezolid, cefepime, Bactrim  -  IV methylprednisolone to 40 mg q12h (increased 6/11, had been on 40 qday since admission)  - Respiratory support escalated to BiPAP with 100% FiO2 on floor, transferred to the ICU for intubation   - Will plan for bronchoscopy and percutaneous pulmonary biopsy if intubated    Labs:  RVP, COVID/Flu: Negative  MRSA swab: negative  Beta-D Glucan: Positive (> 500)  Following Sputum sample  Blood cultures: NG  Sputum: 2+ mixed aracely  Histo antigen: negative  Aspergillus Ag: negative   Blasto antigen: negative  Cryptococcus ag: negative  CMV PRC negative:   Fungal ab (blast, aspergillus, cocci, histo): negative  Quantiferon Gold: negative   PJP PCR: positive   Karius: PJP positive, E faecium positive  Cocci agn: negative    # Afib RVR  Has had increasing PAC burden (~40s per minute) and episodes of tachyarrhythmias, including short run of SVT and most recently A fib with RVR overnight 6/10 which improved with 500 ml IVF. Remote h/o a fib in 2019 on Holter monitor. Possibly increased cardiac stress in setting of worsening hypoxia above.  - Telemetry  - Continue Amiodarone  gtt    # CLL (dx 2/2007)  History of CLL (2007) managed on Ibrutinib (5/2019) which patient is not taking daily (self-titrating). WBC 24.9 on admission (up from baseline ~6-10) now down to ~21. Suspect secondary to recent infection with lower concern for conversion to leukemia. IVIG infusion (5/25). Following FISH and cytogenetics for disease prognostication and will continue to monitor with peripheral smear.   Currently continuing daily ibrutinib to help control disease. CT A/P without evidence of CLL metastasis.    - Hematology/Oncology following    - PTA ibrutinib, pursuing prior authorization for venotoclax   -Following Flow cytometry, cytogenetics, IgH mutation, TP53 mutation  - Plan for bronch and biopsy as above if intubated  - Heme/Onc outpatient follow up on discharge    # Elevated lactate, improved   Intermittent elevation of lactate in setting of worsening hypoxia, pH and bicarb have been stable. No evidence for hypoperfusion today.     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 B12, folate, iron panel normal with slightly elevated YEN levels. Labs concerning for marrow responsive anemia (elevated LDH, bilirubin, reticulocyte count). Will likely need to treat underlying CLL for long-term solution.    Hgb on admission 9.5 down from baseline (~10-12 past year). Rituximab held in setting of recent infection. Haptoglobin remains elevated, suggesting against worsening hemolytic anemia.   - Heme/Onc consulted    - Increase Methylprednisolone 40mg to q8h as above   - Hold Rituximab infusion   - Check uric acid, LDH, Mg, Phos, and retic count q48h    - Continue allopurinol for TLS prevention    - Hep B serologies (Core, Surface Ab +): Hep B ag negative, DNA quant negative   - Continue PTA Folate, B12   - Transfuse if Hgb  < 7     # Malnutrition:    - Level of malnutrition: Severe protein-calorie malnutrition  - Trial marinol 2.5 mg every day (typical dose BID)  - Patient currently declining feeding tube placement  - Nutrition following    # Nausea  Last AXR 5/27 without evidence of obstruction. Likely in setting of medications.  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 Trialed 500 ml LR 6/7, however, sodium studies suggestive of SIADH. Improved 6/8. NT pro BNP and TTE without evidence of volume overload.  - Fluid restriction 1000 ml  - Change IV medication carriers to NS     # Insomnia  - Hold PTA PRN Ambien as above  - Increase scheduled melatonin from 3 mg to 10 mg  - Considered adding mirtazapine, but will hold off given recent linezolid therapy and concern for serotonin syndrome  - Olanzapine PRN    Chronic/Resolved Problems:   # Ground level fall secondary to orthostatic hypotension, resolved  Early AM 6/4. CT head without evidence of intracranial hemorrhage, EKG unremarkable, found to have orthostatic hypotension, improved with  ml.  - Holding Ambien PRN at night  - Fall precautions    # CKD Stage 3A  Cr of 1.31 on admission up from baseline now up to 1.6 (5/27), now improved to 1.05. Suspect Cr elevation is pre-renal in setting of recent sepsis and will likely improve with volume resuscitation.    - Avoid nephrotoxic agents as able    # Degenerative disc disease  # Bilateral intermittent hand cramping, spasms suspicious for cervical impingement  Patient with history of degenerative disc disorder with concern for cervical impingement in setting of new bilateral intermittent hand cramping. Patient met with Dr. Melton his sports medicine doctor (AM 5/24) over phone for follow up appointment to discuss results of his MRI. Will defer to outpatient management.     # Hx Herpes Zoster c/b post-herpetic neuralgia: PTA ppx  Acyclovir while on prednisone  # Hypothyroid - TSH 1.94 on admission WNL. Continue PTA levothyroxine  # HLD- PTA atorvastatin  # GERD- PTA omeprazole + PRN famotidine (also on Pred)  # Vitamin supplements- PTA B12, folate, D3        Diet: Snacks/Supplements Adult: Ensure Enlive; Between Meals  Fluid restriction 1500 ML FLUID  Snacks/Supplements Adult: Other; 10 am - Special K bar; 8 PM - Hard boiled egg; Between Meals  Calorie Counts  NPO for Medical/Clinical Reasons Except for: Meds    DVT Prophylaxis: Heparin ppx  Piña Catheter: PRESENT, indication: Strict 1-2 Hour I&O  Central Lines: None  Cardiac Monitoring: ACTIVE order. Indication: Tachyarrhythmias, acute (48 hours)  Code Status: Full Code    Confirmed patient is full code. His sister Hodan Deleon is his decision maker if he is unable to make decisions for himself.    Disposition Plan   Expected Discharge: 06/23/2022     Anticipated discharge location:  Awaiting care coordination huddle  Delays: None     The patient's care was discussed with the Attending Physician, Dr.Jake Matamoros.    Brandon Sanz MD, PGY1  Hospitalist Service  [facility]  Securely message with the Vocera Web Console (learn more here)  Text page via [facility] Paging/Directory   Please see signed in provider for up to date coverage information      Clinically Significant Risk Factors Present on Admission                    ______________________________________________________________________    Interval History     Afebrile overnight. Went into afib RVR overnight with softer blood pressures but no symptoms. Received 1L NS without improvement, night team discussed with Cardiology and started amiodarone gtt with improvement in rates. Respiratory support escalated to HFNC 60L 100% FiO2 with oxymask at 15L this morning and eventually to BiPAP 100% FiO2 due to ongoing SpO2 in upper 80%s without improvement.     Data reviewed today: I reviewed all  medications, new labs and imaging results over the last 24 hours.    Physical Exam   Vital Signs: Temp: 97.4  F (36.3  C) Temp src: Axillary BP: 91/68 Pulse: 82   Resp: 30 SpO2: 96 % O2 Device: Mechanical Ventilator Oxygen Delivery: 60 LPM  Weight: 138 lbs 14.24 oz  General Appearance: Alert, pleasant, anxious  Respiratory: Desaturations to low 80%s with minimal movement on HFNC at rest, coarse breath sounds bilaterally. Increased WOB.  Cardiovascular: Rates in 110s, rhythm irregular, normal S1/S2, no murmurs. 2s cap refill.  GI: Soft, non-tender, non-distended.   Skin: No rash or lesions noted. Warm, dry.  Other: Alert and oriented x3. Answering questions appropriately.     Data   Recent Labs   Lab 06/15/22  1530 06/15/22  1456 06/15/22  1248 06/15/22  0439 06/14/22  2111 06/14/22  0412 06/13/22  0410 06/12/22  0401   WBC  --   --   --   --   --  35.6* 35.9* 49.6*   HGB  --   --   --   --   --  9.3* 9.5* 10.0*   MCV  --   --   --   --   --  113* 111* 109*   PLT  --   --   --   --   --  147* 145* 169   NA  --  133  133  --  135 132* 132* 132* 132*   POTASSIUM  --  4.4  4.4  --  4.3 4.1 4.6 4.5 4.7   CHLORIDE  --  97  97  --  102 98 98 101 99   CO2  --  23  28  --  26 27 24 25 23   BUN  --  25  26  --  26 27 30 27 29   CR  --  1.11  1.13  --  0.98 0.95 0.90 1.00 1.18   ANIONGAP  --  13  8  --  7 7 10 6 10   DAVID  --  8.6  8.7  --  8.6  8.6 8.9 8.9  8.9 8.6  8.6 8.7  8.7   * 162*  170* 136* 134* 168* 197* 120* 133*   ALBUMIN  --  2.7*  --  2.3*  --  2.5* 2.6* 2.8*   PROTTOTAL  --  6.3*  --  5.2*  --  5.9* 6.1* 5.6*   BILITOTAL  --  1.3  --  0.9  --  1.8* 1.4* 1.6*   ALKPHOS  --  110  --  91  --  102 94 99   ALT  --  40  --  34  --  45 51 60   AST  --  23  --  16  --  24 26 33

## 2022-06-15 NOTE — PROGRESS NOTES
Patient has an order for a sputum induction. I confirmed with the provider of care about this order and they are okay with not doing the sputum induction this AM.

## 2022-06-15 NOTE — PROVIDER NOTIFICATION
06/14/22 2100   Call Information   Date of Call 06/14/22   Time of Call 2148   Name of person requesting the team Leopoldo   Title of person requesting team RN   RRT Arrival time 2149   Time RRT ended 2250   Reason for call   Type of RRT Adult   Primary reason for call Sepsis suspected   Sepsis Suspected Elevated Lactate level;Heart Rate > 100;WBC <4 or >12   Was patient transferred from the ED, ICU, or PACU within last 24 hours prior to RRT call? No   SBAR   Situation LA = 3.7   Background Per MD note: History of CLL, auto-immune hemolytic anemia (on prednisone taper), and CKD3a admitted for several days of SOB, chills, pleuritic chest pain with infiltrates/consolidations on CT imaging. Initially treated for CAP (ceft + azithro) with improvement. On 5/27 [place] [facility] [place] status and fever, now improved and afebrile (45L HFNC -> 12-15L oxymizer). Continue broad coverage with Zosyn, Micafungin (B-D glucan > 500), and IV bactrim.  Heme/Onc following for auto-immune hemolytic anemia and CLL, steroids decreased in setting of infection.   Notable History/Conditions Cancer;Cardiac   Assessment AOx4, denies CP or SOB, Afib -200s   Interventions Fluid bolus;Meds;Labs  (Amio bolus/drip)   Patient Outcome   Patient Outcome Stabilized on unit   RRT Team   Attending/Primary/Covering Physician Isabella 4   Physician(s) MEÑO Warren   Lead RN Karleen Wildes RN Leopoldo Regalado   RT NA   Post RRT Intervention Assessment   Post RRT Assessment Stable/Improved   Date Follow Up Done 06/15/22   Time Follow Up Done 0050

## 2022-06-15 NOTE — PROVIDER NOTIFICATION
Patient satting 84%-88% on HFNC 100% 60L with 15L oximask over top and unable to recover. After 5 minutes Maroon 2 paged and came to bedside. Patient was switched over to bipap 100%, continuing to sat 88%-89%. 40mg IV Lasix given. Patient transferred to ICU for possible intubation.     Transfer  Transferred to:  - room 310   Via: bed  Reason for transfer: Pt no longer appropriate for 6B- worsened breathing   Family: Heme/Onc said they would reach out to friend and sister   Belongings: Packed and sent with pt   Chart: Delivered with pt to next unit   Medications: Meds sent to new unit with pt   Report given to: 4C RN   Pt status: Patient alert and oriented x4. A-fib with rates 100s on amio gtt at 0.5mg/hr (16.67 ml/hr). BP's stable. Afebrile. Voiding in urinal. Last BM yesterday. Ate small amount of breakfast, now NPO. Denying pain.

## 2022-06-15 NOTE — PROGRESS NOTES
"Hematologic Malignancies Service Follow up    Patient seen at bedside after newly being placed on BiPAP. Able to answer questions with few words or yes/no. Breathing remains difficult    ROS: Pertinent positive and negative systems described in HPI; the remainder of the 14 systems are negative    EXAM  BP (!) 120/91 (BP Location: Left arm, Cuff Size: Adult Regular)   Pulse 85   Temp 98.4  F (36.9  C) (Oral)   Resp 22   Ht 1.702 m (5' 7\")   Wt 69.1 kg (152 lb 5.4 oz)   SpO2 91%   BMI 23.86 kg/m       Constitutional: Awake, alert, on BiPAP  Eyes: PERRL, EOMI  ENT: Normocephalic, without obvious abnormality,   Respiratory: On BiPAP  Cardiovascular: RRR, no murmur noted.  GI: + bowel sounds, soft, non-distended, non-tender  Skin: No concerning lesions or rash on exposed areas.  Musculoskeletal: No edema anton LEs.  Neurologic: Awake, alert & oriented  Psych: appropriate affect     Labs/Imaging:  CBC RESULTS: Recent Labs   Lab Test 06/14/22  0412   WBC 35.6*   RBC 2.48*   HGB 9.3*   HCT 28.0*   *   MCH 37.5*   MCHC 33.2   RDW 20.0*   *     Last Comprehensive Metabolic Panel:  Sodium   Date Value Ref Range Status   06/15/2022 135 133 - 144 mmol/L Final   07/02/2021 143 133 - 144 mmol/L Final     Potassium   Date Value Ref Range Status   06/15/2022 4.3 3.4 - 5.3 mmol/L Final   07/02/2021 4.2 3.4 - 5.3 mmol/L Final     Chloride   Date Value Ref Range Status   06/15/2022 102 94 - 109 mmol/L Final   07/02/2021 110 (H) 94 - 109 mmol/L Final     Carbon Dioxide   Date Value Ref Range Status   07/02/2021 28 20 - 32 mmol/L Final     Carbon Dioxide (CO2)   Date Value Ref Range Status   06/15/2022 26 20 - 32 mmol/L Final     Anion Gap   Date Value Ref Range Status   06/15/2022 7 3 - 14 mmol/L Final   07/02/2021 5 3 - 14 mmol/L Final     Glucose   Date Value Ref Range Status   06/15/2022 134 (H) 70 - 99 mg/dL Final   07/02/2021 100 (H) 70 - 99 mg/dL Final     Comment:     Non Fasting     GLUCOSE BY METER POCT "   Date Value Ref Range Status   06/15/2022 136 (H) 70 - 99 mg/dL Final     Urea Nitrogen   Date Value Ref Range Status   06/15/2022 26 7 - 30 mg/dL Final   07/02/2021 23 7 - 30 mg/dL Final     Creatinine   Date Value Ref Range Status   06/15/2022 0.98 0.66 - 1.25 mg/dL Final   07/02/2021 1.47 (H) 0.66 - 1.25 mg/dL Final     GFR Estimate   Date Value Ref Range Status   06/15/2022 80 >60 mL/min/1.73m2 Final     Comment:     Effective December 21, 2021 eGFRcr in adults is calculated using the 2021 CKD-EPI creatinine equation which includes age and gender (Aaliyah et al., NEJ, DOI: 10.1056/QVHEed7737455)   07/02/2021 46 (L) >60 mL/min/[1.73_m2] Final     Comment:     Non  GFR Calc  Starting 12/18/2018, serum creatinine based estimated GFR (eGFR) will be   calculated using the Chronic Kidney Disease Epidemiology Collaboration   (CKD-EPI) equation.       Calcium   Date Value Ref Range Status   06/15/2022 8.6 8.5 - 10.1 mg/dL Final   06/15/2022 8.6 8.5 - 10.1 mg/dL Final   07/02/2021 9.1 8.5 - 10.1 mg/dL Final     Bilirubin Total   Date Value Ref Range Status   06/15/2022 0.9 0.2 - 1.3 mg/dL Final   07/02/2021 1.6 (H) 0.2 - 1.3 mg/dL Final     Alkaline Phosphatase   Date Value Ref Range Status   06/15/2022 91 40 - 150 U/L Final   07/02/2021 72 40 - 150 U/L Final     ALT   Date Value Ref Range Status   06/15/2022 34 0 - 70 U/L Final   07/02/2021 31 0 - 70 U/L Final     AST   Date Value Ref Range Status   06/15/2022 16 0 - 45 U/L Final   07/02/2021 28 0 - 45 U/L Final       Assessment/Plan:  Loco Wood 77 year old man with CLL on ibrutinib (to establish with Dr. Monahan 6/24//22), complicated by AIHA, found to have pneumonia and acute hypoxic respiratory failure due to PJP.    Hematology Treatments:  Ibrutinib 420mg daily (PTA with poor adherence, taking ~weekly)  MP 1mg/kg 5/24-5/27, MP 40mg daily 5/27-6/10, MP 40 q8 (6/10-)  IVIG 0.4g/kg 5/25    # AIHA - stable  Robust, stable retic count, with  stable/himproving Hgb. Haptoglobin neg on 6/7/22 w/o clear evidence of worsening hemolysis. At this point,. No clear indication for AIHA directed therapy like increasing steroids.    # CLL  No HSM/LAD on recent CT. PB flow 5/24 with 50% CLL cells. Increased but stable ALC likely reactive to infection and some degree of rebound while taking ibrutinib as directed (ibrutinib does cause a transient increase in ALC for a few weeks before we start seeing improvement), but overall relatively low burden. Would be rare for CLL to cause this degree of lung involvement for otherwise low burden of disease. Rare for ibrutinib to cause pneumonitis, but it does affect cytotoxic T cells (through the BTK) and predisposes to opportunistic infection (PJP). At this point, his PJP infection is seemingly more likely to be causing his hypoxia and respiratory distress.    We had previously considered obinutuzumab/chlorambucil as a means to transition off ibrutinib since it had been difficult for him to take it regularly. However, we will defer any change to CLL regimen until pulmonary status improves since it does not appear his CLL is the primary  of his pulmonary status.     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 Received IVIG 0.4g/kg last on 5/25/22.   # PJP tx per primary and ID.  # HSV PPx with ACV    Recs:  - Agree with ICU transfer to address worsening acute hypoxic respiratory failure 2/2 PJP   - If intubated, consider bronch w/transbronchial biopsy to assess for CLL involvement (although seemingly less likely)  - CBC and retic daily. Haptoglobin every few days.  - Continue folic acid.   - Continue ibrutinib at 280mg for now to prevent rebound (decreased to 280mg 6/11).    - We will re-evaluate potential for obinutuzumab/chlorambucil if/when pulmonary status improves as a means to transition off ibrutinib  - If pulmonary status improves, consider   - Defer steroid management to pulmonology/ICU/ID, would be ok with decreasing  steroids from AIHA perspective.    Patient seen and staffed with Dr. Alexa Silver MD PhD  Heme/Onc/Transplant Fellow  Pgr #[id number]

## 2022-06-15 NOTE — PLAN OF CARE
Neuro: A&Ox4.   Cardiac: ST with PAC/PVC/Afib RVR. Blood pressure low as charted. Sepsis protocol ordered. Amiodarone bolus/drip started along with 1LNS bolus. Patient now NSR with PAC/PVC. VSS.  Respiratory: Sating 88-92 on HFNC 100%. Desaturates to low 80's with activity.  GI/: Adequate urine output. Voids in urinal. No BM  Diet/appetite: Tolerating regular diet with 1.5L fluid restriction.  Activity: Bedrest. Up with lift  Pain: At acceptable level on current regimen.   Skin: No new deficits noted. Right chest redness  LDA's: PIV.    Plan: Continue with POC. Notify primary team with changes.

## 2022-06-15 NOTE — PLAN OF CARE
Admitted/transferred from: 6B at 1245  Reason for admission/transfer: 100% BiPAP  Patient status upon admission/transfer: Sating 87%  Interventions: Intubation, bronch, ABG, OG, xray  2 RN skin assessment: completed by Anjel RODRIGUEZ  Result of skin assessment and interventions/actions: Red spot on R chest, blanchable redness on coccyx (mepi), gen brusing  Height, weight, drug calc weight: Done  Patient belongings (see Flowsheet - Adult Profile for details): Phone, wallet, credit card, crash, , watch, shoes, clothing  MDRO education (if applicable): N/A

## 2022-06-15 NOTE — PROGRESS NOTES
[facility]  Procedure Note           Intubation:       Loco Wood  MRN# [id number]   Kenisha 15, 2022, 1:13 PM Indication: Respiratory failure  Respiratory distress           Patient intubated at: Kenisha 15, 2022, 1:10 PM   Patient informed of: Why intubation was required   Informed consent: Obtained   Cervical spine: Was not stabilized during the procedure   Sedative medication: Was administered during the procedure   Technique used: Direct laryngoscopy   Endotracheal tube size: 7.5 cm with cuff   Number of attempts: 1   Placement confirmed by: Auscultation of bilateral breath sounds  Visualization of bilateral chest wall rise  End-tidal CO2 monitor  Chest X-ray   ET tube repositioning: Was performed   Tube secured at: 24 cm      This procedure was performed without difficulty and he tolerated the procedure fairly well with no complications.      Recorded by Virginia Mcnally, RT

## 2022-06-15 NOTE — PROGRESS NOTES
Discussed at length during rounds potential for intubation today, barely getting by on maximal HFNC. Patient concerns about intubation 1) he would not survive it 2) being let alive permanently on machines 3) having pain/discomfort with MV    Gave reassurance that pain and discomfort can be controlled with sedation and gave education about trial of critical care vs DNI. Using SDM patient elected to have trial of critical care, including intubation and pressors with the understanding that if he clinically appears to be actively dying he prefers to be made comfortable for whatever time he has left and that he would not be left alive and dependant on machines. He would be comfortable with being placed on MV for a few days/week to try to allow his lungs to recover and allow for further eval and tx.      15-20 later was called to bedside by RN for sats in mid 80s, patient more SOB on max HFNC with oxy mask, biPAP and lasix given and discussed possible MV which patient is agreeable with provided we wait for lasix and biPAP to work. Spoke to ICU who will accept patient and take down to unit in case he needs intubation.     Spoke to patients sister, April, and gave update of above. Reviewed plan and change in patients code status (does not want heroic measures at the end of life, is comfortable with a trial of MV but does not want long term MV). She stated detailed understanding and  Was asking many questions which were answered to satisfaction      Raúl Matamoros MD

## 2022-06-15 NOTE — PROGRESS NOTES
Patient transported to  on BIPAP 14/10 12 100%. Patient tolerated transport well. Handoff given to next RT.

## 2022-06-15 NOTE — PROGRESS NOTES
"Bronchoscopy Risk Assessment Guidelines      A. Patient symptoms to consider when assessing pulmonary TB risk are:    I. Cough greater than 3 weeks; and fever, hemoptysis, pleuritic chest    pain, weight loss greater than 10 lbs, night sweats, fatigue, infiltrates on    upper lobes or superior segments of lower lobes, cavitation on chest    x-ray.   B. Patient risk factors to consider when assessing pulmonary TB risk are:    I. Exposure to known TB case, foreign-born persons (within 5 years of    arrival to [place]), residence in a crowded setting (correctional facility,     long-term care center, etc.), persons with HIV or immunosuppression.    Patients with symptoms and risk factors should generally be considered \"suspect risk\" and bronchoscopies should be performed in airborne precautions.    This patient has NO KNOWN RISK of Tuberculosis (proceed with bronchoscopy)    Specimens sent: yes  Complications: None  Scope used: #[id number] Adult  Attending Physician: Dr. Eric Nelson, RT on 6/15/2022 at 4:53 PM  "

## 2022-06-15 NOTE — CODE/RAPID RESPONSE
Rapid Response Team Note    Assessment   In assessment a rapid response was called on Loco Wood due to SIRS/Sepsis trigger, lactic acidosis and a fib with RVR. This presentation is likely due to atrial fibrillation and fluid volume deficit.     Plan   -  1L NS bolus --> patient has low PO intake and about 1.5 L out today. 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 However, patient's BP has dropped to 84/70 and metoprolol no longer a great option. Perhaps the patient's BP will come up with fluids and then metoprolol could be used; however, this will likely take time, and patient's perfusion is already affected. Therefore, amiodarone bolus with drip ordered.  -  If lactic acid does not improve with rate control and IVF, recommend an infectious workup. As we currently have a clear explanation for his lactic acidosis, an infectious workup was not ordered at this time.    -  The Internal Medicine primary team was paged. They plan to contact cardiology team as well.  -  Disposition: The patient will remain on the current unit. We will continue to monitor this patient closely.  -  Reassessment and plan follow-up will be performed by the primary team    LASHAY Lema CNP  [facility] Job Code Contact #[phone]  [facility] Paging/Directory    Hospital Course   Brief Summary of events leading to rapid response:   Patient with lactic acidosis of 3.6 in a fib with RVR up to 200 bpm.    Admission Diagnosis:   Shortness of breath [R06.02]  Pneumonia [J18.9]  Hypoxia [R09.02]  Pneumonia of both lungs due to infectious organism, unspecified part of lung [J18.9]  Chest pain, unspecified type [R07.9]  CLL (chronic lymphocytic leukemia) (H) [C91.10]    Physical Exam   Temp: 98.4  F (36.9  C) Temp  Min: 97.7  F (36.5  C)  Max: 98.8  F (37.1  C)  Resp: 26 Resp  Min: 22  Max: 26  SpO2: 95 % SpO2  Min: 89 %  Max: 97 %  Pulse: (!) 138 Pulse  Min: 93  Max: 201    No data recorded  BP:  115/89 Systolic (24hrs), Av , Min:96 , Max:135   Diastolic (24hrs), Av, Min:72, Max:103     I/Os: I/O last 3 completed shifts:  In: 478 [P.O.:300; I.V.:178]  Out: 1375 [Urine:1375]     Physical Exam  Constitutional:       General: He is not in acute distress.     Appearance: He is ill-appearing.   Cardiovascular:      Rate and Rhythm: Tachycardia present. Rhythm irregular.      Pulses: Normal pulses.   Pulmonary:      Effort: Pulmonary effort is normal. No respiratory distress.      Breath sounds: Normal breath sounds.   Abdominal:      General: Abdomen is flat.      Palpations: Abdomen is soft.   Musculoskeletal:      Right lower leg: No edema.      Left lower leg: No edema.   Neurological:      Mental Status: He is alert and oriented to person, place, and time. Mental status is at baseline.       Significant Results and Procedures   Lactic Acid:   Recent Labs   Lab Test 22  2111 06/10/22  0448 22  1603   LACT 3.6* 1.4 4.0*     CBC:   Recent Labs   Lab Test 22  0412 22  0410 22  0401   WBC 35.6* 35.9* 49.6*   HGB 9.3* 9.5* 10.0*   HCT 28.0* 29.0* 30.2*   * 145* 169      Sepsis Evaluation   The patient is known to have an infection.  Loco Wood meets SIRS criteria AND has a lactate >2 or other evidence of acute organ damage.  These vital signs, lab and physical exam findings constitute a diagnosis of SEVERE SEPSIS.    Sepsis Time-Zero (time severe sepsis diagnosis confirmed):   22 as this was the time when Lactate resulted, and the level was > 2.0     Anti-infectives (From now, onward)    Start     Dose/Rate Route Frequency Ordered Stop    06/10/22 1700  micafungin (MYCAMINE) 100 mg in sodium chloride 0.9 % 100 mL intermittent infusion         100 mg  100 mL/hr over 60 Minutes Intravenous EVERY 24 HOURS 06/10/22 1612      22 1930  clindamycin (CLEOCIN) infusion 900 mg         900 mg  100 mL/hr over 30 Minutes Intravenous EVERY 8 HOURS 22 1858       06/09/22 1930  primaquine tablet 30 mg         30 mg Oral DAILY 06/09/22 1858      05/24/22 0800  acyclovir (ZOVIRAX) tablet 800 mg         800 mg Oral 2 TIMES DAILY 05/24/22 0258          Current antibiotic coverage is appropriate for source of infection.    3 Hour Severe Sepsis Bundle Completion:  1. Initial Lactic Acid result shown above. Repeat lactic acid ordered for 2 hours from now.   2. Blood Cultures before Antibiotics: No, antibiotics were started prior to BCx collection b/c waiting for BCx to be collected would have been detrimental to the patient  3. Broad Spectrum Antibiotics Administered: yes  4. Fluids: 1000 mL fluids ORDERED to be given

## 2022-06-15 NOTE — PROGRESS NOTES
[facility]  Transplant Infectious Disease Progress Note     Patient:  Loco Wood, Date of birth 1945, Medical record number [id number]  Date of Visit:  06/15/2022         Assessment and Recommendations:   77 year old man with CLL and AIHI s/p prednisone courses in 12/2021 from derm, 1/2022 for lung symptoms in [place], in 3/2022 for anemia, then most recently for 3 weeks & 6 weeks for AIHA who is being treated for severe Pneumocystis pneumonia.    Refractory, Severe Pneumocystis pneumonia: 5/27/22 CT chest showed progressive bilateral groundglass opacities. 5/27/2022 BAL Pneumocystis sputum PCR +, in the setting of a progressive pulmonary process. Elevated Fungitell >500 & LDH are also consistent with Pneumocystis. Had 1 dose of neb pent on 5/26/2022 (may have started to kill some pneumocystis organisms). IV bactrim started 5/27/2022. Other infectious work up has including negative MRSA nares, Cryptococcal antigen, EBV PCR, CMV PCR, Coccidiodes antigen, Aspergillus gm, fungal antibodies, Histoplasma antigen, Blastomyces antigen and TB quantiferon. Chivoius demonstrated 6336 copies of Pneumocystis jirovecii and 353 copies of E faecium. He continues to have increased respiratory distress requiring high amounts of FiO2 requiring bipap and high flow. Received tmp/smx from 5/27 to 6/10 (~2 weeks). The dose had been adjusted intermittently due to renal dysfunction.  Although patients with hematologic malignancies can be slow to respond to PJP treatment I would expect some response within in 2 weeks. 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 It is more challenging to change host factors but there is also concern for treatment failure and/or another issue contributing to it (ARDS, organizing pneumonia, CLL?). Inflammatory markers have down trended suggesting that the degree of inflammation is improving on steroids. The 1-3  BD glucan is still > 500 but this is not as helpful since the value is not measurable past 500. If it had dropped to < 500 this would have been more helpful in determining the trajectory. The 6/9 CT chest has increased consolidations and there is concern for organizing pneumonia or ARDS. The CLL lung involvement is a consideration although it is rare. Pulmonary toxicity from ibrutinib can occur but is also relatively rare.   On 6/10 we changed from tmp/smx to clindamycin + primaquine. There is emerging evidence of the utilization of micafungin with PJP but he had received micafungin ~ 10 days without improvement. Despite this I resumed micafungin on 6/10 given the potential effect, clinical deterioration, minimal side effect profile and to provide additional anti-fungal prophylaxis with the steroid dose increase. Unfortunately today he was intubated due to hypoxia. Will undergo a bronchoscopy today.     Leukocytosis/History of CLL/AIHA:  WBC up trending over the past week with increased lymphocytes. There is concern for CLL progression based on flow. Previously taking ibrutinib at home weekly but now on daily. He is has been methylprednisolone since 5/24 with a dose of 40 mg daily since 5/28-6/10. Dose increased on 6/10 to 40 mg IV q 8 hours. Also on ibrutinib for CLL-dose decreased 6/11/22. Plans to receiving IVIG. Hematology discussing other CLL treatment options as well.     E faecium on Karius: s/p 4 days of linezolid. No isolated on clinical cultures.    History of herpes zoster: on acyclovir    - Hep B core ab & S ab+ 5/26/2022, with negative hep B viral load.  - Extensive travel hx.     - QTc interval: 427 msec on 6/2/2022 EKG  - Bacterial prophylaxis: antibiotics stopped 6/9/22.  - Pneumocystis prophylaxis: pent 5/26/2022, treatment bactrim 5/27/2022. G6PD normal.  - Viral serostatus & prophylaxis: HSV1+, HSV2+, CMV+; acyclovir prophy  - Fungal prophylaxis: micafungin 5/27/2022-6/7/22, 6/10-C  - Immunization  status:3 covid vaccinations.   - Gamma globulin status: 30g IVIG 5/25/2022 and 6/12/22  - Isolation status: Good hand hygiene.    Recommendations:  1. Continue clindamycin 900 mg IV q 8 hours + primaquine 30 mg daily. Primaquine can be crushed.   2. Continue micafungin 100 mg IV daily   3. Dose of steroids per MICU and heme/onc teams  4. Planning on bronchoscopy this afternoon: please send immunocompromised panel including flow cytometry, PJP PCR, PJP DFA, 16S and 28 S PCR     Transplant Infectious Disease will continue to follow with you.      Marybeth Anderson DO.   Infectious Diseases Attending  Pager [phone]        Interval History:   Transferred out of the ICU but then developed increased hypoxia on the floor with difficulty keeps his sats elevated. Transferred back to the ICU and intubated. He was being intubated when I saw him. Discussed case with ICU team.    Review of Systems:Remaining systems all reviewed and negative       Current Medications & Allergies:       acyclovir  800 mg Oral BID     allopurinol  300 mg Oral Daily     atorvastatin  20 mg Oral Daily     clindamycin  900 mg Intravenous Q8H     cyanocobalamin  100 mcg Oral Daily     [Held by provider] dronabinol  2.5 mg Oral Daily     folic acid  1 mg Oral Daily     heparin ANTICOAGULANT  5,000 Units Subcutaneous Q12H     ibrutinib  280 mg Oral Daily     levothyroxine  50 mcg Oral Daily     melatonin  10 mg Oral At Bedtime     methylPREDNISolone  40 mg Intravenous Q12H     micafungin  100 mg Intravenous Q24H     pantoprazole  20 mg Oral Daily     polyethylene glycol  17 g Oral Daily     primaquine  30 mg Oral Daily     psyllium  1 packet Oral BID     senna-docusate  1 tablet Oral BID    Or     senna-docusate  2 tablet Oral BID     sodium chloride (PF)  3 mL Intracatheter Q8H     sodium chloride (PF)  3 mL Intracatheter Q8H     vitamin D3  50 mcg Oral Daily       Allergies   Allergen Reactions     Blood Transfusion Related (Informational Only)  Other (See Comments)     Patient has a history of a clinically significant antibody against RBC antigens.  A delay in compatible RBCs may occur.      Morphine Itching and Rash     Dilaudid [Hydromorphone] Itching            Physical Exam:   Ranges for vital signs:  Temp:  [97.6  F (36.4  C)-99.2  F (37.3  C)] 99.2  F (37.3  C)  Pulse:  [] 131  Resp:  [14-26] 23  BP: ()/() 133/96  MAP:  [88 mmHg-90 mmHg] 90 mmHg  Arterial Line BP: (111-115)/(63-69) 111/69  FiO2 (%):  [100 %] 100 %  SpO2:  [82 %-95 %] 94 %  Vitals:    06/12/22 0000 06/15/22 0318 06/15/22 1300   Weight: 66.1 kg (145 lb 11.6 oz) 69.1 kg (152 lb 5.4 oz) 63 kg (138 lb 14.2 oz)     Physical Examination:  GENERAL:  Chronically ill appearing. Being intubated. Sedated.  HEAD:  Head is normocephalic, atraumatic   LUNGS:  Bagging patient without difficulty. Chest rising w/ bagging.   CARDIOVASCULAR:  Sinus tachycardia on the monitor  SKIN:  No acute rashes.     EXTREMITIES: No joint erythema or swelling.   NEUROLOGIC:  Intubated. Sedated.  LINES: peripheral IV in place without any surrounding erythema or exudate. Dressing intact.          Laboratory Data:     Metabolic Studies       Recent Labs   Lab Test 06/15/22  1248 06/15/22  0439 06/14/22  2111 06/08/22  0445 06/07/22  1420 06/03/22  2202 06/03/22  1801   NA  --  135 132*   < >  --    < >  --    POTASSIUM  --  4.3 4.1   < >  --    < >  --    CHLORIDE  --  102 98   < >  --    < >  --    CO2  --  26 27   < >  --    < >  --    ANIONGAP  --  7 7   < >  --    < >  --    BUN  --  26 27   < >  --    < >  --    CR  --  0.98 0.95   < >  --    < >  --    GFRESTIMATED  --  80 83   < >  --    < >  --    * 134* 168*   < >  --    < >  --    DAVID  --  8.6  8.6 8.9   < >  --    < >  --    PHOS  --  3.4 3.0   < >  --    < >  --    MAG  --  2.0 2.2   < >  --    < >  --    LACT  --  2.5* 3.6*   < >  --    < >  --    PCAL  --   --   --   --   --   --  0.07*   FGTL  --   --   --   --  >500  --   --      < > = values in this interval not displayed.       Hepatic Studies    Recent Labs   Lab Test 06/15/22  0439 06/14/22  0412 06/13/22  0410 06/08/22  0445 06/07/22  0336 06/06/22  0440   BILITOTAL 0.9 1.8* 1.4*   < > 0.8 0.8   DBIL  --  0.4*  --   --   --   --    ALKPHOS 91 102 94   < > 99 92   PROTTOTAL 5.2* 5.9* 6.1*   < > 5.6* 5.3*   ALBUMIN 2.3* 2.5* 2.6*   < > 2.6* 2.4*   AST 16 24 26   < > 43 30   ALT 34 45 51   < > 42 33   LDH  --   --   --   --  348* 322*    < > = values in this interval not displayed.       Hematology Studies      Recent Labs   Lab Test 06/14/22 0412 06/13/22  0410 06/12/22  0401 06/11/22  0353 06/10/22  0448 06/09/22  0413   WBC 35.6* 35.9* 49.6* 38.0* 29.4* 28.1*   ANEU 16.4* 19.4* 17.4* 11.8* 11.8* 10.4*   ALYM 19.2* 16.5* 32.2* 25.8* 17.6* 17.1*   ANY 0.0 0.0 0.0 0.4 0.0 0.3   AEOS 0.0 0.0 0.0 0.0 0.0 0.0   HGB 9.3* 9.5* 10.0* 9.7* 9.4* 9.1*   HCT 28.0* 29.0* 30.2* 29.6* 28.5* 27.6*   * 145* 169 182 184 197       Arterial Blood Gas Testing    Recent Labs   Lab Test 06/14/22 0412 06/13/22  1737 06/13/22  1141 06/13/22  0856 06/13/22  0410   PH 7.41 7.42 7.44 7.45 7.43   PCO2 42 42 43 42 42   PO2 64* 87 64* 96 57*   HCO3 26 28 29* 29* 28   O2PER 90 90 90 100 100      Inflammatory Markers    Recent Labs   Lab Test 06/10/22  0448 06/08/22  0445 06/03/22  0423 06/02/22  0451 10/04/17  2250 04/11/16  1136   SED  --   --   --   --  1  --    CRP 11.0* 19.0* 82.0* 100.0*  --  <0.2  Reference Values:   Low Risk:           <1.0 mg/L   Average Risk:       1.0-3.0 mg/L   High Risk:          >3.0 mg/L   Acute Inflammation: >8.0 mg/L         Immune Globulin Studies     Recent Labs   Lab Test 06/12/22  0401 06/03/22  0856 05/24/22  1651 06/12/19  0806 05/31/19  1021 04/30/19  1219 04/29/15  1012 09/19/14  1227   * 446* 312* 394* 409* 429*   < > 729   IGM  --   --   --   --   --   --   --  28*   IGA  --   --   --   --   --   --   --  72    < > = values in this interval not displayed.        Gout Labs      Recent Labs   Lab Test 06/15/22  0439 06/13/22  0410 06/11/22  0353 06/09/22  0413 06/07/22  0336   URIC 3.0* 3.9 3.0* 2.9* 1.8*     Microbiology:  Fungal testing  Recent Labs   Lab Test 06/07/22  1420 05/27/22  1356 05/27/22  0916 05/23/22  1802   FGTL >500  --   --  >500   ASPGAI  --  0.03  --   --    ASPGAA  --  Negative  --   --    COFUNG  --   --  <1:2  --        Last Culture results with specimen source  Culture   Date Value Ref Range Status   06/13/2022 No growth after 2 days  Preliminary   06/13/2022 No growth after 2 days  Preliminary   06/03/2022 No Growth  Final   06/03/2022 No Growth  Final   06/02/2022 No growth after 13 days  Preliminary   05/27/2022 2+ Normal aracely  Final   05/27/2022 No Growth  Final   05/27/2022 No Growth  Final   05/26/2022 No Growth  Final   05/26/2022 No Growth  Final   05/26/2022   Final    >10 Squamous epithelial cells/low power field indicates oral contamination. Please recollect.   05/23/2022 No Growth  Final   05/23/2022 No Growth  Final     Culture Micro   Date Value Ref Range Status   03/02/2014 No growth  Final   10/22/2010 No Beta Streptococcus isolated  Final   04/21/2010   Final    No Salmonella, Shigella, Campylobacter or E coli 0157 isolated.   04/18/2010 No growth  Final   03/16/2009 No Beta Streptococcus isolated  Final   01/13/2007 No growth  Final   04/06/2004 No Beta Streptococcus isolated  Final        Virology:  Coronavirus-19 testing    Recent Labs   Lab Test 06/07/22  0903 05/31/22  0241 05/23/22  1946 06/23/20  0843   KRC87LU  --   --   --  Negative   WAU85HAK  --   --   --  Not Applicable   [id number] Negative Negative Negative  --        Respiratory virus testing    Recent Labs   Lab Test 05/24/22  1916 05/23/22  1946   IFLUA Not Detected  --    INFZA  --  Negative   FLUAH1 Not Detected  --    BK8739 Not Detected  --    FLUAH3 Not Detected  --    IFLUB Not Detected  --    INFZB  --  Negative   PIV1 Not Detected  --    PIV2 Not  Detected  --    PIV3 Not Detected  --    PIV4 Not Detected  --    IRSV  --  Negative   RSVA Not Detected  --    RSVB Not Detected  --    HMPV Not Detected  --    ADENOV Not Detected  --    CORONA Not Detected  --        CMV viral loads    Recent Labs   Lab Test 22  1448   CMVQNT Not Detected       EBV DNA Copies/mL   Date Value Ref Range Status   2022 Not Detected Not Detected copies/mL Final     Urine Studies     Recent Labs   Lab Test 22  0952 22  2240 22  1906 22  1355 21  1744   URINEPH 6.5 5.5 6.5 7.0 5.5   NITRITE Negative Negative Negative Negative Negative   LEUKEST Negative Negative Negative Negative Negative   WBCU 2 2 1 0-5 1     Imaging:  Echo Complete  [id number]  [id number]  [id number]  [id number]^OLGA^MIKEL     [facility]  Echocardiography Laboratory  [address] [place] [id number]     Name: ATIF JAMES  MRN: [id number]  : 1945  Study Date: 2022 05:01 PM  Age: 77 yrs  Gender: Male  Patient Location: [facility]  Reason For Study: Arrhythmia  Ordering Physician: MIKEL ESPINOZA  Performed By: Rahel Bridges RDCS     BSA: 1.8 m2  Height: 66 in  Weight: 147 lb  HR: 99  BP: 143/82 mmHg  ______________________________________________________________________________  Procedure  Complete Portable Echo Adult. Contrast Optison. Patient was given 5 ml mixture  of 3 ml Optison and 6 ml saline. 4 ml wasted.  ______________________________________________________________________________  Interpretation Summary  Technically difficult study.     Left ventricular size, wall motion and function are normal. The ejection  fraction is 60-65%.     Right ventricular function, chamber size, wall motion, and thickness are  normal.     No significant valvular abnormalities present.     No pericardial effusion is present.     Compared to baseline images on 2021 there are no hemodynamically  signifcant findings but today's  study is of poor quality.  ______________________________________________________________________________  Left Ventricle  Left ventricular size, wall motion and function are normal. The ejection  fraction is 60-65%. Left ventricular diastolic function is not assessable.     Right Ventricle  Right ventricular function, chamber size, wall motion, and thickness are  normal.     Atria  The atria cannot be assessed.     Mitral Valve  The mitral valve is normal.     Aortic Valve  Aortic valve is normal in structure and function. Trace aortic insufficiency  is present.     Tricuspid Valve  The tricuspid valve is normal. Trace tricuspid insufficiency is present. The  peak velocity of the tricuspid regurgitant jet is not obtainable. Pulmonary  artery systolic pressure cannot be assessed.     Pulmonic Valve  The pulmonic valve is normal.     Vessels  The inferior vena cava is normal. Sinuses of Valsalva 3.8 cm. Ascending aorta  3.2 cm. IVC diameter <2.1 cm collapsing >50% with sniff suggests a normal RA  pressure of 3 mmHg.     Pericardium  No pericardial effusion is present.     Miscellaneous  Technically difficult study. No significant valvular abnormalities present.     Compared to Previous Study  Compared to baseline images on 2021 there are no hemodynamically  signifcant findings but today's study is of poor quality.  ______________________________________________________________________________  MMode/2D Measurements & Calculations  IVSd: 0.88 cm  LVIDd: 3.8 cm  LVIDs: 1.7 cm  LVPWd: 0.87 cm  FS: 55.4 %  LV mass(C)d: 96.8 grams  LV mass(C)dI: 55.1 grams/m2  Ao root diam: 3.8 cm  asc Aorta Diam: 3.2 cm  LVOT diam: 2.5 cm  LVOT area: 4.9 cm2  RWT: 0.46     Doppler Measurements & Calculations  MV E max toshia: 69.8 cm/sec  MV A max toshia: 84.4 cm/sec  MV E/A: 0.83  MV dec slope: 249.0 cm/sec2  PA acc time: 0.08 sec     E/E' av.8  Lateral E/e': 5.9  Medial E/e': 11.7      ______________________________________________________________________________  Report approved by: Marichuy Nguyen 06/09/2022 05:46 PM        CT Chest w/o Contrast  Narrative: CT of the chest without contrast    HISTORY: PE JENNIFER concern for treatment failure    COMPARISON STUDY: 6/4/2022    FINDINGS: Peribronchovascular areas of consolidation, crazy paving and  mild central airway dilatation with architectural distortion. Areas of  consolidation are increased since 5/27/2022. Small pleural effusions  bilaterally.    Main pulmonary artery measures 4 cm in diameter. Mild coronary  calcification. Heart is not enlarged. No mediastinal, hilar or  axillary adenopathy. Small hiatal hernia.    Evaluation of the upper abdomen is limited and is without contrast.  Cyst in the parapelvic portion of the partially imaged right kidney.  Punctate calcification of the pancreas.    bones: No suspicious bony lesions.  Impression: IMPRESSION:  1. Increased consolidation in the lungs with a pattern most suggestive  of organizing pneumonia superimposed crazy paving likely secondary to  known pneumocystis Jirovecii pneumonia.  2. Pulmonary artery enlargement suggestive of pulmonary hypertension.    AMANDA BAIRES MD         SYSTEM ID:  [id number]

## 2022-06-15 NOTE — H&P
MEDICAL ICU H&P  06/15/2022    Date of Hospital Admission: 05/23/22  Date of ICU Admission: 06/15/22  Reason for Critical Care Admission: AHRF  Date of Service (when I saw the patient): 06/15/2022    ASSESSMENT: Loco Wood is a 77 year old male with PMH CLL, auto-immune hemolytic anemia (on prednisone taper), and CKD3a who was admitted to ICU on 06/10 for AHRF due to PJP pneumonia.     PLAN:     Neuro:  # Pain, sedation  - Propofol and fentanyl gtt with fentanyl boluses    # Degenerative disc disease  # Bilateral intermittent hand cramping, spasms suspicious for cervical impingement  Patient with history of degenerative disc disorder with concern for cervical impingement in setting of new bilateral intermittent hand cramping. Patient met with Dr. Melton his sports medicine doctor (AM 5/24) over phone for follow up appointment to discuss results of his MRI. Will defer to outpatient management.      Pulmonary:  # Acute hypoxic respiratory failure 2/2 PJP pneumonia   # Bilateral lower lobe bronchiectasis/consolidations  # Positive beta D glucan >500  Admitted for several days of worsening fatigue/dyspnea, subjective chills, and pleuritic chest pain with 3-4L oxygen requirement (w/ elevated WBC) and CT w/ infiltrates and bilateral lower lobe consolidations concerning for infection. RVP negative. Patient initially treated with 5 day abx course starting 5/24 with azithromycin (d/c 5/26) + ceftriaxone for presumed CAP with clinical improvement. On 5/27, patient developed fever with worsening respiratory status (45 L high flow NC) and was transferred to [facility] [place]. Patient improving with IVF 2.5L and broadened coverage with Zosyn, Micafungin (positive B-Glucan), and IV bactrim (for possible PJP) O2 req of 12-15L, with increase to HFNC on 6/1. CXR 5/31 with trace effusions and increasing apical opacities concerning for worsening edema/infection. PJP PCR 5/27 positive. Karius 6/1 + PJP and E. Faecium (less likely pathogenic  as not growing on other cultures). On 6/9, worsening hypoxia with paO2 59 on 100% 50L. Telemetry also with frequent PACs and episode of SVT and lactic acid elevated at 4, likely in setting of worsening hypoxia. CT chest with worsening PJP and concern for organizing pneumonia and pulmonary hypertension. TTE with normal cardiac function and without evidence of volume overload. Currently believe CLL not driving pulmonary symptoms though need biopsy to definitively rule out. Patient is on steroids for hemolytic anemia which would treat organizing pneumonia and unclear if increasing dose would have additional benefits > risks.  - Discussed with ID and onc, will switch to second line treatment for PJP with clindamycin and primaquine. G6PD pending but ok with close monitoring per onc  - Methylpred 40 mg Q12H  - Current regimen: clindamycin, primaquine, fluconazole; also on acyclovir for ppx while on steroids  - Discontinued: micafungin, levofloxacin, zosyn, linezolid, cefepime, Bactrim   - BiPAP; incentive spirometry  - Intubated 06/15 due to desaturation while on BiPAP  - S/p bronch today with get flow cytometry and IC panel per hem/onc and ID    Vent Mode: CMV/AC  (Continuous Mandatory Ventilation/ Assist Control)  FiO2 (%): 80 %  Resp Rate (Set): 30 breaths/min  Tidal Volume (Set, mL): 400 mL  PEEP (cm H2O): 12 cmH2O  Resp: 30    Cardiovascular:  # Hypotension  Shortly after intubation, likely due to sedation.   - NE gtt ordered if needed    # AF with RVR spontaneously resolved  No known history of AF. Echo unremarkable.  - On heparin SQ     # Ground level fall secondary to orthostatic hypotension, resolved  Early AM 6/4. CT head without evidence of intracranial hemorrhage, EKG unremarkable, found to have orthostatic hypotension, improved with  ml.  - Fall precautions     # HLD  PTA atorvastatin     GI/Nutrition:  # Malnutrition  - Level of malnutrition: Severe protein-calorie malnutrition  - OG tube placed  -  Nutrition consult for TF     # GERD  PTA omeprazole + PRN famotidine (also on Pred)     Renal/Fluids/Electrolytes:  # Lactic acidosis  Persistent lactic acidosis 3-4 despite antimicrobial treatment and IVF boluses. Appears hemodynamically stable with no evidence of hypoperfusion. Discussed with hem/onc, can sometimes see in malignancies, but unlikely in reasonably controlled CLL. Hepatic function wnl, suggesting against clearance issue. Resolved with additional IVF 5/30. Increased to 4.1 on 6/3, improved with addition of antibiotics above. Again increased to 4 on 6/9, likely in setting of worsening hypoxia as above.  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 Trialed 500 ml LR 6/7, however, sodium studies suggestive of SIADH. Now ~ 129-131. NT pro BNP and TTE without evidence of volume overload.  - Fluid restriction 1000 ml  - Change IV medication carriers to NS      # RICK on CKD Stage 3A resolved  Baseline ~1.3-1.6.   - Avoid nephrotoxic agents as able     Endocrine:  # Hypothyroid  TSH 1.94 on admission WNL. PTA levothyroxine     ID:  # E faecium on Karius assay  # Severe sepsis, improved  # Positive beta D glucan >500  # PJP pneumonia  - Discussed with ID and onc, will switch to second line treatment for PJP with clindamycin and primaquine. G6PD pending but ok with close monitoring per onc  - Methylpred 40 mg Q8H  - Current regimen: clindamycin, primaquine, fluconazole; also on acyclovir for ppx while on steroids  - Discontinued: micafungin, levofloxacin, zosyn, linezolid, cefepime, Bactrim      # Hx Herpes Zoster c/b post-herpetic neuralgia  PTA ppx Acyclovir while on steroids     Hematology:    # CLL (dx 2/2007)  History of CLL (2007) managed on Ibrutinib (5/2019) which patient is not taking daily. WBC 24.9 on admission (up from baseline ~6-10) now down to ~21. Suspect secondary to recent infection with lower concern for conversion to leukemia. IVIG  infusion (5/25). Following FISH and cytogenetics for disease prognostication and will continue to monitor with peripheral smear.   - Continue daily ibrutinib to help control disease, increased bioavailability with addition of fluconazole 6/2. WBC and ALC uptrending. 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 B12, folate, iron panel normal with slightly elevated YEN levels. Labs concerning for marrow responsive anemia (elevated LDH, bilirubin, reticulocyte count). Will likely need to treat underlying CLL for long-term solution.    Hgb on admission 9.5 down from baseline (~10-12 past year) and now ~8. Currently hemodynamically stable and suspect recent drop in Hgb secondary to dilutional anemia in setting of 2.5L of IVF given (5/27). Methylpred decreased and rituximab held in setting of recent infection. Haptoglobin remains elevated, suggesting against worsening hemolytic anemia.   - Heme/Onc consulted              - Continue Methylprednisolone 40mg (Q8H)              - Hold Rituximab infusion              - Check uric acid, LDH, Mg, Phos, and retic count q48h               - Continue allopurinol for TLS prevention    - Hep B serologies (Core, Surface Ab +): Hep B ag negative, DNA quant negative   - Continue PTA Folate, B12   - Transfuse if Hgb < 7      Musculoskeletal:  No issues     Skin:  No issues    General Cares/Prophylaxis:    DVT Prophylaxis: Heparin SQ  GI  Prophylaxis: PPI  Restraints: NA  Family Communication: Sister April  Code Status: Full code    Lines/tubes/drains:  - PIV x2    Disposition:  - Medical ICU    Patient seen and findings/plan discussed with medical ICU staff, Dr. Reyes.    Azucena Ryan MD  PGY-2 internal medicine    -----------------------------------------------------------------------    HISTORY PRESENTING ILLNESS: Loco Wood is a 77 year old male with history of CLL, auto-immune hemolytic anemia (on prednisone taper), and CKD3a admitted for several days of SOB, chills, pleuritic chest pain with infiltrates/consolidations on CT imaging. Initially treated for CAP (ceft + azithro) with improvement. On 5/27 patient transferred to [facility] for worsening respiratory status and fever, then improved and afebrile (45L HFNC -> 12-15L oxymizer). Found to have PJP pneumonia. Heme/Onc following for auto-immune hemolytic anemia and CLL, steroids decreased in setting of infection. 06/10 transferred to ICU due to increased oxygen requirements which may lead to intubation. Remained on BiPAP vs HFNC intermittently in ICU and transferred back to floor 06/14. Overnight 06/14-06/15 patient was back on BiPAP and there was some concern for worsened mental status so he was transferred back to ICU where he was intubated and sedated.    REVIEW OF SYSTEMS:  ROS: 10 point ROS neg other than the symptoms noted above in the HPI.    PAST MEDICAL HISTORY:   Past Medical History:   Diagnosis Date     Arthritis     hip and toes     Chronic pain      CLL (chronic lymphocytic leukaemia)      Esophageal reflux      Malignant neoplasm (H)     Leukemia     Paroxysmal atrial fibrillation (H) 2/5/2020     PONV (postoperative nausea and vomiting)      Pure hypercholesterolemia      SURGICAL HISTORY:  Past Surgical History:   Procedure Laterality Date     ARTHROPLASTY MINIMALLY INVASIVE HIP  5/10/2013    Procedure: ARTHROPLASTY MINIMALLY INVASIVE HIP;  Left Two Incision Total Hip  Arthroplasty ;  Surgeon: Desmond Alberts MD;  Location: [facility] OR     ARTHROPLASTY MINIMALLY INVASIVE HIP  2/27/2014    Procedure: ARTHROPLASTY MINIMALLY INVASIVE HIP;  Right Total Hip Arthroplasty Minimally Invasive Two Incision  *Latex Free Room;  Surgeon: Desmond Alberts MD;  Location: [facility] OR     BACK SURGERY      back fusion 1994     COLONOSCOPY      2007     COLONOSCOPY N/A 8/15/2017    Procedure: COLONOSCOPY;  colonoscopy;  Surgeon: Chun Mcguire MD;  Location:  GI     GI SURGERY      4 hernia repairs in childhood     HERNIA REPAIR      4 since age 4     IR PAE  3/5/2020     Z NONSPECIFIC PROCEDURE  1964 &'95    (R) inguinal herniorrhapy     Z NONSPECIFIC PROCEDURE  1949    (L) inguinal herniorrhaphy     Z NONSPECIFIC PROCEDURE  1994    L4-5  L5 S1 fusion - spondylolisthesis     SOCIAL HISTORY:  Social History     Socioeconomic History     Marital status:      Spouse name: Ellen     Number of children: 0     Years of education: PHD     Highest education level: None   Occupational History     Occupation: Teacher     Employer: [employer]   Tobacco Use     Smoking status: Never Smoker     Smokeless tobacco: Never Used   Vaping Use     Vaping Use: Never used   Substance and Sexual Activity     Alcohol use: Yes     Alcohol/week: 0.0 standard drinks     Comment: moderate, social     Drug use: No     Sexual activity: Not Currently     Partners: Female   Other Topics Concern      Service No     Blood Transfusions No     Caffeine Concern No     Occupational Exposure No     Hobby Hazards No     Sleep Concern No     Stress Concern No     Weight Concern No     Special Diet No     Back Care Yes     Exercise Yes     Comment: Several times a week     Bike Helmet No     Seat Belt Yes     Self-Exams No     Parent/sibling w/ CABG, MI or angioplasty before 65F 55M? 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 Grew up in [place]. He lives by himself in [place], no pets. Has a huge garden, likes to play scrabble and billiards. He has no known TB exposures, but did travel for a year when he was in his 20s to [place], [place], [place], [place], [place], [place], [place], [place], [place], and [place]. Has also travelled to [place], other areas in [place].      FAMILY HISTORY:   Family History   Problem Relation Age of Onset     Heart Disease Father      Cerebrovascular Disease Father          OF [place]      Cancer Father         melonoma     Melanoma Father      Hyperlipidemia Father      Thyroid Disease Father      Cancer Mother         Lung cancer     Other Cancer Mother         lung; heavy smoker     Cerebrovascular Disease Paternal Uncle         Aneurism     Breast Cancer Maternal Aunt          from it     Cancer Paternal Uncle      Cancer Paternal Aunt      Skin Cancer No family hx of      Glaucoma No family hx of      Macular Degeneration No family hx of      ALLERGIES:   Allergies   Allergen Reactions     Blood Transfusion Related (Informational Only) Other (See Comments)     Patient has a history of a clinically significant antibody against RBC antigens.  A delay in compatible RBCs may occur.      Morphine Itching and Rash     Dilaudid [Hydromorphone] Itching     MEDICATIONS:  No current facility-administered medications on file prior to encounter.  acyclovir (ZOVIRAX) 800 MG tablet, Take 1 tablet (800 mg) by mouth 2 times daily  atorvastatin (LIPITOR) 20 MG tablet, Take 1 tablet (20 mg) by mouth daily  cyanocobalamin (VITAMIN B-12) 100 MCG tablet, Take 1 tablet (100 mcg) by mouth daily  famotidine (PEPCID) 40 MG tablet, Take 1 tablet (40 mg) by mouth nightly as needed for heartburn  folic acid (FOLVITE) 1 MG tablet, Take 1 tablet (1 mg) by mouth daily  levothyroxine (SYNTHROID/LEVOTHROID) 50 MCG tablet, Take 1 tablet (50 mcg) by mouth daily  omeprazole (PRILOSEC) 10 MG DR capsule, TAKE 1 CAPSULE BY MOUTH EVERY DAY 30 TO 60  MINUTES BEFORE A MEAL  predniSONE (DELTASONE) 20 MG tablet, Take 3 tablets (60 mg) by mouth daily  vitamin D3 (CHOLECALCIFEROL) 50 mcg (2000 units) tablet, Take 1 tablet by mouth daily  zolpidem (AMBIEN) 5 MG tablet, Take 1 tablet (5 mg) by mouth nightly as needed for sleep        PHYSICAL EXAMINATION:  Temp:  [97.4  F (36.3  C)-99.2  F (37.3  C)] 97.4  F (36.3  C)  Pulse:  [] 90  Resp:  [14-30] 30  BP: ()/() 91/68  MAP:  [56 mmHg-73 mmHg] 61 mmHg  Arterial Line BP: (71-94)/(48-61) 71/49  FiO2 (%):  [80 %-100 %] 80 %  SpO2:  [82 %-99 %] 99 %     General Appearance: intubated, sedated  Respiratory: CTAB, ETT  Cardiovascular: RRR, normal S1/S2, no murmurs  GI: Soft, non-distended, +BM   Skin: No rash or lesions noted. Warm, dry.    LABS: Reviewed.   Arterial Blood Gases   Recent Labs   Lab 06/15/22  1354 06/14/22  0412 06/13/22  1737 06/13/22  1141   PH 7.31* 7.41 7.42 7.44   PCO2 60* 42 42 43   PO2 100 64* 87 64*   HCO3 30* 26 28 29*     Complete Blood Count   Recent Labs   Lab 06/14/22  0412 06/13/22  0410 06/12/22  0401 06/11/22  0353   WBC 35.6* 35.9* 49.6* 38.0*   HGB 9.3* 9.5* 10.0* 9.7*   * 145* 169 182     Basic Metabolic Panel  Recent Labs   Lab 06/15/22  1530 06/15/22  1456 06/15/22  1248 06/15/22  0439 06/14/22  2111 06/14/22  0412   NA  --  133  133  --  135 132* 132*   POTASSIUM  --  4.4  4.4  --  4.3 4.1 4.6   CHLORIDE  --  97  97  --  102 98 98   CO2  --  23  28  --  26 27 24   BUN  --  25  26  --  26 27 30   CR  --  1.11  1.13  --  0.98 0.95 0.90   * 162*  170* 136* 134* 168* 197*     Liver Function Tests  Recent Labs   Lab 06/15/22  1456 06/15/22  0439 06/14/22  0412 06/13/22  0410   AST 23 16 24 26   ALT 40 34 45 51   ALKPHOS 110 91 102 94   BILITOTAL 1.3 0.9 1.8* 1.4*   ALBUMIN 2.7* 2.3* 2.5* 2.6*     Coagulation Profile  No lab results found in last 7 days.    IMAGING:  Recent Results (from the past 24 hour(s))   XR Chest Port 1 View    Narrative    Portable  chest    INDICATION: Endotracheal tube    COMPARISON: CT 6/19/2022    Findings: Patchy densities mostly in the upper lungs bilaterally.  Endotracheal tube in the mid thoracic trachea. NG/OG tube tip beyond  the inferior margin on the image. Heart size normal.    IMPRESSION low endotracheal intubation. Patchy densities in the upper  lungs mostly, as can represent aspiration or other infection.    EPIFANIO HUDSON MD         SYSTEM ID:  [id number]   XR Abdomen Port 1 View    Narrative    XR ABDOMEN PORT 1 VIEWS 6/15/2022 1:33 PM    CLINICAL HISTORY: og tube placement    COMPARISON: 6/20/2022    FINDINGS: Portable AP semiupright view of the abdomen. Gastric tube  sidehole is in the approximately at the level or slightly distal to  the GE junction. The gastric tube distal tip projects over the  stomach. Multiple air-filled loops of bowel without obstructive  pattern. Small bilateral pleural effusions, left greater than right.  Multifocal mixed interstitial and patchy airspace opacities, greatest  in the right hemithorax. Partially visualized spinal fusion hardware  in the lower lumbar spine.No acute osseous abnormality.      Impression    IMPRESSION:   1. Gastric tube with sidehole approximately the level or slightly  distal to GE junction. Recommend advancement by 1-2 cm.  2. Small bilateral pleural effusions. Multifocal mixed interstitial  and patchy airspace opacities, greatest in the right hemithorax. These  findings are better appreciated on chest CT 6/9/2022.    I have personally reviewed the examination and initial interpretation  and I agree with the findings.    MICHEAL ROCK MD         SYSTEM ID:  [id number]

## 2022-06-15 NOTE — PROCEDURES
[facility]    Intubation    Date/Time: 6/15/2022 1:36 PM  Performed by: Marin Smith MD  Authorized by: Marin Smith MD   Indications: respiratory distress,  respiratory failure and  hypoxemia  Intubation method: video-assisted      UNIVERSAL PROTOCOL   Site Marked: NA  Prior Images Obtained and Reviewed:  NA  Required items: Required blood products, implants, devices and special equipment available    Patient identity confirmed:  Verbally with patient, arm band and provided demographic data  Patient was reevaluated immediately before administering moderate or deep sedation or anesthesia  Confirmation Checklist:  Patient's identity using two indicators, relevant allergies, procedure was appropriate and matched the consent or emergent situation and correct equipment/implants were available  Time out: Immediately prior to the procedure a time out was called    Universal Protocol: the Joint Commission Universal Protocol was followed      Patient status: sedated  Preoxygenation: BiPAP.  Paralytic: rocuronium  Sedatives: etomidate  Laryngoscope size: Glidescope #4.  Tube size: 7.5 mm  Tube type: cuffed  Number of attempts: 1  Cricoid pressure: no  Cords visualized: yes  Post-procedure assessment: chest rise and colorimetric ETCO2  Breath sounds: equal  Cuff inflated: yes  ETT to lip: 24 cm  Chest x-ray findings: endotracheal tube in appropriate position  Tube secured with: ETT gay and adhesive tape        PROCEDURE  Describe Procedure: Airway class 1   Cord view Grade 1  Starting SpO2 85% with BiPAP 16/14  SpO2 de 76%  Patient Tolerance:  Patient tolerated the procedure well with no immediate complications  Length of time physician/provider present for 1:1 monitoring during sedation: 30

## 2022-06-16 NOTE — PHARMACY-CONSULT NOTE
Pharmacy Tube Feeding Consult    Medication reviewed for administration by feeding tube and for potential food/drug interactions.    Recommendation: No changes are needed at this time.     Pharmacy will continue to follow as new medications are ordered.    Ninfa Elizabeth, VikkiD

## 2022-06-16 NOTE — PLAN OF CARE
ICU End of Shift Summary. See flowsheets for vital signs and detailed assessment.    Changes this shift: RASS -3. Fent gtt @125 and versed @3. Fent & versed PRN given for tachycardia. SR/ST (80-100s) with frequent PACs. Amnio gtt stopped and transitioned to oral. Levo turned off around 0730; sustaining MAP > 65 since. CMV 60%/30/400/8. Scant secretions. NJ placed and TF started at 10mL/hr at 1600. Piña with marginal UOP; MICU aware. No BM this shift.     Pt desating around 1415 and FiO2 needs increased from 55 to 80% - ABG obtained. PaO2 78. P/F 97. Xray obtained. Repeated ABG at 1630~ PaO2 156.    Plan: Continue to only turn pt side-lying left (left lung down) and supine. Increase TF at midnight to 20 mL/hr if tolerating. Continue POC, notify team of any changes.      Goal Outcome Evaluation:    Plan of Care Reviewed With: patient     Overall Patient Progress: no change

## 2022-06-16 NOTE — PROCEDURES
[facility]    Arterial line placement    Date/Time: 6/16/2022 7:11 AM  Performed by: Marin Smith MD  Authorized by: Marin Smith MD       UNIVERSAL PROTOCOL   Site Marked: NA  Prior Images Obtained and Reviewed:  NA  Required items: Required blood products, implants, devices and special equipment available    Patient identity confirmed:  Arm band, provided demographic data and hospital-assigned identification number  Patient was reevaluated immediately before administering moderate or deep sedation or anesthesia  Confirmation Checklist:  Patient's identity using two indicators, relevant allergies, procedure was appropriate and matched the consent or emergent situation and correct equipment/implants were available  Universal Protocol: the Joint Commission Universal Protocol was followed    Indication: multiple ABGs respiratory failure hemodynamic monitoring  Location:  Right radial      SEDATION  Patient Sedated: Yes    Sedation:  See MAR for details  Vital signs: Vital signs monitored during sedation      PROCEDURE DETAILS    Needle Gauge:  18  Seldinger technique: Seldinger technique used    Number of Attempts:  1  Post-procedure:  Line sutured      PROCEDURE    Length of time physician/provider present for 1:1 monitoring during sedation: 10

## 2022-06-16 NOTE — PROCEDURES
[facility]    Central line    Date/Time: 6/15/2022 7:52 PM  Performed by: Marin Smith MD  Authorized by: Marin Smith MD   Indications: vascular access      UNIVERSAL PROTOCOL   Site Marked: NA  Prior Images Obtained and Reviewed:  NA  Required items: Required blood products, implants, devices and special equipment available    Patient identity confirmed:  Arm band, provided demographic data and hospital-assigned identification number  Patient was reevaluated immediately before administering moderate or deep sedation or anesthesia  Confirmation Checklist:  Patient's identity using two indicators, relevant allergies, procedure was appropriate and matched the consent or emergent situation and correct equipment/implants were available  Time out: Immediately prior to the procedure a time out was called    Universal Protocol: the Joint Commission Universal Protocol was followed    Preparation: Patient was prepped and draped in usual sterile fashion      SEDATION  Patient Sedated: Yes    Sedation:  See MAR for details  Vital signs: Vital signs monitored during sedation      Preparation: skin prepped with 2% chlorhexidine and skin prepped with alcohol  Skin prep agent dried: skin prep agent completely dried prior to procedure  Sterile barriers: all five maximum sterile barriers used - cap, mask, sterile gown, sterile gloves, and large sterile sheet  Hand hygiene: hand hygiene performed prior to central venous catheter insertion  Patient position: flat  Catheter type: triple lumen  Pre-procedure: landmarks identified  Ultrasound guidance: yes  Sterile ultrasound techniques: sterile gel and sterile probe covers were used  Number of attempts: 2  Successful placement: yes (Wire verified with the US to be in the femoral vein and agitated saline used after the procedure and showed bubbles in the right ventricle after injection in the CVC)  Post-procedure: line sutured and dressing  applied  Assessment: blood return through all ports and free fluid flow (Wire verified with the US to be in the femoral vein and agitated saline used after the procedure and showed bubbles in the right ventricle after injection in the CVC)      PROCEDURE    Patient Tolerance:  Patient tolerated the procedure well with no immediate complications  Length of time physician/provider present for 1:1 monitoring during sedation: 15

## 2022-06-16 NOTE — PROCEDURES
PROCEDURE:   Bronchoscopy with Bronchoalveolar lavage    INDICATION:  Acute Hypoxemic Respiratory Failure    PROCEDURE :   Marin Smith MD    SUPERVISOR:  Dr Reyes     CONSENT:  The patient's medical record has been reviewed.  The indication for the procedure was reviewed.  The necessary history and physical examination was performed and reviewed.  The risks, benefits and alternatives of the procedure were discussed with the the patient's representative (Sister) in detail and she had the opportunity to ask questions.  I discussed in particular the potential complications including risks of minor or life-threatening bleeding and/or infection, respiratory failure, vocal cord trauma / paralysis, pneumothorax, and discomfort. Sedation risks were also discussed including abnormal heart rhythms, low blood pressure, and respiratory failure. All questions were answered to the best of my ability.  Verbal and written informed consent was obtained.  The proposed procedure and the patient's identification were verified prior to the procedure by the physician and the nurse, technician, resident physician (resident / fellow).    UNIVERSAL PROTOCOL: Patient Identification was verified, time out was performed. Imaging data reviewed. Barrier precaution done: Hands washed, mask, gloves, gown, and eye protection all used.     MEDICATIONS:   3 ml 1% lidocaine  continued on ICU gtt medications (see MAR)    PROCEDURE: Patient monitored during entire procedure. 3 mL of lidocaine instilled via ET tube with minimal cough.  A flexible bronchoscope was inserted through patients ET tube.  The tube was in good position.  The sommer was sharp.  An airway inspection was done to the subsegmental level:     Findings:   - Several bronchial narrowing, possible extrinsic compression (see pictures)   - BAL from the RML      Maneuvers / Procedure:    BAL: The bronchoscope was wedged into the RML bronchus. A total of 240 cc of saline was  instilled and a total of 55 was aspirated. This was sent for analysis.     SAMPLES:   55 mL of  fluid were sent for analysis.    Pictures:   Distal trachea    RUL       BI      RML      Left mainstem       NEERU      RML     Dr Reyes was present for the procedure.        COMPLICATIONS: None        Marin Smith MD, 6/15/2022, 7:41 PM  Pulmonology Fellow  Department of Pulmonary, Allergy, Critical Care & Sleep Medicine   [phone]

## 2022-06-16 NOTE — PROGRESS NOTES
Calorie Count  Intake recorded for: 6/15  Total Kcals: 0 Total Protein: 0g  Kcals from Hospital Food: 0   Protein: 0g  Kcals from Outside Food (average):0 Protein: 0g  # Meals Ordered from Kitchen: 1 meal  # Meals Recorded: No food intake recorded  # Supplements Recorded: No food intake recorded

## 2022-06-16 NOTE — PROCEDURES
Small Bowel Feeding Tube Placement Assessment  Reason for Feeding Tube Placement: nutrition and medication administration  Cortrak Start Time: 10:45   Cortrak End Time: 11:15  Medicine Delivered During Procedure: lidocaine gel, reglan and additional sedation per RN  Placement Successful:  Yes, presumed post pyloric per Cortrak (AXR pending)    Procedure Complications: difficulty passing pylorus. Removed OGT after discussion with RN.   Final Placement Desmond at exit of nare 115 cm  Face to Face time with patient: 45 minutes total     Bridle Placement:   Reason for bridle placement: securement of FT   Medicine delivered during procedure: lidocaine gel   Procedure: Successful   Location of top of clip on FT: @ 117 cm marker   Condition of nose/skin at time of bridle placement: Unremarkable   Face to Face time with patient: 5 minutes.    Kera Hightower, MS, RD, LD, [facility]  MICU pager: [phone]  ASCOM: [id number]

## 2022-06-16 NOTE — PROGRESS NOTES
[facility]  Transplant Infectious Disease Progress Note     Patient:  Loco Wood, Date of birth 1945, Medical record number [id number]  Date of Visit:  06/16/2022         Assessment and Recommendations:   77 year old man with CLL and AIHI s/p prednisone courses in 12/2021 from derm, 1/2022 for lung symptoms in [place], in 3/2022 for anemia, then most recently for 3 weeks & 6 weeks for AIHA who is being treated for severe Pneumocystis pneumonia.    Refractory, Severe PJP pneumonia: 5/27/22 CT chest showed progressive bilateral groundglass opacities. 5/27/2022 BAL Pneumocystis sputum PCR +, in the setting of a progressive pulmonary process. Elevated Fungitell >500 & LDH are also consistent with Pneumocystis. Had 1 dose of neb pent on 5/26/2022 (may have started to kill some pneumocystis organisms). IV bactrim started 5/27/2022. Other infectious work up has including negative MRSA nares, Cryptococcal antigen, EBV PCR, CMV PCR, Coccidiodes antigen, Aspergillus gm, fungal antibodies, Histoplasma antigen, Blastomyces antigen,TB quantiferon, Adenovirus PCR, Toxoplasma antbibody. Heena demonstrated 6336 copies of Pneumocystis jirovecii and 353 copies of E faecium. He continues to have increased respiratory distress requiring high amounts of FiO2 requiring bipap and high flow. Received tmp/smx from 5/27 to 6/10 (~2 weeks). The dose had been adjusted intermittently due to renal dysfunction.  Although patients with hematologic malignancies can be slow to respond to PJP treatment I would expect some response within in 2 weeks. 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 It is more challenging to change host factors but there is also concern for treatment failure and/or another issue contributing to it (ARDS, organizing pneumonia, CLL?). Inflammatory markers have down trended suggesting that the degree of inflammation is  improving on steroids. The 1-3 BD glucan is still > 500 but this is not as helpful since the value is not measurable past 500. If it had dropped to < 500 this would have been more helpful in determining the trajectory. The 6/9 CT chest has increased consolidations and there is concern for organizing pneumonia or ARDS. The CLL lung involvement is a consideration although it is rare. Pulmonary toxicity from ibrutinib can occur but is also relatively rare.   On 6/10 we changed from tmp/smx to clindamycin + primaquine. There is emerging evidence of the utilization of micafungin with PJP but he had received micafungin ~ 10 days without improvement. Despite this I resumed micafungin on 6/10 given the potential effect, clinical deterioration, minimal side effect profile and to provide additional anti-fungal prophylaxis with the steroid dose increase. Unfortunatelyhe was intubated 6/15 due to hypoxia and barotrauma. Repeat CT demonstrated pneumomediastinum, subcutaneous emphysema and new RML nodule.   6/15 BAL 54 nucleated cells, 55% pmn. Gram stain, KOH and RVP negative. Pending AFB, HSV 1/2, Actinomyces cx, Nocardia cx, fungus cx, CMV PCR, PJP DFA, PJP PCR, Mycoplasma PCR, Legionella PCR, EBV PCR, Asp gm, cytology and flow cytometry.      RUL nodule: history of travel to [place]-returned on 4/4/22. Initially had been on fluconazole 6/2-6/8. It was discontinued when Coccidioides antigen and fungal antibodies were negative. With the development of a RUL nodule and clinical decompensation will repeat fungal testing and empirically treat with liposomal amphotericin. False negative antibodies can occur with immunocompromised hosts. At this point given the lower sensitivity in fungal antibody and antigen testing will repeat. Will resend Coccidioides testing to [facility] which has higher sensitivity and specificity. He is at risk for fungal infections as well due to ibrutinib and steroid use.    Leukocytosis/History of  CLL/AIHA:  WBC up trending over the past week with increased lymphocytes. There is concern for CLL progression based on flow. Previously taking ibrutinib at home weekly but now on daily. He is has been methylprednisolone since 5/24 with a dose of 40 mg daily since 5/28-6/10. Dose increased on 6/10 to 40 mg IV q 8 hours. Also on ibrutinib for CLL-dose decreased 6/11/22. Plans to receiving IVIG. Hematology discussing other CLL treatment options as well.     E faecium on Karius: s/p 4 days of linezolid. No isolated on clinical cultures.    History of herpes zoster: on acyclovir    - Hep B core ab & S ab+ 5/26/2022, with negative hep B viral load.  - Extensive travel hx.     - QTc interval: 427 msec on 6/2/2022 EKG  - Bacterial prophylaxis: antibiotics stopped 6/9/22.  - Pneumocystis prophylaxis: pent 5/26/2022, treatment bactrim 5/27/2022. G6PD normal.  - Viral serostatus & prophylaxis: HSV1+, HSV2+, CMV+; acyclovir prophy  - Fungal prophylaxis: micafungin 5/27/2022-6/7/22, 6/10-C  - Immunization status:3 covid vaccinations.   - Gamma globulin status: 30g IVIG 5/25/2022 and 6/12/22  - Isolation status: Good hand hygiene.    Recommendations:  1. Continue clindamycin 900 mg IV q 8 hours + primaquine 30 mg daily. Primaquine can be crushed.   2. Stop micafungin  3. Start empiric liposomal amphotericin 5 mg/kg/day. Will need to monitor creatinine and electrolytes closely and replete K and Mg as needed. Will reassess need for amphotericin in the upcoming days based on clinical status, lab results and tolerance.   4. Obtain urine Blastomyces and Histoplasma antigens, Cryptococcal antigen, Coccidioides antigen ([facility]) and antibody (ID and CF to [facility]), Paracoccidioides antibody ([facility]), blood culture, fungal blood culture (orders placed)  5. If he has a fever obtain blood cultures and add cefepime or pip/tazo  6. Dose of steroids per MICU and heme/onc teams    Transplant Infectious Disease will continue to follow with  you.      Marybeth Anderson DO.   Infectious Diseases Attending  Pager [phone]        Interval History:   Intubated yesterday afternoon. Bronchoscopy performed of the RML bronchus. Noted several bronchial narrowing w/ extrinsic compression.  Developed barotrauma w/ pneumomediastinum and subcutaneous emphysema. New 2 cm RUL nodule.   Afebrile. Started on amiodarone. Sedated. Off pressors. FiO2 60%  Methylprednisolone dose decreased to 40 mg q 12 hours. Remains on primaquine and clindamycin.   BAL pending    Review of Systems: unable to obtain due to intubation       Current Medications & Allergies:       acyclovir  800 mg Oral or Feeding Tube BID     allopurinol  300 mg Oral Daily     atorvastatin  20 mg Oral Daily     clindamycin  900 mg Intravenous Q8H     cyanocobalamin  100 mcg Oral Daily     [Held by provider] dronabinol  2.5 mg Oral Daily     folic acid  1 mg Oral Daily     heparin ANTICOAGULANT  5,000 Units Subcutaneous Q12H     [Held by provider] ibrutinib  280 mg Oral Daily     levothyroxine  50 mcg Oral Daily     melatonin  10 mg Oral At Bedtime     methylPREDNISolone  40 mg Intravenous Q12H     micafungin  100 mg Intravenous Q24H     pantoprazole  20 mg Oral or Feeding Tube QAM     polyethylene glycol  17 g Oral Daily     primaquine  30 mg Oral Daily     psyllium  1 packet Oral BID     senna-docusate  1 tablet Oral BID    Or     senna-docusate  2 tablet Oral BID     sodium chloride (PF)  3 mL Intracatheter Q8H     sodium chloride (PF)  3 mL Intracatheter Q8H     vitamin D3  50 mcg Oral Daily       Allergies   Allergen Reactions     Blood Transfusion Related (Informational Only) Other (See Comments)     Patient has a history of a clinically significant antibody against RBC antigens.  A delay in compatible RBCs may occur.      Morphine Itching and Rash     Dilaudid [Hydromorphone] Itching            Physical Exam:   Ranges for vital signs:  Temp:  [97.4  F (36.3  C)-99.2  F (37.3  C)] 97.8  F (36.6   C)  Pulse:  [] 86  Resp:  [14-33] 30  BP: ()/() 91/68  MAP:  [56 mmHg-85 mmHg] 67 mmHg  Arterial Line BP: ()/(48-69) 98/52  FiO2 (%):  [60 %-100 %] 60 %  SpO2:  [82 %-99 %] 93 %  Vitals:    06/15/22 0318 06/15/22 1300 06/16/22 0000   Weight: 69.1 kg (152 lb 5.4 oz) 63 kg (138 lb 14.2 oz) 65.4 kg (144 lb 2.9 oz)     Physical Examination:  GENERAL:  Chronically ill appearing. Being intubated. Sedated.  HEAD:  Head is normocephalic, atraumatic   LUNGS:  Bagging patient without difficulty. Chest rising w/ bagging.   CARDIOVASCULAR:  Sinus tachycardia on the monitor  SKIN:  No acute rashes.     EXTREMITIES: No joint erythema or swelling.   NEUROLOGIC:  Intubated. Sedated.  LINES: peripheral IV in place without any surrounding erythema or exudate. Dressing intact.          Laboratory Data:     Metabolic Studies       Recent Labs   Lab Test 06/16/22  0723 06/16/22  0359 06/15/22  2350 06/15/22  2143 06/15/22  2009 06/15/22  1941 06/15/22  1719 06/08/22  0445 06/07/22  1420 06/03/22  2202 06/03/22  1801   NA  --  136  --   --  134  --   --    < >  --    < >  --    POTASSIUM  --  4.3  --   --  4.4  --   --    < >  --    < >  --    CHLORIDE  --  99  --   --  97  --   --    < >  --    < >  --    CO2  --  27  --   --  28  --   --    < >  --    < >  --    ANIONGAP  --  10  --   --  9  --   --    < >  --    < >  --    BUN  --  31*  --   --  28  --   --    < >  --    < >  --    CR  --  1.22  --   --  1.53*  --   --    < >  --    < >  --    GFRESTIMATED  --  62  --   --  47*  --   --    < >  --    < >  --    * 160*   < >  --  136*   < >  --    < >  --    < >  --    DAVID  --  8.4*  --   --  7.9*  --   --    < >  --    < >  --    PHOS  --  5.0*  --   --   --   --   --    < >  --    < >  --    MAG  --  2.1  --   --  2.2  --   --    < >  --    < >  --    LACT  --   --   --  3.3*  --   --  3.5*   < >  --    < >  --    PCAL  --   --   --   --   --   --   --   --   --   --  0.07*   FGTL  --   --   --   --    --   --   --   --  >500  --   --     < > = values in this interval not displayed.       Hepatic Studies    Recent Labs   Lab Test 06/16/22  0359 06/15/22  1456 06/15/22  0439 06/14/22  0412 06/08/22  0445 06/07/22  0336 06/06/22  0440   BILITOTAL 1.8* 1.3 0.9 1.8*   < > 0.8 0.8   DBIL  --   --   --  0.4*  --   --   --    ALKPHOS 89 110 91 102   < > 99 92   PROTTOTAL 5.6* 6.3* 5.2* 5.9*   < > 5.6* 5.3*   ALBUMIN 2.4* 2.7* 2.3* 2.5*   < > 2.6* 2.4*   AST 17 23 16 24   < > 43 30   ALT 33 40 34 45   < > 42 33   LDH  --   --   --   --   --  348* 322*    < > = values in this interval not displayed.       Hematology Studies      Recent Labs   Lab Test 06/16/22  0359 06/15/22  0439 06/14/22  0412 06/13/22  0410 06/12/22  0401 06/11/22  0353   WBC 39.1* 29.8* 35.6* 35.9* 49.6* 38.0*   ANEU 19.2* 12.2* 16.4* 19.4* 17.4* 11.8*   ALYM 19.6* 17.6* 19.2* 16.5* 32.2* 25.8*   ANY 0.4 0.0 0.0 0.0 0.0 0.4   AEOS 0.0 0.0 0.0 0.0 0.0 0.0   HGB 9.4* 8.8* 9.3* 9.5* 10.0* 9.7*   HCT 28.7* 30.2* 28.0* 29.0* 30.2* 29.6*    145* 147* 145* 169 182       Arterial Blood Gas Testing    Recent Labs   Lab Test 06/16/22  0609 06/15/22  2143 06/15/22  1719 06/15/22  1354 06/14/22  0412   PH 7.41 7.40 7.34* 7.31* 7.41   PCO2 43 43 50* 60* 42   PO2 102 100 110* 100 64*   HCO3 28 26 27 30* 26   O2PER 60 60 80 100 90      Inflammatory Markers    Recent Labs   Lab Test 06/10/22  0448 06/08/22  0445 06/03/22  0423 06/02/22  0451 10/04/17  2250 04/11/16  1136   SED  --   --   --   --  1  --    CRP 11.0* 19.0* 82.0* 100.0*  --  <0.2  Reference Values:   Low Risk:           <1.0 mg/L   Average Risk:       1.0-3.0 mg/L   High Risk:          >3.0 mg/L   Acute Inflammation: >8.0 mg/L         Immune Globulin Studies     Recent Labs   Lab Test 06/12/22  0401 06/03/22  0856 05/24/22  1651 06/12/19  0806 05/31/19  1021 04/30/19  1219 04/29/15  1012 09/19/14  1227   * 446* 312* 394* 409* 429*   < > 729   IGM  --   --   --   --   --   --   --  28*    IGA  --   --   --   --   --   --   --  72    < > = values in this interval not displayed.       Gout Labs      Recent Labs   Lab Test 06/15/22  0439 06/13/22  0410 06/11/22  0353 06/09/22  0413 06/07/22  0336   URIC 3.0* 3.9 3.0* 2.9* 1.8*     Microbiology:  Fungal testing  Recent Labs   Lab Test 06/07/22  1420 05/27/22  1356 05/27/22  0916 05/23/22  1802   FGTL >500  --   --  >500   ASPGAI  --  0.03  --   --    ASPGAA  --  Negative  --   --    COFUNG  --   --  <1:2  --        Last Culture results with specimen source  Culture   Date Value Ref Range Status   06/13/2022 No growth after 2 days  Preliminary   06/13/2022 No growth after 2 days  Preliminary   06/03/2022 No Growth  Final   06/03/2022 No Growth  Final   06/02/2022 No growth after 13 days  Preliminary   05/27/2022 2+ Normal aracely  Final   05/27/2022 No Growth  Final   05/27/2022 No Growth  Final   05/26/2022 No Growth  Final   05/26/2022 No Growth  Final   05/26/2022   Final    >10 Squamous epithelial cells/low power field indicates oral contamination. Please recollect.   05/23/2022 No Growth  Final   05/23/2022 No Growth  Final     Culture Micro   Date Value Ref Range Status   03/02/2014 No growth  Final   10/22/2010 No Beta Streptococcus isolated  Final   04/21/2010   Final    No Salmonella, Shigella, Campylobacter or E coli 0157 isolated.   04/18/2010 No growth  Final   03/16/2009 No Beta Streptococcus isolated  Final   01/13/2007 No growth  Final   04/06/2004 No Beta Streptococcus isolated  Final        Virology:  Coronavirus-19 testing    Recent Labs   Lab Test 06/07/22  0903 05/31/22  0241 05/23/22  1946 06/23/20  0843   CTV35PR  --   --   --  Negative   DUT93YVF  --   --   --  Not Applicable   [id number] Negative Negative Negative  --        Respiratory virus testing    Recent Labs   Lab Test 06/15/22  1618 05/24/22  1916 05/24/22  1916 05/23/22 1946   IFLUA Not Detected   < > Not Detected  --    INFZA  --   --   --  Negative   FLUAH1 Not Detected    < > Not Detected  --    KN3838 Not Detected   < > Not Detected  --    FLUAH3 Not Detected   < > Not Detected  --    IFLUB Not Detected   < > Not Detected  --    INFZB  --   --   --  Negative   PIV1 Not Detected  --  Not Detected  --    PIV2 Not Detected  --  Not Detected  --    PIV3 Not Detected  --  Not Detected  --    PIV4 Not Detected   < > Not Detected  --    IRSV  --   --   --  Negative   RSVA Not Detected   < > Not Detected  --    RSVB Not Detected   < > Not Detected  --    HMPV Not Detected  --  Not Detected  --    ADENOV Not Detected   < > Not Detected  --    CORONA Not Detected   < > Not Detected  --     < > = values in this interval not displayed.       CMV viral loads    Recent Labs   Lab Test 06/01/22  1448   CMVQNT Not Detected       EBV DNA Copies/mL   Date Value Ref Range Status   06/02/2022 Not Detected Not Detected copies/mL Final     Urine Studies     Recent Labs   Lab Test 06/02/22  0952 05/26/22  2240 05/23/22  1906 05/19/22  1355 01/04/21  1744   URINEPH 6.5 5.5 6.5 7.0 5.5   NITRITE Negative Negative Negative Negative Negative   LEUKEST Negative Negative Negative Negative Negative   WBCU 2 2 1 0-5 1     Imaging:  CT Chest w Contrast  Narrative: EXAMINATION: Chest CT  6/15/2022 7:13 PM    CLINICAL HISTORY: h/o CLL, bronch shows external compression, checking  for nodules    COMPARISON: Chest CT 6/9/2022 and 5/27/2022.    TECHNIQUE: CT imaging obtained through the chest with contrast. Axial,  coronal, and sagittal reconstructions and axial MIP reformatted images  are reviewed.     CONTRAST: iopamidol (ISOVUE-370) solution 68 mL    FINDINGS:  Lines and Tubes: Endotracheal tube 4.5 cm from the sommer. Enteric  tube with the tip in the stomach.    Lungs: There is no definite pneumothorax. Bilateral extensive  pulmonary consolidations with areas of septal thickening and  groundglass opacities (crazy paving). There is a a new peribronchial  vascular 2.2 x 1.2 cm nodular opacity (series 4 image 121),  due to its  absence in immediate prior this is more consistent with an acute  infective/inflammatory. There is also lung hyperinflation with some  posterior displacement of the bilateral anterior and lower lungs due  to massive pneumomediastinum. There is no evidence of bronchial  narrowing in this study.    Mediastinum: Extensive pneumomediastinum with significant distention  of the mediastinum, particularly at the anterior lower mediastinum.  The thyroid is unremarkable. Cardiac size is normal. Normal caliber  aorta. The main pulmonary artery measures 3.7 cm in diameter. No  pericardial effusion. No significant coronary artery calcium. No  thoracic lymphadenopathy. Esophagus is normal in caliber.    Bones and soft tissues: New extensive subcutaneous emphysema tracking  superiorly to the patient's right neck and throughout the patient's  right chest wall. No suspicious bone findings.     Upper Abdomen: Limited evaluation of the abdomen. 6 mm  hyperattenuating hepatic lesion in the inferior border of the segment  VI (series 2 image 61), too small to characterize in this nondedicated  study. The gallbladder is partially distended and unremarkable.  Unremarkable spleen. Normal pancreatic parenchyma with borderline  pancreatic duct size. No adrenal nodules. 3.7 cm cortical renal cyst  in the upper pole of the right kidney. No evidence of small bowel  obstruction in the visualized abdomen.  Impression: IMPRESSION:   1. New extensive pneumomediastinum with significant distention of the  anterior inferior mediastinum, concerning for barotrauma.  2. Extensive subcutaneous emphysema.  3. Redemonstration of extensive pulmonary consolidation superimposed  to crazy paving pattern of pulmonary opacities. Now with new 2 cm  right upper lobe nodule.  4. Enlarged pulmonary artery suggestive of pulmonary hypertension.  5. Hyperattenuating 6 mm hepatic nodule, of limited evaluation in this  study. Consider workup as outpatient with  gadolinium enhanced liver  MRI.    [Urgent Result: New pneumomediastinum]    Finding was identified on 6/15/2022 7:13 PM.     Dr. Petty was contacted by Dr. Radha Andrew at 6/15/2022 7:20 PM  and verbalized understanding of the urgent finding.    I have personally reviewed the examination and initial interpretation  and I agree with the findings.    MIKE OLSON MD         SYSTEM ID:  [id number]  XR Abdomen Port 1 View  Narrative: XR ABDOMEN PORT 1 VIEWS 6/15/2022 1:33 PM    CLINICAL HISTORY: og tube placement    COMPARISON: 6/20/2022    FINDINGS: Portable AP semiupright view of the abdomen. Gastric tube  sidehole is in the approximately at the level or slightly distal to  the GE junction. The gastric tube distal tip projects over the  stomach. Multiple air-filled loops of bowel without obstructive  pattern. Small bilateral pleural effusions, left greater than right.  Multifocal mixed interstitial and patchy airspace opacities, greatest  in the right hemithorax. Partially visualized spinal fusion hardware  in the lower lumbar spine.No acute osseous abnormality.  Impression: IMPRESSION:   1. Gastric tube with sidehole approximately the level or slightly  distal to GE junction. Recommend advancement by 1-2 cm.  2. Small bilateral pleural effusions. Multifocal mixed interstitial  and patchy airspace opacities, greatest in the right hemithorax. These  findings are better appreciated on chest CT 6/9/2022.    I have personally reviewed the examination and initial interpretation  and I agree with the findings.    MICHEAL ROCK MD         SYSTEM ID:  [id number]  XR Chest Port 1 View  Portable chest    INDICATION: Endotracheal tube    COMPARISON: CT 6/19/2022    Findings: Patchy densities mostly in the upper lungs bilaterally.  Endotracheal tube in the mid thoracic trachea. NG/OG tube tip beyond  the inferior margin on the image. Heart size normal.    IMPRESSION low endotracheal intubation. Patchy densities in the  upper  lungs mostly, as can represent aspiration or other infection.    EPIFANIO HUDSON MD         SYSTEM ID:  [id number]

## 2022-06-16 NOTE — PROGRESS NOTES
[facility]    ICU Progress Note       Date of Admission:  5/23/2022    Assessment: Critical Care   Loco Wood is a 77 year old male with PMH CLL, auto-immune hemolytic anemia (on prednisone taper), and CKD3a who was admitted to ICU on 06/10 for AHRF due to PJP pneumonia.    Changes today:  - Uric acid check with concern for TLS   - Transition Amiodarone to oral route  - Decrease PEEP from 10->8 given pneumomediastinum  - Start medium dose sliding scale insulin  - Nutrition consult for enteric tube placement and TF  - Change RASS goal to -1 to -2   - Start amphotericin IV per ID with concern for Coccidiodes     PLAN:     Neuro:  # Pain, sedation  - Versed and fentanyl gtt  - Lighten sedation by decreasing versed as tolerated  -RASS goal -1 to -2     # Degenerative disc disease  # Bilateral intermittent hand cramping, spasms suspicious for cervical impingement  Patient with history of degenerative disc disorder with concern for cervical impingement in setting of new bilateral intermittent hand cramping. Patient met with Dr. Melton his sports medicine doctor (AM 5/24) over phone for follow up appointment to discuss results of his MRI. Will defer to outpatient management.      Pulmonary:  # Acute hypoxic respiratory failure 2/2 PJP pneumonia   # Bilateral lower lobe bronchiectasis/consolidations  # Positive beta D glucan >500  Admitted for several days of worsening fatigue/dyspnea, subjective chills, and pleuritic chest pain with 3-4L oxygen requirement (w/ elevated WBC) and CT w/ infiltrates and bilateral lower lobe consolidations concerning for infection. RVP negative. Patient initially treated with 5 day abx course starting 5/24 with azithromycin (d/c 5/26) + ceftriaxone for presumed CAP with clinical improvement. On 5/27, patient developed fever with worsening respiratory status (45 L high flow NC) and [place] [facility] [place] improving with IVF 2.5L and  broadened coverage with Zosyn, Micafungin (positive B-Glucan), and IV bactrim (for possible PJP) O2 req of 12-15L, with increase to HFNC on 6/1. CXR 5/31 with trace effusions and increasing apical opacities concerning for worsening edema/infection. PJP PCR 5/27 positive. Karius 6/1 + PJP and E. Faecium (less likely pathogenic as not growing on other cultures). On 6/9, worsening hypoxia with paO2 59 on 100% 50L. Telemetry also with frequent PACs and episode of SVT and lactic acid elevated at 4, likely in setting of worsening hypoxia. CT chest with worsening PJP and concern for organizing pneumonia and pulmonary hypertension. TTE with normal cardiac function and without evidence of volume overload. Currently believe CLL not driving pulmonary symptoms though need biopsy to definitively rule out. Patient is on steroids for hemolytic anemia which would treat organizing pneumonia and unclear if increasing dose would have additional benefits > risks.  - Discussed with ID and onc, will switch to second line treatment for PJP with clindamycin and primaquine. G6PD pending but ok with close monitoring per onc. S/p bronch 6/15 following flow cytometry and IC panel per hem/onc and ID  - Methylpred 40 mg Q12H  - Current regimen: clindamycin, primaquine, fluconazole; also on acyclovir for ppx while on steroids  - Discontinued: micafungin, levofloxacin, zosyn, linezolid, cefepime, Bactrim   - Intubated 06/15 due to desaturation while on BiPAP  - wean PEEP 10-> 8   Vent Mode: CMV/AC  (Continuous Mandatory Ventilation/ Assist Control)  FiO2 (%): 55 %  Resp Rate (Set): 30 breaths/min  Tidal Volume (Set, mL): 400 mL  PEEP (cm H2O): 8 cmH2O  Resp: 30      #RUL nodule  CT scan 6/15 revealed RUL nodule deemed most likely infectious vs inflammatory per radiology. ID is following and is concerned about coccidioides infection given immunosuppressed state and recent travel to [place].  - ID following; recs below   >Stop micafungin   > Start  empiric liposomal amphotericin 5 mg/kg/day. Will need to monitor creatinine and electrolytes closely and replete K and Mg as needed. To reassess need in coming days.   > Blastomyces and Histoplasma antigens, Cryptococcal antigen, Coccidioides antigen ([facility]) and antibody (ID and CF to [facility]), Paracoccidioides antibody ([facility]), blood culture, fungal blood culture (orders placed)   >If he develops a fever obtain blood cultures and add cefepime or pip/tazo    #Pneumomediastinum   CT 6/15 showed new large pneumomediastinum with extensive subcutaneous emphysema. Hemodynamics have been improving and it is not  a tension pneumomediastinum. Patient have a current PJP which is known to cause pneumothoraces which may be the cause of the patient's presentation. Pneumo could be 2/2 barotrauma.  - Decrease PEEP to 8, limit intrapleural pressure, driving pressure currently=10  - CTM    Cardiovascular:  # Hypotension  Shortly after intubation, likely due to sedation. As of AM 6/16, not requiring pressors  - NE gtt ordered if needed     # AF with RVR spontaneously resolved  No known history of AF. Echo unremarkable.  - On heparin subcutaneous  - Transition amiodarone ggt to oral; taper at 400mg     # Ground level fall secondary to orthostatic hypotension, resolved  Early AM 6/4. CT head without evidence of intracranial hemorrhage, EKG unremarkable, found to have orthostatic hypotension, improved with  ml.  - Fall precautions     # HLD  PTA atorvastatin     GI/Nutrition:  # Malnutrition  - Level of malnutrition: Severe protein-calorie malnutrition  - Nutrition consult for TF placement, will initiate tube feedings once confirmed  - Continues with TPN      # GERD  PTA omeprazole + PRN famotidine (also on Pred)     Renal/Fluids/Electrolytes:  # Lactic acidosis  Persistent lactic acidosis 3-4 despite antimicrobial treatment and IVF boluses. Appears hemodynamically stable with no evidence of hypoperfusion. Discussed with  hem/onc, can sometimes see in malignancies, but unlikely in reasonably controlled CLL. Hepatic function wnl, suggesting against clearance issue. Resolved with additional IVF 5/30. Increased to 4.1 on 6/3, improved with addition of antibiotics above. Lactate is persistently elevated which may be associated with his CLL and recent hypoxia and hypotension.  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 Trialed 500 ml LR 6/7, however, sodium studies suggestive of SIADH. Now ~ 129-131. NT pro BNP and TTE without evidence of volume overload.  - Change IV medication carriers to NS      # RICK on CKD Stage 3A resolved  Baseline ~1.3-1.6.   - Avoid nephrotoxic agents as able     Endocrine:  # Hypothyroid  TSH 1.94 on admission WNL.   - Continue PTA levothyroxine     ID:  # E faecium on Karius assay  # Severe sepsis, improved  # Positive beta D glucan >500  # PJP pneumonia  - Discussed with ID and onc, will switch to second line treatment for PJP with clindamycin and primaquine. G6PD pending but ok with close monitoring per onc  - Methylpred 40 mg Q12H  - Current regimen: clindamycin, primaquine, fluconazole; also on acyclovir for ppx while on steroids  - Discontinued: micafungin, levofloxacin, zosyn, linezolid, cefepime, Bactrim      #?Coccidiodes vs other fungal infection  #RUL nodule  Patient had new RUL nodule on CT 6/15. ID is following and has concern for fungal lesion given immunosuppression.  - ID following; workup and tx per above (pulmonary)    # Hx Herpes Zoster c/b post-herpetic neuralgia  PTA ppx Acyclovir while on steroids     Hematology:    # CLL (dx 2/2007)  History of CLL (2007) managed on Ibrutinib (5/2019) which patient is not taking daily. WBC 24.9 on admission (up from baseline ~6-10) now down to ~21. Suspect secondary to recent infection with lower concern for conversion to leukemia. IVIG infusion (5/25). Following FISH  and cytogenetics for disease prognostication and will continue to monitor with peripheral smear. WBC count geno from 29->39 on 5/16 concerning for worsening CLL. Give IVIg 0.4 g/kg per hem/onc 06/12  - Continue daily ibrutinib to help control disease, increased bioavailability with addition of fluconazole 6/2. WBC and ALC uptrending. CT A/P without evidence of CLL metastasis   - Hematology/Oncology following                     - PTA ibrutinib, pursuing prior authorization for venotoclax              - Following Flow cytometry, cytogenetics, IgH mutation, TP53 mutation  - Await bronch biopsy results  - Heme/Onc outpatient follow up on discharge    #?Tumor lysis syndrome  Patient had elevated phos and low Ca 6/16 concerning for TLS.    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 B12, folate, iron panel normal with slightly elevated YEN levels. Labs concerning for marrow responsive anemia (elevated LDH, bilirubin, reticulocyte count). Will likely need to treat underlying CLL for long-term solution.    Hgb on admission 9.5 down from baseline (~10-12 past year) and now ~8. Currently hemodynamically stable and suspect recent drop in Hgb secondary to dilutional anemia in setting of 2.5L of IVF given (5/27). Methylpred decreased and rituximab held in setting of recent infection. Haptoglobin remains elevated, suggesting against worsening hemolytic anemia however 6/16 total bilirubin was elevated.   - Heme/Onc consulted              - Continue Methylprednisolone 40mg (Q12H)              - Hold Rituximab infusion              - Check uric acid, LDH, Mg, Phos, and retic count q48h               - Continue allopurinol for TLS prevention    - Hep B  serologies (Core, Surface Ab +): Hep B ag negative, DNA quant negative   - Continue PTA Folate, B12   - Transfuse if Hgb < 7      Musculoskeletal:  # Weakness/Deconditioning  - PT/OT following     Skin:  No active concerns     General Cares/Prophylaxis:    DVT Prophylaxis: Heparin SQ  GI Prophylaxis: PPI  Restraints: NA  Family Communication: Sister April  Code Status: Full code     Lines/tubes/drains:  - PIV x3  - CVC TL right femoral  - ETT 7.5mm  - Arterial line  - NJ left nostril  - Piña      Disposition:  - Medical ICU    Duglas Jernigan, MS4  [employer]  Medical Student on MICU2 Team  06/16/2022, 7:04 AM     Resident/Fellow Attestation   I, Ling Byrnes CNP, was present with the medical/MELLY student who participated in the service and in the documentation of the note.  I have verified the history and personally performed the physical exam and medical decision making.  I agree with the assessment and plan of care as documented in the note.      {Key findings;Pt with acute hypoxic respiratory failure 2/2 PJP pneumonia, now with pneumomediastinum developing in last 24H without tension physiology.     Ling Byrnes CNP  Date of Service (when I saw the patient): 06/16/22     Patient seen and findings/plan discussed with medical ICU staff, Dr. Reyes.  _________________________________________________________    Interval History   Loco was resting comfortably this morning on exam. Overnight his pressor requirements were decreasing and his AM ABG showed adequate oxygenation and ventilation.     Physical Exam   Vital Signs: Temp: 97.9  F (36.6  C) Temp src: Axillary BP: 91/68 Pulse: 86   Resp: 30 SpO2: 97 % O2 Device: Mechanical Ventilator    Weight: 144 lbs 2.89 oz  Constitutional: Sedated, resting in bed, NAD  Respiratory: Decreased breath sounds throughout, tolerating the vent well  Cardiovascular: RRR without MGR, Hamman's sign present over precordium  Chest: significant crepitus  noted to palpation of R upper chest wall with presence of subcutaneous air descending into upper abdomen and upper border of mid-neck   GI: Abdomen soft, non distended  Neurologic: Sedated, opens eyes to sternal rub, no following commands, localizing and withdrawing to pain     Data   I reviewed all medications, new labs and imaging results over the last 24 hours.  Arterial Blood Gases   Recent Labs   Lab 06/16/22  0609 06/15/22  2143 06/15/22  1719 06/15/22  1354   PH 7.41 7.40 7.34* 7.31*   PCO2 43 43 50* 60*   PO2 102 100 110* 100   HCO3 28 26 27 30*       Complete Blood Count   Recent Labs   Lab 06/16/22  0359 06/15/22  0439 06/14/22  0412 06/13/22  0410   WBC 39.1* 29.8* 35.6* 35.9*   HGB 9.4* 8.8* 9.3* 9.5*    145* 147* 145*       Basic Metabolic Panel  Recent Labs   Lab 06/16/22  0723 06/16/22  0359 06/15/22  2350 06/15/22  2009 06/15/22  1530 06/15/22  1456 06/15/22  1248 06/15/22  0439   NA  --  136  --  134  --  133  133  --  135   POTASSIUM  --  4.3  --  4.4  --  4.4  4.4  --  4.3   CHLORIDE  --  99  --  97  --  97  97  --  102   CO2  --  27  --  28  --  23  28  --  26   BUN  --  31*  --  28  --  25  26  --  26   CR  --  1.22  --  1.53*  --  1.11  1.13  --  0.98   * 160* 117* 136*   < > 162*  170*   < > 134*    < > = values in this interval not displayed.     Uric Acid   Date Value Ref Range Status   06/16/2022 3.5 3.5 - 7.2 mg/dL Final   04/29/2015 5.0 3.5 - 7.2 mg/dL Final     Liver Function Tests  Recent Labs   Lab 06/16/22  0359 06/15/22  1456 06/15/22  0439 06/14/22  0412   AST 17 23 16 24   ALT 33 40 34 45   ALKPHOS 89 110 91 102   BILITOTAL 1.8* 1.3 0.9 1.8*   ALBUMIN 2.4* 2.7* 2.3* 2.5*       Pancreatic Enzymes  No lab results found in last 7 days.    Coagulation Profile  No lab results found in last 7 days.    IMAGING:  Recent Results (from the past 24 hour(s))   XR Chest Port 1 View    Narrative    Portable chest    INDICATION: Endotracheal tube    COMPARISON: CT  6/19/2022    Findings: Patchy densities mostly in the upper lungs bilaterally.  Endotracheal tube in the mid thoracic trachea. NG/OG tube tip beyond  the inferior margin on the image. Heart size normal.    IMPRESSION low endotracheal intubation. Patchy densities in the upper  lungs mostly, as can represent aspiration or other infection.    EPIFANIO HUDSON MD         SYSTEM ID:  [id number]   XR Abdomen Port 1 View    Narrative    XR ABDOMEN PORT 1 VIEWS 6/15/2022 1:33 PM    CLINICAL HISTORY: og tube placement    COMPARISON: 6/20/2022    FINDINGS: Portable AP semiupright view of the abdomen. Gastric tube  sidehole is in the approximately at the level or slightly distal to  the GE junction. The gastric tube distal tip projects over the  stomach. Multiple air-filled loops of bowel without obstructive  pattern. Small bilateral pleural effusions, left greater than right.  Multifocal mixed interstitial and patchy airspace opacities, greatest  in the right hemithorax. Partially visualized spinal fusion hardware  in the lower lumbar spine.No acute osseous abnormality.      Impression    IMPRESSION:   1. Gastric tube with sidehole approximately the level or slightly  distal to GE junction. Recommend advancement by 1-2 cm.  2. Small bilateral pleural effusions. Multifocal mixed interstitial  and patchy airspace opacities, greatest in the right hemithorax. These  findings are better appreciated on chest CT 6/9/2022.    I have personally reviewed the examination and initial interpretation  and I agree with the findings.    MICHEAL ROCK MD         SYSTEM ID:  [id number]   CT Chest w Contrast   Result Value    Radiologist flags New pneumomediastinum (Urgent)    Narrative    EXAMINATION: Chest CT  6/15/2022 7:13 PM    CLINICAL HISTORY: h/o CLL, bronch shows external compression, checking  for nodules    COMPARISON: Chest CT 6/9/2022 and 5/27/2022.    TECHNIQUE: CT imaging obtained through the chest with contrast.  Axial,  coronal, and sagittal reconstructions and axial MIP reformatted images  are reviewed.     CONTRAST: iopamidol (ISOVUE-370) solution 68 mL    FINDINGS:  Lines and Tubes: Endotracheal tube 4.5 cm from the sommer. Enteric  tube with the tip in the stomach.    Lungs: There is no definite pneumothorax. Bilateral extensive  pulmonary consolidations with areas of septal thickening and  groundglass opacities (crazy paving). There is a a new peribronchial  vascular 2.2 x 1.2 cm nodular opacity (series 4 image 121), due to its  absence in immediate prior this is more consistent with an acute  infective/inflammatory. There is also lung hyperinflation with some  posterior displacement of the bilateral anterior and lower lungs due  to massive pneumomediastinum. There is no evidence of bronchial  narrowing in this study.    Mediastinum: Extensive pneumomediastinum with significant distention  of the mediastinum, particularly at the anterior lower mediastinum.  The thyroid is unremarkable. Cardiac size is normal. Normal caliber  aorta. The main pulmonary artery measures 3.7 cm in diameter. No  pericardial effusion. No significant coronary artery calcium. No  thoracic lymphadenopathy. Esophagus is normal in caliber.    Bones and soft tissues: New extensive subcutaneous emphysema tracking  superiorly to the patient's right neck and throughout the patient's  right chest wall. No suspicious bone findings.     Upper Abdomen: Limited evaluation of the abdomen. 6 mm  hyperattenuating hepatic lesion in the inferior border of the segment  VI (series 2 image 61), too small to characterize in this nondedicated  study. The gallbladder is partially distended and unremarkable.  Unremarkable spleen. Normal pancreatic parenchyma with borderline  pancreatic duct size. No adrenal nodules. 3.7 cm cortical renal cyst  in the upper pole of the right kidney. No evidence of small bowel  obstruction in the visualized abdomen.      Impression     IMPRESSION:   1. New extensive pneumomediastinum with significant distention of the  anterior inferior mediastinum, concerning for barotrauma.  2. Extensive subcutaneous emphysema.  3. Redemonstration of extensive pulmonary consolidation superimposed  to crazy paving pattern of pulmonary opacities. Now with new 2 cm  right upper lobe nodule.  4. Enlarged pulmonary artery suggestive of pulmonary hypertension.  5. Hyperattenuating 6 mm hepatic nodule, of limited evaluation in this  study. Consider workup as outpatient with gadolinium enhanced liver  MRI.    [Urgent Result: New pneumomediastinum]    Finding was identified on 6/15/2022 7:13 PM.     Dr. Petty was contacted by Dr. Radha Andrew at 6/15/2022 7:20 PM  and verbalized understanding of the urgent finding.    I have personally reviewed the examination and initial interpretation  and I agree with the findings.    MIKE OLSON MD         SYSTEM ID:  [id number]

## 2022-06-16 NOTE — PLAN OF CARE
Goal Outcome Evaluation:    Plan of Care Reviewed With: patient     Overall Patient Progress: no change    Outcome Evaluation: Remains intubated, decreasing presser requirements, FiO2 60%    ICU End of Shift Summary. See flowsheets for vital signs and detailed assessment.    Changes this shift: Remains intubated and sedated on 150 of fent and 3 of versed. Opens eyes to voice/touch. SR with frequent PACs, electrolytes checked, WNL. Levo running at 0.02. Amio continued at 0.5/hr. Vent settings remain unchanged. Subcutaneous emphysema in right upper chest/neck. Piña with adequate urine output.     Plan:  Continue to monitor and report any changes to team.

## 2022-06-16 NOTE — PROGRESS NOTES
CLINICAL NUTRITION SERVICES - REASSESSMENT NOTE     Nutrition Prescription    RECOMMENDATIONS FOR MDs/PROVIDERS TO ORDER:  --Additional water flushes and bowel regimen as per primary team.     Malnutrition Status:    Severe malnutrition in the context of acute on chronic illness    Recommendations already ordered by Registered Dietitian (RD):  TF:  --Enteral access: NJT  --Pending AXR confirmation of feeding tube position  --GOAL: Osmolite 1.5 Darian @ goal of 50ml/hr (1200ml/day) +2 Prosource will provide: 1880 kcals (30 kcal/kg), 97 g PRO (1.5 g/kg), 914 ml free H20, 244 g CHO, and 0 g fiber daily.  --Start TF @ 10 ml/hr and advance by 10 ml q 8 hrs until goal rate.  --Do not start or advance TF rate unless K+ >3.0, Mg++ > 1.5,  and Phos > 1.9.  --Minimum 30 ml q 4 hrs water flushes for tube patency.  --If gastric enteral access: HOB > 30 degrees  --Certavite, Thiamine (100 mg).     Future/Additional Recommendations:  --FT position.  --TF start, tolerance, advancement, lytes.       --If renal formula becomes indicated: Novasource Renal @ 35 ml/hr (840 ml) +2 Prosource provides 1760 kcal (28 kcal/kg), 98 g pro (1.6 g/kg), 154 g CHO, 602 ml free water, and 0 g fiber daily.   --Weight trends.       --Metabolic cart study once TF @ goal rate.    Consult received per provider for RD to order TF.    EVALUATION OF THE PROGRESS TOWARD GOALS   Diet: Currently NPO, previously NPO -> full liquids -> regular diet  Calorie counts:  6/10       Total Kcals: 0           Total Protein: 0g  6/11       Total Kcals: 0           Total Protein: 0g  6/12       Total Kcals: 0           Total Protein: 0g  6/14       Total Kcals: 337       Total Protein: 9g  6/15       Total Kcals: 0           Total Protein: 0g     NEW FINDINGS   Weight: driest weight 63 kg on 6/15 = 14% wt loss x 1 month  Labs: BUN 31 (H), Cr 1.22 (WNL), Phos 5.0 (H), K+ 4.3 (WNL), Lactic acid 3.3 (H)  Meds: vitamin B12 (100 mcg), marinol (on hold), folic acid, synthroid,  solumedrol, miralax, metamucil, senna-docusate BID, vitamin D3, epoprostenol, fentanyl, versed, norepi (off)  GI: last BM 6/14  Resp: intubated 6/15  Skin: stage 1 PI on chest per RN flowsheets - WOCN not currently following    NEW Dosing Weight: 63 kg [driest weight on 6/15]    ASSESSED NUTRITION NEEDS  Estimated Energy Needs: 9338-0147+ kcals/day (25 - 30+ kcals/kg)  Justification: repletion, vented  Estimated Protein Needs: 76-95+ grams protein/day (1.2 - 1.5+ grams of pro/kg)  Justification: repletion, increased needs    MALNUTRITION  % Intake: </=75% for >/= 1 month (severe)  % Weight Loss: > 5% in 1 month (severe)  Subcutaneous Fat Loss: Facial region:  moderate and Upper arm:  Mild to moderate  Muscle Loss: Temporal:  Moderate to severe, Thoracic region (clavicle, acromium bone, deltoid, trapezius, pectoral):  Moderate to severe, Upper arm (bicep, tricep):  moderate, Upper leg (quadricep, hamstring):  moderate and Patellar region:  moderate  Fluid Accumulation/Edema: None noted  Malnutrition Diagnosis: Severe malnutrition in the context of acute on chronic illness    Previous Goals   1. Pt to consistently consume at least ~1150 Kcals and 55 gm protein PO vs need to discuss enteral nutrition support   2. Weight not to fall below 66 kg  Evaluation: Not met    Previous Nutrition Diagnosis  Malnutrition related to poor appetite 2/2 nausea and acute illness as evidenced by variable intake meeting <60% of est energy needs and <50% est protein needs, weight loss >5% over past 1-month, mild to moderate muscle/fat depletion  Evaluation: Declining    CURRENT NUTRITION DIAGNOSIS  Malnutrition related to poor appetite 2/2 nausea, increased O2 requirements, now intubated as evidenced by poor PO intake <50% over at least the past 7 days, weight loss >5% over past 1-month, moderate to severe muscle/fat depletion.    INTERVENTIONS  Implementation  Collaboration with other providers - paula RN  Enteral Nutrition -  Initiate    Goals  Total avg nutritional intake to meet a minimum of 25-30 kcal/kg and 1.2-1.5 g PRO/kg daily (per dosing wt 63 kg).    Monitoring/Evaluation  Progress toward goals will be monitored and evaluated per protocol.    Kera Hightower MS, RD, LD, [facility] pager: [phone]  ASCOM: [id number]

## 2022-06-17 PROBLEM — N17.9 ACUTE KIDNEY FAILURE, UNSPECIFIED (H): Status: ACTIVE | Noted: 2022-01-01

## 2022-06-17 NOTE — PROGRESS NOTES
[facility]    ICU Progress Note       Date of Admission:  5/23/2022    Assessment: Critical Care   Loco Wood is a 77 year old male with PMH CLL, auto-immune hemolytic anemia (on prednisone taper), and CKD3a who was admitted to ICU on 06/10 for AHRF due to PJP pneumonia.    Changes today:  - Continue Amiodarone oral   - Increase PEEP from 8->10 given low P/F 120   >Repeat ABG to assess oxygenation s/p vent change Repeat P/F 140  - CXR for monitoring of pneumomediastinum  - Increase sliding scale insulin to high dose  - Continue TF titration  - Continue RASS goal to -1 to -2   - Continue amphotericin IV per ID with concern for Coccidiodes vs. Other opportunistic fungal infection  - IgG level check; IVIG if <400 per heme/onc (MICU to dose)  - 500 ml LR    PLAN:     Neuro:  # Pain, sedation  - Versed infusion + PRN bumps  - Fentanyl infusion + PRN bumps  - Acetaminophen 650 mg Q4H PRN  - Hold PTA Ambien  - Zyprexa 5 mg at bedtime PRN  - Melatonin 1 mg PO at bedtime PRN  -RASS goal -1 to -2     # Degenerative disc disease  # Bilateral intermittent hand cramping, spasms suspicious for cervical impingement  Patient with history of degenerative disc disorder with concern for cervical impingement in setting of new bilateral intermittent hand cramping. Patient met with Dr. Melton his sports medicine doctor (AM 5/24) over phone for follow up appointment to discuss results of his MRI.   - Will defer to outpatient management.      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 Resp viral panel negative. Patient  initially treated with 5 day abx course starting 5/24 with azithromycin (d/c 5/26) + ceftriaxone for presumed CAP with clinical improvement. On 5/27, patient developed fever with worsening respiratory status (45 L high flow NC) and was transferred to [facility] [place]. Patient improving with IVF 2.5L and broadened coverage with Zosyn, Micafungin (positive B-Glucan), and IV bactrim (for possible PJP) O2 req of 12-15L, with increase to HFNC on 6/1. CXR 5/31 with trace effusions and increasing apical opacities concerning for worsening edema/infection. PJP PCR 5/27 positive. Karius 6/1 + PJP and E. Faecium (less likely pathogenic as not growing on other cultures). On 6/9, worsening hypoxia with paO2 59 on 100% 50L. Patient intubated 6/15 for worsening respiratory status. Repeat chest CT showed persistence of bilateral consolidations and a new RUL nodule despite treatment with 1st and 2nd line for PJP. BAL was obtained 6/15 and as of 6/17, all cultures and assays have been negative however serum Galactomannan Agn resulted positive; ID concerned for new fungal process specifically Coccidiodes given recent travel to [place] and fungal studies are pending (see RUL nodule). Liposomal amphotericin was started 6/16. BAL flow cytometry was obtained per Heme/On and results showed low suspicion for pulmonary CLL involvement however biopsy is the only definitive study; BAL cytology is pending as of 6/17.   - Continue clindamycin and primaquine. G6PD levels were elevated (ok for primaquine).  - Follow BAL results; negative to date  - Methylpred 40 mg Q12H  - Current regimen: clindamycin, primaquine, fluconazole; also on acyclovir for ppx while on steroids  - Continue liposomal amphotericin 5mg /kg/day per ID for fungal coverage  - Discontinued: micafungin, levofloxacin, zosyn, linezolid, cefepime, Bactrim   - Intubated 06/15 due to desaturation while on BiPAP  - Increase PEEP 8->10   > repeat ABG with change in PEEP  - Epoprostenol 20 ng/kg/min  (started 6/15)    Vent Mode: CMV/AC  (Continuous Mandatory Ventilation/ Assist Control)  FiO2 (%): 70 %  Resp Rate (Set): 30 breaths/min  Tidal Volume (Set, mL): 400 mL  PEEP (cm H2O): 8 cmH2O  Resp: 30    #RUL nodule  CT scan 6/15 revealed RUL nodule deemed most likely infectious vs inflammatory per radiology. ID is following and is concerned about coccidioides infection given immunosuppressed state and recent travel to [place].  - ID following; recs below   > Stop micafungin   > Start empiric liposomal amphotericin 5 mg/kg/day. Will need to monitor creatinine and electrolytes closely and replete K and Mg as needed. To reassess need in coming days.   > Blastomyces and Histoplasma antigens, Coccidioides antigen ([facility]), Coccidioides antibody (ID and CF to [facility]), and Paracoccidioides antibody ([facility]) pending   > blood culture, fungal blood culture, and cryptococcal antigen negative as of 6/17   >If he develops a fever obtain blood cultures and add cefepime or pip/tazo    #Pneumomediastinum   CT 6/15 showed new large pneumomediastinum with extensive subcutaneous emphysema. Hemodynamics have been improving and it is not  a tension pneumomediastinum. Patient have a current PJP which is known to cause pneumothoraces which may be the cause of the patient's presentation. Pneumo could be 2/2 barotrauma.  - Increase PEEP 8->10 given P/F 120 this AM  - CXR to assess pneumomediastinum  - CTM    Cardiovascular:  # Hypotension  Shortly after intubation, likely due to sedation. As of AM 6/17, not requiring pressors.  - NE gtt ordered if needed  - MAP goal >65  - Sedation per Neuro section above     # AF with RVR spontaneously - resolved  # Sinus tachycardia with frequent PACs  No known history of AF. Echo unremarkable. Patient has been experniencing ST with PACs without hemodynamic instability. Transitioned from IV amio to oral amio 6/16 given no Afib since initial episode.  - On heparin subcutaneous ppx  - Continue  amiodarone gtt to oral; taper at 400mg  - CTM ectopy  - Replete electrolytes per protocol     # HLD  - Continue PTA atorvastatin     GI/Nutrition:  # Severe malnutrition in the context of acute on chronic illness  - Dietician consulted, appreciate recs  - Tube feeds: Osmolite 1.5 @ 40 ml/hr (goal 50 ml/hr)  - Supplements per dietician recs  - Vitamins per dietician recs  - FWF 30 ml Q4H     # GERD  - Continue PTA omeprazole + PRN famotidine (also on Pred)     Renal/Fluids/Electrolytes:  # Lactic acidosis  Persistent lactic acidosis 3-4 despite antimicrobial treatment and IVF boluses. Appears hemodynamically stable with no evidence of hypoperfusion. Discussed with hem/onc, can sometimes see in malignancies, but unlikely in reasonably controlled CLL. Hepatic function wnl, suggesting against clearance issue. Resolved with additional IVF 5/30. Increased to 4.1 on 6/3, improved with addition of antibiotics above. Lactate is persistently elevated which may be associated with his CLL and recent hypoxia and hypotension.  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 Trialed 500 ml LR 6/7, however, sodium studies suggestive of SIADH. Now ~ 129-131. NT pro BNP and TTE without evidence of volume overload.  - Change IV medication carriers to NS      # RICK on CKD Stage 3A resolved  Patient has worsening RICK with Cr bump from 1.11->1.53 with a BUN:Cr of 32.6. On 6/16 the patient's urine output was inadequate at 0.62ml/kg/hr and the patient is net -9000 throughout this admission from a fluid restriction (see above). RICK is likely prerenal.  Baseline ~1.3-1.6.  - 500ml bolus of LR (5/17)  - Assess Cr tomorrow  - Avoid nephrotoxic agents as able  - strict I/O  - Daily weights please     Endocrine:  # Hypothyroidism  TSH 1.94 on admission WNL.   - Continue PTA levothyroxine    #Hyperglycemia, steroid  induced  Patient has had elevated BG starting 6/16 likely related to glucocorticoid use for PJP. 6/16 the patient was started on medium dose sliding scale insulin however BG persisted 150-250.  - Hypoglycemia protocol per ICU routine  - goal blood glucose < 180  - Increase to high dose sliding scale insulin; consider basal insulin when tube feeding at goal    ID:  # E faecium on Karius assay  # Severe sepsis, improved  # Positive beta D glucan >500  # PJP pneumonia  - Discussed with ID and onc, will switch to second line treatment for PJP with clindamycin and primaquine.  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 ID is following and has concern for fungal lesion given immunosuppression. Serum galactomannan Agn resulted positive 6/17 however utility of this test questionable. Fungal workup was initiated per ID and BAL results have returned negative to date for infectious or malignant etiology.   - ID following; workup and tx per above (pulmonary)    # Hx Herpes Zoster c/b post-herpetic neuralgia  - PTA ppx Acyclovir while on steroids     Hematology:    # CLL (dx 2/2007)  History of CLL (2007) managed on Ibrutinib (5/2019) which patient is not taking daily. WBC 24.9 on admission (up from baseline ~6-10) now down to ~21. Suspect secondary to recent infection with lower concern for conversion to leukemia. IVIG infusion (5/25). Following FISH and cytogenetics for disease prognostication and will continue to monitor with peripheral smear. Heme onc following and does not feel CLL is playing a role in current acute illness with the exception of potentially low IgG. Give IVIg 0.4 g/kg per hem/onc 06/12.  - Continue daily ibrutinib to help control  disease, increased bioavailability with addition of fluconazole 6/2. CT A/P without evidence of CLL metastasis and flow cytology of BAL not cw pulmonary CLL involvement.   - Hematology/Oncology following                     - PTA ibrutinib, pursuing prior authorization for venotoclax              -  cytogenetics, IgH mutation, TP53 mutation   - IgG levels ordered 6/17; recommending IVIg if <400  - Await bronch biopsy results  - Heme/Onc outpatient follow up on discharge    # Concern forTumor lysis syndrome  Patient had elevated phos and low Ca 6/16 concerning for TLS. Uric acid was ordered and returned normal 6/16 and 6/17 making TLS less likely cause of electrolyte derangements.  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 B12, folate, iron panel normal with slightly elevated YEN levels. Labs concerning for marrow responsive anemia (elevated LDH, bilirubin, reticulocyte count). Will likely need to treat underlying CLL for long-term solution.    Hgb on admission 9.5 down from baseline (~10-12 past year) and now ~8. Currently hemodynamically stable and suspect recent drop in Hgb secondary to dilutional anemia in setting of 2.5L of IVF given (5/27). Methylpred decreased and rituximab held in setting of recent infection. Haptoglobin remains elevated, suggesting against worsening hemolytic anemia..   - Heme/Onc consulted              - Continue Methylprednisolone 40mg (Q12H)              - Hold Rituximab infusion              - Check uric acid, LDH, Mg, Phos, and retic count q48h               - Continue allopurinol for TLS prevention    - Hep B serologies (Core, Surface Ab +): Hep B ag negative, DNA quant negative   - Continue PTA  Folate, B12   - Transfuse if Hgb < 7      Musculoskeletal:  # Weakness/Deconditioning  - PT/OT following     Skin:  # Concern for sacrum/iliac pressure  - pressure dressing in place  - Pressure minimizing interventions per ICU routine  - Consider WOC consult     General Cares/Prophylaxis:    DVT Prophylaxis: Heparin SQ  GI Prophylaxis: PPI  Restraints: NA  Family Communication: Sister April updated by phone  Code Status: Full code     Lines/tubes/drains:  - PIV x3  - CVC TL right femoral  - ETT 7.5mm  - Arterial line  - NJ left nostril  - Evans      Disposition:  - Medical ICU    Patient seen and findings/plan discussed with medical ICU staff, Dr. Reyes.    Milady Cheung, APRN, CNP  _________________________________________________________    Interval History   Loco was resting comfortably this morning on exam. Overnight his pressor requirements were decreasing and his AM ABG showed adequate oxygenation and ventilation. Concern for increasing subcutaneous emphysema overnight.    Physical Exam   Vital Signs: Temp: 97.4  F (36.3  C) Temp src: Axillary BP: 120/67 Pulse: 115   Resp: 30 SpO2: 96 % O2 Device: Mechanical Ventilator    Weight: 146 lbs 9.69 oz     Constitutional: Sedated, resting in bed, NAD, responds to stimuli  Respiratory: Decreased breath sounds throughout, tolerating the vent well, crepitus on ausculation of R lung  Cardiovascular: RRR without MGR, Hamman's sign present over precordium  Chest: significant crepitus noted to palpation of R upper chest wall with presence of subcutaneous air descending into upper abdomen and upper border of mid-neck; appears stable from prior exam  GI: Abdomen soft, non distended  : evans catheter in place  Neurologic: Sedated, opens eyes to name, unable to follow commands.    Data   I reviewed all medications, new labs and imaging results over the last 24 hours.  Arterial Blood Gases   Recent Labs   Lab 06/17/22  0347 06/16/22  1944 06/16/22  1628 06/16/22  1456   PH 7.32*  7.29* 7.35 7.31*   PCO2 50* 52* 48* 55*   PO2 84 74* 156* 78*   HCO3 26 25 26 27       Complete Blood Count   Recent Labs   Lab 06/17/22  0347 06/16/22  0359 06/15/22  0439 06/14/22  0412   WBC 42.4* 39.1* 29.8* 35.6*   HGB 9.2* 9.4* 8.8* 9.3*    163 145* 147*       Basic Metabolic Panel  Recent Labs   Lab 06/17/22  0748 06/17/22  0351 06/17/22 0347 06/17/22  0011 06/16/22  0723 06/16/22  0359 06/15/22  2350 06/15/22  2009 06/15/22  1530 06/15/22  1456   NA  --   --  134  --   --  136  --  134  --  133  133   POTASSIUM  --   --  4.2  --   --  4.3  --  4.4  --  4.4  4.4   CHLORIDE  --   --  99  --   --  99  --  97  --  97  97   CO2  --   --  26  --   --  27  --  28  --  23  28   BUN  --   --  50*  --   --  31*  --  28  --  25  26   CR  --   --  1.53*  --   --  1.22  --  1.53*  --  1.11  1.13   * 230* 211* 215*   < > 160*   < > 136*   < > 162*  170*    < > = values in this interval not displayed.     Uric Acid   Date Value Ref Range Status   06/17/2022 4.6 3.5 - 7.2 mg/dL Final   04/29/2015 5.0 3.5 - 7.2 mg/dL Final     Liver Function Tests  Recent Labs   Lab 06/17/22  0347 06/16/22  0359 06/15/22  1456 06/15/22  0439   AST 12 17 23 16   ALT 27 33 40 34   ALKPHOS 78 89 110 91   BILITOTAL 0.8 1.8* 1.3 0.9   ALBUMIN 2.4* 2.4* 2.7* 2.3*     Micro:      Pancreatic Enzymes  No lab results found in last 7 days.    Coagulation Profile  No lab results found in last 7 days.    IMAGING:  Recent Results (from the past 24 hour(s))   XR Abdomen Port 1 View    Narrative    Exam: XR ABDOMEN PORT 1 VIEWS, 6/16/2022 1:40 PM    Indication: Verify small bowel feeding tube bedside placement    Comparison: 6/15/2022    Findings: Portable abdominal radiograph. Feeding tube is coiled in the  proximal stomach with indeterminate position of the tip, more likely  projecting over the distal duodenum versus coiling back into the  stomach.  No pneumatosis. No portal venous gas. No obstructive bowel  gas pattern.  Postoperative changes of lumbar fusion and bilateral  total hip arthroplasties. No evidence of hardware failure on single  view. Multiple pelvic phleboliths. Vascular catheter projecting over  the right iliac vessels. No acute osseous abnormalities. Degenerative  changes of the lumbar spine.      Impression    Impression: Feeding tube is coiled in the proximal stomach with  indeterminate position of the tip, more likely projecting over the  distal duodenum versus coiling back into the stomach. Recommend  radiograph post contrast instillation through the feeding tube for  further characterization.    I have personally reviewed the examination and initial interpretation  and I agree with the findings.    MIKE RIVERA MD         SYSTEM ID:  [id number]   XR Chest Port 1 View    Narrative    Exam: XR CHEST PORT 1 VIEW, 6/16/2022 4:08 PM    Indication: pneumomediastinum with increasing 02 needs    Comparison: CT and chest x-ray 6/15/2022    Findings:   ET tube tip over the mid thoracic trachea. Feeding tube courses past  the diaphragm with tip excluded from the field-of-view. Extensive  chest wall, and lower neck subcutaneous emphysema. Likely increased  subcutaneous air in the left chest wall. Continued pneumomediastinum.  Heart size is within normal limits. No appreciable pneumothorax.  Continued mixed bilateral opacities and indistinct pulmonary  vasculature.      Impression    Impression:   1. Continued pneumomediastinum, chest wall and lower neck subcutaneous  gas.  2. No appreciable pneumothorax.  3. Continued extensive bilateral pulmonary opacities suggesting  airspace edema and/or superimposed infection.    I have personally reviewed the examination and initial interpretation  and I agree with the findings.    AMANDA BAIRES MD         SYSTEM ID:  [id number]   XR Chest Port 1 View    Impression    RESIDENT PRELIMINARY INTERPRETATION  IMPRESSION:   1.  No PICC visualized.  2.  Additional support devices in stable  position.  3.  Right greater than left mixed interstitial airspace opacities and  extensive subcutaneous emphysema and pneumomediastinum are unchanged.

## 2022-06-17 NOTE — PROGRESS NOTES
[facility]  Transplant Infectious Disease Progress Note     Patient:  Loco Wood, Date of birth 1945, Medical record number [id number]  Date of Visit:  06/17/2022         Assessment and Recommendations:   77 year old man with CLL and AIHA s/p prednisone courses in 12/2021 from derm, 1/2022 for lung symptoms in [place], in 3/2022 for anemia, then most recently for 3 weeks & 6 weeks for AIHA who is being treated for severe Pneumocystis pneumonia.Hospitalization complicated by prolonged hypoxic respiratory failure eventually requiring intubation on 6/15. Also developed pneumomediastinum and subcutaneous emphysema from barotrauma.    Refractory, Severe PJP pneumonia: 5/27/22 CT chest showed progressive bilateral groundglass opacities. 5/27/2022 BAL Pneumocystis sputum PCR +, in the setting of a progressive pulmonary process. Elevated Fungitell >500 & LDH are also consistent with Pneumocystis. Had 1 dose of neb pent on 5/26/2022. IV bactrim started 5/27/2022. Other infectious work up has including negative MRSA nares, Cryptococcal antigen, EBV PCR, CMV PCR, Coccidiodes antigen, fungal antibodies, Histoplasma antigen, Blastomyces antigen,TB quantiferon, Adenovirus PCR, Toxoplasma antbibody. Heena demonstrated 6336 copies of Pneumocystis jirovecii and 353 copies of E faecium. He has prolonged issues with hypoxic respiratory failure on HF and Bipap but required intubated on 6/15.   Initially received tmp/smx from 5/27 to 6/10 (~2 weeks). Patients with hematologic malignancies can be slow to respond to PJP treatment I would expect some response within in 2 weeks. 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 It is more challenging to change host factors but there is also concern for treatment failure and/or another issue contributing to it (ARDS, organizing pneumonia, CLL?). Due to concern for PJP treatment failure tmp/smx  was changed to clindamycin + primaquine on 6/10/22. There is emerging evidence of the utilization of micafungin with PJP so her received micafungin until 6/16.   6/15 CT demonstrated pneumomediastinum, subcutaneous emphysema and new RML nodule. PJP DFA positive for PJP and 1-3 BD glucan > 500.  Due to the concern of a new pulmonary nodule added liposomal amphotericin on 6/16 (see below). Will continue primaquine + clindamycin.  Unfortunately PJP in this patient population is associated with a high mortality rate.  --6/15 BAL 54 nucleated cells, 55% pmn. Positive PJP DFA. Negative: Gram stain, KOH, CMV, respiratory bacterial cx, RVP, HSV. Pending: AFB, Actinomyces cx, Nocardia cx, fungus cx, PJP DFA, Mycoplasma PCR, Legionella PCR, EBV PCR, Asp gm, cytology. Flow cytometry showed rare CD5 positive lambda monotypic B cells (? 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 Initially had been on fluconazole 6/2-6/8. It was discontinued when Coccidioides antigen and fungal antibodies were negative. With the development of a RUL nodule and clinical decompensation repeat fungal testing including Coccidioides antigen and antibody testing are pending. Serum Aspergillus gm is positive at 1.42. BAL Aspergillus gm. Previous Aspergillus testing was negative so this may not be the primary  in his declined respiratory status. Empirically started on liposomal amphotericin on 6/16. Will keep on for a few days and consider transitioning amphotericin to voriconazole if Coccidiodes testing is negative.    Leukocytosis/History of CLL/AIHA:  WBC up trending over the past week with increased lymphocytes. There is concern for CLL progression based on flow. Previously taking ibrutinib at home weekly but now on daily. He is has been methylprednisolone since 5/24 with a dose of 40 mg daily since 5/28-6/10. Dose increased on 6/10 to 40 mg IV q 8 hours. Also on ibrutinib for  CLL-dose decreased 6/11/22. Plans to receiving IVIG. Hematology discussing other CLL treatment options as well. Heme/onc does not think recent CLL is contributing to pulmonary issues based on BAL flow.    E faecium on Karius: s/p 4 days of linezolid. No isolated on clinical cultures.    History of herpes zoster: on acyclovir    - Hep B core ab & S ab+ 5/26/2022, with negative hep B viral load.  - Extensive travel hx.     - QTc interval: 427 msec on 6/2/2022 EKG  - Bacterial prophylaxis: antibiotics stopped 6/9/22.  - Pneumocystis prophylaxis: pent 5/26/2022, treatment bactrim 5/27/2022. G6PD normal.  - Viral serostatus & prophylaxis: HSV1+, HSV2+, CMV+; acyclovir prophy  - Fungal prophylaxis: micafungin 5/27/2022-6/7/22, 6/10-C  - Immunization status:3 covid vaccinations.   - Gamma globulin status: 30g IVIG 5/25/2022 and 6/12/22  - Isolation status: Good hand hygiene.    Recommendations:  1. Continue clindamycin 900 mg IV q 8 hours + primaquine 30 mg daily. Primaquine can be crushed. (BAL PJP DFA and 1-3 BD glucan positive)   2. Continue liposomal amphotericin 5 mg/kg/day. Monitor creatinine and electrolytes closely and replete K and Mg as needed.   3. Will reassess need for amphotericin in the upcoming days based on clinical status, lab results and tolerance. If Coccidioides testing is negative then will likely transition amphotericin to voriconazole to cover Aspergillus.   4. Pending urine Blastomyces and Histoplasma antigens, Coccidioides antigen ([facility]) and antibody (ID and CF to [facility]), Paracoccidioides antibody ([facility]), blood culture, fungal blood culture  5. If he has a fever obtain blood cultures and add cefepime or pip/tazo  6. Dose of steroids per MICU and heme/onc teams    HSCT/HM ID service will continue to follow. Barry Leblanc (pager [phone]) will start coverage for the service over the weekend. Please call with questions or concerns.     Marybeth Anderson DO.   Infectious Diseases Attending  Pager  [phone]        Interval History:   Increased FiO2 requirements to 70% this morning. Issues with titrating PEEP. Not on pressors. Remains on epoprostenol. Started amphotericin at 5 mg/kg/d. Aspergillus gm via BAL and serum positive. PJP DFA also positive. No chest tube placed yet for pneumomediastinum.     Review of Systems: unable to obtain due to intubation       Current Medications & Allergies:       sodium chloride 0.9%  500 mL Intravenous Q24H     acetaminophen  650 mg Oral or Feeding Tube Q24H     acyclovir  800 mg Oral or Feeding Tube BID     allopurinol  300 mg Oral or Feeding Tube Daily     amiodarone  400 mg Oral or FT or NG tube BID    Followed by     [START ON 6/20/2022] amiodarone  200 mg Oral or FT or NG tube BID    Followed by     [START ON 6/22/2022] amiodarone  200 mg Oral or FT or NG tube Daily     dextrose 5% water  10-20 mL Intravenous Q24H    And     amphotericin B liposome (AMBISOME) intermittent infusion  5 mg/kg (Dosing Weight) Intravenous Q24H    And     dextrose 5% water  10-20 mL Intravenous Q24H     atorvastatin  20 mg Oral or Feeding Tube Daily     clindamycin  900 mg Intravenous Q8H     cyanocobalamin  100 mcg Oral or Feeding Tube Daily     diphenhydrAMINE  25 mg Oral Q24H    Or     diphenhydrAMINE  25 mg Intravenous Q24H     [Held by provider] dronabinol  2.5 mg Oral Daily     folic acid  1 mg Oral or Feeding Tube Daily     heparin ANTICOAGULANT  5,000 Units Subcutaneous Q12H     ibrutinib (IMBRUVICA) oral suspension (site prepared) in water  280 mg Oral or FT or NG tube Daily     insulin aspart  1-22 Units Subcutaneous Q4H     levothyroxine  50 mcg Oral or Feeding Tube Daily     methylPREDNISolone  40 mg Intravenous Q12H     multivitamins w/minerals  15 mL Per Feeding Tube Daily     pantoprazole  20 mg Oral or Feeding Tube QAM     polyethylene glycol  17 g Oral or Feeding Tube Daily     primaquine  30 mg Oral or Feeding Tube Daily     protein modular  1 packet Per Feeding Tube BID      psyllium  1 packet Oral or Feeding Tube BID     senna-docusate  2 tablet Oral or Feeding Tube BID    Or     senna-docusate  1 tablet Oral or Feeding Tube BID     sodium chloride (PF)  3 mL Intracatheter Q8H     sodium chloride (PF)  3 mL Intracatheter Q8H     thiamine  100 mg Per Feeding Tube Daily     vitamin D3  50 mcg Oral or Feeding Tube Daily       Allergies   Allergen Reactions     Blood Transfusion Related (Informational Only) Other (See Comments)     Patient has a history of a clinically significant antibody against RBC antigens.  A delay in compatible RBCs may occur.      Morphine Itching and Rash     Dilaudid [Hydromorphone] Itching            Physical Exam:   Ranges for vital signs:  Temp:  [97.4  F (36.3  C)-98.6  F (37  C)] 97.7  F (36.5  C)  Pulse:  [] 111  Resp:  [30-34] 30  BP: (120)/(67) 120/67  MAP:  [63 mmHg-91 mmHg] 73 mmHg  Arterial Line BP: ()/(50-69) 100/58  FiO2 (%):  [55 %-70 %] 55 %  SpO2:  [92 %-100 %] 94 %  Vitals:    06/15/22 1300 06/16/22 0000 06/17/22 0000   Weight: 63 kg (138 lb 14.2 oz) 65.4 kg (144 lb 2.9 oz) 66.5 kg (146 lb 9.7 oz)     Physical Examination:  GENERAL:  Chronically ill appearing  HEAD:  Head is normocephalic, atraumatic   LUNGS: diminished breath sounds bilaterally. subcutaneous air appreciated on the right chest. 50% FiO2.  CARDIOVASCULAR:  RRR, no murmus  SKIN:  No acute rashes.     EXTREMITIES: No joint erythema or swelling.   NEUROLOGIC:  Intubated. Sedated.  LINES: CVC right femoral, peripheral IV, A line place without any surrounding erythema or exudate. Dressing intact.          Laboratory Data:     Metabolic Studies       Recent Labs   Lab Test 06/17/22  1642 06/17/22  0351 06/17/22  0347 06/16/22  0723 06/16/22  0359 06/15/22  2350 06/15/22  2143 06/15/22  1941 06/15/22  1719 06/15/22  1248 06/15/22  0439 06/03/22  2202 06/03/22  1801   NA  --   --  134  --  136  --   --    < >  --    < > 135   < >  --    POTASSIUM  --   --  4.2  --  4.3  --    --    < >  --    < > 4.3   < >  --    CHLORIDE  --   --  99  --  99  --   --    < >  --    < > 102   < >  --    CO2  --   --  26  --  27  --   --    < >  --    < > 26   < >  --    ANIONGAP  --   --  9  --  10  --   --    < >  --    < > 7   < >  --    BUN  --   --  50*  --  31*  --   --    < >  --    < > 26   < >  --    CR  --   --  1.53*  --  1.22  --   --    < >  --    < > 0.98   < >  --    GFRESTIMATED  --   --  47*  --  62  --   --    < >  --    < > 80   < >  --    *   < > 211*   < > 160*   < >  --    < >  --    < > 134*   < >  --    DAVID  --   --  8.2*  8.2*  --  8.4*  --   --    < >  --    < > 8.6  8.6   < >  --    PHOS  --   --  5.6*  --  5.0*  --   --   --   --    < > 3.4   < >  --    MAG  --   --  2.5*  --  2.1  --   --    < >  --    < > 2.0   < >  --    LACT  --   --   --   --   --   --  3.3*  --  3.5*  --  2.5*   < >  --    PCAL  --   --   --   --   --   --   --   --   --   --   --   --  0.07*   FGTL  --   --   --   --   --   --   --   --   --   --  >500   < >  --     < > = values in this interval not displayed.       Hepatic Studies    Recent Labs   Lab Test 06/17/22  0347 06/16/22  0359 06/15/22  1456 06/15/22  0439 06/14/22  0412 06/08/22  0445 06/07/22  0336 06/06/22  0440   BILITOTAL 0.8 1.8* 1.3   < > 1.8*   < > 0.8 0.8   DBIL  --   --   --   --  0.4*  --   --   --    ALKPHOS 78 89 110   < > 102   < > 99 92   PROTTOTAL 5.5* 5.6* 6.3*   < > 5.9*   < > 5.6* 5.3*   ALBUMIN 2.4* 2.4* 2.7*   < > 2.5*   < > 2.6* 2.4*   AST 12 17 23   < > 24   < > 43 30   ALT 27 33 40   < > 45   < > 42 33   LDH  --   --   --   --   --   --  348* 322*    < > = values in this interval not displayed.       Hematology Studies      Recent Labs   Lab Test 06/17/22  0347 06/16/22  0359 06/15/22  0439 06/14/22  0412 06/13/22  0410 06/12/22  0401   WBC 42.4* 39.1* 29.8* 35.6* 35.9* 49.6*   ANEU 17.0* 19.2* 12.2* 16.4* 19.4* 17.4*   ALYM 25.4* 19.6* 17.6* 19.2* 16.5* 32.2*   ANY 0.0 0.4 0.0 0.0 0.0 0.0   AEOS 0.0 0.0 0.0  0.0 0.0 0.0   HGB 9.2* 9.4* 8.8* 9.3* 9.5* 10.0*   HCT 28.4* 28.7* 30.2* 28.0* 29.0* 30.2*    163 145* 147* 145* 169       Arterial Blood Gas Testing    Recent Labs   Lab Test 06/17/22  1008 06/17/22  0347 06/16/22  1944 06/16/22  1628 06/16/22  1456   PH 7.32* 7.32* 7.29* 7.35 7.31*   PCO2 51* 50* 52* 48* 55*   PO2 99 84 74* 156* 78*   HCO3 26 26 25 26 27   O2PER 70 70 60 80 80      Inflammatory Markers    Recent Labs   Lab Test 06/10/22  0448 06/08/22  0445 06/03/22  0423 06/02/22  0451 10/04/17  2250 04/11/16  1136   SED  --   --   --   --  1  --    CRP 11.0* 19.0* 82.0* 100.0*  --  <0.2  Reference Values:   Low Risk:           <1.0 mg/L   Average Risk:       1.0-3.0 mg/L   High Risk:          >3.0 mg/L   Acute Inflammation: >8.0 mg/L         Immune Globulin Studies     Recent Labs   Lab Test 06/12/22  0401 06/03/22  0856 05/24/22  1651 06/12/19  0806 05/31/19  1021 04/30/19  1219 04/29/15  1012 09/19/14  1227   * 446* 312* 394* 409* 429*   < > 729   IGM  --   --   --   --   --   --   --  28*   IGA  --   --   --   --   --   --   --  72    < > = values in this interval not displayed.       Gout Labs      Recent Labs   Lab Test 06/17/22  0347 06/16/22  0359 06/15/22  0439 06/13/22  0410 06/11/22  0353   URIC 4.6 3.5 3.0* 3.9 3.0*     Microbiology:  Fungal testing  Recent Labs   Lab Test 06/15/22  0439 06/07/22  1420 05/27/22  1356 05/27/22  0916 05/23/22  1802   FGTL >500 >500  --   --  >500   ASPGAI 1.42  --  0.03  --   --    ASPGAA Positive*  --  Negative  --   --    COFUNG  --   --   --  <1:2  --        Last Culture results with specimen source  Culture   Date Value Ref Range Status   06/16/2022 No growth after 1 day  Preliminary   06/16/2022 No growth after 1 day  Preliminary   06/15/2022 No growth after 1 day  Preliminary   06/15/2022 No Growth  Final   06/15/2022 No growth after 1 day  Preliminary   06/15/2022 No Actinomyces species isolated after 1 day  Preliminary   06/13/2022 No growth after  4 days  Preliminary   06/13/2022 No growth after 4 days  Preliminary   06/03/2022 No Growth  Final   06/03/2022 No Growth  Final   06/02/2022 No growth after 15 days  Preliminary   05/27/2022 2+ Normal aracely  Final   05/27/2022 No Growth  Final   05/27/2022 No Growth  Final   05/26/2022 No Growth  Final   05/26/2022 No Growth  Final     Culture Micro   Date Value Ref Range Status   03/02/2014 No growth  Final   10/22/2010 No Beta Streptococcus isolated  Final   04/21/2010   Final    No Salmonella, Shigella, Campylobacter or E coli 0157 isolated.   04/18/2010 No growth  Final   03/16/2009 No Beta Streptococcus isolated  Final   01/13/2007 No growth  Final   04/06/2004 No Beta Streptococcus isolated  Final        Virology:  Coronavirus-19 testing    Recent Labs   Lab Test 06/07/22  0903 05/31/22  0241 05/23/22  1946 06/23/20  0843   [id number]  --   --   --  Negative   EJO78NKS  --   --   --  Not Applicable   [id number] Negative Negative Negative  --        Respiratory virus testing    Recent Labs   Lab Test 06/15/22  1618 05/24/22  1916 05/24/22  1916 05/23/22  1946   IFLUA Not Detected   < > Not Detected  --    INFZA  --   --   --  Negative   FLUAH1 Not Detected   < > Not Detected  --    [id number] Not Detected   < > Not Detected  --    FLUAH3 Not Detected   < > Not Detected  --    IFLUB Not Detected   < > Not Detected  --    INFZB  --   --   --  Negative   PIV1 Not Detected  --  Not Detected  --    PIV2 Not Detected  --  Not Detected  --    PIV3 Not Detected  --  Not Detected  --    PIV4 Not Detected   < > Not Detected  --    IRSV  --   --   --  Negative   RSVA Not Detected   < > Not Detected  --    RSVB Not Detected   < > Not Detected  --    HMPV Not Detected  --  Not Detected  --    ADENOV Not Detected   < > Not Detected  --    CORONA Not Detected   < > Not Detected  --     < > = values in this interval not displayed.       CMV viral loads    Recent Labs   Lab Test 06/15/22  1618 06/01/22  1448   CMVQNT Not Detected Not  Detected       EBV DNA Copies/mL   Date Value Ref Range Status   06/02/2022 Not Detected Not Detected copies/mL Final     Urine Studies     Recent Labs   Lab Test 06/02/22  0952 05/26/22  2240 05/23/22  1906 05/19/22  1355 01/04/21  1744   URINEPH 6.5 5.5 6.5 7.0 5.5   NITRITE Negative Negative Negative Negative Negative   LEUKEST Negative Negative Negative Negative Negative   WBCU 2 2 1 0-5 1     Imaging:  XR Chest Port 1 View  Narrative: XR CHEST PORT 1 VIEW  6/17/2022 6:15 AM      HISTORY: progression of pneumomediastinum    COMPARISON: Chest x-ray 6/16/2022. CT chest 6/15/2022.    FINDINGS:   Portable AP 45 degree upright chest x-ray. Endotracheal tube tip  terminates over the mid trachea. Enteric tube is partially visualized  curled over the stomach.    Trachea is midline. Cardiac silhouette is within normal limits.    Bony vasculature is indistinct. Right greater than left mixed  interstitial and airspace opacities are unchanged. Lung apices are  obscured by extensive subcutaneous emphysema and pneumomediastinum  which is unchanged when compared to prior exam. No appreciable  pneumothorax    Visualized osseous structures and soft tissues are unremarkable.  Impression: IMPRESSION:   1.  Extensive subcutaneous emphysema and pneumomediastinum, unchanged.  No appreciable pneumothorax.  2.  Right greater than left mixed interstitial airspace opacities are  not significant changed.    I have personally reviewed the examination and initial interpretation  and I agree with the findings.    AMANDA BAIRES MD         SYSTEM ID:  [id number]  XR Chest Port 1 View  Narrative: XR CHEST PORT 1 VIEW  6/17/2022 9:16 AM      HISTORY: picc    COMPARISON: Chest x-ray same day.    FINDINGS:   Portable AP supine chest x-ray. Endotracheal tube tip projects over  the mid trachea. Enteric tube is coiled over the stomach with tip  projecting out of the field-of-view. No PICC visualized.    Trachea is midline. Cardiac silhouette is within  normal limits.  Pulmonary vasculature is indistinct. Stable right greater than left  mixed interstitial and airspace opacities. No pleural effusion. No  appreciable pneumothorax.    Visualized osseous structures are unremarkable. Extensive subcutaneous  emphysema and pneumomediastinum are unchanged.  Impression: IMPRESSION:   1.  No PICC visualized.  2.  Additional support devices in stable position.  3.  Right greater than left mixed interstitial airspace opacities and  extensive subcutaneous emphysema and pneumomediastinum are unchanged.    I have personally reviewed the examination and initial interpretation  and I agree with the findings.    AMANDA BAIRES MD         SYSTEM ID:  [id number]

## 2022-06-17 NOTE — PLAN OF CARE
Goal Outcome Evaluation:    Plan of Care Reviewed With: patient     Overall Patient Progress: no change     ICU End of Shift Summary. See flowsheets for vital signs and detailed assessment.    Changes this shift: No acute changes overnight. Fentanyl titrated up to 150 d/t patient coughing/gagging on tube as well as dyssynchrony with the vent. PF ratio this morning 120, team aware. SR-ST 90s-100s with frequent PACs. Tube feed titrated up per protocol. Piña with borderline/low urine output team aware no changes.     Plan: Continue to monitor and report any changes to team.

## 2022-06-17 NOTE — PROGRESS NOTES
ICU End of Shift Summary. See flowsheets for vital signs and detailed assessment.    Changes this shift: Restless in AM, follows commands, Versed increased to 4 mg/hr for the day. RASS -3 in afternoon, weaned to 3 & RASS increased to -1. Sinus tach with frequent PACs, MAP goal maintained off Levo. FiO2 weaned to 55%, crepitus in all lung fields. No BM on shift, TF increased, BG elevated & corrected w/ very high resistance dosing. Piña remains in place.     Plan: Waiting on bronch, IgG antibody results. Continue to wean sedation & FiO2.

## 2022-06-17 NOTE — PROGRESS NOTES
Brief Heme/Onc Note  Chart and labs reviewed. Flow cytometry from BAL showed rare (0.6%) lambda monotypic B-cells, not indicative of significant CLL burden within the lungs. We continue to suspect that the pulmonary issues are primarily related to PJP infection and subsequent sequelae (penumomediastinum etc). We will plan to continue ibrutinib as long as the patient's goals are consistent with continued treatment of CLL. Pharmacy will provide ibrutinib suspension while intubated. Okay to transition back to capsule as able.     Would re-check IgG, and give IVIg again if IgG <400.    Patient was discussed with Dr. Liu who agrees with above. We will continue to follow peripherally.    Please don't hesitate to contact the Heme/Onc fellow on-call with further questions about this patient.    Mihir Silver MD PhD  Heme/Onc/Transplant Fellow  [facility] [phone]

## 2022-06-18 NOTE — PLAN OF CARE
ICU End of Shift Summary. See flowsheets for vital signs and detailed assessment.    Changes this shift: RASS-2, follows commands, opens eye to voice. Around 02:30, -180's, EKG showed SVT, metoprolol 5mg given with some improvement.Versed @ 3mg/hr, levophed started to keep MAP >65, stopped around 0615. STAT CXR done, revealed pneumothorax, PEEP 8, FiO2 65.      Plan: Continue to monitor. Notify provider with changes/concerns.       Goal Outcome Evaluation:    Plan of Care Reviewed With: patient          Outcome Evaluation: Intubate/sedated, pressor resarted to keep MAP >65.

## 2022-06-18 NOTE — PROCEDURES
[facility]    Procedure: Chest tube insertion    Date/Time: 6/18/2022 10:15 AM  Performed by: Marin Smith MD  Authorized by: aMrin Smith MD   Indications: pneumothorax      UNIVERSAL PROTOCOL   Site Marked: Yes  Prior Images Obtained and Reviewed:  Yes  Required items: Required blood products, implants, devices and special equipment available    Patient identity confirmed:  Provided demographic data, arm band and hospital-assigned identification number  Patient was reevaluated immediately before administering moderate or deep sedation or anesthesia  Confirmation Checklist:  Patient's identity using two indicators, relevant allergies, procedure was appropriate and matched the consent or emergent situation and correct equipment/implants were available  Time out: Immediately prior to the procedure a time out was called    Universal Protocol: the [facility] Universal Protocol was followed    Preparation: Patient was prepped and draped in usual sterile fashion    ESBL (mL):  5     ANESTHESIA    Anesthesia: Local infiltration  Local Anesthetic:  Lidocaine 1% with epinephrine  Anesthetic Total (mL):  5      SEDATION  Patient Sedated: Yes    Sedation:  See MAR for details  Vital signs: Vital signs monitored during sedation      PROCEDURE DETAILS  Preparation: skin prepped with ChloraPrep  Placement location: right anterior  Scalpel size: 10  Tube size: 8 [place]uran  Ultrasound guidance: no  Tension pneumothorax heard: no  Tube connected to: suction  Drainage characteristics: serosanguinous  Drainage amount: 1 ml  Post-insertion x-ray findings: tube in good position      PROCEDURE  Describe Procedure: PROCEDURE:  Chest Tube Placement    INDICATION:  Pneumothorax    PROCEDURE :   Marin Smith MD    SUPERVISOR:  Dr. Reyes     CONSENT:  Obtained and in the paper chart from April, the sister     UNIVERSAL PROTOCOL: Patient Identification was verified, time out was  performed, site marking was done. Imaging data reviewed. Full Barrier precaution done: Hands washed, mask, gloves, gown, cap and eye protection all used.     PROCEDURE SUMMARY:   An ultrasound was used to determine location and marked.  A time-out was performed. The patient's right anterior chest region was prepped and draped in sterile fashion. Anesthesia was achieved with 1% lidocaine.  This skin, tract and pleura were anesthetized with a small needle.  Air was aspirated via the anesthetic needle.  An introducer needle was then inserted after bouncing over the superior margin of the rib, and air was aspirated into the syringe.  An 8 Frech chest tube was advanced over the needle and secured into place with 1suture.  The tube was then hooked up to a chest drain and banded into place.  The tube was placed on suction.  An airleak was present at time of completion.  Post-procedure x-ray showed the chest tube in appripriate location and resolution of the right pneumothorax.    Dr Reyes was available for the key portions of the procedure.    COMPLICATIONS:  None    ESTIMATED BLOOD LOSS: 5mL      Patient Tolerance:  Patient tolerated the procedure well with no immediate complications  Length of time physician/provider present for 1:1 monitoring during sedation: 15

## 2022-06-18 NOTE — PROGRESS NOTES
Brief MICU Cross Cover Note    Called to bedside with -180s sustained. MAPs in 80s. Mr. Wood is nodding/shaking head to questions. Suspected SVT given sustained, consistent rate - but recent afib with RVR for which he is on amiodarone gtt.    Current vent settings FiO2 55%, PEEP 10, PIP 23-27. Unchanged. SpO2 mid 90s. Crepitus felt throughout chest and neck. Lung sounds bilaterally. No JVD on exam.     EKG and CXR ordered stat.     Carotid massage with no effect. Did not give fluids given current respiratory status and vent settings. Net +2 L.     EKG with SVT. Given 2.5 mg metoprolol IV, with rates returning to  in room. Repeat EKG with sinus tachycardia. Telemetry continued with earlier patterns of NSR with frequent PACs.    Plan:  - check electrolytes.  - cont with CXR  - metoprolol 2.5 mg q2h PRN for rates sustained >150.     Henri Reina MD  Internal Medicine PGY-3      === Addendum ===    CXR with moderate R apical pneumothorax. Hemodynamics unchanged. PIPs continue low to mid 20s. Exam unchanged. Decreased PEEP 10->8. No change in SpO2, rechecking ABG for P/F ratio to discuss with team in AM. With SpO2>92%, and new pneumothorax, will maintain PEEP at 8 for night.

## 2022-06-18 NOTE — PLAN OF CARE
ICU End of Shift Summary. See flowsheets for vital signs and detailed assessment.    Changes this shift: RASS -2 with versed and fent drips. Arouses to voice. Follows commands. Denies pain. Patient is normal sinus rhythm with frequent PACs. 4 separate instances of sustained SVT to the 170s today. Patient maintains adequate blood pressure during SVT runs. The first three instances, IV metoprolol 2.5mg was administered with subsequent conversion to NSR. The fourth time, patient was suctioned through ETT, coughed, and converted back to NSR. Levo currently at 0.04 to maintain MAP>65. 2g calcium given x1. Vent settings are unchanged, FiO2 weaned to 55%. Moderate amount of pink/creamy sputum via ETT. Right anterior chest tube placed at bedside today; to -20 suction. Currently no air leak, scant serosanguinous drainage. Subcutaneous emphysema remains stable throughout neck, chest, trunk. Blood glucose within target range with insulin drip. TFs remain at goal. Soft abdomen, hypoactive bowel sounds, PRN suppository administered, not effective. Piña remains in place, 50ml/hr urine output.     Plan: Wean levo and vent as tolerated.        Goal Outcome Evaluation:    Plan of Care Reviewed With: patient, sibling     Overall Patient Progress: improving    Outcome Evaluation: maintain MAPs without levo

## 2022-06-18 NOTE — PROGRESS NOTES
[facility]    ICU Progress Note       Date of Admission:  5/23/2022    Assessment: Critical Care   Loco Wood is a 77 year old male with PMH CLL, auto-immune hemolytic anemia (on prednisone taper), and CKD3a who was admitted to ICU on 06/10 for AHRF due to PJP pneumonia.    Changes today:  - Placing chest tube for pneumothorax today; daily CXR  - Will touch base with ID for amphotericin switch with RICK  - Metoprolol 12.5 mg BID for SVT    PLAN:     Neuro:  # Pain, sedation  - Versed infusion + PRN bumps  - Fentanyl infusion + PRN bumps  - Acetaminophen 650 mg Q4H PRN  - Hold PTA Ambien  - Zyprexa 5 mg at bedtime PRN  - Melatonin 1 mg PO at bedtime PRN  - RASS goal -1 to -2     # Degenerative disc disease  # Bilateral intermittent hand cramping, spasms suspicious for cervical impingement  Patient with history of degenerative disc disorder with concern for cervical impingement in setting of new bilateral intermittent hand cramping. Patient met with Dr. Melton his sports medicine doctor (AM 5/24) over phone for follow up appointment to discuss results of his MRI.   - Will defer to outpatient management.      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 Resp viral panel negative. Patient initially treated with 5 day abx course starting 5/24 with azithromycin (d/c 5/26) + ceftriaxone for presumed CAP with clinical improvement. On 5/27, patient developed fever with worsening respiratory status (45 L high flow NC) and was transferred to [facility]. Patient improving with IVF 2.5L and broadened coverage with Zosyn,  Micafungin (positive B-Glucan), and IV bactrim (for possible PJP) O2 req of 12-15L, with increase to HFNC on 6/1. CXR 5/31 with trace effusions and increasing apical opacities concerning for worsening edema/infection. PJP PCR 5/27 positive. Karius 6/1 + PJP and E. Faecium (less likely pathogenic as not growing on other cultures). On 6/9, worsening hypoxia with paO2 59 on 100% 50L. Patient intubated 6/15 for worsening respiratory status. Repeat chest CT showed persistence of bilateral consolidations and a new RUL nodule despite treatment with 1st and 2nd line for PJP. BAL was obtained 6/15 and as of 6/17, all cultures and assays have been negative however serum Galactomannan Agn resulted positive; ID concerned for new fungal process specifically Coccidiodes given recent travel to [place] and fungal studies are pending (see RUL nodule). Liposomal amphotericin was started 6/16. BAL flow cytometry was obtained per Heme/On and results showed low suspicion for pulmonary CLL involvement however biopsy is the only definitive study; BAL cytology is pending as of 6/17.   - Continue clindamycin and primaquine. G6PD levels were elevated (ok for primaquine).  - Follow BAL results; negative to date  - Methylpred 40 mg Q12H  - Current regimen: clindamycin, primaquine, fluconazole; also on acyclovir for ppx while on steroids  - Will touch base with ID for amphotericin switch with RICK started 06/17  - Discontinued: micafungin, levofloxacin, zosyn, linezolid, cefepime, Bactrim   - Intubated 06/15 due to desaturation while on BiPAP  - Increase PEEP 8->10   > repeat ABG with change in PEEP  - Epoprostenol 20 ng/kg/min (started 6/15)    Vent Mode: CMV/AC  (Continuous Mandatory Ventilation/ Assist Control)  FiO2 (%): 65 %  Resp Rate (Set): 30 breaths/min  Tidal Volume (Set, mL): 400 mL  PEEP (cm H2O): 8 cmH2O  Resp: (!) 36    #RUL nodule  CT scan 6/15 revealed RUL nodule deemed most likely infectious vs inflammatory per radiology. ID is  following and is concerned about coccidioides infection given immunosuppressed state and recent travel to [place].  - ID following; recs below   > Stop micafungin   > Start empiric liposomal amphotericin 5 mg/kg/day. Will need to monitor creatinine and electrolytes closely and replete K and Mg as needed. To reassess need in coming days.   > Blastomyces and Histoplasma antigens, Coccidioides antigen ([facility]), Coccidioides antibody (ID and CF to [facility]), and Paracoccidioides antibody ([facility]) pending   > blood culture, fungal blood culture, and cryptococcal antigen negative as of 6/17   >If he develops a fever obtain blood cultures and add cefepime or pip/tazo    #Pneumomediastinum   CT 6/15 showed new large pneumomediastinum with extensive subcutaneous emphysema. Hemodynamics have been improving and it is not  a tension pneumomediastinum. Patient have a current PJP which is known to cause pneumothoraces which may be the cause of the patient's presentation. Pneumo could be 2/2 barotrauma.  - Increase PEEP 8->10 given P/F 120 this AM  - CXR to assess pneumomediastinum  - CTM    #New pneumothorax 06/18  Likely from barotrauma, bronch, line placement. CXR showing PT in right upper lobe.  - Repeat XR in AM  - Chest tube to be placed today, sister consented    Cardiovascular:  # Hypotension  Shortly after intubation, likely due to sedation. As of AM 6/17, not requiring pressors.  - NE gtt ordered if needed  - MAP goal >65  - Sedation per Neuro section above     # AF with RVR spontaneously - resolved  # Sinus tachycardia with frequent PACs  No known history of AF. Echo unremarkable. Patient has been experniencing ST with PACs without hemodynamic instability. Transitioned from IV amio to oral amio 6/16 given no Afib since initial episode.  - On heparin subcutaneous ppx  - Continue amiodarone gtt to oral; taper at 400mg  - CTM ectopy  - Replete electrolytes per protocol     # HLD  - Continue PTA  atorvastatin     GI/Nutrition:  # Severe malnutrition in the context of acute on chronic illness  - Dietician consulted, appreciate recs  - Tube feeds: Osmolite 1.5 @ 40 ml/hr (goal 50 ml/hr)  - Supplements per dietician recs  - Vitamins per dietician recs  - FWF 30 ml Q4H     # GERD  - Continue PTA omeprazole + PRN famotidine (also on Pred)     Renal/Fluids/Electrolytes:  # Lactic acidosis  Persistent lactic acidosis 3-4 despite antimicrobial treatment and IVF boluses. Appears hemodynamically stable with no evidence of hypoperfusion. Discussed with hem/onc, can sometimes see in malignancies, but unlikely in reasonably controlled CLL. Hepatic function wnl, suggesting against clearance issue. Resolved with additional IVF 5/30. Increased to 4.1 on 6/3, improved with addition of antibiotics above. Lactate is persistently elevated which may be associated with his CLL and recent hypoxia and hypotension.  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 Trialed 500 ml LR 6/7, however, sodium studies suggestive of SIADH. Now ~ 129-131. NT pro BNP and TTE without evidence of volume overload.  - Change IV medication carriers to NS      # RICK on CKD Stage 3A resolved  Patient has worsening RICK with Cr bump from 1.11->1.53 with a BUN:Cr of 32.6. On 6/16 the patient's urine output was inadequate at 0.62ml/kg/hr and the patient is net -9000 throughout this admission from a fluid restriction (see above). RICK is likely prerenal.  Baseline ~1.3-1.6.  - 500ml bolus of LR (5/17)  - Assess Cr tomorrow  - Avoid nephrotoxic agents as able  - strict I/O  - Daily weights please     Endocrine:  # Hypothyroidism  TSH 1.94 on admission WNL.   - Continue PTA levothyroxine    #Hyperglycemia, steroid induced  Patient has had elevated BG starting 6/16 likely related to glucocorticoid use for PJP. 6/16 the patient was started  on medium dose sliding scale insulin however BG persisted 150-250.  - Hypoglycemia protocol per ICU routine  - goal blood glucose < 180  - Increase to high dose sliding scale insulin; consider basal insulin when tube feeding at goal    ID:  # E faecium on Karius assay  # Severe sepsis, improved  # Positive beta D glucan >500  # PJP pneumonia  - Discussed with ID and onc, will switch to second line treatment for PJP with clindamycin and primaquine.  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 ID is following and has concern for fungal lesion given immunosuppression. Serum galactomannan Agn resulted positive 6/17 however utility of this test questionable. Fungal workup was initiated per ID and BAL results have returned negative to date for infectious or malignant etiology.   - ID following; workup and tx per above (pulmonary)    # Hx Herpes Zoster c/b post-herpetic neuralgia  - PTA ppx Acyclovir while on steroids     Hematology:    # CLL (dx 2/2007)  History of CLL (2007) managed on Ibrutinib (5/2019) which patient is not taking daily. WBC 24.9 on admission (up from baseline ~6-10) now down to ~21. Suspect secondary to recent infection with lower concern for conversion to leukemia. IVIG infusion (5/25). Following FISH and cytogenetics for disease prognostication and will continue to monitor with peripheral smear. Heme onc following and does not feel CLL is playing a role in current acute illness with the exception of potentially low IgG. Give IVIg 0.4 g/kg per hem/onc 06/12.  - Continue daily ibrutinib to help control disease, increased bioavailability with addition of fluconazole 6/2. CT A/P without evidence of CLL metastasis and flow cytology of  BAL not cw pulmonary CLL involvement.   - Hematology/Oncology following                     - PTA ibrutinib, pursuing prior authorization for venotoclax              -  cytogenetics, IgH mutation, TP53 mutation   - IgG levels ordered 6/17; recommending IVIg if <400  - Await bronch biopsy results  - Heme/Onc outpatient follow up on discharge    # Concern forTumor lysis syndrome  Patient had elevated phos and low Ca 6/16 concerning for TLS. Uric acid was ordered and returned normal 6/16 and 6/17 making TLS less likely cause of electrolyte derangements.  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 B12, folate, iron panel normal with slightly elevated YEN levels. Labs concerning for marrow responsive anemia (elevated LDH, bilirubin, reticulocyte count). Will likely need to treat underlying CLL for long-term solution.    Hgb on admission 9.5 down from baseline (~10-12 past year) and now ~8. Currently hemodynamically stable and suspect recent drop in Hgb secondary to dilutional anemia in setting of 2.5L of IVF given (5/27). Methylpred decreased and rituximab held in setting of recent infection. Haptoglobin remains elevated, suggesting against worsening hemolytic anemia..   - Heme/Onc consulted              - Continue Methylprednisolone 40mg (Q12H)              - Hold Rituximab infusion              - Check uric acid, LDH, Mg, Phos, and retic count q48h               - Continue allopurinol for TLS prevention    - Hep B serologies (Core, Surface Ab +): Hep B ag negative, DNA quant negative   - Continue PTA Folate, B12   - Transfuse if Hgb < 7      Musculoskeletal:  # Weakness/Deconditioning  - PT/OT following     Skin:  # Concern for  sacrum/iliac pressure  - pressure dressing in place  - Pressure minimizing interventions per ICU routine  - Consider WOC consult     General Cares/Prophylaxis:    DVT Prophylaxis: Heparin SQ  GI Prophylaxis: PPI  Restraints: NA  Family Communication: Sister April updated by phone  Code Status: Full code     Lines/tubes/drains:  - PIV x3  - CVC TL right femoral  - ETT 7.5mm  - Arterial line  - NJ left nostril  - Evans      Disposition:  - Medical ICU    Patient seen and findings/plan discussed with medical ICU staff, Dr. Reyes.    Azucena Ryan MD  PGY-2 internal medicine  _________________________________________________________    Interval History   Nursing notes reviewed. Overnight patient was in SVT overnight and got better with PRN 2.5 metoprolol. New pneumothorax seen on CXR. PEEP lowered to 8 from 10.    Physical Exam   Vital Signs: Temp: 97.7  F (36.5  C) Temp src: Axillary BP: 116/63 Pulse: 91   Resp: (!) 36 SpO2: 94 % O2 Device: Mechanical Ventilator    Weight: 146 lbs 9.69 oz     Constitutional: Sedated, resting in bed, NAD, responds to stimuli  Respiratory: Decreased breath sounds throughout, tolerating the vent well, crepitus on ausculation of R lung  Cardiovascular: RRR without MGR, Hamman's sign present over precordium  Chest: significant crepitus noted to palpation of R upper chest wall with presence of subcutaneous air descending into upper abdomen and upper border of mid-neck; appears stable from prior exam  GI: Abdomen soft, non distended  : evans catheter in place  Neurologic: Sedated, opens eyes to name, unable to follow commands.    Data   I reviewed all medications, new labs and imaging results over the last 24 hours.  Arterial Blood Gases   Recent Labs   Lab 06/18/22  0517 06/17/22  1008 06/17/22  0347 06/16/22  1944   PH 7.30* 7.32* 7.32* 7.29*   PCO2 55* 51* 50* 52*   PO2 111* 99 84 74*   HCO3 27 26 26 25       Complete Blood Count   Recent Labs   Lab 06/18/22  0232 06/17/22  0347  06/16/22  0359 06/15/22  0439   WBC 42.8* 42.4* 39.1* 29.8*   HGB 8.8* 9.2* 9.4* 8.8*    160 163 145*       Basic Metabolic Panel  Recent Labs   Lab 06/18/22  0806 06/18/22  0659 06/18/22  0602 06/18/22  0458 06/18/22  0256 06/18/22  0232 06/17/22  0351 06/17/22  0347 06/16/22  0723 06/16/22  0359 06/15/22  2350 06/15/22  2009   NA  --   --   --   --   --  134  --  134  --  136  --  134   POTASSIUM  --   --   --   --   --  3.6  --  4.2  --  4.3  --  4.4   CHLORIDE  --   --   --   --   --  100  --  99  --  99  --  97   CO2  --   --   --   --   --  27  --  26  --  27  --  28   BUN  --   --   --   --   --  59*  --  50*  --  31*  --  28   CR  --   --   --   --   --  1.98*  --  1.53*  --  1.22  --  1.53*   * 170* 175* 192*   < > 226*   < > 211*   < > 160*   < > 136*    < > = values in this interval not displayed.     Uric Acid   Date Value Ref Range Status   06/17/2022 4.6 3.5 - 7.2 mg/dL Final   04/29/2015 5.0 3.5 - 7.2 mg/dL Final     Liver Function Tests  Recent Labs   Lab 06/18/22  0232 06/17/22  0347 06/16/22  0359 06/15/22  1456   AST 12 12 17 23   ALT 25 27 33 40   ALKPHOS 82 78 89 110   BILITOTAL 0.7 0.8 1.8* 1.3   ALBUMIN 2.2* 2.4* 2.4* 2.7*     Micro:      Pancreatic Enzymes  No lab results found in last 7 days.    Coagulation Profile  No lab results found in last 7 days.    IMAGING:  Recent Results (from the past 24 hour(s))   XR Chest Port 1 View    Narrative    XR CHEST PORT 1 VIEW  6/17/2022 9:16 AM      HISTORY: picc    COMPARISON: Chest x-ray same day.    FINDINGS:   Portable AP supine chest x-ray. Endotracheal tube tip projects over  the mid trachea. Enteric tube is coiled over the stomach with tip  projecting out of the field-of-view. No PICC visualized.    Trachea is midline. Cardiac silhouette is within normal limits.  Pulmonary vasculature is indistinct. Stable right greater than left  mixed interstitial and airspace opacities. No pleural effusion. No  appreciable  pneumothorax.    Visualized osseous structures are unremarkable. Extensive subcutaneous  emphysema and pneumomediastinum are unchanged.      Impression    IMPRESSION:   1.  No PICC visualized.  2.  Additional support devices in stable position.  3.  Right greater than left mixed interstitial airspace opacities and  extensive subcutaneous emphysema and pneumomediastinum are unchanged.    I have personally reviewed the examination and initial interpretation  and I agree with the findings.    AMANDA BAIRES MD         SYSTEM ID:  [id number]   XR Chest Port 1 View   Result Value    Radiologist flags R PTX (Urgent)    Narrative    EXAM: XR Chest 1 view 6/18/2022 3:10 AM      HISTORY: f/u pneumomediastinum.    COMPARISON: Radiograph of the chest dated 6/17/2022, CT of the chest  dated 6/15/2022.     TECHNIQUE: Frontal view of the chest.    FINDINGS: ET tube is in the mid thoracic trachea. Enteric tube is  subdiaphragmatic with tip collimated from the study.  Trachea is midline. Stable pneumomediastinum. Stable interstitial and  airspace pulmonary opacities. Moderate right apical pneumothorax. No  definite left pneumothorax. No pleural effusions. Stable extensive  subcutaneous emphysema.      Impression    IMPRESSION:   1. Moderate right apical pneumothorax. This is not well appreciated on  radiographs dated 6/17/2022, and it is new compared to CT dated  6/15/2022.  2. Stable pneumomediastinum extensive subcutaneous emphysema.  3. Stable interstitial and airspace pulmonary opacities.     [Urgent Result: R PTX]    Finding was identified on 6/18/2022 3:13 AM.     Dr. Reina was contacted by Dr. Whatley at 6/18/2022 3:19 AM and  verbalized understanding of the urgent finding.     I have personally reviewed the examination and initial interpretation  and I agree with the findings.    EPIFANIO HUDSON MD         SYSTEM ID:  [id number]

## 2022-06-18 NOTE — PROGRESS NOTES
[facility]  Transplant Infectious Disease Progress Note     Patient:  Loco Wood, Date of birth 1945, Medical record number [id number]  Date of Visit:  06/18/2022         Assessment and Recommendations:   77 year old man with CLL and AIHA s/p prednisone courses in 12/2021 from derm, 1/2022 for lung symptoms in [place], in 3/2022 for anemia, then most recently for 3 weeks & 6 weeks for AIHA who is being treated for severe Pneumocystis pneumonia.Hospitalization complicated by prolonged hypoxic respiratory failure eventually requiring intubation on 6/15. Also developed pneumomediastinum and subcutaneous emphysema from barotrauma.    Refractory, Severe PJP pneumonia: 5/27/22 CT chest showed progressive bilateral groundglass opacities. 5/27/2022 BAL Pneumocystis sputum PCR +, in the setting of a progressive pulmonary process. Elevated Fungitell >500 & LDH are also consistent with Pneumocystis. Had 1 dose of neb pent on 5/26/2022. IV bactrim started 5/27/2022. Other infectious work up has including negative MRSA nares, Cryptococcal antigen, EBV PCR, CMV PCR, Coccidiodes antigen, fungal antibodies, Histoplasma antigen, Blastomyces antigen,TB quantiferon, Adenovirus PCR, Toxoplasma antbibody. Heena demonstrated 6336 copies of Pneumocystis jirovecii and 353 copies of E faecium. He has prolonged issues with hypoxic respiratory failure on HF and Bipap but required intubated on 6/15.   Initially received tmp/smx from 5/27 to 6/10 (~2 weeks). Patients with hematologic malignancies can be slow to respond to PJP treatment but would expect some response within in 2 weeks. 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 It is more challenging to change host factors but there is also concern for treatment failure and/or another issue contributing to it (ARDS, organizing pneumonia, CLL?). Due to concern for PJP treatment failure tmp/smx  was changed to clindamycin + primaquine on 6/10/22. There is emerging evidence of the utilization of micafungin with PJP so her received micafungin until 6/16.   6/15 CT demonstrated pneumomediastinum, subcutaneous emphysema and new RML nodule. PJP DFA positive for PJP and 1-3 BD glucan > 500.  Due to the concern of a new pulmonary nodule added liposomal amphotericin on 6/16 (see below). Will continue primaquine + clindamycin.  Unfortunately PJP in this patient population is associated with a high mortality rate.  --6/15 BAL 54 nucleated cells, 55% pmn. Positive PJP DFA. Negative: Gram stain, KOH, CMV, respiratory bacterial cx, RVP, HSV. Pending: AFB, Actinomyces cx, Nocardia cx, fungus cx, PJP DFA, Mycoplasma PCR, Legionella PCR, EBV PCR, cytology. Flow cytometry showed rare CD5 positive lambda monotypic B cells (? 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 Initially had been on fluconazole 6/2-6/8. It was discontinued when Coccidioides antigen and fungal antibodies were negative. With the development of a RUL nodule and clinical decompensation repeat fungal testing including Coccidioides antigen and antibody testing were sent and are pending. Serum Aspergillus gm is positive at 1.42. BAL Aspergillus gm negative. Previous Aspergillus testing was negative so this may not be the primary  in his declined respiratory status. Empirically started on liposomal amphotericin on 6/16. However, Creatinine now up to 1.98 from normal baseline in 2 days. Considering worsening renal function in light of AmphoB and the fact that Vori does have coverage not only against aspergillus but also other endemic mycoses, will switch to Voriconazole, along with IV Micafungin bridge until therapeutic.    Leukocytosis/History of CLL/AIHA:  WBC up trending over the past week with increased lymphocytes. There is concern for CLL progression based on flow. Previously taking  ibrutinib at home weekly but now on daily. He is has been methylprednisolone since 5/24 with a dose of 40 mg daily since 5/28-6/10. Dose increased on 6/10 to 40 mg IV q 8 hours. Also on ibrutinib for CLL-dose decreased 6/11/22. Plans to receiving IVIG. Hematology discussing other CLL treatment options as well. Heme/onc does not think recent CLL is contributing to pulmonary issues based on BAL flow.    E faecium on Karius: s/p 4 days of linezolid. No isolated on clinical cultures.    History of herpes zoster: on acyclovir    - Hep B core ab & S ab+ 5/26/2022, with negative hep B viral load.  - Extensive travel hx.     - QTc interval: 427 msec on 6/2/2022 EKG  - Bacterial prophylaxis: antibiotics stopped 6/9/22.  - Pneumocystis prophylaxis: pent 5/26/2022, treatment bactrim 5/27/2022. G6PD normal.  - Viral serostatus & prophylaxis: HSV1+, HSV2+, CMV+; acyclovir prophy  - Fungal prophylaxis: micafungin 5/27/2022-6/7/22, 6/10-C  - Immunization status:3 covid vaccinations.   - Gamma globulin status: 30g IVIG 5/25/2022 and 6/12/22  - Isolation status: Good hand hygiene.    Recommendations:  1. Continue clindamycin 900 mg IV q 8 hours + primaquine 30 mg daily. Primaquine can be crushed. (BAL PJP DFA and 1-3 BD glucan positive)   2. Stop Liposomal amphotericin given worsening RICK  3. Please start Voriconazole 300mg q12h x 2 doses followed by 250mg q12h (4mg/kg). Can be given enterally  4. Please check Vori level in 5-7 days (aim for 6/23)  5. Please start Micafungin 150mg q24h IV as a bridge to provide anti-mold/aspergillus coverage while we wait for Voriconazole to become therapeutic  6. Pending urine Blastomyces and Histoplasma antigens, Coccidioides antigen ([facility]) and antibody (ID and CF to [facility]), Paracoccidioides antibody ([facility]), blood culture, fungal blood culture  7. If he has a fever obtain blood cultures and add cefepime or pip/tazo  8. Dose of steroids per MICU and heme/onc teams    HSCT/HM ID service will  continue to follow     BELKYS Zuniga  Staff Physician, Infectious Diseases  Pager [phone]        Interval History:   Over the last 24 hours, patient has remained afebrile. On low dose Norepi. PEEP lowered to 8 due to concerns with pneumothorax and subcut emphysema. FiO2 65% at time of exam (later down to 55%). Patient underwent right sided chest tube placement. He remains intubated and sedated in the ICU    Labs reviewed. Creatinine worsening, now up to 1.98 (from normal baseline in 2 days)    Review of Systems: unable to obtain due to intubation       Current Medications & Allergies:       acyclovir  800 mg Oral or Feeding Tube BID     allopurinol  300 mg Oral or Feeding Tube Daily     [START ON 6/21/2022] amiodarone  200 mg Oral or Feeding Tube Daily     amiodarone  200 mg Oral or Feeding Tube BID     atorvastatin  20 mg Oral or Feeding Tube Daily     calcium gluconate  2 g Intravenous Once     clindamycin  900 mg Intravenous Q8H     cyanocobalamin  100 mcg Oral or Feeding Tube Daily     folic acid  1 mg Oral or Feeding Tube Daily     heparin ANTICOAGULANT  5,000 Units Subcutaneous Q12H     ibrutinib (IMBRUVICA) oral suspension (site prepared) in water  280 mg Oral or FT or NG tube Daily     levothyroxine  50 mcg Oral or Feeding Tube Daily     methylPREDNISolone  40 mg Intravenous Q12H     metoprolol tartrate  12.5 mg Oral or Feeding Tube BID     micafungin  150 mg Intravenous Q24H     multivitamins w/minerals  15 mL Per Feeding Tube Daily     pantoprazole  20 mg Oral or Feeding Tube QAM     polyethylene glycol  17 g Oral or Feeding Tube Daily     primaquine  30 mg Oral or Feeding Tube Daily     protein modular  1 packet Per Feeding Tube BID     psyllium  1 packet Oral or Feeding Tube BID     senna-docusate  2 tablet Oral or Feeding Tube BID    Or     senna-docusate  1 tablet Oral or Feeding Tube BID     sodium chloride (PF)  3 mL Intracatheter Q8H     sodium chloride (PF)  3 mL Intracatheter Q8H      thiamine  100 mg Per Feeding Tube Daily     vitamin D3  50 mcg Oral or Feeding Tube Daily     [START ON 6/19/2022] voriconazole  250 mg Oral or Feeding Tube Q12H RACHEL (08/20)     voriconazole  300 mg Oral or Feeding Tube Once     [START ON 6/19/2022] voriconazole  250 mg Oral or Feeding Tube BID       Allergies   Allergen Reactions     Blood Transfusion Related (Informational Only) Other (See Comments)     Patient has a history of a clinically significant antibody against RBC antigens.  A delay in compatible RBCs may occur.      Morphine Itching and Rash     Dilaudid [Hydromorphone] Itching            Physical Exam:   Ranges for vital signs:  Temp:  [97.6  F (36.4  C)-98.2  F (36.8  C)] 97.9  F (36.6  C)  Pulse:  [] 88  Resp:  [28-36] 30  BP: (116)/(63) 116/63  MAP:  [57 mmHg-117 mmHg] 80 mmHg  Arterial Line BP: ()/(42-89) 134/61  FiO2 (%):  [55 %-65 %] 55 %  SpO2:  [85 %-99 %] 88 %  Vitals:    06/15/22 1300 06/16/22 0000 06/17/22 0000   Weight: 63 kg (138 lb 14.2 oz) 65.4 kg (144 lb 2.9 oz) 66.5 kg (146 lb 9.7 oz)     Physical Examination:  GENERAL:  Chronically ill appearing  HEAD:  Head is normocephalic, atraumatic   LUNGS: diminished breath sounds bilaterally. subcutaneous air appreciated over chest wall, right sided chest tube +. 65% FiO2.  CARDIOVASCULAR:  RRR, no murmus  SKIN:  No acute rashes.     EXTREMITIES: No joint erythema or swelling.   NEUROLOGIC:  Intubated. Sedated.  LINES: CVC right femoral, peripheral IV, A line place without any surrounding erythema or exudate. Dressing intact.   Piña +         Laboratory Data:     Metabolic Studies       Recent Labs   Lab Test 06/18/22  1409 06/18/22  1259 06/18/22  0256 06/18/22  0232 06/18/22  0001 06/17/22  2318 06/17/22  0351 06/17/22  0347 06/15/22  2350 06/15/22  2143 06/15/22  1941 06/15/22  1719 06/15/22  1248 06/15/22  0439 06/03/22  2202 06/03/22  1801   NA  --   --   --  134  --   --   --  134   < >  --    < >  --    < > 135   < >  --     POTASSIUM  --   --   --  3.6  --   --   --  4.2   < >  --    < >  --    < > 4.3   < >  --    CHLORIDE  --   --   --  100  --   --   --  99   < >  --    < >  --    < > 102   < >  --    CO2  --   --   --  27  --   --   --  26   < >  --    < >  --    < > 26   < >  --    ANIONGAP  --   --   --  7  --   --   --  9   < >  --    < >  --    < > 7   < >  --    BUN  --   --   --  59*  --   --   --  50*   < >  --    < >  --    < > 26   < >  --    CR  --  2.15*  --  1.98*  --   --   --  1.53*   < >  --    < >  --    < > 0.98   < >  --    GFRESTIMATED  --  31*  --  35*  --   --   --  47*   < >  --    < >  --    < > 80   < >  --    *  --    < > 226*   < >  --    < > 211*   < >  --    < >  --    < > 134*   < >  --    A1C  --   --   --   --   --  5.0  --   --   --   --   --   --   --   --   --   --    DAVID  --   --   --  7.7*  7.7*  --   --   --  8.2*  8.2*   < >  --    < >  --    < > 8.6  8.6   < >  --    PHOS  --   --   --  4.5  --   --   --  5.6*   < >  --   --   --    < > 3.4   < >  --    MAG  --   --   --  2.4*  --   --   --  2.5*   < >  --    < >  --    < > 2.0   < >  --    LACT  --   --   --   --   --   --   --   --   --  3.3*  --  3.5*  --  2.5*   < >  --    PCAL  --   --   --   --   --   --   --   --   --   --   --   --   --   --   --  0.07*   FGTL  --   --   --   --   --   --   --   --   --   --   --   --   --  >500   < >  --     < > = values in this interval not displayed.       Hepatic Studies    Recent Labs   Lab Test 06/18/22  0232 06/17/22  0347 06/16/22  0359 06/15/22  0439 06/14/22  0412 06/08/22  0445 06/07/22  0336 06/06/22  0440   BILITOTAL 0.7 0.8 1.8*   < > 1.8*   < > 0.8 0.8   DBIL  --   --   --   --  0.4*  --   --   --    ALKPHOS 82 78 89   < > 102   < > 99 92   PROTTOTAL 5.0* 5.5* 5.6*   < > 5.9*   < > 5.6* 5.3*   ALBUMIN 2.2* 2.4* 2.4*   < > 2.5*   < > 2.6* 2.4*   AST 12 12 17   < > 24   < > 43 30   ALT 25 27 33   < > 45   < > 42 33   LDH  --   --   --   --   --   --  348* 322*    < > = values  in this interval not displayed.       Hematology Studies      Recent Labs   Lab Test 06/18/22  0232 06/17/22  0347 06/16/22  0359 06/15/22  0439 06/14/22  0412 06/13/22  0410   WBC 42.8* 42.4* 39.1* 29.8* 35.6* 35.9*   ANEU 21.4* 17.0* 19.2* 12.2* 16.4* 19.4*   ALYM 20.5* 25.4* 19.6* 17.6* 19.2* 16.5*   ANY 0.9 0.0 0.4 0.0 0.0 0.0   AEOS 0.0 0.0 0.0 0.0 0.0 0.0   HGB 8.8* 9.2* 9.4* 8.8* 9.3* 9.5*   HCT 27.1* 28.4* 28.7* 30.2* 28.0* 29.0*    160 163 145* 147* 145*       Arterial Blood Gas Testing    Recent Labs   Lab Test 06/18/22  0517 06/17/22  1008 06/17/22  0347 06/16/22  1944 06/16/22  1628   PH 7.30* 7.32* 7.32* 7.29* 7.35   PCO2 55* 51* 50* 52* 48*   PO2 111* 99 84 74* 156*   HCO3 27 26 26 25 26   O2PER 70 70 70 60 80      Inflammatory Markers    Recent Labs   Lab Test 06/10/22  0448 06/08/22  0445 06/03/22  0423 06/02/22  0451 10/04/17  2250 04/11/16  1136   SED  --   --   --   --  1  --    CRP 11.0* 19.0* 82.0* 100.0*  --  <0.2  Reference Values:   Low Risk:           <1.0 mg/L   Average Risk:       1.0-3.0 mg/L   High Risk:          >3.0 mg/L   Acute Inflammation: >8.0 mg/L         Immune Globulin Studies     Recent Labs   Lab Test 06/12/22  0401 06/03/22  0856 05/24/22  1651 06/12/19  0806 05/31/19  1021 04/30/19  1219 04/29/15  1012 09/19/14  1227   * 446* 312* 394* 409* 429*   < > 729   IGM  --   --   --   --   --   --   --  28*   IGA  --   --   --   --   --   --   --  72    < > = values in this interval not displayed.       Gout Labs      Recent Labs   Lab Test 06/17/22  0347 06/16/22  0359 06/15/22  0439 06/13/22  0410 06/11/22  0353   URIC 4.6 3.5 3.0* 3.9 3.0*     Microbiology:  Fungal testing  Recent Labs   Lab Test 06/15/22  1618 06/15/22  0439 06/07/22  1420 05/27/22  1356 05/27/22  0916 05/23/22  1802   FGTL  --  >500 >500  --   --  >500   ASPGAI 0.10 1.42  --  0.03  --   --    ASPAG Negative  --   --   --   --   --    ASPGAA  --  Positive*  --  Negative  --   --    COFUNG  --   --    --   --  <1:2  --        Last Culture results with specimen source  Culture   Date Value Ref Range Status   06/16/2022 No growth after 1 day  Preliminary   06/16/2022 No growth after 1 day  Preliminary   06/16/2022 No growth after 2 days  Preliminary   06/15/2022 No growth after 2 days  Preliminary   06/15/2022 No Growth  Final   06/15/2022 No growth after 2 days  Preliminary   06/15/2022 No Actinomyces species isolated after 2 days  Preliminary   06/13/2022 No Growth  Final   06/13/2022 No Growth  Final   06/03/2022 No Growth  Final   06/03/2022 No Growth  Final   06/02/2022 No growth after 16 days  Preliminary   05/27/2022 2+ Normal aracely  Final   05/27/2022 No Growth  Final   05/27/2022 No Growth  Final   05/26/2022 No Growth  Final     Culture Micro   Date Value Ref Range Status   03/02/2014 No growth  Final   10/22/2010 No Beta Streptococcus isolated  Final   04/21/2010   Final    No Salmonella, Shigella, Campylobacter or E coli 0157 isolated.   04/18/2010 No growth  Final   03/16/2009 No Beta Streptococcus isolated  Final   01/13/2007 No growth  Final   04/06/2004 No Beta Streptococcus isolated  Final        Virology:  Coronavirus-19 testing    Recent Labs   Lab Test 06/07/22  0903 05/31/22  0241 05/23/22  1946 06/23/20  0843   MLO02GO  --   --   --  Negative   [id number]  --   --   --  Not Applicable   [id number] Negative Negative Negative  --        Respiratory virus testing    Recent Labs   Lab Test 06/15/22  1618 05/24/22  1916 05/24/22  1916 05/23/22  1946   IFLUA Not Detected   < > Not Detected  --    INFZA  --   --   --  Negative   FLUAH1 Not Detected   < > Not Detected  --    [id number] Not Detected   < > Not Detected  --    FLUAH3 Not Detected   < > Not Detected  --    IFLUB Not Detected   < > Not Detected  --    INFZB  --   --   --  Negative   PIV1 Not Detected  --  Not Detected  --    PIV2 Not Detected  --  Not Detected  --    PIV3 Not Detected  --  Not Detected  --    PIV4 Not Detected   < > Not  Detected  --    IRSV  --   --   --  Negative   RSVA Not Detected   < > Not Detected  --    RSVB Not Detected   < > Not Detected  --    HMPV Not Detected  --  Not Detected  --    ADENOV Not Detected   < > Not Detected  --    CORONA Not Detected   < > Not Detected  --     < > = values in this interval not displayed.       CMV viral loads    Recent Labs   Lab Test 06/15/22  1618 06/01/22  1448   CMVQNT Not Detected Not Detected       EBV DNA Copies/mL   Date Value Ref Range Status   06/02/2022 Not Detected Not Detected copies/mL Final     Urine Studies     Recent Labs   Lab Test 06/02/22  0952 05/26/22  2240 05/23/22  1906 05/19/22  1355 01/04/21  1744   URINEPH 6.5 5.5 6.5 7.0 5.5   NITRITE Negative Negative Negative Negative Negative   LEUKEST Negative Negative Negative Negative Negative   WBCU 2 2 1 0-5 1     Imaging:  XR Chest Port 1 View  Narrative: Portable chest 6/18/2022 at 1040 hours    INDICATION: Verify chest tube placement    COMPARISON: Earlier today at 0806 hours    FINDINGS: Right apically directed chest catheter is present. Decreased  conspicuity of right apical pneumothorax. Heart size normal. Feeding  tube progressing beyond the inferior margin of the image. Patchy  densities in the lungs unchanged. Pneumomediastinum again present.  Subcutaneous emphysema in the right side of the neck and right lateral  chest wall unchanged.  Impression: IMPRESSION: Placement of a right apically directed chest catheter with  decreased conspicuity of previous right apical pneumothorax. Continued  edema or atelectasis in the lungs. Subcutaneous emphysema in the right  chest wall and neck with continued pneumomediastinum.    EPIFANIO HUDSON MD         SYSTEM ID:  [id number]  XR Chest Port 1 View  Narrative: EXAM: XR CHEST PORT 1 VIEW  6/18/2022 9:07 AM     HISTORY:  progression of pneumomediastinum       COMPARISON:  Chest x-ray 6/18/2022, 6/17/2022, chest CT 6/15/2022    FINDINGS  Technique: Semiupright AP view of the  chest.     Devices: Endotracheal tube terminates over the mid thoracic trachea.  Enteric tube passes below the diaphragm and inferior to the field of  view.    Unchanged appearance of right apical pneumothorax. No  cardiomediastinal shift. No discernible left pneumothorax. Similar  appearance of extensive pneumomediastinum and soft tissue emphysema.  No definite pleural effusion.  Similar appearance of diffuse mixed  pulmonary opacities.    Cardiomediastinal silhouette is  stable in size.  Impression: IMPRESSION:     1. Unchanged moderate right apical pneumothorax compared with most  recent previous exam.  2. No substantial interval change in extensive pneumomediastinum and  soft tissue emphysema of the chest/neck.  3. No substantial interval change in diffuse bilateral mixed pulmonary  opacities.    I have personally reviewed the examination and initial interpretation  and I agree with the findings.    EPIFANIO HUDSON MD         SYSTEM ID:  [id number]  XR Chest Port 1 View  Narrative: EXAM: XR Chest 1 view 6/18/2022 3:10 AM      HISTORY: f/u pneumomediastinum.    COMPARISON: Radiograph of the chest dated 6/17/2022, CT of the chest  dated 6/15/2022.     TECHNIQUE: Frontal view of the chest.    FINDINGS: ET tube is in the mid thoracic trachea. Enteric tube is  subdiaphragmatic with tip collimated from the study.  Trachea is midline. Stable pneumomediastinum. Stable interstitial and  airspace pulmonary opacities. Moderate right apical pneumothorax. No  definite left pneumothorax. No pleural effusions. Stable extensive  subcutaneous emphysema.  Impression: IMPRESSION:   1. Moderate right apical pneumothorax. This is not well appreciated on  radiographs dated 6/17/2022, and it is new compared to CT dated  6/15/2022.  2. Stable pneumomediastinum extensive subcutaneous emphysema.  3. Stable interstitial and airspace pulmonary opacities.     [Urgent Result: R PTX]    Finding was identified on 6/18/2022 3:13 AM.       [facility] was contacted by Dr. Whatley at 6/18/2022 3:19 AM and  verbalized understanding of the urgent finding.     I have personally reviewed the examination and initial interpretation  and I agree with the findings.    EPIFANIO HUDSON MD         SYSTEM ID:  [id number]

## 2022-06-19 NOTE — PROGRESS NOTES
[facility]  Transplant Infectious Disease Progress Note     Patient:  Loco Wood, Date of birth 1945, Medical record number [id number]  Date of Visit:  06/19/2022         Assessment and Recommendations:   77 year old man with CLL and AIHA s/p prednisone courses in 12/2021 from derm, 1/2022 for lung symptoms in [place], in 3/2022 for anemia, then most recently for 3 weeks & 6 weeks for AIHA who is being treated for severe Pneumocystis pneumonia.Hospitalization complicated by prolonged hypoxic respiratory failure eventually requiring intubation on 6/15. Also developed pneumomediastinum and subcutaneous emphysema from barotrauma.    Refractory, Severe PJP pneumonia: 5/27/22 CT chest showed progressive bilateral groundglass opacities. 5/27/2022 BAL Pneumocystis sputum PCR +, in the setting of a progressive pulmonary process. Elevated Fungitell >500 & LDH are also consistent with Pneumocystis. Had 1 dose of neb pent on 5/26/2022. IV bactrim started 5/27/2022. Other infectious work up has including negative MRSA nares, Cryptococcal antigen, EBV PCR, CMV PCR, Coccidiodes antigen, fungal antibodies, Histoplasma antigen, Blastomyces antigen,TB quantiferon, Adenovirus PCR, Toxoplasma antbibody. Chivoius demonstrated 6336 copies of Pneumocystis jirovecii and 353 copies of E faecium. He has prolonged issues with hypoxic respiratory failure on HF and Bipap but required intubated on 6/15.   Initially received tmp/smx from 5/27 to 6/10 (~2 weeks). Patients with hematologic malignancies can be slow to respond to PJP treatment but would expect some response within in 2 weeks. Due to concern for PJP treatment failure tmp/smx was changed to clindamycin + primaquine on 6/10/22. There is emerging evidence of the utilization of micafungin with PJP so he received micafungin until 6/16.   6/15 CT demonstrated pneumomediastinum, subcutaneous emphysema and new RML nodule. PJP DFA positive for PJP and 1-3 BD  glucan > 500.  Due to the concern of a new pulmonary nodule was initially switched to Liposomal amphotericin on 6/16 (see below) before being switched to Vori/Carolyn given RICK. Will continue primaquine + clindamycin.  Unfortunately PJP in this patient population is associated with a high mortality rate.  --6/15 BAL 54 nucleated cells, 55% pmn. Positive PJP DFA. Negative: Gram stain, KOH, CMV, respiratory bacterial cx, RVP, HSV. Pending: AFB, Actinomyces cx, Nocardia cx, fungus cx, PJP DFA, Mycoplasma PCR, Legionella PCR, EBV PCR, cytology. Flow cytometry showed rare CD5 positive lambda monotypic B cells (? 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 Initially had been on fluconazole 6/2-6/8. It was discontinued when Coccidioides antigen and fungal antibodies were negative. With the development of a RUL nodule and clinical decompensation repeat fungal testing including Coccidioides antigen and antibody testing were sent and are pending. Serum Aspergillus gm is positive at 1.42. BAL Aspergillus gm negative. Previous Aspergillus testing was negative so this may not be the primary  in his declined respiratory status. Empirically started on liposomal amphotericin on 6/16. However, considering worsening renal function in light of AmphoB and the fact that Vori does have coverage not only against aspergillus but also other endemic mycoses, switched to Voriconazole, along with IV Micafungin bridge until therapeutic.    Leukocytosis/History of CLL/AIHA:  WBC up trending over the past week with increased lymphocytes. There is concern for CLL progression based on flow. Previously taking ibrutinib at home weekly but now on daily. He is has been methylprednisolone since 5/24 with a dose of 40 mg daily since 5/28-6/10. Dose increased on 6/10 to 40 mg IV q 8 hours. Also on ibrutinib for CLL-dose decreased 6/11/22. Plans to receiving IVIG. Hematology discussing  other CLL treatment options as well. Heme/onc does not think recent CLL is contributing to pulmonary issues based on BAL flow.    E faecium on Karius: s/p 4 days of linezolid. No isolated on clinical cultures.    History of herpes zoster: on acyclovir    - Hep B core ab & S ab+ 5/26/2022, with negative hep B viral load.  - Extensive travel hx.     - QTc interval: 427 msec on 6/2/2022 EKG  - Bacterial prophylaxis: antibiotics stopped 6/9/22.  - Pneumocystis prophylaxis: pent 5/26/2022, treatment bactrim 5/27/2022. G6PD normal.  - Viral serostatus & prophylaxis: HSV1+, HSV2+, CMV+; acyclovir prophy  - Fungal prophylaxis: micafungin 5/27/2022-6/7/22, 6/10-C  - Immunization status:3 covid vaccinations.   - Gamma globulin status: 30g IVIG 5/25/2022 and 6/12/22  - Isolation status: Good hand hygiene.    Recommendations:  1. Continue clindamycin 900 mg IV q 8 hours + primaquine 30 mg daily. Primaquine can be crushed. (BAL PJP DFA and 1-3 BD glucan positive)   2. Continue Voriconazole 250mg q12h (4mg/kg). Can be given enterally  3. Please check Vori level in 5-7 days (aim for 6/23)  4. Continue Micafungin 150mg q24h IV as a bridge to provide anti-mold/aspergillus coverage while we wait for Voriconazole to become therapeutic  5. Pending urine Blastomyces and Histoplasma antigens, Coccidioides antigen ([facility]) and antibody (ID and CF to [facility]), Paracoccidioides antibody ([facility]), blood culture, fungal blood culture  6. If he has a fever obtain blood cultures and add cefepime or pip/tazo  7. If significant increase in pressor requirement, add vancomycin after obtaining blood cultures  8. Dose of steroids per MICU and heme/onc teams    HSCT/HM ID service will continue to follow     BELKYS Zuniga  Staff Physician, Infectious Diseases  Pager [phone]        Interval History:   Since last seen, patient has remained afebrile. He was weaned off low dose Norepi yesterday and has remained off the same so far. Vent  settings slightly improved, FiO2 down to 55% (was not able to tolerate weaning down further). Flolan halved per discussion with RN and maintaining sats. No new culture data    Labs reviewed. Creatinine up to 2.18, LFTs wnl. WBC 42.8 -> 36.1    Review of Systems: unable to obtain due to intubation       Current Medications & Allergies:       acyclovir  400 mg Oral or Feeding Tube BID     allopurinol  300 mg Oral or Feeding Tube Daily     [START ON 6/21/2022] amiodarone  200 mg Oral or Feeding Tube Daily     amiodarone  200 mg Oral or Feeding Tube BID     atorvastatin  20 mg Oral or Feeding Tube Daily     clindamycin  900 mg Intravenous Q8H     cyanocobalamin  100 mcg Oral or Feeding Tube Daily     folic acid  1 mg Oral or Feeding Tube Daily     heparin ANTICOAGULANT  5,000 Units Subcutaneous Q12H     ibrutinib (IMBRUVICA) oral suspension (site prepared) in water  280 mg Oral or FT or NG tube Daily     levothyroxine  50 mcg Oral or Feeding Tube Daily     methylPREDNISolone  40 mg Intravenous Q12H     metoprolol tartrate  12.5 mg Oral or Feeding Tube BID     micafungin  150 mg Intravenous Q24H     multivitamins w/minerals  15 mL Per Feeding Tube Daily     pantoprazole  20 mg Oral or Feeding Tube QAM     polyethylene glycol  17 g Oral or Feeding Tube Daily     primaquine  30 mg Oral or Feeding Tube Daily     protein modular  1 packet Per Feeding Tube BID     psyllium  1 packet Oral or Feeding Tube BID     senna-docusate  2 tablet Oral or Feeding Tube BID    Or     senna-docusate  1 tablet Oral or Feeding Tube BID     sodium chloride (PF)  3 mL Intracatheter Q8H     sodium chloride (PF)  3 mL Intracatheter Q8H     thiamine  100 mg Per Feeding Tube Daily     vitamin D3  50 mcg Oral or Feeding Tube Daily     voriconazole  250 mg Oral or Feeding Tube Q12H [facility] (08/20)       Allergies   Allergen Reactions     Blood Transfusion Related (Informational Only) Other (See Comments)     Patient has a history of a clinically  significant antibody against RBC antigens.  A delay in compatible RBCs may occur.      Morphine Itching and Rash     Dilaudid [Hydromorphone] Itching            Physical Exam:   Ranges for vital signs:  Temp:  [97.2  F (36.2  C)-98.2  F (36.8  C)] 98.2  F (36.8  C)  Pulse:  [] 91  Resp:  [30-31] 30  BP: (94)/(53) 94/53  MAP:  [61 mmHg-111 mmHg] 85 mmHg  Arterial Line BP: ()/(43-89) 119/69  FiO2 (%):  [45 %-65 %] 65 %  SpO2:  [87 %-100 %] 94 %  Vitals:    06/16/22 0000 06/17/22 0000 06/19/22 0500   Weight: 65.4 kg (144 lb 2.9 oz) 66.5 kg (146 lb 9.7 oz) 70.6 kg (155 lb 10.3 oz)     Physical Examination:  GENERAL:  Chronically ill appearing  HEAD:  Head is normocephalic, atraumatic   LUNGS: diminished breath sounds bilaterally. subcutaneous air appreciated over chest wall, right sided chest tube +. 55% FiO2.  CARDIOVASCULAR:  RRR, no murmus  SKIN:  No acute rashes.     EXTREMITIES: No joint erythema or swelling.   NEUROLOGIC:  Intubated. Sedated.  LINES: CVC right femoral, peripheral IV, A line place without any surrounding erythema or exudate. Dressing intact.   Piña +         Laboratory Data:     Metabolic Studies       Recent Labs   Lab Test 06/19/22  1502 06/19/22  0406 06/19/22  0403 06/18/22  1409 06/18/22  1259 06/18/22  0256 06/18/22  0232 06/18/22  0001 06/17/22  2318 06/15/22  2350 06/15/22  2143 06/15/22  1941 06/15/22  1719 06/15/22  1248 06/15/22  0439 06/03/22  2202 06/03/22  1801   NA  --   --  136  --   --   --  134  --   --    < >  --    < >  --    < > 135   < >  --    POTASSIUM  --   --  4.4  --   --   --  3.6  --   --    < >  --    < >  --    < > 4.3   < >  --    CHLORIDE  --   --  102  --   --   --  100  --   --    < >  --    < >  --    < > 102   < >  --    CO2  --   --  27  --   --   --  27  --   --    < >  --    < >  --    < > 26   < >  --    ANIONGAP  --   --  7  --   --   --  7  --   --    < >  --    < >  --    < > 7   < >  --    BUN  --   --  70*  --   --   --  59*  --   --    < >   --    < >  --    < > 26   < >  --    CR  --   --  2.18*  --  2.15*  --  1.98*  --   --    < >  --    < >  --    < > 0.98   < >  --    GFRESTIMATED  --   --  31*  --  31*  --  35*  --   --    < >  --    < >  --    < > 80   < >  --    *   < > 166*   < >  --    < > 226*   < >  --    < >  --    < >  --    < > 134*   < >  --    A1C  --   --   --   --   --   --   --   --  5.0  --   --   --   --   --   --   --   --    DAVID  --   --  8.2*  8.2*  --   --   --  7.7*  7.7*  --   --    < >  --    < >  --    < > 8.6  8.6   < >  --    PHOS  --   --  4.3  --   --   --  4.5  --   --    < >  --   --   --    < > 3.4   < >  --    MAG  --   --  2.8*  --   --   --  2.4*  --   --    < >  --    < >  --    < > 2.0   < >  --    LACT  --   --   --   --   --   --   --   --   --   --  3.3*  --  3.5*  --  2.5*   < >  --    PCAL  --   --   --   --   --   --   --   --   --   --   --   --   --   --   --   --  0.07*   FGTL  --   --   --   --   --   --   --   --   --   --   --   --   --   --  >500   < >  --     < > = values in this interval not displayed.       Hepatic Studies    Recent Labs   Lab Test 06/19/22  0403 06/18/22  0232 06/17/22  0347 06/15/22  0439 06/14/22  0412 06/08/22  0445 06/07/22  0336 06/06/22  0440   BILITOTAL 0.8 0.7 0.8   < > 1.8*   < > 0.8 0.8   DBIL  --   --   --   --  0.4*  --   --   --    ALKPHOS 75 82 78   < > 102   < > 99 92   PROTTOTAL 4.5* 5.0* 5.5*   < > 5.9*   < > 5.6* 5.3*   ALBUMIN 2.0* 2.2* 2.4*   < > 2.5*   < > 2.6* 2.4*   AST 11 12 12   < > 24   < > 43 30   ALT 19 25 27   < > 45   < > 42 33   LDH  --   --   --   --   --   --  348* 322*    < > = values in this interval not displayed.       Hematology Studies      Recent Labs   Lab Test 06/19/22  0403 06/18/22  0232 06/17/22  0347 06/16/22  0359 06/15/22  0439 06/14/22  0412   WBC 36.1* 42.8* 42.4* 39.1* 29.8* 35.6*   ANEU 17.7* 21.4* 17.0* 19.2* 12.2* 16.4*   ALYM 18.4* 20.5* 25.4* 19.6* 17.6* 19.2*   ANY 0.0 0.9 0.0 0.4 0.0 0.0   AEOS 0.0 0.0 0.0 0.0  0.0 0.0   HGB 8.3* 8.8* 9.2* 9.4* 8.8* 9.3*   HCT 25.9* 27.1* 28.4* 28.7* 30.2* 28.0*   * 174 160 163 145* 147*       Arterial Blood Gas Testing    Recent Labs   Lab Test 06/19/22  1230 06/19/22  0653 06/18/22  0517 06/17/22  1008 06/17/22  0347   PH 7.36 7.34* 7.30* 7.32* 7.32*   PCO2 51* 51* 55* 51* 50*   PO2 50* 100 111* 99 84   HCO3 29* 27 27 26 26   O2PER 55 60 70 70 70      Inflammatory Markers    Recent Labs   Lab Test 06/10/22  0448 06/08/22  0445 06/03/22  0423 06/02/22  0451 10/04/17  2250 04/11/16  1136   SED  --   --   --   --  1  --    CRP 11.0* 19.0* 82.0* 100.0*  --  <0.2  Reference Values:   Low Risk:           <1.0 mg/L   Average Risk:       1.0-3.0 mg/L   High Risk:          >3.0 mg/L   Acute Inflammation: >8.0 mg/L         Immune Globulin Studies     Recent Labs   Lab Test 06/12/22  0401 06/03/22  0856 05/24/22  1651 06/12/19  0806 05/31/19  1021 04/30/19  1219 04/29/15  1012 09/19/14  1227   * 446* 312* 394* 409* 429*   < > 729   IGM  --   --   --   --   --   --   --  28*   IGA  --   --   --   --   --   --   --  72    < > = values in this interval not displayed.       Gout Labs      Recent Labs   Lab Test 06/19/22  0403 06/17/22  0347 06/16/22  0359 06/15/22  0439 06/13/22  0410   URIC 4.4 4.6 3.5 3.0* 3.9     Microbiology:  Fungal testing  Recent Labs   Lab Test 06/15/22  1618 06/15/22  0439 06/07/22  1420 05/27/22  1356 05/27/22  0916 05/23/22  1802   FGTL  --  >500 >500  --   --  >500   ASPGAI 0.10 1.42  --  0.03  --   --    ASPAG Negative  --   --   --   --   --    ASPGAA  --  Positive*  --  Negative  --   --    COFUNG  --   --   --   --  <1:2  --        Last Culture results with specimen source  Culture   Date Value Ref Range Status   06/16/2022 No growth after 2 days  Preliminary   06/16/2022 No growth after 2 days  Preliminary   06/16/2022 No growth after 3 days  Preliminary   06/15/2022 No growth after 3 days  Preliminary   06/15/2022 No Growth  Final   06/15/2022 No growth  after 3 days  Preliminary   06/15/2022 No Actinomyces species isolated after 3 days  Preliminary   06/13/2022 No Growth  Final   06/13/2022 No Growth  Final   06/03/2022 No Growth  Final   06/03/2022 No Growth  Final   06/02/2022 No growth to date  Preliminary   05/27/2022 2+ Normal aracely  Final   05/27/2022 No Growth  Final   05/27/2022 No Growth  Final   05/26/2022 No Growth  Final     Culture Micro   Date Value Ref Range Status   03/02/2014 No growth  Final   10/22/2010 No Beta Streptococcus isolated  Final   04/21/2010   Final    No Salmonella, Shigella, Campylobacter or E coli 0157 isolated.   04/18/2010 No growth  Final   03/16/2009 No Beta Streptococcus isolated  Final   01/13/2007 No growth  Final   04/06/2004 No Beta Streptococcus isolated  Final        Virology:  Coronavirus-19 testing    Recent Labs   Lab Test 06/07/22  0903 05/31/22  0241 05/23/22  1946 06/23/20  0843   [id number]  --   --   --  Negative   CAV21ULG  --   --   --  Not Applicable   [id number] Negative Negative Negative  --        Respiratory virus testing    Recent Labs   Lab Test 06/15/22  1618 05/24/22  1916 05/24/22  1916 05/23/22  1946   IFLUA Not Detected   < > Not Detected  --    INFZA  --   --   --  Negative   FLUAH1 Not Detected   < > Not Detected  --    [id number] Not Detected   < > Not Detected  --    FLUAH3 Not Detected   < > Not Detected  --    IFLUB Not Detected   < > Not Detected  --    INFZB  --   --   --  Negative   PIV1 Not Detected  --  Not Detected  --    PIV2 Not Detected  --  Not Detected  --    PIV3 Not Detected  --  Not Detected  --    PIV4 Not Detected   < > Not Detected  --    IRSV  --   --   --  Negative   RSVA Not Detected   < > Not Detected  --    RSVB Not Detected   < > Not Detected  --    HMPV Not Detected  --  Not Detected  --    ADENOV Not Detected   < > Not Detected  --    CORONA Not Detected   < > Not Detected  --     < > = values in this interval not displayed.       CMV viral loads    Recent Labs   Lab Test  06/15/22  1618 06/01/22  1448   CMVQNT Not Detected Not Detected       EBV DNA Copies/mL   Date Value Ref Range Status   06/02/2022 Not Detected Not Detected copies/mL Final     Urine Studies     Recent Labs   Lab Test 06/02/22  0952 05/26/22  2240 05/23/22  1906 05/19/22  1355 01/04/21  1744   URINEPH 6.5 5.5 6.5 7.0 5.5   NITRITE Negative Negative Negative Negative Negative   LEUKEST Negative Negative Negative Negative Negative   WBCU 2 2 1 0-5 1     Imaging:  XR Chest Port 1 View  Narrative: Exam: XR CHEST PORT 1 VIEW, 6/19/2022 1:11 PM    Comparison: 6/19/2022 at 6:11 AM    History: verify resolution of pneumothorax    Findings:  Single portable semiupright view of the chest. Endotracheal tube with  tip in mid thoracic trachea. Right apical chest tube. Postpyloric  enteric tube.    Trachea is midline. Unchanged cardiomediastinal silhouette. Stable  extensive subcutaneous emphysema overlying the chest. Unchanged mixed  interstitial and airspace opacities. There is no pneumothorax or  pleural effusion. The upper abdomen is unremarkable. Continued  pneumomediastinum.  Impression: Impression:   1. Right apical chest tube with no definite pneumothorax. Continued  pneumomediastinum.  2. Unchanged extensive subcutaneous emphysema overlying the right  chest.  3. Unchanged mixed interstitial and airspace opacities.    I have personally reviewed the examination and initial interpretation  and I agree with the findings.    EPIFANIO HUDSON MD         SYSTEM ID:  [id number]  XR Chest Port 1 View  Narrative: EXAM: XR CHEST PORT 1 VIEW  6/19/2022 6:32 AM      HISTORY: progression of pneumomediastinum    COMPARISON: 6/18/2022    FINDINGS: Single view of the chest. Right apical chest tube appears  slightly retracted/repositioned. ET tube tip in the mid thoracic  trachea. Feeding tube is postpyloric. Extensive pneumomediastinum and  subcutaneous emphysema. Trachea is midline. Stable heart size. No  substantial change in mixed  interstitial and airspace opacities. No  large effusion.  Impression: IMPRESSION:    1. Stable extensive pneumomediastinum and subcutaneous gas overlying  the chest.  2. Right apical chest tube appears slightly retracted/repositioned  since prior. No appreciable pneumothorax.    I have personally reviewed the examination and initial interpretation  and I agree with the findings.    EPIFANIO HUDSON MD         SYSTEM ID:  [id number]

## 2022-06-19 NOTE — PROGRESS NOTES
[facility]    ICU Progress Note       Date of Admission:  5/23/2022    Assessment: Critical Care   Loco Wood is a 77 year old male with PMH CLL, auto-immune hemolytic anemia (on prednisone taper), and CKD3a who was admitted to ICU on 06/10 for AHRF due to PJP pneumonia.    Changes today:  - clamping chest tube and checking CXR to see if resolution of PT  - Drop Veletri to 10 from 20  - Add lantus 20 U daily; continue drip  - Stopped ampho due to RICK; started voriconazole and mohsen  - IgG pending    PLAN:     Neuro:  # Pain, sedation  - Versed infusion + PRN bumps  - Fentanyl infusion + PRN bumps  - Acetaminophen 650 mg Q4H PRN  - Hold PTA Ambien  - Zyprexa 5 mg at bedtime PRN  - Melatonin 1 mg PO at bedtime PRN  - RASS goal -1 to -2     # Degenerative disc disease  # Bilateral intermittent hand cramping, spasms suspicious for cervical impingement  Patient with history of degenerative disc disorder with concern for cervical impingement in setting of new bilateral intermittent hand cramping. Patient met with Dr. Melton his sports medicine doctor (AM 5/24) over phone for follow up appointment to discuss results of his MRI.   - Will defer to outpatient management.      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 Resp viral panel negative. Patient initially treated with 5 day abx course starting 5/24 with azithromycin (d/c 5/26) + ceftriaxone for presumed CAP with clinical improvement. On 5/27, patient developed fever with worsening respiratory status (45 L high flow NC) and was transferred to [facility].  Patient improving with IVF 2.5L and broadened coverage with Zosyn, Micafungin (positive B-Glucan), and IV bactrim (for possible PJP) O2 req of 12-15L, with increase to HFNC on 6/1. CXR 5/31 with trace effusions and increasing apical opacities concerning for worsening edema/infection. PJP PCR 5/27 positive. Karius 6/1 + PJP and E. Faecium (less likely pathogenic as not growing on other cultures). On 6/9, worsening hypoxia with paO2 59 on 100% 50L. Patient intubated 6/15 for worsening respiratory status. Repeat chest CT showed persistence of bilateral consolidations and a new RUL nodule despite treatment with 1st and 2nd line for PJP. BAL was obtained 6/15 and as of 6/17, all cultures and assays have been negative however serum Galactomannan Agn resulted positive; ID concerned for new fungal process specifically Coccidiodes given recent travel to [place] and fungal studies are pending (see RUL nodule). Liposomal amphotericin was started 6/16. BAL flow cytometry was obtained per Heme/On and results showed low suspicion for pulmonary CLL involvement however biopsy is the only definitive study; BAL cytology is pending as of 6/17.   - Continue clindamycin and primaquine. G6PD levels were elevated (ok for primaquine).  - Follow BAL results; negative to date  - Methylpred 40 mg Q12H  - Current regimen: clindamycin, primaquine, fluconazole; also on acyclovir for ppx while on steroids  - Will touch base with ID for amphotericin switch with RICK started 06/17  - Discontinued: micafungin, levofloxacin, zosyn, linezolid, cefepime, Bactrim   - Intubated 06/15 due to desaturation while on BiPAP  - Increase PEEP 8->10   > repeat ABG with change in PEEP  - Epoprostenol 10 ng/kg/min (started 6/15 at 20; 10 since 06/19)    Vent Mode: CMV/AC  (Continuous Mandatory Ventilation/ Assist Control)  FiO2 (%): 55 %  Resp Rate (Set): 30 breaths/min  Tidal Volume (Set, mL): 400 mL  PEEP (cm H2O): 8 cmH2O  Resp: 30    #RUL nodule  CT scan 6/15  revealed RUL nodule deemed most likely infectious vs inflammatory per radiology. ID is following and is concerned about coccidioides infection given immunosuppressed state and recent travel to [place]. Started on ampho 06/17 but stopped 06/18 due to RICK; started voriconazole and mohsen.    #Pneumomediastinum   CT 6/15 showed new large pneumomediastinum with extensive subcutaneous emphysema. Hemodynamics have been improving and it is not  a tension pneumomediastinum. Patient have a current PJP which is known to cause pneumothoraces which may be the cause of the patient's presentation. Pneumo could be 2/2 barotrauma.  - CXR daily to assess pneumomediastinum  - CTM    #New pneumothorax 06/18 improving  Likely from barotrauma, bronch, line placement. CXR showing PT in right upper lobe. Chest tube placed 06/18; improved PT.  - clamping chest tube and checking CXR to see if resolution of PT    Cardiovascular:  # Hypotension  Shortly after intubation, likely due to sedation. As of AM 6/17, not requiring pressors.  - NE gtt ordered if needed  - MAP goal >65  - Sedation per Neuro section above     # AF with RVR spontaneously - resolved  # Sinus tachycardia with frequent PACs  No known history of AF. Echo unremarkable. Patient has been experniencing ST with PACs without hemodynamic instability. Transitioned from IV amio to oral amio 6/16 given no Afib since initial episode.  - On heparin subcutaneous ppx  - Continue amiodarone gtt to oral; taper at 400mg  - CTM ectopy  - Replete electrolytes per protocol     # HLD  - Continue PTA atorvastatin     GI/Nutrition:  # Severe malnutrition in the context of acute on chronic illness  - Dietician consulted, appreciate recs  - Tube feeds: Osmolite 1.5 @ 40 ml/hr (goal 50 ml/hr)  - Supplements per dietician recs  - Vitamins per dietician recs  - FWF 30 ml Q4H     # GERD  - Continue PTA omeprazole + PRN famotidine (also on Pred)     Renal/Fluids/Electrolytes:  # Lactic acidosis  improving  Persistent lactic acidosis 3-4 despite antimicrobial treatment and IVF boluses. Appears hemodynamically stable with no evidence of hypoperfusion. Discussed with hem/onc, can sometimes see in malignancies, but unlikely in reasonably controlled CLL. Hepatic function wnl, suggesting against clearance issue. Resolved with additional IVF 5/30. Increased to 4.1 on 6/3, improved with addition of antibiotics above. Lactate is persistently elevated which may be associated with his CLL and recent hypoxia and hypotension.  - Follow lactate   - Tumor lysis workup per below; has been ntd  - 500 ml LR x1 (6/17)     # Hyponatremia in setting of SIADH resolved  Worsening hyponatremia since 6/2. Trialed 500 ml LR 6/7, however, sodium studies suggestive of SIADH. Now ~ 129-131. NT pro BNP and TTE without evidence of volume overload.  - Change IV medication carriers to NS  - Will liberalize fluid restriction in AM if Na still normal     # RICK on CKD Stage 3A worsening  Patient has worsening RICK with Cr bump from 1.11->1.53 with a BUN:Cr of 32.6. On 6/16 the patient's urine output was inadequate at 0.62ml/kg/hr and the patient is net -9000 throughout this admission from a fluid restriction (see above). Worsened 06/18 when started ampho  Baseline ~1.3-1.6.  - 500ml bolus of LR (5/17)  - Avoid nephrotoxic agents as able  - strict I/O  - Daily weights     Endocrine:  # Hypothyroidism  TSH 1.94 on admission WNL.   - Continue PTA levothyroxine    #Hyperglycemia, steroid induced  Patient has had elevated BG starting 6/16 likely related to glucocorticoid use for PJP. 6/16 the patient was started on medium dose sliding scale insulin however BG persisted 150-250.  - Hypoglycemia protocol per ICU routine  - Goal blood glucose < 180  - Add lantus 20 U daily; continue drip    ID:  # E faecium on Karius assay  # Severe sepsis, improved  # Positive beta D glucan >500  # PJP pneumonia  - Discussed with ID and onc, will switch to second  line treatment for PJP with clindamycin and primaquine.  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 ID is following and has concern for fungal lesion given immunosuppression. Serum galactomannan Agn resulted positive 6/17 however utility of this test questionable. Fungal workup was initiated per ID and BAL results have returned negative to date for infectious or malignant etiology.   - ID following; workup and tx per above (pulmonary)    # Hx Herpes Zoster c/b post-herpetic neuralgia  - PTA ppx Acyclovir while on steroids     Hematology:    # CLL (dx 2/2007)  History of CLL (2007) managed on Ibrutinib (5/2019) which patient is not taking daily. WBC 24.9 on admission (up from baseline ~6-10) now down to ~21. Suspect secondary to recent infection with lower concern for conversion to leukemia. IVIG infusion (5/25). Following FISH and cytogenetics for disease prognostication and will continue to monitor with peripheral smear. Heme onc following and does not feel CLL is playing a role in current acute illness with the exception of potentially low IgG. Give IVIg 0.4 g/kg per hem/onc 06/12.  - Continue daily ibrutinib to help control disease, increased bioavailability with addition of fluconazole 6/2. CT A/P without evidence of CLL metastasis and flow cytology of BAL not cw pulmonary CLL involvement.   - Hematology/Oncology following                     - PTA ibrutinib, pursuing prior authorization for venotoclax              - cytogenetics, IgH mutation, TP53 mutation   - IgG levels ordered 6/17; recommending IVIg if <400  - Await bronch biopsy results  - Heme/Onc outpatient follow up on discharge    # Concern forTumor lysis syndrome  Patient had  elevated phos and low Ca 6/16 concerning for TLS. Uric acid was ordered and returned normal 6/16 and 6/17 making TLS less likely cause of electrolyte derangements.  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 B12, folate, iron panel normal with slightly elevated YEN levels. Labs concerning for marrow responsive anemia (elevated LDH, bilirubin, reticulocyte count). Will likely need to treat underlying CLL for long-term solution.    Hgb on admission 9.5 down from baseline (~10-12 past year) and now ~8. Currently hemodynamically stable and suspect recent drop in Hgb secondary to dilutional anemia in setting of 2.5L of IVF given (5/27). Methylpred decreased and rituximab held in setting of recent infection. Haptoglobin remains elevated, suggesting against worsening hemolytic anemia..   - Heme/Onc consulted              - Continue Methylprednisolone 40mg (Q12H)              - Hold Rituximab infusion              - Check uric acid, LDH, Mg, Phos, and retic count q48h               - Continue allopurinol for TLS prevention    - Hep B serologies (Core, Surface Ab +): Hep B ag negative, DNA quant negative   - Continue PTA Folate, B12   - Transfuse if Hgb < 7      Musculoskeletal:  # Weakness/Deconditioning  - PT/OT following     Skin:  # Concern for sacrum/iliac pressure  - Pressure dressing in place  - Pressure minimizing interventions per ICU routine  - Consider WOC consult     General Cares/Prophylaxis:    DVT Prophylaxis: Heparin SQ  GI Prophylaxis: PPI  Restraints: NA  Family Communication: Sister April updated by phone  Code Status: Full code     Lines/tubes/drains:  - PIV x3  - CVC TL right femoral  - ETT 7.5mm  - Arterial line  - NJ  left nostril  - Evans      Disposition:  - Medical ICU    Patient seen and findings/plan discussed with medical ICU staff, Dr. Reyes.    Azucena Ryan MD  PGY-2 internal medicine  _________________________________________________________    Interval History   Nursing notes reviewed. NAEO. Patient briefly on NE but now off. Not arousable this AM.    Physical Exam   Vital Signs: Temp: 98.2  F (36.8  C) Temp src: Axillary BP: 94/53 (MAP 68) Pulse: 97   Resp: 30 SpO2: (!) 88 % O2 Device: Mechanical Ventilator    Weight: 155 lbs 10.32 oz     Constitutional: Sedated, resting in bed, not waking up  Respiratory: Decreased breath sounds throughout, tolerating the vent well, crepitus on ausculation of R lung  Cardiovascular: RRR without MGR, Hamman's sign present over precordium  Chest: significant crepitus noted to palpation of R upper chest wall with presence of subcutaneous air descending into upper abdomen and upper border of mid-neck; appears stable from prior exam  GI: Abdomen soft, non distended  : evans catheter in place  Neurologic: Sedated, not waking up    Data   I reviewed all medications, new labs and imaging results over the last 24 hours.  Arterial Blood Gases   Recent Labs   Lab 06/19/22  0653 06/18/22  0517 06/17/22  1008 06/17/22  0347   PH 7.34* 7.30* 7.32* 7.32*   PCO2 51* 55* 51* 50*   PO2 100 111* 99 84   HCO3 27 27 26 26       Complete Blood Count   Recent Labs   Lab 06/19/22  0403 06/18/22  0232 06/17/22  0347 06/16/22  0359   WBC 36.1* 42.8* 42.4* 39.1*   HGB 8.3* 8.8* 9.2* 9.4*   * 174 160 163       Basic Metabolic Panel  Recent Labs   Lab 06/19/22  0749 06/19/22  0649 06/19/22  0600 06/19/22  0503 06/19/22  0406 06/19/22  0403 06/18/22  1409 06/18/22  1259 06/18/22  0256 06/18/22  0232 06/17/22  0351 06/17/22  0347 06/16/22  0723 06/16/22  0359   NA  --   --   --   --   --  136  --   --   --  134  --  134  --  136   POTASSIUM  --   --   --   --   --  4.4  --   --   --  3.6  --  4.2  --  4.3    CHLORIDE  --   --   --   --   --  102  --   --   --  100  --  99  --  99   CO2  --   --   --   --   --  27  --   --   --  27  --  26  --  27   BUN  --   --   --   --   --  70*  --   --   --  59*  --  50*  --  31*   CR  --   --   --   --   --  2.18*  --  2.15*  --  1.98*  --  1.53*  --  1.22   * 182* 167* 165*   < > 166*   < >  --    < > 226*   < > 211*   < > 160*    < > = values in this interval not displayed.     Uric Acid   Date Value Ref Range Status   06/19/2022 4.4 3.5 - 7.2 mg/dL Final   04/29/2015 5.0 3.5 - 7.2 mg/dL Final     Liver Function Tests  Recent Labs   Lab 06/19/22  0403 06/18/22  0232 06/17/22  0347 06/16/22  0359   AST 11 12 12 17   ALT 19 25 27 33   ALKPHOS 75 82 78 89   BILITOTAL 0.8 0.7 0.8 1.8*   ALBUMIN 2.0* 2.2* 2.4* 2.4*     Micro:      Pancreatic Enzymes  No lab results found in last 7 days.    Coagulation Profile  No lab results found in last 7 days.    IMAGING:  Recent Results (from the past 24 hour(s))   XR Chest Port 1 View    Narrative    Portable chest 6/18/2022 at 1040 hours    INDICATION: Verify chest tube placement    COMPARISON: Earlier today at 0806 hours    FINDINGS: Right apically directed chest catheter is present. Decreased  conspicuity of right apical pneumothorax. Heart size normal. Feeding  tube progressing beyond the inferior margin of the image. Patchy  densities in the lungs unchanged. Pneumomediastinum again present.  Subcutaneous emphysema in the right side of the neck and right lateral  chest wall unchanged.      Impression    IMPRESSION: Placement of a right apically directed chest catheter with  decreased conspicuity of previous right apical pneumothorax. Continued  edema or atelectasis in the lungs. Subcutaneous emphysema in the right  chest wall and neck with continued pneumomediastinum.    EPIFANIO HUDSON MD         SYSTEM ID:  [id number]   XR Chest Port 1 View    Narrative    EXAM: XR CHEST PORT 1 VIEW  6/19/2022 6:32 AM      HISTORY: progression of  pneumomediastinum    COMPARISON: 6/18/2022    FINDINGS: Single view of the chest. Right apical chest tube appears  slightly retracted/repositioned. ET tube tip in the mid thoracic  trachea. Feeding tube is postpyloric. Extensive pneumomediastinum and  subcutaneous emphysema. Trachea is midline. Stable heart size. No  substantial change in mixed interstitial and airspace opacities. No  large effusion.      Impression    IMPRESSION:    1. Stable extensive pneumomediastinum and subcutaneous gas overlying  the chest.  2. Right apical chest tube appears slightly retracted/repositioned  since prior. No appreciable pneumothorax.    I have personally reviewed the examination and initial interpretation  and I agree with the findings.    EPIFANIO HUDSON MD         SYSTEM ID:  [id number]

## 2022-06-19 NOTE — PLAN OF CARE
Goal Outcome Evaluation:  ICU End of Shift Summary. See flowsheets for vital signs and detailed assessment.    Changes this shift: Pt initially unresponsive this morning. Versed turned off and fentanyl decreased. Pt RASS to -1 to -2 this afternoon. Pt noted to be mildly desynchronous with vent this evening, prn Fentanyl given and versed gtt turned back to 1 mg/hr. Chest tube placed to waterseal, no airleak. CXR completed. Veletri decreased to half dose, repeat gas showed worsening p:f ratio of 90. MICU notified. Veletri placed back to full strength. Repeat ABG showed p:F ratio of 98. MICU notified. Fio2 increased to 100% and repeat ABG done this evening. P:F 133. Pt having thick red secretions throughout day - micu aware. Coags sent. Pt continues to be constipated. Suppository given with patient only producing two small smears. Piña remains in place. Insulin gtt Alg 4+1    Plan: Monitor for s/s of bleeding, maintain map goal of 65, adjust sedation as needed. Repeat abg as needed.                Outcome Evaluation: Did not tolerate decrease in Veletri. Fio2 requirement increasing

## 2022-06-19 NOTE — PLAN OF CARE
ICU End of Shift Summary. See flowsheets for vital signs and detailed assessment.    Changes this shift: RASS-2, on versed/fentanyl. Follows commands, moves all extremities, denies pain. Occasionally, biting on ETT. Adequate BP, MAP >65 overnight, Levophed stopped since 2000. Vent settings unchanged, sats 92-98%. Chest tube to -20 suction in place with minimal serosanguinous output. No BM, evans with output. Insulin gtts infusing, algorithm 4+1.  Tubefeed @ goal.      Plan: Continue with plan of care, notify provider with changes.    Goal Outcome Evaluation:    Plan of Care Reviewed With: patient     Overall Patient Progress: improving     Outcome Evaluation: HR 80's with PAC's, MAP maintained without levophed overnight.

## 2022-06-20 NOTE — PROGRESS NOTES
[facility]    ICU Progress Note       Date of Admission:  5/23/2022    Assessment: Critical Care   Loco Wood is a 77 year old male with PMH CLL, auto-immune hemolytic anemia (on prednisone taper), and CKD3a who was admitted to ICU on 06/10 for AHRF due to PJP pneumonia.    Changes today:  - Lantus 20 BID with drip  - 500 ml LR for tachy, not much improvement, metop 5 IV once  - RASS goal -4  - U+LE US with Doppler to r/o DVT  - MRSA nares  - U(Na)  - Vaso ordered in case of hypotension  - Discontinue electrolyte replacement due to RICK    PLAN:     Neuro:  # Pain, sedation  - Versed infusion + PRN bumps  - Fentanyl infusion + PRN bumps  - Acetaminophen 650 mg Q4H PRN  - Hold PTA Ambien  - Zyprexa 5 mg at bedtime PRN  - Melatonin 1 mg PO at bedtime PRN  - RASS goal -4     # Degenerative disc disease  # Bilateral intermittent hand cramping, spasms suspicious for cervical impingement  Patient with history of degenerative disc disorder with concern for cervical impingement in setting of new bilateral intermittent hand cramping. Patient met with Dr. Melton his sports medicine doctor (AM 5/24) over phone for follow up appointment to discuss results of his MRI.   - Will defer to outpatient management.      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 Resp viral panel negative. Patient initially treated with 5 day abx course starting 5/24 with azithromycin (d/c 5/26) + ceftriaxone for presumed CAP with clinical improvement. On 5/27, patient developed fever with worsening respiratory status (45 L high flow NC)  and was transferred to [facility]. Patient improving with IVF 2.5L and broadened coverage with Zosyn, Micafungin (positive B-Glucan), and IV bactrim (for possible PJP) O2 req of 12-15L, with increase to HFNC on 6/1. CXR 5/31 with trace effusions and increasing apical opacities concerning for worsening edema/infection. PJP PCR 5/27 positive. Karius 6/1 + PJP and E. Faecium (less likely pathogenic as not growing on other cultures). On 6/9, worsening hypoxia with paO2 59 on 100% 50L. Patient intubated 6/15 for worsening respiratory status. Repeat chest CT showed persistence of bilateral consolidations and a new RUL nodule despite treatment with 1st and 2nd line for PJP. BAL was obtained 6/15 and as of 6/17, all cultures and assays have been negative however serum Galactomannan Agn resulted positive; ID concerned for new fungal process specifically Coccidiodes given recent travel to [place] and fungal studies are pending (see RUL nodule). Liposomal amphotericin was started 6/16. BAL flow cytometry was obtained per Heme/On and results showed low suspicion for pulmonary CLL involvement however biopsy is the only definitive study; BAL cytology is pending as of 6/17.   - Continue clindamycin and primaquine. G6PD levels were elevated (ok for primaquine)  - Follow BAL results; negative to date  - Methylpred 40 mg Q12H  - Current regimen: clindamycin, primaquine, fluconazole; also on acyclovir for ppx while on steroids  - Vori + mohsen until coccidio results back  - Discontinued: micafungin, levofloxacin, zosyn, linezolid, cefepime, Bactrim   - Intubated 06/15 due to desaturation while on BiPAP  - Increase PEEP 8->10   > repeat ABG with change in PEEP  - Epoprostenol 20 ng/kg/min (started 6/15 at 20; 10 since 06/19 with worsening of O2, back to 20 06/19)    Vent Mode: CMV/AC  (Continuous Mandatory Ventilation/ Assist Control)  FiO2 (%): 75 %  Resp Rate (Set): 30 breaths/min  Tidal Volume (Set, mL): 400 mL  PEEP (cm H2O): 8 cmH2O  Resp:  30    #RUL nodule  CT scan 6/15 revealed RUL nodule deemed most likely infectious vs inflammatory per radiology. ID is following and is concerned about coccidioides infection given immunosuppressed state and recent travel to [place]. Started on ampho 06/17 but stopped 06/18 due to RICK; started voriconazole and mohsen.    #Pneumomediastinum   CT 6/15 showed new large pneumomediastinum with extensive subcutaneous emphysema. Hemodynamics have been improving and it is not  a tension pneumomediastinum. Patient have a current PJP which is known to cause pneumothoraces which may be the cause of the patient's presentation. Pneumo could be 2/2 barotrauma.  - CXR daily to assess pneumomediastinum  - CTM    #New pneumothorax 06/18 improving  Likely from barotrauma, bronch, line placement. CXR showing PT in right upper lobe. Chest tube placed 06/18; improved PT.  - CTM    Cardiovascular:  # Hypotension improving  Shortly after intubation, likely due to sedation. As of AM 6/17, not requiring pressors. BP drops briefly when tachycardic.  - Vaso gtt ordered if needed  - MAP goal >65  - Sedation per Neuro section above     # AF with RVR spontaneously  # Sinus tachycardia with frequent PACs  No known history of AF. Echo unremarkable. Patient has been experiencing ST with PACs with some soft BP. Transitioned from IV amio to oral amio 6/16.  - On heparin subcutaneous ppx  - 500 ml LR for tachy today; can give metop if tachy not improved     # HLD  - Continue PTA atorvastatin     GI/Nutrition:  # Severe malnutrition in the context of acute on chronic illness  - Dietician consulted, appreciate recs  - Tube feeds: Osmolite 1.5 @ 40 ml/hr (goal 50 ml/hr)  - Supplements per dietician recs  - Vitamins per dietician recs  - FWF 30 ml Q4H     # GERD  - Continue PTA omeprazole + PRN famotidine (also on Pred)     Renal/Fluids/Electrolytes:  # Lactic acidosis improving  Persistent lactic acidosis 3-4 despite antimicrobial treatment and IVF  boluses. Appears hemodynamically stable with no evidence of hypoperfusion. Discussed with hem/onc, can sometimes see in malignancies, but unlikely in reasonably controlled CLL. Hepatic function wnl, suggesting against clearance issue. Resolved with additional IVF 5/30. Increased to 4.1 on 6/3, improved with addition of antibiotics above. Lactate is persistently elevated which may be associated with his CLL and recent hypoxia and hypotension.  - Follow lactate   - Tumor lysis workup per below; has been ntd  - 500 ml LR x1 (6/17)     # Hyponatremia in setting of SIADH resolved  Worsening hyponatremia since 6/2. Trialed 500 ml LR 6/7, however, sodium studies suggestive of SIADH. Now ~ 129-131. NT pro BNP and TTE without evidence of volume overload.  - Change IV medication carriers to NS     # RICK on CKD Stage 3A worsening  Patient has worsening RICK with Cr bump from 1.11->1.53 with a BUN:Cr of 32.6. On 6/16 the patient's urine output was inadequate at 0.62ml/kg/hr and the patient is net -9000 throughout this admission from a fluid restriction (see above). Worsened 06/18 when started ampho, continues to go up, likely ATN. Decent UOP.  Baseline ~1.3-1.6.  - Avoid nephrotoxic agents as able  - strict I/O  - Daily weights     Endocrine:  # Hypothyroidism  TSH 1.94 on admission WNL.   - Continue PTA levothyroxine    #Hyperglycemia, steroid induced  Patient has had elevated BG starting 6/16 likely related to glucocorticoid use for PJP. 6/16 the patient was started on medium dose sliding scale insulin however BG persisted 150-250.  - Hypoglycemia protocol per ICU routine  - Goal blood glucose < 180  - lantus 20 U BID; continue drip    ID:  # E faecium on Karius assay  # Severe sepsis, improved  # Positive beta D glucan >500  # PJP pneumonia  - Discussed with ID and onc, will switch to second line treatment for PJP with clindamycin and primaquine.  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 ID is following and has concern for fungal lesion given immunosuppression. Serum galactomannan Agn resulted positive 6/17 however utility of this test questionable. Fungal workup was initiated per ID and BAL results have returned negative to date for infectious or malignant etiology.   - ID following; workup and tx per above (pulmonary)    # Hx Herpes Zoster c/b post-herpetic neuralgia  - PTA ppx Acyclovir while on steroids     Hematology:    # CLL (dx 2/2007)  History of CLL (2007) managed on Ibrutinib (5/2019) which patient is not taking daily. WBC 24.9 on admission (up from baseline ~6-10) now down to ~21. Suspect secondary to recent infection with lower concern for conversion to leukemia. IVIG infusion (5/25). Following FISH and cytogenetics for disease prognostication and will continue to monitor with peripheral smear. Heme onc following and does not feel CLL is playing a role in current acute illness with the exception of potentially low IgG. Give IVIg 0.4 g/kg per hem/onc 06/12.  - Continue daily ibrutinib to help control disease, increased bioavailability with addition of fluconazole 6/2. CT A/P without evidence of CLL metastasis and flow cytology of BAL not cw pulmonary CLL involvement.   - Hematology/Oncology following                     - PTA ibrutinib, pursuing prior authorization for venotoclax              - cytogenetics, IgH mutation, TP53 mutation   - IgG levels ordered 6/17; recommending IVIg if <400  - Await bronch biopsy results  - Heme/Onc outpatient follow up on discharge    # Concern forTumor lysis syndrome  Patient had elevated phos and low Ca 6/16 concerning for TLS. Uric acid was ordered and returned normal 6/16 and 6/17 making TLS less likely  cause of electrolyte derangements.  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 B12, folate, iron panel normal with slightly elevated YEN levels. Labs concerning for marrow responsive anemia (elevated LDH, bilirubin, reticulocyte count). Will likely need to treat underlying CLL for long-term solution.    Hgb on admission 9.5 down from baseline (~10-12 past year) and now ~8. Currently hemodynamically stable and suspect recent drop in Hgb secondary to dilutional anemia in setting of 2.5L of IVF given (5/27). Methylpred decreased and rituximab held in setting of recent infection. Haptoglobin remains elevated, suggesting against worsening hemolytic anemia..   - Heme/Onc consulted              - Continue Methylprednisolone 40mg (Q12H)              - Hold Rituximab infusion              - Check uric acid, LDH, Mg, Phos, and retic count q48h               - Continue allopurinol for TLS prevention    - Hep B serologies (Core, Surface Ab +): Hep B ag negative, DNA quant negative   - Continue PTA Folate, B12   - Transfuse if Hgb < 7      Musculoskeletal:  # Weakness/Deconditioning  - PT/OT following     Skin:  # Concern for sacrum/iliac pressure  - Pressure dressing in place  - Pressure minimizing interventions per ICU routine  - Consider WOC consult     General Cares/Prophylaxis:    DVT Prophylaxis: Heparin SQ  GI Prophylaxis: PPI  Restraints: NA  Family Communication: Sister April updated by phone  Code Status: Full code     Lines/tubes/drains:  - PIV x3  - CVC TL right femoral  - ETT  - Arterial line  - NJ left nostril  - Piña      Disposition:  - Medical ICU    Patient seen and findings/plan discussed with medical ICU staff,   [place].    Azucena Ryan MD  PGY-2 internal medicine  _________________________________________________________    Interval History   Nursing notes reviewed. NAEO. Not arousable this AM. Had episode of AF with RVR that improved with fluids and scheduled metop.    Physical Exam   Vital Signs: Temp: 98  F (36.7  C) Temp src: Axillary BP: 113/66 Pulse: (!) 132   Resp: 30 SpO2: 97 % O2 Device: Mechanical Ventilator    Weight: 155 lbs 10.32 oz     Constitutional: Sedated, resting in bed, not waking up  Respiratory: Decreased breath sounds throughout, tolerating the vent well, crepitus on ausculation of R lung  Cardiovascular: RRR without MGR, Hamman's sign present over precordium  Chest: significant crepitus noted to palpation of R upper chest wall with presence of subcutaneous air descending into upper abdomen and upper border of mid-neck; appears stable from prior exam  GI: Abdomen soft, non distended  : evans catheter in place  Neurologic: Sedated, not waking up    Data   I reviewed all medications, new labs and imaging results over the last 24 hours.  Arterial Blood Gases   Recent Labs   Lab 06/20/22  0459 06/20/22  0313 06/19/22  1746 06/19/22  1556   PH 7.31* 7.33* 7.34* 7.32*   PCO2 56* 55* 54* 55*   PO2 115* 73* 133* 64*   HCO3 28 29* 29* 28       Complete Blood Count   Recent Labs   Lab 06/20/22  0307 06/19/22  0403 06/18/22  0232 06/17/22  0347   WBC 34.7* 36.1* 42.8* 42.4*   HGB 8.0* 8.3* 8.8* 9.2*   * 143* 174 160       Basic Metabolic Panel  Recent Labs   Lab 06/20/22  0604 06/20/22  0504 06/20/22  0321 06/20/22  0307 06/19/22  0406 06/19/22  0403 06/18/22  1409 06/18/22  1259 06/18/22  0256 06/18/22  0232 06/17/22  0351 06/17/22  0347   NA  --   --   --  134  --  136  --   --   --  134  --  134   POTASSIUM  --   --   --  4.6  --  4.4  --   --   --  3.6  --  4.2   CHLORIDE  --   --   --  98  --  102  --   --   --  100  --  99   CO2  --   --   --  29  --  27  --   --   --  27  --  26   BUN  --   --    --  75*  --  70*  --   --   --  59*  --  50*   CR  --   --   --  2.35*  --  2.18*  --  2.15*  --  1.98*  --  1.53*   * 156* 150* 136*   < > 166*   < >  --    < > 226*   < > 211*    < > = values in this interval not displayed.     Uric Acid   Date Value Ref Range Status   06/19/2022 4.4 3.5 - 7.2 mg/dL Final   04/29/2015 5.0 3.5 - 7.2 mg/dL Final     Liver Function Tests  Recent Labs   Lab 06/20/22  0307 06/19/22  1746 06/19/22  0403 06/18/22  0232 06/17/22  0347   AST 16  --  11 12 12   ALT 18  --  19 25 27   ALKPHOS 76  --  75 82 78   BILITOTAL 0.6  --  0.8 0.7 0.8   ALBUMIN 1.9*  --  2.0* 2.2* 2.4*   INR  --  1.07  --   --   --      Micro:      Pancreatic Enzymes  No lab results found in last 7 days.    Coagulation Profile  Recent Labs   Lab 06/19/22  1746   INR 1.07   PTT 26       IMAGING:  Recent Results (from the past 24 hour(s))   XR Chest Port 1 View    Narrative    Exam: XR CHEST PORT 1 VIEW, 6/19/2022 1:11 PM    Comparison: 6/19/2022 at 6:11 AM    History: verify resolution of pneumothorax    Findings:  Single portable semiupright view of the chest. Endotracheal tube with  tip in mid thoracic trachea. Right apical chest tube. Postpyloric  enteric tube.    Trachea is midline. Unchanged cardiomediastinal silhouette. Stable  extensive subcutaneous emphysema overlying the chest. Unchanged mixed  interstitial and airspace opacities. There is no pneumothorax or  pleural effusion. The upper abdomen is unremarkable. Continued  pneumomediastinum.      Impression    Impression:   1. Right apical chest tube with no definite pneumothorax. Continued  pneumomediastinum.  2. Unchanged extensive subcutaneous emphysema overlying the right  chest.  3. Unchanged mixed interstitial and airspace opacities.    I have personally reviewed the examination and initial interpretation  and I agree with the findings.    EPIFANIO HUDSON MD         SYSTEM ID:  [id number]   XR Chest Port 1 View    Impression    RESIDENT  PRELIMINARY INTERPRETATION  Impression:   1. Similar appearance of pneumomediastinum and subcutaneous emphysema.  2. Similar appearance of bilateral pulmonary opacities.  3. No appreciable right-sided pneumothorax with chest tube in place.

## 2022-06-20 NOTE — PLAN OF CARE
ICU End of Shift Summary. See flowsheets for vital signs and detailed assessment.    Changes this shift: Bouts of sinus tachycardia, intermittent a. Fib RVR. At start of shift, resolved w/ administration of scheduled PO amio and metop, ectopy reoccurred @ 2am. Pts rhythm highly variable. Discussed w/ team administration of IV metoprolol as event has occurred previously 5/18. Administered 500mL LR bolus and albumin per team due to risk of metoprolol lowering Bps. Pt continues to have CXR showed no changes, ABGs drawn.      Plan:  Discuss pt's needs for amio   Goal Outcome Evaluation:    Plan of Care Reviewed With: patient     Overall Patient Progress: no change    Outcome Evaluation: ABGs improved, SVT/a. fib not constently controlled w/ current regimen, fiO2 requirement decreased

## 2022-06-20 NOTE — PLAN OF CARE
ICU End of Shift Summary. See flowsheets for vital signs and detailed assessment.    Changes this shift: RASS -5. Fent@200mcg and versed @10mg. BIS 30-40s this afternoon. Vec gtt @0.4mcg  - TOF 0/4. Baseline TOF 4/4 35mA ulnar. ST/afib 1001-1223. 500 LR bolus given, 6mg adenosine and 5mg metrop and amnio bolus. Amnio gtt @1mg- SR since. CMV/VC settings 100%/28/420/8. Full strength flolan. Proned at 1730 for P/F 94. TF at goal. Piña with adequate UOP. BMx2. Insulin gtt algorithm 4 +3.    Plan: Amnio gtt to 0.5mg at 1930. Maintain prone position all night. Monitor resp status. Notify team of any changes.      Goal Outcome Evaluation:    Plan of Care Reviewed With: patient     Overall Patient Progress: declining

## 2022-06-20 NOTE — PROGRESS NOTES
[facility]  Transplant Infectious Disease Progress Note     Patient:  Loco Wood, Date of birth 1945, Medical record number [id number]  Date of Visit:  06/20/2022         Assessment and Recommendations:   77 year old man with CLL and AIHA s/p prednisone courses in 12/2021 from derm, 1/2022 for lung symptoms in [place], in 3/2022 for anemia, then most recently for 3 weeks & 6 weeks for AIHA who is being treated for severe Pneumocystis pneumonia.Hospitalization complicated by prolonged hypoxic respiratory failure eventually requiring intubation on 6/15. Also developed pneumomediastinum and subcutaneous emphysema from barotrauma.    Refractory, Severe PJP pneumonia: 5/27/22 CT chest showed progressive bilateral groundglass opacities. 5/27/2022 BAL Pneumocystis sputum PCR +, in the setting of a progressive pulmonary process. Elevated Fungitell >500 & LDH are also consistent with Pneumocystis. Had 1 dose of neb pent on 5/26/2022. IV bactrim started 5/27/2022. Other infectious work up has including negative MRSA nares, Cryptococcal antigen, EBV PCR, CMV PCR, Coccidiodes antigen, fungal antibodies, Histoplasma antigen, Blastomyces antigen,TB quantiferon, Adenovirus PCR, Toxoplasma antbibody. Chivoius demonstrated 6336 copies of Pneumocystis jirovecii and 353 copies of E faecium. He has prolonged issues with hypoxic respiratory failure on HF and Bipap but required intubated on 6/15.   Initially received tmp/smx from 5/27 to 6/10 (~2 weeks). Patients with hematologic malignancies can be slow to respond to PJP treatment but would expect some response within in 2 weeks. Due to concern for PJP treatment failure tmp/smx was changed to clindamycin + primaquine on 6/10/22. There is emerging evidence of the utilization of micafungin with PJP so he received micafungin until 6/16.   6/15 CT demonstrated pneumomediastinum, subcutaneous emphysema and new RML nodule. PJP DFA positive for PJP and 1-3 BD  glucan > 500.  Due to the concern of a new pulmonary nodule was initially switched to Liposomal amphotericin on 6/16 (see below) before being switched to Vori/Carolyn given RICK. Will continue primaquine + clindamycin.  Unfortunately PJP in this patient population is associated with a high mortality rate.  --6/15 BAL 54 nucleated cells, 55% pmn. Positive PJP DFA. Negative: Gram stain, KOH, CMV, respiratory bacterial cx, RVP, HSV, Mycoplasma PCR, Legionella PCR, EBV PCR. 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 Initially had been on fluconazole 6/2-6/8. It was discontinued when Coccidioides antigen and fungal antibodies were negative. With the development of a RUL nodule and clinical decompensation repeat fungal testing including Coccidioides antigen and antibody testing were sent and are pending. Serum Aspergillus gm is positive at 1.42. BAL Aspergillus gm negative. Previous Aspergillus testing was negative so this may not be the primary  in his declined respiratory status. Empirically started on liposomal amphotericin on 6/16. However, considering worsening renal function in light of AmphoB and the fact that Vori does have coverage not only against aspergillus but also other endemic mycoses, switched to Voriconazole, along with IV Micafungin bridge until therapeutic.    Leukocytosis/History of CLL/AIHA:  WBC up trending over the past week with increased lymphocytes. There is concern for CLL progression based on flow. Previously taking ibrutinib at home weekly but now on daily. He is has been methylprednisolone since 5/24 with a dose of 40 mg daily since 5/28-6/10. Dose increased on 6/10 to 40 mg IV q 8 hours. Also on ibrutinib for CLL-dose decreased 6/11/22. Plans to receiving IVIG. Hematology discussing other CLL treatment options as well. Heme/onc does not think recent CLL is contributing to  pulmonary issues based on BAL flow.    E faecium on Karius: s/p 4 days of linezolid. No isolated on clinical cultures.    History of herpes zoster: on acyclovir    - Hep B core ab & S ab+ 5/26/2022, with negative hep B viral load.  - Extensive travel hx.     - QTc interval: 427 msec on 6/2/2022 EKG  - Bacterial prophylaxis: antibiotics stopped 6/9/22.  - Pneumocystis prophylaxis: pent 5/26/2022, treatment bactrim 5/27/2022. G6PD normal.  - Viral serostatus & prophylaxis: HSV1+, HSV2+, CMV+; acyclovir prophy  - Fungal prophylaxis: micafungin 5/27/2022-6/7/22, 6/10-C  - Immunization status:3 covid vaccinations.   - Gamma globulin status: 30g IVIG 5/25/2022 and 6/12/22  - Isolation status: Good hand hygiene.    Recommendations:  1. Continue clindamycin 900 mg IV q 8 hours + primaquine 30 mg daily. Primaquine can be crushed. (BAL PJP DFA and 1-3 BD glucan positive)   2. Continue Voriconazole 250mg q12h (4mg/kg). Can be given enterally  3. Please check Vori on 6/23  4. Continue Micafungin 150mg q24h IV as a bridge to provide anti-mold/aspergillus coverage while we wait for Voriconazole to become therapeutic  5. Pending Coccidioides antibody (ID and CF to [facility]), Paracoccidioides antibody ([facility]),   6. If he has a fever obtain blood cultures and add cefepime or pip/tazo  7. If significant increase in pressor requirement, add vancomycin after obtaining blood cultures  8. Dose of steroids per MICU and heme/onc teams    HSCT/HM ID service will continue to follow     BELKYS Zuniga  Staff Physician, Infectious Diseases  Pager [phone]        Interval History:   Patient remains afebrile. Off pressors at the time of exam this morning, but has Vasopressin ordered in case of hypotension. Noted to be hypoxemic this AM, FiO2 now up to 75% (previously 55%). Per RN, bloody secretions. Still has chest tube in.       Blood cultures ordered 6/20. Creatinine 2.31 today. WBC 34.7.   6/16 Urine Histo/Blasto antigens  negative, [place] Cocci antigen negative  Pending Cocci antibodies and Paracocci antibodies    Review of Systems: unable to obtain due to intubation       Current Medications & Allergies:       acyclovir  400 mg Oral or Feeding Tube BID     allopurinol  300 mg Oral or Feeding Tube Daily     artificial tears   Both Eyes Q8H     atorvastatin  20 mg Oral or Feeding Tube Daily     clindamycin  900 mg Intravenous Q8H     cyanocobalamin  100 mcg Oral or Feeding Tube Daily     folic acid  1 mg Oral or Feeding Tube Daily     heparin ANTICOAGULANT  5,000 Units Subcutaneous Q12H     ibrutinib (IMBRUVICA) oral suspension (site prepared) in water  280 mg Oral or FT or NG tube Daily     insulin glargine  20 Units Subcutaneous QAM AC     insulin glargine  20 Units Subcutaneous QPM     levothyroxine  50 mcg Oral or Feeding Tube Daily     methylPREDNISolone  40 mg Intravenous Q12H     metoprolol tartrate  12.5 mg Oral or Feeding Tube BID     micafungin  150 mg Intravenous Q24H     multivitamins w/minerals  15 mL Per Feeding Tube Daily     pantoprazole  20 mg Oral or Feeding Tube QAM     polyethylene glycol  17 g Oral or Feeding Tube Daily     primaquine  30 mg Oral or Feeding Tube Daily     protein modular  1 packet Per Feeding Tube BID     psyllium  1 packet Oral or Feeding Tube BID     senna-docusate  2 tablet Oral or Feeding Tube BID    Or     senna-docusate  1 tablet Oral or Feeding Tube BID     sodium chloride (PF)  3 mL Intracatheter Q8H     sodium chloride (PF)  3 mL Intracatheter Q8H     thiamine  100 mg Per Feeding Tube Daily     vitamin D3  50 mcg Oral or Feeding Tube Daily     voriconazole  250 mg Oral or Feeding Tube Q12H [facility] (08/20)       Allergies   Allergen Reactions     Blood Transfusion Related (Informational Only) Other (See Comments)     Patient has a history of a clinically significant antibody against RBC antigens.  A delay in compatible RBCs may occur.      Morphine Itching and Rash     Dilaudid  [Hydromorphone] Itching            Physical Exam:   Ranges for vital signs:  Temp:  [97.7  F (36.5  C)-98.8  F (37.1  C)] 98  F (36.7  C)  Pulse:  [] 72  Resp:  [25-34] 28  BP: (113)/(66) 113/66  MAP:  [52 mmHg-95 mmHg] 65 mmHg  Arterial Line BP: ()/(41-70) 116/47  FiO2 (%):  [70 %-100 %] 100 %  SpO2:  [91 %-100 %] 98 %  Vitals:    06/16/22 0000 06/17/22 0000 06/19/22 0500   Weight: 65.4 kg (144 lb 2.9 oz) 66.5 kg (146 lb 9.7 oz) 70.6 kg (155 lb 10.3 oz)     Physical Examination:  GENERAL:  Chronically ill appearing  HEAD:  Head is normocephalic, atraumatic   LUNGS: diminished breath sounds bilaterally. subcutaneous air appreciated over chest wall, right sided chest tube + 75% FiO2.  CARDIOVASCULAR:  RRR, no murmus  SKIN:  No acute rashes.     EXTREMITIES: No joint erythema or swelling.   NEUROLOGIC:  Intubated. Sedated.  LINES: CVC right femoral, peripheral IV, A line place without any surrounding erythema or exudate. Dressing intact.   Piña +         Laboratory Data:     Metabolic Studies       Recent Labs   Lab Test 06/20/22  1752 06/20/22  1104 06/20/22  1027 06/20/22  0321 06/20/22  0307 06/19/22  0406 06/19/22  0403 06/18/22  0001 06/17/22  2318 06/15/22  2350 06/15/22  2143 06/15/22  1941 06/15/22  1719 06/15/22  1248 06/15/22  0439 06/03/22  2202 06/03/22  1801   NA  --   --  134  --  134  --  136   < >  --    < >  --    < >  --    < > 135   < >  --    POTASSIUM  --   --   --   --  4.6  --  4.4   < >  --    < >  --    < >  --    < > 4.3   < >  --    CHLORIDE  --   --   --   --  98  --  102   < >  --    < >  --    < >  --    < > 102   < >  --    CO2  --   --   --   --  29  --  27   < >  --    < >  --    < >  --    < > 26   < >  --    ANIONGAP  --   --   --   --  7  --  7   < >  --    < >  --    < >  --    < > 7   < >  --    BUN  --   --   --   --  75*  --  70*   < >  --    < >  --    < >  --    < > 26   < >  --    CR  --   --  2.31*  --  2.35*  --  2.18*   < >  --    < >  --    < >  --    < >  0.98   < >  --    GFRESTIMATED  --   --   --   --  28*  --  31*   < >  --    < >  --    < >  --    < > 80   < >  --    *   < >  --    < > 136*   < > 166*   < >  --    < >  --    < >  --    < > 134*   < >  --    A1C  --   --   --   --   --   --   --   --  5.0  --   --   --   --   --   --   --   --    DAVID  --   --   --   --  7.9*  --  8.2*  8.2*   < >  --    < >  --    < >  --    < > 8.6  8.6   < >  --    PHOS  --   --   --   --  4.7*  --  4.3   < >  --    < >  --   --   --    < > 3.4   < >  --    MAG  --   --   --   --  2.9*  --  2.8*   < >  --    < >  --    < >  --    < > 2.0   < >  --    LACT  --   --   --   --   --   --   --   --   --   --  3.3*  --  3.5*  --  2.5*   < >  --    PCAL  --   --   --   --   --   --   --   --   --   --   --   --   --   --   --   --  0.07*   FGTL  --   --   --   --   --   --   --   --   --   --   --   --   --   --  >500   < >  --     < > = values in this interval not displayed.       Hepatic Studies    Recent Labs   Lab Test 06/20/22  0307 06/19/22  0403 06/18/22  0232 06/15/22  0439 06/14/22  0412 06/08/22  0445 06/07/22  0336 06/06/22  0440   BILITOTAL 0.6 0.8 0.7   < > 1.8*   < > 0.8 0.8   DBIL  --   --   --   --  0.4*  --   --   --    ALKPHOS 76 75 82   < > 102   < > 99 92   PROTTOTAL 4.5* 4.5* 5.0*   < > 5.9*   < > 5.6* 5.3*   ALBUMIN 1.9* 2.0* 2.2*   < > 2.5*   < > 2.6* 2.4*   AST 16 11 12   < > 24   < > 43 30   ALT 18 19 25   < > 45   < > 42 33   LDH  --   --   --   --   --   --  348* 322*    < > = values in this interval not displayed.       Hematology Studies      Recent Labs   Lab Test 06/20/22  0307 06/19/22  0403 06/18/22  0232 06/17/22  0347 06/16/22  0359 06/15/22  0439   WBC 34.7* 36.1* 42.8* 42.4* 39.1* 29.8*   ANEU 16.0* 17.7* 21.4* 17.0* 19.2* 12.2*   ALYM 18.0* 18.4* 20.5* 25.4* 19.6* 17.6*   ANY 0.7 0.0 0.9 0.0 0.4 0.0   AEOS 0.0 0.0 0.0 0.0 0.0 0.0   HGB 8.0* 8.3* 8.8* 9.2* 9.4* 8.8*   HCT 24.9* 25.9* 27.1* 28.4* 28.7* 30.2*   * 143* 174 160 163  145*       Arterial Blood Gas Testing    Recent Labs   Lab Test 06/20/22  1524 06/20/22  0459 06/20/22  0313 06/19/22  1746 06/19/22  1556   PH 7.22* 7.31* 7.33* 7.34* 7.32*   PCO2 72* 56* 55* 54* 55*   PO2 71* 115* 73* 133* 64*   HCO3 29* 28 29* 29* 28   O2PER 75 80 70 100 65      Inflammatory Markers    Recent Labs   Lab Test 06/10/22  0448 06/08/22  0445 06/03/22  0423 06/02/22  0451 10/04/17  2250 04/11/16  1136   SED  --   --   --   --  1  --    CRP 11.0* 19.0* 82.0* 100.0*  --  <0.2  Reference Values:   Low Risk:           <1.0 mg/L   Average Risk:       1.0-3.0 mg/L   High Risk:          >3.0 mg/L   Acute Inflammation: >8.0 mg/L         Immune Globulin Studies     Recent Labs   Lab Test 06/17/22  1008 06/12/22  0401 06/03/22  0856 05/24/22  1651 06/12/19  0806 05/31/19  1021 04/29/15  1012 09/19/14  1227    338* 446* 312* 394* 409*   < > 729   IGM  --   --   --   --   --   --   --  28*   IGA  --   --   --   --   --   --   --  72    < > = values in this interval not displayed.       Gout Labs      Recent Labs   Lab Test 06/19/22  0403 06/17/22  0347 06/16/22  0359 06/15/22  0439 06/13/22  0410   URIC 4.4 4.6 3.5 3.0* 3.9     Microbiology:  Fungal testing  Recent Labs   Lab Test 06/15/22  1618 06/15/22  0439 06/07/22  1420 05/27/22  1356 05/27/22  0916 05/23/22  1802   FGTL  --  >500 >500  --   --  >500   ASPGAI 0.10 1.42  --  0.03  --   --    ASPAG Negative  --   --   --   --   --    ASPGAA  --  Positive*  --  Negative  --   --    COFUNG  --   --   --   --  <1:2  --        Last Culture results with specimen source  Culture   Date Value Ref Range Status   06/16/2022 No growth after 4 days  Preliminary   06/16/2022 No growth after 3 days  Preliminary   06/16/2022 No growth after 4 days  Preliminary   06/15/2022 No growth after 4 days  Preliminary   06/15/2022 No Growth  Final   06/15/2022 No growth after 4 days  Preliminary   06/15/2022 No Actinomyces species isolated after 4 days  Preliminary    06/13/2022 No Growth  Final   06/13/2022 No Growth  Final   06/03/2022 No Growth  Final   06/03/2022 No Growth  Final   06/02/2022 No growth to date  Preliminary   05/27/2022 2+ Normal aracely  Final   05/27/2022 No Growth  Final   05/27/2022 No Growth  Final   05/26/2022 No Growth  Final     Culture Micro   Date Value Ref Range Status   03/02/2014 No growth  Final   10/22/2010 No Beta Streptococcus isolated  Final   04/21/2010   Final    No Salmonella, Shigella, Campylobacter or E coli 0157 isolated.   04/18/2010 No growth  Final   03/16/2009 No Beta Streptococcus isolated  Final   01/13/2007 No growth  Final   04/06/2004 No Beta Streptococcus isolated  Final        Virology:  Coronavirus-19 testing    Recent Labs   Lab Test 06/07/22  0903 05/31/22  0241 05/23/22  1946 06/23/20  0843   [id number]  --   --   --  Negative   [id number]  --   --   --  Not Applicable   [id number] Negative Negative Negative  --        Respiratory virus testing    Recent Labs   Lab Test 06/15/22  1618 05/24/22  1916 05/24/22  1916 05/23/22  1946   IFLUA Not Detected   < > Not Detected  --    INFZA  --   --   --  Negative   FLUAH1 Not Detected   < > Not Detected  --    ZV2218 Not Detected   < > Not Detected  --    FLUAH3 Not Detected   < > Not Detected  --    IFLUB Not Detected   < > Not Detected  --    INFZB  --   --   --  Negative   PIV1 Not Detected  --  Not Detected  --    PIV2 Not Detected  --  Not Detected  --    PIV3 Not Detected  --  Not Detected  --    PIV4 Not Detected   < > Not Detected  --    IRSV  --   --   --  Negative   RSVA Not Detected   < > Not Detected  --    RSVB Not Detected   < > Not Detected  --    HMPV Not Detected  --  Not Detected  --    ADENOV Not Detected   < > Not Detected  --    CORONA Not Detected   < > Not Detected  --     < > = values in this interval not displayed.       CMV viral loads    Recent Labs   Lab Test 06/15/22  1618 06/01/22  1448   CMVQNT Not Detected Not Detected       EBV DNA Copies/mL   Date  Value Ref Range Status   06/02/2022 Not Detected Not Detected copies/mL Final     Urine Studies     Recent Labs   Lab Test 06/02/22  0952 05/26/22  2240 05/23/22  1906 05/19/22  1355 01/04/21  1744   URINEPH 6.5 5.5 6.5 7.0 5.5   NITRITE Negative Negative Negative Negative Negative   LEUKEST Negative Negative Negative Negative Negative   WBCU 2 2 1 0-5 1     Imaging:  US Upper Extremity Venous Duplex Bilat  Narrative: EXAMINATION: DOPPLER VENOUS ULTRASOUND OF BILATERAL UPPER EXTREMITIES,  6/20/2022 4:44 PM     COMPARISON: None.    HISTORY: Edema    TECHNIQUE:  Gray-scale evaluation with compression, spectral flow, and  color Doppler assessment of the deep venous system of both upper  extremities.    FINDINGS:  Right:  Normal blood flow and waveforms are demonstrated in the right internal  jugular, innominate, and axillary veins. Right subclavian vein is not  well visualized. There is normal compressibility of the brachial and  basilic veins. The right cephalic vein from the upper forearm to the  wrist is not compressible. Next echogenicity collection of the right  mid forearm measuring 1.0 x 1.5 x 3.8 cm.    Left:  Normal blood flow and waveforms are demonstrated in the left internal  jugular, innominate, subclavian, and axillary veins.     There is normal compressibility of the brachial, basilic and cephalic  veins.  Impression: IMPRESSION:  1.  No evidence of deep venous thrombosis in either upper extremity.   2.  Superficial venous thrombus of the right cephalic vein at the  level of the forearm.  3.  Mixed echogenicity collection of the right midforearm, likely  hematoma.    I have personally reviewed the examination and initial interpretation  and I agree with the findings.    DENIZ DIXON DO         SYSTEM ID:  [id number]  US Lower Extremity Venous Duplex Bilateral  Narrative: EXAMINATION: US LOWER EXTREMITY VENOUS DUPLEX BILATERAL  6/20/2022  4:45 PM      CLINICAL HISTORY: Edema    COMPARISON: Venous  ultrasound of the right lower extremity dated  3/6/2014        PROCEDURE COMMENTS: Ultrasound was performed of the deep venous system  of the right and left lower extremity using grayscale, color, and  spectral Doppler.    FINDINGS:  The bilateral greater saphenous origins, femoral, popliteal, and deep  calf veins are visualized and are patent. The left common femoral vein  is patent. Venous waveforms are normal. There is normal response to  compression. Heterogeneous hypoechoic collection without internal  vascularity in the left calf measuring 1.6 x 2.1 x 3.3 cm.  Impression: IMPRESSION:.  1. No deep vein thrombosis in the right or left lower extremity.  2. Hypoechoic collection in the left calf, favored to represent a  hematoma.    I have personally reviewed the examination and initial interpretation  and I agree with the findings.    DENIZ DIXON DO         SYSTEM ID:  [id number]  XR Chest Port 1 View  Narrative: Exam: XR CHEST PORT 1 VIEW, 6/20/2022 3:20 AM    Comparison: 6/19/2022    History: Evaluate pneumomediastinum    Findings:  Single portable symmetric view of the chest is obtained. Feeding tube  projects over the stomach before coursing off the inferior to the  film. Endotracheal tube tip in the mid thoracic trachea. Right-sided  chest tube in place.    Trachea is midline. Similar appearance of pneumomediastinum and  subcutaneous emphysema. Heart size is unchanged. Unchanged appearance  of bilateral pulmonary opacities. There is no pneumothorax or pleural  effusion. The upper abdomen is unremarkable.  Impression: Impression:   1. Similar appearance of pneumomediastinum and subcutaneous emphysema.  2. Similar appearance of bilateral pulmonary opacities.  3. No appreciable right-sided pneumothorax with chest tube in place.    I have personally reviewed the examination and initial interpretation  and I agree with the findings.    HARRY WILKERSON MD         SYSTEM ID:  [id number]

## 2022-06-21 NOTE — PLAN OF CARE
ICU End of Shift Summary. See flowsheets for vital signs and detailed assessment.    Changes this shift: Supined at 1000. RASS -5. Remains on fent, and versed. BIS 30s. Sinus juany (48-52s); amnio turned off at 1145 for HR <50. Afebrile. Phenyl titrated to maintain MAP > 65. CMV/PC 80%/28/420/8. Red streaked secretions through ETT. CT to water seal with 25mL output. Emesis x1 with head turn at 0800. NJ clamped. OG placed to LIS for 100mL output; now clamped. 20mg lasix given; adequate UOP. BMx1. 1 unit PRBC given, Hgb recheck 7.5. Insulin gtt alg 4 +2.    Comfort care order placed at 1700. Vec gtt turned off at 1700. Pt still sustaining TOF 0/4 ulnar at 35 mA.    Plan: Transition to CC when TOF 4/4. Call sister April when pt passes.       Goal Outcome Evaluation:    Plan of Care Reviewed With: patient, sibling, friend     Overall Patient Progress: declining

## 2022-06-21 NOTE — PROGRESS NOTES
[facility]  Transplant Infectious Disease Progress Note     Patient:  Loco Wood, Date of birth 1945, Medical record number [id number]  Date of Visit:  06/21/2022         Assessment and Recommendations:   77 year old man with CLL and AIHA s/p prednisone courses in 12/2021 from derm, 1/2022 for lung symptoms in [place], in 3/2022 for anemia, then most recently for 3 weeks & 6 weeks for AIHA who is being treated for severe Pneumocystis pneumonia.Hospitalization complicated by prolonged hypoxic respiratory failure eventually requiring intubation on 6/15. Also developed pneumomediastinum and subcutaneous emphysema from barotrauma.    VRE Bacteremia: Blood cultures collected 6/20 in the setting of persistent leukocytosis, low pressures with repeat need for pressors. 1/2 sets with GPCs in pairs and chains, VRE by Verigene. Possibly line related. Recommend starting renally dosed Daptomycin for coverage. Will obtain repeat cultures, plan to treat through CVCs for now    Refractory, Severe PJP pneumonia: 5/27/22 CT chest showed progressive bilateral groundglass opacities. 5/27/2022 BAL Pneumocystis sputum PCR +, in the setting of a progressive pulmonary process. Elevated Fungitell >500 & LDH are also consistent with Pneumocystis. Had 1 dose of neb pent on 5/26/2022. IV bactrim started 5/27/2022. Other infectious work up has including negative MRSA nares, Cryptococcal antigen, EBV PCR, CMV PCR, Coccidiodes antigen, fungal antibodies, Histoplasma antigen, Blastomyces antigen,TB quantiferon, Adenovirus PCR, Toxoplasma antbibody. Heena demonstrated 6336 copies of Pneumocystis jirovecii and 353 copies of E faecium. He has prolonged issues with hypoxic respiratory failure on HF and Bipap but required intubated on 6/15.   Initially received tmp/smx from 5/27 to 6/10 (~2 weeks). Patients with hematologic malignancies can be slow to respond to PJP treatment but would expect some response within in  2 weeks. Due to concern for PJP treatment failure tmp/smx was changed to clindamycin + primaquine on 6/10/22. There is emerging evidence of the utilization of micafungin with PJP so he received micafungin until 6/16.   6/15 CT demonstrated pneumomediastinum, subcutaneous emphysema and new RML nodule. PJP DFA positive for PJP and 1-3 BD glucan > 500.  Due to the concern of a new pulmonary nodule was initially switched to Liposomal amphotericin on 6/16 (see below) before being switched to Vori/Carolyn given RICK. Will continue primaquine + clindamycin.  Unfortunately PJP in this patient population is associated with a high mortality rate.  --6/15 BAL 54 nucleated cells, 55% pmn. Positive PJP DFA. Negative: Gram stain, KOH, CMV, respiratory bacterial cx, RVP, HSV, Mycoplasma PCR, Legionella PCR, EBV PCR. 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 Initially had been on fluconazole 6/2-6/8. It was discontinued when Coccidioides antigen and fungal antibodies were negative. With the development of a RUL nodule and clinical decompensation repeat fungal testing including Coccidioides antigen and antibody testing were sent, fungal testing so far negative with the exception of serum Aspergillus gm which is positive at 1.42. BAL Aspergillus gm negative. Previous Aspergillus testing was negative so this may not be the primary  in his declined respiratory status. Empirically started on liposomal amphotericin on 6/16. However, considering worsening renal function in light of AmphoB and the fact that Vori does have coverage not only against aspergillus but also other endemic mycoses, switched to Voriconazole, along with IV Micafungin bridge until therapeutic.    Leukocytosis/History of CLL/AIHA:  WBC up trending over the past week with increased lymphocytes. There is concern for CLL progression based on flow. Previously  taking ibrutinib at home weekly but now on daily. He is has been methylprednisolone since 5/24 with a dose of 40 mg daily since 5/28-6/10. Dose increased on 6/10 to 40 mg IV q 8 hours. Also on ibrutinib for CLL-dose decreased 6/11/22. Plans to receiving IVIG. Hematology discussing other CLL treatment options as well. Heme/onc does not think recent CLL is contributing to pulmonary issues based on BAL flow.    E faecium on Karius: s/p 4 days of linezolid. No isolated on clinical cultures.    History of herpes zoster: on acyclovir    - Hep B core ab & S ab+ 5/26/2022, with negative hep B viral load.  - Extensive travel hx.     - QTc interval: 427 msec on 6/2/2022 EKG  - Bacterial prophylaxis: antibiotics stopped 6/9/22.  - Pneumocystis prophylaxis: pent 5/26/2022, treatment bactrim 5/27/2022. G6PD normal.  - Viral serostatus & prophylaxis: HSV1+, HSV2+, CMV+; acyclovir prophy  - Fungal prophylaxis: micafungin 5/27/2022-6/7/22, 6/10-C  - Immunization status:3 covid vaccinations.   - Gamma globulin status: 30g IVIG 5/25/2022 and 6/12/22  - Isolation status: Good hand hygiene.    Recommendations:  1. Start Daptomycin 12mg/kg q48h (CrCl <30 ml/min), please obtain baseline CPK and trend weekly  2. Repeat blood cultures tomorrow to document clearance. Plan to treat through CVCs for now  3. Continue clindamycin 900 mg IV q 8 hours + primaquine 30 mg daily. Primaquine can be crushed. (BAL PJP DFA and 1-3 BD glucan positive)   4. Continue Voriconazole 250mg q12h (4mg/kg). Can be given enterally  5. Please check Vori level/trough on 6/23  6. Continue Micafungin 150mg q24h IV as a bridge to provide anti-mold/aspergillus coverage while we wait for Voriconazole to become therapeutic  7. Pending Coccidioides antibody (ID and CF to [facility]), Paracoccidioides antibody ([facility]),   8. Dose of steroids per MICU and heme/onc teams  9. In light of hypoxic respiratory failure due to refractory PJP pneumonia, possible pulmonary  aspergillosis and now VRE bacteremia, clinically patient appears to be doing worse without response to maximal therapeutic support. Recommend discussions for goals of care given extremely poor prognosis    HSCT/HM ID service will continue to follow     BELKYS Zuniga  Staff Physician, Infectious Diseases  Pager [phone]        Interval History:   Afebrile, was once again started on pressors yesterday - low dose Phenylephrine. Vent settings 420/28/8/60% (FiO2 slightly improved). Having episodes of bradycardia to 40s.   Reviewed labs - Creat 2.2. Leukocytosis worse, 41.9k  Blood culture from 6/20 with growth of GPCs in pairs and chains in 1/2 sets, VRE by Verigene    Review of Systems: unable to obtain due to intubation       Current Medications & Allergies:       acyclovir  400 mg Oral or Feeding Tube BID     allopurinol  300 mg Oral or Feeding Tube Daily     artificial tears   Both Eyes Q8H     atorvastatin  20 mg Oral or Feeding Tube Daily     clindamycin  900 mg Intravenous Q8H     cyanocobalamin  100 mcg Oral or Feeding Tube Daily     DAPTOmycin (CUBICIN) intermittent infusion  12 mg/kg (Dosing Weight) Intravenous Q48H     folic acid  1 mg Oral or Feeding Tube Daily     heparin ANTICOAGULANT  5,000 Units Subcutaneous Q12H     ibrutinib (IMBRUVICA) oral suspension (site prepared) in water  280 mg Oral or FT or NG tube Daily     [Held by provider] insulin glargine  20 Units Subcutaneous QAM AC     [Held by provider] insulin glargine  20 Units Subcutaneous QPM     levothyroxine  50 mcg Oral or Feeding Tube Daily     methylPREDNISolone  40 mg Intravenous Q12H     [Held by provider] metoprolol tartrate  12.5 mg Oral or Feeding Tube BID     micafungin  150 mg Intravenous Q24H     multivitamins w/minerals  15 mL Per Feeding Tube Daily     pantoprazole  20 mg Oral or Feeding Tube QAM     polyethylene glycol  17 g Oral or Feeding Tube Daily     primaquine  30 mg Oral or Feeding Tube Daily     protein modular   1 packet Per Feeding Tube BID     psyllium  1 packet Oral or Feeding Tube BID     senna-docusate  2 tablet Oral or Feeding Tube BID    Or     senna-docusate  1 tablet Oral or Feeding Tube BID     sodium chloride (PF)  3 mL Intracatheter Q8H     sodium chloride (PF)  3 mL Intracatheter Q8H     thiamine  100 mg Per Feeding Tube Daily     vitamin D3  50 mcg Oral or Feeding Tube Daily     voriconazole  250 mg Intravenous Q12H       Allergies   Allergen Reactions     Blood Transfusion Related (Informational Only) Other (See Comments)     Patient has a history of a clinically significant antibody against RBC antigens.  A delay in compatible RBCs may occur.      Morphine Itching and Rash     Dilaudid [Hydromorphone] Itching            Physical Exam:   Ranges for vital signs:  Temp:  [97.4  F (36.3  C)-98.2  F (36.8  C)] 97.4  F (36.3  C)  Pulse:  [47-83] 49  Resp:  [25-28] 28  MAP:  [52 mmHg-88 mmHg] 65 mmHg  Arterial Line BP: ()/(39-65) 94/48  FiO2 (%):  [60 %-100 %] 80 %  SpO2:  [92 %-100 %] 100 %  Vitals:    06/17/22 0000 06/19/22 0500 06/21/22 0400   Weight: 66.5 kg (146 lb 9.7 oz) 70.6 kg (155 lb 10.3 oz) 71 kg (156 lb 8.4 oz)     Physical Examination:  GENERAL:  Chronically ill appearing  HEAD:  Head is normocephalic, atraumatic   LUNGS: diminished breath sounds bilaterally. subcutaneous air appreciated over chest wall, right sided chest tube + 60% FiO2.  CARDIOVASCULAR:  RRR, no murmus  SKIN:  No acute rashes.     EXTREMITIES: No joint erythema or swelling.   NEUROLOGIC:  Intubated. Sedated.  LINES: CVC right femoral, peripheral IV, A line place without any surrounding erythema or exudate. Dressing intact.   Piña +         Laboratory Data:     Metabolic Studies       Recent Labs   Lab Test 06/21/22  1358 06/21/22  0447 06/21/22  0355 06/20/22  1104 06/20/22  1027 06/20/22  0321 06/20/22  0307 06/18/22  0001 06/17/22  2318 06/15/22  2350 06/15/22  2143 06/15/22  1941 06/15/22  1719 06/15/22  1247  06/15/22  0439 06/03/22 2202 06/03/22  1801   NA  --   --  135  --  134  --  134   < >  --    < >  --    < >  --    < > 135   < >  --    POTASSIUM  --   --  4.5  --   --   --  4.6   < >  --    < >  --    < >  --    < > 4.3   < >  --    CHLORIDE  --   --  100  --   --   --  98   < >  --    < >  --    < >  --    < > 102   < >  --    CO2  --   --  30  --   --   --  29   < >  --    < >  --    < >  --    < > 26   < >  --    ANIONGAP  --   --  5  --   --   --  7   < >  --    < >  --    < >  --    < > 7   < >  --    BUN  --   --  76*  --   --   --  75*   < >  --    < >  --    < >  --    < > 26   < >  --    CR  --   --  2.24*  --  2.31*  --  2.35*   < >  --    < >  --    < >  --    < > 0.98   < >  --    GFRESTIMATED  --   --  30*  --   --   --  28*   < >  --    < >  --    < >  --    < > 80   < >  --    *   < > 77   < >  --    < > 136*   < >  --    < >  --    < >  --    < > 134*   < >  --    A1C  --   --   --   --   --   --   --   --  5.0  --   --   --   --   --   --   --   --    DAVID  --   --  7.9*  --   --   --  7.9*   < >  --    < >  --    < >  --    < > 8.6  8.6   < >  --    PHOS  --   --  5.3*  --   --   --  4.7*   < >  --    < >  --   --   --    < > 3.4   < >  --    MAG  --   --  3.0*  --   --   --  2.9*   < >  --    < >  --    < >  --    < > 2.0   < >  --    LACT  --   --   --   --   --   --   --   --   --   --  3.3*  --  3.5*  --  2.5*   < >  --    PCAL  --   --   --   --   --   --   --   --   --   --   --   --   --   --   --   --  0.07*   FGTL  --   --   --   --   --   --   --   --   --   --   --   --   --   --  >500   < >  --     < > = values in this interval not displayed.       Hepatic Studies    Recent Labs   Lab Test 06/21/22  0355 06/20/22  0307 06/19/22  0403 06/15/22  0439 06/14/22  0412 06/08/22  0445 06/07/22  0336 06/06/22  0440   BILITOTAL 0.7 0.6 0.8   < > 1.8*   < > 0.8 0.8   DBIL  --   --   --   --  0.4*  --   --   --    ALKPHOS 75 76 75   < > 102   < > 99 92   PROTTOTAL 4.6* 4.5* 4.5*   < >  5.9*   < > 5.6* 5.3*   ALBUMIN 2.2* 1.9* 2.0*   < > 2.5*   < > 2.6* 2.4*   AST 17 16 11   < > 24   < > 43 30   ALT 15 18 19   < > 45   < > 42 33   LDH  --   --   --   --   --   --  348* 322*    < > = values in this interval not displayed.       Hematology Studies      Recent Labs   Lab Test 06/21/22  1252 06/21/22  0355 06/20/22  0307 06/19/22  0403 06/18/22  0232 06/17/22  0347 06/16/22  0359   WBC  --  41.9* 34.7* 36.1* 42.8* 42.4* 39.1*   ANEU  --  22.2* 16.0* 17.7* 21.4* 17.0* 19.2*   ALYM  --  19.3* 18.0* 18.4* 20.5* 25.4* 19.6*   ANY  --  0.4 0.7 0.0 0.9 0.0 0.4   AEOS  --  0.0 0.0 0.0 0.0 0.0 0.0   HGB 7.5* 6.9* 8.0* 8.3* 8.8* 9.2* 9.4*   HCT  --  22.1* 24.9* 25.9* 27.1* 28.4* 28.7*   PLT  --  148* 132* 143* 174 160 163       Arterial Blood Gas Testing    Recent Labs   Lab Test 06/21/22  1055 06/21/22  0634 06/20/22  1851 06/20/22  1750 06/20/22  1524   PH 7.32* 7.32* 7.25* 7.20* 7.22*   PCO2 58* 57* 67* 74* 72*   PO2 65* 120* 137* 106* 71*   HCO3 30* 29* 29* 29* 29*   O2PER 60 80 100 100 75      Inflammatory Markers    Recent Labs   Lab Test 06/10/22  0448 06/08/22  0445 06/03/22  0423 06/02/22  0451 10/04/17  2250 04/11/16  1136   SED  --   --   --   --  1  --    CRP 11.0* 19.0* 82.0* 100.0*  --  <0.2  Reference Values:   Low Risk:           <1.0 mg/L   Average Risk:       1.0-3.0 mg/L   High Risk:          >3.0 mg/L   Acute Inflammation: >8.0 mg/L         Immune Globulin Studies     Recent Labs   Lab Test 06/17/22  1008 06/12/22  0401 06/03/22  0856 05/24/22  1651 06/12/19  0806 05/31/19  1021 04/29/15  1012 09/19/14  1227    338* 446* 312* 394* 409*   < > 729   IGM  --   --   --   --   --   --   --  28*   IGA  --   --   --   --   --   --   --  72    < > = values in this interval not displayed.       Gout Labs      Recent Labs   Lab Test 06/21/22  0355 06/19/22  0403 06/17/22  0347 06/16/22  0359 06/15/22  0439   URIC 4.1 4.4 4.6 3.5 3.0*     Microbiology:  Fungal testing  Recent Labs   Lab Test  06/15/22  1618 06/15/22  0439 06/07/22  1420 05/27/22  1356 05/27/22  0916 05/23/22  1802   FGTL  --  >500 >500  --   --  >500   ASPGAI 0.10 1.42  --  0.03  --   --    ASPAG Negative  --   --   --   --   --    ASPGAA  --  Positive*  --  Negative  --   --    COFUNG  --   --   --   --  <1:2  --        Last Culture results with specimen source  Culture   Date Value Ref Range Status   06/20/2022 No growth after 1 day  Preliminary   06/20/2022 Positive on the 1st day of incubation (A)  Preliminary   06/20/2022 Gram positive cocci in pairs and chains (AA)  Preliminary     Comment:     1 of 2 bottles   06/20/2022 No Growth  Final   06/16/2022 No growth after 4 days  Preliminary   06/16/2022 No growth after 4 days  Preliminary   06/16/2022 No Growth  Final   06/15/2022 No growth after 5 days  Preliminary   06/15/2022 No Growth  Final   06/15/2022 No growth after 5 days  Preliminary   06/15/2022 No Actinomyces species isolated after 5 days  Preliminary   06/13/2022 No Growth  Final   06/13/2022 No Growth  Final   06/03/2022 No Growth  Final   06/03/2022 No Growth  Final   06/02/2022 No growth to date  Preliminary     Culture Micro   Date Value Ref Range Status   03/02/2014 No growth  Final   10/22/2010 No Beta Streptococcus isolated  Final   04/21/2010   Final    No Salmonella, Shigella, Campylobacter or E coli 0157 isolated.   04/18/2010 No growth  Final   03/16/2009 No Beta Streptococcus isolated  Final   01/13/2007 No growth  Final   04/06/2004 No Beta Streptococcus isolated  Final        Virology:  Coronavirus-19 testing    Recent Labs   Lab Test 06/07/22  0903 05/31/22  0241 05/23/22  1946 06/23/20  0843   [id number]  --   --   --  Negative   [id number]  --   --   --  Not Applicable   [id number] Negative Negative Negative  --        Respiratory virus testing    Recent Labs   Lab Test 06/15/22  1618 05/24/22  1916 05/24/22  1916 05/23/22 1946   IFLUA Not Detected   < > Not Detected  --    INFZA  --   --   --  Negative    FLUAH1 Not Detected   < > Not Detected  --    [id number] Not Detected   < > Not Detected  --    FLUAH3 Not Detected   < > Not Detected  --    IFLUB Not Detected   < > Not Detected  --    INFZB  --   --   --  Negative   PIV1 Not Detected  --  Not Detected  --    PIV2 Not Detected  --  Not Detected  --    PIV3 Not Detected  --  Not Detected  --    PIV4 Not Detected   < > Not Detected  --    IRSV  --   --   --  Negative   RSVA Not Detected   < > Not Detected  --    RSVB Not Detected   < > Not Detected  --    HMPV Not Detected  --  Not Detected  --    ADENOV Not Detected   < > Not Detected  --    CORONA Not Detected   < > Not Detected  --     < > = values in this interval not displayed.       CMV viral loads    Recent Labs   Lab Test 06/15/22  1618 06/01/22  1448   CMVQNT Not Detected Not Detected       EBV DNA Copies/mL   Date Value Ref Range Status   06/02/2022 Not Detected Not Detected copies/mL Final     Urine Studies     Recent Labs   Lab Test 06/02/22  0952 05/26/22  2240 05/23/22  1906 05/19/22  1355 01/04/21  1744   URINEPH 6.5 5.5 6.5 7.0 5.5   NITRITE Negative Negative Negative Negative Negative   LEUKEST Negative Negative Negative Negative Negative   WBCU 2 2 1 0-5 1     Imaging:  XR Abdomen Port 1 View  Narrative: XR ABDOMEN PORT 1 VIEWS 6/21/2022 11:35 AM    CLINICAL HISTORY: Verify small bowel feeding tube bedside placement    COMPARISON: 6/16/2022    FINDINGS: Portable AP semiupright view of the abdomen. Enteric tube is  seen coiled in the proximal stomach with the distal tip projecting  over the second portion of the duodenum. Gastric tube sidehole and tip  projecting over the stomach. Nonobstructive bowel gas pattern. No  pneumatosis or portal venous gas. Partially visualized lumbosacral  hardware. Bilateral patchy pulmonary opacities, better appreciated on  dedicated chest radiograph 6/20/2022. No acute osseous abnormality.  Impression: IMPRESSION:   Enteric tube is seen coiled in the proximal stomach  and the distal tip  projects over the second portion of the duodenum. Additional gastric  tube tip and sidehole in the stomach.    I have personally reviewed the examination and initial interpretation  and I agree with the findings.    DENIZ DIXON,          SYSTEM ID:  [id number]

## 2022-06-21 NOTE — DEATH PRONOUNCEMENT
MD DEATH PRONOUNCEMENT    Called to pronounce Loco Wood dead.    Physical Exam: Unresponsive to noxious stimuli, Spontaneous respirations absent, Breath sounds absent, Carotid pulse absent, Heart sounds absent, Pupillary light reflex absent and Corneal blink reflex absent    Patient was pronounced dead at 14:14 PM, 2022.    Preliminary Cause of Death: acute hypoxic respiratory failure due to PJP pneumonia    Active Problems:    CLL (chronic lymphocytic leukemia) (H)    Pneumonia of both upper lobes due to Pneumocystis jirovecii (H)    Autoimmune hemolytic anemia (H)    Need for prophylactic antibiotic    Shortness of breath    Hypoxia    Pneumonia of both lungs due to infectious organism, unspecified part of lung    Chest pain, unspecified type    Encounter for screening laboratory testing for severe acute respiratory syndrome coronavirus 2 (SARS-CoV-2)    Acute kidney failure, unspecified (H)       Infectious disease present?: YES    Communicable disease present? (examples: HIV, chicken pox, TB, Ebola, CJD) :  NO    Multi-drug resistant organism present? (example: MRSA): NO    Please consider an autopsy if any of the following exist:  NO Unexpected or unexplained death during or following any dental, medical, or surgical diagnostic treatment procedures.   NO Death of mother at or up to seven days after delivery.     NO All  and pediatric deaths.     NO Death where the cause is sufficiently obscure to delay completion of the death certificate.   NO Deaths in which autopsy would confirm a suspected illness/condition that would affect surviving family members or recipients of transplanted organs.     The following deaths must be reported to the 's Office:  NO A death that may be due entirely or in part to any factors other than natural disease (recent surgery, recent trauma, suspected abuse/neglect).   NO A death that may be an accident, suicide, or homicide.     NO Any sudden,  unexpected death in which there is no prior history of significant heart disease or any other condition associated with sudden death.   NO A death under suspicious, unusual, or unexpected circumstances.    NO Any death which is apparently due to natural causes but in which the  does not have a personal physician familiar with the patient s medical history, social, or environmental situation or the circumstances of the terminal event.   NO Any death apparently due to Sudden Infant Death Syndrome.     NO Deaths that occur during, in association with, or as consequences of a diagnostic, therapeutic, or anesthetic procedure.   NO Any death in which a fracture of a major bone has occurred within the past (6) six months.   NO A death of persons note seen by their physician within 120 days of demise.     NO Any death in which the  was an inmate of a public institution or was in the custody of Law Enforcement personnel.   NO  All unexpected deaths of children   NO Solid organ donors   NO Unidentified bodies   NO Deaths of persons whose bodies are to be cremated or otherwise disposed of so that the bodies will later be unavailable for examination;   NO Deaths unattended by a physician outside of a licensed healthcare facility or licensed residential hospice program   NO Deaths occurring within 24 hours of arrival to a health care facility if death is unexpected.    NO Deaths associated with the decedent s employment.   NO Deaths attributed to acts of terrorism.   NO Any death in which there is uncertainty as to whether it is a medical examiner s care should be discussed with the medical investigator.        Body disposition: Body released to the morgue.    Francisca Aguirre, CNP

## 2022-06-21 NOTE — PLAN OF CARE
ICU End of Shift Summary. See flowsheets for vital signs and detailed assessment.    Changes this shift: RASS -5, BIS 30's-40's. 0/4 twitches at 35mA but synchronous on vent. HR NSR/juany 55-60's. Titrating phenyl gtt to maintain MAPs >65, currently running at 1.8. Afebrile. Vent settings remain CMV-VC 80%, RR 28, , PEEP 8, sating %. Continues to have moderate amount of pink/red secretions. No output from chest tube. Clamped NJ and stopped TF after episode of large emesis during head turn, D10 started. 3 BM's this shift. Piña in place with good UOP. Amio gtt at 0.5. Vec at 0.3, versed at 10, fent at 200. Insulin gtt currently off. 1 unit of PRBC's given this AM for Hgb of 6.9.    Plan: Reevaluate need for proning. Continue to titrate gtts accordingly. Wean vent as tolerated.       Problem: Plan of Care - These are the overarching goals to be used throughout the patient stay.    Goal: Absence of Hospital-Acquired Illness or Injury  Outcome: Ongoing, Progressing  Intervention: Identify and Manage Fall Risk  Recent Flowsheet Documentation  Taken 6/21/2022 0400 by Justine Vee RN  Safety Promotion/Fall Prevention:    clutter free environment maintained    fall prevention program maintained    increased rounding and observation    increase visualization of patient    lighting adjusted    room door open    room near nurse's station    safety round/check completed  Taken 6/21/2022 0000 by Justine Vee RN  Safety Promotion/Fall Prevention:    clutter free environment maintained    fall prevention program maintained    increased rounding and observation    increase visualization of patient    lighting adjusted    room door open    room near nurse's station    safety round/check completed  Taken 6/20/2022 2000 by Justine Vee RN  Safety Promotion/Fall Prevention:    clutter free environment maintained    fall prevention program maintained    increased rounding and observation    increase  visualization of patient    lighting adjusted    room door open    room near nurse's station    safety round/check completed  Intervention: Prevent Skin Injury  Recent Flowsheet Documentation  Taken 6/21/2022 0400 by Justine Vee RN  Body Position:    prone    head repositioned, left  Skin Protection:    adhesive use limited    incontinence pads utilized    pulse oximeter probe site changed    skin-to-device areas padded    skin-to-skin areas padded    transparent dressing maintained    tubing/devices free from skin contact  Taken 6/21/2022 0200 by Justine Vee RN  Body Position:    prone    head repositioned, right  Taken 6/21/2022 0000 by Justine Vee RN  Body Position:    prone    head repositioned, left  Skin Protection:    adhesive use limited    incontinence pads utilized    pulse oximeter probe site changed    skin-to-device areas padded    skin-to-skin areas padded    transparent dressing maintained    tubing/devices free from skin contact  Taken 6/20/2022 2200 by Justine Vee RN  Body Position:    prone    head repositioned, right  Taken 6/20/2022 2000 by Justine Vee RN  Body Position:    prone    head repositioned, left  Skin Protection:    adhesive use limited    incontinence pads utilized    pulse oximeter probe site changed    skin-to-device areas padded    skin-to-skin areas padded    transparent dressing maintained    tubing/devices free from skin contact  Intervention: Prevent and Manage VTE (Venous Thromboembolism) Risk  Recent Flowsheet Documentation  Taken 6/21/2022 0400 by Justine Vee RN  Range of Motion: ROM (range of motion) performed  VTE Prevention/Management: SCDs (sequential compression devices) on  Activity Management: activity adjusted per tolerance  Taken 6/21/2022 0000 by Justine Vee RN  Range of Motion: ROM (range of motion) performed  VTE Prevention/Management: SCDs (sequential compression devices) on  Activity Management:  activity adjusted per tolerance  Taken 6/20/2022 2000 by Justine Vee RN  Range of Motion: ROM (range of motion) performed  VTE Prevention/Management: SCDs (sequential compression devices) on  Activity Management: activity adjusted per tolerance  Intervention: Prevent Infection  Recent Flowsheet Documentation  Taken 6/21/2022 0400 by Justine Vee RN  Infection Prevention:    equipment surfaces disinfected    hand hygiene promoted    rest/sleep promoted    single patient room provided    environmental surveillance performed  Taken 6/21/2022 0000 by Justine Vee RN  Infection Prevention:    equipment surfaces disinfected    hand hygiene promoted    rest/sleep promoted    single patient room provided    environmental surveillance performed  Taken 6/20/2022 2000 by Justine Vee RN  Infection Prevention:    equipment surfaces disinfected    hand hygiene promoted    rest/sleep promoted    single patient room provided    environmental surveillance performed     Problem: Fluid Imbalance (Pneumonia)  Goal: Fluid Balance  Outcome: Ongoing, Progressing  Intervention: Monitor and Manage Fluid Balance  Recent Flowsheet Documentation  Taken 6/21/2022 0400 by Justine Vee RN  Fluid/Electrolyte Management: fluids provided  Taken 6/21/2022 0000 by Justine Vee RN  Fluid/Electrolyte Management: fluids provided  Taken 6/20/2022 2000 by Justine Vee RN  Fluid/Electrolyte Management: fluids provided

## 2022-06-21 NOTE — PROGRESS NOTES
Critical Care Attending Note    The patient was seen and examined by me with and independent of Dr Ryan  and housestaff team.  The case was discussed at length. Pertinent vitals, lab results and imaging were reviewed by me. Agree with the resident's note from today as reflects our joint assessment and plan.    O/E paralysis started and proned secondary to worsening mechanics, episode of emesis with head turn, noted chest tube disconnected and thus placed back on suction.  Hgb low. Patient remains critically ill secondary to acute respiratory failure, encephalopathy and septic shock in context of PJP pneumonia and failed prior course of abx. On exam synchronous with vent Pdrive is ~20s, P/F 150 on high level support with new emesis and concern for aspiration. I/O overall net negative since admission however since ICU is 11 liters positive.  Will start diuresis and resupine for cares. Ongoing discussion with family as patient prior wish of time limited trial of aggressive interventions.     ICU Daily Rounding Checklist     Checklist Response Notes   Can sedation be reduced?  No  need deep rass for paralysis    Can analgesia be reduced? No    Is delirium being assessed, addressed and prevented? NA Paralyzed , sedated   Spontaneous awakening trial and/or Spontaneous breathing trial candidate?  NA    Total fluid balance goal reviewed?  Yes Target Goals:  Start intermittent diuresis  Even [24h] start 20 lasix   Is the patient at goals for lung protective ventilation? Yes  but mechanics poor    Head of bed elevation (30 degrees)? Yes    Skin breakdown assessment (prevention) completed? Yes    Is enteral nutrition at goal? No  held secondary to emesis   Is blood glucose at goal? Yes    Deep venous thrombosis prophylaxis? Yes      Gastric ulcer prophylaxis?  Yes If coagulopathy (INR-1.5 PTT2x normal. Ph<50k), mechanical ventilation 48hr, history of GI bleed/ulcer within past year. TBL, SCI, or burn, or if >= 2 minor risk  factors (sepsis, ICU stay 1 week, occult GI bleed > 6 days. glucocorticoid therapy, NSAID use, antiplatelet use)   Can Antibiotics be narrowed or discontinued? Yes  stop voriconazole and micafungin ; continue clinda and primaquine   Early mobility candidate and physical therapy consulted? No    Is evans catheter needed? Yes  paralyzed    Is central venous/arterial catheter needed? Yes  remains vasopressor dependent has femoral , failed internal jugular 2/2 subcutaneous emphysema   Has the family been updated? Yes  yes   Are the patient's goals of care and code status current? Yes Pending discussion with family as advanced directive is           Phani Purcell MD  35 min CCT, excluding procedures

## 2022-06-21 NOTE — DISCHARGE SUMMARY
[facility]    Death Summary - Medicine & Pediatrics    Date of Admission:  5/23/2022  Date of Death: 6/22/2022  Attending Provider: Dr. 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 He was a 77 year old male with PMH CLL, auto-immune hemolytic anemia and CKDIIIA, was found to have PJP pneumonia complicated by acute hypoxic respiratory failure. He was put on BiPAP initially and was transferred to ICU due to concern for need to intubate. He had been stable on BiPAP/HFNC and was transferred back to the floor. Shortly after admission to floor he developed acute hypoxic respiratory failure requiring retransfer to ICU on 06/15 and intubation. Complicated by pneumomediastinum and pneumothorax s/p chest tube with resolution of pneumothorax.         During the course of his hospitalization, he failed a course of Bactrim and was put on clindamycin and primaquine without improvement in respiratory status. Was also found to have aspergillosis and was on voriconazole and micafungin (after developing RICK on amphotericin).         Was seen by hem-onc for his CLL and treated with his home ibrutinib. Was found to have active CLL but it was not contributing to his AHRF.         He unfortunately did not improve with  intubation; requiring more oxygen and Veletri could not be weaned off. Only mild improvement in PF ratio with 16 hour proning (06/20-06/21) with rapid worsening of PF after being supine. Required maximal sedation and addition of paralytic to help with vent synchrony.        In alignment with the patient's wishes and after discussing the situation with sister April, decision was made on 06/21 at 5 PM to transition to comfort cares and extubate. Patient passed away comfortably shortly after extubation.    Discussed with Dr Purcell who agrees with the above.    Francisca Aguirre, [facility]  ______________________________________________________________________      Significant Results and Procedures   Most Recent 3 CBC's:  Recent Labs   Lab Test 06/21/22  1252 06/21/22  0355 06/20/22  0307 06/19/22  0403   WBC  --  41.9* 34.7* 36.1*   HGB 7.5* 6.9* 8.0* 8.3*   MCV  --  118* 116* 116*   PLT  --  148* 132* 143*     Most Recent 3 BMP's:  Recent Labs   Lab Test 06/22/22  1143 06/22/22  0937 06/22/22  0910 06/21/22  0447 06/21/22  0355 06/20/22  1104 06/20/22  1027 06/20/22  0321 06/20/22  0307 06/19/22  0406 06/19/22  0403   NA  --   --   --   --  135  --  134  --  134  --  136   POTASSIUM  --   --   --   --  4.5  --   --   --  4.6  --  4.4   CHLORIDE  --   --   --   --  100  --   --   --  98  --  102   CO2  --   --   --   --  30  --   --   --  29  --  27   BUN  --   --   --   --  76*  --   --   --  75*  --  70*   CR  --   --   --   --  2.24*  --  2.31*  --  2.35*  --  2.18*   ANIONGAP  --   --   --   --  5  --   --   --  7  --  7   DAVID  --   --   --   --  7.9*  --   --   --  7.9*  --  8.2*  8.2*   * 114* 124*   < > 77   < >  --    < > 136*   < > 166*    < > = values in this interval not displayed.     Most Recent 2 LFT's:  Recent Labs   Lab Test 06/21/22  0355 06/20/22  0307   AST 17 16   ALT 15 18   ALKPHOS 75 76   BILITOTAL 0.7 0.6       Consultations This  Hospital Stay   ONCOLOGY ADULT IP CONSULT  INFECTIOUS DISEASE TRANSPLANT HSCT/ HEME MALIG ADULT IP CONSULT  NURSING TO CONSULT FOR VASCULAR ACCESS CARE IP CONSULT  NURSING TO CONSULT FOR VASCULAR ACCESS CARE IP CONSULT  PHYSICAL THERAPY ADULT IP CONSULT  OCCUPATIONAL THERAPY ADULT IP CONSULT  PULMONARY GENERAL ADULT IP CONSULT  CARE MANAGEMENT / SOCIAL WORK IP CONSULT  NURSING TO CONSULT FOR VASCULAR ACCESS CARE IP CONSULT  NURSING TO CONSULT FOR VASCULAR ACCESS CARE IP CONSULT  NUTRITION SERVICES ADULT IP CONSULT  NURSING TO CONSULT FOR VASCULAR ACCESS CARE IP CONSULT  PULMONARY GENERAL ADULT IP CONSULT  VASCULAR ACCESS FOR PICC PLACEMENT ADULT IP CONSULT  NURSING TO CONSULT FOR VASCULAR ACCESS CARE IP CONSULT  NURSING TO CONSULT FOR VASCULAR ACCESS CARE IP CONSULT  NURSING TO CONSULT FOR VASCULAR ACCESS CARE IP CONSULT  VASCULAR ACCESS FOR PICC PLACEMENT ADULT IP CONSULT  NURSING TO CONSULT FOR VASCULAR ACCESS CARE IP CONSULT  NURSING TO CONSULT FOR VASCULAR ACCESS CARE IP CONSULT  NUTRITION SERVICES ADULT IP CONSULT  NUTRITION SERVICES ADULT IP CONSULT  PHARMACY IP CONSULT  PHARMACY IP CONSULT  NURSING TO CONSULT FOR VASCULAR ACCESS CARE IP CONSULT    Primary Care Physician   Campos Parra

## 2022-06-22 NOTE — PROGRESS NOTES
[facility]    ICU Progress Note       Date of Admission:  5/23/2022    Assessment: Critical Care   Loco Wood is a 77 year old male with PMH CLL, auto-immune hemolytic anemia (on prednisone taper), and CKD3a who was admitted to ICU on 06/10 for AHRF due to PJP pneumonia.    Changes today:  - Transition to comfort cares when paralytic cleared, pt overbreathing vent, and friend arrives     PLAN:     Neuro:  # Pain, sedation  - Versed infusion + PRN bumps --> increased bolus dose and frequency  - Fentanyl infusion + PRN bumps --> Increased bolus dose and frequency  - Neuromuscular blockade weaned off, now has 4/4 twitches   - Acetaminophen 650 mg Q4H PRN  - Hold PTA Ambien  - Zyprexa 5 mg at bedtime PRN  - Melatonin 1 mg PO at bedtime PRN  - RASS goal -4     # Degenerative disc disease  # Bilateral intermittent hand cramping, spasms suspicious for cervical impingement  Patient with history of degenerative disc disorder with concern for cervical impingement in setting of new bilateral intermittent hand cramping. Patient met with Dr. Melton his sports medicine doctor (AM 5/24) over phone for follow up appointment to discuss results of his MRI.   - Defer management at this time      Pulmonary:  # Acute hypoxic respiratory failure 2/2 PJP pneumonia   # Bilateral lower lobe bronchiectasis/consolidations  # Positive beta D glucan >500  #Concern for Aspergillosis  Admitted for several days of worsening fatigue/dyspnea, subjective chills, and pleuritic chest pain with 3-4L O2 requirement (w/ elevated WBC) and CT w/ infiltrates and bilateral lower lobe consolidations concerning for infection. RVP neg. Treated with a 5 day course of CTX starting 5/24 and 3 days azithromycin for CAP. On 5/27, recurrent fevers and worsening hypoxia (45 L high flow NC), transferred to [facility]. Found to have positive BD glucan, PJP PCR POS (5/27), abx coverage broaded to pip-tazo, micafungin, and  bactrim. CXR 5/31 with trace effusions and increasing apical opacities concerning for worsening edema/infection. Karius 6/1 + PJP and E. Faecium (less likely pathogenic as not growing on other cultures). Hypoxia progressively worsened requiring intubation 6/15. Repeat chest CT showed persistence of bilateral consolidations and a new RUL nodule despite treatment with 1st and 2nd line for PJP. BAL 6/15, all cultures and assays have been negative, but serum Galactomannan Agn POS; ID concerned for new fungal process specifically Coccidiodes given recent travel to [place] and fungal studies are pending (see RUL nodule). Liposomal amphotericin started 6/16. BAL flow cytometry obtained per Heme/On resulting w/low suspicion for pulmonary CLL involvement however biopsy is the only definitive study; BAL cytology is pending as of 6/17.   - Intubated 06/15 due to desaturation while on BiPAP --> extubate to RA when transitions to comfort   - Increase PEEP 8->10   > repeat ABG with change in PEEP   > worsening hypoxia in supine positioning  - Epoprostenol 20 ng/kg/min (started 6/15, unable to wean) --> stop when transitions to comfort  - Methylpred 40 mg Q12H  - Follow BAL results; negative to date  - Continue clindamycin and primaquine. G6PD levels were elevated (ok for primaquine) --> stop today  - Current regimen: clindamycin, primaquine, fluconazole; also on acyclovir for ppx while on steroids --> stop today  - Discontinued: micafungin, levofloxacin, zosyn, linezolid, cefepime, Bactrim       Vent Mode: CMV/AC  (Continuous Mandatory Ventilation/ Assist Control)  FiO2 (%): 80 %  Resp Rate (Set): 28 breaths/min  Tidal Volume (Set, mL): 420 mL  PEEP (cm H2O): 8 cmH2O  Resp: 28    #RUL nodule  CT scan 6/15 revealed RUL nodule deemed most likely infectious vs inflammatory per radiology. ID is following and is concerned about coccidioides infection given immunosuppressed state and recent travel to [place]. Started on ampho 06/17 but  stopped 06/18 due to RICK; started voriconazole and mohsen.    #Pneumomediastinum   CT 6/15 showed new large pneumomediastinum with extensive subcutaneous emphysema. Hemodynamics have been improving and it is not  a tension pneumomediastinum. Patient have a current PJP which is known to cause pneumothoraces which may be the cause of the patient's presentation. Pneumo could be 2/2 barotrauma.  - CXR daily to assess pneumomediastinum --> stop checking   - Chest tube to water seal --> continue until time of death  - CTM    #New pneumothorax 06/18 improving  Likely from barotrauma, bronch, line placement. CXR showing PT in right upper lobe. Chest tube placed 06/18; improved PT.  - CTM    Cardiovascular:  # Hypotension, persistent  Shortly after intubation, likely due to sedation.   - Phenylephrine gtt, stop when transitions to comfort cares   - MAP goal >65  - Sedation per Neuro section above     # AF with RVR spontaneously  # Sinus tachycardia with frequent PACs  No known history of AF. Echo unremarkable. Patient has been experiencing ST with PACs with some soft BP. Transitioned from IV amio to oral amio 6/16.  - On heparin subcutaneous ppx --> discontinue   - HR controlled this AM on po amio --> discontinue      # HLD  - Continue PTA atorvastatin     GI/Nutrition:  # Severe malnutrition in the context of acute on chronic illness  - Dietician consulted, appreciate recs  - Tube feeds: Osmolite 1.5 @ 50 ml/hr, stop when pt transitions to comfort cares  - Supplements per dietician recs  - Vitamins per dietician recs  - FWF 30 ml Q4H     # GERD  - Continue PTA omeprazole + PRN famotidine (also on Pred)     Renal/Fluids/Electrolytes:  # Lactic acidosis improving  Persistent lactic acidosis 3-4 despite antimicrobial treatment and IVF boluses. Appears hemodynamically stable with no evidence of hypoperfusion. Discussed with hem/onc, can sometimes see in malignancies, but unlikely in reasonably controlled CLL. Hepatic function  wnl, suggesting against clearance issue. Resolved with additional IVF 5/30. Increased to 4.1 on 6/3, improved with addition of antibiotics above. Lactate is persistently elevated which may be associated with his CLL and recent hypoxia and hypotension.  - Lactate stabilized in the 2-3 rage, last checked 6/15  - Tumor lysis workup per below; has been ntd     # Hyponatremia in setting of SIADH resolved  Worsening hyponatremia since 6/2. Trialed 500 ml LR 6/7, however, sodium studies suggestive of SIADH. Now ~ 129-131. NT pro BNP and TTE without evidence of volume overload.  - Change IV medication carriers to NS     # Non-oliguric RICK on CKD Stage 3A worsening, resolved   Patient has worsening RICK with Cr bump from 1.11->1.53 with a BUN:Cr of 32.6. On 6/16 the patient's urine output was inadequate at 0.62ml/kg/hr and the patient is net -10.8L throughout this admission from a fluid restriction (see above). Worsened 06/18 when started ampho, likely ATN. Decent UOP.  Baseline ~1.3-1.6.  - Avoid nephrotoxic agents as able  - strict I/O  - Daily weights     Endocrine:  # Hypothyroidism  TSH 1.94 on admission WNL.   - Continue PTA levothyroxine    #Hyperglycemia, steroid induced  Patient has had elevated BG starting 6/16 likely related to glucocorticoid use for PJP. 6/16 the patient was started on medium dose sliding scale insulin however BG persisted 150-250.  - Hypoglycemia protocol per ICU routine  - Goal blood glucose < 180  - lantus 20 U BID; continue drip --> discontinue when transitions to comfort care    ID:  # E faecium on Karius assay  # Severe sepsis, improved  # Positive beta D glucan >500  # PJP pneumonia  - Discussed with ID and onc, will switch to second line treatment for PJP with clindamycin and primaquine.  - Methylpred 40 mg Q12H  - Current regimen: clindamycin, primaquine, fluconazole; also on acyclovir for ppx while on steroids --> discontinue when transitions to comfort care  - Discontinued: micafungin,  levofloxacin, zosyn, linezolid, cefepime, Bactrim      # Concer for Coccidiodes  # Concern for Aspergillosis  # RUL nodule  Patient had new RUL nodule on CT 6/15. ID is following and has concern for fungal lesion given immunosuppression. Serum galactomannan Agn resulted positive 6/17 however utility of this test questionable. Fungal workup was initiated per ID and BAL results have returned negative to date for infectious or malignant etiology.   - ID following; workup and tx per above (pulmonary)    # Hx Herpes Zoster c/b post-herpetic neuralgia  - PTA ppx Acyclovir while on steroids --> discontinue when transitions to comfort cares     Hematology:    # CLL (dx 2/2007)  History of CLL (2007) managed on Ibrutinib (5/2019) which patient is not taking daily. WBC 24.9 on admission (up from baseline ~6-10) now down to ~21. Suspect secondary to recent infection with lower concern for conversion to leukemia. IVIG infusion (5/25). Following FISH and cytogenetics for disease prognostication and will continue to monitor with peripheral smear. Heme onc following and does not feel CLL is playing a role in current acute illness with the exception of potentially low IgG. Give IVIg 0.4 g/kg per hem/onc 06/12.  - Continue daily ibrutinib to help control disease, increased bioavailability with addition of fluconazole 6/2. CT A/P without evidence of CLL metastasis and flow cytology of BAL not cw pulmonary CLL involvement.   - Hematology/Oncology following                     - PTA ibrutinib, pursuing prior authorization for venotoclax --> Discontinue              - cytogenetics, IgH mutation, TP53 mutation   - IgG levels ordered 6/17; recommending IVIg if <400  - Await bronch biopsy results    # Concern forTumor lysis syndrome  Patient had elevated phos and low Ca 6/16 concerning for TLS. Uric acid was ordered and returned normal 6/16 and 6/17 making TLS less likely cause of electrolyte derangements.  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 B12, folate, iron panel normal with slightly elevated YEN levels. Labs concerning for marrow responsive anemia (elevated LDH, bilirubin, reticulocyte count). Will likely need to treat underlying CLL for long-term solution.    Hgb on admission 9.5 down from baseline (~10-12 past year) and now ~8. Currently hemodynamically stable and suspect recent drop in Hgb secondary to dilutional anemia in setting of 2.5L of IVF given (5/27). Methylpred decreased and rituximab held in setting of recent infection. Haptoglobin remains elevated, suggesting against worsening hemolytic anemia..   - Heme/Onc consulted              - Continue Methylprednisolone 40mg (Q12H)              - Hold Rituximab infusion              - Check uric acid, LDH, Mg, Phos, and retic count q48h               - Continue allopurinol for TLS prevention    - Hep B serologies (Core, Surface Ab +): Hep B ag negative, DNA quant negative   - Continue PTA Folate, B12   - Transfuse if Hgb < 7      Musculoskeletal:  # Weakness/Deconditioning  - PT/OT following     Skin:  # Concern for sacrum/iliac pressure  - Pressure dressing in place  - Pressure minimizing interventions per ICU routine  - Consider WOC consult     General Cares/Prophylaxis:    DVT Prophylaxis: Heparin SQ  GI Prophylaxis: PPI  Restraints: NA  Family Communication: Sister April updated by phone  Code Status: Full code     Lines/tubes/drains:  - PIV x3  - CVC TL right femoral  - ETT  - Arterial line  - NJ left nostril  - Piña      Disposition:  - Medical ICU    Patient seen and findings/plan discussed with medical ICU staff, Dr. Purcell.    Francisca Aguirre,  CNP  _________________________________________________________    Interval History   Nursing notes reviewed. NAEO. Paralytic weaned off, but TOF has not returned to 4/4 this AM on baseline 3.5mA, but has been slowly improving as of this AM. No concerns regarding comfort, oxygenation, or hemodynamics. Plan for transition to comfort care today once NMB is cleared.      Physical Exam   Vital Signs: Temp: 98.1  F (36.7  C) Temp src: Oral   Pulse: 75   Resp: 28 SpO2: 95 % O2 Device: Mechanical Ventilator    Weight: 156 lbs 8.43 oz     Constitutional: Sedated, resting in bed, not arousable to noxious stimuli   Respiratory: Decreased breath sounds throughout, tolerating the vent well, crepitus on ausculation of R lung, persistent blood streaked secretions from ETT  Cardiovascular: RRR without MGR, Hamman's sign present over precordium  Chest: significant crepitus noted to palpation of R upper chest wall with presence of subcutaneous air descending into upper abdomen and upper border of mid-neck; appears stable from prior exam, Chest tube in place with no air leak noted this AM  GI: Abdomen soft, non distended  : evans catheter in place, shilpa clear and yellow  Neurologic: Sedated, not waking up    Data   I reviewed all medications, new labs and imaging results over the last 24 hours.  Arterial Blood Gases   Recent Labs   Lab 06/21/22  1055 06/21/22  0634 06/20/22  1851 06/20/22  1750   PH 7.32* 7.32* 7.25* 7.20*   PCO2 58* 57* 67* 74*   PO2 65* 120* 137* 106*   HCO3 30* 29* 29* 29*       Complete Blood Count   Recent Labs   Lab 06/21/22  1252 06/21/22  0355 06/20/22  0307 06/19/22  0403 06/18/22  0232   WBC  --  41.9* 34.7* 36.1* 42.8*   HGB 7.5* 6.9* 8.0* 8.3* 8.8*   PLT  --  148* 132* 143* 174       Basic Metabolic Panel  Recent Labs   Lab 06/22/22  0550 06/22/22  0421 06/22/22  0328 06/22/22  0235 06/21/22  0447 06/21/22  0355 06/20/22  1104 06/20/22  1027 06/20/22  0321 06/20/22  0307 06/19/22  0406 06/19/22  0403  06/18/22  0256 06/18/22  0232   NA  --   --   --   --   --  135  --  134  --  134  --  136  --  134   POTASSIUM  --   --   --   --   --  4.5  --   --   --  4.6  --  4.4  --  3.6   CHLORIDE  --   --   --   --   --  100  --   --   --  98  --  102  --  100   CO2  --   --   --   --   --  30  --   --   --  29  --  27  --  27   BUN  --   --   --   --   --  76*  --   --   --  75*  --  70*  --  59*   CR  --   --   --   --   --  2.24*  --  2.31*  --  2.35*  --  2.18*   < > 1.98*   * 123* 107* 93   < > 77   < >  --    < > 136*   < > 166*   < > 226*    < > = values in this interval not displayed.     Uric Acid   Date Value Ref Range Status   06/21/2022 4.1 3.5 - 7.2 mg/dL Final   04/29/2015 5.0 3.5 - 7.2 mg/dL Final     Liver Function Tests  Recent Labs   Lab 06/21/22  0355 06/20/22  0307 06/19/22  1746 06/19/22  0403 06/18/22 0232   AST 17 16  --  11 12   ALT 15 18  --  19 25   ALKPHOS 75 76  --  75 82   BILITOTAL 0.7 0.6  --  0.8 0.7   ALBUMIN 2.2* 1.9*  --  2.0* 2.2*   INR  --   --  1.07  --   --      Micro:      Pancreatic Enzymes  No lab results found in last 7 days.    Coagulation Profile  Recent Labs   Lab 06/19/22  1746   INR 1.07   PTT 26       IMAGING:  Recent Results (from the past 24 hour(s))   XR Chest Port 1 View    Narrative    Exam: XR CHEST PORT 1 VIEW, 6/21/2022 11:35 AM    Indication: progression of pneumomediastinum    Comparison: 6/20/2022    Findings:   ET tube tip over the mid thoracic trachea. Feeding tube courses  postpyloric, additional enteric tube tip and side-port project of the  stomach. A right apically directed chest tube is unchanged.    Unchanged right mediastinal silhouette. No effusions. No appreciable  pneumothorax. Continued diffuse mixed opacities. Decreasing chest wall  emphysema and pneumomediastinum.      Impression    Impression:   1. Decreasing chest wall emphysema and pneumomediastinum. No  appreciable pneumothorax.  2. Continued mixed bilateral opacities.    I have  personally reviewed the examination and initial interpretation  and I agree with the findings.    AMANDA BAIRES MD         SYSTEM ID:  [id number]   XR Abdomen Port 1 View    Narrative    XR ABDOMEN PORT 1 VIEWS 6/21/2022 11:35 AM    CLINICAL HISTORY: Verify small bowel feeding tube bedside placement    COMPARISON: 6/16/2022    FINDINGS: Portable AP semiupright view of the abdomen. Enteric tube is  seen coiled in the proximal stomach with the distal tip projecting  over the second portion of the duodenum. Gastric tube sidehole and tip  projecting over the stomach. Nonobstructive bowel gas pattern. No  pneumatosis or portal venous gas. Partially visualized lumbosacral  hardware. Bilateral patchy pulmonary opacities, better appreciated on  dedicated chest radiograph 6/20/2022. No acute osseous abnormality.      Impression    IMPRESSION:   Enteric tube is seen coiled in the proximal stomach and the distal tip  projects over the second portion of the duodenum. Additional gastric  tube tip and sidehole in the stomach.    I have personally reviewed the examination and initial interpretation  and I agree with the findings.    DENIZ DIXON DO         SYSTEM ID:  [id number]

## 2022-06-22 NOTE — PROGRESS NOTES
Code status is DNR/DNI. Patient extubated at 1355 with friend at bedside. Please see flowsheets for further information.

## 2022-06-22 NOTE — PROGRESS NOTES
CLINICAL NUTRITION SERVICES    Informed decision made to change pt s status to comfort care. Nutrition interventions discontinued and no further interventions planned at this time. RD can be consulted if needed.    RD signing off on 6/22/2022.    Kera Hightower, MS, RD, LD, [facility] pager: [phone]  ASCOM: [id number]

## 2022-06-22 NOTE — PLAN OF CARE
ICU End of Shift Summary. See flowsheets for vital signs and detailed assessment.    Changes this shift: RASS -5, BIS 30's-40's. 0/4 twitches at 35mA which is baseline despite vec being off since 1700 6/21. 4/4 twitches on 49mA. HR sinus to sinus jauny, no ectopy. Maintained MAP >65 with phenyl tirtrated accordingly. Phenyl currently at 0.8. Vent settings remain 80%, RR 28, , PEEP 8. Continues to have red streaked secretions in ETT. No BM this shift. Versed at 8 and Fent at 200. Insulin gtt at 4units/hr on Alg 4 +2.     Plan: To extubate and go comfort cares once TOF is 4/4 on 35mA. To notify sister April when pt passes. Pt's friend Maurizio wishes to be here when withdrawing care.      Problem: Plan of Care - These are the overarching goals to be used throughout the patient stay.    Goal: Absence of Hospital-Acquired Illness or Injury  Outcome: Ongoing, Not Progressing  Intervention: Identify and Manage Fall Risk  Recent Flowsheet Documentation  Taken 6/22/2022 0400 by Justine Vee, RN  Safety Promotion/Fall Prevention:   clutter free environment maintained   fall prevention program maintained   increased rounding and observation   increase visualization of patient   lighting adjusted   room door open   room near nurse's station   safety round/check completed  Taken 6/22/2022 0000 by Justine Vee, RN  Safety Promotion/Fall Prevention:   clutter free environment maintained   fall prevention program maintained   increased rounding and observation   increase visualization of patient   lighting adjusted   room door open   room near nurse's station   safety round/check completed  Taken 6/21/2022 2000 by Justine Vee RN  Safety Promotion/Fall Prevention:   clutter free environment maintained   fall prevention program maintained   increased rounding and observation   increase visualization of patient   lighting adjusted   room door open   room near nurse's station   safety round/check  completed  Intervention: Prevent Skin Injury  Recent Flowsheet Documentation  Taken 6/22/2022 0600 by Justine Vee RN  Body Position:   turned   left   heels elevated   upper extremity elevated  Taken 6/22/2022 0400 by Justine Vee RN  Body Position:   turned   right   heels elevated   upper extremity elevated  Skin Protection:   adhesive use limited   incontinence pads utilized   pulse oximeter probe site changed   skin-to-device areas padded   skin-to-skin areas padded   transparent dressing maintained   tubing/devices free from skin contact  Taken 6/22/2022 0200 by Justine Vee RN  Body Position:   turned   left   heels elevated   upper extremity elevated  Taken 6/22/2022 0000 by Justine Vee RN  Body Position:   turned   right   heels elevated   upper extremity elevated  Skin Protection:   adhesive use limited   incontinence pads utilized   pulse oximeter probe site changed   skin-to-device areas padded   skin-to-skin areas padded   transparent dressing maintained   tubing/devices free from skin contact  Taken 6/21/2022 2200 by Justine Vee RN  Body Position:   turned   left   heels elevated   upper extremity elevated  Taken 6/21/2022 2000 by Justine Vee RN  Body Position:   turned   right   heels elevated   upper extremity elevated  Skin Protection:   adhesive use limited   incontinence pads utilized   pulse oximeter probe site changed   skin-to-device areas padded   skin-to-skin areas padded   transparent dressing maintained   tubing/devices free from skin contact  Intervention: Prevent and Manage VTE (Venous Thromboembolism) Risk  Recent Flowsheet Documentation  Taken 6/22/2022 0400 by Justine Vee RN  Range of Motion: ROM (range of motion) performed  VTE Prevention/Management: SCDs (sequential compression devices) on  Activity Management: activity adjusted per tolerance  Taken 6/22/2022 0000 by Justine Vee RN  Range of Motion: ROM (range of  motion) performed  VTE Prevention/Management: SCDs (sequential compression devices) on  Activity Management: activity adjusted per tolerance  Taken 6/21/2022 2000 by Justine Vee RN  Range of Motion: ROM (range of motion) performed  VTE Prevention/Management: SCDs (sequential compression devices) on  Activity Management: activity adjusted per tolerance  Intervention: Prevent Infection  Recent Flowsheet Documentation  Taken 6/22/2022 0400 by Justine eVe RN  Infection Prevention:   equipment surfaces disinfected   hand hygiene promoted   rest/sleep promoted   single patient room provided   environmental surveillance performed  Taken 6/22/2022 0000 by Justine Vee RN  Infection Prevention:   equipment surfaces disinfected   hand hygiene promoted   rest/sleep promoted   single patient room provided   environmental surveillance performed  Taken 6/21/2022 2000 by Justine Vee RN  Infection Prevention:   equipment surfaces disinfected   hand hygiene promoted   rest/sleep promoted   single patient room provided   environmental surveillance performed     Problem: Respiratory Compromise (Pneumonia)  Goal: Effective Oxygenation and Ventilation  Outcome: Ongoing, Not Progressing  Intervention: Promote Airway Secretion Clearance  Recent Flowsheet Documentation  Taken 6/22/2022 0400 by Justine Vee RN  Cough And Deep Breathing: unable to perform  Taken 6/22/2022 0000 by Justine Vee RN  Cough And Deep Breathing: unable to perform  Taken 6/21/2022 2000 by Justine Vee RN  Cough And Deep Breathing: unable to perform  Intervention: Optimize Oxygenation and Ventilation  Recent Flowsheet Documentation  Taken 6/22/2022 0600 by Justine Vee RN  Head of Bed (HOB) Positioning: HOB at 30 degrees  Taken 6/22/2022 0400 by Justine Vee RN  Airway/Ventilation Management:   airway patency maintained   calming measures promoted   humidification applied   pulmonary hygiene  promoted  Head of Bed (HOB) Positioning: HOB at 30 degrees  Taken 6/22/2022 0200 by Justine Vee RN  Head of Bed (HOB) Positioning: HOB at 30 degrees  Taken 6/22/2022 0000 by Justine Vee RN  Airway/Ventilation Management:   airway patency maintained   calming measures promoted   humidification applied   pulmonary hygiene promoted  Head of Bed (HOB) Positioning: HOB at 30 degrees  Taken 6/21/2022 2200 by Justine Vee RN  Head of Bed (HOB) Positioning: HOB at 30 degrees  Taken 6/21/2022 2000 by Justine Vee RN  Airway/Ventilation Management:   airway patency maintained   calming measures promoted   humidification applied   pulmonary hygiene promoted  Head of Bed (HOB) Positioning: HOB at 30 degrees

## 2022-06-22 NOTE — PLAN OF CARE
ICU End of Shift Summary. See flowsheets for vital signs and detailed assessment.    Changes this shift: Patient transitioned to [facility] per family decision to honor patients wishes given recent decline in condition.  Utilized the Terminal Vent weaning protocol to compassionately extubate patient.  Patient's best friend was bedside during the process and stayed with patient until he .  Per patient's wishes Varinder Liu was playing in the background.  Patient was comfortable.    Plan:  Goal Outcome Evaluation: Patient     Plan of Care Reviewed With: patient, sibling, friend (April(sister) & Maurizio(friend))     Overall Patient Progress: declining    Outcome Evaluation: Family has decided to honor patient wishes given current condition and transitioned to [facility] today; compassionate extubation performed

## 2022-06-23 LAB
BACTERIA BLD CULT: ABNORMAL
BACTERIA BLD CULT: ABNORMAL
SCANNED LAB RESULT: NORMAL

## 2022-06-24 LAB — LABCORP INTERFACED MISCELLANEOUS TEST RESULT: NORMAL

## 2022-06-25 LAB — BACTERIA BLD CULT: NO GROWTH

## 2022-06-29 LAB — BACTERIA BRONCH: NORMAL

## 2022-07-07 LAB — BACTERIA BLD CULT: NO GROWTH

## 2022-07-13 LAB
BACTERIA BRONCH: NO GROWTH
BACTERIA BRONCH: NO GROWTH

## 2022-07-14 LAB — BACTERIA BLD CULT: NO GROWTH

## 2022-08-11 LAB
ACID FAST STAIN (ARUP): NORMAL

## 2022-12-12 NOTE — PROGRESS NOTES
Assessment & Plan   Problem List Items Addressed This Visit     CLL (chronic lymphocytic leukemia) (H)    Herpes zoster with keratoconjunctivitis, od - Primary    Paroxysmal atrial fibrillation (H)      Other Visit Diagnoses     Other iron deficiency anemia             Patient with good response to the iron supplement   Does not appear to have hemolytic process although the mild YEN positivity could contribute a little to the anemia  CBC RESULTS:   Recent Labs   Lab Test 05/27/21  1145   WBC 8.3   RBC 4.23*   HGB 12.3*   HCT 37.7*   MCV 89   MCH 29.1   MCHC 32.6   RDW 20.2*   *     Iron sat back to normal , ok to stop iron supplementation   Consider endoscopy if persistent    Cough - clear lungs on exam and cxr from February   No need for repeat at this time   Dizziness and shortness of breath have diminished as anemia resolves             Work on weight loss  Regular exercise    No follow-ups on file.    Campos Parra MD  [facility] FABIANO Adan is a 76 year old who presents for the following health issues     HPI     GI issues  Chronic cough  Dizziness not present   Shortness of breath every once in awhile   Cough that has been going on 3 years ago  Sounds like a hacking rattling cough and leads to a gag reflex  After coughing had shortness of breath with speaking   When eating painful in the mid epigastric   Sharp pain in the area.        Review of Systems   Constitutional, HEENT, cardiovascular, pulmonary, gi and gu systems are negative, except as otherwise noted.      Objective    /68   There is no height or weight on file to calculate BMI.  Physical Exam   GENERAL: healthy, alert and no distress  EYES: Eyes grossly normal to inspection, PERRL and conjunctivae and sclerae normal  HENT: ear canals and TM's normal, nose and mouth without ulcers or lesions  NECK: no adenopathy, no asymmetry, masses, or scars and thyroid normal to palpation  RESP: lungs clear to  Pt states she had ablation Nov 28th. Pt states she is feeling good and would like to know is she can stop taking omeprazole given to her on ablation date.   auscultation - no rales, rhonchi or wheezes  CV: regular rate and rhythm, normal S1 S2, no S3 or S4, no murmur, click or rub, no peripheral edema and peripheral pulses strong  ABDOMEN: soft, nontender, no hepatosplenomegaly, no masses and bowel sounds normal  MS: no gross musculoskeletal defects noted, no edema  SKIN: no suspicious lesions or rashes  NEURO: Normal strength and tone, mentation intact and speech normal  BACK: no CVA tenderness, no paralumbar tenderness  PSYCH: mentation appears normal, affect normal/bright  LYMPH: no cervical, supraclavicular, axillary, or inguinal adenopathy    No results found for any visits on 06/08/21.

## 2024-02-08 NOTE — PATIENT INSTRUCTIONS
Patient Education     Back Sprain or Strain     Injury to the muscles (strain) or ligaments (sprain) around the spine can be troubling. Injury may occur after a sudden forceful twisting or bending such as in a car accident, after a simple awkward movement, or after lifting something heavy with poor body positioning. In any case, muscle spasm is often present and adds to the pain.  Thankfully, most people feel better in 1 to 2 weeks. Most of the rest feel better in 1 to 2 months. Most people can remain active. Unless you had a forceful or traumatic physical injury such as a car accident or fall, X-rays may not be done for the first assessment of a back sprain or strain. If pain continues and doesn't respond to medical treatment, your healthcare provider may then do X-rays and other tests.  Home care  These guidelines will help you care for your injury at home:    When in bed, try to find a comfortable position. A firm mattress is best. Try lying flat on your back with pillows under your knees. You can also try lying on your side with your knees bent up toward your chest and a pillow between your knees.    Don't sit for long periods. Try not to take long car rides or take other trips that have you sitting for a long time. This puts more stress on the lower back than standing or walking.    During the first 24 to 72 hours after an injury or flare-up, put an ice pack on the painful area for 20 minutes. Then remove it for 20 minutes. Do this for 60 to 90 minutes, or several times a day. This will reduce swelling and pain. Always wrap the ice pack in a thin towel or plastic to protect your skin.    You can start with ice, then switch to heat. Heat from a hot shower, hot bath, or heating pad reduces pain and works well for muscle spasms. Put heat on the painful area for 20 minutes, then remove for 20 minutes. Do this for 60 to 90 minutes, or several times a day. Don't use a heating pad while sleeping. It can burn the  Medication:    metFORMIN (GLUCOPHAGE-XR) 500 MG 24 hr tablet       passed protocol.   Last office visit date: 1-14-24  Next appointment scheduled?: No;    Number of refills given: 6 month supply approved      skin.    You can alternate the ice and heat. Talk with your healthcare provider to find out the best treatment or therapy for your back pain.    Therapeutic massage can help relax the back muscles without stretching them.    Be aware of safe lifting methods. Don't lift anything over 15 pounds until all of the pain is gone.  Medicines  Talk with your healthcare provider before using medicines, especially if you have other health problems or are taking other medicines.    You may use over-the-counter medicines such as acetaminophen, ibuprofen, or naproxen to control pain, unless another pain medicine was prescribed. Talk with your healthcare provider before taking any medicines if you have a chronic condition such as diabetes, liver or kidney disease, stomach ulcers, or digestive bleeding, or are taking blood-thinner medicines.    Be careful if you are given prescription medicines, opioids, or medicine for muscle spasm. They can cause drowsiness, and affect your coordination, reflexes, and judgment. Don't drive or operate heavy machinery when taking these types of medicines. Only take pain medicine as prescribed by your healthcare provider.  Follow-up care  Follow up with your healthcare provider, or as advised. You may need physical therapy or more tests if your symptoms get worse.  If you had X-rays, your healthcare provider may be checking for any broken bones, breaks, or fractures. Bruises and sprains can sometimes hurt as much as a fracture. These injuries can take time to heal fully. If your symptoms don t get better or they get worse, talk with your healthcare provider. You may need a repeat X-ray or other tests.  Call 911  Call 911 if any of the following occur:    Trouble breathing    Confused    Very drowsy or trouble awakening    Fainting or loss of consciousness    Rapid or very slow heart rate    Loss of bowel or bladder control  When to seek medical advice  Call your healthcare provider right away if any  of the following occur:    Pain gets worse or spreads to your arms or legs    Weakness or numbness in one or both arms or legs    Numbness in the groin or genital area  [employer] last reviewed this educational content on 11/1/2019 2000-2020 [employer]. [address] [place] [address]. All rights reserved. This information is not intended as a substitute for professional medical care. Always follow your healthcare professional's instructions.           Patient Education     Back Exercises: Arm Reach    Do this exercise on your hands and knees. Keep your knees under your hips and your hands under your shoulders. Keep your spine in a neutral position (not arched or sagging). Be sure to maintain your neck s natural curve:    Stretch one arm straight out in front of you. Don t raise your head or let your supporting shoulder sag.    Hold for 5 seconds.    Return to starting position.    Repeat 5 times.    Switch arms.  [employer] last reviewed this educational content on 3/1/2018    3105-5640 [employer]. [address] [place] [address]. All rights reserved. This information is not intended as a substitute for professional medical care. Always follow your healthcare professional's instructions.           Patient Education     Back Exercises: Leg Pull    To start, lie on your back with your knees bent and feet flat on the floor. Don t press your neck or lower back to the floor. Breathe deeply. You should feel comfortable and relaxed in this position.    Pull one knee to your chest.    Hold for 30 to 60 seconds. Return to starting position.    Repeat 2 times.    Switch legs.    For a double leg pull, pull both legs to your chest at the same time. Repeat 2 times.  For your safety, check with your healthcare provider before starting an exercise program.    [employer] last reviewed this educational content on 3/1/2018    1369-7808 [employer]. [address] [place] [address],  [place] [address]. All rights reserved. This information is not intended as a substitute for professional medical care. Always follow your healthcare professional's instructions.           Patient Education     Back Exercises: Lower Back Rotation    To start, lie on your back with your knees bent and feet flat on the floor. Don t press your neck or lower back to the floor. Breathe deeply. You should feel comfortable and relaxed in this position.    Drop both knees to one side. Turn your head to the other side. Keep your shoulders flat on the floor.    Do not push through pain.    Hold for 20 seconds.    Slowly switch sides.    Repeat 2 to 5 times.  [email or web address] last reviewed this educational content on 3/1/2018    4567-9306 [employer]. [address] [place] [address]. All rights reserved. This information is not intended as a substitute for professional medical care. Always follow your healthcare professional's instructions.           Patient Education     Back Exercises: Lower Back Stretch    To start, sit in a chair with your feet flat on the floor. Shift your weight slightly forward. Relax, and keep your ears, shoulders, and hips aligned while you do the following:    Sit with your feet well apart.    Bend forward and touch the floor with the backs of your hands. Relax and let your body drop.    Hold for 20 seconds. Return to starting position.    Repeat 2 times.   [employer] last reviewed this educational content on 11/1/2017 2000-2020 [employer]. [address] [place] [address]. All rights reserved. This information is not intended as a substitute for professional medical care. Always follow your healthcare professional's instructions.

## 2024-03-22 NOTE — NURSING NOTE
"Oncology Rooming Note    November 12, 2019 12:56 PM   Loco Wood is a 74 year old male who presents for:    Chief Complaint   Patient presents with     Oncology Clinic Visit     Return: CLL (chronic lymphocytic leukemia)     Initial Vitals: There were no vitals taken for this visit. Estimated body mass index is 25.37 kg/m  as calculated from the following:    Height as of 10/10/19: 1.702 m (5' 7.01\").    Weight as of 10/10/19: 73.5 kg (162 lb). There is no height or weight on file to calculate BSA.  Data Unavailable Comment: Data Unavailable   No LMP for male patient.  Allergies reviewed: Yes  Medications reviewed: Yes    Medications: Medication refills not needed today.  Pharmacy name entered into [employer]:    [id number] - [place], [place] - [address] AT [address] OF [address] [place] [address] [facility] [place], [place] - [address]  [facility] - [place], [place] - [address] [facility] - [place], [place] - [address]    Clinical concerns: Pt will discuss concerns with provider.  Dr. Burns was notified.      Patrica Francois CMA              "
started in high school. Drinks with friends once a week 3-4 glasses

## 2025-04-22 NOTE — TELEPHONE ENCOUNTER
Reason for Call:  Medication or medication refill:    Do you use a [facility]?  Name of the pharmacy and phone number for the current request:  [facility] [address] [place] [address]     Name of the medication requested: omeprazole (PRILOSEC)    Other request: No    Can we leave a detailed message on this number? YES    Phone number patient can be reached at: Home number on file [phone] (home)    Best Time: ASAP    Call taken on 8/24/2020 at 4:19 PM by Lisa Pena       good minus Spouse

## (undated) RX ORDER — SODIUM CHLORIDE 9 MG/ML
INJECTION, SOLUTION INTRAVENOUS
Status: DISPENSED
Start: 2020-03-05

## (undated) RX ORDER — CEFAZOLIN SODIUM 2 G/100ML
INJECTION, SOLUTION INTRAVENOUS
Status: DISPENSED
Start: 2020-03-05

## (undated) RX ORDER — FENTANYL CITRATE 50 UG/ML
INJECTION, SOLUTION INTRAMUSCULAR; INTRAVENOUS
Status: DISPENSED
Start: 2017-08-15

## (undated) RX ORDER — NITROGLYCERIN 5 MG/ML
VIAL (ML) INTRAVENOUS
Status: DISPENSED
Start: 2020-03-05

## (undated) RX ORDER — LIDOCAINE HYDROCHLORIDE 10 MG/ML
INJECTION, SOLUTION EPIDURAL; INFILTRATION; INTRACAUDAL; PERINEURAL
Status: DISPENSED
Start: 2022-01-01

## (undated) RX ORDER — FENTANYL CITRATE 50 UG/ML
INJECTION, SOLUTION INTRAMUSCULAR; INTRAVENOUS
Status: DISPENSED
Start: 2020-03-05

## (undated) RX ORDER — TRIAMCINOLONE ACETONIDE 40 MG/ML
INJECTION, SUSPENSION INTRA-ARTICULAR; INTRAMUSCULAR
Status: DISPENSED
Start: 2020-02-04

## (undated) RX ORDER — LIDOCAINE HYDROCHLORIDE 10 MG/ML
INJECTION, SOLUTION EPIDURAL; INFILTRATION; INTRACAUDAL; PERINEURAL
Status: DISPENSED
Start: 2020-03-05

## (undated) RX ORDER — LIDOCAINE HYDROCHLORIDE 10 MG/ML
INJECTION, SOLUTION EPIDURAL; INFILTRATION; INTRACAUDAL; PERINEURAL
Status: DISPENSED
Start: 2020-02-04

## (undated) RX ORDER — METOPROLOL TARTRATE 1 MG/ML
INJECTION, SOLUTION INTRAVENOUS
Status: DISPENSED
Start: 2021-04-26

## (undated) RX ORDER — TRIAMCINOLONE ACETONIDE 40 MG/ML
INJECTION, SUSPENSION INTRA-ARTICULAR; INTRAMUSCULAR
Status: DISPENSED
Start: 2022-01-01

## (undated) RX ORDER — DOBUTAMINE HYDROCHLORIDE 200 MG/100ML
INJECTION INTRAVENOUS
Status: DISPENSED
Start: 2021-04-26

## (undated) RX ORDER — ATROPINE SULFATE 0.4 MG/ML
AMPUL (ML) INJECTION
Status: DISPENSED
Start: 2021-04-26